# Patient Record
Sex: FEMALE | Race: WHITE
[De-identification: names, ages, dates, MRNs, and addresses within clinical notes are randomized per-mention and may not be internally consistent; named-entity substitution may affect disease eponyms.]

---

## 2022-07-14 PROBLEM — Z00.00 ENCOUNTER FOR PREVENTIVE HEALTH EXAMINATION: Status: ACTIVE | Noted: 2022-07-14

## 2022-07-19 ENCOUNTER — APPOINTMENT (OUTPATIENT)
Dept: ORTHOPEDIC SURGERY | Facility: CLINIC | Age: 87
End: 2022-07-19

## 2022-07-19 DIAGNOSIS — I51.9 HEART DISEASE, UNSPECIFIED: ICD-10-CM

## 2022-07-19 DIAGNOSIS — M79.2 NEURALGIA AND NEURITIS, UNSPECIFIED: ICD-10-CM

## 2022-07-19 DIAGNOSIS — I10 ESSENTIAL (PRIMARY) HYPERTENSION: ICD-10-CM

## 2022-07-19 PROCEDURE — 73610 X-RAY EXAM OF ANKLE: CPT | Mod: RT

## 2022-07-19 PROCEDURE — 99203 OFFICE O/P NEW LOW 30 MIN: CPT

## 2022-07-19 NOTE — PHYSICAL EXAM
[Right] : right foot and ankle [0___] : dorsiflexion 0[unfilled]/5 [5___] : plantar flexion 5[unfilled]/5 [2+] : dorsalis pedis pulse: 2+ [] : uses wheelchair [FreeTextEntry8] : reports diffuse ttp - no localizing to specific area [FreeTextEntry9] : limited by foot drop [de-identified] : decreased diffusely

## 2022-07-19 NOTE — ASSESSMENT
[FreeTextEntry1] : likely nerve pain related to nerve injury at time of surgery\par rec pain management for chronic nerve pain\par rec continued f/up with hip specialist\par continue afo\par f/up prn

## 2022-07-19 NOTE — HISTORY OF PRESENT ILLNESS
[de-identified] : 07/19/2022:  RLE pain s/p revision R hip surgery a few months ago c/b by foot drop. wears afo. reports radiating pain in RLE. going to PT. has been confined to wheelchair siince surgery. no dm/tob.  [FreeTextEntry1] : right lower leg/ankle [FreeTextEntry6] : numbness

## 2022-07-19 NOTE — IMAGING
[Right] : right ankle [There are no fractures, subluxations or dislocations. No significant abnormalities are seen] : There are no fractures, subluxations or dislocations. No significant abnormalities are seen

## 2022-08-10 ENCOUNTER — APPOINTMENT (OUTPATIENT)
Dept: PAIN MANAGEMENT | Facility: CLINIC | Age: 87
End: 2022-08-10

## 2022-08-10 VITALS — HEIGHT: 62 IN | BODY MASS INDEX: 22.82 KG/M2 | WEIGHT: 124 LBS

## 2022-08-10 PROCEDURE — 99204 OFFICE O/P NEW MOD 45 MIN: CPT

## 2022-08-10 RX ORDER — GABAPENTIN 300 MG/1
300 CAPSULE ORAL
Qty: 60 | Refills: 2 | Status: ACTIVE | COMMUNITY
Start: 2022-08-10 | End: 1900-01-01

## 2022-08-10 NOTE — PHYSICAL EXAM
[2___] : right hip flexion  2[unfilled]/5 [1___] : right extensor hallicus longus 1[unfilled]/5 [] : positive equivocal straight leg raise [FreeTextEntry9] : Not tested

## 2022-08-10 NOTE — ASSESSMENT
[FreeTextEntry1] : After discussing various treatment options with the patient including but not limited to oral medications, physical therapy, exercise, modalities as well as interventional spinal injections, we have decided with the following plan:\par \par 1) A MRI is indicated as there has been failure of numerous conservative therapies over the last 6-8 weeks. these include but are not limited to medication therapy and physical therapy. \par \par There has been no further work up after diagnosis of drop foot. \par \par 2) increase gabapentin - I would recommend a trial of neuropathic medication as patient presents with signs of nerve irritation. (ie burning, paresthesias etc) Goals of therapy would be to improve pain and overall QOL. Side effects reviewed with patient. Patient will call or stop medication if given side effects occur.\par

## 2022-08-10 NOTE — HISTORY OF PRESENT ILLNESS
[10] : 10 [Shooting] : shooting [Constant] : constant [Meds] : meds [Lying in bed] : lying in bed [] : no [FreeTextEntry1] : Right leg  [FreeTextEntry6] : Soreness [de-identified] : 03/2022

## 2022-08-11 ENCOUNTER — EMERGENCY (EMERGENCY)
Facility: HOSPITAL | Age: 87
LOS: 1 days | Discharge: HOME | End: 2022-08-11
Attending: EMERGENCY MEDICINE | Admitting: EMERGENCY MEDICINE
Payer: MEDICARE

## 2022-08-11 VITALS
TEMPERATURE: 98 F | OXYGEN SATURATION: 96 % | RESPIRATION RATE: 16 BRPM | DIASTOLIC BLOOD PRESSURE: 80 MMHG | HEART RATE: 68 BPM | SYSTOLIC BLOOD PRESSURE: 135 MMHG

## 2022-08-11 VITALS
RESPIRATION RATE: 17 BRPM | TEMPERATURE: 98 F | DIASTOLIC BLOOD PRESSURE: 65 MMHG | OXYGEN SATURATION: 97 % | SYSTOLIC BLOOD PRESSURE: 135 MMHG | HEART RATE: 69 BPM

## 2022-08-11 PROCEDURE — 99283 EMERGENCY DEPT VISIT LOW MDM: CPT

## 2022-08-11 PROCEDURE — 99282 EMERGENCY DEPT VISIT SF MDM: CPT

## 2022-08-11 NOTE — ED PROVIDER NOTE - PATIENT PORTAL LINK FT
You can access the FollowMyHealth Patient Portal offered by [facility] by registering at the following website: [email or web address]. By joining [employer]’s FollowMyHealth portal, you will also be able to view your health information using other applications (apps) compatible with our system. You can access the FollowMyHealth Patient Portal offered by [facility] by registering at the following website: [email or web address]. By joining [employer]’s FollowMyHealth portal, you will also be able to view your health information using other applications (apps) compatible with our system. You can access the FollowMyHealth Patient Portal offered by [facility] by registering at the following website: [email or web address]. By joining [employer]’s FollowMyHealth portal, you will also be able to view your health information using other applications (apps) compatible with our system.

## 2022-08-11 NOTE — ED ADULT NURSE NOTE - NSICDXPASTMEDICALHX_GEN_ALL_CORE_FT
PAST MEDICAL HISTORY:  Afib     Degeneration macular     HTN (hypertension)     Osteoporosis     PFO (patent foramen ovale)     Spinal stenosis

## 2022-08-11 NOTE — ED PROVIDER NOTE - OBJECTIVE STATEMENT
Patient brought in by EMS from assisted living for laceration to left thumb from last night.  Patient relates she was adjusting something on her wheelchair at approximately 6 PM when she accidentally cut her finger.  Tetanus up-to-date.  No weakness numbness or any other injuries.    PMAROLDO Baker

## 2022-08-11 NOTE — ED ADULT NURSE NOTE - OBJECTIVE STATEMENT
Presents to ER with left thumb avulsion. Pt states she cut it on her wheelchair last night. Dr West stating wound cannot be sutured, will place steri strips to wound.

## 2022-08-11 NOTE — ED PROVIDER NOTE - CLINICAL SUMMARY MEDICAL DECISION MAKING FREE TEXT BOX
Patient brought in by EMS for laceration to left thumb more than 12 hours ago.  No weakness or numbness or any other injuries.  Tetanus up-to-date.  Due to laceration occurring more than 12 hours ago, wound cleansed Steri-Strips placed, not repaired with sutures due to increased risk of infection

## 2022-08-11 NOTE — ED PROVIDER NOTE - SKIN, MLM
Skin normal color for race, warm, dry. left thumb approx 2cm laceration over prox phalanx, full rom, distal sensation intact cap refill < 2 secs

## 2022-08-11 NOTE — ED ADULT NURSE NOTE - NSIMPLEMENTINTERV_GEN_ALL_ED
Implemented All Universal Safety Interventions:  [place] to call system. Call bell, personal items and telephone within reach. Instruct patient to call for assistance. Room bathroom lighting operational. Non-slip footwear when patient is off stretcher. Physically safe environment: no spills, clutter or unnecessary equipment. Stretcher in lowest position, wheels locked, appropriate side rails in place. Implemented All Universal Safety Interventions:  [place] to call system. Call bell, personal items and telephone within reach. Instruct patient to call for assistance. Room bathroom lighting operational. Non-slip footwear when patient is off stretcher. Physically safe environment: no spills, clutter or unnecessary equipment. Stretcher in lowest position, wheels locked, appropriate side rails in place. Implemented All Universal Safety Interventions:  [place] to call system. Call bell, personal items and telephone within reach. Instruct patient to call for assistance. Room bathroom lighting operational. Non-slip footwear when patient is off stretcher. Physically safe environment: no spills, clutter or unnecessary equipment. Stretcher in lowest position, wheels locked, appropriate side rails in place.

## 2022-08-16 ENCOUNTER — FORM ENCOUNTER (OUTPATIENT)
Age: 87
End: 2022-08-16

## 2022-08-17 ENCOUNTER — APPOINTMENT (OUTPATIENT)
Dept: MRI IMAGING | Facility: CLINIC | Age: 87
End: 2022-08-17

## 2022-08-17 PROCEDURE — 72148 MRI LUMBAR SPINE W/O DYE: CPT | Mod: MH

## 2022-08-30 ENCOUNTER — NON-APPOINTMENT (OUTPATIENT)
Age: 87
End: 2022-08-30

## 2022-09-26 ENCOUNTER — APPOINTMENT (OUTPATIENT)
Dept: PAIN MANAGEMENT | Facility: CLINIC | Age: 87
End: 2022-09-26

## 2022-09-26 VITALS — WEIGHT: 129 LBS | BODY MASS INDEX: 23.74 KG/M2 | HEIGHT: 62 IN

## 2022-10-20 ENCOUNTER — APPOINTMENT (OUTPATIENT)
Age: 87
End: 2022-10-20

## 2022-11-03 ENCOUNTER — APPOINTMENT (OUTPATIENT)
Age: 87
End: 2022-11-03

## 2022-11-03 PROCEDURE — 64483 NJX AA&/STRD TFRM EPI L/S 1: CPT | Mod: RT

## 2022-11-21 ENCOUNTER — APPOINTMENT (OUTPATIENT)
Dept: PAIN MANAGEMENT | Facility: CLINIC | Age: 87
End: 2022-11-21

## 2023-01-24 ENCOUNTER — APPOINTMENT (OUTPATIENT)
Dept: ORTHOPEDIC SURGERY | Facility: CLINIC | Age: 88
End: 2023-01-24
Payer: MEDICARE

## 2023-01-24 VITALS — HEIGHT: 62 IN | WEIGHT: 129 LBS | BODY MASS INDEX: 23.74 KG/M2

## 2023-01-24 DIAGNOSIS — M21.371 FOOT DROP, RIGHT FOOT: ICD-10-CM

## 2023-01-24 PROCEDURE — 99213 OFFICE O/P EST LOW 20 MIN: CPT

## 2023-01-24 NOTE — PHYSICAL EXAM
[Right] : right foot and ankle [0___] : dorsiflexion 0[unfilled]/5 [5___] : plantar flexion 5[unfilled]/5 [2+] : dorsalis pedis pulse: 2+ [] : no gross deformity [FreeTextEntry8] : no sig poin ttp [FreeTextEntry9] : limited by foot drop [de-identified] : decreased diffusely

## 2023-01-24 NOTE — HISTORY OF PRESENT ILLNESS
[de-identified] : 07/19/2022:  RLE pain s/p revision R hip surgery a few months ago c/b by foot drop. wears afo. reports radiating pain in RLE. going to PT. has been confined to wheelchair siince surgery. no dm/tob. \par \par 01/24/2023: here requesting rx for new afo. no sig change.  [] : Post Surgical Visit: no [FreeTextEntry1] : right lower leg/ankle [FreeTextEntry6] : numbness

## 2023-01-24 NOTE — ASSESSMENT
[FreeTextEntry1] : seeing dr oconnell for pain management\par rec continued f/up with hip specialist\par new rx for afo provided\par f/up prn\par \par The patient is ambulatory and requires stabilization at the ankle to continue safe ambulation. The patient requires a custom brace because they cannot fit into a prefabricated brace due to the limb size and shape. Also, the condition necessitating the orthosis is expected to be of longstanding duration. There is also a need to control the ankle in multiple planes.\par \par

## 2023-03-04 ENCOUNTER — EMERGENCY (EMERGENCY)
Facility: HOSPITAL | Age: 88
LOS: 1 days | Discharge: HOME | End: 2023-03-04
Attending: STUDENT IN AN ORGANIZED HEALTH CARE EDUCATION/TRAINING PROGRAM | Admitting: STUDENT IN AN ORGANIZED HEALTH CARE EDUCATION/TRAINING PROGRAM
Payer: MEDICARE

## 2023-03-04 VITALS
OXYGEN SATURATION: 92 % | HEART RATE: 60 BPM | TEMPERATURE: 98 F | RESPIRATION RATE: 20 BRPM | DIASTOLIC BLOOD PRESSURE: 80 MMHG | SYSTOLIC BLOOD PRESSURE: 182 MMHG | WEIGHT: 130.07 LBS

## 2023-03-04 VITALS
RESPIRATION RATE: 18 BRPM | OXYGEN SATURATION: 97 % | SYSTOLIC BLOOD PRESSURE: 177 MMHG | TEMPERATURE: 98 F | HEART RATE: 59 BPM | DIASTOLIC BLOOD PRESSURE: 98 MMHG

## 2023-03-04 LAB
ALBUMIN SERPL ELPH-MCNC: 3.1 G/DL — LOW (ref 3.3–5)
ALP SERPL-CCNC: 121 U/L — HIGH (ref 40–120)
ALT FLD-CCNC: 22 U/L — SIGNIFICANT CHANGE UP (ref 12–78)
ANION GAP SERPL CALC-SCNC: 4 MMOL/L — LOW (ref 5–17)
APPEARANCE UR: ABNORMAL
APTT BLD: 29.7 SEC — SIGNIFICANT CHANGE UP (ref 27.5–35.5)
AST SERPL-CCNC: 22 U/L — SIGNIFICANT CHANGE UP (ref 15–37)
BASOPHILS # BLD AUTO: 0.07 K/UL — SIGNIFICANT CHANGE UP (ref 0–0.2)
BASOPHILS NFR BLD AUTO: 0.7 % — SIGNIFICANT CHANGE UP (ref 0–2)
BILIRUB SERPL-MCNC: 0.6 MG/DL — SIGNIFICANT CHANGE UP (ref 0.2–1.2)
BILIRUB UR-MCNC: NEGATIVE — SIGNIFICANT CHANGE UP
BUN SERPL-MCNC: 30 MG/DL — HIGH (ref 7–23)
CALCIUM SERPL-MCNC: 8.7 MG/DL — SIGNIFICANT CHANGE UP (ref 8.5–10.1)
CHLORIDE SERPL-SCNC: 111 MMOL/L — HIGH (ref 96–108)
CO2 SERPL-SCNC: 27 MMOL/L — SIGNIFICANT CHANGE UP (ref 22–31)
COLOR SPEC: YELLOW — SIGNIFICANT CHANGE UP
CREAT SERPL-MCNC: 0.89 MG/DL — SIGNIFICANT CHANGE UP (ref 0.5–1.3)
DIFF PNL FLD: ABNORMAL
EGFR: 60 ML/MIN/1.73M2 — SIGNIFICANT CHANGE UP
EOSINOPHIL # BLD AUTO: 0.06 K/UL — SIGNIFICANT CHANGE UP (ref 0–0.5)
EOSINOPHIL NFR BLD AUTO: 0.6 % — SIGNIFICANT CHANGE UP (ref 0–6)
GLUCOSE SERPL-MCNC: 90 MG/DL — SIGNIFICANT CHANGE UP (ref 70–99)
GLUCOSE UR QL: NEGATIVE — SIGNIFICANT CHANGE UP
HCT VFR BLD CALC: 36.6 % — SIGNIFICANT CHANGE UP (ref 34.5–45)
HGB BLD-MCNC: 11.4 G/DL — LOW (ref 11.5–15.5)
IMM GRANULOCYTES NFR BLD AUTO: 0.5 % — SIGNIFICANT CHANGE UP (ref 0–0.9)
INR BLD: 1 RATIO — SIGNIFICANT CHANGE UP (ref 0.88–1.16)
KETONES UR-MCNC: NEGATIVE — SIGNIFICANT CHANGE UP
LEUKOCYTE ESTERASE UR-ACNC: ABNORMAL
LIDOCAIN IGE QN: 168 U/L — SIGNIFICANT CHANGE UP (ref 73–393)
LYMPHOCYTES # BLD AUTO: 1.21 K/UL — SIGNIFICANT CHANGE UP (ref 1–3.3)
LYMPHOCYTES # BLD AUTO: 12.7 % — LOW (ref 13–44)
MCHC RBC-ENTMCNC: 30.4 PG — SIGNIFICANT CHANGE UP (ref 27–34)
MCHC RBC-ENTMCNC: 31.1 GM/DL — LOW (ref 32–36)
MCV RBC AUTO: 97.6 FL — SIGNIFICANT CHANGE UP (ref 80–100)
MONOCYTES # BLD AUTO: 0.69 K/UL — SIGNIFICANT CHANGE UP (ref 0–0.9)
MONOCYTES NFR BLD AUTO: 7.2 % — SIGNIFICANT CHANGE UP (ref 2–14)
NEUTROPHILS # BLD AUTO: 7.45 K/UL — HIGH (ref 1.8–7.4)
NEUTROPHILS NFR BLD AUTO: 78.3 % — HIGH (ref 43–77)
NITRITE UR-MCNC: NEGATIVE — SIGNIFICANT CHANGE UP
NRBC # BLD: 0 /100 WBCS — SIGNIFICANT CHANGE UP (ref 0–0)
PH UR: 5 — SIGNIFICANT CHANGE UP (ref 5–8)
PLATELET # BLD AUTO: 271 K/UL — SIGNIFICANT CHANGE UP (ref 150–400)
POTASSIUM SERPL-MCNC: 4.4 MMOL/L — SIGNIFICANT CHANGE UP (ref 3.5–5.3)
POTASSIUM SERPL-SCNC: 4.4 MMOL/L — SIGNIFICANT CHANGE UP (ref 3.5–5.3)
PROT SERPL-MCNC: 7 G/DL — SIGNIFICANT CHANGE UP (ref 6–8.3)
PROT UR-MCNC: 30 MG/DL
PROTHROM AB SERPL-ACNC: 11.7 SEC — SIGNIFICANT CHANGE UP (ref 10.5–13.4)
RBC # BLD: 3.75 M/UL — LOW (ref 3.8–5.2)
RBC # FLD: 14 % — SIGNIFICANT CHANGE UP (ref 10.3–14.5)
SODIUM SERPL-SCNC: 142 MMOL/L — SIGNIFICANT CHANGE UP (ref 135–145)
SP GR SPEC: 1.01 — SIGNIFICANT CHANGE UP (ref 1.01–1.02)
UROBILINOGEN FLD QL: NEGATIVE — SIGNIFICANT CHANGE UP
WBC # BLD: 9.53 K/UL — SIGNIFICANT CHANGE UP (ref 3.8–10.5)
WBC # FLD AUTO: 9.53 K/UL — SIGNIFICANT CHANGE UP (ref 3.8–10.5)

## 2023-03-04 PROCEDURE — 74176 CT ABD & PELVIS W/O CONTRAST: CPT | Mod: MA

## 2023-03-04 PROCEDURE — 85025 COMPLETE CBC W/AUTO DIFF WBC: CPT

## 2023-03-04 PROCEDURE — 36415 COLL VENOUS BLD VENIPUNCTURE: CPT

## 2023-03-04 PROCEDURE — 81001 URINALYSIS AUTO W/SCOPE: CPT

## 2023-03-04 PROCEDURE — 83690 ASSAY OF LIPASE: CPT

## 2023-03-04 PROCEDURE — 85610 PROTHROMBIN TIME: CPT

## 2023-03-04 PROCEDURE — 80053 COMPREHEN METABOLIC PANEL: CPT

## 2023-03-04 PROCEDURE — 85730 THROMBOPLASTIN TIME PARTIAL: CPT

## 2023-03-04 PROCEDURE — 99284 EMERGENCY DEPT VISIT MOD MDM: CPT | Mod: FS

## 2023-03-04 PROCEDURE — 99284 EMERGENCY DEPT VISIT MOD MDM: CPT | Mod: 25

## 2023-03-04 PROCEDURE — 76856 US EXAM PELVIC COMPLETE: CPT

## 2023-03-04 PROCEDURE — 74176 CT ABD & PELVIS W/O CONTRAST: CPT | Mod: 26,MA

## 2023-03-04 PROCEDURE — 86850 RBC ANTIBODY SCREEN: CPT

## 2023-03-04 PROCEDURE — 76856 US EXAM PELVIC COMPLETE: CPT | Mod: 26

## 2023-03-04 PROCEDURE — 87186 SC STD MICRODIL/AGAR DIL: CPT

## 2023-03-04 PROCEDURE — 86901 BLOOD TYPING SEROLOGIC RH(D): CPT

## 2023-03-04 PROCEDURE — 86900 BLOOD TYPING SEROLOGIC ABO: CPT

## 2023-03-04 PROCEDURE — 87086 URINE CULTURE/COLONY COUNT: CPT

## 2023-03-04 NOTE — ED PROVIDER NOTE - PROGRESS NOTE DETAILS
daughter notified of results and plan - states has f/u with gyn wednesday - advised to keep appt, return precautions discussed

## 2023-03-04 NOTE — ED PROVIDER NOTE - OBJECTIVE STATEMENT
89 F c/o vaginal bleeding. Has been ongoing issue x months, but facility felt it was increased recently so sent to ED. Pt on eliquis. Denies pain, f/c, cp, sob, palpitations.

## 2023-03-04 NOTE — ED PROVIDER NOTE - PATIENT PORTAL LINK FT
You can access the FollowMyHealth Patient Portal offered by [facility] by registering at the following website: [email or web address]. By joining [employer]’s FollowMyHealth portal, you will also be able to view your health information using other applications (apps) compatible with our system. You can access the FollowMyHealth Patient Portal offered by [facility] by registering at the following website: [email or web address]. By joining [employer]’s FollowMyHealth portal, you will also be able to view your health information using other applications (apps) compatible with our system. You can access the FollowMyHealth Patient Portal offered by [facility] by registering at the following website: [email or web address]. By joining [employer]’s FollowMyHealth portal, you will also be able to view your health information using other applications (apps) compatible with our system.

## 2023-03-04 NOTE — ED PROVIDER NOTE - NSFOLLOWUPINSTRUCTIONS_ED_ALL_ED_FT
Follow up with gynecologist as planned.  Return for worsening or concerning symptoms.          Abnormal uterine bleeding means bleeding more than normal from your womb (uterus). It can include:  •Bleeding after sex.      •Bleeding between monthly (menstrual) periods.      •Bleeding that is heavier than normal.      •Monthly periods that last longer than normal.      •Bleeding after you have stopped having your monthly period (menopause).      You should see a doctor for any kind of bleeding that is not normal. Treatment depends on the cause of your bleeding and how much you bleed.      Follow these instructions at home:    Medicines     •Take over-the-counter and prescription medicines only as told by your doctor.    •Ask your doctor about:  •Taking medicines such as aspirin and ibuprofen. Do not take these medicines unless your doctor tells you to take them.      •Taking over-the-counter medicines, vitamins, herbs, and supplements.        •You may be given iron pills. Take them as told by your doctor.      Managing constipation     If you take iron pills, you may need to take these actions to prevent or treat trouble pooping (constipation):  •Drink enough fluid to keep your pee (urine) pale yellow.      •Take over-the-counter or prescription medicines.      •Eat foods that are high in fiber. These include beans, whole grains, and fresh fruits and vegetables.      •Limit foods that are high in fat and sugar. These include fried or sweet foods.      Activity    Change your activity to decrease bleeding if you need to change your sanitary pad more than one time every 2 hours:  •Lie in bed with your feet raised (elevated).      •Place a cold pack on your lower belly.      •Rest as much as you are able until the bleeding stops or slows down.      General instructions    •Do not use tampons, douche, or have sex until your doctor says these things are okay.      •Change your pads often.    •Get regular exams. These include:   •Pelvic exams.      •Screenings for cancer of the cervix.        •It is up to you to get the results of any tests that are done. Ask how to get your results when they are ready.    •Watch for any changes in your bleeding. For 2 months, write down:  •When your monthly period starts.      •When your monthly period ends.      •When you get any abnormal bleeding from your vagina.      •What problems you notice.        •Keep all follow-up visits.        Contact a doctor if:    •The bleeding lasts more than one week.      •You feel dizzy at times.      •You feel like you may vomit (nausea).      •You vomit.      •You feel light-headed or weak.      •Your symptoms get worse.        Get help right away if:    •You faint.      •You have to change pads every hour.      •You have pain in your belly.      •You have a fever or chills.      •You get sweaty or weak.      •You pass large blood clots from your vagina.      These symptoms may be an emergency. Get help right away. Call your local emergency services (911 in the [place]    • Do not wait to see if the symptoms will go away.       • Do not drive yourself to the hospital.         Summary    •Abnormal uterine bleeding means bleeding more than normal from your womb (uterus).      •Any kind of bleeding that is not normal should be checked by a doctor.      •Treatment depends on the cause of your bleeding and how much you bleed.      •Get help right away if you faint, you have to change pads every hour, or you pass large blood clots from your vagina.      This information is not intended to replace advice given to you by your health care provider. Make sure you discuss any questions you have with your health care provider.

## 2023-03-04 NOTE — ED ADULT TRIAGE NOTE - NS ED NURSE AMBULANCES
[facility]

## 2023-03-04 NOTE — ED ADULT NURSE NOTE - CAS EDP DISCH TYPE
[facility]/Assisted living facility [facility]Assisted living facility [facility]/Assisted living facility

## 2023-03-04 NOTE — ED ADULT NURSE NOTE - NSIMPLEMENTINTERV_GEN_ALL_ED
Implemented All Universal Safety Interventions:  [place] to call system. Call bell, personal items and telephone within reach. Instruct patient to call for assistance. Room bathroom lighting operational. Non-slip footwear when patient is off stretcher. Physically safe environment: no spills, clutter or unnecessary equipment. Stretcher in lowest position, wheels locked, appropriate side rails in place. Implemented All Universal Safety Interventions:  [place] to call system. Call bell, personal items and telephone within reach. Instruct patient to call for assistance. Room bathroom lighting operational. Non-slip footwear when patient is off stretcher. Physically safe environment: no spills, clutter or unnecessary equipment. Stretcher in lowest position, wheels locked, appropriate side rails in place. Implemented All Universal Safety Interventions:  [place] to call system. Call bell, personal items and telephone within reach. Instruct patient to call for assistance. Room bathroom lighting operational. Non-slip footwear when patient is off stretcher. Physically safe environment: no spills, clutter or unnecessary equipment. Stretcher in lowest position, wheels locked, appropriate side rails in place.

## 2023-03-04 NOTE — ED ADULT NURSE NOTE - OBJECTIVE STATEMENT
Patient is 89yo F presents with c/o vaginal or rectal bleeding x2 days. Patient reports she is on Eliquis, noticed blood in her diaper two days ago. Patient reports she is unsure if it is from "the front or the behind". Patient denies pain anywhere, denies palpitations, dizziness, visual changes, or recent injury. Per papers from [facility] patient did not receive her Eliquis today. Patient is 89yo F presents with c/o vaginal or rectal bleeding x2 days. Patient reports she is on Eliquis, noticed blood in her diaper two days ago. Patient reports she is unsure if it is from "the front or the behind". Patient denies pain anywhere, denies palpitations, dizziness, visual changes, or recent injury. Per papers from [facility] patient did not receive her Eliquis today. Patient is 89yo F presents with c/o vaginal or rectal bleeding x2 days. Patient reports she is on Eliquis, noticed blood in her diaper two days ago. Patient reports she is unsure if it is from "the front or the behind". Patient denies pain anywhere, denies palpitations, dizziness, visual changes, or recent injury. Per papers from [facility] patient did not receive her Eliquis today.

## 2023-03-04 NOTE — ED ADULT NURSE REASSESSMENT NOTE - NSIMPLEMENTINTERV_GEN_ALL_ED
Implemented All Fall with Harm Risk Interventions:  [place] to call system. Call bell, personal items and telephone within reach. Instruct patient to call for assistance. Room bathroom lighting operational. Non-slip footwear when patient is off stretcher. Physically safe environment: no spills, clutter or unnecessary equipment. Stretcher in lowest position, wheels locked, appropriate side rails in place. Provide visual cue, wrist band, yellow gown, etc. Monitor gait and stability. Monitor for mental status changes and reorient to person, place, and time. Review medications for side effects contributing to fall risk. Reinforce activity limits and safety measures with patient and family. Provide visual clues: red socks. Implemented All Fall with Harm Risk Interventions:  [place] to call system. Call bell, personal items and telephone within reach. Instruct patient to call for assistance. Room bathroom lighting operational. Non-slip footwear when patient is off stretcher. Physically safe environment: no spills, clutter or unnecessary equipment. Stretcher in lowest position, wheels locked, appropriate side rails in place. Provide visual cue, wrist band, yellow gown, etc. Monitor gait and stability. Monitor for mental status changes and reorient to person, place, and time. Review medications for side effects contributing to fall risk. Reinforce activity limits and safety measures with patient and family. Provide visual clues: red socks. Implemented All Fall with Harm Risk Interventions:  [place] to call system. Call bell, personal items and telephone within reach. Instruct patient to call for assistance. Room bathroom lighting operational. Non-slip footwear when patient is off stretcher. Physically safe environment: no spills, clutter or unnecessary equipment. Stretcher in lowest position, wheels locked, appropriate side rails in place. Provide visual cue, wrist band, yellow gown, etc. Monitor gait and stability. Monitor for mental status changes and reorient to person, place, and time. Review medications for side effects contributing to fall risk. Reinforce activity limits and safety measures with patient and family. Provide visual clues: red socks.

## 2023-03-04 NOTE — ED ADULT NURSE REASSESSMENT NOTE - NS ED NURSE REASSESS COMMENT FT1
Pt resting comfortably in bed at this time, offers no complaints.  Denies any chest pain or SOB.  No n/v/d.  Pt still appears to be having some vaginal bleeding.  Pending ultrasound.  Denies any pain or discomfort.  Maintain comfort and safety.

## 2023-03-04 NOTE — ED PROVIDER NOTE - NS ED ATTENDING STATEMENT MOD
This was a shared visit with the ASAD. I reviewed and verified the documentation and independently performed the documented:

## 2023-03-04 NOTE — ED PROVIDER NOTE - CLINICAL SUMMARY MEDICAL DECISION MAKING FREE TEXT BOX
I, Gideon Salgado MD, have seen and examined the patient on the date of service.  I agree with the ASAD's assessment as written, with exceptions or additions as noted below or in a separate note. pt with pmh of afib on eliquis, htn is here for vaginal bleeding. states present for months, but feels like increased recently so came to ed. otherwise denies any complaints. no cp/sob/abdominal pain/nausea/vomiting. on exam, pt without vasquez bleeding or abd pain. there is some blood noted on her diaper. a/p: unclear etiology to bleeding, possible hematuria vs vaginal bleeding, maybe from fibroids vs malignancy? lower concern for gi bleed given no reported melena. given chronicity, will check for anemia as well as imaging for pathology. will also check for uti. plan for labs/imaging.

## 2023-03-07 LAB
-  AMPICILLIN: SIGNIFICANT CHANGE UP
-  CIPROFLOXACIN: SIGNIFICANT CHANGE UP
-  LEVOFLOXACIN: SIGNIFICANT CHANGE UP
-  NITROFURANTOIN: SIGNIFICANT CHANGE UP
-  TETRACYCLINE: SIGNIFICANT CHANGE UP
-  VANCOMYCIN: SIGNIFICANT CHANGE UP
CULTURE RESULTS: SIGNIFICANT CHANGE UP
METHOD TYPE: SIGNIFICANT CHANGE UP
ORGANISM # SPEC MICROSCOPIC CNT: SIGNIFICANT CHANGE UP
SPECIMEN SOURCE: SIGNIFICANT CHANGE UP

## 2023-03-07 NOTE — ED POST DISCHARGE NOTE - RESULT SUMMARY
Enterococcus found in the urine.  I spoke with patient's daughter as well as with patient's assisted living, Bactrim sent to pharmacy.  Pending sensitivity.  Gideon Salgado MD.

## 2023-03-07 NOTE — ED POST DISCHARGE NOTE - DETAILS
spoke with daughter and assisted living facility Urine cultures return, sensitive to fluoroquinolones as well as Macrobid.  Given patient amiodarone, fluoroquinolone not a great option so Macrobid prescribed.  I called patient assisted living and let them know that we are switching her antibiotics.  Gideon Salgado MD.

## 2023-03-08 RX ORDER — NITROFURANTOIN MACROCRYSTAL 50 MG
1 CAPSULE ORAL
Qty: 14 | Refills: 0
Start: 2023-03-08 | End: 2023-03-14

## 2023-07-10 ENCOUNTER — INPATIENT (INPATIENT)
Facility: HOSPITAL | Age: 88
LOS: 1 days | Discharge: NURSING, REHAB OR LONG-TERM CARE | DRG: 309 | End: 2023-07-12
Attending: INTERNAL MEDICINE | Admitting: INTERNAL MEDICINE
Payer: MEDICARE

## 2023-07-10 VITALS
HEART RATE: 40 BPM | TEMPERATURE: 98 F | OXYGEN SATURATION: 99 % | DIASTOLIC BLOOD PRESSURE: 54 MMHG | SYSTOLIC BLOOD PRESSURE: 102 MMHG | RESPIRATION RATE: 18 BRPM

## 2023-07-10 PROCEDURE — 99291 CRITICAL CARE FIRST HOUR: CPT

## 2023-07-10 NOTE — ED ADULT TRIAGE NOTE - MEANS OF ARRIVAL
stretcher Consent 1/Introductory Paragraph: The rationale for Mohs was explained to the patient and consent was obtained. The risks, benefits and alternatives to therapy were discussed in detail. Specifically, the risks of infection, scarring, bleeding, prolonged wound healing, incomplete removal, allergy to anesthesia, nerve injury and recurrence were addressed. Prior to the procedure, the treatment site was clearly identified and confirmed by the patient. All components of Universal Protocol/PAUSE Rule completed.

## 2023-07-10 NOTE — ED ADULT TRIAGE NOTE - CHIEF COMPLAINT QUOTE
per ems from [facility] for shortness of breath, oxygen saturation on room air 95%, arrives on nasal cannula. pt breathing unlabored. per ems HR 30-60s. pt received 1mg of atropine IV per ems per ems from [facility] for shortness of breath, oxygen saturation on room air 95%, arrives on nasal cannula. pt breathing unlabored. per ems HR 30-60s. pt received 1mg of atropine IV per ems per ems from [facility] for shortness of breath, oxygen saturation on room air 95%, arrives on nasal cannula. pt breathing unlabored. per ems HR 30-60s. pt received 1mg of atropine IV per ems

## 2023-07-10 NOTE — ED ADULT TRIAGE NOTE - NS ED NURSE AMBULANCES
[facility]

## 2023-07-11 DIAGNOSIS — I44.2 ATRIOVENTRICULAR BLOCK, COMPLETE: ICD-10-CM

## 2023-07-11 LAB
ALBUMIN SERPL ELPH-MCNC: 2.9 G/DL — LOW (ref 3.3–5)
ALBUMIN SERPL ELPH-MCNC: 3.1 G/DL — LOW (ref 3.3–5)
ALP SERPL-CCNC: 108 U/L — SIGNIFICANT CHANGE UP (ref 40–120)
ALP SERPL-CCNC: 113 U/L — SIGNIFICANT CHANGE UP (ref 40–120)
ALP SERPL-CCNC: 120 U/L — SIGNIFICANT CHANGE UP (ref 40–120)
ALT FLD-CCNC: 80 U/L — HIGH (ref 12–78)
ALT FLD-CCNC: 92 U/L — HIGH (ref 12–78)
ALT FLD-CCNC: 95 U/L — HIGH (ref 12–78)
ANION GAP SERPL CALC-SCNC: 6 MMOL/L — SIGNIFICANT CHANGE UP (ref 5–17)
ANION GAP SERPL CALC-SCNC: 7 MMOL/L — SIGNIFICANT CHANGE UP (ref 5–17)
ANION GAP SERPL CALC-SCNC: 8 MMOL/L — SIGNIFICANT CHANGE UP (ref 5–17)
APTT BLD: 42 SEC — HIGH (ref 27.5–35.5)
AST SERPL-CCNC: 53 U/L — HIGH (ref 15–37)
AST SERPL-CCNC: 66 U/L — HIGH (ref 15–37)
BASOPHILS # BLD AUTO: 0.09 K/UL — SIGNIFICANT CHANGE UP (ref 0–0.2)
BASOPHILS NFR BLD AUTO: 0.6 % — SIGNIFICANT CHANGE UP (ref 0–2)
BILIRUB SERPL-MCNC: 0.3 MG/DL — SIGNIFICANT CHANGE UP (ref 0.2–1.2)
BILIRUB SERPL-MCNC: 0.4 MG/DL — SIGNIFICANT CHANGE UP (ref 0.2–1.2)
BUN SERPL-MCNC: 49 MG/DL — HIGH (ref 7–23)
BUN SERPL-MCNC: 50 MG/DL — HIGH (ref 7–23)
BUN SERPL-MCNC: 52 MG/DL — HIGH (ref 7–23)
CALCIUM SERPL-MCNC: 8.8 MG/DL — SIGNIFICANT CHANGE UP (ref 8.5–10.1)
CALCIUM SERPL-MCNC: 9 MG/DL — SIGNIFICANT CHANGE UP (ref 8.5–10.1)
CALCIUM SERPL-MCNC: 9.3 MG/DL — SIGNIFICANT CHANGE UP (ref 8.5–10.1)
CHLORIDE SERPL-SCNC: 109 MMOL/L — HIGH (ref 96–108)
CHLORIDE SERPL-SCNC: 110 MMOL/L — HIGH (ref 96–108)
CHOLEST SERPL-MCNC: 142 MG/DL — SIGNIFICANT CHANGE UP
CK MB BLD-MCNC: 2.7 % — SIGNIFICANT CHANGE UP (ref 0–3.5)
CK MB CFR SERPL CALC: 1.4 NG/ML — SIGNIFICANT CHANGE UP (ref 0–3.6)
CK SERPL-CCNC: 51 U/L — SIGNIFICANT CHANGE UP (ref 26–192)
CO2 SERPL-SCNC: 19 MMOL/L — LOW (ref 22–31)
CO2 SERPL-SCNC: 23 MMOL/L — SIGNIFICANT CHANGE UP (ref 22–31)
CO2 SERPL-SCNC: 25 MMOL/L — SIGNIFICANT CHANGE UP (ref 22–31)
CREAT SERPL-MCNC: 2.4 MG/DL — HIGH (ref 0.5–1.3)
CREAT SERPL-MCNC: 2.7 MG/DL — HIGH (ref 0.5–1.3)
CREAT SERPL-MCNC: 2.8 MG/DL — HIGH (ref 0.5–1.3)
EGFR: 15 ML/MIN/1.73M2 — LOW
EGFR: 16 ML/MIN/1.73M2 — LOW
EGFR: 18 ML/MIN/1.73M2 — LOW
EOSINOPHIL # BLD AUTO: 0.03 K/UL — SIGNIFICANT CHANGE UP (ref 0–0.5)
EOSINOPHIL NFR BLD AUTO: 0.2 % — SIGNIFICANT CHANGE UP (ref 0–6)
GLUCOSE SERPL-MCNC: 104 MG/DL — HIGH (ref 70–99)
GLUCOSE SERPL-MCNC: 107 MG/DL — HIGH (ref 70–99)
GLUCOSE SERPL-MCNC: 128 MG/DL — HIGH (ref 70–99)
HCT VFR BLD CALC: 32.5 % — LOW (ref 34.5–45)
HCT VFR BLD CALC: 33.8 % — LOW (ref 34.5–45)
HCT VFR BLD CALC: 35.9 % — SIGNIFICANT CHANGE UP (ref 34.5–45)
HDLC SERPL-MCNC: 55 MG/DL — SIGNIFICANT CHANGE UP
HGB BLD-MCNC: 10.2 G/DL — LOW (ref 11.5–15.5)
HGB BLD-MCNC: 10.7 G/DL — LOW (ref 11.5–15.5)
HGB BLD-MCNC: 9.9 G/DL — LOW (ref 11.5–15.5)
IMM GRANULOCYTES NFR BLD AUTO: 1.5 % — HIGH (ref 0–0.9)
INR BLD: 1.32 RATIO — HIGH (ref 0.88–1.16)
LACTATE SERPL-SCNC: 1.6 MMOL/L — SIGNIFICANT CHANGE UP (ref 0.7–2)
LACTATE SERPL-SCNC: 2.5 MMOL/L — HIGH (ref 0.7–2)
LACTATE SERPL-SCNC: 2.9 MMOL/L — HIGH (ref 0.7–2)
LIDOCAIN IGE QN: 277 U/L — SIGNIFICANT CHANGE UP (ref 73–393)
LIPID PNL WITH DIRECT LDL SERPL: 70 MG/DL — SIGNIFICANT CHANGE UP
LYMPHOCYTES # BLD AUTO: 1.25 K/UL — SIGNIFICANT CHANGE UP (ref 1–3.3)
LYMPHOCYTES # BLD AUTO: 7.7 % — LOW (ref 13–44)
MAGNESIUM SERPL-MCNC: 2.3 MG/DL — SIGNIFICANT CHANGE UP (ref 1.6–2.6)
MAGNESIUM SERPL-MCNC: 2.4 MG/DL — SIGNIFICANT CHANGE UP (ref 1.6–2.6)
MCHC RBC-ENTMCNC: 28.2 PG — SIGNIFICANT CHANGE UP (ref 27–34)
MCHC RBC-ENTMCNC: 28.3 PG — SIGNIFICANT CHANGE UP (ref 27–34)
MCHC RBC-ENTMCNC: 28.6 PG — SIGNIFICANT CHANGE UP (ref 27–34)
MCHC RBC-ENTMCNC: 29.8 GM/DL — LOW (ref 32–36)
MCHC RBC-ENTMCNC: 30.2 GM/DL — LOW (ref 32–36)
MCHC RBC-ENTMCNC: 30.5 GM/DL — LOW (ref 32–36)
MCV RBC AUTO: 92.6 FL — SIGNIFICANT CHANGE UP (ref 80–100)
MCV RBC AUTO: 93.6 FL — SIGNIFICANT CHANGE UP (ref 80–100)
MCV RBC AUTO: 96 FL — SIGNIFICANT CHANGE UP (ref 80–100)
MONOCYTES # BLD AUTO: 1.08 K/UL — HIGH (ref 0–0.9)
MONOCYTES NFR BLD AUTO: 6.7 % — SIGNIFICANT CHANGE UP (ref 2–14)
NEUTROPHILS # BLD AUTO: 13.47 K/UL — HIGH (ref 1.8–7.4)
NEUTROPHILS NFR BLD AUTO: 83.3 % — HIGH (ref 43–77)
NON HDL CHOLESTEROL: 87 MG/DL — SIGNIFICANT CHANGE UP
NRBC # BLD: 0 /100 WBCS — SIGNIFICANT CHANGE UP (ref 0–0)
NT-PROBNP SERPL-SCNC: 4030 PG/ML — HIGH (ref 0–450)
PLATELET # BLD AUTO: 310 K/UL — SIGNIFICANT CHANGE UP (ref 150–400)
PLATELET # BLD AUTO: 338 K/UL — SIGNIFICANT CHANGE UP (ref 150–400)
PLATELET # BLD AUTO: 346 K/UL — SIGNIFICANT CHANGE UP (ref 150–400)
POTASSIUM SERPL-MCNC: 5 MMOL/L — SIGNIFICANT CHANGE UP (ref 3.5–5.3)
POTASSIUM SERPL-MCNC: 5.2 MMOL/L — SIGNIFICANT CHANGE UP (ref 3.5–5.3)
POTASSIUM SERPL-MCNC: 6.5 MMOL/L — CRITICAL HIGH (ref 3.5–5.3)
POTASSIUM SERPL-SCNC: 5 MMOL/L — SIGNIFICANT CHANGE UP (ref 3.5–5.3)
POTASSIUM SERPL-SCNC: 5.2 MMOL/L — SIGNIFICANT CHANGE UP (ref 3.5–5.3)
POTASSIUM SERPL-SCNC: 6.5 MMOL/L — CRITICAL HIGH (ref 3.5–5.3)
PROT SERPL-MCNC: 6.7 G/DL — SIGNIFICANT CHANGE UP (ref 6–8.3)
PROTHROM AB SERPL-ACNC: 15.5 SEC — HIGH (ref 10.5–13.4)
RBC # BLD: 3.51 M/UL — LOW (ref 3.8–5.2)
RBC # BLD: 3.61 M/UL — LOW (ref 3.8–5.2)
RBC # BLD: 3.74 M/UL — LOW (ref 3.8–5.2)
RBC # FLD: 16.6 % — HIGH (ref 10.3–14.5)
RBC # FLD: 16.7 % — HIGH (ref 10.3–14.5)
RBC # FLD: 16.9 % — HIGH (ref 10.3–14.5)
SODIUM SERPL-SCNC: 136 MMOL/L — SIGNIFICANT CHANGE UP (ref 135–145)
SODIUM SERPL-SCNC: 140 MMOL/L — SIGNIFICANT CHANGE UP (ref 135–145)
SODIUM SERPL-SCNC: 141 MMOL/L — SIGNIFICANT CHANGE UP (ref 135–145)
TRIGL SERPL-MCNC: 94 MG/DL — SIGNIFICANT CHANGE UP
TROPONIN I, HIGH SENSITIVITY RESULT: 23.5 NG/L — SIGNIFICANT CHANGE UP
TROPONIN I, HIGH SENSITIVITY RESULT: 35 NG/L — SIGNIFICANT CHANGE UP
TSH SERPL-MCNC: 1.98 UIU/ML — SIGNIFICANT CHANGE UP (ref 0.36–3.74)
TSH SERPL-MCNC: 2.68 UIU/ML — SIGNIFICANT CHANGE UP (ref 0.36–3.74)
WBC # BLD: 12.19 K/UL — HIGH (ref 3.8–10.5)
WBC # BLD: 13.75 K/UL — HIGH (ref 3.8–10.5)
WBC # BLD: 16.16 K/UL — HIGH (ref 3.8–10.5)
WBC # FLD AUTO: 12.19 K/UL — HIGH (ref 3.8–10.5)
WBC # FLD AUTO: 13.75 K/UL — HIGH (ref 3.8–10.5)
WBC # FLD AUTO: 16.16 K/UL — HIGH (ref 3.8–10.5)

## 2023-07-11 PROCEDURE — 71045 X-RAY EXAM CHEST 1 VIEW: CPT | Mod: 26

## 2023-07-11 PROCEDURE — 70450 CT HEAD/BRAIN W/O DYE: CPT | Mod: 26

## 2023-07-11 PROCEDURE — 93010 ELECTROCARDIOGRAM REPORT: CPT | Mod: 76

## 2023-07-11 RX ORDER — SODIUM CHLORIDE 9 MG/ML
1000 INJECTION INTRAMUSCULAR; INTRAVENOUS; SUBCUTANEOUS
Refills: 0 | Status: DISCONTINUED | OUTPATIENT
Start: 2023-07-11 | End: 2023-07-12

## 2023-07-11 RX ORDER — CALCIUM GLUCONATE 100 MG/ML
1 VIAL (ML) INTRAVENOUS ONCE
Refills: 0 | Status: COMPLETED | OUTPATIENT
Start: 2023-07-11 | End: 2023-07-11

## 2023-07-11 RX ORDER — ATROPINE SULFATE 0.1 MG/ML
0.5 SYRINGE (ML) INJECTION
Refills: 0 | Status: DISCONTINUED | OUTPATIENT
Start: 2023-07-11 | End: 2023-07-12

## 2023-07-11 RX ORDER — LEVOTHYROXINE SODIUM 125 MCG
88 TABLET ORAL DAILY
Refills: 0 | Status: DISCONTINUED | OUTPATIENT
Start: 2023-07-11 | End: 2023-07-12

## 2023-07-11 RX ORDER — SODIUM CHLORIDE 9 MG/ML
1000 INJECTION, SOLUTION INTRAVENOUS
Refills: 0 | Status: DISCONTINUED | OUTPATIENT
Start: 2023-07-11 | End: 2023-07-11

## 2023-07-11 RX ORDER — CEFTRIAXONE 500 MG/1
1000 INJECTION, POWDER, FOR SOLUTION INTRAMUSCULAR; INTRAVENOUS ONCE
Refills: 0 | Status: COMPLETED | OUTPATIENT
Start: 2023-07-11 | End: 2023-07-11

## 2023-07-11 RX ORDER — SODIUM BICARBONATE 1 MEQ/ML
44 SYRINGE (ML) INTRAVENOUS ONCE
Refills: 0 | Status: COMPLETED | OUTPATIENT
Start: 2023-07-11 | End: 2023-07-11

## 2023-07-11 RX ORDER — IPRATROPIUM/ALBUTEROL SULFATE 18-103MCG
3 AEROSOL WITH ADAPTER (GRAM) INHALATION EVERY 6 HOURS
Refills: 0 | Status: DISCONTINUED | OUTPATIENT
Start: 2023-07-11 | End: 2023-07-12

## 2023-07-11 RX ORDER — CEFTRIAXONE 500 MG/1
1000 INJECTION, POWDER, FOR SOLUTION INTRAMUSCULAR; INTRAVENOUS EVERY 24 HOURS
Refills: 0 | Status: DISCONTINUED | OUTPATIENT
Start: 2023-07-12 | End: 2023-07-12

## 2023-07-11 RX ORDER — ACETAMINOPHEN 500 MG
650 TABLET ORAL EVERY 6 HOURS
Refills: 0 | Status: DISCONTINUED | OUTPATIENT
Start: 2023-07-11 | End: 2023-07-12

## 2023-07-11 RX ORDER — APIXABAN 2.5 MG/1
2.5 TABLET, FILM COATED ORAL
Refills: 0 | Status: DISCONTINUED | OUTPATIENT
Start: 2023-07-11 | End: 2023-07-12

## 2023-07-11 RX ORDER — ALBUTEROL 90 UG/1
2.5 AEROSOL, METERED ORAL ONCE
Refills: 0 | Status: COMPLETED | OUTPATIENT
Start: 2023-07-11 | End: 2023-07-11

## 2023-07-11 RX ORDER — SODIUM ZIRCONIUM CYCLOSILICATE 10 G/10G
10 POWDER, FOR SUSPENSION ORAL ONCE
Refills: 0 | Status: COMPLETED | OUTPATIENT
Start: 2023-07-11 | End: 2023-07-11

## 2023-07-11 RX ORDER — PANTOPRAZOLE SODIUM 20 MG/1
40 TABLET, DELAYED RELEASE ORAL DAILY
Refills: 0 | Status: DISCONTINUED | OUTPATIENT
Start: 2023-07-11 | End: 2023-07-12

## 2023-07-11 RX ORDER — INSULIN HUMAN 100 [IU]/ML
6 INJECTION, SOLUTION SUBCUTANEOUS ONCE
Refills: 0 | Status: COMPLETED | OUTPATIENT
Start: 2023-07-11 | End: 2023-07-11

## 2023-07-11 RX ORDER — DEXTROSE 50 % IN WATER 50 %
50 SYRINGE (ML) INTRAVENOUS ONCE
Refills: 0 | Status: COMPLETED | OUTPATIENT
Start: 2023-07-11 | End: 2023-07-11

## 2023-07-11 RX ADMIN — Medication 44 MILLIEQUIVALENT(S): at 02:19

## 2023-07-11 RX ADMIN — Medication 50 MILLILITER(S): at 03:27

## 2023-07-11 RX ADMIN — ALBUTEROL 2.5 MILLIGRAM(S): 90 AEROSOL, METERED ORAL at 02:19

## 2023-07-11 RX ADMIN — SODIUM ZIRCONIUM CYCLOSILICATE 10 GRAM(S): 10 POWDER, FOR SUSPENSION ORAL at 02:20

## 2023-07-11 RX ADMIN — PANTOPRAZOLE SODIUM 40 MILLIGRAM(S): 20 TABLET, DELAYED RELEASE ORAL at 13:06

## 2023-07-11 RX ADMIN — APIXABAN 2.5 MILLIGRAM(S): 2.5 TABLET, FILM COATED ORAL at 17:56

## 2023-07-11 RX ADMIN — SODIUM CHLORIDE 75 MILLILITER(S): 9 INJECTION INTRAMUSCULAR; INTRAVENOUS; SUBCUTANEOUS at 15:09

## 2023-07-11 RX ADMIN — INSULIN HUMAN 6 UNIT(S): 100 INJECTION, SOLUTION SUBCUTANEOUS at 03:27

## 2023-07-11 RX ADMIN — Medication 100 GRAM(S): at 02:20

## 2023-07-11 RX ADMIN — Medication 3 MILLILITER(S): at 20:34

## 2023-07-11 RX ADMIN — Medication 200 MILLIGRAM(S): at 11:11

## 2023-07-11 RX ADMIN — CEFTRIAXONE 100 MILLIGRAM(S): 500 INJECTION, POWDER, FOR SOLUTION INTRAMUSCULAR; INTRAVENOUS at 01:11

## 2023-07-11 RX ADMIN — APIXABAN 2.5 MILLIGRAM(S): 2.5 TABLET, FILM COATED ORAL at 06:38

## 2023-07-11 RX ADMIN — Medication 3 MILLILITER(S): at 08:14

## 2023-07-11 RX ADMIN — Medication 3 MILLILITER(S): at 14:33

## 2023-07-11 RX ADMIN — Medication 88 MICROGRAM(S): at 06:38

## 2023-07-11 NOTE — PATIENT PROFILE ADULT - FUNCTIONAL ASSESSMENT - BASIC MOBILITY 6.
1-calculated by average/Not able to assess (calculate score using [facility] averaging method)  1-calculated by average/Not able to assess (calculate score using [facility] averaging method)  1-calculated by average/Not able to assess (calculate score using [facility] averaging method)

## 2023-07-11 NOTE — ED ADULT NURSE REASSESSMENT NOTE - NSFALLRISKINTERV_ED_ALL_ED
Assistance OOB with selected safe patient handling equipment if applicable/Assistance with ambulation/Communicate fall risk and risk factors to all staff, patient, and family/Monitor gait and stability/Monitor for mental status changes and reorient to person, place, and time, as needed/Move patient closer to nursing station/within visual sight of ED staff/Provide patient with walking aids/Provide visual cue: yellow wristband, yellow gown, etc/Reinforce activity limits and safety measures with patient and family/Toileting schedule using arm’s reach rule for commode and bathroom/Use of alarms - bed, stretcher, chair and/or video monitoring/Call bell, personal items and telephone in reach/Instruct patient to call for assistance before getting out of bed/chair/stretcher/Non-slip footwear applied when patient is off stretcher/[place] to call system/Physically safe environment - no spills, clutter or unnecessary equipment/Purposeful Proactive Rounding/Room/bathroom lighting operational, light cord in reach Assistance OOB with selected safe patient handling equipment if applicable/Assistance with ambulation/Communicate fall risk and risk factors to all staff, patient, and family/Monitor gait and stability/Monitor for mental status changes and reorient to person, place, and time, as needed/Move patient closer to nursing station/within visual sight of ED staff/Provide patient with walking aids/Provide visual cue: yellow wristband, yellow gown, etc/Reinforce activity limits and safety measures with patient and family/Toileting schedule using arm’s reach rule for commode and bathroom/Use of alarms - bed, stretcher, chair and/or video monitoring/Call bell, personal items and telephone in reach/Instruct patient to call for assistance before getting out of bed/chair/stretcher/Non-slip footwear applied when patient is off stretcher/[place] to call system/Physically safe environment - no spills, clutter or unnecessary equipment/Purposeful Proactive Rounding/Room/bathroom lighting operational, light cord in reach Assistance OOB with selected safe patient handling equipment if applicable/Assistance with ambulation/Communicate fall risk and risk factors to all staff, patient, and family/Monitor gait and stability/Monitor for mental status changes and reorient to person, place, and time, as needed/Move patient closer to nursing station/within visual sight of ED staff/Provide patient with walking aids/Provide visual cue: yellow wristband, yellow gown, etc/Reinforce activity limits and safety measures with patient and family/Toileting schedule using arm’s reach rule for commode and bathroom/Use of alarms - bed, stretcher, chair and/or video monitoring/Call bell, personal items and telephone in reach/Instruct patient to call for assistance before getting out of bed/chair/stretcher/Non-slip footwear applied when patient is off stretcher/[place] to call system/Physically safe environment - no spills, clutter or unnecessary equipment/Purposeful Proactive Rounding/Room/bathroom lighting operational, light cord in reach

## 2023-07-11 NOTE — H&P ADULT - HISTORY OF PRESENT ILLNESS
89-year-old female who has a history of hypothyroidism A-fib on [facility]quis resident of a rehab facility for progressive weakness and unsteady gait.  Found to be complaining of shortness of breath and feeling unwell her heart rate was in the 30s.  EMS was called the patient was brought by private paramedic service to [facility] for evaluation of complete heart block.  EMS administered an amp of atropine without change in status or rhythm.  Patient states she feels better now.  (Even though her heart rate is 39).  Her transfer paperwork includes recommended transport to [facility], her cardiologist is at [facility] per the patient, and her primary care physician is Dr. Collins covered by dr Soliz at this facility. 89-year-old female who has a history of hypothyroidism A-fib on Eliquis resident of a rehab facility for progressive weakness and unsteady gait.  Found to be complaining of shortness of breath and feeling unwell her heart rate was in the 30s.  EMS was called the patient was brought by private paramedic service to [facility] for evaluation of complete heart block.  EMS administered an amp of atropine without change in status or rhythm.  Patient states she feels better now.  (Even though her heart rate is 39).  Her transfer paperwork includes recommended transport to [facility], her cardiologist is at [facility] per the patient, and her primary care physician is Dr. Collins covered by dr Soliz at this facility. 89-year-old female who has a history of hypothyroidism A-fib on Eliquis resident of a rehab facility for progressive weakness and unsteady gait.  Found to be complaining of shortness of breath and feeling unwell her heart rate was in the 30s.  EMS was called the patient was brought by private paramedic service to [facility] for evaluation of complete heart block.  EMS administered an amp of atropine without change in status or rhythm.  Patient states she feels better now.  (Even though her heart rate is 39).  Her transfer paperwork includes recommended transport to [facility], her cardiologist is at [facility] per the patient, and her primary care physician is Dr. Collins covered by dr Soliz at this facility.

## 2023-07-11 NOTE — CONSULT NOTE ADULT - SUBJECTIVE AND OBJECTIVE BOX
CHIEF COMPLAINT/ REASON FOR VISIT  .. Patient was seen to address the  issue listed under PROBLEM LIST which is located toward bottom of this note     DEACON ROBERT    PLV APER 11    Allergies    penicillin (Unknown)    Intolerances        PAST MEDICAL & SURGICAL HISTORY:  HTN (hypertension)      Afib      Spinal stenosis      PFO (patent foramen ovale)      Degeneration macular      Osteoporosis          FAMILY HISTORY:      Home Medications:  amiodarone 200 mg oral tablet: 1 tab(s) orally once a day (04 Mar 2023 20:14)  cholecalciferol 25 mcg (1000 intl units) oral tablet: 1 tab(s) orally once a day (04 Mar 2023 20:14)  clindamycin 1% topical lotion: Apply topically to affected area 2 times a day (04 Mar 2023 20:14)  Eliquis 2.5 mg oral tablet: 1 tab(s) orally 2 times a day (04 Mar 2023 20:14)  enalapril 10 mg oral tablet: orally once a day (at bedtime) (04 Mar 2023 20:14)  enalapril 5 mg oral tablet: 1 tab(s) orally once a day (04 Mar 2023 20:14)  famotidine 20 mg oral tablet: orally once a day (04 Mar 2023 20:14)  Lasix 20 mg oral tablet: 1 tab(s) orally once a day (04 Mar 2023 20:14)  levothyroxine 88 mcg (0.088 mg) oral tablet: 1 tab(s) orally once a day (04 Mar 2023 20:14)  metoprolol tartrate 50 mg oral tablet: orally 3 times a day (04 Mar 2023 20:14)  potassium chloride 10 mEq oral capsule, extended release: orally once a day (04 Mar 2023 20:14)      MEDICATIONS  (STANDING):  albuterol/ipratropium for Nebulization 3 milliLiter(s) Nebulizer every 6 hours  apixaban 2.5 milliGRAM(s) Oral two times a day  dextrose 5% + sodium chloride 0.45%. 1000 milliLiter(s) (50 mL/Hr) IV Continuous <Continuous>  levothyroxine 88 MICROGram(s) Oral daily  pantoprazole    Tablet 40 milliGRAM(s) Oral daily    MEDICATIONS  (PRN):  atropine Syringe 0.5 milliGRAM(s) IV Push <User Schedule> PRN Heart rate<35              Vital Signs Last 24 Hrs  T(C): 36.7 (11 Jul 2023 02:45), Max: 36.7 (10 Jul 2023 23:52)  T(F): 98 (11 Jul 2023 02:45), Max: 98.1 (10 Jul 2023 23:52)  HR: 42 (11 Jul 2023 02:45) (40 - 42)  BP: 123/46 (11 Jul 2023 02:45) (102/54 - 123/46)  BP(mean): --  RR: 18 (11 Jul 2023 02:45) (18 - 18)  SpO2: 98% (11 Jul 2023 02:45) (98% - 99%)    Parameters below as of 11 Jul 2023 02:45  Patient On (Oxygen Delivery Method): nasal cannula  O2 Flow (L/min): 2                LABS:                        10.7   16.16 )-----------( 310      ( 11 Jul 2023 00:43 )             35.9     07-11    136  |  110<H>  |  49<H>  ----------------------------<  128<H>  6.5<HH>   |  19<L>  |  2.40<H>    Ca    9.0      11 Jul 2023 00:43  Mg     2.3     07-11    TPro  6.7  /  Alb  2.9<L>  /  TBili  0.3  /  DBili  x   /  AST  53<H>  /  ALT  80<H>  /  AlkPhos  120  07-11    PT/INR - ( 11 Jul 2023 00:43 )   PT: 15.5 sec;   INR: 1.32 ratio         PTT - ( 11 Jul 2023 00:43 )  PTT:42.0 sec  Urinalysis Basic - ( 11 Jul 2023 00:43 )    Color: x / Appearance: x / SG: x / pH: x  Gluc: 128 mg/dL / Ketone: x  / Bili: x / Urobili: x   Blood: x / Protein: x / Nitrite: x   Leuk Esterase: x / RBC: x / WBC x   Sq Epi: x / Non Sq Epi: x / Bacteria: x            WBC:  WBC Count: 16.16 K/uL (07-11 @ 00:43)      MICROBIOLOGY:  RECENT CULTURES:        CARDIAC MARKERS ( 11 Jul 2023 00:43 )  x     / x     / 51 U/L / x     / 1.4 ng/mL        PT/INR - ( 11 Jul 2023 00:43 )   PT: 15.5 sec;   INR: 1.32 ratio         PTT - ( 11 Jul 2023 00:43 )  PTT:42.0 sec    Sodium:  Sodium: 136 mmol/L (07-11 @ 00:43)      2.40 mg/dL 07-11 @ 00:43      Hemoglobin:  Hemoglobin: 10.7 g/dL (07-11 @ 00:43)      Platelets: Platelet Count - Automated: 310 K/uL (07-11 @ 00:43)      LIVER FUNCTIONS - ( 11 Jul 2023 00:43 )  Alb: 2.9 g/dL / Pro: 6.7 g/dL / ALK PHOS: 120 U/L / ALT: 80 U/L / AST: 53 U/L / GGT: x             Urinalysis Basic - ( 11 Jul 2023 00:43 )    Color: x / Appearance: x / SG: x / pH: x  Gluc: 128 mg/dL / Ketone: x  / Bili: x / Urobili: x   Blood: x / Protein: x / Nitrite: x   Leuk Esterase: x / RBC: x / WBC x   Sq Epi: x / Non Sq Epi: x / Bacteria: x        RADIOLOGY & ADDITIONAL STUDIES:      MICROBIOLOGY:  RECENT CULTURES:

## 2023-07-11 NOTE — ED ADULT NURSE NOTE - NSFALLHARMRISKINTERV_ED_ALL_ED
June 26, 2017        Jarocho Tinajero MD  [address]  [facility] [address]             [facility]  [address]  [facility] [address]  Phone: [phone]  Fax: [phone]   Patient: Sandro Alberto   MR Number: [id number]   YOB: 2015   Date of Visit: 6/26/2017       Dear Dr. Tinajero:    Thank you for referring Sandro Alberto to me for evaluation. Below are the relevant portions of my assessment and plan of care.     Thank you for referring your patient Sandro Alberto to the cardiology clinic for consultation. The patient is accompanied by his mother. Please review my findings below.    CHIEF COMPLAINT: Chronic lung disease    HISTORY OF PRESENT ILLNESS:  I had the pleasure of seeing Sandro in follow-up in the pediatric cardiology clinic at the [facility].  As you know, Sandro is a 2 yr old male with a history of prematurity, PDA s/p ligation, and  PFO vs ASD. He was last seen by Dr. Patel in 2016. He was doing well at the time with no complaints.    INTERIM HISTORY: Since his last clinic visit, he has continued to do well. Mom reports that he is very active.  She has no complaints referable to the cardiovascular system.    REVIEW OF SYSTEMS:     GENERAL: No fever, chills, fatigability or weight loss.  SKIN: No rashes.  EYES: Denies discharge.  EARS: Denies discharge.  MOUTH & THROAT: No hoarseness or change in voice. No excessive gum bleeding.  CHEST: Denies CUELLAR, cyanosis, wheezing, cough and sputum production.  CARDIOVASCULAR: Denies reduced exercise tolerance.  ABDOMEN: Appetite fine. No weight loss. Denies diarrhea, hematemesis or blood in stool.  MUSCULOSKELETAL: No joint stiffness or swelling.   NEUROLOGIC: No history of seizures or paralysis.    PAST MEDICAL HISTORY:   Past Medical History:   Diagnosis Date    Adrenal insufficiency     resolved after hydrocortisone taper    Apnea of prematurity     ASD (atrial septal  Assistance OOB with selected safe patient handling equipment if applicable/Assistance with ambulation/Communicate risk of Fall with Harm to all staff, patient, and family/Monitor gait and stability/Provide visual cue: red socks, yellow wristband, yellow gown, etc/Reinforce activity limits and safety measures with patient and family/Bed in lowest position, wheels locked, appropriate side rails in place/Call bell, personal items and telephone in reach/Instruct patient to call for assistance before getting out of bed/chair/stretcher/Non-slip footwear applied when patient is off stretcher/[place] to call system/Physically safe environment - no spills, clutter or unnecessary equipment/Purposeful Proactive Rounding/Room/bathroom lighting operational, light cord in reach defect)     Chronic lung disease of prematurity 2015    History of prolonged intubation and mechanical ventilation, including jet. On ventilatory support until 2015 and then again briefly on 2015-2015 for gastrostomy placement. NIPPV support completed on 7/13/15. Low flow nasal cannula since 2015. Will be discharged home on 1L nasal cannula at 100%. Completed two prolonged courses of dexamethasone 2015-2015.  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     Hypoxemia     Klebsiella pneumonia     PDA (patent ductus arteriosus)     S/P repair of PDA (patent ductus arteriosus) 2015    Snoring     Wheezing-associated respiratory infection        FAMILY HISTORY:   Family History   Problem Relation Age of Onset    No Known Problems Mother     No Known Problems Father     Congenital heart disease Neg Hx     Early death Neg Hx     Pacemaker/defibrilator Neg Hx        SOCIAL HISTORY:   Social History     Social History    Marital status: Single     Spouse name: N/A    Number of children: N/A    Years of education: N/A     Occupational History    Not on file.     Social History Main Topics    Smoking status: Never Smoker    Smokeless tobacco: Not on file    Alcohol use No    Drug use: No    Sexual activity: Not on file     Other Topics Concern    Not on file     Social History Narrative    Living with mother, father.  Parents both Mos[place]o speaking.  Family is from [facility].  Mom's dad recently passed (6/18/15).        Services through [facility] ([phone])           ALLERGIES:  Review of patient's allergies indicates:  No Known Allergies    MEDICATIONS:    Current Outpatient Prescriptions:     acetaminophen (TYLENOL) 160 mg/5 mL Liqd, Take 4 mLs (128 mg total) by mouth every 6 (six) hours as needed., Disp: 1 mL, Rfl: 0    pediatric nutr, iron, LF-fiber (PEDIASURE WITH FIBER)  "0.03-1 gram-kcal/mL Liqd, Give 2 bottles a day, Disp: 60 Bottle, Rfl: 6    albuterol (PROAIR HFA) 90 mcg/actuation inhaler, Inhale 2 puffs into the lungs every 4 (four) hours as needed for Wheezing., Disp: 1 Inhaler, Rfl: 1    albuterol (PROVENTIL) 2.5 mg /3 mL (0.083 %) nebulizer solution, Take 3 mLs (2.5 mg total) by nebulization every 4 (four) hours as needed for Wheezing., Disp: 1 Box, Rfl: 2    gastrostomy tube 18 Fr Misc, Please provide : 1 Replacement Bard Button every 3 months 18fr X 1.2 cm Ref# [id number] 4 per month Bard button decompression tube 18fr X 1.2cm  Ref# [id number] 4 per month Bard button continuous feeding tube 18fr/ 90 degree adaptor Ref # [id number]  1 box 60ml catheter tip syringes, Disp: 2 each, Rfl: 6      PHYSICAL EXAM:   Vitals:    06/26/17 1008   BP: 93/60   BP Location: Left arm   Patient Position: Sitting   Pulse: (!) 116   SpO2: 96%   Weight: 12.7 kg (28 lb)   Height: 2' 9.07" (0.84 m)         GENERAL: Awake, well-developed well-nourished, no apparent distress. Non-cyanotic.  HEENT: Mucous membranes moist and pink, normocephalic atraumatic, no cranial or carotid bruits, sclera anicteric, EOMI  NECK: No jugular venous distention, no thyromegaly, no lymphadenopathy  CHEST: Good air movement, clear to auscultation bilaterally  CARDIOVASCULAR: Quiet precordium, regular rate and rhythm, S1S2, no murmurs rubs or gallops  ABDOMEN: Soft, nontender nondistended, no hepatosplenomegaly, no aortic bruits  EXTREMITIES: Warm well perfused, 2+ radial/femoral/pedal pulses, capillary refill 2 seconds, no clubbing, cyanosis, or edema  NEURO: Alert and oriented, cooperative with exam, face symmetric, moves all extremities well    STUDIES:  EKG: Normal sinus rhythm. Normal EKG  ECHOCARDIOGRAM (prelim):  No atrial level shunt.  No PDA.   Normal biventricular size and systolic function.  No pericardial effusion.    ASSESSMENT:  Encounter Diagnoses   Name Primary?    Chronic lung disease of prematurity Yes "     PLAN:     1) I reviewed my physical exam findings and the echocardiographic findings with Sandro's mom via a . He has no shunting and normal cardiac function. His cardiac exam and echocardiogram are normal for age. I see no secondary evidence of elevated PA pressure. I explained this to Sandro's mom and she verbalized understanding.    2) No activity restrictions or cardiac special precautions.    3) I informed mom to call with further questions or concerns.    4) Follow-up as needed should new questions or concerns arise.    Time Spent: 30 (min) with over 50% in direct patient and family consultation.      The patient's doctor will be notified via Fax    I hope this brings you up-to-date on Sandro Alberto  Please contact me with any questions or concerns.    Christie Downing MD  Pediatric Cardiology  Interventional Cardiology  [facility] [place] [address]  [phone]         If you have questions, please do not hesitate to call me. I look forward to following Sandro along with you.    Sincerely,      Christie Downing MD           CC  No Recipients        Assistance OOB with selected safe patient handling equipment if applicable/Assistance with ambulation/Communicate risk of Fall with Harm to all staff, patient, and family/Monitor gait and stability/Provide visual cue: red socks, yellow wristband, yellow gown, etc/Reinforce activity limits and safety measures with patient and family/Bed in lowest position, wheels locked, appropriate side rails in place/Call bell, personal items and telephone in reach/Instruct patient to call for assistance before getting out of bed/chair/stretcher/Non-slip footwear applied when patient is off stretcher/[place] to call system/Physically safe environment - no spills, clutter or unnecessary equipment/Purposeful Proactive Rounding/Room/bathroom lighting operational, light cord in reach Assistance OOB with selected safe patient handling equipment if applicable/Assistance with ambulation/Communicate risk of Fall with Harm to all staff, patient, and family/Monitor gait and stability/Provide visual cue: red socks, yellow wristband, yellow gown, etc/Reinforce activity limits and safety measures with patient and family/Bed in lowest position, wheels locked, appropriate side rails in place/Call bell, personal items and telephone in reach/Instruct patient to call for assistance before getting out of bed/chair/stretcher/Non-slip footwear applied when patient is off stretcher/[place] to call system/Physically safe environment - no spills, clutter or unnecessary equipment/Purposeful Proactive Rounding/Room/bathroom lighting operational, light cord in reach

## 2023-07-11 NOTE — ED PROVIDER NOTE - OBJECTIVE STATEMENT
Some mild tach patient is a 89-year-old female who [facility] a [facility] and unsteady gait.  Found to be complaining of shortness of breath and feeling unwell her heart rate was in the 30s.  EMS was called the patient was brought by private paramedic service to [facility] for evaluation of complete heart block.  EMS administered an amp of atropine without change in status or rhythm.  Patient states she feels better now.  (Even though her heart rate is 39).  Her transfer paperwork includes recommended transport to [facility], her cardiologist is at [facility] per the patient, and her primary care physician is Dr. Collins covered by dr Soliz at this facility.   She has a MOLST in place, which indicates DNR and DNI. Some mild tach patient is a 89-year-old female who [facility] a [facility] and unsteady gait.  Found to be complaining of shortness of breath and feeling unwell her heart rate was in the 30s.  EMS was called the patient was brought by private paramedic service to [facility] for evaluation of complete heart block.  EMS administered an amp of atropine without change in status or rhythm.  Patient states she feels better now.  (Even though her heart rate is 39).  Her transfer paperwork includes recommended transport to [facility], her cardiologist is at [facility] per the patient, and her primary care physician is Dr. Collins covered by dr Soliz at this facility.   She has a MOLST in place, which indicates DNR and DNI. Some mild tach patient is a 89-year-old female who has a [facility] and unsteady gait.  Found to be complaining of shortness of breath and feeling unwell her heart rate was in the 30s.  EMS was called the patient was brought by private paramedic service to [facility] for evaluation of complete heart block.  EMS administered an amp of atropine without change in status or rhythm.  Patient states she feels better now.  (Even though her heart rate is 39).  Her transfer paperwork includes recommended transport to [facility], her cardiologist is at [facility] per the patient, and her primary care physician is Dr. Collins covered by dr Soliz at this facility.   She has a MOLST in place, which indicates DNR and DNI.

## 2023-07-11 NOTE — ED PROVIDER NOTE - CLINICAL SUMMARY MEDICAL DECISION MAKING FREE TEXT BOX
Patient is critically ill with a life-threatening arrhythmia.  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  She also on metoprolol for her heart rate.  She has no pacemaker.  She is DNR DNI.  Presents with newly reported bradycardia with hypotension shortness of breath found to be in heart block.  With a junctional escape rhythm of 40.  Plan of care includes rule out metabolic derangement causing arrhythmia, rule out infectious derangement causing arrhythmia rule out MI causing arrhythmia.  Laboratory studies reveal a slightly elevated white blood cell count, elevated potassium and kidney function, will treat the elevated potassium given the arrhythmia.  Cardiac consultation with [facility] cardiology, admit to telemetry service.  Patient is at risk for decompensation and need for immediate pacing.  Will have transcutaneous pacing pads near the patient.  As her most does not indicate cannot pace, rather just no CPR.  This chart was made with dictation software and may contain typographical errors. Patient is critically ill with a life-threatening arrhythmia.  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  She also on metoprolol for her heart rate.  She has no pacemaker.  She is DNR DNI.  Presents with newly reported bradycardia with hypotension shortness of breath found to be in heart block.  With a junctional escape rhythm of 40.  Plan of care includes rule out metabolic derangement causing arrhythmia, rule out infectious derangement causing arrhythmia rule out MI causing arrhythmia.  Laboratory studies reveal a slightly elevated white blood cell count, elevated potassium and kidney function, will treat the elevated potassium given the arrhythmia.  Cardiac consultation with [facility] cardiology, admit to telemetry service.  Patient is at risk for decompensation and need for immediate pacing.  Will have transcutaneous pacing pads near the patient.  As her most does not indicate cannot pace, rather just no CPR.  This chart was made with dictation software and may contain typographical errors. Patient is critically ill with a life-threatening arrhythmia.  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  She also on metoprolol for her heart rate.  She has no pacemaker.  She is DNR DNI.  Presents with newly reported bradycardia with hypotension shortness of breath found to be in heart block.  With a junctional escape rhythm of 40.  Plan of care includes rule out metabolic derangement causing arrhythmia, rule out infectious derangement causing arrhythmia rule out MI causing arrhythmia.  Laboratory studies reveal a slightly elevated white blood cell count, elevated potassium and kidney function, will treat the elevated potassium given the arrhythmia.  Cardiac consultation with [facility] cardiology, admit to telemetry service.  Patient is at risk for decompensation and need for immediate pacing.  Will have transcutaneous pacing pads near the patient.  As her most does not indicate cannot pace, rather just no CPR.  This chart was made with dictation software and may contain typographical errors.

## 2023-07-11 NOTE — ED PROVIDER NOTE - WR ORDER ID 1
[id number]

## 2023-07-11 NOTE — CONSULT NOTE ADULT - ASSESSMENT
Pt is a 89 y/o F pmhx of hypothyroidism, Afib on eliquis, resident of rehab facility for progressive weakness, [facility] w/ complaints of SOB and a general unwell feeling. Pt HR found to be in the 30's, EMS called and transported to ED, in ED ECG showed junctional escape rhythm, treated w/ atropine by EMS w/o response. Baseline labs revealed STEFANY w/ hyperkalemia w/ K of 6.5, elevated WBC w/ positive UA.    1. Junctional Rhythm likely secondary to hyperkalemia   2. STEFANY   3. UTI   4. Transaminitis     Plan:   - Does not need ICU admission at this time.   - Rhythm likely related to hyperkalemia, treated w/ lokalema and calcium in ED would give insulin as well for temporization.   - +UA treated w/ Rocephin in ED would continue on abx.   - Spoke w/ Daughter Beth Aquino who believes her mother would not want transvenous or transcutaneous pacing if needed. Would want only medical management.   - Case discussed w/ EICU attending Dr. Killian   - Reconsult as needed.  Pt is a 89 y/o F pmhx of hypothyroidism, Afib on eliquis, resident of rehab facility for progressive weakness, presented to [facility] ED w/ complaints of SOB and a general unwell feeling. Pt HR found to be in the 30's, EMS called and transported to ED, in ED ECG showed junctional escape rhythm, treated w/ atropine by EMS w/o response. Baseline labs revealed STEFANY w/ hyperkalemia w/ K of 6.5, elevated WBC w/ positive UA.    1. Junctional Rhythm likely secondary to hyperkalemia   2. STEFANY   3. UTI   4. Transaminitis     Plan:   - Does not need ICU admission at this time.   - Rhythm likely related to hyperkalemia, treated w/ lokalema and calcium in ED would give insulin as well for temporization.   - +UA treated w/ Rocephin in ED would continue on abx.   - Spoke w/ Daughter Beth Aquino who believes her mother would not want transvenous or transcutaneous pacing if needed. Would want only medical management.   - Case discussed w/ EICU attending Dr. Killian   - Reconsult as needed.  Pt is a 89 y/o F pmhx of hypothyroidism, Afib on eliquis, resident of rehab facility for progressive weakness, presented to [facility] ED w/ complaints of SOB and a general unwell feeling. Pt HR found to be in the 30's, EMS called and transported to ED, in ED ECG showed junctional escape rhythm, treated w/ atropine by EMS w/o response. Baseline labs revealed STEFANY w/ hyperkalemia w/ K of 6.5, elevated WBC w/ positive UA.    1. Junctional Rhythm likely secondary to hyperkalemia   2. STEFANY   3. UTI   4. Transaminitis     Plan:   - Does not need ICU admission at this time.   - Rhythm likely related to hyperkalemia, treated w/ lokalema and calcium in ED would give insulin as well for temporization.   - +UA treated w/ Rocephin in ED would continue on abx.   - Spoke w/ Daughter Beth Aquino who believes her mother would not want transvenous or transcutaneous pacing if needed. Would want only medical management.   - Case discussed w/ EICU attending Dr. Killian   - Reconsult as needed.

## 2023-07-11 NOTE — ED ADULT NURSE REASSESSMENT NOTE - NSFALLOOBATTEMPT_ED_ALL_ED
No
Patient baseline mental status/Alert and oriented to person, place and time/Dressing clean and dry/No adverse reaction to first time med in ED/Awake/Symptoms improved

## 2023-07-11 NOTE — CONSULT NOTE ADULT - SUBJECTIVE AND OBJECTIVE BOX
Patient is a 89y old  Female who presents with a chief complaint of     BRIEF HOSPITAL COURSE: Pt is a 89 y/[facility] F [facility] facility for progressive weakness, presented to \[facility]\"" ED w/ complaints of SOB and a general unwell feeling. Pt HR found to be in the 30's, EMS called and transported to ED, in ED ECG showed junctional escape rhythm, treated w/ atropine by EMS w/o response. Baseline labs revealed STEFANY w/ hyperkalemia w/ K of 6.5, elevated WBC w/ positive UA. Pt confirmed to be DNR/DNI, ICU consulted for further management.     PAST MEDICAL & SURGICAL HISTORY:  HTN (hypertension)      Afib      Spinal stenosis      PFO (patent foramen ovale)      Degeneration macular      Osteoporosis          Review of Systems:  Unable to assess secondary to mentation.     Medications:      albuterol    0.083%. 2.5 milliGRAM(s) Nebulizer once                calcium gluconate IVPB 1 Gram(s) IV Intermittent Once  sodium bicarbonate  Injectable 44 milliEquivalent(s) IV Push Once        sodium zirconium cyclosilicate 10 Gram(s) Oral Once          ICU Vital Signs Last 24 Hrs  T(C): 36.7 (10 Jul 2023 23:52), Max: 36.7 (10 Jul 2023 23:52)  T(F): 98.1 (10 Jul 2023 23:52), Max: 98.1 (10 Jul 2023 23:52)  HR: 40 (10 Jul 2023 23:52) (40 - 40)  BP: 102/54 (10 Jul 2023 23:52) (102/54 - 102/54)  BP(mean): --  ABP: --  ABP(mean): --  RR: 18 (10 Jul 2023 23:52) (18 - 18)  SpO2: 99% (10 Jul 2023 23:52) (99% - 99%)    O2 Parameters below as of 10 Jul 2023 23:52  Patient On (Oxygen Delivery Method): nasal cannula  O2 Flow (L/min): 2              I&O's Detail        LABS:                        10.7   16.16 )-----------( 310      ( 11 Jul 2023 00:43 )             35.9     07-11    136  |  110<H>  |  49<H>  ----------------------------<  128<H>  6.5<HH>   |  19<L>  |  2.40<H>    Ca    9.0      11 Jul 2023 00:43  Mg     2.3     07-11    TPro  6.7  /  Alb  2.9<L>  /  TBili  0.3  /  DBili  x   /  AST  53<H>  /  ALT  80<H>  /  AlkPhos  120  07-11      CARDIAC MARKERS ( 11 Jul 2023 00:43 )  x     / x     / 51 U/L / x     / 1.4 ng/mL      CAPILLARY BLOOD GLUCOSE        PT/INR - ( 11 Jul 2023 00:43 )   PT: 15.5 sec;   INR: 1.32 ratio         PTT - ( 11 Jul 2023 00:43 )  PTT:42.0 sec  Urinalysis Basic - ( 11 Jul 2023 00:43 )    Color: x / Appearance: x / SG: x / pH: x  Gluc: 128 mg/dL / Ketone: x  / Bili: x / Urobili: x   Blood: x / Protein: x / Nitrite: x   Leuk Esterase: x / RBC: x / WBC x   Sq Epi: x / Non Sq Epi: x / Bacteria: x      CULTURES:      Physical Examination:    General: Pt laying in hospital bed in no acute distress.  Alert, responsive, confused.     HEENT: Pupils equal, reactive to light.  Symmetric.    PULM: Clear to auscultation bilaterally, no significant sputum production    CVS: Regular rate and rhythm, no murmurs, rubs, or gallops    ABD: Soft, nondistended, nontender, normoactive bowel sounds, no masses    EXT: No edema, nontender    SKIN: Warm and well perfused.       RADIOLOGY:   < from: CT Abdomen and Pelvis No Cont (03.04.23 @ 18:08) >  ACC: [id number] EXAM:  CT ABDOMEN AND PELVIS   ORDERED BY: BRIDGET CELESTIN     PROCEDURE DATE:  03/04/2023          INTERPRETATION:  CLINICAL INFORMATION: Hematuria versus vaginal bleeding    COMPARISON: None.    CONTRAST/COMPLICATIONS:  IV Contrast: NONE  0 cc administered   0 cc discarded  Oral Contrast: NONE  Complications: None reported at time of study completion    PROCEDURE:  CT of the Abdomen and Pelvis was performed.  Sagittal and coronal reformats were performed.    FINDINGS:  LOWER CHEST: Cardiomegaly, notably with biatrial dilatation, and coronary   artery calcifications. Bibasilar subsegmental atelectasis.    LIVER: Hyperdense liver; correlation for amiodarone therapy.  BILE DUCTS: Normal caliber.  GALLBLADDER: Cholecystectomy.  SPLEEN: Within normal limits.  PANCREAS: Within normal limits.  ADRENALS: Within normal limits.  KIDNEYS/URETERS: Left renal cyst. A few punctate (approximately 2 mm)   bilateral nonobstructing kidney stones.    BLADDER/REPRODUCTIVE ORGANS: Limited evaluation of the lower pelvis due   to beam hardening artifact from the patient's right hip arthroplasty.   Calcified fibroid uterus. No definite adnexal mass. Suboptimal evaluation   of the urinary bladder due to underdistention and artifact.    BOWEL: No bowel obstruction. Appendix is normal.  PERITONEUM: No ascites.  VESSELS: Atherosclerotic changes.  RETROPERITONEUM/LYMPH NODES: No lymphadenopathy.  ABDOMINAL WALL: Within normal limits.  BONES: Right hip arthroplasty. Osteopenia. Left hip and spinal   degenerative changes.    IMPRESSION:  A few tiny bilateral nonobstructing kidney stones.    No acute abdominal pathology.      --- End of Report ---            JORGE THOMAS MD; Attending Radiologist  This document has been electronically signed. Mar  4 2023  6:22PM          CRITICAL CARE TIME SPENT:    Patient is a 89y old  Female who presents with a chief complaint of     BRIEF HOSPITAL COURSE: Pt is a 89 y/o F [facility]othy[facility]hab facility for progressive weakness, presented to [facility] ED w/ complaints of SOB and a general unwell feeling. Pt HR found to be in the 30's, EMS called and transported to ED, in ED ECG showed junctional escape rhythm, treated w/ atropine by EMS w/o response. Baseline labs revealed STEFANY w/ hyperkalemia w/ K of 6.5, elevated WBC w/ positive UA. Pt confirmed to be DNR/DNI, ICU consulted for further management.     PAST MEDICAL & SURGICAL HISTORY:  HTN (hypertension)      Afib      Spinal stenosis      PFO (patent foramen ovale)      Degeneration macular      Osteoporosis          Review of Systems:  Unable to assess secondary to mentation.     Medications:      albuterol    0.083%. 2.5 milliGRAM(s) Nebulizer once                calcium gluconate IVPB 1 Gram(s) IV Intermittent Once  sodium bicarbonate  Injectable 44 milliEquivalent(s) IV Push Once        sodium zirconium cyclosilicate 10 Gram(s) Oral Once          ICU Vital Signs Last 24 Hrs  T(C): 36.7 (10 Jul 2023 23:52), Max: 36.7 (10 Jul 2023 23:52)  T(F): 98.1 (10 Jul 2023 23:52), Max: 98.1 (10 Jul 2023 23:52)  HR: 40 (10 Jul 2023 23:52) (40 - 40)  BP: 102/54 (10 Jul 2023 23:52) (102/54 - 102/54)  BP(mean): --  ABP: --  ABP(mean): --  RR: 18 (10 Jul 2023 23:52) (18 - 18)  SpO2: 99% (10 Jul 2023 23:52) (99% - 99%)    O2 Parameters below as of 10 Jul 2023 23:52  Patient On (Oxygen Delivery Method): nasal cannula  O2 Flow (L/min): 2              I&O's Detail        LABS:                        10.7   16.16 )-----------( 310      ( 11 Jul 2023 00:43 )             35.9     07-11    136  |  110<H>  |  49<H>  ----------------------------<  128<H>  6.5<HH>   |  19<L>  |  2.40<H>    Ca    9.0      11 Jul 2023 00:43  Mg     2.3     07-11    TPro  6.7  /  Alb  2.9<L>  /  TBili  0.3  /  DBili  x   /  AST  53<H>  /  ALT  80<H>  /  AlkPhos  120  07-11      CARDIAC MARKERS ( 11 Jul 2023 00:43 )  x     / x     / 51 U/L / x     / 1.4 ng/mL      CAPILLARY BLOOD GLUCOSE        PT/INR - ( 11 Jul 2023 00:43 )   PT: 15.5 sec;   INR: 1.32 ratio         PTT - ( 11 Jul 2023 00:43 )  PTT:42.0 sec  Urinalysis Basic - ( 11 Jul 2023 00:43 )    Color: x / Appearance: x / SG: x / pH: x  Gluc: 128 mg/dL / Ketone: x  / Bili: x / Urobili: x   Blood: x / Protein: x / Nitrite: x   Leuk Esterase: x / RBC: x / WBC x   Sq Epi: x / Non Sq Epi: x / Bacteria: x      CULTURES:      Physical Examination:    General: Pt laying in hospital bed in no acute distress.  Alert, responsive, confused.     HEENT: Pupils equal, reactive to light.  Symmetric.    PULM: Clear to auscultation bilaterally, no significant sputum production    CVS: Regular rate and rhythm, no murmurs, rubs, or gallops    ABD: Soft, nondistended, nontender, normoactive bowel sounds, no masses    EXT: No edema, nontender    SKIN: Warm and well perfused.       RADIOLOGY:   < from: CT Abdomen and Pelvis No Cont (03.04.23 @ 18:08) >  ACC: [id number] EXAM:  CT ABDOMEN AND PELVIS   ORDERED BY: BRIDGET CELESTIN     PROCEDURE DATE:  03/04/2023          INTERPRETATION:  CLINICAL INFORMATION: Hematuria versus vaginal bleeding    COMPARISON: None.    CONTRAST/COMPLICATIONS:  IV Contrast: NONE  0 cc administered   0 cc discarded  Oral Contrast: NONE  Complications: None reported at time of study completion    PROCEDURE:  CT of the Abdomen and Pelvis was performed.  Sagittal and coronal reformats were performed.    FINDINGS:  LOWER CHEST: Cardiomegaly, notably with biatrial dilatation, and coronary   artery calcifications. Bibasilar subsegmental atelectasis.    LIVER: Hyperdense liver; correlation for amiodarone therapy.  BILE DUCTS: Normal caliber.  GALLBLADDER: Cholecystectomy.  SPLEEN: Within normal limits.  PANCREAS: Within normal limits.  ADRENALS: Within normal limits.  KIDNEYS/URETERS: Left renal cyst. A few punctate (approximately 2 mm)   bilateral nonobstructing kidney stones.    BLADDER/REPRODUCTIVE ORGANS: Limited evaluation of the lower pelvis due   to beam hardening artifact from the patient's right hip arthroplasty.   Calcified fibroid uterus. No definite adnexal mass. Suboptimal evaluation   of the urinary bladder due to underdistention and artifact.    BOWEL: No bowel obstruction. Appendix is normal.  PERITONEUM: No ascites.  VESSELS: Atherosclerotic changes.  RETROPERITONEUM/LYMPH NODES: No lymphadenopathy.  ABDOMINAL WALL: Within normal limits.  BONES: Right hip arthroplasty. Osteopenia. Left hip and spinal   degenerative changes.    IMPRESSION:  A few tiny bilateral nonobstructing kidney stones.    No acute abdominal pathology.      --- End of Report ---            JORGE THOMAS MD; Attending Radiologist  This document has been electronically signed. Mar  4 2023  6:22PM          CRITICAL CARE TIME SPENT:    Patient is a 89y old  Female who presents with a chief complaint of     BRIEF HOSPITAL COURSE: Pt is a 89 y/o F [facility] facility for progressive weakness, presented to [facility] ED w/ complaints of SOB and a general unwell feeling. Pt HR found to be in the 30's, EMS called and transported to ED, in ED ECG showed junctional escape rhythm, treated w/ atropine by EMS w/o response. Baseline labs revealed STEFANY w/ hyperkalemia w/ K of 6.5, elevated WBC w/ positive UA. Pt confirmed to be DNR/DNI, ICU consulted for further management.     PAST MEDICAL & SURGICAL HISTORY:  HTN (hypertension)      Afib      Spinal stenosis      PFO (patent foramen ovale)      Degeneration macular      Osteoporosis          Review of Systems:  Unable to assess secondary to mentation.     Medications:      albuterol    0.083%. 2.5 milliGRAM(s) Nebulizer once                calcium gluconate IVPB 1 Gram(s) IV Intermittent Once  sodium bicarbonate  Injectable 44 milliEquivalent(s) IV Push Once        sodium zirconium cyclosilicate 10 Gram(s) Oral Once          ICU Vital Signs Last 24 Hrs  T(C): 36.7 (10 Jul 2023 23:52), Max: 36.7 (10 Jul 2023 23:52)  T(F): 98.1 (10 Jul 2023 23:52), Max: 98.1 (10 Jul 2023 23:52)  HR: 40 (10 Jul 2023 23:52) (40 - 40)  BP: 102/54 (10 Jul 2023 23:52) (102/54 - 102/54)  BP(mean): --  ABP: --  ABP(mean): --  RR: 18 (10 Jul 2023 23:52) (18 - 18)  SpO2: 99% (10 Jul 2023 23:52) (99% - 99%)    O2 Parameters below as of 10 Jul 2023 23:52  Patient On (Oxygen Delivery Method): nasal cannula  O2 Flow (L/min): 2              I&O's Detail        LABS:                        10.7   16.16 )-----------( 310      ( 11 Jul 2023 00:43 )             35.9     07-11    136  |  110<H>  |  49<H>  ----------------------------<  128<H>  6.5<HH>   |  19<L>  |  2.40<H>    Ca    9.0      11 Jul 2023 00:43  Mg     2.3     07-11    TPro  6.7  /  Alb  2.9<L>  /  TBili  0.3  /  DBili  x   /  AST  53<H>  /  ALT  80<H>  /  AlkPhos  120  07-11      CARDIAC MARKERS ( 11 Jul 2023 00:43 )  x     / x     / 51 U/L / x     / 1.4 ng/mL      CAPILLARY BLOOD GLUCOSE        PT/INR - ( 11 Jul 2023 00:43 )   PT: 15.5 sec;   INR: 1.32 ratio         PTT - ( 11 Jul 2023 00:43 )  PTT:42.0 sec  Urinalysis Basic - ( 11 Jul 2023 00:43 )    Color: x / Appearance: x / SG: x / pH: x  Gluc: 128 mg/dL / Ketone: x  / Bili: x / Urobili: x   Blood: x / Protein: x / Nitrite: x   Leuk Esterase: x / RBC: x / WBC x   Sq Epi: x / Non Sq Epi: x / Bacteria: x      CULTURES:      Physical Examination:    General: Pt laying in hospital bed in no acute distress.  Alert, responsive, confused.     HEENT: Pupils equal, reactive to light.  Symmetric.    PULM: Clear to auscultation bilaterally, no significant sputum production    CVS: Regular rate and rhythm, no murmurs, rubs, or gallops    ABD: Soft, nondistended, nontender, normoactive bowel sounds, no masses    EXT: No edema, nontender    SKIN: Warm and well perfused.       RADIOLOGY:   < from: CT Abdomen and Pelvis No Cont (03.04.23 @ 18:08) >  ACC: [id number] EXAM:  CT ABDOMEN AND PELVIS   ORDERED BY: BRIDGET CELESTIN     PROCEDURE DATE:  03/04/2023          INTERPRETATION:  CLINICAL INFORMATION: Hematuria versus vaginal bleeding    COMPARISON: None.    CONTRAST/COMPLICATIONS:  IV Contrast: NONE  0 cc administered   0 cc discarded  Oral Contrast: NONE  Complications: None reported at time of study completion    PROCEDURE:  CT of the Abdomen and Pelvis was performed.  Sagittal and coronal reformats were performed.    FINDINGS:  LOWER CHEST: Cardiomegaly, notably with biatrial dilatation, and coronary   artery calcifications. Bibasilar subsegmental atelectasis.    LIVER: Hyperdense liver; correlation for amiodarone therapy.  BILE DUCTS: Normal caliber.  GALLBLADDER: Cholecystectomy.  SPLEEN: Within normal limits.  PANCREAS: Within normal limits.  ADRENALS: Within normal limits.  KIDNEYS/URETERS: Left renal cyst. A few punctate (approximately 2 mm)   bilateral nonobstructing kidney stones.    BLADDER/REPRODUCTIVE ORGANS: Limited evaluation of the lower pelvis due   to beam hardening artifact from the patient's right hip arthroplasty.   Calcified fibroid uterus. No definite adnexal mass. Suboptimal evaluation   of the urinary bladder due to underdistention and artifact.    BOWEL: No bowel obstruction. Appendix is normal.  PERITONEUM: No ascites.  VESSELS: Atherosclerotic changes.  RETROPERITONEUM/LYMPH NODES: No lymphadenopathy.  ABDOMINAL WALL: Within normal limits.  BONES: Right hip arthroplasty. Osteopenia. Left hip and spinal   degenerative changes.    IMPRESSION:  A few tiny bilateral nonobstructing kidney stones.    No acute abdominal pathology.      --- End of Report ---            JORGE THOMAS MD; Attending Radiologist  This document has been electronically signed. Mar  4 2023  6:22PM          CRITICAL CARE TIME SPENT:

## 2023-07-11 NOTE — ED ADULT NURSE NOTE - CHIEF COMPLAINT QUOTE
per ems from [facility] for shortness of breath, oxygen saturation on room air 95%, arrives on nasal cannula. pt breathing unlabored. per ems HR 30-60s. pt received 1mg of atropine IV per ems per ems from [facility] for shortness of breath, oxygen saturation on room air 95%, arrives on nasal cannula. pt breathing unlabored. per ems HR 30-60s. pt received 1mg of atropine IV per ems per ems from [facility] for shortness of breath, oxygen saturation on room air 95%, arrives on nasal cannula. pt breathing unlabored. per ems HR 30-60s. pt received 1mg of atropine IV per ems

## 2023-07-11 NOTE — ED PROVIDER NOTE - CARE PLAN
1 Principal Discharge DX:	Complete heart block  Secondary Diagnosis:	Chronic atrial fibrillation   Principal Discharge DX:	Complete heart block  Secondary Diagnosis:	Chronic atrial fibrillation  Secondary Diagnosis:	Acute hyperkalemia  Secondary Diagnosis:	STEFANY (acute kidney injury)

## 2023-07-11 NOTE — PATIENT PROFILE ADULT - FALL HARM RISK - HARM RISK INTERVENTIONS
Assistance with ambulation/Assistance OOB with selected safe patient handling equipment/Communicate Risk of Fall with Harm to all staff/Discuss with provider need for PT consult/Monitor gait and stability/Reinforce activity limits and safety measures with patient and family/Tailored Fall Risk Interventions/Visual Cue: Yellow wristband and red socks/Bed in lowest position, wheels locked, appropriate side rails in place/Call bell, personal items and telephone in reach/Instruct patient to call for assistance before getting out of bed or chair/Non-slip footwear when patient is out of bed/[place] to call system/Physically safe environment - no spills, clutter or unnecessary equipment/Purposeful Proactive Rounding/Room/bathroom lighting operational, light cord in reach Assistance with ambulation/Assistance OOB with selected safe patient handling equipment/Communicate Risk of Fall with Harm to all staff/Discuss with provider need for PT consult/Monitor gait and stability/Reinforce activity limits and safety measures with patient and family/Tailored Fall Risk Interventions/Visual Cue: Yellow wristband and red socks/Bed in lowest position, wheels locked, appropriate side rails in place/Call bell, personal items and telephone in reach/Instruct patient to call for assistance before getting out of bed or chair/Non-slip footwear when patient is out of bed/Only to call system/Physically safe environment - no spills, clutter or unnecessary equipment/Purposeful Proactive Rounding/Room/bathroom lighting operational, light cord in reach Assistance with ambulation/Assistance OOB with selected safe patient handling equipment/Communicate Risk of Fall with Harm to all staff/Discuss with provider need for PT consult/Monitor gait and stability/Reinforce activity limits and safety measures with patient and family/Tailored Fall Risk Interventions/Visual Cue: Yellow wristband and red socks/Bed in lowest position, wheels locked, appropriate side rails in place/Call bell, personal items and telephone in reach/Instruct patient to call for assistance before getting out of bed or chair/Non-slip footwear when patient is out of bed/[place] to call system/Physically safe environment - no spills, clutter or unnecessary equipment/Purposeful Proactive Rounding/Room/bathroom lighting operational, light cord in reach

## 2023-07-11 NOTE — ED PROVIDER NOTE - CRITICAL CARE ATTENDING CONTRIBUTION TO CARE
Patient is critically ill with a life-threatening arrhythmia.  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  She also on metoprolol for her heart rate.  She has no pacemaker.  She is DNR DNI.  Presents with newly reported bradycardia with hypotension shortness of breath found to be in heart block.  With a junctional escape rhythm of 40.  Plan of care includes rule out metabolic derangement causing arrhythmia, rule out infectious derangement causing arrhythmia rule out MI causing arrhythmia.  Laboratory studies reveal a slightly elevated white blood cell count, elevated potassium and kidney function, will treat the elevated potassium given the arrhythmia.  Cardiac consultation with [facility] cardiology, admit to telemetry service.  Patient is at risk for decompensation and need for immediate pacing.  Will have transcutaneous pacing pads near the patient.  As her most does not indicate cannot pace, rather just no CPR.  This chart was made with dictation software and may contain typographical errors. Patient is critically ill with a life-threatening arrhythmia.  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  She also on metoprolol for her heart rate.  She has no pacemaker.  She is DNR DNI.  Presents with newly reported bradycardia with hypotension shortness of breath found to be in heart block.  With a junctional escape rhythm of 40.  Plan of care includes rule out metabolic derangement causing arrhythmia, rule out infectious derangement causing arrhythmia rule out MI causing arrhythmia.  Laboratory studies reveal a slightly elevated white blood cell count, elevated potassium and kidney function, will treat the elevated potassium given the arrhythmia.  Cardiac consultation with [facility] cardiology, admit to telemetry service.  Patient is at risk for decompensation and need for immediate pacing.  Will have transcutaneous pacing pads near the patient.  As her most does not indicate cannot pace, rather just no CPR.  This chart was made with dictation software and may contain typographical errors. Patient is critically ill with a life-threatening arrhythmia.  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  She also on metoprolol for her heart rate.  She has no pacemaker.  She is DNR DNI.  Presents with newly reported bradycardia with hypotension shortness of breath found to be in heart block.  With a junctional escape rhythm of 40.  Plan of care includes rule out metabolic derangement causing arrhythmia, rule out infectious derangement causing arrhythmia rule out MI causing arrhythmia.  Laboratory studies reveal a slightly elevated white blood cell count, elevated potassium and kidney function, will treat the elevated potassium given the arrhythmia.  Cardiac consultation with [facility] cardiology, admit to telemetry service.  Patient is at risk for decompensation and need for immediate pacing.  Will have transcutaneous pacing pads near the patient.  As her most does not indicate cannot pace, rather just no CPR.  This chart was made with dictation software and may contain typographical errors.

## 2023-07-11 NOTE — H&P ADULT - NSHPPHYSICALEXAM_GEN_ALL_CORE
General: frail  PERRLA  Neurology: A&Ox1  Respiratory: CTA B/L  CV: RRR, S1S2, no murmurs, rubs or gallops  Abdominal: Soft, NT, ND +BS, Last BM  Extremities:edema+  Skin Normal

## 2023-07-11 NOTE — CONSULT NOTE ADULT - CONVERSATION DETAILS
Discussed pt condition w/ daughter Beth. Informed Beth that pt HR is very slow and may need to be paced or controlled via transcutaneous or transvenous pacing. Daughter states her mother would not want anything that invasive and would only want to be managed/treated medically w/ IV medications.

## 2023-07-11 NOTE — CONSULT NOTE ADULT - SUBJECTIVE AND OBJECTIVE BOX
[facility] [place]     Patient is a 89y old  Female who presents with a chief complaint of Weakness, dizziness (11 Jul 2023 01:55)      HPI:      HPI:    89y Female for Cardiology Consult    PAST MEDICAL & SURGICAL HISTORY:  HTN (hypertension)      Afib      Spinal stenosis      PFO (patent foramen ovale)      Degeneration macular      Osteoporosis          FAMILY HISTORY:      SOCIAL HISTORY:   Alcohol:  Smoking:    Allergies    penicillin (Unknown)    Intolerances        MEDICATIONS  (STANDING):  apixaban 2.5 milliGRAM(s) Oral two times a day    MEDICATIONS  (PRN):      REVIEW OF SYSTEMS:  CONSTITUTIONAL: No fever, weight loss, chills, shakes, or fat  RESPIRATORY: No cough, wheezing, hemoptysis, or shortness of breath  CARDIOVASCULAR: No chest pain, dyspnea, palpitations, dizziness, syncope, paroxysmal nocturnal dyspnea, orthopnea, or arm or leg swelling  GASTROINTESTINAL: No abdominal  or epigastric pain, nausea, vomiting, hematemesis, diarrhea, constipation, melena or bright red bloo  NEUROLOGICAL: No headaches, memory loss, slurred speech, limb weakness, loss of strength, numbness, or tremors  SKIN: No itching, burning, rashes, or lesions  ENDOCRINE: No heat or cold intolerance, or hair loss  MUSCULOSKELETAL: No joint pain or swelling, muscle, back, or extremity pain  HEME/LYMPH: No easy bruising or bleeding gums  ALLERY AND IMMUNOLOGIC: No hives or rash.    Vital Signs Last 24 Hrs  T(C): 36.7 (10 Jul 2023 23:52), Max: 36.7 (10 Jul 2023 23:52)  T(F): 98.1 (10 Jul 2023 23:52), Max: 98.1 (10 Jul 2023 23:52)  HR: 40 (10 Jul 2023 23:52) (40 - 40)  BP: 102/54 (10 Jul 2023 23:52) (102/54 - 102/54)  BP(mean): --  RR: 18 (10 Jul 2023 23:52) (18 - 18)  SpO2: 99% (10 Jul 2023 23:52) (99% - 99%)    Parameters below as of 10 Jul 2023 23:52  Patient On (Oxygen Delivery Method): nasal cannula  O2 Flow (L/min): 2      PHYSICAL EXAM:  HEAD:  Atraumatic, Normocephalic  EYES: EOMI, PERRLA, conjunctiva and sclera clear  NECK: Supple and normal thyroid.  No JVD or carotid bruit.   HEART: S1, S2 regular , 1/6 soft ejection systolic murmur in mitral area , no thrill and no gallops .  PULMONARY: Bilateral vesicular breathing , few scattered ronchi and few scattered rales are present .  ABDOMEN: Soft nontender and positive bowl sounds   EXTREMITIES:  No clubbing, cyanosis, or pedal  edema  NEUROLOGICAL: AAOX3 , no focal deficit .    LABS:                        10.7   16.16 )-----------( 310      ( 11 Jul 2023 00:43 )             35.9     07-11    136  |  110<H>  |  49<H>  ----------------------------<  128<H>  6.5<HH>   |  19<L>  |  2.40<H>    Ca    9.0      11 Jul 2023 00:43  Mg     2.3     07-11    TPro  6.7  /  Alb  2.9<L>  /  TBili  0.3  /  DBili  x   /  AST  53<H>  /  ALT  80<H>  /  AlkPhos  120  07-11    CARDIAC MARKERS ( 11 Jul 2023 00:43 )  x     / x     / 51 U/L / x     / 1.4 ng/mL      PT/INR - ( 11 Jul 2023 00:43 )   PT: 15.5 sec;   INR: 1.32 ratio         PTT - ( 11 Jul 2023 00:43 )  PTT:42.0 sec  Urinalysis Basic - ( 11 Jul 2023 00:43 )    Color: x / Appearance: x / SG: x / pH: x  Gluc: 128 mg/dL / Ketone: x  / Bili: x / Urobili: x   Blood: x / Protein: x / Nitrite: x   Leuk Esterase: x / RBC: x / WBC x   Sq Epi: x / Non Sq Epi: x / Bacteria: x      BNP      EKG:  ECHO:  IMAGING:    Assessment and Plan :     Will continue to follow during hospital course and give further recommendations as needed . Thanks for your referral . if any questions please contact me at office ([phone])cell [phone])  [facility] [place]     Patient is a 89y old  Female who presents with a chief complaint of Weakness, dizziness (11 Jul 2023 01:55)      HPI:      HPI:    89y Female for Cardiology Consult    PAST MEDICAL & SURGICAL HISTORY:  HTN (hypertension)      Afib      Spinal stenosis      PFO (patent foramen ovale)      Degeneration macular      Osteoporosis          FAMILY HISTORY:      SOCIAL HISTORY:   Alcohol:  Smoking:    Allergies    penicillin (Unknown)    Intolerances        MEDICATIONS  (STANDING):  apixaban 2.5 milliGRAM(s) Oral two times a day    MEDICATIONS  (PRN):      REVIEW OF SYSTEMS:  CONSTITUTIONAL: No fever, weight loss, chills, shakes, or fat  RESPIRATORY: No cough, wheezing, hemoptysis, or shortness of breath  CARDIOVASCULAR: No chest pain, dyspnea, palpitations, dizziness, syncope, paroxysmal nocturnal dyspnea, orthopnea, or arm or leg swelling  GASTROINTESTINAL: No abdominal  or epigastric pain, nausea, vomiting, hematemesis, diarrhea, constipation, melena or bright red bloo  NEUROLOGICAL: No headaches, memory loss, slurred speech, limb weakness, loss of strength, numbness, or tremors  SKIN: No itching, burning, rashes, or lesions  ENDOCRINE: No heat or cold intolerance, or hair loss  MUSCULOSKELETAL: No joint pain or swelling, muscle, back, or extremity pain  HEME/LYMPH: No easy bruising or bleeding gums  ALLERY AND IMMUNOLOGIC: No hives or rash.    Vital Signs Last 24 Hrs  T(C): 36.7 (10 Jul 2023 23:52), Max: 36.7 (10 Jul 2023 23:52)  T(F): 98.1 (10 Jul 2023 23:52), Max: 98.1 (10 Jul 2023 23:52)  HR: 40 (10 Jul 2023 23:52) (40 - 40)  BP: 102/54 (10 Jul 2023 23:52) (102/54 - 102/54)  BP(mean): --  RR: 18 (10 Jul 2023 23:52) (18 - 18)  SpO2: 99% (10 Jul 2023 23:52) (99% - 99%)    Parameters below as of 10 Jul 2023 23:52  Patient On (Oxygen Delivery Method): nasal cannula  O2 Flow (L/min): 2      PHYSICAL EXAM:  HEAD:  Atraumatic, Normocephalic  EYES: EOMI, PERRLA, conjunctiva and sclera clear  NECK: Supple and normal thyroid.  No JVD or carotid bruit.   HEART: S1, S2 regular , 1/6 soft ejection systolic murmur in mitral area , no thrill and no gallops .  PULMONARY: Bilateral vesicular breathing , few scattered ronchi and few scattered rales are present .  ABDOMEN: Soft nontender and positive bowl sounds   EXTREMITIES:  No clubbing, cyanosis, or pedal  edema  NEUROLOGICAL: AAOX3 , no focal deficit .    LABS:                        10.7   16.16 )-----------( 310      ( 11 Jul 2023 00:43 )             35.9     07-11    136  |  110<H>  |  49<H>  ----------------------------<  128<H>  6.5<HH>   |  19<L>  |  2.40<H>    Ca    9.0      11 Jul 2023 00:43  Mg     2.3     07-11    TPro  6.7  /  Alb  2.9<L>  /  TBili  0.3  /  DBili  x   /  AST  53<H>  /  ALT  80<H>  /  AlkPhos  120  07-11    CARDIAC MARKERS ( 11 Jul 2023 00:43 )  x     / x     / 51 U/L / x     / 1.4 ng/mL      PT/INR - ( 11 Jul 2023 00:43 )   PT: 15.5 sec;   INR: 1.32 ratio         PTT - ( 11 Jul 2023 00:43 )  PTT:42.0 sec  Urinalysis Basic - ( 11 Jul 2023 00:43 )    Color: x / Appearance: x / SG: x / pH: x  Gluc: 128 mg/dL / Ketone: x  / Bili: x / Urobili: x   Blood: x / Protein: x / Nitrite: x   Leuk Esterase: x / RBC: x / WBC x   Sq Epi: x / Non Sq Epi: x / Bacteria: x      BNP      EKG:  ECHO:  IMAGING:    Assessment and Plan :     Will continue to follow during hospital course and give further recommendations as needed . Thanks for your referral . if any questions please contact me at office ([phone])cell [phone])  [facility] [place]     Patient is a 89y old  Female who presents with a chief complaint of Weakness, dizziness (11 Jul 2023 01:55)      HPI:      HPI:    89y Female for Cardiology Consult    PAST MEDICAL & SURGICAL HISTORY:  HTN (hypertension)      Afib      Spinal stenosis      PFO (patent foramen ovale)      Degeneration macular      Osteoporosis          FAMILY HISTORY:      SOCIAL HISTORY:   Alcohol:  Smoking:    Allergies    penicillin (Unknown)    Intolerances        MEDICATIONS  (STANDING):  apixaban 2.5 milliGRAM(s) Oral two times a day    MEDICATIONS  (PRN):      REVIEW OF SYSTEMS:  CONSTITUTIONAL: No fever, weight loss, chills, shakes, or fat  RESPIRATORY: No cough, wheezing, hemoptysis, or shortness of breath  CARDIOVASCULAR: No chest pain, dyspnea, palpitations, dizziness, syncope, paroxysmal nocturnal dyspnea, orthopnea, or arm or leg swelling  GASTROINTESTINAL: No abdominal  or epigastric pain, nausea, vomiting, hematemesis, diarrhea, constipation, melena or bright red bloo  NEUROLOGICAL: No headaches, memory loss, slurred speech, limb weakness, loss of strength, numbness, or tremors  SKIN: No itching, burning, rashes, or lesions  ENDOCRINE: No heat or cold intolerance, or hair loss  MUSCULOSKELETAL: No joint pain or swelling, muscle, back, or extremity pain  HEME/LYMPH: No easy bruising or bleeding gums  ALLERY AND IMMUNOLOGIC: No hives or rash.    Vital Signs Last 24 Hrs  T(C): 36.7 (10 Jul 2023 23:52), Max: 36.7 (10 Jul 2023 23:52)  T(F): 98.1 (10 Jul 2023 23:52), Max: 98.1 (10 Jul 2023 23:52)  HR: 40 (10 Jul 2023 23:52) (40 - 40)  BP: 102/54 (10 Jul 2023 23:52) (102/54 - 102/54)  BP(mean): --  RR: 18 (10 Jul 2023 23:52) (18 - 18)  SpO2: 99% (10 Jul 2023 23:52) (99% - 99%)    Parameters below as of 10 Jul 2023 23:52  Patient On (Oxygen Delivery Method): nasal cannula  O2 Flow (L/min): 2      PHYSICAL EXAM:  HEAD:  Atraumatic, Normocephalic  EYES: EOMI, PERRLA, conjunctiva and sclera clear  NECK: Supple and normal thyroid.  No JVD or carotid bruit.   HEART: S1, S2 regular , 1/6 soft ejection systolic murmur in mitral area , no thrill and no gallops .  PULMONARY: Bilateral vesicular breathing , few scattered ronchi and few scattered rales are present .  ABDOMEN: Soft nontender and positive bowl sounds   EXTREMITIES:  No clubbing, cyanosis, or pedal  edema  NEUROLOGICAL: AAOX3 , no focal deficit .    LABS:                        10.7   16.16 )-----------( 310      ( 11 Jul 2023 00:43 )             35.9     07-11    136  |  110<H>  |  49<H>  ----------------------------<  128<H>  6.5<HH>   |  19<L>  |  2.40<H>    Ca    9.0      11 Jul 2023 00:43  Mg     2.3     07-11    TPro  6.7  /  Alb  2.9<L>  /  TBili  0.3  /  DBili  x   /  AST  53<H>  /  ALT  80<H>  /  AlkPhos  120  07-11    CARDIAC MARKERS ( 11 Jul 2023 00:43 )  x     / x     / 51 U/L / x     / 1.4 ng/mL      PT/INR - ( 11 Jul 2023 00:43 )   PT: 15.5 sec;   INR: 1.32 ratio         PTT - ( 11 Jul 2023 00:43 )  PTT:42.0 sec  Urinalysis Basic - ( 11 Jul 2023 00:43 )    Color: x / Appearance: x / SG: x / pH: x  Gluc: 128 mg/dL / Ketone: x  / Bili: x / Urobili: x   Blood: x / Protein: x / Nitrite: x   Leuk Esterase: x / RBC: x / WBC x   Sq Epi: x / Non Sq Epi: x / Bacteria: x      BNP      EKG:  ECHO:  IMAGING:    Assessment and Plan :     Will continue to follow during hospital course and give further recommendations as needed . Thanks for your referral . if any questions please contact me at office ([phone])cell [phone])  NINOSKA DAVENPORT MD [facility]  [address] [address]  [address]  OFFICE : [phone]  CELL : [phone]  CARDIOLOGY CONSULT :     Patient is a 89y old  Female who presents with a chief complaint of Weakness, dizziness (11 Jul 2023 01:55)    Chief Complaint: shortness of breath.    · Chief Complaint: The patient is a 89y Female complaining of shortness of breath.  · HPI Objective Statement: Some mild tach patient is a 89-year-old female who has a history of hypothyroidism A-fib on Eliquis resident of a rehab facility for progressive weakness and unsteady gait.  Found to be complaining of shortness of breath and feeling unwell her heart rate was in the 30s.  EMS was called the patient was brought by private paramedic service to [facility] for evaluation of complete heart block.  EMS administered an amp of atropine without change in status or rhythm.  Patient states she feels better now.  (Even though her heart rate is 39).  Her transfer paperwork includes recommended transport to [facility], her cardiologist is at [facility] per the patient, and her primary care physician is Dr. Collins covered by dr Soliz at this facility.   She has a MOLST in place, which indicates DNR and DNI.          HPI:    89y Female for Cardiology Consult    PAST MEDICAL & SURGICAL HISTORY:  HTN (hypertension)      Afib      Spinal stenosis      PFO (patent foramen ovale)      Degeneration macular      Osteoporosis          FAMILY HISTORY:      SOCIAL HISTORY:   Alcohol:  Smoking:    Allergies    penicillin (Unknown)    Intolerances        MEDICATIONS  (STANDING):  apixaban 2.5 milliGRAM(s) Oral two times a day    MEDICATIONS  (PRN):    Vital Signs Last 24 Hrs  T(C): 36.7 (10 Jul 2023 23:52), Max: 36.7 (10 Jul 2023 23:52)  T(F): 98.1 (10 Jul 2023 23:52), Max: 98.1 (10 Jul 2023 23:52)  HR: 40 (10 Jul 2023 23:52) (40 - 40)  BP: 102/54 (10 Jul 2023 23:52) (102/54 - 102/54)  BP(mean): --  RR: 18 (10 Jul 2023 23:52) (18 - 18)  SpO2: 99% (10 Jul 2023 23:52) (99% - 99%)    Parameters below as of 10 Jul 2023 23:52  Patient On (Oxygen Delivery Method): nasal cannula  O2 Flow (L/min): 2    LABS:                        10.7   16.16 )-----------( 310      ( 11 Jul 2023 00:43 )             35.9     07-11    136  |  110<H>  |  49<H>  ----------------------------<  128<H>  6.5<HH>   |  19<L>  |  2.40<H>    Ca    9.0      11 Jul 2023 00:43  Mg     2.3     07-11    TPro  6.7  /  Alb  2.9<L>  /  TBili  0.3  /  DBili  x   /  AST  53<H>  /  ALT  80<H>  /  AlkPhos  120  07-11    CARDIAC MARKERS ( 11 Jul 2023 00:43 )  x     / x     / 51 U/L / x     / 1.4 ng/mL      PT/INR - ( 11 Jul 2023 00:43 )   PT: 15.5 sec;   INR: 1.32 ratio         PTT - ( 11 Jul 2023 00:43 )  PTT:42.0 sec  Urinalysis Basic - ( 11 Jul 2023 00:43 )    Color: x / Appearance: x / SG: x / pH: x  Gluc: 128 mg/dL / Ketone: x  / Bili: x / Urobili: x   Blood: x / Protein: x / Nitrite: x   Leuk Esterase: x / RBC: x / WBC x   Sq Epi: x / Non Sq Epi: x / Bacteria: x      Assessment and Plan :   FULL CONSULT DICTATED .  89 years old female with H/O paroxysmal  atrial fibrillation hypothyroidism has lethargy ans confusion and has SOB and also slow atrial fibrillation and has severe bradycardia and AV dissociation and complete heart block and possible effect of hyperkalemia and metoprolol . Will treat hyperkalemia and see any change in heart rate and rythum once hyperkalemia is corrected . Family does  not want any PPM and not even transcutaneous PPM . Prognosis guarded .   Will endorse to Dr Denis to follow cardiology form tomorrow  . Thanks for your referral . if any questions please contact me at office ([phone] cell [phone])  NINOSKA DAVENPORT MD [facility]  [address] [address]  [address]  OFFICE : [phone]  CELL : [phone]  CARDIOLOGY CONSULT :     Patient is a 89y old  Female who presents with a chief complaint of Weakness, dizziness (11 Jul 2023 01:55)    Chief Complaint: shortness of breath.    · Chief Complaint: The patient is a 89y Female complaining of shortness of breath.  · HPI Objective Statement: Some mild tach patient is a 89-year-old female who has a history of hypothyroidism A-fib on Eliquis resident of a rehab facility for progressive weakness and unsteady gait.  Found to be complaining of shortness of breath and feeling unwell her heart rate was in the 30s.  EMS was called the patient was brought by private paramedic service to [facility] for evaluation of complete heart block.  EMS administered an amp of atropine without change in status or rhythm.  Patient states she feels better now.  (Even though her heart rate is 39).  Her transfer paperwork includes recommended transport to [facility], her cardiologist is at [facility] per the patient, and her primary care physician is Dr. Collins covered by dr Soliz at this facility.   She has a MOLST in place, which indicates DNR and DNI.          HPI:    89y Female for Cardiology Consult    PAST MEDICAL & SURGICAL HISTORY:  HTN (hypertension)      Afib      Spinal stenosis      PFO (patent foramen ovale)      Degeneration macular      Osteoporosis          FAMILY HISTORY:      SOCIAL HISTORY:   Alcohol:  Smoking:    Allergies    penicillin (Unknown)    Intolerances        MEDICATIONS  (STANDING):  apixaban 2.5 milliGRAM(s) Oral two times a day    MEDICATIONS  (PRN):    Vital Signs Last 24 Hrs  T(C): 36.7 (10 Jul 2023 23:52), Max: 36.7 (10 Jul 2023 23:52)  T(F): 98.1 (10 Jul 2023 23:52), Max: 98.1 (10 Jul 2023 23:52)  HR: 40 (10 Jul 2023 23:52) (40 - 40)  BP: 102/54 (10 Jul 2023 23:52) (102/54 - 102/54)  BP(mean): --  RR: 18 (10 Jul 2023 23:52) (18 - 18)  SpO2: 99% (10 Jul 2023 23:52) (99% - 99%)    Parameters below as of 10 Jul 2023 23:52  Patient On (Oxygen Delivery Method): nasal cannula  O2 Flow (L/min): 2    LABS:                        10.7   16.16 )-----------( 310      ( 11 Jul 2023 00:43 )             35.9     07-11    136  |  110<H>  |  49<H>  ----------------------------<  128<H>  6.5<HH>   |  19<L>  |  2.40<H>    Ca    9.0      11 Jul 2023 00:43  Mg     2.3     07-11    TPro  6.7  /  Alb  2.9<L>  /  TBili  0.3  /  DBili  x   /  AST  53<H>  /  ALT  80<H>  /  AlkPhos  120  07-11    CARDIAC MARKERS ( 11 Jul 2023 00:43 )  x     / x     / 51 U/L / x     / 1.4 ng/mL      PT/INR - ( 11 Jul 2023 00:43 )   PT: 15.5 sec;   INR: 1.32 ratio         PTT - ( 11 Jul 2023 00:43 )  PTT:42.0 sec  Urinalysis Basic - ( 11 Jul 2023 00:43 )    Color: x / Appearance: x / SG: x / pH: x  Gluc: 128 mg/dL / Ketone: x  / Bili: x / Urobili: x   Blood: x / Protein: x / Nitrite: x   Leuk Esterase: x / RBC: x / WBC x   Sq Epi: x / Non Sq Epi: x / Bacteria: x      Assessment and Plan :   FULL CONSULT DICTATED .  89 years old female with H/O paroxysmal  atrial fibrillation hypothyroidism has lethargy ans confusion and has SOB and also slow atrial fibrillation and has severe bradycardia and AV dissociation and complete heart block and possible effect of hyperkalemia and metoprolol . Will treat hyperkalemia and see any change in heart rate and rythum once hyperkalemia is corrected . Family does  not want any PPM and not even transcutaneous PPM . Prognosis guarded .   Will endorse to Dr Denis to follow cardiology form tomorrow  . Thanks for your referral . if any questions please contact me at office ([phone] cell [phone])  NINOSKA DAVENPORT MD [facility]  [address] [address]  [address]  OFFICE : [phone]  CELL : [phone]  CARDIOLOGY CONSULT :     Patient is a 89y old  Female who presents with a chief complaint of Weakness, dizziness (11 Jul 2023 01:55)    Chief Complaint: shortness of breath.    · Chief Complaint: The patient is a 89y Female complaining of shortness of breath.  · HPI Objective Statement: Some mild tach patient is a 89-year-old female who has a history of hypothyroidism A-fib on Eliquis resident of a rehab facility for progressive weakness and unsteady gait.  Found to be complaining of shortness of breath and feeling unwell her heart rate was in the 30s.  EMS was called the patient was brought by private paramedic service to [facility] for evaluation of complete heart block.  EMS administered an amp of atropine without change in status or rhythm.  Patient states she feels better now.  (Even though her heart rate is 39).  Her transfer paperwork includes recommended transport to [facility], her cardiologist is at [facility] per the patient, and her primary care physician is Dr. Collins covered by dr Soliz at this facility.   She has a MOLST in place, which indicates DNR and DNI.          HPI:    89y Female for Cardiology Consult    PAST MEDICAL & SURGICAL HISTORY:  HTN (hypertension)      Afib      Spinal stenosis      PFO (patent foramen ovale)      Degeneration macular      Osteoporosis          FAMILY HISTORY:      SOCIAL HISTORY:   Alcohol:  Smoking:    Allergies    penicillin (Unknown)    Intolerances        MEDICATIONS  (STANDING):  apixaban 2.5 milliGRAM(s) Oral two times a day    MEDICATIONS  (PRN):    Vital Signs Last 24 Hrs  T(C): 36.7 (10 Jul 2023 23:52), Max: 36.7 (10 Jul 2023 23:52)  T(F): 98.1 (10 Jul 2023 23:52), Max: 98.1 (10 Jul 2023 23:52)  HR: 40 (10 Jul 2023 23:52) (40 - 40)  BP: 102/54 (10 Jul 2023 23:52) (102/54 - 102/54)  BP(mean): --  RR: 18 (10 Jul 2023 23:52) (18 - 18)  SpO2: 99% (10 Jul 2023 23:52) (99% - 99%)    Parameters below as of 10 Jul 2023 23:52  Patient On (Oxygen Delivery Method): nasal cannula  O2 Flow (L/min): 2    LABS:                        10.7   16.16 )-----------( 310      ( 11 Jul 2023 00:43 )             35.9     07-11    136  |  110<H>  |  49<H>  ----------------------------<  128<H>  6.5<HH>   |  19<L>  |  2.40<H>    Ca    9.0      11 Jul 2023 00:43  Mg     2.3     07-11    TPro  6.7  /  Alb  2.9<L>  /  TBili  0.3  /  DBili  x   /  AST  53<H>  /  ALT  80<H>  /  AlkPhos  120  07-11    CARDIAC MARKERS ( 11 Jul 2023 00:43 )  x     / x     / 51 U/L / x     / 1.4 ng/mL      PT/INR - ( 11 Jul 2023 00:43 )   PT: 15.5 sec;   INR: 1.32 ratio         PTT - ( 11 Jul 2023 00:43 )  PTT:42.0 sec  Urinalysis Basic - ( 11 Jul 2023 00:43 )    Color: x / Appearance: x / SG: x / pH: x  Gluc: 128 mg/dL / Ketone: x  / Bili: x / Urobili: x   Blood: x / Protein: x / Nitrite: x   Leuk Esterase: x / RBC: x / WBC x   Sq Epi: x / Non Sq Epi: x / Bacteria: x      Assessment and Plan :   FULL CONSULT DICTATED .  89 years old female with H/O paroxysmal  atrial fibrillation hypothyroidism has lethargy ans confusion and has SOB and also slow atrial fibrillation and has severe bradycardia and AV dissociation and complete heart block and possible effect of hyperkalemia and metoprolol . Will treat hyperkalemia and see any change in heart rate and rythum once hyperkalemia is corrected . Family does  not want any PPM and not even transcutaneous PPM . Prognosis guarded .   Will endorse to Dr Denis to follow cardiology form tomorrow  . Thanks for your referral . if any questions please contact me at office ([phone] cell [phone])

## 2023-07-11 NOTE — ED PROVIDER NOTE - SKIN, MLM
Skin thin frail appearing, no wounds noted, normal color for race, warm, dry and intact. No evidence of rash.

## 2023-07-11 NOTE — CONSULT NOTE ADULT - TIME BILLING
in direct care of patient , reviewing labs and other results and adjusting medications and in discussion with other consultants and RN and PMD

## 2023-07-11 NOTE — CONSULT NOTE ADULT - ASSESSMENT
Initial evaluation/Pulmonary Critical Care consultation requested on 7/11/2023  by Dr DR SOLIZ     from Dr Grant   Patient examined chart reviewed    HOSPITAL ADMISSION   PATIENT CAME  FROM (if information available)      REASON FOR VISIT  .. Management of problems listed below      PHYSICAL EXAM    HEENT Unremarkable  atraumatic   RESP Fair air entry  Harsh breath sound   CARDIAC S1 S2 No S3     NO JVD    ABDOMEN No hepatosplenomegaly   PEDAL EDEMA present No calf tenderness  NO rash       GENERAL DATA .     GOC.    . 7/11/2023 dnr  ICU STAY.   .. none  COVID.   ..      BEST PRACTICE ISSUES.    HOB ELEVATN.   .. Yes  DVT PPLX.   .. 7/11/2023 apixaba 2.5 x 2 ( a fib)   STRESS ULCER PPLX.   ..    7/11/2023 protonix 40   INFECTION PPLX.   ..    SP SW FIFI.    ..        DIET.    .. 7/11/2023 puree    IV fl.  .. 7/11/2023 ns 75      PROCEDURES.  ..   PAST PROCEDURES.    ..     GENERAL DATA .     ALLGY.  ..         pncl                WT.  ..      ABGS.   .       VS/ PO/IO/ VENT/ DRIPS.   7/11/2023 afeb 120/40 18   7/11/2023 ra 98%         DOA/INITIAL PRESENTATION.    89-year-old female who has a history of hypothyroidism A-fib on Eliquis (hx AFL ablation), PFO,  resident of a rehab facility for progressive weakness and unsteady gait.  Found to be complaining of shortness of breath and feeling unwell her heart rate was in the 30s.  EMS was called the patient was brought by private paramedic service to [facility] for evaluation of complete heart block.  EMS administered an amp of atropine without change in status or rhythm.  Patient states she feels better now once in [facility] ER .  (Even though her heart rate is 39).  Her transfer paperwork includes recommended transport to [facility], her cardiologist is at [facility] per the patient, and her primary care physician is Dr. Collins covered by dr Soliz at this facilit                           .     . 7/11/2023 Pt REFUSES EP w/u, declines pacemaker and after dw Cardio  daughter Beth who also declines pacemaker or EP procedure  pt with hx on ILR, dead battery, was seen followed by Dr Cotto, Dr Arellano in the past. Pt non-conpliant with outpt cardiac f/u.  . 7/11/2023 Cardio recommends Observe on tele D/C metoprolol check echo here, prior LVEF 50%, PFO Eliquis 2.5 bid for hx AFIB  . 7/11/2023 Pulm Crit care consulted  COURSE.     PROBLEMS/ASSESSMENT/RECOMMENDATIONS (A/R).  PULMONARY.  . GAS EXCHANGE.  .. A/R.   .. Monitor pulse oximetry and target po 90-95%    . COPD.   .. 7/11/2023 duoneb.4   .. 7/11/2023 guaifenesin 200.4p   .. cont rx    INFECTION.  . UTI 7/11/2023   ..  w 7/11/2023 w 12.1   .. cxr 7/11/2023 cxr atelectatic infiltr l gretar   .. 7/11/2023 Rocephin     HEMODYNAMICS.  .. 7/11/2023 bp ok    CARDIO.  . CHF.  .. bnp 7/11/2023 bnp 4030     . CAD.  .. Tr 7/11/2023 Tr 112   . A fib.  .. 7/11/2023 apixaba 2.5 x 2 ( a fib)    . 3 DEGREE AV BLOCK POA 7/11/2023   .. A/R.  .. 7/11/2023 Pt REFUSES EP w/u, declines pacemaker and after dw Cardio  daughter Beth who also declines pacemaker or EP procedure  pt with hx on ILR, dead battery, was seen followed by Dr Cotto, Dr Arellano in the past. Pt non-conpliant with outpt cardiac f/u.  .. 7/11/2023 Cardio recommends Observe on tele D/C metoprolol check echo here, prior LVEF 50%, PFO Eliquis 2.5 bid for hx A FIB    HEMAT.   . ANEMIA.  .. Hb 7/11/2023 Hb 9.9  .. monitor      RENAL.  . STEFANY/CKD.  .. Cr 7/11/2023 Cr 2.8   .. monitor     ENDOCRINE.  . HYPOTHYROID.  .. 7/11/2023 levoxyl 88     NEURO.   . l WEKANESS.  .. CT H 7/11/2023 (-)     MAIN ISSUES.  .3 DEGREE AV BLOCK POA 7/11/2023   .. 7/11/2023 Pt REFUSES EP w/u, declines pacemaker or EP procedure  .. pt with hx on ILR, dead battery, was seen followed by Dr Cotto, Dr Arellano in the past.   .. 7/11/2023 Cardio recommends Observe on tele D/C metoprolol check echo here, prior LVEF 50%, PFO Eliquis 2.5 bid for hx A . A FIB  . UTI   ..  7/11/2023 rocephin  . AF  .. 7/11/2023 apixa 2.5 x 2     TIME SPENT.   . About 55 minutes aggregate care time spent on encounter; activities included   direct patient care, counseling and/or coordinating care reviewing notes, lab data/ imaging , discussion with multidisciplinary team/ patient  /family and explaining in detail risks, benefits, alternatives  of the recommendations     JAKOB WORTHY 89 f 7/11/2023 9/22/1929 DR BAM SOLIZ              Initial evaluation/Pulmonary Critical Care consultation requested on 7/11/2023  by Dr DR SOLIZ     from Dr Grant   Patient examined chart reviewed    HOSPITAL ADMISSION   PATIENT CAME  FROM (if information available)      REASON FOR VISIT  .. Management of problems listed below      PHYSICAL EXAM    HEENT Unremarkable  atraumatic   RESP Fair air entry  Harsh breath sound   CARDIAC S1 S2 No S3     NO JVD    ABDOMEN No hepatosplenomegaly   PEDAL EDEMA present No calf tenderness  NO rash       GENERAL DATA .     GOC.    . 7/11/2023 dnr  ICU STAY.   .. none  COVID.   ..      BEST PRACTICE ISSUES.    HOB ELEVATN.   .. Yes  DVT PPLX.   .. 7/11/2023 apixaba 2.5 x 2 ( a fib)   STRESS ULCER PPLX.   ..    7/11/2023 protonix 40   INFECTION PPLX.   ..    SP SW FIFI.    ..        DIET.    .. 7/11/2023 puree    IV fl.  .. 7/11/2023 ns 75      PROCEDURES.  ..   PAST PROCEDURES.    ..     GENERAL DATA .     ALLGY.  ..         pncl                WT.  ..      ABGS.   .       VS/ PO/IO/ VENT/ DRIPS.   7/11/2023 afeb 120/40 18   7/11/2023 ra 98%         DOA/INITIAL PRESENTATION.    89-year-old female who has a history of hypothyroidism A-fib on Eliquis (hx AFL ablation), PFO,  resident of a rehab facility for progressive weakness and unsteady gait.  Found to be complaining of shortness of breath and feeling unwell her heart rate was in the 30s.  EMS was called the patient was brought by private paramedic service to [facility] for evaluation of complete heart block.  EMS administered an amp of atropine without change in status or rhythm.  Patient states she feels better now once in [facility] ER .  (Even though her heart rate is 39).  Her transfer paperwork includes recommended transport to [facility], her cardiologist is at [facility] per the patient, and her primary care physician is Dr. Collins covered by dr Soliz at this facilit                           .     . 7/11/2023 Pt REFUSES EP w/u, declines pacemaker and after dw Cardio  daughter Beth who also declines pacemaker or EP procedure  pt with hx on ILR, dead battery, was seen followed by Dr Cotto, Dr Arellano in the past. Pt non-conpliant with outpt cardiac f/u.  . 7/11/2023 Cardio recommends Observe on tele D/C metoprolol check echo here, prior LVEF 50%, PFO Eliquis 2.5 bid for hx AFIB  . 7/11/2023 Pulm Crit care consulted  COURSE.     PROBLEMS/ASSESSMENT/RECOMMENDATIONS (A/R).  PULMONARY.  . GAS EXCHANGE.  .. A/R.   .. Monitor pulse oximetry and target po 90-95%    . COPD.   .. 7/11/2023 duoneb.4   .. 7/11/2023 guaifenesin 200.4p   .. cont rx    INFECTION.  . UTI 7/11/2023   ..  w 7/11/2023 w 12.1   .. cxr 7/11/2023 cxr atelectatic infiltr l gretar   .. 7/11/2023 Rocephin     HEMODYNAMICS.  .. 7/11/2023 bp ok    CARDIO.  . CHF.  .. bnp 7/11/2023 bnp 4030     . CAD.  .. Tr 7/11/2023 Tr 112   . A fib.  .. 7/11/2023 apixaba 2.5 x 2 ( a fib)    . 3 DEGREE AV BLOCK POA 7/11/2023   .. A/R.  .. 7/11/2023 Pt REFUSES EP w/u, declines pacemaker and after dw Cardio  daughter Beth who also declines pacemaker or EP procedure  pt with hx on ILR, dead battery, was seen followed by Dr Cotto, Dr Arellano in the past. Pt non-conpliant with outpt cardiac f/u.  .. 7/11/2023 Cardio recommends Observe on tele D/C metoprolol check echo here, prior LVEF 50%, PFO Eliquis 2.5 bid for hx A FIB    HEMAT.   . ANEMIA.  .. Hb 7/11/2023 Hb 9.9  .. monitor      RENAL.  . STEFANY/CKD.  .. Cr 7/11/2023 Cr 2.8   .. monitor     ENDOCRINE.  . HYPOTHYROID.  .. 7/11/2023 levoxyl 88     NEURO.   . l WEKANESS.  .. CT H 7/11/2023 (-)     MAIN ISSUES.  .3 DEGREE AV BLOCK POA 7/11/2023   .. 7/11/2023 Pt REFUSES EP w/u, declines pacemaker or EP procedure  .. pt with hx on ILR, dead battery, was seen followed by Dr Cotto, Dr Arellano in the past.   .. 7/11/2023 Cardio recommends Observe on tele D/C metoprolol check echo here, prior LVEF 50%, PFO Eliquis 2.5 bid for hx A . A FIB  . UTI   ..  7/11/2023 rocephin  . AF  .. 7/11/2023 apixa 2.5 x 2     TIME SPENT.   . About 55 minutes aggregate care time spent on encounter; activities included   direct patient care, counseling and/or coordinating care reviewing notes, lab data/ imaging , discussion with multidisciplinary team/ patient  /family and explaining in detail risks, benefits, alternatives  of the recommendations     JAKOB WORTHY 89 f 7/11/2023 9/22/1929 DR BAM SOLIZ              Initial evaluation/Pulmonary Critical Care consultation requested on 7/11/2023  by Dr DR SOLIZ     from Dr Grant   Patient examined chart reviewed    HOSPITAL ADMISSION   PATIENT CAME  FROM (if information available)      REASON FOR VISIT  .. Management of problems listed below      PHYSICAL EXAM    HEENT Unremarkable  atraumatic   RESP Fair air entry  Harsh breath sound   CARDIAC S1 S2 No S3     NO JVD    ABDOMEN No hepatosplenomegaly   PEDAL EDEMA present No calf tenderness  NO rash       GENERAL DATA .     GOC.    . 7/11/2023 dnr  ICU STAY.   .. none  COVID.   ..      BEST PRACTICE ISSUES.    HOB ELEVATN.   .. Yes  DVT PPLX.   .. 7/11/2023 apixaba 2.5 x 2 ( a fib)   STRESS ULCER PPLX.   ..    7/11/2023 protonix 40   INFECTION PPLX.   ..    SP SW FIFI.    ..        DIET.    .. 7/11/2023 puree    IV fl.  .. 7/11/2023 ns 75      PROCEDURES.  ..   PAST PROCEDURES.    ..     GENERAL DATA .     ALLGY.  ..         pncl                WT.  ..      ABGS.   .       VS/ PO/IO/ VENT/ DRIPS.   7/11/2023 afeb 120/40 18   7/11/2023 ra 98%         DOA/INITIAL PRESENTATION.    89-year-old female who has a history of hypothyroidism A-fib on Eliquis (hx AFL ablation), PFO,  resident of a rehab facility for progressive weakness and unsteady gait.  Found to be complaining of shortness of breath and feeling unwell her heart rate was in the 30s.  EMS was called the patient was brought by private paramedic service to [facility] for evaluation of complete heart block.  EMS administered an amp of atropine without change in status or rhythm.  Patient states she feels better now once in [facility] ER .  (Even though her heart rate is 39).  Her transfer paperwork includes recommended transport to [facility], her cardiologist is at [facility] per the patient, and her primary care physician is Dr. Collins covered by dr Soliz at this facilit                           .     . 7/11/2023 Pt REFUSES EP w/u, declines pacemaker and after dw Cardio  daughter Beth who also declines pacemaker or EP procedure  pt with hx on ILR, dead battery, was seen followed by Dr Cotto, Dr Arellano in the past. Pt non-conpliant with outpt cardiac f/u.  . 7/11/2023 Cardio recommends Observe on tele D/C metoprolol check echo here, prior LVEF 50%, PFO Eliquis 2.5 bid for hx AFIB  . 7/11/2023 Pulm Crit care consulted  COURSE.     PROBLEMS/ASSESSMENT/RECOMMENDATIONS (A/R).  PULMONARY.  . GAS EXCHANGE.  .. A/R.   .. Monitor pulse oximetry and target po 90-95%    . COPD.   .. 7/11/2023 duoneb.4   .. 7/11/2023 guaifenesin 200.4p   .. cont rx    INFECTION.  . UTI 7/11/2023   ..  w 7/11/2023 w 12.1   .. cxr 7/11/2023 cxr atelectatic infiltr l gretar   .. 7/11/2023 Rocephin     HEMODYNAMICS.  .. 7/11/2023 bp ok    CARDIO.  . CHF.  .. bnp 7/11/2023 bnp 4030     . CAD.  .. Tr 7/11/2023 Tr 112   . A fib.  .. 7/11/2023 apixaba 2.5 x 2 ( a fib)    . 3 DEGREE AV BLOCK POA 7/11/2023   .. A/R.  .. 7/11/2023 Pt REFUSES EP w/u, declines pacemaker and after dw Cardio  daughter Beth who also declines pacemaker or EP procedure  pt with hx on ILR, dead battery, was seen followed by Dr Cotto, Dr Arellano in the past. Pt non-conpliant with outpt cardiac f/u.  .. 7/11/2023 Cardio recommends Observe on tele D/C metoprolol check echo here, prior LVEF 50%, PFO Eliquis 2.5 bid for hx A FIB    HEMAT.   . ANEMIA.  .. Hb 7/11/2023 Hb 9.9  .. monitor      RENAL.  . STEFANY/CKD.  .. Cr 7/11/2023 Cr 2.8   .. monitor     ENDOCRINE.  . HYPOTHYROID.  .. 7/11/2023 levoxyl 88     NEURO.   . l WEKANESS.  .. CT H 7/11/2023 (-)     MAIN ISSUES.  .3 DEGREE AV BLOCK POA 7/11/2023   .. 7/11/2023 Pt REFUSES EP w/u, declines pacemaker or EP procedure  .. pt with hx on ILR, dead battery, was seen followed by Dr Cotto, Dr Arellano in the past.   .. 7/11/2023 Cardio recommends Observe on tele D/C metoprolol check echo here, prior LVEF 50%, PFO Eliquis 2.5 bid for hx A . A FIB  . UTI   ..  7/11/2023 rocephin  . AF  .. 7/11/2023 apixa 2.5 x 2     TIME SPENT.   . About 55 minutes aggregate care time spent on encounter; activities included   direct patient care, counseling and/or coordinating care reviewing notes, lab data/ imaging , discussion with multidisciplinary team/ patient  /family and explaining in detail risks, benefits, alternatives  of the recommendations     JAKOB WORTHY 89 f 7/11/2023 9/22/1929 DR BAM SOLIZ

## 2023-07-11 NOTE — CONSULT NOTE ADULT - SUBJECTIVE AND OBJECTIVE BOX
[facility] Cardiology    CHIEF COMPLAINT: Patient is a 89y old  Female who presents with a chief complaint of weakness (11 Jul 2023 07:29)      HPI:  89-year-old female who has a history of hypothyroidism A-fib on Eliquis resident of a rehab facility for progressive weakness and unsteady gait.  Found to be complaining of shortness of breath and feeling unwell her heart rate was in the 30s.  EMS was called the patient was brought by private paramedic service to [facility] for evaluation of complete heart block.  EMS administered an amp of atropine without change in status or rhythm.  Patient states she feels better now.  (Even though her heart rate is 39).  Her transfer paperwork includes recommended transport to [facility], her cardiologist is at [facility] per the patient, and her primary care physician is Dr. Collins covered by dr Soliz at this facility. (11 Jul 2023 07:29)    PAST MEDICAL & SURGICAL HISTORY:  HTN (hypertension)      Afib      Spinal stenosis      PFO (patent foramen ovale)      Degeneration macular      Osteoporosis        SOCIAL HISTORY: no tobacco  FAMILY HISTORY:   HTN  MEDICATIONS  (STANDING):  albuterol/ipratropium for Nebulization 3 milliLiter(s) Nebulizer every 6 hours  apixaban 2.5 milliGRAM(s) Oral two times a day  cefTRIAXone   IVPB 1000 milliGRAM(s) IV Intermittent every 24 hours  levothyroxine 88 MICROGram(s) Oral daily  pantoprazole    Tablet 40 milliGRAM(s) Oral daily  sodium chloride 0.9%. 1000 milliLiter(s) (75 mL/Hr) IV Continuous <Continuous>    MEDICATIONS  (PRN):  acetaminophen     Tablet .. 650 milliGRAM(s) Oral every 6 hours PRN Temp greater or equal to 38C (100.4F), Mild Pain (1 - 3)  atropine Syringe 0.5 milliGRAM(s) IV Push <User Schedule> PRN Heart rate<35  guaiFENesin Oral Liquid (Sugar-Free) 200 milliGRAM(s) Oral every 6 hours PRN Cough    Allergies    penicillin (Unknown)    Intolerances        REVIEW OF SYSTEMS:  CONSTITUTIONAL: weakness, no fevers   EYES/ENT: No visual changes  NECK: No pain or stiffness  RESPIRATORY:  shortness of breath  CARDIOVASCULAR: No chest pain or palpitations  GASTROINTESTINAL: No abdominal pain  GENITOURINARY: No hematuria  NEUROLOGICAL: No weakness  SKIN: No rash  All other review of systems is negative unless indicated above    VITAL SIGNS:   Vital Signs Last 24 Hrs  T(C): 36.8 (11 Jul 2023 07:48), Max: 36.8 (11 Jul 2023 07:48)  T(F): 98.3 (11 Jul 2023 07:48), Max: 98.3 (11 Jul 2023 07:48)  HR: 42 (11 Jul 2023 05:44) (40 - 42)  BP: 127/49 (11 Jul 2023 07:48) (102/54 - 127/49)  BP(mean): --  RR: 41 (11 Jul 2023 07:48) (18 - 41)  SpO2: 98% (11 Jul 2023 07:48) (97% - 99%)    Parameters below as of 11 Jul 2023 07:48  Patient On (Oxygen Delivery Method): room air      I&O's Summary    PHYSICAL EXAM:  Constitutional: NAD  Neurological: Alert and oriented  HEENT: EOMI, no JVD  Cardiovascular: S1 and S2, no murmur  Pulmonary: breath sounds bilaterally  Gastrointestinal: Bowel Sounds present, soft, nontender  Ext: no peripheral edema  Skin: No rashes, No cyanosis.  Psych:  Mood calm    LABS: All Labs Reviewed:                        10.2   13.75 )-----------( 338      ( 11 Jul 2023 07:00 )             33.8     07-11    140  |  109<H>  |  52<H>  ----------------------------<  107<H>  5.2   |  25  |  2.70<H>    Ca    9.3      11 Jul 2023 07:00  Mg     2.4     07-11    TPro  6.7  /  Alb  3.1<L>  /  TBili  0.4  /  DBili  x   /  AST  66<H>  /  ALT  92<H>  /  AlkPhos  113  07-11    PT/INR - ( 11 Jul 2023 00:43 )   PT: 15.5 sec;   INR: 1.32 ratio         PTT - ( 11 Jul 2023 00:43 )  PTT:42.0 sec  CARDIAC MARKERS ( 11 Jul 2023 00:43 )  x     / x     / 51 U/L / x     / 1.4 ng/mL      HPI:  89-year-old female who has a history of hypothyroidism A-fib on Eliquis (hx AFL ablation), PFO,  resident of a rehab facility for progressive weakness and unsteady gait.  Found to be complaining of shortness of breath and feeling unwell her heart rate was in the 30s.  EMS was called the patient was brought by private paramedic service to [facility] for evaluation of complete heart block.  EMS administered an amp of atropine without change in status or rhythm.  Patient states she feels better now.  (Even though her heart rate is 39).  Her transfer paperwork includes recommended transport to [facility], her cardiologist is at [facility] per the patient, and her primary care physician is Dr. Collins covered by dr Soliz at this facility. (11 Jul 2023 07:29)    CHB on admission EKG  Pt was on metoprolol at home per daughter and outpt EMR  Vitals now; HR: 42 (11 Jul 2023 05:44) (40 - 42), BP: 127/49  Pt mentating well, SOB improved. Denies CP or SOB now. Alert & wants to go home  Pt REFUSES EP w/u, declines pacemaker  spoke to daughter Beth who also declines pacemaker or EP procedure  pt with hx on ILR, dead battery, was seen followed by Dr Cotto, Dr Arellano in the past. Pt non-conpliant with outpt cardiac f/u.    PLAN  Observe on tele  D/C metoprolol  check echo here, prior LVEF 50%, PFO  repeat ekg ordered  Eliquis 2.5 bid for hx AFIB  will follow here         [facility] Cardiology    CHIEF COMPLAINT: Patient is a 89y old  Female who presents with a chief complaint of weakness (11 Jul 2023 07:29)      HPI:  89-year-old female who has a history of hypothyroidism A-fib on Eliquis resident of a rehab facility for progressive weakness and unsteady gait.  Found to be complaining of shortness of breath and feeling unwell her heart rate was in the 30s.  EMS was called the patient was brought by private paramedic service to [facility] for evaluation of complete heart block.  EMS administered an amp of atropine without change in status or rhythm.  Patient states she feels better now.  (Even though her heart rate is 39).  Her transfer paperwork includes recommended transport to [facility], her cardiologist is at [facility] per the patient, and her primary care physician is Dr. Collins covered by dr Soliz at this facility. (11 Jul 2023 07:29)    PAST MEDICAL & SURGICAL HISTORY:  HTN (hypertension)      Afib      Spinal stenosis      PFO (patent foramen ovale)      Degeneration macular      Osteoporosis        SOCIAL HISTORY: no tobacco  FAMILY HISTORY:   HTN  MEDICATIONS  (STANDING):  albuterol/ipratropium for Nebulization 3 milliLiter(s) Nebulizer every 6 hours  apixaban 2.5 milliGRAM(s) Oral two times a day  cefTRIAXone   IVPB 1000 milliGRAM(s) IV Intermittent every 24 hours  levothyroxine 88 MICROGram(s) Oral daily  pantoprazole    Tablet 40 milliGRAM(s) Oral daily  sodium chloride 0.9%. 1000 milliLiter(s) (75 mL/Hr) IV Continuous <Continuous>    MEDICATIONS  (PRN):  acetaminophen     Tablet .. 650 milliGRAM(s) Oral every 6 hours PRN Temp greater or equal to 38C (100.4F), Mild Pain (1 - 3)  atropine Syringe 0.5 milliGRAM(s) IV Push <User Schedule> PRN Heart rate<35  guaiFENesin Oral Liquid (Sugar-Free) 200 milliGRAM(s) Oral every 6 hours PRN Cough    Allergies    penicillin (Unknown)    Intolerances        REVIEW OF SYSTEMS:  CONSTITUTIONAL: weakness, no fevers   EYES/ENT: No visual changes  NECK: No pain or stiffness  RESPIRATORY:  shortness of breath  CARDIOVASCULAR: No chest pain or palpitations  GASTROINTESTINAL: No abdominal pain  GENITOURINARY: No hematuria  NEUROLOGICAL: No weakness  SKIN: No rash  All other review of systems is negative unless indicated above    VITAL SIGNS:   Vital Signs Last 24 Hrs  T(C): 36.8 (11 Jul 2023 07:48), Max: 36.8 (11 Jul 2023 07:48)  T(F): 98.3 (11 Jul 2023 07:48), Max: 98.3 (11 Jul 2023 07:48)  HR: 42 (11 Jul 2023 05:44) (40 - 42)  BP: 127/49 (11 Jul 2023 07:48) (102/54 - 127/49)  BP(mean): --  RR: 41 (11 Jul 2023 07:48) (18 - 41)  SpO2: 98% (11 Jul 2023 07:48) (97% - 99%)    Parameters below as of 11 Jul 2023 07:48  Patient On (Oxygen Delivery Method): room air      I&O's Summary    PHYSICAL EXAM:  Constitutional: NAD  Neurological: Alert and oriented  HEENT: EOMI, no JVD  Cardiovascular: S1 and S2, no murmur  Pulmonary: breath sounds bilaterally  Gastrointestinal: Bowel Sounds present, soft, nontender  Ext: no peripheral edema  Skin: No rashes, No cyanosis.  Psych:  Mood calm    LABS: All Labs Reviewed:                        10.2   13.75 )-----------( 338      ( 11 Jul 2023 07:00 )             33.8     07-11    140  |  109<H>  |  52<H>  ----------------------------<  107<H>  5.2   |  25  |  2.70<H>    Ca    9.3      11 Jul 2023 07:00  Mg     2.4     07-11    TPro  6.7  /  Alb  3.1<L>  /  TBili  0.4  /  DBili  x   /  AST  66<H>  /  ALT  92<H>  /  AlkPhos  113  07-11    PT/INR - ( 11 Jul 2023 00:43 )   PT: 15.5 sec;   INR: 1.32 ratio         PTT - ( 11 Jul 2023 00:43 )  PTT:42.0 sec  CARDIAC MARKERS ( 11 Jul 2023 00:43 )  x     / x     / 51 U/L / x     / 1.4 ng/mL      HPI:  89-year-old female who has a history of hypothyroidism A-fib on Eliquis (hx AFL ablation), PFO,  resident of a rehab facility for progressive weakness and unsteady gait.  Found to be complaining of shortness of breath and feeling unwell her heart rate was in the 30s.  EMS was called the patient was brought by private paramedic service to [facility] for evaluation of complete heart block.  EMS administered an amp of atropine without change in status or rhythm.  Patient states she feels better now.  (Even though her heart rate is 39).  Her transfer paperwork includes recommended transport to [facility], her cardiologist is at [facility] per the patient, and her primary care physician is Dr. Collins covered by dr Soliz at this facility. (11 Jul 2023 07:29)    CHB on admission EKG  Pt was on metoprolol at home per daughter and outpt EMR  Vitals now; HR: 42 (11 Jul 2023 05:44) (40 - 42), BP: 127/49  Pt mentating well, SOB improved. Denies CP or SOB now. Alert & wants to go home  Pt REFUSES EP w/u, declines pacemaker  spoke to daughter Beth who also declines pacemaker or EP procedure  pt with hx on ILR, dead battery, was seen followed by Dr Cotto, Dr Arellano in the past. Pt non-conpliant with outpt cardiac f/u.    PLAN  Observe on tele  D/C metoprolol  check echo here, prior LVEF 50%, PFO  repeat ekg ordered  Eliquis 2.5 bid for hx AFIB  will follow here         [facility] Cardiology    CHIEF COMPLAINT: Patient is a 89y old  Female who presents with a chief complaint of weakness (11 Jul 2023 07:29)      HPI:  89-year-old female who has a history of hypothyroidism A-fib on Eliquis resident of a rehab facility for progressive weakness and unsteady gait.  Found to be complaining of shortness of breath and feeling unwell her heart rate was in the 30s.  EMS was called the patient was brought by private paramedic service to [facility] for evaluation of complete heart block.  EMS administered an amp of atropine without change in status or rhythm.  Patient states she feels better now.  (Even though her heart rate is 39).  Her transfer paperwork includes recommended transport to [facility], her cardiologist is at [facility] per the patient, and her primary care physician is Dr. Collins covered by dr Soliz at this facility. (11 Jul 2023 07:29)    PAST MEDICAL & SURGICAL HISTORY:  HTN (hypertension)      Afib      Spinal stenosis      PFO (patent foramen ovale)      Degeneration macular      Osteoporosis        SOCIAL HISTORY: no tobacco  FAMILY HISTORY:   HTN  MEDICATIONS  (STANDING):  albuterol/ipratropium for Nebulization 3 milliLiter(s) Nebulizer every 6 hours  apixaban 2.5 milliGRAM(s) Oral two times a day  cefTRIAXone   IVPB 1000 milliGRAM(s) IV Intermittent every 24 hours  levothyroxine 88 MICROGram(s) Oral daily  pantoprazole    Tablet 40 milliGRAM(s) Oral daily  sodium chloride 0.9%. 1000 milliLiter(s) (75 mL/Hr) IV Continuous <Continuous>    MEDICATIONS  (PRN):  acetaminophen     Tablet .. 650 milliGRAM(s) Oral every 6 hours PRN Temp greater or equal to 38C (100.4F), Mild Pain (1 - 3)  atropine Syringe 0.5 milliGRAM(s) IV Push <User Schedule> PRN Heart rate<35  guaiFENesin Oral Liquid (Sugar-Free) 200 milliGRAM(s) Oral every 6 hours PRN Cough    Allergies    penicillin (Unknown)    Intolerances        REVIEW OF SYSTEMS:  CONSTITUTIONAL: weakness, no fevers   EYES/ENT: No visual changes  NECK: No pain or stiffness  RESPIRATORY:  shortness of breath  CARDIOVASCULAR: No chest pain or palpitations  GASTROINTESTINAL: No abdominal pain  GENITOURINARY: No hematuria  NEUROLOGICAL: No weakness  SKIN: No rash  All other review of systems is negative unless indicated above    VITAL SIGNS:   Vital Signs Last 24 Hrs  T(C): 36.8 (11 Jul 2023 07:48), Max: 36.8 (11 Jul 2023 07:48)  T(F): 98.3 (11 Jul 2023 07:48), Max: 98.3 (11 Jul 2023 07:48)  HR: 42 (11 Jul 2023 05:44) (40 - 42)  BP: 127/49 (11 Jul 2023 07:48) (102/54 - 127/49)  BP(mean): --  RR: 41 (11 Jul 2023 07:48) (18 - 41)  SpO2: 98% (11 Jul 2023 07:48) (97% - 99%)    Parameters below as of 11 Jul 2023 07:48  Patient On (Oxygen Delivery Method): room air      I&O's Summary    PHYSICAL EXAM:  Constitutional: NAD  Neurological: Alert and oriented  HEENT: EOMI, no JVD  Cardiovascular: S1 and S2, no murmur  Pulmonary: breath sounds bilaterally  Gastrointestinal: Bowel Sounds present, soft, nontender  Ext: no peripheral edema  Skin: No rashes, No cyanosis.  Psych:  Mood calm    LABS: All Labs Reviewed:                        10.2   13.75 )-----------( 338      ( 11 Jul 2023 07:00 )             33.8     07-11    140  |  109<H>  |  52<H>  ----------------------------<  107<H>  5.2   |  25  |  2.70<H>    Ca    9.3      11 Jul 2023 07:00  Mg     2.4     07-11    TPro  6.7  /  Alb  3.1<L>  /  TBili  0.4  /  DBili  x   /  AST  66<H>  /  ALT  92<H>  /  AlkPhos  113  07-11    PT/INR - ( 11 Jul 2023 00:43 )   PT: 15.5 sec;   INR: 1.32 ratio         PTT - ( 11 Jul 2023 00:43 )  PTT:42.0 sec  CARDIAC MARKERS ( 11 Jul 2023 00:43 )  x     / x     / 51 U/L / x     / 1.4 ng/mL      HPI:  89-year-old female who has a history of hypothyroidism A-fib on Eliquis (hx AFL ablation), PFO,  resident of a rehab facility for progressive weakness and unsteady gait.  Found to be complaining of shortness of breath and feeling unwell her heart rate was in the 30s.  EMS was called the patient was brought by private paramedic service to [facility] for evaluation of complete heart block.  EMS administered an amp of atropine without change in status or rhythm.  Patient states she feels better now.  (Even though her heart rate is 39).  Her transfer paperwork includes recommended transport to [facility], her cardiologist is at [facility] per the patient, and her primary care physician is Dr. Collins covered by dr Soliz at this facility. (11 Jul 2023 07:29)    CHB on admission EKG  Pt was on metoprolol at home per daughter and outpt EMR  Vitals now; HR: 42 (11 Jul 2023 05:44) (40 - 42), BP: 127/49  Pt mentating well, SOB improved. Denies CP or SOB now. Alert & wants to go home  Pt REFUSES EP w/u, declines pacemaker  spoke to daughter Beth who also declines pacemaker or EP procedure  pt with hx on ILR, dead battery, was seen followed by Dr Cotto, Dr Arellano in the past. Pt non-conpliant with outpt cardiac f/u.    PLAN  Observe on tele  D/C metoprolol  check echo here, prior LVEF 50%, PFO  repeat ekg ordered  Eliquis 2.5 bid for hx AFIB  will follow here

## 2023-07-11 NOTE — ED ADULT NURSE NOTE - OBJECTIVE STATEMENT
89 y/o female BIBA. Alert and oriented x4. C/o SOB, as per EMS. Pt bradycardic with HR of 40. 1 amp atropine given prior to arrival. Respirations even and unlabored. Pt oxygen saturation 95% on room air. 89 y/o female BIBA. Alert and oriented x4. C/o SOB, as per EMS. Pt bradycardic with HR of 40. 1 amp atropine given prior to arrival. Respirations even and unlabored. Pt oxygen saturation 95% on room air.

## 2023-07-11 NOTE — ED PROVIDER NOTE - PROGRESS NOTE DETAILS
dr kaplan/catarino [facility] cardiology paged  dr petit for dr Franklin hooper  Pt has molst with dnr dni form attached to her documents from [facility]. dr kaplan/catarino [facility] cardiology paged  dr petit for dr Franklin hooper  Pt has molst with dnr dni form attached to her documents from sn[facility]. dr kaplan/catarino [facility] cardiology paged  dr petit for dr Franklin hooper  Pt has molst with dnr dni form attached to her documents from [facility]. icu consulted dr kaplan/catarino [facility] cardiology paged, repaged 01:30am  dr petit for dr Franklin hooper  Pt has molst with dnr dni form attached to her documents from [facility]. dr kaplan/catarino [facility] cardiology paged, repaged 01:30am  dr petit for dr Franklin hooper  Pt has molst with dnr dni form attached to her documents from sn[facility]. dr kaplan/catarino [facility] cardiology paged, repaged 01:30am  dr petit for dr Franklin hooper  Pt has molst with dnr dni form attached to her documents from sn[facility]. icu at bedside, pt stable, dw dr KINDRA Reich for dr Soliz, await call back from [facility] cardiology  Dr. Reich will cover admit aware of current plan of care and agrees. icu at bedside, pt stable, dw dr KINDRA Reich for dr Soliz, await call back from [facility] cardiology  Dr. Reich will cover admit aware of current plan of care and agrees. icu at bedside, pt stable, dw dr KINDRA Reich for dr Soliz, await call back from [facility] cardiology  Dr. Reich will cover admit aware of current plan of care and agrees. icu at bedside, pt stable, isatu Reich for dr Soliz, await call back from [facility] cardiology  Dr. Reich will cover admit aware of current plan of care and agrees.  dw dr Valente kaplan at 2 am will see pt icu at bedside, pt stable, isatu Reich for dr Soliz, await call back from [facility] cardiology  Dr. Reich will cover admit aware of current plan of care and agrees.  dw dr Valente kaplan at 2 am will see pt icu at bedside, pt stable, isatu Reich for dr Soliz, await call back from [facility] cardiology  Dr. Reich will cover admit aware of current plan of care and agrees.  dw dr Valente kaplan at 2 am will see pt

## 2023-07-11 NOTE — H&P ADULT - NSHPLABSRESULTS_GEN_ALL_CORE
Lab Results:  CBC  CBC Full  -  ( 11 Jul 2023 07:00 )  WBC Count : 13.75 K/uL  RBC Count : 3.61 M/uL  Hemoglobin : 10.2 g/dL  Hematocrit : 33.8 %  Platelet Count - Automated : 338 K/uL  Mean Cell Volume : 93.6 fl  Mean Cell Hemoglobin : 28.3 pg  Mean Cell Hemoglobin Concentration : 30.2 gm/dL  Auto Neutrophil # : x  Auto Lymphocyte # : x  Auto Monocyte # : x  Auto Eosinophil # : x  Auto Basophil # : x  Auto Neutrophil % : x  Auto Lymphocyte % : x  Auto Monocyte % : x  Auto Eosinophil % : x  Auto Basophil % : x    .		Differential:	[] Automated		[] Manual  Chemistry                        10.2   13.75 )-----------( 338      ( 11 Jul 2023 07:00 )             33.8     07-11    136  |  110<H>  |  49<H>  ----------------------------<  128<H>  6.5<HH>   |  19<L>  |  2.40<H>    Ca    9.0      11 Jul 2023 00:43  Mg     2.3     07-11    TPro  6.7  /  Alb  2.9<L>  /  TBili  0.3  /  DBili  x   /  AST  53<H>  /  ALT  80<H>  /  AlkPhos  120  07-11    LIVER FUNCTIONS - ( 11 Jul 2023 00:43 )  Alb: 2.9 g/dL / Pro: 6.7 g/dL / ALK PHOS: 120 U/L / ALT: 80 U/L / AST: 53 U/L / GGT: x           PT/INR - ( 11 Jul 2023 00:43 )   PT: 15.5 sec;   INR: 1.32 ratio         PTT - ( 11 Jul 2023 00:43 )  PTT:42.0 sec  Urinalysis Basic - ( 11 Jul 2023 00:43 )    Color: x / Appearance: x / SG: x / pH: x  Gluc: 128 mg/dL / Ketone: x  / Bili: x / Urobili: x   Blood: x / Protein: x / Nitrite: x   Leuk Esterase: x / RBC: x / WBC x   Sq Epi: x / Non Sq Epi: x / Bacteria: x            MICROBIOLOGY/CULTURES:      RADIOLOGY RESULTS: reviewed

## 2023-07-11 NOTE — H&P ADULT - ASSESSMENT
89-year-old female who has a history of hypothyroidism A-fib on Eliquis resident of a rehab facility for progressive weakness and unsteady gait.  Found to be complaining of shortness of breath and feeling unwell her heart rate was in the 30s.  EMS was called the patient was brought by private paramedic service to [facility] for evaluation of complete heart block.  EMS administered an amp of atropine without change in status or rhythm.  Patient states she feels better now.  (Even though her heart rate is 39).  Her transfer paperwork includes recommended transport to [facility], her cardiologist is at [facility] per the patient, and her primary care physician is Dr. Collins covered by dr Soliz at this facility. 89-year-old female who has a history of hypothyroidism A-fib on Eliquis resident of a rehab facility for progressive weakness and unsteady gait.  Found to be complaining of shortness of breath and feeling unwell her heart rate was in the 30s.  EMS was called the patient was brought by private paramedic service to [facility] for evaluation of complete heart block.  EMS administered an amp of atropine without change in status or rhythm.  Patient states she feels better now.  (Even though her heart rate is 39).  Her transfer paperwork includes recommended transport to [facility], her cardiologist is at [facility] per the patient, and her primary care physician is Dr. Collins covered by dr Soliz at this facility. 89-year-old female who has a history of hypothyroidism A-fib on Eliquis resident of a rehab facility for progressive weakness and unsteady gait.  Found to be complaining of shortness of breath and feeling unwell her heart rate was in the 30s.  EMS was called the patient was brought by private paramedic service to [facility] for evaluation of complete heart block.  EMS administered an amp of atropine without change in status or rhythm.  Patient states she feels better now.  (Even though her heart rate is 39).  Her transfer paperwork includes recommended transport to [facility], her cardiologist is at [facility] per the patient, and her primary care physician is Dr. Collins covered by dr Soliz at this facility. 89-year-old female who has a history of hypothyroidism A-fib on Eliquis resident of a rehab facility for progressive weakness and unsteady gait.  Found to be complaining of shortness of breath and feeling unwell her heart rate was in the 30s.  EMS was called the patient was brought by private paramedic service to [facility] for evaluation of complete heart block.  EMS administered an amp of atropine without change in status or rhythm.  Patient states she feels better now.  (Even though her heart rate is 39).  Her transfer paperwork includes recommended transport to [facility], her cardiologist is at [facility] per the patient, and her primary care physician is Dr. Collins covered by dr Soliz at this facility.    Bradycardia, Afib   - telemonitor  - hold BB  - cards fu   - family does not want any aggressive measures   - cw eliquis    UTI  - cw ceftriaxone  - fu cultures  - ID fu     Hypothyroid  - cw synthroid  - check TSH    STEFANY  - monitor cr  - hold ACE  - IVF     89-year-old female who has a history of hypothyroidism A-fib on Eliquis resident of a rehab facility for progressive weakness and unsteady gait.  Found to be complaining of shortness of breath and feeling unwell her heart rate was in the 30s.  EMS was called the patient was brought by private paramedic service to [facility] for evaluation of complete heart block.  EMS administered an amp of atropine without change in status or rhythm.  Patient states she feels better now.  (Even though her heart rate is 39).  Her transfer paperwork includes recommended transport to [facility], her cardiologist is at [facility] per the patient, and her primary care physician is Dr. Collins covered by dr Soliz at this facility.    Bradycardia, Afib   - telemonitor  - hold BB  - cards fu   - family does not want any aggressive measures   - cw eliquis    UTI  - cw ceftriaxone  - fu cultures  - ID fu     Hypothyroid  - cw synthroid  - check TSH    STEFANY  - monitor cr  - hold ACE  - IVF     89-year-old female who has a history of hypothyroidism A-fib on Eliquis resident of a rehab facility for progressive weakness and unsteady gait.  Found to be complaining of shortness of breath and feeling unwell her heart rate was in the 30s.  EMS was called the patient was brought by private paramedic service to [facility] for evaluation of complete heart block.  EMS administered an amp of atropine without change in status or rhythm.  Patient states she feels better now.  (Even though her heart rate is 39).  Her transfer paperwork includes recommended transport to [facility], her cardiologist is at [facility] per the patient, and her primary care physician is Dr. Collins covered by dr Soliz at this facility.    Bradycardia, Afib   - telemonitor  - hold BB  - cards fu   - family does not want any aggressive measures   - cw eliquis    UTI  - cw ceftriaxone  - fu cultures  - ID fu     Hypothyroid  - cw synthroid  - check TSH    STEFANY  - monitor cr  - hold ACE  - IVF

## 2023-07-11 NOTE — ED PROVIDER NOTE - CONSTITUTIONAL, MLM
normal... Well appearing, elderly female awake, alert, oriented to person, place, time/situation and in no apparent distress.

## 2023-07-11 NOTE — SWALLOW BEDSIDE ASSESSMENT ADULT - COMMENTS
Consult received, chart reviewed. SLP attempted to see pt at bedside. Upon arrival, pt AxA, 24 RR, HR 36. Pt not appropriate for swallow assessment. Pt left as received. SLP notified RN and called MD Reich. Per conversation with MD Reich, this service to follow up tomorrow at bedside. Consult received, chart reviewed. SLP attempted to see pt at bedside. Upon arrival, pt AxA, 24 RR, HR 36. Pt not appropriate for swallow assessment. Pt left as received. SLP notified RN and called MD Reich. Per conversation with MD Reich, this service to follow up tomorrow at bedside. Defer diet to MD at this time. Consult received, chart reviewed. SLP attempted to see pt at bedside. Upon arrival, pt AxA, 24 RR, HR 36. Pt not appropriate for swallow assessment. Pt left as received. SLP notified RN and called MD Reich. Per conversation with MD Reich, this service to follow up at bedside as schedule permits. Defer diet to MD at this time.

## 2023-07-12 ENCOUNTER — TRANSCRIPTION ENCOUNTER (OUTPATIENT)
Age: 88
End: 2023-07-12

## 2023-07-12 VITALS
RESPIRATION RATE: 16 BRPM | HEART RATE: 83 BPM | TEMPERATURE: 98 F | SYSTOLIC BLOOD PRESSURE: 128 MMHG | DIASTOLIC BLOOD PRESSURE: 62 MMHG | OXYGEN SATURATION: 94 %

## 2023-07-12 LAB
ALBUMIN SERPL ELPH-MCNC: 2.8 G/DL — LOW (ref 3.3–5)
ALP SERPL-CCNC: 98 U/L — SIGNIFICANT CHANGE UP (ref 40–120)
ALT FLD-CCNC: 91 U/L — HIGH (ref 12–78)
ANION GAP SERPL CALC-SCNC: 8 MMOL/L — SIGNIFICANT CHANGE UP (ref 5–17)
AST SERPL-CCNC: 55 U/L — HIGH (ref 15–37)
BILIRUB SERPL-MCNC: 0.6 MG/DL — SIGNIFICANT CHANGE UP (ref 0.2–1.2)
BUN SERPL-MCNC: 48 MG/DL — HIGH (ref 7–23)
CALCIUM SERPL-MCNC: 8.4 MG/DL — LOW (ref 8.5–10.1)
CHLORIDE SERPL-SCNC: 114 MMOL/L — HIGH (ref 96–108)
CO2 SERPL-SCNC: 22 MMOL/L — SIGNIFICANT CHANGE UP (ref 22–31)
CREAT SERPL-MCNC: 2.2 MG/DL — HIGH (ref 0.5–1.3)
EGFR: 20 ML/MIN/1.73M2 — LOW
FOLATE SERPL-MCNC: 10.8 NG/ML — SIGNIFICANT CHANGE UP
GLUCOSE SERPL-MCNC: 103 MG/DL — HIGH (ref 70–99)
HCT VFR BLD CALC: 33.9 % — LOW (ref 34.5–45)
HGB BLD-MCNC: 10.1 G/DL — LOW (ref 11.5–15.5)
MCHC RBC-ENTMCNC: 28.5 PG — SIGNIFICANT CHANGE UP (ref 27–34)
MCHC RBC-ENTMCNC: 29.8 GM/DL — LOW (ref 32–36)
MCV RBC AUTO: 95.5 FL — SIGNIFICANT CHANGE UP (ref 80–100)
NRBC # BLD: 0 /100 WBCS — SIGNIFICANT CHANGE UP (ref 0–0)
OB PNL STL: NEGATIVE — SIGNIFICANT CHANGE UP
PLATELET # BLD AUTO: 313 K/UL — SIGNIFICANT CHANGE UP (ref 150–400)
POTASSIUM SERPL-MCNC: 4.2 MMOL/L — SIGNIFICANT CHANGE UP (ref 3.5–5.3)
POTASSIUM SERPL-SCNC: 4.2 MMOL/L — SIGNIFICANT CHANGE UP (ref 3.5–5.3)
PROT SERPL-MCNC: 6.5 G/DL — SIGNIFICANT CHANGE UP (ref 6–8.3)
RBC # BLD: 3.55 M/UL — LOW (ref 3.8–5.2)
RBC # FLD: 16.9 % — HIGH (ref 10.3–14.5)
SARS-COV-2 RNA SPEC QL NAA+PROBE: SIGNIFICANT CHANGE UP
SODIUM SERPL-SCNC: 144 MMOL/L — SIGNIFICANT CHANGE UP (ref 135–145)
TSH SERPL-MCNC: 2.21 UIU/ML — SIGNIFICANT CHANGE UP (ref 0.36–3.74)
VIT B12 SERPL-MCNC: 488 PG/ML — SIGNIFICANT CHANGE UP (ref 232–1245)
WBC # BLD: 9.49 K/UL — SIGNIFICANT CHANGE UP (ref 3.8–10.5)
WBC # FLD AUTO: 9.49 K/UL — SIGNIFICANT CHANGE UP (ref 3.8–10.5)

## 2023-07-12 PROCEDURE — 96374 THER/PROPH/DIAG INJ IV PUSH: CPT

## 2023-07-12 PROCEDURE — 83735 ASSAY OF MAGNESIUM: CPT

## 2023-07-12 PROCEDURE — 85610 PROTHROMBIN TIME: CPT

## 2023-07-12 PROCEDURE — 87040 BLOOD CULTURE FOR BACTERIA: CPT

## 2023-07-12 PROCEDURE — 85730 THROMBOPLASTIN TIME PARTIAL: CPT

## 2023-07-12 PROCEDURE — 96375 TX/PRO/DX INJ NEW DRUG ADDON: CPT

## 2023-07-12 PROCEDURE — 84443 ASSAY THYROID STIM HORMONE: CPT

## 2023-07-12 PROCEDURE — 70450 CT HEAD/BRAIN W/O DYE: CPT

## 2023-07-12 PROCEDURE — 83690 ASSAY OF LIPASE: CPT

## 2023-07-12 PROCEDURE — 99291 CRITICAL CARE FIRST HOUR: CPT | Mod: 25

## 2023-07-12 PROCEDURE — 80053 COMPREHEN METABOLIC PANEL: CPT

## 2023-07-12 PROCEDURE — 92610 EVALUATE SWALLOWING FUNCTION: CPT

## 2023-07-12 PROCEDURE — 80061 LIPID PANEL: CPT

## 2023-07-12 PROCEDURE — 71045 X-RAY EXAM CHEST 1 VIEW: CPT

## 2023-07-12 PROCEDURE — 82272 OCCULT BLD FECES 1-3 TESTS: CPT

## 2023-07-12 PROCEDURE — 82550 ASSAY OF CK (CPK): CPT

## 2023-07-12 PROCEDURE — 85027 COMPLETE CBC AUTOMATED: CPT

## 2023-07-12 PROCEDURE — 83880 ASSAY OF NATRIURETIC PEPTIDE: CPT

## 2023-07-12 PROCEDURE — 36415 COLL VENOUS BLD VENIPUNCTURE: CPT

## 2023-07-12 PROCEDURE — 94760 N-INVAS EAR/PLS OXIMETRY 1: CPT

## 2023-07-12 PROCEDURE — 94640 AIRWAY INHALATION TREATMENT: CPT

## 2023-07-12 PROCEDURE — 85025 COMPLETE CBC W/AUTO DIFF WBC: CPT

## 2023-07-12 PROCEDURE — 93005 ELECTROCARDIOGRAM TRACING: CPT

## 2023-07-12 PROCEDURE — 84484 ASSAY OF TROPONIN QUANT: CPT

## 2023-07-12 PROCEDURE — 82746 ASSAY OF FOLIC ACID SERUM: CPT

## 2023-07-12 PROCEDURE — 87635 SARS-COV-2 COVID-19 AMP PRB: CPT

## 2023-07-12 PROCEDURE — 82553 CREATINE MB FRACTION: CPT

## 2023-07-12 PROCEDURE — 82962 GLUCOSE BLOOD TEST: CPT

## 2023-07-12 PROCEDURE — 83605 ASSAY OF LACTIC ACID: CPT

## 2023-07-12 PROCEDURE — 82607 VITAMIN B-12: CPT

## 2023-07-12 RX ORDER — CEFTRIAXONE 500 MG/1
1 INJECTION, POWDER, FOR SOLUTION INTRAMUSCULAR; INTRAVENOUS
Qty: 0 | Refills: 0 | DISCHARGE
Start: 2023-07-12

## 2023-07-12 RX ADMIN — Medication 88 MICROGRAM(S): at 05:32

## 2023-07-12 RX ADMIN — APIXABAN 2.5 MILLIGRAM(S): 2.5 TABLET, FILM COATED ORAL at 05:32

## 2023-07-12 RX ADMIN — PANTOPRAZOLE SODIUM 40 MILLIGRAM(S): 20 TABLET, DELAYED RELEASE ORAL at 12:02

## 2023-07-12 RX ADMIN — CEFTRIAXONE 100 MILLIGRAM(S): 500 INJECTION, POWDER, FOR SOLUTION INTRAMUSCULAR; INTRAVENOUS at 00:01

## 2023-07-12 RX ADMIN — Medication 3 MILLILITER(S): at 20:36

## 2023-07-12 RX ADMIN — Medication 3 MILLILITER(S): at 07:48

## 2023-07-12 RX ADMIN — Medication 3 MILLILITER(S): at 13:44

## 2023-07-12 NOTE — CARE COORDINATION ASSESSMENT. - NSPASTMEDSURGHISTORY_GEN_ALL_CORE_FT
PAST MEDICAL & SURGICAL HISTORY:  Osteoporosis      Degeneration macular      PFO (patent foramen ovale)      Spinal stenosis      Afib      HTN (hypertension)

## 2023-07-12 NOTE — CONSULT NOTE ADULT - ASSESSMENT
89-year-old female who has a history of hypothyroidism A-fib on Eliquis resident of a rehab facility for progressive weakness and unsteady gait.  Found to be complaining of shortness of breath and feeling unwell her heart rate was in the 30s.  EMS was called the patient was brought by private paramedic service to [facility] for evaluation of complete heart block.  EMS administered an amp of atropine without change in status or rhythm.  Patient states she feels better now.  (Even though her heart rate is 39).  Her transfer paperwork includes recommended transport to [facility], her cardiologist is at [facility] per the patient, and her primary care physician is Dr. Collins covered by dr Soliz at this facility. (11 Jul 2023 07:29)     89-year-old female who has a history of hypothyroidism A-fib on Eliquis resident of a rehab facility for progressive weakness and unsteady gait.  Found to be complaining of shortness of breath and feeling unwell her heart rate was in the 30s.  EMS was called the patient was brought by private paramedic service to [facility] for evaluation of complete heart block.  EMS administered an amp of atropine without change in status or rhythm.  Patient states she feels better now.  (Even though her heart rate is 39).  Her transfer paperwork includes recommended transport to [facility], her cardiologist is at [facility] per the patient, and her primary care physician is Dr. Collins covered by dr Soliz at this facility. (11 Jul 2023 07:29)     89-year-old female who has a history of hypothyroidism A-fib on Eliquis resident of a rehab facility for progressive weakness and unsteady gait.  Found to be complaining of shortness of breath and feeling unwell her heart rate was in the 30s.  EMS was called the patient was brought by private paramedic service to [facility] for evaluation of complete heart block.  EMS administered an amp of atropine without change in status or rhythm.  Patient states she feels better now.  (Even though her heart rate is 39).  Her transfer paperwork includes recommended transport to [facility], her cardiologist is at [facility] per the patient, and her primary care physician is Dr. Collins covered by dr Soliz at this facility. (11 Jul 2023 07:29)   89-year-old female who has a history of hypothyroidism A-fib on Eliquis resident of a rehab facility for progressive weakness and unsteady gait.  Found to be complaining of shortness of breath and feeling unwell her heart rate was in the 30s.  EMS was called the patient was brought by private paramedic service to [facility] for evaluation of complete heart block.  EMS administered an amp of atropine without change in status or rhythm.  Patient states she feels better now.  (Even though her heart rate is 39).  Her transfer paperwork includes recommended transport to [facility], her cardiologist is at [facility] per the patient, and her primary care physician is Dr. Collins covered by dr Soliz at this facility. (11 Jul 2023 07:29)      ACUTE RENAL FAILURE: sodium chloride 0.9%. 1000 milliLiter(s) (75 mL/Hr) IV Continuous  Serum creatinine is  at   2.2   , approximating GFR at   ml/min.   There is no progression . No uremic symptoms  No evidence of anemia .  Fluid status stable.  Will continue to avoid nephrotoxic drugs.  Patient remains asymptomatic.   Continue current therapy.  hold  diuretic.  hold   ACE inhibitor.  hold   ARB.  Additional evaluation:   ECG,    echocardiogram,     CXR,  will obtained recent   renal ultrasound to evalaute kidney size and possible stones ,    BP monitoring,continue current antihypertensive meds, low salt diet,    f/u  blood and urine cx,serial lactate levels,monitor vitals closley,ivfs hydration,monitor urine output and renal profile,  cefTRIAXone   IVPB 1000 milliGRAM(s) IV Intermittent every 24 hours     89-year-old female who has a history of hypothyroidism A-fib on Eliquis resident of a rehab facility for progressive weakness and unsteady gait.  Found to be complaining of shortness of breath and feeling unwell her heart rate was in the 30s.  EMS was called the patient was brought by private paramedic service to [facility] for evaluation of complete heart block.  EMS administered an amp of atropine without change in status or rhythm.  Patient states she feels better now.  (Even though her heart rate is 39).  Her transfer paperwork includes recommended transport to [facility], her cardiologist is at [facility] per the patient, and her primary care physician is Dr. Collins covered by dr Soliz at this facility. (11 Jul 2023 07:29)      ACUTE RENAL FAILURE: sodium chloride 0.9%. 1000 milliLiter(s) (75 mL/Hr) IV Continuous  Serum creatinine is  at   2.2   , approximating GFR at   ml/min.   There is no progression . No uremic symptoms  No evidence of anemia .  Fluid status stable.  Will continue to avoid nephrotoxic drugs.  Patient remains asymptomatic.   Continue current therapy.  hold  diuretic.  hold   ACE inhibitor.  hold   ARB.  Additional evaluation:   ECG,    echocardiogram,     CXR,  will obtained recent   renal ultrasound to evalaute kidney size and possible stones ,    BP monitoring,continue current antihypertensive meds, low salt diet,    f/u  blood and urine cx,serial lactate levels,monitor vitals closley,ivfs hydration,monitor urine output and renal profile,  cefTRIAXone   IVPB 1000 milliGRAM(s) IV Intermittent every 24 hours     89-year-old female who has a history of hypothyroidism A-fib on Eliquis resident of a rehab facility for progressive weakness and unsteady gait.  Found to be complaining of shortness of breath and feeling unwell her heart rate was in the 30s.  EMS was called the patient was brought by private paramedic service to [facility] for evaluation of complete heart block.  EMS administered an amp of atropine without change in status or rhythm.  Patient states she feels better now.  (Even though her heart rate is 39).  Her transfer paperwork includes recommended transport to [facility], her cardiologist is at [facility] per the patient, and her primary care physician is Dr. Collins covered by dr Soliz at this facility. (11 Jul 2023 07:29)      ACUTE RENAL FAILURE: sodium chloride 0.9%. 1000 milliLiter(s) (75 mL/Hr) IV Continuous  Serum creatinine is  at   2.2   , approximating GFR at   ml/min.   There is no progression . No uremic symptoms  No evidence of anemia .  Fluid status stable.  Will continue to avoid nephrotoxic drugs.  Patient remains asymptomatic.   Continue current therapy.  hold  diuretic.  hold   ACE inhibitor.  hold   ARB.  Additional evaluation:   ECG,    echocardiogram,     CXR,  will obtained recent   renal ultrasound to evalaute kidney size and possible stones ,    BP monitoring,continue current antihypertensive meds, low salt diet,    f/u  blood and urine cx,serial lactate levels,monitor vitals closley,ivfs hydration,monitor urine output and renal profile,  cefTRIAXone   IVPB 1000 milliGRAM(s) IV Intermittent every 24 hours

## 2023-07-12 NOTE — SWALLOW BEDSIDE ASSESSMENT ADULT - COMMENTS
Chart reviewed order received for swallow eval.  Pt received sleeping in bed, arousable with cues and positioned upright, Ox2, reduced cognition, [place], +O2NC, HR in 70s, pain scale 0/10 pre & post eval.  Swallow eval completed see below for details.  Pt left as received NAD RN   &    notified.  Will follow. Chart reviewed order received for swallow eval.  Pt received sleeping in bed, arousable with cues and positioned upright, Ox2, reduced cognition, [place], +O2NC, HR in 70s, pain scale 0/10 pre & post eval.  Swallow eval completed see below for details.  Pt left as received NAD RN   &    notified.  Will follow. Chart reviewed order received for swallow eval.  Pt received sleeping in bed, arousable with cues and positioned upright, Ox2, reduced cognition, [place], +O2NC, HR in 70s, pain scale 0/10 pre & post eval.  Swallow eval completed see below for details.  Pt left as received NAD RN   &    notified.  Will follow. Chart reviewed order received for swallow eval.  Pt received sleeping in bed, arousable with cues and positioned upright, Ox2, reduced cognition, [place], +O2NC, HR in 70s, pain scale 0/10 pre & post eval.  Swallow eval completed see below for details.  Pt left as received NAD JOHN Mcfarlane & Dr. Soliz notified.  Will follow.    Per charting, pt is a "89-year-old female who has a history of hypothyroidism A-fib on Eliquis resident of a rehab facility for progressive weakness and unsteady gait.  Found to be complaining of shortness of breath and feeling unwell her heart rate was in the 30s.  EMS was called the patient was brought by private paramedic service to [facility] for evaluation of complete heart block.  EMS administered an amp of atropine without change in status or rhythm.  Patient states she feels better now.  (Even though her heart rate is 39).  Her transfer paperwork includes recommended transport to [facility], her cardiologist is at [facility] per the patient, and her primary care physician is Dr. Collins covered by dr Soliz at this facility."    Chest xray 7/11/23: "IMPRESSION: Atelectatic infiltrates left greater than right noted. Heart enlargement."    CT Head 7/11/23: "No evidence of acute intracranial hemorrhage or hydrocephalus. Atrophy with white matter ischemic changes." Chart reviewed order received for swallow eval.  Pt received sleeping in bed, arousable with cues and positioned upright, Ox2, reduced cognition, [place], +O2NC, HR in 70s, pain scale 0/10 pre & post eval.  Swallow eval completed see below for details.  Pt left as received NAD JOHN Mcfarlane & Dr. Soliz notified.  Will follow.    Per charting, pt is a "89-year-old female who has a history of hypothyroidism A-fib on Eliquis resident of a rehab facility for progressive weakness and unsteady gait.  Found to be complaining of shortness of breath and feeling unwell her heart rate was in the 30s.  EMS was called the patient was brought by private paramedic service to [facility] for evaluation of complete heart block.  EMS administered an amp of atropine without change in status or rhythm.  Patient states she feels better now.  (Even though her heart rate is 39).  Her transfer paperwork includes recommended transport to [facility], her cardiologist is at [facility] per the patient, and her primary care physician is Dr. Collins covered by dr Soliz at this facility."    Chest xray 7/11/23: "IMPRESSION: Atelectatic infiltrates left greater than right noted. Heart enlargement."    CT Head 7/11/23: "No evidence of acute intracranial hemorrhage or hydrocephalus. Atrophy with white matter ischemic changes." Chart reviewed order received for swallow eval.  Pt received sleeping in bed, arousable with cues and positioned upright, Ox2, reduced cognition, [place], +O2NC, HR in 70s, pain scale 0/10 pre & post eval.  Swallow eval completed see below for details.  Pt left as received NAD JOHN Mcfarlane & Dr. Soliz notified.  Will follow.    Per charting, pt is a "89-year-old female who has a history of hypothyroidism A-fib on Eliquis resident of a rehab facility for progressive weakness and unsteady gait.  Found to be complaining of shortness of breath and feeling unwell her heart rate was in the 30s.  EMS was called the patient was brought by private paramedic service to [facility] for evaluation of complete heart block.  EMS administered an amp of atropine without change in status or rhythm.  Patient states she feels better now.  (Even though her heart rate is 39).  Her transfer paperwork includes recommended transport to [facility], her cardiologist is at [facility] per the patient, and her primary care physician is Dr. Collins covered by dr Soliz at this facility."    Chest xray 7/11/23: "IMPRESSION: Atelectatic infiltrates left greater than right noted. Heart enlargement."    CT Head 7/11/23: "No evidence of acute intracranial hemorrhage or hydrocephalus. Atrophy with white matter ischemic changes." Chart reviewed order received for swallow eval.  Pt received sleeping in bed, arousable with cues and positioned upright, Ox2, reduced cognition, [place], +O2NC, HR in 70s, pain scale 0/10 pre & post eval.  Swallow eval completed see below for details.  Pt left as received NAD JOHN Mcfarlane & Dr. Reich notified.  Will follow.    Per charting, pt is a "89-year-old female who has a history of hypothyroidism A-fib on Eliquis resident of a rehab facility for progressive weakness and unsteady gait.  Found to be complaining of shortness of breath and feeling unwell her heart rate was in the 30s.  EMS was called the patient was brought by private paramedic service to [facility] for evaluation of complete heart block.  EMS administered an amp of atropine without change in status or rhythm.  Patient states she feels better now.  (Even though her heart rate is 39).  Her transfer paperwork includes recommended transport to [facility], her cardiologist is at [facility] per the patient, and her primary care physician is Dr. Collins covered by dr Soliz at this facility."    Chest xray 7/11/23: "IMPRESSION: Atelectatic infiltrates left greater than right noted. Heart enlargement."    CT Head 7/11/23: "No evidence of acute intracranial hemorrhage or hydrocephalus. Atrophy with white matter ischemic changes." Chart reviewed order received for swallow eval.  Pt received sleeping in bed, arousable with cues and positioned upright, Ox2, reduced cognition, [place], +O2NC, HR in 70s, pain scale 0/10 pre & post eval.  Swallow eval completed see below for details.  Pt left as received NAD JOHN Mcfarlane & Dr. Reich notified.  Will follow.    Per charting, pt is a "89-year-old female who has a history of hypothyroidism A-fib on Eliquis resident of a rehab facility for progressive weakness and unsteady gait.  Found to be complaining of shortness of breath and feeling unwell her heart rate was in the 30s.  EMS was called the patient was brought by private paramedic service to [facility] for evaluation of complete heart block.  EMS administered an amp of atropine without change in status or rhythm.  Patient states she feels better now.  (Even though her heart rate is 39).  Her transfer paperwork includes recommended transport to [facility], her cardiologist is at [facility] per the patient, and her primary care physician is Dr. Collins covered by dr Soliz at this facility."    Chest xray 7/11/23: "IMPRESSION: Atelectatic infiltrates left greater than right noted. Heart enlargement."    CT Head 7/11/23: "No evidence of acute intracranial hemorrhage or hydrocephalus. Atrophy with white matter ischemic changes." Chart reviewed order received for swallow eval.  Pt received sleeping in bed, arousable with cues and positioned upright, Ox2, reduced cognition, [place], +O2NC, HR in 70s, pain scale 0/10 pre & post eval.  Swallow eval completed see below for details.  Pt left as received NAD JOHN Mcfarlane & Dr. Reich notified.  Will follow.    Per charting, pt is a "89-year-old female who has a history of hypothyroidism A-fib on Eliquis resident of a rehab facility for progressive weakness and unsteady gait.  Found to be complaining of shortness of breath and feeling unwell her heart rate was in the 30s.  EMS was called the patient was brought by private paramedic service to [facility] for evaluation of complete heart block.  EMS administered an amp of atropine without change in status or rhythm.  Patient states she feels better now.  (Even though her heart rate is 39).  Her transfer paperwork includes recommended transport to [facility], her cardiologist is at [facility] per the patient, and her primary care physician is Dr. Collins covered by dr Soliz at this facility."    Chest xray 7/11/23: "IMPRESSION: Atelectatic infiltrates left greater than right noted. Heart enlargement."    CT Head 7/11/23: "No evidence of acute intracranial hemorrhage or hydrocephalus. Atrophy with white matter ischemic changes." Ivermectin Counseling:  Patient instructed to take medication on an empty stomach with a full glass of water.  Patient informed of potential adverse effects including but not limited to nausea, diarrhea, dizziness, itching, and swelling of the extremities or lymph nodes.  The patient verbalized understanding of the proper use and possible adverse effects of ivermectin.  All of the patient's questions and concerns were addressed.

## 2023-07-12 NOTE — PROGRESS NOTE ADULT - ASSESSMENT
89-year-old female who has a history of hypothyroidism A-fib on Eliquis resident of a rehab facility for progressive weakness and unsteady gait.  Found to be complaining of shortness of breath and feeling unwell her heart rate was in the 30s.  EMS was called the patient was brought by private paramedic service to [facility] for evaluation of complete heart block.  EMS administered an amp of atropine without change in status or rhythm.  Patient states she feels better now.  (Even though her heart rate is 39).  Her transfer paperwork includes recommended transport to [facility], her cardiologist is at [facility] per the patient, and her primary care physician is Dr. Collins covered by dr Soliz at this facility.    Bradycardia, Afib   - telemonitor  - hold BB  - cards fu   - family does not want any aggressive measures   - cw eliquis    UTI  - cw ceftriaxone  - fu cultures  - ID fu     Hypothyroid  - cw synthroid  - check TSH    STEFANY  - monitor cr  - hold ACE  - IVF  - renal fu        89-year-old female who has a history of hypothyroidism A-fib on Eliquis resident of a rehab facility for progressive weakness and unsteady gait.  Found to be complaining of shortness of breath and feeling unwell her heart rate was in the 30s.  EMS was called the patient was brought by private paramedic service to [facility] for evaluation of complete heart block.  EMS administered an amp of atropine without change in status or rhythm.  Patient states she feels better now.  (Even though her heart rate is 39).  Her transfer paperwork includes recommended transport to [facility], her cardiologist is at [facility] per the patient, and her primary care physician is Dr. Collins covered by dr Soliz at this facility.    Bradycardia, Afib   - telemonitor  - hold BB  - cards fu   - family does not want any aggressive measures   - cw eliquis    UTI  - cw ceftriaxone  - fu cultures  - ID fu     Hypothyroid  - cw synthroid  - check TSH    STEFANY  - monitor cr  - hold ACE  - IVF  - renal fu        89-year-old female who has a history of hypothyroidism A-fib on Eliquis resident of a rehab facility for progressive weakness and unsteady gait.  Found to be complaining of shortness of breath and feeling unwell her heart rate was in the 30s.  EMS was called the patient was brought by private paramedic service to [facility] for evaluation of complete heart block.  EMS administered an amp of atropine without change in status or rhythm.  Patient states she feels better now.  (Even though her heart rate is 39).  Her transfer paperwork includes recommended transport to [facility], her cardiologist is at [facility] per the patient, and her primary care physician is Dr. Collins covered by dr Soliz at this facility.    Bradycardia, Afib   - telemonitor  - hold BB  - cards fu   - family does not want any aggressive measures   - cw eliquis    UTI  - cw ceftriaxone  - fu cultures  - ID fu     Hypothyroid  - cw synthroid  - check TSH    STEFANY  - monitor cr  - hold ACE  - IVF  - renal fu

## 2023-07-12 NOTE — CARE COORDINATION ASSESSMENT. - OTHER PERTINENT DISCHARGE PLANNING INFORMATION:
CM met with the patient and daughter at the bedside and explained role of CM and transition planning. Pt with periods of confusion, Daughter verbalized understanding. CM provided direct contact/resource folder and remains available. Pt resides at [facility]. Per daughter pt is wheelchair bound and unable to ambulate. CM contacted Amauri from [facility], she confirmed that patient is wheelchair bound. Pt had prior home care PT services through [facility] [phone] Home care. They will need a form 5794 to be completed and all new meds eprescribed to [facility], when patient is cleared to return. CM met with the patient and daughter at the bedside and explained role of CM and transition planning. Pt with periods of confusion, Daughter verbalized understanding. CM provided direct contact/resource folder and remains available. Pt resides at [facility]. Per daughter pt is wheelchair bound and unable to ambulate. CM contacted Amauri from [facility], she confirmed that patient is wheelchair bound. Pt had prior home care PT services through [facility] [phone] Home care. They will need a form 1329 to be completed and all new meds eprescribed to [facility], when patient is cleared to return. CM met with the patient and daughter at the bedside and explained role of CM and transition planning. Pt with periods of confusion, Daughter verbalized understanding. CM provided direct contact/resource folder and remains available. Pt resides at [facility]. Per daughter pt is wheelchair bound and unable to ambulate. CM contacted Amauri from [facility], she confirmed that patient is wheelchair bound. Pt had prior home care PT services through [facility] [phone] Home care. They will need a form 6463 to be completed and all new meds eprescribed to [facility], when patient is cleared to return.

## 2023-07-12 NOTE — DISCHARGE NOTE PROVIDER - NSDCMRMEDTOKEN_GEN_ALL_CORE_FT
cefTRIAXone: 1 gram(s) intravenous once a day  Eliquis 2.5 mg oral tablet: 1 tab(s) orally 2 times a day  levothyroxine 88 mcg (0.088 mg) oral tablet: 1 tab(s) orally once a day

## 2023-07-12 NOTE — PROGRESS NOTE ADULT - SUBJECTIVE AND OBJECTIVE BOX
CHIEF COMPLAINT/ REASON FOR VISIT  .. Patient was seen to address the  issue listed under PROBLEM LIST which is located toward bottom of this note     DEACONLUIS ROBERT    PLV TELN 321 D1    Allergies    penicillin (Unknown)    Intolerances        PAST MEDICAL & SURGICAL HISTORY:  HTN (hypertension)      Afib      Spinal stenosis      PFO (patent foramen ovale)      Degeneration macular      Osteoporosis          FAMILY HISTORY:      Home Medications:  amiodarone 200 mg oral tablet: 1 tab(s) orally once a day (11 Jul 2023 07:49)  cholecalciferol 25 mcg (1000 intl units) oral tablet: 1 tab(s) orally once a day (11 Jul 2023 07:49)  clindamycin 1% topical lotion: Apply topically to affected area 2 times a day (11 Jul 2023 07:49)  Eliquis 2.5 mg oral tablet: 1 tab(s) orally 2 times a day (11 Jul 2023 07:49)  enalapril 10 mg oral tablet: orally once a day (at bedtime) (11 Jul 2023 07:49)  enalapril 5 mg oral tablet: 1 tab(s) orally once a day (11 Jul 2023 07:49)  famotidine 20 mg oral tablet: orally once a day (11 Jul 2023 07:49)  Lasix 20 mg oral tablet: 1 tab(s) orally once a day (11 Jul 2023 07:49)  levothyroxine 88 mcg (0.088 mg) oral tablet: 1 tab(s) orally once a day (11 Jul 2023 07:49)  metoprolol tartrate 50 mg oral tablet: orally 3 times a day (11 Jul 2023 07:49)  potassium chloride 10 mEq oral capsule, extended release: orally once a day (11 Jul 2023 07:49)      MEDICATIONS  (STANDING):  albuterol/ipratropium for Nebulization 3 milliLiter(s) Nebulizer every 6 hours  apixaban 2.5 milliGRAM(s) Oral two times a day  cefTRIAXone   IVPB 1000 milliGRAM(s) IV Intermittent every 24 hours  levothyroxine 88 MICROGram(s) Oral daily  pantoprazole    Tablet 40 milliGRAM(s) Oral daily  sodium chloride 0.9%. 1000 milliLiter(s) (75 mL/Hr) IV Continuous <Continuous>    MEDICATIONS  (PRN):  acetaminophen     Tablet .. 650 milliGRAM(s) Oral every 6 hours PRN Temp greater or equal to 38C (100.4F), Mild Pain (1 - 3)  atropine Syringe 0.5 milliGRAM(s) IV Push <User Schedule> PRN Heart rate<35  guaiFENesin Oral Liquid (Sugar-Free) 200 milliGRAM(s) Oral every 6 hours PRN Cough      Diet, Pureed (07-11-23 @ 07:52) [Active]          Vital Signs Last 24 Hrs  T(C): 36.3 (12 Jul 2023 05:04), Max: 36.9 (11 Jul 2023 14:20)  T(F): 97.4 (12 Jul 2023 05:04), Max: 98.5 (11 Jul 2023 22:16)  HR: 79 (12 Jul 2023 05:04) (42 - 79)  BP: 119/86 (12 Jul 2023 05:04) (119/45 - 151/63)  BP(mean): --  RR: 17 (12 Jul 2023 05:04) (16 - 41)  SpO2: 99% (12 Jul 2023 05:04) (96% - 99%)    Parameters below as of 12 Jul 2023 05:04  Patient On (Oxygen Delivery Method): nasal cannula                  LABS:                        9.9    12.19 )-----------( 346      ( 11 Jul 2023 08:20 )             32.5     07-11    141  |  110<H>  |  50<H>  ----------------------------<  104<H>  5.0   |  23  |  2.80<H>    Ca    8.8      11 Jul 2023 08:20  Mg     2.4     07-11    TPro  6.7  /  Alb  2.9<L>  /  TBili  0.4  /  DBili  x   /  AST  66<H>  /  ALT  95<H>  /  AlkPhos  108  07-11    PT/INR - ( 11 Jul 2023 00:43 )   PT: 15.5 sec;   INR: 1.32 ratio         PTT - ( 11 Jul 2023 00:43 )  PTT:42.0 sec  Urinalysis Basic - ( 11 Jul 2023 08:20 )    Color: x / Appearance: x / SG: x / pH: x  Gluc: 104 mg/dL / Ketone: x  / Bili: x / Urobili: x   Blood: x / Protein: x / Nitrite: x   Leuk Esterase: x / RBC: x / WBC x   Sq Epi: x / Non Sq Epi: x / Bacteria: x            WBC:  WBC Count: 12.19 K/uL (07-11 @ 08:20)  WBC Count: 13.75 K/uL (07-11 @ 07:00)  WBC Count: 16.16 K/uL (07-11 @ 00:43)      MICROBIOLOGY:  RECENT CULTURES:        CARDIAC MARKERS ( 11 Jul 2023 00:43 )  x     / x     / 51 U/L / x     / 1.4 ng/mL        PT/INR - ( 11 Jul 2023 00:43 )   PT: 15.5 sec;   INR: 1.32 ratio         PTT - ( 11 Jul 2023 00:43 )  PTT:42.0 sec    Sodium:  Sodium: 141 mmol/L (07-11 @ 08:20)  Sodium: 140 mmol/L (07-11 @ 07:00)  Sodium: 136 mmol/L (07-11 @ 00:43)      2.80 mg/dL 07-11 @ 08:20  2.70 mg/dL 07-11 @ 07:00  2.40 mg/dL 07-11 @ 00:43      Hemoglobin:  Hemoglobin: 9.9 g/dL (07-11 @ 08:20)  Hemoglobin: 10.2 g/dL (07-11 @ 07:00)  Hemoglobin: 10.7 g/dL (07-11 @ 00:43)      Platelets: Platelet Count - Automated: 346 K/uL (07-11 @ 08:20)  Platelet Count - Automated: 338 K/uL (07-11 @ 07:00)  Platelet Count - Automated: 310 K/uL (07-11 @ 00:43)      LIVER FUNCTIONS - ( 11 Jul 2023 08:20 )  Alb: 2.9 g/dL / Pro: 6.7 g/dL / ALK PHOS: 108 U/L / ALT: 95 U/L / AST: 66 U/L / GGT: x             Urinalysis Basic - ( 11 Jul 2023 08:20 )    Color: x / Appearance: x / SG: x / pH: x  Gluc: 104 mg/dL / Ketone: x  / Bili: x / Urobili: x   Blood: x / Protein: x / Nitrite: x   Leuk Esterase: x / RBC: x / WBC x   Sq Epi: x / Non Sq Epi: x / Bacteria: x        RADIOLOGY & ADDITIONAL STUDIES:      MICROBIOLOGY:  RECENT CULTURES:

## 2023-07-12 NOTE — CONSULT NOTE ADULT - NSCONSULTADDITIONALINFOA_GEN_ALL_CORE
MEDICATIONS  (STANDING):  albuterol/ipratropium for Nebulization 3 milliLiter(s) Nebulizer every 6 hours  apixaban 2.5 milliGRAM(s) Oral two times a day  cefTRIAXone   IVPB 1000 milliGRAM(s) IV Intermittent every 24 hours  levothyroxine 88 MICROGram(s) Oral daily  pantoprazole    Tablet 40 milliGRAM(s) Oral daily  sodium chloride 0.9%. 1000 milliLiter(s) (75 mL/Hr) IV Continuous <Continuous>

## 2023-07-12 NOTE — CONSULT NOTE ADULT - SUBJECTIVE AND OBJECTIVE BOX
Patient is a 89y Female whom presented to the hospital with     PAST MEDICAL & SURGICAL HISTORY:  HTN (hypertension)      Afib      Spinal stenosis      PFO (patent foramen ovale)      Degeneration macular      Osteoporosis          MEDICATIONS  (STANDING):  albuterol/ipratropium for Nebulization 3 milliLiter(s) Nebulizer every 6 hours  apixaban 2.5 milliGRAM(s) Oral two times a day  cefTRIAXone   IVPB 1000 milliGRAM(s) IV Intermittent every 24 hours  levothyroxine 88 MICROGram(s) Oral daily  pantoprazole    Tablet 40 milliGRAM(s) Oral daily  sodium chloride 0.9%. 1000 milliLiter(s) (75 mL/Hr) IV Continuous <Continuous>      Allergies    penicillin (Unknown)    Intolerances        SOCIAL HISTORY:  Denies ETOh,Smoking,     FAMILY HISTORY:      REVIEW OF SYSTEMS:    CONSTITUTIONAL: No weakness, fevers or chills  EYES/ENT: No visual changes;  no throat pain   NECK: No pain or stiffness  RESPIRATORY: No cough, wheezing, hemoptysis; No shortness of breath  CARDIOVASCULAR: No chest pain or palpitations  GASTROINTESTINAL: No abdominal or epigastric pain. No nausea, vomiting,     No diarrhea or constipation. No melena   GENITOURINARY: No dysuria, frequency or hematuria  NEUROLOGICAL: No numbness or weakness  SKIN: dry      VITAL:  T(C): , Max: 37.1 (07-12-23 @ 11:29)  T(F): , Max: 98.8 (07-12-23 @ 11:29)  HR: 80 (07-12-23 @ 11:29)  BP: 108/56 (07-12-23 @ 11:29)  BP(mean): --  RR: 16 (07-12-23 @ 11:29)  SpO2: 96% (07-12-23 @ 11:29)  Wt(kg): --    I and O's:        PHYSICAL EXAM:    Constitutional: NAD  HEENT: conjunctive   clear   Neck:  No JVD  Respiratory: CTAB  Cardiovascular: S1 and S2  Gastrointestinal: BS+, soft, NT/ND  Extremities: No peripheral edema  Neurological: A/O x 3, no focal deficits  Psychiatric: Normal mood, normal affect  : No Plaza  Skin: No rashes  Access: Not applicable    LABS:                        10.1   9.49  )-----------( 313      ( 12 Jul 2023 08:56 )             33.9     07-12    144  |  114<H>  |  48<H>  ----------------------------<  103<H>  4.2   |  22  |  2.20<H>    Ca    8.4<L>      12 Jul 2023 08:56  Mg     2.4     07-11    TPro  6.5  /  Alb  2.8<L>  /  TBili  0.6  /  DBili  x   /  AST  55<H>  /  ALT  91<H>  /  AlkPhos  98  07-12      Urine Studies:  Urinalysis Basic - ( 12 Jul 2023 08:56 )    Color: x / Appearance: x / SG: x / pH: x  Gluc: 103 mg/dL / Ketone: x  / Bili: x / Urobili: x   Blood: x / Protein: x / Nitrite: x   Leuk Esterase: x / RBC: x / WBC x   Sq Epi: x / Non Sq Epi: x / Bacteria: x            RADIOLOGY & ADDITIONAL STUDIES:                   Patient is a 89y Female whom presented to the hospital with nick    PAST MEDICAL & SURGICAL HISTORY:  HTN (hypertension)      Afib      Spinal stenosis      PFO (patent foramen ovale)      Degeneration macular      Osteoporosis          MEDICATIONS  (STANDING):  albuterol/ipratropium for Nebulization 3 milliLiter(s) Nebulizer every 6 hours  apixaban 2.5 milliGRAM(s) Oral two times a day  cefTRIAXone   IVPB 1000 milliGRAM(s) IV Intermittent every 24 hours  levothyroxine 88 MICROGram(s) Oral daily  pantoprazole    Tablet 40 milliGRAM(s) Oral daily  sodium chloride 0.9%. 1000 milliLiter(s) (75 mL/Hr) IV Continuous <Continuous>      Allergies    penicillin (Unknown)    Intolerances        SOCIAL HISTORY:  Denies ETOh,Smoking,     FAMILY HISTORY:      REVIEW OF SYSTEMS:  unable to obtained a good review system          VITAL:  T(C): , Max: 37.1 (07-12-23 @ 11:29)  T(F): , Max: 98.8 (07-12-23 @ 11:29)  HR: 80 (07-12-23 @ 11:29)  BP: 108/56 (07-12-23 @ 11:29)  BP(mean): --  RR: 16 (07-12-23 @ 11:29)  SpO2: 96% (07-12-23 @ 11:29)  Wt(kg): --    I and O's:        PHYSICAL EXAM:    Constitutional: NAD  HEENT: conjunctive   clear   Neck:  No JVD  Respiratory: decrease bs b/l   Cardiovascular: S1 and S2  Gastrointestinal: BS+, soft, NT/ND  Extremities: No peripheral edema  : No Plaza  Skin: dry   Access: Not applicable    LABS:                        10.1   9.49  )-----------( 313      ( 12 Jul 2023 08:56 )             33.9     07-12    144  |  114<H>  |  48<H>  ----------------------------<  103<H>  4.2   |  22  |  2.20<H>    Ca    8.4<L>      12 Jul 2023 08:56  Mg     2.4     07-11    TPro  6.5  /  Alb  2.8<L>  /  TBili  0.6  /  DBili  x   /  AST  55<H>  /  ALT  91<H>  /  AlkPhos  98  07-12      Urine Studies:  Urinalysis Basic - ( 12 Jul 2023 08:56 )    Color: x / Appearance: x / SG: x / pH: x  Gluc: 103 mg/dL / Ketone: x  / Bili: x / Urobili: x   Blood: x / Protein: x / Nitrite: x   Leuk Esterase: x / RBC: x / WBC x   Sq Epi: x / Non Sq Epi: x / Bacteria: x            RADIOLOGY & ADDITIONAL STUDIES:

## 2023-07-12 NOTE — DISCHARGE NOTE NURSING/CASE MANAGEMENT/SOCIAL WORK - NSDCPEFALRISK_GEN_ALL_CORE
For information on Fall & Injury Prevention, visit: [email or web address]://[email or web address]/[email or web address]-and-[email or web address]-and-mobility OR  https://www.[email or web address]/news/fall-prevention-tips-to-avoid-injury OR  https://www.cdc.gov/steadi/patient.html For information on Fall & Injury Prevention, visit: https://www.[facility]/news/fall-prevention-protects-and-maintains-health-and-mobility OR  https://www.[facility]/news/fall-prevention-tips-to-avoid-injury OR  https://www.cdc.gov/steadi/patient.html For information on Fall & Injury Prevention, visit: https://www.[facility]/news/fall-prevention-protects-and-maintains-health-and-mobility OR  https://www.[facility]/news/fall-prevention-tips-to-avoid-injury OR  https://www.cdc.gov/steadi/patient.html

## 2023-07-12 NOTE — DISCHARGE NOTE PROVIDER - CARE PROVIDER_API CALL
Valente Swenson  Cardiovascular Disease  [address], [address]  [place] [address]  Phone: [phone]  Fax: [phone]  Follow Up Time:    Valente Swenson  Cardiovascular Disease  [address], [address]  [place] [address]  Phone: [phone]  Fax: [phone]  Follow Up Time:    Valente Swenson  Cardiovascular Disease  [address], [address]  [place] [address]  Phone: [phone]  Fax: [phone]  Follow Up Time:

## 2023-07-12 NOTE — DISCHARGE NOTE PROVIDER - NSDCCAREPROVSEEN_GEN_ALL_CORE_FT
A new diagnosis patient had the 3 episode 1 last year in [facility] and 2nd 1 was she has was fasting for Ramadan and the 3rd 1 happen 3 weeks ago out of the shower associated with blurred vision and passed out the no lose control of the urine or stool no numbness no muscle weakness the but she describes her vision as occur 10 drop the and become completely black associated with a bounding headache and never told before she had a migraine  The plan the check CBC to rule out anemia check thyroid check vitamin B12 and recommend to do MRI of the brain InCase get worse go to the emergency room Damir, Kaylene Reich, Rei Dutta, Tex Dow, Maranda Soliz, Bisi Grant, Rosas Cuevas, Niurka Manning, Demetrius HERNANDEZ Damir, Kaylene Reich, Rie Dutta, Tex Dow, Maranda Soliz, Bisi Grant, Rosas Cuevas, Niurka Manning, Demetrius HERNANDEZ

## 2023-07-12 NOTE — PROGRESS NOTE ADULT - SUBJECTIVE AND OBJECTIVE BOX
Patient is a 89y old  Female who presents with a chief complaint of weakness (12 Jul 2023 07:28)    Date of servie : 07-12-23 @ 13:44  INTERVAL HPI/OVERNIGHT EVENTS:  T(C): 37.1 (07-12-23 @ 11:29), Max: 37.1 (07-12-23 @ 11:29)  HR: 80 (07-12-23 @ 11:29) (42 - 80)  BP: 108/56 (07-12-23 @ 11:29) (108/56 - 151/63)  RR: 16 (07-12-23 @ 11:29) (16 - 17)  SpO2: 96% (07-12-23 @ 11:29) (96% - 99%)  Wt(kg): --  I&O's Summary      LABS:                        10.1   9.49  )-----------( 313      ( 12 Jul 2023 08:56 )             33.9     07-12    144  |  114<H>  |  48<H>  ----------------------------<  103<H>  4.2   |  22  |  2.20<H>    Ca    8.4<L>      12 Jul 2023 08:56  Mg     2.4     07-11    TPro  6.5  /  Alb  2.8<L>  /  TBili  0.6  /  DBili  x   /  AST  55<H>  /  ALT  91<H>  /  AlkPhos  98  07-12    PT/INR - ( 11 Jul 2023 00:43 )   PT: 15.5 sec;   INR: 1.32 ratio         PTT - ( 11 Jul 2023 00:43 )  PTT:42.0 sec  Urinalysis Basic - ( 12 Jul 2023 08:56 )    Color: x / Appearance: x / SG: x / pH: x  Gluc: 103 mg/dL / Ketone: x  / Bili: x / Urobili: x   Blood: x / Protein: x / Nitrite: x   Leuk Esterase: x / RBC: x / WBC x   Sq Epi: x / Non Sq Epi: x / Bacteria: x      CAPILLARY BLOOD GLUCOSE            Urinalysis Basic - ( 12 Jul 2023 08:56 )    Color: x / Appearance: x / SG: x / pH: x  Gluc: 103 mg/dL / Ketone: x  / Bili: x / Urobili: x   Blood: x / Protein: x / Nitrite: x   Leuk Esterase: x / RBC: x / WBC x   Sq Epi: x / Non Sq Epi: x / Bacteria: x        MEDICATIONS  (STANDING):  albuterol/ipratropium for Nebulization 3 milliLiter(s) Nebulizer every 6 hours  apixaban 2.5 milliGRAM(s) Oral two times a day  cefTRIAXone   IVPB 1000 milliGRAM(s) IV Intermittent every 24 hours  levothyroxine 88 MICROGram(s) Oral daily  pantoprazole    Tablet 40 milliGRAM(s) Oral daily  sodium chloride 0.9%. 1000 milliLiter(s) (75 mL/Hr) IV Continuous <Continuous>    MEDICATIONS  (PRN):  acetaminophen     Tablet .. 650 milliGRAM(s) Oral every 6 hours PRN Temp greater or equal to 38C (100.4F), Mild Pain (1 - 3)  atropine Syringe 0.5 milliGRAM(s) IV Push <User Schedule> PRN Heart rate<35  guaiFENesin Oral Liquid (Sugar-Free) 200 milliGRAM(s) Oral every 6 hours PRN Cough          PHYSICAL EXAM:  GENERAL: frail  CHEST/LUNG: Clear to percussion bilaterally; No rales, rhonchi, wheezing, or rubs  HEART: Regular rate and rhythm; No murmurs, rubs, or gallops  ABDOMEN: Soft, Nontender, Nondistended; Bowel sounds present  EXTREMITIES:  edema +    Care Discussed with Consultants/Other Providers [x ] YES  [ ] NO

## 2023-07-12 NOTE — CARE COORDINATION ASSESSMENT. - NSCAREPROVIDERS_GEN_ALL_CORE_FT
CARE PROVIDERS:  Accepting Physician: Bisi Soliz  Administration: Ana Mccauley  Admitting: Bisi Soliz  Attending: Bisi Soliz  Consultant: Rosas Grant  Consultant: Niurka Cuevas  Consultant: Valente Swenson  Consultant: Demetrius Manning  Consultant: Parish Serra  Consultant: Rei Reich  Consultant: Tex Dutta  ED Attending: Valente Sen  ED Nurse: Savanna Grant  Nurse: Hermann Vick  Nurse: Davida Daugherty  Ordered: Doctor, Unknown  Ordered: ADM, User  Ordered: ServiceAccount, [facility]  Outpatient Provider: Pahlavan, Mohsen  Outpatient Provider: Parish Serra  Override: Orly Henao  Override: Rosa M Chowdhury  PCA/Nursing Assistant: Pricilla Garrison  PCA/Nursing Assistant: Anat Ritchie  Primary Team: Pieter Siu  Primary Team: Kaylene Reich  Registered Dietitian: Joie Duran  Respiratory Therapy: Jorge Tee  : Ritesh Castillo  Speech Pathology: Alyssa Donald  Speech Pathology: Jamee Celeste  Speech Pathology: Kayley Salcido// Supp. Assoc.: Daina Mckeon   CARE PROVIDERS:  Accepting Physician: Bisi Soliz  Administration: Ana Mccauley  Admitting: Bisi Soliz  Attending: Bisi Soliz  Consultant: Rosas Grant  Consultant: Niurka Cuevas  Consultant: Valente Swenson  Consultant: Demetrius Manning  Consultant: Parish Serra  Consultant: Rei Reich  Consultant: Tex Dutta  ED Attending: Valente Sen  ED Nurse: Savanna Grant  Nurse: Hermann Vick  Nurse: Davida Daugherty  Ordered: Doctor, Unknown  Ordered: ADM, User  Ordered: [facility]Account, [facility]  Outpatient Provider: Pahlavan, Mohsen  Outpatient Provider: Parish Serra  Override: Orly Henao  Override: Rosa M Chowdhury  PCA/Nursing Assistant: Pricilla Garrison  PCA/Nursing Assistant: Anat Ritchie  Primary Team: Pieter Siu  Primary Team: Kaylene Reich  Registered Dietitian: Joie Duran  Respiratory Therapy: Jorge Tee  : Ritesh Castillo  Speech Pathology: Alyssa Donald  Speech Pathology: Jamee Celeste  Speech Pathology: Kayley Salcido// Supp. Assoc.: Daina Mckeon   CARE PROVIDERS:  Accepting Physician: Bisi Soliz  Administration: Ana Mccauley  Admitting: Bisi Soliz  Attending: Bisi Soliz  Consultant: Rosas Grant  Consultant: Niurka Cuevas  Consultant: Valente Swenson  Consultant: Demetrius Manning  Consultant: Parish Serra  Consultant: Rei Reich  Consultant: Tex Dutta  ED Attending: Valente Sen  ED Nurse: Savanna Grant  Nurse: Hermann Vick  Nurse: Davida Daugherty  Ordered: Doctor, Unknown  Ordered: ADM, User  Ordered: ServiceAccount, [facility]ML[facility]  Outpatient Provider: Pahlavan, Mohsen  Outpatient Provider: Parish Serra  Override: Orly Henao  Override: Rosa M Chowdhury  PCA/Nursing Assistant: Pricilla Garrison  PCA/Nursing Assistant: Anat Ritchie  Primary Team: Pieter Siu  Primary Team: Kaylene Reich  Registered Dietitian: Joie Duran  Respiratory Therapy: Jorge Tee  : Ritesh Castillo  Speech Pathology: Alyssa Donald  Speech Pathology: Jamee Celeste  Speech Pathology: Kayley Salcido// Supp. Assoc.: Daina Mckeon

## 2023-07-12 NOTE — PROGRESS NOTE ADULT - SUBJECTIVE AND OBJECTIVE BOX
Neurology follow up note    DEACON [id number]      Interval History:    Patient resting in bed     Allergies    penicillin (Unknown)    Intolerances        MEDICATIONS    acetaminophen     Tablet .. 650 milliGRAM(s) Oral every 6 hours PRN  albuterol/ipratropium for Nebulization 3 milliLiter(s) Nebulizer every 6 hours  apixaban 2.5 milliGRAM(s) Oral two times a day  atropine Syringe 0.5 milliGRAM(s) IV Push <User Schedule> PRN  cefTRIAXone   IVPB 1000 milliGRAM(s) IV Intermittent every 24 hours  guaiFENesin Oral Liquid (Sugar-Free) 200 milliGRAM(s) Oral every 6 hours PRN  levothyroxine 88 MICROGram(s) Oral daily  pantoprazole    Tablet 40 milliGRAM(s) Oral daily  sodium chloride 0.9%. 1000 milliLiter(s) IV Continuous <Continuous>    REVIEW OF SYSTEMS:  Constitutional:  The patient denies fever, chills, or night sweats.  Head:  No headache.  Eyes:  No double vision or blurry vision.  Ears:  No ringing in the ears.  Neck:  No neck pain.  Respiratory:  Positive history of shortness of breath, which brought her to the emergency room.  Cardiovascular:  No chest pain.  Musculoskeletal:  Occasional joint pain.  Genitourinary:  No burning upon urination.    PHYSICAL EXAMINATION:  HEENT:  Head:  Normocephalic, atraumatic.  Eyes:  No scleral icterus.  Ears:  Hearing appeared to be intact.  NECK:  Supple.  RESPIRATORY:  Decreased air entry bilaterally.  CARDIOVASCULAR:  S1 and S2 heard.  ABDOMEN:  Soft and nontender.  EXTREMITIES:  No clubbing or cyanosis was noted.      NEUROLOGIC:  The patient is awake and alert.  Location was hospital.   Was able to name simple objects.  Recall was 0/3 within 3 minutes.  Speech was fluent.  Smile symmetric.  Motor:  Bilateral upper 4/5, left lower 4-/5, right lower 3/5, but does have a history of right leg weakness as per my conversation with daughter, history of falls, bed bound, and hip revision.            Vital Signs Last 24 Hrs  T(C): 36.3 (12 Jul 2023 05:04), Max: 36.9 (11 Jul 2023 14:20)  T(F): 97.4 (12 Jul 2023 05:04), Max: 98.5 (11 Jul 2023 22:16)  HR: 79 (12 Jul 2023 05:04) (42 - 79)  BP: 119/86 (12 Jul 2023 05:04) (119/86 - 151/63)  BP(mean): --  RR: 17 (12 Jul 2023 05:04) (16 - 17)  SpO2: 99% (12 Jul 2023 05:04) (96% - 99%)    Parameters below as of 12 Jul 2023 05:04  Patient On (Oxygen Delivery Method): nasal cannula                      LABS:  CBC Full  -  ( 11 Jul 2023 08:20 )  WBC Count : 12.19 K/uL  RBC Count : 3.51 M/uL  Hemoglobin : 9.9 g/dL  Hematocrit : 32.5 %  Platelet Count - Automated : 346 K/uL  Mean Cell Volume : 92.6 fl  Mean Cell Hemoglobin : 28.2 pg  Mean Cell Hemoglobin Concentration : 30.5 gm/dL  Auto Neutrophil # : x  Auto Lymphocyte # : x  Auto Monocyte # : x  Auto Eosinophil # : x  Auto Basophil # : x  Auto Neutrophil % : x  Auto Lymphocyte % : x  Auto Monocyte % : x  Auto Eosinophil % : x  Auto Basophil % : x    Urinalysis Basic - ( 11 Jul 2023 08:20 )    Color: x / Appearance: x / SG: x / pH: x  Gluc: 104 mg/dL / Ketone: x  / Bili: x / Urobili: x   Blood: x / Protein: x / Nitrite: x   Leuk Esterase: x / RBC: x / WBC x   Sq Epi: x / Non Sq Epi: x / Bacteria: x      07-11    141  |  110<H>  |  50<H>  ----------------------------<  104<H>  5.0   |  23  |  2.80<H>    Ca    8.8      11 Jul 2023 08:20  Mg     2.4     07-11    TPro  6.7  /  Alb  2.9<L>  /  TBili  0.4  /  DBili  x   /  AST  66<H>  /  ALT  95<H>  /  AlkPhos  108  07-11    Hemoglobin A1C:     LIVER FUNCTIONS - ( 11 Jul 2023 08:20 )  Alb: 2.9 g/dL / Pro: 6.7 g/dL / ALK PHOS: 108 U/L / ALT: 95 U/L / AST: 66 U/L / GGT: x           Vitamin B12   PT/INR - ( 11 Jul 2023 00:43 )   PT: 15.5 sec;   INR: 1.32 ratio         PTT - ( 11 Jul 2023 00:43 )  PTT:42.0 sec      RADIOLOGY    ANALYSIS AND PLAN:  This is a 89-year-old with episode of altered mental status and right leg weakness.  For episode of altered mental status, suspect most likely metabolic encephalopathy with the patient having shortness of breath, elevated white blood cell count, possibly underlying infectious type process, would recommend to follow up chest x-ray and check UA as needed.    Monitor renal function as needed.  The patient does appear to have right leg weakness.  This may be her baseline as per my conversation with daughter,  CT imaging of the brain  was negative for any type of central lesions that could have occurred.  The patient does have underlying atrial fibrillation.  For atrial fibrillation, continue anticoagulation.  For history of hypertension, monitor systolic blood pressure.  For mild cognitive impairment versus subtle dementia supportive treatment   For hypothyroidism, continue the patient on Synthroid.      Spoke with daughter, Beth, at [phone].  She does understand while in the hospital setting, she may become more disoriented, which has happened to her in the past.    Greater than 45 minutes of time was spent with the patient, plan of care, reviewing data, with greater than 50% of the visit was spent counseling and/or coordinating care with multidisciplinary healthcare team   Neurology follow up note    DEACON [id number]      Interval History:    Patient resting in bed     Allergies    penicillin (Unknown)    Intolerances        MEDICATIONS    acetaminophen     Tablet .. 650 milliGRAM(s) Oral every 6 hours PRN  albuterol/ipratropium for Nebulization 3 milliLiter(s) Nebulizer every 6 hours  apixaban 2.5 milliGRAM(s) Oral two times a day  atropine Syringe 0.5 milliGRAM(s) IV Push <User Schedule> PRN  cefTRIAXone   IVPB 1000 milliGRAM(s) IV Intermittent every 24 hours  guaiFENesin Oral Liquid (Sugar-Free) 200 milliGRAM(s) Oral every 6 hours PRN  levothyroxine 88 MICROGram(s) Oral daily  pantoprazole    Tablet 40 milliGRAM(s) Oral daily  sodium chloride 0.9%. 1000 milliLiter(s) IV Continuous <Continuous>    REVIEW OF SYSTEMS:  Constitutional:  The patient denies fever, chills, or night sweats.  Head:  No headache.  Eyes:  No double vision or blurry vision.  Ears:  No ringing in the ears.  Neck:  No neck pain.  Respiratory:  Positive history of shortness of breath, which brought her to the emergency room.  Cardiovascular:  No chest pain.  Musculoskeletal:  Occasional joint pain.  Genitourinary:  No burning upon urination.    PHYSICAL EXAMINATION:  HEENT:  Head:  Normocephalic, atraumatic.  Eyes:  No scleral icterus.  Ears:  Hearing appeared to be intact.  NECK:  Supple.  RESPIRATORY:  Decreased air entry bilaterally.  CARDIOVASCULAR:  S1 and S2 heard.  ABDOMEN:  Soft and nontender.  EXTREMITIES:  No clubbing or cyanosis was noted.      NEUROLOGIC:  The patient is awake and alert.  Location was hospital.   Was able to name simple objects.  Recall was 0/3 within 3 minutes.  Speech was fluent.  Smile symmetric.  Motor:  Bilateral upper 4/5, left lower 4-/5, right lower 3/5, but does have a history of right leg weakness as per my conversation with daughter, history of falls, bed bound, and hip revision.            Vital Signs Last 24 Hrs  T(C): 36.3 (12 Jul 2023 05:04), Max: 36.9 (11 Jul 2023 14:20)  T(F): 97.4 (12 Jul 2023 05:04), Max: 98.5 (11 Jul 2023 22:16)  HR: 79 (12 Jul 2023 05:04) (42 - 79)  BP: 119/86 (12 Jul 2023 05:04) (119/86 - 151/63)  BP(mean): --  RR: 17 (12 Jul 2023 05:04) (16 - 17)  SpO2: 99% (12 Jul 2023 05:04) (96% - 99%)    Parameters below as of 12 Jul 2023 05:04  Patient On (Oxygen Delivery Method): nasal cannula                      LABS:  CBC Full  -  ( 11 Jul 2023 08:20 )  WBC Count : 12.19 K/uL  RBC Count : 3.51 M/uL  Hemoglobin : 9.9 g/dL  Hematocrit : 32.5 %  Platelet Count - Automated : 346 K/uL  Mean Cell Volume : 92.6 fl  Mean Cell Hemoglobin : 28.2 pg  Mean Cell Hemoglobin Concentration : 30.5 gm/dL  Auto Neutrophil # : x  Auto Lymphocyte # : x  Auto Monocyte # : x  Auto Eosinophil # : x  Auto Basophil # : x  Auto Neutrophil % : x  Auto Lymphocyte % : x  Auto Monocyte % : x  Auto Eosinophil % : x  Auto Basophil % : x    Urinalysis Basic - ( 11 Jul 2023 08:20 )    Color: x / Appearance: x / SG: x / pH: x  Gluc: 104 mg/dL / Ketone: x  / Bili: x / Urobili: x   Blood: x / Protein: x / Nitrite: x   Leuk Esterase: x / RBC: x / WBC x   Sq Epi: x / Non Sq Epi: x / Bacteria: x      07-11    141  |  110<H>  |  50<H>  ----------------------------<  104<H>  5.0   |  23  |  2.80<H>    Ca    8.8      11 Jul 2023 08:20  Mg     2.4     07-11    TPro  6.7  /  Alb  2.9<L>  /  TBili  0.4  /  DBili  x   /  AST  66<H>  /  ALT  95<H>  /  AlkPhos  108  07-11    Hemoglobin A1C:     LIVER FUNCTIONS - ( 11 Jul 2023 08:20 )  Alb: 2.9 g/dL / Pro: 6.7 g/dL / ALK PHOS: 108 U/L / ALT: 95 U/L / AST: 66 U/L / GGT: x           Vitamin B12   PT/INR - ( 11 Jul 2023 00:43 )   PT: 15.5 sec;   INR: 1.32 ratio         PTT - ( 11 Jul 2023 00:43 )  PTT:42.0 sec      RADIOLOGY    ANALYSIS AND PLAN:  This is a 89-year-old with episode of altered mental status and right leg weakness.  For episode of altered mental status, suspect most likely metabolic encephalopathy with the patient having shortness of breath, elevated white blood cell count, possibly underlying infectious type process, would recommend to follow up chest x-ray and check UA as needed.    Monitor renal function as needed.  The patient does appear to have right leg weakness.  This may be her baseline as per my conversation with daughter,  CT imaging of the brain  was negative for any type of central lesions that could have occurred.  The patient does have underlying atrial fibrillation.  For atrial fibrillation, continue anticoagulation.  For history of hypertension, monitor systolic blood pressure.  For mild cognitive impairment versus subtle dementia supportive treatment   For hypothyroidism, continue the patient on Synthroid.      Spoke with daughter, Beth, at [phone].  She does understand while in the hospital setting, she may become more disoriented, which has happened to her in the past.    Greater than 45 minutes of time was spent with the patient, plan of care, reviewing data, with greater than 50% of the visit was spent counseling and/or coordinating care with multidisciplinary healthcare team   Neurology follow up note    DEACON [id number]      Interval History:    Patient resting in bed     Allergies    penicillin (Unknown)    Intolerances        MEDICATIONS    acetaminophen     Tablet .. 650 milliGRAM(s) Oral every 6 hours PRN  albuterol/ipratropium for Nebulization 3 milliLiter(s) Nebulizer every 6 hours  apixaban 2.5 milliGRAM(s) Oral two times a day  atropine Syringe 0.5 milliGRAM(s) IV Push <User Schedule> PRN  cefTRIAXone   IVPB 1000 milliGRAM(s) IV Intermittent every 24 hours  guaiFENesin Oral Liquid (Sugar-Free) 200 milliGRAM(s) Oral every 6 hours PRN  levothyroxine 88 MICROGram(s) Oral daily  pantoprazole    Tablet 40 milliGRAM(s) Oral daily  sodium chloride 0.9%. 1000 milliLiter(s) IV Continuous <Continuous>    REVIEW OF SYSTEMS:  Constitutional:  The patient denies fever, chills, or night sweats.  Head:  No headache.  Eyes:  No double vision or blurry vision.  Ears:  No ringing in the ears.  Neck:  No neck pain.  Respiratory:  Positive history of shortness of breath, which brought her to the emergency room.  Cardiovascular:  No chest pain.  Musculoskeletal:  Occasional joint pain.  Genitourinary:  No burning upon urination.    PHYSICAL EXAMINATION:  HEENT:  Head:  Normocephalic, atraumatic.  Eyes:  No scleral icterus.  Ears:  Hearing appeared to be intact.  NECK:  Supple.  RESPIRATORY:  Decreased air entry bilaterally.  CARDIOVASCULAR:  S1 and S2 heard.  ABDOMEN:  Soft and nontender.  EXTREMITIES:  No clubbing or cyanosis was noted.      NEUROLOGIC:  The patient is awake and alert.  Location was hospital.   Was able to name simple objects.  Recall was 0/3 within 3 minutes.  Speech was fluent.  Smile symmetric.  Motor:  Bilateral upper 4/5, left lower 4-/5, right lower 3/5, but does have a history of right leg weakness as per my conversation with daughter, history of falls, bed bound, and hip revision.            Vital Signs Last 24 Hrs  T(C): 36.3 (12 Jul 2023 05:04), Max: 36.9 (11 Jul 2023 14:20)  T(F): 97.4 (12 Jul 2023 05:04), Max: 98.5 (11 Jul 2023 22:16)  HR: 79 (12 Jul 2023 05:04) (42 - 79)  BP: 119/86 (12 Jul 2023 05:04) (119/86 - 151/63)  BP(mean): --  RR: 17 (12 Jul 2023 05:04) (16 - 17)  SpO2: 99% (12 Jul 2023 05:04) (96% - 99%)    Parameters below as of 12 Jul 2023 05:04  Patient On (Oxygen Delivery Method): nasal cannula                      LABS:  CBC Full  -  ( 11 Jul 2023 08:20 )  WBC Count : 12.19 K/uL  RBC Count : 3.51 M/uL  Hemoglobin : 9.9 g/dL  Hematocrit : 32.5 %  Platelet Count - Automated : 346 K/uL  Mean Cell Volume : 92.6 fl  Mean Cell Hemoglobin : 28.2 pg  Mean Cell Hemoglobin Concentration : 30.5 gm/dL  Auto Neutrophil # : x  Auto Lymphocyte # : x  Auto Monocyte # : x  Auto Eosinophil # : x  Auto Basophil # : x  Auto Neutrophil % : x  Auto Lymphocyte % : x  Auto Monocyte % : x  Auto Eosinophil % : x  Auto Basophil % : x    Urinalysis Basic - ( 11 Jul 2023 08:20 )    Color: x / Appearance: x / SG: x / pH: x  Gluc: 104 mg/dL / Ketone: x  / Bili: x / Urobili: x   Blood: x / Protein: x / Nitrite: x   Leuk Esterase: x / RBC: x / WBC x   Sq Epi: x / Non Sq Epi: x / Bacteria: x      07-11    141  |  110<H>  |  50<H>  ----------------------------<  104<H>  5.0   |  23  |  2.80<H>    Ca    8.8      11 Jul 2023 08:20  Mg     2.4     07-11    TPro  6.7  /  Alb  2.9<L>  /  TBili  0.4  /  DBili  x   /  AST  66<H>  /  ALT  95<H>  /  AlkPhos  108  07-11    Hemoglobin A1C:     LIVER FUNCTIONS - ( 11 Jul 2023 08:20 )  Alb: 2.9 g/dL / Pro: 6.7 g/dL / ALK PHOS: 108 U/L / ALT: 95 U/L / AST: 66 U/L / GGT: x           Vitamin B12   PT/INR - ( 11 Jul 2023 00:43 )   PT: 15.5 sec;   INR: 1.32 ratio         PTT - ( 11 Jul 2023 00:43 )  PTT:42.0 sec      RADIOLOGY    ANALYSIS AND PLAN:  This is a 89-year-old with episode of altered mental status and right leg weakness.  For episode of altered mental status, suspect most likely metabolic encephalopathy with the patient having shortness of breath, elevated white blood cell count, possibly underlying infectious type process, would recommend to follow up chest x-ray and check UA as needed.    Monitor renal function as needed.  The patient does appear to have right leg weakness.  This may be her baseline as per my conversation with daughter,  CT imaging of the brain  was negative for any type of central lesions that could have occurred.  The patient does have underlying atrial fibrillation.  For atrial fibrillation, continue anticoagulation.  For history of hypertension, monitor systolic blood pressure.  For mild cognitive impairment versus subtle dementia supportive treatment   For hypothyroidism, continue the patient on Synthroid.      Spoke with daughter, Beth, at [phone].  She does understand while in the hospital setting, she may become more disoriented, which has happened to her in the past.    Greater than 45 minutes of time was spent with the patient, plan of care, reviewing data, with greater than 50% of the visit was spent counseling and/or coordinating care with multidisciplinary healthcare team   Neurology follow up note    DEACON [id number]      Interval History:    Patient resting in bed     Allergies    penicillin (Unknown)    Intolerances        MEDICATIONS    acetaminophen     Tablet .. 650 milliGRAM(s) Oral every 6 hours PRN  albuterol/ipratropium for Nebulization 3 milliLiter(s) Nebulizer every 6 hours  apixaban 2.5 milliGRAM(s) Oral two times a day  atropine Syringe 0.5 milliGRAM(s) IV Push <User Schedule> PRN  cefTRIAXone   IVPB 1000 milliGRAM(s) IV Intermittent every 24 hours  guaiFENesin Oral Liquid (Sugar-Free) 200 milliGRAM(s) Oral every 6 hours PRN  levothyroxine 88 MICROGram(s) Oral daily  pantoprazole    Tablet 40 milliGRAM(s) Oral daily  sodium chloride 0.9%. 1000 milliLiter(s) IV Continuous <Continuous>          Vital Signs Last 24 Hrs  T(C): 36.3 (12 Jul 2023 05:04), Max: 36.9 (11 Jul 2023 14:20)  T(F): 97.4 (12 Jul 2023 05:04), Max: 98.5 (11 Jul 2023 22:16)  HR: 79 (12 Jul 2023 05:04) (42 - 79)  BP: 119/86 (12 Jul 2023 05:04) (119/86 - 151/63)  BP(mean): --  RR: 17 (12 Jul 2023 05:04) (16 - 17)  SpO2: 99% (12 Jul 2023 05:04) (96% - 99%)    Parameters below as of 12 Jul 2023 05:04  Patient On (Oxygen Delivery Method): nasal cannula    REVIEW OF SYSTEMS:  Constitutional:  The patient denies fever, chills, or night sweats.  Head:  No headache.  Eyes:  No double vision or blurry vision.  Ears:  No ringing in the ears.  Neck:  No neck pain.  Respiratory:  Positive history of shortness of breath, which brought her to the emergency room.  Cardiovascular:  No chest pain.  Musculoskeletal:  Occasional joint pain.  Genitourinary:  No burning upon urination.    PHYSICAL EXAMINATION:  HEENT:  Head:  Normocephalic, atraumatic.  Eyes:  No scleral icterus.  Ears:  Hearing appeared to be intact.  NECK:  Supple.  RESPIRATORY:  Decreased air entry bilaterally.  CARDIOVASCULAR:  S1 and S2 heard.  ABDOMEN:  Soft and nontender.  EXTREMITIES:  No clubbing or cyanosis was noted.      NEUROLOGIC:  The patient is awake and alert.  Location was hospital.   Was able to name simple objects.  Recall was 0/3 within 3 minutes.  Speech was fluent.  Smile symmetric.  Motor:  Bilateral upper 4/5, left lower 4-/5, right lower 3/5, but does have a history of right leg weakness as per my conversation with daughter, history of falls, bed bound, and hip revision.                        LABS:  CBC Full  -  ( 11 Jul 2023 08:20 )  WBC Count : 12.19 K/uL  RBC Count : 3.51 M/uL  Hemoglobin : 9.9 g/dL  Hematocrit : 32.5 %  Platelet Count - Automated : 346 K/uL  Mean Cell Volume : 92.6 fl  Mean Cell Hemoglobin : 28.2 pg  Mean Cell Hemoglobin Concentration : 30.5 gm/dL  Auto Neutrophil # : x  Auto Lymphocyte # : x  Auto Monocyte # : x  Auto Eosinophil # : x  Auto Basophil # : x  Auto Neutrophil % : x  Auto Lymphocyte % : x  Auto Monocyte % : x  Auto Eosinophil % : x  Auto Basophil % : x    Urinalysis Basic - ( 11 Jul 2023 08:20 )    Color: x / Appearance: x / SG: x / pH: x  Gluc: 104 mg/dL / Ketone: x  / Bili: x / Urobili: x   Blood: x / Protein: x / Nitrite: x   Leuk Esterase: x / RBC: x / WBC x   Sq Epi: x / Non Sq Epi: x / Bacteria: x      07-11    141  |  110<H>  |  50<H>  ----------------------------<  104<H>  5.0   |  23  |  2.80<H>    Ca    8.8      11 Jul 2023 08:20  Mg     2.4     07-11    TPro  6.7  /  Alb  2.9<L>  /  TBili  0.4  /  DBili  x   /  AST  66<H>  /  ALT  95<H>  /  AlkPhos  108  07-11    Hemoglobin A1C:     LIVER FUNCTIONS - ( 11 Jul 2023 08:20 )  Alb: 2.9 g/dL / Pro: 6.7 g/dL / ALK PHOS: 108 U/L / ALT: 95 U/L / AST: 66 U/L / GGT: x           Vitamin B12   PT/INR - ( 11 Jul 2023 00:43 )   PT: 15.5 sec;   INR: 1.32 ratio         PTT - ( 11 Jul 2023 00:43 )  PTT:42.0 sec      RADIOLOGY    ANALYSIS AND PLAN:  This is a 89-year-old with episode of altered mental status and right leg weakness.  For episode of altered mental status, suspect most likely metabolic encephalopathy with the patient having shortness of breath, elevated white blood cell count, possibly underlying infectious type process, would recommend to follow up chest x-ray and check UA as needed.    Monitor renal function as needed.  The patient does appear to have right leg weakness.  This may be her baseline as per my conversation with daughter,  CT imaging of the brain  was negative for any type of central lesions that could have occurred.  The patient does have underlying atrial fibrillation.  For atrial fibrillation, continue anticoagulation.  For history of hypertension, monitor systolic blood pressure.  For mild cognitive impairment versus subtle dementia supportive treatment   For hypothyroidism, continue the patient on Synthroid.  fall precautions       Spoke with daughter, Beth, at [phone] 7/12.  She does understand while in the hospital setting, she may become more disoriented, which has happened to her in the past.    Greater than 45 minutes of time was spent with the patient, plan of care, reviewing data, with greater than 50% of the visit was spent counseling and/or coordinating care with multidisciplinary healthcare team   Neurology follow up note    DEACON [id number]      Interval History:    Patient resting in bed     Allergies    penicillin (Unknown)    Intolerances        MEDICATIONS    acetaminophen     Tablet .. 650 milliGRAM(s) Oral every 6 hours PRN  albuterol/ipratropium for Nebulization 3 milliLiter(s) Nebulizer every 6 hours  apixaban 2.5 milliGRAM(s) Oral two times a day  atropine Syringe 0.5 milliGRAM(s) IV Push <User Schedule> PRN  cefTRIAXone   IVPB 1000 milliGRAM(s) IV Intermittent every 24 hours  guaiFENesin Oral Liquid (Sugar-Free) 200 milliGRAM(s) Oral every 6 hours PRN  levothyroxine 88 MICROGram(s) Oral daily  pantoprazole    Tablet 40 milliGRAM(s) Oral daily  sodium chloride 0.9%. 1000 milliLiter(s) IV Continuous <Continuous>          Vital Signs Last 24 Hrs  T(C): 36.3 (12 Jul 2023 05:04), Max: 36.9 (11 Jul 2023 14:20)  T(F): 97.4 (12 Jul 2023 05:04), Max: 98.5 (11 Jul 2023 22:16)  HR: 79 (12 Jul 2023 05:04) (42 - 79)  BP: 119/86 (12 Jul 2023 05:04) (119/86 - 151/63)  BP(mean): --  RR: 17 (12 Jul 2023 05:04) (16 - 17)  SpO2: 99% (12 Jul 2023 05:04) (96% - 99%)    Parameters below as of 12 Jul 2023 05:04  Patient On (Oxygen Delivery Method): nasal cannula    REVIEW OF SYSTEMS:  Constitutional:  The patient denies fever, chills, or night sweats.  Head:  No headache.  Eyes:  No double vision or blurry vision.  Ears:  No ringing in the ears.  Neck:  No neck pain.  Respiratory:  Positive history of shortness of breath, which brought her to the emergency room.  Cardiovascular:  No chest pain.  Musculoskeletal:  Occasional joint pain.  Genitourinary:  No burning upon urination.    PHYSICAL EXAMINATION:  HEENT:  Head:  Normocephalic, atraumatic.  Eyes:  No scleral icterus.  Ears:  Hearing appeared to be intact.  NECK:  Supple.  RESPIRATORY:  Decreased air entry bilaterally.  CARDIOVASCULAR:  S1 and S2 heard.  ABDOMEN:  Soft and nontender.  EXTREMITIES:  No clubbing or cyanosis was noted.      NEUROLOGIC:  The patient is awake and alert.  Location was hospital.   Was able to name simple objects.  Recall was 0/3 within 3 minutes.  Speech was fluent.  Smile symmetric.  Motor:  Bilateral upper 4/5, left lower 4-/5, right lower 3/5, but does have a history of right leg weakness as per my conversation with daughter, history of falls, bed bound, and hip revision.                        LABS:  CBC Full  -  ( 11 Jul 2023 08:20 )  WBC Count : 12.19 K/uL  RBC Count : 3.51 M/uL  Hemoglobin : 9.9 g/dL  Hematocrit : 32.5 %  Platelet Count - Automated : 346 K/uL  Mean Cell Volume : 92.6 fl  Mean Cell Hemoglobin : 28.2 pg  Mean Cell Hemoglobin Concentration : 30.5 gm/dL  Auto Neutrophil # : x  Auto Lymphocyte # : x  Auto Monocyte # : x  Auto Eosinophil # : x  Auto Basophil # : x  Auto Neutrophil % : x  Auto Lymphocyte % : x  Auto Monocyte % : x  Auto Eosinophil % : x  Auto Basophil % : x    Urinalysis Basic - ( 11 Jul 2023 08:20 )    Color: x / Appearance: x / SG: x / pH: x  Gluc: 104 mg/dL / Ketone: x  / Bili: x / Urobili: x   Blood: x / Protein: x / Nitrite: x   Leuk Esterase: x / RBC: x / WBC x   Sq Epi: x / Non Sq Epi: x / Bacteria: x      07-11    141  |  110<H>  |  50<H>  ----------------------------<  104<H>  5.0   |  23  |  2.80<H>    Ca    8.8      11 Jul 2023 08:20  Mg     2.4     07-11    TPro  6.7  /  Alb  2.9<L>  /  TBili  0.4  /  DBili  x   /  AST  66<H>  /  ALT  95<H>  /  AlkPhos  108  07-11    Hemoglobin A1C:     LIVER FUNCTIONS - ( 11 Jul 2023 08:20 )  Alb: 2.9 g/dL / Pro: 6.7 g/dL / ALK PHOS: 108 U/L / ALT: 95 U/L / AST: 66 U/L / GGT: x           Vitamin B12   PT/INR - ( 11 Jul 2023 00:43 )   PT: 15.5 sec;   INR: 1.32 ratio         PTT - ( 11 Jul 2023 00:43 )  PTT:42.0 sec      RADIOLOGY    ANALYSIS AND PLAN:  This is a 89-year-old with episode of altered mental status and right leg weakness.  For episode of altered mental status, suspect most likely metabolic encephalopathy with the patient having shortness of breath, elevated white blood cell count, possibly underlying infectious type process, would recommend to follow up chest x-ray and check UA as needed.    Monitor renal function as needed.  The patient does appear to have right leg weakness.  This may be her baseline as per my conversation with daughter,  CT imaging of the brain  was negative for any type of central lesions that could have occurred.  The patient does have underlying atrial fibrillation.  For atrial fibrillation, continue anticoagulation.  For history of hypertension, monitor systolic blood pressure.  For mild cognitive impairment versus subtle dementia supportive treatment   For hypothyroidism, continue the patient on Synthroid.  fall precautions       Spoke with daughter, Beth, at [phone] 7/12.  She does understand while in the hospital setting, she may become more disoriented, which has happened to her in the past.    Greater than 45 minutes of time was spent with the patient, plan of care, reviewing data, with greater than 50% of the visit was spent counseling and/or coordinating care with multidisciplinary healthcare team   Neurology follow up note    DEACON [id number]      Interval History:    Patient resting in bed     Allergies    penicillin (Unknown)    Intolerances        MEDICATIONS    acetaminophen     Tablet .. 650 milliGRAM(s) Oral every 6 hours PRN  albuterol/ipratropium for Nebulization 3 milliLiter(s) Nebulizer every 6 hours  apixaban 2.5 milliGRAM(s) Oral two times a day  atropine Syringe 0.5 milliGRAM(s) IV Push <User Schedule> PRN  cefTRIAXone   IVPB 1000 milliGRAM(s) IV Intermittent every 24 hours  guaiFENesin Oral Liquid (Sugar-Free) 200 milliGRAM(s) Oral every 6 hours PRN  levothyroxine 88 MICROGram(s) Oral daily  pantoprazole    Tablet 40 milliGRAM(s) Oral daily  sodium chloride 0.9%. 1000 milliLiter(s) IV Continuous <Continuous>          Vital Signs Last 24 Hrs  T(C): 36.3 (12 Jul 2023 05:04), Max: 36.9 (11 Jul 2023 14:20)  T(F): 97.4 (12 Jul 2023 05:04), Max: 98.5 (11 Jul 2023 22:16)  HR: 79 (12 Jul 2023 05:04) (42 - 79)  BP: 119/86 (12 Jul 2023 05:04) (119/86 - 151/63)  BP(mean): --  RR: 17 (12 Jul 2023 05:04) (16 - 17)  SpO2: 99% (12 Jul 2023 05:04) (96% - 99%)    Parameters below as of 12 Jul 2023 05:04  Patient On (Oxygen Delivery Method): nasal cannula    REVIEW OF SYSTEMS:  Constitutional:  The patient denies fever, chills, or night sweats.  Head:  No headache.  Eyes:  No double vision or blurry vision.  Ears:  No ringing in the ears.  Neck:  No neck pain.  Respiratory:  Positive history of shortness of breath, which brought her to the emergency room.  Cardiovascular:  No chest pain.  Musculoskeletal:  Occasional joint pain.  Genitourinary:  No burning upon urination.    PHYSICAL EXAMINATION:  HEENT:  Head:  Normocephalic, atraumatic.  Eyes:  No scleral icterus.  Ears:  Hearing appeared to be intact.  NECK:  Supple.  RESPIRATORY:  Decreased air entry bilaterally.  CARDIOVASCULAR:  S1 and S2 heard.  ABDOMEN:  Soft and nontender.  EXTREMITIES:  No clubbing or cyanosis was noted.      NEUROLOGIC:  The patient is awake and alert.  Location was hospital.   Was able to name simple objects.  Recall was 0/3 within 3 minutes.  Speech was fluent.  Smile symmetric.  Motor:  Bilateral upper 4/5, left lower 4-/5, right lower 3/5, but does have a history of right leg weakness as per my conversation with daughter, history of falls, bed bound, and hip revision.                        LABS:  CBC Full  -  ( 11 Jul 2023 08:20 )  WBC Count : 12.19 K/uL  RBC Count : 3.51 M/uL  Hemoglobin : 9.9 g/dL  Hematocrit : 32.5 %  Platelet Count - Automated : 346 K/uL  Mean Cell Volume : 92.6 fl  Mean Cell Hemoglobin : 28.2 pg  Mean Cell Hemoglobin Concentration : 30.5 gm/dL  Auto Neutrophil # : x  Auto Lymphocyte # : x  Auto Monocyte # : x  Auto Eosinophil # : x  Auto Basophil # : x  Auto Neutrophil % : x  Auto Lymphocyte % : x  Auto Monocyte % : x  Auto Eosinophil % : x  Auto Basophil % : x    Urinalysis Basic - ( 11 Jul 2023 08:20 )    Color: x / Appearance: x / SG: x / pH: x  Gluc: 104 mg/dL / Ketone: x  / Bili: x / Urobili: x   Blood: x / Protein: x / Nitrite: x   Leuk Esterase: x / RBC: x / WBC x   Sq Epi: x / Non Sq Epi: x / Bacteria: x      07-11    141  |  110<H>  |  50<H>  ----------------------------<  104<H>  5.0   |  23  |  2.80<H>    Ca    8.8      11 Jul 2023 08:20  Mg     2.4     07-11    TPro  6.7  /  Alb  2.9<L>  /  TBili  0.4  /  DBili  x   /  AST  66<H>  /  ALT  95<H>  /  AlkPhos  108  07-11    Hemoglobin A1C:     LIVER FUNCTIONS - ( 11 Jul 2023 08:20 )  Alb: 2.9 g/dL / Pro: 6.7 g/dL / ALK PHOS: 108 U/L / ALT: 95 U/L / AST: 66 U/L / GGT: x           Vitamin B12   PT/INR - ( 11 Jul 2023 00:43 )   PT: 15.5 sec;   INR: 1.32 ratio         PTT - ( 11 Jul 2023 00:43 )  PTT:42.0 sec      RADIOLOGY    ANALYSIS AND PLAN:  This is a 89-year-old with episode of altered mental status and right leg weakness.  For episode of altered mental status, suspect most likely metabolic encephalopathy with the patient having shortness of breath, elevated white blood cell count, possibly underlying infectious type process, would recommend to follow up chest x-ray and check UA as needed.    Monitor renal function as needed.  The patient does appear to have right leg weakness.  This may be her baseline as per my conversation with daughter,  CT imaging of the brain  was negative for any type of central lesions that could have occurred.  The patient does have underlying atrial fibrillation.  For atrial fibrillation, continue anticoagulation.  For history of hypertension, monitor systolic blood pressure.  For mild cognitive impairment versus subtle dementia supportive treatment   For hypothyroidism, continue the patient on Synthroid.  fall precautions       Spoke with daughter, Beth, at [phone] 7/12.  She does understand while in the hospital setting, she may become more disoriented, which has happened to her in the past.    Greater than 45 minutes of time was spent with the patient, plan of care, reviewing data, with greater than 50% of the visit was spent counseling and/or coordinating care with multidisciplinary healthcare team

## 2023-07-12 NOTE — DISCHARGE NOTE PROVIDER - HOSPITAL COURSE
89-year-old female who has a history of hypothyroidism A-fib on Eliquis resident of a rehab facility for progressive weakness and unsteady gait.  Found to be complaining of shortness of breath and feeling unwell her heart rate was in the 30s.  EMS was called the patient was brought by private paramedic service to [facility] for evaluation of complete heart block.  EMS administered an amp of atropine without change in status or rhythm.  Patient states she feels better now.  (Even though her heart rate is 39).  Her transfer paperwork includes recommended transport to [facility], her cardiologist is at [facility] per the patient, and her primary care physician is Dr. Collins covered by dr Soliz at this facility.    Bradycardia, Afib   - telemonitor  - hold BB  - cards fu   - transfer to [facility] for PPM on family request   - cw eliquis    UTI  - cw ceftriaxone  - fu cultures  - ID fu     Hypothyroid  - cw synthroid  - check TSH    STEFANY  - monitor cr  - hold ACE  - IVF  - renal fu    89-year-old female who has a history of hypothyroidism A-fib on [facility] resident of a rehab facility for progressive weakness and unsteady gait.  Found to be complaining of shortness of breath and feeling unwell her heart rate was in the 30s.  EMS was called the patient was brought by private paramedic service to [facility] for evaluation of complete heart block.  EMS administered an amp of atropine without change in status or rhythm.  Patient states she feels better now.  (Even though her heart rate is 39).  Her transfer paperwork includes recommended transport to [facility], her cardiologist is at [facility] per the patient, and her primary care physician is Dr. Collins covered by dr Soliz at this facility.    Bradycardia, Afib   - telemonitor  - hold BB  - cards fu   - transfer to [facility] for PPM on family request   - cw eliquis    UTI  - cw ceftriaxone  - fu cultures  - ID fu     Hypothyroid  - cw synthroid  - check TSH    STEFANY  - monitor cr  - hold ACE  - IVF  - renal fu    89-year-old female who has a history of hypothyroidism A-fib on Eliquis resident of a rehab facility for progressive weakness and unsteady gait.  Found to be complaining of shortness of breath and feeling unwell her heart rate was in the 30s.  EMS was called the patient was brought by private paramedic service to [facility] for evaluation of complete heart block.  EMS administered an amp of atropine without change in status or rhythm.  Patient states she feels better now.  (Even though her heart rate is 39).  Her transfer paperwork includes recommended transport to [facility], her cardiologist is at [facility] per the patient, and her primary care physician is Dr. Collins covered by dr Soliz at this facility.    Bradycardia, Afib   - telemonitor  - hold BB  - cards fu   - transfer to [facility] for PPM on family request   - cw eliquis    UTI  - cw ceftriaxone  - fu cultures  - ID fu     Hypothyroid  - cw synthroid  - check TSH    STEFANY  - monitor cr  - hold ACE  - IVF  - renal fu

## 2023-07-12 NOTE — SWALLOW BEDSIDE ASSESSMENT ADULT - SLP GENERAL OBSERVATIONS
Pt received sleeping in bed, arousable with cues and positioned upright, Ox2, reduced cognition, [place], +O2NC, HR in 70s, pain scale 0/10 pre & post eval Pt received sleeping in bed, arousable with cues and positioned upright, Ox2, reduced cognition, [place], +O2NC, HR in 70s, pain scale 0/10 pre & post eval Pt received sleeping in bed, arousable with cues and positioned upright, Ox2, reduced cognition, [place], +O2NC, HR in 70s, pain scale 0/10 pre & post eval

## 2023-07-12 NOTE — PROGRESS NOTE ADULT - SUBJECTIVE AND OBJECTIVE BOX
Patient is a 89y Female with a known history of :    HPI:  89-year-old female who has a history of hypothyroidism A-fib on [facility] resident of a rehab facility for progressive weakness and unsteady gait.  Found to be complaining of shortness of breath and feeling unwell her heart rate was in the 30s.  EMS was called the patient was brought by private paramedic service to [facility] for evaluation of complete heart block.  EMS administered an amp of atropine without change in status or rhythm.  Patient states she feels better now.  (Even though her heart rate is 39).  Her transfer paperwork includes recommended transport to [facility], her cardiologist is at [facility] per the patient, and her primary care physician is Dr. Collins covered by dr Soliz at this facility. (11 Jul 2023 07:29)      REVIEW OF SYSTEMS:    CONSTITUTIONAL: weakness, no fevers   EYES/ENT: No visual changes  NECK: No pain or stiffness  RESPIRATORY:  shortness of breath  CARDIOVASCULAR: No chest pain or palpitations  GASTROINTESTINAL: No abdominal pain  GENITOURINARY: No hematuria  NEUROLOGICAL: No weakness  SKIN: No rash  All other review of systems is negative unless indicated above        MEDICATIONS  (STANDING):  albuterol/ipratropium for Nebulization 3 milliLiter(s) Nebulizer every 6 hours  apixaban 2.5 milliGRAM(s) Oral two times a day  cefTRIAXone   IVPB 1000 milliGRAM(s) IV Intermittent every 24 hours  levothyroxine 88 MICROGram(s) Oral daily  pantoprazole    Tablet 40 milliGRAM(s) Oral daily  sodium chloride 0.9%. 1000 milliLiter(s) (75 mL/Hr) IV Continuous <Continuous>    MEDICATIONS  (PRN):  acetaminophen     Tablet .. 650 milliGRAM(s) Oral every 6 hours PRN Temp greater or equal to 38C (100.4F), Mild Pain (1 - 3)  atropine Syringe 0.5 milliGRAM(s) IV Push <User Schedule> PRN Heart rate<35  guaiFENesin Oral Liquid (Sugar-Free) 200 milliGRAM(s) Oral every 6 hours PRN Cough      ALLERGIES: penicillin (Unknown)      FAMILY HISTORY:      PHYSICAL EXAMINATION:  -----------------------------  T(C): 36.3 (07-12-23 @ 05:04), Max: 36.9 (07-11-23 @ 14:20)  HR: 79 (07-12-23 @ 05:04) (42 - 79)  BP: 119/86 (07-12-23 @ 05:04) (119/86 - 151/63)  RR: 17 (07-12-23 @ 05:04) (16 - 41)  SpO2: 99% (07-12-23 @ 05:04) (96% - 99%)  Wt(kg): --    PHYSICAL EXAM:  Constitutional: NAD  Neurological: Alert and oriented  HEENT: EOMI, no JVD  Cardiovascular: S1 and S2, no murmur  Pulmonary: breath sounds bilaterally  Gastrointestinal: Bowel Sounds present, soft, nontender  Ext: no peripheral edema  Skin: No rashes, No cyanosis.  Psych:  Mood calm    LABS: All Labs Reviewed:      LABS:   --------  07-11    141  |  110<H>  |  50<H>  ----------------------------<  104<H>  5.0   |  23  |  2.80<H>    Ca    8.8      11 Jul 2023 08:20  Mg     2.4     07-11    TPro  6.7  /  Alb  2.9<L>  /  TBili  0.4  /  DBili  x   /  AST  66<H>  /  ALT  95<H>  /  AlkPhos  108  07-11                         9.9    12.19 )-----------( 346      ( 11 Jul 2023 08:20 )             32.5     PT/INR - ( 11 Jul 2023 00:43 )   PT: 15.5 sec;   INR: 1.32 ratio         PTT - ( 11 Jul 2023 00:43 )  PTT:42.0 sec      142 mg/dL, --, 55 mg/dL, 94 mg/dL         Patient is a 89y Female with a known history of :    HPI:  89-year-old female who has a history of hypothyroidism A-fib on [facility] resident of a rehab facility for progressive weakness and unsteady gait.  Found to be complaining of shortness of breath and feeling unwell her heart rate was in the 30s.  EMS was called the patient was brought by private paramedic service to [facility] for evaluation of complete heart block.  EMS administered an amp of atropine without change in status or rhythm.  Patient states she feels better now.  (Even though her heart rate is 39).  Her transfer paperwork includes recommended transport to [facility], her cardiologist is at [facility] per the patient, and her primary care physician is Dr. Collins covered by dr Soliz at this facility. (11 Jul 2023 07:29)      REVIEW OF SYSTEMS:    CONSTITUTIONAL: weakness, no fevers   EYES/ENT: No visual changes  NECK: No pain or stiffness  RESPIRATORY:  shortness of breath  CARDIOVASCULAR: No chest pain or palpitations  GASTROINTESTINAL: No abdominal pain  GENITOURINARY: No hematuria  NEUROLOGICAL: No weakness  SKIN: No rash  All other review of systems is negative unless indicated above        MEDICATIONS  (STANDING):  albuterol/ipratropium for Nebulization 3 milliLiter(s) Nebulizer every 6 hours  apixaban 2.5 milliGRAM(s) Oral two times a day  cefTRIAXone   IVPB 1000 milliGRAM(s) IV Intermittent every 24 hours  levothyroxine 88 MICROGram(s) Oral daily  pantoprazole    Tablet 40 milliGRAM(s) Oral daily  sodium chloride 0.9%. 1000 milliLiter(s) (75 mL/Hr) IV Continuous <Continuous>    MEDICATIONS  (PRN):  acetaminophen     Tablet .. 650 milliGRAM(s) Oral every 6 hours PRN Temp greater or equal to 38C (100.4F), Mild Pain (1 - 3)  atropine Syringe 0.5 milliGRAM(s) IV Push <User Schedule> PRN Heart rate<35  guaiFENesin Oral Liquid (Sugar-Free) 200 milliGRAM(s) Oral every 6 hours PRN Cough      ALLERGIES: penicillin (Unknown)      FAMILY HISTORY:      PHYSICAL EXAMINATION:  -----------------------------  T(C): 36.3 (07-12-23 @ 05:04), Max: 36.9 (07-11-23 @ 14:20)  HR: 79 (07-12-23 @ 05:04) (42 - 79)  BP: 119/86 (07-12-23 @ 05:04) (119/86 - 151/63)  RR: 17 (07-12-23 @ 05:04) (16 - 41)  SpO2: 99% (07-12-23 @ 05:04) (96% - 99%)  Wt(kg): --    PHYSICAL EXAM:  Constitutional: NAD  Neurological: Alert and oriented  HEENT: EOMI, no JVD  Cardiovascular: S1 and S2, no murmur  Pulmonary: breath sounds bilaterally  Gastrointestinal: Bowel Sounds present, soft, nontender  Ext: no peripheral edema  Skin: No rashes, No cyanosis.  Psych:  Mood calm    LABS: All Labs Reviewed:      LABS:   --------  07-11    141  |  110<H>  |  50<H>  ----------------------------<  104<H>  5.0   |  23  |  2.80<H>    Ca    8.8      11 Jul 2023 08:20  Mg     2.4     07-11    TPro  6.7  /  Alb  2.9<L>  /  TBili  0.4  /  DBili  x   /  AST  66<H>  /  ALT  95<H>  /  AlkPhos  108  07-11                         9.9    12.19 )-----------( 346      ( 11 Jul 2023 08:20 )             32.5     PT/INR - ( 11 Jul 2023 00:43 )   PT: 15.5 sec;   INR: 1.32 ratio         PTT - ( 11 Jul 2023 00:43 )  PTT:42.0 sec      142 mg/dL, --, 55 mg/dL, 94 mg/dL         Patient is a 89y Female with a known history of :    HPI:  89-year-old female who has a history of hypothyroidism A-fib on [facility] resident of a rehab facility for progressive weakness and unsteady gait.  Found to be complaining of shortness of breath and feeling unwell her heart rate was in the 30s.  EMS was called the patient was brought by private paramedic service to [facility] for evaluation of complete heart block.  EMS administered an amp of atropine without change in status or rhythm.  Patient states she feels better now.  (Even though her heart rate is 39).  Her transfer paperwork includes recommended transport to [facility], her cardiologist is at [facility] per the patient, and her primary care physician is Dr. Collins covered by dr Soliz at this facility. (11 Jul 2023 07:29)      REVIEW OF SYSTEMS:    CONSTITUTIONAL: weakness, no fevers   EYES/ENT: No visual changes  NECK: No pain or stiffness  RESPIRATORY:  shortness of breath  CARDIOVASCULAR: No chest pain or palpitations  GASTROINTESTINAL: No abdominal pain  GENITOURINARY: No hematuria  NEUROLOGICAL: No weakness  SKIN: No rash  All other review of systems is negative unless indicated above        MEDICATIONS  (STANDING):  albuterol/ipratropium for Nebulization 3 milliLiter(s) Nebulizer every 6 hours  apixaban 2.5 milliGRAM(s) Oral two times a day  cefTRIAXone   IVPB 1000 milliGRAM(s) IV Intermittent every 24 hours  levothyroxine 88 MICROGram(s) Oral daily  pantoprazole    Tablet 40 milliGRAM(s) Oral daily  sodium chloride 0.9%. 1000 milliLiter(s) (75 mL/Hr) IV Continuous <Continuous>    MEDICATIONS  (PRN):  acetaminophen     Tablet .. 650 milliGRAM(s) Oral every 6 hours PRN Temp greater or equal to 38C (100.4F), Mild Pain (1 - 3)  atropine Syringe 0.5 milliGRAM(s) IV Push <User Schedule> PRN Heart rate<35  guaiFENesin Oral Liquid (Sugar-Free) 200 milliGRAM(s) Oral every 6 hours PRN Cough      ALLERGIES: penicillin (Unknown)      FAMILY HISTORY:      PHYSICAL EXAMINATION:  -----------------------------  T(C): 36.3 (07-12-23 @ 05:04), Max: 36.9 (07-11-23 @ 14:20)  HR: 79 (07-12-23 @ 05:04) (42 - 79)  BP: 119/86 (07-12-23 @ 05:04) (119/86 - 151/63)  RR: 17 (07-12-23 @ 05:04) (16 - 41)  SpO2: 99% (07-12-23 @ 05:04) (96% - 99%)  Wt(kg): --    PHYSICAL EXAM:  Constitutional: NAD  Neurological: Alert and oriented  HEENT: EOMI, no JVD  Cardiovascular: S1 and S2, no murmur  Pulmonary: breath sounds bilaterally  Gastrointestinal: Bowel Sounds present, soft, nontender  Ext: no peripheral edema  Skin: No rashes, No cyanosis.  Psych:  Mood calm    LABS: All Labs Reviewed:      LABS:   --------  07-11    141  |  110<H>  |  50<H>  ----------------------------<  104<H>  5.0   |  23  |  2.80<H>    Ca    8.8      11 Jul 2023 08:20  Mg     2.4     07-11    TPro  6.7  /  Alb  2.9<L>  /  TBili  0.4  /  DBili  x   /  AST  66<H>  /  ALT  95<H>  /  AlkPhos  108  07-11                         9.9    12.19 )-----------( 346      ( 11 Jul 2023 08:20 )             32.5     PT/INR - ( 11 Jul 2023 00:43 )   PT: 15.5 sec;   INR: 1.32 ratio         PTT - ( 11 Jul 2023 00:43 )  PTT:42.0 sec      142 mg/dL, --, 55 mg/dL, 94 mg/dL

## 2023-07-12 NOTE — SWALLOW BEDSIDE ASSESSMENT ADULT - ASR SWALLOW RECOMMEND DIAG
If silent aspiration is of concern, given 7/11/23 chest xray results, rx consider MBS for objective view of pharyngeal stage. If silent aspiration is of concern, given 7/11/23 chest xray results, rx consider MBS for objective view of pharyngeal stage.  Discussed with Dr. Damir MD does not wish to pursue MBS at this time.

## 2023-07-12 NOTE — PROGRESS NOTE ADULT - ASSESSMENT
89-year-old female who has a history of hypothyroidism A-fib on Eliquis (hx AFL ablation), PFO,  resident of a rehab facility for progressive weakness and unsteady gait.  Found to be complaining of shortness of breath and feeling unwell her heart rate was in the 30s.  EMS was called the patient was brought by private paramedic service to [facility] for evaluation of complete heart block.  EMS administered an amp of atropine without change in status or rhythm.  Patient states she feels better now.  (Even though her heart rate is 39).  Her transfer paperwork includes recommended transport to [facility], her cardiologist is at [facility] per the patient, and her primary care physician is Dr. Collins covered by dr Soliz at this facility. (11 Jul 2023 07:29)    CHB on admission EKG  Pt was on metoprolol at home per daughter and outpt EMR  Vitals now; HR: 42 (11 Jul 2023 05:44) (40 - 42), BP: 127/49  Pt mentating well, SOB improved. Denies CP or SOB now. Alert & wants to go home  Pt REFUSES EP w/u, declines pacemaker  Dr. Manning spoke to daughter Beth who also declines pacemaker or EP procedure  pt with hx on ILR, dead battery, was seen followed by Dr Cotto, Dr Arellano in the past. Pt non-conpliant with outpt cardiac f/u.    PLAN  Observe on tele  D/C metoprolol  check echo here, prior LVEF 50%, PFO  repeat ekg ordered  Eliquis 2.5 bid for hx AFIB  will follow here   89-year-old female who has a history of hypothyroidism A-fib on Eliquis (hx AFL ablation), PFO,  resident of a rehab facility for progressive weakness and unsteady gait.  Found to be complaining of shortness of breath and feeling unwell her heart rate was in the 30s.  EMS was called the patient was brought by private paramedic service to [facility] for evaluation of complete heart block.  EMS administered an amp of atropine without change in status or rhythm.  Patient states she feels better now.  (Even though her heart rate is 39).  Her transfer paperwork includes recommended transport to [facility], her cardiologist is at [facility] per the patient, and her primary care physician is Dr. Collins covered by dr Soliz at this facility. (11 Jul 2023 07:29)    CHB on admission EKG  Pt was on metoprolol at home per daughter and outpt EMR  Vitals now; HR: 42 (11 Jul 2023 05:44) (40 - 42), BP: 127/49  Pt mentating well, SOB improved. Denies CP or SOB now. Alert & wants to go home  Pt REFUSES EP w/u, declines pacemaker  Dr. Manning spoke to daughter Beth who also declines pacemaker or EP procedure  pt with hx on ILR, dead battery, was seen followed by Dr Cotto, Dr Arellano in the past. Pt non-conpliant with outpt cardiac f/u.    PLAN  Observe on tele  D/C metoprolol  check echo here, prior LVEF 50%, PFO  repeat ekg ordered  Eliquis 2.5 bid for hx AFIB  will follow here   89-year-old female who has a history of hypothyroidism A-fib on Eliquis (hx AFL ablation), PFO,  resident of a rehab facility for progressive weakness and unsteady gait.  Found to be complaining of shortness of breath and feeling unwell her heart rate was in the 30s.  EMS was called the patient was brought by private paramedic service to [facility] for evaluation of complete heart block.  EMS administered an amp of atropine without change in status or rhythm.  Patient states she feels better now.  (Even though her heart rate is 39).  Her transfer paperwork includes recommended transport to [facility], her cardiologist is at [facility] per the patient, and her primary care physician is Dr. Collins covered by dr Soliz at this facility. (11 Jul 2023 07:29)    CHB on admission EKG  Pt was on metoprolol at home per daughter and outpt EMR  Vitals now; HR: 42 (11 Jul 2023 05:44) (40 - 42), BP: 127/49  Pt mentating well, SOB improved. Denies CP or SOB now. Alert & wants to go home  Pt REFUSES EP w/u, declines pacemaker  Dr. Manning spoke to daughter Beth who also declines pacemaker or EP procedure  pt with hx on ILR, dead battery, was seen followed by Dr Cotto, Dr Arellano in the past. Pt non-conpliant with outpt cardiac f/u.    PLAN  Observe on tele  D/C metoprolol  check echo here, prior LVEF 50%, PFO  repeat ekg ordered  Eliquis 2.5 bid for hx AFIB  will follow here   89-year-old female who has a history of hypothyroidism A-fib on Eliquis (hx AFL ablation), PFO,  resident of a rehab facility for progressive weakness and unsteady gait.  Found to be complaining of shortness of breath and feeling unwell her heart rate was in the 30s.  EMS was called the patient was brought by private paramedic service to [facility] for evaluation of complete heart block.  EMS administered an amp of atropine without change in status or rhythm.  Patient states she feels better now.  (Even though her heart rate is 39).  Her transfer paperwork includes recommended transport to [facility], her cardiologist is at [facility] per the patient, and her primary care physician is Dr. Collins covered by dr Soliz at this facility. (11 Jul 2023 07:29)    CHB on admission EKG  Pt was on metoprolol at home per daughter and outpt EMR  Vitals now; HR: 42 (11 Jul 2023 05:44) (40 - 42), BP: 127/49  Pt mentating well, SOB improved. Denies CP or SOB now. Alert & wants to go home  Pt REFUSES EP w/u, declines pacemaker  Dr. Manning spoke to daughter Beth who also declines pacemaker or EP procedure  pt with hx on ILR, dead battery, was seen followed by Dr Cotto, Dr Arellano in the past. Pt non-conpliant with outpt cardiac f/u.    PLAN  Observe on tele  D/C metoprolol  check echo here, prior LVEF 50%, PFO  repeat ekg ordered  Eliquis 2.5 bid for hx AFIB  will follow here    ADDENDUM   Pt's dtr contacted me and informs me that she has reconsidered PPM placement.  After discussing with pt, they understand that the CHB is indicative of advanced conduction disease and wants to proceed with PPM.  Surgical indication, method risk benefit reviewed.  Will arrange transfer to [facility] for PPM.        89-year-old female who has a history of hypothyroidism A-fib on Eliquis (hx AFL ablation), PFO,  resident of a rehab facility for progressive weakness and unsteady gait.  Found to be complaining of shortness of breath and feeling unwell her heart rate was in the 30s.  EMS was called the patient was brought by private paramedic service to [facility] for evaluation of complete heart block.  EMS administered an amp of atropine without change in status or rhythm.  Patient states she feels better now.  (Even though her heart rate is 39).  Her transfer paperwork includes recommended transport to [facility], her cardiologist is at [facility] per the patient, and her primary care physician is Dr. Collins covered by dr Soliz at this facility. (11 Jul 2023 07:29)    CHB on admission EKG  Pt was on metoprolol at home per daughter and outpt EMR  Vitals now; HR: 42 (11 Jul 2023 05:44) (40 - 42), BP: 127/49  Pt mentating well, SOB improved. Denies CP or SOB now. Alert & wants to go home  Pt REFUSES EP w/u, declines pacemaker  Dr. Manning spoke to daughter Beth who also declines pacemaker or EP procedure  pt with hx on ILR, dead battery, was seen followed by Dr Cotto, Dr Arellano in the past. Pt non-conpliant with outpt cardiac f/u.    PLAN  Observe on tele  D/C metoprolol  check echo here, prior LVEF 50%, PFO  repeat ekg ordered  Eliquis 2.5 bid for hx AFIB  will follow here    ADDENDUM   Pt's dtr contacted me and informs me that she has reconsidered PPM placement.  After discussing with pt, they understand that the CHB is indicative of advanced conduction disease and wants to proceed with PPM.  Surgical indication, method risk benefit reviewed.  Will arrange transfer to [facility] for PPM.        89-year-old female who has a history of hypothyroidism A-fib on Eliquis (hx AFL ablation), PFO,  resident of a rehab facility for progressive weakness and unsteady gait.  Found to be complaining of shortness of breath and feeling unwell her heart rate was in the 30s.  EMS was called the patient was brought by private paramedic service to [facility] for evaluation of complete heart block.  EMS administered an amp of atropine without change in status or rhythm.  Patient states she feels better now.  (Even though her heart rate is 39).  Her transfer paperwork includes recommended transport to [facility], her cardiologist is at [facility] per the patient, and her primary care physician is Dr. Collins covered by dr Soliz at this facility. (11 Jul 2023 07:29)    CHB on admission EKG  Pt was on metoprolol at home per daughter and outpt EMR  Vitals now; HR: 42 (11 Jul 2023 05:44) (40 - 42), BP: 127/49  Pt mentating well, SOB improved. Denies CP or SOB now. Alert & wants to go home  Pt REFUSES EP w/u, declines pacemaker  Dr. Manning spoke to daughter Beth who also declines pacemaker or EP procedure  pt with hx on ILR, dead battery, was seen followed by Dr Cotto, Dr Arellano in the past. Pt non-conpliant with outpt cardiac f/u.    PLAN  Observe on tele  D/C metoprolol  check echo here, prior LVEF 50%, PFO  repeat ekg ordered  Eliquis 2.5 bid for hx AFIB  will follow here    ADDENDUM   Pt's dtr contacted me and informs me that she has reconsidered PPM placement.  After discussing with pt, they understand that the CHB is indicative of advanced conduction disease and wants to proceed with PPM.  Surgical indication, method risk benefit reviewed.  Will arrange transfer to [facility] for PPM.

## 2023-07-12 NOTE — CARE COORDINATION ASSESSMENT. - [facility]

## 2023-07-12 NOTE — SWALLOW BEDSIDE ASSESSMENT ADULT - SWALLOW EVAL: DIAGNOSIS
Mild-moderate oral dysphagia marked by prolonged mastication time with soft & bite sized and easy to chew, increased oral transit time, suspected posterior loss.  Pharyngeal stage deemed functional, no overt s/s penetration/aspiration noted.  If silent aspiration is of concern, given 7/11/23 chest xray results, rx consider MBS for objective view of pharyngeal stage. Mild-moderate oral dysphagia marked by prolonged mastication time with soft & bite sized and easy to chew, increased oral transit time, suspected posterior loss with all administered consistencies. Pharyngeal stage deemed functional, no overt s/s penetration/aspiration noted.  If silent aspiration is of concern, given 7/11/23 chest xray results, rx consider MBS for objective view of pharyngeal stage. Mild-moderate oral dysphagia marked by prolonged mastication time with soft & bite sized and easy to chew, increased oral transit time, suspected posterior loss with all administered consistencies. Pharyngeal stage deemed functional, no overt s/s penetration/aspiration noted.  If silent aspiration is of concern, given 7/11/23 chest xray results, rx consider MBS for objective view of pharyngeal stage.  Discussed with Dr. Damir MD does not wish to pursue MBS at this time.

## 2023-07-12 NOTE — SWALLOW BEDSIDE ASSESSMENT ADULT - ASPIRATION PRECAUTIONS
I have personally provided the amount of critical care time documented below excluding time spent on separate procedures.
yes

## 2023-07-12 NOTE — PROGRESS NOTE ADULT - ASSESSMENT
GENERAL DATA .     GOC.    . 7/11/2023 dnr  ICU STAY.   .. none  COVID.   ..    BEST PRACTICE ISSUES.    HOB ELEVATN.   .. Yes  DVT PPLX.   .. 7/11/2023 apixaba 2.5 x 2 ( a fib)   STRESS ULCER PPLX.   ..    7/11/2023 protonix 40   INFECTION PPLX.   ..    SP SW FIFI.    ..        DIET.    .. 7/11/2023 puree    IV fl.  .. 7/11/2023 ns 75      PROCEDURES.  ..   PAST PROCEDURES.    ..   GENERAL DATA .   ALLGY.  ..         pncl                WT.    REASON FOR VISIT  .. Management of problems listed below      PHYSICAL EXAM    HEENT Unremarkable  atraumatic   RESP Fair air entry  Harsh breath sound   CARDIAC S1 S2 No S3     NO JVD    ABDOMEN No hepatosplenomegaly   PEDAL EDEMA present No calf tenderness  NO rash     ABGS.   .       VS/ PO/IO/ VENT/ DRIPS.   7/12/2023 afeb 48 150/60   7/12/2023 2l 99%     DOA/INITIAL PRESENTATION.    89-year-old female who has a history of hypothyroidism A-fib on Eliquis (hx AFL ablation), PFO,  resident of a rehab facility for progressive weakness and unsteady gait.  Found to be complaining of shortness of breath and feeling unwell her heart rate was in the 30s.  EMS was called the patient was brought by private paramedic service to [facility] for evaluation of complete heart block.  EMS administered an amp of atropine without change in status or rhythm.  Patient states she feels better now once in [facility] ER .  (Even though her heart rate is 39).  Her transfer paperwork includes recommended transport to [facility], her cardiologist is at [facility] per the patient, and her primary care physician is Dr. Collins covered by dr Herrera at this facilit                           .     . 7/11/2023 Pt REFUSES EP w/u, declines pacemaker and after dw Cardio  daughter Beth who also declines pacemaker or EP procedure  pt with hx on ILR, dead battery, was seen followed by Dr Cotto, Dr Arellano in the past. Pt non-conpliant with outpt cardiac f/u.  . 7/11/2023 Cardio recommends Observe on tele D/C metoprolol check echo here, prior LVEF 50%, PFO Eliquis 2.5 bid for hx AFIB  . 7/11/2023 Pulm Crit care consulted  COURSE.     PROBLEMS/ASSESSMENT/RECOMMENDATIONS (A/R).  PULMONARY.  . GAS EXCHANGE.  .. A/R.   .. Monitor pulse oximetry and target po 90-95%    . COPD.   .. 7/11/2023 duoneb.4   .. 7/11/2023 guaifenesin 200.4p   .. cont rx     INFECTION.  . UTI 7/11/2023   ..  w 7/11/2023 w 12.1   .. cxr 7/11/2023 cxr atelectatic infiltr l gretar   .. 7/11/2023 Rocephin     HEMODYNAMICS.  .. 7/11/2023 bp ok    CARDIO.  . CHF.  .. bnp 7/11/2023 bnp 4030     . CAD.  .. Tr 7/11/2023 Tr 112     . A fib.  .. 7/11/2023 apixaba 2.5 x 2 ( a fib)    . 3 DEGREE AV BLOCK POA 7/11/2023   .. A/R.  .. 7/11/2023 Pt REFUSES EP w/u, declines pacemaker and after dw Cardio  daughter Beth who also declines pacemaker or EP procedure  pt with hx on ILR, dead battery, was seen followed by Dr Adan De La Cruz in the past. Pt non-conpliant with outpt cardiac f/u.  .. 7/11/2023 Cardio recommends Observe on tele D/C metoprolol check echo here, prior LVEF 50%, PFO Eliquis 2.5 bid for hx A FIB    HEMAT.   . ANEMIA.  .. Hb 7/11/2023 Hb 9.9   .. sob 7/12 (-)     RENAL.  . STEFANY/CKD.  .. Cr 7/11/2023 Cr 2.8   .. on iv fl   .. monitor     ENDOCRINE.  . HYPOTHYROID.  .. 7/11/2023 levoxyl 88     NEURO.   . l WEKANESS.  .. CT H 7/11/2023 (-)     MAIN ISSUES.  .3 DEGREE AV BLOCK POA 7/11/2023   .. 7/11/2023 Pt REFUSES EP w/u, declines pacemaker or EP procedure  .. pt with hx on ILR, dead battery, was seen followed by Dr Cotto, Dr Arellano in the past.   .. 7/11/2023 Cardio recommends Observe on tele D/C metoprolol check echo here, prior LVEF 50%, PFO Eliquis 2.5 bid for hx AFIB  . UTI   ..  7/11/2023 rocephin  . AF  .. 7/11/2023 apixa 2.5 x 2   . STEFANY/CKD    .. 7/12/2023 cr 2.8     TIME SPENT.   . About 36 minutes aggregate care time spent on encounter; activities included   direct patient care, counseling and/or coordinating care reviewing notes, lab data/ imaging , discussion with multidisciplinary team/ patient  /family and explaining in detail risks, benefits, alternatives  of the recommendations     JAKOB WORTHY 89 f 7/11/2023 9/22/1929 DR BAM HERRERA          GENERAL DATA .     GOC.    . 7/11/2023 dnr  ICU STAY.   .. none  COVID.   ..    BEST PRACTICE ISSUES.    HOB ELEVATN.   .. Yes  DVT PPLX.   .. 7/11/2023 apixaba 2.5 x 2 ( a fib)   STRESS ULCER PPLX.   ..    7/11/2023 protonix 40   INFECTION PPLX.   ..    SP SW FIFI.    ..        DIET.    .. 7/11/2023 puree    IV fl.  .. 7/11/2023 ns 75      PROCEDURES.  ..   PAST PROCEDURES.    ..   GENERAL DATA .   ALLGY.  ..         pncl                WT.    REASON FOR VISIT  .. Management of problems listed below      PHYSICAL EXAM    HEENT Unremarkable  atraumatic   RESP Fair air entry  Harsh breath sound   CARDIAC S1 S2 No S3     NO JVD    ABDOMEN No hepatosplenomegaly   PEDAL EDEMA present No calf tenderness  NO rash     ABGS.   .       VS/ PO/IO/ VENT/ DRIPS.   7/12/2023 afeb 48 150/60   7/12/2023 2l 99%     DOA/INITIAL PRESENTATION.    89-year-old female who has a history of hypothyroidism A-fib on Eliquis (hx AFL ablation), PFO,  resident of a rehab facility for progressive weakness and unsteady gait.  Found to be complaining of shortness of breath and feeling unwell her heart rate was in the 30s.  EMS was called the patient was brought by private paramedic service to [facility] for evaluation of complete heart block.  EMS administered an amp of atropine without change in status or rhythm.  Patient states she feels better now once in [facility] ER .  (Even though her heart rate is 39).  Her transfer paperwork includes recommended transport to [facility], her cardiologist is at [facility] per the patient, and her primary care physician is Dr. Collins covered by dr Herrera at this facilit                           .     . 7/11/2023 Pt REFUSES EP w/u, declines pacemaker and after dw Cardio  daughter Beth who also declines pacemaker or EP procedure  pt with hx on ILR, dead battery, was seen followed by Dr Cotto, Dr Arellano in the past. Pt non-conpliant with outpt cardiac f/u.  . 7/11/2023 Cardio recommends Observe on tele D/C metoprolol check echo here, prior LVEF 50%, PFO Eliquis 2.5 bid for hx AFIB  . 7/11/2023 Pulm Crit care consulted  COURSE.     PROBLEMS/ASSESSMENT/RECOMMENDATIONS (A/R).  PULMONARY.  . GAS EXCHANGE.  .. A/R.   .. Monitor pulse oximetry and target po 90-95%    . COPD.   .. 7/11/2023 duoneb.4   .. 7/11/2023 guaifenesin 200.4p   .. cont rx     INFECTION.  . UTI 7/11/2023   ..  w 7/11/2023 w 12.1   .. cxr 7/11/2023 cxr atelectatic infiltr l gretar   .. 7/11/2023 Rocephin     HEMODYNAMICS.  .. 7/11/2023 bp ok    CARDIO.  . CHF.  .. bnp 7/11/2023 bnp 4030     . CAD.  .. Tr 7/11/2023 Tr 112     . A fib.  .. 7/11/2023 apixaba 2.5 x 2 ( a fib)    . 3 DEGREE AV BLOCK POA 7/11/2023   .. A/R.  .. 7/11/2023 Pt REFUSES EP w/u, declines pacemaker and after dw Cardio  daughter Beth who also declines pacemaker or EP procedure  pt with hx on ILR, dead battery, was seen followed by Dr Adan De La Cruz in the past. Pt non-conpliant with outpt cardiac f/u.  .. 7/11/2023 Cardio recommends Observe on tele D/C metoprolol check echo here, prior LVEF 50%, PFO Eliquis 2.5 bid for hx A FIB    HEMAT.   . ANEMIA.  .. Hb 7/11/2023 Hb 9.9   .. sob 7/12 (-)     RENAL.  . STEFANY/CKD.  .. Cr 7/11/2023 Cr 2.8   .. on iv fl   .. monitor     ENDOCRINE.  . HYPOTHYROID.  .. 7/11/2023 levoxyl 88     NEURO.   . l WEKANESS.  .. CT H 7/11/2023 (-)     MAIN ISSUES.  .3 DEGREE AV BLOCK POA 7/11/2023   .. 7/11/2023 Pt REFUSES EP w/u, declines pacemaker or EP procedure  .. pt with hx on ILR, dead battery, was seen followed by Dr Cotto, Dr Arellano in the past.   .. 7/11/2023 Cardio recommends Observe on tele D/C metoprolol check echo here, prior LVEF 50%, PFO Eliquis 2.5 bid for hx AFIB  . UTI   ..  7/11/2023 rocephin  . AF  .. 7/11/2023 apixa 2.5 x 2   . STEFANY/CKD    .. 7/12/2023 cr 2.8     TIME SPENT.   . About 36 minutes aggregate care time spent on encounter; activities included   direct patient care, counseling and/or coordinating care reviewing notes, lab data/ imaging , discussion with multidisciplinary team/ patient  /family and explaining in detail risks, benefits, alternatives  of the recommendations     JAKOB WORTHY 89 f 7/11/2023 9/22/1929 DR BAM HERRERA          GENERAL DATA .     GOC.    . 7/11/2023 dnr  ICU STAY.   .. none  COVID.   ..    BEST PRACTICE ISSUES.    HOB ELEVATN.   .. Yes  DVT PPLX.   .. 7/11/2023 apixaba 2.5 x 2 ( a fib)   STRESS ULCER PPLX.   ..    7/11/2023 protonix 40   INFECTION PPLX.   ..    SP SW FIFI.    ..        DIET.    .. 7/11/2023 puree    IV fl.  .. 7/11/2023 ns 75      PROCEDURES.  ..   PAST PROCEDURES.    ..   GENERAL DATA .   ALLGY.  ..         pncl                WT.    REASON FOR VISIT  .. Management of problems listed below      PHYSICAL EXAM    HEENT Unremarkable  atraumatic   RESP Fair air entry  Harsh breath sound   CARDIAC S1 S2 No S3     NO JVD    ABDOMEN No hepatosplenomegaly   PEDAL EDEMA present No calf tenderness  NO rash     ABGS.   .       VS/ PO/IO/ VENT/ DRIPS.   7/12/2023 afeb 48 150/60   7/12/2023 2l 99%     DOA/INITIAL PRESENTATION.    89-year-old female who has a history of hypothyroidism A-fib on Eliquis (hx AFL ablation), PFO,  resident of a rehab facility for progressive weakness and unsteady gait.  Found to be complaining of shortness of breath and feeling unwell her heart rate was in the 30s.  EMS was called the patient was brought by private paramedic service to [facility] for evaluation of complete heart block.  EMS administered an amp of atropine without change in status or rhythm.  Patient states she feels better now once in [facility] ER .  (Even though her heart rate is 39).  Her transfer paperwork includes recommended transport to [facility], her cardiologist is at [facility] per the patient, and her primary care physician is Dr. Collins covered by dr Herrera at this facilit                           .     . 7/11/2023 Pt REFUSES EP w/u, declines pacemaker and after dw Cardio  daughter Beth who also declines pacemaker or EP procedure  pt with hx on ILR, dead battery, was seen followed by Dr Cotto, Dr Arellano in the past. Pt non-conpliant with outpt cardiac f/u.  . 7/11/2023 Cardio recommends Observe on tele D/C metoprolol check echo here, prior LVEF 50%, PFO Eliquis 2.5 bid for hx AFIB  . 7/11/2023 Pulm Crit care consulted  COURSE.     PROBLEMS/ASSESSMENT/RECOMMENDATIONS (A/R).  PULMONARY.  . GAS EXCHANGE.  .. A/R.   .. Monitor pulse oximetry and target po 90-95%    . COPD.   .. 7/11/2023 duoneb.4   .. 7/11/2023 guaifenesin 200.4p   .. cont rx     INFECTION.  . UTI 7/11/2023   ..  w 7/11/2023 w 12.1   .. cxr 7/11/2023 cxr atelectatic infiltr l gretar   .. 7/11/2023 Rocephin     HEMODYNAMICS.  .. 7/11/2023 bp ok    CARDIO.  . CHF.  .. bnp 7/11/2023 bnp 4030     . CAD.  .. Tr 7/11/2023 Tr 112     . A fib.  .. 7/11/2023 apixaba 2.5 x 2 ( a fib)    . 3 DEGREE AV BLOCK POA 7/11/2023   .. A/R.  .. 7/11/2023 Pt REFUSES EP w/u, declines pacemaker and after dw Cardio  daughter Beth who also declines pacemaker or EP procedure  pt with hx on ILR, dead battery, was seen followed by Dr Adan De La Cruz in the past. Pt non-conpliant with outpt cardiac f/u.  .. 7/11/2023 Cardio recommends Observe on tele D/C metoprolol check echo here, prior LVEF 50%, PFO Eliquis 2.5 bid for hx A FIB    HEMAT.   . ANEMIA.  .. Hb 7/11/2023 Hb 9.9   .. sob 7/12 (-)     RENAL.  . STEFANY/CKD.  .. Cr 7/11/2023 Cr 2.8   .. on iv fl   .. monitor     ENDOCRINE.  . HYPOTHYROID.  .. 7/11/2023 levoxyl 88     NEURO.   . l WEKANESS.  .. CT H 7/11/2023 (-)     MAIN ISSUES.  .3 DEGREE AV BLOCK POA 7/11/2023   .. 7/11/2023 Pt REFUSES EP w/u, declines pacemaker or EP procedure  .. pt with hx on ILR, dead battery, was seen followed by Dr Cotto, Dr Arellano in the past.   .. 7/11/2023 Cardio recommends Observe on tele D/C metoprolol check echo here, prior LVEF 50%, PFO Eliquis 2.5 bid for hx AFIB  . UTI   ..  7/11/2023 rocephin  . AF  .. 7/11/2023 apixa 2.5 x 2   . STEFANY/CKD    .. 7/12/2023 cr 2.8     TIME SPENT.   . About 36 minutes aggregate care time spent on encounter; activities included   direct patient care, counseling and/or coordinating care reviewing notes, lab data/ imaging , discussion with multidisciplinary team/ patient  /family and explaining in detail risks, benefits, alternatives  of the recommendations     JAKOB WORTHY 89 f 7/11/2023 9/22/1929 DR BAM HERRERA

## 2023-07-12 NOTE — DISCHARGE NOTE PROVIDER - NSDCCPCAREPLAN_GEN_ALL_CORE_FT
PRINCIPAL DISCHARGE DIAGNOSIS  Diagnosis: Complete heart block  Assessment and Plan of Treatment:       SECONDARY DISCHARGE DIAGNOSES  Diagnosis: Chronic atrial fibrillation  Assessment and Plan of Treatment:     Diagnosis: Acute hyperkalemia  Assessment and Plan of Treatment:     Diagnosis: STEFANY (acute kidney injury)  Assessment and Plan of Treatment:

## 2023-07-12 NOTE — DISCHARGE NOTE NURSING/CASE MANAGEMENT/SOCIAL WORK - PATIENT PORTAL LINK FT
You can access the FollowMyHealth Patient Portal offered by [facility] by registering at the following website: [email or web address]. By joining [employer]’s FollowMyHealth portal, you will also be able to view your health information using other applications (apps) compatible with our system. You can access the FollowMyHealth Patient Portal offered by [facility] by registering at the following website: [email or web address]. By joining [employer]’s FollowMyHealth portal, you will also be able to view your health information using other applications (apps) compatible with our system. You can access the FollowMyHealth Patient Portal offered by [facility] by registering at the following website: [email or web address]. By joining [employer]’s FollowMyHealth portal, you will also be able to view your health information using other applications (apps) compatible with our system.

## 2023-07-16 LAB
CULTURE RESULTS: SIGNIFICANT CHANGE UP
SPECIMEN SOURCE: SIGNIFICANT CHANGE UP

## 2023-08-20 ENCOUNTER — INPATIENT (INPATIENT)
Facility: HOSPITAL | Age: 88
LOS: 11 days | Discharge: HOME | DRG: 871 | End: 2023-09-01
Attending: INTERNAL MEDICINE | Admitting: INTERNAL MEDICINE
Payer: MEDICARE

## 2023-08-20 VITALS
SYSTOLIC BLOOD PRESSURE: 177 MMHG | DIASTOLIC BLOOD PRESSURE: 86 MMHG | OXYGEN SATURATION: 93 % | HEIGHT: 64 IN | TEMPERATURE: 99 F | HEART RATE: 71 BPM | RESPIRATION RATE: 18 BRPM | WEIGHT: 123.9 LBS

## 2023-08-20 DIAGNOSIS — N39.0 URINARY TRACT INFECTION, SITE NOT SPECIFIED: ICD-10-CM

## 2023-08-20 DIAGNOSIS — I48.91 UNSPECIFIED ATRIAL FIBRILLATION: ICD-10-CM

## 2023-08-20 DIAGNOSIS — J20.9 ACUTE BRONCHITIS, UNSPECIFIED: ICD-10-CM

## 2023-08-20 DIAGNOSIS — I10 ESSENTIAL (PRIMARY) HYPERTENSION: ICD-10-CM

## 2023-08-20 DIAGNOSIS — E03.9 HYPOTHYROIDISM, UNSPECIFIED: ICD-10-CM

## 2023-08-20 DIAGNOSIS — Z29.9 ENCOUNTER FOR PROPHYLACTIC MEASURES, UNSPECIFIED: ICD-10-CM

## 2023-08-20 DIAGNOSIS — K22.89 OTHER SPECIFIED DISEASE OF ESOPHAGUS: ICD-10-CM

## 2023-08-20 DIAGNOSIS — J18.9 PNEUMONIA, UNSPECIFIED ORGANISM: ICD-10-CM

## 2023-08-20 DIAGNOSIS — Z95.0 PRESENCE OF CARDIAC PACEMAKER: ICD-10-CM

## 2023-08-20 DIAGNOSIS — Z95.0 PRESENCE OF CARDIAC PACEMAKER: Chronic | ICD-10-CM

## 2023-08-20 DIAGNOSIS — M48.00 SPINAL STENOSIS, SITE UNSPECIFIED: ICD-10-CM

## 2023-08-20 DIAGNOSIS — E87.6 HYPOKALEMIA: ICD-10-CM

## 2023-08-20 LAB
ALBUMIN SERPL ELPH-MCNC: 3.4 G/DL — SIGNIFICANT CHANGE UP (ref 3.3–5)
ALP SERPL-CCNC: 124 U/L — HIGH (ref 40–120)
ALT FLD-CCNC: 25 U/L — SIGNIFICANT CHANGE UP (ref 12–78)
ANION GAP SERPL CALC-SCNC: 8 MMOL/L — SIGNIFICANT CHANGE UP (ref 5–17)
APPEARANCE UR: ABNORMAL
APTT BLD: 30 SEC — SIGNIFICANT CHANGE UP (ref 24.5–35.6)
AST SERPL-CCNC: 23 U/L — SIGNIFICANT CHANGE UP (ref 15–37)
BASOPHILS # BLD AUTO: 0 K/UL — SIGNIFICANT CHANGE UP (ref 0–0.2)
BASOPHILS NFR BLD AUTO: 0 % — SIGNIFICANT CHANGE UP (ref 0–2)
BILIRUB SERPL-MCNC: 1.7 MG/DL — HIGH (ref 0.2–1.2)
BILIRUB UR-MCNC: NEGATIVE — SIGNIFICANT CHANGE UP
BUN SERPL-MCNC: 16 MG/DL — SIGNIFICANT CHANGE UP (ref 7–23)
CALCIUM SERPL-MCNC: 9 MG/DL — SIGNIFICANT CHANGE UP (ref 8.5–10.1)
CHLORIDE SERPL-SCNC: 107 MMOL/L — SIGNIFICANT CHANGE UP (ref 96–108)
CO2 SERPL-SCNC: 29 MMOL/L — SIGNIFICANT CHANGE UP (ref 22–31)
COLOR SPEC: YELLOW — SIGNIFICANT CHANGE UP
CREAT SERPL-MCNC: 0.85 MG/DL — SIGNIFICANT CHANGE UP (ref 0.5–1.3)
DIFF PNL FLD: ABNORMAL
EGFR: 64 ML/MIN/1.73M2 — SIGNIFICANT CHANGE UP
EOSINOPHIL # BLD AUTO: 0 K/UL — SIGNIFICANT CHANGE UP (ref 0–0.5)
EOSINOPHIL NFR BLD AUTO: 0 % — SIGNIFICANT CHANGE UP (ref 0–6)
GLUCOSE SERPL-MCNC: 130 MG/DL — HIGH (ref 70–99)
GLUCOSE UR QL: NEGATIVE MG/DL — SIGNIFICANT CHANGE UP
HCT VFR BLD CALC: 39.5 % — SIGNIFICANT CHANGE UP (ref 34.5–45)
HGB BLD-MCNC: 12.1 G/DL — SIGNIFICANT CHANGE UP (ref 11.5–15.5)
INR BLD: 1.29 RATIO — HIGH (ref 0.85–1.18)
KETONES UR-MCNC: NEGATIVE MG/DL — SIGNIFICANT CHANGE UP
LACTATE SERPL-SCNC: 1.4 MMOL/L — SIGNIFICANT CHANGE UP (ref 0.7–2)
LEUKOCYTE ESTERASE UR-ACNC: ABNORMAL
LYMPHOCYTES # BLD AUTO: 0.33 K/UL — LOW (ref 1–3.3)
LYMPHOCYTES # BLD AUTO: 2 % — LOW (ref 13–44)
MCHC RBC-ENTMCNC: 29.8 PG — SIGNIFICANT CHANGE UP (ref 27–34)
MCHC RBC-ENTMCNC: 30.6 GM/DL — LOW (ref 32–36)
MCV RBC AUTO: 97.3 FL — SIGNIFICANT CHANGE UP (ref 80–100)
MONOCYTES # BLD AUTO: 1.33 K/UL — HIGH (ref 0–0.9)
MONOCYTES NFR BLD AUTO: 8 % — SIGNIFICANT CHANGE UP (ref 2–14)
NEUTROPHILS # BLD AUTO: 15 K/UL — HIGH (ref 1.8–7.4)
NEUTROPHILS NFR BLD AUTO: 90 % — HIGH (ref 43–77)
NITRITE UR-MCNC: NEGATIVE — SIGNIFICANT CHANGE UP
NRBC # BLD: SIGNIFICANT CHANGE UP /100 WBCS (ref 0–0)
NT-PROBNP SERPL-SCNC: 4645 PG/ML — HIGH (ref 0–450)
PH UR: 7.5 — SIGNIFICANT CHANGE UP (ref 5–8)
PLATELET # BLD AUTO: 225 K/UL — SIGNIFICANT CHANGE UP (ref 150–400)
POTASSIUM SERPL-MCNC: 3.3 MMOL/L — LOW (ref 3.5–5.3)
POTASSIUM SERPL-SCNC: 3.3 MMOL/L — LOW (ref 3.5–5.3)
PROT SERPL-MCNC: 7.4 G/DL — SIGNIFICANT CHANGE UP (ref 6–8.3)
PROT UR-MCNC: 100 MG/DL
PROTHROM AB SERPL-ACNC: 15 SEC — HIGH (ref 9.5–13)
RAPID RVP RESULT: SIGNIFICANT CHANGE UP
RBC # BLD: 4.06 M/UL — SIGNIFICANT CHANGE UP (ref 3.8–5.2)
RBC # FLD: 17.7 % — HIGH (ref 10.3–14.5)
SARS-COV-2 RNA SPEC QL NAA+PROBE: SIGNIFICANT CHANGE UP
SODIUM SERPL-SCNC: 144 MMOL/L — SIGNIFICANT CHANGE UP (ref 135–145)
SP GR SPEC: 1.01 — SIGNIFICANT CHANGE UP (ref 1–1.03)
TROPONIN I, HIGH SENSITIVITY RESULT: 33.2 NG/L — SIGNIFICANT CHANGE UP
TROPONIN I, HIGH SENSITIVITY RESULT: 38.7 NG/L — SIGNIFICANT CHANGE UP
UROBILINOGEN FLD QL: 1 MG/DL — SIGNIFICANT CHANGE UP (ref 0.2–1)
WBC # BLD: 16.67 K/UL — HIGH (ref 3.8–10.5)
WBC # FLD AUTO: 16.67 K/UL — HIGH (ref 3.8–10.5)

## 2023-08-20 PROCEDURE — 99285 EMERGENCY DEPT VISIT HI MDM: CPT | Mod: FS

## 2023-08-20 PROCEDURE — 71250 CT THORAX DX C-: CPT | Mod: 26,ME

## 2023-08-20 PROCEDURE — G1004: CPT

## 2023-08-20 PROCEDURE — 71045 X-RAY EXAM CHEST 1 VIEW: CPT | Mod: 26

## 2023-08-20 RX ORDER — AZITHROMYCIN 500 MG/1
500 TABLET, FILM COATED ORAL EVERY 24 HOURS
Refills: 0 | Status: DISCONTINUED | OUTPATIENT
Start: 2023-08-20 | End: 2023-08-20

## 2023-08-20 RX ORDER — APIXABAN 2.5 MG/1
2.5 TABLET, FILM COATED ORAL
Refills: 0 | Status: DISCONTINUED | OUTPATIENT
Start: 2023-08-20 | End: 2023-09-01

## 2023-08-20 RX ORDER — ACETAMINOPHEN 500 MG
1000 TABLET ORAL ONCE
Refills: 0 | Status: COMPLETED | OUTPATIENT
Start: 2023-08-20 | End: 2023-08-20

## 2023-08-20 RX ORDER — PANTOPRAZOLE SODIUM 20 MG/1
40 TABLET, DELAYED RELEASE ORAL DAILY
Refills: 0 | Status: DISCONTINUED | OUTPATIENT
Start: 2023-08-20 | End: 2023-09-01

## 2023-08-20 RX ORDER — POTASSIUM CHLORIDE 20 MEQ
40 PACKET (EA) ORAL ONCE
Refills: 0 | Status: COMPLETED | OUTPATIENT
Start: 2023-08-20 | End: 2023-08-20

## 2023-08-20 RX ORDER — ACETAMINOPHEN 500 MG
650 TABLET ORAL EVERY 6 HOURS
Refills: 0 | Status: DISCONTINUED | OUTPATIENT
Start: 2023-08-20 | End: 2023-09-01

## 2023-08-20 RX ORDER — LACTOBACILLUS ACIDOPHILUS 100MM CELL
1 CAPSULE ORAL EVERY 12 HOURS
Refills: 0 | Status: DISCONTINUED | OUTPATIENT
Start: 2023-08-20 | End: 2023-09-01

## 2023-08-20 RX ORDER — ONDANSETRON 8 MG/1
4 TABLET, FILM COATED ORAL EVERY 8 HOURS
Refills: 0 | Status: DISCONTINUED | OUTPATIENT
Start: 2023-08-20 | End: 2023-09-01

## 2023-08-20 RX ORDER — IPRATROPIUM/ALBUTEROL SULFATE 18-103MCG
3 AEROSOL WITH ADAPTER (GRAM) INHALATION EVERY 6 HOURS
Refills: 0 | Status: DISCONTINUED | OUTPATIENT
Start: 2023-08-20 | End: 2023-09-01

## 2023-08-20 RX ORDER — CEFEPIME 1 G/1
2000 INJECTION, POWDER, FOR SOLUTION INTRAMUSCULAR; INTRAVENOUS ONCE
Refills: 0 | Status: COMPLETED | OUTPATIENT
Start: 2023-08-20 | End: 2023-08-20

## 2023-08-20 RX ORDER — LANOLIN ALCOHOL/MO/W.PET/CERES
3 CREAM (GRAM) TOPICAL AT BEDTIME
Refills: 0 | Status: DISCONTINUED | OUTPATIENT
Start: 2023-08-20 | End: 2023-09-01

## 2023-08-20 RX ORDER — CEFEPIME 1 G/1
2000 INJECTION, POWDER, FOR SOLUTION INTRAMUSCULAR; INTRAVENOUS EVERY 12 HOURS
Refills: 0 | Status: DISCONTINUED | OUTPATIENT
Start: 2023-08-21 | End: 2023-08-21

## 2023-08-20 RX ORDER — DEXTROSE MONOHYDRATE, SODIUM CHLORIDE, AND POTASSIUM CHLORIDE 50; .745; 4.5 G/1000ML; G/1000ML; G/1000ML
1000 INJECTION, SOLUTION INTRAVENOUS
Refills: 0 | Status: DISCONTINUED | OUTPATIENT
Start: 2023-08-20 | End: 2023-08-22

## 2023-08-20 RX ORDER — LEVOTHYROXINE SODIUM 125 MCG
88 TABLET ORAL DAILY
Refills: 0 | Status: DISCONTINUED | OUTPATIENT
Start: 2023-08-20 | End: 2023-08-22

## 2023-08-20 RX ADMIN — CEFEPIME 2000 MILLIGRAM(S): 1 INJECTION, POWDER, FOR SOLUTION INTRAMUSCULAR; INTRAVENOUS at 14:40

## 2023-08-20 RX ADMIN — Medication 40 MILLIEQUIVALENT(S): at 14:15

## 2023-08-20 RX ADMIN — Medication 100 MILLIGRAM(S): at 21:17

## 2023-08-20 RX ADMIN — Medication 1000 MILLIGRAM(S): at 12:58

## 2023-08-20 RX ADMIN — DEXTROSE MONOHYDRATE, SODIUM CHLORIDE, AND POTASSIUM CHLORIDE 50 MILLILITER(S): 50; .745; 4.5 INJECTION, SOLUTION INTRAVENOUS at 15:31

## 2023-08-20 RX ADMIN — Medication 400 MILLIGRAM(S): at 12:43

## 2023-08-20 RX ADMIN — APIXABAN 2.5 MILLIGRAM(S): 2.5 TABLET, FILM COATED ORAL at 18:13

## 2023-08-20 RX ADMIN — CEFEPIME 100 MILLIGRAM(S): 1 INJECTION, POWDER, FOR SOLUTION INTRAMUSCULAR; INTRAVENOUS at 14:11

## 2023-08-20 RX ADMIN — Medication 1000 MILLIGRAM(S): at 13:40

## 2023-08-20 RX ADMIN — Medication 1 TABLET(S): at 18:13

## 2023-08-20 NOTE — ED PROVIDER NOTE - SKILLED NURSING FACILITY NOTE SUMMARY FREE TEXT FOR MDM OBTAINED AND REVIEWED OLD RECORDS QUESTION
medical history, medication, code status obtained by [facility] notes medical history, medication, code status obtained by [facility] notes medical history, medication, code status obtained by [facility] notes

## 2023-08-20 NOTE — CONSULT NOTE ADULT - SUBJECTIVE AND OBJECTIVE BOX
CHIEF COMPLAINT/ REASON FOR VISIT  .. Patient was seen to address the  issue listed under PROBLEM LIST which is located toward bottom of this note     DEACON ROBERT    PLV APER 04    Allergies    penicillin (Unknown)    Intolerances        PAST MEDICAL & SURGICAL HISTORY:  HTN (hypertension)      Afib      Spinal stenosis      PFO (patent foramen ovale)      Degeneration macular      Osteoporosis      History of complete heart block      Hypothyroidism      Cardiac pacemaker          FAMILY HISTORY:      Home Medications:  cefTRIAXone: 1 gram(s) intravenous once a day (2023 17:06)  Eliquis 2.5 mg oral tablet: 1 tab(s) orally 2 times a day (2023 07:49)  levothyroxine 88 mcg (0.088 mg) oral tablet: 1 tab(s) orally once a day (2023 07:49)      MEDICATIONS  (STANDING):  apixaban 2.5 milliGRAM(s) Oral two times a day  benzonatate 100 milliGRAM(s) Oral every 8 hours  dextrose 5% + sodium chloride 0.45% with potassium chloride 20 mEq/L 1000 milliLiter(s) (50 mL/Hr) IV Continuous <Continuous>  lactobacillus acidophilus 1 Tablet(s) Oral every 12 hours  levoFLOXacin IVPB      levothyroxine 88 MICROGram(s) Oral daily  pantoprazole    Tablet 40 milliGRAM(s) Oral daily    MEDICATIONS  (PRN):  acetaminophen     Tablet .. 650 milliGRAM(s) Oral every 6 hours PRN Temp greater or equal to 38C (100.4F), Mild Pain (1 - 3)  albuterol/ipratropium for Nebulization 3 milliLiter(s) Nebulizer every 6 hours PRN Shortness of Breath and/or Wheezing  aluminum hydroxide/magnesium hydroxide/simethicone Suspension 30 milliLiter(s) Oral every 4 hours PRN Dyspepsia  guaiFENesin Oral Liquid (Sugar-Free) 200 milliGRAM(s) Oral every 6 hours PRN Cough  melatonin 3 milliGRAM(s) Oral at bedtime PRN Insomnia  ondansetron Injectable 4 milliGRAM(s) IV Push every 8 hours PRN Nausea and/or Vomiting              Vital Signs Last 24 Hrs  T(C): 36.4 (20 Aug 2023 16:02), Max: 37.2 (20 Aug 2023 11:24)  T(F): 97.5 (20 Aug 2023 16:02), Max: 99 (20 Aug 2023 11:24)  HR: 71 (20 Aug 2023 16:02) (71 - 71)  BP: 108/70 (20 Aug 2023 16:02) (108/70 - 177/86)  BP(mean): --  RR: 18 (20 Aug 2023 16:02) (18 - 18)  SpO2: 96% (20 Aug 2023 16:02) (93% - 96%)    Parameters below as of 20 Aug 2023 16:02  Patient On (Oxygen Delivery Method): room air                  LABS:                        12.1   16.67 )-----------( 225      ( 20 Aug 2023 11:55 )             39.5         144  |  107  |  16  ----------------------------<  130<H>  3.3<L>   |  29  |  0.85    Ca    9.0      20 Aug 2023 11:55    TPro  7.4  /  Alb  3.4  /  TBili  1.7<H>  /  DBili  x   /  AST  23  /  ALT  25  /  AlkPhos  124<H>      PT/INR - ( 20 Aug 2023 11:55 )   PT: 15.0 sec;   INR: 1.29 ratio         PTT - ( 20 Aug 2023 11:55 )  PTT:30.0 sec  Urinalysis Basic - ( 20 Aug 2023 11:55 )    Color: Yellow / Appearance: Turbid / S.012 / pH: x  Gluc: 130 mg/dL / Ketone: Negative mg/dL  / Bili: Negative / Urobili: 1.0 mg/dL   Blood: x / Protein: 100 mg/dL / Nitrite: Negative   Leuk Esterase: Large / RBC: 25 /HPF / WBC Too Numerous to count /HPF   Sq Epi: x / Non Sq Epi: x / Bacteria: Few /HPF            WBC:  WBC Count: 16.67 K/uL ( @ 11:55)      MICROBIOLOGY:  RECENT CULTURES:              PT/INR - ( 20 Aug 2023 11:55 )   PT: 15.0 sec;   INR: 1.29 ratio         PTT - ( 20 Aug 2023 11:55 )  PTT:30.0 sec    Sodium:  Sodium: 144 mmol/L ( @ 11:55)      0.85 mg/dL  @ 11:55      Hemoglobin:  Hemoglobin: 12.1 g/dL ( @ 11:55)      Platelets: Platelet Count - Automated: 225 K/uL ( @ 11:55)      LIVER FUNCTIONS - ( 20 Aug 2023 11:55 )  Alb: 3.4 g/dL / Pro: 7.4 g/dL / ALK PHOS: 124 U/L / ALT: 25 U/L / AST: 23 U/L / GGT: x             Urinalysis Basic - ( 20 Aug 2023 11:55 )    Color: Yellow / Appearance: Turbid / S.012 / pH: x  Gluc: 130 mg/dL / Ketone: Negative mg/dL  / Bili: Negative / Urobili: 1.0 mg/dL   Blood: x / Protein: 100 mg/dL / Nitrite: Negative   Leuk Esterase: Large / RBC: 25 /HPF / WBC Too Numerous to count /HPF   Sq Epi: x / Non Sq Epi: x / Bacteria: Few /HPF        RADIOLOGY & ADDITIONAL STUDIES:      MICROBIOLOGY:  RECENT CULTURES:

## 2023-08-20 NOTE — CONSULT NOTE ADULT - SUBJECTIVE AND OBJECTIVE BOX
Patient is a 89y old  Female who presents with a chief complaint of cough (20 Aug 2023 17:55)      HPI:  89-year-old female with history of hypothyroidism, A-fib on Eliquis, s/p recent pacemaker for complete heart block brought in by ambulance from [facility] living home for cough for the past 2 weeks.  Associated with generalized weakness and decreased p.o. intake.  No fall or trauma.  Patient states she has had cold symptoms for a while.  Denies fever, chills, chest pain, shortness of breath, abdominal pain, nausea, vomiting, upper or lower extremity weakness or paresthesias. pmd: Dr. Crawley, cards: [facility] Seen by card Dr Reich Admitted  to telemetry unit for monitoring , send 3 sets of cardiac enzymes to rule out acute coronary event, obtain ECHO to evaluate LVEF, cardiology consult  ,continue current management, O2 supply, anticoagulation plan as per cardiology consult Pulm cons requested , antitussives and nebulized bd ordered .Also UTI suspected and started on iv abx , id cons called Palliative care consult requested ,to discuss advance directives and complete MOLST  (20 Aug 2023 15:35)      Asked to see patient for ID Consult    PAST MEDICAL & SURGICAL HISTORY:  HTN (hypertension)      Afib      Spinal stenosis      PFO (patent foramen ovale)      Degeneration macular      Osteoporosis      History of complete heart block      Hypothyroidism      Cardiac pacemaker          Allergies    penicillin (Unknown)    Intolerances        REVIEW OF SYSTEMS:  All systems below were reviewed and are negative [  ]  HEENT:  ID:  Pulmonary:  Cardiac:  GI:  Renal:  Musculoskeletal:  All other systems above were reviewed and are negative   [  ]    HOME MEDICATIONS:    MEDICATIONS  (STANDING):  apixaban 2.5 milliGRAM(s) Oral two times a day  azithromycin  IVPB 500 milliGRAM(s) IV Intermittent every 24 hours  benzonatate 100 milliGRAM(s) Oral every 8 hours  dextrose 5% + sodium chloride 0.45% with potassium chloride 20 mEq/L 1000 milliLiter(s) (50 mL/Hr) IV Continuous <Continuous>  lactobacillus acidophilus 1 Tablet(s) Oral every 12 hours  levothyroxine 88 MICROGram(s) Oral daily  pantoprazole    Tablet 40 milliGRAM(s) Oral daily    MEDICATIONS  (PRN):  acetaminophen     Tablet .. 650 milliGRAM(s) Oral every 6 hours PRN Temp greater or equal to 38C (100.4F), Mild Pain (1 - 3)  albuterol/ipratropium for Nebulization 3 milliLiter(s) Nebulizer every 6 hours PRN Shortness of Breath and/or Wheezing  aluminum hydroxide/magnesium hydroxide/simethicone Suspension 30 milliLiter(s) Oral every 4 hours PRN Dyspepsia  guaiFENesin Oral Liquid (Sugar-Free) 200 milliGRAM(s) Oral every 6 hours PRN Cough  melatonin 3 milliGRAM(s) Oral at bedtime PRN Insomnia  ondansetron Injectable 4 milliGRAM(s) IV Push every 8 hours PRN Nausea and/or Vomiting      Vital Signs Last 24 Hrs  T(C): 36.4 (20 Aug 2023 16:02), Max: 37.2 (20 Aug 2023 11:24)  T(F): 97.5 (20 Aug 2023 16:02), Max: 99 (20 Aug 2023 11:24)  HR: 71 (20 Aug 2023 16:02) (71 - 71)  BP: 108/70 (20 Aug 2023 16:02) (108/70 - 177/86)  BP(mean): --  RR: 18 (20 Aug 2023 16:02) (18 - 18)  SpO2: 96% (20 Aug 2023 16:02) (93% - 96%)    Parameters below as of 20 Aug 2023 16:02  Patient On (Oxygen Delivery Method): room air        PHYSICAL EXAM:  HEENT: NC/At, PERRL:A  Neck: Soft, no tenderness  Lungs: Decreased BS bilaterally,. no wheezing  Heart: RRR, no murmurs.   Abdomen: Soft, no tenderness.   Genital/ Rectal: No khan catheter  Extremities: No ulcers or edema  Neurologic: Confused, No focal deficits.     I&O's Summary      LABORATORY:                          12.1   16.67 )-----------( 225      ( 20 Aug 2023 11:55 )             39.5           08-20    144  |  107  |  16  ----------------------------<  130<H>  3.3<L>   |  29  |  0.85    Ca    9.0      20 Aug 2023 11:55    TPro  7.4  /  Alb  3.4  /  TBili  1.7<H>  /  DBili  x   /  AST  23  /  ALT  25  /  AlkPhos  124<H>  08-20      Rapid Respiratory Viral Panel Result        08-20 @ 11:55  Rapid RVP [facility]  Coronovirus --  Adenovirus --  Bordetella Pertussis --  Chlamydia Pneumonia --  Entero/Rhinovirus--  HKU1 Coronovirus --  HMPV Coronovirus --  Influenza A --  Influenza AH1 --  Influenza AH1 2009 --  Influenza AH3 --  Influenza B --  Mycoplasma Pneumoniae --  NL63 Coronovirus --  OC43 Coronovirus --  Parainfluenza 1 --  Parainfluenza 2 --  Parainfluenza 3 --  Parainfluenza 4 --  Resp Syncytial Virus --      LABORATORY:    CBC Full  -  ( 20 Aug 2023 11:55 )  WBC Count : 16.67 K/uL  RBC Count : 4.06 M/uL  Hemoglobin : 12.1 g/dL  Hematocrit : 39.5 %  Platelet Count - Automated : 225 K/uL  Mean Cell Volume : 97.3 fl  Mean Cell Hemoglobin : 29.8 pg  Mean Cell Hemoglobin Concentration : 30.6 gm/dL  Auto Neutrophil # : 15.00 K/uL  Auto Lymphocyte # : 0.33 K/uL  Auto Monocyte # : 1.33 K/uL  Auto Eosinophil # : 0.00 K/uL  Auto Basophil # : 0.00 K/uL  Auto Neutrophil % : 90.0 %  Auto Lymphocyte % : 2.0 %  Auto Monocyte % : 8.0 %  Auto Eosinophil % : 0.0 %  Auto Basophil % : 0.0 %          08-20    144  |  107  |  16  ----------------------------<  130<H>  3.3<L>   |  29  |  0.85    Ca    9.0      20 Aug 2023 11:55    TPro  7.4  /  Alb  3.4  /  TBili  1.7<H>  /  DBili  x   /  AST  23  /  ALT  25  /  AlkPhos  124<H>  08-20      Assessment and plan:    1. UTI,  2. Atelectasis vs pneumonia of lower lobes.  3. Generalized weakness.  4. Dysphagia.      . Chest CT showed atelectasis vs infiltrates of lower lobes, No clear evidence of pneumonia.  . The patient has had a nonproductive cough for more than 2 weeks. Would look for other possible sources: GERD, dysphagia.  . Levaquin was changed to IV Cefepime due to prolonged QTc.    . Will discontinue IV Zithromax due to prolonged QTc  . Continue IV Cefepime for now,  . Follow cultures.   . SLP re-evaluation for dysphagia.  . Continue Protonix.    Thank you        Patient is a 89y old  Female who presents with a chief complaint of cough (20 Aug 2023 17:55)      HPI:  89-year-old female with history of hypothyroidism, A-fib on Eliquis, s/p recent pacemaker for complete heart block brought in by ambulance from [facility] living home for cough for the past 2 weeks.  Associated with generalized weakness and decreased p.o. intake.  No fall or trauma.  Patient states she has had cold symptoms for a while.  Denies fever, chills, chest pain, shortness of breath, abdominal pain, nausea, vomiting, upper or lower extremity weakness or paresthesias. pmd: Dr. Crawley, cards: [facility] Seen by card Dr Reich Admitted  to telemetry unit for monitoring , send 3 sets of cardiac enzymes to rule out acute coronary event, obtain ECHO to evaluate LVEF, cardiology consult  ,continue current management, O2 supply, anticoagulation plan as per cardiology consult Pulm cons requested , antitussives and nebulized bd ordered .Also UTI suspected and started on iv abx , id cons called Palliative care consult requested ,to discuss advance directives and complete MOLST  (20 Aug 2023 15:35)      Asked to see patient for ID Consult    PAST MEDICAL & SURGICAL HISTORY:  HTN (hypertension)      Afib      Spinal stenosis      PFO (patent foramen ovale)      Degeneration macular      Osteoporosis      History of complete heart block      Hypothyroidism      Cardiac pacemaker          Allergies    penicillin (Unknown)    Intolerances        REVIEW OF SYSTEMS:  All systems below were reviewed and are negative [  ]  HEENT:  ID:  Pulmonary:  Cardiac:  GI:  Renal:  Musculoskeletal:  All other systems above were reviewed and are negative   [  ]    HOME MEDICATIONS:    MEDICATIONS  (STANDING):  apixaban 2.5 milliGRAM(s) Oral two times a day  azithromycin  IVPB 500 milliGRAM(s) IV Intermittent every 24 hours  benzonatate 100 milliGRAM(s) Oral every 8 hours  dextrose 5% + sodium chloride 0.45% with potassium chloride 20 mEq/L 1000 milliLiter(s) (50 mL/Hr) IV Continuous <Continuous>  lactobacillus acidophilus 1 Tablet(s) Oral every 12 hours  levothyroxine 88 MICROGram(s) Oral daily  pantoprazole    Tablet 40 milliGRAM(s) Oral daily    MEDICATIONS  (PRN):  acetaminophen     Tablet .. 650 milliGRAM(s) Oral every 6 hours PRN Temp greater or equal to 38C (100.4F), Mild Pain (1 - 3)  albuterol/ipratropium for Nebulization 3 milliLiter(s) Nebulizer every 6 hours PRN Shortness of Breath and/or Wheezing  aluminum hydroxide/magnesium hydroxide/simethicone Suspension 30 milliLiter(s) Oral every 4 hours PRN Dyspepsia  guaiFENesin Oral Liquid (Sugar-Free) 200 milliGRAM(s) Oral every 6 hours PRN Cough  melatonin 3 milliGRAM(s) Oral at bedtime PRN Insomnia  ondansetron Injectable 4 milliGRAM(s) IV Push every 8 hours PRN Nausea and/or Vomiting      Vital Signs Last 24 Hrs  T(C): 36.4 (20 Aug 2023 16:02), Max: 37.2 (20 Aug 2023 11:24)  T(F): 97.5 (20 Aug 2023 16:02), Max: 99 (20 Aug 2023 11:24)  HR: 71 (20 Aug 2023 16:02) (71 - 71)  BP: 108/70 (20 Aug 2023 16:02) (108/70 - 177/86)  BP(mean): --  RR: 18 (20 Aug 2023 16:02) (18 - 18)  SpO2: 96% (20 Aug 2023 16:02) (93% - 96%)    Parameters below as of 20 Aug 2023 16:02  Patient On (Oxygen Delivery Method): room air        PHYSICAL EXAM:  HEENT: NC/At, PERRL:A  Neck: Soft, no tenderness  Lungs: Decreased BS bilaterally,. no wheezing  Heart: RRR, no murmurs.   Abdomen: Soft, no tenderness.   Genital/ Rectal: No khan catheter  Extremities: No ulcers or edema  Neurologic: Confused, No focal deficits.     I&O's Summary      LABORATORY:                          12.1   16.67 )-----------( 225      ( 20 Aug 2023 11:55 )             39.5           08-20    144  |  107  |  16  ----------------------------<  130<H>  3.3<L>   |  29  |  0.85    Ca    9.0      20 Aug 2023 11:55    TPro  7.4  /  Alb  3.4  /  TBili  1.7<H>  /  DBili  x   /  AST  23  /  ALT  25  /  AlkPhos  124<H>  08-20      Rapid Respiratory Viral Panel Result        08-20 @ 11:55  Rapid RVP [facility]  Coronovirus --  Adenovirus --  Bordetella Pertussis --  Chlamydia Pneumonia --  Entero/Rhinovirus--  HKU1 Coronovirus --  HMPV Coronovirus --  Influenza A --  Influenza AH1 --  Influenza AH1 2009 --  Influenza AH3 --  Influenza B --  Mycoplasma Pneumoniae --  NL63 Coronovirus --  OC43 Coronovirus --  Parainfluenza 1 --  Parainfluenza 2 --  Parainfluenza 3 --  Parainfluenza 4 --  Resp Syncytial Virus --      LABORATORY:    CBC Full  -  ( 20 Aug 2023 11:55 )  WBC Count : 16.67 K/uL  RBC Count : 4.06 M/uL  Hemoglobin : 12.1 g/dL  Hematocrit : 39.5 %  Platelet Count - Automated : 225 K/uL  Mean Cell Volume : 97.3 fl  Mean Cell Hemoglobin : 29.8 pg  Mean Cell Hemoglobin Concentration : 30.6 gm/dL  Auto Neutrophil # : 15.00 K/uL  Auto Lymphocyte # : 0.33 K/uL  Auto Monocyte # : 1.33 K/uL  Auto Eosinophil # : 0.00 K/uL  Auto Basophil # : 0.00 K/uL  Auto Neutrophil % : 90.0 %  Auto Lymphocyte % : 2.0 %  Auto Monocyte % : 8.0 %  Auto Eosinophil % : 0.0 %  Auto Basophil % : 0.0 %          08-20    144  |  107  |  16  ----------------------------<  130<H>  3.3<L>   |  29  |  0.85    Ca    9.0      20 Aug 2023 11:55    TPro  7.4  /  Alb  3.4  /  TBili  1.7<H>  /  DBili  x   /  AST  23  /  ALT  25  /  AlkPhos  124<H>  08-20      Assessment and plan:    1. UTI,  2. Atelectasis vs pneumonia of lower lobes.  3. Generalized weakness.  4. Dysphagia.      . Chest CT showed atelectasis vs infiltrates of lower lobes, No clear evidence of pneumonia.  . The patient has had a nonproductive cough for more than 2 weeks. Would look for other possible sources: GERD, dysphagia.  . Levaquin was changed to IV Cefepime due to prolonged QTc.    . Will discontinue IV Zithromax due to prolonged QTc  . Continue IV Cefepime for now,  . Follow cultures.   . SLP re-evaluation for dysphagia.  . Continue Protonix.    Thank you        Patient is a 89y old  Female who presents with a chief complaint of cough (20 Aug 2023 17:55)      HPI:  89-year-old female with history of hypothyroidism, A-fib on Eliquis, s/p recent pacemaker for complete heart block brought in by ambulance from [facility] living home for cough for the past 2 weeks.  Associated with generalized weakness and decreased p.o. intake.  No fall or trauma.  Patient states she has had cold symptoms for a while.  Denies fever, chills, chest pain, shortness of breath, abdominal pain, nausea, vomiting, upper or lower extremity weakness or paresthesias. pmd: Dr. Crawley, cards: [facility] Seen by card Dr Reich Admitted  to telemetry unit for monitoring , send 3 sets of cardiac enzymes to rule out acute coronary event, obtain ECHO to evaluate LVEF, cardiology consult  ,continue current management, O2 supply, anticoagulation plan as per cardiology consult Pulm cons requested , antitussives and nebulized bd ordered .Also UTI suspected and started on iv abx , id cons called Palliative care consult requested ,to discuss advance directives and complete MOLST  (20 Aug 2023 15:35)      Asked to see patient for ID Consult    PAST MEDICAL & SURGICAL HISTORY:  HTN (hypertension)      Afib      Spinal stenosis      PFO (patent foramen ovale)      Degeneration macular      Osteoporosis      History of complete heart block      Hypothyroidism      Cardiac pacemaker          Allergies    penicillin (Unknown)    Intolerances        REVIEW OF SYSTEMS:  All systems below were reviewed and are negative [  ]  HEENT:  ID:  Pulmonary:  Cardiac:  GI:  Renal:  Musculoskeletal:  All other systems above were reviewed and are negative   [  ]    HOME MEDICATIONS:    MEDICATIONS  (STANDING):  apixaban 2.5 milliGRAM(s) Oral two times a day  azithromycin  IVPB 500 milliGRAM(s) IV Intermittent every 24 hours  benzonatate 100 milliGRAM(s) Oral every 8 hours  dextrose 5% + sodium chloride 0.45% with potassium chloride 20 mEq/L 1000 milliLiter(s) (50 mL/Hr) IV Continuous <Continuous>  lactobacillus acidophilus 1 Tablet(s) Oral every 12 hours  levothyroxine 88 MICROGram(s) Oral daily  pantoprazole    Tablet 40 milliGRAM(s) Oral daily    MEDICATIONS  (PRN):  acetaminophen     Tablet .. 650 milliGRAM(s) Oral every 6 hours PRN Temp greater or equal to 38C (100.4F), Mild Pain (1 - 3)  albuterol/ipratropium for Nebulization 3 milliLiter(s) Nebulizer every 6 hours PRN Shortness of Breath and/or Wheezing  aluminum hydroxide/magnesium hydroxide/simethicone Suspension 30 milliLiter(s) Oral every 4 hours PRN Dyspepsia  guaiFENesin Oral Liquid (Sugar-Free) 200 milliGRAM(s) Oral every 6 hours PRN Cough  melatonin 3 milliGRAM(s) Oral at bedtime PRN Insomnia  ondansetron Injectable 4 milliGRAM(s) IV Push every 8 hours PRN Nausea and/or Vomiting      Vital Signs Last 24 Hrs  T(C): 36.4 (20 Aug 2023 16:02), Max: 37.2 (20 Aug 2023 11:24)  T(F): 97.5 (20 Aug 2023 16:02), Max: 99 (20 Aug 2023 11:24)  HR: 71 (20 Aug 2023 16:02) (71 - 71)  BP: 108/70 (20 Aug 2023 16:02) (108/70 - 177/86)  BP(mean): --  RR: 18 (20 Aug 2023 16:02) (18 - 18)  SpO2: 96% (20 Aug 2023 16:02) (93% - 96%)    Parameters below as of 20 Aug 2023 16:02  Patient On (Oxygen Delivery Method): room air        PHYSICAL EXAM:  HEENT: NC/At, PERRL:A  Neck: Soft, no tenderness  Lungs: Decreased BS bilaterally,. no wheezing  Heart: RRR, no murmurs.   Abdomen: Soft, no tenderness.   Genital/ Rectal: No khan catheter  Extremities: No ulcers or edema  Neurologic: Confused, No focal deficits.     I&O's Summary      LABORATORY:                          12.1   16.67 )-----------( 225      ( 20 Aug 2023 11:55 )             39.5           08-20    144  |  107  |  16  ----------------------------<  130<H>  3.3<L>   |  29  |  0.85    Ca    9.0      20 Aug 2023 11:55    TPro  7.4  /  Alb  3.4  /  TBili  1.7<H>  /  DBili  x   /  AST  23  /  ALT  25  /  AlkPhos  124<H>  08-20      Rapid Respiratory Viral Panel Result        08-20 @ 11:55  Rapid RVP [facility]  Coronovirus --  Adenovirus --  Bordetella Pertussis --  Chlamydia Pneumonia --  Entero/Rhinovirus--  HKU1 Coronovirus --  HMPV Coronovirus --  Influenza A --  Influenza AH1 --  Influenza AH1 2009 --  Influenza AH3 --  Influenza B --  Mycoplasma Pneumoniae --  NL63 Coronovirus --  OC43 Coronovirus --  Parainfluenza 1 --  Parainfluenza 2 --  Parainfluenza 3 --  Parainfluenza 4 --  Resp Syncytial Virus --      LABORATORY:    CBC Full  -  ( 20 Aug 2023 11:55 )  WBC Count : 16.67 K/uL  RBC Count : 4.06 M/uL  Hemoglobin : 12.1 g/dL  Hematocrit : 39.5 %  Platelet Count - Automated : 225 K/uL  Mean Cell Volume : 97.3 fl  Mean Cell Hemoglobin : 29.8 pg  Mean Cell Hemoglobin Concentration : 30.6 gm/dL  Auto Neutrophil # : 15.00 K/uL  Auto Lymphocyte # : 0.33 K/uL  Auto Monocyte # : 1.33 K/uL  Auto Eosinophil # : 0.00 K/uL  Auto Basophil # : 0.00 K/uL  Auto Neutrophil % : 90.0 %  Auto Lymphocyte % : 2.0 %  Auto Monocyte % : 8.0 %  Auto Eosinophil % : 0.0 %  Auto Basophil % : 0.0 %          08-20    144  |  107  |  16  ----------------------------<  130<H>  3.3<L>   |  29  |  0.85    Ca    9.0      20 Aug 2023 11:55    TPro  7.4  /  Alb  3.4  /  TBili  1.7<H>  /  DBili  x   /  AST  23  /  ALT  25  /  AlkPhos  124<H>  08-20      Assessment and plan:    1. UTI,  2. Atelectasis vs pneumonia of lower lobes.  3. Generalized weakness.  4. Dysphagia.      . Chest CT showed atelectasis vs infiltrates of lower lobes, No clear evidence of pneumonia.  . The patient has had a nonproductive cough for more than 2 weeks. Would look for other possible sources: GERD, dysphagia.  . Levaquin was changed to IV Cefepime due to prolonged QTc.    . Will discontinue IV Zithromax due to prolonged QTc  . Continue IV Cefepime for now,  . Follow cultures.   . SLP re-evaluation for dysphagia.  . Continue Protonix.    Thank you        Patient is a 89y old  Female who presents with a chief complaint of cough (20 Aug 2023 17:55)        The patient is a 89-year-old female with history of dysphagia, hypothyroidism, A-fib on Eliquis, PFO, s/p recent pacemaker for complete heart block. spinal stenosis who was  brought to the ED by ambulance from [facility] living [place] for a persistent cough for the past 2 weeks associated with generalized weakness and decreased p.o. intake.  There was no report of fall or trauma. In the ED she was afebrile. CT of chest showed atelectasis vs infiltrates of lower lobes. UA was suggestive of UTI. She was admitted and placed on IV Levaquin which was changed to IV Cefepime and Zithromax today. The patient was very confused and could not prove more details.           PAST MEDICAL & SURGICAL HISTORY:  HTN (hypertension)      Afib      Spinal stenosis      PFO (patent foramen ovale)      Degeneration macular      Osteoporosis      History of complete heart block      Hypothyroidism      Cardiac pacemaker          Allergies    penicillin (Unknown)    Intolerances        REVIEW OF SYSTEMS:  No vomiting  No dysuria.      HOME MEDICATIONS:    MEDICATIONS  (STANDING):  apixaban 2.5 milliGRAM(s) Oral two times a day  azithromycin  IVPB 500 milliGRAM(s) IV Intermittent every 24 hours  benzonatate 100 milliGRAM(s) Oral every 8 hours  dextrose 5% + sodium chloride 0.45% with potassium chloride 20 mEq/L 1000 milliLiter(s) (50 mL/Hr) IV Continuous <Continuous>  lactobacillus acidophilus 1 Tablet(s) Oral every 12 hours  levothyroxine 88 MICROGram(s) Oral daily  pantoprazole    Tablet 40 milliGRAM(s) Oral daily    MEDICATIONS  (PRN):  acetaminophen     Tablet .. 650 milliGRAM(s) Oral every 6 hours PRN Temp greater or equal to 38C (100.4F), Mild Pain (1 - 3)  albuterol/ipratropium for Nebulization 3 milliLiter(s) Nebulizer every 6 hours PRN Shortness of Breath and/or Wheezing  aluminum hydroxide/magnesium hydroxide/simethicone Suspension 30 milliLiter(s) Oral every 4 hours PRN Dyspepsia  guaiFENesin Oral Liquid (Sugar-Free) 200 milliGRAM(s) Oral every 6 hours PRN Cough  melatonin 3 milliGRAM(s) Oral at bedtime PRN Insomnia  ondansetron Injectable 4 milliGRAM(s) IV Push every 8 hours PRN Nausea and/or Vomiting      Vital Signs Last 24 Hrs  T(C): 36.4 (20 Aug 2023 16:02), Max: 37.2 (20 Aug 2023 11:24)  T(F): 97.5 (20 Aug 2023 16:02), Max: 99 (20 Aug 2023 11:24)  HR: 71 (20 Aug 2023 16:02) (71 - 71)  BP: 108/70 (20 Aug 2023 16:02) (108/70 - 177/86)  BP(mean): --  RR: 18 (20 Aug 2023 16:02) (18 - 18)  SpO2: 96% (20 Aug 2023 16:02) (93% - 96%)    Parameters below as of 20 Aug 2023 16:02  Patient On (Oxygen Delivery Method): room air        PHYSICAL EXAM:  HEENT: NC/At, PERRL:A  Neck: Soft, no tenderness  Lungs: Decreased BS bilaterally,. no wheezing  Heart: RRR, no murmurs.   Abdomen: Soft, no tenderness.   Genital/ Rectal: No khan catheter  Extremities: No ulcers or edema  Neurologic: Confused, No focal deficits.     I&O's Summary      LABORATORY:                          12.1   16.67 )-----------( 225      ( 20 Aug 2023 11:55 )             39.5           08-20    144  |  107  |  16  ----------------------------<  130<H>  3.3<L>   |  29  |  0.85    Ca    9.0      20 Aug 2023 11:55    TPro  7.4  /  Alb  3.4  /  TBili  1.7<H>  /  DBili  x   /  AST  23  /  ALT  25  /  AlkPhos  124<H>  08-20      Rapid Respiratory Viral Panel Result        08-20 @ 11:55  Rapid RVP [facility]  Coronovirus --  Adenovirus --  Bordetella Pertussis --  Chlamydia Pneumonia --  Entero/Rhinovirus--  HKU1 Coronovirus --  HMPV Coronovirus --  Influenza A --  Influenza AH1 --  Influenza AH1 2009 --  Influenza AH3 --  Influenza B --  Mycoplasma Pneumoniae --  NL63 Coronovirus --  OC43 Coronovirus --  Parainfluenza 1 --  Parainfluenza 2 --  Parainfluenza 3 --  Parainfluenza 4 --  Resp Syncytial Virus --      LABORATORY:    CBC Full  -  ( 20 Aug 2023 11:55 )  WBC Count : 16.67 K/uL  RBC Count : 4.06 M/uL  Hemoglobin : 12.1 g/dL  Hematocrit : 39.5 %  Platelet Count - Automated : 225 K/uL  Mean Cell Volume : 97.3 fl  Mean Cell Hemoglobin : 29.8 pg  Mean Cell Hemoglobin Concentration : 30.6 gm/dL  Auto Neutrophil # : 15.00 K/uL  Auto Lymphocyte # : 0.33 K/uL  Auto Monocyte # : 1.33 K/uL  Auto Eosinophil # : 0.00 K/uL  Auto Basophil # : 0.00 K/uL  Auto Neutrophil % : 90.0 %  Auto Lymphocyte % : 2.0 %  Auto Monocyte % : 8.0 %  Auto Eosinophil % : 0.0 %  Auto Basophil % : 0.0 %          08-20    144  |  107  |  16  ----------------------------<  130<H>  3.3<L>   |  29  |  0.85    Ca    9.0      20 Aug 2023 11:55    TPro  7.4  /  Alb  3.4  /  TBili  1.7<H>  /  DBili  x   /  AST  23  /  ALT  25  /  AlkPhos  124<H>  08-20      Assessment and plan:    1. UTI,  2. Atelectasis vs pneumonia of lower lobes.  3. Generalized weakness.  4. Dysphagia.      . Chest CT showed atelectasis vs infiltrates of lower lobes, No clear evidence of pneumonia.  . The patient has had a nonproductive cough for more than 2 weeks. Would look for other possible sources of this persistent cough: GERD, dysphagia.  . Levaquin was changed to IV Cefepime due to prolonged QTc.    . Will discontinue IV Zithromax due to prolonged QTc  . Continue IV Cefepime for now,  . Follow cultures.   . SLP re-evaluation for dysphagia.  . Continue Protonix.    Thank you        Patient is a 89y old  Female who presents with a chief complaint of cough (20 Aug 2023 17:55)        The patient is a 89-year-old female with history of dysphagia, hypothyroidism, A-fib on Eliquis, PFO, s/p recent pacemaker for complete heart block. spinal stenosis who was  brought to the ED by ambulance from [facility] living [place] for a persistent cough for the past 2 weeks associated with generalized weakness and decreased p.o. intake.  There was no report of fall or trauma. In the ED she was afebrile. CT of chest showed atelectasis vs infiltrates of lower lobes. UA was suggestive of UTI. She was admitted and placed on IV Levaquin which was changed to IV Cefepime and Zithromax today. The patient was very confused and could not prove more details.           PAST MEDICAL & SURGICAL HISTORY:  HTN (hypertension)      Afib      Spinal stenosis      PFO (patent foramen ovale)      Degeneration macular      Osteoporosis      History of complete heart block      Hypothyroidism      Cardiac pacemaker          Allergies    penicillin (Unknown)    Intolerances        REVIEW OF SYSTEMS:  No vomiting  No dysuria.      HOME MEDICATIONS:    MEDICATIONS  (STANDING):  apixaban 2.5 milliGRAM(s) Oral two times a day  azithromycin  IVPB 500 milliGRAM(s) IV Intermittent every 24 hours  benzonatate 100 milliGRAM(s) Oral every 8 hours  dextrose 5% + sodium chloride 0.45% with potassium chloride 20 mEq/L 1000 milliLiter(s) (50 mL/Hr) IV Continuous <Continuous>  lactobacillus acidophilus 1 Tablet(s) Oral every 12 hours  levothyroxine 88 MICROGram(s) Oral daily  pantoprazole    Tablet 40 milliGRAM(s) Oral daily    MEDICATIONS  (PRN):  acetaminophen     Tablet .. 650 milliGRAM(s) Oral every 6 hours PRN Temp greater or equal to 38C (100.4F), Mild Pain (1 - 3)  albuterol/ipratropium for Nebulization 3 milliLiter(s) Nebulizer every 6 hours PRN Shortness of Breath and/or Wheezing  aluminum hydroxide/magnesium hydroxide/simethicone Suspension 30 milliLiter(s) Oral every 4 hours PRN Dyspepsia  guaiFENesin Oral Liquid (Sugar-Free) 200 milliGRAM(s) Oral every 6 hours PRN Cough  melatonin 3 milliGRAM(s) Oral at bedtime PRN Insomnia  ondansetron Injectable 4 milliGRAM(s) IV Push every 8 hours PRN Nausea and/or Vomiting      Vital Signs Last 24 Hrs  T(C): 36.4 (20 Aug 2023 16:02), Max: 37.2 (20 Aug 2023 11:24)  T(F): 97.5 (20 Aug 2023 16:02), Max: 99 (20 Aug 2023 11:24)  HR: 71 (20 Aug 2023 16:02) (71 - 71)  BP: 108/70 (20 Aug 2023 16:02) (108/70 - 177/86)  BP(mean): --  RR: 18 (20 Aug 2023 16:02) (18 - 18)  SpO2: 96% (20 Aug 2023 16:02) (93% - 96%)    Parameters below as of 20 Aug 2023 16:02  Patient On (Oxygen Delivery Method): room air        PHYSICAL EXAM:  HEENT: NC/At, PERRL:A  Neck: Soft, no tenderness  Lungs: Decreased BS bilaterally,. no wheezing  Heart: RRR, no murmurs.   Abdomen: Soft, no tenderness.   Genital/ Rectal: No khan catheter  Extremities: No ulcers or edema  Neurologic: Confused, No focal deficits.     I&O's Summary      LABORATORY:                          12.1   16.67 )-----------( 225      ( 20 Aug 2023 11:55 )             39.5           08-20    144  |  107  |  16  ----------------------------<  130<H>  3.3<L>   |  29  |  0.85    Ca    9.0      20 Aug 2023 11:55    TPro  7.4  /  Alb  3.4  /  TBili  1.7<H>  /  DBili  x   /  AST  23  /  ALT  25  /  AlkPhos  124<H>  08-20      Rapid Respiratory Viral Panel Result        08-20 @ 11:55  Rapid RVP NotDete  Coronovirus --  Adenovirus --  Bordetella Pertussis --  Chlamydia Pneumonia --  Entero/Rhinovirus--  HKU1 Coronovirus --  HMPV Coronovirus --  Influenza A --  Influenza AH1 --  Influenza AH1 2009 --  Influenza AH3 --  Influenza B --  Mycoplasma Pneumoniae --  NL63 Coronovirus --  OC43 Coronovirus --  Parainfluenza 1 --  Parainfluenza 2 --  Parainfluenza 3 --  Parainfluenza 4 --  Resp Syncytial Virus --      LABORATORY:    CBC Full  -  ( 20 Aug 2023 11:55 )  WBC Count : 16.67 K/uL  RBC Count : 4.06 M/uL  Hemoglobin : 12.1 g/dL  Hematocrit : 39.5 %  Platelet Count - Automated : 225 K/uL  Mean Cell Volume : 97.3 fl  Mean Cell Hemoglobin : 29.8 pg  Mean Cell Hemoglobin Concentration : 30.6 gm/dL  Auto Neutrophil # : 15.00 K/uL  Auto Lymphocyte # : 0.33 K/uL  Auto Monocyte # : 1.33 K/uL  Auto Eosinophil # : 0.00 K/uL  Auto Basophil # : 0.00 K/uL  Auto Neutrophil % : 90.0 %  Auto Lymphocyte % : 2.0 %  Auto Monocyte % : 8.0 %  Auto Eosinophil % : 0.0 %  Auto Basophil % : 0.0 %          08-20    144  |  107  |  16  ----------------------------<  130<H>  3.3<L>   |  29  |  0.85    Ca    9.0      20 Aug 2023 11:55    TPro  7.4  /  Alb  3.4  /  TBili  1.7<H>  /  DBili  x   /  AST  23  /  ALT  25  /  AlkPhos  124<H>  08-20      Assessment and plan:    1. UTI,  2. Atelectasis vs pneumonia of lower lobes.  3. Generalized weakness.  4. Dysphagia.      . Chest CT showed atelectasis vs infiltrates of lower lobes, No clear evidence of pneumonia.  . The patient has had a nonproductive cough for more than 2 weeks. Would look for other possible sources of this persistent cough: GERD, dysphagia.  . Levaquin was changed to IV Cefepime due to prolonged QTc.    . Will discontinue IV Zithromax due to prolonged QTc  . Continue IV Cefepime for now,  . Follow cultures.   . SLP re-evaluation for dysphagia.  . Continue Protonix.    Thank you        Patient is a 89y old  Female who presents with a chief complaint of cough (20 Aug 2023 17:55)        The patient is a 89-year-old female with history of dysphagia, hypothyroidism, A-fib on Eliquis, PFO, s/p recent pacemaker for complete heart block. spinal stenosis who was  brought to the ED by ambulance from [facility] living [place] for a persistent cough for the past 2 weeks associated with generalized weakness and decreased p.o. intake.  There was no report of fall or trauma. In the ED she was afebrile. CT of chest showed atelectasis vs infiltrates of lower lobes. UA was suggestive of UTI. She was admitted and placed on IV Levaquin which was changed to IV Cefepime and Zithromax today. The patient was very confused and could not prove more details.           PAST MEDICAL & SURGICAL HISTORY:  HTN (hypertension)      Afib      Spinal stenosis      PFO (patent foramen ovale)      Degeneration macular      Osteoporosis      History of complete heart block      Hypothyroidism      Cardiac pacemaker          Allergies    penicillin (Unknown)    Intolerances        REVIEW OF SYSTEMS:  No vomiting  No dysuria.      HOME MEDICATIONS:    MEDICATIONS  (STANDING):  apixaban 2.5 milliGRAM(s) Oral two times a day  azithromycin  IVPB 500 milliGRAM(s) IV Intermittent every 24 hours  benzonatate 100 milliGRAM(s) Oral every 8 hours  dextrose 5% + sodium chloride 0.45% with potassium chloride 20 mEq/L 1000 milliLiter(s) (50 mL/Hr) IV Continuous <Continuous>  lactobacillus acidophilus 1 Tablet(s) Oral every 12 hours  levothyroxine 88 MICROGram(s) Oral daily  pantoprazole    Tablet 40 milliGRAM(s) Oral daily    MEDICATIONS  (PRN):  acetaminophen     Tablet .. 650 milliGRAM(s) Oral every 6 hours PRN Temp greater or equal to 38C (100.4F), Mild Pain (1 - 3)  albuterol/ipratropium for Nebulization 3 milliLiter(s) Nebulizer every 6 hours PRN Shortness of Breath and/or Wheezing  aluminum hydroxide/magnesium hydroxide/simethicone Suspension 30 milliLiter(s) Oral every 4 hours PRN Dyspepsia  guaiFENesin Oral Liquid (Sugar-Free) 200 milliGRAM(s) Oral every 6 hours PRN Cough  melatonin 3 milliGRAM(s) Oral at bedtime PRN Insomnia  ondansetron Injectable 4 milliGRAM(s) IV Push every 8 hours PRN Nausea and/or Vomiting      Vital Signs Last 24 Hrs  T(C): 36.4 (20 Aug 2023 16:02), Max: 37.2 (20 Aug 2023 11:24)  T(F): 97.5 (20 Aug 2023 16:02), Max: 99 (20 Aug 2023 11:24)  HR: 71 (20 Aug 2023 16:02) (71 - 71)  BP: 108/70 (20 Aug 2023 16:02) (108/70 - 177/86)  BP(mean): --  RR: 18 (20 Aug 2023 16:02) (18 - 18)  SpO2: 96% (20 Aug 2023 16:02) (93% - 96%)    Parameters below as of 20 Aug 2023 16:02  Patient On (Oxygen Delivery Method): room air        PHYSICAL EXAM:  HEENT: NC/At, PERRL:A  Neck: Soft, no tenderness  Lungs: Decreased BS bilaterally,. no wheezing  Heart: RRR, no murmurs.   Abdomen: Soft, no tenderness.   Genital/ Rectal: No khan catheter  Extremities: No ulcers or edema  Neurologic: Confused, No focal deficits.     I&O's Summary      LABORATORY:                          12.1   16.67 )-----------( 225      ( 20 Aug 2023 11:55 )             39.5           08-20    144  |  107  |  16  ----------------------------<  130<H>  3.3<L>   |  29  |  0.85    Ca    9.0      20 Aug 2023 11:55    TPro  7.4  /  Alb  3.4  /  TBili  1.7<H>  /  DBili  x   /  AST  23  /  ALT  25  /  AlkPhos  124<H>  08-20      Rapid Respiratory Viral Panel Result        08-20 @ 11:55  Rapid RVP NotDete  Coronovirus --  Adenovirus --  Bordetella Pertussis --  Chlamydia Pneumonia --  Entero/Rhinovirus--  HKU1 Coronovirus --  HMPV Coronovirus --  Influenza A --  Influenza AH1 --  Influenza AH1 2009 --  Influenza AH3 --  Influenza B --  Mycoplasma Pneumoniae --  NL63 Coronovirus --  OC43 Coronovirus --  Parainfluenza 1 --  Parainfluenza 2 --  Parainfluenza 3 --  Parainfluenza 4 --  Resp Syncytial Virus --      LABORATORY:    CBC Full  -  ( 20 Aug 2023 11:55 )  WBC Count : 16.67 K/uL  RBC Count : 4.06 M/uL  Hemoglobin : 12.1 g/dL  Hematocrit : 39.5 %  Platelet Count - Automated : 225 K/uL  Mean Cell Volume : 97.3 fl  Mean Cell Hemoglobin : 29.8 pg  Mean Cell Hemoglobin Concentration : 30.6 gm/dL  Auto Neutrophil # : 15.00 K/uL  Auto Lymphocyte # : 0.33 K/uL  Auto Monocyte # : 1.33 K/uL  Auto Eosinophil # : 0.00 K/uL  Auto Basophil # : 0.00 K/uL  Auto Neutrophil % : 90.0 %  Auto Lymphocyte % : 2.0 %  Auto Monocyte % : 8.0 %  Auto Eosinophil % : 0.0 %  Auto Basophil % : 0.0 %          08-20    144  |  107  |  16  ----------------------------<  130<H>  3.3<L>   |  29  |  0.85    Ca    9.0      20 Aug 2023 11:55    TPro  7.4  /  Alb  3.4  /  TBili  1.7<H>  /  DBili  x   /  AST  23  /  ALT  25  /  AlkPhos  124<H>  08-20      Assessment and plan:    1. UTI,  2. Atelectasis vs pneumonia of lower lobes.  3. Generalized weakness.  4. Dysphagia.      . Chest CT showed atelectasis vs infiltrates of lower lobes, No clear evidence of pneumonia.  . The patient has had a nonproductive cough for more than 2 weeks. Would look for other possible sources of this persistent cough: GERD, dysphagia.  . Levaquin was changed to IV Cefepime due to prolonged QTc.    . Will discontinue IV Zithromax due to prolonged QTc  . Continue IV Cefepime for now,  . Follow cultures.   . SLP re-evaluation for dysphagia.  . Continue Protonix.    Thank you

## 2023-08-20 NOTE — PATIENT PROFILE ADULT - STATED REASON FOR ADMISSION
patient from [facility] c/o cough x 2 weeks and poor po intake. patient from [facility] c/o cough x 2 weeks and poor po intake. patient from [facility] c/o cough x 2 weeks and poor po intake.

## 2023-08-20 NOTE — PATIENT PROFILE ADULT - FALL HARM RISK - HARM RISK INTERVENTIONS
Assistance with ambulation/Assistance OOB with selected safe patient handling equipment/Communicate Risk of Fall with Harm to all staff/Discuss with provider need for PT consult/Monitor gait and stability/Provide patient with walking aids - walker, cane, crutches/Reinforce activity limits and safety measures with patient and family/Review medications for side effects contributing to fall risk/Sit up slowly, dangle for a short time, stand at bedside before walking/Tailored Fall Risk Interventions/Toileting schedule using arm’s reach rule for commode and bathroom/Visual Cue: Yellow wristband and red socks/Bed in lowest position, wheels locked, appropriate side rails in place/Call bell, personal items and telephone in reach/Instruct patient to call for assistance before getting out of bed or chair/Non-slip footwear when patient is out of bed/[place] to call system/Physically safe environment - no spills, clutter or unnecessary equipment/Purposeful Proactive Rounding/Room/bathroom lighting operational, light cord in reach Assistance with ambulation/Assistance OOB with selected safe patient handling equipment/Communicate Risk of Fall with Harm to all staff/Discuss with provider need for PT consult/Monitor gait and stability/Provide patient with walking aids - walker, cane, crutches/Reinforce activity limits and safety measures with patient and family/Review medications for side effects contributing to fall risk/Sit up slowly, dangle for a short time, stand at bedside before walking/Tailored Fall Risk Interventions/Toileting schedule using arm’s reach rule for commode and bathroom/Visual Cue: Yellow wristband and red socks/Bed in lowest position, wheels locked, appropriate side rails in place/Call bell, personal items and telephone in reach/Instruct patient to call for assistance before getting out of bed or chair/Non-slip footwear when patient is out of bed/[place] to call system/Physically safe environment - no spills, clutter or unnecessary equipment/Purposeful Proactive Rounding/Room/bathroom lighting operational, light cord in reach Assistance with ambulation/Assistance OOB with selected safe patient handling equipment/Communicate Risk of Fall with Harm to all staff/Discuss with provider need for PT consult/Monitor gait and stability/Provide patient with walking aids - walker, cane, crutches/Reinforce activity limits and safety measures with patient and family/Review medications for side effects contributing to fall risk/Sit up slowly, dangle for a short time, stand at bedside before walking/Tailored Fall Risk Interventions/Toileting schedule using arm’s reach rule for commode and bathroom/Visual Cue: Yellow wristband and red socks/Bed in lowest position, wheels locked, appropriate side rails in place/Call bell, personal items and telephone in reach/Instruct patient to call for assistance before getting out of bed or chair/Non-slip footwear when patient is out of bed/[place] to call system/Physically safe environment - no spills, clutter or unnecessary equipment/Purposeful Proactive Rounding/Room/bathroom lighting operational, light cord in reach

## 2023-08-20 NOTE — ED PROVIDER NOTE - OBJECTIVE STATEMENT
89-year-old female with history of hypothyroidism, A-fib on Eliquis, recent pacemaker for complete heart block brought in by ambulance from [facility] living home for cough for the past 2 weeks.  Associated with generalized weakness and decreased p.o. intake.  No fall or trauma.  Patient states she has had cold symptoms for a while.  Denies fever, chills, chest pain, shortness of breath, abdominal pain, nausea, vomiting, upper or lower extremity weakness or paresthesias.     pmd: Dr. Crawley, cards: [facility] 89-year-old female with history of hypothyroidism, A-fib on Eliquis, recent pacemaker for complete heart block brought in by ambulance from [facility] living home for cough for the past 2 weeks.  Associated with generalized weakness and decreased p.o. intake.  No fall or trauma.  Patient states she has had cold symptoms for a while.  Denies fever, chills, chest pain, shortness of breath, abdominal pain, nausea, vomiting, upper or lower extremity weakness or paresthesias.     pmd: Dr. Crawley, cards: [facility] 89-year-old female with history of hypothyroidism, A-fib on Eliquis, recent pacemaker for complete heart block brought in by ambulance from [facility] living home for cough for the past 2 weeks.  Associated with generalized weakness and decreased p.o. intake.  No fall or trauma.  Patient states she has had cold symptoms for a while.  Denies fever, chills, chest pain, shortness of breath, abdominal pain, nausea, vomiting, upper or lower extremity weakness or paresthesias.     pmd: Dr. Crawley, cards: [facility]

## 2023-08-20 NOTE — PATIENT PROFILE ADULT - HAVE YOU EXPERIENCED VIOLENCE OR A TRAUMATIC EVENT?
CONSTITUTIONAL: No fever,  POSITIVE weight loss, or fatigue  EYES: No eye pain, visual disturbances, or discharge  ENMT:  No difficulty hearing, tinnitus, vertigo; No sinus or throat pain  NECK: No pain or stiffness  RESPIRATORY: No cough, wheezing, chills or hemoptysis; No shortness of breath  CARDIOVASCULAR: No chest pain, palpitations, dizziness, or leg swelling  GASTROINTESTINAL :intermittent  abdominal . No nausea, vomiting, or hematemesis; No diarrhea or constipation. No melena or hematochezia.  GENITOURINARY: No dysuria, frequency, hematuria, or incontinence  NEUROLOGICAL: No headaches, memory loss, loss of strength, numbness, or tremors  SKIN: No itching, burning, rashes, or lesions   LYMPH NODES: No enlarged glands  ENDOCRINE: No heat or cold intolerance; No hair loss  MUSCULOSKELETAL: left leg pain swelling and erythema   PSYCHIATRIC: No depression, anxiety, mood swings, or difficulty sleeping  HEME/LYMPH: No easy bruising, or bleeding gums  ALLERGY AND IMMUNOLOGIC: No hives or eczema no

## 2023-08-20 NOTE — ED ADULT NURSE NOTE - CAS EDP DISCH DISPOSITION ADMI
[facility]

## 2023-08-20 NOTE — ED PROVIDER NOTE - CLINICAL SUMMARY MEDICAL DECISION MAKING FREE TEXT BOX
89 y/o F hx of Afib on Eliquis, HTN, Hypothyroidism, recent PPM placement presenting with fatigue and cough.   Notable leukocytosis and pyuria as well as new R pleural effusion.   Concerning for UTI and PNA. Given recent hospitalization, will cover with cefepime.  PPM site c/d/i. One previous culture on record growing Enterococcus.  PCN allergy- would avoid FQ given age and would not commit patient to carbapenem at this time  Will need admission for further antibiotics.   Covid-19 pending. 89 y/o F hx of Afib on Eliquis, HTN, Hypothyroidism, recent PPM placement presenting with fatigue and cough.   Notable leukocytosis and pyuria as well as new R pleural effusion.   Concerning for UTI and PNA. Given recent hospitalization, will cover with cefepime.  PPM site c/d/i. One previous culture on record growing Enterococcus.  PCN allergy- would avoid FQ given age and would not commit patient to carbapenem at this time  Will need admission for further antibiotics.   Covid-19 pending. 89 y/o F hx of Afib on Eliquis, HTN, Hypothyroidism, recent PPM placement presenting with fatigue and cough.   Notable leukocytosis and pyuria as well as new R pleural effusion.   Concerning for UTI and PNA. Given recent hospitalization, will cover with cefepime.  PPM site c/d/i. One previous culture on record growing Enterococcus.  PCN allergy- would avoid FQ given age/QTc and would not commit patient to carbapenem at this time  Will need admission for further antibiotics.   Covid-19 pending.  Obtain CT chest to further characterize right lung findings. 89 y/o F hx of Afib on Eliquis, HTN, Hypothyroidism, recent PPM placement presenting with fatigue and cough.   Notable leukocytosis and pyuria as well as new R pleural effusion.   Concerning for UTI and PNA. Given recent hospitalization, will cover with cefepime.  PPM site c/d/i. One previous culture on record growing Enterococcus.  PCN allergy- would avoid FQ given age/QTc and would not commit patient to carbapenem at this time  Will need admission for further antibiotics.   Covid-19 pending.  Obtain CT chest to further characterize right lung findings.

## 2023-08-20 NOTE — H&P ADULT - NSICDXPASTMEDICALHX_GEN_ALL_CORE_FT
PAST MEDICAL HISTORY:  Afib     Degeneration macular     History of complete heart block     HTN (hypertension)     Hypothyroidism     Osteoporosis     PFO (patent foramen ovale)     Spinal stenosis

## 2023-08-20 NOTE — CHART NOTE - NSCHARTNOTEFT_GEN_A_CORE
Called by pharmacy for antibiotic intervention. Patient was ordered for Levaquin, however upon further review of creatinine clearance and QT (527), it was determined that Levaquin would not be a suitable option. Patient has documented penicillin allergy, however tolerated Ceftriaxone in the past.     Plan  - Dc Levaquin  - Start Cefepime and Azithromycin for broad spectrum and atypical coverage  - f/u legionella pcr  - Primary team can determine further antibiotic regimen

## 2023-08-20 NOTE — ED PROVIDER NOTE - NS ED ATTENDING STATEMENT MOD
Attending Only This was a shared visit with the ASAD. I reviewed and verified the documentation and independently performed the documented:

## 2023-08-20 NOTE — ED ADULT TRIAGE NOTE - ARRIVAL FROM
[facility]/Assisted living facility [facility]/Assisted living facility [facility]/Assisted living facility

## 2023-08-20 NOTE — H&P ADULT - HISTORY OF PRESENT ILLNESS
89-year-old female with history of hypothyroidism, A-fib on Eliquis, s/p recent pacemaker for complete heart block brought in by ambulance from [facility] assisted living home for cough for the past 2 weeks.  Associated with generalized weakness and decreased p.o. intake.  No fall or trauma.  Patient states she has had cold symptoms for a while.  Denies fever, chills, chest pain, shortness of breath, abdominal pain, nausea, vomiting, upper or lower extremity weakness or paresthesias. pmd: Dr. Crawley, cards: [facility] Seen by card Dr Reich Admitted  to telemetry unit for monitoring , send 3 sets of cardiac enzymes to rule out acute coronary event, obtain ECHO to evaluate LVEF, cardiology consult  ,continue current management, O2 supply, anticoagulation plan as per cardiology consult Pulm cons requested , antitussives and nebulized bd ordered .Also UTI suspected and started on iv abx , id cons called Palliative care consult requested ,to discuss advance directives and complete MOLST  89-year-old female with history of hypothyroidism, A-fib on Eliquis, s/p recent pacemaker for complete heart block brought in by ambulance from [facility] assisted living home for cough for the past 2 weeks.  Associated with generalized weakness and decreased p.o. intake.  No fall or trauma.  Patient states she has had cold symptoms for a while.  Denies fever, chills, chest pain, shortness of breath, abdominal pain, nausea, vomiting, upper or lower extremity weakness or paresthesias. pmd: Dr. Crawley, cards: [facility] Seen by card Dr Reich Admitted  to telemetry unit for monitoring , send 3 sets of cardiac enzymes to rule out acute coronary event, obtain ECHO to evaluate LVEF, cardiology consult  ,continue current management, O2 supply, anticoagulation plan as per cardiology consult Pulm cons requested , antitussives and nebulized bd ordered .Also UTI suspected and started on iv abx , id cons called Palliative care consult requested ,to discuss advance directives and complete MOLST  89-year-old female with history of hypothyroidism, A-fib on Eliquis, s/p recent pacemaker for complete heart block brought in by ambulance from [facility] assisted living home for cough for the past 2 weeks.  Associated with generalized weakness and decreased p.o. intake.  No fall or trauma.  Patient states she has had cold symptoms for a while.  Denies fever, chills, chest pain, shortness of breath, abdominal pain, nausea, vomiting, upper or lower extremity weakness or paresthesias. pmd: Dr. Crawley, cards: [facility] Seen by card Dr Reich Admitted  to telemetry unit for monitoring , send 3 sets of cardiac enzymes to rule out acute coronary event, obtain ECHO to evaluate LVEF, cardiology consult  ,continue current management, O2 supply, anticoagulation plan as per cardiology consult Pulm cons requested , antitussives and nebulized bd ordered .Also UTI suspected and started on iv abx , id cons called Palliative care consult requested ,to discuss advance directives and complete MOLST

## 2023-08-20 NOTE — ED ADULT NURSE NOTE - CHIEF COMPLAINT QUOTE
sent by [facility] for cough x 2 weeks. negative cxr and covid swab. poor PO intake today. sent by [facility] for cough x 2 weeks. negative cxr and covid swab. poor PO intake today. sent by [facility] for cough x 2 weeks. negative cxr and covid swab. poor PO intake today.

## 2023-08-20 NOTE — PATIENT PROFILE ADULT - CENTRAL VENOUS CATHETER/PICC LINE
Third attempt:  Received referral from Dr. Maurice Torres for Dr. Bin eDsai regarding peripheral edema , could not leave message due to voice mail full. Will sent back to Dr. Sandy Joaquin.
no

## 2023-08-20 NOTE — ED PROVIDER NOTE - ATTENDING APP SHARED VISIT CONTRIBUTION OF CARE
Kaylee Escoto     see MDM

## 2023-08-20 NOTE — ED ADULT TRIAGE NOTE - RESPIRATORY RATE (BREATHS/MIN)
Incentive Spirometer        Using the incentive spirometer helps expand the small air sacs of your lungs, helps you breathe deeply, and helps improve your lung function. Use your incentive spirometer twice a day (10 breaths each time) prior to surgery. How to Use Your Incentive Spirometer:  1. Hold the incentive spirometer in an upright position. 2. Breathe out as usual.   3. Place the mouthpiece in your mouth and seal your lips tightly around it. 4. Take a deep breath. Breathe in slowly and as deeply as possible. Keep the blue flow rate guide between the arrows. 5. Hold your breath as long as possible. Then exhale slowly and allow the piston to fall to the bottom of the column. 6. Rest for a few seconds and repeat steps one through five at least 10 times. PAT Tidal Volume__________1250________  x_____2___________  Date_______8/25/20________________    Elridge Chris THE INCENTIVE SPIROMETER WITH YOU TO THE HOSPITAL ON THE DAY OF YOUR SURGERY. Opportunity given to ask and answer questions as well as to observe return demonstration.     Patient signature_____________________________    Witness____________________________ 18

## 2023-08-20 NOTE — ED PROVIDER NOTE - DIFFERENTIAL DIAGNOSIS
Differential Diagnosis ddx includes but not limited to: pneumonia, viral syndrome, chf, pleural effusion, dehydration, electrolyte abnormality

## 2023-08-20 NOTE — H&P ADULT - PROBLEM SELECTOR PLAN 2
Chest CT showed atelectasis vs infiltrates of lower lobes, No clear evidence of pneumonia.  . The patient has had a nonproductive cough for more than 2 weeks. Would look for other possible sources of this persistent cough: GERD, dysphagia.  . Levaquin was changed to IV Cefepime due to prolonged QTc and Zithromax was discontinued .    . Continue IV Cefepime for now,  . Follow cultures.   . SLP re-evaluation for dysphagia.

## 2023-08-20 NOTE — CONSULT NOTE ADULT - SUBJECTIVE AND OBJECTIVE BOX
[facility]     Patient is a 89y old  Female who presents with a chief complaint of     HPI:      HPI:    89y Female for Cardiology Consult    PAST MEDICAL & SURGICAL HISTORY:  HTN (hypertension)      Afib      Spinal stenosis      PFO (patent foramen ovale)      Degeneration macular      Osteoporosis          FAMILY HISTORY:      SOCIAL HISTORY:   Alcohol:  Smoking:    Allergies    penicillin (Unknown)    Intolerances        MEDICATIONS  (STANDING):    MEDICATIONS  (PRN):      REVIEW OF SYSTEMS:  CONSTITUTIONAL: No fever, weight loss, chills, shakes, or fat  RESPIRATORY: No cough, wheezing, hemoptysis, or shortness of breath  CARDIOVASCULAR: No chest pain, dyspnea, palpitations, dizziness, syncope, paroxysmal nocturnal dyspnea, orthopnea, or arm or leg swelling  GASTROINTESTINAL: No abdominal  or epigastric pain, nausea, vomiting, hematemesis, diarrhea, constipation, melena or bright red bloo  NEUROLOGICAL: No headaches, memory loss, slurred speech, limb weakness, loss of strength, numbness, or tremors  SKIN: No itching, burning, rashes, or lesions  ENDOCRINE: No heat or cold intolerance, or hair loss  MUSCULOSKELETAL: No joint pain or swelling, muscle, back, or extremity pain  HEME/LYMPH: No easy bruising or bleeding gums  ALLERY AND IMMUNOLOGIC: No hives or rash.    Vital Signs Last 24 Hrs  T(C): 37.2 (20 Aug 2023 11:24), Max: 37.2 (20 Aug 2023 11:24)  T(F): 99 (20 Aug 2023 11:24), Max: 99 (20 Aug 2023 11:24)  HR: 71 (20 Aug 2023 11:24) (71 - 71)  BP: 177/86 (20 Aug 2023 11:24) (177/86 - 177/86)  BP(mean): --  RR: 18 (20 Aug 2023 11:24) (18 - 18)  SpO2: 93% (20 Aug 2023 11:24) (93% - 93%)    Parameters below as of 20 Aug 2023 11:24  Patient On (Oxygen Delivery Method): room air        PHYSICAL EXAM:  HEAD:  Atraumatic, Normocephalic  EYES: EOMI, PERRLA, conjunctiva and sclera clear  NECK: Supple and normal thyroid.  No JVD or carotid bruit.   HEART: S1, S2 regular , 1/6 soft ejection systolic murmur in mitral area , no thrill and no gallops .  PULMONARY: Bilateral vesicular breathing , few scattered ronchi and few scattered rales are present .  ABDOMEN: Soft nontender and positive bowl sounds   EXTREMITIES:  No clubbing, cyanosis, or pedal  edema  NEUROLOGICAL: AAOX3 , no focal deficit .    LABS:                        12.1   16.67 )-----------( 225      ( 20 Aug 2023 11:55 )             39.5     08    144  |  107  |  16  ----------------------------<  130<H>  3.3<L>   |  29  |  0.85    Ca    9.0      20 Aug 2023 11:55    TPro  7.4  /  Alb  3.4  /  TBili  1.7<H>  /  DBili  x   /  AST  23  /  ALT  25  /  AlkPhos  124<H>  0820        PT/INR - ( 20 Aug 2023 11:55 )   PT: 15.0 sec;   INR: 1.29 ratio         PTT - ( 20 Aug 2023 11:55 )  PTT:30.0 sec  Urinalysis Basic - ( 20 Aug 2023 11:55 )    Color: Yellow / Appearance: Turbid / S.012 / pH: x  Gluc: 130 mg/dL / Ketone: Negative mg/dL  / Bili: Negative / Urobili: 1.0 mg/dL   Blood: x / Protein: 100 mg/dL / Nitrite: Negative   Leuk Esterase: Large / RBC: 25 /HPF / WBC Too Numerous to count /HPF   Sq Epi: x / Non Sq Epi: x / Bacteria: Few /HPF      BNP      EKG:  ECHO:  IMAGING:    Assessment and Plan :     Will continue to follow during hospital course and give further recommendations as needed . Thanks for your referral . if any questions please contact me at office ([phone])cell [phone])  [facility]     Patient is a 89y old  Female who presents with a chief complaint of     HPI:      HPI:    89y Female for Cardiology Consult    PAST MEDICAL & SURGICAL HISTORY:  HTN (hypertension)      Afib      Spinal stenosis      PFO (patent foramen ovale)      Degeneration macular      Osteoporosis          FAMILY HISTORY:      SOCIAL HISTORY:   Alcohol:  Smoking:    Allergies    penicillin (Unknown)    Intolerances        MEDICATIONS  (STANDING):    MEDICATIONS  (PRN):      REVIEW OF SYSTEMS:  CONSTITUTIONAL: No fever, weight loss, chills, shakes, or fat  RESPIRATORY: No cough, wheezing, hemoptysis, or shortness of breath  CARDIOVASCULAR: No chest pain, dyspnea, palpitations, dizziness, syncope, paroxysmal nocturnal dyspnea, orthopnea, or arm or leg swelling  GASTROINTESTINAL: No abdominal  or epigastric pain, nausea, vomiting, hematemesis, diarrhea, constipation, melena or bright red bloo  NEUROLOGICAL: No headaches, memory loss, slurred speech, limb weakness, loss of strength, numbness, or tremors  SKIN: No itching, burning, rashes, or lesions  ENDOCRINE: No heat or cold intolerance, or hair loss  MUSCULOSKELETAL: No joint pain or swelling, muscle, back, or extremity pain  HEME/LYMPH: No easy bruising or bleeding gums  ALLERY AND IMMUNOLOGIC: No hives or rash.    Vital Signs Last 24 Hrs  T(C): 37.2 (20 Aug 2023 11:24), Max: 37.2 (20 Aug 2023 11:24)  T(F): 99 (20 Aug 2023 11:24), Max: 99 (20 Aug 2023 11:24)  HR: 71 (20 Aug 2023 11:24) (71 - 71)  BP: 177/86 (20 Aug 2023 11:24) (177/86 - 177/86)  BP(mean): --  RR: 18 (20 Aug 2023 11:24) (18 - 18)  SpO2: 93% (20 Aug 2023 11:24) (93% - 93%)    Parameters below as of 20 Aug 2023 11:24  Patient On (Oxygen Delivery Method): room air        PHYSICAL EXAM:  HEAD:  Atraumatic, Normocephalic  EYES: EOMI, PERRLA, conjunctiva and sclera clear  NECK: Supple and normal thyroid.  No JVD or carotid bruit.   HEART: S1, S2 regular , 1/6 soft ejection systolic murmur in mitral area , no thrill and no gallops .  PULMONARY: Bilateral vesicular breathing , few scattered ronchi and few scattered rales are present .  ABDOMEN: Soft nontender and positive bowl sounds   EXTREMITIES:  No clubbing, cyanosis, or pedal  edema  NEUROLOGICAL: AAOX3 , no focal deficit .    LABS:                        12.1   16.67 )-----------( 225      ( 20 Aug 2023 11:55 )             39.5     08    144  |  107  |  16  ----------------------------<  130<H>  3.3<L>   |  29  |  0.85    Ca    9.0      20 Aug 2023 11:55    TPro  7.4  /  Alb  3.4  /  TBili  1.7<H>  /  DBili  x   /  AST  23  /  ALT  25  /  AlkPhos  124<H>  0820        PT/INR - ( 20 Aug 2023 11:55 )   PT: 15.0 sec;   INR: 1.29 ratio         PTT - ( 20 Aug 2023 11:55 )  PTT:30.0 sec  Urinalysis Basic - ( 20 Aug 2023 11:55 )    Color: Yellow / Appearance: Turbid / S.012 / pH: x  Gluc: 130 mg/dL / Ketone: Negative mg/dL  / Bili: Negative / Urobili: 1.0 mg/dL   Blood: x / Protein: 100 mg/dL / Nitrite: Negative   Leuk Esterase: Large / RBC: 25 /HPF / WBC Too Numerous to count /HPF   Sq Epi: x / Non Sq Epi: x / Bacteria: Few /HPF      BNP      EKG:  ECHO:  IMAGING:    Assessment and Plan :     Will continue to follow during hospital course and give further recommendations as needed . Thanks for your referral . if any questions please contact me at office ([phone])cell [phone])  [facility]     Patient is a 89y old  Female who presents with a chief complaint of     HPI:      HPI:    89y Female for Cardiology Consult    PAST MEDICAL & SURGICAL HISTORY:  HTN (hypertension)      Afib      Spinal stenosis      PFO (patent foramen ovale)      Degeneration macular      Osteoporosis          FAMILY HISTORY:      SOCIAL HISTORY:   Alcohol:  Smoking:    Allergies    penicillin (Unknown)    Intolerances        MEDICATIONS  (STANDING):    MEDICATIONS  (PRN):      REVIEW OF SYSTEMS:  CONSTITUTIONAL: No fever, weight loss, chills, shakes, or fat  RESPIRATORY: No cough, wheezing, hemoptysis, or shortness of breath  CARDIOVASCULAR: No chest pain, dyspnea, palpitations, dizziness, syncope, paroxysmal nocturnal dyspnea, orthopnea, or arm or leg swelling  GASTROINTESTINAL: No abdominal  or epigastric pain, nausea, vomiting, hematemesis, diarrhea, constipation, melena or bright red bloo  NEUROLOGICAL: No headaches, memory loss, slurred speech, limb weakness, loss of strength, numbness, or tremors  SKIN: No itching, burning, rashes, or lesions  ENDOCRINE: No heat or cold intolerance, or hair loss  MUSCULOSKELETAL: No joint pain or swelling, muscle, back, or extremity pain  HEME/LYMPH: No easy bruising or bleeding gums  ALLERY AND IMMUNOLOGIC: No hives or rash.    Vital Signs Last 24 Hrs  T(C): 37.2 (20 Aug 2023 11:24), Max: 37.2 (20 Aug 2023 11:24)  T(F): 99 (20 Aug 2023 11:24), Max: 99 (20 Aug 2023 11:24)  HR: 71 (20 Aug 2023 11:24) (71 - 71)  BP: 177/86 (20 Aug 2023 11:24) (177/86 - 177/86)  BP(mean): --  RR: 18 (20 Aug 2023 11:24) (18 - 18)  SpO2: 93% (20 Aug 2023 11:24) (93% - 93%)    Parameters below as of 20 Aug 2023 11:24  Patient On (Oxygen Delivery Method): room air        PHYSICAL EXAM:  HEAD:  Atraumatic, Normocephalic  EYES: EOMI, PERRLA, conjunctiva and sclera clear  NECK: Supple and normal thyroid.  No JVD or carotid bruit.   HEART: S1, S2 regular , 1/6 soft ejection systolic murmur in mitral area , no thrill and no gallops .  PULMONARY: Bilateral vesicular breathing , few scattered ronchi and few scattered rales are present .  ABDOMEN: Soft nontender and positive bowl sounds   EXTREMITIES:  No clubbing, cyanosis, or pedal  edema  NEUROLOGICAL: AAOX3 , no focal deficit .    LABS:                        12.1   16.67 )-----------( 225      ( 20 Aug 2023 11:55 )             39.5     08    144  |  107  |  16  ----------------------------<  130<H>  3.3<L>   |  29  |  0.85    Ca    9.0      20 Aug 2023 11:55    TPro  7.4  /  Alb  3.4  /  TBili  1.7<H>  /  DBili  x   /  AST  23  /  ALT  25  /  AlkPhos  124<H>  0820        PT/INR - ( 20 Aug 2023 11:55 )   PT: 15.0 sec;   INR: 1.29 ratio         PTT - ( 20 Aug 2023 11:55 )  PTT:30.0 sec  Urinalysis Basic - ( 20 Aug 2023 11:55 )    Color: Yellow / Appearance: Turbid / S.012 / pH: x  Gluc: 130 mg/dL / Ketone: Negative mg/dL  / Bili: Negative / Urobili: 1.0 mg/dL   Blood: x / Protein: 100 mg/dL / Nitrite: Negative   Leuk Esterase: Large / RBC: 25 /HPF / WBC Too Numerous to count /HPF   Sq Epi: x / Non Sq Epi: x / Bacteria: Few /HPF      BNP      EKG:  ECHO:  IMAGING:    Assessment and Plan :     Will continue to follow during hospital course and give further recommendations as needed . Thanks for your referral . if any questions please contact me at office ([phone])cell [phone])  NINOSKA DAVENPORT MD [place] [address]  [address]  OFFICE : [phone]  CELL : [phone]  CARDIOLOGY CONSULT :     Patient is a 89y old  Female who presents with a chief complaint of SOB , cough and wheezing .    HPI:· Chief Complaint: The patient is a 89y Female complaining of cough.  · HPI Objective Statement: 89-year-old female with history of hypothyroidism, A-fib on Eliquis, recent pacemaker for complete heart block brought in by ambulance from [facility] living home for cough for the past 2 weeks.  Associated with generalized weakness and decreased p.o. intake.  No fall or trauma.  Patient states she has had cold symptoms for a while.  Denies fever, chills, chest pain, shortness of breath, abdominal pain, nausea, vomiting, upper or lower extremity weakness or paresthesias.     	pmd: Dr. Crawley, cards: [facility]        HPI:    89y Female for Cardiology Consult    PAST MEDICAL & SURGICAL HISTORY:  HTN (hypertension)      Afib      Spinal stenosis      PFO (patent foramen ovale)      Degeneration macular      Osteoporosis          FAMILY HISTORY:      SOCIAL HISTORY:   Alcohol:  Smoking:    Allergies    penicillin (Unknown)    Intolerances        MEDICATIONS  (STANDING):    MEDICATIONS  (PRN):      Vital Signs Last 24 Hrs  T(C): 37.2 (20 Aug 2023 11:24), Max: 37.2 (20 Aug 2023 11:24)  T(F): 99 (20 Aug 2023 11:24), Max: 99 (20 Aug 2023 11:24)  HR: 71 (20 Aug 2023 11:24) (71 - 71)  BP: 177/86 (20 Aug 2023 11:24) (177/86 - 177/86)  BP(mean): --  RR: 18 (20 Aug 2023 11:24) (18 - 18)  SpO2: 93% (20 Aug 2023 11:24) (93% - 93%)    Parameters below as of 20 Aug 2023 11:24  Patient On (Oxygen Delivery Method): room air        LABS:                        12.1   16.67 )-----------( 225      ( 20 Aug 2023 11:55 )             39.5     08-20    144  |  107  |  16  ----------------------------<  130<H>  3.3<L>   |  29  |  0.85    Ca    9.0      20 Aug 2023 11:55    TPro  7.4  /  Alb  3.4  /  TBili  1.7<H>  /  DBili  x   /  AST  23  /  ALT  25  /  AlkPhos  124<H>  08-20        PT/INR - ( 20 Aug 2023 11:55 )   PT: 15.0 sec;   INR: 1.29 ratio         PTT - ( 20 Aug 2023 11:55 )  PTT:30.0 sec  Urinalysis Basic - ( 20 Aug 2023 11:55 )    Color: Yellow / Appearance: Turbid / S.012 / pH: x  Gluc: 130 mg/dL / Ketone: Negative mg/dL  / Bili: Negative / Urobili: 1.0 mg/dL   Blood: x / Protein: 100 mg/dL / Nitrite: Negative   Leuk Esterase: Large / RBC: 25 /HPF / WBC Too Numerous to count /HPF   Sq Epi: x / Non Sq Epi: x / Bacteria: Few /HPF      Assessment and Plan :   FULL CONSULT DICTATED .  89 years old female with H/O paroxysmal atrial fibrillation , SSS and PPM , hypothyroidism , mild chronic diastolic heart failure has SOB , cough and wheezing and has pneumonia . R/O COVID and viral pneumonia . Patient mild CHF well compensated . Will also send Troponin I at frequent intervals . Monitor hemoglobin and electrolytes .  Will continue to follow during hospital course and give further recommendations as needed . Thanks for your referral . if any questions please contact me at office ([phone] cell [phone])  NINOSKA DAVENPORT MD [place] [address]  [address]  OFFICE : [phone]  CELL : [phone]  CARDIOLOGY CONSULT :     Patient is a 89y old  Female who presents with a chief complaint of SOB , cough and wheezing .    HPI:· Chief Complaint: The patient is a 89y Female complaining of cough.  · HPI Objective Statement: 89-year-old female with history of hypothyroidism, A-fib on Eliquis, recent pacemaker for complete heart block brought in by ambulance from [facility] living home for cough for the past 2 weeks.  Associated with generalized weakness and decreased p.o. intake.  No fall or trauma.  Patient states she has had cold symptoms for a while.  Denies fever, chills, chest pain, shortness of breath, abdominal pain, nausea, vomiting, upper or lower extremity weakness or paresthesias.     	pmd: Dr. Crawley, cards: [facility]        HPI:    89y Female for Cardiology Consult    PAST MEDICAL & SURGICAL HISTORY:  HTN (hypertension)      Afib      Spinal stenosis      PFO (patent foramen ovale)      Degeneration macular      Osteoporosis          FAMILY HISTORY:      SOCIAL HISTORY:   Alcohol:  Smoking:    Allergies    penicillin (Unknown)    Intolerances        MEDICATIONS  (STANDING):    MEDICATIONS  (PRN):      Vital Signs Last 24 Hrs  T(C): 37.2 (20 Aug 2023 11:24), Max: 37.2 (20 Aug 2023 11:24)  T(F): 99 (20 Aug 2023 11:24), Max: 99 (20 Aug 2023 11:24)  HR: 71 (20 Aug 2023 11:24) (71 - 71)  BP: 177/86 (20 Aug 2023 11:24) (177/86 - 177/86)  BP(mean): --  RR: 18 (20 Aug 2023 11:24) (18 - 18)  SpO2: 93% (20 Aug 2023 11:24) (93% - 93%)    Parameters below as of 20 Aug 2023 11:24  Patient On (Oxygen Delivery Method): room air        LABS:                        12.1   16.67 )-----------( 225      ( 20 Aug 2023 11:55 )             39.5     08-20    144  |  107  |  16  ----------------------------<  130<H>  3.3<L>   |  29  |  0.85    Ca    9.0      20 Aug 2023 11:55    TPro  7.4  /  Alb  3.4  /  TBili  1.7<H>  /  DBili  x   /  AST  23  /  ALT  25  /  AlkPhos  124<H>  08-20        PT/INR - ( 20 Aug 2023 11:55 )   PT: 15.0 sec;   INR: 1.29 ratio         PTT - ( 20 Aug 2023 11:55 )  PTT:30.0 sec  Urinalysis Basic - ( 20 Aug 2023 11:55 )    Color: Yellow / Appearance: Turbid / S.012 / pH: x  Gluc: 130 mg/dL / Ketone: Negative mg/dL  / Bili: Negative / Urobili: 1.0 mg/dL   Blood: x / Protein: 100 mg/dL / Nitrite: Negative   Leuk Esterase: Large / RBC: 25 /HPF / WBC Too Numerous to count /HPF   Sq Epi: x / Non Sq Epi: x / Bacteria: Few /HPF      Assessment and Plan :   FULL CONSULT DICTATED .  89 years old female with H/O paroxysmal atrial fibrillation , SSS and PPM , hypothyroidism , mild chronic diastolic heart failure has SOB , cough and wheezing and has pneumonia . R/O COVID and viral pneumonia . Patient mild CHF well compensated . Will also send Troponin I at frequent intervals . Monitor hemoglobin and electrolytes .  Will continue to follow during hospital course and give further recommendations as needed . Thanks for your referral . if any questions please contact me at office ([phone] cell [phone])  NINOSKA DAVENPORT MD [place] [address]  [address]  OFFICE : [phone]  CELL : [phone]  CARDIOLOGY CONSULT :     Patient is a 89y old  Female who presents with a chief complaint of SOB , cough and wheezing .    HPI:· Chief Complaint: The patient is a 89y Female complaining of cough.  · HPI Objective Statement: 89-year-old female with history of hypothyroidism, A-fib on Eliquis, recent pacemaker for complete heart block brought in by ambulance from [facility] living home for cough for the past 2 weeks.  Associated with generalized weakness and decreased p.o. intake.  No fall or trauma.  Patient states she has had cold symptoms for a while.  Denies fever, chills, chest pain, shortness of breath, abdominal pain, nausea, vomiting, upper or lower extremity weakness or paresthesias.     	pmd: Dr. Crawley, cards: [facility]        HPI:    89y Female for Cardiology Consult    PAST MEDICAL & SURGICAL HISTORY:  HTN (hypertension)      Afib      Spinal stenosis      PFO (patent foramen ovale)      Degeneration macular      Osteoporosis          FAMILY HISTORY:      SOCIAL HISTORY:   Alcohol:  Smoking:    Allergies    penicillin (Unknown)    Intolerances        MEDICATIONS  (STANDING):    MEDICATIONS  (PRN):      Vital Signs Last 24 Hrs  T(C): 37.2 (20 Aug 2023 11:24), Max: 37.2 (20 Aug 2023 11:24)  T(F): 99 (20 Aug 2023 11:24), Max: 99 (20 Aug 2023 11:24)  HR: 71 (20 Aug 2023 11:24) (71 - 71)  BP: 177/86 (20 Aug 2023 11:24) (177/86 - 177/86)  BP(mean): --  RR: 18 (20 Aug 2023 11:24) (18 - 18)  SpO2: 93% (20 Aug 2023 11:24) (93% - 93%)    Parameters below as of 20 Aug 2023 11:24  Patient On (Oxygen Delivery Method): room air        LABS:                        12.1   16.67 )-----------( 225      ( 20 Aug 2023 11:55 )             39.5     08-20    144  |  107  |  16  ----------------------------<  130<H>  3.3<L>   |  29  |  0.85    Ca    9.0      20 Aug 2023 11:55    TPro  7.4  /  Alb  3.4  /  TBili  1.7<H>  /  DBili  x   /  AST  23  /  ALT  25  /  AlkPhos  124<H>  08-20        PT/INR - ( 20 Aug 2023 11:55 )   PT: 15.0 sec;   INR: 1.29 ratio         PTT - ( 20 Aug 2023 11:55 )  PTT:30.0 sec  Urinalysis Basic - ( 20 Aug 2023 11:55 )    Color: Yellow / Appearance: Turbid / S.012 / pH: x  Gluc: 130 mg/dL / Ketone: Negative mg/dL  / Bili: Negative / Urobili: 1.0 mg/dL   Blood: x / Protein: 100 mg/dL / Nitrite: Negative   Leuk Esterase: Large / RBC: 25 /HPF / WBC Too Numerous to count /HPF   Sq Epi: x / Non Sq Epi: x / Bacteria: Few /HPF      Assessment and Plan :   FULL CONSULT DICTATED .  89 years old female with H/O paroxysmal atrial fibrillation , SSS and PPM , hypothyroidism , mild chronic diastolic heart failure has SOB , cough and wheezing and has pneumonia . R/O COVID and viral pneumonia . Patient mild CHF well compensated . Will also send Troponin I at frequent intervals . Monitor hemoglobin and electrolytes .  Will continue to follow during hospital course and give further recommendations as needed . Thanks for your referral . if any questions please contact me at office ([phone] cell [phone])

## 2023-08-20 NOTE — ED ADULT NURSE NOTE - OBJECTIVE STATEMENT
Pt. received alert and oriented x 2 with chief complaint of cough for 2 weeks. Pt. presents w/ 100.3 rectal temp. Pt. also presents w/ stage 1 pressure ulcer to sacral area. Pt. placed on continuous cardiac monitor with continuous pulse ox.

## 2023-08-20 NOTE — PHARMACOTHERAPY INTERVENTION NOTE - COMMENTS
Patient is a 89yr old female ordered for levofloxacin 750mg q48hrs for PNA. Discussed w/ Dr. Hester patient's QTC is 529ms and since levofloxacin can prolong QTC recommended switching to cefepime 2g q12h (renally adjusted). Pt has an unknown PCN allergy but tolerated X1 dose in ED and Ceftriaxone in July 2023. MD agreed and order entered per dissuasion.  Patient is a 89yr old female ordered for levofloxacin 750mg q48hrs for PNA. Pt does have an unknown PCN allergy but tolerated cefepime X 1 in ED and Ceftriaxone in July 2023. Discussed w/ Dr. Hester and recommended switching to cefepime 2g q12h (renally adjusted) due to prolonged QTC (529ms). MD agreed and order entered per dissuasion.

## 2023-08-20 NOTE — H&P ADULT - ASSESSMENT
89-year-old female with history of hypothyroidism, A-fib on Eliquis, s/p recent pacemaker for complete heart block brought in by ambulance from [facility] assisted living home for cough for the past 2 weeks.  Associated with generalized weakness and decreased p.o. intake.  No fall or trauma.  Patient states she has had cold symptoms for a while.  Denies fever, chills, chest pain, shortness of breath, abdominal pain, nausea, vomiting, upper or lower extremity weakness or paresthesias. pmd: Dr. Crawley, cards: [facility] Seen by card Dr Reich Admitted  to telemetry unit for monitoring , send 3 sets of cardiac enzymes to rule out acute coronary event, obtain ECHO to evaluate LVEF, cardiology consult  ,continue current management, O2 supply, anticoagulation plan as per cardiology consult Pulm cons requested , antitussives and nebulized bd ordered .Also UTI suspected and started on iv abx , id cons called Palliative care consult requested ,to discuss advance directives and complete MOLST  89-year-old female with history of hypothyroidism, A-fib on Eliquis, s/p recent pacemaker for complete heart block brought in by ambulance from [facility] assisted living home for cough for the past 2 weeks.  Associated with generalized weakness and decreased p.o. intake.  No fall or trauma.  Patient states she has had cold symptoms for a while.  Denies fever, chills, chest pain, shortness of breath, abdominal pain, nausea, vomiting, upper or lower extremity weakness or paresthesias. pmd: Dr. Crawley, cards: [facility] Seen by card Dr Reich Admitted  to telemetry unit for monitoring , send 3 sets of cardiac enzymes to rule out acute coronary event, obtain ECHO to evaluate LVEF, cardiology consult  ,continue current management, O2 supply, anticoagulation plan as per cardiology consult Pulm cons requested , antitussives and nebulized bd ordered .Also UTI suspected and started on iv abx , id cons called Palliative care consult requested ,to discuss advance directives and complete MOLST  89-year-old female with history of hypothyroidism, A-fib on Eliquis, s/p recent pacemaker for complete heart block brought in by ambulance from [facility] assisted living home for cough for the past 2 weeks.  Associated with generalized weakness and decreased p.o. intake.  No fall or trauma.  Patient states she has had cold symptoms for a while.  Denies fever, chills, chest pain, shortness of breath, abdominal pain, nausea, vomiting, upper or lower extremity weakness or paresthesias. pmd: Dr. Crawley, cards: [facility] Seen by card Dr Reich Admitted  to telemetry unit for monitoring , send 3 sets of cardiac enzymes to rule out acute coronary event, obtain ECHO to evaluate LVEF, cardiology consult  ,continue current management, O2 supply, anticoagulation plan as per cardiology consult Pulm cons requested , antitussives and nebulized bd ordered .Also UTI suspected and started on iv abx , id cons called Palliative care consult requested ,to discuss advance directives and complete MOLST

## 2023-08-20 NOTE — H&P ADULT - TIME BILLING
75minutes spent on this visit, 50% visit time spent in care co-ordination with other attendings and counselling patient ,writing admission orders ( see complete and current orders and order section) ,requesting necessary consults ,informing family about status & plan of care .I have discussed care plan with [facility] wellness/admitting /nursing   department ,outpatient PCP , hospital consultants , ER physician & med staff . 75minutes spent on this visit, 50% visit time spent in care co-ordination with other attendings and counselling patient ,writing admission orders ( see complete and current orders and order section) ,requesting necessary consults ,informing family about status & plan of care .I have discussed care plan with [facility] wellness/admitting /nursing   department ,outpatient PCP , hospital consultants , ER physician & med staff . 75minutes spent on this visit, 50% visit time spent in care co-ordination with other attendings and counselling patient ,writing admission orders ( see complete and current orders and order section) ,requesting necessary consults ,informing family about status & plan of care .I have discussed care plan with [facility] wellness/admitting /nursing   department ,outpatient PCP , hospital consultants , ER physician & med staff .

## 2023-08-20 NOTE — PATIENT PROFILE ADULT - NSPROGENOTHERPROVIDER_GEN_A_NUR
[facility] agency Pending sale to [facility] agency [facility] agency

## 2023-08-20 NOTE — CONSULT NOTE ADULT - ASSESSMENT
Initial evaluation/Pulmonary Critical Care consultation requested on 8/20/2023  by Dr HERRERA    from Dr Grant   Patient examined chart reviewed    HOSPITAL ADMISSION   PATIENT CAME  FROM (if information available)      REASON FOR VISIT  .. Management of problems listed below      PHYSICAL EXAM    HEENT Unremarkable  atraumatic   RESP Fair air entry  Harsh breath sound   CARDIAC S1 S2 No S3     NO JVD    ABDOMEN No hepatosplenomegaly   PEDAL EDEMA present No calf tenderness  NO rash       GENERAL DATA .     GOC.    ..   ICU STAY.   .. none  COVID.   ..    BEST PRACTICE ISSUES.    HOB ELEVATN.   .. Yes  DVT PPLX.   ..  8/20/2023 apixaba 2.5 x 2 (af)     SPEECH SWALLOW RECOMMENDATIONS.    ..        DIET.    ..  8/20/2023 soft bite size   IV fl.  .. D5 1/2 ns 20 kcl 50   STRESS ULCER PPLX.   ..    8/20/2023 protonix 40   INFECTION PPLX.   ..     ALLGY.  ..    pncl                     WT.  ..  8/20/2023 56  BMI.  ..   8/20/2023 21    PROCEDURES.  ..     ABGS.   .     VS/ PO/IO/ VENT/ DRIPS.   8/20/2023 99f 71 170/80   8/20/2023 ra 93%         DOA C/C.     . 8/20/2023 · Chief Complaint Quote sent by [facility] for cough x 2 weeks. negative cxr and covid swab. poor PO intake today.           INITIAL PRESENTATION.   . 8/20/2023 89-year-old female with history of hypothyroidism, A-fib on Eliquis, recent pacemaker for complete heart block brought in by ambulance from [facility] assisted living home for cough for the past 2 weeks.  Associated with generalized weakness and decreased p.o. intake.  No fall or trauma.  Patient states she has had cold symptoms for a while.  Denies fever, chills, chest pain, shortness of breath, abdominal pain, nausea, vomiting, upper or lower extremity weakness or paresthesias.   pmd: Dr. Crawley, cards: [facility].   . hypothyroidism   . A-fib on Eliquis (hx AFL ablation),  .  PFO  HOME MEDS.   . levoxyl eliquis 2.5 x 2   COURSE.  . 8/20/2023 pulm consultd    . 8/20/2023 5:39 PM ER meds given already include cefepime 2g     PROBLEMS/ASSESSMENT/RECOMMENDATIONS (A/R).  PULMONARY.  . GAS EXCHANGE.  .. A/R.   .. Monitor pulse oximetry and target po 90-95%    . COPD.  .. 8/20/2023 duoneb.4p   .. 8/20/2023 benzonatate 100.3 x3d   .. 8/20/2023 gauiafenesin     INFECTION.  .. w 8/20/2023 w 16   .. ua 8/20/2023 ua 1012 w tntc l estrase large r 25   .. ct chest 8/20/2023   .... l upper ant cardiac device leads terminating in r atrium and rv   .... no enlarged hilar or mediastinal ln   .... mild doe mod diffuse distention of mid to upper esophagus   .... cm   .... increased density of liver ? amiodarone effect   .... bl lower lobe partial areas of compressive atelectasis sl progressd since ctap 3/4/2023   .... mild bl lower lung areas of linear and compressive atelectasis lower lobes and lingula   .... mild bl upper lobe subsegmental linear and dependent atelectasis   .... nonsp mild interlobular septal thickening   .. 8/20/2023 will start empiric levaquin     . Hemodynamics.  .. la 8/20/2023 la 1.4  .. BP high     . A fib   .. 8/20/2023 eliquis     . CHF   .. bnp 8/20/2023 bnp 4545       HEMAT   .. Hb 8/20/2023 Hb 12   .. plt 8/20/2023 plt 225   .. inr 8/20/2023 inr 129     RENAL   .. Na 8/20/2023 Na 144   .. K 8/20/2023 K 3.3   .. co2 8/20/2023 co2 29   .. Cr 8/20/2023 Cr .8     LFTS   .. AP 8/20/2023 124  .. ast 23  .. alt 25     MAIN ISSUES.  . Cough x 2 wk poa 8/20/2023  . Pneumonia   .. 8/20/2023 levaquin   . COPD   . A fib   .. 8/20/2023 apixaba   . CHF     TIME SPENT.   . Over 55 minutes aggregate care time spent on encounter; activities included   direct patient care, counseling and/or coordinating care reviewing notes, lab data/ imaging , discussion with multidisciplinary team/ patient  /family and explaining in detail risks, benefits, alternatives  of the recommendations     JAKOB WORHTY 89 f 8/20/2023 9/22/1929 DR BAM HERRERA      Initial evaluation/Pulmonary Critical Care consultation requested on 8/20/2023  by Dr HERRERA    from Dr Grant   Patient examined chart reviewed    HOSPITAL ADMISSION   PATIENT CAME  FROM (if information available)      REASON FOR VISIT  .. Management of problems listed below      PHYSICAL EXAM    HEENT Unremarkable  atraumatic   RESP Fair air entry  Harsh breath sound   CARDIAC S1 S2 No S3     NO JVD    ABDOMEN No hepatosplenomegaly   PEDAL EDEMA present No calf tenderness  NO rash       GENERAL DATA .     GOC.    ..   ICU STAY.   .. none  COVID.   ..    BEST PRACTICE ISSUES.    HOB ELEVATN.   .. Yes  DVT PPLX.   ..  8/20/2023 apixaba 2.5 x 2 (af)     SPEECH SWALLOW RECOMMENDATIONS.    ..        DIET.    ..  8/20/2023 soft bite size   IV fl.  .. D5 1/2 ns 20 kcl 50   STRESS ULCER PPLX.   ..    8/20/2023 protonix 40   INFECTION PPLX.   ..     ALLGY.  ..    pncl                     WT.  ..  8/20/2023 56  BMI.  ..   8/20/2023 21    PROCEDURES.  ..     ABGS.   .     VS/ PO/IO/ VENT/ DRIPS.   8/20/2023 99f 71 170/80   8/20/2023 ra 93%         DOA C/C.     . 8/20/2023 · Chief Complaint Quote sent by [employer] [facility] for cough x 2 weeks. negative cxr and covid swab. poor PO intake today.           INITIAL PRESENTATION.   . 8/20/2023 89-year-old female with history of hypothyroidism, A-fib on Eliquis, recent pacemaker for complete heart block brought in by ambulance from [facility] assisted living home for cough for the past 2 weeks.  Associated with generalized weakness and decreased p.o. intake.  No fall or trauma.  Patient states she has had cold symptoms for a while.  Denies fever, chills, chest pain, shortness of breath, abdominal pain, nausea, vomiting, upper or lower extremity weakness or paresthesias.   pmd: Dr. Crawley, cards: [facility].   . hypothyroidism   . A-fib on Eliquis (hx AFL ablation),  .  PFO  HOME MEDS.   . levoxyl eliquis 2.5 x 2   COURSE.  . 8/20/2023 pulm consultd    . 8/20/2023 5:39 PM ER meds given already include cefepime 2g     PROBLEMS/ASSESSMENT/RECOMMENDATIONS (A/R).  PULMONARY.  . GAS EXCHANGE.  .. A/R.   .. Monitor pulse oximetry and target po 90-95%    . COPD.  .. 8/20/2023 duoneb.4p   .. 8/20/2023 benzonatate 100.3 x3d   .. 8/20/2023 gauiafenesin     INFECTION.  .. w 8/20/2023 w 16   .. ua 8/20/2023 ua 1012 w tntc l estrase large r 25   .. ct chest 8/20/2023   .... l upper ant cardiac device leads terminating in r atrium and rv   .... no enlarged hilar or mediastinal ln   .... mild doe mod diffuse distention of mid to upper esophagus   .... cm   .... increased density of liver ? amiodarone effect   .... bl lower lobe partial areas of compressive atelectasis sl progressd since ctap 3/4/2023   .... mild bl lower lung areas of linear and compressive atelectasis lower lobes and lingula   .... mild bl upper lobe subsegmental linear and dependent atelectasis   .... nonsp mild interlobular septal thickening   .. 8/20/2023 will start empiric levaquin     . Hemodynamics.  .. la 8/20/2023 la 1.4  .. BP high     . A fib   .. 8/20/2023 eliquis     . CHF   .. bnp 8/20/2023 bnp 4545       HEMAT   .. Hb 8/20/2023 Hb 12   .. plt 8/20/2023 plt 225   .. inr 8/20/2023 inr 129     RENAL   .. Na 8/20/2023 Na 144   .. K 8/20/2023 K 3.3   .. co2 8/20/2023 co2 29   .. Cr 8/20/2023 Cr .8     LFTS   .. AP 8/20/2023 124  .. ast 23  .. alt 25     MAIN ISSUES.  . Cough x 2 wk poa 8/20/2023  . Pneumonia   .. 8/20/2023 levaquin   . COPD   . A fib   .. 8/20/2023 apixaba   . CHF     TIME SPENT.   . Over 55 minutes aggregate care time spent on encounter; activities included   direct patient care, counseling and/or coordinating care reviewing notes, lab data/ imaging , discussion with multidisciplinary team/ patient  /family and explaining in detail risks, benefits, alternatives  of the recommendations     JAKOB WORTHY 89 f 8/20/2023 9/22/1929 DR BAM HERRERA      Initial evaluation/Pulmonary Critical Care consultation requested on 8/20/2023  by Dr HERRERA    from Dr Grant   Patient examined chart reviewed    HOSPITAL ADMISSION   PATIENT CAME  FROM (if information available)      REASON FOR VISIT  .. Management of problems listed below      PHYSICAL EXAM    HEENT Unremarkable  atraumatic   RESP Fair air entry  Harsh breath sound   CARDIAC S1 S2 No S3     NO JVD    ABDOMEN No hepatosplenomegaly   PEDAL EDEMA present No calf tenderness  NO rash       GENERAL DATA .     GOC.    ..   ICU STAY.   .. none  COVID.   ..    BEST PRACTICE ISSUES.    HOB ELEVATN.   .. Yes  DVT PPLX.   ..  8/20/2023 apixaba 2.5 x 2 (af)     SPEECH SWALLOW RECOMMENDATIONS.    ..        DIET.    ..  8/20/2023 soft bite size   IV fl.  .. D5 1/2 ns 20 kcl 50   STRESS ULCER PPLX.   ..    8/20/2023 protonix 40   INFECTION PPLX.   ..     ALLGY.  ..    pncl                     WT.  ..  8/20/2023 56  BMI.  ..   8/20/2023 21    PROCEDURES.  ..     ABGS.   .     VS/ PO/IO/ VENT/ DRIPS.   8/20/2023 99f 71 170/80   8/20/2023 ra 93%         DOA C/C.     . 8/20/2023 · Chief Complaint Quote sent by [employer] [facility] for cough x 2 weeks. negative cxr and covid swab. poor PO intake today.           INITIAL PRESENTATION.   . 8/20/2023 89-year-old female with history of hypothyroidism, A-fib on Eliquis, recent pacemaker for complete heart block brought in by ambulance from [facility] assisted living home for cough for the past 2 weeks.  Associated with generalized weakness and decreased p.o. intake.  No fall or trauma.  Patient states she has had cold symptoms for a while.  Denies fever, chills, chest pain, shortness of breath, abdominal pain, nausea, vomiting, upper or lower extremity weakness or paresthesias.   pmd: Dr. Crawley, cards: [facility].   . hypothyroidism   . A-fib on Eliquis (hx AFL ablation),  .  PFO  HOME MEDS.   . levoxyl eliquis 2.5 x 2   COURSE.  . 8/20/2023 pulm consultd    . 8/20/2023 5:39 PM ER meds given already include cefepime 2g     PROBLEMS/ASSESSMENT/RECOMMENDATIONS (A/R).  PULMONARY.  . GAS EXCHANGE.  .. A/R.   .. Monitor pulse oximetry and target po 90-95%    . COPD.  .. 8/20/2023 duoneb.4p   .. 8/20/2023 benzonatate 100.3 x3d   .. 8/20/2023 gauiafenesin     INFECTION.  .. w 8/20/2023 w 16   .. ua 8/20/2023 ua 1012 w tntc l estrase large r 25   .. ct chest 8/20/2023   .... l upper ant cardiac device leads terminating in r atrium and rv   .... no enlarged hilar or mediastinal ln   .... mild doe mod diffuse distention of mid to upper esophagus   .... cm   .... increased density of liver ? amiodarone effect   .... bl lower lobe partial areas of compressive atelectasis sl progressd since ctap 3/4/2023   .... mild bl lower lung areas of linear and compressive atelectasis lower lobes and lingula   .... mild bl upper lobe subsegmental linear and dependent atelectasis   .... nonsp mild interlobular septal thickening   .. 8/20/2023 will start empiric levaquin     . Hemodynamics.  .. la 8/20/2023 la 1.4  .. BP high     . A fib   .. 8/20/2023 eliquis     . CHF   .. bnp 8/20/2023 bnp 4545       HEMAT   .. Hb 8/20/2023 Hb 12   .. plt 8/20/2023 plt 225   .. inr 8/20/2023 inr 129     RENAL   .. Na 8/20/2023 Na 144   .. K 8/20/2023 K 3.3   .. co2 8/20/2023 co2 29   .. Cr 8/20/2023 Cr .8     LFTS   .. AP 8/20/2023 124  .. ast 23  .. alt 25     MAIN ISSUES.  . Cough x 2 wk poa 8/20/2023  . Pneumonia   .. 8/20/2023 levaquin   . COPD   . A fib   .. 8/20/2023 apixaba   . CHF     TIME SPENT.   . Over 55 minutes aggregate care time spent on encounter; activities included   direct patient care, counseling and/or coordinating care reviewing notes, lab data/ imaging , discussion with multidisciplinary team/ patient  /family and explaining in detail risks, benefits, alternatives  of the recommendations     JAKOB WORTHY 89 f 8/20/2023 9/22/1929 DR BAM HERRERA

## 2023-08-20 NOTE — ED ADULT NURSE NOTE - NSFALLHARMRISKINTERV_ED_ALL_ED
Assistance OOB with selected safe patient handling equipment if applicable/Assistance with ambulation/Communicate risk of Fall with Harm to all staff, patient, and family/Monitor gait and stability/Provide patient with walking aids/Provide visual cue: red socks, yellow wristband, yellow gown, etc/Reinforce activity limits and safety measures with patient and family/Bed in lowest position, wheels locked, appropriate side rails in place/Call bell, personal items and telephone in reach/Instruct patient to call for assistance before getting out of bed/chair/stretcher/Non-slip footwear applied when patient is off stretcher/[place] to call system/Physically safe environment - no spills, clutter or unnecessary equipment/Purposeful Proactive Rounding/Room/bathroom lighting operational, light cord in reach Assistance OOB with selected safe patient handling equipment if applicable/Assistance with ambulation/Communicate risk of Fall with Harm to all staff, patient, and family/Monitor gait and stability/Provide patient with walking aids/Provide visual cue: red socks, yellow wristband, yellow gown, etc/Reinforce activity limits and safety measures with patient and family/Bed in lowest position, wheels locked, appropriate side rails in place/Call bell, personal items and telephone in reach/Instruct patient to call for assistance before getting out of bed/chair/stretcher/Non-slip footwear applied when patient is off stretcher/[place] to call system/Physically safe environment - no spills, clutter or unnecessary equipment/Purposeful Proactive Rounding/Room/bathroom lighting operational, light cord in reach Assistance OOB with selected safe patient handling equipment if applicable/Assistance with ambulation/Communicate risk of Fall with Harm to all staff, patient, and family/Monitor gait and stability/Provide patient with walking aids/Provide visual cue: red socks, yellow wristband, yellow gown, etc/Reinforce activity limits and safety measures with patient and family/Bed in lowest position, wheels locked, appropriate side rails in place/Call bell, personal items and telephone in reach/Instruct patient to call for assistance before getting out of bed/chair/stretcher/Non-slip footwear applied when patient is off stretcher/[place] to call system/Physically safe environment - no spills, clutter or unnecessary equipment/Purposeful Proactive Rounding/Room/bathroom lighting operational, light cord in reach

## 2023-08-20 NOTE — ED ADULT TRIAGE NOTE - NS ED NURSE AMBULANCES
[facility]

## 2023-08-20 NOTE — H&P ADULT - NSHPLABSRESULTS_GEN_ALL_CORE
< from: CT Chest No Cont (08.20.23 @ 14:06) >    Left upper anterior chest wall cardiac device with the leads terminating   within the right atrium and right ventricle.    Evaluation of the left axilla is limited due to superimposed streak   artifact from the left shoulder replacement. No enlarged mediastinal or   hilar lymph nodes.    Mild to moderate diffuse distention of the mid to upper esophagus.    Heart size is enlarged. Vascular calcifications with involvement of the   aorta and the coronary arteries. Mitral annular calcifications. Trace   bilateral pleural effusions.    Evaluation of the upper abdomen demonstrate increased density of the   partially imaged liver which can be seen in the setting of amiodarone   administration.    Evaluation of the lungs demonstrate bilateral lower lobe partial areas of   compressive atelectasis towards the lung bases slightly progressed since   the abdominal CT of March 4, 2023. Mild bilateral lower lung superimposed   areas of linear and compressive atelectasis within the lower lobes and   the lingula. Mild bilateral upper lobe subsegmental linear and dependent   areas of atelectasis. Nonspecific mild interlobular septal thickening. No   central endobronchial lesions.    Degenerative changes of the spine. Status post left shoulder replacement.   Old healed bilateral rib fractures. Minimal superior endplate loss of   height of the T9 vertebral body is of indeterminate age. Mild superior   endplate loss of height of a few other lower spinal vertebral bodies   appear unchanged since the abdominal CT of March 4, 2023.    IMPRESSION: Trace bilateral pleural effusions with bilateral lower lung   areas of atelectasis.    < end of copied text >    < from: CT Head No Cont (07.11.23 @ 09:46) >    HEMORRHAGE:  No evidence of intracranial hemorrhage.  BRAIN PARENCHYMA:  Moderate atrophy. Moderate confluent periventricular   and subcortical white matter ischemic changes  No mass or mass effect.  VENTRICLES / SHIFT:  No hydrocephalus. No midline shift.  EXTRA-AXIAL / BASAL CISTERNS:  No extra-axial mass. Basal cisterns   preserved.  CALVARIUM AND EXTRACRANIAL SOFT TISSUES:  No depressed calvarial fracture.  SINUSES, ORBITS, MASTOIDS:  The visualized paranasal sinuses and mastoid   air cells are well aerated.    IMPRESSION:  No evidence of acute intracranial hemorrhage or hydrocephalus. Atrophy   with white matter ischemic changes.    < end of copied text >

## 2023-08-21 DIAGNOSIS — R78.81 BACTEREMIA: ICD-10-CM

## 2023-08-21 LAB
-  CTX-M RESISTANCE MARKER: SIGNIFICANT CHANGE UP
-  ESBL: SIGNIFICANT CHANGE UP
ALBUMIN SERPL ELPH-MCNC: 2.6 G/DL — LOW (ref 3.3–5)
ALP SERPL-CCNC: 95 U/L — SIGNIFICANT CHANGE UP (ref 40–120)
ALT FLD-CCNC: 37 U/L — SIGNIFICANT CHANGE UP (ref 12–78)
ANION GAP SERPL CALC-SCNC: 3 MMOL/L — LOW (ref 5–17)
AST SERPL-CCNC: 40 U/L — HIGH (ref 15–37)
BASOPHILS # BLD AUTO: 0.08 K/UL — SIGNIFICANT CHANGE UP (ref 0–0.2)
BASOPHILS NFR BLD AUTO: 0.4 % — SIGNIFICANT CHANGE UP (ref 0–2)
BILIRUB SERPL-MCNC: 1.1 MG/DL — SIGNIFICANT CHANGE UP (ref 0.2–1.2)
BUN SERPL-MCNC: 20 MG/DL — SIGNIFICANT CHANGE UP (ref 7–23)
CALCIUM SERPL-MCNC: 8.3 MG/DL — LOW (ref 8.5–10.1)
CHLORIDE SERPL-SCNC: 109 MMOL/L — HIGH (ref 96–108)
CHOLEST SERPL-MCNC: 98 MG/DL — SIGNIFICANT CHANGE UP
CO2 SERPL-SCNC: 29 MMOL/L — SIGNIFICANT CHANGE UP (ref 22–31)
CREAT SERPL-MCNC: 0.86 MG/DL — SIGNIFICANT CHANGE UP (ref 0.5–1.3)
E COLI DNA BLD POS QL NAA+NON-PROBE: SIGNIFICANT CHANGE UP
EGFR: 63 ML/MIN/1.73M2 — SIGNIFICANT CHANGE UP
EOSINOPHIL # BLD AUTO: 0.02 K/UL — SIGNIFICANT CHANGE UP (ref 0–0.5)
EOSINOPHIL NFR BLD AUTO: 0.1 % — SIGNIFICANT CHANGE UP (ref 0–6)
GLUCOSE SERPL-MCNC: 114 MG/DL — HIGH (ref 70–99)
GRAM STN FLD: SIGNIFICANT CHANGE UP
HCT VFR BLD CALC: 34.7 % — SIGNIFICANT CHANGE UP (ref 34.5–45)
HDLC SERPL-MCNC: 49 MG/DL — LOW
HGB BLD-MCNC: 10.5 G/DL — LOW (ref 11.5–15.5)
IMM GRANULOCYTES NFR BLD AUTO: 1.2 % — HIGH (ref 0–0.9)
INR BLD: 1.53 RATIO — HIGH (ref 0.85–1.18)
LACTATE SERPL-SCNC: 1.7 MMOL/L — SIGNIFICANT CHANGE UP (ref 0.7–2)
LEGIONELLA AG UR QL: NEGATIVE — SIGNIFICANT CHANGE UP
LIPID PNL WITH DIRECT LDL SERPL: 36 MG/DL — SIGNIFICANT CHANGE UP
LYMPHOCYTES # BLD AUTO: 1.33 K/UL — SIGNIFICANT CHANGE UP (ref 1–3.3)
LYMPHOCYTES # BLD AUTO: 6.2 % — LOW (ref 13–44)
MAGNESIUM SERPL-MCNC: 1.9 MG/DL — SIGNIFICANT CHANGE UP (ref 1.6–2.6)
MCHC RBC-ENTMCNC: 29.7 PG — SIGNIFICANT CHANGE UP (ref 27–34)
MCHC RBC-ENTMCNC: 30.3 GM/DL — LOW (ref 32–36)
MCV RBC AUTO: 98 FL — SIGNIFICANT CHANGE UP (ref 80–100)
METHOD TYPE: SIGNIFICANT CHANGE UP
MONOCYTES # BLD AUTO: 1.6 K/UL — HIGH (ref 0–0.9)
MONOCYTES NFR BLD AUTO: 7.5 % — SIGNIFICANT CHANGE UP (ref 2–14)
NEUTROPHILS # BLD AUTO: 18.11 K/UL — HIGH (ref 1.8–7.4)
NEUTROPHILS NFR BLD AUTO: 84.6 % — HIGH (ref 43–77)
NON HDL CHOLESTEROL: 49 MG/DL — SIGNIFICANT CHANGE UP
NRBC # BLD: 0 /100 WBCS — SIGNIFICANT CHANGE UP (ref 0–0)
PHOSPHATE SERPL-MCNC: 3.7 MG/DL — SIGNIFICANT CHANGE UP (ref 2.5–4.5)
PLATELET # BLD AUTO: 175 K/UL — SIGNIFICANT CHANGE UP (ref 150–400)
POTASSIUM SERPL-MCNC: 3.8 MMOL/L — SIGNIFICANT CHANGE UP (ref 3.5–5.3)
POTASSIUM SERPL-SCNC: 3.8 MMOL/L — SIGNIFICANT CHANGE UP (ref 3.5–5.3)
PROCALCITONIN SERPL-MCNC: 7.59 NG/ML — HIGH
PROT SERPL-MCNC: 6.1 G/DL — SIGNIFICANT CHANGE UP (ref 6–8.3)
PROTHROM AB SERPL-ACNC: 17.7 SEC — HIGH (ref 9.5–13)
RBC # BLD: 3.54 M/UL — LOW (ref 3.8–5.2)
RBC # FLD: 17.7 % — HIGH (ref 10.3–14.5)
SODIUM SERPL-SCNC: 141 MMOL/L — SIGNIFICANT CHANGE UP (ref 135–145)
SPECIMEN SOURCE: SIGNIFICANT CHANGE UP
TRIGL SERPL-MCNC: 53 MG/DL — SIGNIFICANT CHANGE UP
TROPONIN I, HIGH SENSITIVITY RESULT: 31 NG/L — SIGNIFICANT CHANGE UP
TSH SERPL-MCNC: 0.84 UIU/ML — SIGNIFICANT CHANGE UP (ref 0.36–3.74)
WBC # BLD: 21.39 K/UL — HIGH (ref 3.8–10.5)
WBC # FLD AUTO: 21.39 K/UL — HIGH (ref 3.8–10.5)

## 2023-08-21 RX ORDER — ERTAPENEM SODIUM 1 G/1
1000 INJECTION, POWDER, LYOPHILIZED, FOR SOLUTION INTRAMUSCULAR; INTRAVENOUS EVERY 24 HOURS
Refills: 0 | Status: COMPLETED | OUTPATIENT
Start: 2023-08-21 | End: 2023-08-28

## 2023-08-21 RX ADMIN — APIXABAN 2.5 MILLIGRAM(S): 2.5 TABLET, FILM COATED ORAL at 18:28

## 2023-08-21 RX ADMIN — ONDANSETRON 4 MILLIGRAM(S): 8 TABLET, FILM COATED ORAL at 20:23

## 2023-08-21 RX ADMIN — DEXTROSE MONOHYDRATE, SODIUM CHLORIDE, AND POTASSIUM CHLORIDE 50 MILLILITER(S): 50; .745; 4.5 INJECTION, SOLUTION INTRAVENOUS at 13:22

## 2023-08-21 RX ADMIN — Medication 100 MILLIGRAM(S): at 05:07

## 2023-08-21 RX ADMIN — CEFEPIME 100 MILLIGRAM(S): 1 INJECTION, POWDER, FOR SOLUTION INTRAMUSCULAR; INTRAVENOUS at 04:10

## 2023-08-21 RX ADMIN — Medication 88 MICROGRAM(S): at 05:08

## 2023-08-21 RX ADMIN — PANTOPRAZOLE SODIUM 40 MILLIGRAM(S): 20 TABLET, DELAYED RELEASE ORAL at 13:22

## 2023-08-21 RX ADMIN — Medication 100 MILLIGRAM(S): at 21:46

## 2023-08-21 RX ADMIN — ERTAPENEM SODIUM 120 MILLIGRAM(S): 1 INJECTION, POWDER, LYOPHILIZED, FOR SOLUTION INTRAMUSCULAR; INTRAVENOUS at 18:28

## 2023-08-21 RX ADMIN — Medication 1 TABLET(S): at 05:07

## 2023-08-21 RX ADMIN — Medication 100 MILLIGRAM(S): at 13:22

## 2023-08-21 RX ADMIN — CEFEPIME 100 MILLIGRAM(S): 1 INJECTION, POWDER, FOR SOLUTION INTRAMUSCULAR; INTRAVENOUS at 13:22

## 2023-08-21 RX ADMIN — Medication 1 TABLET(S): at 18:28

## 2023-08-21 RX ADMIN — APIXABAN 2.5 MILLIGRAM(S): 2.5 TABLET, FILM COATED ORAL at 05:08

## 2023-08-21 NOTE — DIETITIAN INITIAL EVALUATION ADULT - PERTINENT LABORATORY DATA
08-21    141  |  109<H>  |  20  ----------------------------<  114<H>  3.8   |  29  |  0.86    Ca    8.3<L>      21 Aug 2023 06:24  Phos  3.7     08-21  Mg     1.9     08-21    TPro  6.1  /  Alb  2.6<L>  /  TBili  1.1  /  DBili  x   /  AST  40<H>  /  ALT  37  /  AlkPhos  95  08-21

## 2023-08-21 NOTE — CARE COORDINATION ASSESSMENT. - NSDCPLANSERVICES_GEN_ALL_CORE
Met w/ pt and her daughter at bedside to discuss role of case management, medical plan of care, and discharge plan.  Pt verbalized understanding.  name and contact # provided. Patient is from the [facility] and the daughter's wishes is to return there upon discharge.  Per the daughter, the patient is wheelchair bound and does not walk.  She has a 6hr private aide 7 days a week and receive help from the facility staff.  Pt was receiving home care services through [facility] prior to the hospitalization.    CM spoke with nurse Craft at the facility, they will nee a covid result within 72hours and a 3122 completed with all new meds to be sent to [facility] -[phone]. Patient will need ambulance transportation upon discharge.     CM will continue to follow pt along with the treatment team to assist in safe discharge plans./Home Care/Transportation Met w/ pt and her daughter at bedside to discuss role of case management, medical plan of care, and discharge plan.  Pt verbalized understanding.  name and contact # provided. Patient is from the [facility] and the daughter's wishes is to return there upon discharge.  Per the daughter, the patient is wheelchair bound and does not walk.  She has a 6hr private aide 7 days a week and receive help from the facility staff.  Pt was receiving home care services through [facility] prior to the hospitalization.    CM spoke with nurse Craft at the facility, they will nee a covid result within 72hours and a 3122 completed with all new meds to be sent to [facility] [phone]. Patient will need ambulance transportation upon discharge.     CM will continue to follow pt along with the treatment team to assist in safe discharge plans./Home Care/Transportation Met w/ pt and her daughter at bedside to discuss role of case management, medical plan of care, and discharge plan.  Pt verbalized understanding.  name and contact # provided. Patient is from the [facility] and the daughter's wishes is to return there upon discharge.  Per the daughter, the patient is wheelchair bound and does not walk.  She has a 6hr private aide 7 days a week and receive help from the facility staff.  Pt was receiving home care services through [facility] prior to the hospitalization.    CM spoke with nurse Craft at the facility, they will nee a covid result within 72hours and a 3122 completed with all new meds to be sent to [facility] -[phone]. Patient will need ambulance transportation upon discharge.     CM will continue to follow pt along with the treatment team to assist in safe discharge plans./Home Care/Transportation

## 2023-08-21 NOTE — PROGRESS NOTE ADULT - SUBJECTIVE AND OBJECTIVE BOX
[facility] [place]       HPI:  89-year-old female with history of hypothyroidism, A-fib on Eliquis, s/p recent pacemaker for complete heart block brought in by ambulance from [facility] assisted living home for cough for the past 2 weeks.  Associated with generalized weakness and decreased p.o. intake.  No fall or trauma.  Patient states she has had cold symptoms for a while.  Denies fever, chills, chest pain, shortness of breath, abdominal pain, nausea, vomiting, upper or lower extremity weakness or paresthesias. pmd: Dr. Crawley, cards: [facility] Seen by card Dr Reich Admitted  to telemetry unit for monitoring , send 3 sets of cardiac enzymes to rule out acute coronary event, obtain ECHO to evaluate LVEF, cardiology consult  ,continue current management, O2 supply, anticoagulation plan as per cardiology consult Pulm cons requested , antitussives and nebulized bd ordered .Also UTI suspected and started on iv abx , id cons called Palliative care consult requested ,to discuss advance directives and complete MOLST  (20 Aug 2023 15:35)        SUBJECTIVE:      ALLERGIES:  Allergies    penicillin (Unknown)    Intolerances          MEDICATIONS  (STANDING):  apixaban 2.5 milliGRAM(s) Oral two times a day  benzonatate 100 milliGRAM(s) Oral every 8 hours  dextrose 5% + sodium chloride 0.45% with potassium chloride 20 mEq/L 1000 milliLiter(s) (50 mL/Hr) IV Continuous <Continuous>  ertapenem  IVPB 1000 milliGRAM(s) IV Intermittent every 24 hours  lactobacillus acidophilus 1 Tablet(s) Oral every 12 hours  levothyroxine 88 MICROGram(s) Oral daily  pantoprazole    Tablet 40 milliGRAM(s) Oral daily    MEDICATIONS  (PRN):  acetaminophen     Tablet .. 650 milliGRAM(s) Oral every 6 hours PRN Temp greater or equal to 38C (100.4F), Mild Pain (1 - 3)  albuterol/ipratropium for Nebulization 3 milliLiter(s) Nebulizer every 6 hours PRN Shortness of Breath and/or Wheezing  aluminum hydroxide/magnesium hydroxide/simethicone Suspension 30 milliLiter(s) Oral every 4 hours PRN Dyspepsia  guaiFENesin Oral Liquid (Sugar-Free) 200 milliGRAM(s) Oral every 6 hours PRN Cough  melatonin 3 milliGRAM(s) Oral at bedtime PRN Insomnia  ondansetron Injectable 4 milliGRAM(s) IV Push every 8 hours PRN Nausea and/or Vomiting      REVIEW OF SYSTEMS:  CONSTITUTIONAL: No fever,  RESPIRATORY: No cough, wheezing, shortness of breath  CARDIOVASCULAR: No chest pain, dyspnea, palpitations, dizziness, syncope, paroxysmal nocturnal dyspnea, orthopnea, or arm or leg swelling  GASTROINTESTINAL: No abdominal  or epigastric pain, nausea, vomiting,  diarrhea  NEUROLOGICAL: No headaches,  loss of strength, numbness, or tremors    Vital Signs Last 24 Hrs  T(C): 36.9 (21 Aug 2023 20:50), Max: 36.9 (21 Aug 2023 20:50)  T(F): 98.4 (21 Aug 2023 20:50), Max: 98.4 (21 Aug 2023 20:50)  HR: 81 (21 Aug 2023 20:50) (60 - 81)  BP: 131/79 (21 Aug 2023 20:50) (129/73 - 148/76)  BP(mean): --  RR: 18 (21 Aug 2023 20:50) (18 - 18)  SpO2: 97% (21 Aug 2023 20:50) (97% - 100%)    Parameters below as of 21 Aug 2023 20:50  Patient On (Oxygen Delivery Method): nasal cannula        PHYSICAL EXAM:  HEAD:  Atraumatic, Normocephalic  NECK: Supple and normal thyroid.  No JVD or carotid bruit.   HEART: S1, S2 regular , 1/6 soft ejection systolic murmur in mitral area , no thrill and no gallops .  PULMONARY: Bilateral vesicular breathing , few scattered ronchi and few scattered rales are present .  ABDOMEN: Soft nontender and positive bowl sounds   EXTREMITIES:  No clubbing, cyanosis, or pedal  edema  NEUROLOGICAL: AAOX3 , no focal deficit .    LABS:                        10.5   21.39 )-----------( 175      ( 21 Aug 2023 06:24 )             34.7     08-21    141  |  109<H>  |  20  ----------------------------<  114<H>  3.8   |  29  |  0.86    Ca    8.3<L>      21 Aug 2023 06:24  Phos  3.7     08-21  Mg     1.9     08-21    TPro  6.1  /  Alb  2.6<L>  /  TBili  1.1  /  DBili  x   /  AST  40<H>  /  ALT  37  /  AlkPhos  95  08-21        PT/INR - ( 21 Aug 2023 06:24 )   PT: 17.7 sec;   INR: 1.53 ratio         PTT - ( 20 Aug 2023 11:55 )  PTT:30.0 sec  Urinalysis Basic - ( 21 Aug 2023 06:24 )    Color: x / Appearance: x / SG: x / pH: x  Gluc: 114 mg/dL / Ketone: x  / Bili: x / Urobili: x   Blood: x / Protein: x / Nitrite: x   Leuk Esterase: x / RBC: x / WBC x   Sq Epi: x / Non Sq Epi: x / Bacteria: x      BNP      EKG:  ECHO:  IMAGING:    Assessment/Plan    Will continue to follow during hospital course and give further recommendations as needed . Thanks for your referral . if any questions please contact me at office ([phone])cell [phone])  [facility] [place]       HPI:  89-year-old female with history of hypothyroidism, A-fib on Eliquis, s/p recent pacemaker for complete heart block brought in by ambulance from [facility] assisted living home for cough for the past 2 weeks.  Associated with generalized weakness and decreased p.o. intake.  No fall or trauma.  Patient states she has had cold symptoms for a while.  Denies fever, chills, chest pain, shortness of breath, abdominal pain, nausea, vomiting, upper or lower extremity weakness or paresthesias. pmd: Dr. Crawley, cards: [facility] Seen by card Dr Reich Admitted  to telemetry unit for monitoring , send 3 sets of cardiac enzymes to rule out acute coronary event, obtain ECHO to evaluate LVEF, cardiology consult  ,continue current management, O2 supply, anticoagulation plan as per cardiology consult Pulm cons requested , antitussives and nebulized bd ordered .Also UTI suspected and started on iv abx , id cons called Palliative care consult requested ,to discuss advance directives and complete MOLST  (20 Aug 2023 15:35)        SUBJECTIVE:      ALLERGIES:  Allergies    penicillin (Unknown)    Intolerances          MEDICATIONS  (STANDING):  apixaban 2.5 milliGRAM(s) Oral two times a day  benzonatate 100 milliGRAM(s) Oral every 8 hours  dextrose 5% + sodium chloride 0.45% with potassium chloride 20 mEq/L 1000 milliLiter(s) (50 mL/Hr) IV Continuous <Continuous>  ertapenem  IVPB 1000 milliGRAM(s) IV Intermittent every 24 hours  lactobacillus acidophilus 1 Tablet(s) Oral every 12 hours  levothyroxine 88 MICROGram(s) Oral daily  pantoprazole    Tablet 40 milliGRAM(s) Oral daily    MEDICATIONS  (PRN):  acetaminophen     Tablet .. 650 milliGRAM(s) Oral every 6 hours PRN Temp greater or equal to 38C (100.4F), Mild Pain (1 - 3)  albuterol/ipratropium for Nebulization 3 milliLiter(s) Nebulizer every 6 hours PRN Shortness of Breath and/or Wheezing  aluminum hydroxide/magnesium hydroxide/simethicone Suspension 30 milliLiter(s) Oral every 4 hours PRN Dyspepsia  guaiFENesin Oral Liquid (Sugar-Free) 200 milliGRAM(s) Oral every 6 hours PRN Cough  melatonin 3 milliGRAM(s) Oral at bedtime PRN Insomnia  ondansetron Injectable 4 milliGRAM(s) IV Push every 8 hours PRN Nausea and/or Vomiting      REVIEW OF SYSTEMS:  CONSTITUTIONAL: No fever,  RESPIRATORY: No cough, wheezing, shortness of breath  CARDIOVASCULAR: No chest pain, dyspnea, palpitations, dizziness, syncope, paroxysmal nocturnal dyspnea, orthopnea, or arm or leg swelling  GASTROINTESTINAL: No abdominal  or epigastric pain, nausea, vomiting,  diarrhea  NEUROLOGICAL: No headaches,  loss of strength, numbness, or tremors    Vital Signs Last 24 Hrs  T(C): 36.9 (21 Aug 2023 20:50), Max: 36.9 (21 Aug 2023 20:50)  T(F): 98.4 (21 Aug 2023 20:50), Max: 98.4 (21 Aug 2023 20:50)  HR: 81 (21 Aug 2023 20:50) (60 - 81)  BP: 131/79 (21 Aug 2023 20:50) (129/73 - 148/76)  BP(mean): --  RR: 18 (21 Aug 2023 20:50) (18 - 18)  SpO2: 97% (21 Aug 2023 20:50) (97% - 100%)    Parameters below as of 21 Aug 2023 20:50  Patient On (Oxygen Delivery Method): nasal cannula        PHYSICAL EXAM:  HEAD:  Atraumatic, Normocephalic  NECK: Supple and normal thyroid.  No JVD or carotid bruit.   HEART: S1, S2 regular , 1/6 soft ejection systolic murmur in mitral area , no thrill and no gallops .  PULMONARY: Bilateral vesicular breathing , few scattered ronchi and few scattered rales are present .  ABDOMEN: Soft nontender and positive bowl sounds   EXTREMITIES:  No clubbing, cyanosis, or pedal  edema  NEUROLOGICAL: AAOX3 , no focal deficit .    LABS:                        10.5   21.39 )-----------( 175      ( 21 Aug 2023 06:24 )             34.7     08-21    141  |  109<H>  |  20  ----------------------------<  114<H>  3.8   |  29  |  0.86    Ca    8.3<L>      21 Aug 2023 06:24  Phos  3.7     08-21  Mg     1.9     08-21    TPro  6.1  /  Alb  2.6<L>  /  TBili  1.1  /  DBili  x   /  AST  40<H>  /  ALT  37  /  AlkPhos  95  08-21        PT/INR - ( 21 Aug 2023 06:24 )   PT: 17.7 sec;   INR: 1.53 ratio         PTT - ( 20 Aug 2023 11:55 )  PTT:30.0 sec  Urinalysis Basic - ( 21 Aug 2023 06:24 )    Color: x / Appearance: x / SG: x / pH: x  Gluc: 114 mg/dL / Ketone: x  / Bili: x / Urobili: x   Blood: x / Protein: x / Nitrite: x   Leuk Esterase: x / RBC: x / WBC x   Sq Epi: x / Non Sq Epi: x / Bacteria: x      BNP      EKG:  ECHO:  IMAGING:    Assessment/Plan    Will continue to follow during hospital course and give further recommendations as needed . Thanks for your referral . if any questions please contact me at office ([phone])cell [phone])  [facility] [place]       HPI:  89-year-old female with history of hypothyroidism, A-fib on Eliquis, s/p recent pacemaker for complete heart block brought in by ambulance from [facility] assisted living home for cough for the past 2 weeks.  Associated with generalized weakness and decreased p.o. intake.  No fall or trauma.  Patient states she has had cold symptoms for a while.  Denies fever, chills, chest pain, shortness of breath, abdominal pain, nausea, vomiting, upper or lower extremity weakness or paresthesias. pmd: Dr. Crawley, cards: [facility] Seen by card Dr Reich Admitted  to telemetry unit for monitoring , send 3 sets of cardiac enzymes to rule out acute coronary event, obtain ECHO to evaluate LVEF, cardiology consult  ,continue current management, O2 supply, anticoagulation plan as per cardiology consult Pulm cons requested , antitussives and nebulized bd ordered .Also UTI suspected and started on iv abx , id cons called Palliative care consult requested ,to discuss advance directives and complete MOLST  (20 Aug 2023 15:35)        SUBJECTIVE:      ALLERGIES:  Allergies    penicillin (Unknown)    Intolerances          MEDICATIONS  (STANDING):  apixaban 2.5 milliGRAM(s) Oral two times a day  benzonatate 100 milliGRAM(s) Oral every 8 hours  dextrose 5% + sodium chloride 0.45% with potassium chloride 20 mEq/L 1000 milliLiter(s) (50 mL/Hr) IV Continuous <Continuous>  ertapenem  IVPB 1000 milliGRAM(s) IV Intermittent every 24 hours  lactobacillus acidophilus 1 Tablet(s) Oral every 12 hours  levothyroxine 88 MICROGram(s) Oral daily  pantoprazole    Tablet 40 milliGRAM(s) Oral daily    MEDICATIONS  (PRN):  acetaminophen     Tablet .. 650 milliGRAM(s) Oral every 6 hours PRN Temp greater or equal to 38C (100.4F), Mild Pain (1 - 3)  albuterol/ipratropium for Nebulization 3 milliLiter(s) Nebulizer every 6 hours PRN Shortness of Breath and/or Wheezing  aluminum hydroxide/magnesium hydroxide/simethicone Suspension 30 milliLiter(s) Oral every 4 hours PRN Dyspepsia  guaiFENesin Oral Liquid (Sugar-Free) 200 milliGRAM(s) Oral every 6 hours PRN Cough  melatonin 3 milliGRAM(s) Oral at bedtime PRN Insomnia  ondansetron Injectable 4 milliGRAM(s) IV Push every 8 hours PRN Nausea and/or Vomiting      REVIEW OF SYSTEMS:  CONSTITUTIONAL: No fever,  RESPIRATORY: No cough, wheezing, shortness of breath  CARDIOVASCULAR: No chest pain, dyspnea, palpitations, dizziness, syncope, paroxysmal nocturnal dyspnea, orthopnea, or arm or leg swelling  GASTROINTESTINAL: No abdominal  or epigastric pain, nausea, vomiting,  diarrhea  NEUROLOGICAL: No headaches,  loss of strength, numbness, or tremors    Vital Signs Last 24 Hrs  T(C): 36.9 (21 Aug 2023 20:50), Max: 36.9 (21 Aug 2023 20:50)  T(F): 98.4 (21 Aug 2023 20:50), Max: 98.4 (21 Aug 2023 20:50)  HR: 81 (21 Aug 2023 20:50) (60 - 81)  BP: 131/79 (21 Aug 2023 20:50) (129/73 - 148/76)  BP(mean): --  RR: 18 (21 Aug 2023 20:50) (18 - 18)  SpO2: 97% (21 Aug 2023 20:50) (97% - 100%)    Parameters below as of 21 Aug 2023 20:50  Patient On (Oxygen Delivery Method): nasal cannula        PHYSICAL EXAM:  HEAD:  Atraumatic, Normocephalic  NECK: Supple and normal thyroid.  No JVD or carotid bruit.   HEART: S1, S2 regular , 1/6 soft ejection systolic murmur in mitral area , no thrill and no gallops .  PULMONARY: Bilateral vesicular breathing , few scattered ronchi and few scattered rales are present .  ABDOMEN: Soft nontender and positive bowl sounds   EXTREMITIES:  No clubbing, cyanosis, or pedal  edema  NEUROLOGICAL: AAOX3 , no focal deficit .    LABS:                        10.5   21.39 )-----------( 175      ( 21 Aug 2023 06:24 )             34.7     08-21    141  |  109<H>  |  20  ----------------------------<  114<H>  3.8   |  29  |  0.86    Ca    8.3<L>      21 Aug 2023 06:24  Phos  3.7     08-21  Mg     1.9     08-21    TPro  6.1  /  Alb  2.6<L>  /  TBili  1.1  /  DBili  x   /  AST  40<H>  /  ALT  37  /  AlkPhos  95  08-21        PT/INR - ( 21 Aug 2023 06:24 )   PT: 17.7 sec;   INR: 1.53 ratio         PTT - ( 20 Aug 2023 11:55 )  PTT:30.0 sec  Urinalysis Basic - ( 21 Aug 2023 06:24 )    Color: x / Appearance: x / SG: x / pH: x  Gluc: 114 mg/dL / Ketone: x  / Bili: x / Urobili: x   Blood: x / Protein: x / Nitrite: x   Leuk Esterase: x / RBC: x / WBC x   Sq Epi: x / Non Sq Epi: x / Bacteria: x      BNP      EKG:  ECHO:  IMAGING:    Assessment/Plan    Will continue to follow during hospital course and give further recommendations as needed . Thanks for your referral . if any questions please contact me at office ([phone])cell [phone])  [facility] [place]       HPI:  89-year-old female with history of hypothyroidism, A-fib on Eliquis, s/p recent pacemaker for complete heart block brought in by ambulance from [facility] assisted living home for cough for the past 2 weeks.  Associated with generalized weakness and decreased p.o. intake.  No fall or trauma.  Patient states she has had cold symptoms for a while.  Denies fever, chills, chest pain, shortness of breath, abdominal pain, nausea, vomiting, upper or lower extremity weakness or paresthesias. pmd: Dr. Crawley, cards: [facility] Seen by card Dr Reich Admitted  to telemetry unit for monitoring , send 3 sets of cardiac enzymes to rule out acute coronary event, obtain ECHO to evaluate LVEF, cardiology consult  ,continue current management, O2 supply, anticoagulation plan as per cardiology consult Pulm cons requested , antitussives and nebulized bd ordered .Also UTI suspected and started on iv abx , id cons called Palliative care consult requested ,to discuss advance directives and complete MOLST  (20 Aug 2023 15:35)        SUBJECTIVE:      ALLERGIES:  Allergies    penicillin (Unknown)    Intolerances          MEDICATIONS  (STANDING):  apixaban 2.5 milliGRAM(s) Oral two times a day  benzonatate 100 milliGRAM(s) Oral every 8 hours  dextrose 5% + sodium chloride 0.45% with potassium chloride 20 mEq/L 1000 milliLiter(s) (50 mL/Hr) IV Continuous <Continuous>  ertapenem  IVPB 1000 milliGRAM(s) IV Intermittent every 24 hours  lactobacillus acidophilus 1 Tablet(s) Oral every 12 hours  levothyroxine 88 MICROGram(s) Oral daily  pantoprazole    Tablet 40 milliGRAM(s) Oral daily    MEDICATIONS  (PRN):  acetaminophen     Tablet .. 650 milliGRAM(s) Oral every 6 hours PRN Temp greater or equal to 38C (100.4F), Mild Pain (1 - 3)  albuterol/ipratropium for Nebulization 3 milliLiter(s) Nebulizer every 6 hours PRN Shortness of Breath and/or Wheezing  aluminum hydroxide/magnesium hydroxide/simethicone Suspension 30 milliLiter(s) Oral every 4 hours PRN Dyspepsia  guaiFENesin Oral Liquid (Sugar-Free) 200 milliGRAM(s) Oral every 6 hours PRN Cough  melatonin 3 milliGRAM(s) Oral at bedtime PRN Insomnia  ondansetron Injectable 4 milliGRAM(s) IV Push every 8 hours PRN Nausea and/or Vomiting      REVIEW OF SYSTEMS:  CONSTITUTIONAL: No fever,  RESPIRATORY: No cough, wheezing, shortness of breath  CARDIOVASCULAR: No chest pain, dyspnea, palpitations, dizziness, syncope, paroxysmal nocturnal dyspnea, orthopnea, or arm or leg swelling  GASTROINTESTINAL: No abdominal  or epigastric pain, nausea, vomiting,  diarrhea  NEUROLOGICAL: No headaches,  loss of strength, numbness, or tremors    Vital Signs Last 24 Hrs  T(C): 36.9 (21 Aug 2023 20:50), Max: 36.9 (21 Aug 2023 20:50)  T(F): 98.4 (21 Aug 2023 20:50), Max: 98.4 (21 Aug 2023 20:50)  HR: 81 (21 Aug 2023 20:50) (60 - 81)  BP: 131/79 (21 Aug 2023 20:50) (129/73 - 148/76)  BP(mean): --  RR: 18 (21 Aug 2023 20:50) (18 - 18)  SpO2: 97% (21 Aug 2023 20:50) (97% - 100%)    Parameters below as of 21 Aug 2023 20:50  Patient On (Oxygen Delivery Method): nasal cannula        PHYSICAL EXAM:  HEAD:  Atraumatic, Normocephalic  NECK: Supple and normal thyroid.  No JVD or carotid bruit.   HEART: S1, S2 regular , 1/6 soft ejection systolic murmur in mitral area , no thrill and no gallops .  PULMONARY: Bilateral vesicular breathing , few scattered rhonchi and few scattered rales are present .  ABDOMEN: Soft nontender and positive bowl sounds   EXTREMITIES:  No clubbing, cyanosis, or pedal  edema  NEUROLOGICAL: AA and mild confused  , no focal deficit .  Skin : No rashes .  Musculoskeletal : No joint swellings     LABS:                        10.5   21.39 )-----------( 175      ( 21 Aug 2023 06:24 )             34.7     08-21    141  |  109<H>  |  20  ----------------------------<  114<H>  3.8   |  29  |  0.86    Ca    8.3<L>      21 Aug 2023 06:24  Phos  3.7     08-21  Mg     1.9     08-21    TPro  6.1  /  Alb  2.6<L>  /  TBili  1.1  /  DBili  x   /  AST  40<H>  /  ALT  37  /  AlkPhos  95  08-21        PT/INR - ( 21 Aug 2023 06:24 )   PT: 17.7 sec;   INR: 1.53 ratio         PTT - ( 20 Aug 2023 11:55 )  PTT:30.0 sec  Urinalysis Basic - ( 21 Aug 2023 06:24 )    Color: x / Appearance: x / SG: x / pH: x  Gluc: 114 mg/dL / Ketone: x  / Bili: x / Urobili: x   Blood: x / Protein: x / Nitrite: x   Leuk Esterase: x / RBC: x / WBC x   Sq Epi: x / Non Sq Epi: x / Bacteria: x      Assessment/Plan  89 years old female with H/O paroxysmal atrial fibrillation , SSS and PPM , hypothyroidism , mild chronic diastolic heart failure has SOB , cough and wheezing and has pneumonia . R/O COVID and viral pneumonia . Patient mild CHF well compensated . Will also send Troponin I at frequent intervals . Monitor hemoglobin and electrolytes .  Will continue to follow during hospital course and give further recommendations as needed . Thanks for your referral . if any questions please contact me at office ([phone] cell [phone])      [facility] [place]       HPI:  89-year-old female with history of hypothyroidism, A-fib on Eliquis, s/p recent pacemaker for complete heart block brought in by ambulance from [facility] assisted living home for cough for the past 2 weeks.  Associated with generalized weakness and decreased p.o. intake.  No fall or trauma.  Patient states she has had cold symptoms for a while.  Denies fever, chills, chest pain, shortness of breath, abdominal pain, nausea, vomiting, upper or lower extremity weakness or paresthesias. pmd: Dr. Crawley, cards: [facility] Seen by card Dr Reich Admitted  to telemetry unit for monitoring , send 3 sets of cardiac enzymes to rule out acute coronary event, obtain ECHO to evaluate LVEF, cardiology consult  ,continue current management, O2 supply, anticoagulation plan as per cardiology consult Pulm cons requested , antitussives and nebulized bd ordered .Also UTI suspected and started on iv abx , id cons called Palliative care consult requested ,to discuss advance directives and complete MOLST  (20 Aug 2023 15:35)        SUBJECTIVE:      ALLERGIES:  Allergies    penicillin (Unknown)    Intolerances          MEDICATIONS  (STANDING):  apixaban 2.5 milliGRAM(s) Oral two times a day  benzonatate 100 milliGRAM(s) Oral every 8 hours  dextrose 5% + sodium chloride 0.45% with potassium chloride 20 mEq/L 1000 milliLiter(s) (50 mL/Hr) IV Continuous <Continuous>  ertapenem  IVPB 1000 milliGRAM(s) IV Intermittent every 24 hours  lactobacillus acidophilus 1 Tablet(s) Oral every 12 hours  levothyroxine 88 MICROGram(s) Oral daily  pantoprazole    Tablet 40 milliGRAM(s) Oral daily    MEDICATIONS  (PRN):  acetaminophen     Tablet .. 650 milliGRAM(s) Oral every 6 hours PRN Temp greater or equal to 38C (100.4F), Mild Pain (1 - 3)  albuterol/ipratropium for Nebulization 3 milliLiter(s) Nebulizer every 6 hours PRN Shortness of Breath and/or Wheezing  aluminum hydroxide/magnesium hydroxide/simethicone Suspension 30 milliLiter(s) Oral every 4 hours PRN Dyspepsia  guaiFENesin Oral Liquid (Sugar-Free) 200 milliGRAM(s) Oral every 6 hours PRN Cough  melatonin 3 milliGRAM(s) Oral at bedtime PRN Insomnia  ondansetron Injectable 4 milliGRAM(s) IV Push every 8 hours PRN Nausea and/or Vomiting      REVIEW OF SYSTEMS:  CONSTITUTIONAL: No fever,  RESPIRATORY: No cough, wheezing, shortness of breath  CARDIOVASCULAR: No chest pain, dyspnea, palpitations, dizziness, syncope, paroxysmal nocturnal dyspnea, orthopnea, or arm or leg swelling  GASTROINTESTINAL: No abdominal  or epigastric pain, nausea, vomiting,  diarrhea  NEUROLOGICAL: No headaches,  loss of strength, numbness, or tremors    Vital Signs Last 24 Hrs  T(C): 36.9 (21 Aug 2023 20:50), Max: 36.9 (21 Aug 2023 20:50)  T(F): 98.4 (21 Aug 2023 20:50), Max: 98.4 (21 Aug 2023 20:50)  HR: 81 (21 Aug 2023 20:50) (60 - 81)  BP: 131/79 (21 Aug 2023 20:50) (129/73 - 148/76)  BP(mean): --  RR: 18 (21 Aug 2023 20:50) (18 - 18)  SpO2: 97% (21 Aug 2023 20:50) (97% - 100%)    Parameters below as of 21 Aug 2023 20:50  Patient On (Oxygen Delivery Method): nasal cannula        PHYSICAL EXAM:  HEAD:  Atraumatic, Normocephalic  NECK: Supple and normal thyroid.  No JVD or carotid bruit.   HEART: S1, S2 regular , 1/6 soft ejection systolic murmur in mitral area , no thrill and no gallops .  PULMONARY: Bilateral vesicular breathing , few scattered rhonchi and few scattered rales are present .  ABDOMEN: Soft nontender and positive bowl sounds   EXTREMITIES:  No clubbing, cyanosis, or pedal  edema  NEUROLOGICAL: AA and mild confused  , no focal deficit .  Skin : No rashes .  Musculoskeletal : No joint swellings     LABS:                        10.5   21.39 )-----------( 175      ( 21 Aug 2023 06:24 )             34.7     08-21    141  |  109<H>  |  20  ----------------------------<  114<H>  3.8   |  29  |  0.86    Ca    8.3<L>      21 Aug 2023 06:24  Phos  3.7     08-21  Mg     1.9     08-21    TPro  6.1  /  Alb  2.6<L>  /  TBili  1.1  /  DBili  x   /  AST  40<H>  /  ALT  37  /  AlkPhos  95  08-21        PT/INR - ( 21 Aug 2023 06:24 )   PT: 17.7 sec;   INR: 1.53 ratio         PTT - ( 20 Aug 2023 11:55 )  PTT:30.0 sec  Urinalysis Basic - ( 21 Aug 2023 06:24 )    Color: x / Appearance: x / SG: x / pH: x  Gluc: 114 mg/dL / Ketone: x  / Bili: x / Urobili: x   Blood: x / Protein: x / Nitrite: x   Leuk Esterase: x / RBC: x / WBC x   Sq Epi: x / Non Sq Epi: x / Bacteria: x      Assessment/Plan  89 years old female with H/O paroxysmal atrial fibrillation , SSS and PPM , hypothyroidism , mild chronic diastolic heart failure has SOB , cough and wheezing and has pneumonia . R/O COVID and viral pneumonia . Patient mild CHF well compensated . Will also send Troponin I at frequent intervals . Monitor hemoglobin and electrolytes .  Will continue to follow during hospital course and give further recommendations as needed . Thanks for your referral . if any questions please contact me at office ([phone] cell [phone])      [facility] [place]       HPI:  89-year-old female with history of hypothyroidism, A-fib on Eliquis, s/p recent pacemaker for complete heart block brought in by ambulance from [facility] assisted living home for cough for the past 2 weeks.  Associated with generalized weakness and decreased p.o. intake.  No fall or trauma.  Patient states she has had cold symptoms for a while.  Denies fever, chills, chest pain, shortness of breath, abdominal pain, nausea, vomiting, upper or lower extremity weakness or paresthesias. pmd: Dr. Crawley, cards: [facility] Seen by card Dr Reich Admitted  to telemetry unit for monitoring , send 3 sets of cardiac enzymes to rule out acute coronary event, obtain ECHO to evaluate LVEF, cardiology consult  ,continue current management, O2 supply, anticoagulation plan as per cardiology consult Pulm cons requested , antitussives and nebulized bd ordered .Also UTI suspected and started on iv abx , id cons called Palliative care consult requested ,to discuss advance directives and complete MOLST  (20 Aug 2023 15:35)        SUBJECTIVE:      ALLERGIES:  Allergies    penicillin (Unknown)    Intolerances          MEDICATIONS  (STANDING):  apixaban 2.5 milliGRAM(s) Oral two times a day  benzonatate 100 milliGRAM(s) Oral every 8 hours  dextrose 5% + sodium chloride 0.45% with potassium chloride 20 mEq/L 1000 milliLiter(s) (50 mL/Hr) IV Continuous <Continuous>  ertapenem  IVPB 1000 milliGRAM(s) IV Intermittent every 24 hours  lactobacillus acidophilus 1 Tablet(s) Oral every 12 hours  levothyroxine 88 MICROGram(s) Oral daily  pantoprazole    Tablet 40 milliGRAM(s) Oral daily    MEDICATIONS  (PRN):  acetaminophen     Tablet .. 650 milliGRAM(s) Oral every 6 hours PRN Temp greater or equal to 38C (100.4F), Mild Pain (1 - 3)  albuterol/ipratropium for Nebulization 3 milliLiter(s) Nebulizer every 6 hours PRN Shortness of Breath and/or Wheezing  aluminum hydroxide/magnesium hydroxide/simethicone Suspension 30 milliLiter(s) Oral every 4 hours PRN Dyspepsia  guaiFENesin Oral Liquid (Sugar-Free) 200 milliGRAM(s) Oral every 6 hours PRN Cough  melatonin 3 milliGRAM(s) Oral at bedtime PRN Insomnia  ondansetron Injectable 4 milliGRAM(s) IV Push every 8 hours PRN Nausea and/or Vomiting      REVIEW OF SYSTEMS:  CONSTITUTIONAL: No fever,  RESPIRATORY: No cough, wheezing, shortness of breath  CARDIOVASCULAR: No chest pain, dyspnea, palpitations, dizziness, syncope, paroxysmal nocturnal dyspnea, orthopnea, or arm or leg swelling  GASTROINTESTINAL: No abdominal  or epigastric pain, nausea, vomiting,  diarrhea  NEUROLOGICAL: No headaches,  loss of strength, numbness, or tremors    Vital Signs Last 24 Hrs  T(C): 36.9 (21 Aug 2023 20:50), Max: 36.9 (21 Aug 2023 20:50)  T(F): 98.4 (21 Aug 2023 20:50), Max: 98.4 (21 Aug 2023 20:50)  HR: 81 (21 Aug 2023 20:50) (60 - 81)  BP: 131/79 (21 Aug 2023 20:50) (129/73 - 148/76)  BP(mean): --  RR: 18 (21 Aug 2023 20:50) (18 - 18)  SpO2: 97% (21 Aug 2023 20:50) (97% - 100%)    Parameters below as of 21 Aug 2023 20:50  Patient On (Oxygen Delivery Method): nasal cannula        PHYSICAL EXAM:  HEAD:  Atraumatic, Normocephalic  NECK: Supple and normal thyroid.  No JVD or carotid bruit.   HEART: S1, S2 regular , 1/6 soft ejection systolic murmur in mitral area , no thrill and no gallops .  PULMONARY: Bilateral vesicular breathing , few scattered rhonchi and few scattered rales are present .  ABDOMEN: Soft nontender and positive bowl sounds   EXTREMITIES:  No clubbing, cyanosis, or pedal  edema  NEUROLOGICAL: AA and mild confused  , no focal deficit .  Skin : No rashes .  Musculoskeletal : No joint swellings     LABS:                        10.5   21.39 )-----------( 175      ( 21 Aug 2023 06:24 )             34.7     08-21    141  |  109<H>  |  20  ----------------------------<  114<H>  3.8   |  29  |  0.86    Ca    8.3<L>      21 Aug 2023 06:24  Phos  3.7     08-21  Mg     1.9     08-21    TPro  6.1  /  Alb  2.6<L>  /  TBili  1.1  /  DBili  x   /  AST  40<H>  /  ALT  37  /  AlkPhos  95  08-21        PT/INR - ( 21 Aug 2023 06:24 )   PT: 17.7 sec;   INR: 1.53 ratio         PTT - ( 20 Aug 2023 11:55 )  PTT:30.0 sec  Urinalysis Basic - ( 21 Aug 2023 06:24 )    Color: x / Appearance: x / SG: x / pH: x  Gluc: 114 mg/dL / Ketone: x  / Bili: x / Urobili: x   Blood: x / Protein: x / Nitrite: x   Leuk Esterase: x / RBC: x / WBC x   Sq Epi: x / Non Sq Epi: x / Bacteria: x      Assessment/Plan  89 years old female with H/O paroxysmal atrial fibrillation , SSS and PPM , hypothyroidism , mild chronic diastolic heart failure has SOB , cough and wheezing and has pneumonia . R/O COVID and viral pneumonia . Patient mild CHF well compensated . Will also send Troponin I at frequent intervals . Monitor hemoglobin and electrolytes .  Will continue to follow during hospital course and give further recommendations as needed . Thanks for your referral . if any questions please contact me at office ([phone] cell [phone])      NINOSKA DAVENPORT MD [place] [id number]  [address]  OFFICE : [id number]  CELL : [phone]  CARDIOLOGY F/U  :       HPI:  89-year-old female with history of hypothyroidism, A-fib on Eliquis, s/p recent pacemaker for complete heart block brought in by ambulance from [facility] assisted living home for cough for the past 2 weeks.  Associated with generalized weakness and decreased p.o. intake.  No fall or trauma.  Patient states she has had cold symptoms for a while.  Denies fever, chills, chest pain, shortness of breath, abdominal pain, nausea, vomiting, upper or lower extremity weakness or paresthesias. pmd: Dr. Crawley, cards: [facility] Seen by card Dr Davenport Admitted  to telemetry unit for monitoring , send 3 sets of cardiac enzymes to rule out acute coronary event, obtain ECHO to evaluate LVEF, cardiology consult  ,continue current management, O2 supply, anticoagulation plan as per cardiology consult Pulm cons requested , antitussives and nebulized bd ordered .Also UTI suspected and started on iv abx , id cons called Palliative care consult requested ,to discuss advance directives and complete MOLST  (20 Aug 2023 15:35)        SUBJECTIVE: Patient feeling better .      ALLERGIES:  Allergies    penicillin (Unknown)    Intolerances          MEDICATIONS  (STANDING):  apixaban 2.5 milliGRAM(s) Oral two times a day  benzonatate 100 milliGRAM(s) Oral every 8 hours  dextrose 5% + sodium chloride 0.45% with potassium chloride 20 mEq/L 1000 milliLiter(s) (50 mL/Hr) IV Continuous <Continuous>  ertapenem  IVPB 1000 milliGRAM(s) IV Intermittent every 24 hours  lactobacillus acidophilus 1 Tablet(s) Oral every 12 hours  levothyroxine 88 MICROGram(s) Oral daily  pantoprazole    Tablet 40 milliGRAM(s) Oral daily    MEDICATIONS  (PRN):  acetaminophen     Tablet .. 650 milliGRAM(s) Oral every 6 hours PRN Temp greater or equal to 38C (100.4F), Mild Pain (1 - 3)  albuterol/ipratropium for Nebulization 3 milliLiter(s) Nebulizer every 6 hours PRN Shortness of Breath and/or Wheezing  aluminum hydroxide/magnesium hydroxide/simethicone Suspension 30 milliLiter(s) Oral every 4 hours PRN Dyspepsia  guaiFENesin Oral Liquid (Sugar-Free) 200 milliGRAM(s) Oral every 6 hours PRN Cough  melatonin 3 milliGRAM(s) Oral at bedtime PRN Insomnia  ondansetron Injectable 4 milliGRAM(s) IV Push every 8 hours PRN Nausea and/or Vomiting      REVIEW OF SYSTEMS:  CONSTITUTIONAL: No fever,  RESPIRATORY: Improvement in mild  cough and  wheezing  shortness of breath  CARDIOVASCULAR: No chest pain,  palpitations, dizziness, syncope, paroxysmal nocturnal dyspnea,  or arm or leg swelling and improvement in SOB   GASTROINTESTINAL: No abdominal  or epigastric pain, nausea, vomiting,  diarrhea  NEUROLOGICAL: No headaches,  numbness, or tremors  Skin : No itching .  Hematology : No active bleeding .  Endocrinology : No heat and cold intolerance .  Psychiatry : Patient is calm .  Genitourinary : No dysuria .  Musculoskeletal : Patient has mild arthritis       Vital Signs Last 24 Hrs  T(C): 36.9 (21 Aug 2023 20:50), Max: 36.9 (21 Aug 2023 20:50)  T(F): 98.4 (21 Aug 2023 20:50), Max: 98.4 (21 Aug 2023 20:50)  HR: 81 (21 Aug 2023 20:50) (60 - 81)  BP: 131/79 (21 Aug 2023 20:50) (129/73 - 148/76)  BP(mean): --  RR: 18 (21 Aug 2023 20:50) (18 - 18)  SpO2: 97% (21 Aug 2023 20:50) (97% - 100%)    Parameters below as of 21 Aug 2023 20:50  Patient On (Oxygen Delivery Method): nasal cannula        PHYSICAL EXAM:  HEAD:  Atraumatic, Normocephalic  NECK: Supple and normal thyroid.  No JVD or carotid bruit.   HEART: S1, S2 regular , 1/6 soft ejection systolic murmur in mitral area , no thrill and no gallops .  PULMONARY: Bilateral vesicular breathing , few scattered rhonchi and few scattered rales are present .  ABDOMEN: Soft nontender and positive bowl sounds   EXTREMITIES:  No clubbing, cyanosis, or pedal  edema  NEUROLOGICAL: AA and mild confused  , no focal deficit .  Skin : No rashes .  Musculoskeletal : No joint swellings     LABS:                        10.5   21.39 )-----------( 175      ( 21 Aug 2023 06:24 )             34.7     08-21    141  |  109<H>  |  20  ----------------------------<  114<H>  3.8   |  29  |  0.86    Ca    8.3<L>      21 Aug 2023 06:24  Phos  3.7     08-21  Mg     1.9     08-21    TPro  6.1  /  Alb  2.6<L>  /  TBili  1.1  /  DBili  x   /  AST  40<H>  /  ALT  37  /  AlkPhos  95  08-21        PT/INR - ( 21 Aug 2023 06:24 )   PT: 17.7 sec;   INR: 1.53 ratio         PTT - ( 20 Aug 2023 11:55 )  PTT:30.0 sec  Urinalysis Basic - ( 21 Aug 2023 06:24 )    Color: x / Appearance: x / SG: x / pH: x  Gluc: 114 mg/dL / Ketone: x  / Bili: x / Urobili: x   Blood: x / Protein: x / Nitrite: x   Leuk Esterase: x / RBC: x / WBC x   Sq Epi: x / Non Sq Epi: x / Bacteria: x      Assessment/Plan  Patient has :  1) R/O pneumonia and sepsis .  2) UTI   3) paroxysmal atrial fibrillation and stable .  4) SSS and S/P PPM .  5) Mild chronic diastolic heart failure and under control   6) H/O hypothyroidism   7) Mild  Anemia   Plan : 1) I/V antibiotics as per ID 2) Rest of medications as such   3) Monitor hemoglobin and electrolytes . 4) Increase ambulation .  Will continue to follow during hospital course and give further recommendations as needed . Thanks for your referral . if any questions please contact me at office ([phone] cell [phone])      NINOSKA DAVENPORT MD [place] [id number]  [address]  OFFICE : [phone]  CELL : [phone]  CARDIOLOGY F/U  :       HPI:  89-year-old female with history of hypothyroidism, A-fib on Eliquis, s/p recent pacemaker for complete heart block brought in by ambulance from [facility] assisted living home for cough for the past 2 weeks.  Associated with generalized weakness and decreased p.o. intake.  No fall or trauma.  Patient states she has had cold symptoms for a while.  Denies fever, chills, chest pain, shortness of breath, abdominal pain, nausea, vomiting, upper or lower extremity weakness or paresthesias. pmd: Dr. Crawley, cards: [facility] Seen by card Dr Davenport Admitted  to telemetry unit for monitoring , send 3 sets of cardiac enzymes to rule out acute coronary event, obtain ECHO to evaluate LVEF, cardiology consult  ,continue current management, O2 supply, anticoagulation plan as per cardiology consult Pulm cons requested , antitussives and nebulized bd ordered .Also UTI suspected and started on iv abx , id cons called Palliative care consult requested ,to discuss advance directives and complete MOLST  (20 Aug 2023 15:35)        SUBJECTIVE: Patient feeling better .      ALLERGIES:  Allergies    penicillin (Unknown)    Intolerances          MEDICATIONS  (STANDING):  apixaban 2.5 milliGRAM(s) Oral two times a day  benzonatate 100 milliGRAM(s) Oral every 8 hours  dextrose 5% + sodium chloride 0.45% with potassium chloride 20 mEq/L 1000 milliLiter(s) (50 mL/Hr) IV Continuous <Continuous>  ertapenem  IVPB 1000 milliGRAM(s) IV Intermittent every 24 hours  lactobacillus acidophilus 1 Tablet(s) Oral every 12 hours  levothyroxine 88 MICROGram(s) Oral daily  pantoprazole    Tablet 40 milliGRAM(s) Oral daily    MEDICATIONS  (PRN):  acetaminophen     Tablet .. 650 milliGRAM(s) Oral every 6 hours PRN Temp greater or equal to 38C (100.4F), Mild Pain (1 - 3)  albuterol/ipratropium for Nebulization 3 milliLiter(s) Nebulizer every 6 hours PRN Shortness of Breath and/or Wheezing  aluminum hydroxide/magnesium hydroxide/simethicone Suspension 30 milliLiter(s) Oral every 4 hours PRN Dyspepsia  guaiFENesin Oral Liquid (Sugar-Free) 200 milliGRAM(s) Oral every 6 hours PRN Cough  melatonin 3 milliGRAM(s) Oral at bedtime PRN Insomnia  ondansetron Injectable 4 milliGRAM(s) IV Push every 8 hours PRN Nausea and/or Vomiting      REVIEW OF SYSTEMS:  CONSTITUTIONAL: No fever,  RESPIRATORY: Improvement in mild  cough and  wheezing  shortness of breath  CARDIOVASCULAR: No chest pain,  palpitations, dizziness, syncope, paroxysmal nocturnal dyspnea,  or arm or leg swelling and improvement in SOB   GASTROINTESTINAL: No abdominal  or epigastric pain, nausea, vomiting,  diarrhea  NEUROLOGICAL: No headaches,  numbness, or tremors  Skin : No itching .  Hematology : No active bleeding .  Endocrinology : No heat and cold intolerance .  Psychiatry : Patient is calm .  Genitourinary : No dysuria .  Musculoskeletal : Patient has mild arthritis       Vital Signs Last 24 Hrs  T(C): 36.9 (21 Aug 2023 20:50), Max: 36.9 (21 Aug 2023 20:50)  T(F): 98.4 (21 Aug 2023 20:50), Max: 98.4 (21 Aug 2023 20:50)  HR: 81 (21 Aug 2023 20:50) (60 - 81)  BP: 131/79 (21 Aug 2023 20:50) (129/73 - 148/76)  BP(mean): --  RR: 18 (21 Aug 2023 20:50) (18 - 18)  SpO2: 97% (21 Aug 2023 20:50) (97% - 100%)    Parameters below as of 21 Aug 2023 20:50  Patient On (Oxygen Delivery Method): nasal cannula        PHYSICAL EXAM:  HEAD:  Atraumatic, Normocephalic  NECK: Supple and normal thyroid.  No JVD or carotid bruit.   HEART: S1, S2 regular , 1/6 soft ejection systolic murmur in mitral area , no thrill and no gallops .  PULMONARY: Bilateral vesicular breathing , few scattered rhonchi and few scattered rales are present .  ABDOMEN: Soft nontender and positive bowl sounds   EXTREMITIES:  No clubbing, cyanosis, or pedal  edema  NEUROLOGICAL: AA and mild confused  , no focal deficit .  Skin : No rashes .  Musculoskeletal : No joint swellings     LABS:                        10.5   21.39 )-----------( 175      ( 21 Aug 2023 06:24 )             34.7     08-21    141  |  109<H>  |  20  ----------------------------<  114<H>  3.8   |  29  |  0.86    Ca    8.3<L>      21 Aug 2023 06:24  Phos  3.7     08-21  Mg     1.9     08-21    TPro  6.1  /  Alb  2.6<L>  /  TBili  1.1  /  DBili  x   /  AST  40<H>  /  ALT  37  /  AlkPhos  95  08-21        PT/INR - ( 21 Aug 2023 06:24 )   PT: 17.7 sec;   INR: 1.53 ratio         PTT - ( 20 Aug 2023 11:55 )  PTT:30.0 sec  Urinalysis Basic - ( 21 Aug 2023 06:24 )    Color: x / Appearance: x / SG: x / pH: x  Gluc: 114 mg/dL / Ketone: x  / Bili: x / Urobili: x   Blood: x / Protein: x / Nitrite: x   Leuk Esterase: x / RBC: x / WBC x   Sq Epi: x / Non Sq Epi: x / Bacteria: x      Assessment/Plan  Patient has :  1) R/O pneumonia and sepsis .  2) UTI   3) paroxysmal atrial fibrillation and stable .  4) SSS and S/P PPM .  5) Mild chronic diastolic heart failure and under control   6) H/O hypothyroidism   7) Mild  Anemia   Plan : 1) I/V antibiotics as per ID 2) Rest of medications as such   3) Monitor hemoglobin and electrolytes . 4) Increase ambulation .  Will continue to follow during hospital course and give further recommendations as needed . Thanks for your referral . if any questions please contact me at office ([phone] cell [phone])      NINOSKA DAVENPORT MD [place] [id number]  [address]  OFFICE : [phone]  CELL : [phone]  CARDIOLOGY F/U  :       HPI:  89-year-old female with history of hypothyroidism, A-fib on Eliquis, s/p recent pacemaker for complete heart block brought in by ambulance from [facility] assisted living home for cough for the past 2 weeks.  Associated with generalized weakness and decreased p.o. intake.  No fall or trauma.  Patient states she has had cold symptoms for a while.  Denies fever, chills, chest pain, shortness of breath, abdominal pain, nausea, vomiting, upper or lower extremity weakness or paresthesias. pmd: Dr. Crawley, cards: [facility] Seen by card Dr Davenport Admitted  to telemetry unit for monitoring , send 3 sets of cardiac enzymes to rule out acute coronary event, obtain ECHO to evaluate LVEF, cardiology consult  ,continue current management, O2 supply, anticoagulation plan as per cardiology consult Pulm cons requested , antitussives and nebulized bd ordered .Also UTI suspected and started on iv abx , id cons called Palliative care consult requested ,to discuss advance directives and complete MOLST  (20 Aug 2023 15:35)        SUBJECTIVE: Patient feeling better .      ALLERGIES:  Allergies    penicillin (Unknown)    Intolerances          MEDICATIONS  (STANDING):  apixaban 2.5 milliGRAM(s) Oral two times a day  benzonatate 100 milliGRAM(s) Oral every 8 hours  dextrose 5% + sodium chloride 0.45% with potassium chloride 20 mEq/L 1000 milliLiter(s) (50 mL/Hr) IV Continuous <Continuous>  ertapenem  IVPB 1000 milliGRAM(s) IV Intermittent every 24 hours  lactobacillus acidophilus 1 Tablet(s) Oral every 12 hours  levothyroxine 88 MICROGram(s) Oral daily  pantoprazole    Tablet 40 milliGRAM(s) Oral daily    MEDICATIONS  (PRN):  acetaminophen     Tablet .. 650 milliGRAM(s) Oral every 6 hours PRN Temp greater or equal to 38C (100.4F), Mild Pain (1 - 3)  albuterol/ipratropium for Nebulization 3 milliLiter(s) Nebulizer every 6 hours PRN Shortness of Breath and/or Wheezing  aluminum hydroxide/magnesium hydroxide/simethicone Suspension 30 milliLiter(s) Oral every 4 hours PRN Dyspepsia  guaiFENesin Oral Liquid (Sugar-Free) 200 milliGRAM(s) Oral every 6 hours PRN Cough  melatonin 3 milliGRAM(s) Oral at bedtime PRN Insomnia  ondansetron Injectable 4 milliGRAM(s) IV Push every 8 hours PRN Nausea and/or Vomiting      REVIEW OF SYSTEMS:  CONSTITUTIONAL: No fever,  RESPIRATORY: Improvement in mild  cough and  wheezing  shortness of breath  CARDIOVASCULAR: No chest pain,  palpitations, dizziness, syncope, paroxysmal nocturnal dyspnea,  or arm or leg swelling and improvement in SOB   GASTROINTESTINAL: No abdominal  or epigastric pain, nausea, vomiting,  diarrhea  NEUROLOGICAL: No headaches,  numbness, or tremors  Skin : No itching .  Hematology : No active bleeding .  Endocrinology : No heat and cold intolerance .  Psychiatry : Patient is calm .  Genitourinary : No dysuria .  Musculoskeletal : Patient has mild arthritis       Vital Signs Last 24 Hrs  T(C): 36.9 (21 Aug 2023 20:50), Max: 36.9 (21 Aug 2023 20:50)  T(F): 98.4 (21 Aug 2023 20:50), Max: 98.4 (21 Aug 2023 20:50)  HR: 81 (21 Aug 2023 20:50) (60 - 81)  BP: 131/79 (21 Aug 2023 20:50) (129/73 - 148/76)  BP(mean): --  RR: 18 (21 Aug 2023 20:50) (18 - 18)  SpO2: 97% (21 Aug 2023 20:50) (97% - 100%)    Parameters below as of 21 Aug 2023 20:50  Patient On (Oxygen Delivery Method): nasal cannula        PHYSICAL EXAM:  HEAD:  Atraumatic, Normocephalic  NECK: Supple and normal thyroid.  No JVD or carotid bruit.   HEART: S1, S2 regular , 1/6 soft ejection systolic murmur in mitral area , no thrill and no gallops .  PULMONARY: Bilateral vesicular breathing , few scattered rhonchi and few scattered rales are present .  ABDOMEN: Soft nontender and positive bowl sounds   EXTREMITIES:  No clubbing, cyanosis, or pedal  edema  NEUROLOGICAL: AA and mild confused  , no focal deficit .  Skin : No rashes .  Musculoskeletal : No joint swellings     LABS:                        10.5   21.39 )-----------( 175      ( 21 Aug 2023 06:24 )             34.7     08-21    141  |  109<H>  |  20  ----------------------------<  114<H>  3.8   |  29  |  0.86    Ca    8.3<L>      21 Aug 2023 06:24  Phos  3.7     08-21  Mg     1.9     08-21    TPro  6.1  /  Alb  2.6<L>  /  TBili  1.1  /  DBili  x   /  AST  40<H>  /  ALT  37  /  AlkPhos  95  08-21        PT/INR - ( 21 Aug 2023 06:24 )   PT: 17.7 sec;   INR: 1.53 ratio         PTT - ( 20 Aug 2023 11:55 )  PTT:30.0 sec  Urinalysis Basic - ( 21 Aug 2023 06:24 )    Color: x / Appearance: x / SG: x / pH: x  Gluc: 114 mg/dL / Ketone: x  / Bili: x / Urobili: x   Blood: x / Protein: x / Nitrite: x   Leuk Esterase: x / RBC: x / WBC x   Sq Epi: x / Non Sq Epi: x / Bacteria: x      Assessment/Plan  Patient has :  1) R/O pneumonia and sepsis .  2) UTI   3) paroxysmal atrial fibrillation and stable .  4) SSS and S/P PPM .  5) Mild chronic diastolic heart failure and under control   6) H/O hypothyroidism   7) Mild  Anemia   Plan : 1) I/V antibiotics as per ID 2) Rest of medications as such   3) Monitor hemoglobin and electrolytes . 4) Increase ambulation .  Will continue to follow during hospital course and give further recommendations as needed . Thanks for your referral . if any questions please contact me at office ([phone] cell [phone])

## 2023-08-21 NOTE — PROGRESS NOTE ADULT - ASSESSMENT
89-year-old female with history of hypothyroidism, A-fib on Eliquis, s/p recent pacemaker for complete heart block brought in by ambulance from [facility] assisted living home for cough for the past 2 weeks.  Associated with generalized weakness and decreased p.o. intake.  No fall or trauma.  Patient states she has had cold symptoms for a while.  Denies fever, chills, chest pain, shortness of breath, abdominal pain, nausea, vomiting, upper or lower extremity weakness or paresthesias. pmd: Dr. Crawley, cards: [facility] Seen by card Dr Reich Admitted  to telemetry unit for monitoring , send 3 sets of cardiac enzymes to rule out acute coronary event, obtain ECHO to evaluate LVEF, cardiology consult  ,continue current management, O2 supply, anticoagulation plan as per cardiology consult Pulm cons requested , antitussives and nebulized bd ordered .Also UTI suspected and started on iv abx , id cons called Palliative care consult requested ,to discuss advance directives and complete MOLST  89-year-old female with history of hypothyroidism, A-fib on Eliquis, s/p recent pacemaker for complete heart block brought in by ambulance from [facility] assisted living home for cough for the past 2 weeks.  Associated with generalized weakness and decreased p.o. intake.  No fall or trauma.  Patient states she has had cold symptoms for a while.  Denies fever, chills, chest pain, shortness of breath, abdominal pain, nausea, vomiting, upper or lower extremity weakness or paresthesias. pmd: Dr. Crawley, cards: [facility] Seen by card Dr Reich Admitted  to telemetry unit for monitoring , send 3 sets of cardiac enzymes to rule out acute coronary event, obtain ECHO to evaluate LVEF, cardiology consult  ,continue current management, O2 supply, anticoagulation plan as per cardiology consult Pulm cons requested , antitussives and nebulized bd ordered .Also UTI suspected and started on iv abx , id cons called Palliative care consult requested ,to discuss advance directives and complete MOLST  89-year-old female with history of hypothyroidism, A-fib on Eliquis, s/p recent pacemaker for complete heart block brought in by ambulance from [facility] assisted living home for cough for the past 2 weeks.  Associated with generalized weakness and decreased p.o. intake.  No fall or trauma.  Patient states she has had cold symptoms for a while.  Denies fever, chills, chest pain, shortness of breath, abdominal pain, nausea, vomiting, upper or lower extremity weakness or paresthesias. pmd: Dr. Crawley, cards: [facility] Seen by card Dr Reich Admitted  to telemetry unit for monitoring , send 3 sets of cardiac enzymes to rule out acute coronary event, obtain ECHO to evaluate LVEF, cardiology consult  ,continue current management, O2 supply, anticoagulation plan as per cardiology consult Pulm cons requested , antitussives and nebulized bd ordered .Also UTI suspected and started on iv abx , id cons called Palliative care consult requested ,to discuss advance directives and complete MOLST

## 2023-08-21 NOTE — DIETITIAN INITIAL EVALUATION ADULT - ADD RECOMMEND
Will provide mighty shakes 4oz BID. Food preferences/meal alternatives obtained/honored. Recommend MVI with minerals daily.

## 2023-08-21 NOTE — PROGRESS NOTE ADULT - SUBJECTIVE AND OBJECTIVE BOX
CHIEF COMPLAINT/ REASON FOR VISIT  .. Patient was seen to address the  issue listed under PROBLEM LIST which is located toward bottom of this note     DEACON ROBERT    PLV 1EAS 106 W1    Allergies    penicillin (Unknown)    Intolerances        PAST MEDICAL & SURGICAL HISTORY:  HTN (hypertension)      Afib      Spinal stenosis      PFO (patent foramen ovale)      Degeneration macular      Osteoporosis      History of complete heart block      Hypothyroidism      Cardiac pacemaker          FAMILY HISTORY:      Home Medications:  cefTRIAXone: 1 gram(s) intravenous once a day (2023 17:06)  Eliquis 2.5 mg oral tablet: 1 tab(s) orally 2 times a day (2023 07:49)  levothyroxine 88 mcg (0.088 mg) oral tablet: 1 tab(s) orally once a day (2023 07:49)      MEDICATIONS  (STANDING):  apixaban 2.5 milliGRAM(s) Oral two times a day  benzonatate 100 milliGRAM(s) Oral every 8 hours  cefepime   IVPB 2000 milliGRAM(s) IV Intermittent every 12 hours  dextrose 5% + sodium chloride 0.45% with potassium chloride 20 mEq/L 1000 milliLiter(s) (50 mL/Hr) IV Continuous <Continuous>  lactobacillus acidophilus 1 Tablet(s) Oral every 12 hours  levothyroxine 88 MICROGram(s) Oral daily  pantoprazole    Tablet 40 milliGRAM(s) Oral daily    MEDICATIONS  (PRN):  acetaminophen     Tablet .. 650 milliGRAM(s) Oral every 6 hours PRN Temp greater or equal to 38C (100.4F), Mild Pain (1 - 3)  albuterol/ipratropium for Nebulization 3 milliLiter(s) Nebulizer every 6 hours PRN Shortness of Breath and/or Wheezing  aluminum hydroxide/magnesium hydroxide/simethicone Suspension 30 milliLiter(s) Oral every 4 hours PRN Dyspepsia  guaiFENesin Oral Liquid (Sugar-Free) 200 milliGRAM(s) Oral every 6 hours PRN Cough  melatonin 3 milliGRAM(s) Oral at bedtime PRN Insomnia  ondansetron Injectable 4 milliGRAM(s) IV Push every 8 hours PRN Nausea and/or Vomiting              Vital Signs Last 24 Hrs  T(C): 36.3 (21 Aug 2023 04:55), Max: 37.2 (20 Aug 2023 11:24)  T(F): 97.4 (21 Aug 2023 04:55), Max: 99 (20 Aug 2023 11:24)  HR: 60 (21 Aug 2023 04:55) (60 - 71)  BP: 129/73 (21 Aug 2023 04:55) (108/70 - 177/86)  BP(mean): --  RR: 18 (21 Aug 2023 04:55) (18 - 18)  SpO2: 98% (21 Aug 2023 04:55) (84% - 98%)    Parameters below as of 21 Aug 2023 04:55  Patient On (Oxygen Delivery Method): room air  O2 Flow (L/min): 2        23 @ 07:01  -  23 @ 06:09  --------------------------------------------------------  IN: 690 mL / OUT: 0 mL / NET: 690 mL              LABS:                        12.1   16.67 )-----------( 225      ( 20 Aug 2023 11:55 )             39.5     08-20    144  |  107  |  16  ----------------------------<  130<H>  3.3<L>   |  29  |  0.85    Ca    9.0      20 Aug 2023 11:55    TPro  7.4  /  Alb  3.4  /  TBili  1.7<H>  /  DBili  x   /  AST  23  /  ALT  25  /  AlkPhos  124<H>      PT/INR - ( 20 Aug 2023 11:55 )   PT: 15.0 sec;   INR: 1.29 ratio         PTT - ( 20 Aug 2023 11:55 )  PTT:30.0 sec  Urinalysis Basic - ( 20 Aug 2023 11:55 )    Color: Yellow / Appearance: Turbid / S.012 / pH: x  Gluc: 130 mg/dL / Ketone: Negative mg/dL  / Bili: Negative / Urobili: 1.0 mg/dL   Blood: x / Protein: 100 mg/dL / Nitrite: Negative   Leuk Esterase: Large / RBC: 25 /HPF / WBC Too Numerous to count /HPF   Sq Epi: x / Non Sq Epi: x / Bacteria: Few /HPF            WBC:  WBC Count: 16.67 K/uL ( @ 11:55)      MICROBIOLOGY:  RECENT CULTURES:   .Blood Blood-Peripheral XXXX   Growth in aerobic bottle: Gram Negative Rods   Growth in aerobic bottle: Gram Negative Rods     .Blood Blood-Peripheral Blood Culture PCR   Growth in aerobic and anaerobic bottles: Gram Negative Rods   Growth in aerobic and anaerobic bottles: Gram Negative Rods  Direct identification is available within approximately 3-5  hours either by Blood Panel Multiplexed PCR or Direct  MALDI-TOF. Details: [email or web address]                PT/INR - ( 20 Aug 2023 11:55 )   PT: 15.0 sec;   INR: 1.29 ratio         PTT - ( 20 Aug 2023 11:55 )  PTT:30.0 sec    Sodium:  Sodium: 144 mmol/L ( @ 11:55)      0.85 mg/dL  @ 11:55      Hemoglobin:  Hemoglobin: 12.1 g/dL ( @ 11:55)      Platelets: Platelet Count - Automated: 225 K/uL ( @ 11:55)      LIVER FUNCTIONS - ( 20 Aug 2023 11:55 )  Alb: 3.4 g/dL / Pro: 7.4 g/dL / ALK PHOS: 124 U/L / ALT: 25 U/L / AST: 23 U/L / GGT: x             Urinalysis Basic - ( 20 Aug 2023 11:55 )    Color: Yellow / Appearance: Turbid / S.012 / pH: x  Gluc: 130 mg/dL / Ketone: Negative mg/dL  / Bili: Negative / Urobili: 1.0 mg/dL   Blood: x / Protein: 100 mg/dL / Nitrite: Negative   Leuk Esterase: Large / RBC: 25 /HPF / WBC Too Numerous to count /HPF   Sq Epi: x / Non Sq Epi: x / Bacteria: Few /HPF        RADIOLOGY & ADDITIONAL STUDIES:      MICROBIOLOGY:  RECENT CULTURES:   .Blood Blood-Peripheral XXXX   Growth in aerobic bottle: Gram Negative Rods   Growth in aerobic bottle: Gram Negative Rods     .Blood Blood-Peripheral Blood Culture PCR   Growth in aerobic and anaerobic bottles: Gram Negative Rods   Growth in aerobic and anaerobic bottles: Gram Negative Rods  Direct identification is available within approximately 3-5  hours either by Blood Panel Multiplexed PCR or Direct  MALDI-TOF. Details: [email or web address]                 CHIEF COMPLAINT/ REASON FOR VISIT  .. Patient was seen to address the  issue listed under PROBLEM LIST which is located toward bottom of this note     DEACON ROBERT    PLV 1EAS 106 W1    Allergies    penicillin (Unknown)    Intolerances        PAST MEDICAL & SURGICAL HISTORY:  HTN (hypertension)      Afib      Spinal stenosis      PFO (patent foramen ovale)      Degeneration macular      Osteoporosis      History of complete heart block      Hypothyroidism      Cardiac pacemaker          FAMILY HISTORY:      Home Medications:  cefTRIAXone: 1 gram(s) intravenous once a day (2023 17:06)  Eliquis 2.5 mg oral tablet: 1 tab(s) orally 2 times a day (2023 07:49)  levothyroxine 88 mcg (0.088 mg) oral tablet: 1 tab(s) orally once a day (2023 07:49)      MEDICATIONS  (STANDING):  apixaban 2.5 milliGRAM(s) Oral two times a day  benzonatate 100 milliGRAM(s) Oral every 8 hours  cefepime   IVPB 2000 milliGRAM(s) IV Intermittent every 12 hours  dextrose 5% + sodium chloride 0.45% with potassium chloride 20 mEq/L 1000 milliLiter(s) (50 mL/Hr) IV Continuous <Continuous>  lactobacillus acidophilus 1 Tablet(s) Oral every 12 hours  levothyroxine 88 MICROGram(s) Oral daily  pantoprazole    Tablet 40 milliGRAM(s) Oral daily    MEDICATIONS  (PRN):  acetaminophen     Tablet .. 650 milliGRAM(s) Oral every 6 hours PRN Temp greater or equal to 38C (100.4F), Mild Pain (1 - 3)  albuterol/ipratropium for Nebulization 3 milliLiter(s) Nebulizer every 6 hours PRN Shortness of Breath and/or Wheezing  aluminum hydroxide/magnesium hydroxide/simethicone Suspension 30 milliLiter(s) Oral every 4 hours PRN Dyspepsia  guaiFENesin Oral Liquid (Sugar-Free) 200 milliGRAM(s) Oral every 6 hours PRN Cough  melatonin 3 milliGRAM(s) Oral at bedtime PRN Insomnia  ondansetron Injectable 4 milliGRAM(s) IV Push every 8 hours PRN Nausea and/or Vomiting              Vital Signs Last 24 Hrs  T(C): 36.3 (21 Aug 2023 04:55), Max: 37.2 (20 Aug 2023 11:24)  T(F): 97.4 (21 Aug 2023 04:55), Max: 99 (20 Aug 2023 11:24)  HR: 60 (21 Aug 2023 04:55) (60 - 71)  BP: 129/73 (21 Aug 2023 04:55) (108/70 - 177/86)  BP(mean): --  RR: 18 (21 Aug 2023 04:55) (18 - 18)  SpO2: 98% (21 Aug 2023 04:55) (84% - 98%)    Parameters below as of 21 Aug 2023 04:55  Patient On (Oxygen Delivery Method): room air  O2 Flow (L/min): 2        23 @ 07:01  -  23 @ 06:09  --------------------------------------------------------  IN: 690 mL / OUT: 0 mL / NET: 690 mL              LABS:                        12.1   16.67 )-----------( 225      ( 20 Aug 2023 11:55 )             39.5     08-20    144  |  107  |  16  ----------------------------<  130<H>  3.3<L>   |  29  |  0.85    Ca    9.0      20 Aug 2023 11:55    TPro  7.4  /  Alb  3.4  /  TBili  1.7<H>  /  DBili  x   /  AST  23  /  ALT  25  /  AlkPhos  124<H>      PT/INR - ( 20 Aug 2023 11:55 )   PT: 15.0 sec;   INR: 1.29 ratio         PTT - ( 20 Aug 2023 11:55 )  PTT:30.0 sec  Urinalysis Basic - ( 20 Aug 2023 11:55 )    Color: Yellow / Appearance: Turbid / S.012 / pH: x  Gluc: 130 mg/dL / Ketone: Negative mg/dL  / Bili: Negative / Urobili: 1.0 mg/dL   Blood: x / Protein: 100 mg/dL / Nitrite: Negative   Leuk Esterase: Large / RBC: 25 /HPF / WBC Too Numerous to count /HPF   Sq Epi: x / Non Sq Epi: x / Bacteria: Few /HPF            WBC:  WBC Count: 16.67 K/uL ( @ 11:55)      MICROBIOLOGY:  RECENT CULTURES:   .Blood Blood-Peripheral XXXX   Growth in aerobic bottle: Gram Negative Rods   Growth in aerobic bottle: Gram Negative Rods     .Blood Blood-Peripheral Blood Culture PCR   Growth in aerobic and anaerobic bottles: Gram Negative Rods   Growth in aerobic and anaerobic bottles: Gram Negative Rods  Direct identification is available within approximately 3-5  hours either by Blood Panel Multiplexed PCR or Direct  MALDI-TOF. Details: [email or web address]                PT/INR - ( 20 Aug 2023 11:55 )   PT: 15.0 sec;   INR: 1.29 ratio         PTT - ( 20 Aug 2023 11:55 )  PTT:30.0 sec    Sodium:  Sodium: 144 mmol/L ( @ 11:55)      0.85 mg/dL  @ 11:55      Hemoglobin:  Hemoglobin: 12.1 g/dL ( @ 11:55)      Platelets: Platelet Count - Automated: 225 K/uL ( @ 11:55)      LIVER FUNCTIONS - ( 20 Aug 2023 11:55 )  Alb: 3.4 g/dL / Pro: 7.4 g/dL / ALK PHOS: 124 U/L / ALT: 25 U/L / AST: 23 U/L / GGT: x             Urinalysis Basic - ( 20 Aug 2023 11:55 )    Color: Yellow / Appearance: Turbid / S.012 / pH: x  Gluc: 130 mg/dL / Ketone: Negative mg/dL  / Bili: Negative / Urobili: 1.0 mg/dL   Blood: x / Protein: 100 mg/dL / Nitrite: Negative   Leuk Esterase: Large / RBC: 25 /HPF / WBC Too Numerous to count /HPF   Sq Epi: x / Non Sq Epi: x / Bacteria: Few /HPF        RADIOLOGY & ADDITIONAL STUDIES:      MICROBIOLOGY:  RECENT CULTURES:   .Blood Blood-Peripheral XXXX   Growth in aerobic bottle: Gram Negative Rods   Growth in aerobic bottle: Gram Negative Rods     .Blood Blood-Peripheral Blood Culture PCR   Growth in aerobic and anaerobic bottles: Gram Negative Rods   Growth in aerobic and anaerobic bottles: Gram Negative Rods  Direct identification is available within approximately 3-5  hours either by Blood Panel Multiplexed PCR or Direct  MALDI-TOF. Details: [email or web address]                 CHIEF COMPLAINT/ REASON FOR VISIT  .. Patient was seen to address the  issue listed under PROBLEM LIST which is located toward bottom of this note     DEACON ROBERT    PLV 1EAS 106 W1    Allergies    penicillin (Unknown)    Intolerances        PAST MEDICAL & SURGICAL HISTORY:  HTN (hypertension)      Afib      Spinal stenosis      PFO (patent foramen ovale)      Degeneration macular      Osteoporosis      History of complete heart block      Hypothyroidism      Cardiac pacemaker          FAMILY HISTORY:      Home Medications:  cefTRIAXone: 1 gram(s) intravenous once a day (2023 17:06)  Eliquis 2.5 mg oral tablet: 1 tab(s) orally 2 times a day (2023 07:49)  levothyroxine 88 mcg (0.088 mg) oral tablet: 1 tab(s) orally once a day (2023 07:49)      MEDICATIONS  (STANDING):  apixaban 2.5 milliGRAM(s) Oral two times a day  benzonatate 100 milliGRAM(s) Oral every 8 hours  cefepime   IVPB 2000 milliGRAM(s) IV Intermittent every 12 hours  dextrose 5% + sodium chloride 0.45% with potassium chloride 20 mEq/L 1000 milliLiter(s) (50 mL/Hr) IV Continuous <Continuous>  lactobacillus acidophilus 1 Tablet(s) Oral every 12 hours  levothyroxine 88 MICROGram(s) Oral daily  pantoprazole    Tablet 40 milliGRAM(s) Oral daily    MEDICATIONS  (PRN):  acetaminophen     Tablet .. 650 milliGRAM(s) Oral every 6 hours PRN Temp greater or equal to 38C (100.4F), Mild Pain (1 - 3)  albuterol/ipratropium for Nebulization 3 milliLiter(s) Nebulizer every 6 hours PRN Shortness of Breath and/or Wheezing  aluminum hydroxide/magnesium hydroxide/simethicone Suspension 30 milliLiter(s) Oral every 4 hours PRN Dyspepsia  guaiFENesin Oral Liquid (Sugar-Free) 200 milliGRAM(s) Oral every 6 hours PRN Cough  melatonin 3 milliGRAM(s) Oral at bedtime PRN Insomnia  ondansetron Injectable 4 milliGRAM(s) IV Push every 8 hours PRN Nausea and/or Vomiting              Vital Signs Last 24 Hrs  T(C): 36.3 (21 Aug 2023 04:55), Max: 37.2 (20 Aug 2023 11:24)  T(F): 97.4 (21 Aug 2023 04:55), Max: 99 (20 Aug 2023 11:24)  HR: 60 (21 Aug 2023 04:55) (60 - 71)  BP: 129/73 (21 Aug 2023 04:55) (108/70 - 177/86)  BP(mean): --  RR: 18 (21 Aug 2023 04:55) (18 - 18)  SpO2: 98% (21 Aug 2023 04:55) (84% - 98%)    Parameters below as of 21 Aug 2023 04:55  Patient On (Oxygen Delivery Method): room air  O2 Flow (L/min): 2        23 @ 07:01  -  23 @ 06:09  --------------------------------------------------------  IN: 690 mL / OUT: 0 mL / NET: 690 mL              LABS:                        12.1   16.67 )-----------( 225      ( 20 Aug 2023 11:55 )             39.5     08-20    144  |  107  |  16  ----------------------------<  130<H>  3.3<L>   |  29  |  0.85    Ca    9.0      20 Aug 2023 11:55    TPro  7.4  /  Alb  3.4  /  TBili  1.7<H>  /  DBili  x   /  AST  23  /  ALT  25  /  AlkPhos  124<H>      PT/INR - ( 20 Aug 2023 11:55 )   PT: 15.0 sec;   INR: 1.29 ratio         PTT - ( 20 Aug 2023 11:55 )  PTT:30.0 sec  Urinalysis Basic - ( 20 Aug 2023 11:55 )    Color: Yellow / Appearance: Turbid / S.012 / pH: x  Gluc: 130 mg/dL / Ketone: Negative mg/dL  / Bili: Negative / Urobili: 1.0 mg/dL   Blood: x / Protein: 100 mg/dL / Nitrite: Negative   Leuk Esterase: Large / RBC: 25 /HPF / WBC Too Numerous to count /HPF   Sq Epi: x / Non Sq Epi: x / Bacteria: Few /HPF            WBC:  WBC Count: 16.67 K/uL ( @ 11:55)      MICROBIOLOGY:  RECENT CULTURES:   .Blood Blood-Peripheral XXXX   Growth in aerobic bottle: Gram Negative Rods   Growth in aerobic bottle: Gram Negative Rods     .Blood Blood-Peripheral Blood Culture PCR   Growth in aerobic and anaerobic bottles: Gram Negative Rods   Growth in aerobic and anaerobic bottles: Gram Negative Rods  Direct identification is available within approximately 3-5  hours either by Blood Panel Multiplexed PCR or Direct  MALDI-TOF. Details: [email or web address]                PT/INR - ( 20 Aug 2023 11:55 )   PT: 15.0 sec;   INR: 1.29 ratio         PTT - ( 20 Aug 2023 11:55 )  PTT:30.0 sec    Sodium:  Sodium: 144 mmol/L ( @ 11:55)      0.85 mg/dL  @ 11:55      Hemoglobin:  Hemoglobin: 12.1 g/dL ( @ 11:55)      Platelets: Platelet Count - Automated: 225 K/uL ( @ 11:55)      LIVER FUNCTIONS - ( 20 Aug 2023 11:55 )  Alb: 3.4 g/dL / Pro: 7.4 g/dL / ALK PHOS: 124 U/L / ALT: 25 U/L / AST: 23 U/L / GGT: x             Urinalysis Basic - ( 20 Aug 2023 11:55 )    Color: Yellow / Appearance: Turbid / S.012 / pH: x  Gluc: 130 mg/dL / Ketone: Negative mg/dL  / Bili: Negative / Urobili: 1.0 mg/dL   Blood: x / Protein: 100 mg/dL / Nitrite: Negative   Leuk Esterase: Large / RBC: 25 /HPF / WBC Too Numerous to count /HPF   Sq Epi: x / Non Sq Epi: x / Bacteria: Few /HPF        RADIOLOGY & ADDITIONAL STUDIES:      MICROBIOLOGY:  RECENT CULTURES:   .Blood Blood-Peripheral XXXX   Growth in aerobic bottle: Gram Negative Rods   Growth in aerobic bottle: Gram Negative Rods     .Blood Blood-Peripheral Blood Culture PCR   Growth in aerobic and anaerobic bottles: Gram Negative Rods   Growth in aerobic and anaerobic bottles: Gram Negative Rods  Direct identification is available within approximately 3-5  hours either by Blood Panel Multiplexed PCR or Direct  MALDI-TOF. Details: [email or web address]

## 2023-08-21 NOTE — DIETITIAN INITIAL EVALUATION ADULT - OTHER INFO
89-year-old female with history of hypothyroidism, A-fib on Eliquis, s/p recent pacemaker for complete heart block brought in by ambulance from [facility] assisted living home for cough for the past 2 weeks.     Pt awake/alert/forgetful with daugther at bedside. S/p SLP evaluation (8/21) with recommendation for soft and bite sized diet with thin liquids. Pt tolerating well with fair appetite/po intake. Generally good appetite reported by daughtre however decreased past couple days pta. UBW 120lbs. No significant changes reported.  103.8lbs(8/21). Pts daughter does not believe current weight accurate. Request reweight. Declined oral nutritional supplements. Enjoys protein sources in diet. Will send greek yogurt and malted shakes BID to maximize po intake.  89-year-old female with history of hypothyroidism, A-fib on Eliquis, s/p recent pacemaker for complete heart block brought in by ambulance from [facility] assisted living home for cough for the past 2 weeks.     Pt awake/alert/forgetful with daugther at bedside. S/p SLP evaluation (8/21) with recommendation for soft and bite sized diet with thin liquids. Pt tolerating well with fair appetite/po intake. Generally good appetite reported by daughtre however decreased past couple days pta. UBW 120lbs. No significant changes reported.  103.8lbs(8/21). Pts daughter does not believe current weight accurate. Request reweight. Declined oral nutritional supplements. Enjoys protein sources in diet. Will send greek yogurt and malted shakes BID to maximize po intake.  89-year-old female with history of hypothyroidism, A-fib on Eliquis, s/p recent pacemaker for complete heart block brought in by ambulance from [facility] assisted living home for cough for the past 2 weeks.     Pt awake/alert/forgetful with daugther at bedside. S/p SLP evaluation (8/21) with recommendation for soft and bite sized diet with thin liquids. Pt tolerating well with fair appetite/po intake. Generally good appetite reported by daughtre however decreased past couple days pta. UBW 120lbs. No significant changes reported.  103.8lbs(8/21). Pts daughter does not believe current weight accurate. Request reweight. Declined oral nutritional supplements. Enjoys protein sources in diet. Will send greek yogurt and malted shakes BID to maximize po intake.  89-year-old female with history of hypothyroidism, A-fib on Eliquis, s/p recent pacemaker for complete heart block brought in by ambulance from [facility] assisted living home for cough for the past 2 weeks.     Pt awake/alert/forgetful with daugther at bedside. S/p SLP evaluation (8/21) with recommendation for soft and bite sized diet with thin liquids. Pt tolerating well with fair appetite/po intake. Generally good appetite reported by daughtre however decreased past couple days pta. UBW 120lbs. No significant changes reported.  103.8lbs(8/21). Pts daughter does not believe current weight accurate. Request reweight. Appears closer to current weight. Declined oral nutritional supplements. Enjoys protein sources in diet. Will send greek yogurt and malted shakes BID to maximize po intake.  89-year-old female with history of hypothyroidism, A-fib on Eliquis, s/p recent pacemaker for complete heart block brought in by ambulance from [facility] assisted living home for cough for the past 2 weeks.     Pt awake/alert/forgetful with daugther at bedside. S/p SLP evaluation (8/21) with recommendation for soft and bite sized diet with thin liquids. Pt tolerating well with fair appetite/po intake. Generally good appetite reported by daughtre however decreased past couple days pta. UBW 120lbs. No significant changes reported.  103.8lbs(8/21). Pts daughter does not believe current weight accurate. Request reweight. Appears closer to current weight. Declined oral nutritional supplements. Enjoys protein sources in diet. Will send greek yogurt and malted shakes BID to maximize po intake.  89-year-old female with history of hypothyroidism, A-fib on Eliquis, s/p recent pacemaker for complete heart block brought in by ambulance from [facility] assisted living home for cough for the past 2 weeks.     Pt awake/alert/forgetful with daugther at bedside. S/p SLP evaluation (8/21) with recommendation for soft and bite sized diet with thin liquids. Pt tolerating well with fair appetite/po intake. Generally good appetite reported by daughtre however decreased past couple days pta. UBW 120lbs. No significant changes reported.  103.8lbs(8/21). Pts daughter does not believe current weight accurate. Request reweight. Appears closer to current weight. Declined oral nutritional supplements. Enjoys protein sources in diet. Will send greek yogurt and malted shakes BID to maximize po intake.

## 2023-08-21 NOTE — SWALLOW BEDSIDE ASSESSMENT ADULT - COMMENTS
H&P: 89-year-old female with history of hypothyroidism, A-fib on Eliquis, s/p recent pacemaker for complete heart block brought in by ambulance from [facility] assisted living home for cough for the past 2 weeks.  Associated with generalized weakness and decreased p.o. intake.  No fall or trauma.  Patient states she has had cold symptoms for a while.    CT Chest: Atelectasis vs. infiltrates of lower lobes no clear evidence of pneumonia    Pt is known to this dept. from prior bedside swallow evaluation completed 7/12/23 at which time she was recommended soft and bite sized solids with thin fluids. Please see full report for details    Pt was received seated upright in bed awake, alert, in NAD, eating breakfast, able to follow simple commands and communicated verbally with SLP. Pt denied dysphagia upon probing. H&P: 89-year-old female with history of hypothyroidism, A-fib on Eliquis, s/p recent pacemaker for complete heart block brought in by ambulance from [facility] assisted living home for cough for the past 2 weeks.  Associated with generalized weakness and decreased p.o. intake.  No fall or trauma.  Patient states she has had cold symptoms for a while.    CT Chest: Atelectasis vs. infiltrates of lower lobes no clear evidence of pneumonia    Pt is known to this dept. from prior bedside swallow evaluation completed 7/12/23 at which time she was recommended soft and bite sized solids with thin fluids. Please see full report for details    Pt was received seated upright in bed awake, alert, in NAD, eating breakfast, able to follow simple commands and communicated verbally with SLP. Pt denied dysphagia upon probing. H&P: 89-year-old female with history of hypothyroidism, A-fib on Eliquis, s/p recent pacemaker for complete heart block brought in by ambulance from [facility] assisted living home for cough for the past 2 weeks.  Associated with generalized weakness and decreased p.o. intake.  No fall or trauma.  Patient states she has had cold symptoms for a while.    CT Chest: Atelectasis vs. infiltrates of lower lobes no clear evidence of pneumonia    Pt is known to this dept. from prior bedside swallow evaluation completed 7/12/23 at which time she was recommended soft and bite sized solids with thin fluids. Please see full report for details    Pt was received seated upright in bed awake, alert, in NAD, eating breakfast, able to follow simple commands and communicated verbally with SLP. Pt denied dysphagia upon probing.

## 2023-08-21 NOTE — PATIENT CHOICE NOTE. - NSPTCHOICENOTES_GEN_ALL_CORE
Assisted Living Facility with home care services via [facility] Assisted Living Facility with home care services via [facility]isted Living Facility with home care services via [facility]

## 2023-08-21 NOTE — PROGRESS NOTE ADULT - ASSESSMENT
REASON FOR VISIT  .. Management of problems listed below      PHYSICAL EXAM    HEENT Unremarkable  atraumatic   RESP Fair air entry  Harsh breath sound   CARDIAC S1 S2 No S3     NO JVD    ABDOMEN No hepatosplenomegaly   PEDAL EDEMA present No calf tenderness  NO rash       GENERAL DATA .     GOC.    ..   ICU STAY.   .. none  COVID.   .. scv2 8/21/2023 (-)    BEST PRACTICE ISSUES.    HOB ELEVATN.   .. Yes  DVT PPLX.   ..  8/20/2023 apixaba 2.5 x 2 (af)     SPEECH SWALLOW RECOMMENDATIONS.    ..    8/21/2023 soft bite     DIET.    ..  8/20/2023 soft bite size   IV fl.  .. D5 1/2 ns 20 kcl 50   STRESS ULCER PPLX.   ..    8/20/2023 protonix 40   INFECTION PPLX.   ..     ALLGY.  ..    pncl                     WT.  ..  8/20/2023 56  BMI.  ..   8/20/2023 21    PROCEDURES.  ..     ABGS.   .     VS/ PO/IO/ VENT/ DRIPS.   8/21/2023 afeb 64 120/70   8/21/2023 3l 98%     DOA C/C.     . 8/20/2023 · Chief Complaint Quote sent by [employer] [facility] for cough x 2 weeks. negative cxr and covid swab. poor PO intake today.           INITIAL PRESENTATION.   . 8/20/2023 89-year-old female with history of hypothyroidism, A-fib on Eliquis, recent pacemaker for complete heart block brought in by ambulance from [facility] assisted living home for cough for the past 2 weeks.  Associated with generalized weakness and decreased p.o. intake.  No fall or trauma.  Patient states she has had cold symptoms for a while.  Denies fever, chills, chest pain, shortness of breath, abdominal pain, nausea, vomiting, upper or lower extremity weakness or paresthesias.   pmd: Dr. Crawley, cards: [facility].   . hypothyroidism   . A-fib on Eliquis (hx AFL ablation),  .  PFO  HOME MEDS.   . levoxyl eliquis 2.5 x 2   COURSE.  . 8/20/2023 pulm consultd    . 8/20/2023 5:39 PM ER meds given already include cefepime 2g     PROBLEMS/ASSESSMENT/RECOMMENDATIONS (A/R).  PULMONARY.  . GAS EXCHANGE.  .. A/R.   .. Monitor pulse oximetry and target po 90-95%    . COPD.  .. 8/20/2023 duoneb.4p   .. 8/20/2023 benzonatate 100.3 x3d   .. 8/20/2023 gauiafenesin     INFECTION.  . E coli ESBL CTX M bacteremia 8/20  .. w 8/20-8/21/2023 w 16 - 21  .. pr 8/21/2023 pr 7.5   .. ua 8/20/2023 ua 1012 w tntc l estrase large r 25   .. ct chest 8/20/2023   .... l upper ant cardiac device leads terminating in r atrium and rv   .... no enlarged hilar or mediastinal ln   .... mild doe mod diffuse distention of mid to upper esophagus   .... cm   .... increased density of liver ? amiodarone effect   .... bl lower lobe partial areas of compressive atelectasis sl progressd since ctap 3/4/2023   .... mild bl lower lung areas of linear and compressive atelectasis lower lobes and lingula   .... mild bl upper lobe subsegmental linear and dependent atelectasis   .... nonsp mild interlobular septal thickening   .. bc 8/20 E coli ESBL CTX M bacteremia 8/20   .. rvp 8/20 (-)   .. 8/20/2023 will start empiric levaquin   .. 8/21/2023 message sent to ID to change to carbapenem     . Hemodynamics.  .. la 8/20/2023 la 1.4  .. BP ok      . A fib   .. 8/20/2023 eliquis     . CAD.  .. Tr 8/21/2023 Tr 31     . CHF   .. bnp 8/20/2023 bnp 4545       HEMAT   .. Hb 8/20-8/21/2023 Hb 12 - 10.5   .. plt 8/20/2023 plt 225   .. inr 8/20/2023 inr 129     RENAL   .. Na 8/20/2023 Na 144   .. K 8/20/2023 K 3.3   .. co2 8/20/2023 co2 29   .. Cr 8/20/2023 Cr .8     LFTS   .. AP 8/20/2023 124  .. ast 23  .. alt 25     MAIN ISSUES.  . Cough x 2 wk poa 8/20/2023  . Pneumonia   . E coli ESBL CTX M bacteremia 8/20  .. 8/20/2023 levaquin   .. 8/21/2023 message sent to ID to change to carbapenem   . COPD   . A fib   .. 8/20/2023 apixaba   . CHF     TIME SPENT.   . Over 36 minutes aggregate care time spent on encounter; activities included   direct patient care, counseling and/or coordinating care reviewing notes, lab data/ imaging , discussion with multidisciplinary team/ patient  /family and explaining in detail risks, benefits, alternatives  of the recommendations     JAKOB WORTHY 89 f 8/20/2023 9/22/1929 DR BAM HERRERA      REASON FOR VISIT  .. Management of problems listed below      PHYSICAL EXAM    HEENT Unremarkable  atraumatic   RESP Fair air entry  Harsh breath sound   CARDIAC S1 S2 No S3     NO JVD    ABDOMEN No hepatosplenomegaly   PEDAL EDEMA present No calf tenderness  NO rash       GENERAL DATA .     GOC.    ..   ICU STAY.   .. none  COVID.   .. scv2 8/21/2023 (-)    BEST PRACTICE ISSUES.    HOB ELEVATN.   .. Yes  DVT PPLX.   ..  8/20/2023 apixaba 2.5 x 2 (af)     SPEECH SWALLOW RECOMMENDATIONS.    ..    8/21/2023 soft bite     DIET.    ..  8/20/2023 soft bite size   IV fl.  .. D5 1/2 ns 20 kcl 50   STRESS ULCER PPLX.   ..    8/20/2023 protonix 40   INFECTION PPLX.   ..     ALLGY.  ..    pncl                     WT.  ..  8/20/2023 56  BMI.  ..   8/20/2023 21    PROCEDURES.  ..     ABGS.   .     VS/ PO/IO/ VENT/ DRIPS.   8/21/2023 afeb 64 120/70   8/21/2023 3l 98%     DOA C/C.     . 8/20/2023 · Chief Complaint Quote sent by [employer] [facility] for cough x 2 weeks. negative cxr and covid swab. poor PO intake today.           INITIAL PRESENTATION.   . 8/20/2023 89-year-old female with history of hypothyroidism, A-fib on Eliquis, recent pacemaker for complete heart block brought in by ambulance from [facility] assisted living home for cough for the past 2 weeks.  Associated with generalized weakness and decreased p.o. intake.  No fall or trauma.  Patient states she has had cold symptoms for a while.  Denies fever, chills, chest pain, shortness of breath, abdominal pain, nausea, vomiting, upper or lower extremity weakness or paresthesias.   pmd: Dr. Crawley, cards: [facility].   . hypothyroidism   . A-fib on Eliquis (hx AFL ablation),  .  PFO  HOME MEDS.   . levoxyl eliquis 2.5 x 2   COURSE.  . 8/20/2023 pulm consultd    . 8/20/2023 5:39 PM ER meds given already include cefepime 2g     PROBLEMS/ASSESSMENT/RECOMMENDATIONS (A/R).  PULMONARY.  . GAS EXCHANGE.  .. A/R.   .. Monitor pulse oximetry and target po 90-95%    . COPD.  .. 8/20/2023 duoneb.4p   .. 8/20/2023 benzonatate 100.3 x3d   .. 8/20/2023 gauiafenesin     INFECTION.  . E coli ESBL CTX M bacteremia 8/20  .. w 8/20-8/21/2023 w 16 - 21  .. pr 8/21/2023 pr 7.5   .. ua 8/20/2023 ua 1012 w tntc l estrase large r 25   .. ct chest 8/20/2023   .... l upper ant cardiac device leads terminating in r atrium and rv   .... no enlarged hilar or mediastinal ln   .... mild doe mod diffuse distention of mid to upper esophagus   .... cm   .... increased density of liver ? amiodarone effect   .... bl lower lobe partial areas of compressive atelectasis sl progressd since ctap 3/4/2023   .... mild bl lower lung areas of linear and compressive atelectasis lower lobes and lingula   .... mild bl upper lobe subsegmental linear and dependent atelectasis   .... nonsp mild interlobular septal thickening   .. bc 8/20 E coli ESBL CTX M bacteremia 8/20   .. rvp 8/20 (-)   .. 8/20/2023 will start empiric levaquin   .. 8/21/2023 message sent to ID to change to carbapenem     . Hemodynamics.  .. la 8/20/2023 la 1.4  .. BP ok      . A fib   .. 8/20/2023 eliquis     . CAD.  .. Tr 8/21/2023 Tr 31     . CHF   .. bnp 8/20/2023 bnp 4545       HEMAT   .. Hb 8/20-8/21/2023 Hb 12 - 10.5   .. plt 8/20/2023 plt 225   .. inr 8/20/2023 inr 129     RENAL   .. Na 8/20/2023 Na 144   .. K 8/20/2023 K 3.3   .. co2 8/20/2023 co2 29   .. Cr 8/20/2023 Cr .8     LFTS   .. AP 8/20/2023 124  .. ast 23  .. alt 25     MAIN ISSUES.  . Cough x 2 wk poa 8/20/2023  . Pneumonia   . E coli ESBL CTX M bacteremia 8/20  .. 8/20/2023 levaquin   .. 8/21/2023 message sent to ID to change to carbapenem   . COPD   . A fib   .. 8/20/2023 apixaba   . CHF     TIME SPENT.   . Over 36 minutes aggregate care time spent on encounter; activities included   direct patient care, counseling and/or coordinating care reviewing notes, lab data/ imaging , discussion with multidisciplinary team/ patient  /family and explaining in detail risks, benefits, alternatives  of the recommendations     JAKOB WORTHY 89 f 8/20/2023 9/22/1929 DR BAM HERRERA      REASON FOR VISIT  .. Management of problems listed below      PHYSICAL EXAM    HEENT Unremarkable  atraumatic   RESP Fair air entry  Harsh breath sound   CARDIAC S1 S2 No S3     NO JVD    ABDOMEN No hepatosplenomegaly   PEDAL EDEMA present No calf tenderness  NO rash       GENERAL DATA .     GOC.    ..   ICU STAY.   .. none  COVID.   .. scv2 8/21/2023 (-)    BEST PRACTICE ISSUES.    HOB ELEVATN.   .. Yes  DVT PPLX.   ..  8/20/2023 apixaba 2.5 x 2 (af)     SPEECH SWALLOW RECOMMENDATIONS.    ..    8/21/2023 soft bite     DIET.    ..  8/20/2023 soft bite size   IV fl.  .. D5 1/2 ns 20 kcl 50   STRESS ULCER PPLX.   ..    8/20/2023 protonix 40   INFECTION PPLX.   ..     ALLGY.  ..    pncl                     WT.  ..  8/20/2023 56  BMI.  ..   8/20/2023 21    PROCEDURES.  ..     ABGS.   .     VS/ PO/IO/ VENT/ DRIPS.   8/21/2023 afeb 64 120/70   8/21/2023 3l 98%     DOA C/C.     . 8/20/2023 · Chief Complaint Quote sent by [employer] [facility] for cough x 2 weeks. negative cxr and covid swab. poor PO intake today.           INITIAL PRESENTATION.   . 8/20/2023 89-year-old female with history of hypothyroidism, A-fib on Eliquis, recent pacemaker for complete heart block brought in by ambulance from [facility] assisted living home for cough for the past 2 weeks.  Associated with generalized weakness and decreased p.o. intake.  No fall or trauma.  Patient states she has had cold symptoms for a while.  Denies fever, chills, chest pain, shortness of breath, abdominal pain, nausea, vomiting, upper or lower extremity weakness or paresthesias.   pmd: Dr. Crawley, cards: [facility].   . hypothyroidism   . A-fib on Eliquis (hx AFL ablation),  .  PFO  HOME MEDS.   . levoxyl eliquis 2.5 x 2   COURSE.  . 8/20/2023 pulm consultd    . 8/20/2023 5:39 PM ER meds given already include cefepime 2g     PROBLEMS/ASSESSMENT/RECOMMENDATIONS (A/R).  PULMONARY.  . GAS EXCHANGE.  .. A/R.   .. Monitor pulse oximetry and target po 90-95%    . COPD.  .. 8/20/2023 duoneb.4p   .. 8/20/2023 benzonatate 100.3 x3d   .. 8/20/2023 gauiafenesin     INFECTION.  . E coli ESBL CTX M bacteremia 8/20  .. w 8/20-8/21/2023 w 16 - 21  .. pr 8/21/2023 pr 7.5   .. ua 8/20/2023 ua 1012 w tntc l estrase large r 25   .. ct chest 8/20/2023   .... l upper ant cardiac device leads terminating in r atrium and rv   .... no enlarged hilar or mediastinal ln   .... mild doe mod diffuse distention of mid to upper esophagus   .... cm   .... increased density of liver ? amiodarone effect   .... bl lower lobe partial areas of compressive atelectasis sl progressd since ctap 3/4/2023   .... mild bl lower lung areas of linear and compressive atelectasis lower lobes and lingula   .... mild bl upper lobe subsegmental linear and dependent atelectasis   .... nonsp mild interlobular septal thickening   .. bc 8/20 E coli ESBL CTX M bacteremia 8/20   .. rvp 8/20 (-)   .. 8/20/2023 will start empiric levaquin   .. 8/21/2023 message sent to ID to change to carbapenem     . Hemodynamics.  .. la 8/20/2023 la 1.4  .. BP ok      . A fib   .. 8/20/2023 eliquis     . CAD.  .. Tr 8/21/2023 Tr 31     . CHF   .. bnp 8/20/2023 bnp 4545       HEMAT   .. Hb 8/20-8/21/2023 Hb 12 - 10.5   .. plt 8/20/2023 plt 225   .. inr 8/20/2023 inr 129     RENAL   .. Na 8/20/2023 Na 144   .. K 8/20/2023 K 3.3   .. co2 8/20/2023 co2 29   .. Cr 8/20/2023 Cr .8     LFTS   .. AP 8/20/2023 124  .. ast 23  .. alt 25     MAIN ISSUES.  . Cough x 2 wk poa 8/20/2023  . Pneumonia   . E coli ESBL CTX M bacteremia 8/20  .. 8/20/2023 levaquin   .. 8/21/2023 message sent to ID to change to carbapenem   . COPD   . A fib   .. 8/20/2023 apixaba   . CHF     TIME SPENT.   . Over 36 minutes aggregate care time spent on encounter; activities included   direct patient care, counseling and/or coordinating care reviewing notes, lab data/ imaging , discussion with multidisciplinary team/ patient  /family and explaining in detail risks, benefits, alternatives  of the recommendations     JAKOB WORTHY 89 f 8/20/2023 9/22/1929 DR BAM HERRERA

## 2023-08-21 NOTE — CARE COORDINATION ASSESSMENT. - NSCAREPROVIDERS_GEN_ALL_CORE_FT
CARE PROVIDERS:  Accepting Physician: Bisi Soliz  Administration: Annemarie Keith  Admitting: Bisi Soliz  Attending: Bisi Soliz  Cardiology Technician: Coco Hardwick  Case Management: Paty Mayo  Case Management: Brittany Kelly  Clinical Doc. Improvement: Adina Long  Consultant: Rei Reich  Consultant: Edwardo Gonzalez  Consultant: Rosas Grant  Covering Team: Ita Alford  Covering Team: Monica Moeller  ED ACP: Shira Kenny  ED Attending: Pop Felipe  ED Nurse: Asim Rendon  Emergency Medicine: Pop Felipe  Nurse: Geraldine Ovalles  Nurse: Ayad Hernández  Ordered: ADM, User  Ordered: ServiceAccount, [facility]  Outpatient Provider: Pahlavan, Mohsen  Outpatient Provider: Parish Serra  Override: Lina Crespo  PCA/Nursing Assistant: Talita Mercer  Physical Therapy: Faith Gordon  Primary Team: Bird Tilley  Primary Team: Josh Hester  Registered Dietitian: Isabel Ren  Registered Dietitian: Hannah Galaviz  : Alyssa Harrison  Speech Pathology: Maryann Rodriguez  Speech Pathology: Sunni Barnes  Team: [employer] [facility], Team   CARE PROVIDERS:  Accepting Physician: Bisi Soliz  Administration: Annemarie Keith  Admitting: Bisi Soliz  Attending: Bisi Soliz  Cardiology Technician: Coco Hardwick  Case Management: Paty Mayo  Case Management: Brittany Kelly  Clinical Doc. Improvement: Adina Long  Consultant: Rei Reich  Consultant: Edwardo Gonzalez  Consultant: Rosas Grant  Covering Team: Ita Alford  Covering Team: Monica Moeller  ED ACP: Shira Kenny  ED Attending: Pop Felipe  ED Nurse: Asim Rendon  Emergency Medicine: Pop Felipe  Nurse: Geraldine Ovalles  Nurse: Ayad Hernández  Ordered: ADM, User  Ordered: ServiceAccount, [facility]  Outpatient Provider: Pahlavan, Mohsen  Outpatient Provider: Parish Serra  Override: Lina Crespo  PCA/Nursing Assistant: Talita Mercer  Physical Therapy: Faith Gordon  Primary Team: Bird Tilley  Primary Team: Josh Hester  Registered Dietitian: Isabel Ren  Registered Dietitian: Hannah Galaviz  : Alyssa Harrison  Speech Pathology: Maryann Rodriguez  Speech Pathology: Sunni Barnes  Team: [place] [employer] [facility], Team   CARE PROVIDERS:  Accepting Physician: Bisi Soliz  Administration: Annemarie Keith  Admitting: Bisi Soilz  Attending: Bisi Soliz  Cardiology Technician: Coco Hardwick  Case Management: Paty Mayo  Case Management: Brittany Kelly  Clinical Doc. Improvement: Adina Long  Consultant: Rei Reich  Consultant: Edwardo Gonzalez  Consultant: Rosas Grant  Covering Team: Ita Alford  Covering Team: Monica Moeller  ED ACP: Shira Kenny  ED Attending: Pop Felipe  ED Nurse: Asim Rendon  Emergency Medicine: Pop Felipe  Nurse: Geraldine Ovalles  Nurse: Ayad Hernández  Ordered: ADM, User  Ordered: ServiceAccount, [facility]  Outpatient Provider: Pahlavan, Mohsen  Outpatient Provider: Parish Serra  Override: Lina Crespo  PCA/Nursing Assistant: Talita Mercer  Physical Therapy: Faith Gordon  Primary Team: Bird Tilley  Primary Team: Josh Hester  Registered Dietitian: Isabel Ren  Registered Dietitian: Hannah Galaviz  : Alyssa Harrison  Speech Pathology: Maryann Rodriguez  Speech Pathology: Sunni Barnes  Team: [place] [employer] [facility], Team

## 2023-08-21 NOTE — CARE COORDINATION ASSESSMENT. - PRO ARRIVE FROM
The [facility] [phone]/assisted living facility [facility] [phone]/assisted living facility [facility] [phone]/assisted living facility

## 2023-08-21 NOTE — DIETITIAN INITIAL EVALUATION ADULT - PERTINENT MEDS FT
MEDICATIONS  (STANDING):  apixaban 2.5 milliGRAM(s) Oral two times a day  benzonatate 100 milliGRAM(s) Oral every 8 hours  cefepime   IVPB 2000 milliGRAM(s) IV Intermittent every 12 hours  dextrose 5% + sodium chloride 0.45% with potassium chloride 20 mEq/L 1000 milliLiter(s) (50 mL/Hr) IV Continuous <Continuous>  lactobacillus acidophilus 1 Tablet(s) Oral every 12 hours  levothyroxine 88 MICROGram(s) Oral daily  pantoprazole    Tablet 40 milliGRAM(s) Oral daily    MEDICATIONS  (PRN):  acetaminophen     Tablet .. 650 milliGRAM(s) Oral every 6 hours PRN Temp greater or equal to 38C (100.4F), Mild Pain (1 - 3)  albuterol/ipratropium for Nebulization 3 milliLiter(s) Nebulizer every 6 hours PRN Shortness of Breath and/or Wheezing  aluminum hydroxide/magnesium hydroxide/simethicone Suspension 30 milliLiter(s) Oral every 4 hours PRN Dyspepsia  guaiFENesin Oral Liquid (Sugar-Free) 200 milliGRAM(s) Oral every 6 hours PRN Cough  melatonin 3 milliGRAM(s) Oral at bedtime PRN Insomnia  ondansetron Injectable 4 milliGRAM(s) IV Push every 8 hours PRN Nausea and/or Vomiting

## 2023-08-21 NOTE — PROGRESS NOTE ADULT - SUBJECTIVE AND OBJECTIVE BOX
PROGRESS NOTE  Patient is a 89y old  Female who presents with a chief complaint of Pneumonia due to infectious organism     (21 Aug 2023 14:05)    Chart and available morning labs /imaging are reviewed electronically , urgent issues addressed . More information  is being added upon completion of rounds , when more information is collected and management discussed with consultants , medical staff and social service/case management on the floor   OVERNIGHT  No new issues reported by medical staff . All above noted Patient is resting in a bed comfortably  .No distress noted     HPI:  89-year-old female with history of hypothyroidism, A-fib on Eliquis, s/p recent pacemaker for complete heart block brought in by ambulance from [facility] living [place] for cough for the past 2 weeks.  Associated with generalized weakness and decreased p.o. intake.  No fall or trauma.  Patient states she has had cold symptoms for a while.  Denies fever, chills, chest pain, shortness of breath, abdominal pain, nausea, vomiting, upper or lower extremity weakness or paresthesias. pmd: Dr. Crawley, cards: [facility] Seen by card Dr Reich Admitted  to telemetry unit for monitoring , send 3 sets of cardiac enzymes to rule out acute coronary event, obtain ECHO to evaluate LVEF, cardiology consult  ,continue current management, O2 supply, anticoagulation plan as per cardiology consult Pulm cons requested , antitussives and nebulized bd ordered .Also UTI suspected and started on iv abx , id cons called Palliative care consult requested ,to discuss advance directives and complete MOLST  (20 Aug 2023 15:35)    PAST MEDICAL & SURGICAL HISTORY:  HTN (hypertension)      Afib      Spinal stenosis      PFO (patent foramen ovale)      Degeneration macular      Osteoporosis      History of complete heart block      Hypothyroidism      Cardiac pacemaker          MEDICATIONS  (STANDING):  apixaban 2.5 milliGRAM(s) Oral two times a day  benzonatate 100 milliGRAM(s) Oral every 8 hours  dextrose 5% + sodium chloride 0.45% with potassium chloride 20 mEq/L 1000 milliLiter(s) (50 mL/Hr) IV Continuous <Continuous>  ertapenem  IVPB 1000 milliGRAM(s) IV Intermittent every 24 hours  lactobacillus acidophilus 1 Tablet(s) Oral every 12 hours  levothyroxine 88 MICROGram(s) Oral daily  pantoprazole    Tablet 40 milliGRAM(s) Oral daily    MEDICATIONS  (PRN):  acetaminophen     Tablet .. 650 milliGRAM(s) Oral every 6 hours PRN Temp greater or equal to 38C (100.4F), Mild Pain (1 - 3)  albuterol/ipratropium for Nebulization 3 milliLiter(s) Nebulizer every 6 hours PRN Shortness of Breath and/or Wheezing  aluminum hydroxide/magnesium hydroxide/simethicone Suspension 30 milliLiter(s) Oral every 4 hours PRN Dyspepsia  guaiFENesin Oral Liquid (Sugar-Free) 200 milliGRAM(s) Oral every 6 hours PRN Cough  melatonin 3 milliGRAM(s) Oral at bedtime PRN Insomnia  ondansetron Injectable 4 milliGRAM(s) IV Push every 8 hours PRN Nausea and/or Vomiting      OBJECTIVE    T(C): 36.7 (08-21-23 @ 13:48), Max: 36.8 (08-20-23 @ 20:15)  HR: 63 (08-21-23 @ 13:48) (60 - 71)  BP: 148/76 (08-21-23 @ 13:48) (108/70 - 148/76)  RR: 18 (08-21-23 @ 13:48) (18 - 18)  SpO2: 100% (08-21-23 @ 13:48) (84% - 100%)  Wt(kg): --  I&O's Summary    20 Aug 2023 07:01  -  21 Aug 2023 07:00  --------------------------------------------------------  IN: 790 mL / OUT: 0 mL / NET: 790 mL          REVIEW OF SYSTEMS:  CONSTITUTIONAL: No fever, weight loss, or fatigue  EYES: No eye pain, visual disturbances, or discharge  ENMT:   No sinus or throat pain  NECK: No pain or stiffness  RESPIRATORY: No cough, wheezing, chills or hemoptysis; No shortness of breath  CARDIOVASCULAR: No chest pain, palpitations, dizziness, or leg swelling  GASTROINTESTINAL: No abdominal pain. No nausea, vomiting; No diarrhea or constipation. No melena or hematochezia.  GENITOURINARY: No dysuria, frequency, hematuria, or incontinence  NEUROLOGICAL: No headaches, memory loss, loss of strength, numbness, or tremors  SKIN: No itching, burning, rashes, or lesions   MUSCULOSKELETAL: No joint pain or swelling; No muscle, back, or extremity pain    PHYSICAL EXAM:  Appearance: NAD. VS past 24 hrs -as above   HEENT:   Moist oral mucosa. Conjunctiva clear b/l.   Neck : supple  Respiratory: Lungs CTAB.  Gastrointestinal:  Soft, nontender. No rebound. No rigidity. BS present	  Cardiovascular: RRR ,S1S2 present  Neurologic: Non-focal. Moving all extremities.  Extremities: No edema. No erythema. No calf tenderness.  Skin: No rashes, No ecchymoses, No cyanosis.	  wounds ,skin lesions-See skin assesment flow sheet   LABS:                        10.5   21.39 )-----------( 175      ( 21 Aug 2023 06:24 )             34.7     08-21    141  |  109<H>  |  20  ----------------------------<  114<H>  3.8   |  29  |  0.86    Ca    8.3<L>      21 Aug 2023 06:24  Phos  3.7     08-21  Mg     1.9     08-21    TPro  6.1  /  Alb  2.6<L>  /  TBili  1.1  /  DBili  x   /  AST  40<H>  /  ALT  37  /  AlkPhos  95  08-21    CAPILLARY BLOOD GLUCOSE        PT/INR - ( 21 Aug 2023 06:24 )   PT: 17.7 sec;   INR: 1.53 ratio         PTT - ( 20 Aug 2023 11:55 )  PTT:30.0 sec  Urinalysis Basic - ( 21 Aug 2023 06:24 )    Color: x / Appearance: x / SG: x / pH: x  Gluc: 114 mg/dL / Ketone: x  / Bili: x / Urobili: x   Blood: x / Protein: x / Nitrite: x   Leuk Esterase: x / RBC: x / WBC x   Sq Epi: x / Non Sq Epi: x / Bacteria: x        Culture - Blood (collected 20 Aug 2023 11:50)  Source: .Blood Blood-Peripheral  Gram Stain (21 Aug 2023 02:35):    Growth in aerobic bottle: Gram Negative Rods  Preliminary Report (21 Aug 2023 02:35):    Growth in aerobic bottle: Gram Negative Rods    Culture - Blood (collected 20 Aug 2023 11:45)  Source: .Blood Blood-Peripheral  Gram Stain (21 Aug 2023 02:35):    Growth in aerobic and anaerobic bottles: Gram Negative Rods  Preliminary Report (21 Aug 2023 02:35):    Growth in aerobic and anaerobic bottles: Gram Negative Rods    Direct identification is available within approximately 3-5    hours either by Blood Panel Multiplexed PCR or Direct    MALDI-TOF. Details: [email or web address]  Organism: Blood Culture PCR (21 Aug 2023 04:16)  Organism: Blood Culture PCR (21 Aug 2023 04:16)  RADIOLOGY & ADDITIONAL TESTS:   reviewed elctronically  25 minutes aggregate time was spent on this visit, 50% visit time spent in care co-ordination with other attendings and counselling patient .I have discussed care plan with patient / HCP/family member ,who expressed understanding of problems treatment and their effect and side effects, alternatives in details. I have asked if they have any questions and concerns and appropriately addressed them to best of my ability. ACP-Advance care planning was discussed , pallitaive care issues ,CMO ,GOC ,MOLST  form ,advance directives were reviewed .All questions were answered to the best of my knowledge - 25 m PROGRESS NOTE  Patient is a 89y old  Female who presents with a chief complaint of Pneumonia due to infectious organism     (21 Aug 2023 14:05)    Chart and available morning labs /imaging are reviewed electronically , urgent issues addressed . More information  is being added upon completion of rounds , when more information is collected and management discussed with consultants , medical staff and social service/case management on the floor   OVERNIGHT  No new issues reported by medical staff . All above noted Patient is resting in a bed comfortably  .No distress noted     HPI:  89-year-old female with history of hypothyroidism, A-fib on Eliquis, s/p recent pacemaker for complete heart block brought in by ambulance from [facility] living [place] for cough for the past 2 weeks.  Associated with generalized weakness and decreased p.o. intake.  No fall or trauma.  Patient states she has had cold symptoms for a while.  Denies fever, chills, chest pain, shortness of breath, abdominal pain, nausea, vomiting, upper or lower extremity weakness or paresthesias. pmd: Dr. Crawley, cards: [facility] Seen by card Dr Reich Admitted  to telemetry unit for monitoring , send 3 sets of cardiac enzymes to rule out acute coronary event, obtain ECHO to evaluate LVEF, cardiology consult  ,continue current management, O2 supply, anticoagulation plan as per cardiology consult Pulm cons requested , antitussives and nebulized bd ordered .Also UTI suspected and started on iv abx , id cons called Palliative care consult requested ,to discuss advance directives and complete MOLST  (20 Aug 2023 15:35)    PAST MEDICAL & SURGICAL HISTORY:  HTN (hypertension)      Afib      Spinal stenosis      PFO (patent foramen ovale)      Degeneration macular      Osteoporosis      History of complete heart block      Hypothyroidism      Cardiac pacemaker          MEDICATIONS  (STANDING):  apixaban 2.5 milliGRAM(s) Oral two times a day  benzonatate 100 milliGRAM(s) Oral every 8 hours  dextrose 5% + sodium chloride 0.45% with potassium chloride 20 mEq/L 1000 milliLiter(s) (50 mL/Hr) IV Continuous <Continuous>  ertapenem  IVPB 1000 milliGRAM(s) IV Intermittent every 24 hours  lactobacillus acidophilus 1 Tablet(s) Oral every 12 hours  levothyroxine 88 MICROGram(s) Oral daily  pantoprazole    Tablet 40 milliGRAM(s) Oral daily    MEDICATIONS  (PRN):  acetaminophen     Tablet .. 650 milliGRAM(s) Oral every 6 hours PRN Temp greater or equal to 38C (100.4F), Mild Pain (1 - 3)  albuterol/ipratropium for Nebulization 3 milliLiter(s) Nebulizer every 6 hours PRN Shortness of Breath and/or Wheezing  aluminum hydroxide/magnesium hydroxide/simethicone Suspension 30 milliLiter(s) Oral every 4 hours PRN Dyspepsia  guaiFENesin Oral Liquid (Sugar-Free) 200 milliGRAM(s) Oral every 6 hours PRN Cough  melatonin 3 milliGRAM(s) Oral at bedtime PRN Insomnia  ondansetron Injectable 4 milliGRAM(s) IV Push every 8 hours PRN Nausea and/or Vomiting      OBJECTIVE    T(C): 36.7 (08-21-23 @ 13:48), Max: 36.8 (08-20-23 @ 20:15)  HR: 63 (08-21-23 @ 13:48) (60 - 71)  BP: 148/76 (08-21-23 @ 13:48) (108/70 - 148/76)  RR: 18 (08-21-23 @ 13:48) (18 - 18)  SpO2: 100% (08-21-23 @ 13:48) (84% - 100%)  Wt(kg): --  I&O's Summary    20 Aug 2023 07:01  -  21 Aug 2023 07:00  --------------------------------------------------------  IN: 790 mL / OUT: 0 mL / NET: 790 mL          REVIEW OF SYSTEMS:  CONSTITUTIONAL: No fever, weight loss, or fatigue  EYES: No eye pain, visual disturbances, or discharge  ENMT:   No sinus or throat pain  NECK: No pain or stiffness  RESPIRATORY: No cough, wheezing, chills or hemoptysis; No shortness of breath  CARDIOVASCULAR: No chest pain, palpitations, dizziness, or leg swelling  GASTROINTESTINAL: No abdominal pain. No nausea, vomiting; No diarrhea or constipation. No melena or hematochezia.  GENITOURINARY: No dysuria, frequency, hematuria, or incontinence  NEUROLOGICAL: No headaches, memory loss, loss of strength, numbness, or tremors  SKIN: No itching, burning, rashes, or lesions   MUSCULOSKELETAL: No joint pain or swelling; No muscle, back, or extremity pain    PHYSICAL EXAM:  Appearance: NAD. VS past 24 hrs -as above   HEENT:   Moist oral mucosa. Conjunctiva clear b/l.   Neck : supple  Respiratory: Lungs CTAB.  Gastrointestinal:  Soft, nontender. No rebound. No rigidity. BS present	  Cardiovascular: RRR ,S1S2 present  Neurologic: Non-focal. Moving all extremities.  Extremities: No edema. No erythema. No calf tenderness.  Skin: No rashes, No ecchymoses, No cyanosis.	  wounds ,skin lesions-See skin assesment flow sheet   LABS:                        10.5   21.39 )-----------( 175      ( 21 Aug 2023 06:24 )             34.7     08-21    141  |  109<H>  |  20  ----------------------------<  114<H>  3.8   |  29  |  0.86    Ca    8.3<L>      21 Aug 2023 06:24  Phos  3.7     08-21  Mg     1.9     08-21    TPro  6.1  /  Alb  2.6<L>  /  TBili  1.1  /  DBili  x   /  AST  40<H>  /  ALT  37  /  AlkPhos  95  08-21    CAPILLARY BLOOD GLUCOSE        PT/INR - ( 21 Aug 2023 06:24 )   PT: 17.7 sec;   INR: 1.53 ratio         PTT - ( 20 Aug 2023 11:55 )  PTT:30.0 sec  Urinalysis Basic - ( 21 Aug 2023 06:24 )    Color: x / Appearance: x / SG: x / pH: x  Gluc: 114 mg/dL / Ketone: x  / Bili: x / Urobili: x   Blood: x / Protein: x / Nitrite: x   Leuk Esterase: x / RBC: x / WBC x   Sq Epi: x / Non Sq Epi: x / Bacteria: x        Culture - Blood (collected 20 Aug 2023 11:50)  Source: .Blood Blood-Peripheral  Gram Stain (21 Aug 2023 02:35):    Growth in aerobic bottle: Gram Negative Rods  Preliminary Report (21 Aug 2023 02:35):    Growth in aerobic bottle: Gram Negative Rods    Culture - Blood (collected 20 Aug 2023 11:45)  Source: .Blood Blood-Peripheral  Gram Stain (21 Aug 2023 02:35):    Growth in aerobic and anaerobic bottles: Gram Negative Rods  Preliminary Report (21 Aug 2023 02:35):    Growth in aerobic and anaerobic bottles: Gram Negative Rods    Direct identification is available within approximately 3-5    hours either by Blood Panel Multiplexed PCR or Direct    MALDI-TOF. Details: [email or web address]  Organism: Blood Culture PCR (21 Aug 2023 04:16)  Organism: Blood Culture PCR (21 Aug 2023 04:16)  RADIOLOGY & ADDITIONAL TESTS:   reviewed elctronically  25 minutes aggregate time was spent on this visit, 50% visit time spent in care co-ordination with other attendings and counselling patient .I have discussed care plan with patient / HCP/family member ,who expressed understanding of problems treatment and their effect and side effects, alternatives in details. I have asked if they have any questions and concerns and appropriately addressed them to best of my ability. ACP-Advance care planning was discussed , pallitaive care issues ,CMO ,GOC ,MOLST  form ,advance directives were reviewed .All questions were answered to the best of my knowledge - 25 m PROGRESS NOTE  Patient is a 89y old  Female who presents with a chief complaint of Pneumonia due to infectious organism     (21 Aug 2023 14:05)    Chart and available morning labs /imaging are reviewed electronically , urgent issues addressed . More information  is being added upon completion of rounds , when more information is collected and management discussed with consultants , medical staff and social service/case management on the floor   OVERNIGHT  No new issues reported by medical staff . All above noted Patient is resting in a bed comfortably  .No distress noted     HPI:  89-year-old female with history of hypothyroidism, A-fib on Eliquis, s/p recent pacemaker for complete heart block brought in by ambulance from [facility] living [place] for cough for the past 2 weeks.  Associated with generalized weakness and decreased p.o. intake.  No fall or trauma.  Patient states she has had cold symptoms for a while.  Denies fever, chills, chest pain, shortness of breath, abdominal pain, nausea, vomiting, upper or lower extremity weakness or paresthesias. pmd: Dr. Crawley, cards: [facility] Seen by card Dr Reich Admitted  to telemetry unit for monitoring , send 3 sets of cardiac enzymes to rule out acute coronary event, obtain ECHO to evaluate LVEF, cardiology consult  ,continue current management, O2 supply, anticoagulation plan as per cardiology consult Pulm cons requested , antitussives and nebulized bd ordered .Also UTI suspected and started on iv abx , id cons called Palliative care consult requested ,to discuss advance directives and complete MOLST  (20 Aug 2023 15:35)    PAST MEDICAL & SURGICAL HISTORY:  HTN (hypertension)      Afib      Spinal stenosis      PFO (patent foramen ovale)      Degeneration macular      Osteoporosis      History of complete heart block      Hypothyroidism      Cardiac pacemaker          MEDICATIONS  (STANDING):  apixaban 2.5 milliGRAM(s) Oral two times a day  benzonatate 100 milliGRAM(s) Oral every 8 hours  dextrose 5% + sodium chloride 0.45% with potassium chloride 20 mEq/L 1000 milliLiter(s) (50 mL/Hr) IV Continuous <Continuous>  ertapenem  IVPB 1000 milliGRAM(s) IV Intermittent every 24 hours  lactobacillus acidophilus 1 Tablet(s) Oral every 12 hours  levothyroxine 88 MICROGram(s) Oral daily  pantoprazole    Tablet 40 milliGRAM(s) Oral daily    MEDICATIONS  (PRN):  acetaminophen     Tablet .. 650 milliGRAM(s) Oral every 6 hours PRN Temp greater or equal to 38C (100.4F), Mild Pain (1 - 3)  albuterol/ipratropium for Nebulization 3 milliLiter(s) Nebulizer every 6 hours PRN Shortness of Breath and/or Wheezing  aluminum hydroxide/magnesium hydroxide/simethicone Suspension 30 milliLiter(s) Oral every 4 hours PRN Dyspepsia  guaiFENesin Oral Liquid (Sugar-Free) 200 milliGRAM(s) Oral every 6 hours PRN Cough  melatonin 3 milliGRAM(s) Oral at bedtime PRN Insomnia  ondansetron Injectable 4 milliGRAM(s) IV Push every 8 hours PRN Nausea and/or Vomiting      OBJECTIVE    T(C): 36.7 (08-21-23 @ 13:48), Max: 36.8 (08-20-23 @ 20:15)  HR: 63 (08-21-23 @ 13:48) (60 - 71)  BP: 148/76 (08-21-23 @ 13:48) (108/70 - 148/76)  RR: 18 (08-21-23 @ 13:48) (18 - 18)  SpO2: 100% (08-21-23 @ 13:48) (84% - 100%)  Wt(kg): --  I&O's Summary    20 Aug 2023 07:01  -  21 Aug 2023 07:00  --------------------------------------------------------  IN: 790 mL / OUT: 0 mL / NET: 790 mL          REVIEW OF SYSTEMS:  CONSTITUTIONAL: No fever, weight loss, or fatigue  EYES: No eye pain, visual disturbances, or discharge  ENMT:   No sinus or throat pain  NECK: No pain or stiffness  RESPIRATORY: No cough, wheezing, chills or hemoptysis; No shortness of breath  CARDIOVASCULAR: No chest pain, palpitations, dizziness, or leg swelling  GASTROINTESTINAL: No abdominal pain. No nausea, vomiting; No diarrhea or constipation. No melena or hematochezia.  GENITOURINARY: No dysuria, frequency, hematuria, or incontinence  NEUROLOGICAL: No headaches, memory loss, loss of strength, numbness, or tremors  SKIN: No itching, burning, rashes, or lesions   MUSCULOSKELETAL: No joint pain or swelling; No muscle, back, or extremity pain    PHYSICAL EXAM:  Appearance: NAD. VS past 24 hrs -as above   HEENT:   Moist oral mucosa. Conjunctiva clear b/l.   Neck : supple  Respiratory: Lungs CTAB.  Gastrointestinal:  Soft, nontender. No rebound. No rigidity. BS present	  Cardiovascular: RRR ,S1S2 present  Neurologic: Non-focal. Moving all extremities.  Extremities: No edema. No erythema. No calf tenderness.  Skin: No rashes, No ecchymoses, No cyanosis.	  wounds ,skin lesions-See skin assesment flow sheet   LABS:                        10.5   21.39 )-----------( 175      ( 21 Aug 2023 06:24 )             34.7     08-21    141  |  109<H>  |  20  ----------------------------<  114<H>  3.8   |  29  |  0.86    Ca    8.3<L>      21 Aug 2023 06:24  Phos  3.7     08-21  Mg     1.9     08-21    TPro  6.1  /  Alb  2.6<L>  /  TBili  1.1  /  DBili  x   /  AST  40<H>  /  ALT  37  /  AlkPhos  95  08-21    CAPILLARY BLOOD GLUCOSE        PT/INR - ( 21 Aug 2023 06:24 )   PT: 17.7 sec;   INR: 1.53 ratio         PTT - ( 20 Aug 2023 11:55 )  PTT:30.0 sec  Urinalysis Basic - ( 21 Aug 2023 06:24 )    Color: x / Appearance: x / SG: x / pH: x  Gluc: 114 mg/dL / Ketone: x  / Bili: x / Urobili: x   Blood: x / Protein: x / Nitrite: x   Leuk Esterase: x / RBC: x / WBC x   Sq Epi: x / Non Sq Epi: x / Bacteria: x        Culture - Blood (collected 20 Aug 2023 11:50)  Source: .Blood Blood-Peripheral  Gram Stain (21 Aug 2023 02:35):    Growth in aerobic bottle: Gram Negative Rods  Preliminary Report (21 Aug 2023 02:35):    Growth in aerobic bottle: Gram Negative Rods    Culture - Blood (collected 20 Aug 2023 11:45)  Source: .Blood Blood-Peripheral  Gram Stain (21 Aug 2023 02:35):    Growth in aerobic and anaerobic bottles: Gram Negative Rods  Preliminary Report (21 Aug 2023 02:35):    Growth in aerobic and anaerobic bottles: Gram Negative Rods    Direct identification is available within approximately 3-5    hours either by Blood Panel Multiplexed PCR or Direct    MALDI-TOF. Details: [email or web address]  Organism: Blood Culture PCR (21 Aug 2023 04:16)  Organism: Blood Culture PCR (21 Aug 2023 04:16)  RADIOLOGY & ADDITIONAL TESTS:   reviewed elctronically  25 minutes aggregate time was spent on this visit, 50% visit time spent in care co-ordination with other attendings and counselling patient .I have discussed care plan with patient / HCP/family member ,who expressed understanding of problems treatment and their effect and side effects, alternatives in details. I have asked if they have any questions and concerns and appropriately addressed them to best of my ability. ACP-Advance care planning was discussed , pallitaive care issues ,CMO ,GOC ,MOLST  form ,advance directives were reviewed .All questions were answered to the best of my knowledge - 25 m

## 2023-08-21 NOTE — CONSULT NOTE ADULT - SUBJECTIVE AND OBJECTIVE BOX
Chief Complaint:  Patient is a 89y old  Female who presents with a chief complaint of Pneumonia due to infectious organism  89-year-old female with history of hypothyroidism, A-fib on Eliquis, s/p recent pacemaker for complete heart block brought in by ambulance from [facility] living home for cough for the past 2 weeks.  Associated with generalized weakness and decreased p.o. intake.  No fall or trauma.  Patient states she has had cold symptoms for a while.  Denies fever, chills, chest pain, shortness of breath, abdominal pain, nausea, vomiting, upper or lower extremity weakness or paresthesias. pmd: Dr. Crawley, cards: [facility] Seen by card Dr Reich Admitted  to telemetry unit for monitoring , send 3 sets of cardiac enzymes to rule out acute coronary event, obtain ECHO to evaluate LVEF, cardiology consult  ,continue current management, O2 supply, anticoagulation plan as per cardiology consult Pulm cons requested , antitussives and nebulized bd ordered .Also UTI suspected and started on iv abx , id cons called Palliative care consult requested ,to discuss advance directives and complete MOLST        Review of Systems:  · Negative General Symptoms	no fever; no chills  · General Symptoms	malaise; fatigue; weakness  · Skin/Breast	negative  · Ophthalmologic	negative  · ENMT	negative  · Respiratory and Thorax Symptoms	dyspnea  cough   · Cardiovascular	negative  · Gastrointestinal	negative  · Genitourinary	negative  · Musculoskeletal	negative  · Neurological	negative  · Psychiatric	negative  · Hematology/Lymphatics	negative  · Endocrine	negative  · Allergic/Immunologic	negative      Allergies:  penicillin (Unknown)      Medications:  acetaminophen     Tablet .. 650 milliGRAM(s) Oral every 6 hours PRN  albuterol/ipratropium for Nebulization 3 milliLiter(s) Nebulizer every 6 hours PRN  aluminum hydroxide/magnesium hydroxide/simethicone Suspension 30 milliLiter(s) Oral every 4 hours PRN  apixaban 2.5 milliGRAM(s) Oral two times a day  benzonatate 100 milliGRAM(s) Oral every 8 hours  dextrose 5% + sodium chloride 0.45% with potassium chloride 20 mEq/L 1000 milliLiter(s) IV Continuous <Continuous>  ertapenem  IVPB 1000 milliGRAM(s) IV Intermittent every 24 hours  guaiFENesin Oral Liquid (Sugar-Free) 200 milliGRAM(s) Oral every 6 hours PRN  lactobacillus acidophilus 1 Tablet(s) Oral every 12 hours  levothyroxine 88 MICROGram(s) Oral daily  melatonin 3 milliGRAM(s) Oral at bedtime PRN  ondansetron Injectable 4 milliGRAM(s) IV Push every 8 hours PRN  pantoprazole    Tablet 40 milliGRAM(s) Oral daily      PMHX/PSHX:  HTN (hypertension)    Afib    Spinal stenosis    PFO (patent foramen ovale)    Degeneration macular    Osteoporosis    History of complete heart block    Hypothyroidism    Cardiac pacemaker        Family history:    no uc no cd no celiac disease    Social History:   no etoh no cigs no ivda    ROS:     General:  No wt loss, fevers, chills, night sweats, fatigue,   Eyes:  Good vision, no reported pain  ENT:  No sore throat, pain, runny nose, dysphagia  CV:  No pain, palpitations, hypo/hypertension  Resp:  No dyspnea, cough, tachypnea, wheezing  GI:  No pain, No nausea, No vomiting, No diarrhea, No constipation, No weight loss, No fever, No pruritis, No rectal bleeding, No tarry stools, No dysphagia,  :  No pain, bleeding, incontinence, nocturia  Muscle:  No pain, weakness  Neuro:  No weakness, tingling, memory problems  Psych:  No fatigue, insomnia, mood problems, depression  Endocrine:  No polyuria, polydipsia, cold/heat intolerance  Heme:  No petechiae, ecchymosis, easy bruisability  Skin:  No rash, tattoos, scars, edema      PHYSICAL EXAM:   Vital Signs:  Vital Signs Last 24 Hrs  T(C): 36.7 (21 Aug 2023 13:48), Max: 36.8 (20 Aug 2023 20:15)  T(F): 98 (21 Aug 2023 13:48), Max: 98.3 (20 Aug 2023 20:15)  HR: 63 (21 Aug 2023 13:48) (60 - 64)  BP: 148/76 (21 Aug 2023 13:48) (129/73 - 148/76)  BP(mean): --  RR: 18 (21 Aug 2023 13:48) (18 - 18)  SpO2: 100% (21 Aug 2023 13:48) (84% - 100%)    Parameters below as of 21 Aug 2023 13:48  Patient On (Oxygen Delivery Method): room air      Daily     Daily Weight in k.1 (21 Aug 2023 04:55)    GENERAL:  Appears stated age, well-groomed, well-nourished, no distress  HEENT:  NC/AT,  conjunctivae clear and pink, no thyromegaly, nodules, adenopathy, no JVD, sclera -anicteric  CHEST:  Full & symmetric excursion, no increased effort, breath sounds clear  HEART:  Regular rhythm, S1, S2, no murmur/rub/S3/S4, no abdominal bruit, no edema  ABDOMEN:  Soft, non-tender, non-distended, normoactive bowel sounds,  no masses ,no hepato-splenomegaly, no signs of chronic liver disease  EXTEREMITIES:  no cyanosis,clubbing or edema  SKIN:  No rash/erythema/ecchymoses/petechiae/wounds/abscess/warm/dry  NEURO:  Alert, oriented, no asterixis, no tremor, no encephalopathy    LABS:                        10.5   21.39 )-----------( 175      ( 21 Aug 2023 06:24 )             34.7     08-21    141  |  109<H>  |  20  ----------------------------<  114<H>  3.8   |  29  |  0.86    Ca    8.3<L>      21 Aug 2023 06:24  Phos  3.7     08-21  Mg     1.9     08-21    TPro  6.1  /  Alb  2.6<L>  /  TBili  1.1  /  DBili  x   /  AST  40<H>  /  ALT  37  /  AlkPhos  95  08-21    LIVER FUNCTIONS - ( 21 Aug 2023 06:24 )  Alb: 2.6 g/dL / Pro: 6.1 g/dL / ALK PHOS: 95 U/L / ALT: 37 U/L / AST: 40 U/L / GGT: x           PT/INR - ( 21 Aug 2023 06:24 )   PT: 17.7 sec;   INR: 1.53 ratio         PTT - ( 20 Aug 2023 11:55 )  PTT:30.0 sec  Urinalysis Basic - ( 21 Aug 2023 06:24 )    Color: x / Appearance: x / SG: x / pH: x  Gluc: 114 mg/dL / Ketone: x  / Bili: x / Urobili: x   Blood: x / Protein: x / Nitrite: x   Leuk Esterase: x / RBC: x / WBC x   Sq Epi: x / Non Sq Epi: x / Bacteria: x          Imaging:             Chief Complaint:  Patient is a 89y old  Female who presents with a chief complaint of Pneumonia due to infectious organism  89-year-old female with history of hypothyroidism, A-fib on Eliquis, s/p recent pacemaker for complete heart block brought in by ambulance from [facility] living home for cough for the past 2 weeks.  Associated with generalized weakness and decreased p.o. intake.  No fall or trauma.  Patient states she has had cold symptoms for a while.  Denies fever, chills, chest pain, shortness of breath, abdominal pain, nausea, vomiting, upper or lower extremity weakness or paresthesias. pmd: Dr. Crawley, cards: [facility] Seen by card Dr Reich Admitted  to telemetry unit for monitoring , send 3 sets of cardiac enzymes to rule out acute coronary event, obtain ECHO to evaluate LVEF, cardiology consult  ,continue current management, O2 supply, anticoagulation plan as per cardiology consult Pulm cons requested , antitussives and nebulized bd ordered .Also UTI suspected and started on iv abx , id cons called Palliative care consult requested ,to discuss advance directives and complete MOLST        Review of Systems:  · Negative General Symptoms	no fever; no chills  · General Symptoms	malaise; fatigue; weakness  · Skin/Breast	negative  · Ophthalmologic	negative  · ENMT	negative  · Respiratory and Thorax Symptoms	dyspnea  cough   · Cardiovascular	negative  · Gastrointestinal	negative  · Genitourinary	negative  · Musculoskeletal	negative  · Neurological	negative  · Psychiatric	negative  · Hematology/Lymphatics	negative  · Endocrine	negative  · Allergic/Immunologic	negative      Allergies:  penicillin (Unknown)      Medications:  acetaminophen     Tablet .. 650 milliGRAM(s) Oral every 6 hours PRN  albuterol/ipratropium for Nebulization 3 milliLiter(s) Nebulizer every 6 hours PRN  aluminum hydroxide/magnesium hydroxide/simethicone Suspension 30 milliLiter(s) Oral every 4 hours PRN  apixaban 2.5 milliGRAM(s) Oral two times a day  benzonatate 100 milliGRAM(s) Oral every 8 hours  dextrose 5% + sodium chloride 0.45% with potassium chloride 20 mEq/L 1000 milliLiter(s) IV Continuous <Continuous>  ertapenem  IVPB 1000 milliGRAM(s) IV Intermittent every 24 hours  guaiFENesin Oral Liquid (Sugar-Free) 200 milliGRAM(s) Oral every 6 hours PRN  lactobacillus acidophilus 1 Tablet(s) Oral every 12 hours  levothyroxine 88 MICROGram(s) Oral daily  melatonin 3 milliGRAM(s) Oral at bedtime PRN  ondansetron Injectable 4 milliGRAM(s) IV Push every 8 hours PRN  pantoprazole    Tablet 40 milliGRAM(s) Oral daily      PMHX/PSHX:  HTN (hypertension)    Afib    Spinal stenosis    PFO (patent foramen ovale)    Degeneration macular    Osteoporosis    History of complete heart block    Hypothyroidism    Cardiac pacemaker        Family history:    no uc no cd no celiac disease    Social History:   no etoh no cigs no ivda    ROS:     General:  No wt loss, fevers, chills, night sweats, fatigue,   Eyes:  Good vision, no reported pain  ENT:  No sore throat, pain, runny nose, dysphagia  CV:  No pain, palpitations, hypo/hypertension  Resp:  No dyspnea, cough, tachypnea, wheezing  GI:  No pain, No nausea, No vomiting, No diarrhea, No constipation, No weight loss, No fever, No pruritis, No rectal bleeding, No tarry stools, No dysphagia,  :  No pain, bleeding, incontinence, nocturia  Muscle:  No pain, weakness  Neuro:  No weakness, tingling, memory problems  Psych:  No fatigue, insomnia, mood problems, depression  Endocrine:  No polyuria, polydipsia, cold/heat intolerance  Heme:  No petechiae, ecchymosis, easy bruisability  Skin:  No rash, tattoos, scars, edema      PHYSICAL EXAM:   Vital Signs:  Vital Signs Last 24 Hrs  T(C): 36.7 (21 Aug 2023 13:48), Max: 36.8 (20 Aug 2023 20:15)  T(F): 98 (21 Aug 2023 13:48), Max: 98.3 (20 Aug 2023 20:15)  HR: 63 (21 Aug 2023 13:48) (60 - 64)  BP: 148/76 (21 Aug 2023 13:48) (129/73 - 148/76)  BP(mean): --  RR: 18 (21 Aug 2023 13:48) (18 - 18)  SpO2: 100% (21 Aug 2023 13:48) (84% - 100%)    Parameters below as of 21 Aug 2023 13:48  Patient On (Oxygen Delivery Method): room air      Daily     Daily Weight in k.1 (21 Aug 2023 04:55)    GENERAL:  Appears stated age, well-groomed, well-nourished, no distress  HEENT:  NC/AT,  conjunctivae clear and pink, no thyromegaly, nodules, adenopathy, no JVD, sclera -anicteric  CHEST:  Full & symmetric excursion, no increased effort, breath sounds clear  HEART:  Regular rhythm, S1, S2, no murmur/rub/S3/S4, no abdominal bruit, no edema  ABDOMEN:  Soft, non-tender, non-distended, normoactive bowel sounds,  no masses ,no hepato-splenomegaly, no signs of chronic liver disease  EXTEREMITIES:  no cyanosis,clubbing or edema  SKIN:  No rash/erythema/ecchymoses/petechiae/wounds/abscess/warm/dry  NEURO:  Alert, oriented, no asterixis, no tremor, no encephalopathy    LABS:                        10.5   21.39 )-----------( 175      ( 21 Aug 2023 06:24 )             34.7     08-21    141  |  109<H>  |  20  ----------------------------<  114<H>  3.8   |  29  |  0.86    Ca    8.3<L>      21 Aug 2023 06:24  Phos  3.7     08-21  Mg     1.9     08-21    TPro  6.1  /  Alb  2.6<L>  /  TBili  1.1  /  DBili  x   /  AST  40<H>  /  ALT  37  /  AlkPhos  95  08-21    LIVER FUNCTIONS - ( 21 Aug 2023 06:24 )  Alb: 2.6 g/dL / Pro: 6.1 g/dL / ALK PHOS: 95 U/L / ALT: 37 U/L / AST: 40 U/L / GGT: x           PT/INR - ( 21 Aug 2023 06:24 )   PT: 17.7 sec;   INR: 1.53 ratio         PTT - ( 20 Aug 2023 11:55 )  PTT:30.0 sec  Urinalysis Basic - ( 21 Aug 2023 06:24 )    Color: x / Appearance: x / SG: x / pH: x  Gluc: 114 mg/dL / Ketone: x  / Bili: x / Urobili: x   Blood: x / Protein: x / Nitrite: x   Leuk Esterase: x / RBC: x / WBC x   Sq Epi: x / Non Sq Epi: x / Bacteria: x          Imaging:             Chief Complaint:  Patient is a 89y old  Female who presents with a chief complaint of Pneumonia due to infectious organism  89-year-old female with history of hypothyroidism, A-fib on Eliquis, s/p recent pacemaker for complete heart block brought in by ambulance from [facility] living home for cough for the past 2 weeks.  Associated with generalized weakness and decreased p.o. intake.  No fall or trauma.  Patient states she has had cold symptoms for a while.  Denies fever, chills, chest pain, shortness of breath, abdominal pain, nausea, vomiting, upper or lower extremity weakness or paresthesias. pmd: Dr. Crawley, cards: [facility] Seen by card Dr Reich Admitted  to telemetry unit for monitoring , send 3 sets of cardiac enzymes to rule out acute coronary event, obtain ECHO to evaluate LVEF, cardiology consult  ,continue current management, O2 supply, anticoagulation plan as per cardiology consult Pulm cons requested , antitussives and nebulized bd ordered .Also UTI suspected and started on iv abx , id cons called Palliative care consult requested ,to discuss advance directives and complete MOLST        Review of Systems:  · Negative General Symptoms	no fever; no chills  · General Symptoms	malaise; fatigue; weakness  · Skin/Breast	negative  · Ophthalmologic	negative  · ENMT	negative  · Respiratory and Thorax Symptoms	dyspnea  cough   · Cardiovascular	negative  · Gastrointestinal	negative  · Genitourinary	negative  · Musculoskeletal	negative  · Neurological	negative  · Psychiatric	negative  · Hematology/Lymphatics	negative  · Endocrine	negative  · Allergic/Immunologic	negative      Allergies:  penicillin (Unknown)      Medications:  acetaminophen     Tablet .. 650 milliGRAM(s) Oral every 6 hours PRN  albuterol/ipratropium for Nebulization 3 milliLiter(s) Nebulizer every 6 hours PRN  aluminum hydroxide/magnesium hydroxide/simethicone Suspension 30 milliLiter(s) Oral every 4 hours PRN  apixaban 2.5 milliGRAM(s) Oral two times a day  benzonatate 100 milliGRAM(s) Oral every 8 hours  dextrose 5% + sodium chloride 0.45% with potassium chloride 20 mEq/L 1000 milliLiter(s) IV Continuous <Continuous>  ertapenem  IVPB 1000 milliGRAM(s) IV Intermittent every 24 hours  guaiFENesin Oral Liquid (Sugar-Free) 200 milliGRAM(s) Oral every 6 hours PRN  lactobacillus acidophilus 1 Tablet(s) Oral every 12 hours  levothyroxine 88 MICROGram(s) Oral daily  melatonin 3 milliGRAM(s) Oral at bedtime PRN  ondansetron Injectable 4 milliGRAM(s) IV Push every 8 hours PRN  pantoprazole    Tablet 40 milliGRAM(s) Oral daily      PMHX/PSHX:  HTN (hypertension)    Afib    Spinal stenosis    PFO (patent foramen ovale)    Degeneration macular    Osteoporosis    History of complete heart block    Hypothyroidism    Cardiac pacemaker        Family history:    no uc no cd no celiac disease    Social History:   no etoh no cigs no ivda    ROS:     General:  No wt loss, fevers, chills, night sweats, fatigue,   Eyes:  Good vision, no reported pain  ENT:  No sore throat, pain, runny nose, dysphagia  CV:  No pain, palpitations, hypo/hypertension  Resp:  No dyspnea, cough, tachypnea, wheezing  GI:  No pain, No nausea, No vomiting, No diarrhea, No constipation, No weight loss, No fever, No pruritis, No rectal bleeding, No tarry stools, No dysphagia,  :  No pain, bleeding, incontinence, nocturia  Muscle:  No pain, weakness  Neuro:  No weakness, tingling, memory problems  Psych:  No fatigue, insomnia, mood problems, depression  Endocrine:  No polyuria, polydipsia, cold/heat intolerance  Heme:  No petechiae, ecchymosis, easy bruisability  Skin:  No rash, tattoos, scars, edema      PHYSICAL EXAM:   Vital Signs:  Vital Signs Last 24 Hrs  T(C): 36.7 (21 Aug 2023 13:48), Max: 36.8 (20 Aug 2023 20:15)  T(F): 98 (21 Aug 2023 13:48), Max: 98.3 (20 Aug 2023 20:15)  HR: 63 (21 Aug 2023 13:48) (60 - 64)  BP: 148/76 (21 Aug 2023 13:48) (129/73 - 148/76)  BP(mean): --  RR: 18 (21 Aug 2023 13:48) (18 - 18)  SpO2: 100% (21 Aug 2023 13:48) (84% - 100%)    Parameters below as of 21 Aug 2023 13:48  Patient On (Oxygen Delivery Method): room air      Daily     Daily Weight in k.1 (21 Aug 2023 04:55)    GENERAL:  Appears stated age, well-groomed, well-nourished, no distress  HEENT:  NC/AT,  conjunctivae clear and pink, no thyromegaly, nodules, adenopathy, no JVD, sclera -anicteric  CHEST:  Full & symmetric excursion, no increased effort, breath sounds clear  HEART:  Regular rhythm, S1, S2, no murmur/rub/S3/S4, no abdominal bruit, no edema  ABDOMEN:  Soft, non-tender, non-distended, normoactive bowel sounds,  no masses ,no hepato-splenomegaly, no signs of chronic liver disease  EXTEREMITIES:  no cyanosis,clubbing or edema  SKIN:  No rash/erythema/ecchymoses/petechiae/wounds/abscess/warm/dry  NEURO:  Alert, oriented, no asterixis, no tremor, no encephalopathy    LABS:                        10.5   21.39 )-----------( 175      ( 21 Aug 2023 06:24 )             34.7     08-21    141  |  109<H>  |  20  ----------------------------<  114<H>  3.8   |  29  |  0.86    Ca    8.3<L>      21 Aug 2023 06:24  Phos  3.7     08-21  Mg     1.9     08-21    TPro  6.1  /  Alb  2.6<L>  /  TBili  1.1  /  DBili  x   /  AST  40<H>  /  ALT  37  /  AlkPhos  95  08-21    LIVER FUNCTIONS - ( 21 Aug 2023 06:24 )  Alb: 2.6 g/dL / Pro: 6.1 g/dL / ALK PHOS: 95 U/L / ALT: 37 U/L / AST: 40 U/L / GGT: x           PT/INR - ( 21 Aug 2023 06:24 )   PT: 17.7 sec;   INR: 1.53 ratio         PTT - ( 20 Aug 2023 11:55 )  PTT:30.0 sec  Urinalysis Basic - ( 21 Aug 2023 06:24 )    Color: x / Appearance: x / SG: x / pH: x  Gluc: 114 mg/dL / Ketone: x  / Bili: x / Urobili: x   Blood: x / Protein: x / Nitrite: x   Leuk Esterase: x / RBC: x / WBC x   Sq Epi: x / Non Sq Epi: x / Bacteria: x          Imaging:

## 2023-08-21 NOTE — PROGRESS NOTE ADULT - SUBJECTIVE AND OBJECTIVE BOX
Blood cultures on admission are growing ESBL E coli, likely due to UTI.  Discontinue IV Cefepime. Add Invanz 1 gm daily.  Repeat blood cultures in AM.  Monitor leukocytosis.  Will follow.

## 2023-08-21 NOTE — PHARMACOTHERAPY INTERVENTION NOTE - COMMENTS
Modified penicillin allergy to state patient tolerated cefepime during this admission.    Rodrigo Rolon, PharmD  Clinical Pharmacy Specialist, Infectious Diseases  Tele-Antimicrobial Stewardship Program (Tele-ASP)  Tele-ASP Phone: [phone]  Modified penicillin allergy to state patient tolerated cefepime during this admission.    Rodrigo Rolon, PharmD  Clinical Pharmacy Specialist, Infectious Diseases  Tele-Antimicrobial Stewardship Program (Tele-ASP)  Tele-ASP Phone: [phone]  Modified penicillin allergy to state patient tolerated cefepime during this admission.    Rodrigo Rolon, PharmD  Clinical Pharmacy Specialist, Infectious Diseases  Tele-Antimicrobial Stewardship Program (Tele-ASP)  Tele-ASP Phone: [phone]

## 2023-08-21 NOTE — PATIENT CHOICE NOTE. - NSPTCHOICESTATE_GEN_ALL_CORE
I have met with the patient and/or caregiver to discuss discharge goals and treatment plan. Patient and/or caregiver also provided with instructions on accessing the CMS Compare websites for additional information related to Post Acute Provider quality and resource use measures to assist them in evaluation of the providers and in selecting their post-acute provider of choice. Patient and caregiver were informed of the facilities that are owned and/or operated by [facility]. I have discussed with the patient the availability of in-network facilities and providers. Patient and caregiver provided with a list of post-acute providers whose services are appropriate to the discharge plans and patient needs.     For patient requiring durable medical equipment, patient and/or caregiver were informed that they have the right to request who provides the required equipment. I have met with the patient and/or caregiver to discuss discharge goals and treatment plan. Patient and/or caregiver also provided with instructions on accessing the CMS Compare websites for additional information related to Post Acute Provider quality and resource use measures to assist them in evaluation of the providers and in selecting their post-acute provider of choice. Patient and caregiver were informed of the facilities that are owned and/or operated by [facility]. I have discussed with the patient the availability of in-network facilities and providers. Patient and caregiver provided with a list of post-acute providers whose services are appropriate to the discharge plans and patient needs.     For patient requiring durable medical equipment, patient and/or caregiver were informed that they have the right to request who provides the required equipment. I have met with the patient and/or caregiver to discuss discharge goals and treatment plan. Patient and/or caregiver also provided with instructions on accessing the CMS Compare websites for additional information related to Post Acute Provider quality and resource use measures to assist them in evaluation of the providers and in selecting their post-acute provider of choice. Patient and caregiver were informed of the facilities that are owned and/or operated by [facility]. I have discussed with the patient the availability of in-network facilities and providers. Patient and caregiver provided with a list of post-acute providers whose services are appropriate to the discharge plans and patient needs.     For patient requiring durable medical equipment, patient and/or caregiver were informed that they have the right to request who provides the required equipment.

## 2023-08-21 NOTE — CHART NOTE - NSCHARTNOTEFT_GEN_A_CORE
RN called for critical value: blood cultures grew GNR in both aerobic and anaerobic bottles. Patient currently on IV Cefepime, as advised by infectious disease. ID already consulted and following.

## 2023-08-21 NOTE — CARE COORDINATION ASSESSMENT. - NSPASTMEDSURGHISTORY_GEN_ALL_CORE_FT
PAST MEDICAL & SURGICAL HISTORY:  Osteoporosis      Degeneration macular      PFO (patent foramen ovale)      Spinal stenosis      Afib      HTN (hypertension)      Hypothyroidism      History of complete heart block      Cardiac pacemaker

## 2023-08-22 LAB
-  AMIKACIN: SIGNIFICANT CHANGE UP
-  AMPICILLIN/SULBACTAM: SIGNIFICANT CHANGE UP
-  AMPICILLIN: SIGNIFICANT CHANGE UP
-  AZTREONAM: SIGNIFICANT CHANGE UP
-  CEFAZOLIN: SIGNIFICANT CHANGE UP
-  CEFEPIME: SIGNIFICANT CHANGE UP
-  CEFTRIAXONE: SIGNIFICANT CHANGE UP
-  CIPROFLOXACIN: SIGNIFICANT CHANGE UP
-  ERTAPENEM: SIGNIFICANT CHANGE UP
-  GENTAMICIN: SIGNIFICANT CHANGE UP
-  IMIPENEM: SIGNIFICANT CHANGE UP
-  LEVOFLOXACIN: SIGNIFICANT CHANGE UP
-  MEROPENEM: SIGNIFICANT CHANGE UP
-  PIPERACILLIN/TAZOBACTAM: SIGNIFICANT CHANGE UP
-  TOBRAMYCIN: SIGNIFICANT CHANGE UP
-  TRIMETHOPRIM/SULFAMETHOXAZOLE: SIGNIFICANT CHANGE UP
ALBUMIN SERPL ELPH-MCNC: 2.6 G/DL — LOW (ref 3.3–5)
ALP SERPL-CCNC: 99 U/L — SIGNIFICANT CHANGE UP (ref 40–120)
ALT FLD-CCNC: 36 U/L — SIGNIFICANT CHANGE UP (ref 12–78)
ANION GAP SERPL CALC-SCNC: 4 MMOL/L — LOW (ref 5–17)
AST SERPL-CCNC: 21 U/L — SIGNIFICANT CHANGE UP (ref 15–37)
BASOPHILS # BLD AUTO: 0.03 K/UL — SIGNIFICANT CHANGE UP (ref 0–0.2)
BASOPHILS NFR BLD AUTO: 0.4 % — SIGNIFICANT CHANGE UP (ref 0–2)
BILIRUB SERPL-MCNC: 0.8 MG/DL — SIGNIFICANT CHANGE UP (ref 0.2–1.2)
BUN SERPL-MCNC: 22 MG/DL — SIGNIFICANT CHANGE UP (ref 7–23)
CALCIUM SERPL-MCNC: 8.4 MG/DL — LOW (ref 8.5–10.1)
CHLORIDE SERPL-SCNC: 110 MMOL/L — HIGH (ref 96–108)
CO2 SERPL-SCNC: 28 MMOL/L — SIGNIFICANT CHANGE UP (ref 22–31)
CREAT SERPL-MCNC: 0.81 MG/DL — SIGNIFICANT CHANGE UP (ref 0.5–1.3)
CULTURE RESULTS: SIGNIFICANT CHANGE UP
EGFR: 68 ML/MIN/1.73M2 — SIGNIFICANT CHANGE UP
EOSINOPHIL # BLD AUTO: 0.05 K/UL — SIGNIFICANT CHANGE UP (ref 0–0.5)
EOSINOPHIL NFR BLD AUTO: 0.7 % — SIGNIFICANT CHANGE UP (ref 0–6)
GLUCOSE SERPL-MCNC: 99 MG/DL — SIGNIFICANT CHANGE UP (ref 70–99)
GRAM STN FLD: SIGNIFICANT CHANGE UP
HCT VFR BLD CALC: 35.2 % — SIGNIFICANT CHANGE UP (ref 34.5–45)
HGB BLD-MCNC: 10.7 G/DL — LOW (ref 11.5–15.5)
IMM GRANULOCYTES NFR BLD AUTO: 0.6 % — SIGNIFICANT CHANGE UP (ref 0–0.9)
LYMPHOCYTES # BLD AUTO: 0.41 K/UL — LOW (ref 1–3.3)
LYMPHOCYTES # BLD AUTO: 5.9 % — LOW (ref 13–44)
MCHC RBC-ENTMCNC: 29.5 PG — SIGNIFICANT CHANGE UP (ref 27–34)
MCHC RBC-ENTMCNC: 30.4 GM/DL — LOW (ref 32–36)
MCV RBC AUTO: 97 FL — SIGNIFICANT CHANGE UP (ref 80–100)
METHOD TYPE: SIGNIFICANT CHANGE UP
MONOCYTES # BLD AUTO: 0.48 K/UL — SIGNIFICANT CHANGE UP (ref 0–0.9)
MONOCYTES NFR BLD AUTO: 6.9 % — SIGNIFICANT CHANGE UP (ref 2–14)
NEUTROPHILS # BLD AUTO: 5.96 K/UL — SIGNIFICANT CHANGE UP (ref 1.8–7.4)
NEUTROPHILS NFR BLD AUTO: 85.5 % — HIGH (ref 43–77)
NRBC # BLD: 0 /100 WBCS — SIGNIFICANT CHANGE UP (ref 0–0)
ORGANISM # SPEC MICROSCOPIC CNT: SIGNIFICANT CHANGE UP
PLATELET # BLD AUTO: 195 K/UL — SIGNIFICANT CHANGE UP (ref 150–400)
POTASSIUM SERPL-MCNC: 3.4 MMOL/L — LOW (ref 3.5–5.3)
POTASSIUM SERPL-SCNC: 3.4 MMOL/L — LOW (ref 3.5–5.3)
PROT SERPL-MCNC: 6.2 G/DL — SIGNIFICANT CHANGE UP (ref 6–8.3)
RBC # BLD: 3.63 M/UL — LOW (ref 3.8–5.2)
RBC # FLD: 17 % — HIGH (ref 10.3–14.5)
SODIUM SERPL-SCNC: 142 MMOL/L — SIGNIFICANT CHANGE UP (ref 135–145)
SPECIMEN SOURCE: SIGNIFICANT CHANGE UP
WBC # BLD: 6.97 K/UL — SIGNIFICANT CHANGE UP (ref 3.8–10.5)
WBC # FLD AUTO: 6.97 K/UL — SIGNIFICANT CHANGE UP (ref 3.8–10.5)

## 2023-08-22 PROCEDURE — 74220 X-RAY XM ESOPHAGUS 1CNTRST: CPT | Mod: 26

## 2023-08-22 RX ORDER — LEVOTHYROXINE SODIUM 125 MCG
75 TABLET ORAL DAILY
Refills: 0 | Status: DISCONTINUED | OUTPATIENT
Start: 2023-08-22 | End: 2023-09-01

## 2023-08-22 RX ORDER — METOPROLOL TARTRATE 50 MG
50 TABLET ORAL
Refills: 0 | Status: DISCONTINUED | OUTPATIENT
Start: 2023-08-22 | End: 2023-09-01

## 2023-08-22 RX ORDER — POLYETHYLENE GLYCOL 3350 17 G/17G
17 POWDER, FOR SOLUTION ORAL DAILY
Refills: 0 | Status: DISCONTINUED | OUTPATIENT
Start: 2023-08-22 | End: 2023-09-01

## 2023-08-22 RX ORDER — FERROUS SULFATE 325(65) MG
325 TABLET ORAL DAILY
Refills: 0 | Status: DISCONTINUED | OUTPATIENT
Start: 2023-08-22 | End: 2023-09-01

## 2023-08-22 RX ORDER — MULTIVIT-MIN/FERROUS GLUCONATE 9 MG/15 ML
1 LIQUID (ML) ORAL DAILY
Refills: 0 | Status: DISCONTINUED | OUTPATIENT
Start: 2023-08-22 | End: 2023-09-01

## 2023-08-22 RX ORDER — SODIUM CHLORIDE 9 MG/ML
1000 INJECTION, SOLUTION INTRAVENOUS
Refills: 0 | Status: DISCONTINUED | OUTPATIENT
Start: 2023-08-22 | End: 2023-08-24

## 2023-08-22 RX ORDER — AMIODARONE HYDROCHLORIDE 400 MG/1
200 TABLET ORAL DAILY
Refills: 0 | Status: DISCONTINUED | OUTPATIENT
Start: 2023-08-22 | End: 2023-09-01

## 2023-08-22 RX ADMIN — ERTAPENEM SODIUM 120 MILLIGRAM(S): 1 INJECTION, POWDER, LYOPHILIZED, FOR SOLUTION INTRAMUSCULAR; INTRAVENOUS at 14:20

## 2023-08-22 RX ADMIN — APIXABAN 2.5 MILLIGRAM(S): 2.5 TABLET, FILM COATED ORAL at 05:15

## 2023-08-22 RX ADMIN — APIXABAN 2.5 MILLIGRAM(S): 2.5 TABLET, FILM COATED ORAL at 17:51

## 2023-08-22 RX ADMIN — SODIUM CHLORIDE 50 MILLILITER(S): 9 INJECTION, SOLUTION INTRAVENOUS at 17:52

## 2023-08-22 RX ADMIN — Medication 325 MILLIGRAM(S): at 13:18

## 2023-08-22 RX ADMIN — Medication 100 MILLIGRAM(S): at 06:52

## 2023-08-22 RX ADMIN — Medication 88 MICROGRAM(S): at 05:15

## 2023-08-22 RX ADMIN — Medication 1 TABLET(S): at 05:16

## 2023-08-22 RX ADMIN — Medication 1 TABLET(S): at 17:51

## 2023-08-22 RX ADMIN — Medication 50 MILLIGRAM(S): at 17:51

## 2023-08-22 RX ADMIN — Medication 1 TABLET(S): at 13:18

## 2023-08-22 RX ADMIN — POLYETHYLENE GLYCOL 3350 17 GRAM(S): 17 POWDER, FOR SOLUTION ORAL at 13:18

## 2023-08-22 RX ADMIN — Medication 1 DROP(S): at 17:51

## 2023-08-22 RX ADMIN — PANTOPRAZOLE SODIUM 40 MILLIGRAM(S): 20 TABLET, DELAYED RELEASE ORAL at 13:18

## 2023-08-22 RX ADMIN — Medication 100 MILLIGRAM(S): at 13:18

## 2023-08-22 RX ADMIN — Medication 100 MILLIGRAM(S): at 21:35

## 2023-08-22 NOTE — PHYSICAL THERAPY INITIAL EVALUATION ADULT - PERTINENT HX OF CURRENT PROBLEM, REHAB EVAL
89-year-old female with history of hypothyroidism, A-fib on Eliquis, s/p recent pacemaker for complete heart block brought in by ambulance from [facility] assisted living home for cough for the past 2 weeks.  Associated with generalized weakness and decreased p.o. intake.  No fall or trauma.  Patient states she has had cold symptoms for a while.  Denies fever, chills, chest pain, shortness of breath, abdominal pain, nausea, vomiting, upper or lower extremity weakness or paresthesias. pmd: Dr. Crawley, cards: [facility] Seen by card Dr Reich Admitted  to telemetry unit for monitoring , send 3 sets of cardiac enzymes to rule out acute coronary event, obtain ECHO to evaluate LVEF, cardiology consult  ,continue current management, O2 supply, anticoagulation plan as per cardiology consult Pulm cons requested , antitussives and nebulized bd ordered .Also UTI suspected and started on iv abx , id cons called Palliative care consult requested ,to discuss advance directives and complete MOLST 89-year-old female with history of hypothyroidism, A-fib on Eliquis, s/p recent pacemaker for complete heart block brought in by ambulance from [facility] assisted living home for cough for the past 2 weeks.  Associated with generalized weakness and decreased p.o. intake.  No fall or trauma.  Patient states she has had cold symptoms for a while.  Denies fever, chills, chest pain, shortness of breath, abdominal pain, nausea, vomiting, upper or lower extremity weakness or paresthesias. pmd: Dr. Crawley, cards: [facility] Seen by card Dr Reich Admitted  to telemetry unit for monitoring , send 3 sets of cardiac enzymes to rule out acute coronary event, obtain ECHO to evaluate LVEF, cardiology consult  ,continue current management, O2 supply, anticoagulation plan as per cardiology consult Pulm cons requested , antitussives and nebulized bd ordered .Also UTI suspected and started on iv abx , id cons called Palliative care consult requested ,to discuss advance directives and complete MOLST 89-year-old female with history of hypothyroidism, A-fib on Eliquis, s/p recent pacemaker for complete heart block brought in by ambulance from [facility] assisted living home for cough for the past 2 weeks.  Associated with generalized weakness and decreased p.o. intake.  No fall or trauma.  Patient states she has had cold symptoms for a while.  Denies fever, chills, chest pain, shortness of breath, abdominal pain, nausea, vomiting, upper or lower extremity weakness or paresthesias. pmd: Dr. Crawley, cards: [facility] Seen by card Dr Reich Admitted  to telemetry unit for monitoring , send 3 sets of cardiac enzymes to rule out acute coronary event, obtain ECHO to evaluate LVEF, cardiology consult  ,continue current management, O2 supply, anticoagulation plan as per cardiology consult Pulm cons requested , antitussives and nebulized bd ordered .Also UTI suspected and started on iv abx , id cons called Palliative care consult requested ,to discuss advance directives and complete MOLST

## 2023-08-22 NOTE — PROGRESS NOTE ADULT - SUBJECTIVE AND OBJECTIVE BOX
[facility]  Víctor Robertson PA-C  [address] [place] [address]  [phone]      INTERVAL HPI/OVERNIGHT EVENTS:  Pt s/e  Pt denies dysphagia  Discussed CT results with pt and explained why esophagram is being performed    MEDICATIONS  (STANDING):  aMIOdarone    Tablet 200 milliGRAM(s) Oral daily  apixaban 2.5 milliGRAM(s) Oral two times a day  artificial  tears Solution 1 Drop(s) Both EYES two times a day  benzonatate 100 milliGRAM(s) Oral every 8 hours  dextrose 5% + sodium chloride 0.45%. 1000 milliLiter(s) (50 mL/Hr) IV Continuous <Continuous>  enalapril 10 milliGRAM(s) Oral daily  ertapenem  IVPB 1000 milliGRAM(s) IV Intermittent every 24 hours  ferrous    sulfate 325 milliGRAM(s) Oral daily  lactobacillus acidophilus 1 Tablet(s) Oral every 12 hours  levothyroxine 75 MICROGram(s) Oral daily  metoprolol tartrate 50 milliGRAM(s) Oral two times a day  multivitamin/minerals 1 Tablet(s) Oral daily  pantoprazole    Tablet 40 milliGRAM(s) Oral daily  polyethylene glycol 3350 17 Gram(s) Oral daily    MEDICATIONS  (PRN):  acetaminophen     Tablet .. 650 milliGRAM(s) Oral every 6 hours PRN Temp greater or equal to 38C (100.4F), Mild Pain (1 - 3)  albuterol/ipratropium for Nebulization 3 milliLiter(s) Nebulizer every 6 hours PRN Shortness of Breath and/or Wheezing  aluminum hydroxide/magnesium hydroxide/simethicone Suspension 30 milliLiter(s) Oral every 4 hours PRN Dyspepsia  guaiFENesin Oral Liquid (Sugar-Free) 200 milliGRAM(s) Oral every 6 hours PRN Cough  melatonin 3 milliGRAM(s) Oral at bedtime PRN Insomnia  ondansetron Injectable 4 milliGRAM(s) IV Push every 8 hours PRN Nausea and/or Vomiting      Allergies    penicillin (Unknown)      PHYSICAL EXAM:   Vital Signs:  Vital Signs Last 24 Hrs  T(C): 36.4 (22 Aug 2023 05:02), Max: 36.9 (21 Aug 2023 20:50)  T(F): 97.6 (22 Aug 2023 05:02), Max: 98.4 (21 Aug 2023 20:50)  HR: 70 (22 Aug 2023 05:02) (63 - 81)  BP: 161/78 (22 Aug 2023 05:02) (131/79 - 161/78)  BP(mean): --  RR: 18 (22 Aug 2023 05:02) (18 - 18)  SpO2: 98% (22 Aug 2023 05:02) (97% - 100%)    Parameters below as of 22 Aug 2023 05:02  Patient On (Oxygen Delivery Method): nasal cannula  O2 Flow (L/min): 2    Daily     Daily Weight in k.3 (22 Aug 2023 05:02)    GENERAL:  Appears stated age  HEENT:  NC/AT  CHEST:  Full & symmetric excursion  HEART:  Regular rhythm  ABDOMEN:  Soft, non-tender, non-distended  EXTEREMITIES:  no cyanosis  SKIN:  No rash  NEURO:  Alert      LABS:                        10.7   6.97  )-----------( 195      ( 22 Aug 2023 06:20 )             35.2     08-22    142  |  110<H>  |  22  ----------------------------<  99  3.4<L>   |  28  |  0.81    Ca    8.4<L>      22 Aug 2023 06:20  Phos  3.7     08-21  Mg     1.9     08-21    TPro  6.2  /  Alb  2.6<L>  /  TBili  0.8  /  DBili  x   /  AST  21  /  ALT  36  /  AlkPhos  99  08-22    PT/INR - ( 21 Aug 2023 06:24 )   PT: 17.7 sec;   INR: 1.53 ratio           Urinalysis Basic - ( 22 Aug 2023 06:20 )    Color: x / Appearance: x / SG: x / pH: x  Gluc: 99 mg/dL / Ketone: x  / Bili: x / Urobili: x   Blood: x / Protein: x / Nitrite: x   Leuk Esterase: x / RBC: x / WBC x   Sq Epi: x / Non Sq Epi: x / Bacteria: x    IMAGING:  < from: Xray Esophagram Single Contrast (23 @ 10:17) >    ACC: [id number] EXAM:  XR ESOPH SNGL CON STUDY   ORDERED BY: GEMA FLANAGAN     PROCEDURE DATE:  2023          INTERPRETATION:  Clinical information: Dysphasia, abdominal pain.    Limited esophagram is performed following the oral administration of   barium under fluoroscopic control.  Semiupright oblique imaging is obtained.    Contrast transits through the esophagus to the stomach without delay.  There appears to be smooth tapering of the distal esophagus.  No hiatal hernia is noted.    IMPRESSION:    Limited study.    Smooth tapering of the distal esophagus.  Recommend further correlation for distal esophageal stricture.    --- End of Report ---            THEODORA LLANOS MD; Attending Radiologist  This document has been electronically signed. Aug 22 2023 11:15AM    < end of copied text > [facility]  Víctor Robertson PA-C  [address] [place] [address]  [phone]      INTERVAL HPI/OVERNIGHT EVENTS:  Pt s/e  Pt denies dysphagia  Discussed CT results with pt and explained why esophagram is being performed    MEDICATIONS  (STANDING):  aMIOdarone    Tablet 200 milliGRAM(s) Oral daily  apixaban 2.5 milliGRAM(s) Oral two times a day  artificial  tears Solution 1 Drop(s) Both EYES two times a day  benzonatate 100 milliGRAM(s) Oral every 8 hours  dextrose 5% + sodium chloride 0.45%. 1000 milliLiter(s) (50 mL/Hr) IV Continuous <Continuous>  enalapril 10 milliGRAM(s) Oral daily  ertapenem  IVPB 1000 milliGRAM(s) IV Intermittent every 24 hours  ferrous    sulfate 325 milliGRAM(s) Oral daily  lactobacillus acidophilus 1 Tablet(s) Oral every 12 hours  levothyroxine 75 MICROGram(s) Oral daily  metoprolol tartrate 50 milliGRAM(s) Oral two times a day  multivitamin/minerals 1 Tablet(s) Oral daily  pantoprazole    Tablet 40 milliGRAM(s) Oral daily  polyethylene glycol 3350 17 Gram(s) Oral daily    MEDICATIONS  (PRN):  acetaminophen     Tablet .. 650 milliGRAM(s) Oral every 6 hours PRN Temp greater or equal to 38C (100.4F), Mild Pain (1 - 3)  albuterol/ipratropium for Nebulization 3 milliLiter(s) Nebulizer every 6 hours PRN Shortness of Breath and/or Wheezing  aluminum hydroxide/magnesium hydroxide/simethicone Suspension 30 milliLiter(s) Oral every 4 hours PRN Dyspepsia  guaiFENesin Oral Liquid (Sugar-Free) 200 milliGRAM(s) Oral every 6 hours PRN Cough  melatonin 3 milliGRAM(s) Oral at bedtime PRN Insomnia  ondansetron Injectable 4 milliGRAM(s) IV Push every 8 hours PRN Nausea and/or Vomiting      Allergies    penicillin (Unknown)      PHYSICAL EXAM:   Vital Signs:  Vital Signs Last 24 Hrs  T(C): 36.4 (22 Aug 2023 05:02), Max: 36.9 (21 Aug 2023 20:50)  T(F): 97.6 (22 Aug 2023 05:02), Max: 98.4 (21 Aug 2023 20:50)  HR: 70 (22 Aug 2023 05:02) (63 - 81)  BP: 161/78 (22 Aug 2023 05:02) (131/79 - 161/78)  BP(mean): --  RR: 18 (22 Aug 2023 05:02) (18 - 18)  SpO2: 98% (22 Aug 2023 05:02) (97% - 100%)    Parameters below as of 22 Aug 2023 05:02  Patient On (Oxygen Delivery Method): nasal cannula  O2 Flow (L/min): 2    Daily     Daily Weight in k.3 (22 Aug 2023 05:02)    GENERAL:  Appears stated age  HEENT:  NC/AT  CHEST:  Full & symmetric excursion  HEART:  Regular rhythm  ABDOMEN:  Soft, non-tender, non-distended  EXTEREMITIES:  no cyanosis  SKIN:  No rash  NEURO:  Alert      LABS:                        10.7   6.97  )-----------( 195      ( 22 Aug 2023 06:20 )             35.2     08-22    142  |  110<H>  |  22  ----------------------------<  99  3.4<L>   |  28  |  0.81    Ca    8.4<L>      22 Aug 2023 06:20  Phos  3.7     08-21  Mg     1.9     08-21    TPro  6.2  /  Alb  2.6<L>  /  TBili  0.8  /  DBili  x   /  AST  21  /  ALT  36  /  AlkPhos  99  08-22    PT/INR - ( 21 Aug 2023 06:24 )   PT: 17.7 sec;   INR: 1.53 ratio           Urinalysis Basic - ( 22 Aug 2023 06:20 )    Color: x / Appearance: x / SG: x / pH: x  Gluc: 99 mg/dL / Ketone: x  / Bili: x / Urobili: x   Blood: x / Protein: x / Nitrite: x   Leuk Esterase: x / RBC: x / WBC x   Sq Epi: x / Non Sq Epi: x / Bacteria: x    IMAGING:  < from: Xray Esophagram Single Contrast (23 @ 10:17) >    ACC: [id number] EXAM:  XR ESOPH SNGL CON STUDY   ORDERED BY: GEMA FLANAGAN     PROCEDURE DATE:  2023          INTERPRETATION:  Clinical information: Dysphasia, abdominal pain.    Limited esophagram is performed following the oral administration of   barium under fluoroscopic control.  Semiupright oblique imaging is obtained.    Contrast transits through the esophagus to the stomach without delay.  There appears to be smooth tapering of the distal esophagus.  No hiatal hernia is noted.    IMPRESSION:    Limited study.    Smooth tapering of the distal esophagus.  Recommend further correlation for distal esophageal stricture.    --- End of Report ---            THEODORA LLANOS MD; Attending Radiologist  This document has been electronically signed. Aug 22 2023 11:15AM    < end of copied text > [facility]  Víctor Robertson PA-C  [address] [place] [address]  [phone]      INTERVAL HPI/OVERNIGHT EVENTS:  Pt s/e  Pt denies dysphagia  Discussed CT results with pt and explained why esophagram is being performed    MEDICATIONS  (STANDING):  aMIOdarone    Tablet 200 milliGRAM(s) Oral daily  apixaban 2.5 milliGRAM(s) Oral two times a day  artificial  tears Solution 1 Drop(s) Both EYES two times a day  benzonatate 100 milliGRAM(s) Oral every 8 hours  dextrose 5% + sodium chloride 0.45%. 1000 milliLiter(s) (50 mL/Hr) IV Continuous <Continuous>  enalapril 10 milliGRAM(s) Oral daily  ertapenem  IVPB 1000 milliGRAM(s) IV Intermittent every 24 hours  ferrous    sulfate 325 milliGRAM(s) Oral daily  lactobacillus acidophilus 1 Tablet(s) Oral every 12 hours  levothyroxine 75 MICROGram(s) Oral daily  metoprolol tartrate 50 milliGRAM(s) Oral two times a day  multivitamin/minerals 1 Tablet(s) Oral daily  pantoprazole    Tablet 40 milliGRAM(s) Oral daily  polyethylene glycol 3350 17 Gram(s) Oral daily    MEDICATIONS  (PRN):  acetaminophen     Tablet .. 650 milliGRAM(s) Oral every 6 hours PRN Temp greater or equal to 38C (100.4F), Mild Pain (1 - 3)  albuterol/ipratropium for Nebulization 3 milliLiter(s) Nebulizer every 6 hours PRN Shortness of Breath and/or Wheezing  aluminum hydroxide/magnesium hydroxide/simethicone Suspension 30 milliLiter(s) Oral every 4 hours PRN Dyspepsia  guaiFENesin Oral Liquid (Sugar-Free) 200 milliGRAM(s) Oral every 6 hours PRN Cough  melatonin 3 milliGRAM(s) Oral at bedtime PRN Insomnia  ondansetron Injectable 4 milliGRAM(s) IV Push every 8 hours PRN Nausea and/or Vomiting      Allergies    penicillin (Unknown)      PHYSICAL EXAM:   Vital Signs:  Vital Signs Last 24 Hrs  T(C): 36.4 (22 Aug 2023 05:02), Max: 36.9 (21 Aug 2023 20:50)  T(F): 97.6 (22 Aug 2023 05:02), Max: 98.4 (21 Aug 2023 20:50)  HR: 70 (22 Aug 2023 05:02) (63 - 81)  BP: 161/78 (22 Aug 2023 05:02) (131/79 - 161/78)  BP(mean): --  RR: 18 (22 Aug 2023 05:02) (18 - 18)  SpO2: 98% (22 Aug 2023 05:02) (97% - 100%)    Parameters below as of 22 Aug 2023 05:02  Patient On (Oxygen Delivery Method): nasal cannula  O2 Flow (L/min): 2    Daily     Daily Weight in k.3 (22 Aug 2023 05:02)    GENERAL:  Appears stated age  HEENT:  NC/AT  CHEST:  Full & symmetric excursion  HEART:  Regular rhythm  ABDOMEN:  Soft, non-tender, non-distended  EXTEREMITIES:  no cyanosis  SKIN:  No rash  NEURO:  Alert      LABS:                        10.7   6.97  )-----------( 195      ( 22 Aug 2023 06:20 )             35.2     08-22    142  |  110<H>  |  22  ----------------------------<  99  3.4<L>   |  28  |  0.81    Ca    8.4<L>      22 Aug 2023 06:20  Phos  3.7     08-21  Mg     1.9     08-21    TPro  6.2  /  Alb  2.6<L>  /  TBili  0.8  /  DBili  x   /  AST  21  /  ALT  36  /  AlkPhos  99  08-22    PT/INR - ( 21 Aug 2023 06:24 )   PT: 17.7 sec;   INR: 1.53 ratio           Urinalysis Basic - ( 22 Aug 2023 06:20 )    Color: x / Appearance: x / SG: x / pH: x  Gluc: 99 mg/dL / Ketone: x  / Bili: x / Urobili: x   Blood: x / Protein: x / Nitrite: x   Leuk Esterase: x / RBC: x / WBC x   Sq Epi: x / Non Sq Epi: x / Bacteria: x    IMAGING:  < from: Xray Esophagram Single Contrast (23 @ 10:17) >    ACC: [id number] EXAM:  XR ESOPH SNGL CON STUDY   ORDERED BY: GEMA FLANAGAN     PROCEDURE DATE:  2023          INTERPRETATION:  Clinical information: Dysphasia, abdominal pain.    Limited esophagram is performed following the oral administration of   barium under fluoroscopic control.  Semiupright oblique imaging is obtained.    Contrast transits through the esophagus to the stomach without delay.  There appears to be smooth tapering of the distal esophagus.  No hiatal hernia is noted.    IMPRESSION:    Limited study.    Smooth tapering of the distal esophagus.  Recommend further correlation for distal esophageal stricture.    --- End of Report ---            THEODORA LLANOS MD; Attending Radiologist  This document has been electronically signed. Aug 22 2023 11:15AM    < end of copied text >

## 2023-08-22 NOTE — PROGRESS NOTE ADULT - SUBJECTIVE AND OBJECTIVE BOX
PROGRESS NOTE  Patient is a 89y old  Female who presents with a chief complaint of cough (22 Aug 2023 13:04)  Chart and available morning labs /imaging are reviewed electronically , urgent issues addressed . More information  is being added upon completion of rounds , when more information is collected and management discussed with consultants , medical staff and social service/case management on the floor     OVERNIGHT  No new issues reported by medical staff . All above noted Patient is resting in a bed comfortably ..No distress noted     HPI:  89-year-old female with history of hypothyroidism, A-fib on Eliquis, s/p recent pacemaker for complete heart block brought in by ambulance from [facility] living [place] for cough for the past 2 weeks.  Associated with generalized weakness and decreased p.o. intake.  No fall or trauma.  Patient states she has had cold symptoms for a while.  Denies fever, chills, chest pain, shortness of breath, abdominal pain, nausea, vomiting, upper or lower extremity weakness or paresthesias. pmd: Dr. Crawley, cards: [facility] Seen by card Dr Reich Admitted  to telemetry unit for monitoring , send 3 sets of cardiac enzymes to rule out acute coronary event, obtain ECHO to evaluate LVEF, cardiology consult  ,continue current management, O2 supply, anticoagulation plan as per cardiology consult Pulm cons requested , antitussives and nebulized bd ordered .Also UTI suspected and started on iv abx , id cons called Palliative care consult requested ,to discuss advance directives and complete MOLST  (20 Aug 2023 15:35)    PAST MEDICAL & SURGICAL HISTORY:  HTN (hypertension)      Afib      Spinal stenosis      PFO (patent foramen ovale)      Degeneration macular      Osteoporosis      History of complete heart block      Hypothyroidism      Cardiac pacemaker          MEDICATIONS  (STANDING):  aMIOdarone    Tablet 200 milliGRAM(s) Oral daily  apixaban 2.5 milliGRAM(s) Oral two times a day  artificial  tears Solution 1 Drop(s) Both EYES two times a day  benzonatate 100 milliGRAM(s) Oral every 8 hours  dextrose 5% + sodium chloride 0.45%. 1000 milliLiter(s) (50 mL/Hr) IV Continuous <Continuous>  enalapril 10 milliGRAM(s) Oral daily  ertapenem  IVPB 1000 milliGRAM(s) IV Intermittent every 24 hours  ferrous    sulfate 325 milliGRAM(s) Oral daily  lactobacillus acidophilus 1 Tablet(s) Oral every 12 hours  levothyroxine 75 MICROGram(s) Oral daily  metoprolol tartrate 50 milliGRAM(s) Oral two times a day  multivitamin/minerals 1 Tablet(s) Oral daily  pantoprazole    Tablet 40 milliGRAM(s) Oral daily  polyethylene glycol 3350 17 Gram(s) Oral daily    MEDICATIONS  (PRN):  acetaminophen     Tablet .. 650 milliGRAM(s) Oral every 6 hours PRN Temp greater or equal to 38C (100.4F), Mild Pain (1 - 3)  albuterol/ipratropium for Nebulization 3 milliLiter(s) Nebulizer every 6 hours PRN Shortness of Breath and/or Wheezing  aluminum hydroxide/magnesium hydroxide/simethicone Suspension 30 milliLiter(s) Oral every 4 hours PRN Dyspepsia  guaiFENesin Oral Liquid (Sugar-Free) 200 milliGRAM(s) Oral every 6 hours PRN Cough  melatonin 3 milliGRAM(s) Oral at bedtime PRN Insomnia  ondansetron Injectable 4 milliGRAM(s) IV Push every 8 hours PRN Nausea and/or Vomiting      OBJECTIVE    T(C): 36.6 (08-22-23 @ 14:55), Max: 36.9 (08-21-23 @ 20:50)  HR: 78 (08-22-23 @ 14:55) (70 - 81)  BP: 161/81 (08-22-23 @ 14:55) (131/79 - 161/81)  RR: 18 (08-22-23 @ 14:55) (18 - 18)  SpO2: 91% (08-22-23 @ 14:55) (91% - 98%)  Wt(kg): --  I&O's Summary        REVIEW OF SYSTEMS:  CONSTITUTIONAL: No fever, weight loss, or fatigue  EYES: No eye pain, visual disturbances, or discharge  ENMT:   No sinus or throat pain  NECK: No pain or stiffness  RESPIRATORY: No cough, wheezing, chills or hemoptysis; No shortness of breath  CARDIOVASCULAR: No chest pain, palpitations, dizziness, or leg swelling  GASTROINTESTINAL: No abdominal pain. No nausea, vomiting; No diarrhea or constipation. No melena or hematochezia.  GENITOURINARY: No dysuria, frequency, hematuria, or incontinence  NEUROLOGICAL: No headaches, memory loss, loss of strength, numbness, or tremors  SKIN: No itching, burning, rashes, or lesions   MUSCULOSKELETAL: No joint pain or swelling; No muscle, back, or extremity pain    PHYSICAL EXAM:  Appearance: NAD. VS past 24 hrs -as above   HEENT:   Moist oral mucosa. Conjunctiva clear b/l.   Neck : supple  Respiratory: Lungs CTAB.  Gastrointestinal:  Soft, nontender. No rebound. No rigidity. BS present	  Cardiovascular: RRR ,S1S2 present  Neurologic: Non-focal. Moving all extremities.  Extremities: No edema. No erythema. No calf tenderness.  Skin: No rashes, No ecchymoses, No cyanosis.	  wounds ,skin lesions-See skin assesment flow sheet   LABS:                        10.7   6.97  )-----------( 195      ( 22 Aug 2023 06:20 )             35.2     08-22    142  |  110<H>  |  22  ----------------------------<  99  3.4<L>   |  28  |  0.81    Ca    8.4<L>      22 Aug 2023 06:20  Phos  3.7     08-21  Mg     1.9     08-21    TPro  6.2  /  Alb  2.6<L>  /  TBili  0.8  /  DBili  x   /  AST  21  /  ALT  36  /  AlkPhos  99  08-22    CAPILLARY BLOOD GLUCOSE        PT/INR - ( 21 Aug 2023 06:24 )   PT: 17.7 sec;   INR: 1.53 ratio           Urinalysis Basic - ( 22 Aug 2023 06:20 )    Color: x / Appearance: x / SG: x / pH: x  Gluc: 99 mg/dL / Ketone: x  / Bili: x / Urobili: x   Blood: x / Protein: x / Nitrite: x   Leuk Esterase: x / RBC: x / WBC x   Sq Epi: x / Non Sq Epi: x / Bacteria: x        Culture - Blood (collected 21 Aug 2023 09:30)  Source: .Blood Blood  Gram Stain (22 Aug 2023 03:37):    Growth in anaerobic bottle: Gram Negative Rods  Preliminary Report (22 Aug 2023 03:37):    Growth in anaerobic bottle: Gram Negative Rods    Culture - Urine (collected 20 Aug 2023 11:55)  Source: Clean Catch Clean Catch (Midstream)  Preliminary Report (22 Aug 2023 00:01):    >100,000 CFU/ml Escherichia coli    Culture - Blood (collected 20 Aug 2023 11:50)  Source: .Blood Blood-Peripheral  Gram Stain (21 Aug 2023 02:35):    Growth in aerobic bottle: Gram Negative Rods  Preliminary Report (21 Aug 2023 16:38):    Growth in aerobic bottle: Escherichia coli    See previous culture [id number]    Culture - Blood (collected 20 Aug 2023 11:45)  Source: .Blood Blood-Peripheral  Gram Stain (21 Aug 2023 02:35):    Growth in aerobic and anaerobic bottles: Gram Negative Rods  Preliminary Report (21 Aug 2023 16:34):    Growth in aerobic and anaerobic bottles: Escherichia coli    Direct identification is available within approximately 3-5    hours either by Blood Panel Multiplexed PCR or Direct    MALDI-TOF. Details: [email or web address]  Organism: Blood Culture PCR (21 Aug 2023 04:16)  Organism: Blood Culture PCR (21 Aug 2023 04:16)      RADIOLOGY & ADDITIONAL TESTS:   reviewed elctronically  ASSESSMENT/PLAN: 	    25 minutes aggregate time was spent on this visit, 50% visit time spent in care co-ordination with other attendings and counselling patient .I have discussed care plan with patient / HCP/family member ,who expressed understanding of problems treatment and their effect and side effects, alternatives in details. I have asked if they have any questions and concerns and appropriately addressed them to best of my ability.  PROGRESS NOTE  Patient is a 89y old  Female who presents with a chief complaint of cough (22 Aug 2023 13:04)  Chart and available morning labs /imaging are reviewed electronically , urgent issues addressed . More information  is being added upon completion of rounds , when more information is collected and management discussed with consultants , medical staff and social service/case management on the floor     OVERNIGHT  No new issues reported by medical staff . All above noted Patient is resting in a bed comfortably ..No distress noted     HPI:  89-year-old female with history of hypothyroidism, A-fib on Eliquis, s/p recent pacemaker for complete heart block brought in by ambulance from [facility] living [place] for cough for the past 2 weeks.  Associated with generalized weakness and decreased p.o. intake.  No fall or trauma.  Patient states she has had cold symptoms for a while.  Denies fever, chills, chest pain, shortness of breath, abdominal pain, nausea, vomiting, upper or lower extremity weakness or paresthesias. pmd: Dr. Crawley, cards: [facility] Seen by card Dr Reich Admitted  to telemetry unit for monitoring , send 3 sets of cardiac enzymes to rule out acute coronary event, obtain ECHO to evaluate LVEF, cardiology consult  ,continue current management, O2 supply, anticoagulation plan as per cardiology consult Pulm cons requested , antitussives and nebulized bd ordered .Also UTI suspected and started on iv abx , id cons called Palliative care consult requested ,to discuss advance directives and complete MOLST  (20 Aug 2023 15:35)    PAST MEDICAL & SURGICAL HISTORY:  HTN (hypertension)      Afib      Spinal stenosis      PFO (patent foramen ovale)      Degeneration macular      Osteoporosis      History of complete heart block      Hypothyroidism      Cardiac pacemaker          MEDICATIONS  (STANDING):  aMIOdarone    Tablet 200 milliGRAM(s) Oral daily  apixaban 2.5 milliGRAM(s) Oral two times a day  artificial  tears Solution 1 Drop(s) Both EYES two times a day  benzonatate 100 milliGRAM(s) Oral every 8 hours  dextrose 5% + sodium chloride 0.45%. 1000 milliLiter(s) (50 mL/Hr) IV Continuous <Continuous>  enalapril 10 milliGRAM(s) Oral daily  ertapenem  IVPB 1000 milliGRAM(s) IV Intermittent every 24 hours  ferrous    sulfate 325 milliGRAM(s) Oral daily  lactobacillus acidophilus 1 Tablet(s) Oral every 12 hours  levothyroxine 75 MICROGram(s) Oral daily  metoprolol tartrate 50 milliGRAM(s) Oral two times a day  multivitamin/minerals 1 Tablet(s) Oral daily  pantoprazole    Tablet 40 milliGRAM(s) Oral daily  polyethylene glycol 3350 17 Gram(s) Oral daily    MEDICATIONS  (PRN):  acetaminophen     Tablet .. 650 milliGRAM(s) Oral every 6 hours PRN Temp greater or equal to 38C (100.4F), Mild Pain (1 - 3)  albuterol/ipratropium for Nebulization 3 milliLiter(s) Nebulizer every 6 hours PRN Shortness of Breath and/or Wheezing  aluminum hydroxide/magnesium hydroxide/simethicone Suspension 30 milliLiter(s) Oral every 4 hours PRN Dyspepsia  guaiFENesin Oral Liquid (Sugar-Free) 200 milliGRAM(s) Oral every 6 hours PRN Cough  melatonin 3 milliGRAM(s) Oral at bedtime PRN Insomnia  ondansetron Injectable 4 milliGRAM(s) IV Push every 8 hours PRN Nausea and/or Vomiting      OBJECTIVE    T(C): 36.6 (08-22-23 @ 14:55), Max: 36.9 (08-21-23 @ 20:50)  HR: 78 (08-22-23 @ 14:55) (70 - 81)  BP: 161/81 (08-22-23 @ 14:55) (131/79 - 161/81)  RR: 18 (08-22-23 @ 14:55) (18 - 18)  SpO2: 91% (08-22-23 @ 14:55) (91% - 98%)  Wt(kg): --  I&O's Summary        REVIEW OF SYSTEMS:  CONSTITUTIONAL: No fever, weight loss, or fatigue  EYES: No eye pain, visual disturbances, or discharge  ENMT:   No sinus or throat pain  NECK: No pain or stiffness  RESPIRATORY: No cough, wheezing, chills or hemoptysis; No shortness of breath  CARDIOVASCULAR: No chest pain, palpitations, dizziness, or leg swelling  GASTROINTESTINAL: No abdominal pain. No nausea, vomiting; No diarrhea or constipation. No melena or hematochezia.  GENITOURINARY: No dysuria, frequency, hematuria, or incontinence  NEUROLOGICAL: No headaches, memory loss, loss of strength, numbness, or tremors  SKIN: No itching, burning, rashes, or lesions   MUSCULOSKELETAL: No joint pain or swelling; No muscle, back, or extremity pain    PHYSICAL EXAM:  Appearance: NAD. VS past 24 hrs -as above   HEENT:   Moist oral mucosa. Conjunctiva clear b/l.   Neck : supple  Respiratory: Lungs CTAB.  Gastrointestinal:  Soft, nontender. No rebound. No rigidity. BS present	  Cardiovascular: RRR ,S1S2 present  Neurologic: Non-focal. Moving all extremities.  Extremities: No edema. No erythema. No calf tenderness.  Skin: No rashes, No ecchymoses, No cyanosis.	  wounds ,skin lesions-See skin assesment flow sheet   LABS:                        10.7   6.97  )-----------( 195      ( 22 Aug 2023 06:20 )             35.2     08-22    142  |  110<H>  |  22  ----------------------------<  99  3.4<L>   |  28  |  0.81    Ca    8.4<L>      22 Aug 2023 06:20  Phos  3.7     08-21  Mg     1.9     08-21    TPro  6.2  /  Alb  2.6<L>  /  TBili  0.8  /  DBili  x   /  AST  21  /  ALT  36  /  AlkPhos  99  08-22    CAPILLARY BLOOD GLUCOSE        PT/INR - ( 21 Aug 2023 06:24 )   PT: 17.7 sec;   INR: 1.53 ratio           Urinalysis Basic - ( 22 Aug 2023 06:20 )    Color: x / Appearance: x / SG: x / pH: x  Gluc: 99 mg/dL / Ketone: x  / Bili: x / Urobili: x   Blood: x / Protein: x / Nitrite: x   Leuk Esterase: x / RBC: x / WBC x   Sq Epi: x / Non Sq Epi: x / Bacteria: x        Culture - Blood (collected 21 Aug 2023 09:30)  Source: .Blood Blood  Gram Stain (22 Aug 2023 03:37):    Growth in anaerobic bottle: Gram Negative Rods  Preliminary Report (22 Aug 2023 03:37):    Growth in anaerobic bottle: Gram Negative Rods    Culture - Urine (collected 20 Aug 2023 11:55)  Source: Clean Catch Clean Catch (Midstream)  Preliminary Report (22 Aug 2023 00:01):    >100,000 CFU/ml Escherichia coli    Culture - Blood (collected 20 Aug 2023 11:50)  Source: .Blood Blood-Peripheral  Gram Stain (21 Aug 2023 02:35):    Growth in aerobic bottle: Gram Negative Rods  Preliminary Report (21 Aug 2023 16:38):    Growth in aerobic bottle: Escherichia coli    See previous culture [id number]    Culture - Blood (collected 20 Aug 2023 11:45)  Source: .Blood Blood-Peripheral  Gram Stain (21 Aug 2023 02:35):    Growth in aerobic and anaerobic bottles: Gram Negative Rods  Preliminary Report (21 Aug 2023 16:34):    Growth in aerobic and anaerobic bottles: Escherichia coli    Direct identification is available within approximately 3-5    hours either by Blood Panel Multiplexed PCR or Direct    MALDI-TOF. Details: [email or web address]  Organism: Blood Culture PCR (21 Aug 2023 04:16)  Organism: Blood Culture PCR (21 Aug 2023 04:16)      RADIOLOGY & ADDITIONAL TESTS:   reviewed elctronically  ASSESSMENT/PLAN: 	    25 minutes aggregate time was spent on this visit, 50% visit time spent in care co-ordination with other attendings and counselling patient .I have discussed care plan with patient / HCP/family member ,who expressed understanding of problems treatment and their effect and side effects, alternatives in details. I have asked if they have any questions and concerns and appropriately addressed them to best of my ability.  PROGRESS NOTE  Patient is a 89y old  Female who presents with a chief complaint of cough (22 Aug 2023 13:04)  Chart and available morning labs /imaging are reviewed electronically , urgent issues addressed . More information  is being added upon completion of rounds , when more information is collected and management discussed with consultants , medical staff and social service/case management on the floor     OVERNIGHT  No new issues reported by medical staff . All above noted Patient is resting in a bed comfortably ..No distress noted     HPI:  89-year-old female with history of hypothyroidism, A-fib on Eliquis, s/p recent pacemaker for complete heart block brought in by ambulance from [facility] living [place] for cough for the past 2 weeks.  Associated with generalized weakness and decreased p.o. intake.  No fall or trauma.  Patient states she has had cold symptoms for a while.  Denies fever, chills, chest pain, shortness of breath, abdominal pain, nausea, vomiting, upper or lower extremity weakness or paresthesias. pmd: Dr. Crawley, cards: [facility] Seen by card Dr Reich Admitted  to telemetry unit for monitoring , send 3 sets of cardiac enzymes to rule out acute coronary event, obtain ECHO to evaluate LVEF, cardiology consult  ,continue current management, O2 supply, anticoagulation plan as per cardiology consult Pulm cons requested , antitussives and nebulized bd ordered .Also UTI suspected and started on iv abx , id cons called Palliative care consult requested ,to discuss advance directives and complete MOLST  (20 Aug 2023 15:35)    PAST MEDICAL & SURGICAL HISTORY:  HTN (hypertension)      Afib      Spinal stenosis      PFO (patent foramen ovale)      Degeneration macular      Osteoporosis      History of complete heart block      Hypothyroidism      Cardiac pacemaker          MEDICATIONS  (STANDING):  aMIOdarone    Tablet 200 milliGRAM(s) Oral daily  apixaban 2.5 milliGRAM(s) Oral two times a day  artificial  tears Solution 1 Drop(s) Both EYES two times a day  benzonatate 100 milliGRAM(s) Oral every 8 hours  dextrose 5% + sodium chloride 0.45%. 1000 milliLiter(s) (50 mL/Hr) IV Continuous <Continuous>  enalapril 10 milliGRAM(s) Oral daily  ertapenem  IVPB 1000 milliGRAM(s) IV Intermittent every 24 hours  ferrous    sulfate 325 milliGRAM(s) Oral daily  lactobacillus acidophilus 1 Tablet(s) Oral every 12 hours  levothyroxine 75 MICROGram(s) Oral daily  metoprolol tartrate 50 milliGRAM(s) Oral two times a day  multivitamin/minerals 1 Tablet(s) Oral daily  pantoprazole    Tablet 40 milliGRAM(s) Oral daily  polyethylene glycol 3350 17 Gram(s) Oral daily    MEDICATIONS  (PRN):  acetaminophen     Tablet .. 650 milliGRAM(s) Oral every 6 hours PRN Temp greater or equal to 38C (100.4F), Mild Pain (1 - 3)  albuterol/ipratropium for Nebulization 3 milliLiter(s) Nebulizer every 6 hours PRN Shortness of Breath and/or Wheezing  aluminum hydroxide/magnesium hydroxide/simethicone Suspension 30 milliLiter(s) Oral every 4 hours PRN Dyspepsia  guaiFENesin Oral Liquid (Sugar-Free) 200 milliGRAM(s) Oral every 6 hours PRN Cough  melatonin 3 milliGRAM(s) Oral at bedtime PRN Insomnia  ondansetron Injectable 4 milliGRAM(s) IV Push every 8 hours PRN Nausea and/or Vomiting      OBJECTIVE    T(C): 36.6 (08-22-23 @ 14:55), Max: 36.9 (08-21-23 @ 20:50)  HR: 78 (08-22-23 @ 14:55) (70 - 81)  BP: 161/81 (08-22-23 @ 14:55) (131/79 - 161/81)  RR: 18 (08-22-23 @ 14:55) (18 - 18)  SpO2: 91% (08-22-23 @ 14:55) (91% - 98%)  Wt(kg): --  I&O's Summary        REVIEW OF SYSTEMS:  CONSTITUTIONAL: No fever, weight loss, or fatigue  EYES: No eye pain, visual disturbances, or discharge  ENMT:   No sinus or throat pain  NECK: No pain or stiffness  RESPIRATORY: No cough, wheezing, chills or hemoptysis; No shortness of breath  CARDIOVASCULAR: No chest pain, palpitations, dizziness, or leg swelling  GASTROINTESTINAL: No abdominal pain. No nausea, vomiting; No diarrhea or constipation. No melena or hematochezia.  GENITOURINARY: No dysuria, frequency, hematuria, or incontinence  NEUROLOGICAL: No headaches, memory loss, loss of strength, numbness, or tremors  SKIN: No itching, burning, rashes, or lesions   MUSCULOSKELETAL: No joint pain or swelling; No muscle, back, or extremity pain    PHYSICAL EXAM:  Appearance: NAD. VS past 24 hrs -as above   HEENT:   Moist oral mucosa. Conjunctiva clear b/l.   Neck : supple  Respiratory: Lungs CTAB.  Gastrointestinal:  Soft, nontender. No rebound. No rigidity. BS present	  Cardiovascular: RRR ,S1S2 present  Neurologic: Non-focal. Moving all extremities.  Extremities: No edema. No erythema. No calf tenderness.  Skin: No rashes, No ecchymoses, No cyanosis.	  wounds ,skin lesions-See skin assesment flow sheet   LABS:                        10.7   6.97  )-----------( 195      ( 22 Aug 2023 06:20 )             35.2     08-22    142  |  110<H>  |  22  ----------------------------<  99  3.4<L>   |  28  |  0.81    Ca    8.4<L>      22 Aug 2023 06:20  Phos  3.7     08-21  Mg     1.9     08-21    TPro  6.2  /  Alb  2.6<L>  /  TBili  0.8  /  DBili  x   /  AST  21  /  ALT  36  /  AlkPhos  99  08-22    CAPILLARY BLOOD GLUCOSE        PT/INR - ( 21 Aug 2023 06:24 )   PT: 17.7 sec;   INR: 1.53 ratio           Urinalysis Basic - ( 22 Aug 2023 06:20 )    Color: x / Appearance: x / SG: x / pH: x  Gluc: 99 mg/dL / Ketone: x  / Bili: x / Urobili: x   Blood: x / Protein: x / Nitrite: x   Leuk Esterase: x / RBC: x / WBC x   Sq Epi: x / Non Sq Epi: x / Bacteria: x        Culture - Blood (collected 21 Aug 2023 09:30)  Source: .Blood Blood  Gram Stain (22 Aug 2023 03:37):    Growth in anaerobic bottle: Gram Negative Rods  Preliminary Report (22 Aug 2023 03:37):    Growth in anaerobic bottle: Gram Negative Rods    Culture - Urine (collected 20 Aug 2023 11:55)  Source: Clean Catch Clean Catch (Midstream)  Preliminary Report (22 Aug 2023 00:01):    >100,000 CFU/ml Escherichia coli    Culture - Blood (collected 20 Aug 2023 11:50)  Source: .Blood Blood-Peripheral  Gram Stain (21 Aug 2023 02:35):    Growth in aerobic bottle: Gram Negative Rods  Preliminary Report (21 Aug 2023 16:38):    Growth in aerobic bottle: Escherichia coli    See previous culture [id number]    Culture - Blood (collected 20 Aug 2023 11:45)  Source: .Blood Blood-Peripheral  Gram Stain (21 Aug 2023 02:35):    Growth in aerobic and anaerobic bottles: Gram Negative Rods  Preliminary Report (21 Aug 2023 16:34):    Growth in aerobic and anaerobic bottles: Escherichia coli    Direct identification is available within approximately 3-5    hours either by Blood Panel Multiplexed PCR or Direct    MALDI-TOF. Details: [email or web address]  Organism: Blood Culture PCR (21 Aug 2023 04:16)  Organism: Blood Culture PCR (21 Aug 2023 04:16)      RADIOLOGY & ADDITIONAL TESTS:   reviewed elctronically  ASSESSMENT/PLAN: 	    25 minutes aggregate time was spent on this visit, 50% visit time spent in care co-ordination with other attendings and counselling patient .I have discussed care plan with patient / HCP/family member ,who expressed understanding of problems treatment and their effect and side effects, alternatives in details. I have asked if they have any questions and concerns and appropriately addressed them to best of my ability.

## 2023-08-22 NOTE — PROGRESS NOTE ADULT - SUBJECTIVE AND OBJECTIVE BOX
CHIEF COMPLAINT/ REASON FOR VISIT  .. Patient was seen to address the  issue listed under PROBLEM LIST which is located toward bottom of this note     DEACON ROBERT    PLV 1EAS 106 W1    Allergies    penicillin (Unknown)    Intolerances        PAST MEDICAL & SURGICAL HISTORY:  HTN (hypertension)      Afib      Spinal stenosis      PFO (patent foramen ovale)      Degeneration macular      Osteoporosis      History of complete heart block      Hypothyroidism      Cardiac pacemaker          FAMILY HISTORY:      Home Medications:  Albuterol (Eqv-ProAir HFA) 90 mcg/inh inhalation aerosol: 1 puff(s) inhaled 4 times a day as needed for  cough or wheezing (21 Aug 2023 09:56)  amiodarone 200 mg oral tablet: 1 tab(s) orally once a day (21 Aug 2023 09:55)  clindamycin 1% topical lotion: Apply topically to affected area once a day to face (21 Aug 2023 09:55)  Eliquis 2.5 mg oral tablet: 1 tab(s) orally 2 times a day (21 Aug 2023 09:55)  enalapril 10 mg oral tablet: 1 tab(s) orally once a day (21 Aug 2023 09:55)  ferrous sulfate 325 mg (65 mg elemental iron) oral tablet: 1 tab(s) orally once a day (21 Aug 2023 09:55)  levothyroxine 75 mcg (0.075 mg) oral tablet: 1 tab(s) orally once a day (21 Aug 2023 09:56)  Metoprolol Tartrate 50 mg oral tablet: 1 tab(s) orally 2 times a day (21 Aug 2023 09:56)  Myrbetriq 50 mg oral tablet, extended release: 1 tab(s) orally once a day (21 Aug 2023 09:56)  polyethylene glycol 3350 oral powder for reconstitution: 17 gram(s) orally once a day (21 Aug 2023 09:56)  PreserVision AREDS oral capsule: 1 cap(s) orally once a day (21 Aug 2023 09:56)  Refresh Dry Eye Therapy ophthalmic solution: 1 drop(s) in each eye 4 times a day (21 Aug 2023 09:56)  Tylenol 325 mg oral tablet: 2 tab(s) orally every 4 hours as needed for pain or fever (21 Aug 2023 09:56)      MEDICATIONS  (STANDING):  apixaban 2.5 milliGRAM(s) Oral two times a day  benzonatate 100 milliGRAM(s) Oral every 8 hours  dextrose 5% + sodium chloride 0.45% with potassium chloride 20 mEq/L 1000 milliLiter(s) (50 mL/Hr) IV Continuous <Continuous>  ertapenem  IVPB 1000 milliGRAM(s) IV Intermittent every 24 hours  lactobacillus acidophilus 1 Tablet(s) Oral every 12 hours  levothyroxine 88 MICROGram(s) Oral daily  pantoprazole    Tablet 40 milliGRAM(s) Oral daily    MEDICATIONS  (PRN):  acetaminophen     Tablet .. 650 milliGRAM(s) Oral every 6 hours PRN Temp greater or equal to 38C (100.4F), Mild Pain (1 - 3)  albuterol/ipratropium for Nebulization 3 milliLiter(s) Nebulizer every 6 hours PRN Shortness of Breath and/or Wheezing  aluminum hydroxide/magnesium hydroxide/simethicone Suspension 30 milliLiter(s) Oral every 4 hours PRN Dyspepsia  guaiFENesin Oral Liquid (Sugar-Free) 200 milliGRAM(s) Oral every 6 hours PRN Cough  melatonin 3 milliGRAM(s) Oral at bedtime PRN Insomnia  ondansetron Injectable 4 milliGRAM(s) IV Push every 8 hours PRN Nausea and/or Vomiting              Vital Signs Last 24 Hrs  T(C): 36.9 (21 Aug 2023 20:50), Max: 36.9 (21 Aug 2023 20:50)  T(F): 98.4 (21 Aug 2023 20:50), Max: 98.4 (21 Aug 2023 20:50)  HR: 81 (21 Aug 2023 20:50) (63 - 81)  BP: 131/79 (21 Aug 2023 20:50) (131/79 - 148/76)  BP(mean): --  RR: 18 (21 Aug 2023 20:50) (18 - 18)  SpO2: 97% (21 Aug 2023 20:50) (97% - 100%)    Parameters below as of 21 Aug 2023 20:50  Patient On (Oxygen Delivery Method): nasal cannula          08-20-23 @ 07:01  -  08-21-23 @ 07:00  --------------------------------------------------------  IN: 790 mL / OUT: 0 mL / NET: 790 mL              LABS:                        10.5   21.39 )-----------( 175      ( 21 Aug 2023 06:24 )             34.7     08-21    141  |  109<H>  |  20  ----------------------------<  114<H>  3.8   |  29  |  0.86    Ca    8.3<L>      21 Aug 2023 06:24  Phos  3.7     08-21  Mg     1.9     08-21    TPro  6.1  /  Alb  2.6<L>  /  TBili  1.1  /  DBili  x   /  AST  40<H>  /  ALT  37  /  AlkPhos  95  08-21    PT/INR - ( 21 Aug 2023 06:24 )   PT: 17.7 sec;   INR: 1.53 ratio         PTT - ( 20 Aug 2023 11:55 )  PTT:30.0 sec  Urinalysis Basic - ( 21 Aug 2023 06:24 )    Color: x / Appearance: x / SG: x / pH: x  Gluc: 114 mg/dL / Ketone: x  / Bili: x / Urobili: x   Blood: x / Protein: x / Nitrite: x   Leuk Esterase: x / RBC: x / WBC x   Sq Epi: x / Non Sq Epi: x / Bacteria: x            WBC:  WBC Count: 21.39 K/uL (08-21 @ 06:24)  WBC Count: 16.67 K/uL (08-20 @ 11:55)      MICROBIOLOGY:  RECENT CULTURES:  08-21 .Blood Blood XXXX   Growth in anaerobic bottle: Gram Negative Rods   Growth in anaerobic bottle: Gram Negative Rods    08-20 Clean Catch Clean Catch (Midstream) XXXX XXXX   >100,000 CFU/ml Escherichia coli    08-20 .Blood Blood-Peripheral XXXX   Growth in aerobic bottle: Gram Negative Rods   Growth in aerobic bottle: Escherichia coli  See previous culture [id number]    08-20 .Blood Blood-Peripheral Blood Culture PCR   Growth in aerobic and anaerobic bottles: Gram Negative Rods   Growth in aerobic and anaerobic bottles: Escherichia coli  Direct identification is available within approximately 3-5  hours either by Blood Panel Multiplexed PCR or Direct  MALDI-TOF. Details: [email or web address]                PT/INR - ( 21 Aug 2023 06:24 )   PT: 17.7 sec;   INR: 1.53 ratio         PTT - ( 20 Aug 2023 11:55 )  PTT:30.0 sec    Sodium:  Sodium: 141 mmol/L (08-21 @ 06:24)  Sodium: 144 mmol/L (08-20 @ 11:55)      0.86 mg/dL 08-21 @ 06:24  0.85 mg/dL 08-20 @ 11:55      Hemoglobin:  Hemoglobin: 10.5 g/dL (08-21 @ 06:24)  Hemoglobin: 12.1 g/dL (08-20 @ 11:55)      Platelets: Platelet Count - Automated: 175 K/uL (08-21 @ 06:24)  Platelet Count - Automated: 225 K/uL (08-20 @ 11:55)      LIVER FUNCTIONS - ( 21 Aug 2023 06:24 )  Alb: 2.6 g/dL / Pro: 6.1 g/dL / ALK PHOS: 95 U/L / ALT: 37 U/L / AST: 40 U/L / GGT: x             Urinalysis Basic - ( 21 Aug 2023 06:24 )    Color: x / Appearance: x / SG: x / pH: x  Gluc: 114 mg/dL / Ketone: x  / Bili: x / Urobili: x   Blood: x / Protein: x / Nitrite: x   Leuk Esterase: x / RBC: x / WBC x   Sq Epi: x / Non Sq Epi: x / Bacteria: x        RADIOLOGY & ADDITIONAL STUDIES:      MICROBIOLOGY:  RECENT CULTURES:  08-21 .Blood Blood XXXX   Growth in anaerobic bottle: Gram Negative Rods   Growth in anaerobic bottle: Gram Negative Rods    08-20 Clean Catch Clean Catch (Midstream) XXXX XXXX   >100,000 CFU/ml Escherichia coli    08-20 .Blood Blood-Peripheral XXXX   Growth in aerobic bottle: Gram Negative Rods   Growth in aerobic bottle: Escherichia coli  See previous culture [id number]    08-20 .Blood Blood-Peripheral Blood Culture PCR   Growth in aerobic and anaerobic bottles: Gram Negative Rods   Growth in aerobic and anaerobic bottles: Escherichia coli  Direct identification is available within approximately 3-5  hours either by Blood Panel Multiplexed PCR or Direct  MALDI-TOF. Details: [email or web address]                 CHIEF COMPLAINT/ REASON FOR VISIT  .. Patient was seen to address the  issue listed under PROBLEM LIST which is located toward bottom of this note     DEACON ROBERT    PLV 1EAS 106 W1    Allergies    penicillin (Unknown)    Intolerances        PAST MEDICAL & SURGICAL HISTORY:  HTN (hypertension)      Afib      Spinal stenosis      PFO (patent foramen ovale)      Degeneration macular      Osteoporosis      History of complete heart block      Hypothyroidism      Cardiac pacemaker          FAMILY HISTORY:      Home Medications:  Albuterol (Eqv-ProAir HFA) 90 mcg/inh inhalation aerosol: 1 puff(s) inhaled 4 times a day as needed for  cough or wheezing (21 Aug 2023 09:56)  amiodarone 200 mg oral tablet: 1 tab(s) orally once a day (21 Aug 2023 09:55)  clindamycin 1% topical lotion: Apply topically to affected area once a day to face (21 Aug 2023 09:55)  Eliquis 2.5 mg oral tablet: 1 tab(s) orally 2 times a day (21 Aug 2023 09:55)  enalapril 10 mg oral tablet: 1 tab(s) orally once a day (21 Aug 2023 09:55)  ferrous sulfate 325 mg (65 mg elemental iron) oral tablet: 1 tab(s) orally once a day (21 Aug 2023 09:55)  levothyroxine 75 mcg (0.075 mg) oral tablet: 1 tab(s) orally once a day (21 Aug 2023 09:56)  Metoprolol Tartrate 50 mg oral tablet: 1 tab(s) orally 2 times a day (21 Aug 2023 09:56)  Myrbetriq 50 mg oral tablet, extended release: 1 tab(s) orally once a day (21 Aug 2023 09:56)  polyethylene glycol 3350 oral powder for reconstitution: 17 gram(s) orally once a day (21 Aug 2023 09:56)  PreserVision AREDS oral capsule: 1 cap(s) orally once a day (21 Aug 2023 09:56)  Refresh Dry Eye Therapy ophthalmic solution: 1 drop(s) in each eye 4 times a day (21 Aug 2023 09:56)  Tylenol 325 mg oral tablet: 2 tab(s) orally every 4 hours as needed for pain or fever (21 Aug 2023 09:56)      MEDICATIONS  (STANDING):  apixaban 2.5 milliGRAM(s) Oral two times a day  benzonatate 100 milliGRAM(s) Oral every 8 hours  dextrose 5% + sodium chloride 0.45% with potassium chloride 20 mEq/L 1000 milliLiter(s) (50 mL/Hr) IV Continuous <Continuous>  ertapenem  IVPB 1000 milliGRAM(s) IV Intermittent every 24 hours  lactobacillus acidophilus 1 Tablet(s) Oral every 12 hours  levothyroxine 88 MICROGram(s) Oral daily  pantoprazole    Tablet 40 milliGRAM(s) Oral daily    MEDICATIONS  (PRN):  acetaminophen     Tablet .. 650 milliGRAM(s) Oral every 6 hours PRN Temp greater or equal to 38C (100.4F), Mild Pain (1 - 3)  albuterol/ipratropium for Nebulization 3 milliLiter(s) Nebulizer every 6 hours PRN Shortness of Breath and/or Wheezing  aluminum hydroxide/magnesium hydroxide/simethicone Suspension 30 milliLiter(s) Oral every 4 hours PRN Dyspepsia  guaiFENesin Oral Liquid (Sugar-Free) 200 milliGRAM(s) Oral every 6 hours PRN Cough  melatonin 3 milliGRAM(s) Oral at bedtime PRN Insomnia  ondansetron Injectable 4 milliGRAM(s) IV Push every 8 hours PRN Nausea and/or Vomiting              Vital Signs Last 24 Hrs  T(C): 36.9 (21 Aug 2023 20:50), Max: 36.9 (21 Aug 2023 20:50)  T(F): 98.4 (21 Aug 2023 20:50), Max: 98.4 (21 Aug 2023 20:50)  HR: 81 (21 Aug 2023 20:50) (63 - 81)  BP: 131/79 (21 Aug 2023 20:50) (131/79 - 148/76)  BP(mean): --  RR: 18 (21 Aug 2023 20:50) (18 - 18)  SpO2: 97% (21 Aug 2023 20:50) (97% - 100%)    Parameters below as of 21 Aug 2023 20:50  Patient On (Oxygen Delivery Method): nasal cannula          08-20-23 @ 07:01  -  08-21-23 @ 07:00  --------------------------------------------------------  IN: 790 mL / OUT: 0 mL / NET: 790 mL              LABS:                        10.5   21.39 )-----------( 175      ( 21 Aug 2023 06:24 )             34.7     08-21    141  |  109<H>  |  20  ----------------------------<  114<H>  3.8   |  29  |  0.86    Ca    8.3<L>      21 Aug 2023 06:24  Phos  3.7     08-21  Mg     1.9     08-21    TPro  6.1  /  Alb  2.6<L>  /  TBili  1.1  /  DBili  x   /  AST  40<H>  /  ALT  37  /  AlkPhos  95  08-21    PT/INR - ( 21 Aug 2023 06:24 )   PT: 17.7 sec;   INR: 1.53 ratio         PTT - ( 20 Aug 2023 11:55 )  PTT:30.0 sec  Urinalysis Basic - ( 21 Aug 2023 06:24 )    Color: x / Appearance: x / SG: x / pH: x  Gluc: 114 mg/dL / Ketone: x  / Bili: x / Urobili: x   Blood: x / Protein: x / Nitrite: x   Leuk Esterase: x / RBC: x / WBC x   Sq Epi: x / Non Sq Epi: x / Bacteria: x            WBC:  WBC Count: 21.39 K/uL (08-21 @ 06:24)  WBC Count: 16.67 K/uL (08-20 @ 11:55)      MICROBIOLOGY:  RECENT CULTURES:  08-21 .Blood Blood XXXX   Growth in anaerobic bottle: Gram Negative Rods   Growth in anaerobic bottle: Gram Negative Rods    08-20 Clean Catch Clean Catch (Midstream) XXXX XXXX   >100,000 CFU/ml Escherichia coli    08-20 .Blood Blood-Peripheral XXXX   Growth in aerobic bottle: Gram Negative Rods   Growth in aerobic bottle: Escherichia coli  See previous culture [id number]    08-20 .Blood Blood-Peripheral Blood Culture PCR   Growth in aerobic and anaerobic bottles: Gram Negative Rods   Growth in aerobic and anaerobic bottles: Escherichia coli  Direct identification is available within approximately 3-5  hours either by Blood Panel Multiplexed PCR or Direct  MALDI-TOF. Details: [email or web address]                PT/INR - ( 21 Aug 2023 06:24 )   PT: 17.7 sec;   INR: 1.53 ratio         PTT - ( 20 Aug 2023 11:55 )  PTT:30.0 sec    Sodium:  Sodium: 141 mmol/L (08-21 @ 06:24)  Sodium: 144 mmol/L (08-20 @ 11:55)      0.86 mg/dL 08-21 @ 06:24  0.85 mg/dL 08-20 @ 11:55      Hemoglobin:  Hemoglobin: 10.5 g/dL (08-21 @ 06:24)  Hemoglobin: 12.1 g/dL (08-20 @ 11:55)      Platelets: Platelet Count - Automated: 175 K/uL (08-21 @ 06:24)  Platelet Count - Automated: 225 K/uL (08-20 @ 11:55)      LIVER FUNCTIONS - ( 21 Aug 2023 06:24 )  Alb: 2.6 g/dL / Pro: 6.1 g/dL / ALK PHOS: 95 U/L / ALT: 37 U/L / AST: 40 U/L / GGT: x             Urinalysis Basic - ( 21 Aug 2023 06:24 )    Color: x / Appearance: x / SG: x / pH: x  Gluc: 114 mg/dL / Ketone: x  / Bili: x / Urobili: x   Blood: x / Protein: x / Nitrite: x   Leuk Esterase: x / RBC: x / WBC x   Sq Epi: x / Non Sq Epi: x / Bacteria: x        RADIOLOGY & ADDITIONAL STUDIES:      MICROBIOLOGY:  RECENT CULTURES:  08-21 .Blood Blood XXXX   Growth in anaerobic bottle: Gram Negative Rods   Growth in anaerobic bottle: Gram Negative Rods    08-20 Clean Catch Clean Catch (Midstream) XXXX XXXX   >100,000 CFU/ml Escherichia coli    08-20 .Blood Blood-Peripheral XXXX   Growth in aerobic bottle: Gram Negative Rods   Growth in aerobic bottle: Escherichia coli  See previous culture [id number]    08-20 .Blood Blood-Peripheral Blood Culture PCR   Growth in aerobic and anaerobic bottles: Gram Negative Rods   Growth in aerobic and anaerobic bottles: Escherichia coli  Direct identification is available within approximately 3-5  hours either by Blood Panel Multiplexed PCR or Direct  MALDI-TOF. Details: [email or web address]                 CHIEF COMPLAINT/ REASON FOR VISIT  .. Patient was seen to address the  issue listed under PROBLEM LIST which is located toward bottom of this note     DEACON ROBERT    PLV 1EAS 106 W1    Allergies    penicillin (Unknown)    Intolerances        PAST MEDICAL & SURGICAL HISTORY:  HTN (hypertension)      Afib      Spinal stenosis      PFO (patent foramen ovale)      Degeneration macular      Osteoporosis      History of complete heart block      Hypothyroidism      Cardiac pacemaker          FAMILY HISTORY:      Home Medications:  Albuterol (Eqv-ProAir HFA) 90 mcg/inh inhalation aerosol: 1 puff(s) inhaled 4 times a day as needed for  cough or wheezing (21 Aug 2023 09:56)  amiodarone 200 mg oral tablet: 1 tab(s) orally once a day (21 Aug 2023 09:55)  clindamycin 1% topical lotion: Apply topically to affected area once a day to face (21 Aug 2023 09:55)  Eliquis 2.5 mg oral tablet: 1 tab(s) orally 2 times a day (21 Aug 2023 09:55)  enalapril 10 mg oral tablet: 1 tab(s) orally once a day (21 Aug 2023 09:55)  ferrous sulfate 325 mg (65 mg elemental iron) oral tablet: 1 tab(s) orally once a day (21 Aug 2023 09:55)  levothyroxine 75 mcg (0.075 mg) oral tablet: 1 tab(s) orally once a day (21 Aug 2023 09:56)  Metoprolol Tartrate 50 mg oral tablet: 1 tab(s) orally 2 times a day (21 Aug 2023 09:56)  Myrbetriq 50 mg oral tablet, extended release: 1 tab(s) orally once a day (21 Aug 2023 09:56)  polyethylene glycol 3350 oral powder for reconstitution: 17 gram(s) orally once a day (21 Aug 2023 09:56)  PreserVision AREDS oral capsule: 1 cap(s) orally once a day (21 Aug 2023 09:56)  Refresh Dry Eye Therapy ophthalmic solution: 1 drop(s) in each eye 4 times a day (21 Aug 2023 09:56)  Tylenol 325 mg oral tablet: 2 tab(s) orally every 4 hours as needed for pain or fever (21 Aug 2023 09:56)      MEDICATIONS  (STANDING):  apixaban 2.5 milliGRAM(s) Oral two times a day  benzonatate 100 milliGRAM(s) Oral every 8 hours  dextrose 5% + sodium chloride 0.45% with potassium chloride 20 mEq/L 1000 milliLiter(s) (50 mL/Hr) IV Continuous <Continuous>  ertapenem  IVPB 1000 milliGRAM(s) IV Intermittent every 24 hours  lactobacillus acidophilus 1 Tablet(s) Oral every 12 hours  levothyroxine 88 MICROGram(s) Oral daily  pantoprazole    Tablet 40 milliGRAM(s) Oral daily    MEDICATIONS  (PRN):  acetaminophen     Tablet .. 650 milliGRAM(s) Oral every 6 hours PRN Temp greater or equal to 38C (100.4F), Mild Pain (1 - 3)  albuterol/ipratropium for Nebulization 3 milliLiter(s) Nebulizer every 6 hours PRN Shortness of Breath and/or Wheezing  aluminum hydroxide/magnesium hydroxide/simethicone Suspension 30 milliLiter(s) Oral every 4 hours PRN Dyspepsia  guaiFENesin Oral Liquid (Sugar-Free) 200 milliGRAM(s) Oral every 6 hours PRN Cough  melatonin 3 milliGRAM(s) Oral at bedtime PRN Insomnia  ondansetron Injectable 4 milliGRAM(s) IV Push every 8 hours PRN Nausea and/or Vomiting              Vital Signs Last 24 Hrs  T(C): 36.9 (21 Aug 2023 20:50), Max: 36.9 (21 Aug 2023 20:50)  T(F): 98.4 (21 Aug 2023 20:50), Max: 98.4 (21 Aug 2023 20:50)  HR: 81 (21 Aug 2023 20:50) (63 - 81)  BP: 131/79 (21 Aug 2023 20:50) (131/79 - 148/76)  BP(mean): --  RR: 18 (21 Aug 2023 20:50) (18 - 18)  SpO2: 97% (21 Aug 2023 20:50) (97% - 100%)    Parameters below as of 21 Aug 2023 20:50  Patient On (Oxygen Delivery Method): nasal cannula          08-20-23 @ 07:01  -  08-21-23 @ 07:00  --------------------------------------------------------  IN: 790 mL / OUT: 0 mL / NET: 790 mL              LABS:                        10.5   21.39 )-----------( 175      ( 21 Aug 2023 06:24 )             34.7     08-21    141  |  109<H>  |  20  ----------------------------<  114<H>  3.8   |  29  |  0.86    Ca    8.3<L>      21 Aug 2023 06:24  Phos  3.7     08-21  Mg     1.9     08-21    TPro  6.1  /  Alb  2.6<L>  /  TBili  1.1  /  DBili  x   /  AST  40<H>  /  ALT  37  /  AlkPhos  95  08-21    PT/INR - ( 21 Aug 2023 06:24 )   PT: 17.7 sec;   INR: 1.53 ratio         PTT - ( 20 Aug 2023 11:55 )  PTT:30.0 sec  Urinalysis Basic - ( 21 Aug 2023 06:24 )    Color: x / Appearance: x / SG: x / pH: x  Gluc: 114 mg/dL / Ketone: x  / Bili: x / Urobili: x   Blood: x / Protein: x / Nitrite: x   Leuk Esterase: x / RBC: x / WBC x   Sq Epi: x / Non Sq Epi: x / Bacteria: x            WBC:  WBC Count: 21.39 K/uL (08-21 @ 06:24)  WBC Count: 16.67 K/uL (08-20 @ 11:55)      MICROBIOLOGY:  RECENT CULTURES:  08-21 .Blood Blood XXXX   Growth in anaerobic bottle: Gram Negative Rods   Growth in anaerobic bottle: Gram Negative Rods    08-20 Clean Catch Clean Catch (Midstream) XXXX XXXX   >100,000 CFU/ml Escherichia coli    08-20 .Blood Blood-Peripheral XXXX   Growth in aerobic bottle: Gram Negative Rods   Growth in aerobic bottle: Escherichia coli  See previous culture [id number]    08-20 .Blood Blood-Peripheral Blood Culture PCR   Growth in aerobic and anaerobic bottles: Gram Negative Rods   Growth in aerobic and anaerobic bottles: Escherichia coli  Direct identification is available within approximately 3-5  hours either by Blood Panel Multiplexed PCR or Direct  MALDI-TOF. Details: [email or web address]                PT/INR - ( 21 Aug 2023 06:24 )   PT: 17.7 sec;   INR: 1.53 ratio         PTT - ( 20 Aug 2023 11:55 )  PTT:30.0 sec    Sodium:  Sodium: 141 mmol/L (08-21 @ 06:24)  Sodium: 144 mmol/L (08-20 @ 11:55)      0.86 mg/dL 08-21 @ 06:24  0.85 mg/dL 08-20 @ 11:55      Hemoglobin:  Hemoglobin: 10.5 g/dL (08-21 @ 06:24)  Hemoglobin: 12.1 g/dL (08-20 @ 11:55)      Platelets: Platelet Count - Automated: 175 K/uL (08-21 @ 06:24)  Platelet Count - Automated: 225 K/uL (08-20 @ 11:55)      LIVER FUNCTIONS - ( 21 Aug 2023 06:24 )  Alb: 2.6 g/dL / Pro: 6.1 g/dL / ALK PHOS: 95 U/L / ALT: 37 U/L / AST: 40 U/L / GGT: x             Urinalysis Basic - ( 21 Aug 2023 06:24 )    Color: x / Appearance: x / SG: x / pH: x  Gluc: 114 mg/dL / Ketone: x  / Bili: x / Urobili: x   Blood: x / Protein: x / Nitrite: x   Leuk Esterase: x / RBC: x / WBC x   Sq Epi: x / Non Sq Epi: x / Bacteria: x        RADIOLOGY & ADDITIONAL STUDIES:      MICROBIOLOGY:  RECENT CULTURES:  08-21 .Blood Blood XXXX   Growth in anaerobic bottle: Gram Negative Rods   Growth in anaerobic bottle: Gram Negative Rods    08-20 Clean Catch Clean Catch (Midstream) XXXX XXXX   >100,000 CFU/ml Escherichia coli    08-20 .Blood Blood-Peripheral XXXX   Growth in aerobic bottle: Gram Negative Rods   Growth in aerobic bottle: Escherichia coli  See previous culture [id number]    08-20 .Blood Blood-Peripheral Blood Culture PCR   Growth in aerobic and anaerobic bottles: Gram Negative Rods   Growth in aerobic and anaerobic bottles: Escherichia coli  Direct identification is available within approximately 3-5  hours either by Blood Panel Multiplexed PCR or Direct  MALDI-TOF. Details: [email or web address]

## 2023-08-22 NOTE — PHYSICAL THERAPY INITIAL EVALUATION ADULT - NSPTDISCHREC_GEN_A_CORE
Return to [facility] with resumption of previous services Return to [facility] with resumption of previous services Return to [facility] with resumption of previous services

## 2023-08-22 NOTE — PROGRESS NOTE ADULT - SUBJECTIVE AND OBJECTIVE BOX
PROGRESS NOTE  Patient is a 89y old  Female who presents with a chief complaint of cough (22 Aug 2023 12:50)      OVERNIGHT      HPI:  89-year-old female with history of hypothyroidism, A-fib on Eliquis, s/p recent pacemaker for complete heart block brought in by ambulance from [facility] living home for cough for the past 2 weeks.  Associated with generalized weakness and decreased p.o. intake.  No fall or trauma.  Patient states she has had cold symptoms for a while.  Denies fever, chills, chest pain, shortness of breath, abdominal pain, nausea, vomiting, upper or lower extremity weakness or paresthesias. pmd: Dr. Crawley, cards: [facility] Seen by card Dr Reich Admitted  to telemetry unit for monitoring , send 3 sets of cardiac enzymes to rule out acute coronary event, obtain ECHO to evaluate LVEF, cardiology consult  ,continue current management, O2 supply, anticoagulation plan as per cardiology consult Pulm cons requested , antitussives and nebulized bd ordered .Also UTI suspected and started on iv abx , id cons called Palliative care consult requested ,to discuss advance directives and complete MOLST  (20 Aug 2023 15:35)    PAST MEDICAL & SURGICAL HISTORY:  HTN (hypertension)      Afib      Spinal stenosis      PFO (patent foramen ovale)      Degeneration macular      Osteoporosis      History of complete heart block      Hypothyroidism      Cardiac pacemaker          MEDICATIONS  (STANDING):  aMIOdarone    Tablet 200 milliGRAM(s) Oral daily  apixaban 2.5 milliGRAM(s) Oral two times a day  artificial  tears Solution 1 Drop(s) Both EYES two times a day  benzonatate 100 milliGRAM(s) Oral every 8 hours  dextrose 5% + sodium chloride 0.45%. 1000 milliLiter(s) (50 mL/Hr) IV Continuous <Continuous>  enalapril 10 milliGRAM(s) Oral daily  ertapenem  IVPB 1000 milliGRAM(s) IV Intermittent every 24 hours  ferrous    sulfate 325 milliGRAM(s) Oral daily  lactobacillus acidophilus 1 Tablet(s) Oral every 12 hours  levothyroxine 75 MICROGram(s) Oral daily  metoprolol tartrate 50 milliGRAM(s) Oral two times a day  multivitamin/minerals 1 Tablet(s) Oral daily  pantoprazole    Tablet 40 milliGRAM(s) Oral daily  polyethylene glycol 3350 17 Gram(s) Oral daily    MEDICATIONS  (PRN):  acetaminophen     Tablet .. 650 milliGRAM(s) Oral every 6 hours PRN Temp greater or equal to 38C (100.4F), Mild Pain (1 - 3)  albuterol/ipratropium for Nebulization 3 milliLiter(s) Nebulizer every 6 hours PRN Shortness of Breath and/or Wheezing  aluminum hydroxide/magnesium hydroxide/simethicone Suspension 30 milliLiter(s) Oral every 4 hours PRN Dyspepsia  guaiFENesin Oral Liquid (Sugar-Free) 200 milliGRAM(s) Oral every 6 hours PRN Cough  melatonin 3 milliGRAM(s) Oral at bedtime PRN Insomnia  ondansetron Injectable 4 milliGRAM(s) IV Push every 8 hours PRN Nausea and/or Vomiting      OBJECTIVE    T(C): 36.4 (08-22-23 @ 05:02), Max: 36.9 (08-21-23 @ 20:50)  HR: 70 (08-22-23 @ 05:02) (63 - 81)  BP: 161/78 (08-22-23 @ 05:02) (131/79 - 161/78)  RR: 18 (08-22-23 @ 05:02) (18 - 18)  SpO2: 98% (08-22-23 @ 05:02) (97% - 100%)  Wt(kg): --  I&O's Summary        REVIEW OF SYSTEMS:  CONSTITUTIONAL: No fever, weight loss, or fatigue  EYES: No eye pain, visual disturbances, or discharge  ENMT:   No sinus or throat pain  NECK: No pain or stiffness  RESPIRATORY: No cough, wheezing, chills or hemoptysis; No shortness of breath  CARDIOVASCULAR: No chest pain, palpitations, dizziness, or leg swelling  GASTROINTESTINAL: No abdominal pain. No nausea, vomiting; No diarrhea or constipation. No melena or hematochezia.  GENITOURINARY: No dysuria, frequency, hematuria, or incontinence  NEUROLOGICAL: No headaches, memory loss, loss of strength, numbness, or tremors  SKIN: No itching, burning, rashes, or lesions   MUSCULOSKELETAL: No joint pain or swelling; No muscle, back, or extremity pain    PHYSICAL EXAM:  Appearance: NAD. VS past 24 hrs -as above   HEENT:   Moist oral mucosa. Conjunctiva clear b/l.   Neck : supple  Respiratory: Lungs CTAB.  Gastrointestinal:  Soft, nontender. No rebound. No rigidity. BS present	  Cardiovascular: RRR ,S1S2 present  Neurologic: Non-focal. Moving all extremities.  Extremities: No edema. No erythema. No calf tenderness.  Skin: No rashes, No ecchymoses, No cyanosis.	  wounds ,skin lesions-See skin assesment flow sheet   LABS:                        10.7   6.97  )-----------( 195      ( 22 Aug 2023 06:20 )             35.2     08-22    142  |  110<H>  |  22  ----------------------------<  99  3.4<L>   |  28  |  0.81    Ca    8.4<L>      22 Aug 2023 06:20  Phos  3.7     08-21  Mg     1.9     08-21    TPro  6.2  /  Alb  2.6<L>  /  TBili  0.8  /  DBili  x   /  AST  21  /  ALT  36  /  AlkPhos  99  08-22    CAPILLARY BLOOD GLUCOSE        PT/INR - ( 21 Aug 2023 06:24 )   PT: 17.7 sec;   INR: 1.53 ratio           Urinalysis Basic - ( 22 Aug 2023 06:20 )    Color: x / Appearance: x / SG: x / pH: x  Gluc: 99 mg/dL / Ketone: x  / Bili: x / Urobili: x   Blood: x / Protein: x / Nitrite: x   Leuk Esterase: x / RBC: x / WBC x   Sq Epi: x / Non Sq Epi: x / Bacteria: x        Culture - Blood (collected 21 Aug 2023 09:30)  Source: .Blood Blood  Gram Stain (22 Aug 2023 03:37):    Growth in anaerobic bottle: Gram Negative Rods  Preliminary Report (22 Aug 2023 03:37):    Growth in anaerobic bottle: Gram Negative Rods    Culture - Urine (collected 20 Aug 2023 11:55)  Source: Clean Catch Clean Catch (Midstream)  Preliminary Report (22 Aug 2023 00:01):    >100,000 CFU/ml Escherichia coli    Culture - Blood (collected 20 Aug 2023 11:50)  Source: .Blood Blood-Peripheral  Gram Stain (21 Aug 2023 02:35):    Growth in aerobic bottle: Gram Negative Rods  Preliminary Report (21 Aug 2023 16:38):    Growth in aerobic bottle: Escherichia coli    See previous culture [id number]    Culture - Blood (collected 20 Aug 2023 11:45)  Source: .Blood Blood-Peripheral  Gram Stain (21 Aug 2023 02:35):    Growth in aerobic and anaerobic bottles: Gram Negative Rods  Preliminary Report (21 Aug 2023 16:34):    Growth in aerobic and anaerobic bottles: Escherichia coli    Direct identification is available within approximately 3-5    hours either by Blood Panel Multiplexed PCR or Direct    MALDI-TOF. Details: [email or web address]  Organism: Blood Culture PCR (21 Aug 2023 04:16)  Organism: Blood Culture PCR (21 Aug 2023 04:16)      RADIOLOGY & ADDITIONAL TESTS:   reviewed elctronically  ASSESSMENT/PLAN: 	     PROGRESS NOTE  Patient is a 89y old  Female who presents with a chief complaint of cough (22 Aug 2023 12:50)      OVERNIGHT      HPI:  89-year-old female with history of hypothyroidism, A-fib on Eliquis, s/p recent pacemaker for complete heart block brought in by ambulance from [facility] living home for cough for the past 2 weeks.  Associated with generalized weakness and decreased p.o. intake.  No fall or trauma.  Patient states she has had cold symptoms for a while.  Denies fever, chills, chest pain, shortness of breath, abdominal pain, nausea, vomiting, upper or lower extremity weakness or paresthesias. pmd: Dr. Crawley, cards: [facility] Seen by card Dr Reich Admitted  to telemetry unit for monitoring , send 3 sets of cardiac enzymes to rule out acute coronary event, obtain ECHO to evaluate LVEF, cardiology consult  ,continue current management, O2 supply, anticoagulation plan as per cardiology consult Pulm cons requested , antitussives and nebulized bd ordered .Also UTI suspected and started on iv abx , id cons called Palliative care consult requested ,to discuss advance directives and complete MOLST  (20 Aug 2023 15:35)    PAST MEDICAL & SURGICAL HISTORY:  HTN (hypertension)      Afib      Spinal stenosis      PFO (patent foramen ovale)      Degeneration macular      Osteoporosis      History of complete heart block      Hypothyroidism      Cardiac pacemaker          MEDICATIONS  (STANDING):  aMIOdarone    Tablet 200 milliGRAM(s) Oral daily  apixaban 2.5 milliGRAM(s) Oral two times a day  artificial  tears Solution 1 Drop(s) Both EYES two times a day  benzonatate 100 milliGRAM(s) Oral every 8 hours  dextrose 5% + sodium chloride 0.45%. 1000 milliLiter(s) (50 mL/Hr) IV Continuous <Continuous>  enalapril 10 milliGRAM(s) Oral daily  ertapenem  IVPB 1000 milliGRAM(s) IV Intermittent every 24 hours  ferrous    sulfate 325 milliGRAM(s) Oral daily  lactobacillus acidophilus 1 Tablet(s) Oral every 12 hours  levothyroxine 75 MICROGram(s) Oral daily  metoprolol tartrate 50 milliGRAM(s) Oral two times a day  multivitamin/minerals 1 Tablet(s) Oral daily  pantoprazole    Tablet 40 milliGRAM(s) Oral daily  polyethylene glycol 3350 17 Gram(s) Oral daily    MEDICATIONS  (PRN):  acetaminophen     Tablet .. 650 milliGRAM(s) Oral every 6 hours PRN Temp greater or equal to 38C (100.4F), Mild Pain (1 - 3)  albuterol/ipratropium for Nebulization 3 milliLiter(s) Nebulizer every 6 hours PRN Shortness of Breath and/or Wheezing  aluminum hydroxide/magnesium hydroxide/simethicone Suspension 30 milliLiter(s) Oral every 4 hours PRN Dyspepsia  guaiFENesin Oral Liquid (Sugar-Free) 200 milliGRAM(s) Oral every 6 hours PRN Cough  melatonin 3 milliGRAM(s) Oral at bedtime PRN Insomnia  ondansetron Injectable 4 milliGRAM(s) IV Push every 8 hours PRN Nausea and/or Vomiting      OBJECTIVE    T(C): 36.4 (08-22-23 @ 05:02), Max: 36.9 (08-21-23 @ 20:50)  HR: 70 (08-22-23 @ 05:02) (63 - 81)  BP: 161/78 (08-22-23 @ 05:02) (131/79 - 161/78)  RR: 18 (08-22-23 @ 05:02) (18 - 18)  SpO2: 98% (08-22-23 @ 05:02) (97% - 100%)  Wt(kg): --  I&O's Summary        REVIEW OF SYSTEMS:  CONSTITUTIONAL: No fever, weight loss, or fatigue  EYES: No eye pain, visual disturbances, or discharge  ENMT:   No sinus or throat pain  NECK: No pain or stiffness  RESPIRATORY: No cough, wheezing, chills or hemoptysis; No shortness of breath  CARDIOVASCULAR: No chest pain, palpitations, dizziness, or leg swelling  GASTROINTESTINAL: No abdominal pain. No nausea, vomiting; No diarrhea or constipation. No melena or hematochezia.  GENITOURINARY: No dysuria, frequency, hematuria, or incontinence  NEUROLOGICAL: No headaches, memory loss, loss of strength, numbness, or tremors  SKIN: No itching, burning, rashes, or lesions   MUSCULOSKELETAL: No joint pain or swelling; No muscle, back, or extremity pain    PHYSICAL EXAM:  Appearance: NAD. VS past 24 hrs -as above   HEENT:   Moist oral mucosa. Conjunctiva clear b/l.   Neck : supple  Respiratory: Lungs CTAB.  Gastrointestinal:  Soft, nontender. No rebound. No rigidity. BS present	  Cardiovascular: RRR ,S1S2 present  Neurologic: Non-focal. Moving all extremities.  Extremities: No edema. No erythema. No calf tenderness.  Skin: No rashes, No ecchymoses, No cyanosis.	  wounds ,skin lesions-See skin assesment flow sheet   LABS:                        10.7   6.97  )-----------( 195      ( 22 Aug 2023 06:20 )             35.2     08-22    142  |  110<H>  |  22  ----------------------------<  99  3.4<L>   |  28  |  0.81    Ca    8.4<L>      22 Aug 2023 06:20  Phos  3.7     08-21  Mg     1.9     08-21    TPro  6.2  /  Alb  2.6<L>  /  TBili  0.8  /  DBili  x   /  AST  21  /  ALT  36  /  AlkPhos  99  08-22    CAPILLARY BLOOD GLUCOSE        PT/INR - ( 21 Aug 2023 06:24 )   PT: 17.7 sec;   INR: 1.53 ratio           Urinalysis Basic - ( 22 Aug 2023 06:20 )    Color: x / Appearance: x / SG: x / pH: x  Gluc: 99 mg/dL / Ketone: x  / Bili: x / Urobili: x   Blood: x / Protein: x / Nitrite: x   Leuk Esterase: x / RBC: x / WBC x   Sq Epi: x / Non Sq Epi: x / Bacteria: x        Culture - Blood (collected 21 Aug 2023 09:30)  Source: .Blood Blood  Gram Stain (22 Aug 2023 03:37):    Growth in anaerobic bottle: Gram Negative Rods  Preliminary Report (22 Aug 2023 03:37):    Growth in anaerobic bottle: Gram Negative Rods    Culture - Urine (collected 20 Aug 2023 11:55)  Source: Clean Catch Clean Catch (Midstream)  Preliminary Report (22 Aug 2023 00:01):    >100,000 CFU/ml Escherichia coli    Culture - Blood (collected 20 Aug 2023 11:50)  Source: .Blood Blood-Peripheral  Gram Stain (21 Aug 2023 02:35):    Growth in aerobic bottle: Gram Negative Rods  Preliminary Report (21 Aug 2023 16:38):    Growth in aerobic bottle: Escherichia coli    See previous culture [id number]    Culture - Blood (collected 20 Aug 2023 11:45)  Source: .Blood Blood-Peripheral  Gram Stain (21 Aug 2023 02:35):    Growth in aerobic and anaerobic bottles: Gram Negative Rods  Preliminary Report (21 Aug 2023 16:34):    Growth in aerobic and anaerobic bottles: Escherichia coli    Direct identification is available within approximately 3-5    hours either by Blood Panel Multiplexed PCR or Direct    MALDI-TOF. Details: [email or web address]  Organism: Blood Culture PCR (21 Aug 2023 04:16)  Organism: Blood Culture PCR (21 Aug 2023 04:16)      RADIOLOGY & ADDITIONAL TESTS:   reviewed elctronically  ASSESSMENT/PLAN: 	     PROGRESS NOTE  Patient is a 89y old  Female who presents with a chief complaint of cough (22 Aug 2023 12:50)      OVERNIGHT      HPI:  89-year-old female with history of hypothyroidism, A-fib on Eliquis, s/p recent pacemaker for complete heart block brought in by ambulance from [facility] living home for cough for the past 2 weeks.  Associated with generalized weakness and decreased p.o. intake.  No fall or trauma.  Patient states she has had cold symptoms for a while.  Denies fever, chills, chest pain, shortness of breath, abdominal pain, nausea, vomiting, upper or lower extremity weakness or paresthesias. pmd: Dr. Crawley, cards: [facility] Seen by card Dr Reich Admitted  to telemetry unit for monitoring , send 3 sets of cardiac enzymes to rule out acute coronary event, obtain ECHO to evaluate LVEF, cardiology consult  ,continue current management, O2 supply, anticoagulation plan as per cardiology consult Pulm cons requested , antitussives and nebulized bd ordered .Also UTI suspected and started on iv abx , id cons called Palliative care consult requested ,to discuss advance directives and complete MOLST  (20 Aug 2023 15:35)    PAST MEDICAL & SURGICAL HISTORY:  HTN (hypertension)      Afib      Spinal stenosis      PFO (patent foramen ovale)      Degeneration macular      Osteoporosis      History of complete heart block      Hypothyroidism      Cardiac pacemaker          MEDICATIONS  (STANDING):  aMIOdarone    Tablet 200 milliGRAM(s) Oral daily  apixaban 2.5 milliGRAM(s) Oral two times a day  artificial  tears Solution 1 Drop(s) Both EYES two times a day  benzonatate 100 milliGRAM(s) Oral every 8 hours  dextrose 5% + sodium chloride 0.45%. 1000 milliLiter(s) (50 mL/Hr) IV Continuous <Continuous>  enalapril 10 milliGRAM(s) Oral daily  ertapenem  IVPB 1000 milliGRAM(s) IV Intermittent every 24 hours  ferrous    sulfate 325 milliGRAM(s) Oral daily  lactobacillus acidophilus 1 Tablet(s) Oral every 12 hours  levothyroxine 75 MICROGram(s) Oral daily  metoprolol tartrate 50 milliGRAM(s) Oral two times a day  multivitamin/minerals 1 Tablet(s) Oral daily  pantoprazole    Tablet 40 milliGRAM(s) Oral daily  polyethylene glycol 3350 17 Gram(s) Oral daily    MEDICATIONS  (PRN):  acetaminophen     Tablet .. 650 milliGRAM(s) Oral every 6 hours PRN Temp greater or equal to 38C (100.4F), Mild Pain (1 - 3)  albuterol/ipratropium for Nebulization 3 milliLiter(s) Nebulizer every 6 hours PRN Shortness of Breath and/or Wheezing  aluminum hydroxide/magnesium hydroxide/simethicone Suspension 30 milliLiter(s) Oral every 4 hours PRN Dyspepsia  guaiFENesin Oral Liquid (Sugar-Free) 200 milliGRAM(s) Oral every 6 hours PRN Cough  melatonin 3 milliGRAM(s) Oral at bedtime PRN Insomnia  ondansetron Injectable 4 milliGRAM(s) IV Push every 8 hours PRN Nausea and/or Vomiting      OBJECTIVE    T(C): 36.4 (08-22-23 @ 05:02), Max: 36.9 (08-21-23 @ 20:50)  HR: 70 (08-22-23 @ 05:02) (63 - 81)  BP: 161/78 (08-22-23 @ 05:02) (131/79 - 161/78)  RR: 18 (08-22-23 @ 05:02) (18 - 18)  SpO2: 98% (08-22-23 @ 05:02) (97% - 100%)  Wt(kg): --  I&O's Summary        REVIEW OF SYSTEMS:  CONSTITUTIONAL: No fever, weight loss, or fatigue  EYES: No eye pain, visual disturbances, or discharge  ENMT:   No sinus or throat pain  NECK: No pain or stiffness  RESPIRATORY: No cough, wheezing, chills or hemoptysis; No shortness of breath  CARDIOVASCULAR: No chest pain, palpitations, dizziness, or leg swelling  GASTROINTESTINAL: No abdominal pain. No nausea, vomiting; No diarrhea or constipation. No melena or hematochezia.  GENITOURINARY: No dysuria, frequency, hematuria, or incontinence  NEUROLOGICAL: No headaches, memory loss, loss of strength, numbness, or tremors  SKIN: No itching, burning, rashes, or lesions   MUSCULOSKELETAL: No joint pain or swelling; No muscle, back, or extremity pain    PHYSICAL EXAM:  Appearance: NAD. VS past 24 hrs -as above   HEENT:   Moist oral mucosa. Conjunctiva clear b/l.   Neck : supple  Respiratory: Lungs CTAB.  Gastrointestinal:  Soft, nontender. No rebound. No rigidity. BS present	  Cardiovascular: RRR ,S1S2 present  Neurologic: Non-focal. Moving all extremities.  Extremities: No edema. No erythema. No calf tenderness.  Skin: No rashes, No ecchymoses, No cyanosis.	  wounds ,skin lesions-See skin assesment flow sheet   LABS:                        10.7   6.97  )-----------( 195      ( 22 Aug 2023 06:20 )             35.2     08-22    142  |  110<H>  |  22  ----------------------------<  99  3.4<L>   |  28  |  0.81    Ca    8.4<L>      22 Aug 2023 06:20  Phos  3.7     08-21  Mg     1.9     08-21    TPro  6.2  /  Alb  2.6<L>  /  TBili  0.8  /  DBili  x   /  AST  21  /  ALT  36  /  AlkPhos  99  08-22    CAPILLARY BLOOD GLUCOSE        PT/INR - ( 21 Aug 2023 06:24 )   PT: 17.7 sec;   INR: 1.53 ratio           Urinalysis Basic - ( 22 Aug 2023 06:20 )    Color: x / Appearance: x / SG: x / pH: x  Gluc: 99 mg/dL / Ketone: x  / Bili: x / Urobili: x   Blood: x / Protein: x / Nitrite: x   Leuk Esterase: x / RBC: x / WBC x   Sq Epi: x / Non Sq Epi: x / Bacteria: x        Culture - Blood (collected 21 Aug 2023 09:30)  Source: .Blood Blood  Gram Stain (22 Aug 2023 03:37):    Growth in anaerobic bottle: Gram Negative Rods  Preliminary Report (22 Aug 2023 03:37):    Growth in anaerobic bottle: Gram Negative Rods    Culture - Urine (collected 20 Aug 2023 11:55)  Source: Clean Catch Clean Catch (Midstream)  Preliminary Report (22 Aug 2023 00:01):    >100,000 CFU/ml Escherichia coli    Culture - Blood (collected 20 Aug 2023 11:50)  Source: .Blood Blood-Peripheral  Gram Stain (21 Aug 2023 02:35):    Growth in aerobic bottle: Gram Negative Rods  Preliminary Report (21 Aug 2023 16:38):    Growth in aerobic bottle: Escherichia coli    See previous culture [id number]    Culture - Blood (collected 20 Aug 2023 11:45)  Source: .Blood Blood-Peripheral  Gram Stain (21 Aug 2023 02:35):    Growth in aerobic and anaerobic bottles: Gram Negative Rods  Preliminary Report (21 Aug 2023 16:34):    Growth in aerobic and anaerobic bottles: Escherichia coli    Direct identification is available within approximately 3-5    hours either by Blood Panel Multiplexed PCR or Direct    MALDI-TOF. Details: [email or web address]  Organism: Blood Culture PCR (21 Aug 2023 04:16)  Organism: Blood Culture PCR (21 Aug 2023 04:16)      RADIOLOGY & ADDITIONAL TESTS:   reviewed elctronically  ASSESSMENT/PLAN:

## 2023-08-22 NOTE — PHARMACOTHERAPY INTERVENTION NOTE - COMMENTS
Modified penicillin allergy history to state that patient tolerated ertapenem during this admission.    César Garcia, PharmD, [facility]  Clinical Pharmacy Specialist, Infectious Diseases  Tele-Antimicrobial Stewardship Program (Tele-ASP)  Tele-ASP Phone: [phone]   Modified penicillin allergy history to state that patient tolerated ertapenem during this admission.    César Garcia, PharmD, [facility]  Clinical Pharmacy Specialist, Infectious Diseases  Tele-Antimicrobial Stewardship Program (Tele-ASP)  Tele-ASP Phone: [phone]   Modified penicillin allergy history to state that patient tolerated ertapenem during this admission.    César Garcia, [facility]D, [facility]  Clinical Pharmacy Specialist, Infectious Diseases  Tele-Antimicrobial Stewardship Program (Tele-ASP)  Tele-ASP Phone: [phone]

## 2023-08-22 NOTE — CHART NOTE - NSCHARTNOTEFT_GEN_A_CORE
RN called with critical lab value: blood culture x1 from 8/21 growing gram negative rods in anaerobic bottle.   Previous blood cultures from 8/20 grew gram negative rods ESBL E.coli. ID aware.   - Patient currently on Invanz, cefepime previously discontinued by infectious disease.   - ID following

## 2023-08-22 NOTE — PHYSICAL THERAPY INITIAL EVALUATION ADULT - ADDITIONAL COMMENTS
Per EMR. Patient is from the [facility] and the daughter's wishes is to return there upon discharge.  Per the daughter, the patient is wheelchair bound and does not walk.  She has a 6hr private aide 7 days a week and receive help from the facility staff. Per EMR. Patient is from the [facility] and the daughter's wishes is to return there upon discharge.  Per the daughter, the patient is wheelchair bound and does not walk.  She has a 6hr private aide 7 days a week and receive help from the facility staff. Per EMR. Patient is from the [facility] and the daughter's wishes is to return there upon discharge.  Per the daughter, the patient is wheelchair bound and does not walk.  She has a 6hr private aide 7 days a week and receive help from the facility staff.

## 2023-08-22 NOTE — PROGRESS NOTE ADULT - SUBJECTIVE AND OBJECTIVE BOX
Interval History:    CENTRAL LINE:   [  ] YES       [  ] NO  SKELTON:                 [  ] YES       [  ] NO         REVIEW OF SYSTEMS:  All Systems below were reviewed and are negative [  ]  HEENT:  ID:  Pulmonary:  Cardiac:  GI:  Renal:  Musculoskeletal:  All other systems above were reviewed and are negative   [  ]      MEDICATIONS  (STANDING):  aMIOdarone    Tablet 200 milliGRAM(s) Oral daily  apixaban 2.5 milliGRAM(s) Oral two times a day  artificial  tears Solution 1 Drop(s) Both EYES two times a day  benzonatate 100 milliGRAM(s) Oral every 8 hours  dextrose 5% + sodium chloride 0.45%. 1000 milliLiter(s) (50 mL/Hr) IV Continuous <Continuous>  enalapril 10 milliGRAM(s) Oral daily  ertapenem  IVPB 1000 milliGRAM(s) IV Intermittent every 24 hours  ferrous    sulfate 325 milliGRAM(s) Oral daily  lactobacillus acidophilus 1 Tablet(s) Oral every 12 hours  levothyroxine 75 MICROGram(s) Oral daily  metoprolol tartrate 50 milliGRAM(s) Oral two times a day  multivitamin/minerals 1 Tablet(s) Oral daily  pantoprazole    Tablet 40 milliGRAM(s) Oral daily  polyethylene glycol 3350 17 Gram(s) Oral daily    MEDICATIONS  (PRN):  acetaminophen     Tablet .. 650 milliGRAM(s) Oral every 6 hours PRN Temp greater or equal to 38C (100.4F), Mild Pain (1 - 3)  albuterol/ipratropium for Nebulization 3 milliLiter(s) Nebulizer every 6 hours PRN Shortness of Breath and/or Wheezing  aluminum hydroxide/magnesium hydroxide/simethicone Suspension 30 milliLiter(s) Oral every 4 hours PRN Dyspepsia  guaiFENesin Oral Liquid (Sugar-Free) 200 milliGRAM(s) Oral every 6 hours PRN Cough  melatonin 3 milliGRAM(s) Oral at bedtime PRN Insomnia  ondansetron Injectable 4 milliGRAM(s) IV Push every 8 hours PRN Nausea and/or Vomiting      Vital Signs Last 24 Hrs  T(C): 36.6 (22 Aug 2023 14:55), Max: 36.9 (21 Aug 2023 20:50)  T(F): 97.8 (22 Aug 2023 14:55), Max: 98.4 (21 Aug 2023 20:50)  HR: 78 (22 Aug 2023 14:55) (70 - 81)  BP: 161/81 (22 Aug 2023 14:55) (131/79 - 161/81)  BP(mean): --  RR: 18 (22 Aug 2023 14:55) (18 - 18)  SpO2: 91% (22 Aug 2023 14:55) (91% - 98%)    Parameters below as of 22 Aug 2023 14:55  Patient On (Oxygen Delivery Method): nasal cannula  O2 Flow (L/min): 4      I&O's Summary      PHYSICAL EXAM:  HEENT: NC/AT; PERRLA  Neck: Soft; no tenderness  Lungs: CTA bilaterally; no wheezing.   Heart:  Abdomen:  Genital/ Rectal:  Extremities:  Neurologic:  Vascular:      LABORATORY:    CBC Full  -  ( 22 Aug 2023 06:20 )  WBC Count : 6.97 K/uL  RBC Count : 3.63 M/uL  Hemoglobin : 10.7 g/dL  Hematocrit : 35.2 %  Platelet Count - Automated : 195 K/uL  Mean Cell Volume : 97.0 fl  Mean Cell Hemoglobin : 29.5 pg  Mean Cell Hemoglobin Concentration : 30.4 gm/dL  Auto Neutrophil # : 5.96 K/uL  Auto Lymphocyte # : 0.41 K/uL  Auto Monocyte # : 0.48 K/uL  Auto Eosinophil # : 0.05 K/uL  Auto Basophil # : 0.03 K/uL  Auto Neutrophil % : 85.5 %  Auto Lymphocyte % : 5.9 %  Auto Monocyte % : 6.9 %  Auto Eosinophil % : 0.7 %  Auto Basophil % : 0.4 %      ESR:                   08-21 @ 06:24  --    C-Reactive Protein:     08-21 @ 06:24  --    Procalcitonin:           08-21 @ 06:24   7.59      08-22    142  |  110<H>  |  22  ----------------------------<  99  3.4<L>   |  28  |  0.81    Ca    8.4<L>      22 Aug 2023 06:20  Phos  3.7     08-21  Mg     1.9     08-21    TPro  6.2  /  Alb  2.6<L>  /  TBili  0.8  /  DBili  x   /  AST  21  /  ALT  36  /  AlkPhos  99  08-22      Rapid Respiratory Viral Panel Result        08-20 @ 11:55  Rapid RVP [facility]  Coronovirus --  Adenovirus --  Bordetella Pertussis --  Chlamydia Pneumonia --  Entero/Rhinovirus--  HKU1 Coronovirus --  HMPV Coronovirus --  Influenza A --  Influenza AH1 --  Influenza AH1 2009 --  Influenza AH3 --  Influenza B --  Mycoplasma Pneumoniae --  NL63 Coronovirus --  OC43 Coronovirus --  Parainfluenza 1 --  Parainfluenza 2 --  Parainfluenza 3 --  Parainfluenza 4 --  Resp Syncytial Virus --      Assessment and Plan:          Edwardo Gonzalez MD   [phone].      Interval History:    CENTRAL LINE:   [  ] YES       [  ] NO  SKELTON:                 [  ] YES       [  ] NO         REVIEW OF SYSTEMS:  All Systems below were reviewed and are negative [  ]  HEENT:  ID:  Pulmonary:  Cardiac:  GI:  Renal:  Musculoskeletal:  All other systems above were reviewed and are negative   [  ]      MEDICATIONS  (STANDING):  aMIOdarone    Tablet 200 milliGRAM(s) Oral daily  apixaban 2.5 milliGRAM(s) Oral two times a day  artificial  tears Solution 1 Drop(s) Both EYES two times a day  benzonatate 100 milliGRAM(s) Oral every 8 hours  dextrose 5% + sodium chloride 0.45%. 1000 milliLiter(s) (50 mL/Hr) IV Continuous <Continuous>  enalapril 10 milliGRAM(s) Oral daily  ertapenem  IVPB 1000 milliGRAM(s) IV Intermittent every 24 hours  ferrous    sulfate 325 milliGRAM(s) Oral daily  lactobacillus acidophilus 1 Tablet(s) Oral every 12 hours  levothyroxine 75 MICROGram(s) Oral daily  metoprolol tartrate 50 milliGRAM(s) Oral two times a day  multivitamin/minerals 1 Tablet(s) Oral daily  pantoprazole    Tablet 40 milliGRAM(s) Oral daily  polyethylene glycol 3350 17 Gram(s) Oral daily    MEDICATIONS  (PRN):  acetaminophen     Tablet .. 650 milliGRAM(s) Oral every 6 hours PRN Temp greater or equal to 38C (100.4F), Mild Pain (1 - 3)  albuterol/ipratropium for Nebulization 3 milliLiter(s) Nebulizer every 6 hours PRN Shortness of Breath and/or Wheezing  aluminum hydroxide/magnesium hydroxide/simethicone Suspension 30 milliLiter(s) Oral every 4 hours PRN Dyspepsia  guaiFENesin Oral Liquid (Sugar-Free) 200 milliGRAM(s) Oral every 6 hours PRN Cough  melatonin 3 milliGRAM(s) Oral at bedtime PRN Insomnia  ondansetron Injectable 4 milliGRAM(s) IV Push every 8 hours PRN Nausea and/or Vomiting      Vital Signs Last 24 Hrs  T(C): 36.6 (22 Aug 2023 14:55), Max: 36.9 (21 Aug 2023 20:50)  T(F): 97.8 (22 Aug 2023 14:55), Max: 98.4 (21 Aug 2023 20:50)  HR: 78 (22 Aug 2023 14:55) (70 - 81)  BP: 161/81 (22 Aug 2023 14:55) (131/79 - 161/81)  BP(mean): --  RR: 18 (22 Aug 2023 14:55) (18 - 18)  SpO2: 91% (22 Aug 2023 14:55) (91% - 98%)    Parameters below as of 22 Aug 2023 14:55  Patient On (Oxygen Delivery Method): nasal cannula  O2 Flow (L/min): 4      I&O's Summary      PHYSICAL EXAM:  HEENT: NC/AT; PERRLA  Neck: Soft; no tenderness  Lungs: CTA bilaterally; no wheezing.   Heart:  Abdomen:  Genital/ Rectal:  Extremities:  Neurologic:  Vascular:      LABORATORY:    CBC Full  -  ( 22 Aug 2023 06:20 )  WBC Count : 6.97 K/uL  RBC Count : 3.63 M/uL  Hemoglobin : 10.7 g/dL  Hematocrit : 35.2 %  Platelet Count - Automated : 195 K/uL  Mean Cell Volume : 97.0 fl  Mean Cell Hemoglobin : 29.5 pg  Mean Cell Hemoglobin Concentration : 30.4 gm/dL  Auto Neutrophil # : 5.96 K/uL  Auto Lymphocyte # : 0.41 K/uL  Auto Monocyte # : 0.48 K/uL  Auto Eosinophil # : 0.05 K/uL  Auto Basophil # : 0.03 K/uL  Auto Neutrophil % : 85.5 %  Auto Lymphocyte % : 5.9 %  Auto Monocyte % : 6.9 %  Auto Eosinophil % : 0.7 %  Auto Basophil % : 0.4 %      ESR:                   08-21 @ 06:24  --    C-Reactive Protein:     08-21 @ 06:24  --    Procalcitonin:           08-21 @ 06:24   7.59      08-22    142  |  110<H>  |  22  ----------------------------<  99  3.4<L>   |  28  |  0.81    Ca    8.4<L>      22 Aug 2023 06:20  Phos  3.7     08-21  Mg     1.9     08-21    TPro  6.2  /  Alb  2.6<L>  /  TBili  0.8  /  DBili  x   /  AST  21  /  ALT  36  /  AlkPhos  99  08-22      Rapid Respiratory Viral Panel Result        08-20 @ 11:55  Rapid RVP [facility]  Coronovirus --  Adenovirus --  Bordetella Pertussis --  Chlamydia Pneumonia --  Entero/Rhinovirus--  HKU1 Coronovirus --  HMPV Coronovirus --  Influenza A --  Influenza AH1 --  Influenza AH1 2009 --  Influenza AH3 --  Influenza B --  Mycoplasma Pneumoniae --  NL63 Coronovirus --  OC43 Coronovirus --  Parainfluenza 1 --  Parainfluenza 2 --  Parainfluenza 3 --  Parainfluenza 4 --  Resp Syncytial Virus --      Assessment and Plan:          Edwardo Gonzalez MD   [phone].      Interval History:    CENTRAL LINE:   [  ] YES       [  ] NO  SKELTON:                 [  ] YES       [  ] NO         REVIEW OF SYSTEMS:  All Systems below were reviewed and are negative [  ]  HEENT:  ID:  Pulmonary:  Cardiac:  GI:  Renal:  Musculoskeletal:  All other systems above were reviewed and are negative   [  ]      MEDICATIONS  (STANDING):  aMIOdarone    Tablet 200 milliGRAM(s) Oral daily  apixaban 2.5 milliGRAM(s) Oral two times a day  artificial  tears Solution 1 Drop(s) Both EYES two times a day  benzonatate 100 milliGRAM(s) Oral every 8 hours  dextrose 5% + sodium chloride 0.45%. 1000 milliLiter(s) (50 mL/Hr) IV Continuous <Continuous>  enalapril 10 milliGRAM(s) Oral daily  ertapenem  IVPB 1000 milliGRAM(s) IV Intermittent every 24 hours  ferrous    sulfate 325 milliGRAM(s) Oral daily  lactobacillus acidophilus 1 Tablet(s) Oral every 12 hours  levothyroxine 75 MICROGram(s) Oral daily  metoprolol tartrate 50 milliGRAM(s) Oral two times a day  multivitamin/minerals 1 Tablet(s) Oral daily  pantoprazole    Tablet 40 milliGRAM(s) Oral daily  polyethylene glycol 3350 17 Gram(s) Oral daily    MEDICATIONS  (PRN):  acetaminophen     Tablet .. 650 milliGRAM(s) Oral every 6 hours PRN Temp greater or equal to 38C (100.4F), Mild Pain (1 - 3)  albuterol/ipratropium for Nebulization 3 milliLiter(s) Nebulizer every 6 hours PRN Shortness of Breath and/or Wheezing  aluminum hydroxide/magnesium hydroxide/simethicone Suspension 30 milliLiter(s) Oral every 4 hours PRN Dyspepsia  guaiFENesin Oral Liquid (Sugar-Free) 200 milliGRAM(s) Oral every 6 hours PRN Cough  melatonin 3 milliGRAM(s) Oral at bedtime PRN Insomnia  ondansetron Injectable 4 milliGRAM(s) IV Push every 8 hours PRN Nausea and/or Vomiting      Vital Signs Last 24 Hrs  T(C): 36.6 (22 Aug 2023 14:55), Max: 36.9 (21 Aug 2023 20:50)  T(F): 97.8 (22 Aug 2023 14:55), Max: 98.4 (21 Aug 2023 20:50)  HR: 78 (22 Aug 2023 14:55) (70 - 81)  BP: 161/81 (22 Aug 2023 14:55) (131/79 - 161/81)  BP(mean): --  RR: 18 (22 Aug 2023 14:55) (18 - 18)  SpO2: 91% (22 Aug 2023 14:55) (91% - 98%)    Parameters below as of 22 Aug 2023 14:55  Patient On (Oxygen Delivery Method): nasal cannula  O2 Flow (L/min): 4      I&O's Summary      PHYSICAL EXAM:  HEENT: NC/AT; PERRLA  Neck: Soft; no tenderness  Lungs: CTA bilaterally; no wheezing.   Heart:  Abdomen:  Genital/ Rectal:  Extremities:  Neurologic:  Vascular:      LABORATORY:    CBC Full  -  ( 22 Aug 2023 06:20 )  WBC Count : 6.97 K/uL  RBC Count : 3.63 M/uL  Hemoglobin : 10.7 g/dL  Hematocrit : 35.2 %  Platelet Count - Automated : 195 K/uL  Mean Cell Volume : 97.0 fl  Mean Cell Hemoglobin : 29.5 pg  Mean Cell Hemoglobin Concentration : 30.4 gm/dL  Auto Neutrophil # : 5.96 K/uL  Auto Lymphocyte # : 0.41 K/uL  Auto Monocyte # : 0.48 K/uL  Auto Eosinophil # : 0.05 K/uL  Auto Basophil # : 0.03 K/uL  Auto Neutrophil % : 85.5 %  Auto Lymphocyte % : 5.9 %  Auto Monocyte % : 6.9 %  Auto Eosinophil % : 0.7 %  Auto Basophil % : 0.4 %      ESR:                   08-21 @ 06:24  --    C-Reactive Protein:     08-21 @ 06:24  --    Procalcitonin:           08-21 @ 06:24   7.59      08-22    142  |  110<H>  |  22  ----------------------------<  99  3.4<L>   |  28  |  0.81    Ca    8.4<L>      22 Aug 2023 06:20  Phos  3.7     08-21  Mg     1.9     08-21    TPro  6.2  /  Alb  2.6<L>  /  TBili  0.8  /  DBili  x   /  AST  21  /  ALT  36  /  AlkPhos  99  08-22      Rapid Respiratory Viral Panel Result        08-20 @ 11:55  Rapid RVP [facility]  Coronovirus --  Adenovirus --  Bordetella Pertussis --  Chlamydia Pneumonia --  Entero/Rhinovirus--  HKU1 Coronovirus --  HMPV Coronovirus --  Influenza A --  Influenza AH1 --  Influenza AH1 2009 --  Influenza AH3 --  Influenza B --  Mycoplasma Pneumoniae --  NL63 Coronovirus --  OC43 Coronovirus --  Parainfluenza 1 --  Parainfluenza 2 --  Parainfluenza 3 --  Parainfluenza 4 --  Resp Syncytial Virus --      Assessment and Plan:          Edwardo Gonzalez MD   [phone].  No fevers noted      MEDICATIONS  (STANDING):  aMIOdarone    Tablet 200 milliGRAM(s) Oral daily  apixaban 2.5 milliGRAM(s) Oral two times a day  artificial  tears Solution 1 Drop(s) Both EYES two times a day  benzonatate 100 milliGRAM(s) Oral every 8 hours  dextrose 5% + sodium chloride 0.45%. 1000 milliLiter(s) (50 mL/Hr) IV Continuous <Continuous>  enalapril 10 milliGRAM(s) Oral daily  ertapenem  IVPB 1000 milliGRAM(s) IV Intermittent every 24 hours  ferrous    sulfate 325 milliGRAM(s) Oral daily  lactobacillus acidophilus 1 Tablet(s) Oral every 12 hours  levothyroxine 75 MICROGram(s) Oral daily  metoprolol tartrate 50 milliGRAM(s) Oral two times a day  multivitamin/minerals 1 Tablet(s) Oral daily  pantoprazole    Tablet 40 milliGRAM(s) Oral daily  polyethylene glycol 3350 17 Gram(s) Oral daily    MEDICATIONS  (PRN):  acetaminophen     Tablet .. 650 milliGRAM(s) Oral every 6 hours PRN Temp greater or equal to 38C (100.4F), Mild Pain (1 - 3)  albuterol/ipratropium for Nebulization 3 milliLiter(s) Nebulizer every 6 hours PRN Shortness of Breath and/or Wheezing  aluminum hydroxide/magnesium hydroxide/simethicone Suspension 30 milliLiter(s) Oral every 4 hours PRN Dyspepsia  guaiFENesin Oral Liquid (Sugar-Free) 200 milliGRAM(s) Oral every 6 hours PRN Cough  melatonin 3 milliGRAM(s) Oral at bedtime PRN Insomnia  ondansetron Injectable 4 milliGRAM(s) IV Push every 8 hours PRN Nausea and/or Vomiting      Vital Signs Last 24 Hrs  T(C): 36.6 (22 Aug 2023 14:55), Max: 36.9 (21 Aug 2023 20:50)  T(F): 97.8 (22 Aug 2023 14:55), Max: 98.4 (21 Aug 2023 20:50)  HR: 78 (22 Aug 2023 14:55) (70 - 81)  BP: 161/81 (22 Aug 2023 14:55) (131/79 - 161/81)  BP(mean): --  RR: 18 (22 Aug 2023 14:55) (18 - 18)  SpO2: 91% (22 Aug 2023 14:55) (91% - 98%)    Parameters below as of 22 Aug 2023 14:55  Patient On (Oxygen Delivery Method): nasal cannula  O2 Flow (L/min): 4      I&O's Summary      PHYSICAL EXAM:  HEENT: NC/AT; PERRLA  Neck: Soft; no tenderness  Lungs: CTA bilaterally; no wheezing.   Heart:  Abdomen:  Genital/ Rectal:  Extremities:  Neurologic:  Vascular:      LABORATORY:    CBC Full  -  ( 22 Aug 2023 06:20 )  WBC Count : 6.97 K/uL  RBC Count : 3.63 M/uL  Hemoglobin : 10.7 g/dL  Hematocrit : 35.2 %  Platelet Count - Automated : 195 K/uL  Mean Cell Volume : 97.0 fl  Mean Cell Hemoglobin : 29.5 pg  Mean Cell Hemoglobin Concentration : 30.4 gm/dL  Auto Neutrophil # : 5.96 K/uL  Auto Lymphocyte # : 0.41 K/uL  Auto Monocyte # : 0.48 K/uL  Auto Eosinophil # : 0.05 K/uL  Auto Basophil # : 0.03 K/uL  Auto Neutrophil % : 85.5 %  Auto Lymphocyte % : 5.9 %  Auto Monocyte % : 6.9 %  Auto Eosinophil % : 0.7 %  Auto Basophil % : 0.4 %      ESR:                   08-21 @ 06:24  --    C-Reactive Protein:     08-21 @ 06:24  --    Procalcitonin:           08-21 @ 06:24   7.59      08-22    142  |  110<H>  |  22  ----------------------------<  99  3.4<L>   |  28  |  0.81    Ca    8.4<L>      22 Aug 2023 06:20  Phos  3.7     08-21  Mg     1.9     08-21    TPro  6.2  /  Alb  2.6<L>  /  TBili  0.8  /  DBili  x   /  AST  21  /  ALT  36  /  AlkPhos  99  08-22      Rapid Respiratory Viral Panel Result        08-20 @ 11:55  Rapid RVP [facility]  Coronovirus --  Adenovirus --  Bordetella Pertussis --  Chlamydia Pneumonia --  Entero/Rhinovirus--  HKU1 Coronovirus --  HMPV Coronovirus --  Influenza A --  Influenza AH1 --  Influenza AH1 2009 --  Influenza AH3 --  Influenza B --  Mycoplasma Pneumoniae --  NL63 Coronovirus --  OC43 Coronovirus --  Parainfluenza 1 --  Parainfluenza 2 --  Parainfluenza 3 --  Parainfluenza 4 --  Resp Syncytial Virus --      Assessment and Plan:    1. UTI,  2. Atelectasis vs pneumonia of lower lobes.  3. Generalized weakness.  4. Dysphagia.  5. Bacteremia with ESBL E COli      . Chest CT showed atelectasis vs infiltrates of lower lobes, No clear evidence of pneumonia.  . The patient has had a nonproductive cough for more than 2 weeks. Would look for other possible sources of this persistent cough: GERD, dysphagia.  . Continue IV Invanz.   . Follow cultures.   . SLP re-evaluation for dysphagia.  . Continue Protonix.        Edwardo Gonzalez MD   [phone].  No fevers noted      MEDICATIONS  (STANDING):  aMIOdarone    Tablet 200 milliGRAM(s) Oral daily  apixaban 2.5 milliGRAM(s) Oral two times a day  artificial  tears Solution 1 Drop(s) Both EYES two times a day  benzonatate 100 milliGRAM(s) Oral every 8 hours  dextrose 5% + sodium chloride 0.45%. 1000 milliLiter(s) (50 mL/Hr) IV Continuous <Continuous>  enalapril 10 milliGRAM(s) Oral daily  ertapenem  IVPB 1000 milliGRAM(s) IV Intermittent every 24 hours  ferrous    sulfate 325 milliGRAM(s) Oral daily  lactobacillus acidophilus 1 Tablet(s) Oral every 12 hours  levothyroxine 75 MICROGram(s) Oral daily  metoprolol tartrate 50 milliGRAM(s) Oral two times a day  multivitamin/minerals 1 Tablet(s) Oral daily  pantoprazole    Tablet 40 milliGRAM(s) Oral daily  polyethylene glycol 3350 17 Gram(s) Oral daily    MEDICATIONS  (PRN):  acetaminophen     Tablet .. 650 milliGRAM(s) Oral every 6 hours PRN Temp greater or equal to 38C (100.4F), Mild Pain (1 - 3)  albuterol/ipratropium for Nebulization 3 milliLiter(s) Nebulizer every 6 hours PRN Shortness of Breath and/or Wheezing  aluminum hydroxide/magnesium hydroxide/simethicone Suspension 30 milliLiter(s) Oral every 4 hours PRN Dyspepsia  guaiFENesin Oral Liquid (Sugar-Free) 200 milliGRAM(s) Oral every 6 hours PRN Cough  melatonin 3 milliGRAM(s) Oral at bedtime PRN Insomnia  ondansetron Injectable 4 milliGRAM(s) IV Push every 8 hours PRN Nausea and/or Vomiting      Vital Signs Last 24 Hrs  T(C): 36.6 (22 Aug 2023 14:55), Max: 36.9 (21 Aug 2023 20:50)  T(F): 97.8 (22 Aug 2023 14:55), Max: 98.4 (21 Aug 2023 20:50)  HR: 78 (22 Aug 2023 14:55) (70 - 81)  BP: 161/81 (22 Aug 2023 14:55) (131/79 - 161/81)  BP(mean): --  RR: 18 (22 Aug 2023 14:55) (18 - 18)  SpO2: 91% (22 Aug 2023 14:55) (91% - 98%)    Parameters below as of 22 Aug 2023 14:55  Patient On (Oxygen Delivery Method): nasal cannula  O2 Flow (L/min): 4      I&O's Summary      PHYSICAL EXAM:  HEENT: NC/AT; PERRLA  Neck: Soft; no tenderness  Lungs: CTA bilaterally; no wheezing.   Heart:  Abdomen:  Genital/ Rectal:  Extremities:  Neurologic:  Vascular:      LABORATORY:    CBC Full  -  ( 22 Aug 2023 06:20 )  WBC Count : 6.97 K/uL  RBC Count : 3.63 M/uL  Hemoglobin : 10.7 g/dL  Hematocrit : 35.2 %  Platelet Count - Automated : 195 K/uL  Mean Cell Volume : 97.0 fl  Mean Cell Hemoglobin : 29.5 pg  Mean Cell Hemoglobin Concentration : 30.4 gm/dL  Auto Neutrophil # : 5.96 K/uL  Auto Lymphocyte # : 0.41 K/uL  Auto Monocyte # : 0.48 K/uL  Auto Eosinophil # : 0.05 K/uL  Auto Basophil # : 0.03 K/uL  Auto Neutrophil % : 85.5 %  Auto Lymphocyte % : 5.9 %  Auto Monocyte % : 6.9 %  Auto Eosinophil % : 0.7 %  Auto Basophil % : 0.4 %      ESR:                   08-21 @ 06:24  --    C-Reactive Protein:     08-21 @ 06:24  --    Procalcitonin:           08-21 @ 06:24   7.59      08-22    142  |  110<H>  |  22  ----------------------------<  99  3.4<L>   |  28  |  0.81    Ca    8.4<L>      22 Aug 2023 06:20  Phos  3.7     08-21  Mg     1.9     08-21    TPro  6.2  /  Alb  2.6<L>  /  TBili  0.8  /  DBili  x   /  AST  21  /  ALT  36  /  AlkPhos  99  08-22      Rapid Respiratory Viral Panel Result        08-20 @ 11:55  Rapid RVP [facility]  Coronovirus --  Adenovirus --  Bordetella Pertussis --  Chlamydia Pneumonia --  Entero/Rhinovirus--  HKU1 Coronovirus --  HMPV Coronovirus --  Influenza A --  Influenza AH1 --  Influenza AH1 2009 --  Influenza AH3 --  Influenza B --  Mycoplasma Pneumoniae --  NL63 Coronovirus --  OC43 Coronovirus --  Parainfluenza 1 --  Parainfluenza 2 --  Parainfluenza 3 --  Parainfluenza 4 --  Resp Syncytial Virus --      Assessment and Plan:    1. UTI,  2. Atelectasis vs pneumonia of lower lobes.  3. Generalized weakness.  4. Dysphagia.  5. Bacteremia with ESBL E COli      . Chest CT showed atelectasis vs infiltrates of lower lobes, No clear evidence of pneumonia.  . The patient has had a nonproductive cough for more than 2 weeks. Would look for other possible sources of this persistent cough: GERD, dysphagia.  . Continue IV Invanz.   . Follow cultures.   . SLP re-evaluation for dysphagia.  . Continue Protonix.        Edwardo Gonzalez MD   [phone].  No fevers noted      MEDICATIONS  (STANDING):  aMIOdarone    Tablet 200 milliGRAM(s) Oral daily  apixaban 2.5 milliGRAM(s) Oral two times a day  artificial  tears Solution 1 Drop(s) Both EYES two times a day  benzonatate 100 milliGRAM(s) Oral every 8 hours  dextrose 5% + sodium chloride 0.45%. 1000 milliLiter(s) (50 mL/Hr) IV Continuous <Continuous>  enalapril 10 milliGRAM(s) Oral daily  ertapenem  IVPB 1000 milliGRAM(s) IV Intermittent every 24 hours  ferrous    sulfate 325 milliGRAM(s) Oral daily  lactobacillus acidophilus 1 Tablet(s) Oral every 12 hours  levothyroxine 75 MICROGram(s) Oral daily  metoprolol tartrate 50 milliGRAM(s) Oral two times a day  multivitamin/minerals 1 Tablet(s) Oral daily  pantoprazole    Tablet 40 milliGRAM(s) Oral daily  polyethylene glycol 3350 17 Gram(s) Oral daily    MEDICATIONS  (PRN):  acetaminophen     Tablet .. 650 milliGRAM(s) Oral every 6 hours PRN Temp greater or equal to 38C (100.4F), Mild Pain (1 - 3)  albuterol/ipratropium for Nebulization 3 milliLiter(s) Nebulizer every 6 hours PRN Shortness of Breath and/or Wheezing  aluminum hydroxide/magnesium hydroxide/simethicone Suspension 30 milliLiter(s) Oral every 4 hours PRN Dyspepsia  guaiFENesin Oral Liquid (Sugar-Free) 200 milliGRAM(s) Oral every 6 hours PRN Cough  melatonin 3 milliGRAM(s) Oral at bedtime PRN Insomnia  ondansetron Injectable 4 milliGRAM(s) IV Push every 8 hours PRN Nausea and/or Vomiting      Vital Signs Last 24 Hrs  T(C): 36.6 (22 Aug 2023 14:55), Max: 36.9 (21 Aug 2023 20:50)  T(F): 97.8 (22 Aug 2023 14:55), Max: 98.4 (21 Aug 2023 20:50)  HR: 78 (22 Aug 2023 14:55) (70 - 81)  BP: 161/81 (22 Aug 2023 14:55) (131/79 - 161/81)  BP(mean): --  RR: 18 (22 Aug 2023 14:55) (18 - 18)  SpO2: 91% (22 Aug 2023 14:55) (91% - 98%)    Parameters below as of 22 Aug 2023 14:55  Patient On (Oxygen Delivery Method): nasal cannula  O2 Flow (L/min): 4      I&O's Summary      PHYSICAL EXAM:  HEENT: NC/AT; PERRLA  Neck: Soft; no tenderness  Lungs: CTA bilaterally; no wheezing.   Heart:  Abdomen:  Genital/ Rectal:  Extremities:  Neurologic:  Vascular:      LABORATORY:    CBC Full  -  ( 22 Aug 2023 06:20 )  WBC Count : 6.97 K/uL  RBC Count : 3.63 M/uL  Hemoglobin : 10.7 g/dL  Hematocrit : 35.2 %  Platelet Count - Automated : 195 K/uL  Mean Cell Volume : 97.0 fl  Mean Cell Hemoglobin : 29.5 pg  Mean Cell Hemoglobin Concentration : 30.4 gm/dL  Auto Neutrophil # : 5.96 K/uL  Auto Lymphocyte # : 0.41 K/uL  Auto Monocyte # : 0.48 K/uL  Auto Eosinophil # : 0.05 K/uL  Auto Basophil # : 0.03 K/uL  Auto Neutrophil % : 85.5 %  Auto Lymphocyte % : 5.9 %  Auto Monocyte % : 6.9 %  Auto Eosinophil % : 0.7 %  Auto Basophil % : 0.4 %      ESR:                   08-21 @ 06:24  --    C-Reactive Protein:     08-21 @ 06:24  --    Procalcitonin:           08-21 @ 06:24   7.59      08-22    142  |  110<H>  |  22  ----------------------------<  99  3.4<L>   |  28  |  0.81    Ca    8.4<L>      22 Aug 2023 06:20  Phos  3.7     08-21  Mg     1.9     08-21    TPro  6.2  /  Alb  2.6<L>  /  TBili  0.8  /  DBili  x   /  AST  21  /  ALT  36  /  AlkPhos  99  08-22      Rapid Respiratory Viral Panel Result        08-20 @ 11:55  Rapid RVP NotDetec  Coronovirus --  Adenovirus --  Bordetella Pertussis --  Chlamydia Pneumonia --  Entero/Rhinovirus--  HKU1 Coronovirus --  HMPV Coronovirus --  Influenza A --  Influenza AH1 --  Influenza AH1 2009 --  Influenza AH3 --  Influenza B --  Mycoplasma Pneumoniae --  NL63 Coronovirus --  OC43 Coronovirus --  Parainfluenza 1 --  Parainfluenza 2 --  Parainfluenza 3 --  Parainfluenza 4 --  Resp Syncytial Virus --      Assessment and Plan:    1. UTI,  2. Atelectasis vs pneumonia of lower lobes.  3. Generalized weakness.  4. Dysphagia.  5. Bacteremia with ESBL E COli      . Chest CT showed atelectasis vs infiltrates of lower lobes, No clear evidence of pneumonia.  . The patient has had a nonproductive cough for more than 2 weeks. Would look for other possible sources of this persistent cough: GERD, dysphagia.  . Continue IV Invanz.   . Follow cultures.   . SLP re-evaluation for dysphagia.  . Continue Protonix.        Edwardo Gonzalez MD   [phone].  No fevers noted  Comfortable     MEDICATIONS  (STANDING):  aMIOdarone    Tablet 200 milliGRAM(s) Oral daily  apixaban 2.5 milliGRAM(s) Oral two times a day  artificial  tears Solution 1 Drop(s) Both EYES two times a day  benzonatate 100 milliGRAM(s) Oral every 8 hours  dextrose 5% + sodium chloride 0.45%. 1000 milliLiter(s) (50 mL/Hr) IV Continuous <Continuous>  enalapril 10 milliGRAM(s) Oral daily  ertapenem  IVPB 1000 milliGRAM(s) IV Intermittent every 24 hours  ferrous    sulfate 325 milliGRAM(s) Oral daily  lactobacillus acidophilus 1 Tablet(s) Oral every 12 hours  levothyroxine 75 MICROGram(s) Oral daily  metoprolol tartrate 50 milliGRAM(s) Oral two times a day  multivitamin/minerals 1 Tablet(s) Oral daily  pantoprazole    Tablet 40 milliGRAM(s) Oral daily  polyethylene glycol 3350 17 Gram(s) Oral daily    MEDICATIONS  (PRN):  acetaminophen     Tablet .. 650 milliGRAM(s) Oral every 6 hours PRN Temp greater or equal to 38C (100.4F), Mild Pain (1 - 3)  albuterol/ipratropium for Nebulization 3 milliLiter(s) Nebulizer every 6 hours PRN Shortness of Breath and/or Wheezing  aluminum hydroxide/magnesium hydroxide/simethicone Suspension 30 milliLiter(s) Oral every 4 hours PRN Dyspepsia  guaiFENesin Oral Liquid (Sugar-Free) 200 milliGRAM(s) Oral every 6 hours PRN Cough  melatonin 3 milliGRAM(s) Oral at bedtime PRN Insomnia  ondansetron Injectable 4 milliGRAM(s) IV Push every 8 hours PRN Nausea and/or Vomiting      Vital Signs Last 24 Hrs  T(C): 36.6 (22 Aug 2023 14:55), Max: 36.9 (21 Aug 2023 20:50)  T(F): 97.8 (22 Aug 2023 14:55), Max: 98.4 (21 Aug 2023 20:50)  HR: 78 (22 Aug 2023 14:55) (70 - 81)  BP: 161/81 (22 Aug 2023 14:55) (131/79 - 161/81)  BP(mean): --  RR: 18 (22 Aug 2023 14:55) (18 - 18)  SpO2: 91% (22 Aug 2023 14:55) (91% - 98%)    Parameters below as of 22 Aug 2023 14:55  Patient On (Oxygen Delivery Method): nasal cannula  O2 Flow (L/min): 4      I&O's Summary      PHYSICAL EXAM:  HEENT: NC/AT; PERRLA  Neck: Soft; no tenderness  Lungs: CTA bilaterally; no wheezing.   Heart: RRR, no murmurs.  Abdomen: Soft, no tenderness.   Genital/ Rectal: No khan catheter.   Extremities: No ulcers.   Neurologic: Awake.       LABORATORY:    CBC Full  -  ( 22 Aug 2023 06:20 )  WBC Count : 6.97 K/uL  RBC Count : 3.63 M/uL  Hemoglobin : 10.7 g/dL  Hematocrit : 35.2 %  Platelet Count - Automated : 195 K/uL  Mean Cell Volume : 97.0 fl  Mean Cell Hemoglobin : 29.5 pg  Mean Cell Hemoglobin Concentration : 30.4 gm/dL  Auto Neutrophil # : 5.96 K/uL  Auto Lymphocyte # : 0.41 K/uL  Auto Monocyte # : 0.48 K/uL  Auto Eosinophil # : 0.05 K/uL  Auto Basophil # : 0.03 K/uL  Auto Neutrophil % : 85.5 %  Auto Lymphocyte % : 5.9 %  Auto Monocyte % : 6.9 %  Auto Eosinophil % : 0.7 %  Auto Basophil % : 0.4 %      ESR:                   08-21 @ 06:24  --    C-Reactive Protein:     08-21 @ 06:24  --    Procalcitonin:           08-21 @ 06:24   7.59      08-22    142  |  110<H>  |  22  ----------------------------<  99  3.4<L>   |  28  |  0.81    Ca    8.4<L>      22 Aug 2023 06:20  Phos  3.7     08-21  Mg     1.9     08-21    TPro  6.2  /  Alb  2.6<L>  /  TBili  0.8  /  DBili  x   /  AST  21  /  ALT  36  /  AlkPhos  99  08-22      Rapid Respiratory Viral Panel Result        08-20 @ 11:55  Rapid RVP NotDetec  Coronovirus --  Adenovirus --  Bordetella Pertussis --  Chlamydia Pneumonia --  Entero/Rhinovirus--  HKU1 Coronovirus --  HMPV Coronovirus --  Influenza A --  Influenza AH1 --  Influenza AH1 2009 --  Influenza AH3 --  Influenza B --  Mycoplasma Pneumoniae --  NL63 Coronovirus --  OC43 Coronovirus --  Parainfluenza 1 --  Parainfluenza 2 --  Parainfluenza 3 --  Parainfluenza 4 --  Resp Syncytial Virus --      Assessment and Plan:    1. UTI with E coli  2. Atelectasis vs pneumonia of lower lobes.  3. Generalized weakness.  4. Dysphagia.  5. Bacteremia with ESBL E Coli      . Chest CT showed atelectasis vs infiltrates of lower lobes, No clear evidence of pneumonia.  . The patient has had a nonproductive cough for more than 2 weeks. Would look for other possible sources of this persistent cough: GERD, dysphagia.  . Blood cultures on admission grew ESBL E coli, likely due to UTI. Waiting for urine culture.   . Continue IV Invanz for 8 days. Follow repeated blood cultures.   . Would get SLP re-evaluation for dysphagia.  . Continue Protonix.        Edwardo Gonzalez MD   [phone].  No fevers noted  Comfortable     MEDICATIONS  (STANDING):  aMIOdarone    Tablet 200 milliGRAM(s) Oral daily  apixaban 2.5 milliGRAM(s) Oral two times a day  artificial  tears Solution 1 Drop(s) Both EYES two times a day  benzonatate 100 milliGRAM(s) Oral every 8 hours  dextrose 5% + sodium chloride 0.45%. 1000 milliLiter(s) (50 mL/Hr) IV Continuous <Continuous>  enalapril 10 milliGRAM(s) Oral daily  ertapenem  IVPB 1000 milliGRAM(s) IV Intermittent every 24 hours  ferrous    sulfate 325 milliGRAM(s) Oral daily  lactobacillus acidophilus 1 Tablet(s) Oral every 12 hours  levothyroxine 75 MICROGram(s) Oral daily  metoprolol tartrate 50 milliGRAM(s) Oral two times a day  multivitamin/minerals 1 Tablet(s) Oral daily  pantoprazole    Tablet 40 milliGRAM(s) Oral daily  polyethylene glycol 3350 17 Gram(s) Oral daily    MEDICATIONS  (PRN):  acetaminophen     Tablet .. 650 milliGRAM(s) Oral every 6 hours PRN Temp greater or equal to 38C (100.4F), Mild Pain (1 - 3)  albuterol/ipratropium for Nebulization 3 milliLiter(s) Nebulizer every 6 hours PRN Shortness of Breath and/or Wheezing  aluminum hydroxide/magnesium hydroxide/simethicone Suspension 30 milliLiter(s) Oral every 4 hours PRN Dyspepsia  guaiFENesin Oral Liquid (Sugar-Free) 200 milliGRAM(s) Oral every 6 hours PRN Cough  melatonin 3 milliGRAM(s) Oral at bedtime PRN Insomnia  ondansetron Injectable 4 milliGRAM(s) IV Push every 8 hours PRN Nausea and/or Vomiting      Vital Signs Last 24 Hrs  T(C): 36.6 (22 Aug 2023 14:55), Max: 36.9 (21 Aug 2023 20:50)  T(F): 97.8 (22 Aug 2023 14:55), Max: 98.4 (21 Aug 2023 20:50)  HR: 78 (22 Aug 2023 14:55) (70 - 81)  BP: 161/81 (22 Aug 2023 14:55) (131/79 - 161/81)  BP(mean): --  RR: 18 (22 Aug 2023 14:55) (18 - 18)  SpO2: 91% (22 Aug 2023 14:55) (91% - 98%)    Parameters below as of 22 Aug 2023 14:55  Patient On (Oxygen Delivery Method): nasal cannula  O2 Flow (L/min): 4      I&O's Summary      PHYSICAL EXAM:  HEENT: NC/AT; PERRLA  Neck: Soft; no tenderness  Lungs: CTA bilaterally; no wheezing.   Heart: RRR, no murmurs.  Abdomen: Soft, no tenderness.   Genital/ Rectal: No khan catheter.   Extremities: No ulcers.   Neurologic: Awake.       LABORATORY:    CBC Full  -  ( 22 Aug 2023 06:20 )  WBC Count : 6.97 K/uL  RBC Count : 3.63 M/uL  Hemoglobin : 10.7 g/dL  Hematocrit : 35.2 %  Platelet Count - Automated : 195 K/uL  Mean Cell Volume : 97.0 fl  Mean Cell Hemoglobin : 29.5 pg  Mean Cell Hemoglobin Concentration : 30.4 gm/dL  Auto Neutrophil # : 5.96 K/uL  Auto Lymphocyte # : 0.41 K/uL  Auto Monocyte # : 0.48 K/uL  Auto Eosinophil # : 0.05 K/uL  Auto Basophil # : 0.03 K/uL  Auto Neutrophil % : 85.5 %  Auto Lymphocyte % : 5.9 %  Auto Monocyte % : 6.9 %  Auto Eosinophil % : 0.7 %  Auto Basophil % : 0.4 %      ESR:                   08-21 @ 06:24  --    C-Reactive Protein:     08-21 @ 06:24  --    Procalcitonin:           08-21 @ 06:24   7.59      08-22    142  |  110<H>  |  22  ----------------------------<  99  3.4<L>   |  28  |  0.81    Ca    8.4<L>      22 Aug 2023 06:20  Phos  3.7     08-21  Mg     1.9     08-21    TPro  6.2  /  Alb  2.6<L>  /  TBili  0.8  /  DBili  x   /  AST  21  /  ALT  36  /  AlkPhos  99  08-22      Rapid Respiratory Viral Panel Result        08-20 @ 11:55  Rapid RVP NotDetec  Coronovirus --  Adenovirus --  Bordetella Pertussis --  Chlamydia Pneumonia --  Entero/Rhinovirus--  HKU1 Coronovirus --  HMPV Coronovirus --  Influenza A --  Influenza AH1 --  Influenza AH1 2009 --  Influenza AH3 --  Influenza B --  Mycoplasma Pneumoniae --  NL63 Coronovirus --  OC43 Coronovirus --  Parainfluenza 1 --  Parainfluenza 2 --  Parainfluenza 3 --  Parainfluenza 4 --  Resp Syncytial Virus --      Assessment and Plan:    1. UTI with E coli  2. Atelectasis vs pneumonia of lower lobes.  3. Generalized weakness.  4. Dysphagia.  5. Bacteremia with ESBL E Coli      . Chest CT showed atelectasis vs infiltrates of lower lobes, No clear evidence of pneumonia.  . The patient has had a nonproductive cough for more than 2 weeks. Would look for other possible sources of this persistent cough: GERD, dysphagia.  . Blood cultures on admission grew ESBL E coli, likely due to UTI. Waiting for urine culture.   . Continue IV Invanz for 8 days. Follow repeated blood cultures.   . Would get SLP re-evaluation for dysphagia.  . Continue Protonix.        Edwardo Gonzalez MD   [phone].  No fevers noted  Comfortable     MEDICATIONS  (STANDING):  aMIOdarone    Tablet 200 milliGRAM(s) Oral daily  apixaban 2.5 milliGRAM(s) Oral two times a day  artificial  tears Solution 1 Drop(s) Both EYES two times a day  benzonatate 100 milliGRAM(s) Oral every 8 hours  dextrose 5% + sodium chloride 0.45%. 1000 milliLiter(s) (50 mL/Hr) IV Continuous <Continuous>  enalapril 10 milliGRAM(s) Oral daily  ertapenem  IVPB 1000 milliGRAM(s) IV Intermittent every 24 hours  ferrous    sulfate 325 milliGRAM(s) Oral daily  lactobacillus acidophilus 1 Tablet(s) Oral every 12 hours  levothyroxine 75 MICROGram(s) Oral daily  metoprolol tartrate 50 milliGRAM(s) Oral two times a day  multivitamin/minerals 1 Tablet(s) Oral daily  pantoprazole    Tablet 40 milliGRAM(s) Oral daily  polyethylene glycol 3350 17 Gram(s) Oral daily    MEDICATIONS  (PRN):  acetaminophen     Tablet .. 650 milliGRAM(s) Oral every 6 hours PRN Temp greater or equal to 38C (100.4F), Mild Pain (1 - 3)  albuterol/ipratropium for Nebulization 3 milliLiter(s) Nebulizer every 6 hours PRN Shortness of Breath and/or Wheezing  aluminum hydroxide/magnesium hydroxide/simethicone Suspension 30 milliLiter(s) Oral every 4 hours PRN Dyspepsia  guaiFENesin Oral Liquid (Sugar-Free) 200 milliGRAM(s) Oral every 6 hours PRN Cough  melatonin 3 milliGRAM(s) Oral at bedtime PRN Insomnia  ondansetron Injectable 4 milliGRAM(s) IV Push every 8 hours PRN Nausea and/or Vomiting      Vital Signs Last 24 Hrs  T(C): 36.6 (22 Aug 2023 14:55), Max: 36.9 (21 Aug 2023 20:50)  T(F): 97.8 (22 Aug 2023 14:55), Max: 98.4 (21 Aug 2023 20:50)  HR: 78 (22 Aug 2023 14:55) (70 - 81)  BP: 161/81 (22 Aug 2023 14:55) (131/79 - 161/81)  BP(mean): --  RR: 18 (22 Aug 2023 14:55) (18 - 18)  SpO2: 91% (22 Aug 2023 14:55) (91% - 98%)    Parameters below as of 22 Aug 2023 14:55  Patient On (Oxygen Delivery Method): nasal cannula  O2 Flow (L/min): 4      I&O's Summary      PHYSICAL EXAM:  HEENT: NC/AT; PERRLA  Neck: Soft; no tenderness  Lungs: CTA bilaterally; no wheezing.   Heart: RRR, no murmurs.  Abdomen: Soft, no tenderness.   Genital/ Rectal: No khan catheter.   Extremities: No ulcers.   Neurologic: Awake.       LABORATORY:    CBC Full  -  ( 22 Aug 2023 06:20 )  WBC Count : 6.97 K/uL  RBC Count : 3.63 M/uL  Hemoglobin : 10.7 g/dL  Hematocrit : 35.2 %  Platelet Count - Automated : 195 K/uL  Mean Cell Volume : 97.0 fl  Mean Cell Hemoglobin : 29.5 pg  Mean Cell Hemoglobin Concentration : 30.4 gm/dL  Auto Neutrophil # : 5.96 K/uL  Auto Lymphocyte # : 0.41 K/uL  Auto Monocyte # : 0.48 K/uL  Auto Eosinophil # : 0.05 K/uL  Auto Basophil # : 0.03 K/uL  Auto Neutrophil % : 85.5 %  Auto Lymphocyte % : 5.9 %  Auto Monocyte % : 6.9 %  Auto Eosinophil % : 0.7 %  Auto Basophil % : 0.4 %      ESR:                   08-21 @ 06:24  --    C-Reactive Protein:     08-21 @ 06:24  --    Procalcitonin:           08-21 @ 06:24   7.59      08-22    142  |  110<H>  |  22  ----------------------------<  99  3.4<L>   |  28  |  0.81    Ca    8.4<L>      22 Aug 2023 06:20  Phos  3.7     08-21  Mg     1.9     08-21    TPro  6.2  /  Alb  2.6<L>  /  TBili  0.8  /  DBili  x   /  AST  21  /  ALT  36  /  AlkPhos  99  08-22      Rapid Respiratory Viral Panel Result        08-20 @ 11:55  Rapid RVP NotDetec  Coronovirus --  Adenovirus --  Bordetella Pertussis --  Chlamydia Pneumonia --  Entero/Rhinovirus--  HKU1 Coronovirus --  HMPV Coronovirus --  Influenza A --  Influenza AH1 --  Influenza AH1 2009 --  Influenza AH3 --  Influenza B --  Mycoplasma Pneumoniae --  NL63 Coronovirus --  OC43 Coronovirus --  Parainfluenza 1 --  Parainfluenza 2 --  Parainfluenza 3 --  Parainfluenza 4 --  Resp Syncytial Virus --      Assessment and Plan:    1. UTI with E coli  2. Atelectasis vs pneumonia of lower lobes.  3. Generalized weakness.  4. Dysphagia.  5. Bacteremia with ESBL E Coli      . Chest CT showed atelectasis vs infiltrates of lower lobes, No clear evidence of pneumonia.  . The patient has had a nonproductive cough for more than 2 weeks. Would look for other possible sources of this persistent cough: GERD, dysphagia.  . Blood cultures on admission grew ESBL E coli, likely due to UTI. Waiting for urine culture.   . Continue IV Invanz for 8 days. Follow repeated blood cultures.   . Would get SLP re-evaluation for dysphagia.  . Continue Protonix.        Edwardo Gonzalez MD   [phone].

## 2023-08-22 NOTE — PROGRESS NOTE ADULT - ASSESSMENT
REASON FOR VISIT  .. Management of problems listed below      PHYSICAL EXAM    HEENT Unremarkable  atraumatic   RESP Fair air entry  Harsh breath sound   CARDIAC S1 S2 No S3     NO JVD    ABDOMEN No hepatosplenomegaly   PEDAL EDEMA present No calf tenderness  NO rash       GENERAL DATA .     GOC.    ..   ICU STAY.   .. none  COVID.   .. scv2 8/21/2023 (-)    BEST PRACTICE ISSUES.    HOB ELEVATN.   .. Yes  DVT PPLX.   ..  8/20/2023 apixaba 2.5 x 2 (af)     SPEECH SWALLOW RECOMMENDATIONS.    ..    8/21/2023 soft bite     DIET.    ..  8/20/2023 soft bite size   IV fl.  .. 8/22/2023 D5 1/2 ns  50   STRESS ULCER PPLX.   ..    8/20/2023 protonix 40   INFECTION PPLX.   ..     ALLGY.  ..    pncl                     WT.  ..  8/20/2023 56  BMI.  ..   8/20/2023 21    PROCEDURES.    ABGS.   .     VS/ PO/IO/ VENT/ DRIPS.   8/22/2023 afeb 63 140/76   8/22/2023 4l 91%     DOA C/C.     . 8/20/2023 · Chief Complaint Quote sent by [employer] [facility] for cough x 2 weeks. negative cxr and covid swab. poor PO intake today.           INITIAL PRESENTATION.   . 8/20/2023 89-year-old female with history of hypothyroidism, A-fib on Eliquis, recent pacemaker for complete heart block brought in by ambulance from [facility] assisted living home for cough for the past 2 weeks.  Associated with generalized weakness and decreased p.o. intake.  No fall or trauma.  Patient states she has had cold symptoms for a while.  Denies fever, chills, chest pain, shortness of breath, abdominal pain, nausea, vomiting, upper or lower extremity weakness or paresthesias.   pmd: Dr. Crawley, cards: [facility].   . hypothyroidism   . A-fib on Eliquis (hx AFL ablation),  .  PFO  HOME MEDS.   . levoxyl eliquis 2.5 x 2   COURSE.  . 8/20/2023 pulm consultd    . 8/20/2023 5:39 PM ER meds given already include cefepime 2g     PROBLEMS/ASSESSMENT/RECOMMENDATIONS (A/R).  PULMONARY.  . GAS EXCHANGE.  .. A/R.   .. Monitor pulse oximetry and target po 90-95%    . COPD.  .. 8/20/2023 duoneb.4p   .. 8/20/2023 benzonatate 100.3 x3d   .. 8/20/2023 gauiafenesin     INFECTION.  . E coli ESBL CTX M bacteremia 8/20  .. w 8/20-8/21-8/22/2023 w 16 - 21- 6.9   .. pr 8/21/2023 pr 7.5   .. ua 8/20/2023 ua 1012 w tntc l estrase large r 25   .. ct chest 8/20/2023   .... l upper ant cardiac device leads terminating in r atrium and rv   .... no enlarged hilar or mediastinal ln   .... mild doe mod diffuse distention of mid to upper esophagus   .... cm   .... increased density of liver ? amiodarone effect   .... bl lower lobe partial areas of compressive atelectasis sl progressd since ctap 3/4/2023   .... mild bl lower lung areas of linear and compressive atelectasis lower lobes and lingula   .... mild bl upper lobe subsegmental linear and dependent atelectasis   .... nonsp mild interlobular septal thickening   .. MICRO  .. bc 8/20 E coli ESBL CTX M bacteremia 8/20   .. bc 8/21 gnr   .. legn 8/21 (-)   .. rvp 8/20 (-)   .. EVENTS   .. 8/20/2023 will start empiric levaquin   .. 8/21/2023 message sent to ID to change to carbapenem   .. ABIO  .. 8/21 ertapenem 8d dr green   .. AR   .. ESBL e coli bacteremia on 8/20 likely from urine on ertapenem stratd 8/21     . Hemodynamics.  .. la 8/20/2023 la 1.4  .. BP ok      . A fib   .. 8/20/2023 eliquis   .. 8/22/2023 amiodaron 200   .. 8/22/2023 metporolol 50.2     . CAD.  .. Tr 8/21/2023 Tr 31     . CHF   .. bnp 8/20/2023 bnp 4545   .. 8/22/2023 enalapril 10       HEMAT   .. Hb 8/20-8/21/2023 Hb 12 - 10.5   .. plt 8/20/2023 plt 225   .. inr 8/20/2023 inr 129     RENAL   .. Na 8/20/2023 Na 144   .. K 8/20/2023 K 3.3   .. co2 8/20/2023 co2 29   .. Cr 8/20/2023 Cr .8     . Dysphagia  .. esophagogram 8/22/2023 smooth tapering o distal esophagus     LFTS   .. AP 8/20/2023 124  .. ast 23  .. alt 25     MAIN ISSUES.  . Cough x 2 wk poa 8/20/2023  . Pneumonia   . E coli ESBL CTX M bacteremia 8/20  .. 8/20/2023 levaquin -> 8/21 ertapenem 1 x 8d Dr Green  . COPD   . A fib   .. 8/20/2023 apixaba   . CHF     TIME SPENT.   . Over 36 minutes aggregate care time spent on encounter; activities included   direct patient care, counseling and/or coordinating care reviewing notes, lab data/ imaging , discussion with multidisciplinary team/ patient  /family and explaining in detail risks, benefits, alternatives  of the recommendations     JAKOB WORTHY 89 f 8/20/2023 9/22/1929 DR BAM HERRERA      REASON FOR VISIT  .. Management of problems listed below      PHYSICAL EXAM    HEENT Unremarkable  atraumatic   RESP Fair air entry  Harsh breath sound   CARDIAC S1 S2 No S3     NO JVD    ABDOMEN No hepatosplenomegaly   PEDAL EDEMA present No calf tenderness  NO rash       GENERAL DATA .     GOC.    ..   ICU STAY.   .. none  COVID.   .. scv2 8/21/2023 (-)    BEST PRACTICE ISSUES.    HOB ELEVATN.   .. Yes  DVT PPLX.   ..  8/20/2023 apixaba 2.5 x 2 (af)     SPEECH SWALLOW RECOMMENDATIONS.    ..    8/21/2023 soft bite     DIET.    ..  8/20/2023 soft bite size   IV fl.  .. 8/22/2023 D5 1/2 ns  50   STRESS ULCER PPLX.   ..    8/20/2023 protonix 40   INFECTION PPLX.   ..     ALLGY.  ..    pncl                     WT.  ..  8/20/2023 56  BMI.  ..   8/20/2023 21    PROCEDURES.    ABGS.   .     VS/ PO/IO/ VENT/ DRIPS.   8/22/2023 afeb 63 140/76   8/22/2023 4l 91%     DOA C/C.     . 8/20/2023 · Chief Complaint Quote sent by [employer] [facility] for cough x 2 weeks. negative cxr and covid swab. poor PO intake today.           INITIAL PRESENTATION.   . 8/20/2023 89-year-old female with history of hypothyroidism, A-fib on Eliquis, recent pacemaker for complete heart block brought in by ambulance from [facility] assisted living home for cough for the past 2 weeks.  Associated with generalized weakness and decreased p.o. intake.  No fall or trauma.  Patient states she has had cold symptoms for a while.  Denies fever, chills, chest pain, shortness of breath, abdominal pain, nausea, vomiting, upper or lower extremity weakness or paresthesias.   pmd: Dr. Crawley, cards: [facility].   . hypothyroidism   . A-fib on Eliquis (hx AFL ablation),  .  PFO  HOME MEDS.   . levoxyl eliquis 2.5 x 2   COURSE.  . 8/20/2023 pulm consultd    . 8/20/2023 5:39 PM ER meds given already include cefepime 2g     PROBLEMS/ASSESSMENT/RECOMMENDATIONS (A/R).  PULMONARY.  . GAS EXCHANGE.  .. A/R.   .. Monitor pulse oximetry and target po 90-95%    . COPD.  .. 8/20/2023 duoneb.4p   .. 8/20/2023 benzonatate 100.3 x3d   .. 8/20/2023 gauiafenesin     INFECTION.  . E coli ESBL CTX M bacteremia 8/20  .. w 8/20-8/21-8/22/2023 w 16 - 21- 6.9   .. pr 8/21/2023 pr 7.5   .. ua 8/20/2023 ua 1012 w tntc l estrase large r 25   .. ct chest 8/20/2023   .... l upper ant cardiac device leads terminating in r atrium and rv   .... no enlarged hilar or mediastinal ln   .... mild doe mod diffuse distention of mid to upper esophagus   .... cm   .... increased density of liver ? amiodarone effect   .... bl lower lobe partial areas of compressive atelectasis sl progressd since ctap 3/4/2023   .... mild bl lower lung areas of linear and compressive atelectasis lower lobes and lingula   .... mild bl upper lobe subsegmental linear and dependent atelectasis   .... nonsp mild interlobular septal thickening   .. MICRO  .. bc 8/20 E coli ESBL CTX M bacteremia 8/20   .. bc 8/21 gnr   .. legn 8/21 (-)   .. rvp 8/20 (-)   .. EVENTS   .. 8/20/2023 will start empiric levaquin   .. 8/21/2023 message sent to ID to change to carbapenem   .. ABIO  .. 8/21 ertapenem 8d dr green   .. AR   .. ESBL e coli bacteremia on 8/20 likely from urine on ertapenem stratd 8/21     . Hemodynamics.  .. la 8/20/2023 la 1.4  .. BP ok      . A fib   .. 8/20/2023 eliquis   .. 8/22/2023 amiodaron 200   .. 8/22/2023 metporolol 50.2     . CAD.  .. Tr 8/21/2023 Tr 31     . CHF   .. bnp 8/20/2023 bnp 4545   .. 8/22/2023 enalapril 10       HEMAT   .. Hb 8/20-8/21/2023 Hb 12 - 10.5   .. plt 8/20/2023 plt 225   .. inr 8/20/2023 inr 129     RENAL   .. Na 8/20/2023 Na 144   .. K 8/20/2023 K 3.3   .. co2 8/20/2023 co2 29   .. Cr 8/20/2023 Cr .8     . Dysphagia  .. esophagogram 8/22/2023 smooth tapering o distal esophagus     LFTS   .. AP 8/20/2023 124  .. ast 23  .. alt 25     MAIN ISSUES.  . Cough x 2 wk poa 8/20/2023  . Pneumonia   . E coli ESBL CTX M bacteremia 8/20  .. 8/20/2023 levaquin -> 8/21 ertapenem 1 x 8d Dr Green  . COPD   . A fib   .. 8/20/2023 apixaba   . CHF     TIME SPENT.   . Over 36 minutes aggregate care time spent on encounter; activities included   direct patient care, counseling and/or coordinating care reviewing notes, lab data/ imaging , discussion with multidisciplinary team/ patient  /family and explaining in detail risks, benefits, alternatives  of the recommendations     JAKOB WORTHY 89 f 8/20/2023 9/22/1929 DR BAM HERRERA      REASON FOR VISIT  .. Management of problems listed below      PHYSICAL EXAM    HEENT Unremarkable  atraumatic   RESP Fair air entry  Harsh breath sound   CARDIAC S1 S2 No S3     NO JVD    ABDOMEN No hepatosplenomegaly   PEDAL EDEMA present No calf tenderness  NO rash       GENERAL DATA .     GOC.    ..   ICU STAY.   .. none  COVID.   .. scv2 8/21/2023 (-)    BEST PRACTICE ISSUES.    HOB ELEVATN.   .. Yes  DVT PPLX.   ..  8/20/2023 apixaba 2.5 x 2 (af)     SPEECH SWALLOW RECOMMENDATIONS.    ..    8/21/2023 soft bite     DIET.    ..  8/20/2023 soft bite size   IV fl.  .. 8/22/2023 D5 1/2 ns  50   STRESS ULCER PPLX.   ..    8/20/2023 protonix 40   INFECTION PPLX.   ..     ALLGY.  ..    pncl                     WT.  ..  8/20/2023 56  BMI.  ..   8/20/2023 21    PROCEDURES.    ABGS.   .     VS/ PO/IO/ VENT/ DRIPS.   8/22/2023 afeb 63 140/76   8/22/2023 4l 91%     DOA C/C.     . 8/20/2023 · Chief Complaint Quote sent by [employer] [facility] for cough x 2 weeks. negative cxr and covid swab. poor PO intake today.           INITIAL PRESENTATION.   . 8/20/2023 89-year-old female with history of hypothyroidism, A-fib on Eliquis, recent pacemaker for complete heart block brought in by ambulance from [facility] assisted living home for cough for the past 2 weeks.  Associated with generalized weakness and decreased p.o. intake.  No fall or trauma.  Patient states she has had cold symptoms for a while.  Denies fever, chills, chest pain, shortness of breath, abdominal pain, nausea, vomiting, upper or lower extremity weakness or paresthesias.   pmd: Dr. Crawley, cards: [facility].   . hypothyroidism   . A-fib on Eliquis (hx AFL ablation),  .  PFO  HOME MEDS.   . levoxyl eliquis 2.5 x 2   COURSE.  . 8/20/2023 pulm consultd    . 8/20/2023 5:39 PM ER meds given already include cefepime 2g     PROBLEMS/ASSESSMENT/RECOMMENDATIONS (A/R).  PULMONARY.  . GAS EXCHANGE.  .. A/R.   .. Monitor pulse oximetry and target po 90-95%    . COPD.  .. 8/20/2023 duoneb.4p   .. 8/20/2023 benzonatate 100.3 x3d   .. 8/20/2023 gauiafenesin     INFECTION.  . E coli ESBL CTX M bacteremia 8/20  .. w 8/20-8/21-8/22/2023 w 16 - 21- 6.9   .. pr 8/21/2023 pr 7.5   .. ua 8/20/2023 ua 1012 w tntc l estrase large r 25   .. ct chest 8/20/2023   .... l upper ant cardiac device leads terminating in r atrium and rv   .... no enlarged hilar or mediastinal ln   .... mild doe mod diffuse distention of mid to upper esophagus   .... cm   .... increased density of liver ? amiodarone effect   .... bl lower lobe partial areas of compressive atelectasis sl progressd since ctap 3/4/2023   .... mild bl lower lung areas of linear and compressive atelectasis lower lobes and lingula   .... mild bl upper lobe subsegmental linear and dependent atelectasis   .... nonsp mild interlobular septal thickening   .. MICRO  .. bc 8/20 E coli ESBL CTX M bacteremia 8/20   .. bc 8/21 gnr   .. legn 8/21 (-)   .. rvp 8/20 (-)   .. EVENTS   .. 8/20/2023 will start empiric levaquin   .. 8/21/2023 message sent to ID to change to carbapenem   .. ABIO  .. 8/21 ertapenem 8d dr green   .. AR   .. ESBL e coli bacteremia on 8/20 likely from urine on ertapenem stratd 8/21     . Hemodynamics.  .. la 8/20/2023 la 1.4  .. BP ok      . A fib   .. 8/20/2023 eliquis   .. 8/22/2023 amiodaron 200   .. 8/22/2023 metporolol 50.2     . CAD.  .. Tr 8/21/2023 Tr 31     . CHF   .. bnp 8/20/2023 bnp 4545   .. 8/22/2023 enalapril 10       HEMAT   .. Hb 8/20-8/21/2023 Hb 12 - 10.5   .. plt 8/20/2023 plt 225   .. inr 8/20/2023 inr 129     RENAL   .. Na 8/20/2023 Na 144   .. K 8/20/2023 K 3.3   .. co2 8/20/2023 co2 29   .. Cr 8/20/2023 Cr .8     . Dysphagia  .. esophagogram 8/22/2023 smooth tapering o distal esophagus     LFTS   .. AP 8/20/2023 124  .. ast 23  .. alt 25     MAIN ISSUES.  . Cough x 2 wk poa 8/20/2023  . Pneumonia   . E coli ESBL CTX M bacteremia 8/20  .. 8/20/2023 levaquin -> 8/21 ertapenem 1 x 8d Dr Green  . COPD   . A fib   .. 8/20/2023 apixaba   . CHF     TIME SPENT.   . Over 36 minutes aggregate care time spent on encounter; activities included   direct patient care, counseling and/or coordinating care reviewing notes, lab data/ imaging , discussion with multidisciplinary team/ patient  /family and explaining in detail risks, benefits, alternatives  of the recommendations     JAKOB WORTHY 89 f 8/20/2023 9/22/1929 DR BAM HERRERA

## 2023-08-22 NOTE — PROGRESS NOTE ADULT - ASSESSMENT
dilated esophagus  ?aspiration pneumonia  pneumonia    esophagram noted  ppi once a day  gerd precautions  aspiration precautions  TTE 8/21/2023 reviewed; estimated pulmonary artery systolic pressure is 63 mmHg, consistent with moderate-to-severe pulmonary HTN  pt is not candidate for egd at this facility  currently asymptomatic and tolerating diet  outpatient follow up; may need egd at tertiary facility if symptomatic  cont soft diet  will d/w family    I reviewed the overnight course of events on the unit, re-confirming the patient history. I discussed the care with the patient and their family  Differential diagnosis and plan of care discussed with patient after the evaluation  40 minutes spent on total encounter of which more than fifty percent of the encounter was spent counseling and/or coordinating care by the attending physician.  Advanced care planning was discussed with patient and family.  Advanced care planning forms were reviewed and discussed.  Risks, benefits and alternatives of gastroenterologic procedures were discussed in detail and all questions were answered.

## 2023-08-22 NOTE — PROGRESS NOTE ADULT - ASSESSMENT
89-year-old female with history of hypothyroidism, A-fib on Eliquis, s/p recent pacemaker for complete heart block brought in by ambulance from [facility] assisted living home for cough for the past 2 weeks.  Associated with generalized weakness and decreased p.o. intake.  No fall or trauma.  Patient states she has had cold symptoms for a while.  Denies fever, chills, chest pain, shortness of breath, abdominal pain, nausea, vomiting, upper or lower extremity weakness or paresthesias. pmd: Dr. Crawley, cards: [facility] Seen by card Dr Reich Admitted  to telemetry unit for monitoring , send 3 sets of cardiac enzymes to rule out acute coronary event, obtain ECHO to evaluate LVEF, cardiology consult  ,continue current management, O2 supply, anticoagulation plan as per cardiology consult Pulm cons requested , antitussives and nebulized bd ordered .Also UTI suspected and started on iv abx , id cons called Palliative care consult requested ,to discuss advance directives and complete MOLST  89-year-old female with history of hypothyroidism, A-fib on Eliquis, s/p recent pacemaker for complete heart block brought in by ambulance from [facility] assisted living home for cough for the past 2 weeks.  Associated with generalized weakness and decreased p.o. intake.  No fall or trauma.  Patient states she has had cold symptoms for a while.  Denies fever, chills, chest pain, shortness of breath, abdominal pain, nausea, vomiting, upper or lower extremity weakness or paresthesias. pmd: Dr. Crawley, cards: [facility] Seen by card Dr Reich Admitted  to telemetry unit for monitoring , send 3 sets of cardiac enzymes to rule out acute coronary event, obtain ECHO to evaluate LVEF, cardiology consult  ,continue current management, O2 supply, anticoagulation plan as per cardiology consult Pulm cons requested , antitussives and nebulized bd ordered .Also UTI suspected and started on iv abx , id cons called Palliative care consult requested ,to discuss advance directives and complete MOLST  89-year-old female with history of hypothyroidism, A-fib on Eliquis, s/p recent pacemaker for complete heart block brought in by ambulance from [facility] assisted living home for cough for the past 2 weeks.  Associated with generalized weakness and decreased p.o. intake.  No fall or trauma.  Patient states she has had cold symptoms for a while.  Denies fever, chills, chest pain, shortness of breath, abdominal pain, nausea, vomiting, upper or lower extremity weakness or paresthesias. pmd: Dr. Crawley, cards: [facility] Seen by card Dr Reich Admitted  to telemetry unit for monitoring , send 3 sets of cardiac enzymes to rule out acute coronary event, obtain ECHO to evaluate LVEF, cardiology consult  ,continue current management, O2 supply, anticoagulation plan as per cardiology consult Pulm cons requested , antitussives and nebulized bd ordered .Also UTI suspected and started on iv abx , id cons called Palliative care consult requested ,to discuss advance directives and complete MOLST

## 2023-08-23 DIAGNOSIS — A41.9 SEPSIS, UNSPECIFIED ORGANISM: ICD-10-CM

## 2023-08-23 LAB
-  AMIKACIN: SIGNIFICANT CHANGE UP
-  AMOXICILLIN/CLAVULANIC ACID: SIGNIFICANT CHANGE UP
-  AMPICILLIN/SULBACTAM: SIGNIFICANT CHANGE UP
-  AMPICILLIN: SIGNIFICANT CHANGE UP
-  AZTREONAM: SIGNIFICANT CHANGE UP
-  CEFAZOLIN: SIGNIFICANT CHANGE UP
-  CEFEPIME: SIGNIFICANT CHANGE UP
-  CEFTRIAXONE: SIGNIFICANT CHANGE UP
-  CEFUROXIME: SIGNIFICANT CHANGE UP
-  CIPROFLOXACIN: SIGNIFICANT CHANGE UP
-  ERTAPENEM: SIGNIFICANT CHANGE UP
-  GENTAMICIN: SIGNIFICANT CHANGE UP
-  IMIPENEM: SIGNIFICANT CHANGE UP
-  LEVOFLOXACIN: SIGNIFICANT CHANGE UP
-  MEROPENEM: SIGNIFICANT CHANGE UP
-  NITROFURANTOIN: SIGNIFICANT CHANGE UP
-  PIPERACILLIN/TAZOBACTAM: SIGNIFICANT CHANGE UP
-  TOBRAMYCIN: SIGNIFICANT CHANGE UP
-  TRIMETHOPRIM/SULFAMETHOXAZOLE: SIGNIFICANT CHANGE UP
ALBUMIN SERPL ELPH-MCNC: 2.5 G/DL — LOW (ref 3.3–5)
ALP SERPL-CCNC: 91 U/L — SIGNIFICANT CHANGE UP (ref 40–120)
ALT FLD-CCNC: 25 U/L — SIGNIFICANT CHANGE UP (ref 12–78)
ANION GAP SERPL CALC-SCNC: 3 MMOL/L — LOW (ref 5–17)
AST SERPL-CCNC: 11 U/L — LOW (ref 15–37)
BILIRUB SERPL-MCNC: 0.5 MG/DL — SIGNIFICANT CHANGE UP (ref 0.2–1.2)
BUN SERPL-MCNC: 16 MG/DL — SIGNIFICANT CHANGE UP (ref 7–23)
CALCIUM SERPL-MCNC: 8.4 MG/DL — LOW (ref 8.5–10.1)
CHLORIDE SERPL-SCNC: 111 MMOL/L — HIGH (ref 96–108)
CO2 SERPL-SCNC: 28 MMOL/L — SIGNIFICANT CHANGE UP (ref 22–31)
CREAT SERPL-MCNC: 0.68 MG/DL — SIGNIFICANT CHANGE UP (ref 0.5–1.3)
CULTURE RESULTS: SIGNIFICANT CHANGE UP
EGFR: 81 ML/MIN/1.73M2 — SIGNIFICANT CHANGE UP
GLUCOSE SERPL-MCNC: 100 MG/DL — HIGH (ref 70–99)
GRAM STN FLD: SIGNIFICANT CHANGE UP
HCT VFR BLD CALC: 33.8 % — LOW (ref 34.5–45)
HGB BLD-MCNC: 10.1 G/DL — LOW (ref 11.5–15.5)
MCHC RBC-ENTMCNC: 29.4 PG — SIGNIFICANT CHANGE UP (ref 27–34)
MCHC RBC-ENTMCNC: 29.9 GM/DL — LOW (ref 32–36)
MCV RBC AUTO: 98.3 FL — SIGNIFICANT CHANGE UP (ref 80–100)
METHOD TYPE: SIGNIFICANT CHANGE UP
NRBC # BLD: 0 /100 WBCS — SIGNIFICANT CHANGE UP (ref 0–0)
NT-PROBNP SERPL-SCNC: 3279 PG/ML — HIGH (ref 0–450)
ORGANISM # SPEC MICROSCOPIC CNT: SIGNIFICANT CHANGE UP
PLATELET # BLD AUTO: 198 K/UL — SIGNIFICANT CHANGE UP (ref 150–400)
POTASSIUM SERPL-MCNC: 3.5 MMOL/L — SIGNIFICANT CHANGE UP (ref 3.5–5.3)
POTASSIUM SERPL-SCNC: 3.5 MMOL/L — SIGNIFICANT CHANGE UP (ref 3.5–5.3)
PROT SERPL-MCNC: 6 G/DL — SIGNIFICANT CHANGE UP (ref 6–8.3)
RBC # BLD: 3.44 M/UL — LOW (ref 3.8–5.2)
RBC # FLD: 16.9 % — HIGH (ref 10.3–14.5)
SODIUM SERPL-SCNC: 142 MMOL/L — SIGNIFICANT CHANGE UP (ref 135–145)
SPECIMEN SOURCE: SIGNIFICANT CHANGE UP
WBC # BLD: 6 K/UL — SIGNIFICANT CHANGE UP (ref 3.8–10.5)
WBC # FLD AUTO: 6 K/UL — SIGNIFICANT CHANGE UP (ref 3.8–10.5)

## 2023-08-23 RX ORDER — HYDRALAZINE HCL 50 MG
10 TABLET ORAL ONCE
Refills: 0 | Status: COMPLETED | OUTPATIENT
Start: 2023-08-23 | End: 2023-08-23

## 2023-08-23 RX ADMIN — AMIODARONE HYDROCHLORIDE 200 MILLIGRAM(S): 400 TABLET ORAL at 05:33

## 2023-08-23 RX ADMIN — SODIUM CHLORIDE 50 MILLILITER(S): 9 INJECTION, SOLUTION INTRAVENOUS at 17:05

## 2023-08-23 RX ADMIN — APIXABAN 2.5 MILLIGRAM(S): 2.5 TABLET, FILM COATED ORAL at 17:05

## 2023-08-23 RX ADMIN — Medication 1 TABLET(S): at 11:13

## 2023-08-23 RX ADMIN — Medication 10 MILLIGRAM(S): at 05:33

## 2023-08-23 RX ADMIN — POLYETHYLENE GLYCOL 3350 17 GRAM(S): 17 POWDER, FOR SOLUTION ORAL at 11:13

## 2023-08-23 RX ADMIN — ERTAPENEM SODIUM 120 MILLIGRAM(S): 1 INJECTION, POWDER, LYOPHILIZED, FOR SOLUTION INTRAMUSCULAR; INTRAVENOUS at 15:28

## 2023-08-23 RX ADMIN — Medication 50 MILLIGRAM(S): at 17:05

## 2023-08-23 RX ADMIN — Medication 10 MILLIGRAM(S): at 06:56

## 2023-08-23 RX ADMIN — Medication 100 MILLIGRAM(S): at 11:13

## 2023-08-23 RX ADMIN — Medication 1 DROP(S): at 05:34

## 2023-08-23 RX ADMIN — Medication 1 TABLET(S): at 17:04

## 2023-08-23 RX ADMIN — Medication 1 DROP(S): at 17:05

## 2023-08-23 RX ADMIN — Medication 1 TABLET(S): at 05:33

## 2023-08-23 RX ADMIN — PANTOPRAZOLE SODIUM 40 MILLIGRAM(S): 20 TABLET, DELAYED RELEASE ORAL at 11:13

## 2023-08-23 RX ADMIN — Medication 75 MICROGRAM(S): at 05:36

## 2023-08-23 RX ADMIN — Medication 325 MILLIGRAM(S): at 11:13

## 2023-08-23 RX ADMIN — Medication 50 MILLIGRAM(S): at 05:33

## 2023-08-23 RX ADMIN — Medication 100 MILLIGRAM(S): at 06:16

## 2023-08-23 RX ADMIN — APIXABAN 2.5 MILLIGRAM(S): 2.5 TABLET, FILM COATED ORAL at 05:33

## 2023-08-23 NOTE — PROGRESS NOTE ADULT - ASSESSMENT
89-year-old female with history of hypothyroidism, A-fib on Eliquis, s/p recent pacemaker for complete heart block brought in by ambulance from [facility] assisted living home for cough for the past 2 weeks.  Associated with generalized weakness and decreased p.o. intake.  No fall or trauma.  Patient states she has had cold symptoms for a while.  Denies fever, chills, chest pain, shortness of breath, abdominal pain, nausea, vomiting, upper or lower extremity weakness or paresthesias. pmd: Dr. Crawley, cards: [facility] Seen by card Dr Reich Admitted  to telemetry unit for monitoring , send 3 sets of cardiac enzymes to rule out acute coronary event, obtain ECHO to evaluate LVEF, cardiology consult  ,continue current management, O2 supply, anticoagulation plan as per cardiology consult Pulm cons requested , antitussives and nebulized bd ordered .Also UTI suspected and started on iv abx , id cons called Palliative care consult requested ,to discuss advance directives and complete MOLST  89-year-old female with history of hypothyroidism, A-fib on Eliquis, s/p recent pacemaker for complete heart block brought in by ambulance from [facility] assisted living home for cough for the past 2 weeks.  Associated with generalized weakness and decreased p.o. intake.  No fall or trauma.  Patient states she has had cold symptoms for a while.  Denies fever, chills, chest pain, shortness of breath, abdominal pain, nausea, vomiting, upper or lower extremity weakness or paresthesias. pmd: Dr. Crawley, cards: [facility] Seen by card Dr Reich Admitted  to telemetry unit for monitoring , send 3 sets of cardiac enzymes to rule out acute coronary event, obtain ECHO to evaluate LVEF, cardiology consult  ,continue current management, O2 supply, anticoagulation plan as per cardiology consult Pulm cons requested , antitussives and nebulized bd ordered .Also UTI suspected and started on iv abx , id cons called Palliative care consult requested ,to discuss advance directives and complete MOLST  89-year-old female with history of hypothyroidism, A-fib on Eliquis, s/p recent pacemaker for complete heart block brought in by ambulance from [facility] assisted living home for cough for the past 2 weeks.  Associated with generalized weakness and decreased p.o. intake.  No fall or trauma.  Patient states she has had cold symptoms for a while.  Denies fever, chills, chest pain, shortness of breath, abdominal pain, nausea, vomiting, upper or lower extremity weakness or paresthesias. pmd: Dr. Crawley, cards: [facility] Seen by card Dr Reich Admitted  to telemetry unit for monitoring , send 3 sets of cardiac enzymes to rule out acute coronary event, obtain ECHO to evaluate LVEF, cardiology consult  ,continue current management, O2 supply, anticoagulation plan as per cardiology consult Pulm cons requested , antitussives and nebulized bd ordered .Also UTI suspected and started on iv abx , id cons called Palliative care consult requested ,to discuss advance directives and complete MOLST

## 2023-08-23 NOTE — PROGRESS NOTE ADULT - SUBJECTIVE AND OBJECTIVE BOX
[facility] [place]       HPI:  89-year-old female with history of hypothyroidism, A-fib on Eliquis, s/p recent pacemaker for complete heart block brought in by ambulance from [facility] assisted living home for cough for the past 2 weeks.  Associated with generalized weakness and decreased p.o. intake.  No fall or trauma.  Patient states she has had cold symptoms for a while.  Denies fever, chills, chest pain, shortness of breath, abdominal pain, nausea, vomiting, upper or lower extremity weakness or paresthesias. pmd: Dr. Crawley, cards: [facility] Seen by card Dr Reich Admitted  to telemetry unit for monitoring , send 3 sets of cardiac enzymes to rule out acute coronary event, obtain ECHO to evaluate LVEF, cardiology consult  ,continue current management, O2 supply, anticoagulation plan as per cardiology consult Pulm cons requested , antitussives and nebulized bd ordered .Also UTI suspected and started on iv abx , id cons called Palliative care consult requested ,to discuss advance directives and complete MOLST  (20 Aug 2023 15:35)        SUBJECTIVE:      ALLERGIES:  Allergies    penicillin (Unknown)    Intolerances          MEDICATIONS  (STANDING):  aMIOdarone    Tablet 200 milliGRAM(s) Oral daily  apixaban 2.5 milliGRAM(s) Oral two times a day  artificial  tears Solution 1 Drop(s) Both EYES two times a day  dextrose 5% + sodium chloride 0.45%. 1000 milliLiter(s) (50 mL/Hr) IV Continuous <Continuous>  enalapril 10 milliGRAM(s) Oral daily  ertapenem  IVPB 1000 milliGRAM(s) IV Intermittent every 24 hours  ferrous    sulfate 325 milliGRAM(s) Oral daily  lactobacillus acidophilus 1 Tablet(s) Oral every 12 hours  levothyroxine 75 MICROGram(s) Oral daily  metoprolol tartrate 50 milliGRAM(s) Oral two times a day  multivitamin/minerals 1 Tablet(s) Oral daily  pantoprazole    Tablet 40 milliGRAM(s) Oral daily  polyethylene glycol 3350 17 Gram(s) Oral daily    MEDICATIONS  (PRN):  acetaminophen     Tablet .. 650 milliGRAM(s) Oral every 6 hours PRN Temp greater or equal to 38C (100.4F), Mild Pain (1 - 3)  albuterol/ipratropium for Nebulization 3 milliLiter(s) Nebulizer every 6 hours PRN Shortness of Breath and/or Wheezing  aluminum hydroxide/magnesium hydroxide/simethicone Suspension 30 milliLiter(s) Oral every 4 hours PRN Dyspepsia  guaiFENesin Oral Liquid (Sugar-Free) 200 milliGRAM(s) Oral every 6 hours PRN Cough  melatonin 3 milliGRAM(s) Oral at bedtime PRN Insomnia  ondansetron Injectable 4 milliGRAM(s) IV Push every 8 hours PRN Nausea and/or Vomiting      REVIEW OF SYSTEMS:  CONSTITUTIONAL: No fever,  RESPIRATORY: No cough, wheezing, shortness of breath  CARDIOVASCULAR: No chest pain, dyspnea, palpitations, dizziness, syncope, paroxysmal nocturnal dyspnea, orthopnea, or arm or leg swelling  GASTROINTESTINAL: No abdominal  or epigastric pain, nausea, vomiting,  diarrhea  NEUROLOGICAL: No headaches,  loss of strength, numbness, or tremors    Vital Signs Last 24 Hrs  T(C): 36.4 (24 Aug 2023 04:58), Max: 36.8 (23 Aug 2023 20:11)  T(F): 97.5 (24 Aug 2023 04:58), Max: 98.3 (23 Aug 2023 20:11)  HR: 67 (24 Aug 2023 04:58) (65 - 76)  BP: 151/68 (24 Aug 2023 04:58) (120/76 - 178/83)  BP(mean): --  RR: 18 (24 Aug 2023 04:58) (17 - 18)  SpO2: 97% (24 Aug 2023 04:58) (94% - 97%)    Parameters below as of 24 Aug 2023 04:58  Patient On (Oxygen Delivery Method): nasal cannula  O2 Flow (L/min): 2      PHYSICAL EXAM:  HEAD:  Atraumatic, Normocephalic  NECK: Supple and normal thyroid.  No JVD or carotid bruit.   HEART: S1, S2 regular , 1/6 soft ejection systolic murmur in mitral area , no thrill and no gallops .  PULMONARY: Bilateral vesicular breathing , few scattered ronchi and few scattered rales are present .  ABDOMEN: Soft nontender and positive bowl sounds   EXTREMITIES:  No clubbing, cyanosis, or pedal  edema  NEUROLOGICAL: AAOX3 , no focal deficit .    LABS:                        10.1   6.00  )-----------( 198      ( 23 Aug 2023 06:08 )             33.8     08-23    142  |  111<H>  |  16  ----------------------------<  100<H>  3.5   |  28  |  0.68    Ca    8.4<L>      23 Aug 2023 06:08    TPro  6.0  /  Alb  2.5<L>  /  TBili  0.5  /  DBili  x   /  AST  11<L>  /  ALT  25  /  AlkPhos  91  08-23          Urinalysis Basic - ( 23 Aug 2023 06:08 )    Color: x / Appearance: x / SG: x / pH: x  Gluc: 100 mg/dL / Ketone: x  / Bili: x / Urobili: x   Blood: x / Protein: x / Nitrite: x   Leuk Esterase: x / RBC: x / WBC x   Sq Epi: x / Non Sq Epi: x / Bacteria: x      BNP      EKG:  ECHO:  IMAGING:    Assessment/Plan    Will continue to follow during hospital course and give further recommendations as needed . Thanks for your referral . if any questions please contact me at office ([phone])cell [phone])  [facility] [place]       HPI:  89-year-old female with history of hypothyroidism, A-fib on Eliquis, s/p recent pacemaker for complete heart block brought in by ambulance from [facility] assisted living home for cough for the past 2 weeks.  Associated with generalized weakness and decreased p.o. intake.  No fall or trauma.  Patient states she has had cold symptoms for a while.  Denies fever, chills, chest pain, shortness of breath, abdominal pain, nausea, vomiting, upper or lower extremity weakness or paresthesias. pmd: Dr. Crawley, cards: [facility] Seen by card Dr Reich Admitted  to telemetry unit for monitoring , send 3 sets of cardiac enzymes to rule out acute coronary event, obtain ECHO to evaluate LVEF, cardiology consult  ,continue current management, O2 supply, anticoagulation plan as per cardiology consult Pulm cons requested , antitussives and nebulized bd ordered .Also UTI suspected and started on iv abx , id cons called Palliative care consult requested ,to discuss advance directives and complete MOLST  (20 Aug 2023 15:35)        SUBJECTIVE:      ALLERGIES:  Allergies    penicillin (Unknown)    Intolerances          MEDICATIONS  (STANDING):  aMIOdarone    Tablet 200 milliGRAM(s) Oral daily  apixaban 2.5 milliGRAM(s) Oral two times a day  artificial  tears Solution 1 Drop(s) Both EYES two times a day  dextrose 5% + sodium chloride 0.45%. 1000 milliLiter(s) (50 mL/Hr) IV Continuous <Continuous>  enalapril 10 milliGRAM(s) Oral daily  ertapenem  IVPB 1000 milliGRAM(s) IV Intermittent every 24 hours  ferrous    sulfate 325 milliGRAM(s) Oral daily  lactobacillus acidophilus 1 Tablet(s) Oral every 12 hours  levothyroxine 75 MICROGram(s) Oral daily  metoprolol tartrate 50 milliGRAM(s) Oral two times a day  multivitamin/minerals 1 Tablet(s) Oral daily  pantoprazole    Tablet 40 milliGRAM(s) Oral daily  polyethylene glycol 3350 17 Gram(s) Oral daily    MEDICATIONS  (PRN):  acetaminophen     Tablet .. 650 milliGRAM(s) Oral every 6 hours PRN Temp greater or equal to 38C (100.4F), Mild Pain (1 - 3)  albuterol/ipratropium for Nebulization 3 milliLiter(s) Nebulizer every 6 hours PRN Shortness of Breath and/or Wheezing  aluminum hydroxide/magnesium hydroxide/simethicone Suspension 30 milliLiter(s) Oral every 4 hours PRN Dyspepsia  guaiFENesin Oral Liquid (Sugar-Free) 200 milliGRAM(s) Oral every 6 hours PRN Cough  melatonin 3 milliGRAM(s) Oral at bedtime PRN Insomnia  ondansetron Injectable 4 milliGRAM(s) IV Push every 8 hours PRN Nausea and/or Vomiting      REVIEW OF SYSTEMS:  CONSTITUTIONAL: No fever,  RESPIRATORY: No cough, wheezing, shortness of breath  CARDIOVASCULAR: No chest pain, dyspnea, palpitations, dizziness, syncope, paroxysmal nocturnal dyspnea, orthopnea, or arm or leg swelling  GASTROINTESTINAL: No abdominal  or epigastric pain, nausea, vomiting,  diarrhea  NEUROLOGICAL: No headaches,  loss of strength, numbness, or tremors    Vital Signs Last 24 Hrs  T(C): 36.4 (24 Aug 2023 04:58), Max: 36.8 (23 Aug 2023 20:11)  T(F): 97.5 (24 Aug 2023 04:58), Max: 98.3 (23 Aug 2023 20:11)  HR: 67 (24 Aug 2023 04:58) (65 - 76)  BP: 151/68 (24 Aug 2023 04:58) (120/76 - 178/83)  BP(mean): --  RR: 18 (24 Aug 2023 04:58) (17 - 18)  SpO2: 97% (24 Aug 2023 04:58) (94% - 97%)    Parameters below as of 24 Aug 2023 04:58  Patient On (Oxygen Delivery Method): nasal cannula  O2 Flow (L/min): 2      PHYSICAL EXAM:  HEAD:  Atraumatic, Normocephalic  NECK: Supple and normal thyroid.  No JVD or carotid bruit.   HEART: S1, S2 regular , 1/6 soft ejection systolic murmur in mitral area , no thrill and no gallops .  PULMONARY: Bilateral vesicular breathing , few scattered ronchi and few scattered rales are present .  ABDOMEN: Soft nontender and positive bowl sounds   EXTREMITIES:  No clubbing, cyanosis, or pedal  edema  NEUROLOGICAL: AAOX3 , no focal deficit .    LABS:                        10.1   6.00  )-----------( 198      ( 23 Aug 2023 06:08 )             33.8     08-23    142  |  111<H>  |  16  ----------------------------<  100<H>  3.5   |  28  |  0.68    Ca    8.4<L>      23 Aug 2023 06:08    TPro  6.0  /  Alb  2.5<L>  /  TBili  0.5  /  DBili  x   /  AST  11<L>  /  ALT  25  /  AlkPhos  91  08-23          Urinalysis Basic - ( 23 Aug 2023 06:08 )    Color: x / Appearance: x / SG: x / pH: x  Gluc: 100 mg/dL / Ketone: x  / Bili: x / Urobili: x   Blood: x / Protein: x / Nitrite: x   Leuk Esterase: x / RBC: x / WBC x   Sq Epi: x / Non Sq Epi: x / Bacteria: x      BNP      EKG:  ECHO:  IMAGING:    Assessment/Plan    Will continue to follow during hospital course and give further recommendations as needed . Thanks for your referral . if any questions please contact me at office ([phone])cell [phone])  [facility] [place]       HPI:  89-year-old female with history of hypothyroidism, A-fib on Eliquis, s/p recent pacemaker for complete heart block brought in by ambulance from [facility] assisted living home for cough for the past 2 weeks.  Associated with generalized weakness and decreased p.o. intake.  No fall or trauma.  Patient states she has had cold symptoms for a while.  Denies fever, chills, chest pain, shortness of breath, abdominal pain, nausea, vomiting, upper or lower extremity weakness or paresthesias. pmd: Dr. Crawley, cards: [facility] Seen by card Dr Reich Admitted  to telemetry unit for monitoring , send 3 sets of cardiac enzymes to rule out acute coronary event, obtain ECHO to evaluate LVEF, cardiology consult  ,continue current management, O2 supply, anticoagulation plan as per cardiology consult Pulm cons requested , antitussives and nebulized bd ordered .Also UTI suspected and started on iv abx , id cons called Palliative care consult requested ,to discuss advance directives and complete MOLST  (20 Aug 2023 15:35)        SUBJECTIVE:      ALLERGIES:  Allergies    penicillin (Unknown)    Intolerances          MEDICATIONS  (STANDING):  aMIOdarone    Tablet 200 milliGRAM(s) Oral daily  apixaban 2.5 milliGRAM(s) Oral two times a day  artificial  tears Solution 1 Drop(s) Both EYES two times a day  dextrose 5% + sodium chloride 0.45%. 1000 milliLiter(s) (50 mL/Hr) IV Continuous <Continuous>  enalapril 10 milliGRAM(s) Oral daily  ertapenem  IVPB 1000 milliGRAM(s) IV Intermittent every 24 hours  ferrous    sulfate 325 milliGRAM(s) Oral daily  lactobacillus acidophilus 1 Tablet(s) Oral every 12 hours  levothyroxine 75 MICROGram(s) Oral daily  metoprolol tartrate 50 milliGRAM(s) Oral two times a day  multivitamin/minerals 1 Tablet(s) Oral daily  pantoprazole    Tablet 40 milliGRAM(s) Oral daily  polyethylene glycol 3350 17 Gram(s) Oral daily    MEDICATIONS  (PRN):  acetaminophen     Tablet .. 650 milliGRAM(s) Oral every 6 hours PRN Temp greater or equal to 38C (100.4F), Mild Pain (1 - 3)  albuterol/ipratropium for Nebulization 3 milliLiter(s) Nebulizer every 6 hours PRN Shortness of Breath and/or Wheezing  aluminum hydroxide/magnesium hydroxide/simethicone Suspension 30 milliLiter(s) Oral every 4 hours PRN Dyspepsia  guaiFENesin Oral Liquid (Sugar-Free) 200 milliGRAM(s) Oral every 6 hours PRN Cough  melatonin 3 milliGRAM(s) Oral at bedtime PRN Insomnia  ondansetron Injectable 4 milliGRAM(s) IV Push every 8 hours PRN Nausea and/or Vomiting      REVIEW OF SYSTEMS:  CONSTITUTIONAL: No fever,  RESPIRATORY: No cough, wheezing, shortness of breath  CARDIOVASCULAR: No chest pain, dyspnea, palpitations, dizziness, syncope, paroxysmal nocturnal dyspnea, orthopnea, or arm or leg swelling  GASTROINTESTINAL: No abdominal  or epigastric pain, nausea, vomiting,  diarrhea  NEUROLOGICAL: No headaches,  loss of strength, numbness, or tremors    Vital Signs Last 24 Hrs  T(C): 36.4 (24 Aug 2023 04:58), Max: 36.8 (23 Aug 2023 20:11)  T(F): 97.5 (24 Aug 2023 04:58), Max: 98.3 (23 Aug 2023 20:11)  HR: 67 (24 Aug 2023 04:58) (65 - 76)  BP: 151/68 (24 Aug 2023 04:58) (120/76 - 178/83)  BP(mean): --  RR: 18 (24 Aug 2023 04:58) (17 - 18)  SpO2: 97% (24 Aug 2023 04:58) (94% - 97%)    Parameters below as of 24 Aug 2023 04:58  Patient On (Oxygen Delivery Method): nasal cannula  O2 Flow (L/min): 2      PHYSICAL EXAM:  HEAD:  Atraumatic, Normocephalic  NECK: Supple and normal thyroid.  No JVD or carotid bruit.   HEART: S1, S2 regular , 1/6 soft ejection systolic murmur in mitral area , no thrill and no gallops .  PULMONARY: Bilateral vesicular breathing , few scattered ronchi and few scattered rales are present .  ABDOMEN: Soft nontender and positive bowl sounds   EXTREMITIES:  No clubbing, cyanosis, or pedal  edema  NEUROLOGICAL: AAOX3 , no focal deficit .    LABS:                        10.1   6.00  )-----------( 198      ( 23 Aug 2023 06:08 )             33.8     08-23    142  |  111<H>  |  16  ----------------------------<  100<H>  3.5   |  28  |  0.68    Ca    8.4<L>      23 Aug 2023 06:08    TPro  6.0  /  Alb  2.5<L>  /  TBili  0.5  /  DBili  x   /  AST  11<L>  /  ALT  25  /  AlkPhos  91  08-23          Urinalysis Basic - ( 23 Aug 2023 06:08 )    Color: x / Appearance: x / SG: x / pH: x  Gluc: 100 mg/dL / Ketone: x  / Bili: x / Urobili: x   Blood: x / Protein: x / Nitrite: x   Leuk Esterase: x / RBC: x / WBC x   Sq Epi: x / Non Sq Epi: x / Bacteria: x      BNP      EKG:  ECHO:  IMAGING:    Assessment/Plan    Will continue to follow during hospital course and give further recommendations as needed . Thanks for your referral . if any questions please contact me at office ([phone])cell [phone])  [facility] [place]       HPI:  89-year-old female with history of hypothyroidism, A-fib on Eliquis, s/p recent pacemaker for complete heart block brought in by ambulance from [facility] assisted living home for cough for the past 2 weeks.  Associated with generalized weakness and decreased p.o. intake.  No fall or trauma.  Patient states she has had cold symptoms for a while.  Denies fever, chills, chest pain, shortness of breath, abdominal pain, nausea, vomiting, upper or lower extremity weakness or paresthesias. pmd: Dr. Crawley, cards: [facility] Seen by card Dr Reich Admitted  to telemetry unit for monitoring , send 3 sets of cardiac enzymes to rule out acute coronary event, obtain ECHO to evaluate LVEF, cardiology consult  ,continue current management, O2 supply, anticoagulation plan as per cardiology consult Pulm cons requested , antitussives and nebulized bd ordered .Also UTI suspected and started on iv abx , id cons called Palliative care consult requested ,to discuss advance directives and complete MOLST  (20 Aug 2023 15:35)        SUBJECTIVE:      ALLERGIES:  Allergies    penicillin (Unknown)    Intolerances          MEDICATIONS  (STANDING):  aMIOdarone    Tablet 200 milliGRAM(s) Oral daily  apixaban 2.5 milliGRAM(s) Oral two times a day  artificial  tears Solution 1 Drop(s) Both EYES two times a day  dextrose 5% + sodium chloride 0.45%. 1000 milliLiter(s) (50 mL/Hr) IV Continuous <Continuous>  enalapril 10 milliGRAM(s) Oral daily  ertapenem  IVPB 1000 milliGRAM(s) IV Intermittent every 24 hours  ferrous    sulfate 325 milliGRAM(s) Oral daily  lactobacillus acidophilus 1 Tablet(s) Oral every 12 hours  levothyroxine 75 MICROGram(s) Oral daily  metoprolol tartrate 50 milliGRAM(s) Oral two times a day  multivitamin/minerals 1 Tablet(s) Oral daily  pantoprazole    Tablet 40 milliGRAM(s) Oral daily  polyethylene glycol 3350 17 Gram(s) Oral daily    MEDICATIONS  (PRN):  acetaminophen     Tablet .. 650 milliGRAM(s) Oral every 6 hours PRN Temp greater or equal to 38C (100.4F), Mild Pain (1 - 3)  albuterol/ipratropium for Nebulization 3 milliLiter(s) Nebulizer every 6 hours PRN Shortness of Breath and/or Wheezing  aluminum hydroxide/magnesium hydroxide/simethicone Suspension 30 milliLiter(s) Oral every 4 hours PRN Dyspepsia  guaiFENesin Oral Liquid (Sugar-Free) 200 milliGRAM(s) Oral every 6 hours PRN Cough  melatonin 3 milliGRAM(s) Oral at bedtime PRN Insomnia  ondansetron Injectable 4 milliGRAM(s) IV Push every 8 hours PRN Nausea and/or Vomiting      REVIEW OF SYSTEMS:  CONSTITUTIONAL: No fever,  RESPIRATORY: No cough, wheezing, shortness of breath  CARDIOVASCULAR: No chest pain, dyspnea, palpitations, dizziness, syncope, paroxysmal nocturnal dyspnea, orthopnea, or arm or leg swelling  GASTROINTESTINAL: No abdominal  or epigastric pain, nausea, vomiting,  diarrhea  NEUROLOGICAL: No headaches,  loss of strength, numbness, or tremors    Vital Signs Last 24 Hrs  T(C): 36.4 (24 Aug 2023 04:58), Max: 36.8 (23 Aug 2023 20:11)  T(F): 97.5 (24 Aug 2023 04:58), Max: 98.3 (23 Aug 2023 20:11)  HR: 67 (24 Aug 2023 04:58) (65 - 76)  BP: 151/68 (24 Aug 2023 04:58) (120/76 - 178/83)  BP(mean): --  RR: 18 (24 Aug 2023 04:58) (17 - 18)  SpO2: 97% (24 Aug 2023 04:58) (94% - 97%)    Parameters below as of 24 Aug 2023 04:58  Patient On (Oxygen Delivery Method): nasal cannula  O2 Flow (L/min): 2      PHYSICAL EXAM:  HEAD:  Atraumatic, Normocephalic  NECK: Supple and normal thyroid.  No JVD or carotid bruit.   HEART: S1, S2 regular , 1/6 soft ejection systolic murmur in mitral area , no thrill and no gallops .  PULMONARY: Bilateral vesicular breathing , few scattered ronchi and few scattered rales are present .  ABDOMEN: Soft nontender and positive bowl sounds   EXTREMITIES:  No clubbing, cyanosis, or pedal  edema  NEUROLOGICAL: AAOX3 , no focal deficit .    LABS:                        10.1   6.00  )-----------( 198      ( 23 Aug 2023 06:08 )             33.8     08-23    142  |  111<H>  |  16  ----------------------------<  100<H>  3.5   |  28  |  0.68    Ca    8.4<L>      23 Aug 2023 06:08    TPro  6.0  /  Alb  2.5<L>  /  TBili  0.5  /  DBili  x   /  AST  11<L>  /  ALT  25  /  AlkPhos  91  08-23          Urinalysis Basic - ( 23 Aug 2023 06:08 )    Color: x / Appearance: x / SG: x / pH: x  Gluc: 100 mg/dL / Ketone: x  / Bili: x / Urobili: x   Blood: x / Protein: x / Nitrite: x   Leuk Esterase: x / RBC: x / WBC x   Sq Epi: x / Non Sq Epi: x / Bacteria: x      Assessment/Plan  Patient has :  1) R/O pneumonia and sepsis .  2) UTI   3) paroxysmal atrial fibrillation and stable .  4) SSS and S/P PPM .  5) Mild chronic diastolic heart failure and under control   6) H/O hypothyroidism   7) Mild  Anemia   Plan : 1) I/V antibiotics as per ID 2) Rest of medications as such   3) Monitor hemoglobin and electrolytes . 4) Increase ambulation .  Will continue to follow during hospital course and give further recommendations as needed . Thanks for your referral . if any questions please contact me at office ([phone] cell [phone])      [facility] [place]       HPI:  89-year-old female with history of hypothyroidism, A-fib on Eliquis, s/p recent pacemaker for complete heart block brought in by ambulance from [facility] assisted living home for cough for the past 2 weeks.  Associated with generalized weakness and decreased p.o. intake.  No fall or trauma.  Patient states she has had cold symptoms for a while.  Denies fever, chills, chest pain, shortness of breath, abdominal pain, nausea, vomiting, upper or lower extremity weakness or paresthesias. pmd: Dr. Crawley, cards: [facility] Seen by card Dr Reich Admitted  to telemetry unit for monitoring , send 3 sets of cardiac enzymes to rule out acute coronary event, obtain ECHO to evaluate LVEF, cardiology consult  ,continue current management, O2 supply, anticoagulation plan as per cardiology consult Pulm cons requested , antitussives and nebulized bd ordered .Also UTI suspected and started on iv abx , id cons called Palliative care consult requested ,to discuss advance directives and complete MOLST  (20 Aug 2023 15:35)        SUBJECTIVE:      ALLERGIES:  Allergies    penicillin (Unknown)    Intolerances          MEDICATIONS  (STANDING):  aMIOdarone    Tablet 200 milliGRAM(s) Oral daily  apixaban 2.5 milliGRAM(s) Oral two times a day  artificial  tears Solution 1 Drop(s) Both EYES two times a day  dextrose 5% + sodium chloride 0.45%. 1000 milliLiter(s) (50 mL/Hr) IV Continuous <Continuous>  enalapril 10 milliGRAM(s) Oral daily  ertapenem  IVPB 1000 milliGRAM(s) IV Intermittent every 24 hours  ferrous    sulfate 325 milliGRAM(s) Oral daily  lactobacillus acidophilus 1 Tablet(s) Oral every 12 hours  levothyroxine 75 MICROGram(s) Oral daily  metoprolol tartrate 50 milliGRAM(s) Oral two times a day  multivitamin/minerals 1 Tablet(s) Oral daily  pantoprazole    Tablet 40 milliGRAM(s) Oral daily  polyethylene glycol 3350 17 Gram(s) Oral daily    MEDICATIONS  (PRN):  acetaminophen     Tablet .. 650 milliGRAM(s) Oral every 6 hours PRN Temp greater or equal to 38C (100.4F), Mild Pain (1 - 3)  albuterol/ipratropium for Nebulization 3 milliLiter(s) Nebulizer every 6 hours PRN Shortness of Breath and/or Wheezing  aluminum hydroxide/magnesium hydroxide/simethicone Suspension 30 milliLiter(s) Oral every 4 hours PRN Dyspepsia  guaiFENesin Oral Liquid (Sugar-Free) 200 milliGRAM(s) Oral every 6 hours PRN Cough  melatonin 3 milliGRAM(s) Oral at bedtime PRN Insomnia  ondansetron Injectable 4 milliGRAM(s) IV Push every 8 hours PRN Nausea and/or Vomiting      REVIEW OF SYSTEMS:  CONSTITUTIONAL: No fever,  RESPIRATORY: No cough, wheezing, shortness of breath  CARDIOVASCULAR: No chest pain, dyspnea, palpitations, dizziness, syncope, paroxysmal nocturnal dyspnea, orthopnea, or arm or leg swelling  GASTROINTESTINAL: No abdominal  or epigastric pain, nausea, vomiting,  diarrhea  NEUROLOGICAL: No headaches,  loss of strength, numbness, or tremors    Vital Signs Last 24 Hrs  T(C): 36.4 (24 Aug 2023 04:58), Max: 36.8 (23 Aug 2023 20:11)  T(F): 97.5 (24 Aug 2023 04:58), Max: 98.3 (23 Aug 2023 20:11)  HR: 67 (24 Aug 2023 04:58) (65 - 76)  BP: 151/68 (24 Aug 2023 04:58) (120/76 - 178/83)  BP(mean): --  RR: 18 (24 Aug 2023 04:58) (17 - 18)  SpO2: 97% (24 Aug 2023 04:58) (94% - 97%)    Parameters below as of 24 Aug 2023 04:58  Patient On (Oxygen Delivery Method): nasal cannula  O2 Flow (L/min): 2      PHYSICAL EXAM:  HEAD:  Atraumatic, Normocephalic  NECK: Supple and normal thyroid.  No JVD or carotid bruit.   HEART: S1, S2 regular , 1/6 soft ejection systolic murmur in mitral area , no thrill and no gallops .  PULMONARY: Bilateral vesicular breathing , few scattered ronchi and few scattered rales are present .  ABDOMEN: Soft nontender and positive bowl sounds   EXTREMITIES:  No clubbing, cyanosis, or pedal  edema  NEUROLOGICAL: AAOX3 , no focal deficit .    LABS:                        10.1   6.00  )-----------( 198      ( 23 Aug 2023 06:08 )             33.8     08-23    142  |  111<H>  |  16  ----------------------------<  100<H>  3.5   |  28  |  0.68    Ca    8.4<L>      23 Aug 2023 06:08    TPro  6.0  /  Alb  2.5<L>  /  TBili  0.5  /  DBili  x   /  AST  11<L>  /  ALT  25  /  AlkPhos  91  08-23          Urinalysis Basic - ( 23 Aug 2023 06:08 )    Color: x / Appearance: x / SG: x / pH: x  Gluc: 100 mg/dL / Ketone: x  / Bili: x / Urobili: x   Blood: x / Protein: x / Nitrite: x   Leuk Esterase: x / RBC: x / WBC x   Sq Epi: x / Non Sq Epi: x / Bacteria: x      Assessment/Plan  Patient has :  1) R/O pneumonia and sepsis .  2) UTI   3) paroxysmal atrial fibrillation and stable .  4) SSS and S/P PPM .  5) Mild chronic diastolic heart failure and under control   6) H/O hypothyroidism   7) Mild  Anemia   Plan : 1) I/V antibiotics as per ID 2) Rest of medications as such   3) Monitor hemoglobin and electrolytes . 4) Increase ambulation .  Will continue to follow during hospital course and give further recommendations as needed . Thanks for your referral . if any questions please contact me at office ([phone] cell [phone])      [facility] [place]       HPI:  89-year-old female with history of hypothyroidism, A-fib on Eliquis, s/p recent pacemaker for complete heart block brought in by ambulance from [facility] assisted living home for cough for the past 2 weeks.  Associated with generalized weakness and decreased p.o. intake.  No fall or trauma.  Patient states she has had cold symptoms for a while.  Denies fever, chills, chest pain, shortness of breath, abdominal pain, nausea, vomiting, upper or lower extremity weakness or paresthesias. pmd: Dr. Crawley, cards: [facility] Seen by card Dr Reich Admitted  to telemetry unit for monitoring , send 3 sets of cardiac enzymes to rule out acute coronary event, obtain ECHO to evaluate LVEF, cardiology consult  ,continue current management, O2 supply, anticoagulation plan as per cardiology consult Pulm cons requested , antitussives and nebulized bd ordered .Also UTI suspected and started on iv abx , id cons called Palliative care consult requested ,to discuss advance directives and complete MOLST  (20 Aug 2023 15:35)        SUBJECTIVE:      ALLERGIES:  Allergies    penicillin (Unknown)    Intolerances          MEDICATIONS  (STANDING):  aMIOdarone    Tablet 200 milliGRAM(s) Oral daily  apixaban 2.5 milliGRAM(s) Oral two times a day  artificial  tears Solution 1 Drop(s) Both EYES two times a day  dextrose 5% + sodium chloride 0.45%. 1000 milliLiter(s) (50 mL/Hr) IV Continuous <Continuous>  enalapril 10 milliGRAM(s) Oral daily  ertapenem  IVPB 1000 milliGRAM(s) IV Intermittent every 24 hours  ferrous    sulfate 325 milliGRAM(s) Oral daily  lactobacillus acidophilus 1 Tablet(s) Oral every 12 hours  levothyroxine 75 MICROGram(s) Oral daily  metoprolol tartrate 50 milliGRAM(s) Oral two times a day  multivitamin/minerals 1 Tablet(s) Oral daily  pantoprazole    Tablet 40 milliGRAM(s) Oral daily  polyethylene glycol 3350 17 Gram(s) Oral daily    MEDICATIONS  (PRN):  acetaminophen     Tablet .. 650 milliGRAM(s) Oral every 6 hours PRN Temp greater or equal to 38C (100.4F), Mild Pain (1 - 3)  albuterol/ipratropium for Nebulization 3 milliLiter(s) Nebulizer every 6 hours PRN Shortness of Breath and/or Wheezing  aluminum hydroxide/magnesium hydroxide/simethicone Suspension 30 milliLiter(s) Oral every 4 hours PRN Dyspepsia  guaiFENesin Oral Liquid (Sugar-Free) 200 milliGRAM(s) Oral every 6 hours PRN Cough  melatonin 3 milliGRAM(s) Oral at bedtime PRN Insomnia  ondansetron Injectable 4 milliGRAM(s) IV Push every 8 hours PRN Nausea and/or Vomiting      REVIEW OF SYSTEMS:  CONSTITUTIONAL: No fever,  RESPIRATORY: No cough, wheezing, shortness of breath  CARDIOVASCULAR: No chest pain, dyspnea, palpitations, dizziness, syncope, paroxysmal nocturnal dyspnea, orthopnea, or arm or leg swelling  GASTROINTESTINAL: No abdominal  or epigastric pain, nausea, vomiting,  diarrhea  NEUROLOGICAL: No headaches,  loss of strength, numbness, or tremors    Vital Signs Last 24 Hrs  T(C): 36.4 (24 Aug 2023 04:58), Max: 36.8 (23 Aug 2023 20:11)  T(F): 97.5 (24 Aug 2023 04:58), Max: 98.3 (23 Aug 2023 20:11)  HR: 67 (24 Aug 2023 04:58) (65 - 76)  BP: 151/68 (24 Aug 2023 04:58) (120/76 - 178/83)  BP(mean): --  RR: 18 (24 Aug 2023 04:58) (17 - 18)  SpO2: 97% (24 Aug 2023 04:58) (94% - 97%)    Parameters below as of 24 Aug 2023 04:58  Patient On (Oxygen Delivery Method): nasal cannula  O2 Flow (L/min): 2      PHYSICAL EXAM:  HEAD:  Atraumatic, Normocephalic  NECK: Supple and normal thyroid.  No JVD or carotid bruit.   HEART: S1, S2 regular , 1/6 soft ejection systolic murmur in mitral area , no thrill and no gallops .  PULMONARY: Bilateral vesicular breathing , few scattered ronchi and few scattered rales are present .  ABDOMEN: Soft nontender and positive bowl sounds   EXTREMITIES:  No clubbing, cyanosis, or pedal  edema  NEUROLOGICAL: AAOX3 , no focal deficit .    LABS:                        10.1   6.00  )-----------( 198      ( 23 Aug 2023 06:08 )             33.8     08-23    142  |  111<H>  |  16  ----------------------------<  100<H>  3.5   |  28  |  0.68    Ca    8.4<L>      23 Aug 2023 06:08    TPro  6.0  /  Alb  2.5<L>  /  TBili  0.5  /  DBili  x   /  AST  11<L>  /  ALT  25  /  AlkPhos  91  08-23          Urinalysis Basic - ( 23 Aug 2023 06:08 )    Color: x / Appearance: x / SG: x / pH: x  Gluc: 100 mg/dL / Ketone: x  / Bili: x / Urobili: x   Blood: x / Protein: x / Nitrite: x   Leuk Esterase: x / RBC: x / WBC x   Sq Epi: x / Non Sq Epi: x / Bacteria: x      Assessment/Plan  Patient has :  1) R/O pneumonia and sepsis .  2) UTI   3) paroxysmal atrial fibrillation and stable .  4) SSS and S/P PPM .  5) Mild chronic diastolic heart failure and under control   6) H/O hypothyroidism   7) Mild  Anemia   Plan : 1) I/V antibiotics as per ID 2) Rest of medications as such   3) Monitor hemoglobin and electrolytes . 4) Increase ambulation .  Will continue to follow during hospital course and give further recommendations as needed . Thanks for your referral . if any questions please contact me at office ([phone] cell [phone])      NINOSKA DAVENPORT MD [place] [id number]  [address]  OFFICE : [phone]  CELL : [phone]  CARDIOLOGY F/U  :       HPI:  89-year-old female with history of hypothyroidism, A-fib on Eliquis, s/p recent pacemaker for complete heart block brought in by ambulance from [facility] living home for cough for the past 2 weeks.  Associated with generalized weakness and decreased p.o. intake.  No fall or trauma.  Patient states she has had cold symptoms for a while.  Denies fever, chills, chest pain, shortness of breath, abdominal pain, nausea, vomiting, upper or lower extremity weakness or paresthesias. pmd: Dr. Crawley, cards: [facility] Seen by card Dr Davenport Admitted  to telemetry unit for monitoring , send 3 sets of cardiac enzymes to rule out acute coronary event, obtain ECHO to evaluate LVEF, cardiology consult  ,continue current management, O2 supply, anticoagulation plan as per cardiology consult Pulm cons requested , antitussives and nebulized bd ordered .Also UTI suspected and started on iv abx , id cons called Palliative care consult requested ,to discuss advance directives and complete MOLST  (20 Aug 2023 15:35)        SUBJECTIVE: Patient feeling better       ALLERGIES:  Allergies    penicillin (Unknown)    Intolerances          MEDICATIONS  (STANDING):  aMIOdarone    Tablet 200 milliGRAM(s) Oral daily  apixaban 2.5 milliGRAM(s) Oral two times a day  artificial  tears Solution 1 Drop(s) Both EYES two times a day  dextrose 5% + sodium chloride 0.45%. 1000 milliLiter(s) (50 mL/Hr) IV Continuous <Continuous>  enalapril 10 milliGRAM(s) Oral daily  ertapenem  IVPB 1000 milliGRAM(s) IV Intermittent every 24 hours  ferrous    sulfate 325 milliGRAM(s) Oral daily  lactobacillus acidophilus 1 Tablet(s) Oral every 12 hours  levothyroxine 75 MICROGram(s) Oral daily  metoprolol tartrate 50 milliGRAM(s) Oral two times a day  multivitamin/minerals 1 Tablet(s) Oral daily  pantoprazole    Tablet 40 milliGRAM(s) Oral daily  polyethylene glycol 3350 17 Gram(s) Oral daily    MEDICATIONS  (PRN):  acetaminophen     Tablet .. 650 milliGRAM(s) Oral every 6 hours PRN Temp greater or equal to 38C (100.4F), Mild Pain (1 - 3)  albuterol/ipratropium for Nebulization 3 milliLiter(s) Nebulizer every 6 hours PRN Shortness of Breath and/or Wheezing  aluminum hydroxide/magnesium hydroxide/simethicone Suspension 30 milliLiter(s) Oral every 4 hours PRN Dyspepsia  guaiFENesin Oral Liquid (Sugar-Free) 200 milliGRAM(s) Oral every 6 hours PRN Cough  melatonin 3 milliGRAM(s) Oral at bedtime PRN Insomnia  ondansetron Injectable 4 milliGRAM(s) IV Push every 8 hours PRN Nausea and/or Vomiting      REVIEW OF SYSTEMS:  CONSTITUTIONAL: No fever,  RESPIRATORY: No cough, wheezing, shortness of breath  CARDIOVASCULAR: No chest pain, dyspnea, palpitations, dizziness, syncope, paroxysmal nocturnal dyspnea, orthopnea, or arm or leg swelling  GASTROINTESTINAL: No abdominal  or epigastric pain, nausea, vomiting,  diarrhea  NEUROLOGICAL: No headaches,  loss of strength, numbness, or tremors    Vital Signs Last 24 Hrs  T(C): 36.4 (24 Aug 2023 04:58), Max: 36.8 (23 Aug 2023 20:11)  T(F): 97.5 (24 Aug 2023 04:58), Max: 98.3 (23 Aug 2023 20:11)  HR: 67 (24 Aug 2023 04:58) (65 - 76)  BP: 151/68 (24 Aug 2023 04:58) (120/76 - 178/83)  BP(mean): --  RR: 18 (24 Aug 2023 04:58) (17 - 18)  SpO2: 97% (24 Aug 2023 04:58) (94% - 97%)    Parameters below as of 24 Aug 2023 04:58  Patient On (Oxygen Delivery Method): nasal cannula  O2 Flow (L/min): 2      PHYSICAL EXAM:  HEAD:  Atraumatic, Normocephalic  NECK: Supple and normal thyroid.  No JVD or carotid bruit.   HEART: S1, S2 regular , 1/6 soft ejection systolic murmur in mitral area , no thrill and no gallops .  PULMONARY: Bilateral vesicular breathing , few scattered ronchi and few scattered rales are present .  ABDOMEN: Soft nontender and positive bowl sounds   EXTREMITIES:  No clubbing, cyanosis, or pedal  edema  NEUROLOGICAL: AAOX3 , no focal deficit .    LABS:                        10.1   6.00  )-----------( 198      ( 23 Aug 2023 06:08 )             33.8     08-23    142  |  111<H>  |  16  ----------------------------<  100<H>  3.5   |  28  |  0.68    Ca    8.4<L>      23 Aug 2023 06:08    TPro  6.0  /  Alb  2.5<L>  /  TBili  0.5  /  DBili  x   /  AST  11<L>  /  ALT  25  /  AlkPhos  91  08-23          Urinalysis Basic - ( 23 Aug 2023 06:08 )    Color: x / Appearance: x / SG: x / pH: x  Gluc: 100 mg/dL / Ketone: x  / Bili: x / Urobili: x   Blood: x / Protein: x / Nitrite: x   Leuk Esterase: x / RBC: x / WBC x   Sq Epi: x / Non Sq Epi: x / Bacteria: x      Assessment/Plan  Patient has :  1) R/O pneumonia and sepsis .  2) UTI   3) paroxysmal atrial fibrillation and stable .  4) SSS and S/P PPM .  5) Mild chronic diastolic heart failure and under control   6) H/O hypothyroidism   7) Mild  Anemia   Plan : 1) I/V antibiotics as per ID 2) Rest of medications as such   3) Monitor hemoglobin and electrolytes . 4) Increase ambulation .  Will continue to follow during hospital course and give further recommendations as needed . Thanks for your referral . if any questions please contact me at office ([phone] cell [phone])      NINOSKA DAVENPORT MD [place] [id number]  [address]  OFFICE : [phone]  CELL : [phone]  CARDIOLOGY F/U  :       HPI:  89-year-old female with history of hypothyroidism, A-fib on Eliquis, s/p recent pacemaker for complete heart block brought in by ambulance from [facility] living home for cough for the past 2 weeks.  Associated with generalized weakness and decreased p.o. intake.  No fall or trauma.  Patient states she has had cold symptoms for a while.  Denies fever, chills, chest pain, shortness of breath, abdominal pain, nausea, vomiting, upper or lower extremity weakness or paresthesias. pmd: Dr. Crawley, cards: [facility] Seen by card Dr Davenport Admitted  to telemetry unit for monitoring , send 3 sets of cardiac enzymes to rule out acute coronary event, obtain ECHO to evaluate LVEF, cardiology consult  ,continue current management, O2 supply, anticoagulation plan as per cardiology consult Pulm cons requested , antitussives and nebulized bd ordered .Also UTI suspected and started on iv abx , id cons called Palliative care consult requested ,to discuss advance directives and complete MOLST  (20 Aug 2023 15:35)        SUBJECTIVE: Patient feeling better       ALLERGIES:  Allergies    penicillin (Unknown)    Intolerances          MEDICATIONS  (STANDING):  aMIOdarone    Tablet 200 milliGRAM(s) Oral daily  apixaban 2.5 milliGRAM(s) Oral two times a day  artificial  tears Solution 1 Drop(s) Both EYES two times a day  dextrose 5% + sodium chloride 0.45%. 1000 milliLiter(s) (50 mL/Hr) IV Continuous <Continuous>  enalapril 10 milliGRAM(s) Oral daily  ertapenem  IVPB 1000 milliGRAM(s) IV Intermittent every 24 hours  ferrous    sulfate 325 milliGRAM(s) Oral daily  lactobacillus acidophilus 1 Tablet(s) Oral every 12 hours  levothyroxine 75 MICROGram(s) Oral daily  metoprolol tartrate 50 milliGRAM(s) Oral two times a day  multivitamin/minerals 1 Tablet(s) Oral daily  pantoprazole    Tablet 40 milliGRAM(s) Oral daily  polyethylene glycol 3350 17 Gram(s) Oral daily    MEDICATIONS  (PRN):  acetaminophen     Tablet .. 650 milliGRAM(s) Oral every 6 hours PRN Temp greater or equal to 38C (100.4F), Mild Pain (1 - 3)  albuterol/ipratropium for Nebulization 3 milliLiter(s) Nebulizer every 6 hours PRN Shortness of Breath and/or Wheezing  aluminum hydroxide/magnesium hydroxide/simethicone Suspension 30 milliLiter(s) Oral every 4 hours PRN Dyspepsia  guaiFENesin Oral Liquid (Sugar-Free) 200 milliGRAM(s) Oral every 6 hours PRN Cough  melatonin 3 milliGRAM(s) Oral at bedtime PRN Insomnia  ondansetron Injectable 4 milliGRAM(s) IV Push every 8 hours PRN Nausea and/or Vomiting      REVIEW OF SYSTEMS:  CONSTITUTIONAL: No fever,  RESPIRATORY: No cough, wheezing, shortness of breath  CARDIOVASCULAR: No chest pain, dyspnea, palpitations, dizziness, syncope, paroxysmal nocturnal dyspnea, orthopnea, or arm or leg swelling  GASTROINTESTINAL: No abdominal  or epigastric pain, nausea, vomiting,  diarrhea  NEUROLOGICAL: No headaches,  loss of strength, numbness, or tremors    Vital Signs Last 24 Hrs  T(C): 36.4 (24 Aug 2023 04:58), Max: 36.8 (23 Aug 2023 20:11)  T(F): 97.5 (24 Aug 2023 04:58), Max: 98.3 (23 Aug 2023 20:11)  HR: 67 (24 Aug 2023 04:58) (65 - 76)  BP: 151/68 (24 Aug 2023 04:58) (120/76 - 178/83)  BP(mean): --  RR: 18 (24 Aug 2023 04:58) (17 - 18)  SpO2: 97% (24 Aug 2023 04:58) (94% - 97%)    Parameters below as of 24 Aug 2023 04:58  Patient On (Oxygen Delivery Method): nasal cannula  O2 Flow (L/min): 2      PHYSICAL EXAM:  HEAD:  Atraumatic, Normocephalic  NECK: Supple and normal thyroid.  No JVD or carotid bruit.   HEART: S1, S2 regular , 1/6 soft ejection systolic murmur in mitral area , no thrill and no gallops .  PULMONARY: Bilateral vesicular breathing , few scattered ronchi and few scattered rales are present .  ABDOMEN: Soft nontender and positive bowl sounds   EXTREMITIES:  No clubbing, cyanosis, or pedal  edema  NEUROLOGICAL: AAOX3 , no focal deficit .    LABS:                        10.1   6.00  )-----------( 198      ( 23 Aug 2023 06:08 )             33.8     08-23    142  |  111<H>  |  16  ----------------------------<  100<H>  3.5   |  28  |  0.68    Ca    8.4<L>      23 Aug 2023 06:08    TPro  6.0  /  Alb  2.5<L>  /  TBili  0.5  /  DBili  x   /  AST  11<L>  /  ALT  25  /  AlkPhos  91  08-23          Urinalysis Basic - ( 23 Aug 2023 06:08 )    Color: x / Appearance: x / SG: x / pH: x  Gluc: 100 mg/dL / Ketone: x  / Bili: x / Urobili: x   Blood: x / Protein: x / Nitrite: x   Leuk Esterase: x / RBC: x / WBC x   Sq Epi: x / Non Sq Epi: x / Bacteria: x      Assessment/Plan  Patient has :  1) R/O pneumonia and sepsis .  2) UTI   3) paroxysmal atrial fibrillation and stable .  4) SSS and S/P PPM .  5) Mild chronic diastolic heart failure and under control   6) H/O hypothyroidism   7) Mild  Anemia   Plan : 1) I/V antibiotics as per ID 2) Rest of medications as such   3) Monitor hemoglobin and electrolytes . 4) Increase ambulation .  Will continue to follow during hospital course and give further recommendations as needed . Thanks for your referral . if any questions please contact me at office ([phone] cell [phone])      NINOSKA DAVENPORT MD [place] [id number]  [address]  OFFICE : [phone]  CELL : [phone]  CARDIOLOGY F/U  :       HPI:  89-year-old female with history of hypothyroidism, A-fib on Eliquis, s/p recent pacemaker for complete heart block brought in by ambulance from [facility] living home for cough for the past 2 weeks.  Associated with generalized weakness and decreased p.o. intake.  No fall or trauma.  Patient states she has had cold symptoms for a while.  Denies fever, chills, chest pain, shortness of breath, abdominal pain, nausea, vomiting, upper or lower extremity weakness or paresthesias. pmd: Dr. Crawley, cards: [facility] Seen by card Dr Davenport Admitted  to telemetry unit for monitoring , send 3 sets of cardiac enzymes to rule out acute coronary event, obtain ECHO to evaluate LVEF, cardiology consult  ,continue current management, O2 supply, anticoagulation plan as per cardiology consult Pulm cons requested , antitussives and nebulized bd ordered .Also UTI suspected and started on iv abx , id cons called Palliative care consult requested ,to discuss advance directives and complete MOLST  (20 Aug 2023 15:35)        SUBJECTIVE: Patient feeling better       ALLERGIES:  Allergies    penicillin (Unknown)    Intolerances          MEDICATIONS  (STANDING):  aMIOdarone    Tablet 200 milliGRAM(s) Oral daily  apixaban 2.5 milliGRAM(s) Oral two times a day  artificial  tears Solution 1 Drop(s) Both EYES two times a day  dextrose 5% + sodium chloride 0.45%. 1000 milliLiter(s) (50 mL/Hr) IV Continuous <Continuous>  enalapril 10 milliGRAM(s) Oral daily  ertapenem  IVPB 1000 milliGRAM(s) IV Intermittent every 24 hours  ferrous    sulfate 325 milliGRAM(s) Oral daily  lactobacillus acidophilus 1 Tablet(s) Oral every 12 hours  levothyroxine 75 MICROGram(s) Oral daily  metoprolol tartrate 50 milliGRAM(s) Oral two times a day  multivitamin/minerals 1 Tablet(s) Oral daily  pantoprazole    Tablet 40 milliGRAM(s) Oral daily  polyethylene glycol 3350 17 Gram(s) Oral daily    MEDICATIONS  (PRN):  acetaminophen     Tablet .. 650 milliGRAM(s) Oral every 6 hours PRN Temp greater or equal to 38C (100.4F), Mild Pain (1 - 3)  albuterol/ipratropium for Nebulization 3 milliLiter(s) Nebulizer every 6 hours PRN Shortness of Breath and/or Wheezing  aluminum hydroxide/magnesium hydroxide/simethicone Suspension 30 milliLiter(s) Oral every 4 hours PRN Dyspepsia  guaiFENesin Oral Liquid (Sugar-Free) 200 milliGRAM(s) Oral every 6 hours PRN Cough  melatonin 3 milliGRAM(s) Oral at bedtime PRN Insomnia  ondansetron Injectable 4 milliGRAM(s) IV Push every 8 hours PRN Nausea and/or Vomiting      REVIEW OF SYSTEMS:  CONSTITUTIONAL: No fever,  RESPIRATORY: No cough, wheezing, shortness of breath  CARDIOVASCULAR: No chest pain, dyspnea, palpitations, dizziness, syncope, paroxysmal nocturnal dyspnea, orthopnea, or arm or leg swelling  GASTROINTESTINAL: No abdominal  or epigastric pain, nausea, vomiting,  diarrhea  NEUROLOGICAL: No headaches,  loss of strength, numbness, or tremors    Vital Signs Last 24 Hrs  T(C): 36.4 (24 Aug 2023 04:58), Max: 36.8 (23 Aug 2023 20:11)  T(F): 97.5 (24 Aug 2023 04:58), Max: 98.3 (23 Aug 2023 20:11)  HR: 67 (24 Aug 2023 04:58) (65 - 76)  BP: 151/68 (24 Aug 2023 04:58) (120/76 - 178/83)  BP(mean): --  RR: 18 (24 Aug 2023 04:58) (17 - 18)  SpO2: 97% (24 Aug 2023 04:58) (94% - 97%)    Parameters below as of 24 Aug 2023 04:58  Patient On (Oxygen Delivery Method): nasal cannula  O2 Flow (L/min): 2      PHYSICAL EXAM:  HEAD:  Atraumatic, Normocephalic  NECK: Supple and normal thyroid.  No JVD or carotid bruit.   HEART: S1, S2 regular , 1/6 soft ejection systolic murmur in mitral area , no thrill and no gallops .  PULMONARY: Bilateral vesicular breathing , few scattered ronchi and few scattered rales are present .  ABDOMEN: Soft nontender and positive bowl sounds   EXTREMITIES:  No clubbing, cyanosis, or pedal  edema  NEUROLOGICAL: AAOX3 , no focal deficit .    LABS:                        10.1   6.00  )-----------( 198      ( 23 Aug 2023 06:08 )             33.8     08-23    142  |  111<H>  |  16  ----------------------------<  100<H>  3.5   |  28  |  0.68    Ca    8.4<L>      23 Aug 2023 06:08    TPro  6.0  /  Alb  2.5<L>  /  TBili  0.5  /  DBili  x   /  AST  11<L>  /  ALT  25  /  AlkPhos  91  08-23          Urinalysis Basic - ( 23 Aug 2023 06:08 )    Color: x / Appearance: x / SG: x / pH: x  Gluc: 100 mg/dL / Ketone: x  / Bili: x / Urobili: x   Blood: x / Protein: x / Nitrite: x   Leuk Esterase: x / RBC: x / WBC x   Sq Epi: x / Non Sq Epi: x / Bacteria: x      Assessment/Plan  Patient has :  1) R/O pneumonia and sepsis .  2) UTI   3) paroxysmal atrial fibrillation and stable .  4) SSS and S/P PPM .  5) Mild chronic diastolic heart failure and under control   6) H/O hypothyroidism   7) Mild  Anemia   Plan : 1) I/V antibiotics as per ID 2) Rest of medications as such   3) Monitor hemoglobin and electrolytes . 4) Increase ambulation .  Will continue to follow during hospital course and give further recommendations as needed . Thanks for your referral . if any questions please contact me at office ([phone] cell [phone])      NINOSKA DAVENPORT MD [place] [id number]  [address]  OFFICE : [phone]  CELL : [phone]  CARDIOLOGY F/U  :       HPI:  89-year-old female with history of hypothyroidism, A-fib on Eliquis, s/p recent pacemaker for complete heart block brought in by ambulance from [facility] living home for cough for the past 2 weeks.  Associated with generalized weakness and decreased p.o. intake.  No fall or trauma.  Patient states she has had cold symptoms for a while.  Denies fever, chills, chest pain, shortness of breath, abdominal pain, nausea, vomiting, upper or lower extremity weakness or paresthesias. pmd: Dr. Crawley, cards: [facility] Seen by card Dr Davenport Admitted  to telemetry unit for monitoring , send 3 sets of cardiac enzymes to rule out acute coronary event, obtain ECHO to evaluate LVEF, cardiology consult  ,continue current management, O2 supply, anticoagulation plan as per cardiology consult Pulm cons requested , antitussives and nebulized bd ordered .Also UTI suspected and started on iv abx , id cons called Palliative care consult requested ,to discuss advance directives and complete MOLST  (20 Aug 2023 15:35)        SUBJECTIVE: Patient feeling better       ALLERGIES:  Allergies    penicillin (Unknown)    Intolerances          MEDICATIONS  (STANDING):  aMIOdarone    Tablet 200 milliGRAM(s) Oral daily  apixaban 2.5 milliGRAM(s) Oral two times a day  artificial  tears Solution 1 Drop(s) Both EYES two times a day  dextrose 5% + sodium chloride 0.45%. 1000 milliLiter(s) (50 mL/Hr) IV Continuous <Continuous>  enalapril 10 milliGRAM(s) Oral daily  ertapenem  IVPB 1000 milliGRAM(s) IV Intermittent every 24 hours  ferrous    sulfate 325 milliGRAM(s) Oral daily  lactobacillus acidophilus 1 Tablet(s) Oral every 12 hours  levothyroxine 75 MICROGram(s) Oral daily  metoprolol tartrate 50 milliGRAM(s) Oral two times a day  multivitamin/minerals 1 Tablet(s) Oral daily  pantoprazole    Tablet 40 milliGRAM(s) Oral daily  polyethylene glycol 3350 17 Gram(s) Oral daily    MEDICATIONS  (PRN):  acetaminophen     Tablet .. 650 milliGRAM(s) Oral every 6 hours PRN Temp greater or equal to 38C (100.4F), Mild Pain (1 - 3)  albuterol/ipratropium for Nebulization 3 milliLiter(s) Nebulizer every 6 hours PRN Shortness of Breath and/or Wheezing  aluminum hydroxide/magnesium hydroxide/simethicone Suspension 30 milliLiter(s) Oral every 4 hours PRN Dyspepsia  guaiFENesin Oral Liquid (Sugar-Free) 200 milliGRAM(s) Oral every 6 hours PRN Cough  melatonin 3 milliGRAM(s) Oral at bedtime PRN Insomnia  ondansetron Injectable 4 milliGRAM(s) IV Push every 8 hours PRN Nausea and/or Vomiting      REVIEW OF SYSTEMS:  CONSTITUTIONAL: No fever,  RESPIRATORY: Improvement in mild  cough and  wheezing  shortness of breath  CARDIOVASCULAR: No chest pain,  palpitations, dizziness, syncope, paroxysmal nocturnal dyspnea,  or arm or leg swelling and improvement in SOB   GASTROINTESTINAL: No abdominal  or epigastric pain, nausea, vomiting,  diarrhea  NEUROLOGICAL: No headaches,  numbness, or tremors  Skin : No itching .  Hematology : No active bleeding .  Endocrinology : No heat and cold intolerance .  Psychiatry : Patient is calm .  Genitourinary : No dysuria .  Musculoskeletal : Patient has mild arthritis     Vital Signs Last 24 Hrs  T(C): 36.4 (24 Aug 2023 04:58), Max: 36.8 (23 Aug 2023 20:11)  T(F): 97.5 (24 Aug 2023 04:58), Max: 98.3 (23 Aug 2023 20:11)  HR: 67 (24 Aug 2023 04:58) (65 - 76)  BP: 151/68 (24 Aug 2023 04:58) (120/76 - 178/83)  BP(mean): --  RR: 18 (24 Aug 2023 04:58) (17 - 18)  SpO2: 97% (24 Aug 2023 04:58) (94% - 97%)    Parameters below as of 24 Aug 2023 04:58  Patient On (Oxygen Delivery Method): nasal cannula  O2 Flow (L/min): 2      PHYSICAL EXAM:  HEAD:  Atraumatic, Normocephalic  NECK: Supple and normal thyroid.  No JVD or carotid bruit.   HEART: S1, S2 regular , 1/6 soft ejection systolic murmur in mitral area , no thrill and no gallops .  PULMONARY: Bilateral vesicular breathing , few scattered rhonchi and few scattered rales are present .  ABDOMEN: Soft nontender and positive bowl sounds   EXTREMITIES:  No clubbing, cyanosis, or pedal  edema  NEUROLOGICAL: AA and mild confused  , no focal deficit .  Skin : No rashes .  Musculoskeletal : No joint swellings .    LABS:                        10.1   6.00  )-----------( 198      ( 23 Aug 2023 06:08 )             33.8     08-23    142  |  111<H>  |  16  ----------------------------<  100<H>  3.5   |  28  |  0.68    Ca    8.4<L>      23 Aug 2023 06:08    TPro  6.0  /  Alb  2.5<L>  /  TBili  0.5  /  DBili  x   /  AST  11<L>  /  ALT  25  /  AlkPhos  91  08-23          Urinalysis Basic - ( 23 Aug 2023 06:08 )    Color: x / Appearance: x / SG: x / pH: x  Gluc: 100 mg/dL / Ketone: x  / Bili: x / Urobili: x   Blood: x / Protein: x / Nitrite: x   Leuk Esterase: x / RBC: x / WBC x   Sq Epi: x / Non Sq Epi: x / Bacteria: x      Assessment/Plan  Patient has :  1) R/O pneumonia and sepsis . Possibility of pneumnia less likely as per ID . Chronic cough can be due to enalapril so will change it to different antihypertensive medication amlodipine 5 mg daily   2) UTI   3) paroxysmal atrial fibrillation and stable .  4) SSS and S/P PPM .  5) Mild chronic diastolic heart failure and under control   6) H/O hypothyroidism   7) Mild  Anemia   8) Hypertension   Plan : 1) I/V antibiotics as per ID 2) Rest of medications as such   3) Monitor hemoglobin and electrolytes . 4) Increase ambulation . 5) Discontinue enalapril due to cough and start amlodipine 5 mg daily 6) Discontinue monitor   Will continue to follow during hospital course and give further recommendations as needed . Thanks for your referral . if any questions please contact me at office ([phone] cell [phone])      NINOSKA DAVENPORT MD [place] [id number]  [address]  OFFICE : [id number]  CELL : [phone]  CARDIOLOGY F/U  :       HPI:  89-year-old female with history of hypothyroidism, A-fib on Eliquis, s/p recent pacemaker for complete heart block brought in by ambulance from [facility] living home for cough for the past 2 weeks.  Associated with generalized weakness and decreased p.o. intake.  No fall or trauma.  Patient states she has had cold symptoms for a while.  Denies fever, chills, chest pain, shortness of breath, abdominal pain, nausea, vomiting, upper or lower extremity weakness or paresthesias. pmd: Dr. Crawley, cards: [facility] Seen by card Dr Davenport Admitted  to telemetry unit for monitoring , send 3 sets of cardiac enzymes to rule out acute coronary event, obtain ECHO to evaluate LVEF, cardiology consult  ,continue current management, O2 supply, anticoagulation plan as per cardiology consult Pulm cons requested , antitussives and nebulized bd ordered .Also UTI suspected and started on iv abx , id cons called Palliative care consult requested ,to discuss advance directives and complete MOLST  (20 Aug 2023 15:35)        SUBJECTIVE: Patient feeling better       ALLERGIES:  Allergies    penicillin (Unknown)    Intolerances          MEDICATIONS  (STANDING):  aMIOdarone    Tablet 200 milliGRAM(s) Oral daily  apixaban 2.5 milliGRAM(s) Oral two times a day  artificial  tears Solution 1 Drop(s) Both EYES two times a day  dextrose 5% + sodium chloride 0.45%. 1000 milliLiter(s) (50 mL/Hr) IV Continuous <Continuous>  enalapril 10 milliGRAM(s) Oral daily  ertapenem  IVPB 1000 milliGRAM(s) IV Intermittent every 24 hours  ferrous    sulfate 325 milliGRAM(s) Oral daily  lactobacillus acidophilus 1 Tablet(s) Oral every 12 hours  levothyroxine 75 MICROGram(s) Oral daily  metoprolol tartrate 50 milliGRAM(s) Oral two times a day  multivitamin/minerals 1 Tablet(s) Oral daily  pantoprazole    Tablet 40 milliGRAM(s) Oral daily  polyethylene glycol 3350 17 Gram(s) Oral daily    MEDICATIONS  (PRN):  acetaminophen     Tablet .. 650 milliGRAM(s) Oral every 6 hours PRN Temp greater or equal to 38C (100.4F), Mild Pain (1 - 3)  albuterol/ipratropium for Nebulization 3 milliLiter(s) Nebulizer every 6 hours PRN Shortness of Breath and/or Wheezing  aluminum hydroxide/magnesium hydroxide/simethicone Suspension 30 milliLiter(s) Oral every 4 hours PRN Dyspepsia  guaiFENesin Oral Liquid (Sugar-Free) 200 milliGRAM(s) Oral every 6 hours PRN Cough  melatonin 3 milliGRAM(s) Oral at bedtime PRN Insomnia  ondansetron Injectable 4 milliGRAM(s) IV Push every 8 hours PRN Nausea and/or Vomiting      REVIEW OF SYSTEMS:  CONSTITUTIONAL: No fever,  RESPIRATORY: Improvement in mild  cough and  wheezing  shortness of breath  CARDIOVASCULAR: No chest pain,  palpitations, dizziness, syncope, paroxysmal nocturnal dyspnea,  or arm or leg swelling and improvement in SOB   GASTROINTESTINAL: No abdominal  or epigastric pain, nausea, vomiting,  diarrhea  NEUROLOGICAL: No headaches,  numbness, or tremors  Skin : No itching .  Hematology : No active bleeding .  Endocrinology : No heat and cold intolerance .  Psychiatry : Patient is calm .  Genitourinary : No dysuria .  Musculoskeletal : Patient has mild arthritis     Vital Signs Last 24 Hrs  T(C): 36.4 (24 Aug 2023 04:58), Max: 36.8 (23 Aug 2023 20:11)  T(F): 97.5 (24 Aug 2023 04:58), Max: 98.3 (23 Aug 2023 20:11)  HR: 67 (24 Aug 2023 04:58) (65 - 76)  BP: 151/68 (24 Aug 2023 04:58) (120/76 - 178/83)  BP(mean): --  RR: 18 (24 Aug 2023 04:58) (17 - 18)  SpO2: 97% (24 Aug 2023 04:58) (94% - 97%)    Parameters below as of 24 Aug 2023 04:58  Patient On (Oxygen Delivery Method): nasal cannula  O2 Flow (L/min): 2      PHYSICAL EXAM:  HEAD:  Atraumatic, Normocephalic  NECK: Supple and normal thyroid.  No JVD or carotid bruit.   HEART: S1, S2 regular , 1/6 soft ejection systolic murmur in mitral area , no thrill and no gallops .  PULMONARY: Bilateral vesicular breathing , few scattered rhonchi and few scattered rales are present .  ABDOMEN: Soft nontender and positive bowl sounds   EXTREMITIES:  No clubbing, cyanosis, or pedal  edema  NEUROLOGICAL: AA and mild confused  , no focal deficit .  Skin : No rashes .  Musculoskeletal : No joint swellings .    LABS:                        10.1   6.00  )-----------( 198      ( 23 Aug 2023 06:08 )             33.8     08-23    142  |  111<H>  |  16  ----------------------------<  100<H>  3.5   |  28  |  0.68    Ca    8.4<L>      23 Aug 2023 06:08    TPro  6.0  /  Alb  2.5<L>  /  TBili  0.5  /  DBili  x   /  AST  11<L>  /  ALT  25  /  AlkPhos  91  08-23          Urinalysis Basic - ( 23 Aug 2023 06:08 )    Color: x / Appearance: x / SG: x / pH: x  Gluc: 100 mg/dL / Ketone: x  / Bili: x / Urobili: x   Blood: x / Protein: x / Nitrite: x   Leuk Esterase: x / RBC: x / WBC x   Sq Epi: x / Non Sq Epi: x / Bacteria: x      Assessment/Plan  Patient has :  1) R/O pneumonia and sepsis . Possibility of pneumnia less likely as per ID . Chronic cough can be due to enalapril so will change it to different antihypertensive medication amlodipine 5 mg daily   2) UTI   3) paroxysmal atrial fibrillation and stable .  4) SSS and S/P PPM .  5) Mild chronic diastolic heart failure and under control   6) H/O hypothyroidism   7) Mild  Anemia   8) Hypertension   Plan : 1) I/V antibiotics as per ID 2) Rest of medications as such   3) Monitor hemoglobin and electrolytes . 4) Increase ambulation . 5) Discontinue enalapril due to cough and start amlodipine 5 mg daily 6) Discontinue monitor   Will continue to follow during hospital course and give further recommendations as needed . Thanks for your referral . if any questions please contact me at office ([phone] cell [phone])      NINOSKA DAVENPORT MD [place] [id number]  [address]  OFFICE : [phone]  CELL : [phone]  CARDIOLOGY F/U  :       HPI:  89-year-old female with history of hypothyroidism, A-fib on Eliquis, s/p recent pacemaker for complete heart block brought in by ambulance from [facility] living home for cough for the past 2 weeks.  Associated with generalized weakness and decreased p.o. intake.  No fall or trauma.  Patient states she has had cold symptoms for a while.  Denies fever, chills, chest pain, shortness of breath, abdominal pain, nausea, vomiting, upper or lower extremity weakness or paresthesias. pmd: Dr. Crawley, cards: [facility] Seen by card Dr Davenport Admitted  to telemetry unit for monitoring , send 3 sets of cardiac enzymes to rule out acute coronary event, obtain ECHO to evaluate LVEF, cardiology consult  ,continue current management, O2 supply, anticoagulation plan as per cardiology consult Pulm cons requested , antitussives and nebulized bd ordered .Also UTI suspected and started on iv abx , id cons called Palliative care consult requested ,to discuss advance directives and complete MOLST  (20 Aug 2023 15:35)        SUBJECTIVE: Patient feeling better       ALLERGIES:  Allergies    penicillin (Unknown)    Intolerances          MEDICATIONS  (STANDING):  aMIOdarone    Tablet 200 milliGRAM(s) Oral daily  apixaban 2.5 milliGRAM(s) Oral two times a day  artificial  tears Solution 1 Drop(s) Both EYES two times a day  dextrose 5% + sodium chloride 0.45%. 1000 milliLiter(s) (50 mL/Hr) IV Continuous <Continuous>  enalapril 10 milliGRAM(s) Oral daily  ertapenem  IVPB 1000 milliGRAM(s) IV Intermittent every 24 hours  ferrous    sulfate 325 milliGRAM(s) Oral daily  lactobacillus acidophilus 1 Tablet(s) Oral every 12 hours  levothyroxine 75 MICROGram(s) Oral daily  metoprolol tartrate 50 milliGRAM(s) Oral two times a day  multivitamin/minerals 1 Tablet(s) Oral daily  pantoprazole    Tablet 40 milliGRAM(s) Oral daily  polyethylene glycol 3350 17 Gram(s) Oral daily    MEDICATIONS  (PRN):  acetaminophen     Tablet .. 650 milliGRAM(s) Oral every 6 hours PRN Temp greater or equal to 38C (100.4F), Mild Pain (1 - 3)  albuterol/ipratropium for Nebulization 3 milliLiter(s) Nebulizer every 6 hours PRN Shortness of Breath and/or Wheezing  aluminum hydroxide/magnesium hydroxide/simethicone Suspension 30 milliLiter(s) Oral every 4 hours PRN Dyspepsia  guaiFENesin Oral Liquid (Sugar-Free) 200 milliGRAM(s) Oral every 6 hours PRN Cough  melatonin 3 milliGRAM(s) Oral at bedtime PRN Insomnia  ondansetron Injectable 4 milliGRAM(s) IV Push every 8 hours PRN Nausea and/or Vomiting      REVIEW OF SYSTEMS:  CONSTITUTIONAL: No fever,  RESPIRATORY: Improvement in mild  cough and  wheezing  shortness of breath  CARDIOVASCULAR: No chest pain,  palpitations, dizziness, syncope, paroxysmal nocturnal dyspnea,  or arm or leg swelling and improvement in SOB   GASTROINTESTINAL: No abdominal  or epigastric pain, nausea, vomiting,  diarrhea  NEUROLOGICAL: No headaches,  numbness, or tremors  Skin : No itching .  Hematology : No active bleeding .  Endocrinology : No heat and cold intolerance .  Psychiatry : Patient is calm .  Genitourinary : No dysuria .  Musculoskeletal : Patient has mild arthritis     Vital Signs Last 24 Hrs  T(C): 36.4 (24 Aug 2023 04:58), Max: 36.8 (23 Aug 2023 20:11)  T(F): 97.5 (24 Aug 2023 04:58), Max: 98.3 (23 Aug 2023 20:11)  HR: 67 (24 Aug 2023 04:58) (65 - 76)  BP: 151/68 (24 Aug 2023 04:58) (120/76 - 178/83)  BP(mean): --  RR: 18 (24 Aug 2023 04:58) (17 - 18)  SpO2: 97% (24 Aug 2023 04:58) (94% - 97%)    Parameters below as of 24 Aug 2023 04:58  Patient On (Oxygen Delivery Method): nasal cannula  O2 Flow (L/min): 2      PHYSICAL EXAM:  HEAD:  Atraumatic, Normocephalic  NECK: Supple and normal thyroid.  No JVD or carotid bruit.   HEART: S1, S2 regular , 1/6 soft ejection systolic murmur in mitral area , no thrill and no gallops .  PULMONARY: Bilateral vesicular breathing , few scattered rhonchi and few scattered rales are present .  ABDOMEN: Soft nontender and positive bowl sounds   EXTREMITIES:  No clubbing, cyanosis, or pedal  edema  NEUROLOGICAL: AA and mild confused  , no focal deficit .  Skin : No rashes .  Musculoskeletal : No joint swellings .    LABS:                        10.1   6.00  )-----------( 198      ( 23 Aug 2023 06:08 )             33.8     08-23    142  |  111<H>  |  16  ----------------------------<  100<H>  3.5   |  28  |  0.68    Ca    8.4<L>      23 Aug 2023 06:08    TPro  6.0  /  Alb  2.5<L>  /  TBili  0.5  /  DBili  x   /  AST  11<L>  /  ALT  25  /  AlkPhos  91  08-23          Urinalysis Basic - ( 23 Aug 2023 06:08 )    Color: x / Appearance: x / SG: x / pH: x  Gluc: 100 mg/dL / Ketone: x  / Bili: x / Urobili: x   Blood: x / Protein: x / Nitrite: x   Leuk Esterase: x / RBC: x / WBC x   Sq Epi: x / Non Sq Epi: x / Bacteria: x      Assessment/Plan  Patient has :  1) R/O pneumonia and sepsis . Possibility of pneumnia less likely as per ID . Chronic cough can be due to enalapril so will change it to different antihypertensive medication amlodipine 5 mg daily   2) UTI   3) paroxysmal atrial fibrillation and stable .  4) SSS and S/P PPM .  5) Mild chronic diastolic heart failure and under control   6) H/O hypothyroidism   7) Mild  Anemia   8) Hypertension   Plan : 1) I/V antibiotics as per ID 2) Rest of medications as such   3) Monitor hemoglobin and electrolytes . 4) Increase ambulation . 5) Discontinue enalapril due to cough and start amlodipine 5 mg daily 6) Discontinue monitor   Will continue to follow during hospital course and give further recommendations as needed . Thanks for your referral . if any questions please contact me at office ([phone] cell [phone])

## 2023-08-23 NOTE — PROVIDER CONTACT NOTE (CRITICAL VALUE NOTIFICATION) - PERSON GIVING RESULT:
Cady March [facility] Cady March [facility] Cady March [facility]

## 2023-08-23 NOTE — PROGRESS NOTE ADULT - SUBJECTIVE AND OBJECTIVE BOX
[facility]  Víctor Robertson PA-C  [address] [place] [address]  [phone]      INTERVAL HPI/OVERNIGHT EVENTS:  Pt s/e  Denies dysphagia, odynophagia, globus sensation, or any further GI symptoms    MEDICATIONS  (STANDING):  aMIOdarone    Tablet 200 milliGRAM(s) Oral daily  apixaban 2.5 milliGRAM(s) Oral two times a day  artificial  tears Solution 1 Drop(s) Both EYES two times a day  dextrose 5% + sodium chloride 0.45%. 1000 milliLiter(s) (50 mL/Hr) IV Continuous <Continuous>  enalapril 10 milliGRAM(s) Oral daily  ertapenem  IVPB 1000 milliGRAM(s) IV Intermittent every 24 hours  ferrous    sulfate 325 milliGRAM(s) Oral daily  lactobacillus acidophilus 1 Tablet(s) Oral every 12 hours  levothyroxine 75 MICROGram(s) Oral daily  metoprolol tartrate 50 milliGRAM(s) Oral two times a day  multivitamin/minerals 1 Tablet(s) Oral daily  pantoprazole    Tablet 40 milliGRAM(s) Oral daily  polyethylene glycol 3350 17 Gram(s) Oral daily    MEDICATIONS  (PRN):  acetaminophen     Tablet .. 650 milliGRAM(s) Oral every 6 hours PRN Temp greater or equal to 38C (100.4F), Mild Pain (1 - 3)  albuterol/ipratropium for Nebulization 3 milliLiter(s) Nebulizer every 6 hours PRN Shortness of Breath and/or Wheezing  aluminum hydroxide/magnesium hydroxide/simethicone Suspension 30 milliLiter(s) Oral every 4 hours PRN Dyspepsia  guaiFENesin Oral Liquid (Sugar-Free) 200 milliGRAM(s) Oral every 6 hours PRN Cough  melatonin 3 milliGRAM(s) Oral at bedtime PRN Insomnia  ondansetron Injectable 4 milliGRAM(s) IV Push every 8 hours PRN Nausea and/or Vomiting      Allergies    penicillin (Unknown)      PHYSICAL EXAM:   Vital Signs:  Vital Signs Last 24 Hrs  T(C): 36.6 (23 Aug 2023 12:42), Max: 36.7 (23 Aug 2023 05:05)  T(F): 97.9 (23 Aug 2023 12:42), Max: 98 (23 Aug 2023 05:05)  HR: 76 (23 Aug 2023 12:42) (64 - 78)  BP: 147/84 (23 Aug 2023 12:42) (120/76 - 198/90)  BP(mean): --  RR: 17 (23 Aug 2023 12:42) (17 - 19)  SpO2: 96% (23 Aug 2023 12:42) (91% - 100%)    Parameters below as of 23 Aug 2023 12:42  Patient On (Oxygen Delivery Method): nasal cannula      Daily     Daily Weight in k.7 (23 Aug 2023 05:05)    GENERAL:  Appears stated age  HEENT:  NC/AT  CHEST:  Full & symmetric excursion  HEART:  Regular rhythm  ABDOMEN:  Soft, non-tender, non-distended  EXTEREMITIES:  no cyanosis  SKIN:  No rash  NEURO:  Alert      LABS:                        10.1   6.00  )-----------( 198      ( 23 Aug 2023 06:08 )             33.8     08-23    142  |  111<H>  |  16  ----------------------------<  100<H>  3.5   |  28  |  0.68    Ca    8.4<L>      23 Aug 2023 06:08    TPro  6.0  /  Alb  2.5<L>  /  TBili  0.5  /  DBili  x   /  AST  11<L>  /  ALT  25  /  AlkPhos  91  08-23      Urinalysis Basic - ( 23 Aug 2023 06:08 )    Color: x / Appearance: x / SG: x / pH: x  Gluc: 100 mg/dL / Ketone: x  / Bili: x / Urobili: x   Blood: x / Protein: x / Nitrite: x   Leuk Esterase: x / RBC: x / WBC x   Sq Epi: x / Non Sq Epi: x / Bacteria: x   [facility]  Víctor Robertson PA-C  [address] [place] [address]  [phone]      INTERVAL HPI/OVERNIGHT EVENTS:  Pt s/e  Denies dysphagia, odynophagia, globus sensation, or any further GI symptoms    MEDICATIONS  (STANDING):  aMIOdarone    Tablet 200 milliGRAM(s) Oral daily  apixaban 2.5 milliGRAM(s) Oral two times a day  artificial  tears Solution 1 Drop(s) Both EYES two times a day  dextrose 5% + sodium chloride 0.45%. 1000 milliLiter(s) (50 mL/Hr) IV Continuous <Continuous>  enalapril 10 milliGRAM(s) Oral daily  ertapenem  IVPB 1000 milliGRAM(s) IV Intermittent every 24 hours  ferrous    sulfate 325 milliGRAM(s) Oral daily  lactobacillus acidophilus 1 Tablet(s) Oral every 12 hours  levothyroxine 75 MICROGram(s) Oral daily  metoprolol tartrate 50 milliGRAM(s) Oral two times a day  multivitamin/minerals 1 Tablet(s) Oral daily  pantoprazole    Tablet 40 milliGRAM(s) Oral daily  polyethylene glycol 3350 17 Gram(s) Oral daily    MEDICATIONS  (PRN):  acetaminophen     Tablet .. 650 milliGRAM(s) Oral every 6 hours PRN Temp greater or equal to 38C (100.4F), Mild Pain (1 - 3)  albuterol/ipratropium for Nebulization 3 milliLiter(s) Nebulizer every 6 hours PRN Shortness of Breath and/or Wheezing  aluminum hydroxide/magnesium hydroxide/simethicone Suspension 30 milliLiter(s) Oral every 4 hours PRN Dyspepsia  guaiFENesin Oral Liquid (Sugar-Free) 200 milliGRAM(s) Oral every 6 hours PRN Cough  melatonin 3 milliGRAM(s) Oral at bedtime PRN Insomnia  ondansetron Injectable 4 milliGRAM(s) IV Push every 8 hours PRN Nausea and/or Vomiting      Allergies    penicillin (Unknown)      PHYSICAL EXAM:   Vital Signs:  Vital Signs Last 24 Hrs  T(C): 36.6 (23 Aug 2023 12:42), Max: 36.7 (23 Aug 2023 05:05)  T(F): 97.9 (23 Aug 2023 12:42), Max: 98 (23 Aug 2023 05:05)  HR: 76 (23 Aug 2023 12:42) (64 - 78)  BP: 147/84 (23 Aug 2023 12:42) (120/76 - 198/90)  BP(mean): --  RR: 17 (23 Aug 2023 12:42) (17 - 19)  SpO2: 96% (23 Aug 2023 12:42) (91% - 100%)    Parameters below as of 23 Aug 2023 12:42  Patient On (Oxygen Delivery Method): nasal cannula      Daily     Daily Weight in k.7 (23 Aug 2023 05:05)    GENERAL:  Appears stated age  HEENT:  NC/AT  CHEST:  Full & symmetric excursion  HEART:  Regular rhythm  ABDOMEN:  Soft, non-tender, non-distended  EXTEREMITIES:  no cyanosis  SKIN:  No rash  NEURO:  Alert      LABS:                        10.1   6.00  )-----------( 198      ( 23 Aug 2023 06:08 )             33.8     08-23    142  |  111<H>  |  16  ----------------------------<  100<H>  3.5   |  28  |  0.68    Ca    8.4<L>      23 Aug 2023 06:08    TPro  6.0  /  Alb  2.5<L>  /  TBili  0.5  /  DBili  x   /  AST  11<L>  /  ALT  25  /  AlkPhos  91  08-23      Urinalysis Basic - ( 23 Aug 2023 06:08 )    Color: x / Appearance: x / SG: x / pH: x  Gluc: 100 mg/dL / Ketone: x  / Bili: x / Urobili: x   Blood: x / Protein: x / Nitrite: x   Leuk Esterase: x / RBC: x / WBC x   Sq Epi: x / Non Sq Epi: x / Bacteria: x   [facility]  Víctor Robertson PA-C  [address] [place] [address]  [phone]      INTERVAL HPI/OVERNIGHT EVENTS:  Pt s/e  Denies dysphagia, odynophagia, globus sensation, or any further GI symptoms    MEDICATIONS  (STANDING):  aMIOdarone    Tablet 200 milliGRAM(s) Oral daily  apixaban 2.5 milliGRAM(s) Oral two times a day  artificial  tears Solution 1 Drop(s) Both EYES two times a day  dextrose 5% + sodium chloride 0.45%. 1000 milliLiter(s) (50 mL/Hr) IV Continuous <Continuous>  enalapril 10 milliGRAM(s) Oral daily  ertapenem  IVPB 1000 milliGRAM(s) IV Intermittent every 24 hours  ferrous    sulfate 325 milliGRAM(s) Oral daily  lactobacillus acidophilus 1 Tablet(s) Oral every 12 hours  levothyroxine 75 MICROGram(s) Oral daily  metoprolol tartrate 50 milliGRAM(s) Oral two times a day  multivitamin/minerals 1 Tablet(s) Oral daily  pantoprazole    Tablet 40 milliGRAM(s) Oral daily  polyethylene glycol 3350 17 Gram(s) Oral daily    MEDICATIONS  (PRN):  acetaminophen     Tablet .. 650 milliGRAM(s) Oral every 6 hours PRN Temp greater or equal to 38C (100.4F), Mild Pain (1 - 3)  albuterol/ipratropium for Nebulization 3 milliLiter(s) Nebulizer every 6 hours PRN Shortness of Breath and/or Wheezing  aluminum hydroxide/magnesium hydroxide/simethicone Suspension 30 milliLiter(s) Oral every 4 hours PRN Dyspepsia  guaiFENesin Oral Liquid (Sugar-Free) 200 milliGRAM(s) Oral every 6 hours PRN Cough  melatonin 3 milliGRAM(s) Oral at bedtime PRN Insomnia  ondansetron Injectable 4 milliGRAM(s) IV Push every 8 hours PRN Nausea and/or Vomiting      Allergies    penicillin (Unknown)      PHYSICAL EXAM:   Vital Signs:  Vital Signs Last 24 Hrs  T(C): 36.6 (23 Aug 2023 12:42), Max: 36.7 (23 Aug 2023 05:05)  T(F): 97.9 (23 Aug 2023 12:42), Max: 98 (23 Aug 2023 05:05)  HR: 76 (23 Aug 2023 12:42) (64 - 78)  BP: 147/84 (23 Aug 2023 12:42) (120/76 - 198/90)  BP(mean): --  RR: 17 (23 Aug 2023 12:42) (17 - 19)  SpO2: 96% (23 Aug 2023 12:42) (91% - 100%)    Parameters below as of 23 Aug 2023 12:42  Patient On (Oxygen Delivery Method): nasal cannula      Daily     Daily Weight in k.7 (23 Aug 2023 05:05)    GENERAL:  Appears stated age  HEENT:  NC/AT  CHEST:  Full & symmetric excursion  HEART:  Regular rhythm  ABDOMEN:  Soft, non-tender, non-distended  EXTEREMITIES:  no cyanosis  SKIN:  No rash  NEURO:  Alert      LABS:                        10.1   6.00  )-----------( 198      ( 23 Aug 2023 06:08 )             33.8     08-23    142  |  111<H>  |  16  ----------------------------<  100<H>  3.5   |  28  |  0.68    Ca    8.4<L>      23 Aug 2023 06:08    TPro  6.0  /  Alb  2.5<L>  /  TBili  0.5  /  DBili  x   /  AST  11<L>  /  ALT  25  /  AlkPhos  91  08-23      Urinalysis Basic - ( 23 Aug 2023 06:08 )    Color: x / Appearance: x / SG: x / pH: x  Gluc: 100 mg/dL / Ketone: x  / Bili: x / Urobili: x   Blood: x / Protein: x / Nitrite: x   Leuk Esterase: x / RBC: x / WBC x   Sq Epi: x / Non Sq Epi: x / Bacteria: x

## 2023-08-23 NOTE — PROGRESS NOTE ADULT - SUBJECTIVE AND OBJECTIVE BOX
PROGRESS NOTE  Patient is a 89y old  Female who presents with a chief complaint of cough (23 Aug 2023 05:37)    Chart and available morning labs /imaging are reviewed electronically , urgent issues addressed . More information  is being added upon completion of rounds , when more information is collected and management discussed with consultants , medical staff and social service/case management on the floor   OVERNIGHT  No new issues reported by medical staff . All above noted Patient is resting in a bed comfortably .Confused ,poor mentation .No distress noted     HPI:  89-year-old female with history of hypothyroidism, A-fib on Eliquis, s/p recent pacemaker for complete heart block brought in by ambulance from [facility] living home for cough for the past 2 weeks.  Associated with generalized weakness and decreased p.o. intake.  No fall or trauma.  Patient states she has had cold symptoms for a while.  Denies fever, chills, chest pain, shortness of breath, abdominal pain, nausea, vomiting, upper or lower extremity weakness or paresthesias. pmd: Dr. Crawley, cards: [facility] Seen by card Dr Reich Admitted  to telemetry unit for monitoring , send 3 sets of cardiac enzymes to rule out acute coronary event, obtain ECHO to evaluate LVEF, cardiology consult  ,continue current management, O2 supply, anticoagulation plan as per cardiology consult Pulm cons requested , antitussives and nebulized bd ordered .Also UTI suspected and started on iv abx , id cons called Palliative care consult requested ,to discuss advance directives and complete MOLST  (20 Aug 2023 15:35)    PAST MEDICAL & SURGICAL HISTORY:  HTN (hypertension)      Afib      Spinal stenosis      PFO (patent foramen ovale)      Degeneration macular      Osteoporosis      History of complete heart block      Hypothyroidism      Cardiac pacemaker          MEDICATIONS  (STANDING):  aMIOdarone    Tablet 200 milliGRAM(s) Oral daily  apixaban 2.5 milliGRAM(s) Oral two times a day  artificial  tears Solution 1 Drop(s) Both EYES two times a day  dextrose 5% + sodium chloride 0.45%. 1000 milliLiter(s) (50 mL/Hr) IV Continuous <Continuous>  enalapril 10 milliGRAM(s) Oral daily  ertapenem  IVPB 1000 milliGRAM(s) IV Intermittent every 24 hours  ferrous    sulfate 325 milliGRAM(s) Oral daily  lactobacillus acidophilus 1 Tablet(s) Oral every 12 hours  levothyroxine 75 MICROGram(s) Oral daily  metoprolol tartrate 50 milliGRAM(s) Oral two times a day  multivitamin/minerals 1 Tablet(s) Oral daily  pantoprazole    Tablet 40 milliGRAM(s) Oral daily  polyethylene glycol 3350 17 Gram(s) Oral daily    MEDICATIONS  (PRN):  acetaminophen     Tablet .. 650 milliGRAM(s) Oral every 6 hours PRN Temp greater or equal to 38C (100.4F), Mild Pain (1 - 3)  albuterol/ipratropium for Nebulization 3 milliLiter(s) Nebulizer every 6 hours PRN Shortness of Breath and/or Wheezing  aluminum hydroxide/magnesium hydroxide/simethicone Suspension 30 milliLiter(s) Oral every 4 hours PRN Dyspepsia  guaiFENesin Oral Liquid (Sugar-Free) 200 milliGRAM(s) Oral every 6 hours PRN Cough  melatonin 3 milliGRAM(s) Oral at bedtime PRN Insomnia  ondansetron Injectable 4 milliGRAM(s) IV Push every 8 hours PRN Nausea and/or Vomiting      OBJECTIVE    T(C): 36.7 (08-23-23 @ 05:05), Max: 36.7 (08-23-23 @ 05:05)  HR: 75 (08-23-23 @ 06:38) (64 - 78)  BP: 120/76 (08-23-23 @ 07:15) (120/76 - 198/90)  RR: 19 (08-23-23 @ 05:05) (18 - 19)  SpO2: 100% (08-23-23 @ 05:05) (91% - 100%)  Wt(kg): --  I&O's Summary    22 Aug 2023 07:01  -  23 Aug 2023 07:00  --------------------------------------------------------  IN: 400 mL / OUT: 0 mL / NET: 400 mL          REVIEW OF SYSTEMS:  CONSTITUTIONAL: No fever, weight loss, or fatigue  EYES: No eye pain, visual disturbances, or discharge  ENMT:   No sinus or throat pain  NECK: No pain or stiffness  RESPIRATORY: No cough, wheezing, chills or hemoptysis; No shortness of breath  CARDIOVASCULAR: No chest pain, palpitations, dizziness, or leg swelling  GASTROINTESTINAL: No abdominal pain. No nausea, vomiting; No diarrhea or constipation. No melena or hematochezia.  GENITOURINARY: No dysuria, frequency, hematuria, or incontinence  NEUROLOGICAL: No headaches, memory loss, loss of strength, numbness, or tremors  SKIN: No itching, burning, rashes, or lesions   MUSCULOSKELETAL: No joint pain or swelling; No muscle, back, or extremity pain    PHYSICAL EXAM:  Appearance: NAD. VS past 24 hrs -as above   HEENT:   Moist oral mucosa. Conjunctiva clear b/l.   Neck : supple  Respiratory: Lungs CTAB.  Gastrointestinal:  Soft, nontender. No rebound. No rigidity. BS present	  Cardiovascular: RRR ,S1S2 present  Neurologic: Non-focal. Moving all extremities.  Extremities: No edema. No erythema. No calf tenderness.  Skin: No rashes, No ecchymoses, No cyanosis.	  wounds ,skin lesions-See skin assesment flow sheet   LABS:                        10.1   6.00  )-----------( 198      ( 23 Aug 2023 06:08 )             33.8     08-23    142  |  111<H>  |  16  ----------------------------<  100<H>  3.5   |  28  |  0.68    Ca    8.4<L>      23 Aug 2023 06:08    TPro  6.0  /  Alb  2.5<L>  /  TBili  0.5  /  DBili  x   /  AST  11<L>  /  ALT  25  /  AlkPhos  91  08-23    CAPILLARY BLOOD GLUCOSE          Urinalysis Basic - ( 23 Aug 2023 06:08 )    Color: x / Appearance: x / SG: x / pH: x  Gluc: 100 mg/dL / Ketone: x  / Bili: x / Urobili: x   Blood: x / Protein: x / Nitrite: x   Leuk Esterase: x / RBC: x / WBC x   Sq Epi: x / Non Sq Epi: x / Bacteria: x        Culture - Blood (collected 21 Aug 2023 09:35)  Source: .Blood Blood  Gram Stain (23 Aug 2023 01:21):    Growth in aerobic bottle: Gram Negative Rods  Preliminary Report (23 Aug 2023 01:21):    Growth in aerobic bottle: Gram Negative Rods    Culture - Blood (collected 21 Aug 2023 09:30)  Source: .Blood Blood  Gram Stain (22 Aug 2023 17:29):    Growth in anaerobic bottle: Gram Negative Rods    Growth in aerobic bottle: Gram Negative Rods  Final Report (23 Aug 2023 10:15):    Growth in aerobic and anaerobic bottles: Escherichia coli ESBL    See previous culture [id number]    Culture - Urine (collected 20 Aug 2023 11:55)  Source: Clean Catch Clean Catch (Midstream)  Final Report (23 Aug 2023 09:17):    >100,000 CFU/ml Escherichia coli ESBL  Organism: Escherichia coli ESBL (23 Aug 2023 09:17)  Organism: Escherichia coli ESBL (23 Aug 2023 09:17)    Culture - Blood (collected 20 Aug 2023 11:50)  Source: .Blood Blood-Peripheral  Gram Stain (21 Aug 2023 02:35):    Growth in aerobic bottle: Gram Negative Rods  Final Report (22 Aug 2023 17:18):    Growth in aerobic bottle: Escherichia coli ESBL    See previous culture [id number]    Culture - Blood (collected 20 Aug 2023 11:45)  Source: .Blood Blood-Peripheral  Gram Stain (21 Aug 2023 02:35):    Growth in aerobic and anaerobic bottles: Gram Negative Rods  Final Report (22 Aug 2023 17:15):    Growth in aerobic and anaerobic bottles: Escherichia coli ESBL    Direct identification is available within approximately 3-5    hours either by Blood Panel Multiplexed PCR or Direct    MALDI-TOF. Details: [email or web address]  Organism: Blood Culture PCR  Escherichia coli ESBL (22 Aug 2023 17:15)  Organism: Escherichia coli ESBL (22 Aug 2023 17:15)  Organism: Blood Culture PCR (22 Aug 2023 17:15)      RADIOLOGY & ADDITIONAL TESTS:   reviewed elctronically  ASSESSMENT/PLAN: 	    25 minutes aggregate time was spent on this visit, 50% visit time spent in care co-ordination with other attendings and counselling patient .I have discussed care plan with patient / HCP/family member ,who expressed understanding of problems treatment and their effect and side effects, alternatives in details. I have asked if they have any questions and concerns and appropriately addressed them to best of my ability.  PROGRESS NOTE  Patient is a 89y old  Female who presents with a chief complaint of cough (23 Aug 2023 05:37)    Chart and available morning labs /imaging are reviewed electronically , urgent issues addressed . More information  is being added upon completion of rounds , when more information is collected and management discussed with consultants , medical staff and social service/case management on the floor   OVERNIGHT  No new issues reported by medical staff . All above noted Patient is resting in a bed comfortably .Confused ,poor mentation .No distress noted     HPI:  89-year-old female with history of hypothyroidism, A-fib on Eliquis, s/p recent pacemaker for complete heart block brought in by ambulance from [facility] living home for cough for the past 2 weeks.  Associated with generalized weakness and decreased p.o. intake.  No fall or trauma.  Patient states she has had cold symptoms for a while.  Denies fever, chills, chest pain, shortness of breath, abdominal pain, nausea, vomiting, upper or lower extremity weakness or paresthesias. pmd: Dr. Crawley, cards: [facility] Seen by card Dr Reich Admitted  to telemetry unit for monitoring , send 3 sets of cardiac enzymes to rule out acute coronary event, obtain ECHO to evaluate LVEF, cardiology consult  ,continue current management, O2 supply, anticoagulation plan as per cardiology consult Pulm cons requested , antitussives and nebulized bd ordered .Also UTI suspected and started on iv abx , id cons called Palliative care consult requested ,to discuss advance directives and complete MOLST  (20 Aug 2023 15:35)    PAST MEDICAL & SURGICAL HISTORY:  HTN (hypertension)      Afib      Spinal stenosis      PFO (patent foramen ovale)      Degeneration macular      Osteoporosis      History of complete heart block      Hypothyroidism      Cardiac pacemaker          MEDICATIONS  (STANDING):  aMIOdarone    Tablet 200 milliGRAM(s) Oral daily  apixaban 2.5 milliGRAM(s) Oral two times a day  artificial  tears Solution 1 Drop(s) Both EYES two times a day  dextrose 5% + sodium chloride 0.45%. 1000 milliLiter(s) (50 mL/Hr) IV Continuous <Continuous>  enalapril 10 milliGRAM(s) Oral daily  ertapenem  IVPB 1000 milliGRAM(s) IV Intermittent every 24 hours  ferrous    sulfate 325 milliGRAM(s) Oral daily  lactobacillus acidophilus 1 Tablet(s) Oral every 12 hours  levothyroxine 75 MICROGram(s) Oral daily  metoprolol tartrate 50 milliGRAM(s) Oral two times a day  multivitamin/minerals 1 Tablet(s) Oral daily  pantoprazole    Tablet 40 milliGRAM(s) Oral daily  polyethylene glycol 3350 17 Gram(s) Oral daily    MEDICATIONS  (PRN):  acetaminophen     Tablet .. 650 milliGRAM(s) Oral every 6 hours PRN Temp greater or equal to 38C (100.4F), Mild Pain (1 - 3)  albuterol/ipratropium for Nebulization 3 milliLiter(s) Nebulizer every 6 hours PRN Shortness of Breath and/or Wheezing  aluminum hydroxide/magnesium hydroxide/simethicone Suspension 30 milliLiter(s) Oral every 4 hours PRN Dyspepsia  guaiFENesin Oral Liquid (Sugar-Free) 200 milliGRAM(s) Oral every 6 hours PRN Cough  melatonin 3 milliGRAM(s) Oral at bedtime PRN Insomnia  ondansetron Injectable 4 milliGRAM(s) IV Push every 8 hours PRN Nausea and/or Vomiting      OBJECTIVE    T(C): 36.7 (08-23-23 @ 05:05), Max: 36.7 (08-23-23 @ 05:05)  HR: 75 (08-23-23 @ 06:38) (64 - 78)  BP: 120/76 (08-23-23 @ 07:15) (120/76 - 198/90)  RR: 19 (08-23-23 @ 05:05) (18 - 19)  SpO2: 100% (08-23-23 @ 05:05) (91% - 100%)  Wt(kg): --  I&O's Summary    22 Aug 2023 07:01  -  23 Aug 2023 07:00  --------------------------------------------------------  IN: 400 mL / OUT: 0 mL / NET: 400 mL          REVIEW OF SYSTEMS:  CONSTITUTIONAL: No fever, weight loss, or fatigue  EYES: No eye pain, visual disturbances, or discharge  ENMT:   No sinus or throat pain  NECK: No pain or stiffness  RESPIRATORY: No cough, wheezing, chills or hemoptysis; No shortness of breath  CARDIOVASCULAR: No chest pain, palpitations, dizziness, or leg swelling  GASTROINTESTINAL: No abdominal pain. No nausea, vomiting; No diarrhea or constipation. No melena or hematochezia.  GENITOURINARY: No dysuria, frequency, hematuria, or incontinence  NEUROLOGICAL: No headaches, memory loss, loss of strength, numbness, or tremors  SKIN: No itching, burning, rashes, or lesions   MUSCULOSKELETAL: No joint pain or swelling; No muscle, back, or extremity pain    PHYSICAL EXAM:  Appearance: NAD. VS past 24 hrs -as above   HEENT:   Moist oral mucosa. Conjunctiva clear b/l.   Neck : supple  Respiratory: Lungs CTAB.  Gastrointestinal:  Soft, nontender. No rebound. No rigidity. BS present	  Cardiovascular: RRR ,S1S2 present  Neurologic: Non-focal. Moving all extremities.  Extremities: No edema. No erythema. No calf tenderness.  Skin: No rashes, No ecchymoses, No cyanosis.	  wounds ,skin lesions-See skin assesment flow sheet   LABS:                        10.1   6.00  )-----------( 198      ( 23 Aug 2023 06:08 )             33.8     08-23    142  |  111<H>  |  16  ----------------------------<  100<H>  3.5   |  28  |  0.68    Ca    8.4<L>      23 Aug 2023 06:08    TPro  6.0  /  Alb  2.5<L>  /  TBili  0.5  /  DBili  x   /  AST  11<L>  /  ALT  25  /  AlkPhos  91  08-23    CAPILLARY BLOOD GLUCOSE          Urinalysis Basic - ( 23 Aug 2023 06:08 )    Color: x / Appearance: x / SG: x / pH: x  Gluc: 100 mg/dL / Ketone: x  / Bili: x / Urobili: x   Blood: x / Protein: x / Nitrite: x   Leuk Esterase: x / RBC: x / WBC x   Sq Epi: x / Non Sq Epi: x / Bacteria: x        Culture - Blood (collected 21 Aug 2023 09:35)  Source: .Blood Blood  Gram Stain (23 Aug 2023 01:21):    Growth in aerobic bottle: Gram Negative Rods  Preliminary Report (23 Aug 2023 01:21):    Growth in aerobic bottle: Gram Negative Rods    Culture - Blood (collected 21 Aug 2023 09:30)  Source: .Blood Blood  Gram Stain (22 Aug 2023 17:29):    Growth in anaerobic bottle: Gram Negative Rods    Growth in aerobic bottle: Gram Negative Rods  Final Report (23 Aug 2023 10:15):    Growth in aerobic and anaerobic bottles: Escherichia coli ESBL    See previous culture [id number]    Culture - Urine (collected 20 Aug 2023 11:55)  Source: Clean Catch Clean Catch (Midstream)  Final Report (23 Aug 2023 09:17):    >100,000 CFU/ml Escherichia coli ESBL  Organism: Escherichia coli ESBL (23 Aug 2023 09:17)  Organism: Escherichia coli ESBL (23 Aug 2023 09:17)    Culture - Blood (collected 20 Aug 2023 11:50)  Source: .Blood Blood-Peripheral  Gram Stain (21 Aug 2023 02:35):    Growth in aerobic bottle: Gram Negative Rods  Final Report (22 Aug 2023 17:18):    Growth in aerobic bottle: Escherichia coli ESBL    See previous culture [id number]    Culture - Blood (collected 20 Aug 2023 11:45)  Source: .Blood Blood-Peripheral  Gram Stain (21 Aug 2023 02:35):    Growth in aerobic and anaerobic bottles: Gram Negative Rods  Final Report (22 Aug 2023 17:15):    Growth in aerobic and anaerobic bottles: Escherichia coli ESBL    Direct identification is available within approximately 3-5    hours either by Blood Panel Multiplexed PCR or Direct    MALDI-TOF. Details: [email or web address]  Organism: Blood Culture PCR  Escherichia coli ESBL (22 Aug 2023 17:15)  Organism: Escherichia coli ESBL (22 Aug 2023 17:15)  Organism: Blood Culture PCR (22 Aug 2023 17:15)      RADIOLOGY & ADDITIONAL TESTS:   reviewed elctronically  ASSESSMENT/PLAN: 	    25 minutes aggregate time was spent on this visit, 50% visit time spent in care co-ordination with other attendings and counselling patient .I have discussed care plan with patient / HCP/family member ,who expressed understanding of problems treatment and their effect and side effects, alternatives in details. I have asked if they have any questions and concerns and appropriately addressed them to best of my ability.  PROGRESS NOTE  Patient is a 89y old  Female who presents with a chief complaint of cough (23 Aug 2023 05:37)    Chart and available morning labs /imaging are reviewed electronically , urgent issues addressed . More information  is being added upon completion of rounds , when more information is collected and management discussed with consultants , medical staff and social service/case management on the floor   OVERNIGHT  No new issues reported by medical staff . All above noted Patient is resting in a bed comfortably .Confused ,poor mentation .No distress noted     HPI:  89-year-old female with history of hypothyroidism, A-fib on Eliquis, s/p recent pacemaker for complete heart block brought in by ambulance from [facility] living home for cough for the past 2 weeks.  Associated with generalized weakness and decreased p.o. intake.  No fall or trauma.  Patient states she has had cold symptoms for a while.  Denies fever, chills, chest pain, shortness of breath, abdominal pain, nausea, vomiting, upper or lower extremity weakness or paresthesias. pmd: Dr. Crawley, [facility] by card Dr Reich Admitted  to telemetry unit for monitoring , send 3 sets of cardiac enzymes to rule out acute coronary event, obtain ECHO to evaluate LVEF, cardiology consult  ,continue current management, O2 supply, anticoagulation plan as per cardiology consult Pulm cons requested , antitussives and nebulized bd ordered .Also UTI suspected and started on iv abx , id cons called Palliative care consult requested ,to discuss advance directives and complete MOLST  (20 Aug 2023 15:35)    PAST MEDICAL & SURGICAL HISTORY:  HTN (hypertension)      Afib      Spinal stenosis      PFO (patent foramen ovale)      Degeneration macular      Osteoporosis      History of complete heart block      Hypothyroidism      Cardiac pacemaker          MEDICATIONS  (STANDING):  aMIOdarone    Tablet 200 milliGRAM(s) Oral daily  apixaban 2.5 milliGRAM(s) Oral two times a day  artificial  tears Solution 1 Drop(s) Both EYES two times a day  dextrose 5% + sodium chloride 0.45%. 1000 milliLiter(s) (50 mL/Hr) IV Continuous <Continuous>  enalapril 10 milliGRAM(s) Oral daily  ertapenem  IVPB 1000 milliGRAM(s) IV Intermittent every 24 hours  ferrous    sulfate 325 milliGRAM(s) Oral daily  lactobacillus acidophilus 1 Tablet(s) Oral every 12 hours  levothyroxine 75 MICROGram(s) Oral daily  metoprolol tartrate 50 milliGRAM(s) Oral two times a day  multivitamin/minerals 1 Tablet(s) Oral daily  pantoprazole    Tablet 40 milliGRAM(s) Oral daily  polyethylene glycol 3350 17 Gram(s) Oral daily    MEDICATIONS  (PRN):  acetaminophen     Tablet .. 650 milliGRAM(s) Oral every 6 hours PRN Temp greater or equal to 38C (100.4F), Mild Pain (1 - 3)  albuterol/ipratropium for Nebulization 3 milliLiter(s) Nebulizer every 6 hours PRN Shortness of Breath and/or Wheezing  aluminum hydroxide/magnesium hydroxide/simethicone Suspension 30 milliLiter(s) Oral every 4 hours PRN Dyspepsia  guaiFENesin Oral Liquid (Sugar-Free) 200 milliGRAM(s) Oral every 6 hours PRN Cough  melatonin 3 milliGRAM(s) Oral at bedtime PRN Insomnia  ondansetron Injectable 4 milliGRAM(s) IV Push every 8 hours PRN Nausea and/or Vomiting      OBJECTIVE    T(C): 36.7 (08-23-23 @ 05:05), Max: 36.7 (08-23-23 @ 05:05)  HR: 75 (08-23-23 @ 06:38) (64 - 78)  BP: 120/76 (08-23-23 @ 07:15) (120/76 - 198/90)  RR: 19 (08-23-23 @ 05:05) (18 - 19)  SpO2: 100% (08-23-23 @ 05:05) (91% - 100%)  Wt(kg): --  I&O's Summary    22 Aug 2023 07:01  -  23 Aug 2023 07:00  --------------------------------------------------------  IN: 400 mL / OUT: 0 mL / NET: 400 mL          REVIEW OF SYSTEMS:  CONSTITUTIONAL: No fever, weight loss, or fatigue  EYES: No eye pain, visual disturbances, or discharge  ENMT:   No sinus or throat pain  NECK: No pain or stiffness  RESPIRATORY: No cough, wheezing, chills or hemoptysis; No shortness of breath  CARDIOVASCULAR: No chest pain, palpitations, dizziness, or leg swelling  GASTROINTESTINAL: No abdominal pain. No nausea, vomiting; No diarrhea or constipation. No melena or hematochezia.  GENITOURINARY: No dysuria, frequency, hematuria, or incontinence  NEUROLOGICAL: No headaches, memory loss, loss of strength, numbness, or tremors  SKIN: No itching, burning, rashes, or lesions   MUSCULOSKELETAL: No joint pain or swelling; No muscle, back, or extremity pain    PHYSICAL EXAM:  Appearance: NAD. VS past 24 hrs -as above   HEENT:   Moist oral mucosa. Conjunctiva clear b/l.   Neck : supple  Respiratory: Lungs CTAB.  Gastrointestinal:  Soft, nontender. No rebound. No rigidity. BS present	  Cardiovascular: RRR ,S1S2 present  Neurologic: Non-focal. Moving all extremities.  Extremities: No edema. No erythema. No calf tenderness.  Skin: No rashes, No ecchymoses, No cyanosis.	  wounds ,skin lesions-See skin assesment flow sheet   LABS:                        10.1   6.00  )-----------( 198      ( 23 Aug 2023 06:08 )             33.8     08-23    142  |  111<H>  |  16  ----------------------------<  100<H>  3.5   |  28  |  0.68    Ca    8.4<L>      23 Aug 2023 06:08    TPro  6.0  /  Alb  2.5<L>  /  TBili  0.5  /  DBili  x   /  AST  11<L>  /  ALT  25  /  AlkPhos  91  08-23    CAPILLARY BLOOD GLUCOSE          Urinalysis Basic - ( 23 Aug 2023 06:08 )    Color: x / Appearance: x / SG: x / pH: x  Gluc: 100 mg/dL / Ketone: x  / Bili: x / Urobili: x   Blood: x / Protein: x / Nitrite: x   Leuk Esterase: x / RBC: x / WBC x   Sq Epi: x / Non Sq Epi: x / Bacteria: x        Culture - Blood (collected 21 Aug 2023 09:35)  Source: .Blood Blood  Gram Stain (23 Aug 2023 01:21):    Growth in aerobic bottle: Gram Negative Rods  Preliminary Report (23 Aug 2023 01:21):    Growth in aerobic bottle: Gram Negative Rods    Culture - Blood (collected 21 Aug 2023 09:30)  Source: .Blood Blood  Gram Stain (22 Aug 2023 17:29):    Growth in anaerobic bottle: Gram Negative Rods    Growth in aerobic bottle: Gram Negative Rods  Final Report (23 Aug 2023 10:15):    Growth in aerobic and anaerobic bottles: Escherichia coli ESBL    See previous culture [id number]    Culture - Urine (collected 20 Aug 2023 11:55)  Source: Clean Catch Clean Catch (Midstream)  Final Report (23 Aug 2023 09:17):    >100,000 CFU/ml Escherichia coli ESBL  Organism: Escherichia coli ESBL (23 Aug 2023 09:17)  Organism: Escherichia coli ESBL (23 Aug 2023 09:17)    Culture - Blood (collected 20 Aug 2023 11:50)  Source: .Blood Blood-Peripheral  Gram Stain (21 Aug 2023 02:35):    Growth in aerobic bottle: Gram Negative Rods  Final Report (22 Aug 2023 17:18):    Growth in aerobic bottle: Escherichia coli ESBL    See previous culture [id number]    Culture - Blood (collected 20 Aug 2023 11:45)  Source: .Blood Blood-Peripheral  Gram Stain (21 Aug 2023 02:35):    Growth in aerobic and anaerobic bottles: Gram Negative Rods  Final Report (22 Aug 2023 17:15):    Growth in aerobic and anaerobic bottles: Escherichia coli ESBL    Direct identification is available within approximately 3-5    hours either by Blood Panel Multiplexed PCR or Direct    MALDI-TOF. Details: [email or web address]  Organism: Blood Culture PCR  Escherichia coli ESBL (22 Aug 2023 17:15)  Organism: Escherichia coli ESBL (22 Aug 2023 17:15)  Organism: Blood Culture PCR (22 Aug 2023 17:15)      RADIOLOGY & ADDITIONAL TESTS:   reviewed elctronically  ASSESSMENT/PLAN: 	    25 minutes aggregate time was spent on this visit, 50% visit time spent in care co-ordination with other attendings and counselling patient .I have discussed care plan with patient / HCP/family member ,who expressed understanding of problems treatment and their effect and side effects, alternatives in details. I have asked if they have any questions and concerns and appropriately addressed them to best of my ability.

## 2023-08-23 NOTE — PROGRESS NOTE ADULT - SUBJECTIVE AND OBJECTIVE BOX
CHIEF COMPLAINT/ REASON FOR VISIT  .. Patient was seen to address the  issue listed under PROBLEM LIST which is located toward bottom of this note     DEACON ROBERT    PLV 1EAS 106 W1    Allergies    penicillin (Unknown)    Intolerances        PAST MEDICAL & SURGICAL HISTORY:  HTN (hypertension)      Afib      Spinal stenosis      PFO (patent foramen ovale)      Degeneration macular      Osteoporosis      History of complete heart block      Hypothyroidism      Cardiac pacemaker          FAMILY HISTORY:      Home Medications:  Albuterol (Eqv-ProAir HFA) 90 mcg/inh inhalation aerosol: 1 puff(s) inhaled 4 times a day as needed for  cough or wheezing (21 Aug 2023 09:56)  amiodarone 200 mg oral tablet: 1 tab(s) orally once a day (21 Aug 2023 09:55)  clindamycin 1% topical lotion: Apply topically to affected area once a day to face (21 Aug 2023 09:55)  Eliquis 2.5 mg oral tablet: 1 tab(s) orally 2 times a day (21 Aug 2023 09:55)  enalapril 10 mg oral tablet: 1 tab(s) orally once a day (21 Aug 2023 09:55)  ferrous sulfate 325 mg (65 mg elemental iron) oral tablet: 1 tab(s) orally once a day (21 Aug 2023 09:55)  levothyroxine 75 mcg (0.075 mg) oral tablet: 1 tab(s) orally once a day (21 Aug 2023 09:56)  Metoprolol Tartrate 50 mg oral tablet: 1 tab(s) orally 2 times a day (21 Aug 2023 09:56)  Myrbetriq 50 mg oral tablet, extended release: 1 tab(s) orally once a day (21 Aug 2023 09:56)  polyethylene glycol 3350 oral powder for reconstitution: 17 gram(s) orally once a day (21 Aug 2023 09:56)  PreserVision AREDS oral capsule: 1 cap(s) orally once a day (21 Aug 2023 09:56)  Refresh Dry Eye Therapy ophthalmic solution: 1 drop(s) in each eye 4 times a day (21 Aug 2023 09:56)  Tylenol 325 mg oral tablet: 2 tab(s) orally every 4 hours as needed for pain or fever (21 Aug 2023 09:56)      MEDICATIONS  (STANDING):  aMIOdarone    Tablet 200 milliGRAM(s) Oral daily  apixaban 2.5 milliGRAM(s) Oral two times a day  artificial  tears Solution 1 Drop(s) Both EYES two times a day  benzonatate 100 milliGRAM(s) Oral every 8 hours  dextrose 5% + sodium chloride 0.45%. 1000 milliLiter(s) (50 mL/Hr) IV Continuous <Continuous>  enalapril 10 milliGRAM(s) Oral daily  ertapenem  IVPB 1000 milliGRAM(s) IV Intermittent every 24 hours  ferrous    sulfate 325 milliGRAM(s) Oral daily  lactobacillus acidophilus 1 Tablet(s) Oral every 12 hours  levothyroxine 75 MICROGram(s) Oral daily  metoprolol tartrate 50 milliGRAM(s) Oral two times a day  multivitamin/minerals 1 Tablet(s) Oral daily  pantoprazole    Tablet 40 milliGRAM(s) Oral daily  polyethylene glycol 3350 17 Gram(s) Oral daily    MEDICATIONS  (PRN):  acetaminophen     Tablet .. 650 milliGRAM(s) Oral every 6 hours PRN Temp greater or equal to 38C (100.4F), Mild Pain (1 - 3)  albuterol/ipratropium for Nebulization 3 milliLiter(s) Nebulizer every 6 hours PRN Shortness of Breath and/or Wheezing  aluminum hydroxide/magnesium hydroxide/simethicone Suspension 30 milliLiter(s) Oral every 4 hours PRN Dyspepsia  guaiFENesin Oral Liquid (Sugar-Free) 200 milliGRAM(s) Oral every 6 hours PRN Cough  melatonin 3 milliGRAM(s) Oral at bedtime PRN Insomnia  ondansetron Injectable 4 milliGRAM(s) IV Push every 8 hours PRN Nausea and/or Vomiting              Vital Signs Last 24 Hrs  T(C): 36.7 (23 Aug 2023 05:05), Max: 36.7 (23 Aug 2023 05:05)  T(F): 98 (23 Aug 2023 05:05), Max: 98 (23 Aug 2023 05:05)  HR: 64 (23 Aug 2023 05:05) (64 - 78)  BP: 194/84 (23 Aug 2023 05:05) (160/84 - 194/84)  BP(mean): --  RR: 19 (23 Aug 2023 05:05) (18 - 19)  SpO2: 100% (23 Aug 2023 05:05) (91% - 100%)    Parameters below as of 23 Aug 2023 05:05  Patient On (Oxygen Delivery Method): nasal cannula          08-22-23 @ 07:01  -  08-23-23 @ 05:37  --------------------------------------------------------  IN: 400 mL / OUT: 0 mL / NET: 400 mL              LABS:                        10.7   6.97  )-----------( 195      ( 22 Aug 2023 06:20 )             35.2     08-22    142  |  110<H>  |  22  ----------------------------<  99  3.4<L>   |  28  |  0.81    Ca    8.4<L>      22 Aug 2023 06:20  Phos  3.7     08-21  Mg     1.9     08-21    TPro  6.2  /  Alb  2.6<L>  /  TBili  0.8  /  DBili  x   /  AST  21  /  ALT  36  /  AlkPhos  99  08-22    PT/INR - ( 21 Aug 2023 06:24 )   PT: 17.7 sec;   INR: 1.53 ratio           Urinalysis Basic - ( 22 Aug 2023 06:20 )    Color: x / Appearance: x / SG: x / pH: x  Gluc: 99 mg/dL / Ketone: x  / Bili: x / Urobili: x   Blood: x / Protein: x / Nitrite: x   Leuk Esterase: x / RBC: x / WBC x   Sq Epi: x / Non Sq Epi: x / Bacteria: x            WBC:  WBC Count: 6.97 K/uL (08-22 @ 06:20)  WBC Count: 21.39 K/uL (08-21 @ 06:24)  WBC Count: 16.67 K/uL (08-20 @ 11:55)      MICROBIOLOGY:  RECENT CULTURES:  08-21 .Blood Blood XXXX   Growth in aerobic bottle: Gram Negative Rods   Growth in aerobic bottle: Gram Negative Rods    08-21 .Blood Blood XXXX   Growth in anaerobic bottle: Gram Negative Rods  Growth in aerobic bottle: Gram Negative Rods   Growth in anaerobic bottle: Escherichia coli ESBL  Growth in aerobic bottle: Gram Negative Rods  See previous culture [id number]    08-20 Clean Catch Clean Catch (Midstream) XXXX XXXX   >100,000 CFU/ml Escherichia coli    08-20 .Blood Blood-Peripheral XXXX   Growth in aerobic bottle: Gram Negative Rods   Growth in aerobic bottle: Escherichia coli ESBL  See previous culture [id number]    08-20 .Blood Blood-Peripheral Blood Culture PCR  Escherichia coli ESBL   Growth in aerobic and anaerobic bottles: Gram Negative Rods   Growth in aerobic and anaerobic bottles: Escherichia coli ESBL  Direct identification is available within approximately 3-5  hours either by Blood Panel Multiplexed PCR or Direct  MALDI-TOF. Details: [email or web address]                PT/INR - ( 21 Aug 2023 06:24 )   PT: 17.7 sec;   INR: 1.53 ratio             Sodium:  Sodium: 142 mmol/L (08-22 @ 06:20)  Sodium: 141 mmol/L (08-21 @ 06:24)  Sodium: 144 mmol/L (08-20 @ 11:55)      0.81 mg/dL 08-22 @ 06:20  0.86 mg/dL 08-21 @ 06:24  0.85 mg/dL 08-20 @ 11:55      Hemoglobin:  Hemoglobin: 10.7 g/dL (08-22 @ 06:20)  Hemoglobin: 10.5 g/dL (08-21 @ 06:24)  Hemoglobin: 12.1 g/dL (08-20 @ 11:55)      Platelets: Platelet Count - Automated: 195 K/uL (08-22 @ 06:20)  Platelet Count - Automated: 175 K/uL (08-21 @ 06:24)  Platelet Count - Automated: 225 K/uL (08-20 @ 11:55)      LIVER FUNCTIONS - ( 22 Aug 2023 06:20 )  Alb: 2.6 g/dL / Pro: 6.2 g/dL / ALK PHOS: 99 U/L / ALT: 36 U/L / AST: 21 U/L / GGT: x             Urinalysis Basic - ( 22 Aug 2023 06:20 )    Color: x / Appearance: x / SG: x / pH: x  Gluc: 99 mg/dL / Ketone: x  / Bili: x / Urobili: x   Blood: x / Protein: x / Nitrite: x   Leuk Esterase: x / RBC: x / WBC x   Sq Epi: x / Non Sq Epi: x / Bacteria: x        RADIOLOGY & ADDITIONAL STUDIES:      MICROBIOLOGY:  RECENT CULTURES:  08-21 .Blood Blood XXXX   Growth in aerobic bottle: Gram Negative Rods   Growth in aerobic bottle: Gram Negative Rods    08-21 .Blood Blood XXXX   Growth in anaerobic bottle: Gram Negative Rods  Growth in aerobic bottle: Gram Negative Rods   Growth in anaerobic bottle: Escherichia coli ESBL  Growth in aerobic bottle: Gram Negative Rods  See previous culture [id number]    08-20 Clean Catch Clean Catch (Midstream) XXXX XXXX   >100,000 CFU/ml Escherichia coli    08-20 .Blood Blood-Peripheral XXXX   Growth in aerobic bottle: Gram Negative Rods   Growth in aerobic bottle: Escherichia coli ESBL  See previous culture [id number]    08-20 .Blood Blood-Peripheral Blood Culture PCR  Escherichia coli ESBL   Growth in aerobic and anaerobic bottles: Gram Negative Rods   Growth in aerobic and anaerobic bottles: Escherichia coli ESBL  Direct identification is available within approximately 3-5  hours either by Blood Panel Multiplexed PCR or Direct  MALDI-TOF. Details: [email or web address]                 CHIEF COMPLAINT/ REASON FOR VISIT  .. Patient was seen to address the  issue listed under PROBLEM LIST which is located toward bottom of this note     DEACON ROBERT    PLV 1EAS 106 W1    Allergies    penicillin (Unknown)    Intolerances        PAST MEDICAL & SURGICAL HISTORY:  HTN (hypertension)      Afib      Spinal stenosis      PFO (patent foramen ovale)      Degeneration macular      Osteoporosis      History of complete heart block      Hypothyroidism      Cardiac pacemaker          FAMILY HISTORY:      Home Medications:  Albuterol (Eqv-ProAir HFA) 90 mcg/inh inhalation aerosol: 1 puff(s) inhaled 4 times a day as needed for  cough or wheezing (21 Aug 2023 09:56)  amiodarone 200 mg oral tablet: 1 tab(s) orally once a day (21 Aug 2023 09:55)  clindamycin 1% topical lotion: Apply topically to affected area once a day to face (21 Aug 2023 09:55)  Eliquis 2.5 mg oral tablet: 1 tab(s) orally 2 times a day (21 Aug 2023 09:55)  enalapril 10 mg oral tablet: 1 tab(s) orally once a day (21 Aug 2023 09:55)  ferrous sulfate 325 mg (65 mg elemental iron) oral tablet: 1 tab(s) orally once a day (21 Aug 2023 09:55)  levothyroxine 75 mcg (0.075 mg) oral tablet: 1 tab(s) orally once a day (21 Aug 2023 09:56)  Metoprolol Tartrate 50 mg oral tablet: 1 tab(s) orally 2 times a day (21 Aug 2023 09:56)  Myrbetriq 50 mg oral tablet, extended release: 1 tab(s) orally once a day (21 Aug 2023 09:56)  polyethylene glycol 3350 oral powder for reconstitution: 17 gram(s) orally once a day (21 Aug 2023 09:56)  PreserVision AREDS oral capsule: 1 cap(s) orally once a day (21 Aug 2023 09:56)  Refresh Dry Eye Therapy ophthalmic solution: 1 drop(s) in each eye 4 times a day (21 Aug 2023 09:56)  Tylenol 325 mg oral tablet: 2 tab(s) orally every 4 hours as needed for pain or fever (21 Aug 2023 09:56)      MEDICATIONS  (STANDING):  aMIOdarone    Tablet 200 milliGRAM(s) Oral daily  apixaban 2.5 milliGRAM(s) Oral two times a day  artificial  tears Solution 1 Drop(s) Both EYES two times a day  benzonatate 100 milliGRAM(s) Oral every 8 hours  dextrose 5% + sodium chloride 0.45%. 1000 milliLiter(s) (50 mL/Hr) IV Continuous <Continuous>  enalapril 10 milliGRAM(s) Oral daily  ertapenem  IVPB 1000 milliGRAM(s) IV Intermittent every 24 hours  ferrous    sulfate 325 milliGRAM(s) Oral daily  lactobacillus acidophilus 1 Tablet(s) Oral every 12 hours  levothyroxine 75 MICROGram(s) Oral daily  metoprolol tartrate 50 milliGRAM(s) Oral two times a day  multivitamin/minerals 1 Tablet(s) Oral daily  pantoprazole    Tablet 40 milliGRAM(s) Oral daily  polyethylene glycol 3350 17 Gram(s) Oral daily    MEDICATIONS  (PRN):  acetaminophen     Tablet .. 650 milliGRAM(s) Oral every 6 hours PRN Temp greater or equal to 38C (100.4F), Mild Pain (1 - 3)  albuterol/ipratropium for Nebulization 3 milliLiter(s) Nebulizer every 6 hours PRN Shortness of Breath and/or Wheezing  aluminum hydroxide/magnesium hydroxide/simethicone Suspension 30 milliLiter(s) Oral every 4 hours PRN Dyspepsia  guaiFENesin Oral Liquid (Sugar-Free) 200 milliGRAM(s) Oral every 6 hours PRN Cough  melatonin 3 milliGRAM(s) Oral at bedtime PRN Insomnia  ondansetron Injectable 4 milliGRAM(s) IV Push every 8 hours PRN Nausea and/or Vomiting              Vital Signs Last 24 Hrs  T(C): 36.7 (23 Aug 2023 05:05), Max: 36.7 (23 Aug 2023 05:05)  T(F): 98 (23 Aug 2023 05:05), Max: 98 (23 Aug 2023 05:05)  HR: 64 (23 Aug 2023 05:05) (64 - 78)  BP: 194/84 (23 Aug 2023 05:05) (160/84 - 194/84)  BP(mean): --  RR: 19 (23 Aug 2023 05:05) (18 - 19)  SpO2: 100% (23 Aug 2023 05:05) (91% - 100%)    Parameters below as of 23 Aug 2023 05:05  Patient On (Oxygen Delivery Method): nasal cannula          08-22-23 @ 07:01  -  08-23-23 @ 05:37  --------------------------------------------------------  IN: 400 mL / OUT: 0 mL / NET: 400 mL              LABS:                        10.7   6.97  )-----------( 195      ( 22 Aug 2023 06:20 )             35.2     08-22    142  |  110<H>  |  22  ----------------------------<  99  3.4<L>   |  28  |  0.81    Ca    8.4<L>      22 Aug 2023 06:20  Phos  3.7     08-21  Mg     1.9     08-21    TPro  6.2  /  Alb  2.6<L>  /  TBili  0.8  /  DBili  x   /  AST  21  /  ALT  36  /  AlkPhos  99  08-22    PT/INR - ( 21 Aug 2023 06:24 )   PT: 17.7 sec;   INR: 1.53 ratio           Urinalysis Basic - ( 22 Aug 2023 06:20 )    Color: x / Appearance: x / SG: x / pH: x  Gluc: 99 mg/dL / Ketone: x  / Bili: x / Urobili: x   Blood: x / Protein: x / Nitrite: x   Leuk Esterase: x / RBC: x / WBC x   Sq Epi: x / Non Sq Epi: x / Bacteria: x            WBC:  WBC Count: 6.97 K/uL (08-22 @ 06:20)  WBC Count: 21.39 K/uL (08-21 @ 06:24)  WBC Count: 16.67 K/uL (08-20 @ 11:55)      MICROBIOLOGY:  RECENT CULTURES:  08-21 .Blood Blood XXXX   Growth in aerobic bottle: Gram Negative Rods   Growth in aerobic bottle: Gram Negative Rods    08-21 .Blood Blood XXXX   Growth in anaerobic bottle: Gram Negative Rods  Growth in aerobic bottle: Gram Negative Rods   Growth in anaerobic bottle: Escherichia coli ESBL  Growth in aerobic bottle: Gram Negative Rods  See previous culture [id number]    08-20 Clean Catch Clean Catch (Midstream) XXXX XXXX   >100,000 CFU/ml Escherichia coli    08-20 .Blood Blood-Peripheral XXXX   Growth in aerobic bottle: Gram Negative Rods   Growth in aerobic bottle: Escherichia coli ESBL  See previous culture [id number]    08-20 .Blood Blood-Peripheral Blood Culture PCR  Escherichia coli ESBL   Growth in aerobic and anaerobic bottles: Gram Negative Rods   Growth in aerobic and anaerobic bottles: Escherichia coli ESBL  Direct identification is available within approximately 3-5  hours either by Blood Panel Multiplexed PCR or Direct  MALDI-TOF. Details: [email or web address]                PT/INR - ( 21 Aug 2023 06:24 )   PT: 17.7 sec;   INR: 1.53 ratio             Sodium:  Sodium: 142 mmol/L (08-22 @ 06:20)  Sodium: 141 mmol/L (08-21 @ 06:24)  Sodium: 144 mmol/L (08-20 @ 11:55)      0.81 mg/dL 08-22 @ 06:20  0.86 mg/dL 08-21 @ 06:24  0.85 mg/dL 08-20 @ 11:55      Hemoglobin:  Hemoglobin: 10.7 g/dL (08-22 @ 06:20)  Hemoglobin: 10.5 g/dL (08-21 @ 06:24)  Hemoglobin: 12.1 g/dL (08-20 @ 11:55)      Platelets: Platelet Count - Automated: 195 K/uL (08-22 @ 06:20)  Platelet Count - Automated: 175 K/uL (08-21 @ 06:24)  Platelet Count - Automated: 225 K/uL (08-20 @ 11:55)      LIVER FUNCTIONS - ( 22 Aug 2023 06:20 )  Alb: 2.6 g/dL / Pro: 6.2 g/dL / ALK PHOS: 99 U/L / ALT: 36 U/L / AST: 21 U/L / GGT: x             Urinalysis Basic - ( 22 Aug 2023 06:20 )    Color: x / Appearance: x / SG: x / pH: x  Gluc: 99 mg/dL / Ketone: x  / Bili: x / Urobili: x   Blood: x / Protein: x / Nitrite: x   Leuk Esterase: x / RBC: x / WBC x   Sq Epi: x / Non Sq Epi: x / Bacteria: x        RADIOLOGY & ADDITIONAL STUDIES:      MICROBIOLOGY:  RECENT CULTURES:  08-21 .Blood Blood XXXX   Growth in aerobic bottle: Gram Negative Rods   Growth in aerobic bottle: Gram Negative Rods    08-21 .Blood Blood XXXX   Growth in anaerobic bottle: Gram Negative Rods  Growth in aerobic bottle: Gram Negative Rods   Growth in anaerobic bottle: Escherichia coli ESBL  Growth in aerobic bottle: Gram Negative Rods  See previous culture [id number]    08-20 Clean Catch Clean Catch (Midstream) XXXX XXXX   >100,000 CFU/ml Escherichia coli    08-20 .Blood Blood-Peripheral XXXX   Growth in aerobic bottle: Gram Negative Rods   Growth in aerobic bottle: Escherichia coli ESBL  See previous culture [id number]    08-20 .Blood Blood-Peripheral Blood Culture PCR  Escherichia coli ESBL   Growth in aerobic and anaerobic bottles: Gram Negative Rods   Growth in aerobic and anaerobic bottles: Escherichia coli ESBL  Direct identification is available within approximately 3-5  hours either by Blood Panel Multiplexed PCR or Direct  MALDI-TOF. Details: [email or web address]                 CHIEF COMPLAINT/ REASON FOR VISIT  .. Patient was seen to address the  issue listed under PROBLEM LIST which is located toward bottom of this note     DEACON ROBERT    PLV 1EAS 106 W1    Allergies    penicillin (Unknown)    Intolerances        PAST MEDICAL & SURGICAL HISTORY:  HTN (hypertension)      Afib      Spinal stenosis      PFO (patent foramen ovale)      Degeneration macular      Osteoporosis      History of complete heart block      Hypothyroidism      Cardiac pacemaker          FAMILY HISTORY:      Home Medications:  Albuterol (Eqv-ProAir HFA) 90 mcg/inh inhalation aerosol: 1 puff(s) inhaled 4 times a day as needed for  cough or wheezing (21 Aug 2023 09:56)  amiodarone 200 mg oral tablet: 1 tab(s) orally once a day (21 Aug 2023 09:55)  clindamycin 1% topical lotion: Apply topically to affected area once a day to face (21 Aug 2023 09:55)  Eliquis 2.5 mg oral tablet: 1 tab(s) orally 2 times a day (21 Aug 2023 09:55)  enalapril 10 mg oral tablet: 1 tab(s) orally once a day (21 Aug 2023 09:55)  ferrous sulfate 325 mg (65 mg elemental iron) oral tablet: 1 tab(s) orally once a day (21 Aug 2023 09:55)  levothyroxine 75 mcg (0.075 mg) oral tablet: 1 tab(s) orally once a day (21 Aug 2023 09:56)  Metoprolol Tartrate 50 mg oral tablet: 1 tab(s) orally 2 times a day (21 Aug 2023 09:56)  Myrbetriq 50 mg oral tablet, extended release: 1 tab(s) orally once a day (21 Aug 2023 09:56)  polyethylene glycol 3350 oral powder for reconstitution: 17 gram(s) orally once a day (21 Aug 2023 09:56)  PreserVision AREDS oral capsule: 1 cap(s) orally once a day (21 Aug 2023 09:56)  Refresh Dry Eye Therapy ophthalmic solution: 1 drop(s) in each eye 4 times a day (21 Aug 2023 09:56)  Tylenol 325 mg oral tablet: 2 tab(s) orally every 4 hours as needed for pain or fever (21 Aug 2023 09:56)      MEDICATIONS  (STANDING):  aMIOdarone    Tablet 200 milliGRAM(s) Oral daily  apixaban 2.5 milliGRAM(s) Oral two times a day  artificial  tears Solution 1 Drop(s) Both EYES two times a day  benzonatate 100 milliGRAM(s) Oral every 8 hours  dextrose 5% + sodium chloride 0.45%. 1000 milliLiter(s) (50 mL/Hr) IV Continuous <Continuous>  enalapril 10 milliGRAM(s) Oral daily  ertapenem  IVPB 1000 milliGRAM(s) IV Intermittent every 24 hours  ferrous    sulfate 325 milliGRAM(s) Oral daily  lactobacillus acidophilus 1 Tablet(s) Oral every 12 hours  levothyroxine 75 MICROGram(s) Oral daily  metoprolol tartrate 50 milliGRAM(s) Oral two times a day  multivitamin/minerals 1 Tablet(s) Oral daily  pantoprazole    Tablet 40 milliGRAM(s) Oral daily  polyethylene glycol 3350 17 Gram(s) Oral daily    MEDICATIONS  (PRN):  acetaminophen     Tablet .. 650 milliGRAM(s) Oral every 6 hours PRN Temp greater or equal to 38C (100.4F), Mild Pain (1 - 3)  albuterol/ipratropium for Nebulization 3 milliLiter(s) Nebulizer every 6 hours PRN Shortness of Breath and/or Wheezing  aluminum hydroxide/magnesium hydroxide/simethicone Suspension 30 milliLiter(s) Oral every 4 hours PRN Dyspepsia  guaiFENesin Oral Liquid (Sugar-Free) 200 milliGRAM(s) Oral every 6 hours PRN Cough  melatonin 3 milliGRAM(s) Oral at bedtime PRN Insomnia  ondansetron Injectable 4 milliGRAM(s) IV Push every 8 hours PRN Nausea and/or Vomiting              Vital Signs Last 24 Hrs  T(C): 36.7 (23 Aug 2023 05:05), Max: 36.7 (23 Aug 2023 05:05)  T(F): 98 (23 Aug 2023 05:05), Max: 98 (23 Aug 2023 05:05)  HR: 64 (23 Aug 2023 05:05) (64 - 78)  BP: 194/84 (23 Aug 2023 05:05) (160/84 - 194/84)  BP(mean): --  RR: 19 (23 Aug 2023 05:05) (18 - 19)  SpO2: 100% (23 Aug 2023 05:05) (91% - 100%)    Parameters below as of 23 Aug 2023 05:05  Patient On (Oxygen Delivery Method): nasal cannula          08-22-23 @ 07:01  -  08-23-23 @ 05:37  --------------------------------------------------------  IN: 400 mL / OUT: 0 mL / NET: 400 mL              LABS:                        10.7   6.97  )-----------( 195      ( 22 Aug 2023 06:20 )             35.2     08-22    142  |  110<H>  |  22  ----------------------------<  99  3.4<L>   |  28  |  0.81    Ca    8.4<L>      22 Aug 2023 06:20  Phos  3.7     08-21  Mg     1.9     08-21    TPro  6.2  /  Alb  2.6<L>  /  TBili  0.8  /  DBili  x   /  AST  21  /  ALT  36  /  AlkPhos  99  08-22    PT/INR - ( 21 Aug 2023 06:24 )   PT: 17.7 sec;   INR: 1.53 ratio           Urinalysis Basic - ( 22 Aug 2023 06:20 )    Color: x / Appearance: x / SG: x / pH: x  Gluc: 99 mg/dL / Ketone: x  / Bili: x / Urobili: x   Blood: x / Protein: x / Nitrite: x   Leuk Esterase: x / RBC: x / WBC x   Sq Epi: x / Non Sq Epi: x / Bacteria: x            WBC:  WBC Count: 6.97 K/uL (08-22 @ 06:20)  WBC Count: 21.39 K/uL (08-21 @ 06:24)  WBC Count: 16.67 K/uL (08-20 @ 11:55)      MICROBIOLOGY:  RECENT CULTURES:  08-21 .Blood Blood XXXX   Growth in aerobic bottle: Gram Negative Rods   Growth in aerobic bottle: Gram Negative Rods    08-21 .Blood Blood XXXX   Growth in anaerobic bottle: Gram Negative Rods  Growth in aerobic bottle: Gram Negative Rods   Growth in anaerobic bottle: Escherichia coli ESBL  Growth in aerobic bottle: Gram Negative Rods  See previous culture [id number]    08-20 Clean Catch Clean Catch (Midstream) XXXX XXXX   >100,000 CFU/ml Escherichia coli    08-20 .Blood Blood-Peripheral XXXX   Growth in aerobic bottle: Gram Negative Rods   Growth in aerobic bottle: Escherichia coli ESBL  See previous culture [id number]    08-20 .Blood Blood-Peripheral Blood Culture PCR  Escherichia coli ESBL   Growth in aerobic and anaerobic bottles: Gram Negative Rods   Growth in aerobic and anaerobic bottles: Escherichia coli ESBL  Direct identification is available within approximately 3-5  hours either by Blood Panel Multiplexed PCR or Direct  MALDI-TOF. Details: [email or web address]                PT/INR - ( 21 Aug 2023 06:24 )   PT: 17.7 sec;   INR: 1.53 ratio             Sodium:  Sodium: 142 mmol/L (08-22 @ 06:20)  Sodium: 141 mmol/L (08-21 @ 06:24)  Sodium: 144 mmol/L (08-20 @ 11:55)      0.81 mg/dL 08-22 @ 06:20  0.86 mg/dL 08-21 @ 06:24  0.85 mg/dL 08-20 @ 11:55      Hemoglobin:  Hemoglobin: 10.7 g/dL (08-22 @ 06:20)  Hemoglobin: 10.5 g/dL (08-21 @ 06:24)  Hemoglobin: 12.1 g/dL (08-20 @ 11:55)      Platelets: Platelet Count - Automated: 195 K/uL (08-22 @ 06:20)  Platelet Count - Automated: 175 K/uL (08-21 @ 06:24)  Platelet Count - Automated: 225 K/uL (08-20 @ 11:55)      LIVER FUNCTIONS - ( 22 Aug 2023 06:20 )  Alb: 2.6 g/dL / Pro: 6.2 g/dL / ALK PHOS: 99 U/L / ALT: 36 U/L / AST: 21 U/L / GGT: x             Urinalysis Basic - ( 22 Aug 2023 06:20 )    Color: x / Appearance: x / SG: x / pH: x  Gluc: 99 mg/dL / Ketone: x  / Bili: x / Urobili: x   Blood: x / Protein: x / Nitrite: x   Leuk Esterase: x / RBC: x / WBC x   Sq Epi: x / Non Sq Epi: x / Bacteria: x        RADIOLOGY & ADDITIONAL STUDIES:      MICROBIOLOGY:  RECENT CULTURES:  08-21 .Blood Blood XXXX   Growth in aerobic bottle: Gram Negative Rods   Growth in aerobic bottle: Gram Negative Rods    08-21 .Blood Blood XXXX   Growth in anaerobic bottle: Gram Negative Rods  Growth in aerobic bottle: Gram Negative Rods   Growth in anaerobic bottle: Escherichia coli ESBL  Growth in aerobic bottle: Gram Negative Rods  See previous culture [id number]    08-20 Clean Catch Clean Catch (Midstream) XXXX XXXX   >100,000 CFU/ml Escherichia coli    08-20 .Blood Blood-Peripheral XXXX   Growth in aerobic bottle: Gram Negative Rods   Growth in aerobic bottle: Escherichia coli ESBL  See previous culture [id number]    08-20 .Blood Blood-Peripheral Blood Culture PCR  Escherichia coli ESBL   Growth in aerobic and anaerobic bottles: Gram Negative Rods   Growth in aerobic and anaerobic bottles: Escherichia coli ESBL  Direct identification is available within approximately 3-5  hours either by Blood Panel Multiplexed PCR or Direct  MALDI-TOF. Details: [email or web address]

## 2023-08-23 NOTE — PROGRESS NOTE ADULT - ASSESSMENT
REASON FOR VISIT  .. Management of problems listed below      PHYSICAL EXAM    HEENT Unremarkable  atraumatic   RESP Fair air entry  Harsh breath sound   CARDIAC S1 S2 No S3     NO JVD    ABDOMEN No hepatosplenomegaly   PEDAL EDEMA present No calf tenderness  NO rash       GENERAL DATA .     GOC.    ..   ICU STAY.   .. none  COVID.   .. scv2 8/21/2023 (-)    BEST PRACTICE ISSUES.    HOB ELEVATN.   .. Yes  DVT PPLX.   ..  8/20/2023 apixaba 2.5 x 2 (af)     SPEECH SWALLOW RECOMMENDATIONS.    ..    8/21/2023 soft bite     DIET.    ..  8/20/2023 soft bite size   IV fl.  .. 8/22/2023 D5 1/2 ns  50   STRESS ULCER PPLX.   ..    8/20/2023 protonix 40   INFECTION PPLX.   ..     ALLGY.  ..    pncl                     WT.  ..  8/20/2023 56  BMI.  ..   8/20/2023 21    PROCEDURES.    ABGS.   .     VS/ PO/IO/ VENT/ DRIPS.   8/23/2023 afeb 76 140/80   8/23/2023 nc 96%     DOA C/C.     . 8/20/2023 · Chief Complaint Quote sent by "[employer] [facility] for cough x 2 weeks. negative cxr and covid swab. poor PO intake today.           INITIAL PRESENTATION.   . 8/20/2023 89-year-old female with history of hypothyroidism, A-fib on Eliquis, recent pacemaker for complete heart block brought in by ambulance from [facility] assisted living home for cough for the past 2 weeks.  Associated with generalized weakness and decreased p.o. intake.  No fall or trauma.  Patient states she has had cold symptoms for a while.  Denies fever, chills, chest pain, shortness of breath, abdominal pain, nausea, vomiting, upper or lower extremity weakness or paresthesias.   pmd: Dr. Crawley, cards: [employer].   . hypothyroidism   . A-fib on Eliquis (hx AFL ablation),  .  PFO  HOME MEDS.   . levoxyl eliquis 2.5 x 2   COURSE.  . 8/20/2023 pulm consultd    . 8/20/2023 5:39 PM ER meds given already include cefepime 2g     PROBLEMS/ASSESSMENT/RECOMMENDATIONS (A/R).  PULMONARY.  . GAS EXCHANGE.  .. A/R.   .. Monitor pulse oximetry and target po 90-95%    . COPD.  .. 8/20/2023 duoneb.4p   .. 8/20/2023 benzonatate 100.3 x3d   .. 8/20/2023 gauiafenesin     INFECTION.  . E coli ESBL CTX M bacteremia 8/20  .. w 8/20-8/21-8/22-8/23/2023 w 16 - 21- 6.9 - 6   .. pr 8/21/2023 pr 7.5   .. ua 8/20/2023 ua 1012 w tntc l estrase large r 25   .. ct chest 8/20/2023   .... l upper ant cardiac device leads terminating in r atrium and rv   .... no enlarged hilar or mediastinal ln   .... mild doe mod diffuse distention of mid to upper esophagus   .... cm   .... increased density of liver ? amiodarone effect   .... bl lower lobe partial areas of compressive atelectasis sl progressd since ctap 3/4/2023   .... mild bl lower lung areas of linear and compressive atelectasis lower lobes and lingula   .... mild bl upper lobe subsegmental linear and dependent atelectasis   .... nonsp mild interlobular septal thickening   .. MICRO  .. bc 8/20 E coli ESBL CTX M bacteremia 8/20   .. bc 8/21 e coli esbl    .. bc 8/22 (-)   .. legn 8/21 (-)   .. rvp 8/20 (-)   .. EVENTS   .. 8/20/2023 will start empiric levaquin   .. 8/21/2023 message sent to ID to change to carbapenem   .. ABIO  .. 8/21 ertapenem 8d dr green   .. AR   .. ESBL e coli bacteremia on 8/20 likely from urine on ertapenem stratd 8/21     . Hemodynamics.  .. la 8/20/2023 la 1.4  .. BP ok      . A fib   .. 8/20/2023 eliquis   .. 8/22/2023 amiodaron 200   .. 8/22/2023 metporolol 50.2     . CAD.  .. Tr 8/21/2023 Tr 31     . CHF   .. bnp 8/20-8/23/2023 bnp 4545 - 3219   .. 8/22/2023 enalapril 10       HEMAT   .. Hb 8/20-8/21-8/23/2023 Hb 12 - 10.5 - 10   .. plt 8/20/2023 plt 225   .. inr 8/20/2023 inr 129     RENAL   .. Na 8/20/2023 Na 144   .. K 8/20/2023 K 3.3   .. co2 8/20/2023 co2 29   .. Cr 8/20/2023 Cr .8     . Dysphagia  .. esophagogram 8/22/2023 smooth tapering o distal esophagus     LFTS   .. AP 8/20/2023 124  .. ast 23  .. alt 25     MAIN ISSUES.  . Cough x 2 wk poa 8/20/2023  . Pneumonia   . E coli ESBL CTX M bacteremia 8/20 8/21  .. bc 8/22 (-)   .. 8/20/2023 levaquin -> 8/21 ertapenem 1 x 8d Dr Green  . COPD   . A fib   .. 8/20/2023 apixaba   . CHF     TIME SPENT.   . Over 36 minutes aggregate care time spent on encounter; activities included   direct patient care, counseling and/or coordinating care reviewing notes, lab data/ imaging , discussion with multidisciplinary team/ patient  /family and explaining in detail risks, benefits, alternatives  of the recommendations     JAKOB WORTHY 89 f 8/20/2023 9/22/1929 DR BAM HERRERA      REASON FOR VISIT  .. Management of problems listed below      PHYSICAL EXAM    HEENT Unremarkable  atraumatic   RESP Fair air entry  Harsh breath sound   CARDIAC S1 S2 No S3     NO JVD    ABDOMEN No hepatosplenomegaly   PEDAL EDEMA present No calf tenderness  NO rash       GENERAL DATA .     GOC.    ..   ICU STAY.   .. none  COVID.   .. scv2 8/21/2023 (-)    BEST PRACTICE ISSUES.    HOB ELEVATN.   .. Yes  DVT PPLX.   ..  8/20/2023 apixaba 2.5 x 2 (af)     SPEECH SWALLOW RECOMMENDATIONS.    ..    8/21/2023 soft bite     DIET.    ..  8/20/2023 soft bite size   IV fl.  .. 8/22/2023 D5 1/2 ns  50   STRESS ULCER PPLX.   ..    8/20/2023 protonix 40   INFECTION PPLX.   ..     ALLGY.  ..    pncl                     WT.  ..  8/20/2023 56  BMI.  ..   8/20/2023 21    PROCEDURES.    ABGS.   .     VS/ PO/IO/ VENT/ DRIPS.   8/23/2023 afeb 76 140/80   8/23/2023 nc 96%     DOA C/C.     . 8/20/2023 · Chief Complaint Quote sent by [facility] for cough x 2 weeks. negative cxr and covid swab. poor PO intake today.           INITIAL PRESENTATION.   . 8/20/2023 89-year-old female with history of hypothyroidism, A-fib on Eliquis, recent pacemaker for complete heart block brought in by ambulance from [facility] assisted living home for cough for the past 2 weeks.  Associated with generalized weakness and decreased p.o. intake.  No fall or trauma.  Patient states she has had cold symptoms for a while.  Denies fever, chills, chest pain, shortness of breath, abdominal pain, nausea, vomiting, upper or lower extremity weakness or paresthesias.   pmd: Dr. Crawley, cards: [facility].   . hypothyroidism   . A-fib on Eliquis (hx AFL ablation),  .  PFO  HOME MEDS.   . levoxyl eliquis 2.5 x 2   COURSE.  . 8/20/2023 pulm consultd    . 8/20/2023 5:39 PM ER meds given already include cefepime 2g     PROBLEMS/ASSESSMENT/RECOMMENDATIONS (A/R).  PULMONARY.  . GAS EXCHANGE.  .. A/R.   .. Monitor pulse oximetry and target po 90-95%    . COPD.  .. 8/20/2023 duoneb.4p   .. 8/20/2023 benzonatate 100.3 x3d   .. 8/20/2023 gauiafenesin     INFECTION.  . E coli ESBL CTX M bacteremia 8/20  .. w 8/20-8/21-8/22-8/23/2023 w 16 - 21- 6.9 - 6   .. pr 8/21/2023 pr 7.5   .. ua 8/20/2023 ua 1012 w tntc l estrase large r 25   .. ct chest 8/20/2023   .... l upper ant cardiac device leads terminating in r atrium and rv   .... no enlarged hilar or mediastinal ln   .... mild doe mod diffuse distention of mid to upper esophagus   .... cm   .... increased density of liver ? amiodarone effect   .... bl lower lobe partial areas of compressive atelectasis sl progressd since ctap 3/4/2023   .... mild bl lower lung areas of linear and compressive atelectasis lower lobes and lingula   .... mild bl upper lobe subsegmental linear and dependent atelectasis   .... nonsp mild interlobular septal thickening   .. MICRO  .. bc 8/20 E coli ESBL CTX M bacteremia 8/20   .. bc 8/21 e coli esbl    .. bc 8/22 (-)   .. legn 8/21 (-)   .. rvp 8/20 (-)   .. EVENTS   .. 8/20/2023 will start empiric levaquin   .. 8/21/2023 message sent to ID to change to carbapenem   .. ABIO  .. 8/21 ertapenem 8d dr green   .. AR   .. ESBL e coli bacteremia on 8/20 likely from urine on ertapenem stratd 8/21     . Hemodynamics.  .. la 8/20/2023 la 1.4  .. BP ok      . A fib   .. 8/20/2023 eliquis   .. 8/22/2023 amiodaron 200   .. 8/22/2023 metporolol 50.2     . CAD.  .. Tr 8/21/2023 Tr 31     . CHF   .. bnp 8/20-8/23/2023 bnp 4545 - 3219   .. 8/22/2023 enalapril 10       HEMAT   .. Hb 8/20-8/21-8/23/2023 Hb 12 - 10.5 - 10   .. plt 8/20/2023 plt 225   .. inr 8/20/2023 inr 129     RENAL   .. Na 8/20/2023 Na 144   .. K 8/20/2023 K 3.3   .. co2 8/20/2023 co2 29   .. Cr 8/20/2023 Cr .8     . Dysphagia  .. esophagogram 8/22/2023 smooth tapering o distal esophagus     LFTS   .. AP 8/20/2023 124  .. ast 23  .. alt 25     MAIN ISSUES.  . Cough x 2 wk poa 8/20/2023  . Pneumonia   . E coli ESBL CTX M bacteremia 8/20 8/21  .. bc 8/22 (-)   .. 8/20/2023 levaquin -> 8/21 ertapenem 1 x 8d Dr Green  . COPD   . A fib   .. 8/20/2023 apixaba   . CHF     TIME SPENT.   . Over 36 minutes aggregate care time spent on encounter; activities included   direct patient care, counseling and/or coordinating care reviewing notes, lab data/ imaging , discussion with multidisciplinary team/ patient  /family and explaining in detail risks, benefits, alternatives  of the recommendations     JAKOB WORTHY 89 f 8/20/2023 9/22/1929 DR BAM HERRERA      REASON FOR VISIT  .. Management of problems listed below      PHYSICAL EXAM    HEENT Unremarkable  atraumatic   RESP Fair air entry  Harsh breath sound   CARDIAC S1 S2 No S3     NO JVD    ABDOMEN No hepatosplenomegaly   PEDAL EDEMA present No calf tenderness  NO rash       GENERAL DATA .     GOC.    ..   ICU STAY.   .. none  COVID.   .. scv2 8/21/2023 (-)    BEST PRACTICE ISSUES.    HOB ELEVATN.   .. Yes  DVT PPLX.   ..  8/20/2023 apixaba 2.5 x 2 (af)     SPEECH SWALLOW RECOMMENDATIONS.    ..    8/21/2023 soft bite     DIET.    ..  8/20/2023 soft bite size   IV fl.  .. 8/22/2023 D5 1/2 ns  50   STRESS ULCER PPLX.   ..    8/20/2023 protonix 40   INFECTION PPLX.   ..     ALLGY.  ..    pncl                     WT.  ..  8/20/2023 56  BMI.  ..   8/20/2023 21    PROCEDURES.    ABGS.   .     VS/ PO/IO/ VENT/ DRIPS.   8/23/2023 afeb 76 140/80   8/23/2023 nc 96%     DOA C/C.     . 8/20/2023 · Chief Complaint Quote sent by [facility] for cough x 2 weeks. negative cxr and covid swab. poor PO intake today.           INITIAL PRESENTATION.   . 8/20/2023 89-year-old female with history of hypothyroidism, A-fib on Eliquis, recent pacemaker for complete heart block brought in by ambulance from [facility] assisted living home for cough for the past 2 weeks.  Associated with generalized weakness and decreased p.o. intake.  No fall or trauma.  Patient states she has had cold symptoms for a while.  Denies fever, chills, chest pain, shortness of breath, abdominal pain, nausea, vomiting, upper or lower extremity weakness or paresthesias.   pmd: Dr. Crawley, cards: [facility].   . hypothyroidism   . A-fib on Eliquis (hx AFL ablation),  .  PFO  HOME MEDS.   . levoxyl eliquis 2.5 x 2   COURSE.  . 8/20/2023 pulm consultd    . 8/20/2023 5:39 PM ER meds given already include cefepime 2g     PROBLEMS/ASSESSMENT/RECOMMENDATIONS (A/R).  PULMONARY.  . GAS EXCHANGE.  .. A/R.   .. Monitor pulse oximetry and target po 90-95%    . COPD.  .. 8/20/2023 duoneb.4p   .. 8/20/2023 benzonatate 100.3 x3d   .. 8/20/2023 gauiafenesin     INFECTION.  . E coli ESBL CTX M bacteremia 8/20  .. w 8/20-8/21-8/22-8/23/2023 w 16 - 21- 6.9 - 6   .. pr 8/21/2023 pr 7.5   .. ua 8/20/2023 ua 1012 w tntc l estrase large r 25   .. ct chest 8/20/2023   .... l upper ant cardiac device leads terminating in r atrium and rv   .... no enlarged hilar or mediastinal ln   .... mild doe mod diffuse distention of mid to upper esophagus   .... cm   .... increased density of liver ? amiodarone effect   .... bl lower lobe partial areas of compressive atelectasis sl progressd since ctap 3/4/2023   .... mild bl lower lung areas of linear and compressive atelectasis lower lobes and lingula   .... mild bl upper lobe subsegmental linear and dependent atelectasis   .... nonsp mild interlobular septal thickening   .. MICRO  .. bc 8/20 E coli ESBL CTX M bacteremia 8/20   .. bc 8/21 e coli esbl    .. bc 8/22 (-)   .. legn 8/21 (-)   .. rvp 8/20 (-)   .. EVENTS   .. 8/20/2023 will start empiric levaquin   .. 8/21/2023 message sent to ID to change to carbapenem   .. ABIO  .. 8/21 ertapenem 8d dr green   .. AR   .. ESBL e coli bacteremia on 8/20 likely from urine on ertapenem stratd 8/21     . Hemodynamics.  .. la 8/20/2023 la 1.4  .. BP ok      . A fib   .. 8/20/2023 eliquis   .. 8/22/2023 amiodaron 200   .. 8/22/2023 metporolol 50.2     . CAD.  .. Tr 8/21/2023 Tr 31     . CHF   .. bnp 8/20-8/23/2023 bnp 4545 - 3219   .. 8/22/2023 enalapril 10       HEMAT   .. Hb 8/20-8/21-8/23/2023 Hb 12 - 10.5 - 10   .. plt 8/20/2023 plt 225   .. inr 8/20/2023 inr 129     RENAL   .. Na 8/20/2023 Na 144   .. K 8/20/2023 K 3.3   .. co2 8/20/2023 co2 29   .. Cr 8/20/2023 Cr .8     . Dysphagia  .. esophagogram 8/22/2023 smooth tapering o distal esophagus     LFTS   .. AP 8/20/2023 124  .. ast 23  .. alt 25     MAIN ISSUES.  . Cough x 2 wk poa 8/20/2023  . Pneumonia   . E coli ESBL CTX M bacteremia 8/20 8/21  .. bc 8/22 (-)   .. 8/20/2023 levaquin -> 8/21 ertapenem 1 x 8d Dr Green  . COPD   . A fib   .. 8/20/2023 apixaba   . CHF     TIME SPENT.   . Over 36 minutes aggregate care time spent on encounter; activities included   direct patient care, counseling and/or coordinating care reviewing notes, lab data/ imaging , discussion with multidisciplinary team/ patient  /family and explaining in detail risks, benefits, alternatives  of the recommendations     JAKOB WORTHY 89 f 8/20/2023 9/22/1929 DR BAM HERRERA

## 2023-08-23 NOTE — CHART NOTE - NSCHARTNOTEFT_GEN_A_CORE
RN called for manual /80. Patient asymptomatic. Due for AM hypertension medications: lopressor, amiodarone, and enalapril. RN to give now. BP measurements during daytime have been notably elevated systolic 160s.   - RN to give AM BP medication  - Will reassess BP in 1 hour  - Cardiology Dr. Reich following RN called for manual /80. Patient asymptomatic. Due for AM hypertension medications: lopressor, amiodarone, and enalapril. RN to give now. BP measurements during daytime have been notably elevated systolic 160s.   - RN to give AM BP medication  - Per tele tech, no events. EKG tracing shows sinus rhythm, rate consistently in 60s.   - Will reassess BP in 1 hour  - Cardiology Dr. Reich following RN called for manual /80. Patient asymptomatic. Due for AM hypertension medications: lopressor, amiodarone, and enalapril. RN to give now. BP measurements during daytime have been notably elevated systolic 160s.   - RN to give AM BP medication  - Per tele tech, no events. EKG tracing shows sinus rhythm, rate consistently in 60s.   - Will reassess BP in 1 hour  - Cardiology Dr. Reich following    _____________________________________________________________________  ADDENDUM  RN called to report critical lab value: second culture drawn 8/21 growing gram negative rods in aerobic bottle. First blood culture drawn on 8/21, of note, grew GNR in both anaerobic and aerobic bottles. Patient currently on ertapenem as advised by infectious disease.   - ID already following   - Primary team to follow up in AM RN called for manual /80. Patient asymptomatic. Due for AM hypertension medications: lopressor, amiodarone, and enalapril. RN to give now. BP measurements during daytime have been notably elevated systolic 160s.   - RN to give AM BP medication  - Per tele tech, no events. EKG tracing shows sinus rhythm, rate consistently in 60s.   - Will reassess BP in 1 hour  - Cardiology Dr. Reich following    _____________________________________________________________________  ADDENDUM  RN called to report critical lab value: second culture drawn 8/21 growing gram negative rods in aerobic bottle. First blood culture drawn on 8/21, of note, grew GNR in both anaerobic and aerobic bottles. Patient currently on ertapenem as advised by infectious disease.   - ID already following   - Primary team to follow up in AM    _____________________________________________________________________  ADDENDUM  Repeat BP after 1 hour 188/94.   - Confirmed with tele tech, HR 60s  - Administered hydralazine 10mg IVP x1  - After 15 minutes, follow up BP measurement 120/76  - Primary team to follow up

## 2023-08-23 NOTE — PROGRESS NOTE ADULT - SUBJECTIVE AND OBJECTIVE BOX
Interval History:    CENTRAL LINE:   [  ] YES       [  ] NO  SKELTON:                 [  ] YES       [  ] NO         REVIEW OF SYSTEMS:  All Systems below were reviewed and are negative [  ]  HEENT:  ID:  Pulmonary:  Cardiac:  GI:  Renal:  Musculoskeletal:  All other systems above were reviewed and are negative   [  ]      MEDICATIONS  (STANDING):  aMIOdarone    Tablet 200 milliGRAM(s) Oral daily  apixaban 2.5 milliGRAM(s) Oral two times a day  artificial  tears Solution 1 Drop(s) Both EYES two times a day  dextrose 5% + sodium chloride 0.45%. 1000 milliLiter(s) (50 mL/Hr) IV Continuous <Continuous>  enalapril 10 milliGRAM(s) Oral daily  ertapenem  IVPB 1000 milliGRAM(s) IV Intermittent every 24 hours  ferrous    sulfate 325 milliGRAM(s) Oral daily  lactobacillus acidophilus 1 Tablet(s) Oral every 12 hours  levothyroxine 75 MICROGram(s) Oral daily  metoprolol tartrate 50 milliGRAM(s) Oral two times a day  multivitamin/minerals 1 Tablet(s) Oral daily  pantoprazole    Tablet 40 milliGRAM(s) Oral daily  polyethylene glycol 3350 17 Gram(s) Oral daily    MEDICATIONS  (PRN):  acetaminophen     Tablet .. 650 milliGRAM(s) Oral every 6 hours PRN Temp greater or equal to 38C (100.4F), Mild Pain (1 - 3)  albuterol/ipratropium for Nebulization 3 milliLiter(s) Nebulizer every 6 hours PRN Shortness of Breath and/or Wheezing  aluminum hydroxide/magnesium hydroxide/simethicone Suspension 30 milliLiter(s) Oral every 4 hours PRN Dyspepsia  guaiFENesin Oral Liquid (Sugar-Free) 200 milliGRAM(s) Oral every 6 hours PRN Cough  melatonin 3 milliGRAM(s) Oral at bedtime PRN Insomnia  ondansetron Injectable 4 milliGRAM(s) IV Push every 8 hours PRN Nausea and/or Vomiting      Vital Signs Last 24 Hrs  T(C): 36.6 (23 Aug 2023 12:42), Max: 36.7 (23 Aug 2023 05:05)  T(F): 97.9 (23 Aug 2023 12:42), Max: 98 (23 Aug 2023 05:05)  HR: 70 (23 Aug 2023 16:59) (64 - 76)  BP: 151/79 (23 Aug 2023 16:59) (120/76 - 198/90)  BP(mean): --  RR: 17 (23 Aug 2023 12:42) (17 - 19)  SpO2: 96% (23 Aug 2023 12:42) (93% - 100%)    Parameters below as of 23 Aug 2023 12:42  Patient On (Oxygen Delivery Method): nasal cannula        I&O's Summary    22 Aug 2023 07:01  -  23 Aug 2023 07:00  --------------------------------------------------------  IN: 400 mL / OUT: 0 mL / NET: 400 mL    23 Aug 2023 07:01  -  23 Aug 2023 19:09  --------------------------------------------------------  IN: 450 mL / OUT: 0 mL / NET: 450 mL        PHYSICAL EXAM:  HEENT: NC/AT; PERRLA  Neck: Soft; no tenderness  Lungs: CTA bilaterally; no wheezing.   Heart:  Abdomen:  Genital/ Rectal:  Extremities:  Neurologic:  Vascular:      LABORATORY:    CBC Full  -  ( 23 Aug 2023 06:08 )  WBC Count : 6.00 K/uL  RBC Count : 3.44 M/uL  Hemoglobin : 10.1 g/dL  Hematocrit : 33.8 %  Platelet Count - Automated : 198 K/uL  Mean Cell Volume : 98.3 fl  Mean Cell Hemoglobin : 29.4 pg  Mean Cell Hemoglobin Concentration : 29.9 gm/dL  Auto Neutrophil # : x  Auto Lymphocyte # : x  Auto Monocyte # : x  Auto Eosinophil # : x  Auto Basophil # : x  Auto Neutrophil % : x  Auto Lymphocyte % : x  Auto Monocyte % : x  Auto Eosinophil % : x  Auto Basophil % : x      ESR:                   08-21 @ 06:24  --    C-Reactive Protein:     08-21 @ 06:24  --    Procalcitonin:           08-21 @ 06:24   7.59      08-23    142  |  111<H>  |  16  ----------------------------<  100<H>  3.5   |  28  |  0.68    Ca    8.4<L>      23 Aug 2023 06:08    TPro  6.0  /  Alb  2.5<L>  /  TBili  0.5  /  DBili  x   /  AST  11<L>  /  ALT  25  /  AlkPhos  91  08-23      Rapid Respiratory Viral Panel Result        08-20 @ 11:55  Rapid RVP [facility]  Coronovirus --  Adenovirus --  Bordetella Pertussis --  Chlamydia Pneumonia --  Entero/Rhinovirus--  HKU1 Coronovirus --  HMPV Coronovirus --  Influenza A --  Influenza AH1 --  Influenza AH1 2009 --  Influenza AH3 --  Influenza B --  Mycoplasma Pneumoniae --  NL63 Coronovirus --  OC43 Coronovirus --  Parainfluenza 1 --  Parainfluenza 2 --  Parainfluenza 3 --  Parainfluenza 4 --  Resp Syncytial Virus --      Assessment and Plan:          Edwardo Gonzalez MD   [phone].      Interval History:    CENTRAL LINE:   [  ] YES       [  ] NO  SKELTON:                 [  ] YES       [  ] NO         REVIEW OF SYSTEMS:  All Systems below were reviewed and are negative [  ]  HEENT:  ID:  Pulmonary:  Cardiac:  GI:  Renal:  Musculoskeletal:  All other systems above were reviewed and are negative   [  ]      MEDICATIONS  (STANDING):  aMIOdarone    Tablet 200 milliGRAM(s) Oral daily  apixaban 2.5 milliGRAM(s) Oral two times a day  artificial  tears Solution 1 Drop(s) Both EYES two times a day  dextrose 5% + sodium chloride 0.45%. 1000 milliLiter(s) (50 mL/Hr) IV Continuous <Continuous>  enalapril 10 milliGRAM(s) Oral daily  ertapenem  IVPB 1000 milliGRAM(s) IV Intermittent every 24 hours  ferrous    sulfate 325 milliGRAM(s) Oral daily  lactobacillus acidophilus 1 Tablet(s) Oral every 12 hours  levothyroxine 75 MICROGram(s) Oral daily  metoprolol tartrate 50 milliGRAM(s) Oral two times a day  multivitamin/minerals 1 Tablet(s) Oral daily  pantoprazole    Tablet 40 milliGRAM(s) Oral daily  polyethylene glycol 3350 17 Gram(s) Oral daily    MEDICATIONS  (PRN):  acetaminophen     Tablet .. 650 milliGRAM(s) Oral every 6 hours PRN Temp greater or equal to 38C (100.4F), Mild Pain (1 - 3)  albuterol/ipratropium for Nebulization 3 milliLiter(s) Nebulizer every 6 hours PRN Shortness of Breath and/or Wheezing  aluminum hydroxide/magnesium hydroxide/simethicone Suspension 30 milliLiter(s) Oral every 4 hours PRN Dyspepsia  guaiFENesin Oral Liquid (Sugar-Free) 200 milliGRAM(s) Oral every 6 hours PRN Cough  melatonin 3 milliGRAM(s) Oral at bedtime PRN Insomnia  ondansetron Injectable 4 milliGRAM(s) IV Push every 8 hours PRN Nausea and/or Vomiting      Vital Signs Last 24 Hrs  T(C): 36.6 (23 Aug 2023 12:42), Max: 36.7 (23 Aug 2023 05:05)  T(F): 97.9 (23 Aug 2023 12:42), Max: 98 (23 Aug 2023 05:05)  HR: 70 (23 Aug 2023 16:59) (64 - 76)  BP: 151/79 (23 Aug 2023 16:59) (120/76 - 198/90)  BP(mean): --  RR: 17 (23 Aug 2023 12:42) (17 - 19)  SpO2: 96% (23 Aug 2023 12:42) (93% - 100%)    Parameters below as of 23 Aug 2023 12:42  Patient On (Oxygen Delivery Method): nasal cannula        I&O's Summary    22 Aug 2023 07:01  -  23 Aug 2023 07:00  --------------------------------------------------------  IN: 400 mL / OUT: 0 mL / NET: 400 mL    23 Aug 2023 07:01  -  23 Aug 2023 19:09  --------------------------------------------------------  IN: 450 mL / OUT: 0 mL / NET: 450 mL        PHYSICAL EXAM:  HEENT: NC/AT; PERRLA  Neck: Soft; no tenderness  Lungs: CTA bilaterally; no wheezing.   Heart:  Abdomen:  Genital/ Rectal:  Extremities:  Neurologic:  Vascular:      LABORATORY:    CBC Full  -  ( 23 Aug 2023 06:08 )  WBC Count : 6.00 K/uL  RBC Count : 3.44 M/uL  Hemoglobin : 10.1 g/dL  Hematocrit : 33.8 %  Platelet Count - Automated : 198 K/uL  Mean Cell Volume : 98.3 fl  Mean Cell Hemoglobin : 29.4 pg  Mean Cell Hemoglobin Concentration : 29.9 gm/dL  Auto Neutrophil # : x  Auto Lymphocyte # : x  Auto Monocyte # : x  Auto Eosinophil # : x  Auto Basophil # : x  Auto Neutrophil % : x  Auto Lymphocyte % : x  Auto Monocyte % : x  Auto Eosinophil % : x  Auto Basophil % : x      ESR:                   08-21 @ 06:24  --    C-Reactive Protein:     08-21 @ 06:24  --    Procalcitonin:           08-21 @ 06:24   7.59      08-23    142  |  111<H>  |  16  ----------------------------<  100<H>  3.5   |  28  |  0.68    Ca    8.4<L>      23 Aug 2023 06:08    TPro  6.0  /  Alb  2.5<L>  /  TBili  0.5  /  DBili  x   /  AST  11<L>  /  ALT  25  /  AlkPhos  91  08-23      Rapid Respiratory Viral Panel Result        08-20 @ 11:55  Rapid RVP [facility]  Coronovirus --  Adenovirus --  Bordetella Pertussis --  Chlamydia Pneumonia --  Entero/Rhinovirus--  HKU1 Coronovirus --  HMPV Coronovirus --  Influenza A --  Influenza AH1 --  Influenza AH1 2009 --  Influenza AH3 --  Influenza B --  Mycoplasma Pneumoniae --  NL63 Coronovirus --  OC43 Coronovirus --  Parainfluenza 1 --  Parainfluenza 2 --  Parainfluenza 3 --  Parainfluenza 4 --  Resp Syncytial Virus --      Assessment and Plan:          Edwardo Gonzalez MD   [phone].      Interval History:    CENTRAL LINE:   [  ] YES       [  ] NO  SKELTON:                 [  ] YES       [  ] NO         REVIEW OF SYSTEMS:  All Systems below were reviewed and are negative [  ]  HEENT:  ID:  Pulmonary:  Cardiac:  GI:  Renal:  Musculoskeletal:  All other systems above were reviewed and are negative   [  ]      MEDICATIONS  (STANDING):  aMIOdarone    Tablet 200 milliGRAM(s) Oral daily  apixaban 2.5 milliGRAM(s) Oral two times a day  artificial  tears Solution 1 Drop(s) Both EYES two times a day  dextrose 5% + sodium chloride 0.45%. 1000 milliLiter(s) (50 mL/Hr) IV Continuous <Continuous>  enalapril 10 milliGRAM(s) Oral daily  ertapenem  IVPB 1000 milliGRAM(s) IV Intermittent every 24 hours  ferrous    sulfate 325 milliGRAM(s) Oral daily  lactobacillus acidophilus 1 Tablet(s) Oral every 12 hours  levothyroxine 75 MICROGram(s) Oral daily  metoprolol tartrate 50 milliGRAM(s) Oral two times a day  multivitamin/minerals 1 Tablet(s) Oral daily  pantoprazole    Tablet 40 milliGRAM(s) Oral daily  polyethylene glycol 3350 17 Gram(s) Oral daily    MEDICATIONS  (PRN):  acetaminophen     Tablet .. 650 milliGRAM(s) Oral every 6 hours PRN Temp greater or equal to 38C (100.4F), Mild Pain (1 - 3)  albuterol/ipratropium for Nebulization 3 milliLiter(s) Nebulizer every 6 hours PRN Shortness of Breath and/or Wheezing  aluminum hydroxide/magnesium hydroxide/simethicone Suspension 30 milliLiter(s) Oral every 4 hours PRN Dyspepsia  guaiFENesin Oral Liquid (Sugar-Free) 200 milliGRAM(s) Oral every 6 hours PRN Cough  melatonin 3 milliGRAM(s) Oral at bedtime PRN Insomnia  ondansetron Injectable 4 milliGRAM(s) IV Push every 8 hours PRN Nausea and/or Vomiting      Vital Signs Last 24 Hrs  T(C): 36.6 (23 Aug 2023 12:42), Max: 36.7 (23 Aug 2023 05:05)  T(F): 97.9 (23 Aug 2023 12:42), Max: 98 (23 Aug 2023 05:05)  HR: 70 (23 Aug 2023 16:59) (64 - 76)  BP: 151/79 (23 Aug 2023 16:59) (120/76 - 198/90)  BP(mean): --  RR: 17 (23 Aug 2023 12:42) (17 - 19)  SpO2: 96% (23 Aug 2023 12:42) (93% - 100%)    Parameters below as of 23 Aug 2023 12:42  Patient On (Oxygen Delivery Method): nasal cannula        I&O's Summary    22 Aug 2023 07:01  -  23 Aug 2023 07:00  --------------------------------------------------------  IN: 400 mL / OUT: 0 mL / NET: 400 mL    23 Aug 2023 07:01  -  23 Aug 2023 19:09  --------------------------------------------------------  IN: 450 mL / OUT: 0 mL / NET: 450 mL        PHYSICAL EXAM:  HEENT: NC/AT; PERRLA  Neck: Soft; no tenderness  Lungs: CTA bilaterally; no wheezing.   Heart:  Abdomen:  Genital/ Rectal:  Extremities:  Neurologic:  Vascular:      LABORATORY:    CBC Full  -  ( 23 Aug 2023 06:08 )  WBC Count : 6.00 K/uL  RBC Count : 3.44 M/uL  Hemoglobin : 10.1 g/dL  Hematocrit : 33.8 %  Platelet Count - Automated : 198 K/uL  Mean Cell Volume : 98.3 fl  Mean Cell Hemoglobin : 29.4 pg  Mean Cell Hemoglobin Concentration : 29.9 gm/dL  Auto Neutrophil # : x  Auto Lymphocyte # : x  Auto Monocyte # : x  Auto Eosinophil # : x  Auto Basophil # : x  Auto Neutrophil % : x  Auto Lymphocyte % : x  Auto Monocyte % : x  Auto Eosinophil % : x  Auto Basophil % : x      ESR:                   08-21 @ 06:24  --    C-Reactive Protein:     08-21 @ 06:24  --    Procalcitonin:           08-21 @ 06:24   7.59      08-23    142  |  111<H>  |  16  ----------------------------<  100<H>  3.5   |  28  |  0.68    Ca    8.4<L>      23 Aug 2023 06:08    TPro  6.0  /  Alb  2.5<L>  /  TBili  0.5  /  DBili  x   /  AST  11<L>  /  ALT  25  /  AlkPhos  91  08-23      Rapid Respiratory Viral Panel Result        08-20 @ 11:55  Rapid RVP [facility]  Coronovirus --  Adenovirus --  Bordetella Pertussis --  Chlamydia Pneumonia --  Entero/Rhinovirus--  HKU1 Coronovirus --  HMPV Coronovirus --  Influenza A --  Influenza AH1 --  Influenza AH1 2009 --  Influenza AH3 --  Influenza B --  Mycoplasma Pneumoniae --  NL63 Coronovirus --  OC43 Coronovirus --  Parainfluenza 1 --  Parainfluenza 2 --  Parainfluenza 3 --  Parainfluenza 4 --  Resp Syncytial Virus --      Assessment and Plan:          Edwardo Gonzalez MD   [phone].    She is afebrile  On O2 NC  Awake    MEDICATIONS  (STANDING):  aMIOdarone    Tablet 200 milliGRAM(s) Oral daily  apixaban 2.5 milliGRAM(s) Oral two times a day  artificial  tears Solution 1 Drop(s) Both EYES two times a day  dextrose 5% + sodium chloride 0.45%. 1000 milliLiter(s) (50 mL/Hr) IV Continuous <Continuous>  enalapril 10 milliGRAM(s) Oral daily  ertapenem  IVPB 1000 milliGRAM(s) IV Intermittent every 24 hours  ferrous    sulfate 325 milliGRAM(s) Oral daily  lactobacillus acidophilus 1 Tablet(s) Oral every 12 hours  levothyroxine 75 MICROGram(s) Oral daily  metoprolol tartrate 50 milliGRAM(s) Oral two times a day  multivitamin/minerals 1 Tablet(s) Oral daily  pantoprazole    Tablet 40 milliGRAM(s) Oral daily  polyethylene glycol 3350 17 Gram(s) Oral daily    MEDICATIONS  (PRN):  acetaminophen     Tablet .. 650 milliGRAM(s) Oral every 6 hours PRN Temp greater or equal to 38C (100.4F), Mild Pain (1 - 3)  albuterol/ipratropium for Nebulization 3 milliLiter(s) Nebulizer every 6 hours PRN Shortness of Breath and/or Wheezing  aluminum hydroxide/magnesium hydroxide/simethicone Suspension 30 milliLiter(s) Oral every 4 hours PRN Dyspepsia  guaiFENesin Oral Liquid (Sugar-Free) 200 milliGRAM(s) Oral every 6 hours PRN Cough  melatonin 3 milliGRAM(s) Oral at bedtime PRN Insomnia  ondansetron Injectable 4 milliGRAM(s) IV Push every 8 hours PRN Nausea and/or Vomiting      Vital Signs Last 24 Hrs  T(C): 36.6 (23 Aug 2023 12:42), Max: 36.7 (23 Aug 2023 05:05)  T(F): 97.9 (23 Aug 2023 12:42), Max: 98 (23 Aug 2023 05:05)  HR: 70 (23 Aug 2023 16:59) (64 - 76)  BP: 151/79 (23 Aug 2023 16:59) (120/76 - 198/90)  BP(mean): --  RR: 17 (23 Aug 2023 12:42) (17 - 19)  SpO2: 96% (23 Aug 2023 12:42) (93% - 100%)    Parameters below as of 23 Aug 2023 12:42  Patient On (Oxygen Delivery Method): nasal cannula        I&O's Summary    22 Aug 2023 07:01  -  23 Aug 2023 07:00  --------------------------------------------------------  IN: 400 mL / OUT: 0 mL / NET: 400 mL    23 Aug 2023 07:01  -  23 Aug 2023 19:09  --------------------------------------------------------  IN: 450 mL / OUT: 0 mL / NET: 450 mL        PHYSICAL EXAM:  HEENT: NC/AT; PERRLA  Neck: Soft; no tenderness  Lungs: CTA bilaterally; no wheezing.   Heart:  Abdomen:  Genital/ Rectal:  Extremities:  Neurologic:  Vascular:      LABORATORY:    CBC Full  -  ( 23 Aug 2023 06:08 )  WBC Count : 6.00 K/uL  RBC Count : 3.44 M/uL  Hemoglobin : 10.1 g/dL  Hematocrit : 33.8 %  Platelet Count - Automated : 198 K/uL  Mean Cell Volume : 98.3 fl  Mean Cell Hemoglobin : 29.4 pg  Mean Cell Hemoglobin Concentration : 29.9 gm/dL  Auto Neutrophil # : x  Auto Lymphocyte # : x  Auto Monocyte # : x  Auto Eosinophil # : x  Auto Basophil # : x  Auto Neutrophil % : x  Auto Lymphocyte % : x  Auto Monocyte % : x  Auto Eosinophil % : x  Auto Basophil % : x      ESR:                   08-21 @ 06:24  --    C-Reactive Protein:     08-21 @ 06:24  --    Procalcitonin:           08-21 @ 06:24   7.59      08-23    142  |  111<H>  |  16  ----------------------------<  100<H>  3.5   |  28  |  0.68    Ca    8.4<L>      23 Aug 2023 06:08    TPro  6.0  /  Alb  2.5<L>  /  TBili  0.5  /  DBili  x   /  AST  11<L>  /  ALT  25  /  AlkPhos  91  08-23      Rapid Respiratory Viral Panel Result        08-20 @ 11:55  Rapid RVP [facility]  Coronovirus --  Adenovirus --  Bordetella Pertussis --  Chlamydia Pneumonia --  Entero/Rhinovirus--  HKU1 Coronovirus --  HMPV Coronovirus --  Influenza A --  Influenza AH1 --  Influenza AH1 2009 --  Influenza AH3 --  Influenza B --  Mycoplasma Pneumoniae --  NL63 Coronovirus --  OC43 Coronovirus --  Parainfluenza 1 --  Parainfluenza 2 --  Parainfluenza 3 --  Parainfluenza 4 --  Resp Syncytial Virus --      Assessment and Plan:    1. UTI with E coli  2. Atelectasis vs pneumonia of lower lobes.  3. Generalized weakness.  4. Dysphagia.  5. Bacteremia with ESBL E Coli      . Chest CT showed atelectasis vs infiltrates of lower lobes, No clear evidence of pneumonia.  . The patient has had a nonproductive cough for more than 2 weeks. Would look for other possible sources of this persistent cough: GERD, dysphagia.  . Blood cultures on admission grew ESBL E coli, likely due to UTI. Waiting for urine culture.   . Continue IV Invanz for 8 days. Follow repeated blood cultures.   . Would get SLP re-evaluation for dysphagia.  . Continue Protonix.        Edwardo Gonzalez MD   [phone].    She is afebrile  On O2 NC  Awake    MEDICATIONS  (STANDING):  aMIOdarone    Tablet 200 milliGRAM(s) Oral daily  apixaban 2.5 milliGRAM(s) Oral two times a day  artificial  tears Solution 1 Drop(s) Both EYES two times a day  dextrose 5% + sodium chloride 0.45%. 1000 milliLiter(s) (50 mL/Hr) IV Continuous <Continuous>  enalapril 10 milliGRAM(s) Oral daily  ertapenem  IVPB 1000 milliGRAM(s) IV Intermittent every 24 hours  ferrous    sulfate 325 milliGRAM(s) Oral daily  lactobacillus acidophilus 1 Tablet(s) Oral every 12 hours  levothyroxine 75 MICROGram(s) Oral daily  metoprolol tartrate 50 milliGRAM(s) Oral two times a day  multivitamin/minerals 1 Tablet(s) Oral daily  pantoprazole    Tablet 40 milliGRAM(s) Oral daily  polyethylene glycol 3350 17 Gram(s) Oral daily    MEDICATIONS  (PRN):  acetaminophen     Tablet .. 650 milliGRAM(s) Oral every 6 hours PRN Temp greater or equal to 38C (100.4F), Mild Pain (1 - 3)  albuterol/ipratropium for Nebulization 3 milliLiter(s) Nebulizer every 6 hours PRN Shortness of Breath and/or Wheezing  aluminum hydroxide/magnesium hydroxide/simethicone Suspension 30 milliLiter(s) Oral every 4 hours PRN Dyspepsia  guaiFENesin Oral Liquid (Sugar-Free) 200 milliGRAM(s) Oral every 6 hours PRN Cough  melatonin 3 milliGRAM(s) Oral at bedtime PRN Insomnia  ondansetron Injectable 4 milliGRAM(s) IV Push every 8 hours PRN Nausea and/or Vomiting      Vital Signs Last 24 Hrs  T(C): 36.6 (23 Aug 2023 12:42), Max: 36.7 (23 Aug 2023 05:05)  T(F): 97.9 (23 Aug 2023 12:42), Max: 98 (23 Aug 2023 05:05)  HR: 70 (23 Aug 2023 16:59) (64 - 76)  BP: 151/79 (23 Aug 2023 16:59) (120/76 - 198/90)  BP(mean): --  RR: 17 (23 Aug 2023 12:42) (17 - 19)  SpO2: 96% (23 Aug 2023 12:42) (93% - 100%)    Parameters below as of 23 Aug 2023 12:42  Patient On (Oxygen Delivery Method): nasal cannula        I&O's Summary    22 Aug 2023 07:01  -  23 Aug 2023 07:00  --------------------------------------------------------  IN: 400 mL / OUT: 0 mL / NET: 400 mL    23 Aug 2023 07:01  -  23 Aug 2023 19:09  --------------------------------------------------------  IN: 450 mL / OUT: 0 mL / NET: 450 mL        PHYSICAL EXAM:  HEENT: NC/AT; PERRLA  Neck: Soft; no tenderness  Lungs: CTA bilaterally; no wheezing.   Heart:  Abdomen:  Genital/ Rectal:  Extremities:  Neurologic:  Vascular:      LABORATORY:    CBC Full  -  ( 23 Aug 2023 06:08 )  WBC Count : 6.00 K/uL  RBC Count : 3.44 M/uL  Hemoglobin : 10.1 g/dL  Hematocrit : 33.8 %  Platelet Count - Automated : 198 K/uL  Mean Cell Volume : 98.3 fl  Mean Cell Hemoglobin : 29.4 pg  Mean Cell Hemoglobin Concentration : 29.9 gm/dL  Auto Neutrophil # : x  Auto Lymphocyte # : x  Auto Monocyte # : x  Auto Eosinophil # : x  Auto Basophil # : x  Auto Neutrophil % : x  Auto Lymphocyte % : x  Auto Monocyte % : x  Auto Eosinophil % : x  Auto Basophil % : x      ESR:                   08-21 @ 06:24  --    C-Reactive Protein:     08-21 @ 06:24  --    Procalcitonin:           08-21 @ 06:24   7.59      08-23    142  |  111<H>  |  16  ----------------------------<  100<H>  3.5   |  28  |  0.68    Ca    8.4<L>      23 Aug 2023 06:08    TPro  6.0  /  Alb  2.5<L>  /  TBili  0.5  /  DBili  x   /  AST  11<L>  /  ALT  25  /  AlkPhos  91  08-23      Rapid Respiratory Viral Panel Result        08-20 @ 11:55  Rapid RVP [facility]  Coronovirus --  Adenovirus --  Bordetella Pertussis --  Chlamydia Pneumonia --  Entero/Rhinovirus--  HKU1 Coronovirus --  HMPV Coronovirus --  Influenza A --  Influenza AH1 --  Influenza AH1 2009 --  Influenza AH3 --  Influenza B --  Mycoplasma Pneumoniae --  NL63 Coronovirus --  OC43 Coronovirus --  Parainfluenza 1 --  Parainfluenza 2 --  Parainfluenza 3 --  Parainfluenza 4 --  Resp Syncytial Virus --      Assessment and Plan:    1. UTI with E coli  2. Atelectasis vs pneumonia of lower lobes.  3. Generalized weakness.  4. Dysphagia.  5. Bacteremia with ESBL E Coli      . Chest CT showed atelectasis vs infiltrates of lower lobes, No clear evidence of pneumonia.  . The patient has had a nonproductive cough for more than 2 weeks. Would look for other possible sources of this persistent cough: GERD, dysphagia.  . Blood cultures on admission grew ESBL E coli, likely due to UTI. Waiting for urine culture.   . Continue IV Invanz for 8 days. Follow repeated blood cultures.   . Would get SLP re-evaluation for dysphagia.  . Continue Protonix.        Edwardo Gonzalez MD   [phone].    She is afebrile  On O2 NC  Awake    MEDICATIONS  (STANDING):  aMIOdarone    Tablet 200 milliGRAM(s) Oral daily  apixaban 2.5 milliGRAM(s) Oral two times a day  artificial  tears Solution 1 Drop(s) Both EYES two times a day  dextrose 5% + sodium chloride 0.45%. 1000 milliLiter(s) (50 mL/Hr) IV Continuous <Continuous>  enalapril 10 milliGRAM(s) Oral daily  ertapenem  IVPB 1000 milliGRAM(s) IV Intermittent every 24 hours  ferrous    sulfate 325 milliGRAM(s) Oral daily  lactobacillus acidophilus 1 Tablet(s) Oral every 12 hours  levothyroxine 75 MICROGram(s) Oral daily  metoprolol tartrate 50 milliGRAM(s) Oral two times a day  multivitamin/minerals 1 Tablet(s) Oral daily  pantoprazole    Tablet 40 milliGRAM(s) Oral daily  polyethylene glycol 3350 17 Gram(s) Oral daily    MEDICATIONS  (PRN):  acetaminophen     Tablet .. 650 milliGRAM(s) Oral every 6 hours PRN Temp greater or equal to 38C (100.4F), Mild Pain (1 - 3)  albuterol/ipratropium for Nebulization 3 milliLiter(s) Nebulizer every 6 hours PRN Shortness of Breath and/or Wheezing  aluminum hydroxide/magnesium hydroxide/simethicone Suspension 30 milliLiter(s) Oral every 4 hours PRN Dyspepsia  guaiFENesin Oral Liquid (Sugar-Free) 200 milliGRAM(s) Oral every 6 hours PRN Cough  melatonin 3 milliGRAM(s) Oral at bedtime PRN Insomnia  ondansetron Injectable 4 milliGRAM(s) IV Push every 8 hours PRN Nausea and/or Vomiting      Vital Signs Last 24 Hrs  T(C): 36.6 (23 Aug 2023 12:42), Max: 36.7 (23 Aug 2023 05:05)  T(F): 97.9 (23 Aug 2023 12:42), Max: 98 (23 Aug 2023 05:05)  HR: 70 (23 Aug 2023 16:59) (64 - 76)  BP: 151/79 (23 Aug 2023 16:59) (120/76 - 198/90)  BP(mean): --  RR: 17 (23 Aug 2023 12:42) (17 - 19)  SpO2: 96% (23 Aug 2023 12:42) (93% - 100%)    Parameters below as of 23 Aug 2023 12:42  Patient On (Oxygen Delivery Method): nasal cannula        I&O's Summary    22 Aug 2023 07:01  -  23 Aug 2023 07:00  --------------------------------------------------------  IN: 400 mL / OUT: 0 mL / NET: 400 mL    23 Aug 2023 07:01  -  23 Aug 2023 19:09  --------------------------------------------------------  IN: 450 mL / OUT: 0 mL / NET: 450 mL        PHYSICAL EXAM:  HEENT: NC/AT; PERRLA  Neck: Soft; no tenderness  Lungs: CTA bilaterally; no wheezing.   Heart:  Abdomen:  Genital/ Rectal:  Extremities:  Neurologic:  Vascular:      LABORATORY:    CBC Full  -  ( 23 Aug 2023 06:08 )  WBC Count : 6.00 K/uL  RBC Count : 3.44 M/uL  Hemoglobin : 10.1 g/dL  Hematocrit : 33.8 %  Platelet Count - Automated : 198 K/uL  Mean Cell Volume : 98.3 fl  Mean Cell Hemoglobin : 29.4 pg  Mean Cell Hemoglobin Concentration : 29.9 gm/dL  Auto Neutrophil # : x  Auto Lymphocyte # : x  Auto Monocyte # : x  Auto Eosinophil # : x  Auto Basophil # : x  Auto Neutrophil % : x  Auto Lymphocyte % : x  Auto Monocyte % : x  Auto Eosinophil % : x  Auto Basophil % : x      ESR:                   08-21 @ 06:24  --    C-Reactive Protein:     08-21 @ 06:24  --    Procalcitonin:           08-21 @ 06:24   7.59      08-23    142  |  111<H>  |  16  ----------------------------<  100<H>  3.5   |  28  |  0.68    Ca    8.4<L>      23 Aug 2023 06:08    TPro  6.0  /  Alb  2.5<L>  /  TBili  0.5  /  DBili  x   /  AST  11<L>  /  ALT  25  /  AlkPhos  91  08-23      Rapid Respiratory Viral Panel Result        08-20 @ 11:55  Rapid RVP [facility]  Coronovirus --  Adenovirus --  Bordetella Pertussis --  Chlamydia Pneumonia --  Entero/Rhinovirus--  HKU1 Coronovirus --  HMPV Coronovirus --  Influenza A --  Influenza AH1 --  Influenza AH1 2009 --  Influenza AH3 --  Influenza B --  Mycoplasma Pneumoniae --  NL63 Coronovirus --  OC43 Coronovirus --  Parainfluenza 1 --  Parainfluenza 2 --  Parainfluenza 3 --  Parainfluenza 4 --  Resp Syncytial Virus --      Assessment and Plan:    1. UTI with E coli  2. Atelectasis vs pneumonia of lower lobes.  3. Generalized weakness.  4. Dysphagia.  5. Bacteremia with ESBL E Coli      . Chest CT showed atelectasis vs infiltrates of lower lobes, No clear evidence of pneumonia.  . The patient has had a nonproductive cough for more than 2 weeks. Would look for other possible sources of this persistent cough: GERD, dysphagia.  . Blood cultures on admission grew ESBL E coli, likely due to UTI. Waiting for urine culture.   . Continue IV Invanz for 8 days. Follow repeated blood cultures.   . Would get SLP re-evaluation for dysphagia.  . Continue Protonix.        Edwardo Gonzalez MD   [phone].

## 2023-08-24 LAB
ANION GAP SERPL CALC-SCNC: 3 MMOL/L — LOW (ref 5–17)
BUN SERPL-MCNC: 13 MG/DL — SIGNIFICANT CHANGE UP (ref 7–23)
CALCIUM SERPL-MCNC: 8.6 MG/DL — SIGNIFICANT CHANGE UP (ref 8.5–10.1)
CHLORIDE SERPL-SCNC: 111 MMOL/L — HIGH (ref 96–108)
CO2 SERPL-SCNC: 28 MMOL/L — SIGNIFICANT CHANGE UP (ref 22–31)
CREAT SERPL-MCNC: 0.62 MG/DL — SIGNIFICANT CHANGE UP (ref 0.5–1.3)
EGFR: 83 ML/MIN/1.73M2 — SIGNIFICANT CHANGE UP
GLUCOSE SERPL-MCNC: 105 MG/DL — HIGH (ref 70–99)
HCT VFR BLD CALC: 33.3 % — LOW (ref 34.5–45)
HGB BLD-MCNC: 10 G/DL — LOW (ref 11.5–15.5)
MCHC RBC-ENTMCNC: 29.1 PG — SIGNIFICANT CHANGE UP (ref 27–34)
MCHC RBC-ENTMCNC: 30 GM/DL — LOW (ref 32–36)
MCV RBC AUTO: 96.8 FL — SIGNIFICANT CHANGE UP (ref 80–100)
NRBC # BLD: 0 /100 WBCS — SIGNIFICANT CHANGE UP (ref 0–0)
PLATELET # BLD AUTO: 203 K/UL — SIGNIFICANT CHANGE UP (ref 150–400)
POTASSIUM SERPL-MCNC: 3.4 MMOL/L — LOW (ref 3.5–5.3)
POTASSIUM SERPL-SCNC: 3.4 MMOL/L — LOW (ref 3.5–5.3)
RBC # BLD: 3.44 M/UL — LOW (ref 3.8–5.2)
RBC # FLD: 16.5 % — HIGH (ref 10.3–14.5)
SODIUM SERPL-SCNC: 142 MMOL/L — SIGNIFICANT CHANGE UP (ref 135–145)
WBC # BLD: 5.8 K/UL — SIGNIFICANT CHANGE UP (ref 3.8–10.5)
WBC # FLD AUTO: 5.8 K/UL — SIGNIFICANT CHANGE UP (ref 3.8–10.5)

## 2023-08-24 RX ORDER — AMLODIPINE BESYLATE 2.5 MG/1
5 TABLET ORAL DAILY
Refills: 0 | Status: DISCONTINUED | OUTPATIENT
Start: 2023-08-24 | End: 2023-08-25

## 2023-08-24 RX ADMIN — APIXABAN 2.5 MILLIGRAM(S): 2.5 TABLET, FILM COATED ORAL at 05:35

## 2023-08-24 RX ADMIN — Medication 325 MILLIGRAM(S): at 13:40

## 2023-08-24 RX ADMIN — Medication 1 DROP(S): at 05:36

## 2023-08-24 RX ADMIN — Medication 1 TABLET(S): at 13:40

## 2023-08-24 RX ADMIN — POLYETHYLENE GLYCOL 3350 17 GRAM(S): 17 POWDER, FOR SOLUTION ORAL at 13:41

## 2023-08-24 RX ADMIN — AMIODARONE HYDROCHLORIDE 200 MILLIGRAM(S): 400 TABLET ORAL at 05:35

## 2023-08-24 RX ADMIN — Medication 50 MILLIGRAM(S): at 05:35

## 2023-08-24 RX ADMIN — Medication 3 MILLIGRAM(S): at 22:37

## 2023-08-24 RX ADMIN — Medication 50 MILLIGRAM(S): at 20:52

## 2023-08-24 RX ADMIN — Medication 10 MILLIGRAM(S): at 05:35

## 2023-08-24 RX ADMIN — Medication 75 MICROGRAM(S): at 05:35

## 2023-08-24 RX ADMIN — ERTAPENEM SODIUM 120 MILLIGRAM(S): 1 INJECTION, POWDER, LYOPHILIZED, FOR SOLUTION INTRAMUSCULAR; INTRAVENOUS at 14:00

## 2023-08-24 RX ADMIN — PANTOPRAZOLE SODIUM 40 MILLIGRAM(S): 20 TABLET, DELAYED RELEASE ORAL at 13:40

## 2023-08-24 RX ADMIN — Medication 1 TABLET(S): at 05:35

## 2023-08-24 NOTE — PROGRESS NOTE ADULT - ASSESSMENT
89-year-old female with history of hypothyroidism, A-fib on Eliquis, s/p recent pacemaker for complete heart block brought in by ambulance from [facility] assisted living home for cough for the past 2 weeks.  Associated with generalized weakness and decreased p.o. intake.  No fall or trauma.  Patient states she has had cold symptoms for a while.  Denies fever, chills, chest pain, shortness of breath, abdominal pain, nausea, vomiting, upper or lower extremity weakness or paresthesias. pmd: Dr. Crawley, cards: [facility] Seen by card Dr Reich Admitted  to telemetry unit for monitoring , send 3 sets of cardiac enzymes to rule out acute coronary event, obtain ECHO to evaluate LVEF, cardiology consult  ,continue current management, O2 supply, anticoagulation plan as per cardiology consult Pulm cons requested , antitussives and nebulized bd ordered .Also UTI suspected and started on iv abx , id cons called Palliative care consult requested ,to discuss advance directives and complete MOLST  89-year-old female with history of hypothyroidism, A-fib on Eliquis, s/p recent pacemaker for complete heart block brought in by ambulance from [facility] assisted living home for cough for the past 2 weeks.  Associated with generalized weakness and decreased p.o. intake.  No fall or trauma.  Patient states she has had cold symptoms for a while.  Denies fever, chills, chest pain, shortness of breath, abdominal pain, nausea, vomiting, upper or lower extremity weakness or paresthesias. pmd: Dr. Crawley, cards: [facility] Seen by card Dr Reich Admitted  to telemetry unit for monitoring , send 3 sets of cardiac enzymes to rule out acute coronary event, obtain ECHO to evaluate LVEF, cardiology consult  ,continue current management, O2 supply, anticoagulation plan as per cardiology consult Pulm cons requested , antitussives and nebulized bd ordered .Also UTI suspected and started on iv abx , id cons called Palliative care consult requested ,to discuss advance directives and complete MOLST  89-year-old female with history of hypothyroidism, A-fib on Eliquis, s/p recent pacemaker for complete heart block brought in by ambulance from [facility] assisted living home for cough for the past 2 weeks.  Associated with generalized weakness and decreased p.o. intake.  No fall or trauma.  Patient states she has had cold symptoms for a while.  Denies fever, chills, chest pain, shortness of breath, abdominal pain, nausea, vomiting, upper or lower extremity weakness or paresthesias. pmd: Dr. Crawley, cards: [facility] Seen by card Dr Reich Admitted  to telemetry unit for monitoring , send 3 sets of cardiac enzymes to rule out acute coronary event, obtain ECHO to evaluate LVEF, cardiology consult  ,continue current management, O2 supply, anticoagulation plan as per cardiology consult Pulm cons requested , antitussives and nebulized bd ordered .Also UTI suspected and started on iv abx , id cons called Palliative care consult requested ,to discuss advance directives and complete MOLST

## 2023-08-24 NOTE — PROGRESS NOTE ADULT - SUBJECTIVE AND OBJECTIVE BOX
[facility]  Víctor Robertson PA-C  [address] [place] [address]  [phone]      INTERVAL HPI/OVERNIGHT EVENTS:  Pt s/e  No new GI events    MEDICATIONS  (STANDING):  aMIOdarone    Tablet 200 milliGRAM(s) Oral daily  amLODIPine   Tablet 5 milliGRAM(s) Oral daily  apixaban 2.5 milliGRAM(s) Oral two times a day  artificial  tears Solution 1 Drop(s) Both EYES two times a day  dextrose 5% + sodium chloride 0.45%. 1000 milliLiter(s) (50 mL/Hr) IV Continuous <Continuous>  ertapenem  IVPB 1000 milliGRAM(s) IV Intermittent every 24 hours  ferrous    sulfate 325 milliGRAM(s) Oral daily  lactobacillus acidophilus 1 Tablet(s) Oral every 12 hours  levothyroxine 75 MICROGram(s) Oral daily  metoprolol tartrate 50 milliGRAM(s) Oral two times a day  multivitamin/minerals 1 Tablet(s) Oral daily  pantoprazole    Tablet 40 milliGRAM(s) Oral daily  polyethylene glycol 3350 17 Gram(s) Oral daily    MEDICATIONS  (PRN):  acetaminophen     Tablet .. 650 milliGRAM(s) Oral every 6 hours PRN Temp greater or equal to 38C (100.4F), Mild Pain (1 - 3)  albuterol/ipratropium for Nebulization 3 milliLiter(s) Nebulizer every 6 hours PRN Shortness of Breath and/or Wheezing  aluminum hydroxide/magnesium hydroxide/simethicone Suspension 30 milliLiter(s) Oral every 4 hours PRN Dyspepsia  guaiFENesin Oral Liquid (Sugar-Free) 200 milliGRAM(s) Oral every 6 hours PRN Cough  melatonin 3 milliGRAM(s) Oral at bedtime PRN Insomnia  ondansetron Injectable 4 milliGRAM(s) IV Push every 8 hours PRN Nausea and/or Vomiting      Allergies    penicillin (Unknown)    PHYSICAL EXAM:   Vital Signs:  Vital Signs Last 24 Hrs  T(C): 36.4 (24 Aug 2023 04:58), Max: 36.8 (23 Aug 2023 20:11)  T(F): 97.5 (24 Aug 2023 04:58), Max: 98.3 (23 Aug 2023 20:11)  HR: 67 (24 Aug 2023 04:58) (65 - 76)  BP: 151/68 (24 Aug 2023 04:58) (147/84 - 178/83)  BP(mean): --  RR: 18 (24 Aug 2023 04:58) (17 - 18)  SpO2: 97% (24 Aug 2023 04:58) (94% - 97%)    Parameters below as of 24 Aug 2023 04:58  Patient On (Oxygen Delivery Method): nasal cannula  O2 Flow (L/min): 2    Daily     Daily Weight in k.8 (24 Aug 2023 04:58)    GENERAL:  Appears stated age  HEENT:  NC/AT  CHEST:  Full & symmetric excursion  HEART:  Regular rhythm  ABDOMEN:  Soft, non-tender, non-distended  EXTEREMITIES:  no cyanosis  SKIN:  No rash  NEURO:  Alert      LABS:                        10.0   5.80  )-----------(       ( 24 Aug 2023 06:48 )             33.3     08-24    142  |  111<H>  |  13  ----------------------------<  105<H>  3.4<L>   |  28  |  0.62    Ca    8.6      24 Aug 2023 06:48    TPro  6.0  /  Alb  2.5<L>  /  TBili  0.5  /  DBili  x   /  AST  11<L>  /  ALT  25  /  AlkPhos  91  08-23      Urinalysis Basic - ( 24 Aug 2023 06:48 )    Color: x / Appearance: x / SG: x / pH: x  Gluc: 105 mg/dL / Ketone: x  / Bili: x / Urobili: x   Blood: x / Protein: x / Nitrite: x   Leuk Esterase: x / RBC: x / WBC x   Sq Epi: x / Non Sq Epi: x / Bacteria: x [facility]  Víctor Robertson PA-C  [address] [place] [address]  [phone]      INTERVAL HPI/OVERNIGHT EVENTS:  Pt s/e  No new GI events    MEDICATIONS  (STANDING):  aMIOdarone    Tablet 200 milliGRAM(s) Oral daily  amLODIPine   Tablet 5 milliGRAM(s) Oral daily  apixaban 2.5 milliGRAM(s) Oral two times a day  artificial  tears Solution 1 Drop(s) Both EYES two times a day  dextrose 5% + sodium chloride 0.45%. 1000 milliLiter(s) (50 mL/Hr) IV Continuous <Continuous>  ertapenem  IVPB 1000 milliGRAM(s) IV Intermittent every 24 hours  ferrous    sulfate 325 milliGRAM(s) Oral daily  lactobacillus acidophilus 1 Tablet(s) Oral every 12 hours  levothyroxine 75 MICROGram(s) Oral daily  metoprolol tartrate 50 milliGRAM(s) Oral two times a day  multivitamin/minerals 1 Tablet(s) Oral daily  pantoprazole    Tablet 40 milliGRAM(s) Oral daily  polyethylene glycol 3350 17 Gram(s) Oral daily    MEDICATIONS  (PRN):  acetaminophen     Tablet .. 650 milliGRAM(s) Oral every 6 hours PRN Temp greater or equal to 38C (100.4F), Mild Pain (1 - 3)  albuterol/ipratropium for Nebulization 3 milliLiter(s) Nebulizer every 6 hours PRN Shortness of Breath and/or Wheezing  aluminum hydroxide/magnesium hydroxide/simethicone Suspension 30 milliLiter(s) Oral every 4 hours PRN Dyspepsia  guaiFENesin Oral Liquid (Sugar-Free) 200 milliGRAM(s) Oral every 6 hours PRN Cough  melatonin 3 milliGRAM(s) Oral at bedtime PRN Insomnia  ondansetron Injectable 4 milliGRAM(s) IV Push every 8 hours PRN Nausea and/or Vomiting      Allergies    penicillin (Unknown)    PHYSICAL EXAM:   Vital Signs:  Vital Signs Last 24 Hrs  T(C): 36.4 (24 Aug 2023 04:58), Max: 36.8 (23 Aug 2023 20:11)  T(F): 97.5 (24 Aug 2023 04:58), Max: 98.3 (23 Aug 2023 20:11)  HR: 67 (24 Aug 2023 04:58) (65 - 76)  BP: 151/68 (24 Aug 2023 04:58) (147/84 - 178/83)  BP(mean): --  RR: 18 (24 Aug 2023 04:58) (17 - 18)  SpO2: 97% (24 Aug 2023 04:58) (94% - 97%)    Parameters below as of 24 Aug 2023 04:58  Patient On (Oxygen Delivery Method): nasal cannula  O2 Flow (L/min): 2    Daily     Daily Weight in k.8 (24 Aug 2023 04:58)    GENERAL:  Appears stated age  HEENT:  NC/AT  CHEST:  Full & symmetric excursion  HEART:  Regular rhythm  ABDOMEN:  Soft, non-tender, non-distended  EXTEREMITIES:  no cyanosis  SKIN:  No rash  NEURO:  Alert      LABS:                        10.0   5.80  )-----------(       ( 24 Aug 2023 06:48 )             33.3     08-24    142  |  111<H>  |  13  ----------------------------<  105<H>  3.4<L>   |  28  |  0.62    Ca    8.6      24 Aug 2023 06:48    TPro  6.0  /  Alb  2.5<L>  /  TBili  0.5  /  DBili  x   /  AST  11<L>  /  ALT  25  /  AlkPhos  91  08-23      Urinalysis Basic - ( 24 Aug 2023 06:48 )    Color: x / Appearance: x / SG: x / pH: x  Gluc: 105 mg/dL / Ketone: x  / Bili: x / Urobili: x   Blood: x / Protein: x / Nitrite: x   Leuk Esterase: x / RBC: x / WBC x   Sq Epi: x / Non Sq Epi: x / Bacteria: x [facility]  Víctor Robertson PA-C  [address] [place] [address]  [phone]      INTERVAL HPI/OVERNIGHT EVENTS:  Pt s/e  No new GI events    MEDICATIONS  (STANDING):  aMIOdarone    Tablet 200 milliGRAM(s) Oral daily  amLODIPine   Tablet 5 milliGRAM(s) Oral daily  apixaban 2.5 milliGRAM(s) Oral two times a day  artificial  tears Solution 1 Drop(s) Both EYES two times a day  dextrose 5% + sodium chloride 0.45%. 1000 milliLiter(s) (50 mL/Hr) IV Continuous <Continuous>  ertapenem  IVPB 1000 milliGRAM(s) IV Intermittent every 24 hours  ferrous    sulfate 325 milliGRAM(s) Oral daily  lactobacillus acidophilus 1 Tablet(s) Oral every 12 hours  levothyroxine 75 MICROGram(s) Oral daily  metoprolol tartrate 50 milliGRAM(s) Oral two times a day  multivitamin/minerals 1 Tablet(s) Oral daily  pantoprazole    Tablet 40 milliGRAM(s) Oral daily  polyethylene glycol 3350 17 Gram(s) Oral daily    MEDICATIONS  (PRN):  acetaminophen     Tablet .. 650 milliGRAM(s) Oral every 6 hours PRN Temp greater or equal to 38C (100.4F), Mild Pain (1 - 3)  albuterol/ipratropium for Nebulization 3 milliLiter(s) Nebulizer every 6 hours PRN Shortness of Breath and/or Wheezing  aluminum hydroxide/magnesium hydroxide/simethicone Suspension 30 milliLiter(s) Oral every 4 hours PRN Dyspepsia  guaiFENesin Oral Liquid (Sugar-Free) 200 milliGRAM(s) Oral every 6 hours PRN Cough  melatonin 3 milliGRAM(s) Oral at bedtime PRN Insomnia  ondansetron Injectable 4 milliGRAM(s) IV Push every 8 hours PRN Nausea and/or Vomiting      Allergies    penicillin (Unknown)    PHYSICAL EXAM:   Vital Signs:  Vital Signs Last 24 Hrs  T(C): 36.4 (24 Aug 2023 04:58), Max: 36.8 (23 Aug 2023 20:11)  T(F): 97.5 (24 Aug 2023 04:58), Max: 98.3 (23 Aug 2023 20:11)  HR: 67 (24 Aug 2023 04:58) (65 - 76)  BP: 151/68 (24 Aug 2023 04:58) (147/84 - 178/83)  BP(mean): --  RR: 18 (24 Aug 2023 04:58) (17 - 18)  SpO2: 97% (24 Aug 2023 04:58) (94% - 97%)    Parameters below as of 24 Aug 2023 04:58  Patient On (Oxygen Delivery Method): nasal cannula  O2 Flow (L/min): 2    Daily     Daily Weight in k.8 (24 Aug 2023 04:58)    GENERAL:  Appears stated age  HEENT:  NC/AT  CHEST:  Full & symmetric excursion  HEART:  Regular rhythm  ABDOMEN:  Soft, non-tender, non-distended  EXTEREMITIES:  no cyanosis  SKIN:  No rash  NEURO:  Alert      LABS:                        10.0   5.80  )-----------(       ( 24 Aug 2023 06:48 )             33.3     08-24    142  |  111<H>  |  13  ----------------------------<  105<H>  3.4<L>   |  28  |  0.62    Ca    8.6      24 Aug 2023 06:48    TPro  6.0  /  Alb  2.5<L>  /  TBili  0.5  /  DBili  x   /  AST  11<L>  /  ALT  25  /  AlkPhos  91  08-23      Urinalysis Basic - ( 24 Aug 2023 06:48 )    Color: x / Appearance: x / SG: x / pH: x  Gluc: 105 mg/dL / Ketone: x  / Bili: x / Urobili: x   Blood: x / Protein: x / Nitrite: x   Leuk Esterase: x / RBC: x / WBC x   Sq Epi: x / Non Sq Epi: x / Bacteria: x

## 2023-08-24 NOTE — PROGRESS NOTE ADULT - SUBJECTIVE AND OBJECTIVE BOX
CHIEF COMPLAINT/ REASON FOR VISIT  .. Patient was seen to address the  issue listed under PROBLEM LIST which is located toward bottom of this note     DEACON ROBERT    PLV 1EAS 106 W1    Allergies    penicillin (Unknown)    Intolerances        PAST MEDICAL & SURGICAL HISTORY:  HTN (hypertension)      Afib      Spinal stenosis      PFO (patent foramen ovale)      Degeneration macular      Osteoporosis      History of complete heart block      Hypothyroidism      Cardiac pacemaker          FAMILY HISTORY:      Home Medications:  Albuterol (Eqv-ProAir HFA) 90 mcg/inh inhalation aerosol: 1 puff(s) inhaled 4 times a day as needed for  cough or wheezing (21 Aug 2023 09:56)  amiodarone 200 mg oral tablet: 1 tab(s) orally once a day (21 Aug 2023 09:55)  clindamycin 1% topical lotion: Apply topically to affected area once a day to face (21 Aug 2023 09:55)  Eliquis 2.5 mg oral tablet: 1 tab(s) orally 2 times a day (21 Aug 2023 09:55)  enalapril 10 mg oral tablet: 1 tab(s) orally once a day (21 Aug 2023 09:55)  ferrous sulfate 325 mg (65 mg elemental iron) oral tablet: 1 tab(s) orally once a day (21 Aug 2023 09:55)  levothyroxine 75 mcg (0.075 mg) oral tablet: 1 tab(s) orally once a day (21 Aug 2023 09:56)  Metoprolol Tartrate 50 mg oral tablet: 1 tab(s) orally 2 times a day (21 Aug 2023 09:56)  Myrbetriq 50 mg oral tablet, extended release: 1 tab(s) orally once a day (21 Aug 2023 09:56)  polyethylene glycol 3350 oral powder for reconstitution: 17 gram(s) orally once a day (21 Aug 2023 09:56)  PreserVision AREDS oral capsule: 1 cap(s) orally once a day (21 Aug 2023 09:56)  Refresh Dry Eye Therapy ophthalmic solution: 1 drop(s) in each eye 4 times a day (21 Aug 2023 09:56)  Tylenol 325 mg oral tablet: 2 tab(s) orally every 4 hours as needed for pain or fever (21 Aug 2023 09:56)      MEDICATIONS  (STANDING):  aMIOdarone    Tablet 200 milliGRAM(s) Oral daily  amLODIPine   Tablet 5 milliGRAM(s) Oral daily  apixaban 2.5 milliGRAM(s) Oral two times a day  artificial  tears Solution 1 Drop(s) Both EYES two times a day  dextrose 5% + sodium chloride 0.45%. 1000 milliLiter(s) (50 mL/Hr) IV Continuous <Continuous>  ertapenem  IVPB 1000 milliGRAM(s) IV Intermittent every 24 hours  ferrous    sulfate 325 milliGRAM(s) Oral daily  lactobacillus acidophilus 1 Tablet(s) Oral every 12 hours  levothyroxine 75 MICROGram(s) Oral daily  metoprolol tartrate 50 milliGRAM(s) Oral two times a day  multivitamin/minerals 1 Tablet(s) Oral daily  pantoprazole    Tablet 40 milliGRAM(s) Oral daily  polyethylene glycol 3350 17 Gram(s) Oral daily    MEDICATIONS  (PRN):  acetaminophen     Tablet .. 650 milliGRAM(s) Oral every 6 hours PRN Temp greater or equal to 38C (100.4F), Mild Pain (1 - 3)  albuterol/ipratropium for Nebulization 3 milliLiter(s) Nebulizer every 6 hours PRN Shortness of Breath and/or Wheezing  aluminum hydroxide/magnesium hydroxide/simethicone Suspension 30 milliLiter(s) Oral every 4 hours PRN Dyspepsia  guaiFENesin Oral Liquid (Sugar-Free) 200 milliGRAM(s) Oral every 6 hours PRN Cough  melatonin 3 milliGRAM(s) Oral at bedtime PRN Insomnia  ondansetron Injectable 4 milliGRAM(s) IV Push every 8 hours PRN Nausea and/or Vomiting              Vital Signs Last 24 Hrs  T(C): 36.4 (24 Aug 2023 04:58), Max: 36.8 (23 Aug 2023 20:11)  T(F): 97.5 (24 Aug 2023 04:58), Max: 98.3 (23 Aug 2023 20:11)  HR: 67 (24 Aug 2023 04:58) (65 - 76)  BP: 151/68 (24 Aug 2023 04:58) (147/84 - 178/83)  BP(mean): --  RR: 18 (24 Aug 2023 04:58) (17 - 18)  SpO2: 97% (24 Aug 2023 04:58) (94% - 97%)    Parameters below as of 24 Aug 2023 04:58  Patient On (Oxygen Delivery Method): nasal cannula  O2 Flow (L/min): 2        08-23-23 @ 07:01  -  08-24-23 @ 07:00  --------------------------------------------------------  IN: 1000 mL / OUT: 300 mL / NET: 700 mL              LABS:                        10.0   5.80  )-----------( 203      ( 24 Aug 2023 06:48 )             33.3     08-24    142  |  111<H>  |  13  ----------------------------<  105<H>  3.4<L>   |  28  |  0.62    Ca    8.6      24 Aug 2023 06:48    TPro  6.0  /  Alb  2.5<L>  /  TBili  0.5  /  DBili  x   /  AST  11<L>  /  ALT  25  /  AlkPhos  91  08-23      Urinalysis Basic - ( 24 Aug 2023 06:48 )    Color: x / Appearance: x / SG: x / pH: x  Gluc: 105 mg/dL / Ketone: x  / Bili: x / Urobili: x   Blood: x / Protein: x / Nitrite: x   Leuk Esterase: x / RBC: x / WBC x   Sq Epi: x / Non Sq Epi: x / Bacteria: x            WBC:  WBC Count: 5.80 K/uL (08-24 @ 06:48)  WBC Count: 6.00 K/uL (08-23 @ 06:08)  WBC Count: 6.97 K/uL (08-22 @ 06:20)  WBC Count: 21.39 K/uL (08-21 @ 06:24)  WBC Count: 16.67 K/uL (08-20 @ 11:55)      MICROBIOLOGY:  RECENT CULTURES:  08-22 .Blood Blood XXXX XXXX   No growth at 24 hours    08-22 .Blood Blood XXXX XXXX   No growth at 24 hours    08-21 .Blood Blood XXXX   Growth in aerobic bottle: Gram Negative Rods   Growth in aerobic bottle: Escherichia coli ESBL  See previous culture [id number]    08-21 .Blood Blood XXXX   Growth in anaerobic bottle: Gram Negative Rods  Growth in aerobic bottle: Gram Negative Rods   Growth in aerobic and anaerobic bottles: Escherichia coli ESBL  See previous culture [id number]    08-20 Clean Catch Clean Catch (Midstream) Escherichia coli ESBL XXXX   >100,000 CFU/ml Escherichia coli ESBL    08-20 .Blood Blood-Peripheral XXXX   Growth in aerobic bottle: Gram Negative Rods   Growth in aerobic bottle: Escherichia coli ESBL  See previous culture [id number]    08-20 .Blood Blood-Peripheral Blood Culture PCR  Escherichia coli ESBL   Growth in aerobic and anaerobic bottles: Gram Negative Rods   Growth in aerobic and anaerobic bottles: Escherichia coli ESBL  Direct identification is available within approximately 3-5  hours either by Blood Panel Multiplexed PCR or Direct  MALDI-TOF. Details: [email or web address]                    Sodium:  Sodium: 142 mmol/L (08-24 @ 06:48)  Sodium: 142 mmol/L (08-23 @ 06:08)  Sodium: 142 mmol/L (08-22 @ 06:20)  Sodium: 141 mmol/L (08-21 @ 06:24)  Sodium: 144 mmol/L (08-20 @ 11:55)      0.62 mg/dL 08-24 @ 06:48  0.68 mg/dL 08-23 @ 06:08  0.81 mg/dL 08-22 @ 06:20  0.86 mg/dL 08-21 @ 06:24  0.85 mg/dL 08-20 @ 11:55      Hemoglobin:  Hemoglobin: 10.0 g/dL (08-24 @ 06:48)  Hemoglobin: 10.1 g/dL (08-23 @ 06:08)  Hemoglobin: 10.7 g/dL (08-22 @ 06:20)  Hemoglobin: 10.5 g/dL (08-21 @ 06:24)  Hemoglobin: 12.1 g/dL (08-20 @ 11:55)      Platelets: Platelet Count - Automated: 203 K/uL (08-24 @ 06:48)  Platelet Count - Automated: 198 K/uL (08-23 @ 06:08)  Platelet Count - Automated: 195 K/uL (08-22 @ 06:20)  Platelet Count - Automated: 175 K/uL (08-21 @ 06:24)  Platelet Count - Automated: 225 K/uL (08-20 @ 11:55)      LIVER FUNCTIONS - ( 23 Aug 2023 06:08 )  Alb: 2.5 g/dL / Pro: 6.0 g/dL / ALK PHOS: 91 U/L / ALT: 25 U/L / AST: 11 U/L / GGT: x             Urinalysis Basic - ( 24 Aug 2023 06:48 )    Color: x / Appearance: x / SG: x / pH: x  Gluc: 105 mg/dL / Ketone: x  / Bili: x / Urobili: x   Blood: x / Protein: x / Nitrite: x   Leuk Esterase: x / RBC: x / WBC x   Sq Epi: x / Non Sq Epi: x / Bacteria: x        RADIOLOGY & ADDITIONAL STUDIES:      MICROBIOLOGY:  RECENT CULTURES:  08-22 .Blood Blood XXXX XXXX   No growth at 24 hours    08-22 .Blood Blood XXXX XXXX   No growth at 24 hours    08-21 .Blood Blood XXXX   Growth in aerobic bottle: Gram Negative Rods   Growth in aerobic bottle: Escherichia coli ESBL  See previous culture [id number]    08-21 .Blood Blood XXXX   Growth in anaerobic bottle: Gram Negative Rods  Growth in aerobic bottle: Gram Negative Rods   Growth in aerobic and anaerobic bottles: Escherichia coli ESBL  See previous culture [id number]    08-20 Clean Catch Clean Catch (Midstream) Escherichia coli ESBL XXXX   >100,000 CFU/ml Escherichia coli ESBL    08-20 .Blood Blood-Peripheral XXXX   Growth in aerobic bottle: Gram Negative Rods   Growth in aerobic bottle: Escherichia coli ESBL  See previous culture [id number]    08-20 .Blood Blood-Peripheral Blood Culture PCR  Escherichia coli ESBL   Growth in aerobic and anaerobic bottles: Gram Negative Rods   Growth in aerobic and anaerobic bottles: Escherichia coli ESBL  Direct identification is available within approximately 3-5  hours either by Blood Panel Multiplexed PCR or Direct  MALDI-TOF. Details: [email or web address]                 CHIEF COMPLAINT/ REASON FOR VISIT  .. Patient was seen to address the  issue listed under PROBLEM LIST which is located toward bottom of this note     DEACON ROBERT    PLV 1EAS 106 W1    Allergies    penicillin (Unknown)    Intolerances        PAST MEDICAL & SURGICAL HISTORY:  HTN (hypertension)      Afib      Spinal stenosis      PFO (patent foramen ovale)      Degeneration macular      Osteoporosis      History of complete heart block      Hypothyroidism      Cardiac pacemaker          FAMILY HISTORY:      Home Medications:  Albuterol (Eqv-ProAir HFA) 90 mcg/inh inhalation aerosol: 1 puff(s) inhaled 4 times a day as needed for  cough or wheezing (21 Aug 2023 09:56)  amiodarone 200 mg oral tablet: 1 tab(s) orally once a day (21 Aug 2023 09:55)  clindamycin 1% topical lotion: Apply topically to affected area once a day to face (21 Aug 2023 09:55)  Eliquis 2.5 mg oral tablet: 1 tab(s) orally 2 times a day (21 Aug 2023 09:55)  enalapril 10 mg oral tablet: 1 tab(s) orally once a day (21 Aug 2023 09:55)  ferrous sulfate 325 mg (65 mg elemental iron) oral tablet: 1 tab(s) orally once a day (21 Aug 2023 09:55)  levothyroxine 75 mcg (0.075 mg) oral tablet: 1 tab(s) orally once a day (21 Aug 2023 09:56)  Metoprolol Tartrate 50 mg oral tablet: 1 tab(s) orally 2 times a day (21 Aug 2023 09:56)  Myrbetriq 50 mg oral tablet, extended release: 1 tab(s) orally once a day (21 Aug 2023 09:56)  polyethylene glycol 3350 oral powder for reconstitution: 17 gram(s) orally once a day (21 Aug 2023 09:56)  PreserVision AREDS oral capsule: 1 cap(s) orally once a day (21 Aug 2023 09:56)  Refresh Dry Eye Therapy ophthalmic solution: 1 drop(s) in each eye 4 times a day (21 Aug 2023 09:56)  Tylenol 325 mg oral tablet: 2 tab(s) orally every 4 hours as needed for pain or fever (21 Aug 2023 09:56)      MEDICATIONS  (STANDING):  aMIOdarone    Tablet 200 milliGRAM(s) Oral daily  amLODIPine   Tablet 5 milliGRAM(s) Oral daily  apixaban 2.5 milliGRAM(s) Oral two times a day  artificial  tears Solution 1 Drop(s) Both EYES two times a day  dextrose 5% + sodium chloride 0.45%. 1000 milliLiter(s) (50 mL/Hr) IV Continuous <Continuous>  ertapenem  IVPB 1000 milliGRAM(s) IV Intermittent every 24 hours  ferrous    sulfate 325 milliGRAM(s) Oral daily  lactobacillus acidophilus 1 Tablet(s) Oral every 12 hours  levothyroxine 75 MICROGram(s) Oral daily  metoprolol tartrate 50 milliGRAM(s) Oral two times a day  multivitamin/minerals 1 Tablet(s) Oral daily  pantoprazole    Tablet 40 milliGRAM(s) Oral daily  polyethylene glycol 3350 17 Gram(s) Oral daily    MEDICATIONS  (PRN):  acetaminophen     Tablet .. 650 milliGRAM(s) Oral every 6 hours PRN Temp greater or equal to 38C (100.4F), Mild Pain (1 - 3)  albuterol/ipratropium for Nebulization 3 milliLiter(s) Nebulizer every 6 hours PRN Shortness of Breath and/or Wheezing  aluminum hydroxide/magnesium hydroxide/simethicone Suspension 30 milliLiter(s) Oral every 4 hours PRN Dyspepsia  guaiFENesin Oral Liquid (Sugar-Free) 200 milliGRAM(s) Oral every 6 hours PRN Cough  melatonin 3 milliGRAM(s) Oral at bedtime PRN Insomnia  ondansetron Injectable 4 milliGRAM(s) IV Push every 8 hours PRN Nausea and/or Vomiting              Vital Signs Last 24 Hrs  T(C): 36.4 (24 Aug 2023 04:58), Max: 36.8 (23 Aug 2023 20:11)  T(F): 97.5 (24 Aug 2023 04:58), Max: 98.3 (23 Aug 2023 20:11)  HR: 67 (24 Aug 2023 04:58) (65 - 76)  BP: 151/68 (24 Aug 2023 04:58) (147/84 - 178/83)  BP(mean): --  RR: 18 (24 Aug 2023 04:58) (17 - 18)  SpO2: 97% (24 Aug 2023 04:58) (94% - 97%)    Parameters below as of 24 Aug 2023 04:58  Patient On (Oxygen Delivery Method): nasal cannula  O2 Flow (L/min): 2        08-23-23 @ 07:01  -  08-24-23 @ 07:00  --------------------------------------------------------  IN: 1000 mL / OUT: 300 mL / NET: 700 mL              LABS:                        10.0   5.80  )-----------( 203      ( 24 Aug 2023 06:48 )             33.3     08-24    142  |  111<H>  |  13  ----------------------------<  105<H>  3.4<L>   |  28  |  0.62    Ca    8.6      24 Aug 2023 06:48    TPro  6.0  /  Alb  2.5<L>  /  TBili  0.5  /  DBili  x   /  AST  11<L>  /  ALT  25  /  AlkPhos  91  08-23      Urinalysis Basic - ( 24 Aug 2023 06:48 )    Color: x / Appearance: x / SG: x / pH: x  Gluc: 105 mg/dL / Ketone: x  / Bili: x / Urobili: x   Blood: x / Protein: x / Nitrite: x   Leuk Esterase: x / RBC: x / WBC x   Sq Epi: x / Non Sq Epi: x / Bacteria: x            WBC:  WBC Count: 5.80 K/uL (08-24 @ 06:48)  WBC Count: 6.00 K/uL (08-23 @ 06:08)  WBC Count: 6.97 K/uL (08-22 @ 06:20)  WBC Count: 21.39 K/uL (08-21 @ 06:24)  WBC Count: 16.67 K/uL (08-20 @ 11:55)      MICROBIOLOGY:  RECENT CULTURES:  08-22 .Blood Blood XXXX XXXX   No growth at 24 hours    08-22 .Blood Blood XXXX XXXX   No growth at 24 hours    08-21 .Blood Blood XXXX   Growth in aerobic bottle: Gram Negative Rods   Growth in aerobic bottle: Escherichia coli ESBL  See previous culture [id number]    08-21 .Blood Blood XXXX   Growth in anaerobic bottle: Gram Negative Rods  Growth in aerobic bottle: Gram Negative Rods   Growth in aerobic and anaerobic bottles: Escherichia coli ESBL  See previous culture [id number]    08-20 Clean Catch Clean Catch (Midstream) Escherichia coli ESBL XXXX   >100,000 CFU/ml Escherichia coli ESBL    08-20 .Blood Blood-Peripheral XXXX   Growth in aerobic bottle: Gram Negative Rods   Growth in aerobic bottle: Escherichia coli ESBL  See previous culture [id number]    08-20 .Blood Blood-Peripheral Blood Culture PCR  Escherichia coli ESBL   Growth in aerobic and anaerobic bottles: Gram Negative Rods   Growth in aerobic and anaerobic bottles: Escherichia coli ESBL  Direct identification is available within approximately 3-5  hours either by Blood Panel Multiplexed PCR or Direct  MALDI-TOF. Details: [email or web address]                    Sodium:  Sodium: 142 mmol/L (08-24 @ 06:48)  Sodium: 142 mmol/L (08-23 @ 06:08)  Sodium: 142 mmol/L (08-22 @ 06:20)  Sodium: 141 mmol/L (08-21 @ 06:24)  Sodium: 144 mmol/L (08-20 @ 11:55)      0.62 mg/dL 08-24 @ 06:48  0.68 mg/dL 08-23 @ 06:08  0.81 mg/dL 08-22 @ 06:20  0.86 mg/dL 08-21 @ 06:24  0.85 mg/dL 08-20 @ 11:55      Hemoglobin:  Hemoglobin: 10.0 g/dL (08-24 @ 06:48)  Hemoglobin: 10.1 g/dL (08-23 @ 06:08)  Hemoglobin: 10.7 g/dL (08-22 @ 06:20)  Hemoglobin: 10.5 g/dL (08-21 @ 06:24)  Hemoglobin: 12.1 g/dL (08-20 @ 11:55)      Platelets: Platelet Count - Automated: 203 K/uL (08-24 @ 06:48)  Platelet Count - Automated: 198 K/uL (08-23 @ 06:08)  Platelet Count - Automated: 195 K/uL (08-22 @ 06:20)  Platelet Count - Automated: 175 K/uL (08-21 @ 06:24)  Platelet Count - Automated: 225 K/uL (08-20 @ 11:55)      LIVER FUNCTIONS - ( 23 Aug 2023 06:08 )  Alb: 2.5 g/dL / Pro: 6.0 g/dL / ALK PHOS: 91 U/L / ALT: 25 U/L / AST: 11 U/L / GGT: x             Urinalysis Basic - ( 24 Aug 2023 06:48 )    Color: x / Appearance: x / SG: x / pH: x  Gluc: 105 mg/dL / Ketone: x  / Bili: x / Urobili: x   Blood: x / Protein: x / Nitrite: x   Leuk Esterase: x / RBC: x / WBC x   Sq Epi: x / Non Sq Epi: x / Bacteria: x        RADIOLOGY & ADDITIONAL STUDIES:      MICROBIOLOGY:  RECENT CULTURES:  08-22 .Blood Blood XXXX XXXX   No growth at 24 hours    08-22 .Blood Blood XXXX XXXX   No growth at 24 hours    08-21 .Blood Blood XXXX   Growth in aerobic bottle: Gram Negative Rods   Growth in aerobic bottle: Escherichia coli ESBL  See previous culture [id number]    08-21 .Blood Blood XXXX   Growth in anaerobic bottle: Gram Negative Rods  Growth in aerobic bottle: Gram Negative Rods   Growth in aerobic and anaerobic bottles: Escherichia coli ESBL  See previous culture [id number]    08-20 Clean Catch Clean Catch (Midstream) Escherichia coli ESBL XXXX   >100,000 CFU/ml Escherichia coli ESBL    08-20 .Blood Blood-Peripheral XXXX   Growth in aerobic bottle: Gram Negative Rods   Growth in aerobic bottle: Escherichia coli ESBL  See previous culture [id number]    08-20 .Blood Blood-Peripheral Blood Culture PCR  Escherichia coli ESBL   Growth in aerobic and anaerobic bottles: Gram Negative Rods   Growth in aerobic and anaerobic bottles: Escherichia coli ESBL  Direct identification is available within approximately 3-5  hours either by Blood Panel Multiplexed PCR or Direct  MALDI-TOF. Details: [email or web address]                 CHIEF COMPLAINT/ REASON FOR VISIT  .. Patient was seen to address the  issue listed under PROBLEM LIST which is located toward bottom of this note     DEACON ROBERT    PLV 1EAS 106 W1    Allergies    penicillin (Unknown)    Intolerances        PAST MEDICAL & SURGICAL HISTORY:  HTN (hypertension)      Afib      Spinal stenosis      PFO (patent foramen ovale)      Degeneration macular      Osteoporosis      History of complete heart block      Hypothyroidism      Cardiac pacemaker          FAMILY HISTORY:      Home Medications:  Albuterol (Eqv-ProAir HFA) 90 mcg/inh inhalation aerosol: 1 puff(s) inhaled 4 times a day as needed for  cough or wheezing (21 Aug 2023 09:56)  amiodarone 200 mg oral tablet: 1 tab(s) orally once a day (21 Aug 2023 09:55)  clindamycin 1% topical lotion: Apply topically to affected area once a day to face (21 Aug 2023 09:55)  Eliquis 2.5 mg oral tablet: 1 tab(s) orally 2 times a day (21 Aug 2023 09:55)  enalapril 10 mg oral tablet: 1 tab(s) orally once a day (21 Aug 2023 09:55)  ferrous sulfate 325 mg (65 mg elemental iron) oral tablet: 1 tab(s) orally once a day (21 Aug 2023 09:55)  levothyroxine 75 mcg (0.075 mg) oral tablet: 1 tab(s) orally once a day (21 Aug 2023 09:56)  Metoprolol Tartrate 50 mg oral tablet: 1 tab(s) orally 2 times a day (21 Aug 2023 09:56)  Myrbetriq 50 mg oral tablet, extended release: 1 tab(s) orally once a day (21 Aug 2023 09:56)  polyethylene glycol 3350 oral powder for reconstitution: 17 gram(s) orally once a day (21 Aug 2023 09:56)  PreserVision AREDS oral capsule: 1 cap(s) orally once a day (21 Aug 2023 09:56)  Refresh Dry Eye Therapy ophthalmic solution: 1 drop(s) in each eye 4 times a day (21 Aug 2023 09:56)  Tylenol 325 mg oral tablet: 2 tab(s) orally every 4 hours as needed for pain or fever (21 Aug 2023 09:56)      MEDICATIONS  (STANDING):  aMIOdarone    Tablet 200 milliGRAM(s) Oral daily  amLODIPine   Tablet 5 milliGRAM(s) Oral daily  apixaban 2.5 milliGRAM(s) Oral two times a day  artificial  tears Solution 1 Drop(s) Both EYES two times a day  dextrose 5% + sodium chloride 0.45%. 1000 milliLiter(s) (50 mL/Hr) IV Continuous <Continuous>  ertapenem  IVPB 1000 milliGRAM(s) IV Intermittent every 24 hours  ferrous    sulfate 325 milliGRAM(s) Oral daily  lactobacillus acidophilus 1 Tablet(s) Oral every 12 hours  levothyroxine 75 MICROGram(s) Oral daily  metoprolol tartrate 50 milliGRAM(s) Oral two times a day  multivitamin/minerals 1 Tablet(s) Oral daily  pantoprazole    Tablet 40 milliGRAM(s) Oral daily  polyethylene glycol 3350 17 Gram(s) Oral daily    MEDICATIONS  (PRN):  acetaminophen     Tablet .. 650 milliGRAM(s) Oral every 6 hours PRN Temp greater or equal to 38C (100.4F), Mild Pain (1 - 3)  albuterol/ipratropium for Nebulization 3 milliLiter(s) Nebulizer every 6 hours PRN Shortness of Breath and/or Wheezing  aluminum hydroxide/magnesium hydroxide/simethicone Suspension 30 milliLiter(s) Oral every 4 hours PRN Dyspepsia  guaiFENesin Oral Liquid (Sugar-Free) 200 milliGRAM(s) Oral every 6 hours PRN Cough  melatonin 3 milliGRAM(s) Oral at bedtime PRN Insomnia  ondansetron Injectable 4 milliGRAM(s) IV Push every 8 hours PRN Nausea and/or Vomiting              Vital Signs Last 24 Hrs  T(C): 36.4 (24 Aug 2023 04:58), Max: 36.8 (23 Aug 2023 20:11)  T(F): 97.5 (24 Aug 2023 04:58), Max: 98.3 (23 Aug 2023 20:11)  HR: 67 (24 Aug 2023 04:58) (65 - 76)  BP: 151/68 (24 Aug 2023 04:58) (147/84 - 178/83)  BP(mean): --  RR: 18 (24 Aug 2023 04:58) (17 - 18)  SpO2: 97% (24 Aug 2023 04:58) (94% - 97%)    Parameters below as of 24 Aug 2023 04:58  Patient On (Oxygen Delivery Method): nasal cannula  O2 Flow (L/min): 2        08-23-23 @ 07:01  -  08-24-23 @ 07:00  --------------------------------------------------------  IN: 1000 mL / OUT: 300 mL / NET: 700 mL              LABS:                        10.0   5.80  )-----------( 203      ( 24 Aug 2023 06:48 )             33.3     08-24    142  |  111<H>  |  13  ----------------------------<  105<H>  3.4<L>   |  28  |  0.62    Ca    8.6      24 Aug 2023 06:48    TPro  6.0  /  Alb  2.5<L>  /  TBili  0.5  /  DBili  x   /  AST  11<L>  /  ALT  25  /  AlkPhos  91  08-23      Urinalysis Basic - ( 24 Aug 2023 06:48 )    Color: x / Appearance: x / SG: x / pH: x  Gluc: 105 mg/dL / Ketone: x  / Bili: x / Urobili: x   Blood: x / Protein: x / Nitrite: x   Leuk Esterase: x / RBC: x / WBC x   Sq Epi: x / Non Sq Epi: x / Bacteria: x            WBC:  WBC Count: 5.80 K/uL (08-24 @ 06:48)  WBC Count: 6.00 K/uL (08-23 @ 06:08)  WBC Count: 6.97 K/uL (08-22 @ 06:20)  WBC Count: 21.39 K/uL (08-21 @ 06:24)  WBC Count: 16.67 K/uL (08-20 @ 11:55)      MICROBIOLOGY:  RECENT CULTURES:  08-22 .Blood Blood XXXX XXXX   No growth at 24 hours    08-22 .Blood Blood XXXX XXXX   No growth at 24 hours    08-21 .Blood Blood XXXX   Growth in aerobic bottle: Gram Negative Rods   Growth in aerobic bottle: Escherichia coli ESBL  See previous culture [id number]    08-21 .Blood Blood XXXX   Growth in anaerobic bottle: Gram Negative Rods  Growth in aerobic bottle: Gram Negative Rods   Growth in aerobic and anaerobic bottles: Escherichia coli ESBL  See previous culture [id number]    08-20 Clean Catch Clean Catch (Midstream) Escherichia coli ESBL XXXX   >100,000 CFU/ml Escherichia coli ESBL    08-20 .Blood Blood-Peripheral XXXX   Growth in aerobic bottle: Gram Negative Rods   Growth in aerobic bottle: Escherichia coli ESBL  See previous culture [id number]    08-20 .Blood Blood-Peripheral Blood Culture PCR  Escherichia coli ESBL   Growth in aerobic and anaerobic bottles: Gram Negative Rods   Growth in aerobic and anaerobic bottles: Escherichia coli ESBL  Direct identification is available within approximately 3-5  hours either by Blood Panel Multiplexed PCR or Direct  MALDI-TOF. Details: [email or web address]                    Sodium:  Sodium: 142 mmol/L (08-24 @ 06:48)  Sodium: 142 mmol/L (08-23 @ 06:08)  Sodium: 142 mmol/L (08-22 @ 06:20)  Sodium: 141 mmol/L (08-21 @ 06:24)  Sodium: 144 mmol/L (08-20 @ 11:55)      0.62 mg/dL 08-24 @ 06:48  0.68 mg/dL 08-23 @ 06:08  0.81 mg/dL 08-22 @ 06:20  0.86 mg/dL 08-21 @ 06:24  0.85 mg/dL 08-20 @ 11:55      Hemoglobin:  Hemoglobin: 10.0 g/dL (08-24 @ 06:48)  Hemoglobin: 10.1 g/dL (08-23 @ 06:08)  Hemoglobin: 10.7 g/dL (08-22 @ 06:20)  Hemoglobin: 10.5 g/dL (08-21 @ 06:24)  Hemoglobin: 12.1 g/dL (08-20 @ 11:55)      Platelets: Platelet Count - Automated: 203 K/uL (08-24 @ 06:48)  Platelet Count - Automated: 198 K/uL (08-23 @ 06:08)  Platelet Count - Automated: 195 K/uL (08-22 @ 06:20)  Platelet Count - Automated: 175 K/uL (08-21 @ 06:24)  Platelet Count - Automated: 225 K/uL (08-20 @ 11:55)      LIVER FUNCTIONS - ( 23 Aug 2023 06:08 )  Alb: 2.5 g/dL / Pro: 6.0 g/dL / ALK PHOS: 91 U/L / ALT: 25 U/L / AST: 11 U/L / GGT: x             Urinalysis Basic - ( 24 Aug 2023 06:48 )    Color: x / Appearance: x / SG: x / pH: x  Gluc: 105 mg/dL / Ketone: x  / Bili: x / Urobili: x   Blood: x / Protein: x / Nitrite: x   Leuk Esterase: x / RBC: x / WBC x   Sq Epi: x / Non Sq Epi: x / Bacteria: x        RADIOLOGY & ADDITIONAL STUDIES:      MICROBIOLOGY:  RECENT CULTURES:  08-22 .Blood Blood XXXX XXXX   No growth at 24 hours    08-22 .Blood Blood XXXX XXXX   No growth at 24 hours    08-21 .Blood Blood XXXX   Growth in aerobic bottle: Gram Negative Rods   Growth in aerobic bottle: Escherichia coli ESBL  See previous culture [id number]    08-21 .Blood Blood XXXX   Growth in anaerobic bottle: Gram Negative Rods  Growth in aerobic bottle: Gram Negative Rods   Growth in aerobic and anaerobic bottles: Escherichia coli ESBL  See previous culture [id number]    08-20 Clean Catch Clean Catch (Midstream) Escherichia coli ESBL XXXX   >100,000 CFU/ml Escherichia coli ESBL    08-20 .Blood Blood-Peripheral XXXX   Growth in aerobic bottle: Gram Negative Rods   Growth in aerobic bottle: Escherichia coli ESBL  See previous culture [id number]    08-20 .Blood Blood-Peripheral Blood Culture PCR  Escherichia coli ESBL   Growth in aerobic and anaerobic bottles: Gram Negative Rods   Growth in aerobic and anaerobic bottles: Escherichia coli ESBL  Direct identification is available within approximately 3-5  hours either by Blood Panel Multiplexed PCR or Direct  MALDI-TOF. Details: [email or web address]

## 2023-08-24 NOTE — PROGRESS NOTE ADULT - ASSESSMENT
REASON FOR VISIT  .. Management of problems listed below      PHYSICAL EXAM    HEENT Unremarkable  atraumatic   RESP Fair air entry  Harsh breath sound   CARDIAC S1 S2 No S3     NO JVD    ABDOMEN No hepatosplenomegaly   PEDAL EDEMA present No calf tenderness  NO rash       GENERAL DATA .     GOC.    ..   ICU STAY.   .. none  COVID.   .. scv2 8/21/2023 (-)    BEST PRACTICE ISSUES.    HOB ELEVATN.   .. Yes  DVT PPLX.   ..  8/20/2023 apixaba 2.5 x 2 (af)     SPEECH SWALLOW RECOMMENDATIONS.    ..    8/21/2023 soft bite     DIET.    ..  8/20/2023 soft bite size   IV fl.  .. 8/22/2023 D5 1/2 ns  50   STRESS ULCER PPLX.   ..    8/20/2023 protonix 40   INFECTION PPLX.   ..     ALLGY.  ..    pncl                     WT.  ..  8/20/2023 56  BMI.  ..   8/20/2023 21    PROCEDURES.  ..     ABGS.   .     VS/ PO/IO/ VENT/ DRIPS.   8/24/2023 afeb 70 160/80   8/24/2023 2l 92%     DOA C/C.     . 8/20/2023 · Chief Complaint Quote sent by [employer] for cough x 2 weeks. negative cxr and covid swab. poor PO intake today.           INITIAL PRESENTATION.   . 8/20/2023 89-year-old female with history of hypothyroidism, A-fib on Eliquis, recent pacemaker for complete heart block brought in by ambulance from [facility] assisted living home for cough for the past 2 weeks.  Associated with generalized weakness and decreased p.o. intake.  No fall or trauma.  Patient states she has had cold symptoms for a while.  Denies fever, chills, chest pain, shortness of breath, abdominal pain, nausea, vomiting, upper or lower extremity weakness or paresthesias.   pmd: Dr. Crawley, cards: [facility].   . hypothyroidism   . A-fib on Eliquis (hx AFL ablation),  .  PFO  HOME MEDS.   . levoxyl eliquis 2.5 x 2   COURSE.  . 8/20/2023 pulm consultd    . 8/20/2023 5:39 PM ER meds given already include cefepime 2g     PROBLEMS/ASSESSMENT/RECOMMENDATIONS (A/R).  PULMONARY.  . GAS EXCHANGE.  .. A/R.   .. Monitor pulse oximetry and target po 90-95%    . COPD.  .. 8/20/2023 duoneb.4p   .. 8/20/2023 benzonatate 100.3 x3d   .. 8/20/2023 gauiafenesin     INFECTION.  . E coli ESBL CTX M bacteremia 8/20  .. w 8/20-8/21-8/22-8/23/2023 w 16 - 21- 6.9 - 6   .. pr 8/21/2023 pr 7.5   .. ua 8/20/2023 ua 1012 w tntc l estrase large r 25   .. ct chest 8/20/2023   .... l upper ant cardiac device leads terminating in r atrium and rv   .... no enlarged hilar or mediastinal ln   .... mild doe mod diffuse distention of mid to upper esophagus   .... cm   .... increased density of liver ? amiodarone effect   .... bl lower lobe partial areas of compressive atelectasis sl progressd since ctap 3/4/2023   .... mild bl lower lung areas of linear and compressive atelectasis lower lobes and lingula   .... mild bl upper lobe subsegmental linear and dependent atelectasis   .... nonsp mild interlobular septal thickening   .. MICRO  .. bc 8/20 E coli ESBL CTX M bacteremia 8/20   .. bc 8/21 e coli esbl    .. bc 8/22 (-)   .. legn 8/21 (-)   .. rvp 8/20 (-)   .. EVENTS   .. 8/20/2023 will start empiric levaquin   .. 8/21/2023 message sent to ID to change to carbapenem   .. ABIO  .. 8/21 ertapenem 8d dr green   .. AR   .. ESBL e coli bacteremia on 8/20 likely from urine on ertapenem stratd 8/21     . Hemodynamics.  .. la 8/20/2023 la 1.4  .. BP ok      . A fib   .. 8/20/2023 eliquis   .. 8/22/2023 amiodaron 200   .. 8/22/2023 metporolol 50.2     . CAD.  .. Tr 8/21/2023 Tr 31     . CHF   .. bnp 8/20-8/23/2023 bnp 4545 - 3219   .. 8/22/2023 enalapril 10       HEMAT   .. Hb 8/20-8/21-8/23/2023 Hb 12 - 10.5 - 10   .. plt 8/20/2023 plt 225   .. inr 8/20/2023 inr 129     RENAL   .. Na 8/20/2023 Na 144   .. K 8/20/2023 K 3.3   .. co2 8/20/2023 co2 29   .. Cr 8/20/2023 Cr .8     . Dysphagia  .. esophagogram 8/22/2023 smooth tapering o distal esophagus     LFTS   .. AP 8/20/2023 124  .. ast 23  .. alt 25     MAIN ISSUES.  . Cough x 2 wk poa 8/20/2023  . Pneumonia   . E coli ESBL CTX M bacteremia 8/20 8/21  .. bc 8/22 (-)   .. 8/20/2023 levaquin -> 8/21 ertapenem 1 x 8d Dr Green  . COPD   . A fib   .. 8/20/2023 apixaba   . CHF     TIME SPENT.   . Over 36 minutes aggregate care time spent on encounter; activities included   direct patient care, counseling and/or coordinating care reviewing notes, lab data/ imaging , discussion with multidisciplinary team/ patient  /family and explaining in detail risks, benefits, alternatives  of the recommendations     JAKOB WORTHY 89 f 8/20/2023 9/22/1929 DR BAM HERRERA      REASON FOR VISIT  .. Management of problems listed below      PHYSICAL EXAM    HEENT Unremarkable  atraumatic   RESP Fair air entry  Harsh breath sound   CARDIAC S1 S2 No S3     NO JVD    ABDOMEN No hepatosplenomegaly   PEDAL EDEMA present No calf tenderness  NO rash       GENERAL DATA .     GOC.    ..   ICU STAY.   .. none  COVID.   .. scv2 8/21/2023 (-)    BEST PRACTICE ISSUES.    HOB ELEVATN.   .. Yes  DVT PPLX.   ..  8/20/2023 apixaba 2.5 x 2 (af)     SPEECH SWALLOW RECOMMENDATIONS.    ..    8/21/2023 soft bite     DIET.    ..  8/20/2023 soft bite size   IV fl.  .. 8/22/2023 D5 1/2 ns  50   STRESS ULCER PPLX.   ..    8/20/2023 protonix 40   INFECTION PPLX.   ..     ALLGY.  ..    pncl                     WT.  ..  8/20/2023 56  BMI.  ..   8/20/2023 21    PROCEDURES.  ..     ABGS.   .     VS/ PO/IO/ VENT/ DRIPS.   8/24/2023 afeb 70 160/80   8/24/2023 2l 92%     DOA C/C.     . 8/20/2023 · Chief Complaint Quote sent by [employer] for cough x 2 weeks. negative cxr and covid swab. poor PO intake today.           INITIAL PRESENTATION.   . 8/20/2023 89-year-old female with history of hypothyroidism, A-fib on Eliquis, recent pacemaker for complete heart block brought in by ambulance from [facility] assisted living home for cough for the past 2 weeks.  Associated with generalized weakness and decreased p.o. intake.  No fall or trauma.  Patient states she has had cold symptoms for a while.  Denies fever, chills, chest pain, shortness of breath, abdominal pain, nausea, vomiting, upper or lower extremity weakness or paresthesias.   pmd: Dr. Crawley, cards: [facility].   . hypothyroidism   . A-fib on Eliquis (hx AFL ablation),  .  PFO  HOME MEDS.   . levoxyl eliquis 2.5 x 2   COURSE.  . 8/20/2023 pulm consultd    . 8/20/2023 5:39 PM ER meds given already include cefepime 2g     PROBLEMS/ASSESSMENT/RECOMMENDATIONS (A/R).  PULMONARY.  . GAS EXCHANGE.  .. A/R.   .. Monitor pulse oximetry and target po 90-95%    . COPD.  .. 8/20/2023 duoneb.4p   .. 8/20/2023 benzonatate 100.3 x3d   .. 8/20/2023 gauiafenesin     INFECTION.  . E coli ESBL CTX M bacteremia 8/20  .. w 8/20-8/21-8/22-8/23/2023 w 16 - 21- 6.9 - 6   .. pr 8/21/2023 pr 7.5   .. ua 8/20/2023 ua 1012 w tntc l estrase large r 25   .. ct chest 8/20/2023   .... l upper ant cardiac device leads terminating in r atrium and rv   .... no enlarged hilar or mediastinal ln   .... mild doe mod diffuse distention of mid to upper esophagus   .... cm   .... increased density of liver ? amiodarone effect   .... bl lower lobe partial areas of compressive atelectasis sl progressd since ctap 3/4/2023   .... mild bl lower lung areas of linear and compressive atelectasis lower lobes and lingula   .... mild bl upper lobe subsegmental linear and dependent atelectasis   .... nonsp mild interlobular septal thickening   .. MICRO  .. bc 8/20 E coli ESBL CTX M bacteremia 8/20   .. bc 8/21 e coli esbl    .. bc 8/22 (-)   .. legn 8/21 (-)   .. rvp 8/20 (-)   .. EVENTS   .. 8/20/2023 will start empiric levaquin   .. 8/21/2023 message sent to ID to change to carbapenem   .. ABIO  .. 8/21 ertapenem 8d dr green   .. AR   .. ESBL e coli bacteremia on 8/20 likely from urine on ertapenem stratd 8/21     . Hemodynamics.  .. la 8/20/2023 la 1.4  .. BP ok      . A fib   .. 8/20/2023 eliquis   .. 8/22/2023 amiodaron 200   .. 8/22/2023 metporolol 50.2     . CAD.  .. Tr 8/21/2023 Tr 31     . CHF   .. bnp 8/20-8/23/2023 bnp 4545 - 3219   .. 8/22/2023 enalapril 10       HEMAT   .. Hb 8/20-8/21-8/23/2023 Hb 12 - 10.5 - 10   .. plt 8/20/2023 plt 225   .. inr 8/20/2023 inr 129     RENAL   .. Na 8/20/2023 Na 144   .. K 8/20/2023 K 3.3   .. co2 8/20/2023 co2 29   .. Cr 8/20/2023 Cr .8     . Dysphagia  .. esophagogram 8/22/2023 smooth tapering o distal esophagus     LFTS   .. AP 8/20/2023 124  .. ast 23  .. alt 25     MAIN ISSUES.  . Cough x 2 wk poa 8/20/2023  . Pneumonia   . E coli ESBL CTX M bacteremia 8/20 8/21  .. bc 8/22 (-)   .. 8/20/2023 levaquin -> 8/21 ertapenem 1 x 8d Dr Green  . COPD   . A fib   .. 8/20/2023 apixaba   . CHF     TIME SPENT.   . Over 36 minutes aggregate care time spent on encounter; activities included   direct patient care, counseling and/or coordinating care reviewing notes, lab data/ imaging , discussion with multidisciplinary team/ patient  /family and explaining in detail risks, benefits, alternatives  of the recommendations     JAKOB WORTHY 89 f 8/20/2023 9/22/1929 DR BAM HERRERA      REASON FOR VISIT  .. Management of problems listed below      PHYSICAL EXAM    HEENT Unremarkable  atraumatic   RESP Fair air entry  Harsh breath sound   CARDIAC S1 S2 No S3     NO JVD    ABDOMEN No hepatosplenomegaly   PEDAL EDEMA present No calf tenderness  NO rash       GENERAL DATA .     GOC.    ..   ICU STAY.   .. none  COVID.   .. scv2 8/21/2023 (-)    BEST PRACTICE ISSUES.    HOB ELEVATN.   .. Yes  DVT PPLX.   ..  8/20/2023 apixaba 2.5 x 2 (af)     SPEECH SWALLOW RECOMMENDATIONS.    ..    8/21/2023 soft bite     DIET.    ..  8/20/2023 soft bite size   IV fl.  .. 8/22/2023 D5 1/2 ns  50   STRESS ULCER PPLX.   ..    8/20/2023 protonix 40   INFECTION PPLX.   ..     ALLGY.  ..    pncl                     WT.  ..  8/20/2023 56  BMI.  ..   8/20/2023 21    PROCEDURES.  ..     ABGS.   .     VS/ PO/IO/ VENT/ DRIPS.   8/24/2023 afeb 70 160/80   8/24/2023 2l 92%     DOA C/C.     . 8/20/2023 · Chief Complaint Quote sent by [employer] for cough x 2 weeks. negative cxr and covid swab. poor PO intake today.           INITIAL PRESENTATION.   . 8/20/2023 89-year-old female with history of hypothyroidism, A-fib on Eliquis, recent pacemaker for complete heart block brought in by ambulance from [facility] assisted living home for cough for the past 2 weeks.  Associated with generalized weakness and decreased p.o. intake.  No fall or trauma.  Patient states she has had cold symptoms for a while.  Denies fever, chills, chest pain, shortness of breath, abdominal pain, nausea, vomiting, upper or lower extremity weakness or paresthesias.   pmd: Dr. Crawley, cards: [facility].   . hypothyroidism   . A-fib on Eliquis (hx AFL ablation),  .  PFO  HOME MEDS.   . levoxyl eliquis 2.5 x 2   COURSE.  . 8/20/2023 pulm consultd    . 8/20/2023 5:39 PM ER meds given already include cefepime 2g     PROBLEMS/ASSESSMENT/RECOMMENDATIONS (A/R).  PULMONARY.  . GAS EXCHANGE.  .. A/R.   .. Monitor pulse oximetry and target po 90-95%    . COPD.  .. 8/20/2023 duoneb.4p   .. 8/20/2023 benzonatate 100.3 x3d   .. 8/20/2023 gauiafenesin     INFECTION.  . E coli ESBL CTX M bacteremia 8/20  .. w 8/20-8/21-8/22-8/23/2023 w 16 - 21- 6.9 - 6   .. pr 8/21/2023 pr 7.5   .. ua 8/20/2023 ua 1012 w tntc l estrase large r 25   .. ct chest 8/20/2023   .... l upper ant cardiac device leads terminating in r atrium and rv   .... no enlarged hilar or mediastinal ln   .... mild doe mod diffuse distention of mid to upper esophagus   .... cm   .... increased density of liver ? amiodarone effect   .... bl lower lobe partial areas of compressive atelectasis sl progressd since ctap 3/4/2023   .... mild bl lower lung areas of linear and compressive atelectasis lower lobes and lingula   .... mild bl upper lobe subsegmental linear and dependent atelectasis   .... nonsp mild interlobular septal thickening   .. MICRO  .. bc 8/20 E coli ESBL CTX M bacteremia 8/20   .. bc 8/21 e coli esbl    .. bc 8/22 (-)   .. legn 8/21 (-)   .. rvp 8/20 (-)   .. EVENTS   .. 8/20/2023 will start empiric levaquin   .. 8/21/2023 message sent to ID to change to carbapenem   .. ABIO  .. 8/21 ertapenem 8d dr green   .. AR   .. ESBL e coli bacteremia on 8/20 likely from urine on ertapenem stratd 8/21     . Hemodynamics.  .. la 8/20/2023 la 1.4  .. BP ok      . A fib   .. 8/20/2023 eliquis   .. 8/22/2023 amiodaron 200   .. 8/22/2023 metporolol 50.2     . CAD.  .. Tr 8/21/2023 Tr 31     . CHF   .. bnp 8/20-8/23/2023 bnp 4545 - 3219   .. 8/22/2023 enalapril 10       HEMAT   .. Hb 8/20-8/21-8/23/2023 Hb 12 - 10.5 - 10   .. plt 8/20/2023 plt 225   .. inr 8/20/2023 inr 129     RENAL   .. Na 8/20/2023 Na 144   .. K 8/20/2023 K 3.3   .. co2 8/20/2023 co2 29   .. Cr 8/20/2023 Cr .8     . Dysphagia  .. esophagogram 8/22/2023 smooth tapering o distal esophagus     LFTS   .. AP 8/20/2023 124  .. ast 23  .. alt 25     MAIN ISSUES.  . Cough x 2 wk poa 8/20/2023  . Pneumonia   . E coli ESBL CTX M bacteremia 8/20 8/21  .. bc 8/22 (-)   .. 8/20/2023 levaquin -> 8/21 ertapenem 1 x 8d Dr Green  . COPD   . A fib   .. 8/20/2023 apixaba   . CHF     TIME SPENT.   . Over 36 minutes aggregate care time spent on encounter; activities included   direct patient care, counseling and/or coordinating care reviewing notes, lab data/ imaging , discussion with multidisciplinary team/ patient  /family and explaining in detail risks, benefits, alternatives  of the recommendations     JAKOB WORTHY 89 f 8/20/2023 9/22/1929 DR BAM HERRERA

## 2023-08-24 NOTE — PROGRESS NOTE ADULT - SUBJECTIVE AND OBJECTIVE BOX
Date of Service 08-24-23 @ 16:16    Patient is a 89y old  Female who presents with a chief complaint of cough (24 Aug 2023 12:10)      INTERVAL /OVERNIGHT EVENTS: c/o vaginal discomfort    MEDICATIONS  (STANDING):  aMIOdarone    Tablet 200 milliGRAM(s) Oral daily  amLODIPine   Tablet 5 milliGRAM(s) Oral daily  apixaban 2.5 milliGRAM(s) Oral two times a day  artificial  tears Solution 1 Drop(s) Both EYES two times a day  ertapenem  IVPB 1000 milliGRAM(s) IV Intermittent every 24 hours  ferrous    sulfate 325 milliGRAM(s) Oral daily  lactobacillus acidophilus 1 Tablet(s) Oral every 12 hours  levothyroxine 75 MICROGram(s) Oral daily  metoprolol tartrate 50 milliGRAM(s) Oral two times a day  multivitamin/minerals 1 Tablet(s) Oral daily  pantoprazole    Tablet 40 milliGRAM(s) Oral daily  polyethylene glycol 3350 17 Gram(s) Oral daily    MEDICATIONS  (PRN):  acetaminophen     Tablet .. 650 milliGRAM(s) Oral every 6 hours PRN Temp greater or equal to 38C (100.4F), Mild Pain (1 - 3)  albuterol/ipratropium for Nebulization 3 milliLiter(s) Nebulizer every 6 hours PRN Shortness of Breath and/or Wheezing  aluminum hydroxide/magnesium hydroxide/simethicone Suspension 30 milliLiter(s) Oral every 4 hours PRN Dyspepsia  guaiFENesin Oral Liquid (Sugar-Free) 200 milliGRAM(s) Oral every 6 hours PRN Cough  melatonin 3 milliGRAM(s) Oral at bedtime PRN Insomnia  ondansetron Injectable 4 milliGRAM(s) IV Push every 8 hours PRN Nausea and/or Vomiting      Allergies    penicillin (Unknown)    Intolerances        REVIEW OF SYSTEMS:  CONSTITUTIONAL: No fever, weight loss, or fatigue  EYES: No eye pain, visual disturbances, or discharge  ENMT:  No difficulty hearing, tinnitus, vertigo; No sinus or throat pain  NECK: No pain or stiffness  RESPIRATORY: No cough, wheezing, chills or hemoptysis; No shortness of breath  CARDIOVASCULAR: No chest pain, palpitations, dizziness, or leg swelling  GASTROINTESTINAL: No abdominal or epigastric pain. No nausea, vomiting, or hematemesis; No diarrhea or constipation. No melena or hematochezia.  GENITOURINARY: No dysuria, frequency, hematuria, or incontinence  NEUROLOGICAL: No headaches, memory loss, loss of strength, numbness, or tremors  SKIN: No itching, burning, rashes, or lesions   LYMPH NODES: No enlarged glands  ENDOCRINE: No heat or cold intolerance; No hair loss; No polydipsia or polyuria  MUSCULOSKELETAL: No joint pain or swelling; No muscle, back, or extremity pain  PSYCHIATRIC: No depression, anxiety, mood swings, or difficulty sleeping  HEME/LYMPH: No easy bruising, or bleeding gums  ALLERGY AND IMMUNOLOGIC: No hives or eczema    Vital Signs Last 24 Hrs  T(C): 36.6 (24 Aug 2023 13:03), Max: 36.8 (23 Aug 2023 20:11)  T(F): 97.8 (24 Aug 2023 13:03), Max: 98.3 (23 Aug 2023 20:11)  HR: 73 (24 Aug 2023 13:03) (65 - 73)  BP: 165/83 (24 Aug 2023 13:03) (151/68 - 178/83)  BP(mean): --  RR: 18 (24 Aug 2023 13:03) (18 - 18)  SpO2: 92% (24 Aug 2023 13:03) (92% - 97%)    Parameters below as of 24 Aug 2023 13:03  Patient On (Oxygen Delivery Method): nasal cannula  O2 Flow (L/min): 2      PHYSICAL EXAM:  GENERAL: NAD, well-groomed, well-developed  HEAD:  Atraumatic, Normocephalic  EYES: EOMI, PERRLA, conjunctiva and sclera clear  ENMT: No tonsillar erythema, exudates, or enlargement; Moist mucous membranes, Good dentition, No lesions  NECK: Supple, No JVD, Normal thyroid  NERVOUS SYSTEM:  Alert & Oriented X3, Good concentration; Motor Strength 5/5 B/L upper and lower extremities; DTRs 2+ intact and symmetric  CHEST/LUNG: Clear to auscultation bilaterally; No rales, rhonchi, wheezing, or rubs  HEART: Regular rate and rhythm; No murmurs, rubs, or gallops  ABDOMEN: Soft, Nontender, Nondistended; Bowel sounds present  EXTREMITIES:  2+ Peripheral Pulses, No clubbing, cyanosis, or edema  LYMPH: No lymphadenopathy noted  SKIN: No rashes or lesions    LABS:                        10.0   5.80  )-----------( 203      ( 24 Aug 2023 06:48 )             33.3     24 Aug 2023 06:48    142    |  111    |  13     ----------------------------<  105    3.4     |  28     |  0.62     Ca    8.6        24 Aug 2023 06:48        Urinalysis Basic - ( 24 Aug 2023 06:48 )    Color: x / Appearance: x / SG: x / pH: x  Gluc: 105 mg/dL / Ketone: x  / Bili: x / Urobili: x   Blood: x / Protein: x / Nitrite: x   Leuk Esterase: x / RBC: x / WBC x   Sq Epi: x / Non Sq Epi: x / Bacteria: x      CAPILLARY BLOOD GLUCOSE          RADIOLOGY & ADDITIONAL TESTS:    Notes Reviewed:  [x ] YES  [ ] NO    Care Discussed with Consultants/Other Providers [x ] YES  [ ] NO

## 2023-08-24 NOTE — CHART NOTE - NSCHARTNOTEFT_GEN_A_CORE
Assessment: Pt seen for nutrition follow-up. Chart reviewed, hospital course noted.    Brief hx: Pt is a "89-year-old female with history of hypothyroidism, A-fib on Eliquis, s/p recent pacemaker for complete heart block brought in by ambulance from [facility] assisted living home for cough for the past 2 weeks.  Associated with generalized weakness and decreased p.o. intake."    Visited pt at bedside this am. Pt sleeping soundly during visit. Called out pt's name 3x, pt remained asleep at this time. Breakfast meal tray left at pt's bedside, observed minimal po intake this am. Consumed a few mouthfuls of hot cereal. PO intakes 50-75% per nursing documentation. Assisted with feeding. Tolerating diet well. No N/V/D/C per chart review. No BMs thus far. Bowel regimen rx. Pt continues on soft/bite size diet rx. Swallow evaluation completed 8/21- SLP recommended soft and bite sized solids with thin liquids. Pt previously declined ensure supplements. Providing greek yogurt and mighty shakes 4oz BID. Recommend continued assistance/encouragement at meal times.     Factors impacting intake: [ ] none [ ] nausea  [ ] vomiting [ ] diarrhea [ ] constipation  [ ]chewing problems [ ] swallowing issues  [X] other: assistance with feeding     Diet Prescription: Diet, Soft and Bite Sized (08-20-23 @ 14:18) [Active]    Intake: poor/fair    Current Weight: Weight (kg): 56.2 (08-20 @ 11:24), 8/24 114.1# (no edema noted)  % Weight Change    Pertinent Medications: MEDICATIONS  (STANDING):  aMIOdarone    Tablet 200 milliGRAM(s) Oral daily  amLODIPine   Tablet 5 milliGRAM(s) Oral daily  apixaban 2.5 milliGRAM(s) Oral two times a day  artificial  tears Solution 1 Drop(s) Both EYES two times a day  dextrose 5% + sodium chloride 0.45%. 1000 milliLiter(s) (50 mL/Hr) IV Continuous <Continuous>  ertapenem  IVPB 1000 milliGRAM(s) IV Intermittent every 24 hours  ferrous    sulfate 325 milliGRAM(s) Oral daily  lactobacillus acidophilus 1 Tablet(s) Oral every 12 hours  levothyroxine 75 MICROGram(s) Oral daily  metoprolol tartrate 50 milliGRAM(s) Oral two times a day  multivitamin/minerals 1 Tablet(s) Oral daily  pantoprazole    Tablet 40 milliGRAM(s) Oral daily  polyethylene glycol 3350 17 Gram(s) Oral daily    MEDICATIONS  (PRN):  acetaminophen     Tablet .. 650 milliGRAM(s) Oral every 6 hours PRN Temp greater or equal to 38C (100.4F), Mild Pain (1 - 3)  albuterol/ipratropium for Nebulization 3 milliLiter(s) Nebulizer every 6 hours PRN Shortness of Breath and/or Wheezing  aluminum hydroxide/magnesium hydroxide/simethicone Suspension 30 milliLiter(s) Oral every 4 hours PRN Dyspepsia  guaiFENesin Oral Liquid (Sugar-Free) 200 milliGRAM(s) Oral every 6 hours PRN Cough  melatonin 3 milliGRAM(s) Oral at bedtime PRN Insomnia  ondansetron Injectable 4 milliGRAM(s) IV Push every 8 hours PRN Nausea and/or Vomiting    Pertinent Labs: 08-24 Na142 mmol/L Glu 105 mg/dL<H> K+ 3.4 mmol/L<L> Cr  0.62 mg/dL BUN 13 mg/dL 08-21 Phos 3.7 mg/dL 08-23 Alb 2.5 g/dL<L> 08-21 Chol 98 mg/dL LDL --    HDL 49 mg/dL<L> Trig 53 mg/dL    Skin: stage I pressure ulcer to coccyx    Estimated Needs:   [X] no change since previous assessment: based on bedscale wt on admit 103.8#/47kg  30-35kcal/kg (1410-1645kcal)  1.2-1.4g pro/kg (56-66gm protein)  [ ] recalculated:     Previous Nutrition Diagnosis:   [ ] Inadequate Energy Intake [ ]Inadequate Oral Intake [ ] Excessive Energy Intake   [ ] Underweight [ ] Increased Nutrient Needs [ ] Overweight/Obesity   [ ] Altered GI Function [ ] Unintended Weight Loss [ ] Food & Nutrition Related Knowledge Deficit [X] Malnutrition (moderate/chronic)    Nutrition Diagnosis is [X] ongoing  [ ] resolved [ ] not applicable     New Nutrition Diagnosis: [X] not applicable     Interventions:   Recommend  [ ] Change Diet To:  [ ] Nutrition Supplement  [ ] Nutrition Support  [X] Other: Continue current diet as ordered; honor food preferences to optimize po intake/tolerance  Continue MVI/minerals daily    Monitoring and Evaluation:   [X] PO intake [ x ] Tolerance to diet prescription [ x ] weights [ x ] labs[ x ] follow up per protocol  [X] other: s/s GI distress, bowel function, skin integrity/ edema Assessment: Pt seen for nutrition follow-up. Chart reviewed, hospital course noted.    Brief hx: Pt is a "89-year-old female with history of hypothyroidism, A-fib on Eliquis, s/p recent pacemaker for complete heart block brought in by ambulance from [facility] assisted living home for cough for the past 2 weeks.  Associated with generalized weakness and decreased p.o. intake."    Visited pt at bedside this am. Pt sleeping soundly during visit. Called out pt's name 3x, pt remained asleep at this time. Breakfast meal tray left at pt's bedside, observed minimal po intake this am. Consumed a few mouthfuls of hot cereal. PO intakes 50-75% per nursing documentation. Assisted with feeding. Tolerating diet well. No N/V/D/C per chart review. No BMs thus far. Bowel regimen rx. Pt continues on soft/bite size diet rx. Swallow evaluation completed 8/21- SLP recommended soft and bite sized solids with thin liquids. Pt previously declined ensure supplements. Providing greek yogurt and mighty shakes 4oz BID. Recommend continued assistance/encouragement at meal times.     Factors impacting intake: [ ] none [ ] nausea  [ ] vomiting [ ] diarrhea [ ] constipation  [ ]chewing problems [ ] swallowing issues  [X] other: assistance with feeding     Diet Prescription: Diet, Soft and Bite Sized (08-20-23 @ 14:18) [Active]    Intake: poor/fair    Current Weight: Weight (kg): 56.2 (08-20 @ 11:24), 8/24 114.1# (no edema noted)  % Weight Change    Pertinent Medications: MEDICATIONS  (STANDING):  aMIOdarone    Tablet 200 milliGRAM(s) Oral daily  amLODIPine   Tablet 5 milliGRAM(s) Oral daily  apixaban 2.5 milliGRAM(s) Oral two times a day  artificial  tears Solution 1 Drop(s) Both EYES two times a day  dextrose 5% + sodium chloride 0.45%. 1000 milliLiter(s) (50 mL/Hr) IV Continuous <Continuous>  ertapenem  IVPB 1000 milliGRAM(s) IV Intermittent every 24 hours  ferrous    sulfate 325 milliGRAM(s) Oral daily  lactobacillus acidophilus 1 Tablet(s) Oral every 12 hours  levothyroxine 75 MICROGram(s) Oral daily  metoprolol tartrate 50 milliGRAM(s) Oral two times a day  multivitamin/minerals 1 Tablet(s) Oral daily  pantoprazole    Tablet 40 milliGRAM(s) Oral daily  polyethylene glycol 3350 17 Gram(s) Oral daily    MEDICATIONS  (PRN):  acetaminophen     Tablet .. 650 milliGRAM(s) Oral every 6 hours PRN Temp greater or equal to 38C (100.4F), Mild Pain (1 - 3)  albuterol/ipratropium for Nebulization 3 milliLiter(s) Nebulizer every 6 hours PRN Shortness of Breath and/or Wheezing  aluminum hydroxide/magnesium hydroxide/simethicone Suspension 30 milliLiter(s) Oral every 4 hours PRN Dyspepsia  guaiFENesin Oral Liquid (Sugar-Free) 200 milliGRAM(s) Oral every 6 hours PRN Cough  melatonin 3 milliGRAM(s) Oral at bedtime PRN Insomnia  ondansetron Injectable 4 milliGRAM(s) IV Push every 8 hours PRN Nausea and/or Vomiting    Pertinent Labs: 08-24 Na142 mmol/L Glu 105 mg/dL<H> K+ 3.4 mmol/L<L> Cr  0.62 mg/dL BUN 13 mg/dL 08-21 Phos 3.7 mg/dL 08-23 Alb 2.5 g/dL<L> 08-21 Chol 98 mg/dL LDL --    HDL 49 mg/dL<L> Trig 53 mg/dL    Skin: stage I pressure ulcer to coccyx    Estimated Needs:   [X] no change since previous assessment: based on bedscale wt on admit 103.8#/47kg  30-35kcal/kg (1410-1645kcal)  1.2-1.4g pro/kg (56-66gm protein)  [ ] recalculated:     Previous Nutrition Diagnosis:   [ ] Inadequate Energy Intake [ ]Inadequate Oral Intake [ ] Excessive Energy Intake   [ ] Underweight [ ] Increased Nutrient Needs [ ] Overweight/Obesity   [ ] Altered GI Function [ ] Unintended Weight Loss [ ] Food & Nutrition Related Knowledge Deficit [X] Malnutrition (moderate/chronic)    Nutrition Diagnosis is [X] ongoing  [ ] resolved [ ] not applicable     New Nutrition Diagnosis: [X] not applicable     Interventions:   Recommend  [ ] Change Diet To:  [ ] Nutrition Supplement  [ ] Nutrition Support  [X] Other: Continue current diet as ordered; honor food preferences to optimize po intake/tolerance  Continue MVI/minerals daily    Monitoring and Evaluation:   [X] PO intake [ x ] Tolerance to diet prescription [ x ] weights [ x ] labs[ x ] follow up per protocol  [X] other: s/s GI distress, bowel function, skin integrity/ edema Assessment: Pt seen for nutrition follow-up. Chart reviewed, hospital course noted.    Brief hx: Pt is a "89-year-old female with history of hypothyroidism, A-fib on Eliquis, s/p recent pacemaker for complete heart block brought in by ambulance from [facility] assisted living home for cough for the past 2 weeks.  Associated with generalized weakness and decreased p.o. intake."    Visited pt at bedside this am. Pt sleeping soundly during visit. Called out pt's name 3x, pt remained asleep at this time. Breakfast meal tray left at pt's bedside, observed minimal po intake this am. Consumed a few mouthfuls of hot cereal. PO intakes 50-75% per nursing documentation. Assisted with feeding. Tolerating diet well. No N/V/D/C per chart review. No BMs thus far. Bowel regimen rx. Pt continues on soft/bite size diet rx. Swallow evaluation completed 8/21- SLP recommended soft and bite sized solids with thin liquids. Pt previously declined ensure supplements. Providing greek yogurt and mighty shakes 4oz BID. Recommend continued assistance/encouragement at meal times.     Factors impacting intake: [ ] none [ ] nausea  [ ] vomiting [ ] diarrhea [ ] constipation  [ ]chewing problems [ ] swallowing issues  [X] other: assistance with feeding     Diet Prescription: Diet, Soft and Bite Sized (08-20-23 @ 14:18) [Active]    Intake: poor/fair    Current Weight: Weight (kg): 56.2 (08-20 @ 11:24), 8/24 114.1# (no edema noted)  % Weight Change    Pertinent Medications: MEDICATIONS  (STANDING):  aMIOdarone    Tablet 200 milliGRAM(s) Oral daily  amLODIPine   Tablet 5 milliGRAM(s) Oral daily  apixaban 2.5 milliGRAM(s) Oral two times a day  artificial  tears Solution 1 Drop(s) Both EYES two times a day  dextrose 5% + sodium chloride 0.45%. 1000 milliLiter(s) (50 mL/Hr) IV Continuous <Continuous>  ertapenem  IVPB 1000 milliGRAM(s) IV Intermittent every 24 hours  ferrous    sulfate 325 milliGRAM(s) Oral daily  lactobacillus acidophilus 1 Tablet(s) Oral every 12 hours  levothyroxine 75 MICROGram(s) Oral daily  metoprolol tartrate 50 milliGRAM(s) Oral two times a day  multivitamin/minerals 1 Tablet(s) Oral daily  pantoprazole    Tablet 40 milliGRAM(s) Oral daily  polyethylene glycol 3350 17 Gram(s) Oral daily    MEDICATIONS  (PRN):  acetaminophen     Tablet .. 650 milliGRAM(s) Oral every 6 hours PRN Temp greater or equal to 38C (100.4F), Mild Pain (1 - 3)  albuterol/ipratropium for Nebulization 3 milliLiter(s) Nebulizer every 6 hours PRN Shortness of Breath and/or Wheezing  aluminum hydroxide/magnesium hydroxide/simethicone Suspension 30 milliLiter(s) Oral every 4 hours PRN Dyspepsia  guaiFENesin Oral Liquid (Sugar-Free) 200 milliGRAM(s) Oral every 6 hours PRN Cough  melatonin 3 milliGRAM(s) Oral at bedtime PRN Insomnia  ondansetron Injectable 4 milliGRAM(s) IV Push every 8 hours PRN Nausea and/or Vomiting    Pertinent Labs: 08-24 Na142 mmol/L Glu 105 mg/dL<H> K+ 3.4 mmol/L<L> Cr  0.62 mg/dL BUN 13 mg/dL 08-21 Phos 3.7 mg/dL 08-23 Alb 2.5 g/dL<L> 08-21 Chol 98 mg/dL LDL --    HDL 49 mg/dL<L> Trig 53 mg/dL    Skin: stage I pressure ulcer to coccyx    Estimated Needs:   [X] no change since previous assessment: based on bedscale wt on admit 103.8#/47kg  30-35kcal/kg (1410-1645kcal)  1.2-1.4g pro/kg (56-66gm protein)  [ ] recalculated:     Previous Nutrition Diagnosis:   [ ] Inadequate Energy Intake [ ]Inadequate Oral Intake [ ] Excessive Energy Intake   [ ] Underweight [ ] Increased Nutrient Needs [ ] Overweight/Obesity   [ ] Altered GI Function [ ] Unintended Weight Loss [ ] Food & Nutrition Related Knowledge Deficit [X] Malnutrition (moderate/chronic)    Nutrition Diagnosis is [X] ongoing  [ ] resolved [ ] not applicable     New Nutrition Diagnosis: [X] not applicable     Interventions:   Recommend  [ ] Change Diet To:  [ ] Nutrition Supplement  [ ] Nutrition Support  [X] Other: Continue current diet as ordered; honor food preferences to optimize po intake/tolerance  Continue MVI/minerals daily    Monitoring and Evaluation:   [X] PO intake [ x ] Tolerance to diet prescription [ x ] weights [ x ] labs[ x ] follow up per protocol  [X] other: s/s GI distress, bowel function, skin integrity/ edema

## 2023-08-24 NOTE — PROVIDER CONTACT NOTE (CRITICAL VALUE NOTIFICATION) - TEST AND RESULT REPORTED:
Blood Culture- Growth in aerobic/anaerobic bottles- Gram Negative Rods
positive blood cultures, growth in aerobic Gram Negative rods
blood cultures, anerobic gram negative rods from 8/21
growth in aerobic bottle: E coli ESBL

## 2023-08-24 NOTE — PROGRESS NOTE ADULT - SUBJECTIVE AND OBJECTIVE BOX
Interval History:    CENTRAL LINE:   [  ] YES       [  ] NO  SKELTON:                 [  ] YES       [  ] NO         REVIEW OF SYSTEMS:  All Systems below were reviewed and are negative [  ]  HEENT:  ID:  Pulmonary:  Cardiac:  GI:  Renal:  Musculoskeletal:  All other systems above were reviewed and are negative   [  ]      MEDICATIONS  (STANDING):  aMIOdarone    Tablet 200 milliGRAM(s) Oral daily  amLODIPine   Tablet 5 milliGRAM(s) Oral daily  apixaban 2.5 milliGRAM(s) Oral two times a day  artificial  tears Solution 1 Drop(s) Both EYES two times a day  ertapenem  IVPB 1000 milliGRAM(s) IV Intermittent every 24 hours  ferrous    sulfate 325 milliGRAM(s) Oral daily  lactobacillus acidophilus 1 Tablet(s) Oral every 12 hours  levothyroxine 75 MICROGram(s) Oral daily  metoprolol tartrate 50 milliGRAM(s) Oral two times a day  multivitamin/minerals 1 Tablet(s) Oral daily  pantoprazole    Tablet 40 milliGRAM(s) Oral daily  polyethylene glycol 3350 17 Gram(s) Oral daily    MEDICATIONS  (PRN):  acetaminophen     Tablet .. 650 milliGRAM(s) Oral every 6 hours PRN Temp greater or equal to 38C (100.4F), Mild Pain (1 - 3)  albuterol/ipratropium for Nebulization 3 milliLiter(s) Nebulizer every 6 hours PRN Shortness of Breath and/or Wheezing  aluminum hydroxide/magnesium hydroxide/simethicone Suspension 30 milliLiter(s) Oral every 4 hours PRN Dyspepsia  guaiFENesin Oral Liquid (Sugar-Free) 200 milliGRAM(s) Oral every 6 hours PRN Cough  melatonin 3 milliGRAM(s) Oral at bedtime PRN Insomnia  ondansetron Injectable 4 milliGRAM(s) IV Push every 8 hours PRN Nausea and/or Vomiting      Vital Signs Last 24 Hrs  T(C): 36.6 (24 Aug 2023 13:03), Max: 36.8 (23 Aug 2023 20:11)  T(F): 97.8 (24 Aug 2023 13:03), Max: 98.3 (23 Aug 2023 20:11)  HR: 73 (24 Aug 2023 13:03) (65 - 73)  BP: 165/83 (24 Aug 2023 13:03) (151/68 - 178/83)  BP(mean): --  RR: 18 (24 Aug 2023 13:03) (18 - 18)  SpO2: 92% (24 Aug 2023 13:03) (92% - 97%)    Parameters below as of 24 Aug 2023 13:03  Patient On (Oxygen Delivery Method): nasal cannula  O2 Flow (L/min): 2      I&O's Summary    23 Aug 2023 07:01  -  24 Aug 2023 07:00  --------------------------------------------------------  IN: 1000 mL / OUT: 300 mL / NET: 700 mL        PHYSICAL EXAM:  HEENT: NC/AT; PERRLA  Neck: Soft; no tenderness  Lungs: CTA bilaterally; no wheezing.   Heart:  Abdomen:  Genital/ Rectal:  Extremities:  Neurologic:  Vascular:      LABORATORY:    CBC Full  -  ( 24 Aug 2023 06:48 )  WBC Count : 5.80 K/uL  RBC Count : 3.44 M/uL  Hemoglobin : 10.0 g/dL  Hematocrit : 33.3 %  Platelet Count - Automated : 203 K/uL  Mean Cell Volume : 96.8 fl  Mean Cell Hemoglobin : 29.1 pg  Mean Cell Hemoglobin Concentration : 30.0 gm/dL  Auto Neutrophil # : x  Auto Lymphocyte # : x  Auto Monocyte # : x  Auto Eosinophil # : x  Auto Basophil # : x  Auto Neutrophil % : x  Auto Lymphocyte % : x  Auto Monocyte % : x  Auto Eosinophil % : x  Auto Basophil % : x      ESR:                   08-21 @ 06:24  --    C-Reactive Protein:     08-21 @ 06:24  --    Procalcitonin:           08-21 @ 06:24   7.59      08-24    142  |  111<H>  |  13  ----------------------------<  105<H>  3.4<L>   |  28  |  0.62    Ca    8.6      24 Aug 2023 06:48    TPro  6.0  /  Alb  2.5<L>  /  TBili  0.5  /  DBili  x   /  AST  11<L>  /  ALT  25  /  AlkPhos  91  08-23      Rapid Respiratory Viral Panel Result        08-20 @ 11:55  Rapid RVP NotDetec  Coronovirus --  Adenovirus --  Bordetella Pertussis --  Chlamydia Pneumonia --  Entero/Rhinovirus--  HKU1 Coronovirus --  HMPV Coronovirus --  Influenza A --  Influenza AH1 --  Influenza AH1 2009 --  Influenza AH3 --  Influenza B --  Mycoplasma Pneumoniae --  NL63 Coronovirus --  OC43 Coronovirus --  Parainfluenza 1 --  Parainfluenza 2 --  Parainfluenza 3 --  Parainfluenza 4 --  Resp Syncytial Virus --      Assessment and Plan:          Edwardo Gonzalez MD   [phone].      Interval History:    CENTRAL LINE:   [  ] YES       [  ] NO  SKELTON:                 [  ] YES       [  ] NO         REVIEW OF SYSTEMS:  All Systems below were reviewed and are negative [  ]  HEENT:  ID:  Pulmonary:  Cardiac:  GI:  Renal:  Musculoskeletal:  All other systems above were reviewed and are negative   [  ]      MEDICATIONS  (STANDING):  aMIOdarone    Tablet 200 milliGRAM(s) Oral daily  amLODIPine   Tablet 5 milliGRAM(s) Oral daily  apixaban 2.5 milliGRAM(s) Oral two times a day  artificial  tears Solution 1 Drop(s) Both EYES two times a day  ertapenem  IVPB 1000 milliGRAM(s) IV Intermittent every 24 hours  ferrous    sulfate 325 milliGRAM(s) Oral daily  lactobacillus acidophilus 1 Tablet(s) Oral every 12 hours  levothyroxine 75 MICROGram(s) Oral daily  metoprolol tartrate 50 milliGRAM(s) Oral two times a day  multivitamin/minerals 1 Tablet(s) Oral daily  pantoprazole    Tablet 40 milliGRAM(s) Oral daily  polyethylene glycol 3350 17 Gram(s) Oral daily    MEDICATIONS  (PRN):  acetaminophen     Tablet .. 650 milliGRAM(s) Oral every 6 hours PRN Temp greater or equal to 38C (100.4F), Mild Pain (1 - 3)  albuterol/ipratropium for Nebulization 3 milliLiter(s) Nebulizer every 6 hours PRN Shortness of Breath and/or Wheezing  aluminum hydroxide/magnesium hydroxide/simethicone Suspension 30 milliLiter(s) Oral every 4 hours PRN Dyspepsia  guaiFENesin Oral Liquid (Sugar-Free) 200 milliGRAM(s) Oral every 6 hours PRN Cough  melatonin 3 milliGRAM(s) Oral at bedtime PRN Insomnia  ondansetron Injectable 4 milliGRAM(s) IV Push every 8 hours PRN Nausea and/or Vomiting      Vital Signs Last 24 Hrs  T(C): 36.6 (24 Aug 2023 13:03), Max: 36.8 (23 Aug 2023 20:11)  T(F): 97.8 (24 Aug 2023 13:03), Max: 98.3 (23 Aug 2023 20:11)  HR: 73 (24 Aug 2023 13:03) (65 - 73)  BP: 165/83 (24 Aug 2023 13:03) (151/68 - 178/83)  BP(mean): --  RR: 18 (24 Aug 2023 13:03) (18 - 18)  SpO2: 92% (24 Aug 2023 13:03) (92% - 97%)    Parameters below as of 24 Aug 2023 13:03  Patient On (Oxygen Delivery Method): nasal cannula  O2 Flow (L/min): 2      I&O's Summary    23 Aug 2023 07:01  -  24 Aug 2023 07:00  --------------------------------------------------------  IN: 1000 mL / OUT: 300 mL / NET: 700 mL        PHYSICAL EXAM:  HEENT: NC/AT; PERRLA  Neck: Soft; no tenderness  Lungs: CTA bilaterally; no wheezing.   Heart:  Abdomen:  Genital/ Rectal:  Extremities:  Neurologic:  Vascular:      LABORATORY:    CBC Full  -  ( 24 Aug 2023 06:48 )  WBC Count : 5.80 K/uL  RBC Count : 3.44 M/uL  Hemoglobin : 10.0 g/dL  Hematocrit : 33.3 %  Platelet Count - Automated : 203 K/uL  Mean Cell Volume : 96.8 fl  Mean Cell Hemoglobin : 29.1 pg  Mean Cell Hemoglobin Concentration : 30.0 gm/dL  Auto Neutrophil # : x  Auto Lymphocyte # : x  Auto Monocyte # : x  Auto Eosinophil # : x  Auto Basophil # : x  Auto Neutrophil % : x  Auto Lymphocyte % : x  Auto Monocyte % : x  Auto Eosinophil % : x  Auto Basophil % : x      ESR:                   08-21 @ 06:24  --    C-Reactive Protein:     08-21 @ 06:24  --    Procalcitonin:           08-21 @ 06:24   7.59      08-24    142  |  111<H>  |  13  ----------------------------<  105<H>  3.4<L>   |  28  |  0.62    Ca    8.6      24 Aug 2023 06:48    TPro  6.0  /  Alb  2.5<L>  /  TBili  0.5  /  DBili  x   /  AST  11<L>  /  ALT  25  /  AlkPhos  91  08-23      Rapid Respiratory Viral Panel Result        08-20 @ 11:55  Rapid RVP NotDetec  Coronovirus --  Adenovirus --  Bordetella Pertussis --  Chlamydia Pneumonia --  Entero/Rhinovirus--  HKU1 Coronovirus --  HMPV Coronovirus --  Influenza A --  Influenza AH1 --  Influenza AH1 2009 --  Influenza AH3 --  Influenza B --  Mycoplasma Pneumoniae --  NL63 Coronovirus --  OC43 Coronovirus --  Parainfluenza 1 --  Parainfluenza 2 --  Parainfluenza 3 --  Parainfluenza 4 --  Resp Syncytial Virus --      Assessment and Plan:          Edwadro Gonzalez MD   [phone].      Interval History:    CENTRAL LINE:   [  ] YES       [  ] NO  SKELTON:                 [  ] YES       [  ] NO         REVIEW OF SYSTEMS:  All Systems below were reviewed and are negative [  ]  HEENT:  ID:  Pulmonary:  Cardiac:  GI:  Renal:  Musculoskeletal:  All other systems above were reviewed and are negative   [  ]      MEDICATIONS  (STANDING):  aMIOdarone    Tablet 200 milliGRAM(s) Oral daily  amLODIPine   Tablet 5 milliGRAM(s) Oral daily  apixaban 2.5 milliGRAM(s) Oral two times a day  artificial  tears Solution 1 Drop(s) Both EYES two times a day  ertapenem  IVPB 1000 milliGRAM(s) IV Intermittent every 24 hours  ferrous    sulfate 325 milliGRAM(s) Oral daily  lactobacillus acidophilus 1 Tablet(s) Oral every 12 hours  levothyroxine 75 MICROGram(s) Oral daily  metoprolol tartrate 50 milliGRAM(s) Oral two times a day  multivitamin/minerals 1 Tablet(s) Oral daily  pantoprazole    Tablet 40 milliGRAM(s) Oral daily  polyethylene glycol 3350 17 Gram(s) Oral daily    MEDICATIONS  (PRN):  acetaminophen     Tablet .. 650 milliGRAM(s) Oral every 6 hours PRN Temp greater or equal to 38C (100.4F), Mild Pain (1 - 3)  albuterol/ipratropium for Nebulization 3 milliLiter(s) Nebulizer every 6 hours PRN Shortness of Breath and/or Wheezing  aluminum hydroxide/magnesium hydroxide/simethicone Suspension 30 milliLiter(s) Oral every 4 hours PRN Dyspepsia  guaiFENesin Oral Liquid (Sugar-Free) 200 milliGRAM(s) Oral every 6 hours PRN Cough  melatonin 3 milliGRAM(s) Oral at bedtime PRN Insomnia  ondansetron Injectable 4 milliGRAM(s) IV Push every 8 hours PRN Nausea and/or Vomiting      Vital Signs Last 24 Hrs  T(C): 36.6 (24 Aug 2023 13:03), Max: 36.8 (23 Aug 2023 20:11)  T(F): 97.8 (24 Aug 2023 13:03), Max: 98.3 (23 Aug 2023 20:11)  HR: 73 (24 Aug 2023 13:03) (65 - 73)  BP: 165/83 (24 Aug 2023 13:03) (151/68 - 178/83)  BP(mean): --  RR: 18 (24 Aug 2023 13:03) (18 - 18)  SpO2: 92% (24 Aug 2023 13:03) (92% - 97%)    Parameters below as of 24 Aug 2023 13:03  Patient On (Oxygen Delivery Method): nasal cannula  O2 Flow (L/min): 2      I&O's Summary    23 Aug 2023 07:01  -  24 Aug 2023 07:00  --------------------------------------------------------  IN: 1000 mL / OUT: 300 mL / NET: 700 mL        PHYSICAL EXAM:  HEENT: NC/AT; PERRLA  Neck: Soft; no tenderness  Lungs: CTA bilaterally; no wheezing.   Heart:  Abdomen:  Genital/ Rectal:  Extremities:  Neurologic:  Vascular:      LABORATORY:    CBC Full  -  ( 24 Aug 2023 06:48 )  WBC Count : 5.80 K/uL  RBC Count : 3.44 M/uL  Hemoglobin : 10.0 g/dL  Hematocrit : 33.3 %  Platelet Count - Automated : 203 K/uL  Mean Cell Volume : 96.8 fl  Mean Cell Hemoglobin : 29.1 pg  Mean Cell Hemoglobin Concentration : 30.0 gm/dL  Auto Neutrophil # : x  Auto Lymphocyte # : x  Auto Monocyte # : x  Auto Eosinophil # : x  Auto Basophil # : x  Auto Neutrophil % : x  Auto Lymphocyte % : x  Auto Monocyte % : x  Auto Eosinophil % : x  Auto Basophil % : x      ESR:                   08-21 @ 06:24  --    C-Reactive Protein:     08-21 @ 06:24  --    Procalcitonin:           08-21 @ 06:24   7.59      08-24    142  |  111<H>  |  13  ----------------------------<  105<H>  3.4<L>   |  28  |  0.62    Ca    8.6      24 Aug 2023 06:48    TPro  6.0  /  Alb  2.5<L>  /  TBili  0.5  /  DBili  x   /  AST  11<L>  /  ALT  25  /  AlkPhos  91  08-23      Rapid Respiratory Viral Panel Result        08-20 @ 11:55  Rapid RVP NotDetec  Coronovirus --  Adenovirus --  Bordetella Pertussis --  Chlamydia Pneumonia --  Entero/Rhinovirus--  HKU1 Coronovirus --  HMPV Coronovirus --  Influenza A --  Influenza AH1 --  Influenza AH1 2009 --  Influenza AH3 --  Influenza B --  Mycoplasma Pneumoniae --  NL63 Coronovirus --  OC43 Coronovirus --  Parainfluenza 1 --  Parainfluenza 2 --  Parainfluenza 3 --  Parainfluenza 4 --  Resp Syncytial Virus --      Assessment and Plan:          Edwardo Gonzalez MD   [phone].    She is afebrile  Comfortable     MEDICATIONS  (STANDING):  aMIOdarone    Tablet 200 milliGRAM(s) Oral daily  amLODIPine   Tablet 5 milliGRAM(s) Oral daily  apixaban 2.5 milliGRAM(s) Oral two times a day  artificial  tears Solution 1 Drop(s) Both EYES two times a day  ertapenem  IVPB 1000 milliGRAM(s) IV Intermittent every 24 hours  ferrous    sulfate 325 milliGRAM(s) Oral daily  lactobacillus acidophilus 1 Tablet(s) Oral every 12 hours  levothyroxine 75 MICROGram(s) Oral daily  metoprolol tartrate 50 milliGRAM(s) Oral two times a day  multivitamin/minerals 1 Tablet(s) Oral daily  pantoprazole    Tablet 40 milliGRAM(s) Oral daily  polyethylene glycol 3350 17 Gram(s) Oral daily    MEDICATIONS  (PRN):  acetaminophen     Tablet .. 650 milliGRAM(s) Oral every 6 hours PRN Temp greater or equal to 38C (100.4F), Mild Pain (1 - 3)  albuterol/ipratropium for Nebulization 3 milliLiter(s) Nebulizer every 6 hours PRN Shortness of Breath and/or Wheezing  aluminum hydroxide/magnesium hydroxide/simethicone Suspension 30 milliLiter(s) Oral every 4 hours PRN Dyspepsia  guaiFENesin Oral Liquid (Sugar-Free) 200 milliGRAM(s) Oral every 6 hours PRN Cough  melatonin 3 milliGRAM(s) Oral at bedtime PRN Insomnia  ondansetron Injectable 4 milliGRAM(s) IV Push every 8 hours PRN Nausea and/or Vomiting      Vital Signs Last 24 Hrs  T(C): 36.6 (24 Aug 2023 13:03), Max: 36.8 (23 Aug 2023 20:11)  T(F): 97.8 (24 Aug 2023 13:03), Max: 98.3 (23 Aug 2023 20:11)  HR: 73 (24 Aug 2023 13:03) (65 - 73)  BP: 165/83 (24 Aug 2023 13:03) (151/68 - 178/83)  BP(mean): --  RR: 18 (24 Aug 2023 13:03) (18 - 18)  SpO2: 92% (24 Aug 2023 13:03) (92% - 97%)    Parameters below as of 24 Aug 2023 13:03  Patient On (Oxygen Delivery Method): nasal cannula  O2 Flow (L/min): 2      I&O's Summary    23 Aug 2023 07:01  -  24 Aug 2023 07:00  --------------------------------------------------------  IN: 1000 mL / OUT: 300 mL / NET: 700 mL        PHYSICAL EXAM:  HEENT: NC/AT; PERRLA  Neck: Soft; no tenderness  Lungs: CTA bilaterally; no wheezing.   Heart:  Abdomen:  Genital/ Rectal:  Extremities:  Neurologic:  Vascular:      LABORATORY:    CBC Full  -  ( 24 Aug 2023 06:48 )  WBC Count : 5.80 K/uL  RBC Count : 3.44 M/uL  Hemoglobin : 10.0 g/dL  Hematocrit : 33.3 %  Platelet Count - Automated : 203 K/uL  Mean Cell Volume : 96.8 fl  Mean Cell Hemoglobin : 29.1 pg  Mean Cell Hemoglobin Concentration : 30.0 gm/dL  Auto Neutrophil # : x  Auto Lymphocyte # : x  Auto Monocyte # : x  Auto Eosinophil # : x  Auto Basophil # : x  Auto Neutrophil % : x  Auto Lymphocyte % : x  Auto Monocyte % : x  Auto Eosinophil % : x  Auto Basophil % : x      ESR:                   08-21 @ 06:24  --    C-Reactive Protein:     08-21 @ 06:24  --    Procalcitonin:           08-21 @ 06:24   7.59      08-24    142  |  111<H>  |  13  ----------------------------<  105<H>  3.4<L>   |  28  |  0.62    Ca    8.6      24 Aug 2023 06:48    TPro  6.0  /  Alb  2.5<L>  /  TBili  0.5  /  DBili  x   /  AST  11<L>  /  ALT  25  /  AlkPhos  91  08-23      Rapid Respiratory Viral Panel Result        08-20 @ 11:55  [facility]  Coronovirus --  Adenovirus --  Bordetella Pertussis --  Chlamydia Pneumonia --  Entero/Rhinovirus--  HKU1 Coronovirus --  HMPV Coronovirus --  Influenza A --  Influenza AH1 --  Influenza AH1 2009 --  Influenza AH3 --  Influenza B --  Mycoplasma Pneumoniae --  NL63 Coronovirus --  OC43 Coronovirus --  Parainfluenza 1 --  Parainfluenza 2 --  Parainfluenza 3 --  Parainfluenza 4 --  Resp Syncytial Virus --      Assessment and Plan:    1. UTI with E coli  2. Atelectasis vs pneumonia of lower lobes.  3. Generalized weakness.  4. Dysphagia.  5. Bacteremia with ESBL E Coli      . Chest CT showed atelectasis vs infiltrates of lower lobes, No clear evidence of pneumonia.  . The patient has had a nonproductive cough for more than 2 weeks. Would look for other possible sources of this persistent cough: GERD, dysphagia.  . Blood cultures on admission grew ESBL E coli, likely due to UTI. Waiting for urine culture.   . Continue IV Invanz for 4 more days for total of  8 days via a peripheral IV line. Repeated blood cultures. are negative.   . Would get SLP re-evaluation for dysphagia.  . Continue Protonix.        Edwardo Gonzalez MD   [phone].    She is afebrile  Comfortable     MEDICATIONS  (STANDING):  aMIOdarone    Tablet 200 milliGRAM(s) Oral daily  amLODIPine   Tablet 5 milliGRAM(s) Oral daily  apixaban 2.5 milliGRAM(s) Oral two times a day  artificial  tears Solution 1 Drop(s) Both EYES two times a day  ertapenem  IVPB 1000 milliGRAM(s) IV Intermittent every 24 hours  ferrous    sulfate 325 milliGRAM(s) Oral daily  lactobacillus acidophilus 1 Tablet(s) Oral every 12 hours  levothyroxine 75 MICROGram(s) Oral daily  metoprolol tartrate 50 milliGRAM(s) Oral two times a day  multivitamin/minerals 1 Tablet(s) Oral daily  pantoprazole    Tablet 40 milliGRAM(s) Oral daily  polyethylene glycol 3350 17 Gram(s) Oral daily    MEDICATIONS  (PRN):  acetaminophen     Tablet .. 650 milliGRAM(s) Oral every 6 hours PRN Temp greater or equal to 38C (100.4F), Mild Pain (1 - 3)  albuterol/ipratropium for Nebulization 3 milliLiter(s) Nebulizer every 6 hours PRN Shortness of Breath and/or Wheezing  aluminum hydroxide/magnesium hydroxide/simethicone Suspension 30 milliLiter(s) Oral every 4 hours PRN Dyspepsia  guaiFENesin Oral Liquid (Sugar-Free) 200 milliGRAM(s) Oral every 6 hours PRN Cough  melatonin 3 milliGRAM(s) Oral at bedtime PRN Insomnia  ondansetron Injectable 4 milliGRAM(s) IV Push every 8 hours PRN Nausea and/or Vomiting      Vital Signs Last 24 Hrs  T(C): 36.6 (24 Aug 2023 13:03), Max: 36.8 (23 Aug 2023 20:11)  T(F): 97.8 (24 Aug 2023 13:03), Max: 98.3 (23 Aug 2023 20:11)  HR: 73 (24 Aug 2023 13:03) (65 - 73)  BP: 165/83 (24 Aug 2023 13:03) (151/68 - 178/83)  BP(mean): --  RR: 18 (24 Aug 2023 13:03) (18 - 18)  SpO2: 92% (24 Aug 2023 13:03) (92% - 97%)    Parameters below as of 24 Aug 2023 13:03  Patient On (Oxygen Delivery Method): nasal cannula  O2 Flow (L/min): 2      I&O's Summary    23 Aug 2023 07:01  -  24 Aug 2023 07:00  --------------------------------------------------------  IN: 1000 mL / OUT: 300 mL / NET: 700 mL        PHYSICAL EXAM:  HEENT: NC/AT; PERRLA  Neck: Soft; no tenderness  Lungs: CTA bilaterally; no wheezing.   Heart:  Abdomen:  Genital/ Rectal:  Extremities:  Neurologic:  Vascular:      LABORATORY:    CBC Full  -  ( 24 Aug 2023 06:48 )  WBC Count : 5.80 K/uL  RBC Count : 3.44 M/uL  Hemoglobin : 10.0 g/dL  Hematocrit : 33.3 %  Platelet Count - Automated : 203 K/uL  Mean Cell Volume : 96.8 fl  Mean Cell Hemoglobin : 29.1 pg  Mean Cell Hemoglobin Concentration : 30.0 gm/dL  Auto Neutrophil # : x  Auto Lymphocyte # : x  Auto Monocyte # : x  Auto Eosinophil # : x  Auto Basophil # : x  Auto Neutrophil % : x  Auto Lymphocyte % : x  Auto Monocyte % : x  Auto Eosinophil % : x  Auto Basophil % : x      ESR:                   08-21 @ 06:24  --    C-Reactive Protein:     08-21 @ 06:24  --    Procalcitonin:           08-21 @ 06:24   7.59      08-24    142  |  111<H>  |  13  ----------------------------<  105<H>  3.4<L>   |  28  |  0.62    Ca    8.6      24 Aug 2023 06:48    TPro  6.0  /  Alb  2.5<L>  /  TBili  0.5  /  DBili  x   /  AST  11<L>  /  ALT  25  /  AlkPhos  91  08-23      Rapid Respiratory Viral Panel Result        08-20 @ 11:55  [facility]  Coronovirus --  Adenovirus --  Bordetella Pertussis --  Chlamydia Pneumonia --  Entero/Rhinovirus--  HKU1 Coronovirus --  HMPV Coronovirus --  Influenza A --  Influenza AH1 --  Influenza AH1 2009 --  Influenza AH3 --  Influenza B --  Mycoplasma Pneumoniae --  NL63 Coronovirus --  OC43 Coronovirus --  Parainfluenza 1 --  Parainfluenza 2 --  Parainfluenza 3 --  Parainfluenza 4 --  Resp Syncytial Virus --      Assessment and Plan:    1. UTI with E coli  2. Atelectasis vs pneumonia of lower lobes.  3. Generalized weakness.  4. Dysphagia.  5. Bacteremia with ESBL E Coli      . Chest CT showed atelectasis vs infiltrates of lower lobes, No clear evidence of pneumonia.  . The patient has had a nonproductive cough for more than 2 weeks. Would look for other possible sources of this persistent cough: GERD, dysphagia.  . Blood cultures on admission grew ESBL E coli, likely due to UTI. Waiting for urine culture.   . Continue IV Invanz for 4 more days for total of  8 days via a peripheral IV line. Repeated blood cultures. are negative.   . Would get SLP re-evaluation for dysphagia.  . Continue Protonix.        Edwardo Gonzalez MD   [phone].    She is afebrile  Comfortable     MEDICATIONS  (STANDING):  aMIOdarone    Tablet 200 milliGRAM(s) Oral daily  amLODIPine   Tablet 5 milliGRAM(s) Oral daily  apixaban 2.5 milliGRAM(s) Oral two times a day  artificial  tears Solution 1 Drop(s) Both EYES two times a day  ertapenem  IVPB 1000 milliGRAM(s) IV Intermittent every 24 hours  ferrous    sulfate 325 milliGRAM(s) Oral daily  lactobacillus acidophilus 1 Tablet(s) Oral every 12 hours  levothyroxine 75 MICROGram(s) Oral daily  metoprolol tartrate 50 milliGRAM(s) Oral two times a day  multivitamin/minerals 1 Tablet(s) Oral daily  pantoprazole    Tablet 40 milliGRAM(s) Oral daily  polyethylene glycol 3350 17 Gram(s) Oral daily    MEDICATIONS  (PRN):  acetaminophen     Tablet .. 650 milliGRAM(s) Oral every 6 hours PRN Temp greater or equal to 38C (100.4F), Mild Pain (1 - 3)  albuterol/ipratropium for Nebulization 3 milliLiter(s) Nebulizer every 6 hours PRN Shortness of Breath and/or Wheezing  aluminum hydroxide/magnesium hydroxide/simethicone Suspension 30 milliLiter(s) Oral every 4 hours PRN Dyspepsia  guaiFENesin Oral Liquid (Sugar-Free) 200 milliGRAM(s) Oral every 6 hours PRN Cough  melatonin 3 milliGRAM(s) Oral at bedtime PRN Insomnia  ondansetron Injectable 4 milliGRAM(s) IV Push every 8 hours PRN Nausea and/or Vomiting      Vital Signs Last 24 Hrs  T(C): 36.6 (24 Aug 2023 13:03), Max: 36.8 (23 Aug 2023 20:11)  T(F): 97.8 (24 Aug 2023 13:03), Max: 98.3 (23 Aug 2023 20:11)  HR: 73 (24 Aug 2023 13:03) (65 - 73)  BP: 165/83 (24 Aug 2023 13:03) (151/68 - 178/83)  BP(mean): --  RR: 18 (24 Aug 2023 13:03) (18 - 18)  SpO2: 92% (24 Aug 2023 13:03) (92% - 97%)    Parameters below as of 24 Aug 2023 13:03  Patient On (Oxygen Delivery Method): nasal cannula  O2 Flow (L/min): 2      I&O's Summary    23 Aug 2023 07:01  -  24 Aug 2023 07:00  --------------------------------------------------------  IN: 1000 mL / OUT: 300 mL / NET: 700 mL        PHYSICAL EXAM:  HEENT: NC/AT; PERRLA  Neck: Soft; no tenderness  Lungs: CTA bilaterally; no wheezing.   Heart:  Abdomen:  Genital/ Rectal:  Extremities:  Neurologic:  Vascular:      LABORATORY:    CBC Full  -  ( 24 Aug 2023 06:48 )  WBC Count : 5.80 K/uL  RBC Count : 3.44 M/uL  Hemoglobin : 10.0 g/dL  Hematocrit : 33.3 %  Platelet Count - Automated : 203 K/uL  Mean Cell Volume : 96.8 fl  Mean Cell Hemoglobin : 29.1 pg  Mean Cell Hemoglobin Concentration : 30.0 gm/dL  Auto Neutrophil # : x  Auto Lymphocyte # : x  Auto Monocyte # : x  Auto Eosinophil # : x  Auto Basophil # : x  Auto Neutrophil % : x  Auto Lymphocyte % : x  Auto Monocyte % : x  Auto Eosinophil % : x  Auto Basophil % : x      ESR:                   08-21 @ 06:24  --    C-Reactive Protein:     08-21 @ 06:24  --    Procalcitonin:           08-21 @ 06:24   7.59      08-24    142  |  111<H>  |  13  ----------------------------<  105<H>  3.4<L>   |  28  |  0.62    Ca    8.6      24 Aug 2023 06:48    TPro  6.0  /  Alb  2.5<L>  /  TBili  0.5  /  DBili  x   /  AST  11<L>  /  ALT  25  /  AlkPhos  91  08-23      Rapid Respiratory Viral Panel Result        08-20 @ 11:55  [facility]  Coronovirus --  Adenovirus --  Bordetella Pertussis --  Chlamydia Pneumonia --  Entero/Rhinovirus--  HKU1 Coronovirus --  HMPV Coronovirus --  Influenza A --  Influenza AH1 --  Influenza AH1 2009 --  Influenza AH3 --  Influenza B --  Mycoplasma Pneumoniae --  NL63 Coronovirus --  OC43 Coronovirus --  Parainfluenza 1 --  Parainfluenza 2 --  Parainfluenza 3 --  Parainfluenza 4 --  Resp Syncytial Virus --      Assessment and Plan:    1. UTI with E coli  2. Atelectasis vs pneumonia of lower lobes.  3. Generalized weakness.  4. Dysphagia.  5. Bacteremia with ESBL E Coli      . Chest CT showed atelectasis vs infiltrates of lower lobes, No clear evidence of pneumonia.  . The patient has had a nonproductive cough for more than 2 weeks. Would look for other possible sources of this persistent cough: GERD, dysphagia.  . Blood cultures on admission grew ESBL E coli, likely due to UTI. Waiting for urine culture.   . Continue IV Invanz for 4 more days for total of  8 days via a peripheral IV line. Repeated blood cultures. are negative.   . Would get SLP re-evaluation for dysphagia.  . Continue Protonix.        Edwardo Gonzalez MD   [phone].    She is afebrile  Comfortable     MEDICATIONS  (STANDING):  aMIOdarone    Tablet 200 milliGRAM(s) Oral daily  amLODIPine   Tablet 5 milliGRAM(s) Oral daily  apixaban 2.5 milliGRAM(s) Oral two times a day  artificial  tears Solution 1 Drop(s) Both EYES two times a day  ertapenem  IVPB 1000 milliGRAM(s) IV Intermittent every 24 hours  ferrous    sulfate 325 milliGRAM(s) Oral daily  lactobacillus acidophilus 1 Tablet(s) Oral every 12 hours  levothyroxine 75 MICROGram(s) Oral daily  metoprolol tartrate 50 milliGRAM(s) Oral two times a day  multivitamin/minerals 1 Tablet(s) Oral daily  pantoprazole    Tablet 40 milliGRAM(s) Oral daily  polyethylene glycol 3350 17 Gram(s) Oral daily    MEDICATIONS  (PRN):  acetaminophen     Tablet .. 650 milliGRAM(s) Oral every 6 hours PRN Temp greater or equal to 38C (100.4F), Mild Pain (1 - 3)  albuterol/ipratropium for Nebulization 3 milliLiter(s) Nebulizer every 6 hours PRN Shortness of Breath and/or Wheezing  aluminum hydroxide/magnesium hydroxide/simethicone Suspension 30 milliLiter(s) Oral every 4 hours PRN Dyspepsia  guaiFENesin Oral Liquid (Sugar-Free) 200 milliGRAM(s) Oral every 6 hours PRN Cough  melatonin 3 milliGRAM(s) Oral at bedtime PRN Insomnia  ondansetron Injectable 4 milliGRAM(s) IV Push every 8 hours PRN Nausea and/or Vomiting      Vital Signs Last 24 Hrs  T(C): 36.6 (24 Aug 2023 13:03), Max: 36.8 (23 Aug 2023 20:11)  T(F): 97.8 (24 Aug 2023 13:03), Max: 98.3 (23 Aug 2023 20:11)  HR: 73 (24 Aug 2023 13:03) (65 - 73)  BP: 165/83 (24 Aug 2023 13:03) (151/68 - 178/83)  BP(mean): --  RR: 18 (24 Aug 2023 13:03) (18 - 18)  SpO2: 92% (24 Aug 2023 13:03) (92% - 97%)    Parameters below as of 24 Aug 2023 13:03  Patient On (Oxygen Delivery Method): nasal cannula  O2 Flow (L/min): 2      I&O's Summary    23 Aug 2023 07:01  -  24 Aug 2023 07:00  --------------------------------------------------------  IN: 1000 mL / OUT: 300 mL / NET: 700 mL        PHYSICAL EXAM:  HEENT: NC/AT; PERRLA  Neck: Soft; no tenderness  Lungs: CTA bilaterally; no wheezing.   Heart: RRR, no murmurs.  Abdomen: Soft, no tenderness.  Extremities: No ulcers.   Neurologic: Confused.       LABORATORY:    CBC Full  -  ( 24 Aug 2023 06:48 )  WBC Count : 5.80 K/uL  RBC Count : 3.44 M/uL  Hemoglobin : 10.0 g/dL  Hematocrit : 33.3 %  Platelet Count - Automated : 203 K/uL  Mean Cell Volume : 96.8 fl  Mean Cell Hemoglobin : 29.1 pg  Mean Cell Hemoglobin Concentration : 30.0 gm/dL  Auto Neutrophil # : x  Auto Lymphocyte # : x  Auto Monocyte # : x  Auto Eosinophil # : x  Auto Basophil # : x  Auto Neutrophil % : x  Auto Lymphocyte % : x  Auto Monocyte % : x  Auto Eosinophil % : x  Auto Basophil % : x      ESR:                   08-21 @ 06:24  --    C-Reactive Protein:     08-21 @ 06:24  --    Procalcitonin:           08-21 @ 06:24   7.59      08-24    142  |  111<H>  |  13  ----------------------------<  105<H>  3.4<L>   |  28  |  0.62    Ca    8.6      24 Aug 2023 06:48    TPro  6.0  /  Alb  2.5<L>  /  TBili  0.5  /  DBili  x   /  AST  11<L>  /  ALT  25  /  AlkPhos  91  08-23      Rapid Respiratory Viral Panel Result        08-20 @ 11:55  Rapid RVP NotDetec  Coronovirus --  Adenovirus --  Bordetella Pertussis --  Chlamydia Pneumonia --  Entero/Rhinovirus--  HKU1 Coronovirus --  HMPV Coronovirus --  Influenza A --  Influenza AH1 --  Influenza AH1 2009 --  Influenza AH3 --  Influenza B --  Mycoplasma Pneumoniae --  NL63 Coronovirus --  OC43 Coronovirus --  Parainfluenza 1 --  Parainfluenza 2 --  Parainfluenza 3 --  Parainfluenza 4 --  Resp Syncytial Virus --      Assessment and Plan:    1. UTI with E coli  2. Atelectasis vs pneumonia of lower lobes.  3. Generalized weakness.  4. Dysphagia.  5. Bacteremia with ESBL E Coli      . Chest CT showed atelectasis vs infiltrates of lower lobes, No clear evidence of pneumonia.  . The patient has had a nonproductive cough for more than 2 weeks. Would look for other possible sources of this persistent cough: GERD, dysphagia.  . Blood cultures on admission grew ESBL E coli, likely due to UTI. Urine culture also grew ESBL E coli.  . Continue IV Invanz for 4 more days for total of  8 days via a peripheral IV line. Repeated blood culture are negative.   . Supportive care.       Edwardo Gonzalez MD   [phone].    She is afebrile  Comfortable     MEDICATIONS  (STANDING):  aMIOdarone    Tablet 200 milliGRAM(s) Oral daily  amLODIPine   Tablet 5 milliGRAM(s) Oral daily  apixaban 2.5 milliGRAM(s) Oral two times a day  artificial  tears Solution 1 Drop(s) Both EYES two times a day  ertapenem  IVPB 1000 milliGRAM(s) IV Intermittent every 24 hours  ferrous    sulfate 325 milliGRAM(s) Oral daily  lactobacillus acidophilus 1 Tablet(s) Oral every 12 hours  levothyroxine 75 MICROGram(s) Oral daily  metoprolol tartrate 50 milliGRAM(s) Oral two times a day  multivitamin/minerals 1 Tablet(s) Oral daily  pantoprazole    Tablet 40 milliGRAM(s) Oral daily  polyethylene glycol 3350 17 Gram(s) Oral daily    MEDICATIONS  (PRN):  acetaminophen     Tablet .. 650 milliGRAM(s) Oral every 6 hours PRN Temp greater or equal to 38C (100.4F), Mild Pain (1 - 3)  albuterol/ipratropium for Nebulization 3 milliLiter(s) Nebulizer every 6 hours PRN Shortness of Breath and/or Wheezing  aluminum hydroxide/magnesium hydroxide/simethicone Suspension 30 milliLiter(s) Oral every 4 hours PRN Dyspepsia  guaiFENesin Oral Liquid (Sugar-Free) 200 milliGRAM(s) Oral every 6 hours PRN Cough  melatonin 3 milliGRAM(s) Oral at bedtime PRN Insomnia  ondansetron Injectable 4 milliGRAM(s) IV Push every 8 hours PRN Nausea and/or Vomiting      Vital Signs Last 24 Hrs  T(C): 36.6 (24 Aug 2023 13:03), Max: 36.8 (23 Aug 2023 20:11)  T(F): 97.8 (24 Aug 2023 13:03), Max: 98.3 (23 Aug 2023 20:11)  HR: 73 (24 Aug 2023 13:03) (65 - 73)  BP: 165/83 (24 Aug 2023 13:03) (151/68 - 178/83)  BP(mean): --  RR: 18 (24 Aug 2023 13:03) (18 - 18)  SpO2: 92% (24 Aug 2023 13:03) (92% - 97%)    Parameters below as of 24 Aug 2023 13:03  Patient On (Oxygen Delivery Method): nasal cannula  O2 Flow (L/min): 2      I&O's Summary    23 Aug 2023 07:01  -  24 Aug 2023 07:00  --------------------------------------------------------  IN: 1000 mL / OUT: 300 mL / NET: 700 mL        PHYSICAL EXAM:  HEENT: NC/AT; PERRLA  Neck: Soft; no tenderness  Lungs: CTA bilaterally; no wheezing.   Heart: RRR, no murmurs.  Abdomen: Soft, no tenderness.  Extremities: No ulcers.   Neurologic: Confused.       LABORATORY:    CBC Full  -  ( 24 Aug 2023 06:48 )  WBC Count : 5.80 K/uL  RBC Count : 3.44 M/uL  Hemoglobin : 10.0 g/dL  Hematocrit : 33.3 %  Platelet Count - Automated : 203 K/uL  Mean Cell Volume : 96.8 fl  Mean Cell Hemoglobin : 29.1 pg  Mean Cell Hemoglobin Concentration : 30.0 gm/dL  Auto Neutrophil # : x  Auto Lymphocyte # : x  Auto Monocyte # : x  Auto Eosinophil # : x  Auto Basophil # : x  Auto Neutrophil % : x  Auto Lymphocyte % : x  Auto Monocyte % : x  Auto Eosinophil % : x  Auto Basophil % : x      ESR:                   08-21 @ 06:24  --    C-Reactive Protein:     08-21 @ 06:24  --    Procalcitonin:           08-21 @ 06:24   7.59      08-24    142  |  111<H>  |  13  ----------------------------<  105<H>  3.4<L>   |  28  |  0.62    Ca    8.6      24 Aug 2023 06:48    TPro  6.0  /  Alb  2.5<L>  /  TBili  0.5  /  DBili  x   /  AST  11<L>  /  ALT  25  /  AlkPhos  91  08-23      Rapid Respiratory Viral Panel Result        08-20 @ 11:55  Rapid RVP NotDetec  Coronovirus --  Adenovirus --  Bordetella Pertussis --  Chlamydia Pneumonia --  Entero/Rhinovirus--  HKU1 Coronovirus --  HMPV Coronovirus --  Influenza A --  Influenza AH1 --  Influenza AH1 2009 --  Influenza AH3 --  Influenza B --  Mycoplasma Pneumoniae --  NL63 Coronovirus --  OC43 Coronovirus --  Parainfluenza 1 --  Parainfluenza 2 --  Parainfluenza 3 --  Parainfluenza 4 --  Resp Syncytial Virus --      Assessment and Plan:    1. UTI with E coli  2. Atelectasis vs pneumonia of lower lobes.  3. Generalized weakness.  4. Dysphagia.  5. Bacteremia with ESBL E Coli      . Chest CT showed atelectasis vs infiltrates of lower lobes, No clear evidence of pneumonia.  . The patient has had a nonproductive cough for more than 2 weeks. Would look for other possible sources of this persistent cough: GERD, dysphagia.  . Blood cultures on admission grew ESBL E coli, likely due to UTI. Urine culture also grew ESBL E coli.  . Continue IV Invanz for 4 more days for total of  8 days via a peripheral IV line. Repeated blood culture are negative.   . Supportive care.       Edwardo Gonzalez MD   [phone].    She is afebrile  Comfortable     MEDICATIONS  (STANDING):  aMIOdarone    Tablet 200 milliGRAM(s) Oral daily  amLODIPine   Tablet 5 milliGRAM(s) Oral daily  apixaban 2.5 milliGRAM(s) Oral two times a day  artificial  tears Solution 1 Drop(s) Both EYES two times a day  ertapenem  IVPB 1000 milliGRAM(s) IV Intermittent every 24 hours  ferrous    sulfate 325 milliGRAM(s) Oral daily  lactobacillus acidophilus 1 Tablet(s) Oral every 12 hours  levothyroxine 75 MICROGram(s) Oral daily  metoprolol tartrate 50 milliGRAM(s) Oral two times a day  multivitamin/minerals 1 Tablet(s) Oral daily  pantoprazole    Tablet 40 milliGRAM(s) Oral daily  polyethylene glycol 3350 17 Gram(s) Oral daily    MEDICATIONS  (PRN):  acetaminophen     Tablet .. 650 milliGRAM(s) Oral every 6 hours PRN Temp greater or equal to 38C (100.4F), Mild Pain (1 - 3)  albuterol/ipratropium for Nebulization 3 milliLiter(s) Nebulizer every 6 hours PRN Shortness of Breath and/or Wheezing  aluminum hydroxide/magnesium hydroxide/simethicone Suspension 30 milliLiter(s) Oral every 4 hours PRN Dyspepsia  guaiFENesin Oral Liquid (Sugar-Free) 200 milliGRAM(s) Oral every 6 hours PRN Cough  melatonin 3 milliGRAM(s) Oral at bedtime PRN Insomnia  ondansetron Injectable 4 milliGRAM(s) IV Push every 8 hours PRN Nausea and/or Vomiting      Vital Signs Last 24 Hrs  T(C): 36.6 (24 Aug 2023 13:03), Max: 36.8 (23 Aug 2023 20:11)  T(F): 97.8 (24 Aug 2023 13:03), Max: 98.3 (23 Aug 2023 20:11)  HR: 73 (24 Aug 2023 13:03) (65 - 73)  BP: 165/83 (24 Aug 2023 13:03) (151/68 - 178/83)  BP(mean): --  RR: 18 (24 Aug 2023 13:03) (18 - 18)  SpO2: 92% (24 Aug 2023 13:03) (92% - 97%)    Parameters below as of 24 Aug 2023 13:03  Patient On (Oxygen Delivery Method): nasal cannula  O2 Flow (L/min): 2      I&O's Summary    23 Aug 2023 07:01  -  24 Aug 2023 07:00  --------------------------------------------------------  IN: 1000 mL / OUT: 300 mL / NET: 700 mL        PHYSICAL EXAM:  HEENT: NC/AT; PERRLA  Neck: Soft; no tenderness  Lungs: CTA bilaterally; no wheezing.   Heart: RRR, no murmurs.  Abdomen: Soft, no tenderness.  Extremities: No ulcers.   Neurologic: Confused.       LABORATORY:    CBC Full  -  ( 24 Aug 2023 06:48 )  WBC Count : 5.80 K/uL  RBC Count : 3.44 M/uL  Hemoglobin : 10.0 g/dL  Hematocrit : 33.3 %  Platelet Count - Automated : 203 K/uL  Mean Cell Volume : 96.8 fl  Mean Cell Hemoglobin : 29.1 pg  Mean Cell Hemoglobin Concentration : 30.0 gm/dL  Auto Neutrophil # : x  Auto Lymphocyte # : x  Auto Monocyte # : x  Auto Eosinophil # : x  Auto Basophil # : x  Auto Neutrophil % : x  Auto Lymphocyte % : x  Auto Monocyte % : x  Auto Eosinophil % : x  Auto Basophil % : x      ESR:                   08-21 @ 06:24  --    C-Reactive Protein:     08-21 @ 06:24  --    Procalcitonin:           08-21 @ 06:24   7.59      08-24    142  |  111<H>  |  13  ----------------------------<  105<H>  3.4<L>   |  28  |  0.62    Ca    8.6      24 Aug 2023 06:48    TPro  6.0  /  Alb  2.5<L>  /  TBili  0.5  /  DBili  x   /  AST  11<L>  /  ALT  25  /  AlkPhos  91  08-23      Rapid Respiratory Viral Panel Result        08-20 @ 11:55  Rapid RVP NotDetec  Coronovirus --  Adenovirus --  Bordetella Pertussis --  Chlamydia Pneumonia --  Entero/Rhinovirus--  HKU1 Coronovirus --  HMPV Coronovirus --  Influenza A --  Influenza AH1 --  Influenza AH1 2009 --  Influenza AH3 --  Influenza B --  Mycoplasma Pneumoniae --  NL63 Coronovirus --  OC43 Coronovirus --  Parainfluenza 1 --  Parainfluenza 2 --  Parainfluenza 3 --  Parainfluenza 4 --  Resp Syncytial Virus --      Assessment and Plan:    1. UTI with E coli  2. Atelectasis vs pneumonia of lower lobes.  3. Generalized weakness.  4. Dysphagia.  5. Bacteremia with ESBL E Coli      . Chest CT showed atelectasis vs infiltrates of lower lobes, No clear evidence of pneumonia.  . The patient has had a nonproductive cough for more than 2 weeks. Would look for other possible sources of this persistent cough: GERD, dysphagia.  . Blood cultures on admission grew ESBL E coli, likely due to UTI. Urine culture also grew ESBL E coli.  . Continue IV Invanz for 4 more days for total of  8 days via a peripheral IV line. Repeated blood culture are negative.   . Supportive care.       Edwardo Gonzalez MD   [phone].

## 2023-08-24 NOTE — PROGRESS NOTE ADULT - ASSESSMENT
dilated esophagus  ?aspiration pneumonia  pneumonia    esophagram noted  ppi once a day  gerd precautions  aspiration precautions  TTE 8/21/2023 reviewed; estimated pulmonary artery systolic pressure is 63 mmHg, consistent with moderate-to-severe pulmonary HTN  pt is not candidate for egd at this facility  currently asymptomatic and tolerating diet  outpatient follow up; may need egd at tertiary facility if symptomatic  cont soft diet    I reviewed the overnight course of events on the unit, re-confirming the patient history. I discussed the care with the patient and their family  Differential diagnosis and plan of care discussed with patient after the evaluation  40 minutes spent on total encounter of which more than fifty percent of the encounter was spent counseling and/or coordinating care by the attending physician.  Advanced care planning was discussed with patient and family.  Advanced care planning forms were reviewed and discussed.  Risks, benefits and alternatives of gastroenterologic procedures were discussed in detail and all questions were answered.

## 2023-08-24 NOTE — PROVIDER CONTACT NOTE (CRITICAL VALUE NOTIFICATION) - SITUATION
Pt admitted with PNA
Blood Culture (8/20) Growth in aerobic/anaerobic bottles- Gram Negative Rods
growth in aerobic bottle: E coli ESBL

## 2023-08-24 NOTE — CHART NOTE - NSCHARTNOTEFT_GEN_A_CORE
Called by RN, for critical lab value: pt with final positive blood cultures for ESBL E. coli. On Invanz as advised by infectious disease.   - ID already following  - Primary team to follow up in AM

## 2023-08-25 ENCOUNTER — TRANSCRIPTION ENCOUNTER (OUTPATIENT)
Age: 88
End: 2023-08-25

## 2023-08-25 LAB
ANION GAP SERPL CALC-SCNC: 4 MMOL/L — LOW (ref 5–17)
BUN SERPL-MCNC: 12 MG/DL — SIGNIFICANT CHANGE UP (ref 7–23)
CALCIUM SERPL-MCNC: 8.7 MG/DL — SIGNIFICANT CHANGE UP (ref 8.5–10.1)
CHLORIDE SERPL-SCNC: 111 MMOL/L — HIGH (ref 96–108)
CO2 SERPL-SCNC: 28 MMOL/L — SIGNIFICANT CHANGE UP (ref 22–31)
CREAT SERPL-MCNC: 0.53 MG/DL — SIGNIFICANT CHANGE UP (ref 0.5–1.3)
EGFR: 86 ML/MIN/1.73M2 — SIGNIFICANT CHANGE UP
GLUCOSE SERPL-MCNC: 96 MG/DL — SIGNIFICANT CHANGE UP (ref 70–99)
HCT VFR BLD CALC: 33.1 % — LOW (ref 34.5–45)
HGB BLD-MCNC: 10.2 G/DL — LOW (ref 11.5–15.5)
MCHC RBC-ENTMCNC: 29.2 PG — SIGNIFICANT CHANGE UP (ref 27–34)
MCHC RBC-ENTMCNC: 30.8 GM/DL — LOW (ref 32–36)
MCV RBC AUTO: 94.8 FL — SIGNIFICANT CHANGE UP (ref 80–100)
NRBC # BLD: 0 /100 WBCS — SIGNIFICANT CHANGE UP (ref 0–0)
PLATELET # BLD AUTO: 215 K/UL — SIGNIFICANT CHANGE UP (ref 150–400)
POTASSIUM SERPL-MCNC: 3.4 MMOL/L — LOW (ref 3.5–5.3)
POTASSIUM SERPL-SCNC: 3.4 MMOL/L — LOW (ref 3.5–5.3)
RBC # BLD: 3.49 M/UL — LOW (ref 3.8–5.2)
RBC # FLD: 16.4 % — HIGH (ref 10.3–14.5)
SODIUM SERPL-SCNC: 143 MMOL/L — SIGNIFICANT CHANGE UP (ref 135–145)
WBC # BLD: 7.13 K/UL — SIGNIFICANT CHANGE UP (ref 3.8–10.5)
WBC # FLD AUTO: 7.13 K/UL — SIGNIFICANT CHANGE UP (ref 3.8–10.5)

## 2023-08-25 RX ORDER — POTASSIUM CHLORIDE 20 MEQ
40 PACKET (EA) ORAL ONCE
Refills: 0 | Status: COMPLETED | OUTPATIENT
Start: 2023-08-25 | End: 2023-08-25

## 2023-08-25 RX ORDER — AMLODIPINE BESYLATE 2.5 MG/1
5 TABLET ORAL ONCE
Refills: 0 | Status: COMPLETED | OUTPATIENT
Start: 2023-08-25 | End: 2023-08-25

## 2023-08-25 RX ORDER — POTASSIUM CHLORIDE 20 MEQ
10 PACKET (EA) ORAL
Refills: 0 | Status: DISCONTINUED | OUTPATIENT
Start: 2023-08-25 | End: 2023-08-28

## 2023-08-25 RX ORDER — AMLODIPINE BESYLATE 2.5 MG/1
10 TABLET ORAL DAILY
Refills: 0 | Status: DISCONTINUED | OUTPATIENT
Start: 2023-08-26 | End: 2023-09-01

## 2023-08-25 RX ADMIN — Medication 1 TABLET(S): at 05:14

## 2023-08-25 RX ADMIN — AMLODIPINE BESYLATE 5 MILLIGRAM(S): 2.5 TABLET ORAL at 14:18

## 2023-08-25 RX ADMIN — Medication 325 MILLIGRAM(S): at 13:23

## 2023-08-25 RX ADMIN — Medication 1 TABLET(S): at 18:12

## 2023-08-25 RX ADMIN — AMIODARONE HYDROCHLORIDE 200 MILLIGRAM(S): 400 TABLET ORAL at 05:14

## 2023-08-25 RX ADMIN — APIXABAN 2.5 MILLIGRAM(S): 2.5 TABLET, FILM COATED ORAL at 05:14

## 2023-08-25 RX ADMIN — ERTAPENEM SODIUM 120 MILLIGRAM(S): 1 INJECTION, POWDER, LYOPHILIZED, FOR SOLUTION INTRAMUSCULAR; INTRAVENOUS at 13:48

## 2023-08-25 RX ADMIN — POLYETHYLENE GLYCOL 3350 17 GRAM(S): 17 POWDER, FOR SOLUTION ORAL at 13:23

## 2023-08-25 RX ADMIN — PANTOPRAZOLE SODIUM 40 MILLIGRAM(S): 20 TABLET, DELAYED RELEASE ORAL at 13:23

## 2023-08-25 RX ADMIN — Medication 40 MILLIEQUIVALENT(S): at 13:22

## 2023-08-25 RX ADMIN — Medication 1 DROP(S): at 18:12

## 2023-08-25 RX ADMIN — Medication 1 DROP(S): at 13:22

## 2023-08-25 RX ADMIN — Medication 50 MILLIGRAM(S): at 05:13

## 2023-08-25 RX ADMIN — Medication 75 MICROGRAM(S): at 05:13

## 2023-08-25 RX ADMIN — Medication 50 MILLIGRAM(S): at 18:12

## 2023-08-25 RX ADMIN — Medication 10 MILLIEQUIVALENT(S): at 18:12

## 2023-08-25 RX ADMIN — Medication 1 TABLET(S): at 13:23

## 2023-08-25 RX ADMIN — AMLODIPINE BESYLATE 5 MILLIGRAM(S): 2.5 TABLET ORAL at 05:13

## 2023-08-25 RX ADMIN — APIXABAN 2.5 MILLIGRAM(S): 2.5 TABLET, FILM COATED ORAL at 18:12

## 2023-08-25 NOTE — DISCHARGE NOTE PROVIDER - NSDCMRMEDTOKEN_GEN_ALL_CORE_FT
Albuterol (Eqv-ProAir HFA) 90 mcg/inh inhalation aerosol: 1 puff(s) inhaled 4 times a day as needed for  cough or wheezing  amiodarone 200 mg oral tablet: 1 tab(s) orally once a day  clindamycin 1% topical lotion: Apply topically to affected area once a day to face  Eliquis 2.5 mg oral tablet: 1 tab(s) orally 2 times a day  enalapril 10 mg oral tablet: 1 tab(s) orally once a day  ferrous sulfate 325 mg (65 mg elemental iron) oral tablet: 1 tab(s) orally once a day  levothyroxine 75 mcg (0.075 mg) oral tablet: 1 tab(s) orally once a day  Metoprolol Tartrate 50 mg oral tablet: 1 tab(s) orally 2 times a day  Myrbetriq 50 mg oral tablet, extended release: 1 tab(s) orally once a day  polyethylene glycol 3350 oral powder for reconstitution: 17 gram(s) orally once a day  PreserVision AREDS oral capsule: 1 cap(s) orally once a day  Refresh Dry Eye Therapy ophthalmic solution: 1 drop(s) in each eye 4 times a day  Tylenol 325 mg oral tablet: 2 tab(s) orally every 4 hours as needed for pain or fever   Albuterol (Eqv-ProAir HFA) 90 mcg/inh inhalation aerosol: 1 puff(s) inhaled 4 times a day as needed for  cough or wheezing  amiodarone 200 mg oral tablet: 1 tab(s) orally once a day  amLODIPine 10 mg oral tablet: 1 tab(s) orally once a day  clindamycin 1% topical lotion: Apply topically to affected area once a day to face  Eliquis 2.5 mg oral tablet: 1 tab(s) orally 2 times a day  enalapril 10 mg oral tablet: 1 tab(s) orally once a day  ferrous sulfate 325 mg (65 mg elemental iron) oral tablet: 1 tab(s) orally once a day  levothyroxine 75 mcg (0.075 mg) oral tablet: 1 tab(s) orally once a day  Metoprolol Tartrate 50 mg oral tablet: 1 tab(s) orally 2 times a day  Myrbetriq 50 mg oral tablet, extended release: 1 tab(s) orally once a day  polyethylene glycol 3350 oral powder for reconstitution: 17 gram(s) orally once a day  PreserVision AREDS oral capsule: 1 cap(s) orally once a day  Protonix 40 mg oral delayed release tablet: 1 tab(s) orally once a day  Refresh Dry Eye Therapy ophthalmic solution: 1 drop(s) in each eye 4 times a day  Tylenol 325 mg oral tablet: 2 tab(s) orally every 4 hours as needed for pain or fever

## 2023-08-25 NOTE — PROGRESS NOTE ADULT - SUBJECTIVE AND OBJECTIVE BOX
[facility]  Víctor Robertson PA-C  [address] [place] [address]  [phone]      INTERVAL HPI/OVERNIGHT EVENTS:  Pt s/e  No dysphagia or further GI complaints    MEDICATIONS  (STANDING):  aMIOdarone    Tablet 200 milliGRAM(s) Oral daily  amLODIPine   Tablet 5 milliGRAM(s) Oral daily  apixaban 2.5 milliGRAM(s) Oral two times a day  artificial  tears Solution 1 Drop(s) Both EYES two times a day  ertapenem  IVPB 1000 milliGRAM(s) IV Intermittent every 24 hours  ferrous    sulfate 325 milliGRAM(s) Oral daily  lactobacillus acidophilus 1 Tablet(s) Oral every 12 hours  levothyroxine 75 MICROGram(s) Oral daily  metoprolol tartrate 50 milliGRAM(s) Oral two times a day  multivitamin/minerals 1 Tablet(s) Oral daily  pantoprazole    Tablet 40 milliGRAM(s) Oral daily  polyethylene glycol 3350 17 Gram(s) Oral daily  potassium chloride   Powder 40 milliEquivalent(s) Oral once    MEDICATIONS  (PRN):  acetaminophen     Tablet .. 650 milliGRAM(s) Oral every 6 hours PRN Temp greater or equal to 38C (100.4F), Mild Pain (1 - 3)  albuterol/ipratropium for Nebulization 3 milliLiter(s) Nebulizer every 6 hours PRN Shortness of Breath and/or Wheezing  aluminum hydroxide/magnesium hydroxide/simethicone Suspension 30 milliLiter(s) Oral every 4 hours PRN Dyspepsia  guaiFENesin Oral Liquid (Sugar-Free) 200 milliGRAM(s) Oral every 6 hours PRN Cough  melatonin 3 milliGRAM(s) Oral at bedtime PRN Insomnia  ondansetron Injectable 4 milliGRAM(s) IV Push every 8 hours PRN Nausea and/or Vomiting      Allergies  penicillin (Unknown)      PHYSICAL EXAM:   Vital Signs:  Vital Signs Last 24 Hrs  T(C): 36.6 (25 Aug 2023 04:35), Max: 36.8 (24 Aug 2023 20:21)  T(F): 97.9 (25 Aug 2023 04:35), Max: 98.2 (24 Aug 2023 20:21)  HR: 62 (25 Aug 2023 04:35) (62 - 89)  BP: 183/79 (25 Aug 2023 04:35) (150/80 - 183/79)  BP(mean): --  RR: 18 (25 Aug 2023 04:35) (18 - 18)  SpO2: 92% (25 Aug 2023 04:35) (92% - 92%)    Parameters below as of 25 Aug 2023 04:35  Patient On (Oxygen Delivery Method): room air      Daily     Daily Weight in k.3 (25 Aug 2023 06:57)    GENERAL:  Appears stated age  HEENT:  NC/AT  CHEST:  Full & symmetric excursion  HEART:  Regular rhythm  ABDOMEN:  Soft, non-tender, non-distended  EXTEREMITIES:  no cyanosis  SKIN:  No rash  NEURO:  Alert      LABS:                        10.2   7.13  )-----------( 215      ( 25 Aug 2023 06:05 )             33.1     08-25    143  |  111<H>  |  12  ----------------------------<  96  3.4<L>   |  28  |  0.53    Ca    8.7      25 Aug 2023 06:05        Urinalysis Basic - ( 25 Aug 2023 06:05 )    Color: x / Appearance: x / SG: x / pH: x  Gluc: 96 mg/dL / Ketone: x  / Bili: x / Urobili: x   Blood: x / Protein: x / Nitrite: x   Leuk Esterase: x / RBC: x / WBC x   Sq Epi: x / Non Sq Epi: x / Bacteria: x     [facility]  Víctor Robertson PA-C  [address] [place] [address]  [phone]      INTERVAL HPI/OVERNIGHT EVENTS:  Pt s/e  No dysphagia or further GI complaints    MEDICATIONS  (STANDING):  aMIOdarone    Tablet 200 milliGRAM(s) Oral daily  amLODIPine   Tablet 5 milliGRAM(s) Oral daily  apixaban 2.5 milliGRAM(s) Oral two times a day  artificial  tears Solution 1 Drop(s) Both EYES two times a day  ertapenem  IVPB 1000 milliGRAM(s) IV Intermittent every 24 hours  ferrous    sulfate 325 milliGRAM(s) Oral daily  lactobacillus acidophilus 1 Tablet(s) Oral every 12 hours  levothyroxine 75 MICROGram(s) Oral daily  metoprolol tartrate 50 milliGRAM(s) Oral two times a day  multivitamin/minerals 1 Tablet(s) Oral daily  pantoprazole    Tablet 40 milliGRAM(s) Oral daily  polyethylene glycol 3350 17 Gram(s) Oral daily  potassium chloride   Powder 40 milliEquivalent(s) Oral once    MEDICATIONS  (PRN):  acetaminophen     Tablet .. 650 milliGRAM(s) Oral every 6 hours PRN Temp greater or equal to 38C (100.4F), Mild Pain (1 - 3)  albuterol/ipratropium for Nebulization 3 milliLiter(s) Nebulizer every 6 hours PRN Shortness of Breath and/or Wheezing  aluminum hydroxide/magnesium hydroxide/simethicone Suspension 30 milliLiter(s) Oral every 4 hours PRN Dyspepsia  guaiFENesin Oral Liquid (Sugar-Free) 200 milliGRAM(s) Oral every 6 hours PRN Cough  melatonin 3 milliGRAM(s) Oral at bedtime PRN Insomnia  ondansetron Injectable 4 milliGRAM(s) IV Push every 8 hours PRN Nausea and/or Vomiting      Allergies  penicillin (Unknown)      PHYSICAL EXAM:   Vital Signs:  Vital Signs Last 24 Hrs  T(C): 36.6 (25 Aug 2023 04:35), Max: 36.8 (24 Aug 2023 20:21)  T(F): 97.9 (25 Aug 2023 04:35), Max: 98.2 (24 Aug 2023 20:21)  HR: 62 (25 Aug 2023 04:35) (62 - 89)  BP: 183/79 (25 Aug 2023 04:35) (150/80 - 183/79)  BP(mean): --  RR: 18 (25 Aug 2023 04:35) (18 - 18)  SpO2: 92% (25 Aug 2023 04:35) (92% - 92%)    Parameters below as of 25 Aug 2023 04:35  Patient On (Oxygen Delivery Method): room air      Daily     Daily Weight in k.3 (25 Aug 2023 06:57)    GENERAL:  Appears stated age  HEENT:  NC/AT  CHEST:  Full & symmetric excursion  HEART:  Regular rhythm  ABDOMEN:  Soft, non-tender, non-distended  EXTEREMITIES:  no cyanosis  SKIN:  No rash  NEURO:  Alert      LABS:                        10.2   7.13  )-----------( 215      ( 25 Aug 2023 06:05 )             33.1     08-25    143  |  111<H>  |  12  ----------------------------<  96  3.4<L>   |  28  |  0.53    Ca    8.7      25 Aug 2023 06:05        Urinalysis Basic - ( 25 Aug 2023 06:05 )    Color: x / Appearance: x / SG: x / pH: x  Gluc: 96 mg/dL / Ketone: x  / Bili: x / Urobili: x   Blood: x / Protein: x / Nitrite: x   Leuk Esterase: x / RBC: x / WBC x   Sq Epi: x / Non Sq Epi: x / Bacteria: x     [facility]  Víctor Robertson PA-C  [address] [place] [address]  [phone]      INTERVAL HPI/OVERNIGHT EVENTS:  Pt s/e  No dysphagia or further GI complaints    MEDICATIONS  (STANDING):  aMIOdarone    Tablet 200 milliGRAM(s) Oral daily  amLODIPine   Tablet 5 milliGRAM(s) Oral daily  apixaban 2.5 milliGRAM(s) Oral two times a day  artificial  tears Solution 1 Drop(s) Both EYES two times a day  ertapenem  IVPB 1000 milliGRAM(s) IV Intermittent every 24 hours  ferrous    sulfate 325 milliGRAM(s) Oral daily  lactobacillus acidophilus 1 Tablet(s) Oral every 12 hours  levothyroxine 75 MICROGram(s) Oral daily  metoprolol tartrate 50 milliGRAM(s) Oral two times a day  multivitamin/minerals 1 Tablet(s) Oral daily  pantoprazole    Tablet 40 milliGRAM(s) Oral daily  polyethylene glycol 3350 17 Gram(s) Oral daily  potassium chloride   Powder 40 milliEquivalent(s) Oral once    MEDICATIONS  (PRN):  acetaminophen     Tablet .. 650 milliGRAM(s) Oral every 6 hours PRN Temp greater or equal to 38C (100.4F), Mild Pain (1 - 3)  albuterol/ipratropium for Nebulization 3 milliLiter(s) Nebulizer every 6 hours PRN Shortness of Breath and/or Wheezing  aluminum hydroxide/magnesium hydroxide/simethicone Suspension 30 milliLiter(s) Oral every 4 hours PRN Dyspepsia  guaiFENesin Oral Liquid (Sugar-Free) 200 milliGRAM(s) Oral every 6 hours PRN Cough  melatonin 3 milliGRAM(s) Oral at bedtime PRN Insomnia  ondansetron Injectable 4 milliGRAM(s) IV Push every 8 hours PRN Nausea and/or Vomiting      Allergies  penicillin (Unknown)      PHYSICAL EXAM:   Vital Signs:  Vital Signs Last 24 Hrs  T(C): 36.6 (25 Aug 2023 04:35), Max: 36.8 (24 Aug 2023 20:21)  T(F): 97.9 (25 Aug 2023 04:35), Max: 98.2 (24 Aug 2023 20:21)  HR: 62 (25 Aug 2023 04:35) (62 - 89)  BP: 183/79 (25 Aug 2023 04:35) (150/80 - 183/79)  BP(mean): --  RR: 18 (25 Aug 2023 04:35) (18 - 18)  SpO2: 92% (25 Aug 2023 04:35) (92% - 92%)    Parameters below as of 25 Aug 2023 04:35  Patient On (Oxygen Delivery Method): room air      Daily     Daily Weight in k.3 (25 Aug 2023 06:57)    GENERAL:  Appears stated age  HEENT:  NC/AT  CHEST:  Full & symmetric excursion  HEART:  Regular rhythm  ABDOMEN:  Soft, non-tender, non-distended  EXTEREMITIES:  no cyanosis  SKIN:  No rash  NEURO:  Alert      LABS:                        10.2   7.13  )-----------( 215      ( 25 Aug 2023 06:05 )             33.1     08-25    143  |  111<H>  |  12  ----------------------------<  96  3.4<L>   |  28  |  0.53    Ca    8.7      25 Aug 2023 06:05        Urinalysis Basic - ( 25 Aug 2023 06:05 )    Color: x / Appearance: x / SG: x / pH: x  Gluc: 96 mg/dL / Ketone: x  / Bili: x / Urobili: x   Blood: x / Protein: x / Nitrite: x   Leuk Esterase: x / RBC: x / WBC x   Sq Epi: x / Non Sq Epi: x / Bacteria: x

## 2023-08-25 NOTE — CASE MANAGEMENT PROGRESS NOTE - NSCMPROGRESSNOTE_GEN_ALL_CORE
Patient remains on IV Invanz for 4 more days for total of  8 days, as per ID. Patient will need Covid-19 PCR  within 72 hours of transition back the [facility]. Patient remains on IV Invanz for 4 more days for total of  8 days, as per ID. Patient will need Covid-19 PCR  within 72 hours of transition back the [facility]. Patient remains on IV Invanz for 4 more days for total of  8 days, as per ID. Patient will need Covid-19 PCR  within 72 hours of transition back the [facility].

## 2023-08-25 NOTE — DISCHARGE NOTE PROVIDER - PROVIDER RX CONTACT NUMBER
[phone]

## 2023-08-25 NOTE — PROGRESS NOTE ADULT - SUBJECTIVE AND OBJECTIVE BOX
Date of Service 08-25-23 @ 17:17    Patient is a 89y old  Female who presents with a chief complaint of cough (25 Aug 2023 13:03)      INTERVAL /OVERNIGHT EVENTS: resting comfortably    MEDICATIONS  (STANDING):  aMIOdarone    Tablet 200 milliGRAM(s) Oral daily  apixaban 2.5 milliGRAM(s) Oral two times a day  artificial  tears Solution 1 Drop(s) Both EYES two times a day  ertapenem  IVPB 1000 milliGRAM(s) IV Intermittent every 24 hours  ferrous    sulfate 325 milliGRAM(s) Oral daily  lactobacillus acidophilus 1 Tablet(s) Oral every 12 hours  levothyroxine 75 MICROGram(s) Oral daily  metoprolol tartrate 50 milliGRAM(s) Oral two times a day  multivitamin/minerals 1 Tablet(s) Oral daily  pantoprazole    Tablet 40 milliGRAM(s) Oral daily  polyethylene glycol 3350 17 Gram(s) Oral daily  potassium chloride    Tablet ER 10 milliEquivalent(s) Oral two times a day    MEDICATIONS  (PRN):  acetaminophen     Tablet .. 650 milliGRAM(s) Oral every 6 hours PRN Temp greater or equal to 38C (100.4F), Mild Pain (1 - 3)  albuterol/ipratropium for Nebulization 3 milliLiter(s) Nebulizer every 6 hours PRN Shortness of Breath and/or Wheezing  aluminum hydroxide/magnesium hydroxide/simethicone Suspension 30 milliLiter(s) Oral every 4 hours PRN Dyspepsia  guaiFENesin Oral Liquid (Sugar-Free) 200 milliGRAM(s) Oral every 6 hours PRN Cough  melatonin 3 milliGRAM(s) Oral at bedtime PRN Insomnia  ondansetron Injectable 4 milliGRAM(s) IV Push every 8 hours PRN Nausea and/or Vomiting      Allergies    penicillin (Unknown)    Intolerances        REVIEW OF SYSTEMS:  denies    Vital Signs Last 24 Hrs  T(C): 36.8 (25 Aug 2023 12:59), Max: 36.8 (24 Aug 2023 20:21)  T(F): 98.2 (25 Aug 2023 12:59), Max: 98.2 (24 Aug 2023 20:21)  HR: 68 (25 Aug 2023 12:59) (62 - 89)  BP: 120/70 (25 Aug 2023 16:05) (120/70 - 186/82)  BP(mean): --  RR: 18 (25 Aug 2023 12:59) (18 - 18)  SpO2: 90% (25 Aug 2023 12:59) (90% - 92%)    Parameters below as of 25 Aug 2023 12:59  Patient On (Oxygen Delivery Method): room air        PHYSICAL EXAM:  GENERAL: NAD, well-groomed, well-developed  HEAD:  Atraumatic, Normocephalic  EYES: EOMI, PERRLA, conjunctiva and sclera clear  ENMT: No tonsillar erythema, exudates, or enlargement; Moist mucous membranes, Good dentition, No lesions  NECK: Supple, No JVD, Normal thyroid  NERVOUS SYSTEM:  Alert & Oriented X3, Good concentration; Motor Strength 5/5 B/L upper and lower extremities; DTRs 2+ intact and symmetric  CHEST/LUNG: Clear to auscultation bilaterally; No rales, rhonchi, wheezing, or rubs  HEART: Regular rate and rhythm; No murmurs, rubs, or gallops  ABDOMEN: Soft, Nontender, Nondistended; Bowel sounds present  EXTREMITIES:  2+ Peripheral Pulses, No clubbing, cyanosis, or edema  LYMPH: No lymphadenopathy noted  SKIN: No rashes or lesions    LABS:                        10.2   7.13  )-----------( 215      ( 25 Aug 2023 06:05 )             33.1     25 Aug 2023 06:05    143    |  111    |  12     ----------------------------<  96     3.4     |  28     |  0.53     Ca    8.7        25 Aug 2023 06:05        Urinalysis Basic - ( 25 Aug 2023 06:05 )    Color: x / Appearance: x / SG: x / pH: x  Gluc: 96 mg/dL / Ketone: x  / Bili: x / Urobili: x   Blood: x / Protein: x / Nitrite: x   Leuk Esterase: x / RBC: x / WBC x   Sq Epi: x / Non Sq Epi: x / Bacteria: x      CAPILLARY BLOOD GLUCOSE          RADIOLOGY & ADDITIONAL TESTS:    Notes Reviewed:  [x ] YES  [ ] NO    Care Discussed with Consultants/Other Providers [ x] YES  [ ] NO

## 2023-08-25 NOTE — DISCHARGE NOTE PROVIDER - HOSPITAL COURSE
89-year-old female with history of hypothyroidism, A-fib on Eliquis, s/p recent pacemaker for complete heart block brought in by ambulance from [facility] assisted living home for cough for the past 2 weeks.  Associated with generalized weakness and decreased p.o. intake.  Found with UTI, completed course of IV antibiotics.     Problem/Plan - 1:  ·  Problem: Sepsis.   ·  Plan: likely 2/2 to UTI ,POA -Final Report (22 Aug 2023 17:15):BLOOD CX    Growth in aerobic and anaerobic bottles: Escherichia coli ESBL   . completed course of intravenous antibiotics, resolved     Problem/Plan - 2:  ·  Problem: Gram-negative bacteremia.   ·  Plan: ruled in for SEPSIS ,POA likely 2/2 to UTI ,poa -ID cons ,follow septic workup, followed by ID, completed course of IV antibiotics.     Problem/Plan - 3:  ·  Problem: Acute UTI.   ·  Plan: ID cons , follow urine cx , continue cefepime Source: Clean Catch Clean Catch (Midstream)  Preliminary Report (22 Aug 2023 00:01):    >100,000 CFU/ml Escherichia coli.  . ID cons ,follow septic workup, followed by ID, completed course of IV antibiotics.     Problem/Plan - 4:  ·  Problem: Acute bronchitis.   ·  Plan: Chest CT showed atelectasis vs infiltrates of lower lobes, No clear evidence of pneumonia.  . The patient has had a nonproductive cough for more than 2 weeks. Would look for other possible sources of this persistent cough: GERD, dysphagia.  . Levaquin was changed to IV Cefepime due to prolonged QTc and Zithromax was discontinued. Per ID changed to Ertapenem for 7 day course   . Completed IV course of antibiotics prior to d/c   . Repeat BC 8/22 and 8/24 with NGTD     Problem/Plan - 5:  ·  Problem: Afib.   ·  Plan: continue home medications.    Problem/Plan - 6:  ·  Problem: Hypokalemia.   ·  Plan: serial bmp ,electrolytes monitored and replaced     Problem/Plan - 7:  ·  Problem: Esophageal dilatation.   ·  Plan: gi cons ,ppi Seen by GI -ESOPHAGOGRAM REVIEWED ppi once a day .POC D/W HCP on a phone 08/22/23  gerd precautions  aspiration precautions  TTE 8/21/2023 reviewed; estimated pulmonary artery systolic pressure is 63 mmHg, consistent with moderate-to-severe pulmonary HTN  pt is not candidate for egd at this facility  currently asymptomatic and tolerating diet  outpatient follow up; may need egd at tertiary facility if symptomatic  cont soft diet.    Problem/Plan - 8:  ·  Problem: S/P placement of cardiac pacemaker.   ·  Plan: continue home medications ,card f/up.    Problem/Plan - 9:  ·  Problem: HTN (hypertension).   ·  Plan: continue home medications.    Problem/Plan - 10:  ·  Problem: Hypothyroidism.   ·  Plan; continue home medications.    Problem/Plan - 11:  ·  Problem: Spinal stenosis.   ·  Plan: pain management , ot/pt.    Problem/Plan - 12:  ·  Problem: Prophylactic measure.   ·  Plan: Gastrointestinal stress ulcer prophylaxis and DVT prophylaxis administered.   89-year-old female with history of hypothyroidism, A-fib on Eliquis, s/p recent pacemaker for complete heart block brought in by ambulance from [facility] assisted living home for cough for the past 2 weeks.  Associated with generalized weakness and decreased p.o. intake.  Found with UTI, completed course of IV antibiotics.     Problem/Plan - 1:  ·  Problem: Sepsis.   ·  Plan: likely 2/2 to UTI ,POA -Final Report (22 Aug 2023 17:15):BLOOD CX    Growth in aerobic and anaerobic bottles: Escherichia coli ESBL   . completed course of intravenous antibiotics, resolved     Problem/Plan - 2:  ·  Problem: Gram-negative bacteremia.   ·  Plan: ruled in for SEPSIS ,POA likely 2/2 to UTI ,poa -ID cons ,follow septic workup, followed by ID, completed course of IV antibiotics.     Problem/Plan - 3:  ·  Problem: Acute UTI.   ·  Plan: ID cons , follow urine cx , continue cefepime Source: Clean Catch Clean Catch (Midstream)  Preliminary Report (22 Aug 2023 00:01):    >100,000 CFU/ml Escherichia coli.  . ID cons ,follow septic workup, followed by ID, completed course of IV antibiotics.     Problem/Plan - 4:  ·  Problem: Acute bronchitis.   ·  Plan: Chest CT showed atelectasis vs infiltrates of lower lobes, No clear evidence of pneumonia.  . The patient has had a nonproductive cough for more than 2 weeks. Would look for other possible sources of this persistent cough: GERD, dysphagia.  . Levaquin was changed to IV Cefepime due to prolonged QTc and Zithromax was discontinued. Per ID changed to Ertapenem for 7 day course   . Completed IV course of antibiotics prior to d/c   . Repeat BC 8/22 and 8/24 with NGTD     Problem/Plan - 5:  ·  Problem: Afib.   ·  Plan: continue home medications.    Problem/Plan - 6:  ·  Problem: Hypokalemia.   ·  Plan: serial bmp ,electrolytes monitored and replaced     Problem/Plan - 7:  ·  Problem: Esophageal dilatation.   ·  Plan: gi cons ,ppi Seen by GI -ESOPHAGOGRAM REVIEWED ppi once a day .POC D/W HCP on a phone 08/22/23  gerd precautions  aspiration precautions  TTE 8/21/2023 reviewed; estimated pulmonary artery systolic pressure is 63 mmHg, consistent with moderate-to-severe pulmonary HTN  pt is not candidate for egd at this facility  currently asymptomatic and tolerating diet  outpatient follow up; may need egd at tertiary facility if symptomatic  cont soft diet.    Problem/Plan - 8:  ·  Problem: S/P placement of cardiac pacemaker.   ·  Plan: continue home medications ,card f/up.    Problem/Plan - 9:  ·  Problem: HTN (hypertension).   ·  Plan: continue home medications.    Problem/Plan - 10:  ·  Problem: Hypothyroidism.   ·  Plan; continue home medications.    Problem/Plan - 11:  ·  Problem: Spinal stenosis.   ·  Plan: pain management , ot/pt.    Problem/Plan - 12:  ·  Problem: Prophylactic measure.   ·  Plan: Gastrointestinal stress ulcer prophylaxis and DVT prophylaxis administered.   89-year-old female with history of hypothyroidism, A-fib on Eliquis, s/p recent pacemaker for complete heart block brought in by ambulance from [facility] assisted living home for cough for the past 2 weeks.  Associated with generalized weakness and decreased p.o. intake.  Found with UTI, completed course of IV antibiotics.     Problem/Plan - 1:  ·  Problem: Sepsis.   ·  Plan: likely 2/2 to UTI ,POA -Final Report (22 Aug 2023 17:15):BLOOD CX    Growth in aerobic and anaerobic bottles: Escherichia coli ESBL   . completed course of intravenous antibiotics, resolved     Problem/Plan - 2:  ·  Problem: Gram-negative bacteremia.   ·  Plan: ruled in for SEPSIS ,POA likely 2/2 to UTI ,poa -ID cons ,follow septic workup, followed by ID, completed course of IV antibiotics.     Problem/Plan - 3:  ·  Problem: Acute UTI.   ·  Plan: ID cons , follow urine cx , continue cefepime Source: Clean Catch Clean Catch (Midstream)  Preliminary Report (22 Aug 2023 00:01):    >100,000 CFU/ml Escherichia coli.  . ID cons ,follow septic workup, followed by ID, completed course of IV antibiotics.     Problem/Plan - 4:  ·  Problem: Acute bronchitis.   ·  Plan: Chest CT showed atelectasis vs infiltrates of lower lobes, No clear evidence of pneumonia.  . The patient has had a nonproductive cough for more than 2 weeks. Would look for other possible sources of this persistent cough: GERD, dysphagia.  . Levaquin was changed to IV Cefepime due to prolonged QTc and Zithromax was discontinued. Per ID changed to Ertapenem for 7 day course   . Completed IV course of antibiotics prior to d/c   . Repeat BC 8/22 and 8/24 with NGTD     Problem/Plan - 5:  ·  Problem: Afib.   ·  Plan: continue home medications.    Problem/Plan - 6:  ·  Problem: Hypokalemia.   ·  Plan: serial bmp ,electrolytes monitored and replaced     Problem/Plan - 7:  ·  Problem: Esophageal dilatation.   ·  Plan: gi cons ,ppi Seen by GI -ESOPHAGOGRAM REVIEWED ppi once a day .POC D/W HCP on a phone 08/22/23  gerd precautions  aspiration precautions  TTE 8/21/2023 reviewed; estimated pulmonary artery systolic pressure is 63 mmHg, consistent with moderate-to-severe pulmonary HTN  pt is not candidate for egd at this facility  currently asymptomatic and tolerating diet  outpatient follow up; may need egd at tertiary facility if symptomatic  cont soft diet.    Problem/Plan - 8:  ·  Problem: S/P placement of cardiac pacemaker.   ·  Plan: continue home medications ,card f/up.    Problem/Plan - 9:  ·  Problem: HTN (hypertension).   ·  Plan: continue home medications.    Problem/Plan - 10:  ·  Problem: Hypothyroidism.   ·  Plan; continue home medications.    Problem/Plan - 11:  ·  Problem: Spinal stenosis.   ·  Plan: pain management , ot/pt.    Problem/Plan - 12:  ·  Problem: Prophylactic measure.   ·  Plan: Gastrointestinal stress ulcer prophylaxis and DVT prophylaxis administered.

## 2023-08-25 NOTE — PROGRESS NOTE ADULT - ASSESSMENT
REASON FOR VISIT  .. Management of problems listed below      PHYSICAL EXAM    HEENT Unremarkable  atraumatic   RESP Fair air entry  Harsh breath sound   CARDIAC S1 S2 No S3     NO JVD    ABDOMEN No hepatosplenomegaly   PEDAL EDEMA present No calf tenderness  NO rash       GENERAL DATA .     GOC.    ..   ICU STAY.   .. none  COVID.   .. scv2 8/21/2023 (-)    BEST PRACTICE ISSUES.    HOB ELEVATN.   .. Yes  DVT PPLX.   ..  8/20/2023 apixaba 2.5 x 2 (af)     SPEECH SWALLOW RECOMMENDATIONS.    ..    8/21/2023 soft bite     DIET.    ..  8/20/2023 soft bite size   IV fl.  .. 8/22/2023 D5 1/2 ns  50   STRESS ULCER PPLX.   ..    8/20/2023 protonix 40   INFECTION PPLX.   ..     ALLGY.  ..    pncl                     WT.  ..  8/20/2023 56  BMI.  ..   8/20/2023 21    PROCEDURES.    ABGS.   .     VS/ PO/IO/ VENT/ DRIPS.   8/25/2023 afeb 80 170/90   8/25/2023 ra 92%     DOA C/C.     . 8/20/2023 · Chief Complaint Quote sent by [employer] [facility] for cough x 2 weeks. negative cxr and covid swab. poor PO intake today.           INITIAL PRESENTATION.   . 8/20/2023 89-year-old female with history of hypothyroidism, A-fib on Eliquis, recent pacemaker for complete heart block brought in by ambulance from [facility] assisted living home for cough for the past 2 weeks.  Associated with generalized weakness and decreased p.o. intake.  No fall or trauma.  Patient states she has had cold symptoms for a while.  Denies fever, chills, chest pain, shortness of breath, abdominal pain, nausea, vomiting, upper or lower extremity weakness or paresthesias.   pmd: Dr. Crawley, cards: [facility].   . hypothyroidism   . A-fib on Eliquis (hx AFL ablation),  .  PFO  HOME MEDS.   . levoxyl eliquis 2.5 x 2   COURSE.  . 8/20/2023 pulm consultd    . 8/20/2023 5:39 PM ER meds given already include cefepime 2g     PROBLEMS/ASSESSMENT/RECOMMENDATIONS (A/R).  PULMONARY.  . GAS EXCHANGE.  .. A/R.   .. Monitor pulse oximetry and target po 90-95%    . COPD.  .. 8/20/2023 duoneb.4p   .. 8/20/2023 benzonatate 100.3 x3d   .. 8/20/2023 gauiafenesin     INFECTION.  . E coli ESBL CTX M bacteremia 8/20  .. w 8/20-8/21-8/22-8/23/2023 w 16 - 21- 6.9 - 6   .. pr 8/21/2023 pr 7.5   .. ua 8/20/2023 ua 1012 w tntc l estrase large r 25   .. ct chest 8/20/2023   .... l upper ant cardiac device leads terminating in r atrium and rv   .... no enlarged hilar or mediastinal ln   .... mild doe mod diffuse distention of mid to upper esophagus   .... cm   .... increased density of liver ? amiodarone effect   .... bl lower lobe partial areas of compressive atelectasis sl progressd since ctap 3/4/2023   .... mild bl lower lung areas of linear and compressive atelectasis lower lobes and lingula   .... mild bl upper lobe subsegmental linear and dependent atelectasis   .... nonsp mild interlobular septal thickening   .. MICRO  .. bc 8/20 E coli ESBL CTX M bacteremia 8/20   .. bc 8/21 e coli esbl    .. bc 8/22 (-)   .. legn 8/21 (-)   .. rvp 8/20 (-)   .. EVENTS   .. 8/20/2023 will start empiric levaquin   .. 8/21/2023 message sent to ID to change to carbapenem   .. ABIO  .. 8/21 ertapenem 8d dr green   .. AR   .. ESBL e coli bacteremia on 8/20 likely from urine on ertapenem stratd 8/21     . Hemodynamics.  .. la 8/20/2023 la 1.4  .. BP ok      . A fib   .. 8/20/2023 eliquis   .. 8/22/2023 amiodaron 200   .. 8/22/2023 metporolol 50.2     . CAD.  .. Tr 8/21/2023 Tr 31     . CHF   .. bnp 8/20-8/23/2023 bnp 4545 - 3219   .. 8/22/2023 enalapril 10       HEMAT   .. Hb 8/20-8/21-8/23/2023 Hb 12 - 10.5 - 10   .. plt 8/20/2023 plt 225   .. inr 8/20/2023 inr 129     RENAL   .. Na 8/20/2023 Na 144   .. K 8/20/2023 K 3.3   .. co2 8/20/2023 co2 29   .. Cr 8/20/2023 Cr .8     . Dysphagia  .. esophagogram 8/22/2023 smooth tapering o distal esophagus     LFTS   .. AP 8/20/2023 124  .. ast 23  .. alt 25     MAIN ISSUES.  . Cough x 2 wk poa 8/20/2023  . Pneumonia   . E coli ESBL CTX M bacteremia 8/20 8/21  .. bc 8/22 (-)   .. 8/20/2023 levaquin -> 8/21 ertapenem 1 x 8d Dr Green  . COPD   . A fib   .. 8/20/2023 apixaba   . CHF     TIME SPENT.   . Over 36 minutes aggregate care time spent on encounter; activities included   direct patient care, counseling and/or coordinating care reviewing notes, lab data/ imaging , discussion with multidisciplinary team/ patient  /family and explaining in detail risks, benefits, alternatives  of the recommendations        REASON FOR VISIT  .. Management of problems listed below      PHYSICAL EXAM    HEENT Unremarkable  atraumatic   RESP Fair air entry  Harsh breath sound   CARDIAC S1 S2 No S3     NO JVD    ABDOMEN No hepatosplenomegaly   PEDAL EDEMA present No calf tenderness  NO rash       GENERAL DATA .     GOC.    ..   ICU STAY.   .. none  COVID.   .. scv2 8/21/2023 (-)    BEST PRACTICE ISSUES.    HOB ELEVATN.   .. Yes  DVT PPLX.   ..  8/20/2023 apixaba 2.5 x 2 (af)     SPEECH SWALLOW RECOMMENDATIONS.    ..    8/21/2023 soft bite     DIET.    ..  8/20/2023 soft bite size   IV fl.  .. 8/22/2023 D5 1/2 ns  50   STRESS ULCER PPLX.   ..    8/20/2023 protonix 40   INFECTION PPLX.   ..     ALLGY.  ..    pncl                     WT.  ..  8/20/2023 56  BMI.  ..   8/20/2023 21    PROCEDURES.    ABGS.   .     VS/ PO/IO/ VENT/ DRIPS.   8/25/2023 afeb 80 170/90   8/25/2023 ra 92%     DOA C/C.     . 8/20/2023 · Chief Complaint Quote sent by [employer] [facility] for cough x 2 weeks. negative cxr and covid swab. poor PO intake today.           INITIAL PRESENTATION.   . 8/20/2023 89-year-old female with history of hypothyroidism, A-fib on Eliquis, recent pacemaker for complete heart block brought in by ambulance from [facility] assisted living home for cough for the past 2 weeks.  Associated with generalized weakness and decreased p.o. intake.  No fall or trauma.  Patient states she has had cold symptoms for a while.  Denies fever, chills, chest pain, shortness of breath, abdominal pain, nausea, vomiting, upper or lower extremity weakness or paresthesias.   pmd: Dr. Crawley, cards: [facility].   . hypothyroidism   . A-fib on Eliquis (hx AFL ablation),  .  PFO  HOME MEDS.   . levoxyl eliquis 2.5 x 2   COURSE.  . 8/20/2023 pulm consultd    . 8/20/2023 5:39 PM ER meds given already include cefepime 2g     PROBLEMS/ASSESSMENT/RECOMMENDATIONS (A/R).  PULMONARY.  . GAS EXCHANGE.  .. A/R.   .. Monitor pulse oximetry and target po 90-95%    . COPD.  .. 8/20/2023 duoneb.4p   .. 8/20/2023 benzonatate 100.3 x3d   .. 8/20/2023 gauiafenesin     INFECTION.  . E coli ESBL CTX M bacteremia 8/20  .. w 8/20-8/21-8/22-8/23/2023 w 16 - 21- 6.9 - 6   .. pr 8/21/2023 pr 7.5   .. ua 8/20/2023 ua 1012 w tntc l estrase large r 25   .. ct chest 8/20/2023   .... l upper ant cardiac device leads terminating in r atrium and rv   .... no enlarged hilar or mediastinal ln   .... mild doe mod diffuse distention of mid to upper esophagus   .... cm   .... increased density of liver ? amiodarone effect   .... bl lower lobe partial areas of compressive atelectasis sl progressd since ctap 3/4/2023   .... mild bl lower lung areas of linear and compressive atelectasis lower lobes and lingula   .... mild bl upper lobe subsegmental linear and dependent atelectasis   .... nonsp mild interlobular septal thickening   .. MICRO  .. bc 8/20 E coli ESBL CTX M bacteremia 8/20   .. bc 8/21 e coli esbl    .. bc 8/22 (-)   .. legn 8/21 (-)   .. rvp 8/20 (-)   .. EVENTS   .. 8/20/2023 will start empiric levaquin   .. 8/21/2023 message sent to ID to change to carbapenem   .. ABIO  .. 8/21 ertapenem 8d dr green   .. AR   .. ESBL e coli bacteremia on 8/20 likely from urine on ertapenem stratd 8/21     . Hemodynamics.  .. la 8/20/2023 la 1.4  .. BP ok      . A fib   .. 8/20/2023 eliquis   .. 8/22/2023 amiodaron 200   .. 8/22/2023 metporolol 50.2     . CAD.  .. Tr 8/21/2023 Tr 31     . CHF   .. bnp 8/20-8/23/2023 bnp 4545 - 3219   .. 8/22/2023 enalapril 10       HEMAT   .. Hb 8/20-8/21-8/23/2023 Hb 12 - 10.5 - 10   .. plt 8/20/2023 plt 225   .. inr 8/20/2023 inr 129     RENAL   .. Na 8/20/2023 Na 144   .. K 8/20/2023 K 3.3   .. co2 8/20/2023 co2 29   .. Cr 8/20/2023 Cr .8     . Dysphagia  .. esophagogram 8/22/2023 smooth tapering o distal esophagus     LFTS   .. AP 8/20/2023 124  .. ast 23  .. alt 25     MAIN ISSUES.  . Cough x 2 wk poa 8/20/2023  . Pneumonia   . E coli ESBL CTX M bacteremia 8/20 8/21  .. bc 8/22 (-)   .. 8/20/2023 levaquin -> 8/21 ertapenem 1 x 8d Dr Green  . COPD   . A fib   .. 8/20/2023 apixaba   . CHF     TIME SPENT.   . Over 36 minutes aggregate care time spent on encounter; activities included   direct patient care, counseling and/or coordinating care reviewing notes, lab data/ imaging , discussion with multidisciplinary team/ patient  /family and explaining in detail risks, benefits, alternatives  of the recommendations        REASON FOR VISIT  .. Management of problems listed below      PHYSICAL EXAM    HEENT Unremarkable  atraumatic   RESP Fair air entry  Harsh breath sound   CARDIAC S1 S2 No S3     NO JVD    ABDOMEN No hepatosplenomegaly   PEDAL EDEMA present No calf tenderness  NO rash       GENERAL DATA .     GOC.    ..   ICU STAY.   .. none  COVID.   .. scv2 8/21/2023 (-)    BEST PRACTICE ISSUES.    HOB ELEVATN.   .. Yes  DVT PPLX.   ..  8/20/2023 apixaba 2.5 x 2 (af)     SPEECH SWALLOW RECOMMENDATIONS.    ..    8/21/2023 soft bite     DIET.    ..  8/20/2023 soft bite size   IV fl.  .. 8/22/2023 D5 1/2 ns  50   STRESS ULCER PPLX.   ..    8/20/2023 protonix 40   INFECTION PPLX.   ..     ALLGY.  ..    pncl                     WT.  ..  8/20/2023 56  BMI.  ..   8/20/2023 21    PROCEDURES.    ABGS.   .     VS/ PO/IO/ VENT/ DRIPS.   8/25/2023 afeb 80 170/90   8/25/2023 ra 92%     DOA C/C.     . 8/20/2023 · Chief Complaint Quote sent by [employer] [facility] for cough x 2 weeks. negative cxr and covid swab. poor PO intake today.           INITIAL PRESENTATION.   . 8/20/2023 89-year-old female with history of hypothyroidism, A-fib on Eliquis, recent pacemaker for complete heart block brought in by ambulance from [facility] assisted living home for cough for the past 2 weeks.  Associated with generalized weakness and decreased p.o. intake.  No fall or trauma.  Patient states she has had cold symptoms for a while.  Denies fever, chills, chest pain, shortness of breath, abdominal pain, nausea, vomiting, upper or lower extremity weakness or paresthesias.   pmd: Dr. Crawley, cards: [facility].   . hypothyroidism   . A-fib on Eliquis (hx AFL ablation),  .  PFO  HOME MEDS.   . levoxyl eliquis 2.5 x 2   COURSE.  . 8/20/2023 pulm consultd    . 8/20/2023 5:39 PM ER meds given already include cefepime 2g     PROBLEMS/ASSESSMENT/RECOMMENDATIONS (A/R).  PULMONARY.  . GAS EXCHANGE.  .. A/R.   .. Monitor pulse oximetry and target po 90-95%    . COPD.  .. 8/20/2023 duoneb.4p   .. 8/20/2023 benzonatate 100.3 x3d   .. 8/20/2023 gauiafenesin     INFECTION.  . E coli ESBL CTX M bacteremia 8/20  .. w 8/20-8/21-8/22-8/23/2023 w 16 - 21- 6.9 - 6   .. pr 8/21/2023 pr 7.5   .. ua 8/20/2023 ua 1012 w tntc l estrase large r 25   .. ct chest 8/20/2023   .... l upper ant cardiac device leads terminating in r atrium and rv   .... no enlarged hilar or mediastinal ln   .... mild doe mod diffuse distention of mid to upper esophagus   .... cm   .... increased density of liver ? amiodarone effect   .... bl lower lobe partial areas of compressive atelectasis sl progressd since ctap 3/4/2023   .... mild bl lower lung areas of linear and compressive atelectasis lower lobes and lingula   .... mild bl upper lobe subsegmental linear and dependent atelectasis   .... nonsp mild interlobular septal thickening   .. MICRO  .. bc 8/20 E coli ESBL CTX M bacteremia 8/20   .. bc 8/21 e coli esbl    .. bc 8/22 (-)   .. legn 8/21 (-)   .. rvp 8/20 (-)   .. EVENTS   .. 8/20/2023 will start empiric levaquin   .. 8/21/2023 message sent to ID to change to carbapenem   .. ABIO  .. 8/21 ertapenem 8d dr green   .. AR   .. ESBL e coli bacteremia on 8/20 likely from urine on ertapenem stratd 8/21     . Hemodynamics.  .. la 8/20/2023 la 1.4  .. BP ok      . A fib   .. 8/20/2023 eliquis   .. 8/22/2023 amiodaron 200   .. 8/22/2023 metporolol 50.2     . CAD.  .. Tr 8/21/2023 Tr 31     . CHF   .. bnp 8/20-8/23/2023 bnp 4545 - 3219   .. 8/22/2023 enalapril 10       HEMAT   .. Hb 8/20-8/21-8/23/2023 Hb 12 - 10.5 - 10   .. plt 8/20/2023 plt 225   .. inr 8/20/2023 inr 129     RENAL   .. Na 8/20/2023 Na 144   .. K 8/20/2023 K 3.3   .. co2 8/20/2023 co2 29   .. Cr 8/20/2023 Cr .8     . Dysphagia  .. esophagogram 8/22/2023 smooth tapering o distal esophagus     LFTS   .. AP 8/20/2023 124  .. ast 23  .. alt 25     MAIN ISSUES.  . Cough x 2 wk poa 8/20/2023  . Pneumonia   . E coli ESBL CTX M bacteremia 8/20 8/21  .. bc 8/22 (-)   .. 8/20/2023 levaquin -> 8/21 ertapenem 1 x 8d Dr Green  . COPD   . A fib   .. 8/20/2023 apixaba   . CHF     TIME SPENT.   . Over 36 minutes aggregate care time spent on encounter; activities included   direct patient care, counseling and/or coordinating care reviewing notes, lab data/ imaging , discussion with multidisciplinary team/ patient  /family and explaining in detail risks, benefits, alternatives  of the recommendations

## 2023-08-25 NOTE — DISCHARGE NOTE PROVIDER - NSDCCAREPROVSEEN_GEN_ALL_CORE_FT
Allan, Edwin Swenson, Valente Soto, Carlene Reich, Rei Soliz, Bisi Grant, Rosas Lares, Matt Robertson, Lamar Gonzalez, Edwardo Foster, Víctor Barillas

## 2023-08-25 NOTE — DISCHARGE NOTE PROVIDER - NSDCCPCAREPLAN_GEN_ALL_CORE_FT
PRINCIPAL DISCHARGE DIAGNOSIS  Diagnosis: Acute UTI  Assessment and Plan of Treatment: You presented to the hospital with complaint of generalized weakness. Found wth a urinary tract infection. You were seen by infectious disease which recommended you complete a 7 day course of intravenous antibiotics. You completed the antibiotic course and your symptoms have improved.      SECONDARY DISCHARGE DIAGNOSES  Diagnosis: Afib  Assessment and Plan of Treatment: Continue Eliquis. Follow with your cardiologist on discharge.    Diagnosis: HTN (hypertension)  Assessment and Plan of Treatment: Continue current medical management.    Diagnosis: Hypokalemia  Assessment and Plan of Treatment: You were admitted to the hospital with complain  of low potassium levels. Your levels were checked while admitted and they were repleated. Your potassium levels have normalized. Please follow with your primary medical doctor to have your levels checked within 1 week.    Diagnosis: Spinal stenosis  Assessment and Plan of Treatment: Continue current management.     PRINCIPAL DISCHARGE DIAGNOSIS  Diagnosis: Acute UTI  Assessment and Plan of Treatment: You presented to the hospital with complaint of generalized weakness. Found wth a urinary tract infection. You were seen by infectious disease which recommended you complete a 7 day course of intravenous antibiotics. You completed the antibiotic course and your symptoms have improved.      SECONDARY DISCHARGE DIAGNOSES  Diagnosis: Sepsis  Assessment and Plan of Treatment:     Diagnosis: Acute metabolic encephalopathy  Assessment and Plan of Treatment:     Diagnosis: Acute bronchitis  Assessment and Plan of Treatment:     Diagnosis: 2019 novel coronavirus disease (COVID-19)  Assessment and Plan of Treatment:     Diagnosis: Closed displaced fracture of triquetrum  Assessment and Plan of Treatment:     Diagnosis: Afib  Assessment and Plan of Treatment: Continue Eliquis. Follow with your cardiologist on discharge.    Diagnosis: HTN (hypertension)  Assessment and Plan of Treatment: Continue current medical management.    Diagnosis: Spinal stenosis  Assessment and Plan of Treatment: Continue current management.    Diagnosis: Hypokalemia  Assessment and Plan of Treatment: You were admitted to the hospital with complain  of low potassium levels. Your levels were checked while admitted and they were repleated. Your potassium levels have normalized. Please follow with your primary medical doctor to have your levels checked within 1 week.

## 2023-08-25 NOTE — CHART NOTE - NSCHARTNOTEFT_GEN_A_CORE
Do you have Advance Directives (HCP / LV / Organ donation / Documentation of oral advance Directive):   (  x  )  yes    (      )    NO                                                                            Do you have LV - Living will :                                                                                                                                             (  x  )  yes    (      )   No    Do you have HCP - Health Care Proxy:                                                                                                                            (   x  )  yes   (       ) N0    Do you have DNR- Do Not Resuscitate :                                                                                                                           (    x  )  yes  (        )  No    Do you have DNI- Do Not intubate  :                                                                                                                               (   x   )  yes   (       ) No    Do you have MOLST - Medical orders for Life sustaining treatment  :                                                                    (  x    ) yes    (       ) No    Decision Maker :  (     ) Patient     (   x   )  HCA   (     ) Public Health Law Surrogate     (      ) Surrogate  (       ) Guardian    Goals of Care :  (      )   Complete Care     (       ) No Limitations                              (       )   Comfort Care       (       )  Hospice                               (    x  )   Limited medical Intervention / s    Medical Interventions :   (        )   CPR       (     x   )  DNR                                               (        )  Intubation with MV - Mechanical Ventilation  (      ) BIPAP/CPAP    (      x   )   DNI                                               (         )  Artificial Nutrition -  IVF, TPN / PPN, Tube Feeds             (     x    )   No Feeding Tube                                                (    x    ) Use Antibiotics                         (          ) No Antibiotics                                                (    x     ) Blood and Blood Products     (         )   No Blood or Blood products                                                (    x      )  Dialysis                                    (         )  No Dialysis                                                (          )  Medical Management only  (         )  No Invasive Interventions or Surgery  Time spent :                        (     x  ) upto 30 minutes                       (           )   more than 30 minutes  ACP reviewed and discussed

## 2023-08-25 NOTE — DISCHARGE NOTE PROVIDER - DETAILS OF MALNUTRITION DIAGNOSIS/DIAGNOSES
This patient has been assessed with a concern for Malnutrition and was treated during this hospitalization for the following Nutrition diagnosis/diagnoses:     -  08/21/2023: Moderate protein-calorie malnutrition   -  08/21/2023: Underweight (BMI < 19)

## 2023-08-25 NOTE — DISCHARGE NOTE PROVIDER - PROVIDER TOKENS
PROVIDER:[TOKEN:[[id number]]] PROVIDER:[TOKEN:[[id number]]] PROVIDER:[TOKEN:[[id number]]] PROVIDER:[TOKEN:[[id number]]],PROVIDER:[TOKEN:[[id number]],FOLLOWUP:[1-3 days]] PROVIDER:[TOKEN:[[id number]]],PROVIDER:[TOKEN:[[id number]],FOLLOWUP:[1-3 days]] PROVIDER:[TOKEN:[[id number]]],PROVIDER:[TOKEN:[[id number]],FOLLOWUP:[1-3 days]]

## 2023-08-25 NOTE — PROGRESS NOTE ADULT - ASSESSMENT
89-year-old female with history of hypothyroidism, A-fib on Eliquis, s/p recent pacemaker for complete heart block brought in by ambulance from [facility] assisted living home for cough for the past 2 weeks.  Associated with generalized weakness and decreased p.o. intake.  No fall or trauma.  Patient states she has had cold symptoms for a while.  Denies fever, chills, chest pain, shortness of breath, abdominal pain, nausea, vomiting, upper or lower extremity weakness or paresthesias. pmd: Dr. Crawley, cards: [facility] Seen by card Dr Reich Admitted  to telemetry unit for monitoring , send 3 sets of cardiac enzymes to rule out acute coronary event, obtain ECHO to evaluate LVEF, cardiology consult  ,continue current management, O2 supply, anticoagulation plan as per cardiology consult Pulm cons requested , antitussives and nebulized bd ordered .Also UTI suspected and started on iv abx , id cons called Palliative care consult requested ,to discuss advance directives and complete MOLST  89-year-old female with history of hypothyroidism, A-fib on Eliquis, s/p recent pacemaker for complete heart block brought in by ambulance from [facility] assisted living home for cough for the past 2 weeks.  Associated with generalized weakness and decreased p.o. intake.  No fall or trauma.  Patient states she has had cold symptoms for a while.  Denies fever, chills, chest pain, shortness of breath, abdominal pain, nausea, vomiting, upper or lower extremity weakness or paresthesias. pmd: Dr. Crawley, cards: [facility] Seen by card Dr Reich Admitted  to telemetry unit for monitoring , send 3 sets of cardiac enzymes to rule out acute coronary event, obtain ECHO to evaluate LVEF, cardiology consult  ,continue current management, O2 supply, anticoagulation plan as per cardiology consult Pulm cons requested , antitussives and nebulized bd ordered .Also UTI suspected and started on iv abx , id cons called Palliative care consult requested ,to discuss advance directives and complete MOLST  89-year-old female with history of hypothyroidism, A-fib on Eliquis, s/p recent pacemaker for complete heart block brought in by ambulance from [facility] assisted living home for cough for the past 2 weeks.  Associated with generalized weakness and decreased p.o. intake.  No fall or trauma.  Patient states she has had cold symptoms for a while.  Denies fever, chills, chest pain, shortness of breath, abdominal pain, nausea, vomiting, upper or lower extremity weakness or paresthesias. pmd: Dr. Crawley, cards: [facility] Seen by card Dr Reich Admitted  to telemetry unit for monitoring , send 3 sets of cardiac enzymes to rule out acute coronary event, obtain ECHO to evaluate LVEF, cardiology consult  ,continue current management, O2 supply, anticoagulation plan as per cardiology consult Pulm cons requested , antitussives and nebulized bd ordered .Also UTI suspected and started on iv abx , id cons called Palliative care consult requested ,to discuss advance directives and complete MOLST

## 2023-08-25 NOTE — DISCHARGE NOTE PROVIDER - NPI NUMBER (FOR SYSADMIN USE ONLY) :
[id number]] [[id number] [[id number]] [[id number]],[[id number]] [[id number]],[[id number]] [[id number]]

## 2023-08-25 NOTE — DISCHARGE NOTE PROVIDER - CARE PROVIDER_API CALL
Patient is a 68y old  Male who presents with a chief complaint of Fever (07 Feb 2022 07:44)    f/u UTI    Interval History/ROS:  no fever.  dysuria better.  urinating okay.  no n/v/d.  Remainder of ROS otherwise negative.    PAST MEDICAL & SURGICAL HISTORY:  HTN (hypertension)  HLD (hyperlipidemia)  BPH (benign prostatic hyperplasia)    Allergies  No Known Allergies    ANTIMICROBIALS:  Piperacillin/tazobactam IVPB (2/3-2/6)  vancomycin  IVPB (2/3 x1)  cefTRIAXone   IVPB 1000 every 24 hours (2/3, 2/6-2/7)    active:  levoFLOXacin  Tablet 500 every 24 hours (2/8-)    MEDICATIONS  (STANDING):  metoprolol tartrate 12.5 two times a day  phenazopyridine 200 every 8 hours  tamsulosin 0.8 at bedtime    Vital Signs Last 24 Hrs  T(F): 98.1 (02-08-22 @ 08:34), Max: 98.9 (02-07-22 @ 21:32)  HR: 101 (02-08-22 @ 08:34)  BP: 128/85 (02-08-22 @ 08:34)  RR: 18 (02-08-22 @ 08:34)  SpO2: 95% (02-08-22 @ 08:34) (95% - 97%)    PHYSICAL EXAM:  Constitutional: non-toxic  HEAD/EYES: anicteric  ENT:  supple  Cardiovascular:   normal S1, S2,  Respiratory:  clear BS bilaterally  GI:  soft, non-tender, normal bowel sounds  :  no louise, no CVA tenderness  Musculoskeletal:  no synovitis  Neurologic: awake and alert, normal strength, no focal findings  Skin:  no rash  Psychiatric:  awake, alert, appropriate mood                        11.9   11.76 )-----------( 202      ( 08 Feb 2022 07:02 )             38.3 02-08    135  |  100  |  19  ----------------------------<  108  4.3   |  24  |  0.94  Ca    9.4      08 Feb 2022 07:02Phos  3.2     02-08Mg     2.1     02-08    WBC Count: 9.10 (02-07-22 @ 06:22)  WBC Count: 10.10 (02-06-22 @ 07:29)  WBC Count: 11.60 (02-05-22 @ 07:53)  WBC Count: 15.85 (02-04-22 @ 07:06)  WBC Count: 18.88 (02-03-22 @ 10:20)    MICROBIOLOGY:  Culture - Blood (collected 05 Feb 2022 12:04)  Source: .Blood Blood-Peripheral  Preliminary Report (06 Feb 2022 13:01):    No growth to date.    Culture - Blood (collected 05 Feb 2022 12:04)  Source: .Blood Blood-Peripheral  Preliminary Report (06 Feb 2022 13:01):    No growth to date.    Culture - Urine (collected 03 Feb 2022 14:47)  Source: Clean Catch Clean Catch (Midstream)  Final Report (06 Feb 2022 16:22):    >100,000 CFU/ml Escherichia coli  Organism: Escherichia coli (06 Feb 2022 16:22)  Organism: Escherichia coli (06 Feb 2022 16:22)      -  Amikacin: S <=16      -  Amoxicillin/Clavulanic Acid: S <=8/4      -  Ampicillin: S <=8 These ampicillin results predict results for amoxicillin      -  Ampicillin/Sulbactam: S <=4/2 Enterobacter, Klebsiella aerogenes, Citrobacter, and Serratia may develop resistance during prolonged therapy (3-4 days)      -  Aztreonam: S <=4      -  Cefazolin: S <=2 ([id number]) For uncomplicated UTI with K. pneumoniae, E. coli, or P. mirablis: MAHESH <=16 is sensitive and MAHESH >=32 is resistant. This also predicts results for oral agents cefaclor, cefdinir, cefpodoxime, cefprozil, cefuroxime axetil, cephalexin and locarbef for uncomplicated UTI. Note that some isolates may be susceptible to these agents while testing resistant to cefazolin.      -  Cefepime: S <=2      -  Cefoxitin: S <=8      -  Ceftriaxone: S <=1 Enterobacter, Klebsiella aerogenes, Citrobacter, and Serratia may develop resistance during prolonged therapy      -  Ciprofloxacin: S <=0.25      -  Ertapenem: S <=0.5      -  Gentamicin: S <=2      -  Imipenem: S <=1      -  Levofloxacin: S <=0.5      -  Meropenem: S <=1      -  Nitrofurantoin: S <=32 Should not be used to treat pyelonephritis      -  Piperacillin/Tazobactam: S <=8      -  Tigecycline: S <=2      -  Tobramycin: S <=2      -  Trimethoprim/Sulfamethoxazole: S <=0.5/9.5      Method Type: MAHESH    Culture - Blood (collected 03 Feb 2022 14:41)  Source: .Blood Blood-Peripheral  Gram Stain (04 Feb 2022 01:32):    Growth in aerobic bottle: Gram Negative Rods  Final Report (05 Feb 2022 16:12):    Growth in aerobic bottle: Escherichia coli    ***Blood Panel PCR results on this specimen are available    approximately 3 hours after the Gram stain result.***    Gram stain, PCR, and/or culture results may not always    correspond due to difference in methodologies.    ************************************************************    This PCR assay was performed by multiplex PCR. This    Assay tests for 66 bacterial and resistance gene targets.    Please refer to the [facility] test directory    at [email or web address] for details.  Organism: Blood Culture PCR  Escherichia coli (05 Feb 2022 16:12)  Organism: Escherichia coli (05 Feb 2022 16:12)      -  Amikacin: S <=16      -  Ampicillin: S <=8 These ampicillin results predict results for amoxicillin      -  Ampicillin/Sulbactam: S <=4/2 Enterobacter, Klebsiella aerogenes, Citrobacter, and Serratia may develop resistance during prolonged therapy (3-4 days)      -  Aztreonam: S <=4      -  Cefazolin: S <=2 Enterobacter, Klebsiella aerogenes, Citrobacter, and Serratia may develop resistance during prolonged therapy (3-4 days)      -  Cefepime: S <=2      -  Cefoxitin: S <=8      -  Ceftriaxone: S <=1 Enterobacter, Klebsiella aerogenes, Citrobacter, and Serratia may develop resistance during prolonged therapy      -  Ciprofloxacin: S <=0.25      -  Ertapenem: S <=0.5      -  Gentamicin: S <=2      -  Imipenem: S <=1      -  Levofloxacin: S <=0.5      -  Meropenem: S <=1      -  Piperacillin/Tazobactam: S <=8      -  Tobramycin: S <=2      -  Trimethoprim/Sulfamethoxazole: S <=0.5/9.5      Method Type: MAHESH  Organism: Blood Culture PCR (05 Feb 2022 16:12)      -  Escherichia coli: Detec      Method Type: PCR    Culture - Blood (collected 03 Feb 2022 14:41)  Source: .Blood Blood-Peripheral  Preliminary Report (04 Feb 2022 15:02):    No growth to date.    RADIOLOGY:  imaging below personally reviewed and agree with findings    CT Abdomen and Pelvis No Cont (02.03.22 @ 16:48) >  IMPRESSION:  Bladder wall thickening with perivesicular inflammatory changes, suggestive of cystitis.  Enlarged prostate. Mild periprostatic inflammatory changes are suggestive of prostatitis, potentially post procedural or infectious in etiology.   Findings of proctitis, potentially reactive.  Left adrenal adenoma measuring 4.6 cm.   Ayad Collins  Hospice/Palliative Medicine  [address] [place] [address]  Phone: [phone]  Fax: [phone]  Follow Up Time:    Ayad Collins  Hospice/Palliative Medicine  [address] [place] [address]  Phone: [phone]  Fax: [phone]  Follow Up Time:    Ayad Collins  Hospice/Palliative Medicine  [address] [place] [address]  Phone: [phone]  Fax: [phone]  Follow Up Time:    Ayad Collins  Hospice/Palliative Medicine  [address] [place] [address]  Phone: [phone]  Fax: [phone]  Follow Up Time:     Chang Sparks  Plastic Surgery  [address] [place] [address], [address]  [place], [place] [id number]  Phone: [phone]  Fax: [phone]  Follow Up Time: 1-3 days   Ayad Collins  Hospice/Palliative Medicine  [address] [place] [address]  Phone: [phone]  Fax: [phone]  Follow Up Time:     Chang Sparks  Plastic Surgery  [address] [place] [address], [address]  [place], [place] [id number]  Phone: [phone]  Fax: [phone]  Follow Up Time: 1-3 days   Ayad Collins  Hospice/Palliative Medicine  [address] [place] [address]  Phone: [phone]  Fax: [phone]  Follow Up Time:     Chang Sparks  Plastic Surgery  [address] [place] [address], [address]  [place], [place] [id number]  Phone: [phone]  Fax: [phone]  Follow Up Time: 1-3 days

## 2023-08-25 NOTE — PROGRESS NOTE ADULT - SUBJECTIVE AND OBJECTIVE BOX
CHIEF COMPLAINT/ REASON FOR VISIT  .. Patient was seen to address the  issue listed under PROBLEM LIST which is located toward bottom of this note     DEACON ROBERT    PLV 1EAS 106 W1    Allergies    penicillin (Unknown)    Intolerances        PAST MEDICAL & SURGICAL HISTORY:  HTN (hypertension)      Afib      Spinal stenosis      PFO (patent foramen ovale)      Degeneration macular      Osteoporosis      History of complete heart block      Hypothyroidism      Cardiac pacemaker          FAMILY HISTORY:      Home Medications:  Albuterol (Eqv-ProAir HFA) 90 mcg/inh inhalation aerosol: 1 puff(s) inhaled 4 times a day as needed for  cough or wheezing (21 Aug 2023 09:56)  amiodarone 200 mg oral tablet: 1 tab(s) orally once a day (21 Aug 2023 09:55)  clindamycin 1% topical lotion: Apply topically to affected area once a day to face (21 Aug 2023 09:55)  Eliquis 2.5 mg oral tablet: 1 tab(s) orally 2 times a day (21 Aug 2023 09:55)  enalapril 10 mg oral tablet: 1 tab(s) orally once a day (21 Aug 2023 09:55)  ferrous sulfate 325 mg (65 mg elemental iron) oral tablet: 1 tab(s) orally once a day (21 Aug 2023 09:55)  levothyroxine 75 mcg (0.075 mg) oral tablet: 1 tab(s) orally once a day (21 Aug 2023 09:56)  Metoprolol Tartrate 50 mg oral tablet: 1 tab(s) orally 2 times a day (21 Aug 2023 09:56)  Myrbetriq 50 mg oral tablet, extended release: 1 tab(s) orally once a day (21 Aug 2023 09:56)  polyethylene glycol 3350 oral powder for reconstitution: 17 gram(s) orally once a day (21 Aug 2023 09:56)  PreserVision AREDS oral capsule: 1 cap(s) orally once a day (21 Aug 2023 09:56)  Refresh Dry Eye Therapy ophthalmic solution: 1 drop(s) in each eye 4 times a day (21 Aug 2023 09:56)  Tylenol 325 mg oral tablet: 2 tab(s) orally every 4 hours as needed for pain or fever (21 Aug 2023 09:56)      MEDICATIONS  (STANDING):  aMIOdarone    Tablet 200 milliGRAM(s) Oral daily  amLODIPine   Tablet 5 milliGRAM(s) Oral daily  apixaban 2.5 milliGRAM(s) Oral two times a day  artificial  tears Solution 1 Drop(s) Both EYES two times a day  ertapenem  IVPB 1000 milliGRAM(s) IV Intermittent every 24 hours  ferrous    sulfate 325 milliGRAM(s) Oral daily  lactobacillus acidophilus 1 Tablet(s) Oral every 12 hours  levothyroxine 75 MICROGram(s) Oral daily  metoprolol tartrate 50 milliGRAM(s) Oral two times a day  multivitamin/minerals 1 Tablet(s) Oral daily  pantoprazole    Tablet 40 milliGRAM(s) Oral daily  polyethylene glycol 3350 17 Gram(s) Oral daily    MEDICATIONS  (PRN):  acetaminophen     Tablet .. 650 milliGRAM(s) Oral every 6 hours PRN Temp greater or equal to 38C (100.4F), Mild Pain (1 - 3)  albuterol/ipratropium for Nebulization 3 milliLiter(s) Nebulizer every 6 hours PRN Shortness of Breath and/or Wheezing  aluminum hydroxide/magnesium hydroxide/simethicone Suspension 30 milliLiter(s) Oral every 4 hours PRN Dyspepsia  guaiFENesin Oral Liquid (Sugar-Free) 200 milliGRAM(s) Oral every 6 hours PRN Cough  melatonin 3 milliGRAM(s) Oral at bedtime PRN Insomnia  ondansetron Injectable 4 milliGRAM(s) IV Push every 8 hours PRN Nausea and/or Vomiting              Vital Signs Last 24 Hrs  T(C): 36.6 (25 Aug 2023 04:35), Max: 36.8 (24 Aug 2023 20:21)  T(F): 97.9 (25 Aug 2023 04:35), Max: 98.2 (24 Aug 2023 20:21)  HR: 62 (25 Aug 2023 04:35) (62 - 89)  BP: 183/79 (25 Aug 2023 04:35) (150/80 - 183/79)  BP(mean): --  RR: 18 (25 Aug 2023 04:35) (18 - 18)  SpO2: 92% (25 Aug 2023 04:35) (92% - 92%)    Parameters below as of 25 Aug 2023 04:35  Patient On (Oxygen Delivery Method): room air          08-24-23 @ 07:01  -  08-25-23 @ 07:00  --------------------------------------------------------  IN: 0 mL / OUT: 200 mL / NET: -200 mL              LABS:                        10.2   7.13  )-----------( 215      ( 25 Aug 2023 06:05 )             33.1     08-25    143  |  111<H>  |  12  ----------------------------<  96  3.4<L>   |  28  |  0.53    Ca    8.7      25 Aug 2023 06:05        Urinalysis Basic - ( 25 Aug 2023 06:05 )    Color: x / Appearance: x / SG: x / pH: x  Gluc: 96 mg/dL / Ketone: x  / Bili: x / Urobili: x   Blood: x / Protein: x / Nitrite: x   Leuk Esterase: x / RBC: x / WBC x   Sq Epi: x / Non Sq Epi: x / Bacteria: x            WBC:  WBC Count: 7.13 K/uL (08-25 @ 06:05)  WBC Count: 5.80 K/uL (08-24 @ 06:48)  WBC Count: 6.00 K/uL (08-23 @ 06:08)  WBC Count: 6.97 K/uL (08-22 @ 06:20)      MICROBIOLOGY:  RECENT CULTURES:  08-22 .Blood Blood XXXX XXXX   No growth at 48 Hours    08-22 .Blood Blood XXXX XXXX   No growth at 48 Hours    08-21 .Blood Blood XXXX   Growth in aerobic bottle: Gram Negative Rods   Growth in aerobic bottle: Escherichia coli ESBL  See previous culture [id number]    08-21 .Blood Blood XXXX   Growth in anaerobic bottle: Gram Negative Rods  Growth in aerobic bottle: Gram Negative Rods   Growth in aerobic and anaerobic bottles: Escherichia coli ESBL  See previous culture [id number]    08-20 Clean Catch Clean Catch (Midstream) Escherichia coli ESBL XXXX   >100,000 CFU/ml Escherichia coli ESBL    08-20 .Blood Blood-Peripheral XXXX   Growth in aerobic bottle: Gram Negative Rods   Growth in aerobic bottle: Escherichia coli ESBL  See previous culture [id number]    08-20 .Blood Blood-Peripheral Blood Culture PCR  Escherichia coli ESBL   Growth in aerobic and anaerobic bottles: Gram Negative Rods   Growth in aerobic and anaerobic bottles: Escherichia coli ESBL  Direct identification is available within approximately 3-5  hours either by Blood Panel Multiplexed PCR or Direct  MALDI-TOF. Details: [email or web address]                    Sodium:  Sodium: 143 mmol/L (08-25 @ 06:05)  Sodium: 142 mmol/L (08-24 @ 06:48)  Sodium: 142 mmol/L (08-23 @ 06:08)  Sodium: 142 mmol/L (08-22 @ 06:20)      0.53 mg/dL 08-25 @ 06:05  0.62 mg/dL 08-24 @ 06:48  0.68 mg/dL 08-23 @ 06:08  0.81 mg/dL 08-22 @ 06:20      Hemoglobin:  Hemoglobin: 10.2 g/dL (08-25 @ 06:05)  Hemoglobin: 10.0 g/dL (08-24 @ 06:48)  Hemoglobin: 10.1 g/dL (08-23 @ 06:08)  Hemoglobin: 10.7 g/dL (08-22 @ 06:20)      Platelets: Platelet Count - Automated: 215 K/uL (08-25 @ 06:05)  Platelet Count - Automated: 203 K/uL (08-24 @ 06:48)  Platelet Count - Automated: 198 K/uL (08-23 @ 06:08)  Platelet Count - Automated: 195 K/uL (08-22 @ 06:20)          Urinalysis Basic - ( 25 Aug 2023 06:05 )    Color: x / Appearance: x / SG: x / pH: x  Gluc: 96 mg/dL / Ketone: x  / Bili: x / Urobili: x   Blood: x / Protein: x / Nitrite: x   Leuk Esterase: x / RBC: x / WBC x   Sq Epi: x / Non Sq Epi: x / Bacteria: x        RADIOLOGY & ADDITIONAL STUDIES:      MICROBIOLOGY:  RECENT CULTURES:  08-22 .Blood Blood XXXX XXXX   No growth at 48 Hours    08-22 .Blood Blood XXXX XXXX   No growth at 48 Hours    08-21 .Blood Blood XXXX   Growth in aerobic bottle: Gram Negative Rods   Growth in aerobic bottle: Escherichia coli ESBL  See previous culture [id number]    08-21 .Blood Blood XXXX   Growth in anaerobic bottle: Gram Negative Rods  Growth in aerobic bottle: Gram Negative Rods   Growth in aerobic and anaerobic bottles: Escherichia coli ESBL  See previous culture [id number]    08-20 Clean Catch Clean Catch (Midstream) Escherichia coli ESBL XXXX   >100,000 CFU/ml Escherichia coli ESBL    08-20 .Blood Blood-Peripheral XXXX   Growth in aerobic bottle: Gram Negative Rods   Growth in aerobic bottle: Escherichia coli ESBL  See previous culture [id number]    08-20 .Blood Blood-Peripheral Blood Culture PCR  Escherichia coli ESBL   Growth in aerobic and anaerobic bottles: Gram Negative Rods   Growth in aerobic and anaerobic bottles: Escherichia coli ESBL  Direct identification is available within approximately 3-5  hours either by Blood Panel Multiplexed PCR or Direct  MALDI-TOF. Details: [email or web address]                 CHIEF COMPLAINT/ REASON FOR VISIT  .. Patient was seen to address the  issue listed under PROBLEM LIST which is located toward bottom of this note     DEACON ROBERT    PLV 1EAS 106 W1    Allergies    penicillin (Unknown)    Intolerances        PAST MEDICAL & SURGICAL HISTORY:  HTN (hypertension)      Afib      Spinal stenosis      PFO (patent foramen ovale)      Degeneration macular      Osteoporosis      History of complete heart block      Hypothyroidism      Cardiac pacemaker          FAMILY HISTORY:      Home Medications:  Albuterol (Eqv-ProAir HFA) 90 mcg/inh inhalation aerosol: 1 puff(s) inhaled 4 times a day as needed for  cough or wheezing (21 Aug 2023 09:56)  amiodarone 200 mg oral tablet: 1 tab(s) orally once a day (21 Aug 2023 09:55)  clindamycin 1% topical lotion: Apply topically to affected area once a day to face (21 Aug 2023 09:55)  Eliquis 2.5 mg oral tablet: 1 tab(s) orally 2 times a day (21 Aug 2023 09:55)  enalapril 10 mg oral tablet: 1 tab(s) orally once a day (21 Aug 2023 09:55)  ferrous sulfate 325 mg (65 mg elemental iron) oral tablet: 1 tab(s) orally once a day (21 Aug 2023 09:55)  levothyroxine 75 mcg (0.075 mg) oral tablet: 1 tab(s) orally once a day (21 Aug 2023 09:56)  Metoprolol Tartrate 50 mg oral tablet: 1 tab(s) orally 2 times a day (21 Aug 2023 09:56)  Myrbetriq 50 mg oral tablet, extended release: 1 tab(s) orally once a day (21 Aug 2023 09:56)  polyethylene glycol 3350 oral powder for reconstitution: 17 gram(s) orally once a day (21 Aug 2023 09:56)  PreserVision AREDS oral capsule: 1 cap(s) orally once a day (21 Aug 2023 09:56)  Refresh Dry Eye Therapy ophthalmic solution: 1 drop(s) in each eye 4 times a day (21 Aug 2023 09:56)  Tylenol 325 mg oral tablet: 2 tab(s) orally every 4 hours as needed for pain or fever (21 Aug 2023 09:56)      MEDICATIONS  (STANDING):  aMIOdarone    Tablet 200 milliGRAM(s) Oral daily  amLODIPine   Tablet 5 milliGRAM(s) Oral daily  apixaban 2.5 milliGRAM(s) Oral two times a day  artificial  tears Solution 1 Drop(s) Both EYES two times a day  ertapenem  IVPB 1000 milliGRAM(s) IV Intermittent every 24 hours  ferrous    sulfate 325 milliGRAM(s) Oral daily  lactobacillus acidophilus 1 Tablet(s) Oral every 12 hours  levothyroxine 75 MICROGram(s) Oral daily  metoprolol tartrate 50 milliGRAM(s) Oral two times a day  multivitamin/minerals 1 Tablet(s) Oral daily  pantoprazole    Tablet 40 milliGRAM(s) Oral daily  polyethylene glycol 3350 17 Gram(s) Oral daily    MEDICATIONS  (PRN):  acetaminophen     Tablet .. 650 milliGRAM(s) Oral every 6 hours PRN Temp greater or equal to 38C (100.4F), Mild Pain (1 - 3)  albuterol/ipratropium for Nebulization 3 milliLiter(s) Nebulizer every 6 hours PRN Shortness of Breath and/or Wheezing  aluminum hydroxide/magnesium hydroxide/simethicone Suspension 30 milliLiter(s) Oral every 4 hours PRN Dyspepsia  guaiFENesin Oral Liquid (Sugar-Free) 200 milliGRAM(s) Oral every 6 hours PRN Cough  melatonin 3 milliGRAM(s) Oral at bedtime PRN Insomnia  ondansetron Injectable 4 milliGRAM(s) IV Push every 8 hours PRN Nausea and/or Vomiting              Vital Signs Last 24 Hrs  T(C): 36.6 (25 Aug 2023 04:35), Max: 36.8 (24 Aug 2023 20:21)  T(F): 97.9 (25 Aug 2023 04:35), Max: 98.2 (24 Aug 2023 20:21)  HR: 62 (25 Aug 2023 04:35) (62 - 89)  BP: 183/79 (25 Aug 2023 04:35) (150/80 - 183/79)  BP(mean): --  RR: 18 (25 Aug 2023 04:35) (18 - 18)  SpO2: 92% (25 Aug 2023 04:35) (92% - 92%)    Parameters below as of 25 Aug 2023 04:35  Patient On (Oxygen Delivery Method): room air          08-24-23 @ 07:01  -  08-25-23 @ 07:00  --------------------------------------------------------  IN: 0 mL / OUT: 200 mL / NET: -200 mL              LABS:                        10.2   7.13  )-----------( 215      ( 25 Aug 2023 06:05 )             33.1     08-25    143  |  111<H>  |  12  ----------------------------<  96  3.4<L>   |  28  |  0.53    Ca    8.7      25 Aug 2023 06:05        Urinalysis Basic - ( 25 Aug 2023 06:05 )    Color: x / Appearance: x / SG: x / pH: x  Gluc: 96 mg/dL / Ketone: x  / Bili: x / Urobili: x   Blood: x / Protein: x / Nitrite: x   Leuk Esterase: x / RBC: x / WBC x   Sq Epi: x / Non Sq Epi: x / Bacteria: x            WBC:  WBC Count: 7.13 K/uL (08-25 @ 06:05)  WBC Count: 5.80 K/uL (08-24 @ 06:48)  WBC Count: 6.00 K/uL (08-23 @ 06:08)  WBC Count: 6.97 K/uL (08-22 @ 06:20)      MICROBIOLOGY:  RECENT CULTURES:  08-22 .Blood Blood XXXX XXXX   No growth at 48 Hours    08-22 .Blood Blood XXXX XXXX   No growth at 48 Hours    08-21 .Blood Blood XXXX   Growth in aerobic bottle: Gram Negative Rods   Growth in aerobic bottle: Escherichia coli ESBL  See previous culture [id number]    08-21 .Blood Blood XXXX   Growth in anaerobic bottle: Gram Negative Rods  Growth in aerobic bottle: Gram Negative Rods   Growth in aerobic and anaerobic bottles: Escherichia coli ESBL  See previous culture [id number]    08-20 Clean Catch Clean Catch (Midstream) Escherichia coli ESBL XXXX   >100,000 CFU/ml Escherichia coli ESBL    08-20 .Blood Blood-Peripheral XXXX   Growth in aerobic bottle: Gram Negative Rods   Growth in aerobic bottle: Escherichia coli ESBL  See previous culture [id number]    08-20 .Blood Blood-Peripheral Blood Culture PCR  Escherichia coli ESBL   Growth in aerobic and anaerobic bottles: Gram Negative Rods   Growth in aerobic and anaerobic bottles: Escherichia coli ESBL  Direct identification is available within approximately 3-5  hours either by Blood Panel Multiplexed PCR or Direct  MALDI-TOF. Details: [email or web address]                    Sodium:  Sodium: 143 mmol/L (08-25 @ 06:05)  Sodium: 142 mmol/L (08-24 @ 06:48)  Sodium: 142 mmol/L (08-23 @ 06:08)  Sodium: 142 mmol/L (08-22 @ 06:20)      0.53 mg/dL 08-25 @ 06:05  0.62 mg/dL 08-24 @ 06:48  0.68 mg/dL 08-23 @ 06:08  0.81 mg/dL 08-22 @ 06:20      Hemoglobin:  Hemoglobin: 10.2 g/dL (08-25 @ 06:05)  Hemoglobin: 10.0 g/dL (08-24 @ 06:48)  Hemoglobin: 10.1 g/dL (08-23 @ 06:08)  Hemoglobin: 10.7 g/dL (08-22 @ 06:20)      Platelets: Platelet Count - Automated: 215 K/uL (08-25 @ 06:05)  Platelet Count - Automated: 203 K/uL (08-24 @ 06:48)  Platelet Count - Automated: 198 K/uL (08-23 @ 06:08)  Platelet Count - Automated: 195 K/uL (08-22 @ 06:20)          Urinalysis Basic - ( 25 Aug 2023 06:05 )    Color: x / Appearance: x / SG: x / pH: x  Gluc: 96 mg/dL / Ketone: x  / Bili: x / Urobili: x   Blood: x / Protein: x / Nitrite: x   Leuk Esterase: x / RBC: x / WBC x   Sq Epi: x / Non Sq Epi: x / Bacteria: x        RADIOLOGY & ADDITIONAL STUDIES:      MICROBIOLOGY:  RECENT CULTURES:  08-22 .Blood Blood XXXX XXXX   No growth at 48 Hours    08-22 .Blood Blood XXXX XXXX   No growth at 48 Hours    08-21 .Blood Blood XXXX   Growth in aerobic bottle: Gram Negative Rods   Growth in aerobic bottle: Escherichia coli ESBL  See previous culture [id number]    08-21 .Blood Blood XXXX   Growth in anaerobic bottle: Gram Negative Rods  Growth in aerobic bottle: Gram Negative Rods   Growth in aerobic and anaerobic bottles: Escherichia coli ESBL  See previous culture [id number]    08-20 Clean Catch Clean Catch (Midstream) Escherichia coli ESBL XXXX   >100,000 CFU/ml Escherichia coli ESBL    08-20 .Blood Blood-Peripheral XXXX   Growth in aerobic bottle: Gram Negative Rods   Growth in aerobic bottle: Escherichia coli ESBL  See previous culture [id number]    08-20 .Blood Blood-Peripheral Blood Culture PCR  Escherichia coli ESBL   Growth in aerobic and anaerobic bottles: Gram Negative Rods   Growth in aerobic and anaerobic bottles: Escherichia coli ESBL  Direct identification is available within approximately 3-5  hours either by Blood Panel Multiplexed PCR or Direct  MALDI-TOF. Details: [email or web address]                 CHIEF COMPLAINT/ REASON FOR VISIT  .. Patient was seen to address the  issue listed under PROBLEM LIST which is located toward bottom of this note     DEACON ROBERT    PLV 1EAS 106 W1    Allergies    penicillin (Unknown)    Intolerances        PAST MEDICAL & SURGICAL HISTORY:  HTN (hypertension)      Afib      Spinal stenosis      PFO (patent foramen ovale)      Degeneration macular      Osteoporosis      History of complete heart block      Hypothyroidism      Cardiac pacemaker          FAMILY HISTORY:      Home Medications:  Albuterol (Eqv-ProAir HFA) 90 mcg/inh inhalation aerosol: 1 puff(s) inhaled 4 times a day as needed for  cough or wheezing (21 Aug 2023 09:56)  amiodarone 200 mg oral tablet: 1 tab(s) orally once a day (21 Aug 2023 09:55)  clindamycin 1% topical lotion: Apply topically to affected area once a day to face (21 Aug 2023 09:55)  Eliquis 2.5 mg oral tablet: 1 tab(s) orally 2 times a day (21 Aug 2023 09:55)  enalapril 10 mg oral tablet: 1 tab(s) orally once a day (21 Aug 2023 09:55)  ferrous sulfate 325 mg (65 mg elemental iron) oral tablet: 1 tab(s) orally once a day (21 Aug 2023 09:55)  levothyroxine 75 mcg (0.075 mg) oral tablet: 1 tab(s) orally once a day (21 Aug 2023 09:56)  Metoprolol Tartrate 50 mg oral tablet: 1 tab(s) orally 2 times a day (21 Aug 2023 09:56)  Myrbetriq 50 mg oral tablet, extended release: 1 tab(s) orally once a day (21 Aug 2023 09:56)  polyethylene glycol 3350 oral powder for reconstitution: 17 gram(s) orally once a day (21 Aug 2023 09:56)  PreserVision AREDS oral capsule: 1 cap(s) orally once a day (21 Aug 2023 09:56)  Refresh Dry Eye Therapy ophthalmic solution: 1 drop(s) in each eye 4 times a day (21 Aug 2023 09:56)  Tylenol 325 mg oral tablet: 2 tab(s) orally every 4 hours as needed for pain or fever (21 Aug 2023 09:56)      MEDICATIONS  (STANDING):  aMIOdarone    Tablet 200 milliGRAM(s) Oral daily  amLODIPine   Tablet 5 milliGRAM(s) Oral daily  apixaban 2.5 milliGRAM(s) Oral two times a day  artificial  tears Solution 1 Drop(s) Both EYES two times a day  ertapenem  IVPB 1000 milliGRAM(s) IV Intermittent every 24 hours  ferrous    sulfate 325 milliGRAM(s) Oral daily  lactobacillus acidophilus 1 Tablet(s) Oral every 12 hours  levothyroxine 75 MICROGram(s) Oral daily  metoprolol tartrate 50 milliGRAM(s) Oral two times a day  multivitamin/minerals 1 Tablet(s) Oral daily  pantoprazole    Tablet 40 milliGRAM(s) Oral daily  polyethylene glycol 3350 17 Gram(s) Oral daily    MEDICATIONS  (PRN):  acetaminophen     Tablet .. 650 milliGRAM(s) Oral every 6 hours PRN Temp greater or equal to 38C (100.4F), Mild Pain (1 - 3)  albuterol/ipratropium for Nebulization 3 milliLiter(s) Nebulizer every 6 hours PRN Shortness of Breath and/or Wheezing  aluminum hydroxide/magnesium hydroxide/simethicone Suspension 30 milliLiter(s) Oral every 4 hours PRN Dyspepsia  guaiFENesin Oral Liquid (Sugar-Free) 200 milliGRAM(s) Oral every 6 hours PRN Cough  melatonin 3 milliGRAM(s) Oral at bedtime PRN Insomnia  ondansetron Injectable 4 milliGRAM(s) IV Push every 8 hours PRN Nausea and/or Vomiting              Vital Signs Last 24 Hrs  T(C): 36.6 (25 Aug 2023 04:35), Max: 36.8 (24 Aug 2023 20:21)  T(F): 97.9 (25 Aug 2023 04:35), Max: 98.2 (24 Aug 2023 20:21)  HR: 62 (25 Aug 2023 04:35) (62 - 89)  BP: 183/79 (25 Aug 2023 04:35) (150/80 - 183/79)  BP(mean): --  RR: 18 (25 Aug 2023 04:35) (18 - 18)  SpO2: 92% (25 Aug 2023 04:35) (92% - 92%)    Parameters below as of 25 Aug 2023 04:35  Patient On (Oxygen Delivery Method): room air          08-24-23 @ 07:01  -  08-25-23 @ 07:00  --------------------------------------------------------  IN: 0 mL / OUT: 200 mL / NET: -200 mL              LABS:                        10.2   7.13  )-----------( 215      ( 25 Aug 2023 06:05 )             33.1     08-25    143  |  111<H>  |  12  ----------------------------<  96  3.4<L>   |  28  |  0.53    Ca    8.7      25 Aug 2023 06:05        Urinalysis Basic - ( 25 Aug 2023 06:05 )    Color: x / Appearance: x / SG: x / pH: x  Gluc: 96 mg/dL / Ketone: x  / Bili: x / Urobili: x   Blood: x / Protein: x / Nitrite: x   Leuk Esterase: x / RBC: x / WBC x   Sq Epi: x / Non Sq Epi: x / Bacteria: x            WBC:  WBC Count: 7.13 K/uL (08-25 @ 06:05)  WBC Count: 5.80 K/uL (08-24 @ 06:48)  WBC Count: 6.00 K/uL (08-23 @ 06:08)  WBC Count: 6.97 K/uL (08-22 @ 06:20)      MICROBIOLOGY:  RECENT CULTURES:  08-22 .Blood Blood XXXX XXXX   No growth at 48 Hours    08-22 .Blood Blood XXXX XXXX   No growth at 48 Hours    08-21 .Blood Blood XXXX   Growth in aerobic bottle: Gram Negative Rods   Growth in aerobic bottle: Escherichia coli ESBL  See previous culture [id number]    08-21 .Blood Blood XXXX   Growth in anaerobic bottle: Gram Negative Rods  Growth in aerobic bottle: Gram Negative Rods   Growth in aerobic and anaerobic bottles: Escherichia coli ESBL  See previous culture [id number]    08-20 Clean Catch Clean Catch (Midstream) Escherichia coli ESBL XXXX   >100,000 CFU/ml Escherichia coli ESBL    08-20 .Blood Blood-Peripheral XXXX   Growth in aerobic bottle: Gram Negative Rods   Growth in aerobic bottle: Escherichia coli ESBL  See previous culture [id number]    08-20 .Blood Blood-Peripheral Blood Culture PCR  Escherichia coli ESBL   Growth in aerobic and anaerobic bottles: Gram Negative Rods   Growth in aerobic and anaerobic bottles: Escherichia coli ESBL  Direct identification is available within approximately 3-5  hours either by Blood Panel Multiplexed PCR or Direct  MALDI-TOF. Details: [email or web address]                    Sodium:  Sodium: 143 mmol/L (08-25 @ 06:05)  Sodium: 142 mmol/L (08-24 @ 06:48)  Sodium: 142 mmol/L (08-23 @ 06:08)  Sodium: 142 mmol/L (08-22 @ 06:20)      0.53 mg/dL 08-25 @ 06:05  0.62 mg/dL 08-24 @ 06:48  0.68 mg/dL 08-23 @ 06:08  0.81 mg/dL 08-22 @ 06:20      Hemoglobin:  Hemoglobin: 10.2 g/dL (08-25 @ 06:05)  Hemoglobin: 10.0 g/dL (08-24 @ 06:48)  Hemoglobin: 10.1 g/dL (08-23 @ 06:08)  Hemoglobin: 10.7 g/dL (08-22 @ 06:20)      Platelets: Platelet Count - Automated: 215 K/uL (08-25 @ 06:05)  Platelet Count - Automated: 203 K/uL (08-24 @ 06:48)  Platelet Count - Automated: 198 K/uL (08-23 @ 06:08)  Platelet Count - Automated: 195 K/uL (08-22 @ 06:20)          Urinalysis Basic - ( 25 Aug 2023 06:05 )    Color: x / Appearance: x / SG: x / pH: x  Gluc: 96 mg/dL / Ketone: x  / Bili: x / Urobili: x   Blood: x / Protein: x / Nitrite: x   Leuk Esterase: x / RBC: x / WBC x   Sq Epi: x / Non Sq Epi: x / Bacteria: x        RADIOLOGY & ADDITIONAL STUDIES:      MICROBIOLOGY:  RECENT CULTURES:  08-22 .Blood Blood XXXX XXXX   No growth at 48 Hours    08-22 .Blood Blood XXXX XXXX   No growth at 48 Hours    08-21 .Blood Blood XXXX   Growth in aerobic bottle: Gram Negative Rods   Growth in aerobic bottle: Escherichia coli ESBL  See previous culture [id number]    08-21 .Blood Blood XXXX   Growth in anaerobic bottle: Gram Negative Rods  Growth in aerobic bottle: Gram Negative Rods   Growth in aerobic and anaerobic bottles: Escherichia coli ESBL  See previous culture [id number]    08-20 Clean Catch Clean Catch (Midstream) Escherichia coli ESBL XXXX   >100,000 CFU/ml Escherichia coli ESBL    08-20 .Blood Blood-Peripheral XXXX   Growth in aerobic bottle: Gram Negative Rods   Growth in aerobic bottle: Escherichia coli ESBL  See previous culture [id number]    08-20 .Blood Blood-Peripheral Blood Culture PCR  Escherichia coli ESBL   Growth in aerobic and anaerobic bottles: Gram Negative Rods   Growth in aerobic and anaerobic bottles: Escherichia coli ESBL  Direct identification is available within approximately 3-5  hours either by Blood Panel Multiplexed PCR or Direct  MALDI-TOF. Details: [email or web address]

## 2023-08-26 PROCEDURE — 99233 SBSQ HOSP IP/OBS HIGH 50: CPT

## 2023-08-26 RX ADMIN — ERTAPENEM SODIUM 120 MILLIGRAM(S): 1 INJECTION, POWDER, LYOPHILIZED, FOR SOLUTION INTRAMUSCULAR; INTRAVENOUS at 15:18

## 2023-08-26 RX ADMIN — Medication 50 MILLIGRAM(S): at 18:13

## 2023-08-26 RX ADMIN — Medication 1 TABLET(S): at 05:31

## 2023-08-26 RX ADMIN — Medication 10 MILLIEQUIVALENT(S): at 05:32

## 2023-08-26 RX ADMIN — APIXABAN 2.5 MILLIGRAM(S): 2.5 TABLET, FILM COATED ORAL at 18:12

## 2023-08-26 RX ADMIN — AMLODIPINE BESYLATE 10 MILLIGRAM(S): 2.5 TABLET ORAL at 05:34

## 2023-08-26 RX ADMIN — Medication 50 MILLIGRAM(S): at 05:31

## 2023-08-26 RX ADMIN — Medication 75 MICROGRAM(S): at 05:32

## 2023-08-26 RX ADMIN — APIXABAN 2.5 MILLIGRAM(S): 2.5 TABLET, FILM COATED ORAL at 05:33

## 2023-08-26 RX ADMIN — Medication 1 DROP(S): at 05:33

## 2023-08-26 RX ADMIN — Medication 10 MILLIEQUIVALENT(S): at 18:13

## 2023-08-26 RX ADMIN — Medication 325 MILLIGRAM(S): at 11:28

## 2023-08-26 RX ADMIN — Medication 1 TABLET(S): at 11:28

## 2023-08-26 RX ADMIN — POLYETHYLENE GLYCOL 3350 17 GRAM(S): 17 POWDER, FOR SOLUTION ORAL at 11:28

## 2023-08-26 RX ADMIN — Medication 1 DROP(S): at 18:13

## 2023-08-26 RX ADMIN — Medication 1 TABLET(S): at 18:13

## 2023-08-26 RX ADMIN — PANTOPRAZOLE SODIUM 40 MILLIGRAM(S): 20 TABLET, DELAYED RELEASE ORAL at 11:28

## 2023-08-26 RX ADMIN — AMIODARONE HYDROCHLORIDE 200 MILLIGRAM(S): 400 TABLET ORAL at 05:32

## 2023-08-26 NOTE — PROGRESS NOTE ADULT - SUBJECTIVE AND OBJECTIVE BOX
CHIEF COMPLAINT/ REASON FOR VISIT  .. Patient was seen to address the  issue listed under PROBLEM LIST which is located toward bottom of this note     DEACON ROBERT    PLV 1EAS 106 W1    Allergies    penicillin (Unknown)    Intolerances        PAST MEDICAL & SURGICAL HISTORY:  HTN (hypertension)      Afib      Spinal stenosis      PFO (patent foramen ovale)      Degeneration macular      Osteoporosis      History of complete heart block      Hypothyroidism      Cardiac pacemaker          FAMILY HISTORY:      Home Medications:  Albuterol (Eqv-ProAir HFA) 90 mcg/inh inhalation aerosol: 1 puff(s) inhaled 4 times a day as needed for  cough or wheezing (21 Aug 2023 09:56)  amiodarone 200 mg oral tablet: 1 tab(s) orally once a day (21 Aug 2023 09:55)  clindamycin 1% topical lotion: Apply topically to affected area once a day to face (21 Aug 2023 09:55)  Eliquis 2.5 mg oral tablet: 1 tab(s) orally 2 times a day (21 Aug 2023 09:55)  enalapril 10 mg oral tablet: 1 tab(s) orally once a day (21 Aug 2023 09:55)  ferrous sulfate 325 mg (65 mg elemental iron) oral tablet: 1 tab(s) orally once a day (21 Aug 2023 09:55)  levothyroxine 75 mcg (0.075 mg) oral tablet: 1 tab(s) orally once a day (21 Aug 2023 09:56)  Metoprolol Tartrate 50 mg oral tablet: 1 tab(s) orally 2 times a day (21 Aug 2023 09:56)  Myrbetriq 50 mg oral tablet, extended release: 1 tab(s) orally once a day (21 Aug 2023 09:56)  polyethylene glycol 3350 oral powder for reconstitution: 17 gram(s) orally once a day (21 Aug 2023 09:56)  PreserVision AREDS oral capsule: 1 cap(s) orally once a day (21 Aug 2023 09:56)  Refresh Dry Eye Therapy ophthalmic solution: 1 drop(s) in each eye 4 times a day (21 Aug 2023 09:56)  Tylenol 325 mg oral tablet: 2 tab(s) orally every 4 hours as needed for pain or fever (21 Aug 2023 09:56)      MEDICATIONS  (STANDING):  aMIOdarone    Tablet 200 milliGRAM(s) Oral daily  amLODIPine   Tablet 10 milliGRAM(s) Oral daily  apixaban 2.5 milliGRAM(s) Oral two times a day  artificial  tears Solution 1 Drop(s) Both EYES two times a day  ertapenem  IVPB 1000 milliGRAM(s) IV Intermittent every 24 hours  ferrous    sulfate 325 milliGRAM(s) Oral daily  lactobacillus acidophilus 1 Tablet(s) Oral every 12 hours  levothyroxine 75 MICROGram(s) Oral daily  metoprolol tartrate 50 milliGRAM(s) Oral two times a day  multivitamin/minerals 1 Tablet(s) Oral daily  pantoprazole    Tablet 40 milliGRAM(s) Oral daily  polyethylene glycol 3350 17 Gram(s) Oral daily  potassium chloride    Tablet ER 10 milliEquivalent(s) Oral two times a day    MEDICATIONS  (PRN):  acetaminophen     Tablet .. 650 milliGRAM(s) Oral every 6 hours PRN Temp greater or equal to 38C (100.4F), Mild Pain (1 - 3)  albuterol/ipratropium for Nebulization 3 milliLiter(s) Nebulizer every 6 hours PRN Shortness of Breath and/or Wheezing  aluminum hydroxide/magnesium hydroxide/simethicone Suspension 30 milliLiter(s) Oral every 4 hours PRN Dyspepsia  guaiFENesin Oral Liquid (Sugar-Free) 200 milliGRAM(s) Oral every 6 hours PRN Cough  melatonin 3 milliGRAM(s) Oral at bedtime PRN Insomnia  ondansetron Injectable 4 milliGRAM(s) IV Push every 8 hours PRN Nausea and/or Vomiting              Vital Signs Last 24 Hrs  T(C): 36.4 (26 Aug 2023 05:19), Max: 36.8 (25 Aug 2023 12:59)  T(F): 97.5 (26 Aug 2023 05:19), Max: 98.2 (25 Aug 2023 12:59)  HR: 67 (26 Aug 2023 05:19) (67 - 80)  BP: 190/80 (26 Aug 2023 05:19) (120/70 - 190/80)  BP(mean): --  RR: 18 (26 Aug 2023 05:19) (17 - 18)  SpO2: 91% (26 Aug 2023 05:19) (90% - 92%)    Parameters below as of 26 Aug 2023 05:19  Patient On (Oxygen Delivery Method): room air          08-25-23 @ 07:01  -  08-26-23 @ 07:00  --------------------------------------------------------  IN: 0 mL / OUT: 700 mL / NET: -700 mL              LABS:                        10.2   7.13  )-----------( 215      ( 25 Aug 2023 06:05 )             33.1     08-25    143  |  111<H>  |  12  ----------------------------<  96  3.4<L>   |  28  |  0.53    Ca    8.7      25 Aug 2023 06:05        Urinalysis Basic - ( 25 Aug 2023 06:05 )    Color: x / Appearance: x / SG: x / pH: x  Gluc: 96 mg/dL / Ketone: x  / Bili: x / Urobili: x   Blood: x / Protein: x / Nitrite: x   Leuk Esterase: x / RBC: x / WBC x   Sq Epi: x / Non Sq Epi: x / Bacteria: x            WBC:  WBC Count: 7.13 K/uL (08-25 @ 06:05)  WBC Count: 5.80 K/uL (08-24 @ 06:48)  WBC Count: 6.00 K/uL (08-23 @ 06:08)      MICROBIOLOGY:  RECENT CULTURES:  08-24 .Blood Blood XXXX XXXX   No growth at 24 hours    08-22 .Blood Blood XXXX XXXX   No growth at 72 Hours    08-22 .Blood Blood XXXX XXXX   No growth at 72 Hours    08-21 .Blood Blood XXXX   Growth in aerobic bottle: Gram Negative Rods   Growth in aerobic bottle: Escherichia coli ESBL  See previous culture [id number]    08-21 .Blood Blood XXXX   Growth in anaerobic bottle: Gram Negative Rods  Growth in aerobic bottle: Gram Negative Rods   Growth in aerobic and anaerobic bottles: Escherichia coli ESBL  See previous culture [id number]    08-20 Clean Catch Clean Catch (Midstream) Escherichia coli ESBL XXXX   >100,000 CFU/ml Escherichia coli ESBL    08-20 .Blood Blood-Peripheral XXXX   Growth in aerobic bottle: Gram Negative Rods   Growth in aerobic bottle: Escherichia coli ESBL  See previous culture [id number]    08-20 .Blood Blood-Peripheral Blood Culture PCR  Escherichia coli ESBL   Growth in aerobic and anaerobic bottles: Gram Negative Rods   Growth in aerobic and anaerobic bottles: Escherichia coli ESBL  Direct identification is available within approximately 3-5  hours either by Blood Panel Multiplexed PCR or Direct  MALDI-TOF. Details: [email or web address]                    Sodium:  Sodium: 143 mmol/L (08-25 @ 06:05)  Sodium: 142 mmol/L (08-24 @ 06:48)  Sodium: 142 mmol/L (08-23 @ 06:08)      0.53 mg/dL 08-25 @ 06:05  0.62 mg/dL 08-24 @ 06:48  0.68 mg/dL 08-23 @ 06:08      Hemoglobin:  Hemoglobin: 10.2 g/dL (08-25 @ 06:05)  Hemoglobin: 10.0 g/dL (08-24 @ 06:48)  Hemoglobin: 10.1 g/dL (08-23 @ 06:08)      Platelets: Platelet Count - Automated: 215 K/uL (08-25 @ 06:05)  Platelet Count - Automated: 203 K/uL (08-24 @ 06:48)  Platelet Count - Automated: 198 K/uL (08-23 @ 06:08)          Urinalysis Basic - ( 25 Aug 2023 06:05 )    Color: x / Appearance: x / SG: x / pH: x  Gluc: 96 mg/dL / Ketone: x  / Bili: x / Urobili: x   Blood: x / Protein: x / Nitrite: x   Leuk Esterase: x / RBC: x / WBC x   Sq Epi: x / Non Sq Epi: x / Bacteria: x        RADIOLOGY & ADDITIONAL STUDIES:      MICROBIOLOGY:  RECENT CULTURES:  08-24 .Blood Blood XXXX XXXX   No growth at 24 hours    08-22 .Blood Blood XXXX XXXX   No growth at 72 Hours    08-22 .Blood Blood XXXX XXXX   No growth at 72 Hours    08-21 .Blood Blood XXXX   Growth in aerobic bottle: Gram Negative Rods   Growth in aerobic bottle: Escherichia coli ESBL  See previous culture [id number]    08-21 .Blood Blood XXXX   Growth in anaerobic bottle: Gram Negative Rods  Growth in aerobic bottle: Gram Negative Rods   Growth in aerobic and anaerobic bottles: Escherichia coli ESBL  See previous culture [id number]    08-20 Clean Catch Clean Catch (Midstream) Escherichia coli ESBL XXXX   >100,000 CFU/ml Escherichia coli ESBL    08-20 .Blood Blood-Peripheral XXXX   Growth in aerobic bottle: Gram Negative Rods   Growth in aerobic bottle: Escherichia coli ESBL  See previous culture [id number]    08-20 .Blood Blood-Peripheral Blood Culture PCR  Escherichia coli ESBL   Growth in aerobic and anaerobic bottles: Gram Negative Rods   Growth in aerobic and anaerobic bottles: Escherichia coli ESBL  Direct identification is available within approximately 3-5  hours either by Blood Panel Multiplexed PCR or Direct  MALDI-TOF. Details: [email or web address]                 CHIEF COMPLAINT/ REASON FOR VISIT  .. Patient was seen to address the  issue listed under PROBLEM LIST which is located toward bottom of this note     DEACON ROBERT    PLV 1EAS 106 W1    Allergies    penicillin (Unknown)    Intolerances        PAST MEDICAL & SURGICAL HISTORY:  HTN (hypertension)      Afib      Spinal stenosis      PFO (patent foramen ovale)      Degeneration macular      Osteoporosis      History of complete heart block      Hypothyroidism      Cardiac pacemaker          FAMILY HISTORY:      Home Medications:  Albuterol (Eqv-ProAir HFA) 90 mcg/inh inhalation aerosol: 1 puff(s) inhaled 4 times a day as needed for  cough or wheezing (21 Aug 2023 09:56)  amiodarone 200 mg oral tablet: 1 tab(s) orally once a day (21 Aug 2023 09:55)  clindamycin 1% topical lotion: Apply topically to affected area once a day to face (21 Aug 2023 09:55)  Eliquis 2.5 mg oral tablet: 1 tab(s) orally 2 times a day (21 Aug 2023 09:55)  enalapril 10 mg oral tablet: 1 tab(s) orally once a day (21 Aug 2023 09:55)  ferrous sulfate 325 mg (65 mg elemental iron) oral tablet: 1 tab(s) orally once a day (21 Aug 2023 09:55)  levothyroxine 75 mcg (0.075 mg) oral tablet: 1 tab(s) orally once a day (21 Aug 2023 09:56)  Metoprolol Tartrate 50 mg oral tablet: 1 tab(s) orally 2 times a day (21 Aug 2023 09:56)  Myrbetriq 50 mg oral tablet, extended release: 1 tab(s) orally once a day (21 Aug 2023 09:56)  polyethylene glycol 3350 oral powder for reconstitution: 17 gram(s) orally once a day (21 Aug 2023 09:56)  PreserVision AREDS oral capsule: 1 cap(s) orally once a day (21 Aug 2023 09:56)  Refresh Dry Eye Therapy ophthalmic solution: 1 drop(s) in each eye 4 times a day (21 Aug 2023 09:56)  Tylenol 325 mg oral tablet: 2 tab(s) orally every 4 hours as needed for pain or fever (21 Aug 2023 09:56)      MEDICATIONS  (STANDING):  aMIOdarone    Tablet 200 milliGRAM(s) Oral daily  amLODIPine   Tablet 10 milliGRAM(s) Oral daily  apixaban 2.5 milliGRAM(s) Oral two times a day  artificial  tears Solution 1 Drop(s) Both EYES two times a day  ertapenem  IVPB 1000 milliGRAM(s) IV Intermittent every 24 hours  ferrous    sulfate 325 milliGRAM(s) Oral daily  lactobacillus acidophilus 1 Tablet(s) Oral every 12 hours  levothyroxine 75 MICROGram(s) Oral daily  metoprolol tartrate 50 milliGRAM(s) Oral two times a day  multivitamin/minerals 1 Tablet(s) Oral daily  pantoprazole    Tablet 40 milliGRAM(s) Oral daily  polyethylene glycol 3350 17 Gram(s) Oral daily  potassium chloride    Tablet ER 10 milliEquivalent(s) Oral two times a day    MEDICATIONS  (PRN):  acetaminophen     Tablet .. 650 milliGRAM(s) Oral every 6 hours PRN Temp greater or equal to 38C (100.4F), Mild Pain (1 - 3)  albuterol/ipratropium for Nebulization 3 milliLiter(s) Nebulizer every 6 hours PRN Shortness of Breath and/or Wheezing  aluminum hydroxide/magnesium hydroxide/simethicone Suspension 30 milliLiter(s) Oral every 4 hours PRN Dyspepsia  guaiFENesin Oral Liquid (Sugar-Free) 200 milliGRAM(s) Oral every 6 hours PRN Cough  melatonin 3 milliGRAM(s) Oral at bedtime PRN Insomnia  ondansetron Injectable 4 milliGRAM(s) IV Push every 8 hours PRN Nausea and/or Vomiting              Vital Signs Last 24 Hrs  T(C): 36.4 (26 Aug 2023 05:19), Max: 36.8 (25 Aug 2023 12:59)  T(F): 97.5 (26 Aug 2023 05:19), Max: 98.2 (25 Aug 2023 12:59)  HR: 67 (26 Aug 2023 05:19) (67 - 80)  BP: 190/80 (26 Aug 2023 05:19) (120/70 - 190/80)  BP(mean): --  RR: 18 (26 Aug 2023 05:19) (17 - 18)  SpO2: 91% (26 Aug 2023 05:19) (90% - 92%)    Parameters below as of 26 Aug 2023 05:19  Patient On (Oxygen Delivery Method): room air          08-25-23 @ 07:01  -  08-26-23 @ 07:00  --------------------------------------------------------  IN: 0 mL / OUT: 700 mL / NET: -700 mL              LABS:                        10.2   7.13  )-----------( 215      ( 25 Aug 2023 06:05 )             33.1     08-25    143  |  111<H>  |  12  ----------------------------<  96  3.4<L>   |  28  |  0.53    Ca    8.7      25 Aug 2023 06:05        Urinalysis Basic - ( 25 Aug 2023 06:05 )    Color: x / Appearance: x / SG: x / pH: x  Gluc: 96 mg/dL / Ketone: x  / Bili: x / Urobili: x   Blood: x / Protein: x / Nitrite: x   Leuk Esterase: x / RBC: x / WBC x   Sq Epi: x / Non Sq Epi: x / Bacteria: x            WBC:  WBC Count: 7.13 K/uL (08-25 @ 06:05)  WBC Count: 5.80 K/uL (08-24 @ 06:48)  WBC Count: 6.00 K/uL (08-23 @ 06:08)      MICROBIOLOGY:  RECENT CULTURES:  08-24 .Blood Blood XXXX XXXX   No growth at 24 hours    08-22 .Blood Blood XXXX XXXX   No growth at 72 Hours    08-22 .Blood Blood XXXX XXXX   No growth at 72 Hours    08-21 .Blood Blood XXXX   Growth in aerobic bottle: Gram Negative Rods   Growth in aerobic bottle: Escherichia coli ESBL  See previous culture [id number]    08-21 .Blood Blood XXXX   Growth in anaerobic bottle: Gram Negative Rods  Growth in aerobic bottle: Gram Negative Rods   Growth in aerobic and anaerobic bottles: Escherichia coli ESBL  See previous culture [id number]    08-20 Clean Catch Clean Catch (Midstream) Escherichia coli ESBL XXXX   >100,000 CFU/ml Escherichia coli ESBL    08-20 .Blood Blood-Peripheral XXXX   Growth in aerobic bottle: Gram Negative Rods   Growth in aerobic bottle: Escherichia coli ESBL  See previous culture [id number]    08-20 .Blood Blood-Peripheral Blood Culture PCR  Escherichia coli ESBL   Growth in aerobic and anaerobic bottles: Gram Negative Rods   Growth in aerobic and anaerobic bottles: Escherichia coli ESBL  Direct identification is available within approximately 3-5  hours either by Blood Panel Multiplexed PCR or Direct  MALDI-TOF. Details: [email or web address]                    Sodium:  Sodium: 143 mmol/L (08-25 @ 06:05)  Sodium: 142 mmol/L (08-24 @ 06:48)  Sodium: 142 mmol/L (08-23 @ 06:08)      0.53 mg/dL 08-25 @ 06:05  0.62 mg/dL 08-24 @ 06:48  0.68 mg/dL 08-23 @ 06:08      Hemoglobin:  Hemoglobin: 10.2 g/dL (08-25 @ 06:05)  Hemoglobin: 10.0 g/dL (08-24 @ 06:48)  Hemoglobin: 10.1 g/dL (08-23 @ 06:08)      Platelets: Platelet Count - Automated: 215 K/uL (08-25 @ 06:05)  Platelet Count - Automated: 203 K/uL (08-24 @ 06:48)  Platelet Count - Automated: 198 K/uL (08-23 @ 06:08)          Urinalysis Basic - ( 25 Aug 2023 06:05 )    Color: x / Appearance: x / SG: x / pH: x  Gluc: 96 mg/dL / Ketone: x  / Bili: x / Urobili: x   Blood: x / Protein: x / Nitrite: x   Leuk Esterase: x / RBC: x / WBC x   Sq Epi: x / Non Sq Epi: x / Bacteria: x        RADIOLOGY & ADDITIONAL STUDIES:      MICROBIOLOGY:  RECENT CULTURES:  08-24 .Blood Blood XXXX XXXX   No growth at 24 hours    08-22 .Blood Blood XXXX XXXX   No growth at 72 Hours    08-22 .Blood Blood XXXX XXXX   No growth at 72 Hours    08-21 .Blood Blood XXXX   Growth in aerobic bottle: Gram Negative Rods   Growth in aerobic bottle: Escherichia coli ESBL  See previous culture [id number]    08-21 .Blood Blood XXXX   Growth in anaerobic bottle: Gram Negative Rods  Growth in aerobic bottle: Gram Negative Rods   Growth in aerobic and anaerobic bottles: Escherichia coli ESBL  See previous culture [id number]    08-20 Clean Catch Clean Catch (Midstream) Escherichia coli ESBL XXXX   >100,000 CFU/ml Escherichia coli ESBL    08-20 .Blood Blood-Peripheral XXXX   Growth in aerobic bottle: Gram Negative Rods   Growth in aerobic bottle: Escherichia coli ESBL  See previous culture [id number]    08-20 .Blood Blood-Peripheral Blood Culture PCR  Escherichia coli ESBL   Growth in aerobic and anaerobic bottles: Gram Negative Rods   Growth in aerobic and anaerobic bottles: Escherichia coli ESBL  Direct identification is available within approximately 3-5  hours either by Blood Panel Multiplexed PCR or Direct  MALDI-TOF. Details: [email or web address]                 CHIEF COMPLAINT/ REASON FOR VISIT  .. Patient was seen to address the  issue listed under PROBLEM LIST which is located toward bottom of this note     DEACON ROBERT    PLV 1EAS 106 W1    Allergies    penicillin (Unknown)    Intolerances        PAST MEDICAL & SURGICAL HISTORY:  HTN (hypertension)      Afib      Spinal stenosis      PFO (patent foramen ovale)      Degeneration macular      Osteoporosis      History of complete heart block      Hypothyroidism      Cardiac pacemaker          FAMILY HISTORY:      Home Medications:  Albuterol (Eqv-ProAir HFA) 90 mcg/inh inhalation aerosol: 1 puff(s) inhaled 4 times a day as needed for  cough or wheezing (21 Aug 2023 09:56)  amiodarone 200 mg oral tablet: 1 tab(s) orally once a day (21 Aug 2023 09:55)  clindamycin 1% topical lotion: Apply topically to affected area once a day to face (21 Aug 2023 09:55)  Eliquis 2.5 mg oral tablet: 1 tab(s) orally 2 times a day (21 Aug 2023 09:55)  enalapril 10 mg oral tablet: 1 tab(s) orally once a day (21 Aug 2023 09:55)  ferrous sulfate 325 mg (65 mg elemental iron) oral tablet: 1 tab(s) orally once a day (21 Aug 2023 09:55)  levothyroxine 75 mcg (0.075 mg) oral tablet: 1 tab(s) orally once a day (21 Aug 2023 09:56)  Metoprolol Tartrate 50 mg oral tablet: 1 tab(s) orally 2 times a day (21 Aug 2023 09:56)  Myrbetriq 50 mg oral tablet, extended release: 1 tab(s) orally once a day (21 Aug 2023 09:56)  polyethylene glycol 3350 oral powder for reconstitution: 17 gram(s) orally once a day (21 Aug 2023 09:56)  PreserVision AREDS oral capsule: 1 cap(s) orally once a day (21 Aug 2023 09:56)  Refresh Dry Eye Therapy ophthalmic solution: 1 drop(s) in each eye 4 times a day (21 Aug 2023 09:56)  Tylenol 325 mg oral tablet: 2 tab(s) orally every 4 hours as needed for pain or fever (21 Aug 2023 09:56)      MEDICATIONS  (STANDING):  aMIOdarone    Tablet 200 milliGRAM(s) Oral daily  amLODIPine   Tablet 10 milliGRAM(s) Oral daily  apixaban 2.5 milliGRAM(s) Oral two times a day  artificial  tears Solution 1 Drop(s) Both EYES two times a day  ertapenem  IVPB 1000 milliGRAM(s) IV Intermittent every 24 hours  ferrous    sulfate 325 milliGRAM(s) Oral daily  lactobacillus acidophilus 1 Tablet(s) Oral every 12 hours  levothyroxine 75 MICROGram(s) Oral daily  metoprolol tartrate 50 milliGRAM(s) Oral two times a day  multivitamin/minerals 1 Tablet(s) Oral daily  pantoprazole    Tablet 40 milliGRAM(s) Oral daily  polyethylene glycol 3350 17 Gram(s) Oral daily  potassium chloride    Tablet ER 10 milliEquivalent(s) Oral two times a day    MEDICATIONS  (PRN):  acetaminophen     Tablet .. 650 milliGRAM(s) Oral every 6 hours PRN Temp greater or equal to 38C (100.4F), Mild Pain (1 - 3)  albuterol/ipratropium for Nebulization 3 milliLiter(s) Nebulizer every 6 hours PRN Shortness of Breath and/or Wheezing  aluminum hydroxide/magnesium hydroxide/simethicone Suspension 30 milliLiter(s) Oral every 4 hours PRN Dyspepsia  guaiFENesin Oral Liquid (Sugar-Free) 200 milliGRAM(s) Oral every 6 hours PRN Cough  melatonin 3 milliGRAM(s) Oral at bedtime PRN Insomnia  ondansetron Injectable 4 milliGRAM(s) IV Push every 8 hours PRN Nausea and/or Vomiting              Vital Signs Last 24 Hrs  T(C): 36.4 (26 Aug 2023 05:19), Max: 36.8 (25 Aug 2023 12:59)  T(F): 97.5 (26 Aug 2023 05:19), Max: 98.2 (25 Aug 2023 12:59)  HR: 67 (26 Aug 2023 05:19) (67 - 80)  BP: 190/80 (26 Aug 2023 05:19) (120/70 - 190/80)  BP(mean): --  RR: 18 (26 Aug 2023 05:19) (17 - 18)  SpO2: 91% (26 Aug 2023 05:19) (90% - 92%)    Parameters below as of 26 Aug 2023 05:19  Patient On (Oxygen Delivery Method): room air          08-25-23 @ 07:01  -  08-26-23 @ 07:00  --------------------------------------------------------  IN: 0 mL / OUT: 700 mL / NET: -700 mL              LABS:                        10.2   7.13  )-----------( 215      ( 25 Aug 2023 06:05 )             33.1     08-25    143  |  111<H>  |  12  ----------------------------<  96  3.4<L>   |  28  |  0.53    Ca    8.7      25 Aug 2023 06:05        Urinalysis Basic - ( 25 Aug 2023 06:05 )    Color: x / Appearance: x / SG: x / pH: x  Gluc: 96 mg/dL / Ketone: x  / Bili: x / Urobili: x   Blood: x / Protein: x / Nitrite: x   Leuk Esterase: x / RBC: x / WBC x   Sq Epi: x / Non Sq Epi: x / Bacteria: x            WBC:  WBC Count: 7.13 K/uL (08-25 @ 06:05)  WBC Count: 5.80 K/uL (08-24 @ 06:48)  WBC Count: 6.00 K/uL (08-23 @ 06:08)      MICROBIOLOGY:  RECENT CULTURES:  08-24 .Blood Blood XXXX XXXX   No growth at 24 hours    08-22 .Blood Blood XXXX XXXX   No growth at 72 Hours    08-22 .Blood Blood XXXX XXXX   No growth at 72 Hours    08-21 .Blood Blood XXXX   Growth in aerobic bottle: Gram Negative Rods   Growth in aerobic bottle: Escherichia coli ESBL  See previous culture [id number]    08-21 .Blood Blood XXXX   Growth in anaerobic bottle: Gram Negative Rods  Growth in aerobic bottle: Gram Negative Rods   Growth in aerobic and anaerobic bottles: Escherichia coli ESBL  See previous culture [id number]    08-20 Clean Catch Clean Catch (Midstream) Escherichia coli ESBL XXXX   >100,000 CFU/ml Escherichia coli ESBL    08-20 .Blood Blood-Peripheral XXXX   Growth in aerobic bottle: Gram Negative Rods   Growth in aerobic bottle: Escherichia coli ESBL  See previous culture [id number]    08-20 .Blood Blood-Peripheral Blood Culture PCR  Escherichia coli ESBL   Growth in aerobic and anaerobic bottles: Gram Negative Rods   Growth in aerobic and anaerobic bottles: Escherichia coli ESBL  Direct identification is available within approximately 3-5  hours either by Blood Panel Multiplexed PCR or Direct  MALDI-TOF. Details: [email or web address]                    Sodium:  Sodium: 143 mmol/L (08-25 @ 06:05)  Sodium: 142 mmol/L (08-24 @ 06:48)  Sodium: 142 mmol/L (08-23 @ 06:08)      0.53 mg/dL 08-25 @ 06:05  0.62 mg/dL 08-24 @ 06:48  0.68 mg/dL 08-23 @ 06:08      Hemoglobin:  Hemoglobin: 10.2 g/dL (08-25 @ 06:05)  Hemoglobin: 10.0 g/dL (08-24 @ 06:48)  Hemoglobin: 10.1 g/dL (08-23 @ 06:08)      Platelets: Platelet Count - Automated: 215 K/uL (08-25 @ 06:05)  Platelet Count - Automated: 203 K/uL (08-24 @ 06:48)  Platelet Count - Automated: 198 K/uL (08-23 @ 06:08)          Urinalysis Basic - ( 25 Aug 2023 06:05 )    Color: x / Appearance: x / SG: x / pH: x  Gluc: 96 mg/dL / Ketone: x  / Bili: x / Urobili: x   Blood: x / Protein: x / Nitrite: x   Leuk Esterase: x / RBC: x / WBC x   Sq Epi: x / Non Sq Epi: x / Bacteria: x        RADIOLOGY & ADDITIONAL STUDIES:      MICROBIOLOGY:  RECENT CULTURES:  08-24 .Blood Blood XXXX XXXX   No growth at 24 hours    08-22 .Blood Blood XXXX XXXX   No growth at 72 Hours    08-22 .Blood Blood XXXX XXXX   No growth at 72 Hours    08-21 .Blood Blood XXXX   Growth in aerobic bottle: Gram Negative Rods   Growth in aerobic bottle: Escherichia coli ESBL  See previous culture [id number]    08-21 .Blood Blood XXXX   Growth in anaerobic bottle: Gram Negative Rods  Growth in aerobic bottle: Gram Negative Rods   Growth in aerobic and anaerobic bottles: Escherichia coli ESBL  See previous culture [id number]    08-20 Clean Catch Clean Catch (Midstream) Escherichia coli ESBL XXXX   >100,000 CFU/ml Escherichia coli ESBL    08-20 .Blood Blood-Peripheral XXXX   Growth in aerobic bottle: Gram Negative Rods   Growth in aerobic bottle: Escherichia coli ESBL  See previous culture [id number]    08-20 .Blood Blood-Peripheral Blood Culture PCR  Escherichia coli ESBL   Growth in aerobic and anaerobic bottles: Gram Negative Rods   Growth in aerobic and anaerobic bottles: Escherichia coli ESBL  Direct identification is available within approximately 3-5  hours either by Blood Panel Multiplexed PCR or Direct  MALDI-TOF. Details: [email or web address]

## 2023-08-26 NOTE — PROGRESS NOTE ADULT - SUBJECTIVE AND OBJECTIVE BOX
Interval History:    CENTRAL LINE:   [  ] YES       [  ] NO  SKELTON:                 [  ] YES       [  ] NO         REVIEW OF SYSTEMS:  All Systems below were reviewed and are negative [  ]  HEENT:  ID:  Pulmonary:  Cardiac:  GI:  Renal:  Musculoskeletal:  All other systems above were reviewed and are negative   [  ]      MEDICATIONS  (STANDING):  aMIOdarone    Tablet 200 milliGRAM(s) Oral daily  amLODIPine   Tablet 10 milliGRAM(s) Oral daily  apixaban 2.5 milliGRAM(s) Oral two times a day  artificial  tears Solution 1 Drop(s) Both EYES two times a day  ertapenem  IVPB 1000 milliGRAM(s) IV Intermittent every 24 hours  ferrous    sulfate 325 milliGRAM(s) Oral daily  lactobacillus acidophilus 1 Tablet(s) Oral every 12 hours  levothyroxine 75 MICROGram(s) Oral daily  metoprolol tartrate 50 milliGRAM(s) Oral two times a day  multivitamin/minerals 1 Tablet(s) Oral daily  pantoprazole    Tablet 40 milliGRAM(s) Oral daily  polyethylene glycol 3350 17 Gram(s) Oral daily  potassium chloride    Tablet ER 10 milliEquivalent(s) Oral two times a day    MEDICATIONS  (PRN):  acetaminophen     Tablet .. 650 milliGRAM(s) Oral every 6 hours PRN Temp greater or equal to 38C (100.4F), Mild Pain (1 - 3)  albuterol/ipratropium for Nebulization 3 milliLiter(s) Nebulizer every 6 hours PRN Shortness of Breath and/or Wheezing  aluminum hydroxide/magnesium hydroxide/simethicone Suspension 30 milliLiter(s) Oral every 4 hours PRN Dyspepsia  guaiFENesin Oral Liquid (Sugar-Free) 200 milliGRAM(s) Oral every 6 hours PRN Cough  melatonin 3 milliGRAM(s) Oral at bedtime PRN Insomnia  ondansetron Injectable 4 milliGRAM(s) IV Push every 8 hours PRN Nausea and/or Vomiting      Vital Signs Last 24 Hrs  T(C): 36.6 (26 Aug 2023 12:04), Max: 36.6 (25 Aug 2023 20:11)  T(F): 97.9 (26 Aug 2023 12:04), Max: 97.9 (25 Aug 2023 20:11)  HR: 98 (26 Aug 2023 18:09) (67 - 98)  BP: 173/82 (26 Aug 2023 18:09) (170/70 - 190/80)  BP(mean): --  RR: 19 (26 Aug 2023 12:04) (17 - 19)  SpO2: 94% (26 Aug 2023 12:04) (91% - 94%)    Parameters below as of 26 Aug 2023 12:04  Patient On (Oxygen Delivery Method): nasal cannula        I&O's Summary    25 Aug 2023 07:01  -  26 Aug 2023 07:00  --------------------------------------------------------  IN: 0 mL / OUT: 700 mL / NET: -700 mL    26 Aug 2023 07:01  -  26 Aug 2023 19:03  --------------------------------------------------------  IN: 50 mL / OUT: 0 mL / NET: 50 mL        PHYSICAL EXAM:  HEENT: NC/AT; PERRLA  Neck: Soft; no tenderness  Lungs: CTA bilaterally; no wheezing.   Heart:  Abdomen:  Genital/ Rectal:  Extremities:  Neurologic:  Vascular:      LABORATORY:    CBC Full  -  ( 25 Aug 2023 06:05 )  WBC Count : 7.13 K/uL  RBC Count : 3.49 M/uL  Hemoglobin : 10.2 g/dL  Hematocrit : 33.1 %  Platelet Count - Automated : 215 K/uL  Mean Cell Volume : 94.8 fl  Mean Cell Hemoglobin : 29.2 pg  Mean Cell Hemoglobin Concentration : 30.8 gm/dL  Auto Neutrophil # : x  Auto Lymphocyte # : x  Auto Monocyte # : x  Auto Eosinophil # : x  Auto Basophil # : x  Auto Neutrophil % : x  Auto Lymphocyte % : x  Auto Monocyte % : x  Auto Eosinophil % : x  Auto Basophil % : x      ESR:                   08-21 @ 06:24  --    C-Reactive Protein:     08-21 @ 06:24  --    Procalcitonin:           08-21 @ 06:24   7.59      08-25    143  |  111<H>  |  12  ----------------------------<  96  3.4<L>   |  28  |  0.53    Ca    8.7      25 Aug 2023 06:05        Rapid Respiratory Viral Panel Result        08-20 @ 11:55  Rapid RVP [facility]  Coronovirus --  Adenovirus --  Bordetella Pertussis --  Chlamydia Pneumonia --  Entero/Rhinovirus--  HKU1 Coronovirus --  HMPV Coronovirus --  Influenza A --  Influenza AH1 --  Influenza AH1 2009 --  Influenza AH3 --  Influenza B --  Mycoplasma Pneumoniae --  NL63 Coronovirus --  OC43 Coronovirus --  Parainfluenza 1 --  Parainfluenza 2 --  Parainfluenza 3 --  Parainfluenza 4 --  Resp Syncytial Virus --      Assessment and Plan:          Edwardo Gonzalez MD   [phone].      Interval History:    CENTRAL LINE:   [  ] YES       [  ] NO  SKELTON:                 [  ] YES       [  ] NO         REVIEW OF SYSTEMS:  All Systems below were reviewed and are negative [  ]  HEENT:  ID:  Pulmonary:  Cardiac:  GI:  Renal:  Musculoskeletal:  All other systems above were reviewed and are negative   [  ]      MEDICATIONS  (STANDING):  aMIOdarone    Tablet 200 milliGRAM(s) Oral daily  amLODIPine   Tablet 10 milliGRAM(s) Oral daily  apixaban 2.5 milliGRAM(s) Oral two times a day  artificial  tears Solution 1 Drop(s) Both EYES two times a day  ertapenem  IVPB 1000 milliGRAM(s) IV Intermittent every 24 hours  ferrous    sulfate 325 milliGRAM(s) Oral daily  lactobacillus acidophilus 1 Tablet(s) Oral every 12 hours  levothyroxine 75 MICROGram(s) Oral daily  metoprolol tartrate 50 milliGRAM(s) Oral two times a day  multivitamin/minerals 1 Tablet(s) Oral daily  pantoprazole    Tablet 40 milliGRAM(s) Oral daily  polyethylene glycol 3350 17 Gram(s) Oral daily  potassium chloride    Tablet ER 10 milliEquivalent(s) Oral two times a day    MEDICATIONS  (PRN):  acetaminophen     Tablet .. 650 milliGRAM(s) Oral every 6 hours PRN Temp greater or equal to 38C (100.4F), Mild Pain (1 - 3)  albuterol/ipratropium for Nebulization 3 milliLiter(s) Nebulizer every 6 hours PRN Shortness of Breath and/or Wheezing  aluminum hydroxide/magnesium hydroxide/simethicone Suspension 30 milliLiter(s) Oral every 4 hours PRN Dyspepsia  guaiFENesin Oral Liquid (Sugar-Free) 200 milliGRAM(s) Oral every 6 hours PRN Cough  melatonin 3 milliGRAM(s) Oral at bedtime PRN Insomnia  ondansetron Injectable 4 milliGRAM(s) IV Push every 8 hours PRN Nausea and/or Vomiting      Vital Signs Last 24 Hrs  T(C): 36.6 (26 Aug 2023 12:04), Max: 36.6 (25 Aug 2023 20:11)  T(F): 97.9 (26 Aug 2023 12:04), Max: 97.9 (25 Aug 2023 20:11)  HR: 98 (26 Aug 2023 18:09) (67 - 98)  BP: 173/82 (26 Aug 2023 18:09) (170/70 - 190/80)  BP(mean): --  RR: 19 (26 Aug 2023 12:04) (17 - 19)  SpO2: 94% (26 Aug 2023 12:04) (91% - 94%)    Parameters below as of 26 Aug 2023 12:04  Patient On (Oxygen Delivery Method): nasal cannula        I&O's Summary    25 Aug 2023 07:01  -  26 Aug 2023 07:00  --------------------------------------------------------  IN: 0 mL / OUT: 700 mL / NET: -700 mL    26 Aug 2023 07:01  -  26 Aug 2023 19:03  --------------------------------------------------------  IN: 50 mL / OUT: 0 mL / NET: 50 mL        PHYSICAL EXAM:  HEENT: NC/AT; PERRLA  Neck: Soft; no tenderness  Lungs: CTA bilaterally; no wheezing.   Heart:  Abdomen:  Genital/ Rectal:  Extremities:  Neurologic:  Vascular:      LABORATORY:    CBC Full  -  ( 25 Aug 2023 06:05 )  WBC Count : 7.13 K/uL  RBC Count : 3.49 M/uL  Hemoglobin : 10.2 g/dL  Hematocrit : 33.1 %  Platelet Count - Automated : 215 K/uL  Mean Cell Volume : 94.8 fl  Mean Cell Hemoglobin : 29.2 pg  Mean Cell Hemoglobin Concentration : 30.8 gm/dL  Auto Neutrophil # : x  Auto Lymphocyte # : x  Auto Monocyte # : x  Auto Eosinophil # : x  Auto Basophil # : x  Auto Neutrophil % : x  Auto Lymphocyte % : x  Auto Monocyte % : x  Auto Eosinophil % : x  Auto Basophil % : x      ESR:                   08-21 @ 06:24  --    C-Reactive Protein:     08-21 @ 06:24  --    Procalcitonin:           08-21 @ 06:24   7.59      08-25    143  |  111<H>  |  12  ----------------------------<  96  3.4<L>   |  28  |  0.53    Ca    8.7      25 Aug 2023 06:05        Rapid Respiratory Viral Panel Result        08-20 @ 11:55  Rapid RVP [facility]  Coronovirus --  Adenovirus --  Bordetella Pertussis --  Chlamydia Pneumonia --  Entero/Rhinovirus--  HKU1 Coronovirus --  HMPV Coronovirus --  Influenza A --  Influenza AH1 --  Influenza AH1 2009 --  Influenza AH3 --  Influenza B --  Mycoplasma Pneumoniae --  NL63 Coronovirus --  OC43 Coronovirus --  Parainfluenza 1 --  Parainfluenza 2 --  Parainfluenza 3 --  Parainfluenza 4 --  Resp Syncytial Virus --      Assessment and Plan:          Edwardo Gonzalez MD   [phone].      Interval History:    CENTRAL LINE:   [  ] YES       [  ] NO  SKELTON:                 [  ] YES       [  ] NO         REVIEW OF SYSTEMS:  All Systems below were reviewed and are negative [  ]  HEENT:  ID:  Pulmonary:  Cardiac:  GI:  Renal:  Musculoskeletal:  All other systems above were reviewed and are negative   [  ]      MEDICATIONS  (STANDING):  aMIOdarone    Tablet 200 milliGRAM(s) Oral daily  amLODIPine   Tablet 10 milliGRAM(s) Oral daily  apixaban 2.5 milliGRAM(s) Oral two times a day  artificial  tears Solution 1 Drop(s) Both EYES two times a day  ertapenem  IVPB 1000 milliGRAM(s) IV Intermittent every 24 hours  ferrous    sulfate 325 milliGRAM(s) Oral daily  lactobacillus acidophilus 1 Tablet(s) Oral every 12 hours  levothyroxine 75 MICROGram(s) Oral daily  metoprolol tartrate 50 milliGRAM(s) Oral two times a day  multivitamin/minerals 1 Tablet(s) Oral daily  pantoprazole    Tablet 40 milliGRAM(s) Oral daily  polyethylene glycol 3350 17 Gram(s) Oral daily  potassium chloride    Tablet ER 10 milliEquivalent(s) Oral two times a day    MEDICATIONS  (PRN):  acetaminophen     Tablet .. 650 milliGRAM(s) Oral every 6 hours PRN Temp greater or equal to 38C (100.4F), Mild Pain (1 - 3)  albuterol/ipratropium for Nebulization 3 milliLiter(s) Nebulizer every 6 hours PRN Shortness of Breath and/or Wheezing  aluminum hydroxide/magnesium hydroxide/simethicone Suspension 30 milliLiter(s) Oral every 4 hours PRN Dyspepsia  guaiFENesin Oral Liquid (Sugar-Free) 200 milliGRAM(s) Oral every 6 hours PRN Cough  melatonin 3 milliGRAM(s) Oral at bedtime PRN Insomnia  ondansetron Injectable 4 milliGRAM(s) IV Push every 8 hours PRN Nausea and/or Vomiting      Vital Signs Last 24 Hrs  T(C): 36.6 (26 Aug 2023 12:04), Max: 36.6 (25 Aug 2023 20:11)  T(F): 97.9 (26 Aug 2023 12:04), Max: 97.9 (25 Aug 2023 20:11)  HR: 98 (26 Aug 2023 18:09) (67 - 98)  BP: 173/82 (26 Aug 2023 18:09) (170/70 - 190/80)  BP(mean): --  RR: 19 (26 Aug 2023 12:04) (17 - 19)  SpO2: 94% (26 Aug 2023 12:04) (91% - 94%)    Parameters below as of 26 Aug 2023 12:04  Patient On (Oxygen Delivery Method): nasal cannula        I&O's Summary    25 Aug 2023 07:01  -  26 Aug 2023 07:00  --------------------------------------------------------  IN: 0 mL / OUT: 700 mL / NET: -700 mL    26 Aug 2023 07:01  -  26 Aug 2023 19:03  --------------------------------------------------------  IN: 50 mL / OUT: 0 mL / NET: 50 mL        PHYSICAL EXAM:  HEENT: NC/AT; PERRLA  Neck: Soft; no tenderness  Lungs: CTA bilaterally; no wheezing.   Heart:  Abdomen:  Genital/ Rectal:  Extremities:  Neurologic:  Vascular:      LABORATORY:    CBC Full  -  ( 25 Aug 2023 06:05 )  WBC Count : 7.13 K/uL  RBC Count : 3.49 M/uL  Hemoglobin : 10.2 g/dL  Hematocrit : 33.1 %  Platelet Count - Automated : 215 K/uL  Mean Cell Volume : 94.8 fl  Mean Cell Hemoglobin : 29.2 pg  Mean Cell Hemoglobin Concentration : 30.8 gm/dL  Auto Neutrophil # : x  Auto Lymphocyte # : x  Auto Monocyte # : x  Auto Eosinophil # : x  Auto Basophil # : x  Auto Neutrophil % : x  Auto Lymphocyte % : x  Auto Monocyte % : x  Auto Eosinophil % : x  Auto Basophil % : x      ESR:                   08-21 @ 06:24  --    C-Reactive Protein:     08-21 @ 06:24  --    Procalcitonin:           08-21 @ 06:24   7.59      08-25    143  |  111<H>  |  12  ----------------------------<  96  3.4<L>   |  28  |  0.53    Ca    8.7      25 Aug 2023 06:05        Rapid Respiratory Viral Panel Result        08-20 @ 11:55  Rapid [facility]  Coronovirus --  Adenovirus --  Bordetella Pertussis --  Chlamydia Pneumonia --  Entero/Rhinovirus--  HKU1 Coronovirus --  HMPV Coronovirus --  Influenza A --  Influenza AH1 --  Influenza AH1 2009 --  Influenza AH3 --  Influenza B --  Mycoplasma Pneumoniae --  NL63 Coronovirus --  OC43 Coronovirus --  Parainfluenza 1 --  Parainfluenza 2 --  Parainfluenza 3 --  Parainfluenza 4 --  Resp Syncytial Virus --      Assessment and Plan:          Edwardo Gonzalez MD   [phone].  She is awake  Comfortable  No fevers.      MEDICATIONS  (STANDING):  aMIOdarone    Tablet 200 milliGRAM(s) Oral daily  amLODIPine   Tablet 10 milliGRAM(s) Oral daily  apixaban 2.5 milliGRAM(s) Oral two times a day  artificial  tears Solution 1 Drop(s) Both EYES two times a day  ertapenem  IVPB 1000 milliGRAM(s) IV Intermittent every 24 hours  ferrous    sulfate 325 milliGRAM(s) Oral daily  lactobacillus acidophilus 1 Tablet(s) Oral every 12 hours  levothyroxine 75 MICROGram(s) Oral daily  metoprolol tartrate 50 milliGRAM(s) Oral two times a day  multivitamin/minerals 1 Tablet(s) Oral daily  pantoprazole    Tablet 40 milliGRAM(s) Oral daily  polyethylene glycol 3350 17 Gram(s) Oral daily  potassium chloride    Tablet ER 10 milliEquivalent(s) Oral two times a day    MEDICATIONS  (PRN):  acetaminophen     Tablet .. 650 milliGRAM(s) Oral every 6 hours PRN Temp greater or equal to 38C (100.4F), Mild Pain (1 - 3)  albuterol/ipratropium for Nebulization 3 milliLiter(s) Nebulizer every 6 hours PRN Shortness of Breath and/or Wheezing  aluminum hydroxide/magnesium hydroxide/simethicone Suspension 30 milliLiter(s) Oral every 4 hours PRN Dyspepsia  guaiFENesin Oral Liquid (Sugar-Free) 200 milliGRAM(s) Oral every 6 hours PRN Cough  melatonin 3 milliGRAM(s) Oral at bedtime PRN Insomnia  ondansetron Injectable 4 milliGRAM(s) IV Push every 8 hours PRN Nausea and/or Vomiting      Vital Signs Last 24 Hrs  T(C): 36.6 (26 Aug 2023 12:04), Max: 36.6 (25 Aug 2023 20:11)  T(F): 97.9 (26 Aug 2023 12:04), Max: 97.9 (25 Aug 2023 20:11)  HR: 98 (26 Aug 2023 18:09) (67 - 98)  BP: 173/82 (26 Aug 2023 18:09) (170/70 - 190/80)  BP(mean): --  RR: 19 (26 Aug 2023 12:04) (17 - 19)  SpO2: 94% (26 Aug 2023 12:04) (91% - 94%)    Parameters below as of 26 Aug 2023 12:04  Patient On (Oxygen Delivery Method): nasal cannula        I&O's Summary    25 Aug 2023 07:01  -  26 Aug 2023 07:00  --------------------------------------------------------  IN: 0 mL / OUT: 700 mL / NET: -700 mL    26 Aug 2023 07:01  -  26 Aug 2023 19:03  --------------------------------------------------------  IN: 50 mL / OUT: 0 mL / NET: 50 mL        PHYSICAL EXAM:  HEENT: NC/AT; PERRLA  Neck: Soft; no tenderness  Lungs: CTA bilaterally; no wheezing.   Heart:  Abdomen:  Genital/ Rectal:  Extremities:  Neurologic:  Vascular:      LABORATORY:    CBC Full  -  ( 25 Aug 2023 06:05 )  WBC Count : 7.13 K/uL  RBC Count : 3.49 M/uL  Hemoglobin : 10.2 g/dL  Hematocrit : 33.1 %  Platelet Count - Automated : 215 K/uL  Mean Cell Volume : 94.8 fl  Mean Cell Hemoglobin : 29.2 pg  Mean Cell Hemoglobin Concentration : 30.8 gm/dL  Auto Neutrophil # : x  Auto Lymphocyte # : x  Auto Monocyte # : x  Auto Eosinophil # : x  Auto Basophil # : x  Auto Neutrophil % : x  Auto Lymphocyte % : x  Auto Monocyte % : x  Auto Eosinophil % : x  Auto Basophil % : x      ESR:                   08-21 @ 06:24  --    C-Reactive Protein:     08-21 @ 06:24  --    Procalcitonin:           08-21 @ 06:24   7.59      08-25    143  |  111<H>  |  12  ----------------------------<  96  3.4<L>   |  28  |  0.53    Ca    8.7      25 Aug 2023 06:05        Rapid Respiratory Viral Panel Result        08-20 @ 11:55  Rapid R[facility]  Coronovirus --  Adenovirus --  Bordetella Pertussis --  Chlamydia Pneumonia --  Entero/Rhinovirus--  HKU1 Coronovirus --  HMPV Coronovirus --  Influenza A --  Influenza AH1 --  Influenza AH1 2009 --  Influenza AH3 --  Influenza B --  Mycoplasma Pneumoniae --  NL63 Coronovirus --  OC43 Coronovirus --  Parainfluenza 1 --  Parainfluenza 2 --  Parainfluenza 3 --  Parainfluenza 4 --  Resp Syncytial Virus --      Assessment and Plan:      1. UTI with E coli  2. Atelectasis vs pneumonia of lower lobes.  3. Generalized weakness.  4. Dysphagia.  5. Bacteremia with ESBL E Coli      . Chest CT showed atelectasis vs infiltrates of lower lobes, No clear evidence of pneumonia.  . The patient has had a nonproductive cough for more than 2 weeks. Would look for other possible sources of this persistent cough: GERD, dysphagia.  . Blood cultures on admission grew ESBL E coli, likely due to UTI. Urine culture also grew ESBL E coli.  . Continue IV Invanz for total of  8 days ( 08/29/23)  via a peripheral IV line. Repeated blood culture are negative.   . Supportive care.   . Discharge planning.        Edwardo Gonzalez MD   [phone].  She is awake  Comfortable  No fevers.      MEDICATIONS  (STANDING):  aMIOdarone    Tablet 200 milliGRAM(s) Oral daily  amLODIPine   Tablet 10 milliGRAM(s) Oral daily  apixaban 2.5 milliGRAM(s) Oral two times a day  artificial  tears Solution 1 Drop(s) Both EYES two times a day  ertapenem  IVPB 1000 milliGRAM(s) IV Intermittent every 24 hours  ferrous    sulfate 325 milliGRAM(s) Oral daily  lactobacillus acidophilus 1 Tablet(s) Oral every 12 hours  levothyroxine 75 MICROGram(s) Oral daily  metoprolol tartrate 50 milliGRAM(s) Oral two times a day  multivitamin/minerals 1 Tablet(s) Oral daily  pantoprazole    Tablet 40 milliGRAM(s) Oral daily  polyethylene glycol 3350 17 Gram(s) Oral daily  potassium chloride    Tablet ER 10 milliEquivalent(s) Oral two times a day    MEDICATIONS  (PRN):  acetaminophen     Tablet .. 650 milliGRAM(s) Oral every 6 hours PRN Temp greater or equal to 38C (100.4F), Mild Pain (1 - 3)  albuterol/ipratropium for Nebulization 3 milliLiter(s) Nebulizer every 6 hours PRN Shortness of Breath and/or Wheezing  aluminum hydroxide/magnesium hydroxide/simethicone Suspension 30 milliLiter(s) Oral every 4 hours PRN Dyspepsia  guaiFENesin Oral Liquid (Sugar-Free) 200 milliGRAM(s) Oral every 6 hours PRN Cough  melatonin 3 milliGRAM(s) Oral at bedtime PRN Insomnia  ondansetron Injectable 4 milliGRAM(s) IV Push every 8 hours PRN Nausea and/or Vomiting      Vital Signs Last 24 Hrs  T(C): 36.6 (26 Aug 2023 12:04), Max: 36.6 (25 Aug 2023 20:11)  T(F): 97.9 (26 Aug 2023 12:04), Max: 97.9 (25 Aug 2023 20:11)  HR: 98 (26 Aug 2023 18:09) (67 - 98)  BP: 173/82 (26 Aug 2023 18:09) (170/70 - 190/80)  BP(mean): --  RR: 19 (26 Aug 2023 12:04) (17 - 19)  SpO2: 94% (26 Aug 2023 12:04) (91% - 94%)    Parameters below as of 26 Aug 2023 12:04  Patient On (Oxygen Delivery Method): nasal cannula        I&O's Summary    25 Aug 2023 07:01  -  26 Aug 2023 07:00  --------------------------------------------------------  IN: 0 mL / OUT: 700 mL / NET: -700 mL    26 Aug 2023 07:01  -  26 Aug 2023 19:03  --------------------------------------------------------  IN: 50 mL / OUT: 0 mL / NET: 50 mL        PHYSICAL EXAM:  HEENT: NC/AT; PERRLA  Neck: Soft; no tenderness  Lungs: CTA bilaterally; no wheezing.   Heart:  Abdomen:  Genital/ Rectal:  Extremities:  Neurologic:  Vascular:      LABORATORY:    CBC Full  -  ( 25 Aug 2023 06:05 )  WBC Count : 7.13 K/uL  RBC Count : 3.49 M/uL  Hemoglobin : 10.2 g/dL  Hematocrit : 33.1 %  Platelet Count - Automated : 215 K/uL  Mean Cell Volume : 94.8 fl  Mean Cell Hemoglobin : 29.2 pg  Mean Cell Hemoglobin Concentration : 30.8 gm/dL  Auto Neutrophil # : x  Auto Lymphocyte # : x  Auto Monocyte # : x  Auto Eosinophil # : x  Auto Basophil # : x  Auto Neutrophil % : x  Auto Lymphocyte % : x  Auto Monocyte % : x  Auto Eosinophil % : x  Auto Basophil % : x      ESR:                   08-21 @ 06:24  --    C-Reactive Protein:     08-21 @ 06:24  --    Procalcitonin:           08-21 @ 06:24   7.59      08-25    143  |  111<H>  |  12  ----------------------------<  96  3.4<L>   |  28  |  0.53    Ca    8.7      25 Aug 2023 06:05        Rapid Respiratory Viral Panel Result        08-20 @ 11:55  Rapid [facility]  Coronovirus --  Adenovirus --  Bordetella Pertussis --  Chlamydia Pneumonia --  Entero/Rhinovirus--  HKU1 Coronovirus --  HMPV Coronovirus --  Influenza A --  Influenza AH1 --  Influenza AH1 2009 --  Influenza AH3 --  Influenza B --  Mycoplasma Pneumoniae --  NL63 Coronovirus --  OC43 Coronovirus --  Parainfluenza 1 --  Parainfluenza 2 --  Parainfluenza 3 --  Parainfluenza 4 --  Resp Syncytial Virus --      Assessment and Plan:      1. UTI with E coli  2. Atelectasis vs pneumonia of lower lobes.  3. Generalized weakness.  4. Dysphagia.  5. Bacteremia with ESBL E Coli      . Chest CT showed atelectasis vs infiltrates of lower lobes, No clear evidence of pneumonia.  . The patient has had a nonproductive cough for more than 2 weeks. Would look for other possible sources of this persistent cough: GERD, dysphagia.  . Blood cultures on admission grew ESBL E coli, likely due to UTI. Urine culture also grew ESBL E coli.  . Continue IV Invanz for total of  8 days ( 08/29/23)  via a peripheral IV line. Repeated blood culture are negative.   . Supportive care.   . Discharge planning.        Edwardo Gonzalez MD   [phone].  She is awake  Comfortable  No fevers.      MEDICATIONS  (STANDING):  aMIOdarone    Tablet 200 milliGRAM(s) Oral daily  amLODIPine   Tablet 10 milliGRAM(s) Oral daily  apixaban 2.5 milliGRAM(s) Oral two times a day  artificial  tears Solution 1 Drop(s) Both EYES two times a day  ertapenem  IVPB 1000 milliGRAM(s) IV Intermittent every 24 hours  ferrous    sulfate 325 milliGRAM(s) Oral daily  lactobacillus acidophilus 1 Tablet(s) Oral every 12 hours  levothyroxine 75 MICROGram(s) Oral daily  metoprolol tartrate 50 milliGRAM(s) Oral two times a day  multivitamin/minerals 1 Tablet(s) Oral daily  pantoprazole    Tablet 40 milliGRAM(s) Oral daily  polyethylene glycol 3350 17 Gram(s) Oral daily  potassium chloride    Tablet ER 10 milliEquivalent(s) Oral two times a day    MEDICATIONS  (PRN):  acetaminophen     Tablet .. 650 milliGRAM(s) Oral every 6 hours PRN Temp greater or equal to 38C (100.4F), Mild Pain (1 - 3)  albuterol/ipratropium for Nebulization 3 milliLiter(s) Nebulizer every 6 hours PRN Shortness of Breath and/or Wheezing  aluminum hydroxide/magnesium hydroxide/simethicone Suspension 30 milliLiter(s) Oral every 4 hours PRN Dyspepsia  guaiFENesin Oral Liquid (Sugar-Free) 200 milliGRAM(s) Oral every 6 hours PRN Cough  melatonin 3 milliGRAM(s) Oral at bedtime PRN Insomnia  ondansetron Injectable 4 milliGRAM(s) IV Push every 8 hours PRN Nausea and/or Vomiting      Vital Signs Last 24 Hrs  T(C): 36.6 (26 Aug 2023 12:04), Max: 36.6 (25 Aug 2023 20:11)  T(F): 97.9 (26 Aug 2023 12:04), Max: 97.9 (25 Aug 2023 20:11)  HR: 98 (26 Aug 2023 18:09) (67 - 98)  BP: 173/82 (26 Aug 2023 18:09) (170/70 - 190/80)  BP(mean): --  RR: 19 (26 Aug 2023 12:04) (17 - 19)  SpO2: 94% (26 Aug 2023 12:04) (91% - 94%)    Parameters below as of 26 Aug 2023 12:04  Patient On (Oxygen Delivery Method): nasal cannula        I&O's Summary    25 Aug 2023 07:01  -  26 Aug 2023 07:00  --------------------------------------------------------  IN: 0 mL / OUT: 700 mL / NET: -700 mL    26 Aug 2023 07:01  -  26 Aug 2023 19:03  --------------------------------------------------------  IN: 50 mL / OUT: 0 mL / NET: 50 mL        PHYSICAL EXAM:  HEENT: NC/AT; PERRLA  Neck: Soft; no tenderness  Lungs: CTA bilaterally; no wheezing.   Heart:  Abdomen:  Genital/ Rectal:  Extremities:  Neurologic:  Vascular:      LABORATORY:    CBC Full  -  ( 25 Aug 2023 06:05 )  WBC Count : 7.13 K/uL  RBC Count : 3.49 M/uL  Hemoglobin : 10.2 g/dL  Hematocrit : 33.1 %  Platelet Count - Automated : 215 K/uL  Mean Cell Volume : 94.8 fl  Mean Cell Hemoglobin : 29.2 pg  Mean Cell Hemoglobin Concentration : 30.8 gm/dL  Auto Neutrophil # : x  Auto Lymphocyte # : x  Auto Monocyte # : x  Auto Eosinophil # : x  Auto Basophil # : x  Auto Neutrophil % : x  Auto Lymphocyte % : x  Auto Monocyte % : x  Auto Eosinophil % : x  Auto Basophil % : x      ESR:                   08-21 @ 06:24  --    C-Reactive Protein:     08-21 @ 06:24  --    Procalcitonin:           08-21 @ 06:24   7.59      08-25    143  |  111<H>  |  12  ----------------------------<  96  3.4<L>   |  28  |  0.53    Ca    8.7      25 Aug 2023 06:05        Rapid Respiratory Viral Panel Result        08-20 @ 11:55  Rapid RVP [facility]  Coronovirus --  Adenovirus --  Bordetella Pertussis --  Chlamydia Pneumonia --  Entero/Rhinovirus--  HKU1 Coronovirus --  HMPV Coronovirus --  Influenza A --  Influenza AH1 --  Influenza AH1 2009 --  Influenza AH3 --  Influenza B --  Mycoplasma Pneumoniae --  NL63 Coronovirus --  OC43 Coronovirus --  Parainfluenza 1 --  Parainfluenza 2 --  Parainfluenza 3 --  Parainfluenza 4 --  Resp Syncytial Virus --      Assessment and Plan:      1. UTI with E coli  2. Atelectasis vs pneumonia of lower lobes.  3. Generalized weakness.  4. Dysphagia.  5. Bacteremia with ESBL E Coli      . Chest CT showed atelectasis vs infiltrates of lower lobes, No clear evidence of pneumonia.  . The patient has had a nonproductive cough for more than 2 weeks. Would look for other possible sources of this persistent cough: GERD, dysphagia.  . Blood cultures on admission grew ESBL E coli, likely due to UTI. Urine culture also grew ESBL E coli.  . Continue IV Invanz for total of  8 days ( 08/29/23)  via a peripheral IV line. Repeated blood culture are negative.   . Supportive care.   . Discharge planning.        Edwardo Gonzalez MD   [phone].

## 2023-08-26 NOTE — PROGRESS NOTE ADULT - ASSESSMENT
89-year-old female with history of hypothyroidism, A-fib on Eliquis, s/p recent pacemaker for complete heart block brought in by ambulance from [facility] assisted living home for cough for the past 2 weeks.  Associated with generalized weakness and decreased p.o. intake.  No fall or trauma.  Patient states she has had cold symptoms for a while.  Denies fever, chills, chest pain, shortness of breath, abdominal pain, nausea, vomiting, upper or lower extremity weakness or paresthesias. pmd: Dr. Crawley, cards: [facility] Seen by card Dr Reich Admitted  to telemetry unit for monitoring , send 3 sets of cardiac enzymes to rule out acute coronary event, obtain ECHO to evaluate LVEF, cardiology consult  ,continue current management, O2 supply, anticoagulation plan as per cardiology consult Pulm cons requested , antitussives and nebulized bd ordered .Also UTI suspected and started on iv abx , id cons called Palliative care consult requested ,to discuss advance directives and complete MOLST  89-year-old female with history of hypothyroidism, A-fib on Eliquis, s/p recent pacemaker for complete heart block brought in by ambulance from [facility] assisted living home for cough for the past 2 weeks.  Associated with generalized weakness and decreased p.o. intake.  No fall or trauma.  Patient states she has had cold symptoms for a while.  Denies fever, chills, chest pain, shortness of breath, abdominal pain, nausea, vomiting, upper or lower extremity weakness or paresthesias. pmd: Dr. Crawley, cards: [facility] Seen by card Dr Reich Admitted  to telemetry unit for monitoring , send 3 sets of cardiac enzymes to rule out acute coronary event, obtain ECHO to evaluate LVEF, cardiology consult  ,continue current management, O2 supply, anticoagulation plan as per cardiology consult Pulm cons requested , antitussives and nebulized bd ordered .Also UTI suspected and started on iv abx , id cons called Palliative care consult requested ,to discuss advance directives and complete MOLST  89-year-old female with history of hypothyroidism, A-fib on Eliquis, s/p recent pacemaker for complete heart block brought in by ambulance from [facility] assisted living home for cough for the past 2 weeks.  Associated with generalized weakness and decreased p.o. intake.  No fall or trauma.  Patient states she has had cold symptoms for a while.  Denies fever, chills, chest pain, shortness of breath, abdominal pain, nausea, vomiting, upper or lower extremity weakness or paresthesias. pmd: Dr. Crawley, cards: [facility] Seen by card Dr Reich Admitted  to telemetry unit for monitoring , send 3 sets of cardiac enzymes to rule out acute coronary event, obtain ECHO to evaluate LVEF, cardiology consult  ,continue current management, O2 supply, anticoagulation plan as per cardiology consult Pulm cons requested , antitussives and nebulized bd ordered .Also UTI suspected and started on iv abx , id cons called Palliative care consult requested ,to discuss advance directives and complete MOLST

## 2023-08-26 NOTE — PROGRESS NOTE ADULT - SUBJECTIVE AND OBJECTIVE BOX
[facility]  Víctor Robertson PA-C  [address] [place] [address]  [phone]      INTERVAL HPI/OVERNIGHT EVENTS:  Pt s/e   No dysphagia or further GI complaints        MEDICATIONS  (STANDING):  aMIOdarone    Tablet 200 milliGRAM(s) Oral daily  amLODIPine   Tablet 10 milliGRAM(s) Oral daily  apixaban 2.5 milliGRAM(s) Oral two times a day  artificial  tears Solution 1 Drop(s) Both EYES two times a day  ertapenem  IVPB 1000 milliGRAM(s) IV Intermittent every 24 hours  ferrous    sulfate 325 milliGRAM(s) Oral daily  lactobacillus acidophilus 1 Tablet(s) Oral every 12 hours  levothyroxine 75 MICROGram(s) Oral daily  metoprolol tartrate 50 milliGRAM(s) Oral two times a day  multivitamin/minerals 1 Tablet(s) Oral daily  pantoprazole    Tablet 40 milliGRAM(s) Oral daily  polyethylene glycol 3350 17 Gram(s) Oral daily  potassium chloride    Tablet ER 10 milliEquivalent(s) Oral two times a day    MEDICATIONS  (PRN):  acetaminophen     Tablet .. 650 milliGRAM(s) Oral every 6 hours PRN Temp greater or equal to 38C (100.4F), Mild Pain (1 - 3)  albuterol/ipratropium for Nebulization 3 milliLiter(s) Nebulizer every 6 hours PRN Shortness of Breath and/or Wheezing  aluminum hydroxide/magnesium hydroxide/simethicone Suspension 30 milliLiter(s) Oral every 4 hours PRN Dyspepsia  guaiFENesin Oral Liquid (Sugar-Free) 200 milliGRAM(s) Oral every 6 hours PRN Cough  melatonin 3 milliGRAM(s) Oral at bedtime PRN Insomnia  ondansetron Injectable 4 milliGRAM(s) IV Push every 8 hours PRN Nausea and/or Vomiting      Allergies    penicillin (Unknown)    Intolerances          PHYSICAL EXAM  Vital Signs Last 24 Hrs  T(C): 36.4 (26 Aug 2023 05:19), Max: 36.8 (25 Aug 2023 12:59)  T(F): 97.5 (26 Aug 2023 05:19), Max: 98.2 (25 Aug 2023 12:59)  HR: 67 (26 Aug 2023 05:19) (67 - 80)  BP: 190/80 (26 Aug 2023 05:19) (120/70 - 190/80)  BP(mean): --  RR: 18 (26 Aug 2023 05:19) (17 - 18)  SpO2: 91% (26 Aug 2023 05:19) (90% - 92%)    Parameters below as of 26 Aug 2023 05:19  Patient On (Oxygen Delivery Method): room air            GENERAL:  Appears stated age  HEENT:  NC/AT  CHEST:  Full & symmetric excursion  HEART:  Regular rhythm  ABDOMEN:  Soft, non-tender, non-distended  EXTEREMITIES:  no cyanosis  SKIN:  No rash  NEURO:  Alert        LABS:                        10.2   7.13  )-----------( 215      ( 25 Aug 2023 06:05 )             33.1     08-25    143  |  111<H>  |  12  ----------------------------<  96  3.4<L>   |  28  |  0.53    Ca    8.7      25 Aug 2023 06:05            08-25-23 @ 07:01  -  08-26-23 @ 07:00  --------------------------------------------------------  IN: 0 mL / OUT: 700 mL / NET: -700 mL                Culture - Blood (collected 24 Aug 2023 06:48)  Source: .Blood Blood  Preliminary Report (26 Aug 2023 10:01):    No growth at 48 Hours       [facility]  Víctor Robertson PA-C  [address] [place] [address]  [phone]      INTERVAL HPI/OVERNIGHT EVENTS:  Pt s/e   No dysphagia or further GI complaints        MEDICATIONS  (STANDING):  aMIOdarone    Tablet 200 milliGRAM(s) Oral daily  amLODIPine   Tablet 10 milliGRAM(s) Oral daily  apixaban 2.5 milliGRAM(s) Oral two times a day  artificial  tears Solution 1 Drop(s) Both EYES two times a day  ertapenem  IVPB 1000 milliGRAM(s) IV Intermittent every 24 hours  ferrous    sulfate 325 milliGRAM(s) Oral daily  lactobacillus acidophilus 1 Tablet(s) Oral every 12 hours  levothyroxine 75 MICROGram(s) Oral daily  metoprolol tartrate 50 milliGRAM(s) Oral two times a day  multivitamin/minerals 1 Tablet(s) Oral daily  pantoprazole    Tablet 40 milliGRAM(s) Oral daily  polyethylene glycol 3350 17 Gram(s) Oral daily  potassium chloride    Tablet ER 10 milliEquivalent(s) Oral two times a day    MEDICATIONS  (PRN):  acetaminophen     Tablet .. 650 milliGRAM(s) Oral every 6 hours PRN Temp greater or equal to 38C (100.4F), Mild Pain (1 - 3)  albuterol/ipratropium for Nebulization 3 milliLiter(s) Nebulizer every 6 hours PRN Shortness of Breath and/or Wheezing  aluminum hydroxide/magnesium hydroxide/simethicone Suspension 30 milliLiter(s) Oral every 4 hours PRN Dyspepsia  guaiFENesin Oral Liquid (Sugar-Free) 200 milliGRAM(s) Oral every 6 hours PRN Cough  melatonin 3 milliGRAM(s) Oral at bedtime PRN Insomnia  ondansetron Injectable 4 milliGRAM(s) IV Push every 8 hours PRN Nausea and/or Vomiting      Allergies    penicillin (Unknown)    Intolerances          PHYSICAL EXAM  Vital Signs Last 24 Hrs  T(C): 36.4 (26 Aug 2023 05:19), Max: 36.8 (25 Aug 2023 12:59)  T(F): 97.5 (26 Aug 2023 05:19), Max: 98.2 (25 Aug 2023 12:59)  HR: 67 (26 Aug 2023 05:19) (67 - 80)  BP: 190/80 (26 Aug 2023 05:19) (120/70 - 190/80)  BP(mean): --  RR: 18 (26 Aug 2023 05:19) (17 - 18)  SpO2: 91% (26 Aug 2023 05:19) (90% - 92%)    Parameters below as of 26 Aug 2023 05:19  Patient On (Oxygen Delivery Method): room air            GENERAL:  Appears stated age  HEENT:  NC/AT  CHEST:  Full & symmetric excursion  HEART:  Regular rhythm  ABDOMEN:  Soft, non-tender, non-distended  EXTEREMITIES:  no cyanosis  SKIN:  No rash  NEURO:  Alert        LABS:                        10.2   7.13  )-----------( 215      ( 25 Aug 2023 06:05 )             33.1     08-25    143  |  111<H>  |  12  ----------------------------<  96  3.4<L>   |  28  |  0.53    Ca    8.7      25 Aug 2023 06:05            08-25-23 @ 07:01  -  08-26-23 @ 07:00  --------------------------------------------------------  IN: 0 mL / OUT: 700 mL / NET: -700 mL                Culture - Blood (collected 24 Aug 2023 06:48)  Source: .Blood Blood  Preliminary Report (26 Aug 2023 10:01):    No growth at 48 Hours       [facility]  Víctor Robertson PA-C  [address] [place] [address]  [phone]      INTERVAL HPI/OVERNIGHT EVENTS:  Pt s/e   No dysphagia or further GI complaints        MEDICATIONS  (STANDING):  aMIOdarone    Tablet 200 milliGRAM(s) Oral daily  amLODIPine   Tablet 10 milliGRAM(s) Oral daily  apixaban 2.5 milliGRAM(s) Oral two times a day  artificial  tears Solution 1 Drop(s) Both EYES two times a day  ertapenem  IVPB 1000 milliGRAM(s) IV Intermittent every 24 hours  ferrous    sulfate 325 milliGRAM(s) Oral daily  lactobacillus acidophilus 1 Tablet(s) Oral every 12 hours  levothyroxine 75 MICROGram(s) Oral daily  metoprolol tartrate 50 milliGRAM(s) Oral two times a day  multivitamin/minerals 1 Tablet(s) Oral daily  pantoprazole    Tablet 40 milliGRAM(s) Oral daily  polyethylene glycol 3350 17 Gram(s) Oral daily  potassium chloride    Tablet ER 10 milliEquivalent(s) Oral two times a day    MEDICATIONS  (PRN):  acetaminophen     Tablet .. 650 milliGRAM(s) Oral every 6 hours PRN Temp greater or equal to 38C (100.4F), Mild Pain (1 - 3)  albuterol/ipratropium for Nebulization 3 milliLiter(s) Nebulizer every 6 hours PRN Shortness of Breath and/or Wheezing  aluminum hydroxide/magnesium hydroxide/simethicone Suspension 30 milliLiter(s) Oral every 4 hours PRN Dyspepsia  guaiFENesin Oral Liquid (Sugar-Free) 200 milliGRAM(s) Oral every 6 hours PRN Cough  melatonin 3 milliGRAM(s) Oral at bedtime PRN Insomnia  ondansetron Injectable 4 milliGRAM(s) IV Push every 8 hours PRN Nausea and/or Vomiting      Allergies    penicillin (Unknown)    Intolerances          PHYSICAL EXAM  Vital Signs Last 24 Hrs  T(C): 36.4 (26 Aug 2023 05:19), Max: 36.8 (25 Aug 2023 12:59)  T(F): 97.5 (26 Aug 2023 05:19), Max: 98.2 (25 Aug 2023 12:59)  HR: 67 (26 Aug 2023 05:19) (67 - 80)  BP: 190/80 (26 Aug 2023 05:19) (120/70 - 190/80)  BP(mean): --  RR: 18 (26 Aug 2023 05:19) (17 - 18)  SpO2: 91% (26 Aug 2023 05:19) (90% - 92%)    Parameters below as of 26 Aug 2023 05:19  Patient On (Oxygen Delivery Method): room air            GENERAL:  Appears stated age  HEENT:  NC/AT  CHEST:  Full & symmetric excursion  HEART:  Regular rhythm  ABDOMEN:  Soft, non-tender, non-distended  EXTEREMITIES:  no cyanosis  SKIN:  No rash  NEURO:  Alert        LABS:                        10.2   7.13  )-----------( 215      ( 25 Aug 2023 06:05 )             33.1     08-25    143  |  111<H>  |  12  ----------------------------<  96  3.4<L>   |  28  |  0.53    Ca    8.7      25 Aug 2023 06:05            08-25-23 @ 07:01  -  08-26-23 @ 07:00  --------------------------------------------------------  IN: 0 mL / OUT: 700 mL / NET: -700 mL                Culture - Blood (collected 24 Aug 2023 06:48)  Source: .Blood Blood  Preliminary Report (26 Aug 2023 10:01):    No growth at 48 Hours

## 2023-08-26 NOTE — PROGRESS NOTE ADULT - SUBJECTIVE AND OBJECTIVE BOX
Patient is a 89y Female with a known history of :  HTN (hypertension) [I10]    Afib [I48.91]    Spinal stenosis [M48.00]    Hypothyroidism [E03.9]    Acute UTI [N39.0]    Acute bronchitis [J20.9]    Hypokalemia [E87.6]    Prophylactic measure [Z29.9]    Hypothyroidism [E03.9]    S/P placement of cardiac pacemaker [Z95.0]    Esophageal dilatation [K22.89]    Gram-negative bacteremia [R78.81]    Sepsis [A41.9]      HPI:  89-year-old female with history of hypothyroidism, A-fib on Eliquis, s/p recent pacemaker for complete heart block brought in by ambulance from [facility] living home for cough for the past 2 weeks.  Associated with generalized weakness and decreased p.o. intake.  No fall or trauma.  Patient states she has had cold symptoms for a while.  Denies fever, chills, chest pain, shortness of breath, abdominal pain, nausea, vomiting, upper or lower extremity weakness or paresthesias. pmd: Dr. Crawley, cards: [facility] Seen by card Dr Reich Admitted  to telemetry unit for monitoring , send 3 sets of cardiac enzymes to rule out acute coronary event, obtain ECHO to evaluate LVEF, cardiology consult  ,continue current management, O2 supply, anticoagulation plan as per cardiology consult Pulm cons requested , antitussives and nebulized bd ordered .Also UTI suspected and started on iv abx , id cons called Palliative care consult requested ,to discuss advance directives and complete MOLST  (20 Aug 2023 15:35)      REVIEW OF SYSTEMS:    CONSTITUTIONAL: No fever, weight loss, or fatigue  EYES: No eye pain, visual disturbances, or discharge  ENMT:  No difficulty hearing, tinnitus, vertigo; No sinus or throat pain  NECK: No pain or stiffness  BREASTS: No pain, masses, or nipple discharge  RESPIRATORY: No cough, wheezing, chills or hemoptysis; No shortness of breath  CARDIOVASCULAR: No chest pain, palpitations, dizziness, or leg swelling  GASTROINTESTINAL: No abdominal or epigastric pain. No nausea, vomiting, or hematemesis; No diarrhea or constipation. No melena or hematochezia.  GENITOURINARY: No dysuria, frequency, hematuria, or incontinence  NEUROLOGICAL: No headaches, memory loss, loss of strength, numbness, or tremors  SKIN: No itching, burning, rashes, or lesions   LYMPH NODES: No enlarged glands  ENDOCRINE: No heat or cold intolerance; No hair loss  MUSCULOSKELETAL: No joint pain or swelling; No muscle, back, or extremity pain  PSYCHIATRIC: No depression, anxiety, mood swings, or difficulty sleeping  HEME/LYMPH: No easy bruising, or bleeding gums  ALLERGY AND IMMUNOLOGIC: No hives or eczema    MEDICATIONS  (STANDING):  aMIOdarone    Tablet 200 milliGRAM(s) Oral daily  amLODIPine   Tablet 10 milliGRAM(s) Oral daily  apixaban 2.5 milliGRAM(s) Oral two times a day  artificial  tears Solution 1 Drop(s) Both EYES two times a day  ertapenem  IVPB 1000 milliGRAM(s) IV Intermittent every 24 hours  ferrous    sulfate 325 milliGRAM(s) Oral daily  lactobacillus acidophilus 1 Tablet(s) Oral every 12 hours  levothyroxine 75 MICROGram(s) Oral daily  metoprolol tartrate 50 milliGRAM(s) Oral two times a day  multivitamin/minerals 1 Tablet(s) Oral daily  pantoprazole    Tablet 40 milliGRAM(s) Oral daily  polyethylene glycol 3350 17 Gram(s) Oral daily  potassium chloride    Tablet ER 10 milliEquivalent(s) Oral two times a day    MEDICATIONS  (PRN):  acetaminophen     Tablet .. 650 milliGRAM(s) Oral every 6 hours PRN Temp greater or equal to 38C (100.4F), Mild Pain (1 - 3)  albuterol/ipratropium for Nebulization 3 milliLiter(s) Nebulizer every 6 hours PRN Shortness of Breath and/or Wheezing  aluminum hydroxide/magnesium hydroxide/simethicone Suspension 30 milliLiter(s) Oral every 4 hours PRN Dyspepsia  guaiFENesin Oral Liquid (Sugar-Free) 200 milliGRAM(s) Oral every 6 hours PRN Cough  melatonin 3 milliGRAM(s) Oral at bedtime PRN Insomnia  ondansetron Injectable 4 milliGRAM(s) IV Push every 8 hours PRN Nausea and/or Vomiting      ALLERGIES: penicillin (Unknown)      FAMILY HISTORY:      PHYSICAL EXAMINATION:  -----------------------------  T(C): 36.6 (08-26-23 @ 12:04), Max: 36.6 (08-25-23 @ 20:11)  HR: 68 (08-26-23 @ 12:04) (67 - 80)  BP: 175/79 (08-26-23 @ 12:04) (120/70 - 190/80)  RR: 19 (08-26-23 @ 12:04) (17 - 19)  SpO2: 94% (08-26-23 @ 12:04) (91% - 94%)  Wt(kg): --    08-25 @ 07:01  -  08-26 @ 07:00  --------------------------------------------------------  IN:  Total IN: 0 mL    OUT:    Voided (mL): 700 mL  Total OUT: 700 mL    Total NET: -700 mL            VITALS  T(C): 36.6 (08-26-23 @ 12:04), Max: 36.6 (08-25-23 @ 20:11)  HR: 68 (08-26-23 @ 12:04) (67 - 80)  BP: 175/79 (08-26-23 @ 12:04) (120/70 - 190/80)  RR: 19 (08-26-23 @ 12:04) (17 - 19)  SpO2: 94% (08-26-23 @ 12:04) (91% - 94%)    Constitutional: well developed, normal appearance, well groomed, well nourished, no deformities and no acute distress.   Eyes: the conjunctiva exhibited no abnormalities and the eyelids demonstrated no xanthelasmas.   HEENT: normal oral mucosa, no oral pallor and no oral cyanosis.   Neck: normal jugular venous A waves present, normal jugular venous V waves present and no jugular venous gibson A waves.   Pulmonary: no respiratory distress, normal respiratory rhythm and effort, no accessory muscle use and lungs were clear to auscultation bilaterally.   Cardiovascular: heart rate and rhythm were normal, normal S1 and S2 and no murmur, gallop, rub, heave or thrill are present.   Abdomen: soft, non-tender, no hepato-splenomegaly and no abdominal mass palpated.   Musculoskeletal: the gait could not be assessed..   Extremities: no clubbing of the fingernails, no localized cyanosis, no petechial hemorrhages and no ischemic changes.   Skin: normal skin color and pigmentation, no rash, no venous stasis, no skin lesions, no skin ulcer and no xanthoma was observed.   Psychiatric: oriented to person, place, and time, the affect was normal, the mood was normal and not feeling anxious.     LABS:   --------  08-25    143  |  111<H>  |  12  ----------------------------<  96  3.4<L>   |  28  |  0.53    Ca    8.7      25 Aug 2023 06:05                           10.2   7.13  )-----------( 215      ( 25 Aug 2023 06:05 )             33.1                 RADIOLOGY:  -----------------    ECG:     ECHO: Patient is a 89y Female with a known history of :  HTN (hypertension) [I10]    Afib [I48.91]    Spinal stenosis [M48.00]    Hypothyroidism [E03.9]    Acute UTI [N39.0]    Acute bronchitis [J20.9]    Hypokalemia [E87.6]    Prophylactic measure [Z29.9]    Hypothyroidism [E03.9]    S/P placement of cardiac pacemaker [Z95.0]    Esophageal dilatation [K22.89]    Gram-negative bacteremia [R78.81]    Sepsis [A41.9]      HPI:  89-year-old female with history of hypothyroidism, A-fib on Eliquis, s/p recent pacemaker for complete heart block brought in by ambulance from [facility] living home for cough for the past 2 weeks.  Associated with generalized weakness and decreased p.o. intake.  No fall or trauma.  Patient states she has had cold symptoms for a while.  Denies fever, chills, chest pain, shortness of breath, abdominal pain, nausea, vomiting, upper or lower extremity weakness or paresthesias. pmd: Dr. Crawley, cards: [facility] Seen by card Dr Reich Admitted  to telemetry unit for monitoring , send 3 sets of cardiac enzymes to rule out acute coronary event, obtain ECHO to evaluate LVEF, cardiology consult  ,continue current management, O2 supply, anticoagulation plan as per cardiology consult Pulm cons requested , antitussives and nebulized bd ordered .Also UTI suspected and started on iv abx , id cons called Palliative care consult requested ,to discuss advance directives and complete MOLST  (20 Aug 2023 15:35)      REVIEW OF SYSTEMS:    CONSTITUTIONAL: No fever, weight loss, or fatigue  EYES: No eye pain, visual disturbances, or discharge  ENMT:  No difficulty hearing, tinnitus, vertigo; No sinus or throat pain  NECK: No pain or stiffness  BREASTS: No pain, masses, or nipple discharge  RESPIRATORY: No cough, wheezing, chills or hemoptysis; No shortness of breath  CARDIOVASCULAR: No chest pain, palpitations, dizziness, or leg swelling  GASTROINTESTINAL: No abdominal or epigastric pain. No nausea, vomiting, or hematemesis; No diarrhea or constipation. No melena or hematochezia.  GENITOURINARY: No dysuria, frequency, hematuria, or incontinence  NEUROLOGICAL: No headaches, memory loss, loss of strength, numbness, or tremors  SKIN: No itching, burning, rashes, or lesions   LYMPH NODES: No enlarged glands  ENDOCRINE: No heat or cold intolerance; No hair loss  MUSCULOSKELETAL: No joint pain or swelling; No muscle, back, or extremity pain  PSYCHIATRIC: No depression, anxiety, mood swings, or difficulty sleeping  HEME/LYMPH: No easy bruising, or bleeding gums  ALLERGY AND IMMUNOLOGIC: No hives or eczema    MEDICATIONS  (STANDING):  aMIOdarone    Tablet 200 milliGRAM(s) Oral daily  amLODIPine   Tablet 10 milliGRAM(s) Oral daily  apixaban 2.5 milliGRAM(s) Oral two times a day  artificial  tears Solution 1 Drop(s) Both EYES two times a day  ertapenem  IVPB 1000 milliGRAM(s) IV Intermittent every 24 hours  ferrous    sulfate 325 milliGRAM(s) Oral daily  lactobacillus acidophilus 1 Tablet(s) Oral every 12 hours  levothyroxine 75 MICROGram(s) Oral daily  metoprolol tartrate 50 milliGRAM(s) Oral two times a day  multivitamin/minerals 1 Tablet(s) Oral daily  pantoprazole    Tablet 40 milliGRAM(s) Oral daily  polyethylene glycol 3350 17 Gram(s) Oral daily  potassium chloride    Tablet ER 10 milliEquivalent(s) Oral two times a day    MEDICATIONS  (PRN):  acetaminophen     Tablet .. 650 milliGRAM(s) Oral every 6 hours PRN Temp greater or equal to 38C (100.4F), Mild Pain (1 - 3)  albuterol/ipratropium for Nebulization 3 milliLiter(s) Nebulizer every 6 hours PRN Shortness of Breath and/or Wheezing  aluminum hydroxide/magnesium hydroxide/simethicone Suspension 30 milliLiter(s) Oral every 4 hours PRN Dyspepsia  guaiFENesin Oral Liquid (Sugar-Free) 200 milliGRAM(s) Oral every 6 hours PRN Cough  melatonin 3 milliGRAM(s) Oral at bedtime PRN Insomnia  ondansetron Injectable 4 milliGRAM(s) IV Push every 8 hours PRN Nausea and/or Vomiting      ALLERGIES: penicillin (Unknown)      FAMILY HISTORY:      PHYSICAL EXAMINATION:  -----------------------------  T(C): 36.6 (08-26-23 @ 12:04), Max: 36.6 (08-25-23 @ 20:11)  HR: 68 (08-26-23 @ 12:04) (67 - 80)  BP: 175/79 (08-26-23 @ 12:04) (120/70 - 190/80)  RR: 19 (08-26-23 @ 12:04) (17 - 19)  SpO2: 94% (08-26-23 @ 12:04) (91% - 94%)  Wt(kg): --    08-25 @ 07:01  -  08-26 @ 07:00  --------------------------------------------------------  IN:  Total IN: 0 mL    OUT:    Voided (mL): 700 mL  Total OUT: 700 mL    Total NET: -700 mL            VITALS  T(C): 36.6 (08-26-23 @ 12:04), Max: 36.6 (08-25-23 @ 20:11)  HR: 68 (08-26-23 @ 12:04) (67 - 80)  BP: 175/79 (08-26-23 @ 12:04) (120/70 - 190/80)  RR: 19 (08-26-23 @ 12:04) (17 - 19)  SpO2: 94% (08-26-23 @ 12:04) (91% - 94%)    Constitutional: well developed, normal appearance, well groomed, well nourished, no deformities and no acute distress.   Eyes: the conjunctiva exhibited no abnormalities and the eyelids demonstrated no xanthelasmas.   HEENT: normal oral mucosa, no oral pallor and no oral cyanosis.   Neck: normal jugular venous A waves present, normal jugular venous V waves present and no jugular venous gibson A waves.   Pulmonary: no respiratory distress, normal respiratory rhythm and effort, no accessory muscle use and lungs were clear to auscultation bilaterally.   Cardiovascular: heart rate and rhythm were normal, normal S1 and S2 and no murmur, gallop, rub, heave or thrill are present.   Abdomen: soft, non-tender, no hepato-splenomegaly and no abdominal mass palpated.   Musculoskeletal: the gait could not be assessed..   Extremities: no clubbing of the fingernails, no localized cyanosis, no petechial hemorrhages and no ischemic changes.   Skin: normal skin color and pigmentation, no rash, no venous stasis, no skin lesions, no skin ulcer and no xanthoma was observed.   Psychiatric: oriented to person, place, and time, the affect was normal, the mood was normal and not feeling anxious.     LABS:   --------  08-25    143  |  111<H>  |  12  ----------------------------<  96  3.4<L>   |  28  |  0.53    Ca    8.7      25 Aug 2023 06:05                           10.2   7.13  )-----------( 215      ( 25 Aug 2023 06:05 )             33.1                 RADIOLOGY:  -----------------    ECG:     ECHO: Patient is a 89y Female with a known history of :  HTN (hypertension) [I10]    Afib [I48.91]    Spinal stenosis [M48.00]    Hypothyroidism [E03.9]    Acute UTI [N39.0]    Acute bronchitis [J20.9]    Hypokalemia [E87.6]    Prophylactic measure [Z29.9]    Hypothyroidism [E03.9]    S/P placement of cardiac pacemaker [Z95.0]    Esophageal dilatation [K22.89]    Gram-negative bacteremia [R78.81]    Sepsis [A41.9]      HPI:  89-year-old female with history of hypothyroidism, A-fib on Eliquis, s/p recent pacemaker for complete heart block brought in by ambulance from [facility] living home for cough for the past 2 weeks.  Associated with generalized weakness and decreased p.o. intake.  No fall or trauma.  Patient states she has had cold symptoms for a while.  Denies fever, chills, chest pain, shortness of breath, abdominal pain, nausea, vomiting, upper or lower extremity weakness or paresthesias. pmd: Dr. Crawley, cards: [facility] Seen by card Dr Reich Admitted  to telemetry unit for monitoring , send 3 sets of cardiac enzymes to rule out acute coronary event, obtain ECHO to evaluate LVEF, cardiology consult  ,continue current management, O2 supply, anticoagulation plan as per cardiology consult Pulm cons requested , antitussives and nebulized bd ordered .Also UTI suspected and started on iv abx , id cons called Palliative care consult requested ,to discuss advance directives and complete MOLST  (20 Aug 2023 15:35)      REVIEW OF SYSTEMS:    CONSTITUTIONAL: No fever, weight loss, or fatigue  EYES: No eye pain, visual disturbances, or discharge  ENMT:  No difficulty hearing, tinnitus, vertigo; No sinus or throat pain  NECK: No pain or stiffness  BREASTS: No pain, masses, or nipple discharge  RESPIRATORY: No cough, wheezing, chills or hemoptysis; No shortness of breath  CARDIOVASCULAR: No chest pain, palpitations, dizziness, or leg swelling  GASTROINTESTINAL: No abdominal or epigastric pain. No nausea, vomiting, or hematemesis; No diarrhea or constipation. No melena or hematochezia.  GENITOURINARY: No dysuria, frequency, hematuria, or incontinence  NEUROLOGICAL: No headaches, memory loss, loss of strength, numbness, or tremors  SKIN: No itching, burning, rashes, or lesions   LYMPH NODES: No enlarged glands  ENDOCRINE: No heat or cold intolerance; No hair loss  MUSCULOSKELETAL: No joint pain or swelling; No muscle, back, or extremity pain  PSYCHIATRIC: No depression, anxiety, mood swings, or difficulty sleeping  HEME/LYMPH: No easy bruising, or bleeding gums  ALLERGY AND IMMUNOLOGIC: No hives or eczema    MEDICATIONS  (STANDING):  aMIOdarone    Tablet 200 milliGRAM(s) Oral daily  amLODIPine   Tablet 10 milliGRAM(s) Oral daily  apixaban 2.5 milliGRAM(s) Oral two times a day  artificial  tears Solution 1 Drop(s) Both EYES two times a day  ertapenem  IVPB 1000 milliGRAM(s) IV Intermittent every 24 hours  ferrous    sulfate 325 milliGRAM(s) Oral daily  lactobacillus acidophilus 1 Tablet(s) Oral every 12 hours  levothyroxine 75 MICROGram(s) Oral daily  metoprolol tartrate 50 milliGRAM(s) Oral two times a day  multivitamin/minerals 1 Tablet(s) Oral daily  pantoprazole    Tablet 40 milliGRAM(s) Oral daily  polyethylene glycol 3350 17 Gram(s) Oral daily  potassium chloride    Tablet ER 10 milliEquivalent(s) Oral two times a day    MEDICATIONS  (PRN):  acetaminophen     Tablet .. 650 milliGRAM(s) Oral every 6 hours PRN Temp greater or equal to 38C (100.4F), Mild Pain (1 - 3)  albuterol/ipratropium for Nebulization 3 milliLiter(s) Nebulizer every 6 hours PRN Shortness of Breath and/or Wheezing  aluminum hydroxide/magnesium hydroxide/simethicone Suspension 30 milliLiter(s) Oral every 4 hours PRN Dyspepsia  guaiFENesin Oral Liquid (Sugar-Free) 200 milliGRAM(s) Oral every 6 hours PRN Cough  melatonin 3 milliGRAM(s) Oral at bedtime PRN Insomnia  ondansetron Injectable 4 milliGRAM(s) IV Push every 8 hours PRN Nausea and/or Vomiting      ALLERGIES: penicillin (Unknown)      FAMILY HISTORY:      PHYSICAL EXAMINATION:  -----------------------------  T(C): 36.6 (08-26-23 @ 12:04), Max: 36.6 (08-25-23 @ 20:11)  HR: 68 (08-26-23 @ 12:04) (67 - 80)  BP: 175/79 (08-26-23 @ 12:04) (120/70 - 190/80)  RR: 19 (08-26-23 @ 12:04) (17 - 19)  SpO2: 94% (08-26-23 @ 12:04) (91% - 94%)  Wt(kg): --    08-25 @ 07:01  -  08-26 @ 07:00  --------------------------------------------------------  IN:  Total IN: 0 mL    OUT:    Voided (mL): 700 mL  Total OUT: 700 mL    Total NET: -700 mL            VITALS  T(C): 36.6 (08-26-23 @ 12:04), Max: 36.6 (08-25-23 @ 20:11)  HR: 68 (08-26-23 @ 12:04) (67 - 80)  BP: 175/79 (08-26-23 @ 12:04) (120/70 - 190/80)  RR: 19 (08-26-23 @ 12:04) (17 - 19)  SpO2: 94% (08-26-23 @ 12:04) (91% - 94%)    Constitutional: well developed, normal appearance, well groomed, well nourished, no deformities and no acute distress.   Eyes: the conjunctiva exhibited no abnormalities and the eyelids demonstrated no xanthelasmas.   HEENT: normal oral mucosa, no oral pallor and no oral cyanosis.   Neck: normal jugular venous A waves present, normal jugular venous V waves present and no jugular venous gibson A waves.   Pulmonary: no respiratory distress, normal respiratory rhythm and effort, no accessory muscle use and lungs were clear to auscultation bilaterally.   Cardiovascular: heart rate and rhythm were normal, normal S1 and S2 and no murmur, gallop, rub, heave or thrill are present.   Abdomen: soft, non-tender, no hepato-splenomegaly and no abdominal mass palpated.   Musculoskeletal: the gait could not be assessed..   Extremities: no clubbing of the fingernails, no localized cyanosis, no petechial hemorrhages and no ischemic changes.   Skin: normal skin color and pigmentation, no rash, no venous stasis, no skin lesions, no skin ulcer and no xanthoma was observed.   Psychiatric: oriented to person, place, and time, the affect was normal, the mood was normal and not feeling anxious.     LABS:   --------  08-25    143  |  111<H>  |  12  ----------------------------<  96  3.4<L>   |  28  |  0.53    Ca    8.7      25 Aug 2023 06:05                           10.2   7.13  )-----------( 215      ( 25 Aug 2023 06:05 )             33.1                 RADIOLOGY:  -----------------    ECG:     ECHO:

## 2023-08-26 NOTE — CHART NOTE - NSCHARTNOTEFT_GEN_A_CORE
RN noted that patient is DNR/DNI according to MOLST form. However, primary team did not put in order.  - Ordered Do Not Resuscitate order with DNI

## 2023-08-27 LAB
ANION GAP SERPL CALC-SCNC: 7 MMOL/L — SIGNIFICANT CHANGE UP (ref 5–17)
BUN SERPL-MCNC: 11 MG/DL — SIGNIFICANT CHANGE UP (ref 7–23)
CALCIUM SERPL-MCNC: 8.6 MG/DL — SIGNIFICANT CHANGE UP (ref 8.5–10.1)
CHLORIDE SERPL-SCNC: 106 MMOL/L — SIGNIFICANT CHANGE UP (ref 96–108)
CO2 SERPL-SCNC: 28 MMOL/L — SIGNIFICANT CHANGE UP (ref 22–31)
CREAT SERPL-MCNC: 0.52 MG/DL — SIGNIFICANT CHANGE UP (ref 0.5–1.3)
CULTURE RESULTS: SIGNIFICANT CHANGE UP
EGFR: 87 ML/MIN/1.73M2 — SIGNIFICANT CHANGE UP
GLUCOSE SERPL-MCNC: 86 MG/DL — SIGNIFICANT CHANGE UP (ref 70–99)
HCT VFR BLD CALC: 36.5 % — SIGNIFICANT CHANGE UP (ref 34.5–45)
HGB BLD-MCNC: 11.4 G/DL — LOW (ref 11.5–15.5)
MCHC RBC-ENTMCNC: 29.2 PG — SIGNIFICANT CHANGE UP (ref 27–34)
MCHC RBC-ENTMCNC: 31.2 GM/DL — LOW (ref 32–36)
MCV RBC AUTO: 93.6 FL — SIGNIFICANT CHANGE UP (ref 80–100)
NRBC # BLD: 0 /100 WBCS — SIGNIFICANT CHANGE UP (ref 0–0)
PLATELET # BLD AUTO: 302 K/UL — SIGNIFICANT CHANGE UP (ref 150–400)
POTASSIUM SERPL-MCNC: 4.2 MMOL/L — SIGNIFICANT CHANGE UP (ref 3.5–5.3)
POTASSIUM SERPL-SCNC: 4.2 MMOL/L — SIGNIFICANT CHANGE UP (ref 3.5–5.3)
RBC # BLD: 3.9 M/UL — SIGNIFICANT CHANGE UP (ref 3.8–5.2)
RBC # FLD: 16.3 % — HIGH (ref 10.3–14.5)
SODIUM SERPL-SCNC: 141 MMOL/L — SIGNIFICANT CHANGE UP (ref 135–145)
SPECIMEN SOURCE: SIGNIFICANT CHANGE UP
WBC # BLD: 10.12 K/UL — SIGNIFICANT CHANGE UP (ref 3.8–10.5)
WBC # FLD AUTO: 10.12 K/UL — SIGNIFICANT CHANGE UP (ref 3.8–10.5)

## 2023-08-27 PROCEDURE — 99233 SBSQ HOSP IP/OBS HIGH 50: CPT

## 2023-08-27 RX ADMIN — Medication 1 TABLET(S): at 05:08

## 2023-08-27 RX ADMIN — Medication 75 MICROGRAM(S): at 05:09

## 2023-08-27 RX ADMIN — Medication 50 MILLIGRAM(S): at 05:09

## 2023-08-27 RX ADMIN — APIXABAN 2.5 MILLIGRAM(S): 2.5 TABLET, FILM COATED ORAL at 05:09

## 2023-08-27 RX ADMIN — AMLODIPINE BESYLATE 10 MILLIGRAM(S): 2.5 TABLET ORAL at 05:10

## 2023-08-27 RX ADMIN — ERTAPENEM SODIUM 120 MILLIGRAM(S): 1 INJECTION, POWDER, LYOPHILIZED, FOR SOLUTION INTRAMUSCULAR; INTRAVENOUS at 15:42

## 2023-08-27 RX ADMIN — POLYETHYLENE GLYCOL 3350 17 GRAM(S): 17 POWDER, FOR SOLUTION ORAL at 11:32

## 2023-08-27 RX ADMIN — PANTOPRAZOLE SODIUM 40 MILLIGRAM(S): 20 TABLET, DELAYED RELEASE ORAL at 11:32

## 2023-08-27 RX ADMIN — APIXABAN 2.5 MILLIGRAM(S): 2.5 TABLET, FILM COATED ORAL at 18:29

## 2023-08-27 RX ADMIN — Medication 10 MILLIEQUIVALENT(S): at 18:28

## 2023-08-27 RX ADMIN — Medication 1 DROP(S): at 05:11

## 2023-08-27 RX ADMIN — Medication 1 TABLET(S): at 11:32

## 2023-08-27 RX ADMIN — AMIODARONE HYDROCHLORIDE 200 MILLIGRAM(S): 400 TABLET ORAL at 05:09

## 2023-08-27 RX ADMIN — Medication 1 TABLET(S): at 18:33

## 2023-08-27 RX ADMIN — Medication 1 DROP(S): at 18:28

## 2023-08-27 RX ADMIN — Medication 325 MILLIGRAM(S): at 11:32

## 2023-08-27 RX ADMIN — Medication 50 MILLIGRAM(S): at 18:29

## 2023-08-27 RX ADMIN — Medication 10 MILLIEQUIVALENT(S): at 05:12

## 2023-08-27 NOTE — PROVIDER CONTACT NOTE (OTHER) - ASSESSMENT
Family requesting to speak to MD regarding pt's plan of care, RN gave an update on patient's current care plan but family still wants to speak to MD. MD Rodriguez spoke to family over phone and family was not pleased with the conversation with MD. Supervisor Camelia romeo and MD Rodriguez as well. Family requesting to speak to MD regarding pt's plan of care, verbalized pt is not as verbal as she used to be prior to hospitalization and falls asleep easily.  RN gave an update on patient's current care plan but family still wants to speak to MD. MD Rodriguez spoke to family over phone and family was not pleased with the conversation with MD. Supervisor Camelia romeo and MD Rodriguez as well. Pt is sleepy easily aroused and recognizes his daughters, denies any discomfort/ pain at this time. VSS.

## 2023-08-27 NOTE — PROGRESS NOTE ADULT - ASSESSMENT
REASON FOR VISIT  .. Management of problems listed below      PHYSICAL EXAM    HEENT Unremarkable  atraumatic   RESP Fair air entry  Harsh breath sound   CARDIAC S1 S2 No S3     NO JVD    ABDOMEN No hepatosplenomegaly   PEDAL EDEMA present No calf tenderness  NO rash       GENERAL DATA .     GOC.    .. 8/26/2023 dnr  ICU STAY.   .. none  COVID.   .. scv2 8/21/2023 (-)    BEST PRACTICE ISSUES.    HOB ELEVATN.   .. Yes  DVT PPLX.   ..  8/20/2023 apixaba 2.5 x 2 (af)     SPEECH SWALLOW RECOMMENDATIONS.    ..    8/21/2023 soft bite     DIET.    ..  8/20/2023 soft bite size   IV fl.  .. 8/22/2023 D5 1/2 ns  50   STRESS ULCER PPLX.   ..    8/20/2023 protonix 40   INFECTION PPLX.   ..     ALLGY.  ..    pncl                     WT.  ..  8/20/2023 56  BMI.  ..   8/20/2023 21      ABGS.   .     VS/ PO/IO/ VENT/ DRIPS.   8/27/2023 afeb 60 160/80   8/27/2023 ra 95%    DOA C/C.     . 8/20/2023 · [facility] 2 weeks. negative cxr and covid swab. poor PO intake today.           INITIAL PRESENTATION.   . 8/20/2023 89-year-old female with history of hypothyroidism, A-fib on Eliquis, recent pacemaker for complete heart block brought in by ambulance from [facility] assisted living home for cough for the past 2 weeks.  Associated with generalized weakness and decreased p.o. intake.  No fall or trauma.  Patient states she has had cold symptoms for a while.  Denies fever, chills, chest pain, shortness of breath, abdominal pain, nausea, vomiting, upper or lower extremity weakness or paresthesias.   pmd: Dr. Crawley, cards: [facility].   . hypothyroidism   . A-fib on Eliquis (hx AFL ablation),  .  PFO  HOME MEDS.   . levoxyl eliquis 2.5 x 2   COURSE.  . 8/20/2023 pulm consultd    . 8/20/2023 5:39 PM ER meds given already include cefepime 2g                  MAIN ISSUES .   . CC 8/20/2023 89-year-old female with history of hypothyroidism, A-fib on Eliquis, [facility] found to have E coli ESBL cateremia   . COUGH x 2 wk poa 8/20/2023  . Pneumonia   .. ct chest 8/20/2023   .... distention of mid to upper esophagus   . E coli ESBL CTX M bacteremia 8/20 8/21  .. bc 8/22 (-)   .. 8/20/2023 levaquin -> 8/21 ertapenem 1 x 8d Dr Roberts  . COPD   . A fib   .. 8/20/2023 apixaba   . CHF   .. bnp 8/20-8/23/2023 bnp 4545 - 3219   .. echo 8/21/2023 ef 56% pasp 63   .. 8/22/2023 enalapril 10   . Dysphagia  .. esophagogram 8/22/2023 smooth tapering o distal esophagus       TIME SPENT.   . Over 36 minutes aggregate care time spent on encounter; activities included   direct patient care, counseling and/or coordinating care reviewing notes, lab data/ imaging , discussion with multidisciplinary team/ patient  /family and explaining in detail risks, benefits, alternatives  of the recommendations     JAKOB WORTHY 89 f 8/20/2023 9/22/1929 DR BAM HERRERA      REASON FOR VISIT  .. Management of problems listed below      PHYSICAL EXAM    HEENT Unremarkable  atraumatic   RESP Fair air entry  Harsh breath sound   CARDIAC S1 S2 No S3     NO JVD    ABDOMEN No hepatosplenomegaly   PEDAL EDEMA present No calf tenderness  NO rash       GENERAL DATA .     GOC.    .. 8/26/2023 dnr  ICU STAY.   .. none  COVID.   .. scv2 8/21/2023 (-)    BEST PRACTICE ISSUES.    HOB ELEVATN.   .. Yes  DVT PPLX.   ..  8/20/2023 apixaba 2.5 x 2 (af)     SPEECH SWALLOW RECOMMENDATIONS.    ..    8/21/2023 soft bite     DIET.    ..  8/20/2023 soft bite size   IV fl.  .. 8/22/2023 D5 1/2 ns  50   STRESS ULCER PPLX.   ..    8/20/2023 protonix 40   INFECTION PPLX.   ..     ALLGY.  ..    pncl                     WT.  ..  8/20/2023 56  BMI.  ..   8/20/2023 21      ABGS.   .     VS/ PO/IO/ VENT/ DRIPS.   8/27/2023 afeb 60 160/80   8/27/2023 ra 95%    DOA C/C.     . 8/20/2023 · [facility] for [facility] x 2 weeks. negative cxr and covid swab. poor PO intake today.           INITIAL PRESENTATION.   . 8/20/2023 89-year-old female with history of hypothyroidism, A-fib on Eliquis, recent pacemaker for complete heart block brought in by ambulance from [facility] assisted living home for cough for the past 2 weeks.  Associated with generalized weakness and decreased p.o. intake.  No fall or trauma.  Patient states she has had cold symptoms for a while.  Denies fever, chills, chest pain, shortness of breath, abdominal pain, nausea, vomiting, upper or lower extremity weakness or paresthesias.   pmd: Dr. Crawley, cards: [facility].   . hypothyroidism   . A-fib on Eliquis (hx AFL ablation),  .  PFO  HOME MEDS.   . levoxyl eliquis 2.5 x 2   COURSE.  . 8/20/2023 pulm consultd    . 8/20/2023 5:39 PM ER meds given already include cefepime 2g                  MAIN ISSUES .   . CC 8/20/2023 89-year-old female with history of hypothyroidism, A-fib on Eliquis, recent pacemaker for complete heart block from [facility] for cogh 2 w found to have E coli ESBL cateremia   . COUGH x 2 wk poa 8/20/2023  . Pneumonia   .. ct chest 8/20/2023   .... distention of mid to upper esophagus   . E coli ESBL CTX M bacteremia 8/20 8/21  .. bc 8/22 (-)   .. 8/20/2023 levaquin -> 8/21 ertapenem 1 x 8d Dr Roberts  . COPD   . A fib   .. 8/20/2023 apixaba   . CHF   .. bnp 8/20-8/23/2023 bnp 4545 - 3219   .. echo 8/21/2023 ef 56% pasp 63   .. 8/22/2023 enalapril 10   . Dysphagia  .. esophagogram 8/22/2023 smooth tapering o distal esophagus       TIME SPENT.   . Over 36 minutes aggregate care time spent on encounter; activities included   direct patient care, counseling and/or coordinating care reviewing notes, lab data/ imaging , discussion with multidisciplinary team/ patient  /family and explaining in detail risks, benefits, alternatives  of the recommendations     JAKOB WORTHY 89 f 8/20/2023 9/22/1929 DR BAM HERRERA      REASON FOR VISIT  .. Management of problems listed below      PHYSICAL EXAM    HEENT Unremarkable  atraumatic   RESP Fair air entry  Harsh breath sound   CARDIAC S1 S2 No S3     NO JVD    ABDOMEN No hepatosplenomegaly   PEDAL EDEMA present No calf tenderness  NO rash       GENERAL DATA .     GOC.    .. 8/26/2023 dnr  ICU STAY.   .. none  COVID.   .. scv2 8/21/2023 (-)    BEST PRACTICE ISSUES.    HOB ELEVATN.   .. Yes  DVT PPLX.   ..  8/20/2023 apixaba 2.5 x 2 (af)     SPEECH SWALLOW RECOMMENDATIONS.    ..    8/21/2023 soft bite     DIET.    ..  8/20/2023 soft bite size   IV fl.  .. 8/22/2023 D5 1/2 ns  50   STRESS ULCER PPLX.   ..    8/20/2023 protonix 40   INFECTION PPLX.   ..     ALLGY.  ..    pncl                     WT.  ..  8/20/2023 56  BMI.  ..   8/20/2023 21      ABGS.   .     VS/ PO/IO/ VENT/ DRIPS.   8/27/2023 afeb 60 160/80   8/27/2023 ra 95%    DOA C/C.     . 8/20/2023 · [facility]. negative cxr and covid swab. poor PO intake today.           INITIAL PRESENTATION.   . 8/20/2023 89-year-old female with history of hypothyroidism, A-fib on Eliquis, recent pacemaker for complete heart block brought in by ambulance from [facility] assisted living home for cough for the past 2 weeks.  Associated with generalized weakness and decreased p.o. intake.  No fall or trauma.  Patient states she has had cold symptoms for a while.  Denies fever, chills, chest pain, shortness of breath, abdominal pain, nausea, vomiting, upper or lower extremity weakness or paresthesias.   pmd: Dr. Crawley, cards: [facility].   . hypothyroidism   . A-fib on Eliquis (hx AFL ablation),  .  PFO  HOME MEDS.   . levoxyl eliquis 2.5 x 2   COURSE.  . 8/20/2023 pulm consultd    . 8/20/2023 5:39 PM ER meds given already include cefepime 2g                  MAIN ISSUES .   . CC 8/20/2023 89-year-old female with history of hypothyroidism, A-fib on Eliquis, [facility] for [facility] found to have E coli ESBL cateremia   . COUGH x 2 wk poa 8/20/2023  . Pneumonia   .. ct chest 8/20/2023   .... distention of mid to upper esophagus   . E coli ESBL CTX M bacteremia 8/20 8/21  .. bc 8/22 (-)   .. 8/20/2023 levaquin -> 8/21 ertapenem 1 x 8d Dr Roberts  . COPD   . A fib   .. 8/20/2023 apixaba   . CHF   .. bnp 8/20-8/23/2023 bnp 4545 - 3219   .. echo 8/21/2023 ef 56% pasp 63   .. 8/22/2023 enalapril 10   . Dysphagia  .. esophagogram 8/22/2023 smooth tapering o distal esophagus       TIME SPENT.   . Over 36 minutes aggregate care time spent on encounter; activities included   direct patient care, counseling and/or coordinating care reviewing notes, lab data/ imaging , discussion with multidisciplinary team/ patient  /family and explaining in detail risks, benefits, alternatives  of the recommendations     JAKOB WORTHY 89 f 8/20/2023 9/22/1929 DR BAM HERRERA

## 2023-08-27 NOTE — PROGRESS NOTE ADULT - ASSESSMENT
89-year-old female with history of hypothyroidism, A-fib on Eliquis, s/p recent pacemaker for complete heart block brought in by ambulance from [facility] assisted living home for cough for the past 2 weeks.  Associated with generalized weakness and decreased p.o. intake.  No fall or trauma.  Patient states she has had cold symptoms for a while.  Denies fever, chills, chest pain, shortness of breath, abdominal pain, nausea, vomiting, upper or lower extremity weakness or paresthesias. pmd: Dr. Crawley, cards: [facility] Seen by card Dr Reich Admitted  to telemetry unit for monitoring , send 3 sets of cardiac enzymes to rule out acute coronary event, obtain ECHO to evaluate LVEF, cardiology consult  ,continue current management, O2 supply, anticoagulation plan as per cardiology consult Pulm cons requested , antitussives and nebulized bd ordered .Also UTI suspected and started on iv abx , id cons called Palliative care consult requested ,to discuss advance directives and complete MOLST  89-year-old female with history of hypothyroidism, A-fib on Eliquis, s/p recent pacemaker for complete heart block brought in by ambulance from [facility] assisted living home for cough for the past 2 weeks.  Associated with generalized weakness and decreased p.o. intake.  No fall or trauma.  Patient states she has had cold symptoms for a while.  Denies fever, chills, chest pain, shortness of breath, abdominal pain, nausea, vomiting, upper or lower extremity weakness or paresthesias. pmd: Dr. Crawley, cards: [facility] Seen by card Dr Reich Admitted  to telemetry unit for monitoring , send 3 sets of cardiac enzymes to rule out acute coronary event, obtain ECHO to evaluate LVEF, cardiology consult  ,continue current management, O2 supply, anticoagulation plan as per cardiology consult Pulm cons requested , antitussives and nebulized bd ordered .Also UTI suspected and started on iv abx , id cons called Palliative care consult requested ,to discuss advance directives and complete MOLST  89-year-old female with history of hypothyroidism, A-fib on Eliquis, s/p recent pacemaker for complete heart block brought in by ambulance from [facility] assisted living home for cough for the past 2 weeks.  Associated with generalized weakness and decreased p.o. intake.  No fall or trauma.  Patient states she has had cold symptoms for a while.  Denies fever, chills, chest pain, shortness of breath, abdominal pain, nausea, vomiting, upper or lower extremity weakness or paresthesias. pmd: Dr. Crawley, cards: [facility] Seen by card Dr Reich Admitted  to telemetry unit for monitoring , send 3 sets of cardiac enzymes to rule out acute coronary event, obtain ECHO to evaluate LVEF, cardiology consult  ,continue current management, O2 supply, anticoagulation plan as per cardiology consult Pulm cons requested , antitussives and nebulized bd ordered .Also UTI suspected and started on iv abx , id cons called Palliative care consult requested ,to discuss advance directives and complete MOLST

## 2023-08-27 NOTE — PROGRESS NOTE ADULT - SUBJECTIVE AND OBJECTIVE BOX
Patient is a 89y Female with a known history of :  HTN (hypertension) [I10]    Afib [I48.91]    Spinal stenosis [M48.00]    Hypothyroidism [E03.9]    Acute UTI [N39.0]    Acute bronchitis [J20.9]    Hypokalemia [E87.6]    Prophylactic measure [Z29.9]    Hypothyroidism [E03.9]    S/P placement of cardiac pacemaker [Z95.0]    Esophageal dilatation [K22.89]    Gram-negative bacteremia [R78.81]    Sepsis [A41.9]      HPI:  89-year-old female with history of hypothyroidism, A-fib on Eliquis, s/p recent pacemaker for complete heart block brought in by ambulance from [facility] living home for cough for the past 2 weeks.  Associated with generalized weakness and decreased p.o. intake.  No fall or trauma.  Patient states she has had cold symptoms for a while.  Denies fever, chills, chest pain, shortness of breath, abdominal pain, nausea, vomiting, upper or lower extremity weakness or paresthesias. pmd: Dr. Crawley, cards: [facility] Seen by card Dr Reich Admitted  to telemetry unit for monitoring , send 3 sets of cardiac enzymes to rule out acute coronary event, obtain ECHO to evaluate LVEF, cardiology consult  ,continue current management, O2 supply, anticoagulation plan as per cardiology consult Pulm cons requested , antitussives and nebulized bd ordered .Also UTI suspected and started on iv abx , id cons called Palliative care consult requested ,to discuss advance directives and complete MOLST  (20 Aug 2023 15:35)      REVIEW OF SYSTEMS:    CONSTITUTIONAL: No fever, weight loss, or fatigue  EYES: No eye pain, visual disturbances, or discharge  ENMT:  No difficulty hearing, tinnitus, vertigo; No sinus or throat pain  NECK: No pain or stiffness  BREASTS: No pain, masses, or nipple discharge  RESPIRATORY: No cough, wheezing, chills or hemoptysis; No shortness of breath  CARDIOVASCULAR: No chest pain, palpitations, dizziness, or leg swelling  GASTROINTESTINAL: No abdominal or epigastric pain. No nausea, vomiting, or hematemesis; No diarrhea or constipation. No melena or hematochezia.  GENITOURINARY: No dysuria, frequency, hematuria, or incontinence  NEUROLOGICAL: No headaches, memory loss, loss of strength, numbness, or tremors  SKIN: No itching, burning, rashes, or lesions   LYMPH NODES: No enlarged glands  ENDOCRINE: No heat or cold intolerance; No hair loss  MUSCULOSKELETAL: No joint pain or swelling; No muscle, back, or extremity pain  PSYCHIATRIC: No depression, anxiety, mood swings, or difficulty sleeping  HEME/LYMPH: No easy bruising, or bleeding gums  ALLERGY AND IMMUNOLOGIC: No hives or eczema    MEDICATIONS  (STANDING):  aMIOdarone    Tablet 200 milliGRAM(s) Oral daily  amLODIPine   Tablet 10 milliGRAM(s) Oral daily  apixaban 2.5 milliGRAM(s) Oral two times a day  artificial  tears Solution 1 Drop(s) Both EYES two times a day  ertapenem  IVPB 1000 milliGRAM(s) IV Intermittent every 24 hours  ferrous    sulfate 325 milliGRAM(s) Oral daily  lactobacillus acidophilus 1 Tablet(s) Oral every 12 hours  levothyroxine 75 MICROGram(s) Oral daily  metoprolol tartrate 50 milliGRAM(s) Oral two times a day  multivitamin/minerals 1 Tablet(s) Oral daily  pantoprazole    Tablet 40 milliGRAM(s) Oral daily  polyethylene glycol 3350 17 Gram(s) Oral daily  potassium chloride    Tablet ER 10 milliEquivalent(s) Oral two times a day    MEDICATIONS  (PRN):  acetaminophen     Tablet .. 650 milliGRAM(s) Oral every 6 hours PRN Temp greater or equal to 38C (100.4F), Mild Pain (1 - 3)  albuterol/ipratropium for Nebulization 3 milliLiter(s) Nebulizer every 6 hours PRN Shortness of Breath and/or Wheezing  aluminum hydroxide/magnesium hydroxide/simethicone Suspension 30 milliLiter(s) Oral every 4 hours PRN Dyspepsia  guaiFENesin Oral Liquid (Sugar-Free) 200 milliGRAM(s) Oral every 6 hours PRN Cough  melatonin 3 milliGRAM(s) Oral at bedtime PRN Insomnia  ondansetron Injectable 4 milliGRAM(s) IV Push every 8 hours PRN Nausea and/or Vomiting      ALLERGIES: penicillin (Unknown)      FAMILY HISTORY:      PHYSICAL EXAMINATION:  -----------------------------  T(C): 36.7 (08-27-23 @ 04:35), Max: 36.7 (08-27-23 @ 04:35)  HR: 64 (08-27-23 @ 04:35) (64 - 98)  BP: 180/82 (08-27-23 @ 04:35) (173/82 - 180/82)  RR: 18 (08-27-23 @ 04:35) (18 - 19)  SpO2: 97% (08-27-23 @ 04:35) (94% - 98%)  Wt(kg): --    08-26 @ 07:01  -  08-27 @ 07:00  --------------------------------------------------------  IN:    IV PiggyBack: 50 mL  Total IN: 50 mL    OUT:    Voided (mL): 500 mL  Total OUT: 500 mL    Total NET: -450 mL            VITALS  T(C): 36.7 (08-27-23 @ 04:35), Max: 36.7 (08-27-23 @ 04:35)  HR: 64 (08-27-23 @ 04:35) (64 - 98)  BP: 180/82 (08-27-23 @ 04:35) (173/82 - 180/82)  RR: 18 (08-27-23 @ 04:35) (18 - 19)  SpO2: 97% (08-27-23 @ 04:35) (94% - 98%)    Constitutional: well developed, normal appearance, well groomed, well nourished, no deformities and no acute distress.   Eyes: the conjunctiva exhibited no abnormalities and the eyelids demonstrated no xanthelasmas.   HEENT: normal oral mucosa, no oral pallor and no oral cyanosis.   Neck: normal jugular venous A waves present, normal jugular venous V waves present and no jugular venous gibson A waves.   Pulmonary: no respiratory distress, normal respiratory rhythm and effort, no accessory muscle use and lungs were clear to auscultation bilaterally.   Cardiovascular: heart rate and rhythm were normal, normal S1 and S2 and no murmur, gallop, rub, heave or thrill are present.   Abdomen: soft, non-tender, no hepato-splenomegaly and no abdominal mass palpated.   Musculoskeletal: the gait could not be assessed..   Extremities: no clubbing of the fingernails, no localized cyanosis, no petechial hemorrhages and no ischemic changes.   Skin: normal skin color and pigmentation, no rash, no venous stasis, no skin lesions, no skin ulcer and no xanthoma was observed.   Psychiatric: oriented to person, place, and time, the affect was normal, the mood was normal and not feeling anxious.     LABS:   --------                     RADIOLOGY:  -----------------    ECG:     ECHO: Patient is a 89y Female with a known history of :  HTN (hypertension) [I10]    Afib [I48.91]    Spinal stenosis [M48.00]    Hypothyroidism [E03.9]    Acute UTI [N39.0]    Acute bronchitis [J20.9]    Hypokalemia [E87.6]    Prophylactic measure [Z29.9]    Hypothyroidism [E03.9]    S/P placement of cardiac pacemaker [Z95.0]    Esophageal dilatation [K22.89]    Gram-negative bacteremia [R78.81]    Sepsis [A41.9]      HPI:  89-year-old female with history of hypothyroidism, A-fib on Eliquis, s/p recent pacemaker for complete heart block brought in by ambulance from [facility] living home for cough for the past 2 weeks.  Associated with generalized weakness and decreased p.o. intake.  No fall or trauma.  Patient states she has had cold symptoms for a while.  Denies fever, chills, chest pain, shortness of breath, abdominal pain, nausea, vomiting, upper or lower extremity weakness or paresthesias. pmd: Dr. Crawley, cards: [facility] Seen by card Dr Reich Admitted  to telemetry unit for monitoring , send 3 sets of cardiac enzymes to rule out acute coronary event, obtain ECHO to evaluate LVEF, cardiology consult  ,continue current management, O2 supply, anticoagulation plan as per cardiology consult Pulm cons requested , antitussives and nebulized bd ordered .Also UTI suspected and started on iv abx , id cons called Palliative care consult requested ,to discuss advance directives and complete MOLST  (20 Aug 2023 15:35)      REVIEW OF SYSTEMS:    CONSTITUTIONAL: No fever, weight loss, or fatigue  EYES: No eye pain, visual disturbances, or discharge  ENMT:  No difficulty hearing, tinnitus, vertigo; No sinus or throat pain  NECK: No pain or stiffness  BREASTS: No pain, masses, or nipple discharge  RESPIRATORY: No cough, wheezing, chills or hemoptysis; No shortness of breath  CARDIOVASCULAR: No chest pain, palpitations, dizziness, or leg swelling  GASTROINTESTINAL: No abdominal or epigastric pain. No nausea, vomiting, or hematemesis; No diarrhea or constipation. No melena or hematochezia.  GENITOURINARY: No dysuria, frequency, hematuria, or incontinence  NEUROLOGICAL: No headaches, memory loss, loss of strength, numbness, or tremors  SKIN: No itching, burning, rashes, or lesions   LYMPH NODES: No enlarged glands  ENDOCRINE: No heat or cold intolerance; No hair loss  MUSCULOSKELETAL: No joint pain or swelling; No muscle, back, or extremity pain  PSYCHIATRIC: No depression, anxiety, mood swings, or difficulty sleeping  HEME/LYMPH: No easy bruising, or bleeding gums  ALLERGY AND IMMUNOLOGIC: No hives or eczema    MEDICATIONS  (STANDING):  aMIOdarone    Tablet 200 milliGRAM(s) Oral daily  amLODIPine   Tablet 10 milliGRAM(s) Oral daily  apixaban 2.5 milliGRAM(s) Oral two times a day  artificial  tears Solution 1 Drop(s) Both EYES two times a day  ertapenem  IVPB 1000 milliGRAM(s) IV Intermittent every 24 hours  ferrous    sulfate 325 milliGRAM(s) Oral daily  lactobacillus acidophilus 1 Tablet(s) Oral every 12 hours  levothyroxine 75 MICROGram(s) Oral daily  metoprolol tartrate 50 milliGRAM(s) Oral two times a day  multivitamin/minerals 1 Tablet(s) Oral daily  pantoprazole    Tablet 40 milliGRAM(s) Oral daily  polyethylene glycol 3350 17 Gram(s) Oral daily  potassium chloride    Tablet ER 10 milliEquivalent(s) Oral two times a day    MEDICATIONS  (PRN):  acetaminophen     Tablet .. 650 milliGRAM(s) Oral every 6 hours PRN Temp greater or equal to 38C (100.4F), Mild Pain (1 - 3)  albuterol/ipratropium for Nebulization 3 milliLiter(s) Nebulizer every 6 hours PRN Shortness of Breath and/or Wheezing  aluminum hydroxide/magnesium hydroxide/simethicone Suspension 30 milliLiter(s) Oral every 4 hours PRN Dyspepsia  guaiFENesin Oral Liquid (Sugar-Free) 200 milliGRAM(s) Oral every 6 hours PRN Cough  melatonin 3 milliGRAM(s) Oral at bedtime PRN Insomnia  ondansetron Injectable 4 milliGRAM(s) IV Push every 8 hours PRN Nausea and/or Vomiting      ALLERGIES: penicillin (Unknown)      FAMILY HISTORY:      PHYSICAL EXAMINATION:  -----------------------------  T(C): 36.7 (08-27-23 @ 04:35), Max: 36.7 (08-27-23 @ 04:35)  HR: 64 (08-27-23 @ 04:35) (64 - 98)  BP: 180/82 (08-27-23 @ 04:35) (173/82 - 180/82)  RR: 18 (08-27-23 @ 04:35) (18 - 19)  SpO2: 97% (08-27-23 @ 04:35) (94% - 98%)  Wt(kg): --    08-26 @ 07:01  -  08-27 @ 07:00  --------------------------------------------------------  IN:    IV PiggyBack: 50 mL  Total IN: 50 mL    OUT:    Voided (mL): 500 mL  Total OUT: 500 mL    Total NET: -450 mL            VITALS  T(C): 36.7 (08-27-23 @ 04:35), Max: 36.7 (08-27-23 @ 04:35)  HR: 64 (08-27-23 @ 04:35) (64 - 98)  BP: 180/82 (08-27-23 @ 04:35) (173/82 - 180/82)  RR: 18 (08-27-23 @ 04:35) (18 - 19)  SpO2: 97% (08-27-23 @ 04:35) (94% - 98%)    Constitutional: well developed, normal appearance, well groomed, well nourished, no deformities and no acute distress.   Eyes: the conjunctiva exhibited no abnormalities and the eyelids demonstrated no xanthelasmas.   HEENT: normal oral mucosa, no oral pallor and no oral cyanosis.   Neck: normal jugular venous A waves present, normal jugular venous V waves present and no jugular venous gibson A waves.   Pulmonary: no respiratory distress, normal respiratory rhythm and effort, no accessory muscle use and lungs were clear to auscultation bilaterally.   Cardiovascular: heart rate and rhythm were normal, normal S1 and S2 and no murmur, gallop, rub, heave or thrill are present.   Abdomen: soft, non-tender, no hepato-splenomegaly and no abdominal mass palpated.   Musculoskeletal: the gait could not be assessed..   Extremities: no clubbing of the fingernails, no localized cyanosis, no petechial hemorrhages and no ischemic changes.   Skin: normal skin color and pigmentation, no rash, no venous stasis, no skin lesions, no skin ulcer and no xanthoma was observed.   Psychiatric: oriented to person, place, and time, the affect was normal, the mood was normal and not feeling anxious.     LABS:   --------                     RADIOLOGY:  -----------------    ECG:     ECHO: Patient is a 89y Female with a known history of :  HTN (hypertension) [I10]    Afib [I48.91]    Spinal stenosis [M48.00]    Hypothyroidism [E03.9]    Acute UTI [N39.0]    Acute bronchitis [J20.9]    Hypokalemia [E87.6]    Prophylactic measure [Z29.9]    Hypothyroidism [E03.9]    S/P placement of cardiac pacemaker [Z95.0]    Esophageal dilatation [K22.89]    Gram-negative bacteremia [R78.81]    Sepsis [A41.9]      HPI:  89-year-old female with history of hypothyroidism, A-fib on Eliquis, s/p recent pacemaker for complete heart block brought in by ambulance from [facility] living home for cough for the past 2 weeks.  Associated with generalized weakness and decreased p.o. intake.  No fall or trauma.  Patient states she has had cold symptoms for a while.  Denies fever, chills, chest pain, shortness of breath, abdominal pain, nausea, vomiting, upper or lower extremity weakness or paresthesias. pmd: Dr. Crawley, cards: [facility] Seen by card Dr Reich Admitted  to telemetry unit for monitoring , send 3 sets of cardiac enzymes to rule out acute coronary event, obtain ECHO to evaluate LVEF, cardiology consult  ,continue current management, O2 supply, anticoagulation plan as per cardiology consult Pulm cons requested , antitussives and nebulized bd ordered .Also UTI suspected and started on iv abx , id cons called Palliative care consult requested ,to discuss advance directives and complete MOLST  (20 Aug 2023 15:35)      REVIEW OF SYSTEMS:    CONSTITUTIONAL: No fever, weight loss, or fatigue  EYES: No eye pain, visual disturbances, or discharge  ENMT:  No difficulty hearing, tinnitus, vertigo; No sinus or throat pain  NECK: No pain or stiffness  BREASTS: No pain, masses, or nipple discharge  RESPIRATORY: No cough, wheezing, chills or hemoptysis; No shortness of breath  CARDIOVASCULAR: No chest pain, palpitations, dizziness, or leg swelling  GASTROINTESTINAL: No abdominal or epigastric pain. No nausea, vomiting, or hematemesis; No diarrhea or constipation. No melena or hematochezia.  GENITOURINARY: No dysuria, frequency, hematuria, or incontinence  NEUROLOGICAL: No headaches, memory loss, loss of strength, numbness, or tremors  SKIN: No itching, burning, rashes, or lesions   LYMPH NODES: No enlarged glands  ENDOCRINE: No heat or cold intolerance; No hair loss  MUSCULOSKELETAL: No joint pain or swelling; No muscle, back, or extremity pain  PSYCHIATRIC: No depression, anxiety, mood swings, or difficulty sleeping  HEME/LYMPH: No easy bruising, or bleeding gums  ALLERGY AND IMMUNOLOGIC: No hives or eczema    MEDICATIONS  (STANDING):  aMIOdarone    Tablet 200 milliGRAM(s) Oral daily  amLODIPine   Tablet 10 milliGRAM(s) Oral daily  apixaban 2.5 milliGRAM(s) Oral two times a day  artificial  tears Solution 1 Drop(s) Both EYES two times a day  ertapenem  IVPB 1000 milliGRAM(s) IV Intermittent every 24 hours  ferrous    sulfate 325 milliGRAM(s) Oral daily  lactobacillus acidophilus 1 Tablet(s) Oral every 12 hours  levothyroxine 75 MICROGram(s) Oral daily  metoprolol tartrate 50 milliGRAM(s) Oral two times a day  multivitamin/minerals 1 Tablet(s) Oral daily  pantoprazole    Tablet 40 milliGRAM(s) Oral daily  polyethylene glycol 3350 17 Gram(s) Oral daily  potassium chloride    Tablet ER 10 milliEquivalent(s) Oral two times a day    MEDICATIONS  (PRN):  acetaminophen     Tablet .. 650 milliGRAM(s) Oral every 6 hours PRN Temp greater or equal to 38C (100.4F), Mild Pain (1 - 3)  albuterol/ipratropium for Nebulization 3 milliLiter(s) Nebulizer every 6 hours PRN Shortness of Breath and/or Wheezing  aluminum hydroxide/magnesium hydroxide/simethicone Suspension 30 milliLiter(s) Oral every 4 hours PRN Dyspepsia  guaiFENesin Oral Liquid (Sugar-Free) 200 milliGRAM(s) Oral every 6 hours PRN Cough  melatonin 3 milliGRAM(s) Oral at bedtime PRN Insomnia  ondansetron Injectable 4 milliGRAM(s) IV Push every 8 hours PRN Nausea and/or Vomiting      ALLERGIES: penicillin (Unknown)      FAMILY HISTORY:      PHYSICAL EXAMINATION:  -----------------------------  T(C): 36.7 (08-27-23 @ 04:35), Max: 36.7 (08-27-23 @ 04:35)  HR: 64 (08-27-23 @ 04:35) (64 - 98)  BP: 180/82 (08-27-23 @ 04:35) (173/82 - 180/82)  RR: 18 (08-27-23 @ 04:35) (18 - 19)  SpO2: 97% (08-27-23 @ 04:35) (94% - 98%)  Wt(kg): --    08-26 @ 07:01  -  08-27 @ 07:00  --------------------------------------------------------  IN:    IV PiggyBack: 50 mL  Total IN: 50 mL    OUT:    Voided (mL): 500 mL  Total OUT: 500 mL    Total NET: -450 mL            VITALS  T(C): 36.7 (08-27-23 @ 04:35), Max: 36.7 (08-27-23 @ 04:35)  HR: 64 (08-27-23 @ 04:35) (64 - 98)  BP: 180/82 (08-27-23 @ 04:35) (173/82 - 180/82)  RR: 18 (08-27-23 @ 04:35) (18 - 19)  SpO2: 97% (08-27-23 @ 04:35) (94% - 98%)    Constitutional: well developed, normal appearance, well groomed, well nourished, no deformities and no acute distress.   Eyes: the conjunctiva exhibited no abnormalities and the eyelids demonstrated no xanthelasmas.   HEENT: normal oral mucosa, no oral pallor and no oral cyanosis.   Neck: normal jugular venous A waves present, normal jugular venous V waves present and no jugular venous gibson A waves.   Pulmonary: no respiratory distress, normal respiratory rhythm and effort, no accessory muscle use and lungs were clear to auscultation bilaterally.   Cardiovascular: heart rate and rhythm were normal, normal S1 and S2 and no murmur, gallop, rub, heave or thrill are present.   Abdomen: soft, non-tender, no hepato-splenomegaly and no abdominal mass palpated.   Musculoskeletal: the gait could not be assessed..   Extremities: no clubbing of the fingernails, no localized cyanosis, no petechial hemorrhages and no ischemic changes.   Skin: normal skin color and pigmentation, no rash, no venous stasis, no skin lesions, no skin ulcer and no xanthoma was observed.   Psychiatric: oriented to person, place, and time, the affect was normal, the mood was normal and not feeling anxious.     LABS:   --------                     RADIOLOGY:  -----------------    ECG:     ECHO:

## 2023-08-27 NOTE — PROGRESS NOTE ADULT - SUBJECTIVE AND OBJECTIVE BOX
CHIEF COMPLAINT/ REASON FOR VISIT  .. Patient was seen to address the  issue listed under PROBLEM LIST which is located toward bottom of this note     DEACON ROBERT    PLV 1EAS 106 W1    Allergies    penicillin (Unknown)    Intolerances        PAST MEDICAL & SURGICAL HISTORY:  HTN (hypertension)      Afib      Spinal stenosis      PFO (patent foramen ovale)      Degeneration macular      Osteoporosis      History of complete heart block      Hypothyroidism      Cardiac pacemaker          FAMILY HISTORY:      Home Medications:  Albuterol (Eqv-ProAir HFA) 90 mcg/inh inhalation aerosol: 1 puff(s) inhaled 4 times a day as needed for  cough or wheezing (21 Aug 2023 09:56)  amiodarone 200 mg oral tablet: 1 tab(s) orally once a day (21 Aug 2023 09:55)  clindamycin 1% topical lotion: Apply topically to affected area once a day to face (21 Aug 2023 09:55)  Eliquis 2.5 mg oral tablet: 1 tab(s) orally 2 times a day (21 Aug 2023 09:55)  enalapril 10 mg oral tablet: 1 tab(s) orally once a day (21 Aug 2023 09:55)  ferrous sulfate 325 mg (65 mg elemental iron) oral tablet: 1 tab(s) orally once a day (21 Aug 2023 09:55)  levothyroxine 75 mcg (0.075 mg) oral tablet: 1 tab(s) orally once a day (21 Aug 2023 09:56)  Metoprolol Tartrate 50 mg oral tablet: 1 tab(s) orally 2 times a day (21 Aug 2023 09:56)  Myrbetriq 50 mg oral tablet, extended release: 1 tab(s) orally once a day (21 Aug 2023 09:56)  polyethylene glycol 3350 oral powder for reconstitution: 17 gram(s) orally once a day (21 Aug 2023 09:56)  PreserVision AREDS oral capsule: 1 cap(s) orally once a day (21 Aug 2023 09:56)  Refresh Dry Eye Therapy ophthalmic solution: 1 drop(s) in each eye 4 times a day (21 Aug 2023 09:56)  Tylenol 325 mg oral tablet: 2 tab(s) orally every 4 hours as needed for pain or fever (21 Aug 2023 09:56)      MEDICATIONS  (STANDING):  aMIOdarone    Tablet 200 milliGRAM(s) Oral daily  amLODIPine   Tablet 10 milliGRAM(s) Oral daily  apixaban 2.5 milliGRAM(s) Oral two times a day  artificial  tears Solution 1 Drop(s) Both EYES two times a day  ertapenem  IVPB 1000 milliGRAM(s) IV Intermittent every 24 hours  ferrous    sulfate 325 milliGRAM(s) Oral daily  lactobacillus acidophilus 1 Tablet(s) Oral every 12 hours  levothyroxine 75 MICROGram(s) Oral daily  metoprolol tartrate 50 milliGRAM(s) Oral two times a day  multivitamin/minerals 1 Tablet(s) Oral daily  pantoprazole    Tablet 40 milliGRAM(s) Oral daily  polyethylene glycol 3350 17 Gram(s) Oral daily  potassium chloride    Tablet ER 10 milliEquivalent(s) Oral two times a day    MEDICATIONS  (PRN):  acetaminophen     Tablet .. 650 milliGRAM(s) Oral every 6 hours PRN Temp greater or equal to 38C (100.4F), Mild Pain (1 - 3)  albuterol/ipratropium for Nebulization 3 milliLiter(s) Nebulizer every 6 hours PRN Shortness of Breath and/or Wheezing  aluminum hydroxide/magnesium hydroxide/simethicone Suspension 30 milliLiter(s) Oral every 4 hours PRN Dyspepsia  guaiFENesin Oral Liquid (Sugar-Free) 200 milliGRAM(s) Oral every 6 hours PRN Cough  melatonin 3 milliGRAM(s) Oral at bedtime PRN Insomnia  ondansetron Injectable 4 milliGRAM(s) IV Push every 8 hours PRN Nausea and/or Vomiting              Vital Signs Last 24 Hrs  T(C): 36.7 (27 Aug 2023 04:35), Max: 36.7 (27 Aug 2023 04:35)  T(F): 98.1 (27 Aug 2023 04:35), Max: 98.1 (27 Aug 2023 04:35)  HR: 64 (27 Aug 2023 04:35) (64 - 98)  BP: 180/82 (27 Aug 2023 04:35) (173/82 - 180/82)  BP(mean): --  RR: 18 (27 Aug 2023 04:35) (18 - 19)  SpO2: 97% (27 Aug 2023 04:35) (94% - 98%)    Parameters below as of 27 Aug 2023 04:35  Patient On (Oxygen Delivery Method): nasal cannula          08-26-23 @ 07:01  -  08-27-23 @ 07:00  --------------------------------------------------------  IN: 50 mL / OUT: 500 mL / NET: -450 mL              LABS:                        11.4   10.12 )-----------( x        ( 27 Aug 2023 07:14 )             36.5     08-27    141  |  106  |  11  ----------------------------<  86  4.2   |  28  |  0.52    Ca    8.6      27 Aug 2023 07:14        Urinalysis Basic - ( 27 Aug 2023 07:14 )    Color: x / Appearance: x / SG: x / pH: x  Gluc: 86 mg/dL / Ketone: x  / Bili: x / Urobili: x   Blood: x / Protein: x / Nitrite: x   Leuk Esterase: x / RBC: x / WBC x   Sq Epi: x / Non Sq Epi: x / Bacteria: x            WBC:  WBC Count: 10.12 K/uL (08-27 @ 07:14)  WBC Count: 7.13 K/uL (08-25 @ 06:05)  WBC Count: 5.80 K/uL (08-24 @ 06:48)      MICROBIOLOGY:  RECENT CULTURES:  08-24 .Blood Blood XXXX XXXX   No growth at 48 Hours    08-22 .Blood Blood XXXX XXXX   No growth at 4 days    08-22 .Blood Blood XXXX XXXX   No growth at 4 days    08-21 .Blood Blood XXXX   Growth in aerobic bottle: Gram Negative Rods   Growth in aerobic bottle: Escherichia coli ESBL  See previous culture [id number]    08-21 .Blood Blood XXXX   Growth in anaerobic bottle: Gram Negative Rods  Growth in aerobic bottle: Gram Negative Rods   Growth in aerobic and anaerobic bottles: Escherichia coli ESBL  See previous culture [id number]    08-20 Clean Catch Clean Catch (Midstream) Escherichia coli ESBL XXXX   >100,000 CFU/ml Escherichia coli ESBL    08-20 .Blood Blood-Peripheral XXXX   Growth in aerobic bottle: Gram Negative Rods   Growth in aerobic bottle: Escherichia coli ESBL  See previous culture [id number]    08-20 .Blood Blood-Peripheral Blood Culture PCR  Escherichia coli ESBL   Growth in aerobic and anaerobic bottles: Gram Negative Rods   Growth in aerobic and anaerobic bottles: Escherichia coli ESBL  Direct identification is available within approximately 3-5  hours either by Blood Panel Multiplexed PCR or Direct  MALDI-TOF. Details: [email or web address]                    Sodium:  Sodium: 141 mmol/L (08-27 @ 07:14)  Sodium: 143 mmol/L (08-25 @ 06:05)  Sodium: 142 mmol/L (08-24 @ 06:48)      0.52 mg/dL 08-27 @ 07:14  0.53 mg/dL 08-25 @ 06:05  0.62 mg/dL 08-24 @ 06:48      Hemoglobin:  Hemoglobin: 11.4 g/dL (08-27 @ 07:14)  Hemoglobin: 10.2 g/dL (08-25 @ 06:05)  Hemoglobin: 10.0 g/dL (08-24 @ 06:48)      Platelets: Platelet Count - Automated: 215 K/uL (08-25 @ 06:05)  Platelet Count - Automated: 203 K/uL (08-24 @ 06:48)          Urinalysis Basic - ( 27 Aug 2023 07:14 )    Color: x / Appearance: x / SG: x / pH: x  Gluc: 86 mg/dL / Ketone: x  / Bili: x / Urobili: x   Blood: x / Protein: x / Nitrite: x   Leuk Esterase: x / RBC: x / WBC x   Sq Epi: x / Non Sq Epi: x / Bacteria: x        RADIOLOGY & ADDITIONAL STUDIES:      MICROBIOLOGY:  RECENT CULTURES:  08-24 .Blood Blood XXXX XXXX   No growth at 48 Hours    08-22 .Blood Blood XXXX XXXX   No growth at 4 days    08-22 .Blood Blood XXXX XXXX   No growth at 4 days    08-21 .Blood Blood XXXX   Growth in aerobic bottle: Gram Negative Rods   Growth in aerobic bottle: Escherichia coli ESBL  See previous culture [id number]    08-21 .Blood Blood XXXX   Growth in anaerobic bottle: Gram Negative Rods  Growth in aerobic bottle: Gram Negative Rods   Growth in aerobic and anaerobic bottles: Escherichia coli ESBL  See previous culture [id number]    08-20 Clean Catch Clean Catch (Midstream) Escherichia coli ESBL XXXX   >100,000 CFU/ml Escherichia coli ESBL    08-20 .Blood Blood-Peripheral XXXX   Growth in aerobic bottle: Gram Negative Rods   Growth in aerobic bottle: Escherichia coli ESBL  See previous culture [id number]    08-20 .Blood Blood-Peripheral Blood Culture PCR  Escherichia coli ESBL   Growth in aerobic and anaerobic bottles: Gram Negative Rods   Growth in aerobic and anaerobic bottles: Escherichia coli ESBL  Direct identification is available within approximately 3-5  hours either by Blood Panel Multiplexed PCR or Direct  MALDI-TOF. Details: [email or web address]                 CHIEF COMPLAINT/ REASON FOR VISIT  .. Patient was seen to address the  issue listed under PROBLEM LIST which is located toward bottom of this note     DEACON ROBERT    PLV 1EAS 106 W1    Allergies    penicillin (Unknown)    Intolerances        PAST MEDICAL & SURGICAL HISTORY:  HTN (hypertension)      Afib      Spinal stenosis      PFO (patent foramen ovale)      Degeneration macular      Osteoporosis      History of complete heart block      Hypothyroidism      Cardiac pacemaker          FAMILY HISTORY:      Home Medications:  Albuterol (Eqv-ProAir HFA) 90 mcg/inh inhalation aerosol: 1 puff(s) inhaled 4 times a day as needed for  cough or wheezing (21 Aug 2023 09:56)  amiodarone 200 mg oral tablet: 1 tab(s) orally once a day (21 Aug 2023 09:55)  clindamycin 1% topical lotion: Apply topically to affected area once a day to face (21 Aug 2023 09:55)  Eliquis 2.5 mg oral tablet: 1 tab(s) orally 2 times a day (21 Aug 2023 09:55)  enalapril 10 mg oral tablet: 1 tab(s) orally once a day (21 Aug 2023 09:55)  ferrous sulfate 325 mg (65 mg elemental iron) oral tablet: 1 tab(s) orally once a day (21 Aug 2023 09:55)  levothyroxine 75 mcg (0.075 mg) oral tablet: 1 tab(s) orally once a day (21 Aug 2023 09:56)  Metoprolol Tartrate 50 mg oral tablet: 1 tab(s) orally 2 times a day (21 Aug 2023 09:56)  Myrbetriq 50 mg oral tablet, extended release: 1 tab(s) orally once a day (21 Aug 2023 09:56)  polyethylene glycol 3350 oral powder for reconstitution: 17 gram(s) orally once a day (21 Aug 2023 09:56)  PreserVision AREDS oral capsule: 1 cap(s) orally once a day (21 Aug 2023 09:56)  Refresh Dry Eye Therapy ophthalmic solution: 1 drop(s) in each eye 4 times a day (21 Aug 2023 09:56)  Tylenol 325 mg oral tablet: 2 tab(s) orally every 4 hours as needed for pain or fever (21 Aug 2023 09:56)      MEDICATIONS  (STANDING):  aMIOdarone    Tablet 200 milliGRAM(s) Oral daily  amLODIPine   Tablet 10 milliGRAM(s) Oral daily  apixaban 2.5 milliGRAM(s) Oral two times a day  artificial  tears Solution 1 Drop(s) Both EYES two times a day  ertapenem  IVPB 1000 milliGRAM(s) IV Intermittent every 24 hours  ferrous    sulfate 325 milliGRAM(s) Oral daily  lactobacillus acidophilus 1 Tablet(s) Oral every 12 hours  levothyroxine 75 MICROGram(s) Oral daily  metoprolol tartrate 50 milliGRAM(s) Oral two times a day  multivitamin/minerals 1 Tablet(s) Oral daily  pantoprazole    Tablet 40 milliGRAM(s) Oral daily  polyethylene glycol 3350 17 Gram(s) Oral daily  potassium chloride    Tablet ER 10 milliEquivalent(s) Oral two times a day    MEDICATIONS  (PRN):  acetaminophen     Tablet .. 650 milliGRAM(s) Oral every 6 hours PRN Temp greater or equal to 38C (100.4F), Mild Pain (1 - 3)  albuterol/ipratropium for Nebulization 3 milliLiter(s) Nebulizer every 6 hours PRN Shortness of Breath and/or Wheezing  aluminum hydroxide/magnesium hydroxide/simethicone Suspension 30 milliLiter(s) Oral every 4 hours PRN Dyspepsia  guaiFENesin Oral Liquid (Sugar-Free) 200 milliGRAM(s) Oral every 6 hours PRN Cough  melatonin 3 milliGRAM(s) Oral at bedtime PRN Insomnia  ondansetron Injectable 4 milliGRAM(s) IV Push every 8 hours PRN Nausea and/or Vomiting              Vital Signs Last 24 Hrs  T(C): 36.7 (27 Aug 2023 04:35), Max: 36.7 (27 Aug 2023 04:35)  T(F): 98.1 (27 Aug 2023 04:35), Max: 98.1 (27 Aug 2023 04:35)  HR: 64 (27 Aug 2023 04:35) (64 - 98)  BP: 180/82 (27 Aug 2023 04:35) (173/82 - 180/82)  BP(mean): --  RR: 18 (27 Aug 2023 04:35) (18 - 19)  SpO2: 97% (27 Aug 2023 04:35) (94% - 98%)    Parameters below as of 27 Aug 2023 04:35  Patient On (Oxygen Delivery Method): nasal cannula          08-26-23 @ 07:01  -  08-27-23 @ 07:00  --------------------------------------------------------  IN: 50 mL / OUT: 500 mL / NET: -450 mL              LABS:                        11.4   10.12 )-----------( x        ( 27 Aug 2023 07:14 )             36.5     08-27    141  |  106  |  11  ----------------------------<  86  4.2   |  28  |  0.52    Ca    8.6      27 Aug 2023 07:14        Urinalysis Basic - ( 27 Aug 2023 07:14 )    Color: x / Appearance: x / SG: x / pH: x  Gluc: 86 mg/dL / Ketone: x  / Bili: x / Urobili: x   Blood: x / Protein: x / Nitrite: x   Leuk Esterase: x / RBC: x / WBC x   Sq Epi: x / Non Sq Epi: x / Bacteria: x            WBC:  WBC Count: 10.12 K/uL (08-27 @ 07:14)  WBC Count: 7.13 K/uL (08-25 @ 06:05)  WBC Count: 5.80 K/uL (08-24 @ 06:48)      MICROBIOLOGY:  RECENT CULTURES:  08-24 .Blood Blood XXXX XXXX   No growth at 48 Hours    08-22 .Blood Blood XXXX XXXX   No growth at 4 days    08-22 .Blood Blood XXXX XXXX   No growth at 4 days    08-21 .Blood Blood XXXX   Growth in aerobic bottle: Gram Negative Rods   Growth in aerobic bottle: Escherichia coli ESBL  See previous culture [id number]    08-21 .Blood Blood XXXX   Growth in anaerobic bottle: Gram Negative Rods  Growth in aerobic bottle: Gram Negative Rods   Growth in aerobic and anaerobic bottles: Escherichia coli ESBL  See previous culture [id number]    08-20 Clean Catch Clean Catch (Midstream) Escherichia coli ESBL XXXX   >100,000 CFU/ml Escherichia coli ESBL    08-20 .Blood Blood-Peripheral XXXX   Growth in aerobic bottle: Gram Negative Rods   Growth in aerobic bottle: Escherichia coli ESBL  See previous culture [id number]    08-20 .Blood Blood-Peripheral Blood Culture PCR  Escherichia coli ESBL   Growth in aerobic and anaerobic bottles: Gram Negative Rods   Growth in aerobic and anaerobic bottles: Escherichia coli ESBL  Direct identification is available within approximately 3-5  hours either by Blood Panel Multiplexed PCR or Direct  MALDI-TOF. Details: [email or web address]                    Sodium:  Sodium: 141 mmol/L (08-27 @ 07:14)  Sodium: 143 mmol/L (08-25 @ 06:05)  Sodium: 142 mmol/L (08-24 @ 06:48)      0.52 mg/dL 08-27 @ 07:14  0.53 mg/dL 08-25 @ 06:05  0.62 mg/dL 08-24 @ 06:48      Hemoglobin:  Hemoglobin: 11.4 g/dL (08-27 @ 07:14)  Hemoglobin: 10.2 g/dL (08-25 @ 06:05)  Hemoglobin: 10.0 g/dL (08-24 @ 06:48)      Platelets: Platelet Count - Automated: 215 K/uL (08-25 @ 06:05)  Platelet Count - Automated: 203 K/uL (08-24 @ 06:48)          Urinalysis Basic - ( 27 Aug 2023 07:14 )    Color: x / Appearance: x / SG: x / pH: x  Gluc: 86 mg/dL / Ketone: x  / Bili: x / Urobili: x   Blood: x / Protein: x / Nitrite: x   Leuk Esterase: x / RBC: x / WBC x   Sq Epi: x / Non Sq Epi: x / Bacteria: x        RADIOLOGY & ADDITIONAL STUDIES:      MICROBIOLOGY:  RECENT CULTURES:  08-24 .Blood Blood XXXX XXXX   No growth at 48 Hours    08-22 .Blood Blood XXXX XXXX   No growth at 4 days    08-22 .Blood Blood XXXX XXXX   No growth at 4 days    08-21 .Blood Blood XXXX   Growth in aerobic bottle: Gram Negative Rods   Growth in aerobic bottle: Escherichia coli ESBL  See previous culture [id number]    08-21 .Blood Blood XXXX   Growth in anaerobic bottle: Gram Negative Rods  Growth in aerobic bottle: Gram Negative Rods   Growth in aerobic and anaerobic bottles: Escherichia coli ESBL  See previous culture [id number]    08-20 Clean Catch Clean Catch (Midstream) Escherichia coli ESBL XXXX   >100,000 CFU/ml Escherichia coli ESBL    08-20 .Blood Blood-Peripheral XXXX   Growth in aerobic bottle: Gram Negative Rods   Growth in aerobic bottle: Escherichia coli ESBL  See previous culture [id number]    08-20 .Blood Blood-Peripheral Blood Culture PCR  Escherichia coli ESBL   Growth in aerobic and anaerobic bottles: Gram Negative Rods   Growth in aerobic and anaerobic bottles: Escherichia coli ESBL  Direct identification is available within approximately 3-5  hours either by Blood Panel Multiplexed PCR or Direct  MALDI-TOF. Details: [email or web address]                 CHIEF COMPLAINT/ REASON FOR VISIT  .. Patient was seen to address the  issue listed under PROBLEM LIST which is located toward bottom of this note     DEACON ROBERT    PLV 1EAS 106 W1    Allergies    penicillin (Unknown)    Intolerances        PAST MEDICAL & SURGICAL HISTORY:  HTN (hypertension)      Afib      Spinal stenosis      PFO (patent foramen ovale)      Degeneration macular      Osteoporosis      History of complete heart block      Hypothyroidism      Cardiac pacemaker          FAMILY HISTORY:      Home Medications:  Albuterol (Eqv-ProAir HFA) 90 mcg/inh inhalation aerosol: 1 puff(s) inhaled 4 times a day as needed for  cough or wheezing (21 Aug 2023 09:56)  amiodarone 200 mg oral tablet: 1 tab(s) orally once a day (21 Aug 2023 09:55)  clindamycin 1% topical lotion: Apply topically to affected area once a day to face (21 Aug 2023 09:55)  Eliquis 2.5 mg oral tablet: 1 tab(s) orally 2 times a day (21 Aug 2023 09:55)  enalapril 10 mg oral tablet: 1 tab(s) orally once a day (21 Aug 2023 09:55)  ferrous sulfate 325 mg (65 mg elemental iron) oral tablet: 1 tab(s) orally once a day (21 Aug 2023 09:55)  levothyroxine 75 mcg (0.075 mg) oral tablet: 1 tab(s) orally once a day (21 Aug 2023 09:56)  Metoprolol Tartrate 50 mg oral tablet: 1 tab(s) orally 2 times a day (21 Aug 2023 09:56)  Myrbetriq 50 mg oral tablet, extended release: 1 tab(s) orally once a day (21 Aug 2023 09:56)  polyethylene glycol 3350 oral powder for reconstitution: 17 gram(s) orally once a day (21 Aug 2023 09:56)  PreserVision AREDS oral capsule: 1 cap(s) orally once a day (21 Aug 2023 09:56)  Refresh Dry Eye Therapy ophthalmic solution: 1 drop(s) in each eye 4 times a day (21 Aug 2023 09:56)  Tylenol 325 mg oral tablet: 2 tab(s) orally every 4 hours as needed for pain or fever (21 Aug 2023 09:56)      MEDICATIONS  (STANDING):  aMIOdarone    Tablet 200 milliGRAM(s) Oral daily  amLODIPine   Tablet 10 milliGRAM(s) Oral daily  apixaban 2.5 milliGRAM(s) Oral two times a day  artificial  tears Solution 1 Drop(s) Both EYES two times a day  ertapenem  IVPB 1000 milliGRAM(s) IV Intermittent every 24 hours  ferrous    sulfate 325 milliGRAM(s) Oral daily  lactobacillus acidophilus 1 Tablet(s) Oral every 12 hours  levothyroxine 75 MICROGram(s) Oral daily  metoprolol tartrate 50 milliGRAM(s) Oral two times a day  multivitamin/minerals 1 Tablet(s) Oral daily  pantoprazole    Tablet 40 milliGRAM(s) Oral daily  polyethylene glycol 3350 17 Gram(s) Oral daily  potassium chloride    Tablet ER 10 milliEquivalent(s) Oral two times a day    MEDICATIONS  (PRN):  acetaminophen     Tablet .. 650 milliGRAM(s) Oral every 6 hours PRN Temp greater or equal to 38C (100.4F), Mild Pain (1 - 3)  albuterol/ipratropium for Nebulization 3 milliLiter(s) Nebulizer every 6 hours PRN Shortness of Breath and/or Wheezing  aluminum hydroxide/magnesium hydroxide/simethicone Suspension 30 milliLiter(s) Oral every 4 hours PRN Dyspepsia  guaiFENesin Oral Liquid (Sugar-Free) 200 milliGRAM(s) Oral every 6 hours PRN Cough  melatonin 3 milliGRAM(s) Oral at bedtime PRN Insomnia  ondansetron Injectable 4 milliGRAM(s) IV Push every 8 hours PRN Nausea and/or Vomiting              Vital Signs Last 24 Hrs  T(C): 36.7 (27 Aug 2023 04:35), Max: 36.7 (27 Aug 2023 04:35)  T(F): 98.1 (27 Aug 2023 04:35), Max: 98.1 (27 Aug 2023 04:35)  HR: 64 (27 Aug 2023 04:35) (64 - 98)  BP: 180/82 (27 Aug 2023 04:35) (173/82 - 180/82)  BP(mean): --  RR: 18 (27 Aug 2023 04:35) (18 - 19)  SpO2: 97% (27 Aug 2023 04:35) (94% - 98%)    Parameters below as of 27 Aug 2023 04:35  Patient On (Oxygen Delivery Method): nasal cannula          08-26-23 @ 07:01  -  08-27-23 @ 07:00  --------------------------------------------------------  IN: 50 mL / OUT: 500 mL / NET: -450 mL              LABS:                        11.4   10.12 )-----------( x        ( 27 Aug 2023 07:14 )             36.5     08-27    141  |  106  |  11  ----------------------------<  86  4.2   |  28  |  0.52    Ca    8.6      27 Aug 2023 07:14        Urinalysis Basic - ( 27 Aug 2023 07:14 )    Color: x / Appearance: x / SG: x / pH: x  Gluc: 86 mg/dL / Ketone: x  / Bili: x / Urobili: x   Blood: x / Protein: x / Nitrite: x   Leuk Esterase: x / RBC: x / WBC x   Sq Epi: x / Non Sq Epi: x / Bacteria: x            WBC:  WBC Count: 10.12 K/uL (08-27 @ 07:14)  WBC Count: 7.13 K/uL (08-25 @ 06:05)  WBC Count: 5.80 K/uL (08-24 @ 06:48)      MICROBIOLOGY:  RECENT CULTURES:  08-24 .Blood Blood XXXX XXXX   No growth at 48 Hours    08-22 .Blood Blood XXXX XXXX   No growth at 4 days    08-22 .Blood Blood XXXX XXXX   No growth at 4 days    08-21 .Blood Blood XXXX   Growth in aerobic bottle: Gram Negative Rods   Growth in aerobic bottle: Escherichia coli ESBL  See previous culture [id number]    08-21 .Blood Blood XXXX   Growth in anaerobic bottle: Gram Negative Rods  Growth in aerobic bottle: Gram Negative Rods   Growth in aerobic and anaerobic bottles: Escherichia coli ESBL  See previous culture [id number]    08-20 Clean Catch Clean Catch (Midstream) Escherichia coli ESBL XXXX   >100,000 CFU/ml Escherichia coli ESBL    08-20 .Blood Blood-Peripheral XXXX   Growth in aerobic bottle: Gram Negative Rods   Growth in aerobic bottle: Escherichia coli ESBL  See previous culture [id number]    08-20 .Blood Blood-Peripheral Blood Culture PCR  Escherichia coli ESBL   Growth in aerobic and anaerobic bottles: Gram Negative Rods   Growth in aerobic and anaerobic bottles: Escherichia coli ESBL  Direct identification is available within approximately 3-5  hours either by Blood Panel Multiplexed PCR or Direct  MALDI-TOF. Details: [email or web address]                    Sodium:  Sodium: 141 mmol/L (08-27 @ 07:14)  Sodium: 143 mmol/L (08-25 @ 06:05)  Sodium: 142 mmol/L (08-24 @ 06:48)      0.52 mg/dL 08-27 @ 07:14  0.53 mg/dL 08-25 @ 06:05  0.62 mg/dL 08-24 @ 06:48      Hemoglobin:  Hemoglobin: 11.4 g/dL (08-27 @ 07:14)  Hemoglobin: 10.2 g/dL (08-25 @ 06:05)  Hemoglobin: 10.0 g/dL (08-24 @ 06:48)      Platelets: Platelet Count - Automated: 215 K/uL (08-25 @ 06:05)  Platelet Count - Automated: 203 K/uL (08-24 @ 06:48)          Urinalysis Basic - ( 27 Aug 2023 07:14 )    Color: x / Appearance: x / SG: x / pH: x  Gluc: 86 mg/dL / Ketone: x  / Bili: x / Urobili: x   Blood: x / Protein: x / Nitrite: x   Leuk Esterase: x / RBC: x / WBC x   Sq Epi: x / Non Sq Epi: x / Bacteria: x        RADIOLOGY & ADDITIONAL STUDIES:      MICROBIOLOGY:  RECENT CULTURES:  08-24 .Blood Blood XXXX XXXX   No growth at 48 Hours    08-22 .Blood Blood XXXX XXXX   No growth at 4 days    08-22 .Blood Blood XXXX XXXX   No growth at 4 days    08-21 .Blood Blood XXXX   Growth in aerobic bottle: Gram Negative Rods   Growth in aerobic bottle: Escherichia coli ESBL  See previous culture [id number]    08-21 .Blood Blood XXXX   Growth in anaerobic bottle: Gram Negative Rods  Growth in aerobic bottle: Gram Negative Rods   Growth in aerobic and anaerobic bottles: Escherichia coli ESBL  See previous culture [id number]    08-20 Clean Catch Clean Catch (Midstream) Escherichia coli ESBL XXXX   >100,000 CFU/ml Escherichia coli ESBL    08-20 .Blood Blood-Peripheral XXXX   Growth in aerobic bottle: Gram Negative Rods   Growth in aerobic bottle: Escherichia coli ESBL  See previous culture [id number]    08-20 .Blood Blood-Peripheral Blood Culture PCR  Escherichia coli ESBL   Growth in aerobic and anaerobic bottles: Gram Negative Rods   Growth in aerobic and anaerobic bottles: Escherichia coli ESBL  Direct identification is available within approximately 3-5  hours either by Blood Panel Multiplexed PCR or Direct  MALDI-TOF. Details: [email or web address]

## 2023-08-27 NOTE — PROGRESS NOTE ADULT - SUBJECTIVE AND OBJECTIVE BOX
[facility]  Víctor Robertson PA-C  [address] [place] [address]  [phone]      INTERVAL HPI/OVERNIGHT EVENTS:  Pt s/e   No dysphagia or further GI complaints  no acute overnight events        MEDICATIONS  (STANDING):  aMIOdarone    Tablet 200 milliGRAM(s) Oral daily  amLODIPine   Tablet 10 milliGRAM(s) Oral daily  apixaban 2.5 milliGRAM(s) Oral two times a day  artificial  tears Solution 1 Drop(s) Both EYES two times a day  ertapenem  IVPB 1000 milliGRAM(s) IV Intermittent every 24 hours  ferrous    sulfate 325 milliGRAM(s) Oral daily  lactobacillus acidophilus 1 Tablet(s) Oral every 12 hours  levothyroxine 75 MICROGram(s) Oral daily  metoprolol tartrate 50 milliGRAM(s) Oral two times a day  multivitamin/minerals 1 Tablet(s) Oral daily  pantoprazole    Tablet 40 milliGRAM(s) Oral daily  polyethylene glycol 3350 17 Gram(s) Oral daily  potassium chloride    Tablet ER 10 milliEquivalent(s) Oral two times a day    MEDICATIONS  (PRN):  acetaminophen     Tablet .. 650 milliGRAM(s) Oral every 6 hours PRN Temp greater or equal to 38C (100.4F), Mild Pain (1 - 3)  albuterol/ipratropium for Nebulization 3 milliLiter(s) Nebulizer every 6 hours PRN Shortness of Breath and/or Wheezing  aluminum hydroxide/magnesium hydroxide/simethicone Suspension 30 milliLiter(s) Oral every 4 hours PRN Dyspepsia  guaiFENesin Oral Liquid (Sugar-Free) 200 milliGRAM(s) Oral every 6 hours PRN Cough  melatonin 3 milliGRAM(s) Oral at bedtime PRN Insomnia  ondansetron Injectable 4 milliGRAM(s) IV Push every 8 hours PRN Nausea and/or Vomiting      Allergies    penicillin (Unknown)    Intolerances          PHYSICAL EXAM  Vital Signs Last 24 Hrs  T(C): 36.7 (27 Aug 2023 04:35), Max: 36.7 (27 Aug 2023 04:35)  T(F): 98.1 (27 Aug 2023 04:35), Max: 98.1 (27 Aug 2023 04:35)  HR: 64 (27 Aug 2023 04:35) (64 - 98)  BP: 180/82 (27 Aug 2023 04:35) (173/82 - 180/82)  BP(mean): --  RR: 18 (27 Aug 2023 04:35) (18 - 19)  SpO2: 97% (27 Aug 2023 04:35) (94% - 98%)    Parameters below as of 27 Aug 2023 04:35  Patient On (Oxygen Delivery Method): nasal cannula              GENERAL:  Appears stated age  HEENT:  NC/AT  CHEST:  Full & symmetric excursion  HEART:  Regular rhythm  ABDOMEN:  Soft, non-tender, non-distended  EXTEREMITIES:  no cyanosis  SKIN:  No rash  NEURO:  Alert                LABS:                        11.4   10.12 )-----------( x        ( 27 Aug 2023 07:14 )             36.5     08-27    141  |  106  |  11  ----------------------------<  86  4.2   |  28  |  0.52    Ca    8.6      27 Aug 2023 07:14            08-26-23 @ 07:01  -  08-27-23 @ 07:00  --------------------------------------------------------  IN: 50 mL / OUT: 500 mL / NET: -450 mL                  No growth at 48 Hours       [facility]  Víctor Robertson PA-C  [address] [place] [address]  [phone]      INTERVAL HPI/OVERNIGHT EVENTS:  Pt s/e   No dysphagia or further GI complaints  no acute overnight events        MEDICATIONS  (STANDING):  aMIOdarone    Tablet 200 milliGRAM(s) Oral daily  amLODIPine   Tablet 10 milliGRAM(s) Oral daily  apixaban 2.5 milliGRAM(s) Oral two times a day  artificial  tears Solution 1 Drop(s) Both EYES two times a day  ertapenem  IVPB 1000 milliGRAM(s) IV Intermittent every 24 hours  ferrous    sulfate 325 milliGRAM(s) Oral daily  lactobacillus acidophilus 1 Tablet(s) Oral every 12 hours  levothyroxine 75 MICROGram(s) Oral daily  metoprolol tartrate 50 milliGRAM(s) Oral two times a day  multivitamin/minerals 1 Tablet(s) Oral daily  pantoprazole    Tablet 40 milliGRAM(s) Oral daily  polyethylene glycol 3350 17 Gram(s) Oral daily  potassium chloride    Tablet ER 10 milliEquivalent(s) Oral two times a day    MEDICATIONS  (PRN):  acetaminophen     Tablet .. 650 milliGRAM(s) Oral every 6 hours PRN Temp greater or equal to 38C (100.4F), Mild Pain (1 - 3)  albuterol/ipratropium for Nebulization 3 milliLiter(s) Nebulizer every 6 hours PRN Shortness of Breath and/or Wheezing  aluminum hydroxide/magnesium hydroxide/simethicone Suspension 30 milliLiter(s) Oral every 4 hours PRN Dyspepsia  guaiFENesin Oral Liquid (Sugar-Free) 200 milliGRAM(s) Oral every 6 hours PRN Cough  melatonin 3 milliGRAM(s) Oral at bedtime PRN Insomnia  ondansetron Injectable 4 milliGRAM(s) IV Push every 8 hours PRN Nausea and/or Vomiting      Allergies    penicillin (Unknown)    Intolerances          PHYSICAL EXAM  Vital Signs Last 24 Hrs  T(C): 36.7 (27 Aug 2023 04:35), Max: 36.7 (27 Aug 2023 04:35)  T(F): 98.1 (27 Aug 2023 04:35), Max: 98.1 (27 Aug 2023 04:35)  HR: 64 (27 Aug 2023 04:35) (64 - 98)  BP: 180/82 (27 Aug 2023 04:35) (173/82 - 180/82)  BP(mean): --  RR: 18 (27 Aug 2023 04:35) (18 - 19)  SpO2: 97% (27 Aug 2023 04:35) (94% - 98%)    Parameters below as of 27 Aug 2023 04:35  Patient On (Oxygen Delivery Method): nasal cannula              GENERAL:  Appears stated age  HEENT:  NC/AT  CHEST:  Full & symmetric excursion  HEART:  Regular rhythm  ABDOMEN:  Soft, non-tender, non-distended  EXTEREMITIES:  no cyanosis  SKIN:  No rash  NEURO:  Alert                LABS:                        11.4   10.12 )-----------( x        ( 27 Aug 2023 07:14 )             36.5     08-27    141  |  106  |  11  ----------------------------<  86  4.2   |  28  |  0.52    Ca    8.6      27 Aug 2023 07:14            08-26-23 @ 07:01  -  08-27-23 @ 07:00  --------------------------------------------------------  IN: 50 mL / OUT: 500 mL / NET: -450 mL                  No growth at 48 Hours       [facility]  Víctor Robertson PA-C  [address] [place] [address]  [phone]      INTERVAL HPI/OVERNIGHT EVENTS:  Pt s/e   No dysphagia or further GI complaints  no acute overnight events        MEDICATIONS  (STANDING):  aMIOdarone    Tablet 200 milliGRAM(s) Oral daily  amLODIPine   Tablet 10 milliGRAM(s) Oral daily  apixaban 2.5 milliGRAM(s) Oral two times a day  artificial  tears Solution 1 Drop(s) Both EYES two times a day  ertapenem  IVPB 1000 milliGRAM(s) IV Intermittent every 24 hours  ferrous    sulfate 325 milliGRAM(s) Oral daily  lactobacillus acidophilus 1 Tablet(s) Oral every 12 hours  levothyroxine 75 MICROGram(s) Oral daily  metoprolol tartrate 50 milliGRAM(s) Oral two times a day  multivitamin/minerals 1 Tablet(s) Oral daily  pantoprazole    Tablet 40 milliGRAM(s) Oral daily  polyethylene glycol 3350 17 Gram(s) Oral daily  potassium chloride    Tablet ER 10 milliEquivalent(s) Oral two times a day    MEDICATIONS  (PRN):  acetaminophen     Tablet .. 650 milliGRAM(s) Oral every 6 hours PRN Temp greater or equal to 38C (100.4F), Mild Pain (1 - 3)  albuterol/ipratropium for Nebulization 3 milliLiter(s) Nebulizer every 6 hours PRN Shortness of Breath and/or Wheezing  aluminum hydroxide/magnesium hydroxide/simethicone Suspension 30 milliLiter(s) Oral every 4 hours PRN Dyspepsia  guaiFENesin Oral Liquid (Sugar-Free) 200 milliGRAM(s) Oral every 6 hours PRN Cough  melatonin 3 milliGRAM(s) Oral at bedtime PRN Insomnia  ondansetron Injectable 4 milliGRAM(s) IV Push every 8 hours PRN Nausea and/or Vomiting      Allergies    penicillin (Unknown)    Intolerances          PHYSICAL EXAM  Vital Signs Last 24 Hrs  T(C): 36.7 (27 Aug 2023 04:35), Max: 36.7 (27 Aug 2023 04:35)  T(F): 98.1 (27 Aug 2023 04:35), Max: 98.1 (27 Aug 2023 04:35)  HR: 64 (27 Aug 2023 04:35) (64 - 98)  BP: 180/82 (27 Aug 2023 04:35) (173/82 - 180/82)  BP(mean): --  RR: 18 (27 Aug 2023 04:35) (18 - 19)  SpO2: 97% (27 Aug 2023 04:35) (94% - 98%)    Parameters below as of 27 Aug 2023 04:35  Patient On (Oxygen Delivery Method): nasal cannula              GENERAL:  Appears stated age  HEENT:  NC/AT  CHEST:  Full & symmetric excursion  HEART:  Regular rhythm  ABDOMEN:  Soft, non-tender, non-distended  EXTEREMITIES:  no cyanosis  SKIN:  No rash  NEURO:  Alert                LABS:                        11.4   10.12 )-----------( x        ( 27 Aug 2023 07:14 )             36.5     08-27    141  |  106  |  11  ----------------------------<  86  4.2   |  28  |  0.52    Ca    8.6      27 Aug 2023 07:14            08-26-23 @ 07:01  -  08-27-23 @ 07:00  --------------------------------------------------------  IN: 50 mL / OUT: 500 mL / NET: -450 mL                  No growth at 48 Hours

## 2023-08-28 DIAGNOSIS — M79.643 PAIN IN UNSPECIFIED HAND: ICD-10-CM

## 2023-08-28 LAB
ANION GAP SERPL CALC-SCNC: 8 MMOL/L — SIGNIFICANT CHANGE UP (ref 5–17)
BUN SERPL-MCNC: 17 MG/DL — SIGNIFICANT CHANGE UP (ref 7–23)
CALCIUM SERPL-MCNC: 8.9 MG/DL — SIGNIFICANT CHANGE UP (ref 8.5–10.1)
CHLORIDE SERPL-SCNC: 107 MMOL/L — SIGNIFICANT CHANGE UP (ref 96–108)
CO2 SERPL-SCNC: 26 MMOL/L — SIGNIFICANT CHANGE UP (ref 22–31)
CREAT SERPL-MCNC: 0.52 MG/DL — SIGNIFICANT CHANGE UP (ref 0.5–1.3)
EGFR: 87 ML/MIN/1.73M2 — SIGNIFICANT CHANGE UP
GLUCOSE SERPL-MCNC: 88 MG/DL — SIGNIFICANT CHANGE UP (ref 70–99)
HCT VFR BLD CALC: 36.1 % — SIGNIFICANT CHANGE UP (ref 34.5–45)
HGB BLD-MCNC: 11.4 G/DL — LOW (ref 11.5–15.5)
MCHC RBC-ENTMCNC: 29.3 PG — SIGNIFICANT CHANGE UP (ref 27–34)
MCHC RBC-ENTMCNC: 31.6 GM/DL — LOW (ref 32–36)
MCV RBC AUTO: 92.8 FL — SIGNIFICANT CHANGE UP (ref 80–100)
NRBC # BLD: 0 /100 WBCS — SIGNIFICANT CHANGE UP (ref 0–0)
PLATELET # BLD AUTO: 337 K/UL — SIGNIFICANT CHANGE UP (ref 150–400)
POTASSIUM SERPL-MCNC: 4.7 MMOL/L — SIGNIFICANT CHANGE UP (ref 3.5–5.3)
POTASSIUM SERPL-SCNC: 4.7 MMOL/L — SIGNIFICANT CHANGE UP (ref 3.5–5.3)
RBC # BLD: 3.89 M/UL — SIGNIFICANT CHANGE UP (ref 3.8–5.2)
RBC # FLD: 16.5 % — HIGH (ref 10.3–14.5)
SODIUM SERPL-SCNC: 141 MMOL/L — SIGNIFICANT CHANGE UP (ref 135–145)
WBC # BLD: 11.95 K/UL — HIGH (ref 3.8–10.5)
WBC # FLD AUTO: 11.95 K/UL — HIGH (ref 3.8–10.5)

## 2023-08-28 PROCEDURE — 73110 X-RAY EXAM OF WRIST: CPT | Mod: 26,RT

## 2023-08-28 PROCEDURE — 73120 X-RAY EXAM OF HAND: CPT | Mod: 26,RT

## 2023-08-28 RX ORDER — ACETAMINOPHEN 500 MG
325 TABLET ORAL EVERY 8 HOURS
Refills: 0 | Status: COMPLETED | OUTPATIENT
Start: 2023-08-28 | End: 2023-08-31

## 2023-08-28 RX ORDER — SODIUM CHLORIDE 9 MG/ML
1000 INJECTION, SOLUTION INTRAVENOUS
Refills: 0 | Status: DISCONTINUED | OUTPATIENT
Start: 2023-08-28 | End: 2023-09-01

## 2023-08-28 RX ADMIN — Medication 325 MILLIGRAM(S): at 14:39

## 2023-08-28 RX ADMIN — Medication 75 MICROGRAM(S): at 05:32

## 2023-08-28 RX ADMIN — Medication 1 TABLET(S): at 13:34

## 2023-08-28 RX ADMIN — Medication 50 MILLIGRAM(S): at 18:46

## 2023-08-28 RX ADMIN — APIXABAN 2.5 MILLIGRAM(S): 2.5 TABLET, FILM COATED ORAL at 05:33

## 2023-08-28 RX ADMIN — POLYETHYLENE GLYCOL 3350 17 GRAM(S): 17 POWDER, FOR SOLUTION ORAL at 13:33

## 2023-08-28 RX ADMIN — AMLODIPINE BESYLATE 10 MILLIGRAM(S): 2.5 TABLET ORAL at 05:32

## 2023-08-28 RX ADMIN — AMIODARONE HYDROCHLORIDE 200 MILLIGRAM(S): 400 TABLET ORAL at 05:33

## 2023-08-28 RX ADMIN — Medication 1 TABLET(S): at 18:46

## 2023-08-28 RX ADMIN — Medication 325 MILLIGRAM(S): at 21:31

## 2023-08-28 RX ADMIN — Medication 1 TABLET(S): at 05:31

## 2023-08-28 RX ADMIN — APIXABAN 2.5 MILLIGRAM(S): 2.5 TABLET, FILM COATED ORAL at 18:46

## 2023-08-28 RX ADMIN — Medication 50 MILLIGRAM(S): at 05:34

## 2023-08-28 RX ADMIN — Medication 1 DROP(S): at 05:31

## 2023-08-28 RX ADMIN — Medication 325 MILLIGRAM(S): at 13:34

## 2023-08-28 RX ADMIN — PANTOPRAZOLE SODIUM 40 MILLIGRAM(S): 20 TABLET, DELAYED RELEASE ORAL at 13:34

## 2023-08-28 RX ADMIN — ERTAPENEM SODIUM 120 MILLIGRAM(S): 1 INJECTION, POWDER, LYOPHILIZED, FOR SOLUTION INTRAMUSCULAR; INTRAVENOUS at 14:39

## 2023-08-28 RX ADMIN — Medication 1 DROP(S): at 19:08

## 2023-08-28 RX ADMIN — Medication 10 MILLIEQUIVALENT(S): at 05:32

## 2023-08-28 NOTE — PROGRESS NOTE ADULT - SUBJECTIVE AND OBJECTIVE BOX
CHIEF COMPLAINT/ REASON FOR VISIT  .. Patient was seen to address the  issue listed under PROBLEM LIST which is located toward bottom of this note     DEACON ROBERT    PLV 1EAS 106 W1    Allergies    penicillin (Unknown)    Intolerances        PAST MEDICAL & SURGICAL HISTORY:  HTN (hypertension)      Afib      Spinal stenosis      PFO (patent foramen ovale)      Degeneration macular      Osteoporosis      History of complete heart block      Hypothyroidism      Cardiac pacemaker          FAMILY HISTORY:      Home Medications:  Albuterol (Eqv-ProAir HFA) 90 mcg/inh inhalation aerosol: 1 puff(s) inhaled 4 times a day as needed for  cough or wheezing (21 Aug 2023 09:56)  amiodarone 200 mg oral tablet: 1 tab(s) orally once a day (21 Aug 2023 09:55)  clindamycin 1% topical lotion: Apply topically to affected area once a day to face (21 Aug 2023 09:55)  Eliquis 2.5 mg oral tablet: 1 tab(s) orally 2 times a day (21 Aug 2023 09:55)  enalapril 10 mg oral tablet: 1 tab(s) orally once a day (21 Aug 2023 09:55)  ferrous sulfate 325 mg (65 mg elemental iron) oral tablet: 1 tab(s) orally once a day (21 Aug 2023 09:55)  levothyroxine 75 mcg (0.075 mg) oral tablet: 1 tab(s) orally once a day (21 Aug 2023 09:56)  Metoprolol Tartrate 50 mg oral tablet: 1 tab(s) orally 2 times a day (21 Aug 2023 09:56)  Myrbetriq 50 mg oral tablet, extended release: 1 tab(s) orally once a day (21 Aug 2023 09:56)  polyethylene glycol 3350 oral powder for reconstitution: 17 gram(s) orally once a day (21 Aug 2023 09:56)  PreserVision AREDS oral capsule: 1 cap(s) orally once a day (21 Aug 2023 09:56)  Refresh Dry Eye Therapy ophthalmic solution: 1 drop(s) in each eye 4 times a day (21 Aug 2023 09:56)  Tylenol 325 mg oral tablet: 2 tab(s) orally every 4 hours as needed for pain or fever (21 Aug 2023 09:56)      MEDICATIONS  (STANDING):  aMIOdarone    Tablet 200 milliGRAM(s) Oral daily  amLODIPine   Tablet 10 milliGRAM(s) Oral daily  apixaban 2.5 milliGRAM(s) Oral two times a day  artificial  tears Solution 1 Drop(s) Both EYES two times a day  ertapenem  IVPB 1000 milliGRAM(s) IV Intermittent every 24 hours  ferrous    sulfate 325 milliGRAM(s) Oral daily  lactobacillus acidophilus 1 Tablet(s) Oral every 12 hours  levothyroxine 75 MICROGram(s) Oral daily  metoprolol tartrate 50 milliGRAM(s) Oral two times a day  multivitamin/minerals 1 Tablet(s) Oral daily  pantoprazole    Tablet 40 milliGRAM(s) Oral daily  polyethylene glycol 3350 17 Gram(s) Oral daily  potassium chloride    Tablet ER 10 milliEquivalent(s) Oral two times a day    MEDICATIONS  (PRN):  acetaminophen     Tablet .. 650 milliGRAM(s) Oral every 6 hours PRN Temp greater or equal to 38C (100.4F), Mild Pain (1 - 3)  albuterol/ipratropium for Nebulization 3 milliLiter(s) Nebulizer every 6 hours PRN Shortness of Breath and/or Wheezing  aluminum hydroxide/magnesium hydroxide/simethicone Suspension 30 milliLiter(s) Oral every 4 hours PRN Dyspepsia  guaiFENesin Oral Liquid (Sugar-Free) 200 milliGRAM(s) Oral every 6 hours PRN Cough  melatonin 3 milliGRAM(s) Oral at bedtime PRN Insomnia  ondansetron Injectable 4 milliGRAM(s) IV Push every 8 hours PRN Nausea and/or Vomiting              Vital Signs Last 24 Hrs  T(C): 36.7 (28 Aug 2023 04:54), Max: 36.9 (27 Aug 2023 11:45)  T(F): 98 (28 Aug 2023 04:54), Max: 98.5 (27 Aug 2023 11:45)  HR: 70 (28 Aug 2023 04:54) (63 - 78)  BP: 175/82 (28 Aug 2023 04:54) (156/78 - 175/82)  BP(mean): --  RR: 18 (28 Aug 2023 04:54) (18 - 18)  SpO2: 94% (28 Aug 2023 04:54) (94% - 95%)    Parameters below as of 28 Aug 2023 04:54  Patient On (Oxygen Delivery Method): room air          08-27-23 @ 07:01  -  08-28-23 @ 07:00  --------------------------------------------------------  IN: 50 mL / OUT: 750 mL / NET: -700 mL              LABS:                        11.4   10.12 )-----------( 302      ( 27 Aug 2023 07:14 )             36.5     08-27    141  |  106  |  11  ----------------------------<  86  4.2   |  28  |  0.52    Ca    8.6      27 Aug 2023 07:14        Urinalysis Basic - ( 27 Aug 2023 07:14 )    Color: x / Appearance: x / SG: x / pH: x  Gluc: 86 mg/dL / Ketone: x  / Bili: x / Urobili: x   Blood: x / Protein: x / Nitrite: x   Leuk Esterase: x / RBC: x / WBC x   Sq Epi: x / Non Sq Epi: x / Bacteria: x            WBC:  WBC Count: 10.12 K/uL (08-27 @ 07:14)  WBC Count: 7.13 K/uL (08-25 @ 06:05)      MICROBIOLOGY:  RECENT CULTURES:  08-24 .Blood Blood XXXX XXXX   No growth at 72 Hours    08-22 .Blood Blood XXXX XXXX   No growth at 5 days    08-22 .Blood Blood XXXX XXXX   No growth at 5 days    08-21 .Blood Blood XXXX   Growth in aerobic bottle: Gram Negative Rods   Growth in aerobic bottle: Escherichia coli ESBL  See previous culture [id number]    08-21 .Blood Blood XXXX   Growth in anaerobic bottle: Gram Negative Rods  Growth in aerobic bottle: Gram Negative Rods   Growth in aerobic and anaerobic bottles: Escherichia coli ESBL  See previous culture [id number]                    Sodium:  Sodium: 141 mmol/L (08-27 @ 07:14)  Sodium: 143 mmol/L (08-25 @ 06:05)      0.52 mg/dL 08-27 @ 07:14  0.53 mg/dL 08-25 @ 06:05      Hemoglobin:  Hemoglobin: 11.4 g/dL (08-27 @ 07:14)  Hemoglobin: 10.2 g/dL (08-25 @ 06:05)      Platelets: Platelet Count - Automated: 302 K/uL (08-27 @ 07:14)  Platelet Count - Automated: 215 K/uL (08-25 @ 06:05)          Urinalysis Basic - ( 27 Aug 2023 07:14 )    Color: x / Appearance: x / SG: x / pH: x  Gluc: 86 mg/dL / Ketone: x  / Bili: x / Urobili: x   Blood: x / Protein: x / Nitrite: x   Leuk Esterase: x / RBC: x / WBC x   Sq Epi: x / Non Sq Epi: x / Bacteria: x        RADIOLOGY & ADDITIONAL STUDIES:      MICROBIOLOGY:  RECENT CULTURES:  08-24 .Blood Blood XXXX XXXX   No growth at 72 Hours    08-22 .Blood Blood XXXX XXXX   No growth at 5 days    08-22 .Blood Blood XXXX XXXX   No growth at 5 days    08-21 .Blood Blood XXXX   Growth in aerobic bottle: Gram Negative Rods   Growth in aerobic bottle: Escherichia coli ESBL  See previous culture [id number]    08-21 .Blood Blood XXXX   Growth in anaerobic bottle: Gram Negative Rods  Growth in aerobic bottle: Gram Negative Rods   Growth in aerobic and anaerobic bottles: Escherichia coli ESBL  See previous culture [id number]                 CHIEF COMPLAINT/ REASON FOR VISIT  .. Patient was seen to address the  issue listed under PROBLEM LIST which is located toward bottom of this note     DEACON ROBERT    PLV 1EAS 106 W1    Allergies    penicillin (Unknown)    Intolerances        PAST MEDICAL & SURGICAL HISTORY:  HTN (hypertension)      Afib      Spinal stenosis      PFO (patent foramen ovale)      Degeneration macular      Osteoporosis      History of complete heart block      Hypothyroidism      Cardiac pacemaker          FAMILY HISTORY:      Home Medications:  Albuterol (Eqv-ProAir HFA) 90 mcg/inh inhalation aerosol: 1 puff(s) inhaled 4 times a day as needed for  cough or wheezing (21 Aug 2023 09:56)  amiodarone 200 mg oral tablet: 1 tab(s) orally once a day (21 Aug 2023 09:55)  clindamycin 1% topical lotion: Apply topically to affected area once a day to face (21 Aug 2023 09:55)  Eliquis 2.5 mg oral tablet: 1 tab(s) orally 2 times a day (21 Aug 2023 09:55)  enalapril 10 mg oral tablet: 1 tab(s) orally once a day (21 Aug 2023 09:55)  ferrous sulfate 325 mg (65 mg elemental iron) oral tablet: 1 tab(s) orally once a day (21 Aug 2023 09:55)  levothyroxine 75 mcg (0.075 mg) oral tablet: 1 tab(s) orally once a day (21 Aug 2023 09:56)  Metoprolol Tartrate 50 mg oral tablet: 1 tab(s) orally 2 times a day (21 Aug 2023 09:56)  Myrbetriq 50 mg oral tablet, extended release: 1 tab(s) orally once a day (21 Aug 2023 09:56)  polyethylene glycol 3350 oral powder for reconstitution: 17 gram(s) orally once a day (21 Aug 2023 09:56)  PreserVision AREDS oral capsule: 1 cap(s) orally once a day (21 Aug 2023 09:56)  Refresh Dry Eye Therapy ophthalmic solution: 1 drop(s) in each eye 4 times a day (21 Aug 2023 09:56)  Tylenol 325 mg oral tablet: 2 tab(s) orally every 4 hours as needed for pain or fever (21 Aug 2023 09:56)      MEDICATIONS  (STANDING):  aMIOdarone    Tablet 200 milliGRAM(s) Oral daily  amLODIPine   Tablet 10 milliGRAM(s) Oral daily  apixaban 2.5 milliGRAM(s) Oral two times a day  artificial  tears Solution 1 Drop(s) Both EYES two times a day  ertapenem  IVPB 1000 milliGRAM(s) IV Intermittent every 24 hours  ferrous    sulfate 325 milliGRAM(s) Oral daily  lactobacillus acidophilus 1 Tablet(s) Oral every 12 hours  levothyroxine 75 MICROGram(s) Oral daily  metoprolol tartrate 50 milliGRAM(s) Oral two times a day  multivitamin/minerals 1 Tablet(s) Oral daily  pantoprazole    Tablet 40 milliGRAM(s) Oral daily  polyethylene glycol 3350 17 Gram(s) Oral daily  potassium chloride    Tablet ER 10 milliEquivalent(s) Oral two times a day    MEDICATIONS  (PRN):  acetaminophen     Tablet .. 650 milliGRAM(s) Oral every 6 hours PRN Temp greater or equal to 38C (100.4F), Mild Pain (1 - 3)  albuterol/ipratropium for Nebulization 3 milliLiter(s) Nebulizer every 6 hours PRN Shortness of Breath and/or Wheezing  aluminum hydroxide/magnesium hydroxide/simethicone Suspension 30 milliLiter(s) Oral every 4 hours PRN Dyspepsia  guaiFENesin Oral Liquid (Sugar-Free) 200 milliGRAM(s) Oral every 6 hours PRN Cough  melatonin 3 milliGRAM(s) Oral at bedtime PRN Insomnia  ondansetron Injectable 4 milliGRAM(s) IV Push every 8 hours PRN Nausea and/or Vomiting              Vital Signs Last 24 Hrs  T(C): 36.7 (28 Aug 2023 04:54), Max: 36.9 (27 Aug 2023 11:45)  T(F): 98 (28 Aug 2023 04:54), Max: 98.5 (27 Aug 2023 11:45)  HR: 70 (28 Aug 2023 04:54) (63 - 78)  BP: 175/82 (28 Aug 2023 04:54) (156/78 - 175/82)  BP(mean): --  RR: 18 (28 Aug 2023 04:54) (18 - 18)  SpO2: 94% (28 Aug 2023 04:54) (94% - 95%)    Parameters below as of 28 Aug 2023 04:54  Patient On (Oxygen Delivery Method): room air          08-27-23 @ 07:01  -  08-28-23 @ 07:00  --------------------------------------------------------  IN: 50 mL / OUT: 750 mL / NET: -700 mL              LABS:                        11.4   10.12 )-----------( 302      ( 27 Aug 2023 07:14 )             36.5     08-27    141  |  106  |  11  ----------------------------<  86  4.2   |  28  |  0.52    Ca    8.6      27 Aug 2023 07:14        Urinalysis Basic - ( 27 Aug 2023 07:14 )    Color: x / Appearance: x / SG: x / pH: x  Gluc: 86 mg/dL / Ketone: x  / Bili: x / Urobili: x   Blood: x / Protein: x / Nitrite: x   Leuk Esterase: x / RBC: x / WBC x   Sq Epi: x / Non Sq Epi: x / Bacteria: x            WBC:  WBC Count: 10.12 K/uL (08-27 @ 07:14)  WBC Count: 7.13 K/uL (08-25 @ 06:05)      MICROBIOLOGY:  RECENT CULTURES:  08-24 .Blood Blood XXXX XXXX   No growth at 72 Hours    08-22 .Blood Blood XXXX XXXX   No growth at 5 days    08-22 .Blood Blood XXXX XXXX   No growth at 5 days    08-21 .Blood Blood XXXX   Growth in aerobic bottle: Gram Negative Rods   Growth in aerobic bottle: Escherichia coli ESBL  See previous culture [id number]    08-21 .Blood Blood XXXX   Growth in anaerobic bottle: Gram Negative Rods  Growth in aerobic bottle: Gram Negative Rods   Growth in aerobic and anaerobic bottles: Escherichia coli ESBL  See previous culture [id number]                    Sodium:  Sodium: 141 mmol/L (08-27 @ 07:14)  Sodium: 143 mmol/L (08-25 @ 06:05)      0.52 mg/dL 08-27 @ 07:14  0.53 mg/dL 08-25 @ 06:05      Hemoglobin:  Hemoglobin: 11.4 g/dL (08-27 @ 07:14)  Hemoglobin: 10.2 g/dL (08-25 @ 06:05)      Platelets: Platelet Count - Automated: 302 K/uL (08-27 @ 07:14)  Platelet Count - Automated: 215 K/uL (08-25 @ 06:05)          Urinalysis Basic - ( 27 Aug 2023 07:14 )    Color: x / Appearance: x / SG: x / pH: x  Gluc: 86 mg/dL / Ketone: x  / Bili: x / Urobili: x   Blood: x / Protein: x / Nitrite: x   Leuk Esterase: x / RBC: x / WBC x   Sq Epi: x / Non Sq Epi: x / Bacteria: x        RADIOLOGY & ADDITIONAL STUDIES:      MICROBIOLOGY:  RECENT CULTURES:  08-24 .Blood Blood XXXX XXXX   No growth at 72 Hours    08-22 .Blood Blood XXXX XXXX   No growth at 5 days    08-22 .Blood Blood XXXX XXXX   No growth at 5 days    08-21 .Blood Blood XXXX   Growth in aerobic bottle: Gram Negative Rods   Growth in aerobic bottle: Escherichia coli ESBL  See previous culture [id number]    08-21 .Blood Blood XXXX   Growth in anaerobic bottle: Gram Negative Rods  Growth in aerobic bottle: Gram Negative Rods   Growth in aerobic and anaerobic bottles: Escherichia coli ESBL  See previous culture [id number]                 CHIEF COMPLAINT/ REASON FOR VISIT  .. Patient was seen to address the  issue listed under PROBLEM LIST which is located toward bottom of this note     DEACON ROBERT    PLV 1EAS 106 W1    Allergies    penicillin (Unknown)    Intolerances        PAST MEDICAL & SURGICAL HISTORY:  HTN (hypertension)      Afib      Spinal stenosis      PFO (patent foramen ovale)      Degeneration macular      Osteoporosis      History of complete heart block      Hypothyroidism      Cardiac pacemaker          FAMILY HISTORY:      Home Medications:  Albuterol (Eqv-ProAir HFA) 90 mcg/inh inhalation aerosol: 1 puff(s) inhaled 4 times a day as needed for  cough or wheezing (21 Aug 2023 09:56)  amiodarone 200 mg oral tablet: 1 tab(s) orally once a day (21 Aug 2023 09:55)  clindamycin 1% topical lotion: Apply topically to affected area once a day to face (21 Aug 2023 09:55)  Eliquis 2.5 mg oral tablet: 1 tab(s) orally 2 times a day (21 Aug 2023 09:55)  enalapril 10 mg oral tablet: 1 tab(s) orally once a day (21 Aug 2023 09:55)  ferrous sulfate 325 mg (65 mg elemental iron) oral tablet: 1 tab(s) orally once a day (21 Aug 2023 09:55)  levothyroxine 75 mcg (0.075 mg) oral tablet: 1 tab(s) orally once a day (21 Aug 2023 09:56)  Metoprolol Tartrate 50 mg oral tablet: 1 tab(s) orally 2 times a day (21 Aug 2023 09:56)  Myrbetriq 50 mg oral tablet, extended release: 1 tab(s) orally once a day (21 Aug 2023 09:56)  polyethylene glycol 3350 oral powder for reconstitution: 17 gram(s) orally once a day (21 Aug 2023 09:56)  PreserVision AREDS oral capsule: 1 cap(s) orally once a day (21 Aug 2023 09:56)  Refresh Dry Eye Therapy ophthalmic solution: 1 drop(s) in each eye 4 times a day (21 Aug 2023 09:56)  Tylenol 325 mg oral tablet: 2 tab(s) orally every 4 hours as needed for pain or fever (21 Aug 2023 09:56)      MEDICATIONS  (STANDING):  aMIOdarone    Tablet 200 milliGRAM(s) Oral daily  amLODIPine   Tablet 10 milliGRAM(s) Oral daily  apixaban 2.5 milliGRAM(s) Oral two times a day  artificial  tears Solution 1 Drop(s) Both EYES two times a day  ertapenem  IVPB 1000 milliGRAM(s) IV Intermittent every 24 hours  ferrous    sulfate 325 milliGRAM(s) Oral daily  lactobacillus acidophilus 1 Tablet(s) Oral every 12 hours  levothyroxine 75 MICROGram(s) Oral daily  metoprolol tartrate 50 milliGRAM(s) Oral two times a day  multivitamin/minerals 1 Tablet(s) Oral daily  pantoprazole    Tablet 40 milliGRAM(s) Oral daily  polyethylene glycol 3350 17 Gram(s) Oral daily  potassium chloride    Tablet ER 10 milliEquivalent(s) Oral two times a day    MEDICATIONS  (PRN):  acetaminophen     Tablet .. 650 milliGRAM(s) Oral every 6 hours PRN Temp greater or equal to 38C (100.4F), Mild Pain (1 - 3)  albuterol/ipratropium for Nebulization 3 milliLiter(s) Nebulizer every 6 hours PRN Shortness of Breath and/or Wheezing  aluminum hydroxide/magnesium hydroxide/simethicone Suspension 30 milliLiter(s) Oral every 4 hours PRN Dyspepsia  guaiFENesin Oral Liquid (Sugar-Free) 200 milliGRAM(s) Oral every 6 hours PRN Cough  melatonin 3 milliGRAM(s) Oral at bedtime PRN Insomnia  ondansetron Injectable 4 milliGRAM(s) IV Push every 8 hours PRN Nausea and/or Vomiting              Vital Signs Last 24 Hrs  T(C): 36.7 (28 Aug 2023 04:54), Max: 36.9 (27 Aug 2023 11:45)  T(F): 98 (28 Aug 2023 04:54), Max: 98.5 (27 Aug 2023 11:45)  HR: 70 (28 Aug 2023 04:54) (63 - 78)  BP: 175/82 (28 Aug 2023 04:54) (156/78 - 175/82)  BP(mean): --  RR: 18 (28 Aug 2023 04:54) (18 - 18)  SpO2: 94% (28 Aug 2023 04:54) (94% - 95%)    Parameters below as of 28 Aug 2023 04:54  Patient On (Oxygen Delivery Method): room air          08-27-23 @ 07:01  -  08-28-23 @ 07:00  --------------------------------------------------------  IN: 50 mL / OUT: 750 mL / NET: -700 mL              LABS:                        11.4   10.12 )-----------( 302      ( 27 Aug 2023 07:14 )             36.5     08-27    141  |  106  |  11  ----------------------------<  86  4.2   |  28  |  0.52    Ca    8.6      27 Aug 2023 07:14        Urinalysis Basic - ( 27 Aug 2023 07:14 )    Color: x / Appearance: x / SG: x / pH: x  Gluc: 86 mg/dL / Ketone: x  / Bili: x / Urobili: x   Blood: x / Protein: x / Nitrite: x   Leuk Esterase: x / RBC: x / WBC x   Sq Epi: x / Non Sq Epi: x / Bacteria: x            WBC:  WBC Count: 10.12 K/uL (08-27 @ 07:14)  WBC Count: 7.13 K/uL (08-25 @ 06:05)      MICROBIOLOGY:  RECENT CULTURES:  08-24 .Blood Blood XXXX XXXX   No growth at 72 Hours    08-22 .Blood Blood XXXX XXXX   No growth at 5 days    08-22 .Blood Blood XXXX XXXX   No growth at 5 days    08-21 .Blood Blood XXXX   Growth in aerobic bottle: Gram Negative Rods   Growth in aerobic bottle: Escherichia coli ESBL  See previous culture [id number]    08-21 .Blood Blood XXXX   Growth in anaerobic bottle: Gram Negative Rods  Growth in aerobic bottle: Gram Negative Rods   Growth in aerobic and anaerobic bottles: Escherichia coli ESBL  See previous culture [id number]                    Sodium:  Sodium: 141 mmol/L (08-27 @ 07:14)  Sodium: 143 mmol/L (08-25 @ 06:05)      0.52 mg/dL 08-27 @ 07:14  0.53 mg/dL 08-25 @ 06:05      Hemoglobin:  Hemoglobin: 11.4 g/dL (08-27 @ 07:14)  Hemoglobin: 10.2 g/dL (08-25 @ 06:05)      Platelets: Platelet Count - Automated: 302 K/uL (08-27 @ 07:14)  Platelet Count - Automated: 215 K/uL (08-25 @ 06:05)          Urinalysis Basic - ( 27 Aug 2023 07:14 )    Color: x / Appearance: x / SG: x / pH: x  Gluc: 86 mg/dL / Ketone: x  / Bili: x / Urobili: x   Blood: x / Protein: x / Nitrite: x   Leuk Esterase: x / RBC: x / WBC x   Sq Epi: x / Non Sq Epi: x / Bacteria: x        RADIOLOGY & ADDITIONAL STUDIES:      MICROBIOLOGY:  RECENT CULTURES:  08-24 .Blood Blood XXXX XXXX   No growth at 72 Hours    08-22 .Blood Blood XXXX XXXX   No growth at 5 days    08-22 .Blood Blood XXXX XXXX   No growth at 5 days    08-21 .Blood Blood XXXX   Growth in aerobic bottle: Gram Negative Rods   Growth in aerobic bottle: Escherichia coli ESBL  See previous culture [id number]    08-21 .Blood Blood XXXX   Growth in anaerobic bottle: Gram Negative Rods  Growth in aerobic bottle: Gram Negative Rods   Growth in aerobic and anaerobic bottles: Escherichia coli ESBL  See previous culture [id number]

## 2023-08-28 NOTE — PROGRESS NOTE ADULT - ASSESSMENT
REASON FOR VISIT  .. Management of problems listed below      PHYSICAL EXAM    HEENT Unremarkable  atraumatic   RESP Fair air entry  Harsh breath sound   CARDIAC S1 S2 No S3     NO JVD    ABDOMEN No hepatosplenomegaly   PEDAL EDEMA present No calf tenderness  NO rash       GENERAL DATA .     GOC.    .. 8/26/2023 dnr  ICU STAY.   .. none  COVID.   .. scv2 8/21/2023 (-)    BEST PRACTICE ISSUES.    HOB ELEVATN.   .. Yes  DVT PPLX.   ..  8/20/2023 apixaba 2.5 x 2 (af)     SPEECH SWALLOW RECOMMENDATIONS.    ..    8/21/2023 soft bite     DIET.    ..  8/20/2023 soft bite size   IV fl.  .. 8/22/2023 D5 1/2 ns  50   STRESS ULCER PPLX.   ..    8/20/2023 protonix 40   INFECTION PPLX.   ..     ALLGY.  ..    pncl                     WT.  ..  8/20/2023 56  BMI.  ..   8/20/2023 21    PROCEDURES.  ..     ABGS.   .     VS/ PO/IO/ VENT/ DRIPS.   8/28/2023 afeb 70 140/60   8/28/2023 ra 90%    DOA C/C.     . 8/20/2023 · Chief Complaint Quote sent by [facility] for cough x 2 weeks. negative cxr and covid swab. poor PO intake today.           INITIAL PRESENTATION.   . 8/20/2023 89-year-old female with history of hypothyroidism, A-fib on Eliquis, recent pacemaker for complete heart block brought in by ambulance from [facility] assisted living home for cough for the past 2 weeks.  Associated with generalized weakness and decreased p.o. intake.  No fall or trauma.  Patient states she has had cold symptoms for a while.  Denies fever, chills, chest pain, shortness of breath, abdominal pain, nausea, vomiting, upper or lower extremity weakness or paresthesias.   pmd: Dr. Crawley, cards: [facility].   . hypothyroidism   . A-fib on Eliquis (hx AFL ablation),  .  PFO  HOME MEDS.   . levoxyl eliquis 2.5 x 2   COURSE.  . 8/20/2023 pulm consultd    . 8/20/2023 5:39 PM ER meds given already include cefepime 2g                MAIN ISSUES .   . CC 8/20/2023 89-year-old female with history of hypothyroidism, A-fib on Eliquis, recent pacemaker for complete heart block from nursing home for cogh 2 w found to have E coli ESBL cateremia   . COUGH x 2 wk poa 8/20/2023  . Pneumonia   .. ct chest 8/20/2023   .... distention of mid to upper esophagus   . E coli ESBL CTX M bacteremia 8/20 8/21  .. bc 8/22 (-)   .. 8/20/2023 levaquin -> 8/21 ertapenem 1 x 8d  Dr Roberts completed   . COPD   . A fib   .. 8/20/2023 apixaba   . CHF   .. bnp 8/20-8/23/2023 bnp 4545 - 3219   .. echo 8/21/2023 ef 56% pasp 63   .. 8/22/2023 enalapril 10   . Dysphagia  .. esophagogram 8/22/2023 smooth tapering o distal esophagus       TIME SPENT.   . Over 36 minutes aggregate care time spent on encounter; activities included   direct patient care, counseling and/or coordinating care reviewing notes, lab data/ imaging , discussion with multidisciplinary team/ patient  /family and explaining in detail risks, benefits, alternatives  of the recommendations     JAKOB WORTHY 89 f 8/20/2023 9/22/1929 DR BAM HERRERA     REASON FOR VISIT  .. Management of problems listed below      PHYSICAL EXAM    HEENT Unremarkable  atraumatic   RESP Fair air entry  Harsh breath sound   CARDIAC S1 S2 No S3     NO JVD    ABDOMEN No hepatosplenomegaly   PEDAL EDEMA present No calf tenderness  NO rash       GENERAL DATA .     GOC.    .. 8/26/2023 dnr  ICU STAY.   .. none  COVID.   .. scv2 8/21/2023 (-)    BEST PRACTICE ISSUES.    HOB ELEVATN.   .. Yes  DVT PPLX.   ..  8/20/2023 apixaba 2.5 x 2 (af)     SPEECH SWALLOW RECOMMENDATIONS.    ..    8/21/2023 soft bite     DIET.    ..  8/20/2023 soft bite size   IV fl.  .. 8/22/2023 D5 1/2 ns  50   STRESS ULCER PPLX.   ..    8/20/2023 protonix 40   INFECTION PPLX.   ..     ALLGY.  ..    pncl                     WT.  ..  8/20/2023 56  BMI.  ..   8/20/2023 21    PROCEDURES.  ..     ABGS.   .     VS/ PO/IO/ VENT/ DRIPS.   8/28/2023 afeb 70 140/60   8/28/2023 ra 90%    DOA C/C.     . 8/20/2023 · Chief Complaint Quote sent by [facility] for cough x 2 weeks. negative cxr and covid swab. poor PO intake today.           INITIAL PRESENTATION.   . 8/20/2023 89-year-old female with history of hypothyroidism, A-fib on Eliquis, recent pacemaker for complete heart block brought in by ambulance from [facility] assisted living home for cough for the past 2 weeks.  Associated with generalized weakness and decreased p.o. intake.  No fall or trauma.  Patient states she has had cold symptoms for a while.  Denies fever, chills, chest pain, shortness of breath, abdominal pain, nausea, vomiting, upper or lower extremity weakness or paresthesias.   pmd: Dr. Crawley, cards: [facility].   . hypothyroidism   . A-fib on Eliquis (hx AFL ablation),  .  PFO  HOME MEDS.   . levoxyl eliquis 2.5 x 2   COURSE.  . 8/20/2023 pulm consultd    . 8/20/2023 5:39 PM ER meds given already include cefepime 2g                MAIN ISSUES .   . CC 8/20/2023 89-year-old female with history of hypothyroidism, A-fib on Eliquis, recent pacemaker for complete heart block from custodial for cogh 2 w found to have E coli ESBL cateremia   . COUGH x 2 wk poa 8/20/2023  . Pneumonia   .. ct chest 8/20/2023   .... distention of mid to upper esophagus   . E coli ESBL CTX M bacteremia 8/20 8/21  .. bc 8/22 (-)   .. 8/20/2023 levaquin -> 8/21 ertapenem 1 x 8d  Dr Roberts completed   . COPD   . A fib   .. 8/20/2023 apixaba   . CHF   .. bnp 8/20-8/23/2023 bnp 4545 - 3219   .. echo 8/21/2023 ef 56% pasp 63   .. 8/22/2023 enalapril 10   . Dysphagia  .. esophagogram 8/22/2023 smooth tapering o distal esophagus       TIME SPENT.   . Over 36 minutes aggregate care time spent on encounter; activities included   direct patient care, counseling and/or coordinating care reviewing notes, lab data/ imaging , discussion with multidisciplinary team/ patient  /family and explaining in detail risks, benefits, alternatives  of the recommendations     JAKOB WORTHY 89 f 8/20/2023 9/22/1929 DR BAM HERRERA     REASON FOR VISIT  .. Management of problems listed below      PHYSICAL EXAM    HEENT Unremarkable  atraumatic   RESP Fair air entry  Harsh breath sound   CARDIAC S1 S2 No S3     NO JVD    ABDOMEN No hepatosplenomegaly   PEDAL EDEMA present No calf tenderness  NO rash       GENERAL DATA .     GOC.    .. 8/26/2023 dnr  ICU STAY.   .. none  COVID.   .. scv2 8/21/2023 (-)    BEST PRACTICE ISSUES.    HOB ELEVATN.   .. Yes  DVT PPLX.   ..  8/20/2023 apixaba 2.5 x 2 (af)     SPEECH SWALLOW RECOMMENDATIONS.    ..    8/21/2023 soft bite     DIET.    ..  8/20/2023 soft bite size   IV fl.  .. 8/22/2023 D5 1/2 ns  50   STRESS ULCER PPLX.   ..    8/20/2023 protonix 40   INFECTION PPLX.   ..     ALLGY.  ..    pncl                     WT.  ..  8/20/2023 56  BMI.  ..   8/20/2023 21    PROCEDURES.  ..     ABGS.   .     VS/ PO/IO/ VENT/ DRIPS.   8/28/2023 afeb 70 140/60   8/28/2023 ra 90%    DOA C/C.     . 8/20/2023 · Chief Complaint Quote sent by [facility] for cough x 2 weeks. negative cxr and covid swab. poor PO intake today.           INITIAL PRESENTATION.   . 8/20/2023 89-year-old female with history of hypothyroidism, A-fib on Eliquis, recent pacemaker for complete heart block brought in by ambulance from [facility] assisted living home for cough for the past 2 weeks.  Associated with generalized weakness and decreased p.o. intake.  No fall or trauma.  Patient states she has had cold symptoms for a while.  Denies fever, chills, chest pain, shortness of breath, abdominal pain, nausea, vomiting, upper or lower extremity weakness or paresthesias.   pmd: Dr. Crawley, cards: [facility].   . hypothyroidism   . A-fib on Eliquis (hx AFL ablation),  .  PFO  HOME MEDS.   . levoxyl eliquis 2.5 x 2   COURSE.  . 8/20/2023 pulm consultd    . 8/20/2023 5:39 PM ER meds given already include cefepime 2g                MAIN ISSUES .   . CC 8/20/2023 89-year-old female with history of hypothyroidism, A-fib on Eliquis, recent pacemaker for complete heart block from skilled nursing for cogh 2 w found to have E coli ESBL cateremia   . COUGH x 2 wk poa 8/20/2023  . Pneumonia   .. ct chest 8/20/2023   .... distention of mid to upper esophagus   . E coli ESBL CTX M bacteremia 8/20 8/21  .. bc 8/22 (-)   .. 8/20/2023 levaquin -> 8/21 ertapenem 1 x 8d  Dr Roberts completed   . COPD   . A fib   .. 8/20/2023 apixaba   . CHF   .. bnp 8/20-8/23/2023 bnp 4545 - 3219   .. echo 8/21/2023 ef 56% pasp 63   .. 8/22/2023 enalapril 10   . Dysphagia  .. esophagogram 8/22/2023 smooth tapering o distal esophagus       TIME SPENT.   . Over 36 minutes aggregate care time spent on encounter; activities included   direct patient care, counseling and/or coordinating care reviewing notes, lab data/ imaging , discussion with multidisciplinary team/ patient  /family and explaining in detail risks, benefits, alternatives  of the recommendations     JAKOB WORTHY 89 f 8/20/2023 9/22/1929 DR BAM HERRERA

## 2023-08-28 NOTE — PROGRESS NOTE ADULT - ASSESSMENT
89-year-old female with history of hypothyroidism, A-fib on Eliquis, s/p recent pacemaker for complete heart block brought in by ambulance from [facility] assisted living home for cough for the past 2 weeks.  Associated with generalized weakness and decreased p.o. intake.  No fall or trauma.  Patient states she has had cold symptoms for a while.  Denies fever, chills, chest pain, shortness of breath, abdominal pain, nausea, vomiting, upper or lower extremity weakness or paresthesias. pmd: Dr. Crawley, cards: [facility] Seen by card Dr Reich Admitted  to telemetry unit for monitoring , send 3 sets of cardiac enzymes to rule out acute coronary event, obtain ECHO to evaluate LVEF, cardiology consult  ,continue current management, O2 supply, anticoagulation plan as per cardiology consult Pulm cons requested , antitussives and nebulized bd ordered .Also UTI suspected and started on iv abx , id cons called Palliative care consult requested ,to discuss advance directives and complete MOLST  89-year-old female with history of hypothyroidism, A-fib on Eliquis, s/p recent pacemaker for complete heart block brought in by ambulance from [facility] assisted living home for cough for the past 2 weeks.  Associated with generalized weakness and decreased p.o. intake.  No fall or trauma.  Patient states she has had cold symptoms for a while.  Denies fever, chills, chest pain, shortness of breath, abdominal pain, nausea, vomiting, upper or lower extremity weakness or paresthesias. pmd: Dr. Crawley, cards: [facility] Seen by card Dr Reich Admitted  to telemetry unit for monitoring , send 3 sets of cardiac enzymes to rule out acute coronary event, obtain ECHO to evaluate LVEF, cardiology consult  ,continue current management, O2 supply, anticoagulation plan as per cardiology consult Pulm cons requested , antitussives and nebulized bd ordered .Also UTI suspected and started on iv abx , id cons called Palliative care consult requested ,to discuss advance directives and complete MOLST  89-year-old female with history of hypothyroidism, A-fib on Eliquis, s/p recent pacemaker for complete heart block brought in by ambulance from [facility] assisted living home for cough for the past 2 weeks.  Associated with generalized weakness and decreased p.o. intake.  No fall or trauma.  Patient states she has had cold symptoms for a while.  Denies fever, chills, chest pain, shortness of breath, abdominal pain, nausea, vomiting, upper or lower extremity weakness or paresthesias. pmd: Dr. Crawley, cards: [facility] Seen by card Dr Reich Admitted  to telemetry unit for monitoring , send 3 sets of cardiac enzymes to rule out acute coronary event, obtain ECHO to evaluate LVEF, cardiology consult  ,continue current management, O2 supply, anticoagulation plan as per cardiology consult Pulm cons requested , antitussives and nebulized bd ordered .Also UTI suspected and started on iv abx , id cons called Palliative care consult requested ,to discuss advance directives and complete MOLST

## 2023-08-28 NOTE — PROGRESS NOTE ADULT - SUBJECTIVE AND OBJECTIVE BOX
PROGRESS NOTE  Patient is a 89y old  Female who presents with a chief complaint of cough (28 Aug 2023 12:43)    Chart and available morning labs /imaging are reviewed electronically , urgent issues addressed . More information  is being added upon completion of rounds , when more information is collected and management discussed with consultants , medical staff and social service/case management on the floor   OVERNIGHT    No new issues reported by medical staff . All above noted Patient is resting in a bed comfortably .Confused ,poor mentation .No distress noted Daughter is at bedside , spoke to her on a phone earlier today update is given .Patient is c/of R hand and wrist pain /swelling -imaging ordered , likely related to phelbitis in iv infiltration site Daughter is hoping patient can return to [facility] since she is wheel chair bound and no benefits in [facility] placement   HPI:  89-year-old female with history of hypothyroidism, A-fib on Eliquis, s/p recent pacemaker for complete heart block brought in by ambulance from [facility] living home for cough for the past 2 weeks.  Associated with generalized weakness and decreased p.o. intake.  No fall or trauma.  Patient states she has had cold symptoms for a while.  Denies fever, chills, chest pain, shortness of breath, abdominal pain, nausea, vomiting, upper or lower extremity weakness or paresthesias. pmd: Dr. Crawley, cards: [facility] Seen by levon Reich Admitted  to telemetry unit for monitoring , send 3 sets of cardiac enzymes to rule out acute coronary event, obtain ECHO to evaluate LVEF, cardiology consult  ,continue current management, O2 supply, anticoagulation plan as per cardiology consult Pulm cons requested , antitussives and nebulized bd ordered .Also UTI suspected and started on iv abx , id cons called Palliative care consult requested ,to discuss advance directives and complete MOLST  (20 Aug 2023 15:35)    PAST MEDICAL & SURGICAL HISTORY:  HTN (hypertension)      Afib      Spinal stenosis      PFO (patent foramen ovale)      Degeneration macular      Osteoporosis      History of complete heart block      Hypothyroidism      Cardiac pacemaker          MEDICATIONS  (STANDING):  acetaminophen     Tablet .. 325 milliGRAM(s) Oral every 8 hours  aMIOdarone    Tablet 200 milliGRAM(s) Oral daily  amLODIPine   Tablet 10 milliGRAM(s) Oral daily  apixaban 2.5 milliGRAM(s) Oral two times a day  artificial  tears Solution 1 Drop(s) Both EYES two times a day  ertapenem  IVPB 1000 milliGRAM(s) IV Intermittent every 24 hours  ferrous    sulfate 325 milliGRAM(s) Oral daily  lactobacillus acidophilus 1 Tablet(s) Oral every 12 hours  levothyroxine 75 MICROGram(s) Oral daily  metoprolol tartrate 50 milliGRAM(s) Oral two times a day  multivitamin/minerals 1 Tablet(s) Oral daily  pantoprazole    Tablet 40 milliGRAM(s) Oral daily  polyethylene glycol 3350 17 Gram(s) Oral daily  potassium chloride    Tablet ER 10 milliEquivalent(s) Oral two times a day    MEDICATIONS  (PRN):  acetaminophen     Tablet .. 650 milliGRAM(s) Oral every 6 hours PRN Temp greater or equal to 38C (100.4F), Mild Pain (1 - 3)  albuterol/ipratropium for Nebulization 3 milliLiter(s) Nebulizer every 6 hours PRN Shortness of Breath and/or Wheezing  aluminum hydroxide/magnesium hydroxide/simethicone Suspension 30 milliLiter(s) Oral every 4 hours PRN Dyspepsia  guaiFENesin Oral Liquid (Sugar-Free) 200 milliGRAM(s) Oral every 6 hours PRN Cough  melatonin 3 milliGRAM(s) Oral at bedtime PRN Insomnia  ondansetron Injectable 4 milliGRAM(s) IV Push every 8 hours PRN Nausea and/or Vomiting      OBJECTIVE    T(C): 36.7 (08-28-23 @ 12:03), Max: 36.8 (08-27-23 @ 20:20)  HR: 76 (08-28-23 @ 12:03) (63 - 76)  BP: 143/63 (08-28-23 @ 12:03) (143/63 - 175/82)  RR: 18 (08-28-23 @ 12:03) (18 - 18)  SpO2: 90% (08-28-23 @ 12:03) (90% - 94%)  Wt(kg): --  I&O's Summary    27 Aug 2023 07:01  -  28 Aug 2023 07:00  --------------------------------------------------------  IN: 50 mL / OUT: 750 mL / NET: -700 mL          REVIEW OF SYSTEMS:  Patient is  unable to provide any information/ROS  due to baseline mental status.     PHYSICAL EXAM:  Appearance: NAD. VS past 24 hrs -as above   HEENT:   Moist oral mucosa. Conjunctiva clear b/l.   Neck : supple  Respiratory: Lungs CTAB.  Gastrointestinal:  Soft, nontender. No rebound. No rigidity. BS present	  Cardiovascular: RRR ,S1S2 present  Neurologic: Non-focal. Moving all extremities.  Extremities: RUE wrist and hand edema. LE -WNL No calf tenderness.  Skin: No rashes, No ecchymoses, No cyanosis.	  wounds ,skin lesions-See skin assesment flow sheet   LABS:                        11.4   11.95 )-----------( 337      ( 28 Aug 2023 08:22 )             36.1     08-28    141  |  107  |  17  ----------------------------<  88  4.7   |  26  |  0.52    Ca    8.9      28 Aug 2023 08:22      CAPILLARY BLOOD GLUCOSE          Urinalysis Basic - ( 28 Aug 2023 08:22 )    Color: x / Appearance: x / SG: x / pH: x  Gluc: 88 mg/dL / Ketone: x  / Bili: x / Urobili: x   Blood: x / Protein: x / Nitrite: x   Leuk Esterase: x / RBC: x / WBC x   Sq Epi: x / Non Sq Epi: x / Bacteria: x        Culture - Blood (collected 24 Aug 2023 06:48)  Source: .Blood Blood  Preliminary Report (28 Aug 2023 10:00):    No growth at 4 days    Culture - Blood (collected 22 Aug 2023 11:01)  Source: .Blood Blood  Final Report (27 Aug 2023 15:01):    No growth at 5 days    Culture - Blood (collected 22 Aug 2023 06:20)  Source: .Blood Blood  Final Report (27 Aug 2023 12:00):    No growth at 5 days    Culture - Blood (collected 21 Aug 2023 09:35)  Source: .Blood Blood  Gram Stain (23 Aug 2023 01:21):    Growth in aerobic bottle: Gram Negative Rods  Final Report (23 Aug 2023 20:36):    Growth in aerobic bottle: Escherichia coli ESBL    See previous culture [id number]    Culture - Blood (collected 21 Aug 2023 09:30)  Source: .Blood Blood  Gram Stain (22 Aug 2023 17:29):    Growth in anaerobic bottle: Gram Negative Rods    Growth in aerobic bottle: Gram Negative Rods  Final Report (23 Aug 2023 10:15):    Growth in aerobic and anaerobic bottles: Escherichia coli ESBL    See previous culture [id number]    Culture - Urine (collected 20 Aug 2023 11:55)  Source: Clean Catch Clean Catch (Midstream)  Final Report (23 Aug 2023 09:17):    >100,000 CFU/ml Escherichia coli ESBL  Organism: Escherichia coli ESBL (23 Aug 2023 09:17)  Organism: Escherichia coli ESBL (23 Aug 2023 09:17)    Culture - Blood (collected 20 Aug 2023 11:50)  Source: .Blood Blood-Peripheral  Gram Stain (21 Aug 2023 02:35):    Growth in aerobic bottle: Gram Negative Rods  Final Report (22 Aug 2023 17:18):    Growth in aerobic bottle: Escherichia coli ESBL    See previous culture [id number]    Culture - Blood (collected 20 Aug 2023 11:45)  Source: .Blood Blood-Peripheral  Gram Stain (21 Aug 2023 02:35):    Growth in aerobic and anaerobic bottles: Gram Negative Rods  Final Report (22 Aug 2023 17:15):    Growth in aerobic and anaerobic bottles: Escherichia coli ESBL    Direct identification is available within approximately 3-5    hours either by Blood Panel Multiplexed PCR or Direct    MALDI-TOF. Details: [email or web address]  Organism: Blood Culture PCR  Escherichia coli ESBL (22 Aug 2023 17:15)  Organism: Escherichia coli ESBL (22 Aug 2023 17:15)  Organism: Blood Culture PCR (22 Aug 2023 17:15)      RADIOLOGY & ADDITIONAL TESTS:   reviewed elctronically  ASSESSMENT/PLAN: 	  25 minutes aggregate time was spent on this visit, 50% visit time spent in care co-ordination with other attendings and counselling patient .I have discussed care plan with patient / HCP/family member laquita Campos on a phone and in person  ,who expressed understanding of problems treatment and their effect and side effects, alternatives in details. I have asked if they have any questions and concerns and appropriately addressed them to best of my ability. ACP-Advance care planning was discussed , pallitaive care issues ,CMO ,GOC ,MOLST  form ,advance directives were reviewed .All questions were answered to the best of my knowledge - 25 min spent.    PROGRESS NOTE  Patient is a 89y old  Female who presents with a chief complaint of cough (28 Aug 2023 12:43)    Chart and available morning labs /imaging are reviewed electronically , urgent issues addressed . More information  is being added upon completion of rounds , when more information is collected and management discussed with consultants , medical staff and social service/case management on the floor   OVERNIGHT    No new issues reported by medical staff . All above noted Patient is resting in a bed comfortably .Confused ,poor mentation .No distress noted Daughter is at bedside , spoke to her on a phone earlier today update is given .Patient is c/of R hand and wrist pain /swelling -imaging ordered , likely related to phelbitis in iv infiltration site Daughter is hoping patient can return to [facility] since she is wheel chair bound and no benefits in [facility] placement   HPI:  89-year-old female with history of hypothyroidism, A-fib on Eliquis, s/p recent pacemaker for complete heart block brought in by ambulance from [facility] living home for cough for the past 2 weeks.  Associated with generalized weakness and decreased p.o. intake.  No fall or trauma.  Patient states she has had cold symptoms for a while.  Denies fever, chills, chest pain, shortness of breath, abdominal pain, nausea, vomiting, upper or lower extremity weakness or paresthesias. pmd: Dr. Crawley, cards: [facility] Seen by levon Reich Admitted  to telemetry unit for monitoring , send 3 sets of cardiac enzymes to rule out acute coronary event, obtain ECHO to evaluate LVEF, cardiology consult  ,continue current management, O2 supply, anticoagulation plan as per cardiology consult Pulm cons requested , antitussives and nebulized bd ordered .Also UTI suspected and started on iv abx , id cons called Palliative care consult requested ,to discuss advance directives and complete MOLST  (20 Aug 2023 15:35)    PAST MEDICAL & SURGICAL HISTORY:  HTN (hypertension)      Afib      Spinal stenosis      PFO (patent foramen ovale)      Degeneration macular      Osteoporosis      History of complete heart block      Hypothyroidism      Cardiac pacemaker          MEDICATIONS  (STANDING):  acetaminophen     Tablet .. 325 milliGRAM(s) Oral every 8 hours  aMIOdarone    Tablet 200 milliGRAM(s) Oral daily  amLODIPine   Tablet 10 milliGRAM(s) Oral daily  apixaban 2.5 milliGRAM(s) Oral two times a day  artificial  tears Solution 1 Drop(s) Both EYES two times a day  ertapenem  IVPB 1000 milliGRAM(s) IV Intermittent every 24 hours  ferrous    sulfate 325 milliGRAM(s) Oral daily  lactobacillus acidophilus 1 Tablet(s) Oral every 12 hours  levothyroxine 75 MICROGram(s) Oral daily  metoprolol tartrate 50 milliGRAM(s) Oral two times a day  multivitamin/minerals 1 Tablet(s) Oral daily  pantoprazole    Tablet 40 milliGRAM(s) Oral daily  polyethylene glycol 3350 17 Gram(s) Oral daily  potassium chloride    Tablet ER 10 milliEquivalent(s) Oral two times a day    MEDICATIONS  (PRN):  acetaminophen     Tablet .. 650 milliGRAM(s) Oral every 6 hours PRN Temp greater or equal to 38C (100.4F), Mild Pain (1 - 3)  albuterol/ipratropium for Nebulization 3 milliLiter(s) Nebulizer every 6 hours PRN Shortness of Breath and/or Wheezing  aluminum hydroxide/magnesium hydroxide/simethicone Suspension 30 milliLiter(s) Oral every 4 hours PRN Dyspepsia  guaiFENesin Oral Liquid (Sugar-Free) 200 milliGRAM(s) Oral every 6 hours PRN Cough  melatonin 3 milliGRAM(s) Oral at bedtime PRN Insomnia  ondansetron Injectable 4 milliGRAM(s) IV Push every 8 hours PRN Nausea and/or Vomiting      OBJECTIVE    T(C): 36.7 (08-28-23 @ 12:03), Max: 36.8 (08-27-23 @ 20:20)  HR: 76 (08-28-23 @ 12:03) (63 - 76)  BP: 143/63 (08-28-23 @ 12:03) (143/63 - 175/82)  RR: 18 (08-28-23 @ 12:03) (18 - 18)  SpO2: 90% (08-28-23 @ 12:03) (90% - 94%)  Wt(kg): --  I&O's Summary    27 Aug 2023 07:01  -  28 Aug 2023 07:00  --------------------------------------------------------  IN: 50 mL / OUT: 750 mL / NET: -700 mL          REVIEW OF SYSTEMS:  Patient is  unable to provide any information/ROS  due to baseline mental status.     PHYSICAL EXAM:  Appearance: NAD. VS past 24 hrs -as above   HEENT:   Moist oral mucosa. Conjunctiva clear b/l.   Neck : supple  Respiratory: Lungs CTAB.  Gastrointestinal:  Soft, nontender. No rebound. No rigidity. BS present	  Cardiovascular: RRR ,S1S2 present  Neurologic: Non-focal. Moving all extremities.  Extremities: RUE wrist and hand edema. LE -WNL No calf tenderness.  Skin: No rashes, No ecchymoses, No cyanosis.	  wounds ,skin lesions-See skin assesment flow sheet   LABS:                        11.4   11.95 )-----------( 337      ( 28 Aug 2023 08:22 )             36.1     08-28    141  |  107  |  17  ----------------------------<  88  4.7   |  26  |  0.52    Ca    8.9      28 Aug 2023 08:22      CAPILLARY BLOOD GLUCOSE          Urinalysis Basic - ( 28 Aug 2023 08:22 )    Color: x / Appearance: x / SG: x / pH: x  Gluc: 88 mg/dL / Ketone: x  / Bili: x / Urobili: x   Blood: x / Protein: x / Nitrite: x   Leuk Esterase: x / RBC: x / WBC x   Sq Epi: x / Non Sq Epi: x / Bacteria: x        Culture - Blood (collected 24 Aug 2023 06:48)  Source: .Blood Blood  Preliminary Report (28 Aug 2023 10:00):    No growth at 4 days    Culture - Blood (collected 22 Aug 2023 11:01)  Source: .Blood Blood  Final Report (27 Aug 2023 15:01):    No growth at 5 days    Culture - Blood (collected 22 Aug 2023 06:20)  Source: .Blood Blood  Final Report (27 Aug 2023 12:00):    No growth at 5 days    Culture - Blood (collected 21 Aug 2023 09:35)  Source: .Blood Blood  Gram Stain (23 Aug 2023 01:21):    Growth in aerobic bottle: Gram Negative Rods  Final Report (23 Aug 2023 20:36):    Growth in aerobic bottle: Escherichia coli ESBL    See previous culture [id number]    Culture - Blood (collected 21 Aug 2023 09:30)  Source: .Blood Blood  Gram Stain (22 Aug 2023 17:29):    Growth in anaerobic bottle: Gram Negative Rods    Growth in aerobic bottle: Gram Negative Rods  Final Report (23 Aug 2023 10:15):    Growth in aerobic and anaerobic bottles: Escherichia coli ESBL    See previous culture [id number]    Culture - Urine (collected 20 Aug 2023 11:55)  Source: Clean Catch Clean Catch (Midstream)  Final Report (23 Aug 2023 09:17):    >100,000 CFU/ml Escherichia coli ESBL  Organism: Escherichia coli ESBL (23 Aug 2023 09:17)  Organism: Escherichia coli ESBL (23 Aug 2023 09:17)    Culture - Blood (collected 20 Aug 2023 11:50)  Source: .Blood Blood-Peripheral  Gram Stain (21 Aug 2023 02:35):    Growth in aerobic bottle: Gram Negative Rods  Final Report (22 Aug 2023 17:18):    Growth in aerobic bottle: Escherichia coli ESBL    See previous culture [id number]    Culture - Blood (collected 20 Aug 2023 11:45)  Source: .Blood Blood-Peripheral  Gram Stain (21 Aug 2023 02:35):    Growth in aerobic and anaerobic bottles: Gram Negative Rods  Final Report (22 Aug 2023 17:15):    Growth in aerobic and anaerobic bottles: Escherichia coli ESBL    Direct identification is available within approximately 3-5    hours either by Blood Panel Multiplexed PCR or Direct    MALDI-TOF. Details: [email or web address]  Organism: Blood Culture PCR  Escherichia coli ESBL (22 Aug 2023 17:15)  Organism: Escherichia coli ESBL (22 Aug 2023 17:15)  Organism: Blood Culture PCR (22 Aug 2023 17:15)      RADIOLOGY & ADDITIONAL TESTS:   reviewed elctronically  ASSESSMENT/PLAN: 	  25 minutes aggregate time was spent on this visit, 50% visit time spent in care co-ordination with other attendings and counselling patient .I have discussed care plan with patient / HCP/family member laquita Campos on a phone and in person  ,who expressed understanding of problems treatment and their effect and side effects, alternatives in details. I have asked if they have any questions and concerns and appropriately addressed them to best of my ability. ACP-Advance care planning was discussed , pallitaive care issues ,CMO ,GOC ,MOLST  form ,advance directives were reviewed .All questions were answered to the best of my knowledge - 25 min spent.    PROGRESS NOTE  Patient is a 89y old  Female who presents with a chief complaint of cough (28 Aug 2023 12:43)    Chart and available morning labs /imaging are reviewed electronically , urgent issues addressed . More information  is being added upon completion of rounds , when more information is collected and management discussed with consultants , medical staff and social service/case management on the floor   OVERNIGHT    No new issues reported by medical staff . All above noted Patient is resting in a bed comfortably .Confused ,poor mentation .No distress noted Daughter is at bedside , spoke to her on a phone earlier today update is given .Patient is c/of R hand and wrist pain /swelling -imaging ordered , likely related to phelbitis in iv infiltration site Daughter is hoping patient can return to [facility] since she is wheel chair bound and no benefits in [facility] placement   HPI:  89-year-old female with history of hypothyroidism, A-fib on Eliquis, s/p recent pacemaker for complete heart block brought in by ambulance from [facility] living home for cough for the past 2 weeks.  Associated with generalized weakness and decreased p.o. intake.  No fall or trauma.  Patient states she has had cold symptoms for a while.  Denies fever, chills, chest pain, shortness of breath, abdominal pain, nausea, vomiting, upper or lower extremity weakness or paresthesias. pmd: Dr. Crawley, cards: [facility] Seen by levon Reich Admitted  to telemetry unit for monitoring , send 3 sets of cardiac enzymes to rule out acute coronary event, obtain ECHO to evaluate LVEF, cardiology consult  ,continue current management, O2 supply, anticoagulation plan as per cardiology consult Pulm cons requested , antitussives and nebulized bd ordered .Also UTI suspected and started on iv abx , id cons called Palliative care consult requested ,to discuss advance directives and complete MOLST  (20 Aug 2023 15:35)    PAST MEDICAL & SURGICAL HISTORY:  HTN (hypertension)      Afib      Spinal stenosis      PFO (patent foramen ovale)      Degeneration macular      Osteoporosis      History of complete heart block      Hypothyroidism      Cardiac pacemaker          MEDICATIONS  (STANDING):  acetaminophen     Tablet .. 325 milliGRAM(s) Oral every 8 hours  aMIOdarone    Tablet 200 milliGRAM(s) Oral daily  amLODIPine   Tablet 10 milliGRAM(s) Oral daily  apixaban 2.5 milliGRAM(s) Oral two times a day  artificial  tears Solution 1 Drop(s) Both EYES two times a day  ertapenem  IVPB 1000 milliGRAM(s) IV Intermittent every 24 hours  ferrous    sulfate 325 milliGRAM(s) Oral daily  lactobacillus acidophilus 1 Tablet(s) Oral every 12 hours  levothyroxine 75 MICROGram(s) Oral daily  metoprolol tartrate 50 milliGRAM(s) Oral two times a day  multivitamin/minerals 1 Tablet(s) Oral daily  pantoprazole    Tablet 40 milliGRAM(s) Oral daily  polyethylene glycol 3350 17 Gram(s) Oral daily  potassium chloride    Tablet ER 10 milliEquivalent(s) Oral two times a day    MEDICATIONS  (PRN):  acetaminophen     Tablet .. 650 milliGRAM(s) Oral every 6 hours PRN Temp greater or equal to 38C (100.4F), Mild Pain (1 - 3)  albuterol/ipratropium for Nebulization 3 milliLiter(s) Nebulizer every 6 hours PRN Shortness of Breath and/or Wheezing  aluminum hydroxide/magnesium hydroxide/simethicone Suspension 30 milliLiter(s) Oral every 4 hours PRN Dyspepsia  guaiFENesin Oral Liquid (Sugar-Free) 200 milliGRAM(s) Oral every 6 hours PRN Cough  melatonin 3 milliGRAM(s) Oral at bedtime PRN Insomnia  ondansetron Injectable 4 milliGRAM(s) IV Push every 8 hours PRN Nausea and/or Vomiting      OBJECTIVE    T(C): 36.7 (08-28-23 @ 12:03), Max: 36.8 (08-27-23 @ 20:20)  HR: 76 (08-28-23 @ 12:03) (63 - 76)  BP: 143/63 (08-28-23 @ 12:03) (143/63 - 175/82)  RR: 18 (08-28-23 @ 12:03) (18 - 18)  SpO2: 90% (08-28-23 @ 12:03) (90% - 94%)  Wt(kg): --  I&O's Summary    27 Aug 2023 07:01  -  28 Aug 2023 07:00  --------------------------------------------------------  IN: 50 mL / OUT: 750 mL / NET: -700 mL          REVIEW OF SYSTEMS:  Patient is  unable to provide any information/ROS  due to baseline mental status.     PHYSICAL EXAM:  Appearance: NAD. VS past 24 hrs -as above   HEENT:   Moist oral mucosa. Conjunctiva clear b/l.   Neck : supple  Respiratory: Lungs CTAB.  Gastrointestinal:  Soft, nontender. No rebound. No rigidity. BS present	  Cardiovascular: RRR ,S1S2 present  Neurologic: Non-focal. Moving all extremities.  Extremities: RUE wrist and hand edema. LE -WNL No calf tenderness.  Skin: No rashes, No ecchymoses, No cyanosis.	  wounds ,skin lesions-See skin assesment flow sheet   LABS:                        11.4   11.95 )-----------( 337      ( 28 Aug 2023 08:22 )             36.1     08-28    141  |  107  |  17  ----------------------------<  88  4.7   |  26  |  0.52    Ca    8.9      28 Aug 2023 08:22      CAPILLARY BLOOD GLUCOSE          Urinalysis Basic - ( 28 Aug 2023 08:22 )    Color: x / Appearance: x / SG: x / pH: x  Gluc: 88 mg/dL / Ketone: x  / Bili: x / Urobili: x   Blood: x / Protein: x / Nitrite: x   Leuk Esterase: x / RBC: x / WBC x   Sq Epi: x / Non Sq Epi: x / Bacteria: x        Culture - Blood (collected 24 Aug 2023 06:48)  Source: .Blood Blood  Preliminary Report (28 Aug 2023 10:00):    No growth at 4 days    Culture - Blood (collected 22 Aug 2023 11:01)  Source: .Blood Blood  Final Report (27 Aug 2023 15:01):    No growth at 5 days    Culture - Blood (collected 22 Aug 2023 06:20)  Source: .Blood Blood  Final Report (27 Aug 2023 12:00):    No growth at 5 days    Culture - Blood (collected 21 Aug 2023 09:35)  Source: .Blood Blood  Gram Stain (23 Aug 2023 01:21):    Growth in aerobic bottle: Gram Negative Rods  Final Report (23 Aug 2023 20:36):    Growth in aerobic bottle: Escherichia coli ESBL    See previous culture [id number]    Culture - Blood (collected 21 Aug 2023 09:30)  Source: .Blood Blood  Gram Stain (22 Aug 2023 17:29):    Growth in anaerobic bottle: Gram Negative Rods    Growth in aerobic bottle: Gram Negative Rods  Final Report (23 Aug 2023 10:15):    Growth in aerobic and anaerobic bottles: Escherichia coli ESBL    See previous culture [id number]    Culture - Urine (collected 20 Aug 2023 11:55)  Source: Clean Catch Clean Catch (Midstream)  Final Report (23 Aug 2023 09:17):    >100,000 CFU/ml Escherichia coli ESBL  Organism: Escherichia coli ESBL (23 Aug 2023 09:17)  Organism: Escherichia coli ESBL (23 Aug 2023 09:17)    Culture - Blood (collected 20 Aug 2023 11:50)  Source: .Blood Blood-Peripheral  Gram Stain (21 Aug 2023 02:35):    Growth in aerobic bottle: Gram Negative Rods  Final Report (22 Aug 2023 17:18):    Growth in aerobic bottle: Escherichia coli ESBL    See previous culture [id number]    Culture - Blood (collected 20 Aug 2023 11:45)  Source: .Blood Blood-Peripheral  Gram Stain (21 Aug 2023 02:35):    Growth in aerobic and anaerobic bottles: Gram Negative Rods  Final Report (22 Aug 2023 17:15):    Growth in aerobic and anaerobic bottles: Escherichia coli ESBL    Direct identification is available within approximately 3-5    hours either by Blood Panel Multiplexed PCR or Direct    MALDI-TOF. Details: [email or web address]  Organism: Blood Culture PCR  Escherichia coli ESBL (22 Aug 2023 17:15)  Organism: Escherichia coli ESBL (22 Aug 2023 17:15)  Organism: Blood Culture PCR (22 Aug 2023 17:15)      RADIOLOGY & ADDITIONAL TESTS:   reviewed elctronically  ASSESSMENT/PLAN: 	  25 minutes aggregate time was spent on this visit, 50% visit time spent in care co-ordination with other attendings and counselling patient .I have discussed care plan with patient / HCP/family member laquita Campos on a phone and in person  ,who expressed understanding of problems treatment and their effect and side effects, alternatives in details. I have asked if they have any questions and concerns and appropriately addressed them to best of my ability. ACP-Advance care planning was discussed , pallitaive care issues ,CMO ,GOC ,MOLST  form ,advance directives were reviewed .All questions were answered to the best of my knowledge - 25 min spent.

## 2023-08-28 NOTE — PROGRESS NOTE ADULT - SUBJECTIVE AND OBJECTIVE BOX
[facility] [place]       HPI:  89-year-old female with history of hypothyroidism, A-fib on Eliquis, s/p recent pacemaker for complete heart block brought in by ambulance from [facility] assisted living home for cough for the past 2 weeks.  Associated with generalized weakness and decreased p.o. intake.  No fall or trauma.  Patient states she has had cold symptoms for a while.  Denies fever, chills, chest pain, shortness of breath, abdominal pain, nausea, vomiting, upper or lower extremity weakness or paresthesias. pmd: Dr. Crawley, cards: [facility] Seen by card Dr Reich Admitted  to telemetry unit for monitoring , send 3 sets of cardiac enzymes to rule out acute coronary event, obtain ECHO to evaluate LVEF, cardiology consult  ,continue current management, O2 supply, anticoagulation plan as per cardiology consult Pulm cons requested , antitussives and nebulized bd ordered .Also UTI suspected and started on iv abx , id cons called Palliative care consult requested ,to discuss advance directives and complete MOLST  (20 Aug 2023 15:35)        SUBJECTIVE:      ALLERGIES:  Allergies    penicillin (Unknown)    Intolerances          MEDICATIONS  (STANDING):  acetaminophen     Tablet .. 325 milliGRAM(s) Oral every 8 hours  aMIOdarone    Tablet 200 milliGRAM(s) Oral daily  amLODIPine   Tablet 10 milliGRAM(s) Oral daily  apixaban 2.5 milliGRAM(s) Oral two times a day  artificial  tears Solution 1 Drop(s) Both EYES two times a day  dextrose 5% + sodium chloride 0.45%. 1000 milliLiter(s) (50 mL/Hr) IV Continuous <Continuous>  ferrous    sulfate 325 milliGRAM(s) Oral daily  lactobacillus acidophilus 1 Tablet(s) Oral every 12 hours  levothyroxine 75 MICROGram(s) Oral daily  metoprolol tartrate 50 milliGRAM(s) Oral two times a day  multivitamin/minerals 1 Tablet(s) Oral daily  pantoprazole    Tablet 40 milliGRAM(s) Oral daily  polyethylene glycol 3350 17 Gram(s) Oral daily    MEDICATIONS  (PRN):  acetaminophen     Tablet .. 650 milliGRAM(s) Oral every 6 hours PRN Temp greater or equal to 38C (100.4F), Mild Pain (1 - 3)  albuterol/ipratropium for Nebulization 3 milliLiter(s) Nebulizer every 6 hours PRN Shortness of Breath and/or Wheezing  aluminum hydroxide/magnesium hydroxide/simethicone Suspension 30 milliLiter(s) Oral every 4 hours PRN Dyspepsia  guaiFENesin Oral Liquid (Sugar-Free) 200 milliGRAM(s) Oral every 6 hours PRN Cough  melatonin 3 milliGRAM(s) Oral at bedtime PRN Insomnia  ondansetron Injectable 4 milliGRAM(s) IV Push every 8 hours PRN Nausea and/or Vomiting      REVIEW OF SYSTEMS:  CONSTITUTIONAL: No fever,  RESPIRATORY: No cough, wheezing, shortness of breath  CARDIOVASCULAR: No chest pain, dyspnea, palpitations, dizziness, syncope, paroxysmal nocturnal dyspnea, orthopnea, or arm or leg swelling  GASTROINTESTINAL: No abdominal  or epigastric pain, nausea, vomiting,  diarrhea  NEUROLOGICAL: No headaches,  loss of strength, numbness, or tremors    Vital Signs Last 24 Hrs  T(C): 36.7 (29 Aug 2023 04:52), Max: 36.7 (28 Aug 2023 12:03)  T(F): 98.1 (29 Aug 2023 04:52), Max: 98.1 (29 Aug 2023 04:52)  HR: 88 (29 Aug 2023 04:52) (76 - 88)  BP: 124/67 (29 Aug 2023 04:52) (124/67 - 147/80)  BP(mean): --  RR: 18 (29 Aug 2023 04:52) (18 - 18)  SpO2: 96% (29 Aug 2023 04:52) (90% - 96%)    Parameters below as of 29 Aug 2023 04:52  Patient On (Oxygen Delivery Method): room air        PHYSICAL EXAM:  HEAD:  Atraumatic, Normocephalic  NECK: Supple and normal thyroid.  No JVD or carotid bruit.   HEART: S1, S2 regular , 1/6 soft ejection systolic murmur in mitral area , no thrill and no gallops .  PULMONARY: Bilateral vesicular breathing , few scattered ronchi and few scattered rales are present .  ABDOMEN: Soft nontender and positive bowl sounds   EXTREMITIES:  No clubbing, cyanosis, or pedal  edema  NEUROLOGICAL: AAOX3 , no focal deficit .    LABS:                        11.4   11.95 )-----------( 337      ( 28 Aug 2023 08:22 )             36.1     08-28    141  |  107  |  17  ----------------------------<  88  4.7   |  26  |  0.52    Ca    8.9      28 Aug 2023 08:22            Urinalysis Basic - ( 28 Aug 2023 08:22 )    Color: x / Appearance: x / SG: x / pH: x  Gluc: 88 mg/dL / Ketone: x  / Bili: x / Urobili: x   Blood: x / Protein: x / Nitrite: x   Leuk Esterase: x / RBC: x / WBC x   Sq Epi: x / Non Sq Epi: x / Bacteria: x      BNP      EKG:  ECHO:  IMAGING:    Assessment/Plan    Will continue to follow during hospital course and give further recommendations as needed . Thanks for your referral . if any questions please contact me at office ([phone])cell [phone])  [facility] [place]       HPI:  89-year-old female with history of hypothyroidism, A-fib on Eliquis, s/p recent pacemaker for complete heart block brought in by ambulance from [facility] assisted living home for cough for the past 2 weeks.  Associated with generalized weakness and decreased p.o. intake.  No fall or trauma.  Patient states she has had cold symptoms for a while.  Denies fever, chills, chest pain, shortness of breath, abdominal pain, nausea, vomiting, upper or lower extremity weakness or paresthesias. pmd: Dr. Crawley, cards: [facility] Seen by card Dr Reich Admitted  to telemetry unit for monitoring , send 3 sets of cardiac enzymes to rule out acute coronary event, obtain ECHO to evaluate LVEF, cardiology consult  ,continue current management, O2 supply, anticoagulation plan as per cardiology consult Pulm cons requested , antitussives and nebulized bd ordered .Also UTI suspected and started on iv abx , id cons called Palliative care consult requested ,to discuss advance directives and complete MOLST  (20 Aug 2023 15:35)        SUBJECTIVE:      ALLERGIES:  Allergies    penicillin (Unknown)    Intolerances          MEDICATIONS  (STANDING):  acetaminophen     Tablet .. 325 milliGRAM(s) Oral every 8 hours  aMIOdarone    Tablet 200 milliGRAM(s) Oral daily  amLODIPine   Tablet 10 milliGRAM(s) Oral daily  apixaban 2.5 milliGRAM(s) Oral two times a day  artificial  tears Solution 1 Drop(s) Both EYES two times a day  dextrose 5% + sodium chloride 0.45%. 1000 milliLiter(s) (50 mL/Hr) IV Continuous <Continuous>  ferrous    sulfate 325 milliGRAM(s) Oral daily  lactobacillus acidophilus 1 Tablet(s) Oral every 12 hours  levothyroxine 75 MICROGram(s) Oral daily  metoprolol tartrate 50 milliGRAM(s) Oral two times a day  multivitamin/minerals 1 Tablet(s) Oral daily  pantoprazole    Tablet 40 milliGRAM(s) Oral daily  polyethylene glycol 3350 17 Gram(s) Oral daily    MEDICATIONS  (PRN):  acetaminophen     Tablet .. 650 milliGRAM(s) Oral every 6 hours PRN Temp greater or equal to 38C (100.4F), Mild Pain (1 - 3)  albuterol/ipratropium for Nebulization 3 milliLiter(s) Nebulizer every 6 hours PRN Shortness of Breath and/or Wheezing  aluminum hydroxide/magnesium hydroxide/simethicone Suspension 30 milliLiter(s) Oral every 4 hours PRN Dyspepsia  guaiFENesin Oral Liquid (Sugar-Free) 200 milliGRAM(s) Oral every 6 hours PRN Cough  melatonin 3 milliGRAM(s) Oral at bedtime PRN Insomnia  ondansetron Injectable 4 milliGRAM(s) IV Push every 8 hours PRN Nausea and/or Vomiting      REVIEW OF SYSTEMS:  CONSTITUTIONAL: No fever,  RESPIRATORY: No cough, wheezing, shortness of breath  CARDIOVASCULAR: No chest pain, dyspnea, palpitations, dizziness, syncope, paroxysmal nocturnal dyspnea, orthopnea, or arm or leg swelling  GASTROINTESTINAL: No abdominal  or epigastric pain, nausea, vomiting,  diarrhea  NEUROLOGICAL: No headaches,  loss of strength, numbness, or tremors    Vital Signs Last 24 Hrs  T(C): 36.7 (29 Aug 2023 04:52), Max: 36.7 (28 Aug 2023 12:03)  T(F): 98.1 (29 Aug 2023 04:52), Max: 98.1 (29 Aug 2023 04:52)  HR: 88 (29 Aug 2023 04:52) (76 - 88)  BP: 124/67 (29 Aug 2023 04:52) (124/67 - 147/80)  BP(mean): --  RR: 18 (29 Aug 2023 04:52) (18 - 18)  SpO2: 96% (29 Aug 2023 04:52) (90% - 96%)    Parameters below as of 29 Aug 2023 04:52  Patient On (Oxygen Delivery Method): room air        PHYSICAL EXAM:  HEAD:  Atraumatic, Normocephalic  NECK: Supple and normal thyroid.  No JVD or carotid bruit.   HEART: S1, S2 regular , 1/6 soft ejection systolic murmur in mitral area , no thrill and no gallops .  PULMONARY: Bilateral vesicular breathing , few scattered ronchi and few scattered rales are present .  ABDOMEN: Soft nontender and positive bowl sounds   EXTREMITIES:  No clubbing, cyanosis, or pedal  edema  NEUROLOGICAL: AAOX3 , no focal deficit .    LABS:                        11.4   11.95 )-----------( 337      ( 28 Aug 2023 08:22 )             36.1     08-28    141  |  107  |  17  ----------------------------<  88  4.7   |  26  |  0.52    Ca    8.9      28 Aug 2023 08:22            Urinalysis Basic - ( 28 Aug 2023 08:22 )    Color: x / Appearance: x / SG: x / pH: x  Gluc: 88 mg/dL / Ketone: x  / Bili: x / Urobili: x   Blood: x / Protein: x / Nitrite: x   Leuk Esterase: x / RBC: x / WBC x   Sq Epi: x / Non Sq Epi: x / Bacteria: x      BNP      EKG:  ECHO:  IMAGING:    Assessment/Plan    Will continue to follow during hospital course and give further recommendations as needed . Thanks for your referral . if any questions please contact me at office ([phone])cell [phone])  [facility] [place]       HPI:  89-year-old female with history of hypothyroidism, A-fib on Eliquis, s/p recent pacemaker for complete heart block brought in by ambulance from [facility] assisted living home for cough for the past 2 weeks.  Associated with generalized weakness and decreased p.o. intake.  No fall or trauma.  Patient states she has had cold symptoms for a while.  Denies fever, chills, chest pain, shortness of breath, abdominal pain, nausea, vomiting, upper or lower extremity weakness or paresthesias. pmd: Dr. Crawely, cards: [facility] Seen by card Dr Reich Admitted  to telemetry unit for monitoring , send 3 sets of cardiac enzymes to rule out acute coronary event, obtain ECHO to evaluate LVEF, cardiology consult  ,continue current management, O2 supply, anticoagulation plan as per cardiology consult Pulm cons requested , antitussives and nebulized bd ordered .Also UTI suspected and started on iv abx , id cons called Palliative care consult requested ,to discuss advance directives and complete MOLST  (20 Aug 2023 15:35)        SUBJECTIVE:      ALLERGIES:  Allergies    penicillin (Unknown)    Intolerances          MEDICATIONS  (STANDING):  acetaminophen     Tablet .. 325 milliGRAM(s) Oral every 8 hours  aMIOdarone    Tablet 200 milliGRAM(s) Oral daily  amLODIPine   Tablet 10 milliGRAM(s) Oral daily  apixaban 2.5 milliGRAM(s) Oral two times a day  artificial  tears Solution 1 Drop(s) Both EYES two times a day  dextrose 5% + sodium chloride 0.45%. 1000 milliLiter(s) (50 mL/Hr) IV Continuous <Continuous>  ferrous    sulfate 325 milliGRAM(s) Oral daily  lactobacillus acidophilus 1 Tablet(s) Oral every 12 hours  levothyroxine 75 MICROGram(s) Oral daily  metoprolol tartrate 50 milliGRAM(s) Oral two times a day  multivitamin/minerals 1 Tablet(s) Oral daily  pantoprazole    Tablet 40 milliGRAM(s) Oral daily  polyethylene glycol 3350 17 Gram(s) Oral daily    MEDICATIONS  (PRN):  acetaminophen     Tablet .. 650 milliGRAM(s) Oral every 6 hours PRN Temp greater or equal to 38C (100.4F), Mild Pain (1 - 3)  albuterol/ipratropium for Nebulization 3 milliLiter(s) Nebulizer every 6 hours PRN Shortness of Breath and/or Wheezing  aluminum hydroxide/magnesium hydroxide/simethicone Suspension 30 milliLiter(s) Oral every 4 hours PRN Dyspepsia  guaiFENesin Oral Liquid (Sugar-Free) 200 milliGRAM(s) Oral every 6 hours PRN Cough  melatonin 3 milliGRAM(s) Oral at bedtime PRN Insomnia  ondansetron Injectable 4 milliGRAM(s) IV Push every 8 hours PRN Nausea and/or Vomiting      REVIEW OF SYSTEMS:  CONSTITUTIONAL: No fever,  RESPIRATORY: No cough, wheezing, shortness of breath  CARDIOVASCULAR: No chest pain, dyspnea, palpitations, dizziness, syncope, paroxysmal nocturnal dyspnea, orthopnea, or arm or leg swelling  GASTROINTESTINAL: No abdominal  or epigastric pain, nausea, vomiting,  diarrhea  NEUROLOGICAL: No headaches,  loss of strength, numbness, or tremors    Vital Signs Last 24 Hrs  T(C): 36.7 (29 Aug 2023 04:52), Max: 36.7 (28 Aug 2023 12:03)  T(F): 98.1 (29 Aug 2023 04:52), Max: 98.1 (29 Aug 2023 04:52)  HR: 88 (29 Aug 2023 04:52) (76 - 88)  BP: 124/67 (29 Aug 2023 04:52) (124/67 - 147/80)  BP(mean): --  RR: 18 (29 Aug 2023 04:52) (18 - 18)  SpO2: 96% (29 Aug 2023 04:52) (90% - 96%)    Parameters below as of 29 Aug 2023 04:52  Patient On (Oxygen Delivery Method): room air        PHYSICAL EXAM:  HEAD:  Atraumatic, Normocephalic  NECK: Supple and normal thyroid.  No JVD or carotid bruit.   HEART: S1, S2 regular , 1/6 soft ejection systolic murmur in mitral area , no thrill and no gallops .  PULMONARY: Bilateral vesicular breathing , few scattered ronchi and few scattered rales are present .  ABDOMEN: Soft nontender and positive bowl sounds   EXTREMITIES:  No clubbing, cyanosis, or pedal  edema  NEUROLOGICAL: AAOX3 , no focal deficit .    LABS:                        11.4   11.95 )-----------( 337      ( 28 Aug 2023 08:22 )             36.1     08-28    141  |  107  |  17  ----------------------------<  88  4.7   |  26  |  0.52    Ca    8.9      28 Aug 2023 08:22            Urinalysis Basic - ( 28 Aug 2023 08:22 )    Color: x / Appearance: x / SG: x / pH: x  Gluc: 88 mg/dL / Ketone: x  / Bili: x / Urobili: x   Blood: x / Protein: x / Nitrite: x   Leuk Esterase: x / RBC: x / WBC x   Sq Epi: x / Non Sq Epi: x / Bacteria: x      BNP      EKG:  ECHO:  IMAGING:    Assessment/Plan    Will continue to follow during hospital course and give further recommendations as needed . Thanks for your referral . if any questions please contact me at office ([phone])cell [phone])  [facility] [place]       HPI:  89-year-old female with history of hypothyroidism, A-fib on Eliquis, s/p recent pacemaker for complete heart block brought in by ambulance from [facility] assisted living home for cough for the past 2 weeks.  Associated with generalized weakness and decreased p.o. intake.  No fall or trauma.  Patient states she has had cold symptoms for a while.  Denies fever, chills, chest pain, shortness of breath, abdominal pain, nausea, vomiting, upper or lower extremity weakness or paresthesias. pmd: Dr. Crawley, cards: [facility] Seen by card Dr Reich Admitted  to telemetry unit for monitoring , send 3 sets of cardiac enzymes to rule out acute coronary event, obtain ECHO to evaluate LVEF, cardiology consult  ,continue current management, O2 supply, anticoagulation plan as per cardiology consult Pulm cons requested , antitussives and nebulized bd ordered .Also UTI suspected and started on iv abx , id cons called Palliative care consult requested ,to discuss advance directives and complete MOLST  (20 Aug 2023 15:35)        SUBJECTIVE:      ALLERGIES:  Allergies    penicillin (Unknown)    Intolerances          MEDICATIONS  (STANDING):  acetaminophen     Tablet .. 325 milliGRAM(s) Oral every 8 hours  aMIOdarone    Tablet 200 milliGRAM(s) Oral daily  amLODIPine   Tablet 10 milliGRAM(s) Oral daily  apixaban 2.5 milliGRAM(s) Oral two times a day  artificial  tears Solution 1 Drop(s) Both EYES two times a day  dextrose 5% + sodium chloride 0.45%. 1000 milliLiter(s) (50 mL/Hr) IV Continuous <Continuous>  ferrous    sulfate 325 milliGRAM(s) Oral daily  lactobacillus acidophilus 1 Tablet(s) Oral every 12 hours  levothyroxine 75 MICROGram(s) Oral daily  metoprolol tartrate 50 milliGRAM(s) Oral two times a day  multivitamin/minerals 1 Tablet(s) Oral daily  pantoprazole    Tablet 40 milliGRAM(s) Oral daily  polyethylene glycol 3350 17 Gram(s) Oral daily    MEDICATIONS  (PRN):  acetaminophen     Tablet .. 650 milliGRAM(s) Oral every 6 hours PRN Temp greater or equal to 38C (100.4F), Mild Pain (1 - 3)  albuterol/ipratropium for Nebulization 3 milliLiter(s) Nebulizer every 6 hours PRN Shortness of Breath and/or Wheezing  aluminum hydroxide/magnesium hydroxide/simethicone Suspension 30 milliLiter(s) Oral every 4 hours PRN Dyspepsia  guaiFENesin Oral Liquid (Sugar-Free) 200 milliGRAM(s) Oral every 6 hours PRN Cough  melatonin 3 milliGRAM(s) Oral at bedtime PRN Insomnia  ondansetron Injectable 4 milliGRAM(s) IV Push every 8 hours PRN Nausea and/or Vomiting      REVIEW OF SYSTEMS:  CONSTITUTIONAL: No fever,  RESPIRATORY: No cough, wheezing, shortness of breath  CARDIOVASCULAR: No chest pain, dyspnea, palpitations, dizziness, syncope, paroxysmal nocturnal dyspnea, orthopnea, or arm or leg swelling  GASTROINTESTINAL: No abdominal  or epigastric pain, nausea, vomiting,  diarrhea  NEUROLOGICAL: No headaches,  loss of strength, numbness, or tremors    Vital Signs Last 24 Hrs  T(C): 36.7 (29 Aug 2023 04:52), Max: 36.7 (28 Aug 2023 12:03)  T(F): 98.1 (29 Aug 2023 04:52), Max: 98.1 (29 Aug 2023 04:52)  HR: 88 (29 Aug 2023 04:52) (76 - 88)  BP: 124/67 (29 Aug 2023 04:52) (124/67 - 147/80)  BP(mean): --  RR: 18 (29 Aug 2023 04:52) (18 - 18)  SpO2: 96% (29 Aug 2023 04:52) (90% - 96%)    Parameters below as of 29 Aug 2023 04:52  Patient On (Oxygen Delivery Method): room air        PHYSICAL EXAM:  HEAD:  Atraumatic, Normocephalic  NECK: Supple and normal thyroid.  No JVD or carotid bruit.   HEART: S1, S2 regular , 1/6 soft ejection systolic murmur in mitral area , no thrill and no gallops .  PULMONARY: Bilateral vesicular breathing , few scattered rhonchi and few scattered rales are present .  ABDOMEN: Soft nontender and positive bowl sounds   EXTREMITIES:  No clubbing, cyanosis, or pedal  edema  NEUROLOGICAL: AA and mild confused  , no focal deficit .  Skin : No rashes .  Musculoskeletal : No joint swellings .    LABS:                        11.4   11.95 )-----------( 337      ( 28 Aug 2023 08:22 )             36.1     08-28    141  |  107  |  17  ----------------------------<  88  4.7   |  26  |  0.52    Ca    8.9      28 Aug 2023 08:22            Urinalysis Basic - ( 28 Aug 2023 08:22 )    Color: x / Appearance: x / SG: x / pH: x  Gluc: 88 mg/dL / Ketone: x  / Bili: x / Urobili: x   Blood: x / Protein: x / Nitrite: x   Leuk Esterase: x / RBC: x / WBC x   Sq Epi: x / Non Sq Epi: x / Bacteria: x      Assessment/Plan  Patient has :  1) R/O pneumonia and sepsis . Possibility of pneumnia less likely as per ID . Chronic cough can be due to enalapril so will change it to different antihypertensive medication amlodipine 5 mg daily   2) UTI   3) paroxysmal atrial fibrillation and stable .  4) SSS and S/P PPM .  5) Mild chronic diastolic heart failure and under control   6) H/O hypothyroidism   7) Mild  Anemia   8) Hypertension   Plan : 1) I/V antibiotics as per ID 2) Rest of medications as such   3) Monitor hemoglobin and electrolytes . 4) Increase ambulation . 5) Discontinue enalapril due to cough and start amlodipine 5 mg daily 6) Discontinue monitor   Will continue to follow during hospital course and give further recommendations as needed . Thanks for your referral . if any questions please contact me at office ([phone] cell [phone])      [facility] [place]       HPI:  89-year-old female with history of hypothyroidism, A-fib on Eliquis, s/p recent pacemaker for complete heart block brought in by ambulance from [facility] assisted living home for cough for the past 2 weeks.  Associated with generalized weakness and decreased p.o. intake.  No fall or trauma.  Patient states she has had cold symptoms for a while.  Denies fever, chills, chest pain, shortness of breath, abdominal pain, nausea, vomiting, upper or lower extremity weakness or paresthesias. pmd: Dr. Crawley, cards: [facility] Seen by card Dr Reich Admitted  to telemetry unit for monitoring , send 3 sets of cardiac enzymes to rule out acute coronary event, obtain ECHO to evaluate LVEF, cardiology consult  ,continue current management, O2 supply, anticoagulation plan as per cardiology consult Pulm cons requested , antitussives and nebulized bd ordered .Also UTI suspected and started on iv abx , id cons called Palliative care consult requested ,to discuss advance directives and complete MOLST  (20 Aug 2023 15:35)        SUBJECTIVE:      ALLERGIES:  Allergies    penicillin (Unknown)    Intolerances          MEDICATIONS  (STANDING):  acetaminophen     Tablet .. 325 milliGRAM(s) Oral every 8 hours  aMIOdarone    Tablet 200 milliGRAM(s) Oral daily  amLODIPine   Tablet 10 milliGRAM(s) Oral daily  apixaban 2.5 milliGRAM(s) Oral two times a day  artificial  tears Solution 1 Drop(s) Both EYES two times a day  dextrose 5% + sodium chloride 0.45%. 1000 milliLiter(s) (50 mL/Hr) IV Continuous <Continuous>  ferrous    sulfate 325 milliGRAM(s) Oral daily  lactobacillus acidophilus 1 Tablet(s) Oral every 12 hours  levothyroxine 75 MICROGram(s) Oral daily  metoprolol tartrate 50 milliGRAM(s) Oral two times a day  multivitamin/minerals 1 Tablet(s) Oral daily  pantoprazole    Tablet 40 milliGRAM(s) Oral daily  polyethylene glycol 3350 17 Gram(s) Oral daily    MEDICATIONS  (PRN):  acetaminophen     Tablet .. 650 milliGRAM(s) Oral every 6 hours PRN Temp greater or equal to 38C (100.4F), Mild Pain (1 - 3)  albuterol/ipratropium for Nebulization 3 milliLiter(s) Nebulizer every 6 hours PRN Shortness of Breath and/or Wheezing  aluminum hydroxide/magnesium hydroxide/simethicone Suspension 30 milliLiter(s) Oral every 4 hours PRN Dyspepsia  guaiFENesin Oral Liquid (Sugar-Free) 200 milliGRAM(s) Oral every 6 hours PRN Cough  melatonin 3 milliGRAM(s) Oral at bedtime PRN Insomnia  ondansetron Injectable 4 milliGRAM(s) IV Push every 8 hours PRN Nausea and/or Vomiting      REVIEW OF SYSTEMS:  CONSTITUTIONAL: No fever,  RESPIRATORY: No cough, wheezing, shortness of breath  CARDIOVASCULAR: No chest pain, dyspnea, palpitations, dizziness, syncope, paroxysmal nocturnal dyspnea, orthopnea, or arm or leg swelling  GASTROINTESTINAL: No abdominal  or epigastric pain, nausea, vomiting,  diarrhea  NEUROLOGICAL: No headaches,  loss of strength, numbness, or tremors    Vital Signs Last 24 Hrs  T(C): 36.7 (29 Aug 2023 04:52), Max: 36.7 (28 Aug 2023 12:03)  T(F): 98.1 (29 Aug 2023 04:52), Max: 98.1 (29 Aug 2023 04:52)  HR: 88 (29 Aug 2023 04:52) (76 - 88)  BP: 124/67 (29 Aug 2023 04:52) (124/67 - 147/80)  BP(mean): --  RR: 18 (29 Aug 2023 04:52) (18 - 18)  SpO2: 96% (29 Aug 2023 04:52) (90% - 96%)    Parameters below as of 29 Aug 2023 04:52  Patient On (Oxygen Delivery Method): room air        PHYSICAL EXAM:  HEAD:  Atraumatic, Normocephalic  NECK: Supple and normal thyroid.  No JVD or carotid bruit.   HEART: S1, S2 regular , 1/6 soft ejection systolic murmur in mitral area , no thrill and no gallops .  PULMONARY: Bilateral vesicular breathing , few scattered rhonchi and few scattered rales are present .  ABDOMEN: Soft nontender and positive bowl sounds   EXTREMITIES:  No clubbing, cyanosis, or pedal  edema  NEUROLOGICAL: AA and mild confused  , no focal deficit .  Skin : No rashes .  Musculoskeletal : No joint swellings .    LABS:                        11.4   11.95 )-----------( 337      ( 28 Aug 2023 08:22 )             36.1     08-28    141  |  107  |  17  ----------------------------<  88  4.7   |  26  |  0.52    Ca    8.9      28 Aug 2023 08:22            Urinalysis Basic - ( 28 Aug 2023 08:22 )    Color: x / Appearance: x / SG: x / pH: x  Gluc: 88 mg/dL / Ketone: x  / Bili: x / Urobili: x   Blood: x / Protein: x / Nitrite: x   Leuk Esterase: x / RBC: x / WBC x   Sq Epi: x / Non Sq Epi: x / Bacteria: x      Assessment/Plan  Patient has :  1) R/O pneumonia and sepsis . Possibility of pneumnia less likely as per ID . Chronic cough can be due to enalapril so will change it to different antihypertensive medication amlodipine 5 mg daily   2) UTI   3) paroxysmal atrial fibrillation and stable .  4) SSS and S/P PPM .  5) Mild chronic diastolic heart failure and under control   6) H/O hypothyroidism   7) Mild  Anemia   8) Hypertension   Plan : 1) I/V antibiotics as per ID 2) Rest of medications as such   3) Monitor hemoglobin and electrolytes . 4) Increase ambulation . 5) Discontinue enalapril due to cough and start amlodipine 5 mg daily 6) Discontinue monitor   Will continue to follow during hospital course and give further recommendations as needed . Thanks for your referral . if any questions please contact me at office ([phone] cell [phone])      [facility] [place]       HPI:  89-year-old female with history of hypothyroidism, A-fib on Eliquis, s/p recent pacemaker for complete heart block brought in by ambulance from [facility] assisted living home for cough for the past 2 weeks.  Associated with generalized weakness and decreased p.o. intake.  No fall or trauma.  Patient states she has had cold symptoms for a while.  Denies fever, chills, chest pain, shortness of breath, abdominal pain, nausea, vomiting, upper or lower extremity weakness or paresthesias. pmd: Dr. Crawley, cards: [facility] Seen by card Dr Reich Admitted  to telemetry unit for monitoring , send 3 sets of cardiac enzymes to rule out acute coronary event, obtain ECHO to evaluate LVEF, cardiology consult  ,continue current management, O2 supply, anticoagulation plan as per cardiology consult Pulm cons requested , antitussives and nebulized bd ordered .Also UTI suspected and started on iv abx , id cons called Palliative care consult requested ,to discuss advance directives and complete MOLST  (20 Aug 2023 15:35)        SUBJECTIVE:      ALLERGIES:  Allergies    penicillin (Unknown)    Intolerances          MEDICATIONS  (STANDING):  acetaminophen     Tablet .. 325 milliGRAM(s) Oral every 8 hours  aMIOdarone    Tablet 200 milliGRAM(s) Oral daily  amLODIPine   Tablet 10 milliGRAM(s) Oral daily  apixaban 2.5 milliGRAM(s) Oral two times a day  artificial  tears Solution 1 Drop(s) Both EYES two times a day  dextrose 5% + sodium chloride 0.45%. 1000 milliLiter(s) (50 mL/Hr) IV Continuous <Continuous>  ferrous    sulfate 325 milliGRAM(s) Oral daily  lactobacillus acidophilus 1 Tablet(s) Oral every 12 hours  levothyroxine 75 MICROGram(s) Oral daily  metoprolol tartrate 50 milliGRAM(s) Oral two times a day  multivitamin/minerals 1 Tablet(s) Oral daily  pantoprazole    Tablet 40 milliGRAM(s) Oral daily  polyethylene glycol 3350 17 Gram(s) Oral daily    MEDICATIONS  (PRN):  acetaminophen     Tablet .. 650 milliGRAM(s) Oral every 6 hours PRN Temp greater or equal to 38C (100.4F), Mild Pain (1 - 3)  albuterol/ipratropium for Nebulization 3 milliLiter(s) Nebulizer every 6 hours PRN Shortness of Breath and/or Wheezing  aluminum hydroxide/magnesium hydroxide/simethicone Suspension 30 milliLiter(s) Oral every 4 hours PRN Dyspepsia  guaiFENesin Oral Liquid (Sugar-Free) 200 milliGRAM(s) Oral every 6 hours PRN Cough  melatonin 3 milliGRAM(s) Oral at bedtime PRN Insomnia  ondansetron Injectable 4 milliGRAM(s) IV Push every 8 hours PRN Nausea and/or Vomiting      REVIEW OF SYSTEMS:  CONSTITUTIONAL: No fever,  RESPIRATORY: No cough, wheezing, shortness of breath  CARDIOVASCULAR: No chest pain, dyspnea, palpitations, dizziness, syncope, paroxysmal nocturnal dyspnea, orthopnea, or arm or leg swelling  GASTROINTESTINAL: No abdominal  or epigastric pain, nausea, vomiting,  diarrhea  NEUROLOGICAL: No headaches,  loss of strength, numbness, or tremors    Vital Signs Last 24 Hrs  T(C): 36.7 (29 Aug 2023 04:52), Max: 36.7 (28 Aug 2023 12:03)  T(F): 98.1 (29 Aug 2023 04:52), Max: 98.1 (29 Aug 2023 04:52)  HR: 88 (29 Aug 2023 04:52) (76 - 88)  BP: 124/67 (29 Aug 2023 04:52) (124/67 - 147/80)  BP(mean): --  RR: 18 (29 Aug 2023 04:52) (18 - 18)  SpO2: 96% (29 Aug 2023 04:52) (90% - 96%)    Parameters below as of 29 Aug 2023 04:52  Patient On (Oxygen Delivery Method): room air        PHYSICAL EXAM:  HEAD:  Atraumatic, Normocephalic  NECK: Supple and normal thyroid.  No JVD or carotid bruit.   HEART: S1, S2 regular , 1/6 soft ejection systolic murmur in mitral area , no thrill and no gallops .  PULMONARY: Bilateral vesicular breathing , few scattered rhonchi and few scattered rales are present .  ABDOMEN: Soft nontender and positive bowl sounds   EXTREMITIES:  No clubbing, cyanosis, or pedal  edema  NEUROLOGICAL: AA and mild confused  , no focal deficit .  Skin : No rashes .  Musculoskeletal : No joint swellings .    LABS:                        11.4   11.95 )-----------( 337      ( 28 Aug 2023 08:22 )             36.1     08-28    141  |  107  |  17  ----------------------------<  88  4.7   |  26  |  0.52    Ca    8.9      28 Aug 2023 08:22            Urinalysis Basic - ( 28 Aug 2023 08:22 )    Color: x / Appearance: x / SG: x / pH: x  Gluc: 88 mg/dL / Ketone: x  / Bili: x / Urobili: x   Blood: x / Protein: x / Nitrite: x   Leuk Esterase: x / RBC: x / WBC x   Sq Epi: x / Non Sq Epi: x / Bacteria: x      Assessment/Plan  Patient has :  1) R/O pneumonia and sepsis . Possibility of pneumnia less likely as per ID . Chronic cough can be due to enalapril so will change it to different antihypertensive medication amlodipine 5 mg daily   2) UTI   3) paroxysmal atrial fibrillation and stable .  4) SSS and S/P PPM .  5) Mild chronic diastolic heart failure and under control   6) H/O hypothyroidism   7) Mild  Anemia   8) Hypertension   Plan : 1) I/V antibiotics as per ID 2) Rest of medications as such   3) Monitor hemoglobin and electrolytes . 4) Increase ambulation . 5) Discontinue enalapril due to cough and start amlodipine 5 mg daily 6) Discontinue monitor   Will continue to follow during hospital course and give further recommendations as needed . Thanks for your referral . if any questions please contact me at office ([phone] cell [phone])      NINOSKA DAVENPORT MD [place] [id number]  [address]  OFFICE : [phone]  CELL : [phone]  CARDIOLOGY F/U  :       HPI:  89-year-old female with history of hypothyroidism, A-fib on Eliquis, s/p recent pacemaker for complete heart block brought in by ambulance from [facility] living home for cough for the past 2 weeks.  Associated with generalized weakness and decreased p.o. intake.  No fall or trauma.  Patient states she has had cold symptoms for a while.  Denies fever, chills, chest pain, shortness of breath, abdominal pain, nausea, vomiting, upper or lower extremity weakness or paresthesias. pmd: Dr. Crawley, cards: [facility] Seen by card Dr Davenport Admitted  to telemetry unit for monitoring , send 3 sets of cardiac enzymes to rule out acute coronary event, obtain ECHO to evaluate LVEF, cardiology consult  ,continue current management, O2 supply, anticoagulation plan as per cardiology consult Pulm cons requested , antitussives and nebulized bd ordered .Also UTI suspected and started on iv abx , id cons called Palliative care consult requested ,to discuss advance directives and complete MOLST  (20 Aug 2023 15:35)        SUBJECTIVE: Patient feeling better .      ALLERGIES:  Allergies    penicillin (Unknown)    Intolerances          MEDICATIONS  (STANDING):  acetaminophen     Tablet .. 325 milliGRAM(s) Oral every 8 hours  aMIOdarone    Tablet 200 milliGRAM(s) Oral daily  amLODIPine   Tablet 10 milliGRAM(s) Oral daily  apixaban 2.5 milliGRAM(s) Oral two times a day  artificial  tears Solution 1 Drop(s) Both EYES two times a day  dextrose 5% + sodium chloride 0.45%. 1000 milliLiter(s) (50 mL/Hr) IV Continuous <Continuous>  ferrous    sulfate 325 milliGRAM(s) Oral daily  lactobacillus acidophilus 1 Tablet(s) Oral every 12 hours  levothyroxine 75 MICROGram(s) Oral daily  metoprolol tartrate 50 milliGRAM(s) Oral two times a day  multivitamin/minerals 1 Tablet(s) Oral daily  pantoprazole    Tablet 40 milliGRAM(s) Oral daily  polyethylene glycol 3350 17 Gram(s) Oral daily    MEDICATIONS  (PRN):  acetaminophen     Tablet .. 650 milliGRAM(s) Oral every 6 hours PRN Temp greater or equal to 38C (100.4F), Mild Pain (1 - 3)  albuterol/ipratropium for Nebulization 3 milliLiter(s) Nebulizer every 6 hours PRN Shortness of Breath and/or Wheezing  aluminum hydroxide/magnesium hydroxide/simethicone Suspension 30 milliLiter(s) Oral every 4 hours PRN Dyspepsia  guaiFENesin Oral Liquid (Sugar-Free) 200 milliGRAM(s) Oral every 6 hours PRN Cough  melatonin 3 milliGRAM(s) Oral at bedtime PRN Insomnia  ondansetron Injectable 4 milliGRAM(s) IV Push every 8 hours PRN Nausea and/or Vomiting      REVIEW OF SYSTEMS:  CONSTITUTIONAL: No fever,  RESPIRATORY: No cough, wheezing, shortness of breath  CARDIOVASCULAR: No chest pain, dyspnea, palpitations, dizziness, syncope, paroxysmal nocturnal dyspnea, orthopnea, or arm or leg swelling  GASTROINTESTINAL: No abdominal  or epigastric pain, nausea, vomiting,  diarrhea  NEUROLOGICAL: No headaches,  loss of strength, numbness, or tremors    Vital Signs Last 24 Hrs  T(C): 36.7 (29 Aug 2023 04:52), Max: 36.7 (28 Aug 2023 12:03)  T(F): 98.1 (29 Aug 2023 04:52), Max: 98.1 (29 Aug 2023 04:52)  HR: 88 (29 Aug 2023 04:52) (76 - 88)  BP: 124/67 (29 Aug 2023 04:52) (124/67 - 147/80)  BP(mean): --  RR: 18 (29 Aug 2023 04:52) (18 - 18)  SpO2: 96% (29 Aug 2023 04:52) (90% - 96%)    Parameters below as of 29 Aug 2023 04:52  Patient On (Oxygen Delivery Method): room air        PHYSICAL EXAM:  HEAD:  Atraumatic, Normocephalic  NECK: Supple and normal thyroid.  No JVD or carotid bruit.   HEART: S1, S2 regular , 1/6 soft ejection systolic murmur in mitral area , no thrill and no gallops .  PULMONARY: Bilateral vesicular breathing , few scattered rhonchi and few scattered rales are present .  ABDOMEN: Soft nontender and positive bowl sounds   EXTREMITIES:  No clubbing, cyanosis, or pedal  edema  NEUROLOGICAL: AA and mild confused  , no focal deficit .  Skin : No rashes .  Musculoskeletal : No joint swellings .    LABS:                        11.4   11.95 )-----------( 337      ( 28 Aug 2023 08:22 )             36.1     08-28    141  |  107  |  17  ----------------------------<  88  4.7   |  26  |  0.52    Ca    8.9      28 Aug 2023 08:22            Urinalysis Basic - ( 28 Aug 2023 08:22 )    Color: x / Appearance: x / SG: x / pH: x  Gluc: 88 mg/dL / Ketone: x  / Bili: x / Urobili: x   Blood: x / Protein: x / Nitrite: x   Leuk Esterase: x / RBC: x / WBC x   Sq Epi: x / Non Sq Epi: x / Bacteria: x      Assessment/Plan  Patient has :  1) R/O pneumonia and sepsis . Possibility of pneumnia less likely as per ID . Chronic cough can be due to enalapril so will change it to different antihypertensive medication amlodipine 5 mg daily   2) UTI   3) paroxysmal atrial fibrillation and stable .  4) SSS and S/P PPM .  5) Mild chronic diastolic heart failure and under control   6) H/O hypothyroidism   7) Mild  Anemia   8) Hypertension   Plan : 1) I/V antibiotics as per ID 2) Rest of medications as such   3) Monitor hemoglobin and electrolytes . 4) Increase ambulation . 5) Discontinue enalapril due to cough and start amlodipine 5 mg daily 6) Discontinue monitor   Will continue to follow during hospital course and give further recommendations as needed . Thanks for your referral . if any questions please contact me at office ([phone] cell [phone])      NINOSKA DAVENPORT MD [place] [id number]  [address]  OFFICE : [phone]  CELL : [phone]  CARDIOLOGY F/U  :       HPI:  89-year-old female with history of hypothyroidism, A-fib on Eliquis, s/p recent pacemaker for complete heart block brought in by ambulance from [facility] living home for cough for the past 2 weeks.  Associated with generalized weakness and decreased p.o. intake.  No fall or trauma.  Patient states she has had cold symptoms for a while.  Denies fever, chills, chest pain, shortness of breath, abdominal pain, nausea, vomiting, upper or lower extremity weakness or paresthesias. pmd: Dr. Crawley, cards: [facility] Seen by card Dr Davenport Admitted  to telemetry unit for monitoring , send 3 sets of cardiac enzymes to rule out acute coronary event, obtain ECHO to evaluate LVEF, cardiology consult  ,continue current management, O2 supply, anticoagulation plan as per cardiology consult Pulm cons requested , antitussives and nebulized bd ordered .Also UTI suspected and started on iv abx , id cons called Palliative care consult requested ,to discuss advance directives and complete MOLST  (20 Aug 2023 15:35)        SUBJECTIVE: Patient feeling better .      ALLERGIES:  Allergies    penicillin (Unknown)    Intolerances          MEDICATIONS  (STANDING):  acetaminophen     Tablet .. 325 milliGRAM(s) Oral every 8 hours  aMIOdarone    Tablet 200 milliGRAM(s) Oral daily  amLODIPine   Tablet 10 milliGRAM(s) Oral daily  apixaban 2.5 milliGRAM(s) Oral two times a day  artificial  tears Solution 1 Drop(s) Both EYES two times a day  dextrose 5% + sodium chloride 0.45%. 1000 milliLiter(s) (50 mL/Hr) IV Continuous <Continuous>  ferrous    sulfate 325 milliGRAM(s) Oral daily  lactobacillus acidophilus 1 Tablet(s) Oral every 12 hours  levothyroxine 75 MICROGram(s) Oral daily  metoprolol tartrate 50 milliGRAM(s) Oral two times a day  multivitamin/minerals 1 Tablet(s) Oral daily  pantoprazole    Tablet 40 milliGRAM(s) Oral daily  polyethylene glycol 3350 17 Gram(s) Oral daily    MEDICATIONS  (PRN):  acetaminophen     Tablet .. 650 milliGRAM(s) Oral every 6 hours PRN Temp greater or equal to 38C (100.4F), Mild Pain (1 - 3)  albuterol/ipratropium for Nebulization 3 milliLiter(s) Nebulizer every 6 hours PRN Shortness of Breath and/or Wheezing  aluminum hydroxide/magnesium hydroxide/simethicone Suspension 30 milliLiter(s) Oral every 4 hours PRN Dyspepsia  guaiFENesin Oral Liquid (Sugar-Free) 200 milliGRAM(s) Oral every 6 hours PRN Cough  melatonin 3 milliGRAM(s) Oral at bedtime PRN Insomnia  ondansetron Injectable 4 milliGRAM(s) IV Push every 8 hours PRN Nausea and/or Vomiting      REVIEW OF SYSTEMS:  CONSTITUTIONAL: No fever,  RESPIRATORY: No cough, wheezing, shortness of breath  CARDIOVASCULAR: No chest pain, dyspnea, palpitations, dizziness, syncope, paroxysmal nocturnal dyspnea, orthopnea, or arm or leg swelling  GASTROINTESTINAL: No abdominal  or epigastric pain, nausea, vomiting,  diarrhea  NEUROLOGICAL: No headaches,  loss of strength, numbness, or tremors    Vital Signs Last 24 Hrs  T(C): 36.7 (29 Aug 2023 04:52), Max: 36.7 (28 Aug 2023 12:03)  T(F): 98.1 (29 Aug 2023 04:52), Max: 98.1 (29 Aug 2023 04:52)  HR: 88 (29 Aug 2023 04:52) (76 - 88)  BP: 124/67 (29 Aug 2023 04:52) (124/67 - 147/80)  BP(mean): --  RR: 18 (29 Aug 2023 04:52) (18 - 18)  SpO2: 96% (29 Aug 2023 04:52) (90% - 96%)    Parameters below as of 29 Aug 2023 04:52  Patient On (Oxygen Delivery Method): room air        PHYSICAL EXAM:  HEAD:  Atraumatic, Normocephalic  NECK: Supple and normal thyroid.  No JVD or carotid bruit.   HEART: S1, S2 regular , 1/6 soft ejection systolic murmur in mitral area , no thrill and no gallops .  PULMONARY: Bilateral vesicular breathing , few scattered rhonchi and few scattered rales are present .  ABDOMEN: Soft nontender and positive bowl sounds   EXTREMITIES:  No clubbing, cyanosis, or pedal  edema  NEUROLOGICAL: AA and mild confused  , no focal deficit .  Skin : No rashes .  Musculoskeletal : No joint swellings .    LABS:                        11.4   11.95 )-----------( 337      ( 28 Aug 2023 08:22 )             36.1     08-28    141  |  107  |  17  ----------------------------<  88  4.7   |  26  |  0.52    Ca    8.9      28 Aug 2023 08:22            Urinalysis Basic - ( 28 Aug 2023 08:22 )    Color: x / Appearance: x / SG: x / pH: x  Gluc: 88 mg/dL / Ketone: x  / Bili: x / Urobili: x   Blood: x / Protein: x / Nitrite: x   Leuk Esterase: x / RBC: x / WBC x   Sq Epi: x / Non Sq Epi: x / Bacteria: x      Assessment/Plan  Patient has :  1) R/O pneumonia and sepsis . Possibility of pneumnia less likely as per ID . Chronic cough can be due to enalapril so will change it to different antihypertensive medication amlodipine 5 mg daily   2) UTI   3) paroxysmal atrial fibrillation and stable .  4) SSS and S/P PPM .  5) Mild chronic diastolic heart failure and under control   6) H/O hypothyroidism   7) Mild  Anemia   8) Hypertension   Plan : 1) I/V antibiotics as per ID 2) Rest of medications as such   3) Monitor hemoglobin and electrolytes . 4) Increase ambulation . 5) Discontinue enalapril due to cough and start amlodipine 5 mg daily 6) Discontinue monitor   Will continue to follow during hospital course and give further recommendations as needed . Thanks for your referral . if any questions please contact me at office ([phone] cell [phone])      NINOSKA DAVENPORT MD [place] [id number]  [address]  OFFICE : [phone]  CELL : [phone]  CARDIOLOGY F/U  :       HPI:  89-year-old female with history of hypothyroidism, A-fib on Eliquis, s/p recent pacemaker for complete heart block brought in by ambulance from [facility] living home for cough for the past 2 weeks.  Associated with generalized weakness and decreased p.o. intake.  No fall or trauma.  Patient states she has had cold symptoms for a while.  Denies fever, chills, chest pain, shortness of breath, abdominal pain, nausea, vomiting, upper or lower extremity weakness or paresthesias. pmd: Dr. Crawley, cards: [facility] Seen by card Dr Davenport Admitted  to telemetry unit for monitoring , send 3 sets of cardiac enzymes to rule out acute coronary event, obtain ECHO to evaluate LVEF, cardiology consult  ,continue current management, O2 supply, anticoagulation plan as per cardiology consult Pulm cons requested , antitussives and nebulized bd ordered .Also UTI suspected and started on iv abx , id cons called Palliative care consult requested ,to discuss advance directives and complete MOLST  (20 Aug 2023 15:35)        SUBJECTIVE: Patient feeling better .      ALLERGIES:  Allergies    penicillin (Unknown)    Intolerances          MEDICATIONS  (STANDING):  acetaminophen     Tablet .. 325 milliGRAM(s) Oral every 8 hours  aMIOdarone    Tablet 200 milliGRAM(s) Oral daily  amLODIPine   Tablet 10 milliGRAM(s) Oral daily  apixaban 2.5 milliGRAM(s) Oral two times a day  artificial  tears Solution 1 Drop(s) Both EYES two times a day  dextrose 5% + sodium chloride 0.45%. 1000 milliLiter(s) (50 mL/Hr) IV Continuous <Continuous>  ferrous    sulfate 325 milliGRAM(s) Oral daily  lactobacillus acidophilus 1 Tablet(s) Oral every 12 hours  levothyroxine 75 MICROGram(s) Oral daily  metoprolol tartrate 50 milliGRAM(s) Oral two times a day  multivitamin/minerals 1 Tablet(s) Oral daily  pantoprazole    Tablet 40 milliGRAM(s) Oral daily  polyethylene glycol 3350 17 Gram(s) Oral daily    MEDICATIONS  (PRN):  acetaminophen     Tablet .. 650 milliGRAM(s) Oral every 6 hours PRN Temp greater or equal to 38C (100.4F), Mild Pain (1 - 3)  albuterol/ipratropium for Nebulization 3 milliLiter(s) Nebulizer every 6 hours PRN Shortness of Breath and/or Wheezing  aluminum hydroxide/magnesium hydroxide/simethicone Suspension 30 milliLiter(s) Oral every 4 hours PRN Dyspepsia  guaiFENesin Oral Liquid (Sugar-Free) 200 milliGRAM(s) Oral every 6 hours PRN Cough  melatonin 3 milliGRAM(s) Oral at bedtime PRN Insomnia  ondansetron Injectable 4 milliGRAM(s) IV Push every 8 hours PRN Nausea and/or Vomiting      REVIEW OF SYSTEMS:  CONSTITUTIONAL: No fever,  RESPIRATORY: No cough, wheezing, shortness of breath  CARDIOVASCULAR: No chest pain, dyspnea, palpitations, dizziness, syncope, paroxysmal nocturnal dyspnea, orthopnea, or arm or leg swelling  GASTROINTESTINAL: No abdominal  or epigastric pain, nausea, vomiting,  diarrhea  NEUROLOGICAL: No headaches,  loss of strength, numbness, or tremors    Vital Signs Last 24 Hrs  T(C): 36.7 (29 Aug 2023 04:52), Max: 36.7 (28 Aug 2023 12:03)  T(F): 98.1 (29 Aug 2023 04:52), Max: 98.1 (29 Aug 2023 04:52)  HR: 88 (29 Aug 2023 04:52) (76 - 88)  BP: 124/67 (29 Aug 2023 04:52) (124/67 - 147/80)  BP(mean): --  RR: 18 (29 Aug 2023 04:52) (18 - 18)  SpO2: 96% (29 Aug 2023 04:52) (90% - 96%)    Parameters below as of 29 Aug 2023 04:52  Patient On (Oxygen Delivery Method): room air        PHYSICAL EXAM:  HEAD:  Atraumatic, Normocephalic  NECK: Supple and normal thyroid.  No JVD or carotid bruit.   HEART: S1, S2 regular , 1/6 soft ejection systolic murmur in mitral area , no thrill and no gallops .  PULMONARY: Bilateral vesicular breathing , few scattered rhonchi and few scattered rales are present .  ABDOMEN: Soft nontender and positive bowl sounds   EXTREMITIES:  No clubbing, cyanosis, or pedal  edema  NEUROLOGICAL: AA and mild confused  , no focal deficit .  Skin : No rashes .  Musculoskeletal : No joint swellings .    LABS:                        11.4   11.95 )-----------( 337      ( 28 Aug 2023 08:22 )             36.1     08-28    141  |  107  |  17  ----------------------------<  88  4.7   |  26  |  0.52    Ca    8.9      28 Aug 2023 08:22            Urinalysis Basic - ( 28 Aug 2023 08:22 )    Color: x / Appearance: x / SG: x / pH: x  Gluc: 88 mg/dL / Ketone: x  / Bili: x / Urobili: x   Blood: x / Protein: x / Nitrite: x   Leuk Esterase: x / RBC: x / WBC x   Sq Epi: x / Non Sq Epi: x / Bacteria: x      Assessment/Plan  Patient has :  1) R/O pneumonia and sepsis . Possibility of pneumnia less likely as per ID . Chronic cough can be due to enalapril so will change it to different antihypertensive medication amlodipine 5 mg daily   2) UTI   3) paroxysmal atrial fibrillation and stable .  4) SSS and S/P PPM .  5) Mild chronic diastolic heart failure and under control   6) H/O hypothyroidism   7) Mild  Anemia   8) Hypertension   Plan : 1) I/V antibiotics as per ID 2) Rest of medications as such   3) Monitor hemoglobin and electrolytes . 4) Increase ambulation . 5) Discontinue enalapril due to cough and start amlodipine 5 mg daily 6) Discontinue monitor   Will continue to follow during hospital course and give further recommendations as needed . Thanks for your referral . if any questions please contact me at office ([phone] cell [phone])      NINOSKA DAVENPORT MD [place] [id number]  [address]  OFFICE : [phone]  CELL : [phone]  CARDIOLOGY F/U  :       HPI:  89-year-old female with history of hypothyroidism, A-fib on Eliquis, s/p recent pacemaker for complete heart block brought in by ambulance from [facility] living home for cough for the past 2 weeks.  Associated with generalized weakness and decreased p.o. intake.  No fall or trauma.  Patient states she has had cold symptoms for a while.  Denies fever, chills, chest pain, shortness of breath, abdominal pain, nausea, vomiting, upper or lower extremity weakness or paresthesias. pmd: Dr. Crawley, cards: [facility] Seen by card Dr Davenport Admitted  to telemetry unit for monitoring , send 3 sets of cardiac enzymes to rule out acute coronary event, obtain ECHO to evaluate LVEF, cardiology consult  ,continue current management, O2 supply, anticoagulation plan as per cardiology consult Pulm cons requested , antitussives and nebulized bd ordered .Also UTI suspected and started on iv abx , id cons called Palliative care consult requested ,to discuss advance directives and complete MOLST  (20 Aug 2023 15:35)        SUBJECTIVE: Patient feeling better .      ALLERGIES:  Allergies    penicillin (Unknown)    Intolerances          MEDICATIONS  (STANDING):  acetaminophen     Tablet .. 325 milliGRAM(s) Oral every 8 hours  aMIOdarone    Tablet 200 milliGRAM(s) Oral daily  amLODIPine   Tablet 10 milliGRAM(s) Oral daily  apixaban 2.5 milliGRAM(s) Oral two times a day  artificial  tears Solution 1 Drop(s) Both EYES two times a day  dextrose 5% + sodium chloride 0.45%. 1000 milliLiter(s) (50 mL/Hr) IV Continuous <Continuous>  ferrous    sulfate 325 milliGRAM(s) Oral daily  lactobacillus acidophilus 1 Tablet(s) Oral every 12 hours  levothyroxine 75 MICROGram(s) Oral daily  metoprolol tartrate 50 milliGRAM(s) Oral two times a day  multivitamin/minerals 1 Tablet(s) Oral daily  pantoprazole    Tablet 40 milliGRAM(s) Oral daily  polyethylene glycol 3350 17 Gram(s) Oral daily    MEDICATIONS  (PRN):  acetaminophen     Tablet .. 650 milliGRAM(s) Oral every 6 hours PRN Temp greater or equal to 38C (100.4F), Mild Pain (1 - 3)  albuterol/ipratropium for Nebulization 3 milliLiter(s) Nebulizer every 6 hours PRN Shortness of Breath and/or Wheezing  aluminum hydroxide/magnesium hydroxide/simethicone Suspension 30 milliLiter(s) Oral every 4 hours PRN Dyspepsia  guaiFENesin Oral Liquid (Sugar-Free) 200 milliGRAM(s) Oral every 6 hours PRN Cough  melatonin 3 milliGRAM(s) Oral at bedtime PRN Insomnia  ondansetron Injectable 4 milliGRAM(s) IV Push every 8 hours PRN Nausea and/or Vomiting      REVIEW OF SYSTEMS:  CONSTITUTIONAL: No fever,  RESPIRATORY: Improvement in mild  cough and  wheezing  shortness of breath  CARDIOVASCULAR: No chest pain,  palpitations, dizziness, syncope, paroxysmal nocturnal dyspnea,  or arm or leg swelling and improvement in SOB   GASTROINTESTINAL: No abdominal  or epigastric pain, nausea, vomiting,  diarrhea  NEUROLOGICAL: No headaches,  numbness, or tremors  Skin : No itching .  Hematology : No active bleeding .  Endocrinology : No heat and cold intolerance .  Psychiatry : Patient is calm .  Genitourinary : No dysuria .  Musculoskeletal : Patient has mild arthritis     Vital Signs Last 24 Hrs  T(C): 36.7 (29 Aug 2023 04:52), Max: 36.7 (28 Aug 2023 12:03)  T(F): 98.1 (29 Aug 2023 04:52), Max: 98.1 (29 Aug 2023 04:52)  HR: 88 (29 Aug 2023 04:52) (76 - 88)  BP: 124/67 (29 Aug 2023 04:52) (124/67 - 147/80)  BP(mean): --  RR: 18 (29 Aug 2023 04:52) (18 - 18)  SpO2: 96% (29 Aug 2023 04:52) (90% - 96%)    Parameters below as of 29 Aug 2023 04:52  Patient On (Oxygen Delivery Method): room air        PHYSICAL EXAM:  HEAD:  Atraumatic, Normocephalic  NECK: Supple and normal thyroid.  No JVD or carotid bruit.   HEART: S1, S2 regular , 1/6 soft ejection systolic murmur in mitral area , no thrill and no gallops .  PULMONARY: Bilateral vesicular breathing , few scattered rhonchi and few scattered rales are present .  ABDOMEN: Soft nontender and positive bowl sounds   EXTREMITIES:  No clubbing, cyanosis, or pedal  edema  NEUROLOGICAL: AA and mild confused  , no focal deficit .  Skin : No rashes .  Musculoskeletal : No joint swellings .    LABS:                        11.4   11.95 )-----------( 337      ( 28 Aug 2023 08:22 )             36.1     08-28    141  |  107  |  17  ----------------------------<  88  4.7   |  26  |  0.52    Ca    8.9      28 Aug 2023 08:22            Urinalysis Basic - ( 28 Aug 2023 08:22 )    Color: x / Appearance: x / SG: x / pH: x  Gluc: 88 mg/dL / Ketone: x  / Bili: x / Urobili: x   Blood: x / Protein: x / Nitrite: x   Leuk Esterase: x / RBC: x / WBC x   Sq Epi: x / Non Sq Epi: x / Bacteria: x      Assessment/Plan  Patient has :  1) R/O pneumonia and sepsis . Possibility of pneumonia less likely as per ID . Chronic cough can be due to enalapril so it was changed to amlodipine   2) UTI   3) paroxysmal atrial fibrillation and stable .  4) SSS and S/P PPM .  5) Mild chronic diastolic heart failure and under control   6) H/O hypothyroidism   7) Mild  Anemia   8) Hypertension   Plan : 1) I/V antibiotics as per ID 2) Rest of medications as such   3) Monitor hemoglobin and electrolytes . 4) Increase ambulation . 5) Discontinue enalapril due to cough and continue amlodipine  6) Discontinue monitor   Will continue to follow during hospital course and give further recommendations as needed . Thanks for your referral . if any questions please contact me at office ([phone] cell [phone])      NINOSKA DAVENPORT MD [place] [id number]  [address]  OFFICE : [phone]  CELL : [phone]  CARDIOLOGY F/U  :       HPI:  89-year-old female with history of hypothyroidism, A-fib on Eliquis, s/p recent pacemaker for complete heart block brought in by ambulance from [facility] living home for cough for the past 2 weeks.  Associated with generalized weakness and decreased p.o. intake.  No fall or trauma.  Patient states she has had cold symptoms for a while.  Denies fever, chills, chest pain, shortness of breath, abdominal pain, nausea, vomiting, upper or lower extremity weakness or paresthesias. pmd: Dr. Crawley, cards: [facility] Seen by card Dr Davenport Admitted  to telemetry unit for monitoring , send 3 sets of cardiac enzymes to rule out acute coronary event, obtain ECHO to evaluate LVEF, cardiology consult  ,continue current management, O2 supply, anticoagulation plan as per cardiology consult Pulm cons requested , antitussives and nebulized bd ordered .Also UTI suspected and started on iv abx , id cons called Palliative care consult requested ,to discuss advance directives and complete MOLST  (20 Aug 2023 15:35)        SUBJECTIVE: Patient feeling better .      ALLERGIES:  Allergies    penicillin (Unknown)    Intolerances          MEDICATIONS  (STANDING):  acetaminophen     Tablet .. 325 milliGRAM(s) Oral every 8 hours  aMIOdarone    Tablet 200 milliGRAM(s) Oral daily  amLODIPine   Tablet 10 milliGRAM(s) Oral daily  apixaban 2.5 milliGRAM(s) Oral two times a day  artificial  tears Solution 1 Drop(s) Both EYES two times a day  dextrose 5% + sodium chloride 0.45%. 1000 milliLiter(s) (50 mL/Hr) IV Continuous <Continuous>  ferrous    sulfate 325 milliGRAM(s) Oral daily  lactobacillus acidophilus 1 Tablet(s) Oral every 12 hours  levothyroxine 75 MICROGram(s) Oral daily  metoprolol tartrate 50 milliGRAM(s) Oral two times a day  multivitamin/minerals 1 Tablet(s) Oral daily  pantoprazole    Tablet 40 milliGRAM(s) Oral daily  polyethylene glycol 3350 17 Gram(s) Oral daily    MEDICATIONS  (PRN):  acetaminophen     Tablet .. 650 milliGRAM(s) Oral every 6 hours PRN Temp greater or equal to 38C (100.4F), Mild Pain (1 - 3)  albuterol/ipratropium for Nebulization 3 milliLiter(s) Nebulizer every 6 hours PRN Shortness of Breath and/or Wheezing  aluminum hydroxide/magnesium hydroxide/simethicone Suspension 30 milliLiter(s) Oral every 4 hours PRN Dyspepsia  guaiFENesin Oral Liquid (Sugar-Free) 200 milliGRAM(s) Oral every 6 hours PRN Cough  melatonin 3 milliGRAM(s) Oral at bedtime PRN Insomnia  ondansetron Injectable 4 milliGRAM(s) IV Push every 8 hours PRN Nausea and/or Vomiting      REVIEW OF SYSTEMS:  CONSTITUTIONAL: No fever,  RESPIRATORY: Improvement in mild  cough and  wheezing  shortness of breath  CARDIOVASCULAR: No chest pain,  palpitations, dizziness, syncope, paroxysmal nocturnal dyspnea,  or arm or leg swelling and improvement in SOB   GASTROINTESTINAL: No abdominal  or epigastric pain, nausea, vomiting,  diarrhea  NEUROLOGICAL: No headaches,  numbness, or tremors  Skin : No itching .  Hematology : No active bleeding .  Endocrinology : No heat and cold intolerance .  Psychiatry : Patient is calm .  Genitourinary : No dysuria .  Musculoskeletal : Patient has mild arthritis     Vital Signs Last 24 Hrs  T(C): 36.7 (29 Aug 2023 04:52), Max: 36.7 (28 Aug 2023 12:03)  T(F): 98.1 (29 Aug 2023 04:52), Max: 98.1 (29 Aug 2023 04:52)  HR: 88 (29 Aug 2023 04:52) (76 - 88)  BP: 124/67 (29 Aug 2023 04:52) (124/67 - 147/80)  BP(mean): --  RR: 18 (29 Aug 2023 04:52) (18 - 18)  SpO2: 96% (29 Aug 2023 04:52) (90% - 96%)    Parameters below as of 29 Aug 2023 04:52  Patient On (Oxygen Delivery Method): room air        PHYSICAL EXAM:  HEAD:  Atraumatic, Normocephalic  NECK: Supple and normal thyroid.  No JVD or carotid bruit.   HEART: S1, S2 regular , 1/6 soft ejection systolic murmur in mitral area , no thrill and no gallops .  PULMONARY: Bilateral vesicular breathing , few scattered rhonchi and few scattered rales are present .  ABDOMEN: Soft nontender and positive bowl sounds   EXTREMITIES:  No clubbing, cyanosis, or pedal  edema  NEUROLOGICAL: AA and mild confused  , no focal deficit .  Skin : No rashes .  Musculoskeletal : No joint swellings .    LABS:                        11.4   11.95 )-----------( 337      ( 28 Aug 2023 08:22 )             36.1     08-28    141  |  107  |  17  ----------------------------<  88  4.7   |  26  |  0.52    Ca    8.9      28 Aug 2023 08:22            Urinalysis Basic - ( 28 Aug 2023 08:22 )    Color: x / Appearance: x / SG: x / pH: x  Gluc: 88 mg/dL / Ketone: x  / Bili: x / Urobili: x   Blood: x / Protein: x / Nitrite: x   Leuk Esterase: x / RBC: x / WBC x   Sq Epi: x / Non Sq Epi: x / Bacteria: x      Assessment/Plan  Patient has :  1) R/O pneumonia and sepsis . Possibility of pneumonia less likely as per ID . Chronic cough can be due to enalapril so it was changed to amlodipine   2) UTI   3) paroxysmal atrial fibrillation and stable .  4) SSS and S/P PPM .  5) Mild chronic diastolic heart failure and under control   6) H/O hypothyroidism   7) Mild  Anemia   8) Hypertension   Plan : 1) I/V antibiotics as per ID 2) Rest of medications as such   3) Monitor hemoglobin and electrolytes . 4) Increase ambulation . 5) Discontinue enalapril due to cough and continue amlodipine  6) Discontinue monitor   Will continue to follow during hospital course and give further recommendations as needed . Thanks for your referral . if any questions please contact me at office ([phone] cell [phone])      NINOSKA DAVENPORT MD [place] [id number]  [address]  OFFICE : [phone]  CELL : [phone]  CARDIOLOGY F/U  :       HPI:  89-year-old female with history of hypothyroidism, A-fib on Eliquis, s/p recent pacemaker for complete heart block brought in by ambulance from [facility] living home for cough for the past 2 weeks.  Associated with generalized weakness and decreased p.o. intake.  No fall or trauma.  Patient states she has had cold symptoms for a while.  Denies fever, chills, chest pain, shortness of breath, abdominal pain, nausea, vomiting, upper or lower extremity weakness or paresthesias. pmd: Dr. Crawley, cards: [facility] Seen by card Dr Davenport Admitted  to telemetry unit for monitoring , send 3 sets of cardiac enzymes to rule out acute coronary event, obtain ECHO to evaluate LVEF, cardiology consult  ,continue current management, O2 supply, anticoagulation plan as per cardiology consult Pulm cons requested , antitussives and nebulized bd ordered .Also UTI suspected and started on iv abx , id cons called Palliative care consult requested ,to discuss advance directives and complete MOLST  (20 Aug 2023 15:35)        SUBJECTIVE: Patient feeling better .      ALLERGIES:  Allergies    penicillin (Unknown)    Intolerances          MEDICATIONS  (STANDING):  acetaminophen     Tablet .. 325 milliGRAM(s) Oral every 8 hours  aMIOdarone    Tablet 200 milliGRAM(s) Oral daily  amLODIPine   Tablet 10 milliGRAM(s) Oral daily  apixaban 2.5 milliGRAM(s) Oral two times a day  artificial  tears Solution 1 Drop(s) Both EYES two times a day  dextrose 5% + sodium chloride 0.45%. 1000 milliLiter(s) (50 mL/Hr) IV Continuous <Continuous>  ferrous    sulfate 325 milliGRAM(s) Oral daily  lactobacillus acidophilus 1 Tablet(s) Oral every 12 hours  levothyroxine 75 MICROGram(s) Oral daily  metoprolol tartrate 50 milliGRAM(s) Oral two times a day  multivitamin/minerals 1 Tablet(s) Oral daily  pantoprazole    Tablet 40 milliGRAM(s) Oral daily  polyethylene glycol 3350 17 Gram(s) Oral daily    MEDICATIONS  (PRN):  acetaminophen     Tablet .. 650 milliGRAM(s) Oral every 6 hours PRN Temp greater or equal to 38C (100.4F), Mild Pain (1 - 3)  albuterol/ipratropium for Nebulization 3 milliLiter(s) Nebulizer every 6 hours PRN Shortness of Breath and/or Wheezing  aluminum hydroxide/magnesium hydroxide/simethicone Suspension 30 milliLiter(s) Oral every 4 hours PRN Dyspepsia  guaiFENesin Oral Liquid (Sugar-Free) 200 milliGRAM(s) Oral every 6 hours PRN Cough  melatonin 3 milliGRAM(s) Oral at bedtime PRN Insomnia  ondansetron Injectable 4 milliGRAM(s) IV Push every 8 hours PRN Nausea and/or Vomiting      REVIEW OF SYSTEMS:  CONSTITUTIONAL: No fever,  RESPIRATORY: Improvement in mild  cough and  wheezing  shortness of breath  CARDIOVASCULAR: No chest pain,  palpitations, dizziness, syncope, paroxysmal nocturnal dyspnea,  or arm or leg swelling and improvement in SOB   GASTROINTESTINAL: No abdominal  or epigastric pain, nausea, vomiting,  diarrhea  NEUROLOGICAL: No headaches,  numbness, or tremors  Skin : No itching .  Hematology : No active bleeding .  Endocrinology : No heat and cold intolerance .  Psychiatry : Patient is calm .  Genitourinary : No dysuria .  Musculoskeletal : Patient has mild arthritis     Vital Signs Last 24 Hrs  T(C): 36.7 (29 Aug 2023 04:52), Max: 36.7 (28 Aug 2023 12:03)  T(F): 98.1 (29 Aug 2023 04:52), Max: 98.1 (29 Aug 2023 04:52)  HR: 88 (29 Aug 2023 04:52) (76 - 88)  BP: 124/67 (29 Aug 2023 04:52) (124/67 - 147/80)  BP(mean): --  RR: 18 (29 Aug 2023 04:52) (18 - 18)  SpO2: 96% (29 Aug 2023 04:52) (90% - 96%)    Parameters below as of 29 Aug 2023 04:52  Patient On (Oxygen Delivery Method): room air        PHYSICAL EXAM:  HEAD:  Atraumatic, Normocephalic  NECK: Supple and normal thyroid.  No JVD or carotid bruit.   HEART: S1, S2 regular , 1/6 soft ejection systolic murmur in mitral area , no thrill and no gallops .  PULMONARY: Bilateral vesicular breathing , few scattered rhonchi and few scattered rales are present .  ABDOMEN: Soft nontender and positive bowl sounds   EXTREMITIES:  No clubbing, cyanosis, or pedal  edema  NEUROLOGICAL: AA and mild confused  , no focal deficit .  Skin : No rashes .  Musculoskeletal : No joint swellings .    LABS:                        11.4   11.95 )-----------( 337      ( 28 Aug 2023 08:22 )             36.1     08-28    141  |  107  |  17  ----------------------------<  88  4.7   |  26  |  0.52    Ca    8.9      28 Aug 2023 08:22            Urinalysis Basic - ( 28 Aug 2023 08:22 )    Color: x / Appearance: x / SG: x / pH: x  Gluc: 88 mg/dL / Ketone: x  / Bili: x / Urobili: x   Blood: x / Protein: x / Nitrite: x   Leuk Esterase: x / RBC: x / WBC x   Sq Epi: x / Non Sq Epi: x / Bacteria: x      Assessment/Plan  Patient has :  1) R/O pneumonia and sepsis . Possibility of pneumonia less likely as per ID . Chronic cough can be due to enalapril so it was changed to amlodipine   2) UTI   3) paroxysmal atrial fibrillation and stable .  4) SSS and S/P PPM .  5) Mild chronic diastolic heart failure and under control   6) H/O hypothyroidism   7) Mild  Anemia   8) Hypertension   Plan : 1) I/V antibiotics as per ID 2) Rest of medications as such   3) Monitor hemoglobin and electrolytes . 4) Increase ambulation . 5) Discontinue enalapril due to cough and continue amlodipine  6) Discontinue monitor   Will continue to follow during hospital course and give further recommendations as needed . Thanks for your referral . if any questions please contact me at office ([phone] cell [phone])

## 2023-08-28 NOTE — PROGRESS NOTE ADULT - SUBJECTIVE AND OBJECTIVE BOX
Interval History:    CENTRAL LINE:   [  ] YES       [  ] NO  SKELTON:                 [  ] YES       [  ] NO         REVIEW OF SYSTEMS:  All Systems below were reviewed and are negative [  ]  HEENT:  ID:  Pulmonary:  Cardiac:  GI:  Renal:  Musculoskeletal:  All other systems above were reviewed and are negative   [  ]      MEDICATIONS  (STANDING):  acetaminophen     Tablet .. 325 milliGRAM(s) Oral every 8 hours  aMIOdarone    Tablet 200 milliGRAM(s) Oral daily  amLODIPine   Tablet 10 milliGRAM(s) Oral daily  apixaban 2.5 milliGRAM(s) Oral two times a day  artificial  tears Solution 1 Drop(s) Both EYES two times a day  dextrose 5% + sodium chloride 0.45%. 1000 milliLiter(s) (50 mL/Hr) IV Continuous <Continuous>  ferrous    sulfate 325 milliGRAM(s) Oral daily  lactobacillus acidophilus 1 Tablet(s) Oral every 12 hours  levothyroxine 75 MICROGram(s) Oral daily  metoprolol tartrate 50 milliGRAM(s) Oral two times a day  multivitamin/minerals 1 Tablet(s) Oral daily  pantoprazole    Tablet 40 milliGRAM(s) Oral daily  polyethylene glycol 3350 17 Gram(s) Oral daily    MEDICATIONS  (PRN):  acetaminophen     Tablet .. 650 milliGRAM(s) Oral every 6 hours PRN Temp greater or equal to 38C (100.4F), Mild Pain (1 - 3)  albuterol/ipratropium for Nebulization 3 milliLiter(s) Nebulizer every 6 hours PRN Shortness of Breath and/or Wheezing  aluminum hydroxide/magnesium hydroxide/simethicone Suspension 30 milliLiter(s) Oral every 4 hours PRN Dyspepsia  guaiFENesin Oral Liquid (Sugar-Free) 200 milliGRAM(s) Oral every 6 hours PRN Cough  melatonin 3 milliGRAM(s) Oral at bedtime PRN Insomnia  ondansetron Injectable 4 milliGRAM(s) IV Push every 8 hours PRN Nausea and/or Vomiting      Vital Signs Last 24 Hrs  T(C): 36.7 (28 Aug 2023 12:03), Max: 36.8 (27 Aug 2023 20:20)  T(F): 98 (28 Aug 2023 12:03), Max: 98.2 (27 Aug 2023 20:20)  HR: 76 (28 Aug 2023 12:03) (63 - 76)  BP: 143/63 (28 Aug 2023 12:03) (143/63 - 175/82)  BP(mean): --  RR: 18 (28 Aug 2023 12:03) (18 - 18)  SpO2: 90% (28 Aug 2023 12:03) (90% - 94%)    Parameters below as of 28 Aug 2023 12:03  Patient On (Oxygen Delivery Method): room air        I&O's Summary    27 Aug 2023 07:01  -  28 Aug 2023 07:00  --------------------------------------------------------  IN: 50 mL / OUT: 750 mL / NET: -700 mL        PHYSICAL EXAM:  HEENT: NC/AT; PERRLA  Neck: Soft; no tenderness  Lungs: CTA bilaterally; no wheezing.   Heart:  Abdomen:  Genital/ Rectal:  Extremities:  Neurologic:  Vascular:      LABORATORY:    CBC Full  -  ( 28 Aug 2023 08:22 )  WBC Count : 11.95 K/uL  RBC Count : 3.89 M/uL  Hemoglobin : 11.4 g/dL  Hematocrit : 36.1 %  Platelet Count - Automated : 337 K/uL  Mean Cell Volume : 92.8 fl  Mean Cell Hemoglobin : 29.3 pg  Mean Cell Hemoglobin Concentration : 31.6 gm/dL  Auto Neutrophil # : x  Auto Lymphocyte # : x  Auto Monocyte # : x  Auto Eosinophil # : x  Auto Basophil # : x  Auto Neutrophil % : x  Auto Lymphocyte % : x  Auto Monocyte % : x  Auto Eosinophil % : x  Auto Basophil % : x      ESR:                   08-21 @ 06:24  --    C-Reactive Protein:     08-21 @ 06:24  --    Procalcitonin:           08-21 @ 06:24   7.59      08-28    141  |  107  |  17  ----------------------------<  88  4.7   |  26  |  0.52    Ca    8.9      28 Aug 2023 08:22        Rapid Respiratory Viral Panel Result        08-20 @ 11:55  Rapid RVP NotDetec  Coronovirus --  Adenovirus --  Bordetella Pertussis --  Chlamydia Pneumonia --  Entero/Rhinovirus--  HKU1 Coronovirus --  HMPV Coronovirus --  Influenza A --  Influenza AH1 --  Influenza AH1 2009 --  Influenza AH3 --  Influenza B --  Mycoplasma Pneumoniae --  NL63 Coronovirus --  OC43 Coronovirus --  Parainfluenza 1 --  Parainfluenza 2 --  Parainfluenza 3 --  Parainfluenza 4 --  Resp Syncytial Virus --      Assessment and Plan:          Edwardo Gonzalez MD   [phone].      Interval History:    CENTRAL LINE:   [  ] YES       [  ] NO  SKELTON:                 [  ] YES       [  ] NO         REVIEW OF SYSTEMS:  All Systems below were reviewed and are negative [  ]  HEENT:  ID:  Pulmonary:  Cardiac:  GI:  Renal:  Musculoskeletal:  All other systems above were reviewed and are negative   [  ]      MEDICATIONS  (STANDING):  acetaminophen     Tablet .. 325 milliGRAM(s) Oral every 8 hours  aMIOdarone    Tablet 200 milliGRAM(s) Oral daily  amLODIPine   Tablet 10 milliGRAM(s) Oral daily  apixaban 2.5 milliGRAM(s) Oral two times a day  artificial  tears Solution 1 Drop(s) Both EYES two times a day  dextrose 5% + sodium chloride 0.45%. 1000 milliLiter(s) (50 mL/Hr) IV Continuous <Continuous>  ferrous    sulfate 325 milliGRAM(s) Oral daily  lactobacillus acidophilus 1 Tablet(s) Oral every 12 hours  levothyroxine 75 MICROGram(s) Oral daily  metoprolol tartrate 50 milliGRAM(s) Oral two times a day  multivitamin/minerals 1 Tablet(s) Oral daily  pantoprazole    Tablet 40 milliGRAM(s) Oral daily  polyethylene glycol 3350 17 Gram(s) Oral daily    MEDICATIONS  (PRN):  acetaminophen     Tablet .. 650 milliGRAM(s) Oral every 6 hours PRN Temp greater or equal to 38C (100.4F), Mild Pain (1 - 3)  albuterol/ipratropium for Nebulization 3 milliLiter(s) Nebulizer every 6 hours PRN Shortness of Breath and/or Wheezing  aluminum hydroxide/magnesium hydroxide/simethicone Suspension 30 milliLiter(s) Oral every 4 hours PRN Dyspepsia  guaiFENesin Oral Liquid (Sugar-Free) 200 milliGRAM(s) Oral every 6 hours PRN Cough  melatonin 3 milliGRAM(s) Oral at bedtime PRN Insomnia  ondansetron Injectable 4 milliGRAM(s) IV Push every 8 hours PRN Nausea and/or Vomiting      Vital Signs Last 24 Hrs  T(C): 36.7 (28 Aug 2023 12:03), Max: 36.8 (27 Aug 2023 20:20)  T(F): 98 (28 Aug 2023 12:03), Max: 98.2 (27 Aug 2023 20:20)  HR: 76 (28 Aug 2023 12:03) (63 - 76)  BP: 143/63 (28 Aug 2023 12:03) (143/63 - 175/82)  BP(mean): --  RR: 18 (28 Aug 2023 12:03) (18 - 18)  SpO2: 90% (28 Aug 2023 12:03) (90% - 94%)    Parameters below as of 28 Aug 2023 12:03  Patient On (Oxygen Delivery Method): room air        I&O's Summary    27 Aug 2023 07:01  -  28 Aug 2023 07:00  --------------------------------------------------------  IN: 50 mL / OUT: 750 mL / NET: -700 mL        PHYSICAL EXAM:  HEENT: NC/AT; PERRLA  Neck: Soft; no tenderness  Lungs: CTA bilaterally; no wheezing.   Heart:  Abdomen:  Genital/ Rectal:  Extremities:  Neurologic:  Vascular:      LABORATORY:    CBC Full  -  ( 28 Aug 2023 08:22 )  WBC Count : 11.95 K/uL  RBC Count : 3.89 M/uL  Hemoglobin : 11.4 g/dL  Hematocrit : 36.1 %  Platelet Count - Automated : 337 K/uL  Mean Cell Volume : 92.8 fl  Mean Cell Hemoglobin : 29.3 pg  Mean Cell Hemoglobin Concentration : 31.6 gm/dL  Auto Neutrophil # : x  Auto Lymphocyte # : x  Auto Monocyte # : x  Auto Eosinophil # : x  Auto Basophil # : x  Auto Neutrophil % : x  Auto Lymphocyte % : x  Auto Monocyte % : x  Auto Eosinophil % : x  Auto Basophil % : x      ESR:                   08-21 @ 06:24  --    C-Reactive Protein:     08-21 @ 06:24  --    Procalcitonin:           08-21 @ 06:24   7.59      08-28    141  |  107  |  17  ----------------------------<  88  4.7   |  26  |  0.52    Ca    8.9      28 Aug 2023 08:22        Rapid Respiratory Viral Panel Result        08-20 @ 11:55  Rapid RVP NotDetec  Coronovirus --  Adenovirus --  Bordetella Pertussis --  Chlamydia Pneumonia --  Entero/Rhinovirus--  HKU1 Coronovirus --  HMPV Coronovirus --  Influenza A --  Influenza AH1 --  Influenza AH1 2009 --  Influenza AH3 --  Influenza B --  Mycoplasma Pneumoniae --  NL63 Coronovirus --  OC43 Coronovirus --  Parainfluenza 1 --  Parainfluenza 2 --  Parainfluenza 3 --  Parainfluenza 4 --  Resp Syncytial Virus --      Assessment and Plan:          Edwardo Gonzalez MD   [phone].      Interval History:    CENTRAL LINE:   [  ] YES       [  ] NO  SKELTON:                 [  ] YES       [  ] NO         REVIEW OF SYSTEMS:  All Systems below were reviewed and are negative [  ]  HEENT:  ID:  Pulmonary:  Cardiac:  GI:  Renal:  Musculoskeletal:  All other systems above were reviewed and are negative   [  ]      MEDICATIONS  (STANDING):  acetaminophen     Tablet .. 325 milliGRAM(s) Oral every 8 hours  aMIOdarone    Tablet 200 milliGRAM(s) Oral daily  amLODIPine   Tablet 10 milliGRAM(s) Oral daily  apixaban 2.5 milliGRAM(s) Oral two times a day  artificial  tears Solution 1 Drop(s) Both EYES two times a day  dextrose 5% + sodium chloride 0.45%. 1000 milliLiter(s) (50 mL/Hr) IV Continuous <Continuous>  ferrous    sulfate 325 milliGRAM(s) Oral daily  lactobacillus acidophilus 1 Tablet(s) Oral every 12 hours  levothyroxine 75 MICROGram(s) Oral daily  metoprolol tartrate 50 milliGRAM(s) Oral two times a day  multivitamin/minerals 1 Tablet(s) Oral daily  pantoprazole    Tablet 40 milliGRAM(s) Oral daily  polyethylene glycol 3350 17 Gram(s) Oral daily    MEDICATIONS  (PRN):  acetaminophen     Tablet .. 650 milliGRAM(s) Oral every 6 hours PRN Temp greater or equal to 38C (100.4F), Mild Pain (1 - 3)  albuterol/ipratropium for Nebulization 3 milliLiter(s) Nebulizer every 6 hours PRN Shortness of Breath and/or Wheezing  aluminum hydroxide/magnesium hydroxide/simethicone Suspension 30 milliLiter(s) Oral every 4 hours PRN Dyspepsia  guaiFENesin Oral Liquid (Sugar-Free) 200 milliGRAM(s) Oral every 6 hours PRN Cough  melatonin 3 milliGRAM(s) Oral at bedtime PRN Insomnia  ondansetron Injectable 4 milliGRAM(s) IV Push every 8 hours PRN Nausea and/or Vomiting      Vital Signs Last 24 Hrs  T(C): 36.7 (28 Aug 2023 12:03), Max: 36.8 (27 Aug 2023 20:20)  T(F): 98 (28 Aug 2023 12:03), Max: 98.2 (27 Aug 2023 20:20)  HR: 76 (28 Aug 2023 12:03) (63 - 76)  BP: 143/63 (28 Aug 2023 12:03) (143/63 - 175/82)  BP(mean): --  RR: 18 (28 Aug 2023 12:03) (18 - 18)  SpO2: 90% (28 Aug 2023 12:03) (90% - 94%)    Parameters below as of 28 Aug 2023 12:03  Patient On (Oxygen Delivery Method): room air        I&O's Summary    27 Aug 2023 07:01  -  28 Aug 2023 07:00  --------------------------------------------------------  IN: 50 mL / OUT: 750 mL / NET: -700 mL        PHYSICAL EXAM:  HEENT: NC/AT; PERRLA  Neck: Soft; no tenderness  Lungs: CTA bilaterally; no wheezing.   Heart:  Abdomen:  Genital/ Rectal:  Extremities:  Neurologic:  Vascular:      LABORATORY:    CBC Full  -  ( 28 Aug 2023 08:22 )  WBC Count : 11.95 K/uL  RBC Count : 3.89 M/uL  Hemoglobin : 11.4 g/dL  Hematocrit : 36.1 %  Platelet Count - Automated : 337 K/uL  Mean Cell Volume : 92.8 fl  Mean Cell Hemoglobin : 29.3 pg  Mean Cell Hemoglobin Concentration : 31.6 gm/dL  Auto Neutrophil # : x  Auto Lymphocyte # : x  Auto Monocyte # : x  Auto Eosinophil # : x  Auto Basophil # : x  Auto Neutrophil % : x  Auto Lymphocyte % : x  Auto Monocyte % : x  Auto Eosinophil % : x  Auto Basophil % : x      ESR:                   08-21 @ 06:24  --    C-Reactive Protein:     08-21 @ 06:24  --    Procalcitonin:           08-21 @ 06:24   7.59      08-28    141  |  107  |  17  ----------------------------<  88  4.7   |  26  |  0.52    Ca    8.9      28 Aug 2023 08:22        Rapid Respiratory Viral Panel Result        08-20 @ 11:55  Rapid RVP NotDetec  Coronovirus --  Adenovirus --  Bordetella Pertussis --  Chlamydia Pneumonia --  Entero/Rhinovirus--  HKU1 Coronovirus --  HMPV Coronovirus --  Influenza A --  Influenza AH1 --  Influenza AH1 2009 --  Influenza AH3 --  Influenza B --  Mycoplasma Pneumoniae --  NL63 Coronovirus --  OC43 Coronovirus --  Parainfluenza 1 --  Parainfluenza 2 --  Parainfluenza 3 --  Parainfluenza 4 --  Resp Syncytial Virus --      Assessment and Plan:          Edwardo Gonzalez MD   [phone].    She is sleeping in bed  No fevers      MEDICATIONS  (STANDING):  acetaminophen     Tablet .. 325 milliGRAM(s) Oral every 8 hours  aMIOdarone    Tablet 200 milliGRAM(s) Oral daily  amLODIPine   Tablet 10 milliGRAM(s) Oral daily  apixaban 2.5 milliGRAM(s) Oral two times a day  artificial  tears Solution 1 Drop(s) Both EYES two times a day  dextrose 5% + sodium chloride 0.45%. 1000 milliLiter(s) (50 mL/Hr) IV Continuous <Continuous>  ferrous    sulfate 325 milliGRAM(s) Oral daily  lactobacillus acidophilus 1 Tablet(s) Oral every 12 hours  levothyroxine 75 MICROGram(s) Oral daily  metoprolol tartrate 50 milliGRAM(s) Oral two times a day  multivitamin/minerals 1 Tablet(s) Oral daily  pantoprazole    Tablet 40 milliGRAM(s) Oral daily  polyethylene glycol 3350 17 Gram(s) Oral daily    MEDICATIONS  (PRN):  acetaminophen     Tablet .. 650 milliGRAM(s) Oral every 6 hours PRN Temp greater or equal to 38C (100.4F), Mild Pain (1 - 3)  albuterol/ipratropium for Nebulization 3 milliLiter(s) Nebulizer every 6 hours PRN Shortness of Breath and/or Wheezing  aluminum hydroxide/magnesium hydroxide/simethicone Suspension 30 milliLiter(s) Oral every 4 hours PRN Dyspepsia  guaiFENesin Oral Liquid (Sugar-Free) 200 milliGRAM(s) Oral every 6 hours PRN Cough  melatonin 3 milliGRAM(s) Oral at bedtime PRN Insomnia  ondansetron Injectable 4 milliGRAM(s) IV Push every 8 hours PRN Nausea and/or Vomiting      Vital Signs Last 24 Hrs  T(C): 36.7 (28 Aug 2023 12:03), Max: 36.8 (27 Aug 2023 20:20)  T(F): 98 (28 Aug 2023 12:03), Max: 98.2 (27 Aug 2023 20:20)  HR: 76 (28 Aug 2023 12:03) (63 - 76)  BP: 143/63 (28 Aug 2023 12:03) (143/63 - 175/82)  BP(mean): --  RR: 18 (28 Aug 2023 12:03) (18 - 18)  SpO2: 90% (28 Aug 2023 12:03) (90% - 94%)    Parameters below as of 28 Aug 2023 12:03  Patient On (Oxygen Delivery Method): room air        I&O's Summary    27 Aug 2023 07:01  -  28 Aug 2023 07:00  --------------------------------------------------------  IN: 50 mL / OUT: 750 mL / NET: -700 mL        PHYSICAL EXAM:  HEENT: NC/AT; PERRLA  Neck: Soft; no tenderness  Lungs: CTA bilaterally; no wheezing.   Heart:  Abdomen:  Genital/ Rectal:  Extremities:  Neurologic:  Vascular:      LABORATORY:    CBC Full  -  ( 28 Aug 2023 08:22 )  WBC Count : 11.95 K/uL  RBC Count : 3.89 M/uL  Hemoglobin : 11.4 g/dL  Hematocrit : 36.1 %  Platelet Count - Automated : 337 K/uL  Mean Cell Volume : 92.8 fl  Mean Cell Hemoglobin : 29.3 pg  Mean Cell Hemoglobin Concentration : 31.6 gm/dL  Auto Neutrophil # : x  Auto Lymphocyte # : x  Auto Monocyte # : x  Auto Eosinophil # : x  Auto Basophil # : x  Auto Neutrophil % : x  Auto Lymphocyte % : x  Auto Monocyte % : x  Auto Eosinophil % : x  Auto Basophil % : x      ESR:                   08-21 @ 06:24  --    C-Reactive Protein:     08-21 @ 06:24  --    Procalcitonin:           08-21 @ 06:24   7.59      08-28    141  |  107  |  17  ----------------------------<  88  4.7   |  26  |  0.52    Ca    8.9      28 Aug 2023 08:22        Rapid Respiratory Viral Panel Result        08-20 @ 11:55  Rapid RVP NotDete  Coronovirus --  Adenovirus --  Bordetella Pertussis --  Chlamydia Pneumonia --  Entero/Rhinovirus--  HKU1 Coronovirus --  HMPV Coronovirus --  Influenza A --  Influenza AH1 --  Influenza AH1 2009 --  Influenza AH3 --  Influenza B --  Mycoplasma Pneumoniae --  NL63 Coronovirus --  OC43 Coronovirus --  Parainfluenza 1 --  Parainfluenza 2 --  Parainfluenza 3 --  Parainfluenza 4 --  Resp Syncytial Virus --      Assessment and Plan:      1. UTI with E coli  2. Atelectasis vs pneumonia of lower lobes.  3. Generalized weakness.  4. Dysphagia.  5. Bacteremia with ESBL E Coli      . Chest CT showed atelectasis vs infiltrates of lower lobes, No clear evidence of pneumonia.  . The patient has had a nonproductive cough for more than 2 weeks. Would look for other possible sources of this persistent cough: GERD, dysphagia.  . Blood cultures on admission grew ESBL E coli, likely due to UTI. Urine culture also grew ESBL E coli.  . Completed  IV Invanz for total of  8 days today for ESBL E coli bacteremia.  Repeated blood culture are negative.   . The patient had lethargy yesterday, probably from the side effects of IV Invanz. Discussed with the daughter yesterday. Monitor mental status.          Edwardo Gonzalez MD   [phone].    She is sleeping in bed  No fevers      MEDICATIONS  (STANDING):  acetaminophen     Tablet .. 325 milliGRAM(s) Oral every 8 hours  aMIOdarone    Tablet 200 milliGRAM(s) Oral daily  amLODIPine   Tablet 10 milliGRAM(s) Oral daily  apixaban 2.5 milliGRAM(s) Oral two times a day  artificial  tears Solution 1 Drop(s) Both EYES two times a day  dextrose 5% + sodium chloride 0.45%. 1000 milliLiter(s) (50 mL/Hr) IV Continuous <Continuous>  ferrous    sulfate 325 milliGRAM(s) Oral daily  lactobacillus acidophilus 1 Tablet(s) Oral every 12 hours  levothyroxine 75 MICROGram(s) Oral daily  metoprolol tartrate 50 milliGRAM(s) Oral two times a day  multivitamin/minerals 1 Tablet(s) Oral daily  pantoprazole    Tablet 40 milliGRAM(s) Oral daily  polyethylene glycol 3350 17 Gram(s) Oral daily    MEDICATIONS  (PRN):  acetaminophen     Tablet .. 650 milliGRAM(s) Oral every 6 hours PRN Temp greater or equal to 38C (100.4F), Mild Pain (1 - 3)  albuterol/ipratropium for Nebulization 3 milliLiter(s) Nebulizer every 6 hours PRN Shortness of Breath and/or Wheezing  aluminum hydroxide/magnesium hydroxide/simethicone Suspension 30 milliLiter(s) Oral every 4 hours PRN Dyspepsia  guaiFENesin Oral Liquid (Sugar-Free) 200 milliGRAM(s) Oral every 6 hours PRN Cough  melatonin 3 milliGRAM(s) Oral at bedtime PRN Insomnia  ondansetron Injectable 4 milliGRAM(s) IV Push every 8 hours PRN Nausea and/or Vomiting      Vital Signs Last 24 Hrs  T(C): 36.7 (28 Aug 2023 12:03), Max: 36.8 (27 Aug 2023 20:20)  T(F): 98 (28 Aug 2023 12:03), Max: 98.2 (27 Aug 2023 20:20)  HR: 76 (28 Aug 2023 12:03) (63 - 76)  BP: 143/63 (28 Aug 2023 12:03) (143/63 - 175/82)  BP(mean): --  RR: 18 (28 Aug 2023 12:03) (18 - 18)  SpO2: 90% (28 Aug 2023 12:03) (90% - 94%)    Parameters below as of 28 Aug 2023 12:03  Patient On (Oxygen Delivery Method): room air        I&O's Summary    27 Aug 2023 07:01  -  28 Aug 2023 07:00  --------------------------------------------------------  IN: 50 mL / OUT: 750 mL / NET: -700 mL        PHYSICAL EXAM:  HEENT: NC/AT; PERRLA  Neck: Soft; no tenderness  Lungs: CTA bilaterally; no wheezing.   Heart:  Abdomen:  Genital/ Rectal:  Extremities:  Neurologic:  Vascular:      LABORATORY:    CBC Full  -  ( 28 Aug 2023 08:22 )  WBC Count : 11.95 K/uL  RBC Count : 3.89 M/uL  Hemoglobin : 11.4 g/dL  Hematocrit : 36.1 %  Platelet Count - Automated : 337 K/uL  Mean Cell Volume : 92.8 fl  Mean Cell Hemoglobin : 29.3 pg  Mean Cell Hemoglobin Concentration : 31.6 gm/dL  Auto Neutrophil # : x  Auto Lymphocyte # : x  Auto Monocyte # : x  Auto Eosinophil # : x  Auto Basophil # : x  Auto Neutrophil % : x  Auto Lymphocyte % : x  Auto Monocyte % : x  Auto Eosinophil % : x  Auto Basophil % : x      ESR:                   08-21 @ 06:24  --    C-Reactive Protein:     08-21 @ 06:24  --    Procalcitonin:           08-21 @ 06:24   7.59      08-28    141  |  107  |  17  ----------------------------<  88  4.7   |  26  |  0.52    Ca    8.9      28 Aug 2023 08:22        Rapid Respiratory Viral Panel Result        08-20 @ 11:55  Rapid RVP NotDete  Coronovirus --  Adenovirus --  Bordetella Pertussis --  Chlamydia Pneumonia --  Entero/Rhinovirus--  HKU1 Coronovirus --  HMPV Coronovirus --  Influenza A --  Influenza AH1 --  Influenza AH1 2009 --  Influenza AH3 --  Influenza B --  Mycoplasma Pneumoniae --  NL63 Coronovirus --  OC43 Coronovirus --  Parainfluenza 1 --  Parainfluenza 2 --  Parainfluenza 3 --  Parainfluenza 4 --  Resp Syncytial Virus --      Assessment and Plan:      1. UTI with E coli  2. Atelectasis vs pneumonia of lower lobes.  3. Generalized weakness.  4. Dysphagia.  5. Bacteremia with ESBL E Coli      . Chest CT showed atelectasis vs infiltrates of lower lobes, No clear evidence of pneumonia.  . The patient has had a nonproductive cough for more than 2 weeks. Would look for other possible sources of this persistent cough: GERD, dysphagia.  . Blood cultures on admission grew ESBL E coli, likely due to UTI. Urine culture also grew ESBL E coli.  . Completed  IV Invanz for total of  8 days today for ESBL E coli bacteremia.  Repeated blood culture are negative.   . The patient had lethargy yesterday, probably from the side effects of IV Invanz. Discussed with the daughter yesterday. Monitor mental status.          Edwardo Gonzalez MD   [phone].    She is sleeping in bed  No fevers      MEDICATIONS  (STANDING):  acetaminophen     Tablet .. 325 milliGRAM(s) Oral every 8 hours  aMIOdarone    Tablet 200 milliGRAM(s) Oral daily  amLODIPine   Tablet 10 milliGRAM(s) Oral daily  apixaban 2.5 milliGRAM(s) Oral two times a day  artificial  tears Solution 1 Drop(s) Both EYES two times a day  dextrose 5% + sodium chloride 0.45%. 1000 milliLiter(s) (50 mL/Hr) IV Continuous <Continuous>  ferrous    sulfate 325 milliGRAM(s) Oral daily  lactobacillus acidophilus 1 Tablet(s) Oral every 12 hours  levothyroxine 75 MICROGram(s) Oral daily  metoprolol tartrate 50 milliGRAM(s) Oral two times a day  multivitamin/minerals 1 Tablet(s) Oral daily  pantoprazole    Tablet 40 milliGRAM(s) Oral daily  polyethylene glycol 3350 17 Gram(s) Oral daily    MEDICATIONS  (PRN):  acetaminophen     Tablet .. 650 milliGRAM(s) Oral every 6 hours PRN Temp greater or equal to 38C (100.4F), Mild Pain (1 - 3)  albuterol/ipratropium for Nebulization 3 milliLiter(s) Nebulizer every 6 hours PRN Shortness of Breath and/or Wheezing  aluminum hydroxide/magnesium hydroxide/simethicone Suspension 30 milliLiter(s) Oral every 4 hours PRN Dyspepsia  guaiFENesin Oral Liquid (Sugar-Free) 200 milliGRAM(s) Oral every 6 hours PRN Cough  melatonin 3 milliGRAM(s) Oral at bedtime PRN Insomnia  ondansetron Injectable 4 milliGRAM(s) IV Push every 8 hours PRN Nausea and/or Vomiting      Vital Signs Last 24 Hrs  T(C): 36.7 (28 Aug 2023 12:03), Max: 36.8 (27 Aug 2023 20:20)  T(F): 98 (28 Aug 2023 12:03), Max: 98.2 (27 Aug 2023 20:20)  HR: 76 (28 Aug 2023 12:03) (63 - 76)  BP: 143/63 (28 Aug 2023 12:03) (143/63 - 175/82)  BP(mean): --  RR: 18 (28 Aug 2023 12:03) (18 - 18)  SpO2: 90% (28 Aug 2023 12:03) (90% - 94%)    Parameters below as of 28 Aug 2023 12:03  Patient On (Oxygen Delivery Method): room air        I&O's Summary    27 Aug 2023 07:01  -  28 Aug 2023 07:00  --------------------------------------------------------  IN: 50 mL / OUT: 750 mL / NET: -700 mL        PHYSICAL EXAM:  HEENT: NC/AT; PERRLA  Neck: Soft; no tenderness  Lungs: CTA bilaterally; no wheezing.   Heart:  Abdomen:  Genital/ Rectal:  Extremities:  Neurologic:  Vascular:      LABORATORY:    CBC Full  -  ( 28 Aug 2023 08:22 )  WBC Count : 11.95 K/uL  RBC Count : 3.89 M/uL  Hemoglobin : 11.4 g/dL  Hematocrit : 36.1 %  Platelet Count - Automated : 337 K/uL  Mean Cell Volume : 92.8 fl  Mean Cell Hemoglobin : 29.3 pg  Mean Cell Hemoglobin Concentration : 31.6 gm/dL  Auto Neutrophil # : x  Auto Lymphocyte # : x  Auto Monocyte # : x  Auto Eosinophil # : x  Auto Basophil # : x  Auto Neutrophil % : x  Auto Lymphocyte % : x  Auto Monocyte % : x  Auto Eosinophil % : x  Auto Basophil % : x      ESR:                   08-21 @ 06:24  --    C-Reactive Protein:     08-21 @ 06:24  --    Procalcitonin:           08-21 @ 06:24   7.59      08-28    141  |  107  |  17  ----------------------------<  88  4.7   |  26  |  0.52    Ca    8.9      28 Aug 2023 08:22        Rapid Respiratory Viral Panel Result        08-20 @ 11:55  Rapid RVP NotDete  Coronovirus --  Adenovirus --  Bordetella Pertussis --  Chlamydia Pneumonia --  Entero/Rhinovirus--  HKU1 Coronovirus --  HMPV Coronovirus --  Influenza A --  Influenza AH1 --  Influenza AH1 2009 --  Influenza AH3 --  Influenza B --  Mycoplasma Pneumoniae --  NL63 Coronovirus --  OC43 Coronovirus --  Parainfluenza 1 --  Parainfluenza 2 --  Parainfluenza 3 --  Parainfluenza 4 --  Resp Syncytial Virus --      Assessment and Plan:      1. UTI with E coli  2. Atelectasis vs pneumonia of lower lobes.  3. Generalized weakness.  4. Dysphagia.  5. Bacteremia with ESBL E Coli      . Chest CT showed atelectasis vs infiltrates of lower lobes, No clear evidence of pneumonia.  . The patient has had a nonproductive cough for more than 2 weeks. Would look for other possible sources of this persistent cough: GERD, dysphagia.  . Blood cultures on admission grew ESBL E coli, likely due to UTI. Urine culture also grew ESBL E coli.  . Completed  IV Invanz for total of  8 days today for ESBL E coli bacteremia.  Repeated blood culture are negative.   . The patient had lethargy yesterday, probably from the side effects of IV Invanz. Discussed with the daughter yesterday. Monitor mental status.          Edwardo Gonzalez MD   [phone].    She is sleeping in bed  No fevers      MEDICATIONS  (STANDING):  acetaminophen     Tablet .. 325 milliGRAM(s) Oral every 8 hours  aMIOdarone    Tablet 200 milliGRAM(s) Oral daily  amLODIPine   Tablet 10 milliGRAM(s) Oral daily  apixaban 2.5 milliGRAM(s) Oral two times a day  artificial  tears Solution 1 Drop(s) Both EYES two times a day  dextrose 5% + sodium chloride 0.45%. 1000 milliLiter(s) (50 mL/Hr) IV Continuous <Continuous>  ferrous    sulfate 325 milliGRAM(s) Oral daily  lactobacillus acidophilus 1 Tablet(s) Oral every 12 hours  levothyroxine 75 MICROGram(s) Oral daily  metoprolol tartrate 50 milliGRAM(s) Oral two times a day  multivitamin/minerals 1 Tablet(s) Oral daily  pantoprazole    Tablet 40 milliGRAM(s) Oral daily  polyethylene glycol 3350 17 Gram(s) Oral daily    MEDICATIONS  (PRN):  acetaminophen     Tablet .. 650 milliGRAM(s) Oral every 6 hours PRN Temp greater or equal to 38C (100.4F), Mild Pain (1 - 3)  albuterol/ipratropium for Nebulization 3 milliLiter(s) Nebulizer every 6 hours PRN Shortness of Breath and/or Wheezing  aluminum hydroxide/magnesium hydroxide/simethicone Suspension 30 milliLiter(s) Oral every 4 hours PRN Dyspepsia  guaiFENesin Oral Liquid (Sugar-Free) 200 milliGRAM(s) Oral every 6 hours PRN Cough  melatonin 3 milliGRAM(s) Oral at bedtime PRN Insomnia  ondansetron Injectable 4 milliGRAM(s) IV Push every 8 hours PRN Nausea and/or Vomiting      Vital Signs Last 24 Hrs  T(C): 36.7 (28 Aug 2023 12:03), Max: 36.8 (27 Aug 2023 20:20)  T(F): 98 (28 Aug 2023 12:03), Max: 98.2 (27 Aug 2023 20:20)  HR: 76 (28 Aug 2023 12:03) (63 - 76)  BP: 143/63 (28 Aug 2023 12:03) (143/63 - 175/82)  BP(mean): --  RR: 18 (28 Aug 2023 12:03) (18 - 18)  SpO2: 90% (28 Aug 2023 12:03) (90% - 94%)    Parameters below as of 28 Aug 2023 12:03  Patient On (Oxygen Delivery Method): room air        I&O's Summary    27 Aug 2023 07:01  -  28 Aug 2023 07:00  --------------------------------------------------------  IN: 50 mL / OUT: 750 mL / NET: -700 mL        PHYSICAL EXAM:  HEENT: NC/AT; PERRLA  Neck: Soft; no tenderness  Lungs: CTA bilaterally; no wheezing.   Heart: RRR, no murmurs  Abdomen: Soft, no tenderness.  Genital/ Rectal: No khan catheter  Extremities: No ulcers.  Neurologic: No focal weakness.        LABORATORY:    CBC Full  -  ( 28 Aug 2023 08:22 )  WBC Count : 11.95 K/uL  RBC Count : 3.89 M/uL  Hemoglobin : 11.4 g/dL  Hematocrit : 36.1 %  Platelet Count - Automated : 337 K/uL  Mean Cell Volume : 92.8 fl  Mean Cell Hemoglobin : 29.3 pg  Mean Cell Hemoglobin Concentration : 31.6 gm/dL  Auto Neutrophil # : x  Auto Lymphocyte # : x  Auto Monocyte # : x  Auto Eosinophil # : x  Auto Basophil # : x  Auto Neutrophil % : x  Auto Lymphocyte % : x  Auto Monocyte % : x  Auto Eosinophil % : x  Auto Basophil % : x      ESR:                   08-21 @ 06:24  --    C-Reactive Protein:     08-21 @ 06:24  --    Procalcitonin:           08-21 @ 06:24   7.59      08-28    141  |  107  |  17  ----------------------------<  88  4.7   |  26  |  0.52    Ca    8.9      28 Aug 2023 08:22        Rapid Respiratory Viral Panel Result        08-20 @ 11:55  Rapid RVP NotDete  Coronovirus --  Adenovirus --  Bordetella Pertussis --  Chlamydia Pneumonia --  Entero/Rhinovirus--  HKU1 Coronovirus --  HMPV Coronovirus --  Influenza A --  Influenza AH1 --  Influenza AH1 2009 --  Influenza AH3 --  Influenza B --  Mycoplasma Pneumoniae --  NL63 Coronovirus --  OC43 Coronovirus --  Parainfluenza 1 --  Parainfluenza 2 --  Parainfluenza 3 --  Parainfluenza 4 --  Resp Syncytial Virus --      Assessment and Plan:      1. UTI with E coli  2. Atelectasis vs pneumonia of lower lobes.  3. Generalized weakness.  4. Dysphagia.  5. Bacteremia with ESBL E Coli      . Chest CT showed atelectasis vs infiltrates of lower lobes, No clear evidence of pneumonia.  . The patient has had a nonproductive cough for more than 2 weeks. Would look for other possible sources of this persistent cough: GERD, dysphagia.  . Blood cultures on admission grew ESBL E coli, likely due to UTI. Urine culture also grew ESBL E coli.  . Completed  IV Invanz for total of  8 days today for ESBL E coli bacteremia.  Repeated blood culture are negative.   . The patient had lethargy yesterday, probably from the side effects of IV Invanz. Discussed with the daughter yesterday. Monitor mental status.  . Discharge planning.         Edwardo Gonzalez MD   [phone].    She is sleeping in bed  No fevers      MEDICATIONS  (STANDING):  acetaminophen     Tablet .. 325 milliGRAM(s) Oral every 8 hours  aMIOdarone    Tablet 200 milliGRAM(s) Oral daily  amLODIPine   Tablet 10 milliGRAM(s) Oral daily  apixaban 2.5 milliGRAM(s) Oral two times a day  artificial  tears Solution 1 Drop(s) Both EYES two times a day  dextrose 5% + sodium chloride 0.45%. 1000 milliLiter(s) (50 mL/Hr) IV Continuous <Continuous>  ferrous    sulfate 325 milliGRAM(s) Oral daily  lactobacillus acidophilus 1 Tablet(s) Oral every 12 hours  levothyroxine 75 MICROGram(s) Oral daily  metoprolol tartrate 50 milliGRAM(s) Oral two times a day  multivitamin/minerals 1 Tablet(s) Oral daily  pantoprazole    Tablet 40 milliGRAM(s) Oral daily  polyethylene glycol 3350 17 Gram(s) Oral daily    MEDICATIONS  (PRN):  acetaminophen     Tablet .. 650 milliGRAM(s) Oral every 6 hours PRN Temp greater or equal to 38C (100.4F), Mild Pain (1 - 3)  albuterol/ipratropium for Nebulization 3 milliLiter(s) Nebulizer every 6 hours PRN Shortness of Breath and/or Wheezing  aluminum hydroxide/magnesium hydroxide/simethicone Suspension 30 milliLiter(s) Oral every 4 hours PRN Dyspepsia  guaiFENesin Oral Liquid (Sugar-Free) 200 milliGRAM(s) Oral every 6 hours PRN Cough  melatonin 3 milliGRAM(s) Oral at bedtime PRN Insomnia  ondansetron Injectable 4 milliGRAM(s) IV Push every 8 hours PRN Nausea and/or Vomiting      Vital Signs Last 24 Hrs  T(C): 36.7 (28 Aug 2023 12:03), Max: 36.8 (27 Aug 2023 20:20)  T(F): 98 (28 Aug 2023 12:03), Max: 98.2 (27 Aug 2023 20:20)  HR: 76 (28 Aug 2023 12:03) (63 - 76)  BP: 143/63 (28 Aug 2023 12:03) (143/63 - 175/82)  BP(mean): --  RR: 18 (28 Aug 2023 12:03) (18 - 18)  SpO2: 90% (28 Aug 2023 12:03) (90% - 94%)    Parameters below as of 28 Aug 2023 12:03  Patient On (Oxygen Delivery Method): room air        I&O's Summary    27 Aug 2023 07:01  -  28 Aug 2023 07:00  --------------------------------------------------------  IN: 50 mL / OUT: 750 mL / NET: -700 mL        PHYSICAL EXAM:  HEENT: NC/AT; PERRLA  Neck: Soft; no tenderness  Lungs: CTA bilaterally; no wheezing.   Heart: RRR, no murmurs  Abdomen: Soft, no tenderness.  Genital/ Rectal: No khan catheter  Extremities: No ulcers.  Neurologic: No focal weakness.        LABORATORY:    CBC Full  -  ( 28 Aug 2023 08:22 )  WBC Count : 11.95 K/uL  RBC Count : 3.89 M/uL  Hemoglobin : 11.4 g/dL  Hematocrit : 36.1 %  Platelet Count - Automated : 337 K/uL  Mean Cell Volume : 92.8 fl  Mean Cell Hemoglobin : 29.3 pg  Mean Cell Hemoglobin Concentration : 31.6 gm/dL  Auto Neutrophil # : x  Auto Lymphocyte # : x  Auto Monocyte # : x  Auto Eosinophil # : x  Auto Basophil # : x  Auto Neutrophil % : x  Auto Lymphocyte % : x  Auto Monocyte % : x  Auto Eosinophil % : x  Auto Basophil % : x      ESR:                   08-21 @ 06:24  --    C-Reactive Protein:     08-21 @ 06:24  --    Procalcitonin:           08-21 @ 06:24   7.59      08-28    141  |  107  |  17  ----------------------------<  88  4.7   |  26  |  0.52    Ca    8.9      28 Aug 2023 08:22        Rapid Respiratory Viral Panel Result        08-20 @ 11:55  Rapid RVP NotDete  Coronovirus --  Adenovirus --  Bordetella Pertussis --  Chlamydia Pneumonia --  Entero/Rhinovirus--  HKU1 Coronovirus --  HMPV Coronovirus --  Influenza A --  Influenza AH1 --  Influenza AH1 2009 --  Influenza AH3 --  Influenza B --  Mycoplasma Pneumoniae --  NL63 Coronovirus --  OC43 Coronovirus --  Parainfluenza 1 --  Parainfluenza 2 --  Parainfluenza 3 --  Parainfluenza 4 --  Resp Syncytial Virus --      Assessment and Plan:      1. UTI with E coli  2. Atelectasis vs pneumonia of lower lobes.  3. Generalized weakness.  4. Dysphagia.  5. Bacteremia with ESBL E Coli      . Chest CT showed atelectasis vs infiltrates of lower lobes, No clear evidence of pneumonia.  . The patient has had a nonproductive cough for more than 2 weeks. Would look for other possible sources of this persistent cough: GERD, dysphagia.  . Blood cultures on admission grew ESBL E coli, likely due to UTI. Urine culture also grew ESBL E coli.  . Completed  IV Invanz for total of  8 days today for ESBL E coli bacteremia.  Repeated blood culture are negative.   . The patient had lethargy yesterday, probably from the side effects of IV Invanz. Discussed with the daughter yesterday. Monitor mental status.  . Discharge planning.         Edwardo Gonzalez MD   [phone].    She is sleeping in bed  No fevers      MEDICATIONS  (STANDING):  acetaminophen     Tablet .. 325 milliGRAM(s) Oral every 8 hours  aMIOdarone    Tablet 200 milliGRAM(s) Oral daily  amLODIPine   Tablet 10 milliGRAM(s) Oral daily  apixaban 2.5 milliGRAM(s) Oral two times a day  artificial  tears Solution 1 Drop(s) Both EYES two times a day  dextrose 5% + sodium chloride 0.45%. 1000 milliLiter(s) (50 mL/Hr) IV Continuous <Continuous>  ferrous    sulfate 325 milliGRAM(s) Oral daily  lactobacillus acidophilus 1 Tablet(s) Oral every 12 hours  levothyroxine 75 MICROGram(s) Oral daily  metoprolol tartrate 50 milliGRAM(s) Oral two times a day  multivitamin/minerals 1 Tablet(s) Oral daily  pantoprazole    Tablet 40 milliGRAM(s) Oral daily  polyethylene glycol 3350 17 Gram(s) Oral daily    MEDICATIONS  (PRN):  acetaminophen     Tablet .. 650 milliGRAM(s) Oral every 6 hours PRN Temp greater or equal to 38C (100.4F), Mild Pain (1 - 3)  albuterol/ipratropium for Nebulization 3 milliLiter(s) Nebulizer every 6 hours PRN Shortness of Breath and/or Wheezing  aluminum hydroxide/magnesium hydroxide/simethicone Suspension 30 milliLiter(s) Oral every 4 hours PRN Dyspepsia  guaiFENesin Oral Liquid (Sugar-Free) 200 milliGRAM(s) Oral every 6 hours PRN Cough  melatonin 3 milliGRAM(s) Oral at bedtime PRN Insomnia  ondansetron Injectable 4 milliGRAM(s) IV Push every 8 hours PRN Nausea and/or Vomiting      Vital Signs Last 24 Hrs  T(C): 36.7 (28 Aug 2023 12:03), Max: 36.8 (27 Aug 2023 20:20)  T(F): 98 (28 Aug 2023 12:03), Max: 98.2 (27 Aug 2023 20:20)  HR: 76 (28 Aug 2023 12:03) (63 - 76)  BP: 143/63 (28 Aug 2023 12:03) (143/63 - 175/82)  BP(mean): --  RR: 18 (28 Aug 2023 12:03) (18 - 18)  SpO2: 90% (28 Aug 2023 12:03) (90% - 94%)    Parameters below as of 28 Aug 2023 12:03  Patient On (Oxygen Delivery Method): room air        I&O's Summary    27 Aug 2023 07:01  -  28 Aug 2023 07:00  --------------------------------------------------------  IN: 50 mL / OUT: 750 mL / NET: -700 mL        PHYSICAL EXAM:  HEENT: NC/AT; PERRLA  Neck: Soft; no tenderness  Lungs: CTA bilaterally; no wheezing.   Heart: RRR, no murmurs  Abdomen: Soft, no tenderness.  Genital/ Rectal: No khan catheter  Extremities: No ulcers.  Neurologic: No focal weakness.        LABORATORY:    CBC Full  -  ( 28 Aug 2023 08:22 )  WBC Count : 11.95 K/uL  RBC Count : 3.89 M/uL  Hemoglobin : 11.4 g/dL  Hematocrit : 36.1 %  Platelet Count - Automated : 337 K/uL  Mean Cell Volume : 92.8 fl  Mean Cell Hemoglobin : 29.3 pg  Mean Cell Hemoglobin Concentration : 31.6 gm/dL  Auto Neutrophil # : x  Auto Lymphocyte # : x  Auto Monocyte # : x  Auto Eosinophil # : x  Auto Basophil # : x  Auto Neutrophil % : x  Auto Lymphocyte % : x  Auto Monocyte % : x  Auto Eosinophil % : x  Auto Basophil % : x      ESR:                   08-21 @ 06:24  --    C-Reactive Protein:     08-21 @ 06:24  --    Procalcitonin:           08-21 @ 06:24   7.59      08-28    141  |  107  |  17  ----------------------------<  88  4.7   |  26  |  0.52    Ca    8.9      28 Aug 2023 08:22        Rapid Respiratory Viral Panel Result        08-20 @ 11:55  Rapid RVP NotDete  Coronovirus --  Adenovirus --  Bordetella Pertussis --  Chlamydia Pneumonia --  Entero/Rhinovirus--  HKU1 Coronovirus --  HMPV Coronovirus --  Influenza A --  Influenza AH1 --  Influenza AH1 2009 --  Influenza AH3 --  Influenza B --  Mycoplasma Pneumoniae --  NL63 Coronovirus --  OC43 Coronovirus --  Parainfluenza 1 --  Parainfluenza 2 --  Parainfluenza 3 --  Parainfluenza 4 --  Resp Syncytial Virus --      Assessment and Plan:      1. UTI with E coli  2. Atelectasis vs pneumonia of lower lobes.  3. Generalized weakness.  4. Dysphagia.  5. Bacteremia with ESBL E Coli      . Chest CT showed atelectasis vs infiltrates of lower lobes, No clear evidence of pneumonia.  . The patient has had a nonproductive cough for more than 2 weeks. Would look for other possible sources of this persistent cough: GERD, dysphagia.  . Blood cultures on admission grew ESBL E coli, likely due to UTI. Urine culture also grew ESBL E coli.  . Completed  IV Invanz for total of  8 days today for ESBL E coli bacteremia.  Repeated blood culture are negative.   . The patient had lethargy yesterday, probably from the side effects of IV Invanz. Discussed with the daughter yesterday. Monitor mental status.  . Discharge planning.         Edwardo Gonzalez MD   [phone].

## 2023-08-28 NOTE — CASE MANAGEMENT PROGRESS NOTE - NSCMPROGRESSNOTE_GEN_ALL_CORE
Patient is for possible transition home on 8/30/2023. 3122 places in front of chart to be completed and sent to the [facility]. Patient is for possible transition home on 8/30/2023. 3122 places in front of chart to be completed and sent to the [facility]. Patient is for possible transition home on 8/30/2023. 3122 places in front of chart to be completed and sent to the [facility].

## 2023-08-28 NOTE — PROGRESS NOTE ADULT - SUBJECTIVE AND OBJECTIVE BOX
[facility]  Víctor Robertson PA-C  [address] [place] [address]  [phone]      INTERVAL HPI/OVERNIGHT EVENTS:  Pt s/e  Tolerating diet  No new GI events    MEDICATIONS  (STANDING):  aMIOdarone    Tablet 200 milliGRAM(s) Oral daily  amLODIPine   Tablet 10 milliGRAM(s) Oral daily  apixaban 2.5 milliGRAM(s) Oral two times a day  artificial  tears Solution 1 Drop(s) Both EYES two times a day  ertapenem  IVPB 1000 milliGRAM(s) IV Intermittent every 24 hours  ferrous    sulfate 325 milliGRAM(s) Oral daily  lactobacillus acidophilus 1 Tablet(s) Oral every 12 hours  levothyroxine 75 MICROGram(s) Oral daily  metoprolol tartrate 50 milliGRAM(s) Oral two times a day  multivitamin/minerals 1 Tablet(s) Oral daily  pantoprazole    Tablet 40 milliGRAM(s) Oral daily  polyethylene glycol 3350 17 Gram(s) Oral daily  potassium chloride    Tablet ER 10 milliEquivalent(s) Oral two times a day    MEDICATIONS  (PRN):  acetaminophen     Tablet .. 650 milliGRAM(s) Oral every 6 hours PRN Temp greater or equal to 38C (100.4F), Mild Pain (1 - 3)  albuterol/ipratropium for Nebulization 3 milliLiter(s) Nebulizer every 6 hours PRN Shortness of Breath and/or Wheezing  aluminum hydroxide/magnesium hydroxide/simethicone Suspension 30 milliLiter(s) Oral every 4 hours PRN Dyspepsia  guaiFENesin Oral Liquid (Sugar-Free) 200 milliGRAM(s) Oral every 6 hours PRN Cough  melatonin 3 milliGRAM(s) Oral at bedtime PRN Insomnia  ondansetron Injectable 4 milliGRAM(s) IV Push every 8 hours PRN Nausea and/or Vomiting      Allergies    penicillin (Unknown)    PHYSICAL EXAM:   Vital Signs:  Vital Signs Last 24 Hrs  T(C): 36.7 (28 Aug 2023 12:03), Max: 36.8 (27 Aug 2023 20:20)  T(F): 98 (28 Aug 2023 12:03), Max: 98.2 (27 Aug 2023 20:20)  HR: 76 (28 Aug 2023 12:03) (63 - 76)  BP: 143/63 (28 Aug 2023 12:03) (143/63 - 175/82)  BP(mean): --  RR: 18 (28 Aug 2023 12:03) (18 - 18)  SpO2: 90% (28 Aug 2023 12:03) (90% - 94%)    Parameters below as of 28 Aug 2023 12:03  Patient On (Oxygen Delivery Method): room air      Daily     Daily Weight in k.5 (28 Aug 2023 04:54)    GENERAL:  Appears stated age  HEENT:  NC/AT  CHEST:  Full & symmetric excursion  HEART:  Regular rhythm  ABDOMEN:  Soft, non-tender, non-distended  EXTEREMITIES:  no cyanosis  SKIN:  No rash  NEURO:  Alert      LABS:                        11.4   11.95 )-----------( 337      ( 28 Aug 2023 08:22 )             36.1     08-28    141  |  107  |  17  ----------------------------<  88  4.7   |  26  |  0.52    Ca    8.9      28 Aug 2023 08:22        Urinalysis Basic - ( 28 Aug 2023 08:22 )    Color: x / Appearance: x / SG: x / pH: x  Gluc: 88 mg/dL / Ketone: x  / Bili: x / Urobili: x   Blood: x / Protein: x / Nitrite: x   Leuk Esterase: x / RBC: x / WBC x   Sq Epi: x / Non Sq Epi: x / Bacteria: x   [facility]  Víctor Robertson PA-C  [address] [place] [address]  [phone]      INTERVAL HPI/OVERNIGHT EVENTS:  Pt s/e  Tolerating diet  No new GI events    MEDICATIONS  (STANDING):  aMIOdarone    Tablet 200 milliGRAM(s) Oral daily  amLODIPine   Tablet 10 milliGRAM(s) Oral daily  apixaban 2.5 milliGRAM(s) Oral two times a day  artificial  tears Solution 1 Drop(s) Both EYES two times a day  ertapenem  IVPB 1000 milliGRAM(s) IV Intermittent every 24 hours  ferrous    sulfate 325 milliGRAM(s) Oral daily  lactobacillus acidophilus 1 Tablet(s) Oral every 12 hours  levothyroxine 75 MICROGram(s) Oral daily  metoprolol tartrate 50 milliGRAM(s) Oral two times a day  multivitamin/minerals 1 Tablet(s) Oral daily  pantoprazole    Tablet 40 milliGRAM(s) Oral daily  polyethylene glycol 3350 17 Gram(s) Oral daily  potassium chloride    Tablet ER 10 milliEquivalent(s) Oral two times a day    MEDICATIONS  (PRN):  acetaminophen     Tablet .. 650 milliGRAM(s) Oral every 6 hours PRN Temp greater or equal to 38C (100.4F), Mild Pain (1 - 3)  albuterol/ipratropium for Nebulization 3 milliLiter(s) Nebulizer every 6 hours PRN Shortness of Breath and/or Wheezing  aluminum hydroxide/magnesium hydroxide/simethicone Suspension 30 milliLiter(s) Oral every 4 hours PRN Dyspepsia  guaiFENesin Oral Liquid (Sugar-Free) 200 milliGRAM(s) Oral every 6 hours PRN Cough  melatonin 3 milliGRAM(s) Oral at bedtime PRN Insomnia  ondansetron Injectable 4 milliGRAM(s) IV Push every 8 hours PRN Nausea and/or Vomiting      Allergies    penicillin (Unknown)    PHYSICAL EXAM:   Vital Signs:  Vital Signs Last 24 Hrs  T(C): 36.7 (28 Aug 2023 12:03), Max: 36.8 (27 Aug 2023 20:20)  T(F): 98 (28 Aug 2023 12:03), Max: 98.2 (27 Aug 2023 20:20)  HR: 76 (28 Aug 2023 12:03) (63 - 76)  BP: 143/63 (28 Aug 2023 12:03) (143/63 - 175/82)  BP(mean): --  RR: 18 (28 Aug 2023 12:03) (18 - 18)  SpO2: 90% (28 Aug 2023 12:03) (90% - 94%)    Parameters below as of 28 Aug 2023 12:03  Patient On (Oxygen Delivery Method): room air      Daily     Daily Weight in k.5 (28 Aug 2023 04:54)    GENERAL:  Appears stated age  HEENT:  NC/AT  CHEST:  Full & symmetric excursion  HEART:  Regular rhythm  ABDOMEN:  Soft, non-tender, non-distended  EXTEREMITIES:  no cyanosis  SKIN:  No rash  NEURO:  Alert      LABS:                        11.4   11.95 )-----------( 337      ( 28 Aug 2023 08:22 )             36.1     08-28    141  |  107  |  17  ----------------------------<  88  4.7   |  26  |  0.52    Ca    8.9      28 Aug 2023 08:22        Urinalysis Basic - ( 28 Aug 2023 08:22 )    Color: x / Appearance: x / SG: x / pH: x  Gluc: 88 mg/dL / Ketone: x  / Bili: x / Urobili: x   Blood: x / Protein: x / Nitrite: x   Leuk Esterase: x / RBC: x / WBC x   Sq Epi: x / Non Sq Epi: x / Bacteria: x   [facility]  Víctor Robertson PA-C  [address] [place] [address]  [phone]      INTERVAL HPI/OVERNIGHT EVENTS:  Pt s/e  Tolerating diet  No new GI events    MEDICATIONS  (STANDING):  aMIOdarone    Tablet 200 milliGRAM(s) Oral daily  amLODIPine   Tablet 10 milliGRAM(s) Oral daily  apixaban 2.5 milliGRAM(s) Oral two times a day  artificial  tears Solution 1 Drop(s) Both EYES two times a day  ertapenem  IVPB 1000 milliGRAM(s) IV Intermittent every 24 hours  ferrous    sulfate 325 milliGRAM(s) Oral daily  lactobacillus acidophilus 1 Tablet(s) Oral every 12 hours  levothyroxine 75 MICROGram(s) Oral daily  metoprolol tartrate 50 milliGRAM(s) Oral two times a day  multivitamin/minerals 1 Tablet(s) Oral daily  pantoprazole    Tablet 40 milliGRAM(s) Oral daily  polyethylene glycol 3350 17 Gram(s) Oral daily  potassium chloride    Tablet ER 10 milliEquivalent(s) Oral two times a day    MEDICATIONS  (PRN):  acetaminophen     Tablet .. 650 milliGRAM(s) Oral every 6 hours PRN Temp greater or equal to 38C (100.4F), Mild Pain (1 - 3)  albuterol/ipratropium for Nebulization 3 milliLiter(s) Nebulizer every 6 hours PRN Shortness of Breath and/or Wheezing  aluminum hydroxide/magnesium hydroxide/simethicone Suspension 30 milliLiter(s) Oral every 4 hours PRN Dyspepsia  guaiFENesin Oral Liquid (Sugar-Free) 200 milliGRAM(s) Oral every 6 hours PRN Cough  melatonin 3 milliGRAM(s) Oral at bedtime PRN Insomnia  ondansetron Injectable 4 milliGRAM(s) IV Push every 8 hours PRN Nausea and/or Vomiting      Allergies    penicillin (Unknown)    PHYSICAL EXAM:   Vital Signs:  Vital Signs Last 24 Hrs  T(C): 36.7 (28 Aug 2023 12:03), Max: 36.8 (27 Aug 2023 20:20)  T(F): 98 (28 Aug 2023 12:03), Max: 98.2 (27 Aug 2023 20:20)  HR: 76 (28 Aug 2023 12:03) (63 - 76)  BP: 143/63 (28 Aug 2023 12:03) (143/63 - 175/82)  BP(mean): --  RR: 18 (28 Aug 2023 12:03) (18 - 18)  SpO2: 90% (28 Aug 2023 12:03) (90% - 94%)    Parameters below as of 28 Aug 2023 12:03  Patient On (Oxygen Delivery Method): room air      Daily     Daily Weight in k.5 (28 Aug 2023 04:54)    GENERAL:  Appears stated age  HEENT:  NC/AT  CHEST:  Full & symmetric excursion  HEART:  Regular rhythm  ABDOMEN:  Soft, non-tender, non-distended  EXTEREMITIES:  no cyanosis  SKIN:  No rash  NEURO:  Alert      LABS:                        11.4   11.95 )-----------( 337      ( 28 Aug 2023 08:22 )             36.1     08-28    141  |  107  |  17  ----------------------------<  88  4.7   |  26  |  0.52    Ca    8.9      28 Aug 2023 08:22        Urinalysis Basic - ( 28 Aug 2023 08:22 )    Color: x / Appearance: x / SG: x / pH: x  Gluc: 88 mg/dL / Ketone: x  / Bili: x / Urobili: x   Blood: x / Protein: x / Nitrite: x   Leuk Esterase: x / RBC: x / WBC x   Sq Epi: x / Non Sq Epi: x / Bacteria: x

## 2023-08-28 NOTE — SWALLOW BEDSIDE ASSESSMENT ADULT - COMMENTS
H&P: 89-year-old female with history of hypothyroidism, A-fib on Eliquis, s/p recent pacemaker for complete heart block brought in by ambulance from [facility] assisted living home for cough for the past 2 weeks.  Associated with generalized weakness and decreased p.o. intake.  No fall or trauma.  Patient states she has had cold symptoms for a while.    CT Chest: Atelectasis vs. infiltrates of lower lobes no clear evidence of pneumonia    Pt is known to this dept. from prior bedside swallow evaluation completed 7/12/23 and 8/21/23 at which time she was recommended soft and bite sized solids with thin fluids. Please see full report for details    Pt was received asleep and was not easily aroused despite provision of multi modal cues. Therefore, plan for SLP to revisit pt at a later time as scheduled permits. D/w Carlene H&P: 89-year-old female with history of hypothyroidism, A-fib on Eliquis, s/p recent pacemaker for complete heart block brought in by ambulance from [facility] assisted living home for cough for the past 2 weeks.  Associated with generalized weakness and decreased p.o. intake.  No fall or trauma.  Patient states she has had cold symptoms for a while.    CT Chest: Atelectasis vs. infiltrates of lower lobes no clear evidence of pneumonia    Pt is known to this dept. from prior bedside swallow evaluation completed 7/12/23 and 8/21/23 at which time she was recommended soft and bite sized solids with thin fluids. Please see full report for details    Pt was received asleep and was not easily aroused despite provision of multi modal cues. Therefore, plan for SLP to revisit pt at a later time as scheduled permits. D/w Carlene H&P: 89-year-old female with history of hypothyroidism, A-fib on Eliquis, s/p recent pacemaker for complete heart block brought in by ambulance from [facility] assisted living home for cough for the past 2 weeks.  Associated with generalized weakness and decreased p.o. intake.  No fall or trauma.  Patient states she has had cold symptoms for a while.    CT Chest: Atelectasis vs. infiltrates of lower lobes no clear evidence of pneumonia    Pt is known to this dept. from prior bedside swallow evaluation completed 7/12/23 and 8/21/23 at which time she was recommended soft and bite sized solids with thin fluids. Please see full report for details    Pt was received asleep and was not easily aroused despite provision of multi modal cues. Therefore, plan for SLP to revisit pt at a later time as scheduled permits. D/w Carlene

## 2023-08-29 DIAGNOSIS — G93.41 METABOLIC ENCEPHALOPATHY: ICD-10-CM

## 2023-08-29 DIAGNOSIS — U07.1 COVID-19: ICD-10-CM

## 2023-08-29 LAB
ALBUMIN SERPL ELPH-MCNC: 2.3 G/DL — LOW (ref 3.3–5)
ALBUMIN SERPL ELPH-MCNC: 2.4 G/DL — LOW (ref 3.3–5)
ALP SERPL-CCNC: 86 U/L — SIGNIFICANT CHANGE UP (ref 40–120)
ALP SERPL-CCNC: 94 U/L — SIGNIFICANT CHANGE UP (ref 40–120)
ALT FLD-CCNC: 141 U/L — HIGH (ref 12–78)
ALT FLD-CCNC: 97 U/L — HIGH (ref 12–78)
ANION GAP SERPL CALC-SCNC: 7 MMOL/L — SIGNIFICANT CHANGE UP (ref 5–17)
AST SERPL-CCNC: 111 U/L — HIGH (ref 15–37)
AST SERPL-CCNC: 84 U/L — HIGH (ref 15–37)
BILIRUB DIRECT SERPL-MCNC: 0.3 MG/DL — SIGNIFICANT CHANGE UP (ref 0–0.3)
BILIRUB INDIRECT FLD-MCNC: 0.5 MG/DL — SIGNIFICANT CHANGE UP (ref 0.2–1)
BILIRUB SERPL-MCNC: 0.8 MG/DL — SIGNIFICANT CHANGE UP (ref 0.2–1.2)
BILIRUB SERPL-MCNC: 0.9 MG/DL — SIGNIFICANT CHANGE UP (ref 0.2–1.2)
BUN SERPL-MCNC: 22 MG/DL — SIGNIFICANT CHANGE UP (ref 7–23)
CALCIUM SERPL-MCNC: 8.6 MG/DL — SIGNIFICANT CHANGE UP (ref 8.5–10.1)
CHLORIDE SERPL-SCNC: 109 MMOL/L — HIGH (ref 96–108)
CO2 SERPL-SCNC: 23 MMOL/L — SIGNIFICANT CHANGE UP (ref 22–31)
CREAT SERPL-MCNC: 0.6 MG/DL — SIGNIFICANT CHANGE UP (ref 0.5–1.3)
CREAT SERPL-MCNC: 0.78 MG/DL — SIGNIFICANT CHANGE UP (ref 0.5–1.3)
CULTURE RESULTS: SIGNIFICANT CHANGE UP
EGFR: 71 ML/MIN/1.73M2 — SIGNIFICANT CHANGE UP
EGFR: 84 ML/MIN/1.73M2 — SIGNIFICANT CHANGE UP
GLUCOSE SERPL-MCNC: 99 MG/DL — SIGNIFICANT CHANGE UP (ref 70–99)
INR BLD: 1.38 RATIO — HIGH (ref 0.85–1.18)
POTASSIUM SERPL-MCNC: 4.4 MMOL/L — SIGNIFICANT CHANGE UP (ref 3.5–5.3)
POTASSIUM SERPL-SCNC: 4.4 MMOL/L — SIGNIFICANT CHANGE UP (ref 3.5–5.3)
PROT SERPL-MCNC: 6.3 G/DL — SIGNIFICANT CHANGE UP (ref 6–8.3)
PROTHROM AB SERPL-ACNC: 16 SEC — HIGH (ref 9.5–13)
SARS-COV-2 RNA SPEC QL NAA+PROBE: DETECTED
SODIUM SERPL-SCNC: 139 MMOL/L — SIGNIFICANT CHANGE UP (ref 135–145)
SPECIMEN SOURCE: SIGNIFICANT CHANGE UP

## 2023-08-29 PROCEDURE — 93971 EXTREMITY STUDY: CPT | Mod: 26,LT

## 2023-08-29 RX ORDER — REMDESIVIR 5 MG/ML
100 INJECTION INTRAVENOUS EVERY 24 HOURS
Refills: 0 | Status: COMPLETED | OUTPATIENT
Start: 2023-08-30 | End: 2023-08-31

## 2023-08-29 RX ORDER — REMDESIVIR 5 MG/ML
INJECTION INTRAVENOUS
Refills: 0 | Status: COMPLETED | OUTPATIENT
Start: 2023-08-29 | End: 2023-08-31

## 2023-08-29 RX ORDER — REMDESIVIR 5 MG/ML
200 INJECTION INTRAVENOUS EVERY 24 HOURS
Refills: 0 | Status: COMPLETED | OUTPATIENT
Start: 2023-08-29 | End: 2023-08-29

## 2023-08-29 RX ADMIN — Medication 325 MILLIGRAM(S): at 22:37

## 2023-08-29 RX ADMIN — Medication 75 MICROGRAM(S): at 06:02

## 2023-08-29 RX ADMIN — REMDESIVIR 200 MILLIGRAM(S): 5 INJECTION INTRAVENOUS at 22:36

## 2023-08-29 RX ADMIN — Medication 1 TABLET(S): at 06:02

## 2023-08-29 RX ADMIN — Medication 1 DROP(S): at 18:06

## 2023-08-29 RX ADMIN — Medication 325 MILLIGRAM(S): at 23:37

## 2023-08-29 RX ADMIN — Medication 325 MILLIGRAM(S): at 14:09

## 2023-08-29 RX ADMIN — AMIODARONE HYDROCHLORIDE 200 MILLIGRAM(S): 400 TABLET ORAL at 06:02

## 2023-08-29 RX ADMIN — AMLODIPINE BESYLATE 10 MILLIGRAM(S): 2.5 TABLET ORAL at 06:02

## 2023-08-29 RX ADMIN — Medication 50 MILLIGRAM(S): at 18:06

## 2023-08-29 RX ADMIN — Medication 325 MILLIGRAM(S): at 06:03

## 2023-08-29 RX ADMIN — Medication 1 DROP(S): at 06:03

## 2023-08-29 RX ADMIN — APIXABAN 2.5 MILLIGRAM(S): 2.5 TABLET, FILM COATED ORAL at 18:06

## 2023-08-29 RX ADMIN — Medication 1 TABLET(S): at 18:06

## 2023-08-29 RX ADMIN — POLYETHYLENE GLYCOL 3350 17 GRAM(S): 17 POWDER, FOR SOLUTION ORAL at 12:11

## 2023-08-29 RX ADMIN — Medication 1 TABLET(S): at 12:10

## 2023-08-29 RX ADMIN — Medication 325 MILLIGRAM(S): at 12:11

## 2023-08-29 RX ADMIN — APIXABAN 2.5 MILLIGRAM(S): 2.5 TABLET, FILM COATED ORAL at 06:02

## 2023-08-29 RX ADMIN — Medication 50 MILLIGRAM(S): at 06:02

## 2023-08-29 RX ADMIN — PANTOPRAZOLE SODIUM 40 MILLIGRAM(S): 20 TABLET, DELAYED RELEASE ORAL at 12:10

## 2023-08-29 NOTE — SWALLOW BEDSIDE ASSESSMENT ADULT - SWALLOW EVAL: RECOMMENDED FEEDING/EATING TECHNIQUES
allow for swallow between intakes/crush medication (when feasible)/maintain upright posture during/after eating for 30 mins/oral hygiene/position upright (90 degrees)/small sips/bites
maintain upright posture during/after eating for 30 mins/position upright (90 degrees)

## 2023-08-29 NOTE — CASE MANAGEMENT PROGRESS NOTE - NSCMPROGRESSNOTE_GEN_ALL_CORE
Discussed patient with Dr. Mallory. Anticipate plan to return to [facility] possible for tomorrow. After speaking with Physical Therapy and Dr. Mallory, patient is not at her baseline and is requiring a hoya lift for oob to chair and needs to be fed meals at this time. I called and spoke with Michael at [facility] to address changes and if patient can return with these needs. Need to confirm with [facility] that they can use Hoya if we supply and they have staff to feed patient. Michael is unable to confirm info and will have director call back our CM dept to address changes/needs. CM will continue to follow.  Discussed patient with Dr. Mallory. Anticipate plan to return to [facility] possible for tomorrow. After speaking with Physical Therapy and Dr. Mallory, patient is not at her baseline and is requiring a hoya lift for oob to chair and needs to be fed meals at this time. I called and spoke with Michael at [facility] to address changes and if patient can return with these needs. Need to confirm with [facility] that they can use Hoya if we supply and they have staff to feed patient. Michael is unable to confirm info and will have director call back our CM dept to address changes/needs. CM will continue to follow.  Discussed patient with Dr. Mallory. Anticipate plan to return to [facility] possible for tomorrow. After speaking with Physical Therapy and Dr. Mallory, patient is not at her baseline and is requiring a hoya lift for oob to chair and needs to be fed meals at this time. I called and spoke with Michael at [facility] to address changes and if patient can return with these needs. Need to confirm with [facility] that they can use H[employer] if we supply and they have staff to feed patient. Michael is unable to confirm info and will have director call back our CM dept to address changes/needs. CM will continue to follow.

## 2023-08-29 NOTE — PROGRESS NOTE ADULT - SUBJECTIVE AND OBJECTIVE BOX
CHIEF COMPLAINT/ REASON FOR VISIT  .. Patient was seen to address the  issue listed under PROBLEM LIST which is located toward bottom of this note     DEACON ROBERT    PLV 1EAS 106 W1    Allergies    penicillin (Unknown)    Intolerances        PAST MEDICAL & SURGICAL HISTORY:  HTN (hypertension)      Afib      Spinal stenosis      PFO (patent foramen ovale)      Degeneration macular      Osteoporosis      History of complete heart block      Hypothyroidism      Cardiac pacemaker          FAMILY HISTORY:      Home Medications:  Albuterol (Eqv-ProAir HFA) 90 mcg/inh inhalation aerosol: 1 puff(s) inhaled 4 times a day as needed for  cough or wheezing (21 Aug 2023 09:56)  amiodarone 200 mg oral tablet: 1 tab(s) orally once a day (21 Aug 2023 09:55)  clindamycin 1% topical lotion: Apply topically to affected area once a day to face (21 Aug 2023 09:55)  Eliquis 2.5 mg oral tablet: 1 tab(s) orally 2 times a day (21 Aug 2023 09:55)  enalapril 10 mg oral tablet: 1 tab(s) orally once a day (21 Aug 2023 09:55)  ferrous sulfate 325 mg (65 mg elemental iron) oral tablet: 1 tab(s) orally once a day (21 Aug 2023 09:55)  levothyroxine 75 mcg (0.075 mg) oral tablet: 1 tab(s) orally once a day (21 Aug 2023 09:56)  Metoprolol Tartrate 50 mg oral tablet: 1 tab(s) orally 2 times a day (21 Aug 2023 09:56)  Myrbetriq 50 mg oral tablet, extended release: 1 tab(s) orally once a day (21 Aug 2023 09:56)  polyethylene glycol 3350 oral powder for reconstitution: 17 gram(s) orally once a day (21 Aug 2023 09:56)  PreserVision AREDS oral capsule: 1 cap(s) orally once a day (21 Aug 2023 09:56)  Refresh Dry Eye Therapy ophthalmic solution: 1 drop(s) in each eye 4 times a day (21 Aug 2023 09:56)  Tylenol 325 mg oral tablet: 2 tab(s) orally every 4 hours as needed for pain or fever (21 Aug 2023 09:56)      MEDICATIONS  (STANDING):  acetaminophen     Tablet .. 325 milliGRAM(s) Oral every 8 hours  aMIOdarone    Tablet 200 milliGRAM(s) Oral daily  amLODIPine   Tablet 10 milliGRAM(s) Oral daily  apixaban 2.5 milliGRAM(s) Oral two times a day  artificial  tears Solution 1 Drop(s) Both EYES two times a day  dextrose 5% + sodium chloride 0.45%. 1000 milliLiter(s) (50 mL/Hr) IV Continuous <Continuous>  ferrous    sulfate 325 milliGRAM(s) Oral daily  lactobacillus acidophilus 1 Tablet(s) Oral every 12 hours  levothyroxine 75 MICROGram(s) Oral daily  metoprolol tartrate 50 milliGRAM(s) Oral two times a day  multivitamin/minerals 1 Tablet(s) Oral daily  pantoprazole    Tablet 40 milliGRAM(s) Oral daily  polyethylene glycol 3350 17 Gram(s) Oral daily    MEDICATIONS  (PRN):  acetaminophen     Tablet .. 650 milliGRAM(s) Oral every 6 hours PRN Temp greater or equal to 38C (100.4F), Mild Pain (1 - 3)  albuterol/ipratropium for Nebulization 3 milliLiter(s) Nebulizer every 6 hours PRN Shortness of Breath and/or Wheezing  aluminum hydroxide/magnesium hydroxide/simethicone Suspension 30 milliLiter(s) Oral every 4 hours PRN Dyspepsia  guaiFENesin Oral Liquid (Sugar-Free) 200 milliGRAM(s) Oral every 6 hours PRN Cough  melatonin 3 milliGRAM(s) Oral at bedtime PRN Insomnia  ondansetron Injectable 4 milliGRAM(s) IV Push every 8 hours PRN Nausea and/or Vomiting              Vital Signs Last 24 Hrs  T(C): 36.7 (29 Aug 2023 04:52), Max: 36.7 (28 Aug 2023 12:03)  T(F): 98.1 (29 Aug 2023 04:52), Max: 98.1 (29 Aug 2023 04:52)  HR: 88 (29 Aug 2023 04:52) (76 - 88)  BP: 124/67 (29 Aug 2023 04:52) (124/67 - 147/80)  BP(mean): --  RR: 18 (29 Aug 2023 04:52) (18 - 18)  SpO2: 96% (29 Aug 2023 04:52) (90% - 96%)    Parameters below as of 29 Aug 2023 04:52  Patient On (Oxygen Delivery Method): room air          08-27-23 @ 07:01  -  08-28-23 @ 07:00  --------------------------------------------------------  IN: 50 mL / OUT: 750 mL / NET: -700 mL              LABS:                        11.4   11.95 )-----------( 337      ( 28 Aug 2023 08:22 )             36.1     08-28    141  |  107  |  17  ----------------------------<  88  4.7   |  26  |  0.52    Ca    8.9      28 Aug 2023 08:22        Urinalysis Basic - ( 28 Aug 2023 08:22 )    Color: x / Appearance: x / SG: x / pH: x  Gluc: 88 mg/dL / Ketone: x  / Bili: x / Urobili: x   Blood: x / Protein: x / Nitrite: x   Leuk Esterase: x / RBC: x / WBC x   Sq Epi: x / Non Sq Epi: x / Bacteria: x            WBC:  WBC Count: 11.95 K/uL (08-28 @ 08:22)  WBC Count: 10.12 K/uL (08-27 @ 07:14)      MICROBIOLOGY:  RECENT CULTURES:  08-24 .Blood Blood XXXX XXXX   No growth at 4 days    08-22 .Blood Blood XXXX XXXX   No growth at 5 days                    Sodium:  Sodium: 141 mmol/L (08-28 @ 08:22)  Sodium: 141 mmol/L (08-27 @ 07:14)      0.52 mg/dL 08-28 @ 08:22  0.52 mg/dL 08-27 @ 07:14      Hemoglobin:  Hemoglobin: 11.4 g/dL (08-28 @ 08:22)  Hemoglobin: 11.4 g/dL (08-27 @ 07:14)      Platelets: Platelet Count - Automated: 337 K/uL (08-28 @ 08:22)  Platelet Count - Automated: 302 K/uL (08-27 @ 07:14)          Urinalysis Basic - ( 28 Aug 2023 08:22 )    Color: x / Appearance: x / SG: x / pH: x  Gluc: 88 mg/dL / Ketone: x  / Bili: x / Urobili: x   Blood: x / Protein: x / Nitrite: x   Leuk Esterase: x / RBC: x / WBC x   Sq Epi: x / Non Sq Epi: x / Bacteria: x        RADIOLOGY & ADDITIONAL STUDIES:      MICROBIOLOGY:  RECENT CULTURES:  08-24 .Blood Blood XXXX XXXX   No growth at 4 days    08-22 .Blood Blood XXXX XXXX   No growth at 5 days

## 2023-08-29 NOTE — CASE MANAGEMENT PROGRESS NOTE - NSCMPROGRESSNOTE_GEN_ALL_CORE
[facility] back.  [facility].  [facility] no [facility] back.

## 2023-08-29 NOTE — PROGRESS NOTE ADULT - SUBJECTIVE AND OBJECTIVE BOX
Interval History:    CENTRAL LINE:   [  ] YES       [  ] NO  SKELTON:                 [  ] YES       [  ] NO         REVIEW OF SYSTEMS:  All Systems below were reviewed and are negative [  ]  HEENT:  ID:  Pulmonary:  Cardiac:  GI:  Renal:  Musculoskeletal:  All other systems above were reviewed and are negative   [  ]      MEDICATIONS  (STANDING):  acetaminophen     Tablet .. 325 milliGRAM(s) Oral every 8 hours  aMIOdarone    Tablet 200 milliGRAM(s) Oral daily  amLODIPine   Tablet 10 milliGRAM(s) Oral daily  apixaban 2.5 milliGRAM(s) Oral two times a day  artificial  tears Solution 1 Drop(s) Both EYES two times a day  dextrose 5% + sodium chloride 0.45%. 1000 milliLiter(s) (50 mL/Hr) IV Continuous <Continuous>  ferrous    sulfate 325 milliGRAM(s) Oral daily  lactobacillus acidophilus 1 Tablet(s) Oral every 12 hours  levothyroxine 75 MICROGram(s) Oral daily  metoprolol tartrate 50 milliGRAM(s) Oral two times a day  multivitamin/minerals 1 Tablet(s) Oral daily  pantoprazole    Tablet 40 milliGRAM(s) Oral daily  polyethylene glycol 3350 17 Gram(s) Oral daily    MEDICATIONS  (PRN):  acetaminophen     Tablet .. 650 milliGRAM(s) Oral every 6 hours PRN Temp greater or equal to 38C (100.4F), Mild Pain (1 - 3)  albuterol/ipratropium for Nebulization 3 milliLiter(s) Nebulizer every 6 hours PRN Shortness of Breath and/or Wheezing  aluminum hydroxide/magnesium hydroxide/simethicone Suspension 30 milliLiter(s) Oral every 4 hours PRN Dyspepsia  guaiFENesin Oral Liquid (Sugar-Free) 200 milliGRAM(s) Oral every 6 hours PRN Cough  melatonin 3 milliGRAM(s) Oral at bedtime PRN Insomnia  ondansetron Injectable 4 milliGRAM(s) IV Push every 8 hours PRN Nausea and/or Vomiting      Vital Signs Last 24 Hrs  T(C): 36.8 (29 Aug 2023 18:07), Max: 36.8 (29 Aug 2023 18:07)  T(F): 98.2 (29 Aug 2023 18:07), Max: 98.2 (29 Aug 2023 18:07)  HR: 85 (29 Aug 2023 18:07) (76 - 88)  BP: 137/65 (29 Aug 2023 18:07) (124/67 - 171/70)  BP(mean): --  RR: 18 (29 Aug 2023 18:07) (18 - 18)  SpO2: 91% (29 Aug 2023 18:07) (91% - 96%)    Parameters below as of 29 Aug 2023 18:07  Patient On (Oxygen Delivery Method): room air        I&O's Summary      PHYSICAL EXAM:  HEENT: NC/AT; PERRLA  Neck: Soft; no tenderness  Lungs: CTA bilaterally; no wheezing.   Heart:  Abdomen:  Genital/ Rectal:  Extremities:  Neurologic:  Vascular:      LABORATORY:    CBC Full  -  ( 29 Aug 2023 10:13 )  WBC Count : 11.52 K/uL  RBC Count : 3.88 M/uL  Hemoglobin : 11.3 g/dL  Hematocrit : 36.5 %  Platelet Count - Automated : 340 K/uL  Mean Cell Volume : 94.1 fl  Mean Cell Hemoglobin : 29.1 pg  Mean Cell Hemoglobin Concentration : 31.0 gm/dL  Auto Neutrophil # : 10.48 K/uL  Auto Lymphocyte # : 0.46 K/uL  Auto Monocyte # : 0.58 K/uL  Auto Eosinophil # : 0.00 K/uL  Auto Basophil # : 0.00 K/uL  Auto Neutrophil % : 91.0 %  Auto Lymphocyte % : 4.0 %  Auto Monocyte % : 5.0 %  Auto Eosinophil % : 0.0 %  Auto Basophil % : 0.0 %      ESR:                   08-21 @ 06:24  --    C-Reactive Protein:     08-21 @ 06:24  --    Procalcitonin:           08-21 @ 06:24   7.59      08-29    139  |  109<H>  |  22  ----------------------------<  99  4.4   |  23  |  0.60    Ca    8.6      29 Aug 2023 06:54    TPro  6.3  /  Alb  2.3<L>  /  TBili  0.9  /  DBili  x   /  AST  84<H>  /  ALT  97<H>  /  AlkPhos  86  08-29      Rapid Respiratory Viral Panel Result        08-20 @ 11:55  Rapid RVP NotDetec  Coronovirus --  Adenovirus --  Bordetella Pertussis --  Chlamydia Pneumonia --  Entero/Rhinovirus--  HKU1 Coronovirus --  HMPV Coronovirus --  Influenza A --  Influenza AH1 --  Influenza AH1 2009 --  Influenza AH3 --  Influenza B --  Mycoplasma Pneumoniae --  NL63 Coronovirus --  OC43 Coronovirus --  Parainfluenza 1 --  Parainfluenza 2 --  Parainfluenza 3 --  Parainfluenza 4 --  Resp Syncytial Virus --      Assessment and Plan:          Edwardo Gonzalez MD   [phone].      Interval History:    CENTRAL LINE:   [  ] YES       [  ] NO  SKELTON:                 [  ] YES       [  ] NO         REVIEW OF SYSTEMS:  All Systems below were reviewed and are negative [  ]  HEENT:  ID:  Pulmonary:  Cardiac:  GI:  Renal:  Musculoskeletal:  All other systems above were reviewed and are negative   [  ]      MEDICATIONS  (STANDING):  acetaminophen     Tablet .. 325 milliGRAM(s) Oral every 8 hours  aMIOdarone    Tablet 200 milliGRAM(s) Oral daily  amLODIPine   Tablet 10 milliGRAM(s) Oral daily  apixaban 2.5 milliGRAM(s) Oral two times a day  artificial  tears Solution 1 Drop(s) Both EYES two times a day  dextrose 5% + sodium chloride 0.45%. 1000 milliLiter(s) (50 mL/Hr) IV Continuous <Continuous>  ferrous    sulfate 325 milliGRAM(s) Oral daily  lactobacillus acidophilus 1 Tablet(s) Oral every 12 hours  levothyroxine 75 MICROGram(s) Oral daily  metoprolol tartrate 50 milliGRAM(s) Oral two times a day  multivitamin/minerals 1 Tablet(s) Oral daily  pantoprazole    Tablet 40 milliGRAM(s) Oral daily  polyethylene glycol 3350 17 Gram(s) Oral daily    MEDICATIONS  (PRN):  acetaminophen     Tablet .. 650 milliGRAM(s) Oral every 6 hours PRN Temp greater or equal to 38C (100.4F), Mild Pain (1 - 3)  albuterol/ipratropium for Nebulization 3 milliLiter(s) Nebulizer every 6 hours PRN Shortness of Breath and/or Wheezing  aluminum hydroxide/magnesium hydroxide/simethicone Suspension 30 milliLiter(s) Oral every 4 hours PRN Dyspepsia  guaiFENesin Oral Liquid (Sugar-Free) 200 milliGRAM(s) Oral every 6 hours PRN Cough  melatonin 3 milliGRAM(s) Oral at bedtime PRN Insomnia  ondansetron Injectable 4 milliGRAM(s) IV Push every 8 hours PRN Nausea and/or Vomiting      Vital Signs Last 24 Hrs  T(C): 36.8 (29 Aug 2023 18:07), Max: 36.8 (29 Aug 2023 18:07)  T(F): 98.2 (29 Aug 2023 18:07), Max: 98.2 (29 Aug 2023 18:07)  HR: 85 (29 Aug 2023 18:07) (76 - 88)  BP: 137/65 (29 Aug 2023 18:07) (124/67 - 171/70)  BP(mean): --  RR: 18 (29 Aug 2023 18:07) (18 - 18)  SpO2: 91% (29 Aug 2023 18:07) (91% - 96%)    Parameters below as of 29 Aug 2023 18:07  Patient On (Oxygen Delivery Method): room air        I&O's Summary      PHYSICAL EXAM:  HEENT: NC/AT; PERRLA  Neck: Soft; no tenderness  Lungs: CTA bilaterally; no wheezing.   Heart:  Abdomen:  Genital/ Rectal:  Extremities:  Neurologic:  Vascular:      LABORATORY:    CBC Full  -  ( 29 Aug 2023 10:13 )  WBC Count : 11.52 K/uL  RBC Count : 3.88 M/uL  Hemoglobin : 11.3 g/dL  Hematocrit : 36.5 %  Platelet Count - Automated : 340 K/uL  Mean Cell Volume : 94.1 fl  Mean Cell Hemoglobin : 29.1 pg  Mean Cell Hemoglobin Concentration : 31.0 gm/dL  Auto Neutrophil # : 10.48 K/uL  Auto Lymphocyte # : 0.46 K/uL  Auto Monocyte # : 0.58 K/uL  Auto Eosinophil # : 0.00 K/uL  Auto Basophil # : 0.00 K/uL  Auto Neutrophil % : 91.0 %  Auto Lymphocyte % : 4.0 %  Auto Monocyte % : 5.0 %  Auto Eosinophil % : 0.0 %  Auto Basophil % : 0.0 %      ESR:                   08-21 @ 06:24  --    C-Reactive Protein:     08-21 @ 06:24  --    Procalcitonin:           08-21 @ 06:24   7.59      08-29    139  |  109<H>  |  22  ----------------------------<  99  4.4   |  23  |  0.60    Ca    8.6      29 Aug 2023 06:54    TPro  6.3  /  Alb  2.3<L>  /  TBili  0.9  /  DBili  x   /  AST  84<H>  /  ALT  97<H>  /  AlkPhos  86  08-29      Rapid Respiratory Viral Panel Result        08-20 @ 11:55  Rapid RVP NotDetec  Coronovirus --  Adenovirus --  Bordetella Pertussis --  Chlamydia Pneumonia --  Entero/Rhinovirus--  HKU1 Coronovirus --  HMPV Coronovirus --  Influenza A --  Influenza AH1 --  Influenza AH1 2009 --  Influenza AH3 --  Influenza B --  Mycoplasma Pneumoniae --  NL63 Coronovirus --  OC43 Coronovirus --  Parainfluenza 1 --  Parainfluenza 2 --  Parainfluenza 3 --  Parainfluenza 4 --  Resp Syncytial Virus --      Assessment and Plan:          Edwardo Gonzalez MD   [phone].      Interval History:    CENTRAL LINE:   [  ] YES       [  ] NO  SKELTON:                 [  ] YES       [  ] NO         REVIEW OF SYSTEMS:  All Systems below were reviewed and are negative [  ]  HEENT:  ID:  Pulmonary:  Cardiac:  GI:  Renal:  Musculoskeletal:  All other systems above were reviewed and are negative   [  ]      MEDICATIONS  (STANDING):  acetaminophen     Tablet .. 325 milliGRAM(s) Oral every 8 hours  aMIOdarone    Tablet 200 milliGRAM(s) Oral daily  amLODIPine   Tablet 10 milliGRAM(s) Oral daily  apixaban 2.5 milliGRAM(s) Oral two times a day  artificial  tears Solution 1 Drop(s) Both EYES two times a day  dextrose 5% + sodium chloride 0.45%. 1000 milliLiter(s) (50 mL/Hr) IV Continuous <Continuous>  ferrous    sulfate 325 milliGRAM(s) Oral daily  lactobacillus acidophilus 1 Tablet(s) Oral every 12 hours  levothyroxine 75 MICROGram(s) Oral daily  metoprolol tartrate 50 milliGRAM(s) Oral two times a day  multivitamin/minerals 1 Tablet(s) Oral daily  pantoprazole    Tablet 40 milliGRAM(s) Oral daily  polyethylene glycol 3350 17 Gram(s) Oral daily    MEDICATIONS  (PRN):  acetaminophen     Tablet .. 650 milliGRAM(s) Oral every 6 hours PRN Temp greater or equal to 38C (100.4F), Mild Pain (1 - 3)  albuterol/ipratropium for Nebulization 3 milliLiter(s) Nebulizer every 6 hours PRN Shortness of Breath and/or Wheezing  aluminum hydroxide/magnesium hydroxide/simethicone Suspension 30 milliLiter(s) Oral every 4 hours PRN Dyspepsia  guaiFENesin Oral Liquid (Sugar-Free) 200 milliGRAM(s) Oral every 6 hours PRN Cough  melatonin 3 milliGRAM(s) Oral at bedtime PRN Insomnia  ondansetron Injectable 4 milliGRAM(s) IV Push every 8 hours PRN Nausea and/or Vomiting      Vital Signs Last 24 Hrs  T(C): 36.8 (29 Aug 2023 18:07), Max: 36.8 (29 Aug 2023 18:07)  T(F): 98.2 (29 Aug 2023 18:07), Max: 98.2 (29 Aug 2023 18:07)  HR: 85 (29 Aug 2023 18:07) (76 - 88)  BP: 137/65 (29 Aug 2023 18:07) (124/67 - 171/70)  BP(mean): --  RR: 18 (29 Aug 2023 18:07) (18 - 18)  SpO2: 91% (29 Aug 2023 18:07) (91% - 96%)    Parameters below as of 29 Aug 2023 18:07  Patient On (Oxygen Delivery Method): room air        I&O's Summary      PHYSICAL EXAM:  HEENT: NC/AT; PERRLA  Neck: Soft; no tenderness  Lungs: CTA bilaterally; no wheezing.   Heart:  Abdomen:  Genital/ Rectal:  Extremities:  Neurologic:  Vascular:      LABORATORY:    CBC Full  -  ( 29 Aug 2023 10:13 )  WBC Count : 11.52 K/uL  RBC Count : 3.88 M/uL  Hemoglobin : 11.3 g/dL  Hematocrit : 36.5 %  Platelet Count - Automated : 340 K/uL  Mean Cell Volume : 94.1 fl  Mean Cell Hemoglobin : 29.1 pg  Mean Cell Hemoglobin Concentration : 31.0 gm/dL  Auto Neutrophil # : 10.48 K/uL  Auto Lymphocyte # : 0.46 K/uL  Auto Monocyte # : 0.58 K/uL  Auto Eosinophil # : 0.00 K/uL  Auto Basophil # : 0.00 K/uL  Auto Neutrophil % : 91.0 %  Auto Lymphocyte % : 4.0 %  Auto Monocyte % : 5.0 %  Auto Eosinophil % : 0.0 %  Auto Basophil % : 0.0 %      ESR:                   08-21 @ 06:24  --    C-Reactive Protein:     08-21 @ 06:24  --    Procalcitonin:           08-21 @ 06:24   7.59      08-29    139  |  109<H>  |  22  ----------------------------<  99  4.4   |  23  |  0.60    Ca    8.6      29 Aug 2023 06:54    TPro  6.3  /  Alb  2.3<L>  /  TBili  0.9  /  DBili  x   /  AST  84<H>  /  ALT  97<H>  /  AlkPhos  86  08-29      Rapid Respiratory Viral Panel Result        08-20 @ 11:55  Rapid RVP NotDetec  Coronovirus --  Adenovirus --  Bordetella Pertussis --  Chlamydia Pneumonia --  Entero/Rhinovirus--  HKU1 Coronovirus --  HMPV Coronovirus --  Influenza A --  Influenza AH1 --  Influenza AH1 2009 --  Influenza AH3 --  Influenza B --  Mycoplasma Pneumoniae --  NL63 Coronovirus --  OC43 Coronovirus --  Parainfluenza 1 --  Parainfluenza 2 --  Parainfluenza 3 --  Parainfluenza 4 --  Resp Syncytial Virus --      Assessment and Plan:          Edwardo Gonzalez MD   [phone].    She is lethargic  Tested positive for COVID 19      MEDICATIONS  (STANDING):  acetaminophen     Tablet .. 325 milliGRAM(s) Oral every 8 hours  aMIOdarone    Tablet 200 milliGRAM(s) Oral daily  amLODIPine   Tablet 10 milliGRAM(s) Oral daily  apixaban 2.5 milliGRAM(s) Oral two times a day  artificial  tears Solution 1 Drop(s) Both EYES two times a day  dextrose 5% + sodium chloride 0.45%. 1000 milliLiter(s) (50 mL/Hr) IV Continuous <Continuous>  ferrous    sulfate 325 milliGRAM(s) Oral daily  lactobacillus acidophilus 1 Tablet(s) Oral every 12 hours  levothyroxine 75 MICROGram(s) Oral daily  metoprolol tartrate 50 milliGRAM(s) Oral two times a day  multivitamin/minerals 1 Tablet(s) Oral daily  pantoprazole    Tablet 40 milliGRAM(s) Oral daily  polyethylene glycol 3350 17 Gram(s) Oral daily    MEDICATIONS  (PRN):  acetaminophen     Tablet .. 650 milliGRAM(s) Oral every 6 hours PRN Temp greater or equal to 38C (100.4F), Mild Pain (1 - 3)  albuterol/ipratropium for Nebulization 3 milliLiter(s) Nebulizer every 6 hours PRN Shortness of Breath and/or Wheezing  aluminum hydroxide/magnesium hydroxide/simethicone Suspension 30 milliLiter(s) Oral every 4 hours PRN Dyspepsia  guaiFENesin Oral Liquid (Sugar-Free) 200 milliGRAM(s) Oral every 6 hours PRN Cough  melatonin 3 milliGRAM(s) Oral at bedtime PRN Insomnia  ondansetron Injectable 4 milliGRAM(s) IV Push every 8 hours PRN Nausea and/or Vomiting      Vital Signs Last 24 Hrs  T(C): 36.8 (29 Aug 2023 18:07), Max: 36.8 (29 Aug 2023 18:07)  T(F): 98.2 (29 Aug 2023 18:07), Max: 98.2 (29 Aug 2023 18:07)  HR: 85 (29 Aug 2023 18:07) (76 - 88)  BP: 137/65 (29 Aug 2023 18:07) (124/67 - 171/70)  BP(mean): --  RR: 18 (29 Aug 2023 18:07) (18 - 18)  SpO2: 91% (29 Aug 2023 18:07) (91% - 96%)    Parameters below as of 29 Aug 2023 18:07  Patient On (Oxygen Delivery Method): room air        I&O's Summary      PHYSICAL EXAM:  HEENT: NC/AT; PERRLA  Neck: Soft; no tenderness  Lungs: CTA bilaterally; no wheezing.   Heart:  Abdomen:  Genital/ Rectal:  Extremities:  Neurologic:  Vascular:      LABORATORY:    CBC Full  -  ( 29 Aug 2023 10:13 )  WBC Count : 11.52 K/uL  RBC Count : 3.88 M/uL  Hemoglobin : 11.3 g/dL  Hematocrit : 36.5 %  Platelet Count - Automated : 340 K/uL  Mean Cell Volume : 94.1 fl  Mean Cell Hemoglobin : 29.1 pg  Mean Cell Hemoglobin Concentration : 31.0 gm/dL  Auto Neutrophil # : 10.48 K/uL  Auto Lymphocyte # : 0.46 K/uL  Auto Monocyte # : 0.58 K/uL  Auto Eosinophil # : 0.00 K/uL  Auto Basophil # : 0.00 K/uL  Auto Neutrophil % : 91.0 %  Auto Lymphocyte % : 4.0 %  Auto Monocyte % : 5.0 %  Auto Eosinophil % : 0.0 %  Auto Basophil % : 0.0 %      ESR:                   08-21 @ 06:24  --    C-Reactive Protein:     08-21 @ 06:24  --    Procalcitonin:           08-21 @ 06:24   7.59      08-29    139  |  109<H>  |  22  ----------------------------<  99  4.4   |  23  |  0.60    Ca    8.6      29 Aug 2023 06:54    TPro  6.3  /  Alb  2.3<L>  /  TBili  0.9  /  DBili  x   /  AST  84<H>  /  ALT  97<H>  /  AlkPhos  86  08-29      Rapid Respiratory Viral Panel Result        08-20 @ 11:55  Rapid RVP NotDete  Coronovirus --  Adenovirus --  Bordetella Pertussis --  Chlamydia Pneumonia --  Entero/Rhinovirus--  HKU1 Coronovirus --  HMPV Coronovirus --  Influenza A --  Influenza AH1 --  Influenza AH1 2009 --  Influenza AH3 --  Influenza B --  Mycoplasma Pneumoniae --  NL63 Coronovirus --  OC43 Coronovirus --  Parainfluenza 1 --  Parainfluenza 2 --  Parainfluenza 3 --  Parainfluenza 4 --  Resp Syncytial Virus --      Assessment and Plan:      . She tested positive for COIVUD 19. She is at risks for progression to severe COVID 19. Will add IV Remdesivir for 3 days.  . No good IV access with edema of upper extremities. Get midline placement.          Edwardo Gonzalez MD   [phone].    She is lethargic  Tested positive for COVID 19      MEDICATIONS  (STANDING):  acetaminophen     Tablet .. 325 milliGRAM(s) Oral every 8 hours  aMIOdarone    Tablet 200 milliGRAM(s) Oral daily  amLODIPine   Tablet 10 milliGRAM(s) Oral daily  apixaban 2.5 milliGRAM(s) Oral two times a day  artificial  tears Solution 1 Drop(s) Both EYES two times a day  dextrose 5% + sodium chloride 0.45%. 1000 milliLiter(s) (50 mL/Hr) IV Continuous <Continuous>  ferrous    sulfate 325 milliGRAM(s) Oral daily  lactobacillus acidophilus 1 Tablet(s) Oral every 12 hours  levothyroxine 75 MICROGram(s) Oral daily  metoprolol tartrate 50 milliGRAM(s) Oral two times a day  multivitamin/minerals 1 Tablet(s) Oral daily  pantoprazole    Tablet 40 milliGRAM(s) Oral daily  polyethylene glycol 3350 17 Gram(s) Oral daily    MEDICATIONS  (PRN):  acetaminophen     Tablet .. 650 milliGRAM(s) Oral every 6 hours PRN Temp greater or equal to 38C (100.4F), Mild Pain (1 - 3)  albuterol/ipratropium for Nebulization 3 milliLiter(s) Nebulizer every 6 hours PRN Shortness of Breath and/or Wheezing  aluminum hydroxide/magnesium hydroxide/simethicone Suspension 30 milliLiter(s) Oral every 4 hours PRN Dyspepsia  guaiFENesin Oral Liquid (Sugar-Free) 200 milliGRAM(s) Oral every 6 hours PRN Cough  melatonin 3 milliGRAM(s) Oral at bedtime PRN Insomnia  ondansetron Injectable 4 milliGRAM(s) IV Push every 8 hours PRN Nausea and/or Vomiting      Vital Signs Last 24 Hrs  T(C): 36.8 (29 Aug 2023 18:07), Max: 36.8 (29 Aug 2023 18:07)  T(F): 98.2 (29 Aug 2023 18:07), Max: 98.2 (29 Aug 2023 18:07)  HR: 85 (29 Aug 2023 18:07) (76 - 88)  BP: 137/65 (29 Aug 2023 18:07) (124/67 - 171/70)  BP(mean): --  RR: 18 (29 Aug 2023 18:07) (18 - 18)  SpO2: 91% (29 Aug 2023 18:07) (91% - 96%)    Parameters below as of 29 Aug 2023 18:07  Patient On (Oxygen Delivery Method): room air        I&O's Summary      PHYSICAL EXAM:  HEENT: NC/AT; PERRLA  Neck: Soft; no tenderness  Lungs: CTA bilaterally; no wheezing.   Heart:  Abdomen:  Genital/ Rectal:  Extremities:  Neurologic:  Vascular:      LABORATORY:    CBC Full  -  ( 29 Aug 2023 10:13 )  WBC Count : 11.52 K/uL  RBC Count : 3.88 M/uL  Hemoglobin : 11.3 g/dL  Hematocrit : 36.5 %  Platelet Count - Automated : 340 K/uL  Mean Cell Volume : 94.1 fl  Mean Cell Hemoglobin : 29.1 pg  Mean Cell Hemoglobin Concentration : 31.0 gm/dL  Auto Neutrophil # : 10.48 K/uL  Auto Lymphocyte # : 0.46 K/uL  Auto Monocyte # : 0.58 K/uL  Auto Eosinophil # : 0.00 K/uL  Auto Basophil # : 0.00 K/uL  Auto Neutrophil % : 91.0 %  Auto Lymphocyte % : 4.0 %  Auto Monocyte % : 5.0 %  Auto Eosinophil % : 0.0 %  Auto Basophil % : 0.0 %      ESR:                   08-21 @ 06:24  --    C-Reactive Protein:     08-21 @ 06:24  --    Procalcitonin:           08-21 @ 06:24   7.59      08-29    139  |  109<H>  |  22  ----------------------------<  99  4.4   |  23  |  0.60    Ca    8.6      29 Aug 2023 06:54    TPro  6.3  /  Alb  2.3<L>  /  TBili  0.9  /  DBili  x   /  AST  84<H>  /  ALT  97<H>  /  AlkPhos  86  08-29      Rapid Respiratory Viral Panel Result        08-20 @ 11:55  Rapid RVP NotDete  Coronovirus --  Adenovirus --  Bordetella Pertussis --  Chlamydia Pneumonia --  Entero/Rhinovirus--  HKU1 Coronovirus --  HMPV Coronovirus --  Influenza A --  Influenza AH1 --  Influenza AH1 2009 --  Influenza AH3 --  Influenza B --  Mycoplasma Pneumoniae --  NL63 Coronovirus --  OC43 Coronovirus --  Parainfluenza 1 --  Parainfluenza 2 --  Parainfluenza 3 --  Parainfluenza 4 --  Resp Syncytial Virus --      Assessment and Plan:      . She tested positive for COIVUD 19. She is at risks for progression to severe COVID 19. Will add IV Remdesivir for 3 days.  . No good IV access with edema of upper extremities. Get midline placement.          Edwardo Gonzalez MD   [phone].    She is lethargic  Tested positive for COVID 19      MEDICATIONS  (STANDING):  acetaminophen     Tablet .. 325 milliGRAM(s) Oral every 8 hours  aMIOdarone    Tablet 200 milliGRAM(s) Oral daily  amLODIPine   Tablet 10 milliGRAM(s) Oral daily  apixaban 2.5 milliGRAM(s) Oral two times a day  artificial  tears Solution 1 Drop(s) Both EYES two times a day  dextrose 5% + sodium chloride 0.45%. 1000 milliLiter(s) (50 mL/Hr) IV Continuous <Continuous>  ferrous    sulfate 325 milliGRAM(s) Oral daily  lactobacillus acidophilus 1 Tablet(s) Oral every 12 hours  levothyroxine 75 MICROGram(s) Oral daily  metoprolol tartrate 50 milliGRAM(s) Oral two times a day  multivitamin/minerals 1 Tablet(s) Oral daily  pantoprazole    Tablet 40 milliGRAM(s) Oral daily  polyethylene glycol 3350 17 Gram(s) Oral daily    MEDICATIONS  (PRN):  acetaminophen     Tablet .. 650 milliGRAM(s) Oral every 6 hours PRN Temp greater or equal to 38C (100.4F), Mild Pain (1 - 3)  albuterol/ipratropium for Nebulization 3 milliLiter(s) Nebulizer every 6 hours PRN Shortness of Breath and/or Wheezing  aluminum hydroxide/magnesium hydroxide/simethicone Suspension 30 milliLiter(s) Oral every 4 hours PRN Dyspepsia  guaiFENesin Oral Liquid (Sugar-Free) 200 milliGRAM(s) Oral every 6 hours PRN Cough  melatonin 3 milliGRAM(s) Oral at bedtime PRN Insomnia  ondansetron Injectable 4 milliGRAM(s) IV Push every 8 hours PRN Nausea and/or Vomiting      Vital Signs Last 24 Hrs  T(C): 36.8 (29 Aug 2023 18:07), Max: 36.8 (29 Aug 2023 18:07)  T(F): 98.2 (29 Aug 2023 18:07), Max: 98.2 (29 Aug 2023 18:07)  HR: 85 (29 Aug 2023 18:07) (76 - 88)  BP: 137/65 (29 Aug 2023 18:07) (124/67 - 171/70)  BP(mean): --  RR: 18 (29 Aug 2023 18:07) (18 - 18)  SpO2: 91% (29 Aug 2023 18:07) (91% - 96%)    Parameters below as of 29 Aug 2023 18:07  Patient On (Oxygen Delivery Method): room air        I&O's Summary      PHYSICAL EXAM:  HEENT: NC/AT; PERRLA  Neck: Soft; no tenderness  Lungs: CTA bilaterally; no wheezing.   Heart:  Abdomen:  Genital/ Rectal:  Extremities:  Neurologic:  Vascular:      LABORATORY:    CBC Full  -  ( 29 Aug 2023 10:13 )  WBC Count : 11.52 K/uL  RBC Count : 3.88 M/uL  Hemoglobin : 11.3 g/dL  Hematocrit : 36.5 %  Platelet Count - Automated : 340 K/uL  Mean Cell Volume : 94.1 fl  Mean Cell Hemoglobin : 29.1 pg  Mean Cell Hemoglobin Concentration : 31.0 gm/dL  Auto Neutrophil # : 10.48 K/uL  Auto Lymphocyte # : 0.46 K/uL  Auto Monocyte # : 0.58 K/uL  Auto Eosinophil # : 0.00 K/uL  Auto Basophil # : 0.00 K/uL  Auto Neutrophil % : 91.0 %  Auto Lymphocyte % : 4.0 %  Auto Monocyte % : 5.0 %  Auto Eosinophil % : 0.0 %  Auto Basophil % : 0.0 %      ESR:                   08-21 @ 06:24  --    C-Reactive Protein:     08-21 @ 06:24  --    Procalcitonin:           08-21 @ 06:24   7.59      08-29    139  |  109<H>  |  22  ----------------------------<  99  4.4   |  23  |  0.60    Ca    8.6      29 Aug 2023 06:54    TPro  6.3  /  Alb  2.3<L>  /  TBili  0.9  /  DBili  x   /  AST  84<H>  /  ALT  97<H>  /  AlkPhos  86  08-29      Rapid Respiratory Viral Panel Result        08-20 @ 11:55  Rapid RVP NotDete  Coronovirus --  Adenovirus --  Bordetella Pertussis --  Chlamydia Pneumonia --  Entero/Rhinovirus--  HKU1 Coronovirus --  HMPV Coronovirus --  Influenza A --  Influenza AH1 --  Influenza AH1 2009 --  Influenza AH3 --  Influenza B --  Mycoplasma Pneumoniae --  NL63 Coronovirus --  OC43 Coronovirus --  Parainfluenza 1 --  Parainfluenza 2 --  Parainfluenza 3 --  Parainfluenza 4 --  Resp Syncytial Virus --      Assessment and Plan:      . She tested positive for COIVUD 19. She is at risks for progression to severe COVID 19. Will add IV Remdesivir for 3 days.  . No good IV access with edema of upper extremities. Get midline placement.          Edwardo Gonzalez MD   [phone].

## 2023-08-29 NOTE — SWALLOW BEDSIDE ASSESSMENT ADULT - COMMENTS
H&P: 89-year-old female with history of hypothyroidism, A-fib on Eliquis, s/p recent pacemaker for complete heart block brought in by ambulance from [facility] assisted living home for cough for the past 2 weeks.  Associated with generalized weakness and decreased p.o. intake.  No fall or trauma.  Patient states she has had cold symptoms for a while.    CT Chest: Atelectasis vs. infiltrates of lower lobes no clear evidence of pneumonia    Pt known to this service and seen previously on 8/21 and 8/28 for bedside swallow evaluations with recommendations for soft and bite sized solids and thin liquids (see report for details).     Pt seen at bedside, this AM for swallow re-evaluation. Pt noted with confusion and unable to answer simple questions or follow simple directions. Pt needed verbal encouragement to accept PO trials. H&P: 89-year-old female with history of hypothyroidism, A-fib on Eliquis, s/p recent pacemaker for complete heart block brought in by ambulance from [facility] assisted living home for cough for the past 2 weeks.  Associated with generalized weakness and decreased p.o. intake.  No fall or trauma.  Patient states she has had cold symptoms for a while.    CT Chest: Atelectasis vs. infiltrates of lower lobes no clear evidence of pneumonia    Pt known to this service and seen previously on 8/21 and 8/28 for bedside swallow evaluations with recommendations for soft and bite sized solids and thin liquids (see report for details).     Pt seen at bedside, this AM for swallow re-evaluation. Pt noted with confusion and unable to answer simple questions or follow simple directions. Pt needed verbal encouragement to accept PO trials. H&P: 89-year-old female with history of hypothyroidism, A-fib on Eliquis, s/p recent pacemaker for complete heart block brought in by ambulance from [facility] assisted living home for cough for the past 2 weeks.  Associated with generalized weakness and decreased p.o. intake.  No fall or trauma.  Patient states she has had cold symptoms for a while.    CT Chest: Atelectasis vs. infiltrates of lower lobes no clear evidence of pneumonia    Pt known to this service and seen previously on 8/21 and 8/28 for bedside swallow evaluations with recommendations for soft and bite sized solids and thin liquids (see report for details).     Pt seen at bedside, this AM for swallow re-evaluation. Pt noted with confusion and unable to answer simple questions or follow simple directions. Pt needed verbal encouragement to accept PO trials.

## 2023-08-29 NOTE — PROGRESS NOTE ADULT - ASSESSMENT
REASON FOR VISIT  .. Management of problems listed below      PHYSICAL EXAM    HEENT Unremarkable  atraumatic   RESP Fair air entry  Harsh breath sound   CARDIAC S1 S2 No S3     NO JVD    ABDOMEN No hepatosplenomegaly   PEDAL EDEMA present No calf tenderness  NO rash       GENERAL DATA .     GOC.    .. 8/26/2023 dnr  ICU STAY.   .. none  COVID.   .. scv2 8/29/2023 (+)   .. scv2 8/21/2023 (-)    BEST PRACTICE ISSUES.    HOB ELEVATN.   .. Yes  DVT PPLX.   ..  8/20/2023 apixaba 2.5 x 2 (af)     SPEECH SWALLOW RECOMMENDATIONS.    ..    8/21/2023 soft bite     DIET.    ..  8/20/2023 soft bite size   IV fl.  .. 8/22/2023 D5 1/2 ns  50   STRESS ULCER PPLX.   ..    8/20/2023 protonix 40   INFECTION PPLX.   ..     ALLGY.  ..    pncl                     WT.  ..  8/20/2023 56  BMI.  ..   8/20/2023 21    PROCEDURES.  ..     ABGS.   .     VS/ PO/IO/ VENT/ DRIPS.   8/29/2023 afeb 85 130/60   8/29/2023 ra 92%    DOA C/C.     . 8/20/2023 · Chief Complaint Quote sent by [facility] for cough x 2 weeks. negative cxr and covid swab. poor PO intake today.           INITIAL PRESENTATION.   . 8/20/2023 89-year-old female with history of hypothyroidism, A-fib on Eliquis, recent pacemaker for complete heart block brought in by ambulance from [facility] assisted living home for cough for the past 2 weeks.  Associated with generalized weakness and decreased p.o. intake.  No fall or trauma.  Patient states she has had cold symptoms for a while.  Denies fever, chills, chest pain, shortness of breath, abdominal pain, nausea, vomiting, upper or lower extremity weakness or paresthesias.   pmd: Dr. Crawley, cards: [facility].   . hypothyroidism   . A-fib on Eliquis (hx AFL ablation),  .  PFO  HOME MEDS.   . levoxyl eliquis 2.5 x 2   COURSE.  . 8/20/2023 pulm consultd    . 8/20/2023 5:39 PM ER meds given already include cefepime 2g           PROBLEMS/ASSESSMENT/RECOMMENDATIONS (A/R).  PULMONARY.  . GAS EXCHANGE.  .. A/R.   .. Monitor pulse oximetry and target po 90-95%    . COPD.  .. 8/20/2023 duoneb.4p   .. 8/20/2023 benzonatate 100.3 x3d   .. 8/20/2023 gauiafenesin     INFECTION.  . COVID (+) 8/29/2023   .. 8/29/2023 rdsv   .. 8/29/2023 will add dexa if hypoxia develops     . E coli ESBL CTX M bacteremia 8/20  .. w 8/20-8/21-8/22-8/23-8/27-8/29/2023   .. w 16 - 21- 6.9 - 6 - 10 - 11.5   .. pr 8/21/2023 pr 7.5   .. ua 8/20/2023 ua 1012 w tntc l estrase large r 25   .. ct chest 8/20/2023   .... l upper ant cardiac device leads terminating in r atrium and rv   .... no enlarged hilar or mediastinal ln   .... mild doe mod diffuse distention of mid to upper esophagus   .... cm   .... increased density of liver ? amiodarone effect   .... bl lower lobe partial areas of compressive atelectasis sl progressd since ctap 3/4/2023   .... mild bl lower lung areas of linear and compressive atelectasis lower lobes and lingula   .... mild bl upper lobe subsegmental linear and dependent atelectasis   .... nonsp mild interlobular septal thickening   .. MICRO  .. bc 8/20 E coli ESBL CTX M bacteremia 8/20   .. bc 8/21 e coli esbl    .. bc 8/22 (-)   .. legn 8/21 (-)   .. rvp 8/20 (-)   .. EVENTS   .. 8/20/2023 will start empiric levaquin   .. 8/21/2023 message sent to ID to change to carbapenem   .. ABIO  .. 8/21 ertapenem 8d dr green   .. AR   .. ESBL e coli bacteremia on 8/20 likely from urine on ertapenem stratd 8/21     . Hemodynamics.  .. la 8/20/2023 la 1.4  .. BP ok      . A fib   .. 8/20/2023 eliquis   .. 8/22/2023 amiodaron 200   .. 8/22/2023 metporolol 50.2     . CAD.  .. Tr 8/21/2023 Tr 31     . CHF   .. bnp 8/20-8/23/2023 bnp 4545 - 3219   .. echo 8/21/2023 ef 56% pasp 63   .. 8/22/2023 enalapril 10       HEMAT   .. Hb 8/20-8/21-8/23-8/29/2023 Hb 12 - 10.5 - 10 - 11.3   .. plt 8/20/2023 plt 225   .. inr 8/20/2023 inr 129     RENAL   .. Na 8/20/2023 Na 144   .. K 8/20/2023 K 3.3   .. co2 8/20/2023 co2 29   .. Cr 8/20/2023 Cr .8     . Dysphagia  .. esophagogram 8/22/2023 smooth tapering o distal esophagus     LFTS   .. AP 8/20/2023 124  .. ast 23  .. alt 25     MAIN ISSUES.  . CC 8/20/2023 89-year-old female with history of hypothyroidism, A-fib on Eliquis, recent pacemaker for complete heart block from [facility] for cogh 2 w found to have E coli ESBL cateremia   . COUGH x 2 wk poa 8/20/2023  . COVID (+) 8/29/2023   .. 8/29/2023 rdsv   . Pneumonia   .. ct chest 8/20/2023   .... distention of mid to upper esophagus   . E coli ESBL CTX M bacteremia 8/20 8/21  .. bc 8/22 (-)   .. 8/20/2023 levaquin -> 8/21 ertapenem 1 x 8d  Dr Green completed   . COPD   . A fib   .. 8/20/2023 apixaba   . CHF   .. bnp 8/20-8/23/2023 bnp 4545 - 3219   .. echo 8/21/2023 ef 56% pasp 63   .. 8/22/2023 enalapril 10   . Dysphagia  .. esophagogram 8/22/2023 smooth tapering o distal esophagus    TIME SPENT.   . Over 36 minutes aggregate care time spent on encounter; activities included   direct patient care, counseling and/or coordinating care reviewing notes, lab data/ imaging , discussion with multidisciplinary team/ patient  /family and explaining in detail risks, benefits, alternatives  of the recommendations     JAKOB WORTHY 89 f 8/20/2023 9/22/1929 DR BAM HERRERA      REASON FOR VISIT  .. Management of problems listed below      PHYSICAL EXAM    HEENT Unremarkable  atraumatic   RESP Fair air entry  Harsh breath sound   CARDIAC S1 S2 No S3     NO JVD    ABDOMEN No hepatosplenomegaly   PEDAL EDEMA present No calf tenderness  NO rash       GENERAL DATA .     GOC.    .. 8/26/2023 dnr  ICU STAY.   .. none  COVID.   .. scv2 8/29/2023 (+)   .. scv2 8/21/2023 (-)    BEST PRACTICE ISSUES.    HOB ELEVATN.   .. Yes  DVT PPLX.   ..  8/20/2023 apixaba 2.5 x 2 (af)     SPEECH SWALLOW RECOMMENDATIONS.    ..    8/21/2023 soft bite     DIET.    ..  8/20/2023 soft bite size   IV fl.  .. 8/22/2023 D5 1/2 ns  50   STRESS ULCER PPLX.   ..    8/20/2023 protonix 40   INFECTION PPLX.   ..     ALLGY.  ..    pncl                     WT.  ..  8/20/2023 56  BMI.  ..   8/20/2023 21    PROCEDURES.  ..     ABGS.   .     VS/ PO/IO/ VENT/ DRIPS.   8/29/2023 afeb 85 130/60   8/29/2023 ra 92%    DOA C/C.     . 8/20/2023 · Chief Complaint Quote sent by [employer] [facility] for cough x 2 weeks. negative cxr and covid swab. poor PO intake today.           INITIAL PRESENTATION.   . 8/20/2023 89-year-old female with history of hypothyroidism, A-fib on Eliquis, recent pacemaker for complete heart block brought in by ambulance from [facility] assisted living home for cough for the past 2 weeks.  Associated with generalized weakness and decreased p.o. intake.  No fall or trauma.  Patient states she has had cold symptoms for a while.  Denies fever, chills, chest pain, shortness of breath, abdominal pain, nausea, vomiting, upper or lower extremity weakness or paresthesias.   pmd: Dr. Crawley, cards: [facility].   . hypothyroidism   . A-fib on Eliquis (hx AFL ablation),  .  PFO  HOME MEDS.   . levoxyl eliquis 2.5 x 2   COURSE.  . 8/20/2023 pulm consultd    . 8/20/2023 5:39 PM ER meds given already include cefepime 2g           PROBLEMS/ASSESSMENT/RECOMMENDATIONS (A/R).  PULMONARY.  . GAS EXCHANGE.  .. A/R.   .. Monitor pulse oximetry and target po 90-95%    . COPD.  .. 8/20/2023 duoneb.4p   .. 8/20/2023 benzonatate 100.3 x3d   .. 8/20/2023 gauiafenesin     INFECTION.  . COVID (+) 8/29/2023   .. 8/29/2023 rdsv   .. 8/29/2023 will add dexa if hypoxia develops     . E coli ESBL CTX M bacteremia 8/20  .. w 8/20-8/21-8/22-8/23-8/27-8/29/2023   .. w 16 - 21- 6.9 - 6 - 10 - 11.5   .. pr 8/21/2023 pr 7.5   .. ua 8/20/2023 ua 1012 w tntc l estrase large r 25   .. ct chest 8/20/2023   .... l upper ant cardiac device leads terminating in r atrium and rv   .... no enlarged hilar or mediastinal ln   .... mild doe mod diffuse distention of mid to upper esophagus   .... cm   .... increased density of liver ? amiodarone effect   .... bl lower lobe partial areas of compressive atelectasis sl progressd since ctap 3/4/2023   .... mild bl lower lung areas of linear and compressive atelectasis lower lobes and lingula   .... mild bl upper lobe subsegmental linear and dependent atelectasis   .... nonsp mild interlobular septal thickening   .. MICRO  .. bc 8/20 E coli ESBL CTX M bacteremia 8/20   .. bc 8/21 e coli esbl    .. bc 8/22 (-)   .. legn 8/21 (-)   .. rvp 8/20 (-)   .. EVENTS   .. 8/20/2023 will start empiric levaquin   .. 8/21/2023 message sent to ID to change to carbapenem   .. ABIO  .. 8/21 ertapenem 8d dr green   .. AR   .. ESBL e coli bacteremia on 8/20 likely from urine on ertapenem stratd 8/21     . Hemodynamics.  .. la 8/20/2023 la 1.4  .. BP ok      . A fib   .. 8/20/2023 eliquis   .. 8/22/2023 amiodaron 200   .. 8/22/2023 metporolol 50.2     . CAD.  .. Tr 8/21/2023 Tr 31     . CHF   .. bnp 8/20-8/23/2023 bnp 4545 - 3219   .. echo 8/21/2023 ef 56% pasp 63   .. 8/22/2023 enalapril 10       HEMAT   .. Hb 8/20-8/21-8/23-8/29/2023 Hb 12 - 10.5 - 10 - 11.3   .. plt 8/20/2023 plt 225   .. inr 8/20/2023 inr 129     RENAL   .. Na 8/20/2023 Na 144   .. K 8/20/2023 K 3.3   .. co2 8/20/2023 co2 29   .. Cr 8/20/2023 Cr .8     . Dysphagia  .. esophagogram 8/22/2023 smooth tapering o distal esophagus     LFTS   .. AP 8/20/2023 124  .. ast 23  .. alt 25     MAIN ISSUES.  . CC 8/20/2023 89-year-old female with history of hypothyroidism, A-fib on Eliquis, recent pacemaker for complete heart block from [facility] for cogh 2 w found to have E coli ESBL cateremia   . COUGH x 2 wk poa 8/20/2023  . COVID (+) 8/29/2023   .. 8/29/2023 rdsv   . Pneumonia   .. ct chest 8/20/2023   .... distention of mid to upper esophagus   . E coli ESBL CTX M bacteremia 8/20 8/21  .. bc 8/22 (-)   .. 8/20/2023 levaquin -> 8/21 ertapenem 1 x 8d  Dr Green completed   . COPD   . A fib   .. 8/20/2023 apixaba   . CHF   .. bnp 8/20-8/23/2023 bnp 4545 - 3219   .. echo 8/21/2023 ef 56% pasp 63   .. 8/22/2023 enalapril 10   . Dysphagia  .. esophagogram 8/22/2023 smooth tapering o distal esophagus    TIME SPENT.   . Over 36 minutes aggregate care time spent on encounter; activities included   direct patient care, counseling and/or coordinating care reviewing notes, lab data/ imaging , discussion with multidisciplinary team/ patient  /family and explaining in detail risks, benefits, alternatives  of the recommendations     JAKOB WORTHY 89 f 8/20/2023 9/22/1929 DR BAM HERRERA      REASON FOR VISIT  .. Management of problems listed below      PHYSICAL EXAM    HEENT Unremarkable  atraumatic   RESP Fair air entry  Harsh breath sound   CARDIAC S1 S2 No S3     NO JVD    ABDOMEN No hepatosplenomegaly   PEDAL EDEMA present No calf tenderness  NO rash       GENERAL DATA .     GOC.    .. 8/26/2023 dnr  ICU STAY.   .. none  COVID.   .. scv2 8/29/2023 (+)   .. scv2 8/21/2023 (-)    BEST PRACTICE ISSUES.    HOB ELEVATN.   .. Yes  DVT PPLX.   ..  8/20/2023 apixaba 2.5 x 2 (af)     SPEECH SWALLOW RECOMMENDATIONS.    ..    8/21/2023 soft bite     DIET.    ..  8/20/2023 soft bite size   IV fl.  .. 8/22/2023 D5 1/2 ns  50   STRESS ULCER PPLX.   ..    8/20/2023 protonix 40   INFECTION PPLX.   ..     ALLGY.  ..    pncl                     WT.  ..  8/20/2023 56  BMI.  ..   8/20/2023 21    PROCEDURES.  ..     ABGS.   .     VS/ PO/IO/ VENT/ DRIPS.   8/29/2023 afeb 85 130/60   8/29/2023 ra 92%    DOA C/C.     . 8/20/2023 · Chief Complaint Quote sent by [employer] [facility] for cough x 2 weeks. negative cxr and covid swab. poor PO intake today.           INITIAL PRESENTATION.   . 8/20/2023 89-year-old female with history of hypothyroidism, A-fib on Eliquis, recent pacemaker for complete heart block brought in by ambulance from [facility] assisted living home for cough for the past 2 weeks.  Associated with generalized weakness and decreased p.o. intake.  No fall or trauma.  Patient states she has had cold symptoms for a while.  Denies fever, chills, chest pain, shortness of breath, abdominal pain, nausea, vomiting, upper or lower extremity weakness or paresthesias.   pmd: Dr. Crawley, cards: [facility].   . hypothyroidism   . A-fib on Eliquis (hx AFL ablation),  .  PFO  HOME MEDS.   . levoxyl eliquis 2.5 x 2   COURSE.  . 8/20/2023 pulm consultd    . 8/20/2023 5:39 PM ER meds given already include cefepime 2g           PROBLEMS/ASSESSMENT/RECOMMENDATIONS (A/R).  PULMONARY.  . GAS EXCHANGE.  .. A/R.   .. Monitor pulse oximetry and target po 90-95%    . COPD.  .. 8/20/2023 duoneb.4p   .. 8/20/2023 benzonatate 100.3 x3d   .. 8/20/2023 gauiafenesin     INFECTION.  . COVID (+) 8/29/2023   .. 8/29/2023 rdsv   .. 8/29/2023 will add dexa if hypoxia develops     . E coli ESBL CTX M bacteremia 8/20  .. w 8/20-8/21-8/22-8/23-8/27-8/29/2023   .. w 16 - 21- 6.9 - 6 - 10 - 11.5   .. pr 8/21/2023 pr 7.5   .. ua 8/20/2023 ua 1012 w tntc l estrase large r 25   .. ct chest 8/20/2023   .... l upper ant cardiac device leads terminating in r atrium and rv   .... no enlarged hilar or mediastinal ln   .... mild doe mod diffuse distention of mid to upper esophagus   .... cm   .... increased density of liver ? amiodarone effect   .... bl lower lobe partial areas of compressive atelectasis sl progressd since ctap 3/4/2023   .... mild bl lower lung areas of linear and compressive atelectasis lower lobes and lingula   .... mild bl upper lobe subsegmental linear and dependent atelectasis   .... nonsp mild interlobular septal thickening   .. MICRO  .. bc 8/20 E coli ESBL CTX M bacteremia 8/20   .. bc 8/21 e coli esbl    .. bc 8/22 (-)   .. legn 8/21 (-)   .. rvp 8/20 (-)   .. EVENTS   .. 8/20/2023 will start empiric levaquin   .. 8/21/2023 message sent to ID to change to carbapenem   .. ABIO  .. 8/21 ertapenem 8d dr green   .. AR   .. ESBL e coli bacteremia on 8/20 likely from urine on ertapenem stratd 8/21     . Hemodynamics.  .. la 8/20/2023 la 1.4  .. BP ok      . A fib   .. 8/20/2023 eliquis   .. 8/22/2023 amiodaron 200   .. 8/22/2023 metporolol 50.2     . CAD.  .. Tr 8/21/2023 Tr 31     . CHF   .. bnp 8/20-8/23/2023 bnp 4545 - 3219   .. echo 8/21/2023 ef 56% pasp 63   .. 8/22/2023 enalapril 10       HEMAT   .. Hb 8/20-8/21-8/23-8/29/2023 Hb 12 - 10.5 - 10 - 11.3   .. plt 8/20/2023 plt 225   .. inr 8/20/2023 inr 129     RENAL   .. Na 8/20/2023 Na 144   .. K 8/20/2023 K 3.3   .. co2 8/20/2023 co2 29   .. Cr 8/20/2023 Cr .8     . Dysphagia  .. esophagogram 8/22/2023 smooth tapering o distal esophagus     LFTS   .. AP 8/20/2023 124  .. ast 23  .. alt 25     MAIN ISSUES.  . CC 8/20/2023 89-year-old female with history of hypothyroidism, A-fib on Eliquis, recent pacemaker for complete heart block from [facility] for cogh 2 w found to have E coli ESBL cateremia   . COUGH x 2 wk poa 8/20/2023  . COVID (+) 8/29/2023   .. 8/29/2023 rdsv   . Pneumonia   .. ct chest 8/20/2023   .... distention of mid to upper esophagus   . E coli ESBL CTX M bacteremia 8/20 8/21  .. bc 8/22 (-)   .. 8/20/2023 levaquin -> 8/21 ertapenem 1 x 8d  Dr Green completed   . COPD   . A fib   .. 8/20/2023 apixaba   . CHF   .. bnp 8/20-8/23/2023 bnp 4545 - 3219   .. echo 8/21/2023 ef 56% pasp 63   .. 8/22/2023 enalapril 10   . Dysphagia  .. esophagogram 8/22/2023 smooth tapering o distal esophagus    TIME SPENT.   . Over 36 minutes aggregate care time spent on encounter; activities included   direct patient care, counseling and/or coordinating care reviewing notes, lab data/ imaging , discussion with multidisciplinary team/ patient  /family and explaining in detail risks, benefits, alternatives  of the recommendations     JAKOB WORTHY 89 f 8/20/2023 9/22/1929 DR BAM HERRERA

## 2023-08-29 NOTE — SWALLOW BEDSIDE ASSESSMENT ADULT - ASR SWALLOW RECOMMEND DIAG
Consider MBS at MD's discretion, as discussed with NP Carlene if concern for aspiration pneumonia and to assess oropharyngeal swallow function. However, per NP, pt with recent Esophagram with no evidence of aspiration viewed./VFSS/MBS
to be considered if suspicious for PNA

## 2023-08-29 NOTE — PROGRESS NOTE ADULT - SUBJECTIVE AND OBJECTIVE BOX
PROGRESS NOTE  Patient is a 89y old  Female who presents with a chief complaint of cough (29 Aug 2023 12:03)  Chart and available morning labs /imaging are reviewed electronically , urgent issues addressed . More information  is being added upon completion of rounds , when more information is collected and management discussed with consultants , medical staff and social service/case management on the floor     OVERNIGHT  Lethargy and poor po intake  reported by medical staff . All above noted Patient is resting in a bed comfortably .Confused ,poor mentation .No distress noted   Spoke to the daughter at bedside ,neurology input is appreciated . Patient converted to covid positive status later during the day , daughter is aware on a phone   HPI:  89-year-old female with history of hypothyroidism, A-fib on Eliquis, s/p recent pacemaker for complete heart block brought in by ambulance from [facility] living [place] for cough for the past 2 weeks.  Associated with generalized weakness and decreased p.o. intake.  No fall or trauma.  Patient states she has had cold symptoms for a while.  Denies fever, chills, chest pain, shortness of breath, abdominal pain, nausea, vomiting, upper or lower extremity weakness or paresthesias. pmd: Dr. Crawley, cards: [facility] Seen by card Dr Reich Admitted  to telemetry unit for monitoring , send 3 sets of cardiac enzymes to rule out acute coronary event, obtain ECHO to evaluate LVEF, cardiology consult  ,continue current management, O2 supply, anticoagulation plan as per cardiology consult Pulm cons requested , antitussives and nebulized bd ordered .Also UTI suspected and started on iv abx , id cons called Palliative care consult requested ,to discuss advance directives and complete MOLST  (20 Aug 2023 15:35)    PAST MEDICAL & SURGICAL HISTORY:  HTN (hypertension)      Afib      Spinal stenosis      PFO (patent foramen ovale)      Degeneration macular      Osteoporosis      History of complete heart block      Hypothyroidism      Cardiac pacemaker          MEDICATIONS  (STANDING):  acetaminophen     Tablet .. 325 milliGRAM(s) Oral every 8 hours  aMIOdarone    Tablet 200 milliGRAM(s) Oral daily  amLODIPine   Tablet 10 milliGRAM(s) Oral daily  apixaban 2.5 milliGRAM(s) Oral two times a day  artificial  tears Solution 1 Drop(s) Both EYES two times a day  dextrose 5% + sodium chloride 0.45%. 1000 milliLiter(s) (50 mL/Hr) IV Continuous <Continuous>  ferrous    sulfate 325 milliGRAM(s) Oral daily  lactobacillus acidophilus 1 Tablet(s) Oral every 12 hours  levothyroxine 75 MICROGram(s) Oral daily  metoprolol tartrate 50 milliGRAM(s) Oral two times a day  multivitamin/minerals 1 Tablet(s) Oral daily  pantoprazole    Tablet 40 milliGRAM(s) Oral daily  polyethylene glycol 3350 17 Gram(s) Oral daily    MEDICATIONS  (PRN):  acetaminophen     Tablet .. 650 milliGRAM(s) Oral every 6 hours PRN Temp greater or equal to 38C (100.4F), Mild Pain (1 - 3)  albuterol/ipratropium for Nebulization 3 milliLiter(s) Nebulizer every 6 hours PRN Shortness of Breath and/or Wheezing  aluminum hydroxide/magnesium hydroxide/simethicone Suspension 30 milliLiter(s) Oral every 4 hours PRN Dyspepsia  guaiFENesin Oral Liquid (Sugar-Free) 200 milliGRAM(s) Oral every 6 hours PRN Cough  melatonin 3 milliGRAM(s) Oral at bedtime PRN Insomnia  ondansetron Injectable 4 milliGRAM(s) IV Push every 8 hours PRN Nausea and/or Vomiting      OBJECTIVE    T(C): 36.8 (08-29-23 @ 18:07), Max: 36.8 (08-29-23 @ 18:07)  HR: 85 (08-29-23 @ 18:07) (76 - 88)  BP: 137/65 (08-29-23 @ 18:07) (124/67 - 171/70)  RR: 18 (08-29-23 @ 18:07) (18 - 18)  SpO2: 91% (08-29-23 @ 18:07) (91% - 96%)  Wt(kg): --  I&O's Summary        REVIEW OF SYSTEMS:  Patient is  unable to provide any information/ROS  due to baseline mental status.     PHYSICAL EXAM:  Appearance: NAD. VS past 24 hrs -as above   HEENT:   Moist oral mucosa. Conjunctiva clear b/l.   Neck : supple  Respiratory: Lungs CTAB.  Gastrointestinal:  Soft, nontender. No rebound. No rigidity. BS present	  Cardiovascular: RRR ,S1S2 present  Neurologic: Non-focal. Moving all extremities.  Extremities: No edema. No erythema. No calf tenderness.  Skin: No rashes, No ecchymoses, No cyanosis.	  wounds ,skin lesions-See skin assesment flow sheet   LABS:                        11.3   11.52 )-----------( 340      ( 29 Aug 2023 10:13 )             36.5     08-29    139  |  109<H>  |  22  ----------------------------<  99  4.4   |  23  |  0.60    Ca    8.6      29 Aug 2023 06:54    TPro  6.3  /  Alb  2.3<L>  /  TBili  0.9  /  DBili  x   /  AST  84<H>  /  ALT  97<H>  /  AlkPhos  86  08-29    CAPILLARY BLOOD GLUCOSE          Urinalysis Basic - ( 29 Aug 2023 06:54 )    Color: x / Appearance: x / SG: x / pH: x  Gluc: 99 mg/dL / Ketone: x  / Bili: x / Urobili: x   Blood: x / Protein: x / Nitrite: x   Leuk Esterase: x / RBC: x / WBC x   Sq Epi: x / Non Sq Epi: x / Bacteria: x        Culture - Blood (collected 24 Aug 2023 06:48)  Source: .Blood Blood  Final Report (29 Aug 2023 10:01):    No growth at 5 days    Culture - Blood (collected 22 Aug 2023 11:01)  Source: .Blood Blood  Final Report (27 Aug 2023 15:01):    No growth at 5 days    Culture - Blood (collected 22 Aug 2023 06:20)  Source: .Blood Blood  Final Report (27 Aug 2023 12:00):    No growth at 5 days    Culture - Blood (collected 21 Aug 2023 09:35)  Source: .Blood Blood  Gram Stain (23 Aug 2023 01:21):    Growth in aerobic bottle: Gram Negative Rods  Final Report (23 Aug 2023 20:36):    Growth in aerobic bottle: Escherichia coli ESBL    See previous culture [id number]    Culture - Blood (collected 21 Aug 2023 09:30)  Source: .Blood Blood  Gram Stain (22 Aug 2023 17:29):    Growth in anaerobic bottle: Gram Negative Rods    Growth in aerobic bottle: Gram Negative Rods  Final Report (23 Aug 2023 10:15):    Growth in aerobic and anaerobic bottles: Escherichia coli ESBL    See previous culture [id number]    Culture - Urine (collected 20 Aug 2023 11:55)  Source: Clean Catch Clean Catch (Midstream)  Final Report (23 Aug 2023 09:17):    >100,000 CFU/ml Escherichia coli ESBL  Organism: Escherichia coli ESBL (23 Aug 2023 09:17)  Organism: Escherichia coli ESBL (23 Aug 2023 09:17)    Culture - Blood (collected 20 Aug 2023 11:50)  Source: .Blood Blood-Peripheral  Gram Stain (21 Aug 2023 02:35):    Growth in aerobic bottle: Gram Negative Rods  Final Report (22 Aug 2023 17:18):    Growth in aerobic bottle: Escherichia coli ESBL    See previous culture [id number]    Culture - Blood (collected 20 Aug 2023 11:45)  Source: .Blood Blood-Peripheral  Gram Stain (21 Aug 2023 02:35):    Growth in aerobic and anaerobic bottles: Gram Negative Rods  Final Report (22 Aug 2023 17:15):    Growth in aerobic and anaerobic bottles: Escherichia coli ESBL    Direct identification is available within approximately 3-5    hours either by Blood Panel Multiplexed PCR or Direct    MALDI-TOF. Details: [email or web address]  Organism: Blood Culture PCR  Escherichia coli ESBL (22 Aug 2023 17:15)  Organism: Escherichia coli ESBL (22 Aug 2023 17:15)  Organism: Blood Culture PCR (22 Aug 2023 17:15)      RADIOLOGY & ADDITIONAL TESTS:   reviewed elctronically  ASSESSMENT/PLAN: 	    25 minutes aggregate time was spent on this visit, 50% visit time spent in care co-ordination with other attendings and counselling patient .I have discussed care plan with patient / HCP/family member Beth  ,who expressed understanding of problems treatment and their effect and side effects, alternatives in details. I have asked if they have any questions and concerns and appropriately addressed them to best of my ability. ACP-Advance care planning was discussed , pallitaive care issues ,CMO ,GOC ,MOLST  form ,advance directives were reviewed .All questions were answered to the best of my knowledge - 25 min  PROGRESS NOTE  Patient is a 89y old  Female who presents with a chief complaint of cough (29 Aug 2023 12:03)  Chart and available morning labs /imaging are reviewed electronically , urgent issues addressed . More information  is being added upon completion of rounds , when more information is collected and management discussed with consultants , medical staff and social service/case management on the floor     OVERNIGHT  Lethargy and poor po intake  reported by medical staff . All above noted Patient is resting in a bed comfortably .Confused ,poor mentation .No distress noted   Spoke to the daughter at bedside ,neurology input is appreciated . Patient converted to covid positive status later during the day , daughter is aware on a phone   HPI:  89-year-old female with history of hypothyroidism, A-fib on Eliquis, s/p recent pacemaker for complete heart block brought in by ambulance from [facility] living [place] for cough for the past 2 weeks.  Associated with generalized weakness and decreased p.o. intake.  No fall or trauma.  Patient states she has had cold symptoms for a while.  Denies fever, chills, chest pain, shortness of breath, abdominal pain, nausea, vomiting, upper or lower extremity weakness or paresthesias. pmd: Dr. Crawley, cards: [facility] Seen by card Dr Reich Admitted  to telemetry unit for monitoring , send 3 sets of cardiac enzymes to rule out acute coronary event, obtain ECHO to evaluate LVEF, cardiology consult  ,continue current management, O2 supply, anticoagulation plan as per cardiology consult Pulm cons requested , antitussives and nebulized bd ordered .Also UTI suspected and started on iv abx , id cons called Palliative care consult requested ,to discuss advance directives and complete MOLST  (20 Aug 2023 15:35)    PAST MEDICAL & SURGICAL HISTORY:  HTN (hypertension)      Afib      Spinal stenosis      PFO (patent foramen ovale)      Degeneration macular      Osteoporosis      History of complete heart block      Hypothyroidism      Cardiac pacemaker          MEDICATIONS  (STANDING):  acetaminophen     Tablet .. 325 milliGRAM(s) Oral every 8 hours  aMIOdarone    Tablet 200 milliGRAM(s) Oral daily  amLODIPine   Tablet 10 milliGRAM(s) Oral daily  apixaban 2.5 milliGRAM(s) Oral two times a day  artificial  tears Solution 1 Drop(s) Both EYES two times a day  dextrose 5% + sodium chloride 0.45%. 1000 milliLiter(s) (50 mL/Hr) IV Continuous <Continuous>  ferrous    sulfate 325 milliGRAM(s) Oral daily  lactobacillus acidophilus 1 Tablet(s) Oral every 12 hours  levothyroxine 75 MICROGram(s) Oral daily  metoprolol tartrate 50 milliGRAM(s) Oral two times a day  multivitamin/minerals 1 Tablet(s) Oral daily  pantoprazole    Tablet 40 milliGRAM(s) Oral daily  polyethylene glycol 3350 17 Gram(s) Oral daily    MEDICATIONS  (PRN):  acetaminophen     Tablet .. 650 milliGRAM(s) Oral every 6 hours PRN Temp greater or equal to 38C (100.4F), Mild Pain (1 - 3)  albuterol/ipratropium for Nebulization 3 milliLiter(s) Nebulizer every 6 hours PRN Shortness of Breath and/or Wheezing  aluminum hydroxide/magnesium hydroxide/simethicone Suspension 30 milliLiter(s) Oral every 4 hours PRN Dyspepsia  guaiFENesin Oral Liquid (Sugar-Free) 200 milliGRAM(s) Oral every 6 hours PRN Cough  melatonin 3 milliGRAM(s) Oral at bedtime PRN Insomnia  ondansetron Injectable 4 milliGRAM(s) IV Push every 8 hours PRN Nausea and/or Vomiting      OBJECTIVE    T(C): 36.8 (08-29-23 @ 18:07), Max: 36.8 (08-29-23 @ 18:07)  HR: 85 (08-29-23 @ 18:07) (76 - 88)  BP: 137/65 (08-29-23 @ 18:07) (124/67 - 171/70)  RR: 18 (08-29-23 @ 18:07) (18 - 18)  SpO2: 91% (08-29-23 @ 18:07) (91% - 96%)  Wt(kg): --  I&O's Summary        REVIEW OF SYSTEMS:  Patient is  unable to provide any information/ROS  due to baseline mental status.     PHYSICAL EXAM:  Appearance: NAD. VS past 24 hrs -as above   HEENT:   Moist oral mucosa. Conjunctiva clear b/l.   Neck : supple  Respiratory: Lungs CTAB.  Gastrointestinal:  Soft, nontender. No rebound. No rigidity. BS present	  Cardiovascular: RRR ,S1S2 present  Neurologic: Non-focal. Moving all extremities.  Extremities: No edema. No erythema. No calf tenderness.  Skin: No rashes, No ecchymoses, No cyanosis.	  wounds ,skin lesions-See skin assesment flow sheet   LABS:                        11.3   11.52 )-----------( 340      ( 29 Aug 2023 10:13 )             36.5     08-29    139  |  109<H>  |  22  ----------------------------<  99  4.4   |  23  |  0.60    Ca    8.6      29 Aug 2023 06:54    TPro  6.3  /  Alb  2.3<L>  /  TBili  0.9  /  DBili  x   /  AST  84<H>  /  ALT  97<H>  /  AlkPhos  86  08-29    CAPILLARY BLOOD GLUCOSE          Urinalysis Basic - ( 29 Aug 2023 06:54 )    Color: x / Appearance: x / SG: x / pH: x  Gluc: 99 mg/dL / Ketone: x  / Bili: x / Urobili: x   Blood: x / Protein: x / Nitrite: x   Leuk Esterase: x / RBC: x / WBC x   Sq Epi: x / Non Sq Epi: x / Bacteria: x        Culture - Blood (collected 24 Aug 2023 06:48)  Source: .Blood Blood  Final Report (29 Aug 2023 10:01):    No growth at 5 days    Culture - Blood (collected 22 Aug 2023 11:01)  Source: .Blood Blood  Final Report (27 Aug 2023 15:01):    No growth at 5 days    Culture - Blood (collected 22 Aug 2023 06:20)  Source: .Blood Blood  Final Report (27 Aug 2023 12:00):    No growth at 5 days    Culture - Blood (collected 21 Aug 2023 09:35)  Source: .Blood Blood  Gram Stain (23 Aug 2023 01:21):    Growth in aerobic bottle: Gram Negative Rods  Final Report (23 Aug 2023 20:36):    Growth in aerobic bottle: Escherichia coli ESBL    See previous culture [id number]    Culture - Blood (collected 21 Aug 2023 09:30)  Source: .Blood Blood  Gram Stain (22 Aug 2023 17:29):    Growth in anaerobic bottle: Gram Negative Rods    Growth in aerobic bottle: Gram Negative Rods  Final Report (23 Aug 2023 10:15):    Growth in aerobic and anaerobic bottles: Escherichia coli ESBL    See previous culture [id number]    Culture - Urine (collected 20 Aug 2023 11:55)  Source: Clean Catch Clean Catch (Midstream)  Final Report (23 Aug 2023 09:17):    >100,000 CFU/ml Escherichia coli ESBL  Organism: Escherichia coli ESBL (23 Aug 2023 09:17)  Organism: Escherichia coli ESBL (23 Aug 2023 09:17)    Culture - Blood (collected 20 Aug 2023 11:50)  Source: .Blood Blood-Peripheral  Gram Stain (21 Aug 2023 02:35):    Growth in aerobic bottle: Gram Negative Rods  Final Report (22 Aug 2023 17:18):    Growth in aerobic bottle: Escherichia coli ESBL    See previous culture [id number]    Culture - Blood (collected 20 Aug 2023 11:45)  Source: .Blood Blood-Peripheral  Gram Stain (21 Aug 2023 02:35):    Growth in aerobic and anaerobic bottles: Gram Negative Rods  Final Report (22 Aug 2023 17:15):    Growth in aerobic and anaerobic bottles: Escherichia coli ESBL    Direct identification is available within approximately 3-5    hours either by Blood Panel Multiplexed PCR or Direct    MALDI-TOF. Details: [email or web address]  Organism: Blood Culture PCR  Escherichia coli ESBL (22 Aug 2023 17:15)  Organism: Escherichia coli ESBL (22 Aug 2023 17:15)  Organism: Blood Culture PCR (22 Aug 2023 17:15)      RADIOLOGY & ADDITIONAL TESTS:   reviewed elctronically  ASSESSMENT/PLAN: 	    25 minutes aggregate time was spent on this visit, 50% visit time spent in care co-ordination with other attendings and counselling patient .I have discussed care plan with patient / HCP/family member Beth  ,who expressed understanding of problems treatment and their effect and side effects, alternatives in details. I have asked if they have any questions and concerns and appropriately addressed them to best of my ability. ACP-Advance care planning was discussed , pallitaive care issues ,CMO ,GOC ,MOLST  form ,advance directives were reviewed .All questions were answered to the best of my knowledge - 25 min  PROGRESS NOTE  Patient is a 89y old  Female who presents with a chief complaint of cough (29 Aug 2023 12:03)  Chart and available morning labs /imaging are reviewed electronically , urgent issues addressed . More information  is being added upon completion of rounds , when more information is collected and management discussed with consultants , medical staff and social service/case management on the floor     OVERNIGHT  Lethargy and poor po intake  reported by medical staff . All above noted Patient is resting in a bed comfortably .Confused ,poor mentation .No distress noted   Spoke to the daughter at bedside ,neurology input is appreciated . Patient converted to covid positive status later during the day , daughter is aware on a phone   HPI:  89-year-old female with history of hypothyroidism, A-fib on Eliquis, s/p recent pacemaker for complete heart block brought in by ambulance from [facility] living [place] for cough for the past 2 weeks.  Associated with generalized weakness and decreased p.o. intake.  No fall or trauma.  Patient states she has had cold symptoms for a while.  Denies fever, chills, chest pain, shortness of breath, abdominal pain, nausea, vomiting, upper or lower extremity weakness or paresthesias. pmd: Dr. Crawley, cards: [facility] Seen by card Dr Reich Admitted  to telemetry unit for monitoring , send 3 sets of cardiac enzymes to rule out acute coronary event, obtain ECHO to evaluate LVEF, cardiology consult  ,continue current management, O2 supply, anticoagulation plan as per cardiology consult Pulm cons requested , antitussives and nebulized bd ordered .Also UTI suspected and started on iv abx , id cons called Palliative care consult requested ,to discuss advance directives and complete MOLST  (20 Aug 2023 15:35)    PAST MEDICAL & SURGICAL HISTORY:  HTN (hypertension)      Afib      Spinal stenosis      PFO (patent foramen ovale)      Degeneration macular      Osteoporosis      History of complete heart block      Hypothyroidism      Cardiac pacemaker          MEDICATIONS  (STANDING):  acetaminophen     Tablet .. 325 milliGRAM(s) Oral every 8 hours  aMIOdarone    Tablet 200 milliGRAM(s) Oral daily  amLODIPine   Tablet 10 milliGRAM(s) Oral daily  apixaban 2.5 milliGRAM(s) Oral two times a day  artificial  tears Solution 1 Drop(s) Both EYES two times a day  dextrose 5% + sodium chloride 0.45%. 1000 milliLiter(s) (50 mL/Hr) IV Continuous <Continuous>  ferrous    sulfate 325 milliGRAM(s) Oral daily  lactobacillus acidophilus 1 Tablet(s) Oral every 12 hours  levothyroxine 75 MICROGram(s) Oral daily  metoprolol tartrate 50 milliGRAM(s) Oral two times a day  multivitamin/minerals 1 Tablet(s) Oral daily  pantoprazole    Tablet 40 milliGRAM(s) Oral daily  polyethylene glycol 3350 17 Gram(s) Oral daily    MEDICATIONS  (PRN):  acetaminophen     Tablet .. 650 milliGRAM(s) Oral every 6 hours PRN Temp greater or equal to 38C (100.4F), Mild Pain (1 - 3)  albuterol/ipratropium for Nebulization 3 milliLiter(s) Nebulizer every 6 hours PRN Shortness of Breath and/or Wheezing  aluminum hydroxide/magnesium hydroxide/simethicone Suspension 30 milliLiter(s) Oral every 4 hours PRN Dyspepsia  guaiFENesin Oral Liquid (Sugar-Free) 200 milliGRAM(s) Oral every 6 hours PRN Cough  melatonin 3 milliGRAM(s) Oral at bedtime PRN Insomnia  ondansetron Injectable 4 milliGRAM(s) IV Push every 8 hours PRN Nausea and/or Vomiting      OBJECTIVE    T(C): 36.8 (08-29-23 @ 18:07), Max: 36.8 (08-29-23 @ 18:07)  HR: 85 (08-29-23 @ 18:07) (76 - 88)  BP: 137/65 (08-29-23 @ 18:07) (124/67 - 171/70)  RR: 18 (08-29-23 @ 18:07) (18 - 18)  SpO2: 91% (08-29-23 @ 18:07) (91% - 96%)  Wt(kg): --  I&O's Summary        REVIEW OF SYSTEMS:  Patient is  unable to provide any information/ROS  due to baseline mental status.     PHYSICAL EXAM:  Appearance: NAD. VS past 24 hrs -as above   HEENT:   Moist oral mucosa. Conjunctiva clear b/l.   Neck : supple  Respiratory: Lungs CTAB.  Gastrointestinal:  Soft, nontender. No rebound. No rigidity. BS present	  Cardiovascular: RRR ,S1S2 present  Neurologic: Non-focal. Moving all extremities.  Extremities: No edema. No erythema. No calf tenderness.  Skin: No rashes, No ecchymoses, No cyanosis.	  wounds ,skin lesions-See skin assesment flow sheet   LABS:                        11.3   11.52 )-----------( 340      ( 29 Aug 2023 10:13 )             36.5     08-29    139  |  109<H>  |  22  ----------------------------<  99  4.4   |  23  |  0.60    Ca    8.6      29 Aug 2023 06:54    TPro  6.3  /  Alb  2.3<L>  /  TBili  0.9  /  DBili  x   /  AST  84<H>  /  ALT  97<H>  /  AlkPhos  86  08-29    CAPILLARY BLOOD GLUCOSE          Urinalysis Basic - ( 29 Aug 2023 06:54 )    Color: x / Appearance: x / SG: x / pH: x  Gluc: 99 mg/dL / Ketone: x  / Bili: x / Urobili: x   Blood: x / Protein: x / Nitrite: x   Leuk Esterase: x / RBC: x / WBC x   Sq Epi: x / Non Sq Epi: x / Bacteria: x        Culture - Blood (collected 24 Aug 2023 06:48)  Source: .Blood Blood  Final Report (29 Aug 2023 10:01):    No growth at 5 days    Culture - Blood (collected 22 Aug 2023 11:01)  Source: .Blood Blood  Final Report (27 Aug 2023 15:01):    No growth at 5 days    Culture - Blood (collected 22 Aug 2023 06:20)  Source: .Blood Blood  Final Report (27 Aug 2023 12:00):    No growth at 5 days    Culture - Blood (collected 21 Aug 2023 09:35)  Source: .Blood Blood  Gram Stain (23 Aug 2023 01:21):    Growth in aerobic bottle: Gram Negative Rods  Final Report (23 Aug 2023 20:36):    Growth in aerobic bottle: Escherichia coli ESBL    See previous culture [id number]    Culture - Blood (collected 21 Aug 2023 09:30)  Source: .Blood Blood  Gram Stain (22 Aug 2023 17:29):    Growth in anaerobic bottle: Gram Negative Rods    Growth in aerobic bottle: Gram Negative Rods  Final Report (23 Aug 2023 10:15):    Growth in aerobic and anaerobic bottles: Escherichia coli ESBL    See previous culture [id number]    Culture - Urine (collected 20 Aug 2023 11:55)  Source: Clean Catch Clean Catch (Midstream)  Final Report (23 Aug 2023 09:17):    >100,000 CFU/ml Escherichia coli ESBL  Organism: Escherichia coli ESBL (23 Aug 2023 09:17)  Organism: Escherichia coli ESBL (23 Aug 2023 09:17)    Culture - Blood (collected 20 Aug 2023 11:50)  Source: .Blood Blood-Peripheral  Gram Stain (21 Aug 2023 02:35):    Growth in aerobic bottle: Gram Negative Rods  Final Report (22 Aug 2023 17:18):    Growth in aerobic bottle: Escherichia coli ESBL    See previous culture [id number]    Culture - Blood (collected 20 Aug 2023 11:45)  Source: .Blood Blood-Peripheral  Gram Stain (21 Aug 2023 02:35):    Growth in aerobic and anaerobic bottles: Gram Negative Rods  Final Report (22 Aug 2023 17:15):    Growth in aerobic and anaerobic bottles: Escherichia coli ESBL    Direct identification is available within approximately 3-5    hours either by Blood Panel Multiplexed PCR or Direct    MALDI-TOF. Details: [email or web address]  Organism: Blood Culture PCR  Escherichia coli ESBL (22 Aug 2023 17:15)  Organism: Escherichia coli ESBL (22 Aug 2023 17:15)  Organism: Blood Culture PCR (22 Aug 2023 17:15)      RADIOLOGY & ADDITIONAL TESTS:   reviewed elctronically  ASSESSMENT/PLAN: 	    25 minutes aggregate time was spent on this visit, 50% visit time spent in care co-ordination with other attendings and counselling patient .I have discussed care plan with patient / HCP/family member Beth  ,who expressed understanding of problems treatment and their effect and side effects, alternatives in details. I have asked if they have any questions and concerns and appropriately addressed them to best of my ability. ACP-Advance care planning was discussed , pallitaive care issues ,CMO ,GOC ,MOLST  form ,advance directives were reviewed .All questions were answered to the best of my knowledge - 25 min

## 2023-08-29 NOTE — CHART NOTE - NSCHARTNOTEFT_GEN_A_CORE
Assessment/Follow up: Pt lethargic at time of visit; pts daughter at bedside. Poor appetite/po intake ongoing on soft and bite sized diet; 0-50% per EMR past 3 days. Pt has declined oral nutritional supplements; greek yogurt and mighty shakes provided BID as high calorie/high protein snacks. Per RN aide pt consumed only greek yogurt for breakfast today 8/29 and soup & applesauce for lunch 8/29. S/p SLP evaluation 8/29 with recommendation for puree diet with thin liquids. Recommend changing diet per SLP recommendation. Will continue greek yogurt & mighty shakes. Additional food preferences obtained through pts daughter to maximize po intake. Possible discharge 8/30.     Factors impacting intake: [ ] none [ ] nausea  [ ] vomiting [ ] diarrhea [ ] constipation  [x ]chewing problems [x ] swallowing issues  [x ] other: lethargy    Diet Presciption: Soft and bite sized with thin liquids    Intake: Poor; 0-50% past 3 days    Current Weight: 104.7lbs(8/28)      Pertinent Medications: MEDICATIONS  (STANDING):  acetaminophen     Tablet .. 325 milliGRAM(s) Oral every 8 hours  aMIOdarone    Tablet 200 milliGRAM(s) Oral daily  amLODIPine   Tablet 10 milliGRAM(s) Oral daily  apixaban 2.5 milliGRAM(s) Oral two times a day  artificial  tears Solution 1 Drop(s) Both EYES two times a day  dextrose 5% + sodium chloride 0.45%. 1000 milliLiter(s) (50 mL/Hr) IV Continuous <Continuous>  ferrous    sulfate 325 milliGRAM(s) Oral daily  lactobacillus acidophilus 1 Tablet(s) Oral every 12 hours  levothyroxine 75 MICROGram(s) Oral daily  metoprolol tartrate 50 milliGRAM(s) Oral two times a day  multivitamin/minerals 1 Tablet(s) Oral daily  pantoprazole    Tablet 40 milliGRAM(s) Oral daily  polyethylene glycol 3350 17 Gram(s) Oral daily    MEDICATIONS  (PRN):  acetaminophen     Tablet .. 650 milliGRAM(s) Oral every 6 hours PRN Temp greater or equal to 38C (100.4F), Mild Pain (1 - 3)  albuterol/ipratropium for Nebulization 3 milliLiter(s) Nebulizer every 6 hours PRN Shortness of Breath and/or Wheezing  aluminum hydroxide/magnesium hydroxide/simethicone Suspension 30 milliLiter(s) Oral every 4 hours PRN Dyspepsia  guaiFENesin Oral Liquid (Sugar-Free) 200 milliGRAM(s) Oral every 6 hours PRN Cough  melatonin 3 milliGRAM(s) Oral at bedtime PRN Insomnia  ondansetron Injectable 4 milliGRAM(s) IV Push every 8 hours PRN Nausea and/or Vomiting    Pertinent Labs: 08-29 Na139 mmol/L Glu 99 mg/dL K+ 4.4 mmol/L Cr  0.60 mg/dL BUN 22 mg/dL 08-29 Alb 2.3 g/dL<L> 08-21 Chol 98 mg/dL LDL --    HDL 49 mg/dL<L> Trig 53 mg/dL     CAPILLARY BLOOD GLUCOSE        Skin: sacrum/coccyx I. Dashawn 12.     Estimated Needs:   [X] no change since previous assessment: based on bedscale wt on admit 103.8#/47kg  30-35kcal/kg (1410-1645kcal)  1.2-1.4g pro/kg (56-66gm protein)  [ ] recalculated:     Previous Nutrition Diagnosis:   [ ] Inadequate Energy Intake [ ]Inadequate Oral Intake [ ] Excessive Energy Intake   [ ] Underweight [ ] Increased Nutrient Needs [ ] Overweight/Obesity   [ ] Altered GI Function [ ] Unintended Weight Loss [ ] Food & Nutrition Related Knowledge Deficit [X] Malnutrition (moderate/chronic)    Nutrition Diagnosis is [X] ongoing  [ ] resolved [ ] not applicable     New Nutrition Diagnosis: [X] not applicable     Interventions:   Recommend  [ x] Change Diet To: Puree diet with thin liquids  [ ] Nutrition Supplement  [ ] Nutrition Support  [x ] Other: Will continue greek yogurt BID, mighty shakes 4oz BID.     Monitoring and Evaluation:   [x ] PO intake [ x ] Tolerance to diet prescription [ x ] weights [ x ] labs[ x ] follow up per protocol  [ ] other: Assessment/Follow up: Pt lethargic at time of visit; pts daughter at bedside. Poor appetite/po intake ongoing on soft and bite sized diet; 0-50% per EMR past 3 days. Pt has declined oral nutritional supplements; greek yogurt and mighty shakes provided BID as high calorie/high protein snacks. Per RN aide pt consumed only greek yogurt for breakfast today 8/29 and soup & applesauce for lunch 8/29. S/p SLP evaluation 8/29 with recommendation for puree diet with thin liquids. Recommend changing diet per SLP recommendation. Will continue greek yogurt & mighty shakes. Additional food preferences obtained through pts daughter to maximize po intake.  Possible discharge back to [facility] 8/30.     Factors impacting intake: [ ] none [ ] nausea  [ ] vomiting [ ] diarrhea [ ] constipation  [x ]chewing problems [x ] swallowing issues  [x ] other: lethargy    Diet Presciption: Soft and bite sized with thin liquids    Intake: Poor; 0-50% past 3 days    Current Weight: 104.7lbs(8/28)      Pertinent Medications: MEDICATIONS  (STANDING):  acetaminophen     Tablet .. 325 milliGRAM(s) Oral every 8 hours  aMIOdarone    Tablet 200 milliGRAM(s) Oral daily  amLODIPine   Tablet 10 milliGRAM(s) Oral daily  apixaban 2.5 milliGRAM(s) Oral two times a day  artificial  tears Solution 1 Drop(s) Both EYES two times a day  dextrose 5% + sodium chloride 0.45%. 1000 milliLiter(s) (50 mL/Hr) IV Continuous <Continuous>  ferrous    sulfate 325 milliGRAM(s) Oral daily  lactobacillus acidophilus 1 Tablet(s) Oral every 12 hours  levothyroxine 75 MICROGram(s) Oral daily  metoprolol tartrate 50 milliGRAM(s) Oral two times a day  multivitamin/minerals 1 Tablet(s) Oral daily  pantoprazole    Tablet 40 milliGRAM(s) Oral daily  polyethylene glycol 3350 17 Gram(s) Oral daily    MEDICATIONS  (PRN):  acetaminophen     Tablet .. 650 milliGRAM(s) Oral every 6 hours PRN Temp greater or equal to 38C (100.4F), Mild Pain (1 - 3)  albuterol/ipratropium for Nebulization 3 milliLiter(s) Nebulizer every 6 hours PRN Shortness of Breath and/or Wheezing  aluminum hydroxide/magnesium hydroxide/simethicone Suspension 30 milliLiter(s) Oral every 4 hours PRN Dyspepsia  guaiFENesin Oral Liquid (Sugar-Free) 200 milliGRAM(s) Oral every 6 hours PRN Cough  melatonin 3 milliGRAM(s) Oral at bedtime PRN Insomnia  ondansetron Injectable 4 milliGRAM(s) IV Push every 8 hours PRN Nausea and/or Vomiting    Pertinent Labs: 08-29 Na139 mmol/L Glu 99 mg/dL K+ 4.4 mmol/L Cr  0.60 mg/dL BUN 22 mg/dL 08-29 Alb 2.3 g/dL<L> 08-21 Chol 98 mg/dL LDL --    HDL 49 mg/dL<L> Trig 53 mg/dL     CAPILLARY BLOOD GLUCOSE        Skin: sacrum/coccyx I. Dashawn 12.     Estimated Needs:   [X] no change since previous assessment: based on bedscale wt on admit 103.8#/47kg  30-35kcal/kg (1410-1645kcal)  1.2-1.4g pro/kg (56-66gm protein)  [ ] recalculated:     Previous Nutrition Diagnosis:   [ ] Inadequate Energy Intake [ ]Inadequate Oral Intake [ ] Excessive Energy Intake   [ ] Underweight [ ] Increased Nutrient Needs [ ] Overweight/Obesity   [ ] Altered GI Function [ ] Unintended Weight Loss [ ] Food & Nutrition Related Knowledge Deficit [X] Malnutrition (moderate/chronic)    Nutrition Diagnosis is [X] ongoing  [ ] resolved [ ] not applicable     New Nutrition Diagnosis: [X] not applicable     Interventions:   Recommend  [ x] Change Diet To: Puree diet with thin liquids  [ ] Nutrition Supplement  [ ] Nutrition Support  [x ] Other: Continued assistance/encouragement with meals. Will continue greek yogurt BID, mighty shakes 4oz BID.     Monitoring and Evaluation:   [x ] PO intake [ x ] Tolerance to diet prescription [ x ] weights [ x ] labs[ x ] follow up per protocol  [ ] other: Assessment/Follow up: Pt lethargic at time of visit; pts daughter at bedside. Poor appetite/po intake ongoing on soft and bite sized diet; 0-50% per EMR past 3 days. Pt has declined oral nutritional supplements; greek yogurt and mighty shakes provided BID as high calorie/high protein snacks. Per RN aide pt consumed only greek yogurt for breakfast today 8/29 and soup & applesauce for lunch 8/29. S/p SLP evaluation 8/29 with recommendation for puree diet with thin liquids. Recommend changing diet per SLP recommendation. Will continue greek yogurt & mighty shakes. Additional food preferences obtained through pts daughter to maximize po intake.  Possible discharge back to [facility] 8/30. Per [facility], pt DNR/DNI, TF not addressed.     Factors impacting intake: [ ] none [ ] nausea  [ ] vomiting [ ] diarrhea [ ] constipation  [x ]chewing problems [x ] swallowing issues  [x ] other: lethargy    Diet Presciption: Soft and bite sized with thin liquids    Intake: Poor; 0-50% past 3 days    Current Weight: 104.7lbs(8/28)      Pertinent Medications: MEDICATIONS  (STANDING):  acetaminophen     Tablet .. 325 milliGRAM(s) Oral every 8 hours  aMIOdarone    Tablet 200 milliGRAM(s) Oral daily  amLODIPine   Tablet 10 milliGRAM(s) Oral daily  apixaban 2.5 milliGRAM(s) Oral two times a day  artificial  tears Solution 1 Drop(s) Both EYES two times a day  dextrose 5% + sodium chloride 0.45%. 1000 milliLiter(s) (50 mL/Hr) IV Continuous <Continuous>  ferrous    sulfate 325 milliGRAM(s) Oral daily  lactobacillus acidophilus 1 Tablet(s) Oral every 12 hours  levothyroxine 75 MICROGram(s) Oral daily  metoprolol tartrate 50 milliGRAM(s) Oral two times a day  multivitamin/minerals 1 Tablet(s) Oral daily  pantoprazole    Tablet 40 milliGRAM(s) Oral daily  polyethylene glycol 3350 17 Gram(s) Oral daily    MEDICATIONS  (PRN):  acetaminophen     Tablet .. 650 milliGRAM(s) Oral every 6 hours PRN Temp greater or equal to 38C (100.4F), Mild Pain (1 - 3)  albuterol/ipratropium for Nebulization 3 milliLiter(s) Nebulizer every 6 hours PRN Shortness of Breath and/or Wheezing  aluminum hydroxide/magnesium hydroxide/simethicone Suspension 30 milliLiter(s) Oral every 4 hours PRN Dyspepsia  guaiFENesin Oral Liquid (Sugar-Free) 200 milliGRAM(s) Oral every 6 hours PRN Cough  melatonin 3 milliGRAM(s) Oral at bedtime PRN Insomnia  ondansetron Injectable 4 milliGRAM(s) IV Push every 8 hours PRN Nausea and/or Vomiting    Pertinent Labs: 08-29 Na139 mmol/L Glu 99 mg/dL K+ 4.4 mmol/L Cr  0.60 mg/dL BUN 22 mg/dL 08-29 Alb 2.3 g/dL<L> 08-21 Chol 98 mg/dL LDL --    HDL 49 mg/dL<L> Trig 53 mg/dL     CAPILLARY BLOOD GLUCOSE        Skin: sacrum/coccyx IMaylin Tamez 12.     Estimated Needs:   [X] no change since previous assessment: based on bedscale wt on admit 103.8#/47kg  30-35kcal/kg (1410-1645kcal)  1.2-1.4g pro/kg (56-66gm protein)  [ ] recalculated:     Previous Nutrition Diagnosis:   [ ] Inadequate Energy Intake [ ]Inadequate Oral Intake [ ] Excessive Energy Intake   [ ] Underweight [ ] Increased Nutrient Needs [ ] Overweight/Obesity   [ ] Altered GI Function [ ] Unintended Weight Loss [ ] Food & Nutrition Related Knowledge Deficit [X] Malnutrition (moderate/chronic)    Nutrition Diagnosis is [X] ongoing  [ ] resolved [ ] not applicable     New Nutrition Diagnosis: [X] not applicable     Interventions:   Recommend  [ x] Change Diet To: Puree diet with thin liquids  [ ] Nutrition Supplement  [ ] Nutrition Support  [x ] Other: Continued assistance/encouragement with meals/snacks. Will continue greek yogurt BID, mighty shakes 4oz BID. Palliative re-evaluation to determine pts wishes on nutrition support.     Monitoring and Evaluation:   [x ] PO intake [ x ] Tolerance to diet prescription [ x ] weights [ x ] labs[ x ] follow up per protocol  [ ] other:

## 2023-08-29 NOTE — SWALLOW BEDSIDE ASSESSMENT ADULT - SWALLOW EVAL: DIAGNOSIS
1) Mild to moderate oral dysphagia for soft/bite sized solids marked by reduced utensil stripping, increased bolus prep/mastication time, and delayed posterior transfer. Pt with lingal stasis noted which was cleared with liquid wash. 2) Mild oral dysphagia for puree, mildly thick and thin liquids marked by adequate reduced utensil stripping, bolus manipulation, and delayed coordination. Anterior to posterior oral transit/transfer noted. 3) Functional pharyngeal stage of swallow for soft/bite sized, puree, mildly thick, and thin fluids marked by initiation of pharyngeal swallow trigger and hyolaryngeal excursion noted upon digital palpation. No overt signs/symptoms of penetration/aspiration noted across all consistencies.
Patient presents with mild oral dysphagia and overtly functional pharyngeal stage. Oral stages were marked by mildly reduced mastication efficiency for regular solids, improved with easy to chew/soft bite sized. Pharyngeal stage was assessed via digital palpation and marked by a +prompt swallow trigger and +hyolaryngeal elevation. No overt s/s of penetration/aspiration exhibited.

## 2023-08-29 NOTE — PROGRESS NOTE ADULT - SUBJECTIVE AND OBJECTIVE BOX
[facility]  Víctor Robertson PA-C  [address] [place] [address]  [phone]      INTERVAL HPI/OVERNIGHT EVENTS:  Pt s/e  S/S eval noted  Diet per SLP recs    MEDICATIONS  (STANDING):  acetaminophen     Tablet .. 325 milliGRAM(s) Oral every 8 hours  aMIOdarone    Tablet 200 milliGRAM(s) Oral daily  amLODIPine   Tablet 10 milliGRAM(s) Oral daily  apixaban 2.5 milliGRAM(s) Oral two times a day  artificial  tears Solution 1 Drop(s) Both EYES two times a day  dextrose 5% + sodium chloride 0.45%. 1000 milliLiter(s) (50 mL/Hr) IV Continuous <Continuous>  ferrous    sulfate 325 milliGRAM(s) Oral daily  lactobacillus acidophilus 1 Tablet(s) Oral every 12 hours  levothyroxine 75 MICROGram(s) Oral daily  metoprolol tartrate 50 milliGRAM(s) Oral two times a day  multivitamin/minerals 1 Tablet(s) Oral daily  pantoprazole    Tablet 40 milliGRAM(s) Oral daily  polyethylene glycol 3350 17 Gram(s) Oral daily    MEDICATIONS  (PRN):  acetaminophen     Tablet .. 650 milliGRAM(s) Oral every 6 hours PRN Temp greater or equal to 38C (100.4F), Mild Pain (1 - 3)  albuterol/ipratropium for Nebulization 3 milliLiter(s) Nebulizer every 6 hours PRN Shortness of Breath and/or Wheezing  aluminum hydroxide/magnesium hydroxide/simethicone Suspension 30 milliLiter(s) Oral every 4 hours PRN Dyspepsia  guaiFENesin Oral Liquid (Sugar-Free) 200 milliGRAM(s) Oral every 6 hours PRN Cough  melatonin 3 milliGRAM(s) Oral at bedtime PRN Insomnia  ondansetron Injectable 4 milliGRAM(s) IV Push every 8 hours PRN Nausea and/or Vomiting      Allergies    penicillin (Unknown)    PHYSICAL EXAM:   Vital Signs:  Vital Signs Last 24 Hrs  T(C): 36.7 (29 Aug 2023 04:52), Max: 36.7 (28 Aug 2023 20:33)  T(F): 98.1 (29 Aug 2023 04:52), Max: 98.1 (29 Aug 2023 04:52)  HR: 88 (29 Aug 2023 04:52) (76 - 88)  BP: 124/67 (29 Aug 2023 04:52) (124/67 - 147/80)  BP(mean): --  RR: 18 (29 Aug 2023 04:52) (18 - 18)  SpO2: 96% (29 Aug 2023 04:52) (92% - 96%)    Parameters below as of 29 Aug 2023 04:52  Patient On (Oxygen Delivery Method): room air    GENERAL:  Appears stated age  HEENT:  NC/AT  CHEST:  Full & symmetric excursion  HEART:  Regular rhythm  ABDOMEN:  Soft, non-tender, non-distended  EXTEREMITIES:  no cyanosis  SKIN:  No rash  NEURO:  Alert      LABS:                        11.3   11.52 )-----------( 340      ( 29 Aug 2023 10:13 )             36.5     08-29    139  |  109<H>  |  22  ----------------------------<  99  4.4   |  23  |  0.60    Ca    8.6      29 Aug 2023 06:54    TPro  6.3  /  Alb  2.3<L>  /  TBili  0.9  /  DBili  x   /  AST  84<H>  /  ALT  97<H>  /  AlkPhos  86  08-29      Urinalysis Basic - ( 29 Aug 2023 06:54 )    Color: x / Appearance: x / SG: x / pH: x  Gluc: 99 mg/dL / Ketone: x  / Bili: x / Urobili: x   Blood: x / Protein: x / Nitrite: x   Leuk Esterase: x / RBC: x / WBC x   Sq Epi: x / Non Sq Epi: x / Bacteria: x   [facility]  Víctor Robertson PA-C  [address] [place] [address]  [phone]      INTERVAL HPI/OVERNIGHT EVENTS:  Pt s/e  S/S eval noted  Diet per SLP recs    MEDICATIONS  (STANDING):  acetaminophen     Tablet .. 325 milliGRAM(s) Oral every 8 hours  aMIOdarone    Tablet 200 milliGRAM(s) Oral daily  amLODIPine   Tablet 10 milliGRAM(s) Oral daily  apixaban 2.5 milliGRAM(s) Oral two times a day  artificial  tears Solution 1 Drop(s) Both EYES two times a day  dextrose 5% + sodium chloride 0.45%. 1000 milliLiter(s) (50 mL/Hr) IV Continuous <Continuous>  ferrous    sulfate 325 milliGRAM(s) Oral daily  lactobacillus acidophilus 1 Tablet(s) Oral every 12 hours  levothyroxine 75 MICROGram(s) Oral daily  metoprolol tartrate 50 milliGRAM(s) Oral two times a day  multivitamin/minerals 1 Tablet(s) Oral daily  pantoprazole    Tablet 40 milliGRAM(s) Oral daily  polyethylene glycol 3350 17 Gram(s) Oral daily    MEDICATIONS  (PRN):  acetaminophen     Tablet .. 650 milliGRAM(s) Oral every 6 hours PRN Temp greater or equal to 38C (100.4F), Mild Pain (1 - 3)  albuterol/ipratropium for Nebulization 3 milliLiter(s) Nebulizer every 6 hours PRN Shortness of Breath and/or Wheezing  aluminum hydroxide/magnesium hydroxide/simethicone Suspension 30 milliLiter(s) Oral every 4 hours PRN Dyspepsia  guaiFENesin Oral Liquid (Sugar-Free) 200 milliGRAM(s) Oral every 6 hours PRN Cough  melatonin 3 milliGRAM(s) Oral at bedtime PRN Insomnia  ondansetron Injectable 4 milliGRAM(s) IV Push every 8 hours PRN Nausea and/or Vomiting      Allergies    penicillin (Unknown)    PHYSICAL EXAM:   Vital Signs:  Vital Signs Last 24 Hrs  T(C): 36.7 (29 Aug 2023 04:52), Max: 36.7 (28 Aug 2023 20:33)  T(F): 98.1 (29 Aug 2023 04:52), Max: 98.1 (29 Aug 2023 04:52)  HR: 88 (29 Aug 2023 04:52) (76 - 88)  BP: 124/67 (29 Aug 2023 04:52) (124/67 - 147/80)  BP(mean): --  RR: 18 (29 Aug 2023 04:52) (18 - 18)  SpO2: 96% (29 Aug 2023 04:52) (92% - 96%)    Parameters below as of 29 Aug 2023 04:52  Patient On (Oxygen Delivery Method): room air    GENERAL:  Appears stated age  HEENT:  NC/AT  CHEST:  Full & symmetric excursion  HEART:  Regular rhythm  ABDOMEN:  Soft, non-tender, non-distended  EXTEREMITIES:  no cyanosis  SKIN:  No rash  NEURO:  Alert      LABS:                        11.3   11.52 )-----------( 340      ( 29 Aug 2023 10:13 )             36.5     08-29    139  |  109<H>  |  22  ----------------------------<  99  4.4   |  23  |  0.60    Ca    8.6      29 Aug 2023 06:54    TPro  6.3  /  Alb  2.3<L>  /  TBili  0.9  /  DBili  x   /  AST  84<H>  /  ALT  97<H>  /  AlkPhos  86  08-29      Urinalysis Basic - ( 29 Aug 2023 06:54 )    Color: x / Appearance: x / SG: x / pH: x  Gluc: 99 mg/dL / Ketone: x  / Bili: x / Urobili: x   Blood: x / Protein: x / Nitrite: x   Leuk Esterase: x / RBC: x / WBC x   Sq Epi: x / Non Sq Epi: x / Bacteria: x   [facility]  Víctor Robertson PA-C  [address] [place] [address]  [phone]      INTERVAL HPI/OVERNIGHT EVENTS:  Pt s/e  S/S eval noted  Diet per SLP recs    MEDICATIONS  (STANDING):  acetaminophen     Tablet .. 325 milliGRAM(s) Oral every 8 hours  aMIOdarone    Tablet 200 milliGRAM(s) Oral daily  amLODIPine   Tablet 10 milliGRAM(s) Oral daily  apixaban 2.5 milliGRAM(s) Oral two times a day  artificial  tears Solution 1 Drop(s) Both EYES two times a day  dextrose 5% + sodium chloride 0.45%. 1000 milliLiter(s) (50 mL/Hr) IV Continuous <Continuous>  ferrous    sulfate 325 milliGRAM(s) Oral daily  lactobacillus acidophilus 1 Tablet(s) Oral every 12 hours  levothyroxine 75 MICROGram(s) Oral daily  metoprolol tartrate 50 milliGRAM(s) Oral two times a day  multivitamin/minerals 1 Tablet(s) Oral daily  pantoprazole    Tablet 40 milliGRAM(s) Oral daily  polyethylene glycol 3350 17 Gram(s) Oral daily    MEDICATIONS  (PRN):  acetaminophen     Tablet .. 650 milliGRAM(s) Oral every 6 hours PRN Temp greater or equal to 38C (100.4F), Mild Pain (1 - 3)  albuterol/ipratropium for Nebulization 3 milliLiter(s) Nebulizer every 6 hours PRN Shortness of Breath and/or Wheezing  aluminum hydroxide/magnesium hydroxide/simethicone Suspension 30 milliLiter(s) Oral every 4 hours PRN Dyspepsia  guaiFENesin Oral Liquid (Sugar-Free) 200 milliGRAM(s) Oral every 6 hours PRN Cough  melatonin 3 milliGRAM(s) Oral at bedtime PRN Insomnia  ondansetron Injectable 4 milliGRAM(s) IV Push every 8 hours PRN Nausea and/or Vomiting      Allergies    penicillin (Unknown)    PHYSICAL EXAM:   Vital Signs:  Vital Signs Last 24 Hrs  T(C): 36.7 (29 Aug 2023 04:52), Max: 36.7 (28 Aug 2023 20:33)  T(F): 98.1 (29 Aug 2023 04:52), Max: 98.1 (29 Aug 2023 04:52)  HR: 88 (29 Aug 2023 04:52) (76 - 88)  BP: 124/67 (29 Aug 2023 04:52) (124/67 - 147/80)  BP(mean): --  RR: 18 (29 Aug 2023 04:52) (18 - 18)  SpO2: 96% (29 Aug 2023 04:52) (92% - 96%)    Parameters below as of 29 Aug 2023 04:52  Patient On (Oxygen Delivery Method): room air    GENERAL:  Appears stated age  HEENT:  NC/AT  CHEST:  Full & symmetric excursion  HEART:  Regular rhythm  ABDOMEN:  Soft, non-tender, non-distended  EXTEREMITIES:  no cyanosis  SKIN:  No rash  NEURO:  Alert      LABS:                        11.3   11.52 )-----------( 340      ( 29 Aug 2023 10:13 )             36.5     08-29    139  |  109<H>  |  22  ----------------------------<  99  4.4   |  23  |  0.60    Ca    8.6      29 Aug 2023 06:54    TPro  6.3  /  Alb  2.3<L>  /  TBili  0.9  /  DBili  x   /  AST  84<H>  /  ALT  97<H>  /  AlkPhos  86  08-29      Urinalysis Basic - ( 29 Aug 2023 06:54 )    Color: x / Appearance: x / SG: x / pH: x  Gluc: 99 mg/dL / Ketone: x  / Bili: x / Urobili: x   Blood: x / Protein: x / Nitrite: x   Leuk Esterase: x / RBC: x / WBC x   Sq Epi: x / Non Sq Epi: x / Bacteria: x

## 2023-08-29 NOTE — PROGRESS NOTE ADULT - TIME BILLING
in direct care of patient , reviewing labs and other results and adjusting medications and in discussion with other consultants , RN and PMD
as above
as above

## 2023-08-29 NOTE — SWALLOW BEDSIDE ASSESSMENT ADULT - ORAL PHASE
Decreased anterior-posterior movement of the bolus/Delayed oral transit time
Within functional limits

## 2023-08-29 NOTE — PROGRESS NOTE ADULT - SUBJECTIVE AND OBJECTIVE BOX
[facility] [place]       HPI:  89-year-old female with history of hypothyroidism, A-fib on Eliquis, s/p recent pacemaker for complete heart block brought in by ambulance from [facility] assisted living home for cough for the past 2 weeks.  Associated with generalized weakness and decreased p.o. intake.  No fall or trauma.  Patient states she has had cold symptoms for a while.  Denies fever, chills, chest pain, shortness of breath, abdominal pain, nausea, vomiting, upper or lower extremity weakness or paresthesias. pmd: Dr. Crawley, cards: [facility] Seen by card Dr Reich Admitted  to telemetry unit for monitoring , send 3 sets of cardiac enzymes to rule out acute coronary event, obtain ECHO to evaluate LVEF, cardiology consult  ,continue current management, O2 supply, anticoagulation plan as per cardiology consult Pulm cons requested , antitussives and nebulized bd ordered .Also UTI suspected and started on iv abx , id cons called Palliative care consult requested ,to discuss advance directives and complete MOLST  (20 Aug 2023 15:35)        SUBJECTIVE:      ALLERGIES:  Allergies    penicillin (Unknown)    Intolerances          MEDICATIONS  (STANDING):  acetaminophen     Tablet .. 325 milliGRAM(s) Oral every 8 hours  aMIOdarone    Tablet 200 milliGRAM(s) Oral daily  amLODIPine   Tablet 10 milliGRAM(s) Oral daily  apixaban 2.5 milliGRAM(s) Oral two times a day  artificial  tears Solution 1 Drop(s) Both EYES two times a day  dextrose 5% + sodium chloride 0.45%. 1000 milliLiter(s) (50 mL/Hr) IV Continuous <Continuous>  ferrous    sulfate 325 milliGRAM(s) Oral daily  lactobacillus acidophilus 1 Tablet(s) Oral every 12 hours  levothyroxine 75 MICROGram(s) Oral daily  metoprolol tartrate 50 milliGRAM(s) Oral two times a day  multivitamin/minerals 1 Tablet(s) Oral daily  pantoprazole    Tablet 40 milliGRAM(s) Oral daily  polyethylene glycol 3350 17 Gram(s) Oral daily  remdesivir  IVPB   IV Intermittent   remdesivir  IVPB 100 milliGRAM(s) IV Intermittent every 24 hours    MEDICATIONS  (PRN):  acetaminophen     Tablet .. 650 milliGRAM(s) Oral every 6 hours PRN Temp greater or equal to 38C (100.4F), Mild Pain (1 - 3)  albuterol/ipratropium for Nebulization 3 milliLiter(s) Nebulizer every 6 hours PRN Shortness of Breath and/or Wheezing  aluminum hydroxide/magnesium hydroxide/simethicone Suspension 30 milliLiter(s) Oral every 4 hours PRN Dyspepsia  guaiFENesin Oral Liquid (Sugar-Free) 200 milliGRAM(s) Oral every 6 hours PRN Cough  melatonin 3 milliGRAM(s) Oral at bedtime PRN Insomnia  ondansetron Injectable 4 milliGRAM(s) IV Push every 8 hours PRN Nausea and/or Vomiting      REVIEW OF SYSTEMS:  CONSTITUTIONAL: No fever,  RESPIRATORY: No cough, wheezing, shortness of breath  CARDIOVASCULAR: No chest pain, dyspnea, palpitations, dizziness, syncope, paroxysmal nocturnal dyspnea, orthopnea, or arm or leg swelling  GASTROINTESTINAL: No abdominal  or epigastric pain, nausea, vomiting,  diarrhea  NEUROLOGICAL: No headaches,  loss of strength, numbness, or tremors    Vital Signs Last 24 Hrs  T(C): 37.1 (30 Aug 2023 05:30), Max: 37.1 (30 Aug 2023 05:30)  T(F): 98.7 (30 Aug 2023 05:30), Max: 98.7 (30 Aug 2023 05:30)  HR: 85 (30 Aug 2023 05:30) (81 - 85)  BP: 137/77 (30 Aug 2023 05:30) (137/65 - 171/70)  BP(mean): --  RR: 18 (30 Aug 2023 05:30) (18 - 18)  SpO2: 90% (30 Aug 2023 05:30) (90% - 92%)    Parameters below as of 30 Aug 2023 05:30  Patient On (Oxygen Delivery Method): room air        PHYSICAL EXAM:  HEAD:  Atraumatic, Normocephalic  NECK: Supple and normal thyroid.  No JVD or carotid bruit.   HEART: S1, S2 regular , 1/6 soft ejection systolic murmur in mitral area , no thrill and no gallops .  PULMONARY: Bilateral vesicular breathing , few scattered ronchi and few scattered rales are present .  ABDOMEN: Soft nontender and positive bowl sounds   EXTREMITIES:  No clubbing, cyanosis, or pedal  edema  NEUROLOGICAL: AAOX3 , no focal deficit .    LABS:                        11.3   11.52 )-----------( 340      ( 29 Aug 2023 10:13 )             36.5     08-29    x   |  x   |  x   ----------------------------<  x   x    |  x   |  0.78    Ca    8.6      29 Aug 2023 06:54    TPro  6.3  /  Alb  2.4<L>  /  TBili  0.8  /  DBili  0.3  /  AST  111<H>  /  ALT  141<H>  /  AlkPhos  94  08-29        PT/INR - ( 29 Aug 2023 22:20 )   PT: 16.0 sec;   INR: 1.38 ratio           Urinalysis Basic - ( 29 Aug 2023 06:54 )    Color: x / Appearance: x / SG: x / pH: x  Gluc: 99 mg/dL / Ketone: x  / Bili: x / Urobili: x   Blood: x / Protein: x / Nitrite: x   Leuk Esterase: x / RBC: x / WBC x   Sq Epi: x / Non Sq Epi: x / Bacteria: x      BNP      EKG:  ECHO:  IMAGING:    Assessment/Plan    Will continue to follow during hospital course and give further recommendations as needed . Thanks for your referral . if any questions please contact me at office ([phone])cell [phone])  [facility] [place]       HPI:  89-year-old female with history of hypothyroidism, A-fib on Eliquis, s/p recent pacemaker for complete heart block brought in by ambulance from [facility] assisted living home for cough for the past 2 weeks.  Associated with generalized weakness and decreased p.o. intake.  No fall or trauma.  Patient states she has had cold symptoms for a while.  Denies fever, chills, chest pain, shortness of breath, abdominal pain, nausea, vomiting, upper or lower extremity weakness or paresthesias. pmd: Dr. Crawley, cards: [facility] Seen by card Dr Reich Admitted  to telemetry unit for monitoring , send 3 sets of cardiac enzymes to rule out acute coronary event, obtain ECHO to evaluate LVEF, cardiology consult  ,continue current management, O2 supply, anticoagulation plan as per cardiology consult Pulm cons requested , antitussives and nebulized bd ordered .Also UTI suspected and started on iv abx , id cons called Palliative care consult requested ,to discuss advance directives and complete MOLST  (20 Aug 2023 15:35)        SUBJECTIVE:      ALLERGIES:  Allergies    penicillin (Unknown)    Intolerances          MEDICATIONS  (STANDING):  acetaminophen     Tablet .. 325 milliGRAM(s) Oral every 8 hours  aMIOdarone    Tablet 200 milliGRAM(s) Oral daily  amLODIPine   Tablet 10 milliGRAM(s) Oral daily  apixaban 2.5 milliGRAM(s) Oral two times a day  artificial  tears Solution 1 Drop(s) Both EYES two times a day  dextrose 5% + sodium chloride 0.45%. 1000 milliLiter(s) (50 mL/Hr) IV Continuous <Continuous>  ferrous    sulfate 325 milliGRAM(s) Oral daily  lactobacillus acidophilus 1 Tablet(s) Oral every 12 hours  levothyroxine 75 MICROGram(s) Oral daily  metoprolol tartrate 50 milliGRAM(s) Oral two times a day  multivitamin/minerals 1 Tablet(s) Oral daily  pantoprazole    Tablet 40 milliGRAM(s) Oral daily  polyethylene glycol 3350 17 Gram(s) Oral daily  remdesivir  IVPB   IV Intermittent   remdesivir  IVPB 100 milliGRAM(s) IV Intermittent every 24 hours    MEDICATIONS  (PRN):  acetaminophen     Tablet .. 650 milliGRAM(s) Oral every 6 hours PRN Temp greater or equal to 38C (100.4F), Mild Pain (1 - 3)  albuterol/ipratropium for Nebulization 3 milliLiter(s) Nebulizer every 6 hours PRN Shortness of Breath and/or Wheezing  aluminum hydroxide/magnesium hydroxide/simethicone Suspension 30 milliLiter(s) Oral every 4 hours PRN Dyspepsia  guaiFENesin Oral Liquid (Sugar-Free) 200 milliGRAM(s) Oral every 6 hours PRN Cough  melatonin 3 milliGRAM(s) Oral at bedtime PRN Insomnia  ondansetron Injectable 4 milliGRAM(s) IV Push every 8 hours PRN Nausea and/or Vomiting      REVIEW OF SYSTEMS:  CONSTITUTIONAL: No fever,  RESPIRATORY: No cough, wheezing, shortness of breath  CARDIOVASCULAR: No chest pain, dyspnea, palpitations, dizziness, syncope, paroxysmal nocturnal dyspnea, orthopnea, or arm or leg swelling  GASTROINTESTINAL: No abdominal  or epigastric pain, nausea, vomiting,  diarrhea  NEUROLOGICAL: No headaches,  loss of strength, numbness, or tremors    Vital Signs Last 24 Hrs  T(C): 37.1 (30 Aug 2023 05:30), Max: 37.1 (30 Aug 2023 05:30)  T(F): 98.7 (30 Aug 2023 05:30), Max: 98.7 (30 Aug 2023 05:30)  HR: 85 (30 Aug 2023 05:30) (81 - 85)  BP: 137/77 (30 Aug 2023 05:30) (137/65 - 171/70)  BP(mean): --  RR: 18 (30 Aug 2023 05:30) (18 - 18)  SpO2: 90% (30 Aug 2023 05:30) (90% - 92%)    Parameters below as of 30 Aug 2023 05:30  Patient On (Oxygen Delivery Method): room air        PHYSICAL EXAM:  HEAD:  Atraumatic, Normocephalic  NECK: Supple and normal thyroid.  No JVD or carotid bruit.   HEART: S1, S2 regular , 1/6 soft ejection systolic murmur in mitral area , no thrill and no gallops .  PULMONARY: Bilateral vesicular breathing , few scattered ronchi and few scattered rales are present .  ABDOMEN: Soft nontender and positive bowl sounds   EXTREMITIES:  No clubbing, cyanosis, or pedal  edema  NEUROLOGICAL: AAOX3 , no focal deficit .    LABS:                        11.3   11.52 )-----------( 340      ( 29 Aug 2023 10:13 )             36.5     08-29    x   |  x   |  x   ----------------------------<  x   x    |  x   |  0.78    Ca    8.6      29 Aug 2023 06:54    TPro  6.3  /  Alb  2.4<L>  /  TBili  0.8  /  DBili  0.3  /  AST  111<H>  /  ALT  141<H>  /  AlkPhos  94  08-29        PT/INR - ( 29 Aug 2023 22:20 )   PT: 16.0 sec;   INR: 1.38 ratio           Urinalysis Basic - ( 29 Aug 2023 06:54 )    Color: x / Appearance: x / SG: x / pH: x  Gluc: 99 mg/dL / Ketone: x  / Bili: x / Urobili: x   Blood: x / Protein: x / Nitrite: x   Leuk Esterase: x / RBC: x / WBC x   Sq Epi: x / Non Sq Epi: x / Bacteria: x      BNP      EKG:  ECHO:  IMAGING:    Assessment/Plan    Will continue to follow during hospital course and give further recommendations as needed . Thanks for your referral . if any questions please contact me at office ([phone])cell [phone])  [facility] [place]       HPI:  89-year-old female with history of hypothyroidism, A-fib on Eliquis, s/p recent pacemaker for complete heart block brought in by ambulance from [facility] assisted living home for cough for the past 2 weeks.  Associated with generalized weakness and decreased p.o. intake.  No fall or trauma.  Patient states she has had cold symptoms for a while.  Denies fever, chills, chest pain, shortness of breath, abdominal pain, nausea, vomiting, upper or lower extremity weakness or paresthesias. pmd: Dr. Crawley, cards: [facility] Seen by card Dr Reich Admitted  to telemetry unit for monitoring , send 3 sets of cardiac enzymes to rule out acute coronary event, obtain ECHO to evaluate LVEF, cardiology consult  ,continue current management, O2 supply, anticoagulation plan as per cardiology consult Pulm cons requested , antitussives and nebulized bd ordered .Also UTI suspected and started on iv abx , id cons called Palliative care consult requested ,to discuss advance directives and complete MOLST  (20 Aug 2023 15:35)        SUBJECTIVE:      ALLERGIES:  Allergies    penicillin (Unknown)    Intolerances          MEDICATIONS  (STANDING):  acetaminophen     Tablet .. 325 milliGRAM(s) Oral every 8 hours  aMIOdarone    Tablet 200 milliGRAM(s) Oral daily  amLODIPine   Tablet 10 milliGRAM(s) Oral daily  apixaban 2.5 milliGRAM(s) Oral two times a day  artificial  tears Solution 1 Drop(s) Both EYES two times a day  dextrose 5% + sodium chloride 0.45%. 1000 milliLiter(s) (50 mL/Hr) IV Continuous <Continuous>  ferrous    sulfate 325 milliGRAM(s) Oral daily  lactobacillus acidophilus 1 Tablet(s) Oral every 12 hours  levothyroxine 75 MICROGram(s) Oral daily  metoprolol tartrate 50 milliGRAM(s) Oral two times a day  multivitamin/minerals 1 Tablet(s) Oral daily  pantoprazole    Tablet 40 milliGRAM(s) Oral daily  polyethylene glycol 3350 17 Gram(s) Oral daily  remdesivir  IVPB   IV Intermittent   remdesivir  IVPB 100 milliGRAM(s) IV Intermittent every 24 hours    MEDICATIONS  (PRN):  acetaminophen     Tablet .. 650 milliGRAM(s) Oral every 6 hours PRN Temp greater or equal to 38C (100.4F), Mild Pain (1 - 3)  albuterol/ipratropium for Nebulization 3 milliLiter(s) Nebulizer every 6 hours PRN Shortness of Breath and/or Wheezing  aluminum hydroxide/magnesium hydroxide/simethicone Suspension 30 milliLiter(s) Oral every 4 hours PRN Dyspepsia  guaiFENesin Oral Liquid (Sugar-Free) 200 milliGRAM(s) Oral every 6 hours PRN Cough  melatonin 3 milliGRAM(s) Oral at bedtime PRN Insomnia  ondansetron Injectable 4 milliGRAM(s) IV Push every 8 hours PRN Nausea and/or Vomiting      REVIEW OF SYSTEMS:  CONSTITUTIONAL: No fever,  RESPIRATORY: No cough, wheezing, shortness of breath  CARDIOVASCULAR: No chest pain, dyspnea, palpitations, dizziness, syncope, paroxysmal nocturnal dyspnea, orthopnea, or arm or leg swelling  GASTROINTESTINAL: No abdominal  or epigastric pain, nausea, vomiting,  diarrhea  NEUROLOGICAL: No headaches,  loss of strength, numbness, or tremors    Vital Signs Last 24 Hrs  T(C): 37.1 (30 Aug 2023 05:30), Max: 37.1 (30 Aug 2023 05:30)  T(F): 98.7 (30 Aug 2023 05:30), Max: 98.7 (30 Aug 2023 05:30)  HR: 85 (30 Aug 2023 05:30) (81 - 85)  BP: 137/77 (30 Aug 2023 05:30) (137/65 - 171/70)  BP(mean): --  RR: 18 (30 Aug 2023 05:30) (18 - 18)  SpO2: 90% (30 Aug 2023 05:30) (90% - 92%)    Parameters below as of 30 Aug 2023 05:30  Patient On (Oxygen Delivery Method): room air        PHYSICAL EXAM:  HEAD:  Atraumatic, Normocephalic  NECK: Supple and normal thyroid.  No JVD or carotid bruit.   HEART: S1, S2 regular , 1/6 soft ejection systolic murmur in mitral area , no thrill and no gallops .  PULMONARY: Bilateral vesicular breathing , few scattered ronchi and few scattered rales are present .  ABDOMEN: Soft nontender and positive bowl sounds   EXTREMITIES:  No clubbing, cyanosis, or pedal  edema  NEUROLOGICAL: AAOX3 , no focal deficit .    LABS:                        11.3   11.52 )-----------( 340      ( 29 Aug 2023 10:13 )             36.5     08-29    x   |  x   |  x   ----------------------------<  x   x    |  x   |  0.78    Ca    8.6      29 Aug 2023 06:54    TPro  6.3  /  Alb  2.4<L>  /  TBili  0.8  /  DBili  0.3  /  AST  111<H>  /  ALT  141<H>  /  AlkPhos  94  08-29        PT/INR - ( 29 Aug 2023 22:20 )   PT: 16.0 sec;   INR: 1.38 ratio           Urinalysis Basic - ( 29 Aug 2023 06:54 )    Color: x / Appearance: x / SG: x / pH: x  Gluc: 99 mg/dL / Ketone: x  / Bili: x / Urobili: x   Blood: x / Protein: x / Nitrite: x   Leuk Esterase: x / RBC: x / WBC x   Sq Epi: x / Non Sq Epi: x / Bacteria: x      BNP      EKG:  ECHO:  IMAGING:    Assessment/Plan    Will continue to follow during hospital course and give further recommendations as needed . Thanks for your referral . if any questions please contact me at office ([phone])cell [phone])  [facility] [place]       HPI:  89-year-old female with history of hypothyroidism, A-fib on Eliquis, s/p recent pacemaker for complete heart block brought in by ambulance from [facility] assisted living home for cough for the past 2 weeks.  Associated with generalized weakness and decreased p.o. intake.  No fall or trauma.  Patient states she has had cold symptoms for a while.  Denies fever, chills, chest pain, shortness of breath, abdominal pain, nausea, vomiting, upper or lower extremity weakness or paresthesias. pmd: Dr. Crawley, cards: [facility] Seen by card Dr Reich Admitted  to telemetry unit for monitoring , send 3 sets of cardiac enzymes to rule out acute coronary event, obtain ECHO to evaluate LVEF, cardiology consult  ,continue current management, O2 supply, anticoagulation plan as per cardiology consult Pulm cons requested , antitussives and nebulized bd ordered .Also UTI suspected and started on iv abx , id cons called Palliative care consult requested ,to discuss advance directives and complete MOLST  (20 Aug 2023 15:35)        SUBJECTIVE:      ALLERGIES:  Allergies    penicillin (Unknown)    Intolerances          MEDICATIONS  (STANDING):  acetaminophen     Tablet .. 325 milliGRAM(s) Oral every 8 hours  aMIOdarone    Tablet 200 milliGRAM(s) Oral daily  amLODIPine   Tablet 10 milliGRAM(s) Oral daily  apixaban 2.5 milliGRAM(s) Oral two times a day  artificial  tears Solution 1 Drop(s) Both EYES two times a day  dextrose 5% + sodium chloride 0.45%. 1000 milliLiter(s) (50 mL/Hr) IV Continuous <Continuous>  ferrous    sulfate 325 milliGRAM(s) Oral daily  lactobacillus acidophilus 1 Tablet(s) Oral every 12 hours  levothyroxine 75 MICROGram(s) Oral daily  metoprolol tartrate 50 milliGRAM(s) Oral two times a day  multivitamin/minerals 1 Tablet(s) Oral daily  pantoprazole    Tablet 40 milliGRAM(s) Oral daily  polyethylene glycol 3350 17 Gram(s) Oral daily  remdesivir  IVPB   IV Intermittent   remdesivir  IVPB 100 milliGRAM(s) IV Intermittent every 24 hours    MEDICATIONS  (PRN):  acetaminophen     Tablet .. 650 milliGRAM(s) Oral every 6 hours PRN Temp greater or equal to 38C (100.4F), Mild Pain (1 - 3)  albuterol/ipratropium for Nebulization 3 milliLiter(s) Nebulizer every 6 hours PRN Shortness of Breath and/or Wheezing  aluminum hydroxide/magnesium hydroxide/simethicone Suspension 30 milliLiter(s) Oral every 4 hours PRN Dyspepsia  guaiFENesin Oral Liquid (Sugar-Free) 200 milliGRAM(s) Oral every 6 hours PRN Cough  melatonin 3 milliGRAM(s) Oral at bedtime PRN Insomnia  ondansetron Injectable 4 milliGRAM(s) IV Push every 8 hours PRN Nausea and/or Vomiting      REVIEW OF SYSTEMS:  CONSTITUTIONAL: No fever,  RESPIRATORY: No cough, wheezing, shortness of breath  CARDIOVASCULAR: No chest pain, dyspnea, palpitations, dizziness, syncope, paroxysmal nocturnal dyspnea, orthopnea, or arm or leg swelling  GASTROINTESTINAL: No abdominal  or epigastric pain, nausea, vomiting,  diarrhea  NEUROLOGICAL: No headaches,  loss of strength, numbness, or tremors    Vital Signs Last 24 Hrs  T(C): 37.1 (30 Aug 2023 05:30), Max: 37.1 (30 Aug 2023 05:30)  T(F): 98.7 (30 Aug 2023 05:30), Max: 98.7 (30 Aug 2023 05:30)  HR: 85 (30 Aug 2023 05:30) (81 - 85)  BP: 137/77 (30 Aug 2023 05:30) (137/65 - 171/70)  BP(mean): --  RR: 18 (30 Aug 2023 05:30) (18 - 18)  SpO2: 90% (30 Aug 2023 05:30) (90% - 92%)    Parameters below as of 30 Aug 2023 05:30  Patient On (Oxygen Delivery Method): room air        PHYSICAL EXAM:  HEAD:  Atraumatic, Normocephalic  NECK: Supple and normal thyroid.  No JVD or carotid bruit.   HEART: S1, S2 regular , 1/6 soft ejection systolic murmur in mitral area , no thrill and no gallops .  PULMONARY: Bilateral vesicular breathing , few scattered ronchi and few scattered rales are present .  ABDOMEN: Soft nontender and positive bowl sounds   EXTREMITIES:  No clubbing, cyanosis, or pedal  edema  NEUROLOGICAL: AAOX3 , no focal deficit .    LABS:                        11.3   11.52 )-----------( 340      ( 29 Aug 2023 10:13 )             36.5     08-29    x   |  x   |  x   ----------------------------<  x   x    |  x   |  0.78    Ca    8.6      29 Aug 2023 06:54    TPro  6.3  /  Alb  2.4<L>  /  TBili  0.8  /  DBili  0.3  /  AST  111<H>  /  ALT  141<H>  /  AlkPhos  94  08-29        PT/INR - ( 29 Aug 2023 22:20 )   PT: 16.0 sec;   INR: 1.38 ratio           Urinalysis Basic - ( 29 Aug 2023 06:54 )    Color: x / Appearance: x / SG: x / pH: x  Gluc: 99 mg/dL / Ketone: x  / Bili: x / Urobili: x   Blood: x / Protein: x / Nitrite: x   Leuk Esterase: x / RBC: x / WBC x   Sq Epi: x / Non Sq Epi: x / Bacteria: x  Assessment/Plan  Patient has :  1) R/O pneumonia and sepsis . Possibility of pneumonia less likely as per ID . Chronic cough can be due to enalapril so it was changed to amlodipine . Patient has developed now COVID pneumonia while in Hospital stay and getting  remdesvir   2) UTI   3) paroxysmal atrial fibrillation and stable .  4) SSS and S/P PPM .  5) Mild chronic diastolic heart failure and under control   6) H/O hypothyroidism   7) Mild  Anemia   8) Hypertension   Plan : 1) I/V antibiotics as per ID 2) Rest of medications as such   3) Monitor hemoglobin and electrolytes . 4) Increase ambulation . 5) Discontinue enalapril due to cough and continue amlodipine as out patient   6) Discontinue monitor   Will continue to follow during hospital course and give further recommendations as needed . Thanks for your referral . if any questions please contact me at office ([phone] cell [phone])       [facility] [place]       HPI:  89-year-old female with history of hypothyroidism, A-fib on Eliquis, s/p recent pacemaker for complete heart block brought in by ambulance from [facility] assisted living home for cough for the past 2 weeks.  Associated with generalized weakness and decreased p.o. intake.  No fall or trauma.  Patient states she has had cold symptoms for a while.  Denies fever, chills, chest pain, shortness of breath, abdominal pain, nausea, vomiting, upper or lower extremity weakness or paresthesias. pmd: Dr. Crawley, cards: [facility] Seen by card Dr Reich Admitted  to telemetry unit for monitoring , send 3 sets of cardiac enzymes to rule out acute coronary event, obtain ECHO to evaluate LVEF, cardiology consult  ,continue current management, O2 supply, anticoagulation plan as per cardiology consult Pulm cons requested , antitussives and nebulized bd ordered .Also UTI suspected and started on iv abx , id cons called Palliative care consult requested ,to discuss advance directives and complete MOLST  (20 Aug 2023 15:35)        SUBJECTIVE:      ALLERGIES:  Allergies    penicillin (Unknown)    Intolerances          MEDICATIONS  (STANDING):  acetaminophen     Tablet .. 325 milliGRAM(s) Oral every 8 hours  aMIOdarone    Tablet 200 milliGRAM(s) Oral daily  amLODIPine   Tablet 10 milliGRAM(s) Oral daily  apixaban 2.5 milliGRAM(s) Oral two times a day  artificial  tears Solution 1 Drop(s) Both EYES two times a day  dextrose 5% + sodium chloride 0.45%. 1000 milliLiter(s) (50 mL/Hr) IV Continuous <Continuous>  ferrous    sulfate 325 milliGRAM(s) Oral daily  lactobacillus acidophilus 1 Tablet(s) Oral every 12 hours  levothyroxine 75 MICROGram(s) Oral daily  metoprolol tartrate 50 milliGRAM(s) Oral two times a day  multivitamin/minerals 1 Tablet(s) Oral daily  pantoprazole    Tablet 40 milliGRAM(s) Oral daily  polyethylene glycol 3350 17 Gram(s) Oral daily  remdesivir  IVPB   IV Intermittent   remdesivir  IVPB 100 milliGRAM(s) IV Intermittent every 24 hours    MEDICATIONS  (PRN):  acetaminophen     Tablet .. 650 milliGRAM(s) Oral every 6 hours PRN Temp greater or equal to 38C (100.4F), Mild Pain (1 - 3)  albuterol/ipratropium for Nebulization 3 milliLiter(s) Nebulizer every 6 hours PRN Shortness of Breath and/or Wheezing  aluminum hydroxide/magnesium hydroxide/simethicone Suspension 30 milliLiter(s) Oral every 4 hours PRN Dyspepsia  guaiFENesin Oral Liquid (Sugar-Free) 200 milliGRAM(s) Oral every 6 hours PRN Cough  melatonin 3 milliGRAM(s) Oral at bedtime PRN Insomnia  ondansetron Injectable 4 milliGRAM(s) IV Push every 8 hours PRN Nausea and/or Vomiting      REVIEW OF SYSTEMS:  CONSTITUTIONAL: No fever,  RESPIRATORY: No cough, wheezing, shortness of breath  CARDIOVASCULAR: No chest pain, dyspnea, palpitations, dizziness, syncope, paroxysmal nocturnal dyspnea, orthopnea, or arm or leg swelling  GASTROINTESTINAL: No abdominal  or epigastric pain, nausea, vomiting,  diarrhea  NEUROLOGICAL: No headaches,  loss of strength, numbness, or tremors    Vital Signs Last 24 Hrs  T(C): 37.1 (30 Aug 2023 05:30), Max: 37.1 (30 Aug 2023 05:30)  T(F): 98.7 (30 Aug 2023 05:30), Max: 98.7 (30 Aug 2023 05:30)  HR: 85 (30 Aug 2023 05:30) (81 - 85)  BP: 137/77 (30 Aug 2023 05:30) (137/65 - 171/70)  BP(mean): --  RR: 18 (30 Aug 2023 05:30) (18 - 18)  SpO2: 90% (30 Aug 2023 05:30) (90% - 92%)    Parameters below as of 30 Aug 2023 05:30  Patient On (Oxygen Delivery Method): room air        PHYSICAL EXAM:  HEAD:  Atraumatic, Normocephalic  NECK: Supple and normal thyroid.  No JVD or carotid bruit.   HEART: S1, S2 regular , 1/6 soft ejection systolic murmur in mitral area , no thrill and no gallops .  PULMONARY: Bilateral vesicular breathing , few scattered ronchi and few scattered rales are present .  ABDOMEN: Soft nontender and positive bowl sounds   EXTREMITIES:  No clubbing, cyanosis, or pedal  edema  NEUROLOGICAL: AAOX3 , no focal deficit .    LABS:                        11.3   11.52 )-----------( 340      ( 29 Aug 2023 10:13 )             36.5     08-29    x   |  x   |  x   ----------------------------<  x   x    |  x   |  0.78    Ca    8.6      29 Aug 2023 06:54    TPro  6.3  /  Alb  2.4<L>  /  TBili  0.8  /  DBili  0.3  /  AST  111<H>  /  ALT  141<H>  /  AlkPhos  94  08-29        PT/INR - ( 29 Aug 2023 22:20 )   PT: 16.0 sec;   INR: 1.38 ratio           Urinalysis Basic - ( 29 Aug 2023 06:54 )    Color: x / Appearance: x / SG: x / pH: x  Gluc: 99 mg/dL / Ketone: x  / Bili: x / Urobili: x   Blood: x / Protein: x / Nitrite: x   Leuk Esterase: x / RBC: x / WBC x   Sq Epi: x / Non Sq Epi: x / Bacteria: x  Assessment/Plan  Patient has :  1) R/O pneumonia and sepsis . Possibility of pneumonia less likely as per ID . Chronic cough can be due to enalapril so it was changed to amlodipine . Patient has developed now COVID pneumonia while in Hospital stay and getting  remdesvir   2) UTI   3) paroxysmal atrial fibrillation and stable .  4) SSS and S/P PPM .  5) Mild chronic diastolic heart failure and under control   6) H/O hypothyroidism   7) Mild  Anemia   8) Hypertension   Plan : 1) I/V antibiotics as per ID 2) Rest of medications as such   3) Monitor hemoglobin and electrolytes . 4) Increase ambulation . 5) Discontinue enalapril due to cough and continue amlodipine as out patient   6) Discontinue monitor   Will continue to follow during hospital course and give further recommendations as needed . Thanks for your referral . if any questions please contact me at office ([phone] cell [phone])       [facility] [place]       HPI:  89-year-old female with history of hypothyroidism, A-fib on Eliquis, s/p recent pacemaker for complete heart block brought in by ambulance from [facility] assisted living home for cough for the past 2 weeks.  Associated with generalized weakness and decreased p.o. intake.  No fall or trauma.  Patient states she has had cold symptoms for a while.  Denies fever, chills, chest pain, shortness of breath, abdominal pain, nausea, vomiting, upper or lower extremity weakness or paresthesias. pmd: Dr. Crawley, cards: [facility] Seen by card Dr Reich Admitted  to telemetry unit for monitoring , send 3 sets of cardiac enzymes to rule out acute coronary event, obtain ECHO to evaluate LVEF, cardiology consult  ,continue current management, O2 supply, anticoagulation plan as per cardiology consult Pulm cons requested , antitussives and nebulized bd ordered .Also UTI suspected and started on iv abx , id cons called Palliative care consult requested ,to discuss advance directives and complete MOLST  (20 Aug 2023 15:35)        SUBJECTIVE:      ALLERGIES:  Allergies    penicillin (Unknown)    Intolerances          MEDICATIONS  (STANDING):  acetaminophen     Tablet .. 325 milliGRAM(s) Oral every 8 hours  aMIOdarone    Tablet 200 milliGRAM(s) Oral daily  amLODIPine   Tablet 10 milliGRAM(s) Oral daily  apixaban 2.5 milliGRAM(s) Oral two times a day  artificial  tears Solution 1 Drop(s) Both EYES two times a day  dextrose 5% + sodium chloride 0.45%. 1000 milliLiter(s) (50 mL/Hr) IV Continuous <Continuous>  ferrous    sulfate 325 milliGRAM(s) Oral daily  lactobacillus acidophilus 1 Tablet(s) Oral every 12 hours  levothyroxine 75 MICROGram(s) Oral daily  metoprolol tartrate 50 milliGRAM(s) Oral two times a day  multivitamin/minerals 1 Tablet(s) Oral daily  pantoprazole    Tablet 40 milliGRAM(s) Oral daily  polyethylene glycol 3350 17 Gram(s) Oral daily  remdesivir  IVPB   IV Intermittent   remdesivir  IVPB 100 milliGRAM(s) IV Intermittent every 24 hours    MEDICATIONS  (PRN):  acetaminophen     Tablet .. 650 milliGRAM(s) Oral every 6 hours PRN Temp greater or equal to 38C (100.4F), Mild Pain (1 - 3)  albuterol/ipratropium for Nebulization 3 milliLiter(s) Nebulizer every 6 hours PRN Shortness of Breath and/or Wheezing  aluminum hydroxide/magnesium hydroxide/simethicone Suspension 30 milliLiter(s) Oral every 4 hours PRN Dyspepsia  guaiFENesin Oral Liquid (Sugar-Free) 200 milliGRAM(s) Oral every 6 hours PRN Cough  melatonin 3 milliGRAM(s) Oral at bedtime PRN Insomnia  ondansetron Injectable 4 milliGRAM(s) IV Push every 8 hours PRN Nausea and/or Vomiting      REVIEW OF SYSTEMS:  CONSTITUTIONAL: No fever,  RESPIRATORY: No cough, wheezing, shortness of breath  CARDIOVASCULAR: No chest pain, dyspnea, palpitations, dizziness, syncope, paroxysmal nocturnal dyspnea, orthopnea, or arm or leg swelling  GASTROINTESTINAL: No abdominal  or epigastric pain, nausea, vomiting,  diarrhea  NEUROLOGICAL: No headaches,  loss of strength, numbness, or tremors    Vital Signs Last 24 Hrs  T(C): 37.1 (30 Aug 2023 05:30), Max: 37.1 (30 Aug 2023 05:30)  T(F): 98.7 (30 Aug 2023 05:30), Max: 98.7 (30 Aug 2023 05:30)  HR: 85 (30 Aug 2023 05:30) (81 - 85)  BP: 137/77 (30 Aug 2023 05:30) (137/65 - 171/70)  BP(mean): --  RR: 18 (30 Aug 2023 05:30) (18 - 18)  SpO2: 90% (30 Aug 2023 05:30) (90% - 92%)    Parameters below as of 30 Aug 2023 05:30  Patient On (Oxygen Delivery Method): room air        PHYSICAL EXAM:  HEAD:  Atraumatic, Normocephalic  NECK: Supple and normal thyroid.  No JVD or carotid bruit.   HEART: S1, S2 regular , 1/6 soft ejection systolic murmur in mitral area , no thrill and no gallops .  PULMONARY: Bilateral vesicular breathing , few scattered ronchi and few scattered rales are present .  ABDOMEN: Soft nontender and positive bowl sounds   EXTREMITIES:  No clubbing, cyanosis, or pedal  edema  NEUROLOGICAL: AAOX3 , no focal deficit .    LABS:                        11.3   11.52 )-----------( 340      ( 29 Aug 2023 10:13 )             36.5     08-29    x   |  x   |  x   ----------------------------<  x   x    |  x   |  0.78    Ca    8.6      29 Aug 2023 06:54    TPro  6.3  /  Alb  2.4<L>  /  TBili  0.8  /  DBili  0.3  /  AST  111<H>  /  ALT  141<H>  /  AlkPhos  94  08-29        PT/INR - ( 29 Aug 2023 22:20 )   PT: 16.0 sec;   INR: 1.38 ratio           Urinalysis Basic - ( 29 Aug 2023 06:54 )    Color: x / Appearance: x / SG: x / pH: x  Gluc: 99 mg/dL / Ketone: x  / Bili: x / Urobili: x   Blood: x / Protein: x / Nitrite: x   Leuk Esterase: x / RBC: x / WBC x   Sq Epi: x / Non Sq Epi: x / Bacteria: x  Assessment/Plan  Patient has :  1) R/O pneumonia and sepsis . Possibility of pneumonia less likely as per ID . Chronic cough can be due to enalapril so it was changed to amlodipine . Patient has developed now COVID pneumonia while in Hospital stay and getting  remdesvir   2) UTI   3) paroxysmal atrial fibrillation and stable .  4) SSS and S/P PPM .  5) Mild chronic diastolic heart failure and under control   6) H/O hypothyroidism   7) Mild  Anemia   8) Hypertension   Plan : 1) I/V antibiotics as per ID 2) Rest of medications as such   3) Monitor hemoglobin and electrolytes . 4) Increase ambulation . 5) Discontinue enalapril due to cough and continue amlodipine as out patient   6) Discontinue monitor   Will continue to follow during hospital course and give further recommendations as needed . Thanks for your referral . if any questions please contact me at office ([phone] cell [phone])       NINOSKA DAVENPORT MD [place] [id number]  [address]  OFFICE : [phone]  CELL : [phone]  CARDIOLOGY F/U  :       HPI:  89-year-old female with history of hypothyroidism, A-fib on Eliquis, s/p recent pacemaker for complete heart block brought in by ambulance from [facility] living home for cough for the past 2 weeks.  Associated with generalized weakness and decreased p.o. intake.  No fall or trauma.  Patient states she has had cold symptoms for a while.  Denies fever, chills, chest pain, shortness of breath, abdominal pain, nausea, vomiting, upper or lower extremity weakness or paresthesias. pmd: Dr. Crawley, cards: [facility] Seen by card Dr Davenport Admitted  to telemetry unit for monitoring , send 3 sets of cardiac enzymes to rule out acute coronary event, obtain ECHO to evaluate LVEF, cardiology consult  ,continue current management, O2 supply, anticoagulation plan as per cardiology consult Pulm cons requested , antitussives and nebulized bd ordered .Also UTI suspected and started on iv abx , id cons called Palliative care consult requested ,to discuss advance directives and complete MOLST  (20 Aug 2023 15:35)        SUBJECTIVE: Patient feeling tired and fatigue       ALLERGIES:  Allergies    penicillin (Unknown)    Intolerances          MEDICATIONS  (STANDING):  acetaminophen     Tablet .. 325 milliGRAM(s) Oral every 8 hours  aMIOdarone    Tablet 200 milliGRAM(s) Oral daily  amLODIPine   Tablet 10 milliGRAM(s) Oral daily  apixaban 2.5 milliGRAM(s) Oral two times a day  artificial  tears Solution 1 Drop(s) Both EYES two times a day  dextrose 5% + sodium chloride 0.45%. 1000 milliLiter(s) (50 mL/Hr) IV Continuous <Continuous>  ferrous    sulfate 325 milliGRAM(s) Oral daily  lactobacillus acidophilus 1 Tablet(s) Oral every 12 hours  levothyroxine 75 MICROGram(s) Oral daily  metoprolol tartrate 50 milliGRAM(s) Oral two times a day  multivitamin/minerals 1 Tablet(s) Oral daily  pantoprazole    Tablet 40 milliGRAM(s) Oral daily  polyethylene glycol 3350 17 Gram(s) Oral daily  remdesivir  IVPB   IV Intermittent   remdesivir  IVPB 100 milliGRAM(s) IV Intermittent every 24 hours    MEDICATIONS  (PRN):  acetaminophen     Tablet .. 650 milliGRAM(s) Oral every 6 hours PRN Temp greater or equal to 38C (100.4F), Mild Pain (1 - 3)  albuterol/ipratropium for Nebulization 3 milliLiter(s) Nebulizer every 6 hours PRN Shortness of Breath and/or Wheezing  aluminum hydroxide/magnesium hydroxide/simethicone Suspension 30 milliLiter(s) Oral every 4 hours PRN Dyspepsia  guaiFENesin Oral Liquid (Sugar-Free) 200 milliGRAM(s) Oral every 6 hours PRN Cough  melatonin 3 milliGRAM(s) Oral at bedtime PRN Insomnia  ondansetron Injectable 4 milliGRAM(s) IV Push every 8 hours PRN Nausea and/or Vomiting      REVIEW OF SYSTEMS:  CONSTITUTIONAL: No fever,  RESPIRATORY: No cough, wheezing, shortness of breath  CARDIOVASCULAR: No chest pain, dyspnea, palpitations, dizziness, syncope, paroxysmal nocturnal dyspnea, orthopnea, or arm or leg swelling  GASTROINTESTINAL: No abdominal  or epigastric pain, nausea, vomiting,  diarrhea  NEUROLOGICAL: No headaches,  loss of strength, numbness, or tremors    Vital Signs Last 24 Hrs  T(C): 37.1 (30 Aug 2023 05:30), Max: 37.1 (30 Aug 2023 05:30)  T(F): 98.7 (30 Aug 2023 05:30), Max: 98.7 (30 Aug 2023 05:30)  HR: 85 (30 Aug 2023 05:30) (81 - 85)  BP: 137/77 (30 Aug 2023 05:30) (137/65 - 171/70)  BP(mean): --  RR: 18 (30 Aug 2023 05:30) (18 - 18)  SpO2: 90% (30 Aug 2023 05:30) (90% - 92%)    Parameters below as of 30 Aug 2023 05:30  Patient On (Oxygen Delivery Method): room air        PHYSICAL EXAM:  HEAD:  Atraumatic, Normocephalic  NECK: Supple and normal thyroid.  No JVD or carotid bruit.   HEART: S1, S2 irregular , 1/6 soft ejection systolic murmur in mitral area , no thrill and no gallops .  PULMONARY: Bilateral vesicular breathing , few scattered rhonchi and few scattered rales are present .  ABDOMEN: Soft nontender and positive bowl sounds   EXTREMITIES:  No clubbing, cyanosis, or pedal  edema  NEUROLOGICAL: AA and mild confused  , no focal deficit .  Skin : No rashes .  Musculoskeletal : No joint swellings .    LABS:                        11.3   11.52 )-----------( 340      ( 29 Aug 2023 10:13 )             36.5     08-29    x   |  x   |  x   ----------------------------<  x   x    |  x   |  0.78    Ca    8.6      29 Aug 2023 06:54    TPro  6.3  /  Alb  2.4<L>  /  TBili  0.8  /  DBili  0.3  /  AST  111<H>  /  ALT  141<H>  /  AlkPhos  94  08-29        PT/INR - ( 29 Aug 2023 22:20 )   PT: 16.0 sec;   INR: 1.38 ratio           Urinalysis Basic - ( 29 Aug 2023 06:54 )    Color: x / Appearance: x / SG: x / pH: x  Gluc: 99 mg/dL / Ketone: x  / Bili: x / Urobili: x   Blood: x / Protein: x / Nitrite: x   Leuk Esterase: x / RBC: x / WBC x   Sq Epi: x / Non Sq Epi: x / Bacteria: x  Assessment/Plan  Patient has :  1) R/O pneumonia and sepsis . Possibility of pneumonia less likely as per ID . Chronic cough can be due to enalapril so it was changed to amlodipine . Patient has developed now COVID pneumonia while in Hospital stay and getting  remdesvir   2) UTI   3) paroxysmal atrial fibrillation and stable .  4) SSS and S/P PPM .  5) Mild chronic diastolic heart failure and under control   6) H/O hypothyroidism   7) Mild  Anemia   8) Hypertension   Plan : 1) I/V antibiotics as per ID 2) Rest of medications as such   3) Monitor hemoglobin and electrolytes . 4) Increase ambulation . 5) Discontinue enalapril due to cough and continue amlodipine as out patient   6) Discontinue monitor   Will continue to follow during hospital course and give further recommendations as needed . Thanks for your referral . if any questions please contact me at office ([phone] cell [phone])       NINOSKA DAVENPORT MD [place] [id number]  [address]  OFFICE : [phone]  CELL : [phone]  CARDIOLOGY F/U  :       HPI:  89-year-old female with history of hypothyroidism, A-fib on Eliquis, s/p recent pacemaker for complete heart block brought in by ambulance from [facility] living home for cough for the past 2 weeks.  Associated with generalized weakness and decreased p.o. intake.  No fall or trauma.  Patient states she has had cold symptoms for a while.  Denies fever, chills, chest pain, shortness of breath, abdominal pain, nausea, vomiting, upper or lower extremity weakness or paresthesias. pmd: Dr. Crawley, cards: [facility] Seen by card Dr Davenport Admitted  to telemetry unit for monitoring , send 3 sets of cardiac enzymes to rule out acute coronary event, obtain ECHO to evaluate LVEF, cardiology consult  ,continue current management, O2 supply, anticoagulation plan as per cardiology consult Pulm cons requested , antitussives and nebulized bd ordered .Also UTI suspected and started on iv abx , id cons called Palliative care consult requested ,to discuss advance directives and complete MOLST  (20 Aug 2023 15:35)        SUBJECTIVE: Patient feeling tired and fatigue       ALLERGIES:  Allergies    penicillin (Unknown)    Intolerances          MEDICATIONS  (STANDING):  acetaminophen     Tablet .. 325 milliGRAM(s) Oral every 8 hours  aMIOdarone    Tablet 200 milliGRAM(s) Oral daily  amLODIPine   Tablet 10 milliGRAM(s) Oral daily  apixaban 2.5 milliGRAM(s) Oral two times a day  artificial  tears Solution 1 Drop(s) Both EYES two times a day  dextrose 5% + sodium chloride 0.45%. 1000 milliLiter(s) (50 mL/Hr) IV Continuous <Continuous>  ferrous    sulfate 325 milliGRAM(s) Oral daily  lactobacillus acidophilus 1 Tablet(s) Oral every 12 hours  levothyroxine 75 MICROGram(s) Oral daily  metoprolol tartrate 50 milliGRAM(s) Oral two times a day  multivitamin/minerals 1 Tablet(s) Oral daily  pantoprazole    Tablet 40 milliGRAM(s) Oral daily  polyethylene glycol 3350 17 Gram(s) Oral daily  remdesivir  IVPB   IV Intermittent   remdesivir  IVPB 100 milliGRAM(s) IV Intermittent every 24 hours    MEDICATIONS  (PRN):  acetaminophen     Tablet .. 650 milliGRAM(s) Oral every 6 hours PRN Temp greater or equal to 38C (100.4F), Mild Pain (1 - 3)  albuterol/ipratropium for Nebulization 3 milliLiter(s) Nebulizer every 6 hours PRN Shortness of Breath and/or Wheezing  aluminum hydroxide/magnesium hydroxide/simethicone Suspension 30 milliLiter(s) Oral every 4 hours PRN Dyspepsia  guaiFENesin Oral Liquid (Sugar-Free) 200 milliGRAM(s) Oral every 6 hours PRN Cough  melatonin 3 milliGRAM(s) Oral at bedtime PRN Insomnia  ondansetron Injectable 4 milliGRAM(s) IV Push every 8 hours PRN Nausea and/or Vomiting      REVIEW OF SYSTEMS:  CONSTITUTIONAL: No fever,  RESPIRATORY: No cough, wheezing, shortness of breath  CARDIOVASCULAR: No chest pain, dyspnea, palpitations, dizziness, syncope, paroxysmal nocturnal dyspnea, orthopnea, or arm or leg swelling  GASTROINTESTINAL: No abdominal  or epigastric pain, nausea, vomiting,  diarrhea  NEUROLOGICAL: No headaches,  loss of strength, numbness, or tremors    Vital Signs Last 24 Hrs  T(C): 37.1 (30 Aug 2023 05:30), Max: 37.1 (30 Aug 2023 05:30)  T(F): 98.7 (30 Aug 2023 05:30), Max: 98.7 (30 Aug 2023 05:30)  HR: 85 (30 Aug 2023 05:30) (81 - 85)  BP: 137/77 (30 Aug 2023 05:30) (137/65 - 171/70)  BP(mean): --  RR: 18 (30 Aug 2023 05:30) (18 - 18)  SpO2: 90% (30 Aug 2023 05:30) (90% - 92%)    Parameters below as of 30 Aug 2023 05:30  Patient On (Oxygen Delivery Method): room air        PHYSICAL EXAM:  HEAD:  Atraumatic, Normocephalic  NECK: Supple and normal thyroid.  No JVD or carotid bruit.   HEART: S1, S2 irregular , 1/6 soft ejection systolic murmur in mitral area , no thrill and no gallops .  PULMONARY: Bilateral vesicular breathing , few scattered rhonchi and few scattered rales are present .  ABDOMEN: Soft nontender and positive bowl sounds   EXTREMITIES:  No clubbing, cyanosis, or pedal  edema  NEUROLOGICAL: AA and mild confused  , no focal deficit .  Skin : No rashes .  Musculoskeletal : No joint swellings .    LABS:                        11.3   11.52 )-----------( 340      ( 29 Aug 2023 10:13 )             36.5     08-29    x   |  x   |  x   ----------------------------<  x   x    |  x   |  0.78    Ca    8.6      29 Aug 2023 06:54    TPro  6.3  /  Alb  2.4<L>  /  TBili  0.8  /  DBili  0.3  /  AST  111<H>  /  ALT  141<H>  /  AlkPhos  94  08-29        PT/INR - ( 29 Aug 2023 22:20 )   PT: 16.0 sec;   INR: 1.38 ratio           Urinalysis Basic - ( 29 Aug 2023 06:54 )    Color: x / Appearance: x / SG: x / pH: x  Gluc: 99 mg/dL / Ketone: x  / Bili: x / Urobili: x   Blood: x / Protein: x / Nitrite: x   Leuk Esterase: x / RBC: x / WBC x   Sq Epi: x / Non Sq Epi: x / Bacteria: x  Assessment/Plan  Patient has :  1) R/O pneumonia and sepsis . Possibility of pneumonia less likely as per ID . Chronic cough can be due to enalapril so it was changed to amlodipine . Patient has developed now COVID pneumonia while in Hospital stay and getting  remdesvir   2) UTI   3) paroxysmal atrial fibrillation and stable .  4) SSS and S/P PPM .  5) Mild chronic diastolic heart failure and under control   6) H/O hypothyroidism   7) Mild  Anemia   8) Hypertension   Plan : 1) I/V antibiotics as per ID 2) Rest of medications as such   3) Monitor hemoglobin and electrolytes . 4) Increase ambulation . 5) Discontinue enalapril due to cough and continue amlodipine as out patient   6) Discontinue monitor   Will continue to follow during hospital course and give further recommendations as needed . Thanks for your referral . if any questions please contact me at office ([phone] cell [phone])       NINOSKA DAVENPORT MD [place] [id number]  [address]  OFFICE : [phone]  CELL : [phone]  CARDIOLOGY F/U  :       HPI:  89-year-old female with history of hypothyroidism, A-fib on Eliquis, s/p recent pacemaker for complete heart block brought in by ambulance from [facility] living home for cough for the past 2 weeks.  Associated with generalized weakness and decreased p.o. intake.  No fall or trauma.  Patient states she has had cold symptoms for a while.  Denies fever, chills, chest pain, shortness of breath, abdominal pain, nausea, vomiting, upper or lower extremity weakness or paresthesias. pmd: Dr. Crawley, cards: [facility] Seen by card Dr Davenport Admitted  to telemetry unit for monitoring , send 3 sets of cardiac enzymes to rule out acute coronary event, obtain ECHO to evaluate LVEF, cardiology consult  ,continue current management, O2 supply, anticoagulation plan as per cardiology consult Pulm cons requested , antitussives and nebulized bd ordered .Also UTI suspected and started on iv abx , id cons called Palliative care consult requested ,to discuss advance directives and complete MOLST  (20 Aug 2023 15:35)        SUBJECTIVE: Patient feeling tired and fatigue       ALLERGIES:  Allergies    penicillin (Unknown)    Intolerances          MEDICATIONS  (STANDING):  acetaminophen     Tablet .. 325 milliGRAM(s) Oral every 8 hours  aMIOdarone    Tablet 200 milliGRAM(s) Oral daily  amLODIPine   Tablet 10 milliGRAM(s) Oral daily  apixaban 2.5 milliGRAM(s) Oral two times a day  artificial  tears Solution 1 Drop(s) Both EYES two times a day  dextrose 5% + sodium chloride 0.45%. 1000 milliLiter(s) (50 mL/Hr) IV Continuous <Continuous>  ferrous    sulfate 325 milliGRAM(s) Oral daily  lactobacillus acidophilus 1 Tablet(s) Oral every 12 hours  levothyroxine 75 MICROGram(s) Oral daily  metoprolol tartrate 50 milliGRAM(s) Oral two times a day  multivitamin/minerals 1 Tablet(s) Oral daily  pantoprazole    Tablet 40 milliGRAM(s) Oral daily  polyethylene glycol 3350 17 Gram(s) Oral daily  remdesivir  IVPB   IV Intermittent   remdesivir  IVPB 100 milliGRAM(s) IV Intermittent every 24 hours    MEDICATIONS  (PRN):  acetaminophen     Tablet .. 650 milliGRAM(s) Oral every 6 hours PRN Temp greater or equal to 38C (100.4F), Mild Pain (1 - 3)  albuterol/ipratropium for Nebulization 3 milliLiter(s) Nebulizer every 6 hours PRN Shortness of Breath and/or Wheezing  aluminum hydroxide/magnesium hydroxide/simethicone Suspension 30 milliLiter(s) Oral every 4 hours PRN Dyspepsia  guaiFENesin Oral Liquid (Sugar-Free) 200 milliGRAM(s) Oral every 6 hours PRN Cough  melatonin 3 milliGRAM(s) Oral at bedtime PRN Insomnia  ondansetron Injectable 4 milliGRAM(s) IV Push every 8 hours PRN Nausea and/or Vomiting      REVIEW OF SYSTEMS:  CONSTITUTIONAL: No fever,  RESPIRATORY: No cough, wheezing, shortness of breath  CARDIOVASCULAR: No chest pain, dyspnea, palpitations, dizziness, syncope, paroxysmal nocturnal dyspnea, orthopnea, or arm or leg swelling  GASTROINTESTINAL: No abdominal  or epigastric pain, nausea, vomiting,  diarrhea  NEUROLOGICAL: No headaches,  loss of strength, numbness, or tremors    Vital Signs Last 24 Hrs  T(C): 37.1 (30 Aug 2023 05:30), Max: 37.1 (30 Aug 2023 05:30)  T(F): 98.7 (30 Aug 2023 05:30), Max: 98.7 (30 Aug 2023 05:30)  HR: 85 (30 Aug 2023 05:30) (81 - 85)  BP: 137/77 (30 Aug 2023 05:30) (137/65 - 171/70)  BP(mean): --  RR: 18 (30 Aug 2023 05:30) (18 - 18)  SpO2: 90% (30 Aug 2023 05:30) (90% - 92%)    Parameters below as of 30 Aug 2023 05:30  Patient On (Oxygen Delivery Method): room air        PHYSICAL EXAM:  HEAD:  Atraumatic, Normocephalic  NECK: Supple and normal thyroid.  No JVD or carotid bruit.   HEART: S1, S2 irregular , 1/6 soft ejection systolic murmur in mitral area , no thrill and no gallops .  PULMONARY: Bilateral vesicular breathing , few scattered rhonchi and few scattered rales are present .  ABDOMEN: Soft nontender and positive bowl sounds   EXTREMITIES:  No clubbing, cyanosis, or pedal  edema  NEUROLOGICAL: AA and mild confused  , no focal deficit .  Skin : No rashes .  Musculoskeletal : No joint swellings .    LABS:                        11.3   11.52 )-----------( 340      ( 29 Aug 2023 10:13 )             36.5     08-29    x   |  x   |  x   ----------------------------<  x   x    |  x   |  0.78    Ca    8.6      29 Aug 2023 06:54    TPro  6.3  /  Alb  2.4<L>  /  TBili  0.8  /  DBili  0.3  /  AST  111<H>  /  ALT  141<H>  /  AlkPhos  94  08-29        PT/INR - ( 29 Aug 2023 22:20 )   PT: 16.0 sec;   INR: 1.38 ratio           Urinalysis Basic - ( 29 Aug 2023 06:54 )    Color: x / Appearance: x / SG: x / pH: x  Gluc: 99 mg/dL / Ketone: x  / Bili: x / Urobili: x   Blood: x / Protein: x / Nitrite: x   Leuk Esterase: x / RBC: x / WBC x   Sq Epi: x / Non Sq Epi: x / Bacteria: x  Assessment/Plan  Patient has :  1) R/O pneumonia and sepsis . Possibility of pneumonia less likely as per ID . Chronic cough can be due to enalapril so it was changed to amlodipine . Patient has developed now COVID pneumonia while in Hospital stay and getting  remdesvir   2) UTI   3) paroxysmal atrial fibrillation and stable .  4) SSS and S/P PPM .  5) Mild chronic diastolic heart failure and under control   6) H/O hypothyroidism   7) Mild  Anemia   8) Hypertension   Plan : 1) I/V antibiotics as per ID 2) Rest of medications as such   3) Monitor hemoglobin and electrolytes . 4) Increase ambulation . 5) Discontinue enalapril due to cough and continue amlodipine as out patient   6) Discontinue monitor   Will continue to follow during hospital course and give further recommendations as needed . Thanks for your referral . if any questions please contact me at office ([phone] cell [phone])       NINOSKA DAVENPORT MD [place] [id number]  [address]  OFFICE : [phone]  CELL : [phone]  CARDIOLOGY F/U  :       HPI:  89-year-old female with history of hypothyroidism, A-fib on Eliquis, s/p recent pacemaker for complete heart block brought in by ambulance from [facility] living home for cough for the past 2 weeks.  Associated with generalized weakness and decreased p.o. intake.  No fall or trauma.  Patient states she has had cold symptoms for a while.  Denies fever, chills, chest pain, shortness of breath, abdominal pain, nausea, vomiting, upper or lower extremity weakness or paresthesias. pmd: Dr. Crawley, cards: [facility] Seen by card Dr Davenport Admitted  to telemetry unit for monitoring , send 3 sets of cardiac enzymes to rule out acute coronary event, obtain ECHO to evaluate LVEF, cardiology consult  ,continue current management, O2 supply, anticoagulation plan as per cardiology consult Pulm cons requested , antitussives and nebulized bd ordered .Also UTI suspected and started on iv abx , id cons called Palliative care consult requested ,to discuss advance directives and complete MOLST  (20 Aug 2023 15:35)        SUBJECTIVE: Patient feeling tired and fatigue       ALLERGIES:  Allergies    penicillin (Unknown)    Intolerances          MEDICATIONS  (STANDING):  acetaminophen     Tablet .. 325 milliGRAM(s) Oral every 8 hours  aMIOdarone    Tablet 200 milliGRAM(s) Oral daily  amLODIPine   Tablet 10 milliGRAM(s) Oral daily  apixaban 2.5 milliGRAM(s) Oral two times a day  artificial  tears Solution 1 Drop(s) Both EYES two times a day  dextrose 5% + sodium chloride 0.45%. 1000 milliLiter(s) (50 mL/Hr) IV Continuous <Continuous>  ferrous    sulfate 325 milliGRAM(s) Oral daily  lactobacillus acidophilus 1 Tablet(s) Oral every 12 hours  levothyroxine 75 MICROGram(s) Oral daily  metoprolol tartrate 50 milliGRAM(s) Oral two times a day  multivitamin/minerals 1 Tablet(s) Oral daily  pantoprazole    Tablet 40 milliGRAM(s) Oral daily  polyethylene glycol 3350 17 Gram(s) Oral daily  remdesivir  IVPB   IV Intermittent   remdesivir  IVPB 100 milliGRAM(s) IV Intermittent every 24 hours    MEDICATIONS  (PRN):  acetaminophen     Tablet .. 650 milliGRAM(s) Oral every 6 hours PRN Temp greater or equal to 38C (100.4F), Mild Pain (1 - 3)  albuterol/ipratropium for Nebulization 3 milliLiter(s) Nebulizer every 6 hours PRN Shortness of Breath and/or Wheezing  aluminum hydroxide/magnesium hydroxide/simethicone Suspension 30 milliLiter(s) Oral every 4 hours PRN Dyspepsia  guaiFENesin Oral Liquid (Sugar-Free) 200 milliGRAM(s) Oral every 6 hours PRN Cough  melatonin 3 milliGRAM(s) Oral at bedtime PRN Insomnia  ondansetron Injectable 4 milliGRAM(s) IV Push every 8 hours PRN Nausea and/or Vomiting      REVIEW OF SYSTEMS:  CONSTITUTIONAL: No fever,  RESPIRATORY: Improvement in mild  cough and  wheezing  shortness of breath  CARDIOVASCULAR: No chest pain,  palpitations, dizziness, syncope, paroxysmal nocturnal dyspnea,  or arm or leg swelling and improvement in SOB   GASTROINTESTINAL: No abdominal  or epigastric pain, nausea, vomiting,  diarrhea  NEUROLOGICAL: No headaches,  numbness, or tremors  Skin : No itching .  Hematology : No active bleeding .  Endocrinology : No heat and cold intolerance .  Psychiatry : Patient is calm .  Genitourinary : No dysuria .  Musculoskeletal : Patient has mild arthritis       Vital Signs Last 24 Hrs  T(C): 37.1 (30 Aug 2023 05:30), Max: 37.1 (30 Aug 2023 05:30)  T(F): 98.7 (30 Aug 2023 05:30), Max: 98.7 (30 Aug 2023 05:30)  HR: 85 (30 Aug 2023 05:30) (81 - 85)  BP: 137/77 (30 Aug 2023 05:30) (137/65 - 171/70)  BP(mean): --  RR: 18 (30 Aug 2023 05:30) (18 - 18)  SpO2: 90% (30 Aug 2023 05:30) (90% - 92%)    Parameters below as of 30 Aug 2023 05:30  Patient On (Oxygen Delivery Method): room air        PHYSICAL EXAM:  HEAD:  Atraumatic, Normocephalic  NECK: Supple and normal thyroid.  No JVD or carotid bruit.   HEART: S1, S2 irregular , 1/6 soft ejection systolic murmur in mitral area , no thrill and no gallops .  PULMONARY: Bilateral vesicular breathing , few scattered rhonchi and few scattered rales are present .  ABDOMEN: Soft nontender and positive bowl sounds   EXTREMITIES:  No clubbing, cyanosis, or pedal  edema  NEUROLOGICAL: AA and mild confused  , no focal deficit .  Skin : No rashes .  Musculoskeletal : No joint swellings .    LABS:                        11.3   11.52 )-----------( 340      ( 29 Aug 2023 10:13 )             36.5     08-29    x   |  x   |  x   ----------------------------<  x   x    |  x   |  0.78    Ca    8.6      29 Aug 2023 06:54    TPro  6.3  /  Alb  2.4<L>  /  TBili  0.8  /  DBili  0.3  /  AST  111<H>  /  ALT  141<H>  /  AlkPhos  94  08-29        PT/INR - ( 29 Aug 2023 22:20 )   PT: 16.0 sec;   INR: 1.38 ratio           Urinalysis Basic - ( 29 Aug 2023 06:54 )    Color: x / Appearance: x / SG: x / pH: x  Gluc: 99 mg/dL / Ketone: x  / Bili: x / Urobili: x   Blood: x / Protein: x / Nitrite: x   Leuk Esterase: x / RBC: x / WBC x   Sq Epi: x / Non Sq Epi: x / Bacteria: x  Assessment/Plan  Patient has :  1) R/O pneumonia and sepsis . Possibility of pneumonia less likely as per ID . Chronic cough can be due to enalapril so it was changed to amlodipine . Patient has developed now COVID pneumonia while in Hospital stay and getting  remdesvir   2) UTI   3) paroxysmal atrial fibrillation and stable .  4) SSS and S/P PPM .  5) Mild chronic diastolic heart failure and under control   6) H/O hypothyroidism   7) Mild  Anemia   8) Hypertension   Plan : 1) I/V antibiotics as per ID 2) Rest of medications as such   3) Monitor hemoglobin and electrolytes . 4) Increase ambulation . 5) Discontinue enalapril due to cough and continue amlodipine as out patient   6) Discontinue monitor   Will continue to follow during hospital course and give further recommendations as needed . Thanks for your referral . if any questions please contact me at office ([phone] cell [phone])       NINOSKA DAVENPORT MD [place] [id number]  [address]  OFFICE : [phone]  CELL : [phone]  CARDIOLOGY F/U  :       HPI:  89-year-old female with history of hypothyroidism, A-fib on Eliquis, s/p recent pacemaker for complete heart block brought in by ambulance from [facility] living home for cough for the past 2 weeks.  Associated with generalized weakness and decreased p.o. intake.  No fall or trauma.  Patient states she has had cold symptoms for a while.  Denies fever, chills, chest pain, shortness of breath, abdominal pain, nausea, vomiting, upper or lower extremity weakness or paresthesias. pmd: Dr. Crawley, cards: [facility] Seen by card Dr Davenport Admitted  to telemetry unit for monitoring , send 3 sets of cardiac enzymes to rule out acute coronary event, obtain ECHO to evaluate LVEF, cardiology consult  ,continue current management, O2 supply, anticoagulation plan as per cardiology consult Pulm cons requested , antitussives and nebulized bd ordered .Also UTI suspected and started on iv abx , id cons called Palliative care consult requested ,to discuss advance directives and complete MOLST  (20 Aug 2023 15:35)        SUBJECTIVE: Patient feeling tired and fatigue       ALLERGIES:  Allergies    penicillin (Unknown)    Intolerances          MEDICATIONS  (STANDING):  acetaminophen     Tablet .. 325 milliGRAM(s) Oral every 8 hours  aMIOdarone    Tablet 200 milliGRAM(s) Oral daily  amLODIPine   Tablet 10 milliGRAM(s) Oral daily  apixaban 2.5 milliGRAM(s) Oral two times a day  artificial  tears Solution 1 Drop(s) Both EYES two times a day  dextrose 5% + sodium chloride 0.45%. 1000 milliLiter(s) (50 mL/Hr) IV Continuous <Continuous>  ferrous    sulfate 325 milliGRAM(s) Oral daily  lactobacillus acidophilus 1 Tablet(s) Oral every 12 hours  levothyroxine 75 MICROGram(s) Oral daily  metoprolol tartrate 50 milliGRAM(s) Oral two times a day  multivitamin/minerals 1 Tablet(s) Oral daily  pantoprazole    Tablet 40 milliGRAM(s) Oral daily  polyethylene glycol 3350 17 Gram(s) Oral daily  remdesivir  IVPB   IV Intermittent   remdesivir  IVPB 100 milliGRAM(s) IV Intermittent every 24 hours    MEDICATIONS  (PRN):  acetaminophen     Tablet .. 650 milliGRAM(s) Oral every 6 hours PRN Temp greater or equal to 38C (100.4F), Mild Pain (1 - 3)  albuterol/ipratropium for Nebulization 3 milliLiter(s) Nebulizer every 6 hours PRN Shortness of Breath and/or Wheezing  aluminum hydroxide/magnesium hydroxide/simethicone Suspension 30 milliLiter(s) Oral every 4 hours PRN Dyspepsia  guaiFENesin Oral Liquid (Sugar-Free) 200 milliGRAM(s) Oral every 6 hours PRN Cough  melatonin 3 milliGRAM(s) Oral at bedtime PRN Insomnia  ondansetron Injectable 4 milliGRAM(s) IV Push every 8 hours PRN Nausea and/or Vomiting      REVIEW OF SYSTEMS:  CONSTITUTIONAL: No fever,  RESPIRATORY: Improvement in mild  cough and  wheezing  shortness of breath  CARDIOVASCULAR: No chest pain,  palpitations, dizziness, syncope, paroxysmal nocturnal dyspnea,  or arm or leg swelling and improvement in SOB   GASTROINTESTINAL: No abdominal  or epigastric pain, nausea, vomiting,  diarrhea  NEUROLOGICAL: No headaches,  numbness, or tremors  Skin : No itching .  Hematology : No active bleeding .  Endocrinology : No heat and cold intolerance .  Psychiatry : Patient is calm .  Genitourinary : No dysuria .  Musculoskeletal : Patient has mild arthritis       Vital Signs Last 24 Hrs  T(C): 37.1 (30 Aug 2023 05:30), Max: 37.1 (30 Aug 2023 05:30)  T(F): 98.7 (30 Aug 2023 05:30), Max: 98.7 (30 Aug 2023 05:30)  HR: 85 (30 Aug 2023 05:30) (81 - 85)  BP: 137/77 (30 Aug 2023 05:30) (137/65 - 171/70)  BP(mean): --  RR: 18 (30 Aug 2023 05:30) (18 - 18)  SpO2: 90% (30 Aug 2023 05:30) (90% - 92%)    Parameters below as of 30 Aug 2023 05:30  Patient On (Oxygen Delivery Method): room air        PHYSICAL EXAM:  HEAD:  Atraumatic, Normocephalic  NECK: Supple and normal thyroid.  No JVD or carotid bruit.   HEART: S1, S2 irregular , 1/6 soft ejection systolic murmur in mitral area , no thrill and no gallops .  PULMONARY: Bilateral vesicular breathing , few scattered rhonchi and few scattered rales are present .  ABDOMEN: Soft nontender and positive bowl sounds   EXTREMITIES:  No clubbing, cyanosis, or pedal  edema  NEUROLOGICAL: AA and mild confused  , no focal deficit .  Skin : No rashes .  Musculoskeletal : No joint swellings .    LABS:                        11.3   11.52 )-----------( 340      ( 29 Aug 2023 10:13 )             36.5     08-29    x   |  x   |  x   ----------------------------<  x   x    |  x   |  0.78    Ca    8.6      29 Aug 2023 06:54    TPro  6.3  /  Alb  2.4<L>  /  TBili  0.8  /  DBili  0.3  /  AST  111<H>  /  ALT  141<H>  /  AlkPhos  94  08-29        PT/INR - ( 29 Aug 2023 22:20 )   PT: 16.0 sec;   INR: 1.38 ratio           Urinalysis Basic - ( 29 Aug 2023 06:54 )    Color: x / Appearance: x / SG: x / pH: x  Gluc: 99 mg/dL / Ketone: x  / Bili: x / Urobili: x   Blood: x / Protein: x / Nitrite: x   Leuk Esterase: x / RBC: x / WBC x   Sq Epi: x / Non Sq Epi: x / Bacteria: x  Assessment/Plan  Patient has :  1) R/O pneumonia and sepsis . Possibility of pneumonia less likely as per ID . Chronic cough can be due to enalapril so it was changed to amlodipine . Patient has developed now COVID pneumonia while in Hospital stay and getting  remdesvir   2) UTI   3) paroxysmal atrial fibrillation and stable .  4) SSS and S/P PPM .  5) Mild chronic diastolic heart failure and under control   6) H/O hypothyroidism   7) Mild  Anemia   8) Hypertension   Plan : 1) I/V antibiotics as per ID 2) Rest of medications as such   3) Monitor hemoglobin and electrolytes . 4) Increase ambulation . 5) Discontinue enalapril due to cough and continue amlodipine as out patient   6) Discontinue monitor   Will continue to follow during hospital course and give further recommendations as needed . Thanks for your referral . if any questions please contact me at office ([phone] cell [phone])       NINOSKA DAVENPORT MD [place] [id number]  [address]  OFFICE : [id number]  CELL : [phone]  CARDIOLOGY F/U  :       HPI:  89-year-old female with history of hypothyroidism, A-fib on Eliquis, s/p recent pacemaker for complete heart block brought in by ambulance from [facility] living home for cough for the past 2 weeks.  Associated with generalized weakness and decreased p.o. intake.  No fall or trauma.  Patient states she has had cold symptoms for a while.  Denies fever, chills, chest pain, shortness of breath, abdominal pain, nausea, vomiting, upper or lower extremity weakness or paresthesias. pmd: Dr. Crawley, cards: [facility] Seen by card Dr Davenpotr Admitted  to telemetry unit for monitoring , send 3 sets of cardiac enzymes to rule out acute coronary event, obtain ECHO to evaluate LVEF, cardiology consult  ,continue current management, O2 supply, anticoagulation plan as per cardiology consult Pulm cons requested , antitussives and nebulized bd ordered .Also UTI suspected and started on iv abx , id cons called Palliative care consult requested ,to discuss advance directives and complete MOLST  (20 Aug 2023 15:35)        SUBJECTIVE: Patient feeling tired and fatigue       ALLERGIES:  Allergies    penicillin (Unknown)    Intolerances          MEDICATIONS  (STANDING):  acetaminophen     Tablet .. 325 milliGRAM(s) Oral every 8 hours  aMIOdarone    Tablet 200 milliGRAM(s) Oral daily  amLODIPine   Tablet 10 milliGRAM(s) Oral daily  apixaban 2.5 milliGRAM(s) Oral two times a day  artificial  tears Solution 1 Drop(s) Both EYES two times a day  dextrose 5% + sodium chloride 0.45%. 1000 milliLiter(s) (50 mL/Hr) IV Continuous <Continuous>  ferrous    sulfate 325 milliGRAM(s) Oral daily  lactobacillus acidophilus 1 Tablet(s) Oral every 12 hours  levothyroxine 75 MICROGram(s) Oral daily  metoprolol tartrate 50 milliGRAM(s) Oral two times a day  multivitamin/minerals 1 Tablet(s) Oral daily  pantoprazole    Tablet 40 milliGRAM(s) Oral daily  polyethylene glycol 3350 17 Gram(s) Oral daily  remdesivir  IVPB   IV Intermittent   remdesivir  IVPB 100 milliGRAM(s) IV Intermittent every 24 hours    MEDICATIONS  (PRN):  acetaminophen     Tablet .. 650 milliGRAM(s) Oral every 6 hours PRN Temp greater or equal to 38C (100.4F), Mild Pain (1 - 3)  albuterol/ipratropium for Nebulization 3 milliLiter(s) Nebulizer every 6 hours PRN Shortness of Breath and/or Wheezing  aluminum hydroxide/magnesium hydroxide/simethicone Suspension 30 milliLiter(s) Oral every 4 hours PRN Dyspepsia  guaiFENesin Oral Liquid (Sugar-Free) 200 milliGRAM(s) Oral every 6 hours PRN Cough  melatonin 3 milliGRAM(s) Oral at bedtime PRN Insomnia  ondansetron Injectable 4 milliGRAM(s) IV Push every 8 hours PRN Nausea and/or Vomiting      REVIEW OF SYSTEMS:  CONSTITUTIONAL: No fever,  RESPIRATORY: Improvement in mild  cough and  wheezing  shortness of breath  CARDIOVASCULAR: No chest pain,  palpitations, dizziness, syncope, paroxysmal nocturnal dyspnea,  or arm or leg swelling and improvement in SOB   GASTROINTESTINAL: No abdominal  or epigastric pain, nausea, vomiting,  diarrhea  NEUROLOGICAL: No headaches,  numbness, or tremors  Skin : No itching .  Hematology : No active bleeding .  Endocrinology : No heat and cold intolerance .  Psychiatry : Patient is calm .  Genitourinary : No dysuria .  Musculoskeletal : Patient has mild arthritis       Vital Signs Last 24 Hrs  T(C): 37.1 (30 Aug 2023 05:30), Max: 37.1 (30 Aug 2023 05:30)  T(F): 98.7 (30 Aug 2023 05:30), Max: 98.7 (30 Aug 2023 05:30)  HR: 85 (30 Aug 2023 05:30) (81 - 85)  BP: 137/77 (30 Aug 2023 05:30) (137/65 - 171/70)  BP(mean): --  RR: 18 (30 Aug 2023 05:30) (18 - 18)  SpO2: 90% (30 Aug 2023 05:30) (90% - 92%)    Parameters below as of 30 Aug 2023 05:30  Patient On (Oxygen Delivery Method): room air        PHYSICAL EXAM:  HEAD:  Atraumatic, Normocephalic  NECK: Supple and normal thyroid.  No JVD or carotid bruit.   HEART: S1, S2 irregular , 1/6 soft ejection systolic murmur in mitral area , no thrill and no gallops .  PULMONARY: Bilateral vesicular breathing , few scattered rhonchi and few scattered rales are present .  ABDOMEN: Soft nontender and positive bowl sounds   EXTREMITIES:  No clubbing, cyanosis, or pedal  edema  NEUROLOGICAL: AA and mild confused  , no focal deficit .  Skin : No rashes .  Musculoskeletal : No joint swellings .    LABS:                        11.3   11.52 )-----------( 340      ( 29 Aug 2023 10:13 )             36.5     08-29    x   |  x   |  x   ----------------------------<  x   x    |  x   |  0.78    Ca    8.6      29 Aug 2023 06:54    TPro  6.3  /  Alb  2.4<L>  /  TBili  0.8  /  DBili  0.3  /  AST  111<H>  /  ALT  141<H>  /  AlkPhos  94  08-29        PT/INR - ( 29 Aug 2023 22:20 )   PT: 16.0 sec;   INR: 1.38 ratio           Urinalysis Basic - ( 29 Aug 2023 06:54 )    Color: x / Appearance: x / SG: x / pH: x  Gluc: 99 mg/dL / Ketone: x  / Bili: x / Urobili: x   Blood: x / Protein: x / Nitrite: x   Leuk Esterase: x / RBC: x / WBC x   Sq Epi: x / Non Sq Epi: x / Bacteria: x  Assessment/Plan  Patient has :  1) R/O pneumonia and sepsis . Possibility of pneumonia less likely as per ID . Chronic cough can be due to enalapril so it was changed to amlodipine . Patient has developed now COVID pneumonia while in Hospital stay and getting  remdesvir   2) UTI   3) paroxysmal atrial fibrillation and stable .  4) SSS and S/P PPM .  5) Mild chronic diastolic heart failure and under control   6) H/O hypothyroidism   7) Mild  Anemia   8) Hypertension   Plan : 1) I/V antibiotics as per ID 2) Rest of medications as such   3) Monitor hemoglobin and electrolytes . 4) Increase ambulation . 5) Discontinue enalapril due to cough and continue amlodipine as out patient   6) Discontinue monitor   Will continue to follow during hospital course and give further recommendations as needed . Thanks for your referral . if any questions please contact me at office ([phone] cell [phone])

## 2023-08-29 NOTE — SWALLOW BEDSIDE ASSESSMENT ADULT - ADDITIONAL RECOMMENDATIONS
1. Please reconsult this dept with any changes in status/diet intolerance  2. This dept. to cont. to follow while in house as schedule permits for diet tolerance/trial swallow therapy
This department to follow up as schedule permits to assess for diet tolerance, possible diet advancement and/or need for objective assessment. Medical team further advised to reconsult if there is a change in medical status and/or observed change in patient's tolerance of recommended PO diet.

## 2023-08-29 NOTE — PROGRESS NOTE ADULT - SUBJECTIVE AND OBJECTIVE BOX
Neurology follow up note    DEACON [id number]      Interval History:    Patient feels ok no new complaints.    Allergies    penicillin (Unknown)    Intolerances        MEDICATIONS    acetaminophen     Tablet .. 650 milliGRAM(s) Oral every 6 hours PRN  acetaminophen     Tablet .. 325 milliGRAM(s) Oral every 8 hours  albuterol/ipratropium for Nebulization 3 milliLiter(s) Nebulizer every 6 hours PRN  aluminum hydroxide/magnesium hydroxide/simethicone Suspension 30 milliLiter(s) Oral every 4 hours PRN  aMIOdarone    Tablet 200 milliGRAM(s) Oral daily  amLODIPine   Tablet 10 milliGRAM(s) Oral daily  apixaban 2.5 milliGRAM(s) Oral two times a day  artificial  tears Solution 1 Drop(s) Both EYES two times a day  dextrose 5% + sodium chloride 0.45%. 1000 milliLiter(s) IV Continuous <Continuous>  ferrous    sulfate 325 milliGRAM(s) Oral daily  guaiFENesin Oral Liquid (Sugar-Free) 200 milliGRAM(s) Oral every 6 hours PRN  lactobacillus acidophilus 1 Tablet(s) Oral every 12 hours  levothyroxine 75 MICROGram(s) Oral daily  melatonin 3 milliGRAM(s) Oral at bedtime PRN  metoprolol tartrate 50 milliGRAM(s) Oral two times a day  multivitamin/minerals 1 Tablet(s) Oral daily  ondansetron Injectable 4 milliGRAM(s) IV Push every 8 hours PRN  pantoprazole    Tablet 40 milliGRAM(s) Oral daily  polyethylene glycol 3350 17 Gram(s) Oral daily              Vital Signs Last 24 Hrs  T(C): 36.7 (29 Aug 2023 04:52), Max: 36.7 (28 Aug 2023 12:03)  T(F): 98.1 (29 Aug 2023 04:52), Max: 98.1 (29 Aug 2023 04:52)  HR: 88 (29 Aug 2023 04:52) (76 - 88)  BP: 124/67 (29 Aug 2023 04:52) (124/67 - 147/80)  BP(mean): --  RR: 18 (29 Aug 2023 04:52) (18 - 18)  SpO2: 96% (29 Aug 2023 04:52) (90% - 96%)    Parameters below as of 29 Aug 2023 04:52  Patient On (Oxygen Delivery Method): room air        REVIEW OF SYSTEMS:  Very limited secondary to the patient has underlying cognitive impairment, confusion feels ok     PHYSICAL EXAMINATION: .  HEENT:  Head:  Normocephalic, atraumatic.  Eyes:  No scleral icterus.  Ears:  Hard to evaluate, but from pervious note was intact.  NECK:  Supple.  RESPIRATORY:  Decreased air entry bilaterally.  CARDIOVASCULAR:  S1 and S2 heard.  ABDOMEN:  Soft and nontender.  EXTREMITIES:  No clubbing or cyanosis was noted.     NEUROLOGIC:  The patient was arousable to verbal stimuli.  Location was hospital.  Year unknown.  The patient would keep her eyes closed, would not open her eyes, attempted to open her eyes, she would actually resist.  Speech:  The patient would articulate words, no dysarthria was noted, but would say irrelevant words.  Smile appeared to be symmetric when talking.  Motor:  Examination is limited secondary to the patient is uncooperative.  While attempting to evaluate bilateral upper extremities, the patient would actively resist, pull both arms down, would say 3+/5.  Left lower was 3/5, right was 3-/5, but does have a history of leg weakness to begin with.       LABS:  CBC Full  -  ( 29 Aug 2023 10:13 )  WBC Count : 11.52 K/uL  RBC Count : 3.88 M/uL  Hemoglobin : 11.3 g/dL  Hematocrit : 36.5 %  Platelet Count - Automated : 340 K/uL  Mean Cell Volume : 94.1 fl  Mean Cell Hemoglobin : 29.1 pg  Mean Cell Hemoglobin Concentration : 31.0 gm/dL  Auto Neutrophil # : x  Auto Lymphocyte # : x  Auto Monocyte # : x  Auto Eosinophil # : x  Auto Basophil # : x  Auto Neutrophil % : x  Auto Lymphocyte % : x  Auto Monocyte % : x  Auto Eosinophil % : x  Auto Basophil % : x    Urinalysis Basic - ( 29 Aug 2023 06:54 )    Color: x / Appearance: x / SG: x / pH: x  Gluc: 99 mg/dL / Ketone: x  / Bili: x / Urobili: x   Blood: x / Protein: x / Nitrite: x   Leuk Esterase: x / RBC: x / WBC x   Sq Epi: x / Non Sq Epi: x / Bacteria: x      08-29    139  |  109<H>  |  22  ----------------------------<  99  4.4   |  23  |  0.60    Ca    8.6      29 Aug 2023 06:54    TPro  6.3  /  Alb  2.3<L>  /  TBili  0.9  /  DBili  x   /  AST  84<H>  /  ALT  97<H>  /  AlkPhos  86  08-29    Hemoglobin A1C:     LIVER FUNCTIONS - ( 29 Aug 2023 06:54 )  Alb: 2.3 g/dL / Pro: 6.3 g/dL / ALK PHOS: 86 U/L / ALT: 97 U/L / AST: 84 U/L / GGT: x           Vitamin B12         RADIOLOGY    ANALYSIS AND PLAN:  A 89-year-old with episode of altered mental status.  For episode of altered mental status, I suspect most likely metabolic encephalopathy secondary to underlying infectious-type process, possibly urinary tract infection, also with positive blood cultures along with dementia, mild cognitive impairment becoming more prominent in hospital setting.  Examination is severely limited to evaluate for new cerebrovascular accident.  We will recommend antibiotics as needed.  Sepsis workup as needed.  For history of underlying mild cognitive impairment versus subtle dementia, appears to be somewhat progressive, would recommend supportive therapy for now.  For history of atrial fibrillation, risks versus benefits of any type of blood-thinning medications, Cardiology medical followup.  For hypertension, monitor systolic blood pressure.  For hypothyroidism, continue the patient on Synthroid.  wax and weaning of mental status most probably h/o infection hospital setting     Attempted to contact daughter, Beth, at [phone] 8/29     Greater than 45 minutes of time was spent with the patient, plan of care, reviewing data, with greater than 50% of the visit was spent counseling and/or coordinating care with multidisciplinary healthcare team   Neurology follow up note    DEACON [id number]      Interval History:    Patient feels ok no new complaints.    Allergies    penicillin (Unknown)    Intolerances        MEDICATIONS    acetaminophen     Tablet .. 650 milliGRAM(s) Oral every 6 hours PRN  acetaminophen     Tablet .. 325 milliGRAM(s) Oral every 8 hours  albuterol/ipratropium for Nebulization 3 milliLiter(s) Nebulizer every 6 hours PRN  aluminum hydroxide/magnesium hydroxide/simethicone Suspension 30 milliLiter(s) Oral every 4 hours PRN  aMIOdarone    Tablet 200 milliGRAM(s) Oral daily  amLODIPine   Tablet 10 milliGRAM(s) Oral daily  apixaban 2.5 milliGRAM(s) Oral two times a day  artificial  tears Solution 1 Drop(s) Both EYES two times a day  dextrose 5% + sodium chloride 0.45%. 1000 milliLiter(s) IV Continuous <Continuous>  ferrous    sulfate 325 milliGRAM(s) Oral daily  guaiFENesin Oral Liquid (Sugar-Free) 200 milliGRAM(s) Oral every 6 hours PRN  lactobacillus acidophilus 1 Tablet(s) Oral every 12 hours  levothyroxine 75 MICROGram(s) Oral daily  melatonin 3 milliGRAM(s) Oral at bedtime PRN  metoprolol tartrate 50 milliGRAM(s) Oral two times a day  multivitamin/minerals 1 Tablet(s) Oral daily  ondansetron Injectable 4 milliGRAM(s) IV Push every 8 hours PRN  pantoprazole    Tablet 40 milliGRAM(s) Oral daily  polyethylene glycol 3350 17 Gram(s) Oral daily              Vital Signs Last 24 Hrs  T(C): 36.7 (29 Aug 2023 04:52), Max: 36.7 (28 Aug 2023 12:03)  T(F): 98.1 (29 Aug 2023 04:52), Max: 98.1 (29 Aug 2023 04:52)  HR: 88 (29 Aug 2023 04:52) (76 - 88)  BP: 124/67 (29 Aug 2023 04:52) (124/67 - 147/80)  BP(mean): --  RR: 18 (29 Aug 2023 04:52) (18 - 18)  SpO2: 96% (29 Aug 2023 04:52) (90% - 96%)    Parameters below as of 29 Aug 2023 04:52  Patient On (Oxygen Delivery Method): room air        REVIEW OF SYSTEMS:  Very limited secondary to the patient has underlying cognitive impairment, confusion feels ok     PHYSICAL EXAMINATION: .  HEENT:  Head:  Normocephalic, atraumatic.  Eyes:  No scleral icterus.  Ears:  Hard to evaluate, but from pervious note was intact.  NECK:  Supple.  RESPIRATORY:  Decreased air entry bilaterally.  CARDIOVASCULAR:  S1 and S2 heard.  ABDOMEN:  Soft and nontender.  EXTREMITIES:  No clubbing or cyanosis was noted.     NEUROLOGIC:  The patient was arousable to verbal stimuli.  Location was hospital.  Year unknown.  The patient would keep her eyes closed, would not open her eyes, attempted to open her eyes, she would actually resist.  Speech:  The patient would articulate words, no dysarthria was noted, but would say irrelevant words.  Smile appeared to be symmetric when talking.  Motor:  Examination is limited secondary to the patient is uncooperative.  While attempting to evaluate bilateral upper extremities, the patient would actively resist, pull both arms down, would say 3+/5.  Left lower was 3/5, right was 3-/5, but does have a history of leg weakness to begin with.       LABS:  CBC Full  -  ( 29 Aug 2023 10:13 )  WBC Count : 11.52 K/uL  RBC Count : 3.88 M/uL  Hemoglobin : 11.3 g/dL  Hematocrit : 36.5 %  Platelet Count - Automated : 340 K/uL  Mean Cell Volume : 94.1 fl  Mean Cell Hemoglobin : 29.1 pg  Mean Cell Hemoglobin Concentration : 31.0 gm/dL  Auto Neutrophil # : x  Auto Lymphocyte # : x  Auto Monocyte # : x  Auto Eosinophil # : x  Auto Basophil # : x  Auto Neutrophil % : x  Auto Lymphocyte % : x  Auto Monocyte % : x  Auto Eosinophil % : x  Auto Basophil % : x    Urinalysis Basic - ( 29 Aug 2023 06:54 )    Color: x / Appearance: x / SG: x / pH: x  Gluc: 99 mg/dL / Ketone: x  / Bili: x / Urobili: x   Blood: x / Protein: x / Nitrite: x   Leuk Esterase: x / RBC: x / WBC x   Sq Epi: x / Non Sq Epi: x / Bacteria: x      08-29    139  |  109<H>  |  22  ----------------------------<  99  4.4   |  23  |  0.60    Ca    8.6      29 Aug 2023 06:54    TPro  6.3  /  Alb  2.3<L>  /  TBili  0.9  /  DBili  x   /  AST  84<H>  /  ALT  97<H>  /  AlkPhos  86  08-29    Hemoglobin A1C:     LIVER FUNCTIONS - ( 29 Aug 2023 06:54 )  Alb: 2.3 g/dL / Pro: 6.3 g/dL / ALK PHOS: 86 U/L / ALT: 97 U/L / AST: 84 U/L / GGT: x           Vitamin B12         RADIOLOGY    ANALYSIS AND PLAN:  A 89-year-old with episode of altered mental status.  For episode of altered mental status, I suspect most likely metabolic encephalopathy secondary to underlying infectious-type process, possibly urinary tract infection, also with positive blood cultures along with dementia, mild cognitive impairment becoming more prominent in hospital setting.  Examination is severely limited to evaluate for new cerebrovascular accident.  We will recommend antibiotics as needed.  Sepsis workup as needed.  For history of underlying mild cognitive impairment versus subtle dementia, appears to be somewhat progressive, would recommend supportive therapy for now.  For history of atrial fibrillation, risks versus benefits of any type of blood-thinning medications, Cardiology medical followup.  For hypertension, monitor systolic blood pressure.  For hypothyroidism, continue the patient on Synthroid.  wax and weaning of mental status most probably h/o infection hospital setting     Attempted to contact daughter, Beth, at [phone] 8/29     Greater than 45 minutes of time was spent with the patient, plan of care, reviewing data, with greater than 50% of the visit was spent counseling and/or coordinating care with multidisciplinary healthcare team   Neurology follow up note    DEACON [id number]      Interval History:    Patient feels ok no new complaints.    Allergies    penicillin (Unknown)    Intolerances        MEDICATIONS    acetaminophen     Tablet .. 650 milliGRAM(s) Oral every 6 hours PRN  acetaminophen     Tablet .. 325 milliGRAM(s) Oral every 8 hours  albuterol/ipratropium for Nebulization 3 milliLiter(s) Nebulizer every 6 hours PRN  aluminum hydroxide/magnesium hydroxide/simethicone Suspension 30 milliLiter(s) Oral every 4 hours PRN  aMIOdarone    Tablet 200 milliGRAM(s) Oral daily  amLODIPine   Tablet 10 milliGRAM(s) Oral daily  apixaban 2.5 milliGRAM(s) Oral two times a day  artificial  tears Solution 1 Drop(s) Both EYES two times a day  dextrose 5% + sodium chloride 0.45%. 1000 milliLiter(s) IV Continuous <Continuous>  ferrous    sulfate 325 milliGRAM(s) Oral daily  guaiFENesin Oral Liquid (Sugar-Free) 200 milliGRAM(s) Oral every 6 hours PRN  lactobacillus acidophilus 1 Tablet(s) Oral every 12 hours  levothyroxine 75 MICROGram(s) Oral daily  melatonin 3 milliGRAM(s) Oral at bedtime PRN  metoprolol tartrate 50 milliGRAM(s) Oral two times a day  multivitamin/minerals 1 Tablet(s) Oral daily  ondansetron Injectable 4 milliGRAM(s) IV Push every 8 hours PRN  pantoprazole    Tablet 40 milliGRAM(s) Oral daily  polyethylene glycol 3350 17 Gram(s) Oral daily              Vital Signs Last 24 Hrs  T(C): 36.7 (29 Aug 2023 04:52), Max: 36.7 (28 Aug 2023 12:03)  T(F): 98.1 (29 Aug 2023 04:52), Max: 98.1 (29 Aug 2023 04:52)  HR: 88 (29 Aug 2023 04:52) (76 - 88)  BP: 124/67 (29 Aug 2023 04:52) (124/67 - 147/80)  BP(mean): --  RR: 18 (29 Aug 2023 04:52) (18 - 18)  SpO2: 96% (29 Aug 2023 04:52) (90% - 96%)    Parameters below as of 29 Aug 2023 04:52  Patient On (Oxygen Delivery Method): room air        REVIEW OF SYSTEMS:  Very limited secondary to the patient has underlying cognitive impairment, confusion feels ok     PHYSICAL EXAMINATION: .  HEENT:  Head:  Normocephalic, atraumatic.  Eyes:  No scleral icterus.  Ears:  Hard to evaluate, but from pervious note was intact.  NECK:  Supple.  RESPIRATORY:  Decreased air entry bilaterally.  CARDIOVASCULAR:  S1 and S2 heard.  ABDOMEN:  Soft and nontender.  EXTREMITIES:  No clubbing or cyanosis was noted.     NEUROLOGIC:  The patient was arousable to verbal stimuli.  Location was hospital.  Year unknown.  The patient would keep her eyes closed, would not open her eyes, attempted to open her eyes, she would actually resist.  Speech:  The patient would articulate words, no dysarthria was noted, but would say irrelevant words.  Smile appeared to be symmetric when talking.  Motor:  Examination is limited secondary to the patient is uncooperative.  While attempting to evaluate bilateral upper extremities, the patient would actively resist, pull both arms down, would say 3+/5.  Left lower was 3/5, right was 3-/5, but does have a history of leg weakness to begin with.       LABS:  CBC Full  -  ( 29 Aug 2023 10:13 )  WBC Count : 11.52 K/uL  RBC Count : 3.88 M/uL  Hemoglobin : 11.3 g/dL  Hematocrit : 36.5 %  Platelet Count - Automated : 340 K/uL  Mean Cell Volume : 94.1 fl  Mean Cell Hemoglobin : 29.1 pg  Mean Cell Hemoglobin Concentration : 31.0 gm/dL  Auto Neutrophil # : x  Auto Lymphocyte # : x  Auto Monocyte # : x  Auto Eosinophil # : x  Auto Basophil # : x  Auto Neutrophil % : x  Auto Lymphocyte % : x  Auto Monocyte % : x  Auto Eosinophil % : x  Auto Basophil % : x    Urinalysis Basic - ( 29 Aug 2023 06:54 )    Color: x / Appearance: x / SG: x / pH: x  Gluc: 99 mg/dL / Ketone: x  / Bili: x / Urobili: x   Blood: x / Protein: x / Nitrite: x   Leuk Esterase: x / RBC: x / WBC x   Sq Epi: x / Non Sq Epi: x / Bacteria: x      08-29    139  |  109<H>  |  22  ----------------------------<  99  4.4   |  23  |  0.60    Ca    8.6      29 Aug 2023 06:54    TPro  6.3  /  Alb  2.3<L>  /  TBili  0.9  /  DBili  x   /  AST  84<H>  /  ALT  97<H>  /  AlkPhos  86  08-29    Hemoglobin A1C:     LIVER FUNCTIONS - ( 29 Aug 2023 06:54 )  Alb: 2.3 g/dL / Pro: 6.3 g/dL / ALK PHOS: 86 U/L / ALT: 97 U/L / AST: 84 U/L / GGT: x           Vitamin B12         RADIOLOGY    ANALYSIS AND PLAN:  A 89-year-old with episode of altered mental status.  For episode of altered mental status, I suspect most likely metabolic encephalopathy secondary to underlying infectious-type process, possibly urinary tract infection, also with positive blood cultures along with dementia, mild cognitive impairment becoming more prominent in hospital setting.  Examination is severely limited to evaluate for new cerebrovascular accident.  We will recommend antibiotics as needed.  Sepsis workup as needed.  For history of underlying mild cognitive impairment versus subtle dementia, appears to be somewhat progressive, would recommend supportive therapy for now.  For history of atrial fibrillation, risks versus benefits of any type of blood-thinning medications, Cardiology medical followup.  For hypertension, monitor systolic blood pressure.  For hypothyroidism, continue the patient on Synthroid.  wax and weaning of mental status most probably h/o infection hospital setting     Attempted to contact daughter, Beth, at [phone] 8/29     Greater than 45 minutes of time was spent with the patient, plan of care, reviewing data, with greater than 50% of the visit was spent counseling and/or coordinating care with multidisciplinary healthcare team

## 2023-08-29 NOTE — PROGRESS NOTE ADULT - ASSESSMENT
89-year-old female with history of hypothyroidism, A-fib on Eliquis, s/p recent pacemaker for complete heart block brought in by ambulance from [facility] assisted living home for cough for the past 2 weeks.  Associated with generalized weakness and decreased p.o. intake.  No fall or trauma.  Patient states she has had cold symptoms for a while.  Denies fever, chills, chest pain, shortness of breath, abdominal pain, nausea, vomiting, upper or lower extremity weakness or paresthesias. pmd: Dr. Crawley, cards: [facility] Seen by card Dr Reich Admitted  to telemetry unit for monitoring , send 3 sets of cardiac enzymes to rule out acute coronary event, obtain ECHO to evaluate LVEF, cardiology consult  ,continue current management, O2 supply, anticoagulation plan as per cardiology consult Pulm cons requested , antitussives and nebulized bd ordered .Also UTI suspected and started on iv abx , id cons called Palliative care consult requested ,to discuss advance directives and complete MOLST  89-year-old female with history of hypothyroidism, A-fib on Eliquis, s/p recent pacemaker for complete heart block brought in by ambulance from [facility] assisted living home for cough for the past 2 weeks.  Associated with generalized weakness and decreased p.o. intake.  No fall or trauma.  Patient states she has had cold symptoms for a while.  Denies fever, chills, chest pain, shortness of breath, abdominal pain, nausea, vomiting, upper or lower extremity weakness or paresthesias. pmd: Dr. Crawley, cards: [facility] Seen by card Dr Reich Admitted  to telemetry unit for monitoring , send 3 sets of cardiac enzymes to rule out acute coronary event, obtain ECHO to evaluate LVEF, cardiology consult  ,continue current management, O2 supply, anticoagulation plan as per cardiology consult Pulm cons requested , antitussives and nebulized bd ordered .Also UTI suspected and started on iv abx , id cons called Palliative care consult requested ,to discuss advance directives and complete MOLST  89-year-old female with history of hypothyroidism, A-fib on Eliquis, s/p recent pacemaker for complete heart block brought in by ambulance from [facility] assisted living home for cough for the past 2 weeks.  Associated with generalized weakness and decreased p.o. intake.  No fall or trauma.  Patient states she has had cold symptoms for a while.  Denies fever, chills, chest pain, shortness of breath, abdominal pain, nausea, vomiting, upper or lower extremity weakness or paresthesias. pmd: Dr. Crawley, cards: [facility] Seen by card Dr Reich Admitted  to telemetry unit for monitoring , send 3 sets of cardiac enzymes to rule out acute coronary event, obtain ECHO to evaluate LVEF, cardiology consult  ,continue current management, O2 supply, anticoagulation plan as per cardiology consult Pulm cons requested , antitussives and nebulized bd ordered .Also UTI suspected and started on iv abx , id cons called Palliative care consult requested ,to discuss advance directives and complete MOLST

## 2023-08-29 NOTE — PROVIDER CONTACT NOTE (OTHER) - ACTION/TREATMENT ORDERED:
Dr. Soliz aware. place patient on isolation precaution. Dr. Soliz will notify daughter. will continue to assess and monitor.
Continue current plan of care

## 2023-08-30 LAB
ALBUMIN SERPL ELPH-MCNC: 2.6 G/DL — LOW (ref 3.3–5)
ALP SERPL-CCNC: 103 U/L — SIGNIFICANT CHANGE UP (ref 40–120)
ALP SERPL-CCNC: 99 U/L — SIGNIFICANT CHANGE UP (ref 40–120)
ALT FLD-CCNC: 187 U/L — HIGH (ref 12–78)
ALT FLD-CCNC: 188 U/L — HIGH (ref 12–78)
ANION GAP SERPL CALC-SCNC: 4 MMOL/L — LOW (ref 5–17)
AST SERPL-CCNC: 158 U/L — HIGH (ref 15–37)
AST SERPL-CCNC: 160 U/L — HIGH (ref 15–37)
BASOPHILS # BLD AUTO: 0.05 K/UL — SIGNIFICANT CHANGE UP (ref 0–0.2)
BASOPHILS NFR BLD AUTO: 0.4 % — SIGNIFICANT CHANGE UP (ref 0–2)
BILIRUB DIRECT SERPL-MCNC: 0.3 MG/DL — SIGNIFICANT CHANGE UP (ref 0–0.3)
BILIRUB INDIRECT FLD-MCNC: 0.4 MG/DL — SIGNIFICANT CHANGE UP (ref 0.2–1)
BILIRUB SERPL-MCNC: 0.7 MG/DL — SIGNIFICANT CHANGE UP (ref 0.2–1.2)
BILIRUB SERPL-MCNC: 0.8 MG/DL — SIGNIFICANT CHANGE UP (ref 0.2–1.2)
BUN SERPL-MCNC: 25 MG/DL — HIGH (ref 7–23)
CALCIUM SERPL-MCNC: 9 MG/DL — SIGNIFICANT CHANGE UP (ref 8.5–10.1)
CHLORIDE SERPL-SCNC: 107 MMOL/L — SIGNIFICANT CHANGE UP (ref 96–108)
CO2 SERPL-SCNC: 28 MMOL/L — SIGNIFICANT CHANGE UP (ref 22–31)
CREAT SERPL-MCNC: 0.68 MG/DL — SIGNIFICANT CHANGE UP (ref 0.5–1.3)
EGFR: 81 ML/MIN/1.73M2 — SIGNIFICANT CHANGE UP
EOSINOPHIL # BLD AUTO: 0.02 K/UL — SIGNIFICANT CHANGE UP (ref 0–0.5)
EOSINOPHIL NFR BLD AUTO: 0.2 % — SIGNIFICANT CHANGE UP (ref 0–6)
GLUCOSE SERPL-MCNC: 101 MG/DL — HIGH (ref 70–99)
HCT VFR BLD CALC: 39.3 % — SIGNIFICANT CHANGE UP (ref 34.5–45)
HGB BLD-MCNC: 12.3 G/DL — SIGNIFICANT CHANGE UP (ref 11.5–15.5)
IMM GRANULOCYTES NFR BLD AUTO: 1.2 % — HIGH (ref 0–0.9)
INR BLD: 1.28 RATIO — HIGH (ref 0.85–1.18)
LYMPHOCYTES # BLD AUTO: 0.77 K/UL — LOW (ref 1–3.3)
LYMPHOCYTES # BLD AUTO: 6.5 % — LOW (ref 13–44)
MCHC RBC-ENTMCNC: 29.1 PG — SIGNIFICANT CHANGE UP (ref 27–34)
MCHC RBC-ENTMCNC: 31.3 GM/DL — LOW (ref 32–36)
MCV RBC AUTO: 93.1 FL — SIGNIFICANT CHANGE UP (ref 80–100)
MONOCYTES # BLD AUTO: 0.62 K/UL — SIGNIFICANT CHANGE UP (ref 0–0.9)
MONOCYTES NFR BLD AUTO: 5.2 % — SIGNIFICANT CHANGE UP (ref 2–14)
NEUTROPHILS # BLD AUTO: 10.29 K/UL — HIGH (ref 1.8–7.4)
NEUTROPHILS NFR BLD AUTO: 86.5 % — HIGH (ref 43–77)
NRBC # BLD: 0 /100 WBCS — SIGNIFICANT CHANGE UP (ref 0–0)
PLATELET # BLD AUTO: 383 K/UL — SIGNIFICANT CHANGE UP (ref 150–400)
POTASSIUM SERPL-MCNC: 3.8 MMOL/L — SIGNIFICANT CHANGE UP (ref 3.5–5.3)
POTASSIUM SERPL-SCNC: 3.8 MMOL/L — SIGNIFICANT CHANGE UP (ref 3.5–5.3)
PROT SERPL-MCNC: 6.7 G/DL — SIGNIFICANT CHANGE UP (ref 6–8.3)
PROTHROM AB SERPL-ACNC: 14.9 SEC — HIGH (ref 9.5–13)
RBC # BLD: 4.22 M/UL — SIGNIFICANT CHANGE UP (ref 3.8–5.2)
RBC # FLD: 16.3 % — HIGH (ref 10.3–14.5)
SODIUM SERPL-SCNC: 139 MMOL/L — SIGNIFICANT CHANGE UP (ref 135–145)
WBC # BLD: 11.89 K/UL — HIGH (ref 3.8–10.5)
WBC # FLD AUTO: 11.89 K/UL — HIGH (ref 3.8–10.5)

## 2023-08-30 RX ADMIN — AMLODIPINE BESYLATE 10 MILLIGRAM(S): 2.5 TABLET ORAL at 06:20

## 2023-08-30 RX ADMIN — Medication 325 MILLIGRAM(S): at 22:34

## 2023-08-30 RX ADMIN — Medication 50 MILLIGRAM(S): at 18:54

## 2023-08-30 RX ADMIN — Medication 325 MILLIGRAM(S): at 21:45

## 2023-08-30 RX ADMIN — Medication 325 MILLIGRAM(S): at 14:07

## 2023-08-30 RX ADMIN — Medication 1 TABLET(S): at 18:54

## 2023-08-30 RX ADMIN — Medication 325 MILLIGRAM(S): at 06:20

## 2023-08-30 RX ADMIN — POLYETHYLENE GLYCOL 3350 17 GRAM(S): 17 POWDER, FOR SOLUTION ORAL at 11:34

## 2023-08-30 RX ADMIN — REMDESIVIR 200 MILLIGRAM(S): 5 INJECTION INTRAVENOUS at 21:53

## 2023-08-30 RX ADMIN — Medication 50 MILLIGRAM(S): at 06:20

## 2023-08-30 RX ADMIN — Medication 1 DROP(S): at 06:16

## 2023-08-30 RX ADMIN — Medication 1 TABLET(S): at 06:20

## 2023-08-30 RX ADMIN — APIXABAN 2.5 MILLIGRAM(S): 2.5 TABLET, FILM COATED ORAL at 06:21

## 2023-08-30 RX ADMIN — Medication 1 DROP(S): at 18:55

## 2023-08-30 RX ADMIN — Medication 325 MILLIGRAM(S): at 11:34

## 2023-08-30 RX ADMIN — APIXABAN 2.5 MILLIGRAM(S): 2.5 TABLET, FILM COATED ORAL at 18:54

## 2023-08-30 RX ADMIN — PANTOPRAZOLE SODIUM 40 MILLIGRAM(S): 20 TABLET, DELAYED RELEASE ORAL at 11:34

## 2023-08-30 RX ADMIN — Medication 200 MILLIGRAM(S): at 18:54

## 2023-08-30 RX ADMIN — Medication 1 TABLET(S): at 11:34

## 2023-08-30 RX ADMIN — Medication 325 MILLIGRAM(S): at 15:07

## 2023-08-30 RX ADMIN — AMIODARONE HYDROCHLORIDE 200 MILLIGRAM(S): 400 TABLET ORAL at 06:20

## 2023-08-30 RX ADMIN — Medication 75 MICROGRAM(S): at 06:20

## 2023-08-30 RX ADMIN — Medication 325 MILLIGRAM(S): at 07:20

## 2023-08-30 NOTE — PROGRESS NOTE ADULT - ASSESSMENT
89-year-old female with history of hypothyroidism, A-fib on Eliquis, s/p recent pacemaker for complete heart block brought in by ambulance from [facility] assisted living home for cough for the past 2 weeks.  Associated with generalized weakness and decreased p.o. intake.  No fall or trauma.  Patient states she has had cold symptoms for a while.  Denies fever, chills, chest pain, shortness of breath, abdominal pain, nausea, vomiting, upper or lower extremity weakness or paresthesias. pmd: Dr. Crawley, cards: [facility] Seen by card Dr Reich Admitted  to telemetry unit for monitoring , send 3 sets of cardiac enzymes to rule out acute coronary event, obtain ECHO to evaluate LVEF, cardiology consult  ,continue current management, O2 supply, anticoagulation plan as per cardiology consult Pulm cons requested , antitussives and nebulized bd ordered .Also UTI suspected and started on iv abx , id cons called Palliative care consult requested ,to discuss advance directives and complete MOLST  89-year-old female with history of hypothyroidism, A-fib on Eliquis, s/p recent pacemaker for complete heart block brought in by ambulance from [facility] assisted living home for cough for the past 2 weeks.  Associated with generalized weakness and decreased p.o. intake.  No fall or trauma.  Patient states she has had cold symptoms for a while.  Denies fever, chills, chest pain, shortness of breath, abdominal pain, nausea, vomiting, upper or lower extremity weakness or paresthesias. pmd: Dr. Crawley, cards: [facility] Seen by card Dr Reich Admitted  to telemetry unit for monitoring , send 3 sets of cardiac enzymes to rule out acute coronary event, obtain ECHO to evaluate LVEF, cardiology consult  ,continue current management, O2 supply, anticoagulation plan as per cardiology consult Pulm cons requested , antitussives and nebulized bd ordered .Also UTI suspected and started on iv abx , id cons called Palliative care consult requested ,to discuss advance directives and complete MOLST  89-year-old female with history of hypothyroidism, A-fib on Eliquis, s/p recent pacemaker for complete heart block brought in by ambulance from [facility] assisted living home for cough for the past 2 weeks.  Associated with generalized weakness and decreased p.o. intake.  No fall or trauma.  Patient states she has had cold symptoms for a while.  Denies fever, chills, chest pain, shortness of breath, abdominal pain, nausea, vomiting, upper or lower extremity weakness or paresthesias. pmd: Dr. Crawley, cards: [facility] Seen by card Dr Reich Admitted  to telemetry unit for monitoring , send 3 sets of cardiac enzymes to rule out acute coronary event, obtain ECHO to evaluate LVEF, cardiology consult  ,continue current management, O2 supply, anticoagulation plan as per cardiology consult Pulm cons requested , antitussives and nebulized bd ordered .Also UTI suspected and started on iv abx , id cons called Palliative care consult requested ,to discuss advance directives and complete MOLST

## 2023-08-30 NOTE — PROGRESS NOTE ADULT - SUBJECTIVE AND OBJECTIVE BOX
Interval History:    CENTRAL LINE:   [  ] YES       [  ] NO  SKELTON:                 [  ] YES       [  ] NO         REVIEW OF SYSTEMS:  All Systems below were reviewed and are negative [  ]  HEENT:  ID:  Pulmonary:  Cardiac:  GI:  Renal:  Musculoskeletal:  All other systems above were reviewed and are negative   [  ]      MEDICATIONS  (STANDING):  acetaminophen     Tablet .. 325 milliGRAM(s) Oral every 8 hours  aMIOdarone    Tablet 200 milliGRAM(s) Oral daily  amLODIPine   Tablet 10 milliGRAM(s) Oral daily  apixaban 2.5 milliGRAM(s) Oral two times a day  artificial  tears Solution 1 Drop(s) Both EYES two times a day  dextrose 5% + sodium chloride 0.45%. 1000 milliLiter(s) (50 mL/Hr) IV Continuous <Continuous>  ferrous    sulfate 325 milliGRAM(s) Oral daily  lactobacillus acidophilus 1 Tablet(s) Oral every 12 hours  levothyroxine 75 MICROGram(s) Oral daily  metoprolol tartrate 50 milliGRAM(s) Oral two times a day  multivitamin/minerals 1 Tablet(s) Oral daily  pantoprazole    Tablet 40 milliGRAM(s) Oral daily  polyethylene glycol 3350 17 Gram(s) Oral daily  remdesivir  IVPB   IV Intermittent   remdesivir  IVPB 100 milliGRAM(s) IV Intermittent every 24 hours    MEDICATIONS  (PRN):  acetaminophen     Tablet .. 650 milliGRAM(s) Oral every 6 hours PRN Temp greater or equal to 38C (100.4F), Mild Pain (1 - 3)  albuterol/ipratropium for Nebulization 3 milliLiter(s) Nebulizer every 6 hours PRN Shortness of Breath and/or Wheezing  aluminum hydroxide/magnesium hydroxide/simethicone Suspension 30 milliLiter(s) Oral every 4 hours PRN Dyspepsia  guaiFENesin Oral Liquid (Sugar-Free) 200 milliGRAM(s) Oral every 6 hours PRN Cough  melatonin 3 milliGRAM(s) Oral at bedtime PRN Insomnia  ondansetron Injectable 4 milliGRAM(s) IV Push every 8 hours PRN Nausea and/or Vomiting      Vital Signs Last 24 Hrs  T(C): 36.8 (30 Aug 2023 19:01), Max: 37.1 (30 Aug 2023 05:30)  T(F): 98.2 (30 Aug 2023 19:01), Max: 98.7 (30 Aug 2023 05:30)  HR: 86 (30 Aug 2023 19:01) (84 - 86)  BP: 135/74 (30 Aug 2023 19:01) (129/63 - 146/75)  BP(mean): --  RR: 18 (30 Aug 2023 19:01) (18 - 18)  SpO2: 91% (30 Aug 2023 19:01) (90% - 98%)    Parameters below as of 30 Aug 2023 19:01  Patient On (Oxygen Delivery Method): room air        I&O's Summary    29 Aug 2023 07:01  -  30 Aug 2023 07:00  --------------------------------------------------------  IN: 600 mL / OUT: 250 mL / NET: 350 mL    30 Aug 2023 07:01  -  30 Aug 2023 19:29  --------------------------------------------------------  IN: 910 mL / OUT: 0 mL / NET: 910 mL        PHYSICAL EXAM:  HEENT: NC/AT; PERRLA  Neck: Soft; no tenderness  Lungs: CTA bilaterally; no wheezing.   Heart:  Abdomen:  Genital/ Rectal:  Extremities:  Neurologic:  Vascular:      LABORATORY:    CBC Full  -  ( 30 Aug 2023 06:27 )  WBC Count : 11.89 K/uL  RBC Count : 4.22 M/uL  Hemoglobin : 12.3 g/dL  Hematocrit : 39.3 %  Platelet Count - Automated : 383 K/uL  Mean Cell Volume : 93.1 fl  Mean Cell Hemoglobin : 29.1 pg  Mean Cell Hemoglobin Concentration : 31.3 gm/dL  Auto Neutrophil # : 10.29 K/uL  Auto Lymphocyte # : 0.77 K/uL  Auto Monocyte # : 0.62 K/uL  Auto Eosinophil # : 0.02 K/uL  Auto Basophil # : 0.05 K/uL  Auto Neutrophil % : 86.5 %  Auto Lymphocyte % : 6.5 %  Auto Monocyte % : 5.2 %  Auto Eosinophil % : 0.2 %  Auto Basophil % : 0.4 %      ESR:                   08-21 @ 06:24  --    C-Reactive Protein:     08-21 @ 06:24  --    Procalcitonin:           08-21 @ 06:24   7.59      08-30    139  |  107  |  25<H>  ----------------------------<  101<H>  3.8   |  28  |  0.68    Ca    9.0      30 Aug 2023 06:27    TPro  6.7  /  Alb  2.6<L>  /  TBili  0.8  /  DBili  0.3  /  AST  158<H>  /  ALT  188<H>  /  AlkPhos  99  08-30      Rapid Respiratory Viral Panel Result        08-20 @ 11:55  Rapid RVP [facility]  Coronovirus --  Adenovirus --  Bordetella Pertussis --  Chlamydia Pneumonia --  Entero/Rhinovirus--  HKU1 Coronovirus --  HMPV Coronovirus --  Influenza A --  Influenza AH1 --  Influenza AH1 2009 --  Influenza AH3 --  Influenza B --  Mycoplasma Pneumoniae --  NL63 Coronovirus --  OC43 Coronovirus --  Parainfluenza 1 --  Parainfluenza 2 --  Parainfluenza 3 --  Parainfluenza 4 --  Resp Syncytial Virus --      Assessment and Plan:          Edwardo Gonzalez MD   [phone].      Interval History:    CENTRAL LINE:   [  ] YES       [  ] NO  SKELTON:                 [  ] YES       [  ] NO         REVIEW OF SYSTEMS:  All Systems below were reviewed and are negative [  ]  HEENT:  ID:  Pulmonary:  Cardiac:  GI:  Renal:  Musculoskeletal:  All other systems above were reviewed and are negative   [  ]      MEDICATIONS  (STANDING):  acetaminophen     Tablet .. 325 milliGRAM(s) Oral every 8 hours  aMIOdarone    Tablet 200 milliGRAM(s) Oral daily  amLODIPine   Tablet 10 milliGRAM(s) Oral daily  apixaban 2.5 milliGRAM(s) Oral two times a day  artificial  tears Solution 1 Drop(s) Both EYES two times a day  dextrose 5% + sodium chloride 0.45%. 1000 milliLiter(s) (50 mL/Hr) IV Continuous <Continuous>  ferrous    sulfate 325 milliGRAM(s) Oral daily  lactobacillus acidophilus 1 Tablet(s) Oral every 12 hours  levothyroxine 75 MICROGram(s) Oral daily  metoprolol tartrate 50 milliGRAM(s) Oral two times a day  multivitamin/minerals 1 Tablet(s) Oral daily  pantoprazole    Tablet 40 milliGRAM(s) Oral daily  polyethylene glycol 3350 17 Gram(s) Oral daily  remdesivir  IVPB   IV Intermittent   remdesivir  IVPB 100 milliGRAM(s) IV Intermittent every 24 hours    MEDICATIONS  (PRN):  acetaminophen     Tablet .. 650 milliGRAM(s) Oral every 6 hours PRN Temp greater or equal to 38C (100.4F), Mild Pain (1 - 3)  albuterol/ipratropium for Nebulization 3 milliLiter(s) Nebulizer every 6 hours PRN Shortness of Breath and/or Wheezing  aluminum hydroxide/magnesium hydroxide/simethicone Suspension 30 milliLiter(s) Oral every 4 hours PRN Dyspepsia  guaiFENesin Oral Liquid (Sugar-Free) 200 milliGRAM(s) Oral every 6 hours PRN Cough  melatonin 3 milliGRAM(s) Oral at bedtime PRN Insomnia  ondansetron Injectable 4 milliGRAM(s) IV Push every 8 hours PRN Nausea and/or Vomiting      Vital Signs Last 24 Hrs  T(C): 36.8 (30 Aug 2023 19:01), Max: 37.1 (30 Aug 2023 05:30)  T(F): 98.2 (30 Aug 2023 19:01), Max: 98.7 (30 Aug 2023 05:30)  HR: 86 (30 Aug 2023 19:01) (84 - 86)  BP: 135/74 (30 Aug 2023 19:01) (129/63 - 146/75)  BP(mean): --  RR: 18 (30 Aug 2023 19:01) (18 - 18)  SpO2: 91% (30 Aug 2023 19:01) (90% - 98%)    Parameters below as of 30 Aug 2023 19:01  Patient On (Oxygen Delivery Method): room air        I&O's Summary    29 Aug 2023 07:01  -  30 Aug 2023 07:00  --------------------------------------------------------  IN: 600 mL / OUT: 250 mL / NET: 350 mL    30 Aug 2023 07:01  -  30 Aug 2023 19:29  --------------------------------------------------------  IN: 910 mL / OUT: 0 mL / NET: 910 mL        PHYSICAL EXAM:  HEENT: NC/AT; PERRLA  Neck: Soft; no tenderness  Lungs: CTA bilaterally; no wheezing.   Heart:  Abdomen:  Genital/ Rectal:  Extremities:  Neurologic:  Vascular:      LABORATORY:    CBC Full  -  ( 30 Aug 2023 06:27 )  WBC Count : 11.89 K/uL  RBC Count : 4.22 M/uL  Hemoglobin : 12.3 g/dL  Hematocrit : 39.3 %  Platelet Count - Automated : 383 K/uL  Mean Cell Volume : 93.1 fl  Mean Cell Hemoglobin : 29.1 pg  Mean Cell Hemoglobin Concentration : 31.3 gm/dL  Auto Neutrophil # : 10.29 K/uL  Auto Lymphocyte # : 0.77 K/uL  Auto Monocyte # : 0.62 K/uL  Auto Eosinophil # : 0.02 K/uL  Auto Basophil # : 0.05 K/uL  Auto Neutrophil % : 86.5 %  Auto Lymphocyte % : 6.5 %  Auto Monocyte % : 5.2 %  Auto Eosinophil % : 0.2 %  Auto Basophil % : 0.4 %      ESR:                   08-21 @ 06:24  --    C-Reactive Protein:     08-21 @ 06:24  --    Procalcitonin:           08-21 @ 06:24   7.59      08-30    139  |  107  |  25<H>  ----------------------------<  101<H>  3.8   |  28  |  0.68    Ca    9.0      30 Aug 2023 06:27    TPro  6.7  /  Alb  2.6<L>  /  TBili  0.8  /  DBili  0.3  /  AST  158<H>  /  ALT  188<H>  /  AlkPhos  99  08-30      Rapid Respiratory Viral Panel Result        08-20 @ 11:55  Rapid RVP [facility]  Coronovirus --  Adenovirus --  Bordetella Pertussis --  Chlamydia Pneumonia --  Entero/Rhinovirus--  HKU1 Coronovirus --  HMPV Coronovirus --  Influenza A --  Influenza AH1 --  Influenza AH1 2009 --  Influenza AH3 --  Influenza B --  Mycoplasma Pneumoniae --  NL63 Coronovirus --  OC43 Coronovirus --  Parainfluenza 1 --  Parainfluenza 2 --  Parainfluenza 3 --  Parainfluenza 4 --  Resp Syncytial Virus --      Assessment and Plan:          Edwardo Gonzalez MD   [phone].      Interval History:    CENTRAL LINE:   [  ] YES       [  ] NO  SKELTON:                 [  ] YES       [  ] NO         REVIEW OF SYSTEMS:  All Systems below were reviewed and are negative [  ]  HEENT:  ID:  Pulmonary:  Cardiac:  GI:  Renal:  Musculoskeletal:  All other systems above were reviewed and are negative   [  ]      MEDICATIONS  (STANDING):  acetaminophen     Tablet .. 325 milliGRAM(s) Oral every 8 hours  aMIOdarone    Tablet 200 milliGRAM(s) Oral daily  amLODIPine   Tablet 10 milliGRAM(s) Oral daily  apixaban 2.5 milliGRAM(s) Oral two times a day  artificial  tears Solution 1 Drop(s) Both EYES two times a day  dextrose 5% + sodium chloride 0.45%. 1000 milliLiter(s) (50 mL/Hr) IV Continuous <Continuous>  ferrous    sulfate 325 milliGRAM(s) Oral daily  lactobacillus acidophilus 1 Tablet(s) Oral every 12 hours  levothyroxine 75 MICROGram(s) Oral daily  metoprolol tartrate 50 milliGRAM(s) Oral two times a day  multivitamin/minerals 1 Tablet(s) Oral daily  pantoprazole    Tablet 40 milliGRAM(s) Oral daily  polyethylene glycol 3350 17 Gram(s) Oral daily  remdesivir  IVPB   IV Intermittent   remdesivir  IVPB 100 milliGRAM(s) IV Intermittent every 24 hours    MEDICATIONS  (PRN):  acetaminophen     Tablet .. 650 milliGRAM(s) Oral every 6 hours PRN Temp greater or equal to 38C (100.4F), Mild Pain (1 - 3)  albuterol/ipratropium for Nebulization 3 milliLiter(s) Nebulizer every 6 hours PRN Shortness of Breath and/or Wheezing  aluminum hydroxide/magnesium hydroxide/simethicone Suspension 30 milliLiter(s) Oral every 4 hours PRN Dyspepsia  guaiFENesin Oral Liquid (Sugar-Free) 200 milliGRAM(s) Oral every 6 hours PRN Cough  melatonin 3 milliGRAM(s) Oral at bedtime PRN Insomnia  ondansetron Injectable 4 milliGRAM(s) IV Push every 8 hours PRN Nausea and/or Vomiting      Vital Signs Last 24 Hrs  T(C): 36.8 (30 Aug 2023 19:01), Max: 37.1 (30 Aug 2023 05:30)  T(F): 98.2 (30 Aug 2023 19:01), Max: 98.7 (30 Aug 2023 05:30)  HR: 86 (30 Aug 2023 19:01) (84 - 86)  BP: 135/74 (30 Aug 2023 19:01) (129/63 - 146/75)  BP(mean): --  RR: 18 (30 Aug 2023 19:01) (18 - 18)  SpO2: 91% (30 Aug 2023 19:01) (90% - 98%)    Parameters below as of 30 Aug 2023 19:01  Patient On (Oxygen Delivery Method): room air        I&O's Summary    29 Aug 2023 07:01  -  30 Aug 2023 07:00  --------------------------------------------------------  IN: 600 mL / OUT: 250 mL / NET: 350 mL    30 Aug 2023 07:01  -  30 Aug 2023 19:29  --------------------------------------------------------  IN: 910 mL / OUT: 0 mL / NET: 910 mL        PHYSICAL EXAM:  HEENT: NC/AT; PERRLA  Neck: Soft; no tenderness  Lungs: CTA bilaterally; no wheezing.   Heart:  Abdomen:  Genital/ Rectal:  Extremities:  Neurologic:  Vascular:      LABORATORY:    CBC Full  -  ( 30 Aug 2023 06:27 )  WBC Count : 11.89 K/uL  RBC Count : 4.22 M/uL  Hemoglobin : 12.3 g/dL  Hematocrit : 39.3 %  Platelet Count - Automated : 383 K/uL  Mean Cell Volume : 93.1 fl  Mean Cell Hemoglobin : 29.1 pg  Mean Cell Hemoglobin Concentration : 31.3 gm/dL  Auto Neutrophil # : 10.29 K/uL  Auto Lymphocyte # : 0.77 K/uL  Auto Monocyte # : 0.62 K/uL  Auto Eosinophil # : 0.02 K/uL  Auto Basophil # : 0.05 K/uL  Auto Neutrophil % : 86.5 %  Auto Lymphocyte % : 6.5 %  Auto Monocyte % : 5.2 %  Auto Eosinophil % : 0.2 %  Auto Basophil % : 0.4 %      ESR:                   08-21 @ 06:24  --    C-Reactive Protein:     08-21 @ 06:24  --    Procalcitonin:           08-21 @ 06:24   7.59      08-30    139  |  107  |  25<H>  ----------------------------<  101<H>  3.8   |  28  |  0.68    Ca    9.0      30 Aug 2023 06:27    TPro  6.7  /  Alb  2.6<L>  /  TBili  0.8  /  DBili  0.3  /  AST  158<H>  /  ALT  188<H>  /  AlkPhos  99  08-30      Rapid Respiratory Viral Panel Result        08-20 @ 11:55  Rapid RVP [facility]  Coronovirus --  Adenovirus --  Bordetella Pertussis --  Chlamydia Pneumonia --  Entero/Rhinovirus--  HKU1 Coronovirus --  HMPV Coronovirus --  Influenza A --  Influenza AH1 --  Influenza AH1 2009 --  Influenza AH3 --  Influenza B --  Mycoplasma Pneumoniae --  NL63 Coronovirus --  OC43 Coronovirus --  Parainfluenza 1 --  Parainfluenza 2 --  Parainfluenza 3 --  Parainfluenza 4 --  Resp Syncytial Virus --      Assessment and Plan:          Edwardo Gonzalez MD   [phone].    She is afebrile  Comfortable      MEDICATIONS  (STANDING):  acetaminophen     Tablet .. 325 milliGRAM(s) Oral every 8 hours  aMIOdarone    Tablet 200 milliGRAM(s) Oral daily  amLODIPine   Tablet 10 milliGRAM(s) Oral daily  apixaban 2.5 milliGRAM(s) Oral two times a day  artificial  tears Solution 1 Drop(s) Both EYES two times a day  dextrose 5% + sodium chloride 0.45%. 1000 milliLiter(s) (50 mL/Hr) IV Continuous <Continuous>  ferrous    sulfate 325 milliGRAM(s) Oral daily  lactobacillus acidophilus 1 Tablet(s) Oral every 12 hours  levothyroxine 75 MICROGram(s) Oral daily  metoprolol tartrate 50 milliGRAM(s) Oral two times a day  multivitamin/minerals 1 Tablet(s) Oral daily  pantoprazole    Tablet 40 milliGRAM(s) Oral daily  polyethylene glycol 3350 17 Gram(s) Oral daily  remdesivir  IVPB   IV Intermittent   remdesivir  IVPB 100 milliGRAM(s) IV Intermittent every 24 hours    MEDICATIONS  (PRN):  acetaminophen     Tablet .. 650 milliGRAM(s) Oral every 6 hours PRN Temp greater or equal to 38C (100.4F), Mild Pain (1 - 3)  albuterol/ipratropium for Nebulization 3 milliLiter(s) Nebulizer every 6 hours PRN Shortness of Breath and/or Wheezing  aluminum hydroxide/magnesium hydroxide/simethicone Suspension 30 milliLiter(s) Oral every 4 hours PRN Dyspepsia  guaiFENesin Oral Liquid (Sugar-Free) 200 milliGRAM(s) Oral every 6 hours PRN Cough  melatonin 3 milliGRAM(s) Oral at bedtime PRN Insomnia  ondansetron Injectable 4 milliGRAM(s) IV Push every 8 hours PRN Nausea and/or Vomiting      Vital Signs Last 24 Hrs  T(C): 36.8 (30 Aug 2023 19:01), Max: 37.1 (30 Aug 2023 05:30)  T(F): 98.2 (30 Aug 2023 19:01), Max: 98.7 (30 Aug 2023 05:30)  HR: 86 (30 Aug 2023 19:01) (84 - 86)  BP: 135/74 (30 Aug 2023 19:01) (129/63 - 146/75)  BP(mean): --  RR: 18 (30 Aug 2023 19:01) (18 - 18)  SpO2: 91% (30 Aug 2023 19:01) (90% - 98%)    Parameters below as of 30 Aug 2023 19:01  Patient On (Oxygen Delivery Method): room air        I&O's Summary    29 Aug 2023 07:01  -  30 Aug 2023 07:00  --------------------------------------------------------  IN: 600 mL / OUT: 250 mL / NET: 350 mL    30 Aug 2023 07:01  -  30 Aug 2023 19:29  --------------------------------------------------------  IN: 910 mL / OUT: 0 mL / NET: 910 mL        PHYSICAL EXAM:  HEENT: NC/AT; PERRLA  Neck: Soft; no tenderness  Lungs: CTA bilaterally; no wheezing.   Heart:  Abdomen:  Genital/ Rectal:  Extremities:  Neurologic:  Vascular:      LABORATORY:    CBC Full  -  ( 30 Aug 2023 06:27 )  WBC Count : 11.89 K/uL  RBC Count : 4.22 M/uL  Hemoglobin : 12.3 g/dL  Hematocrit : 39.3 %  Platelet Count - Automated : 383 K/uL  Mean Cell Volume : 93.1 fl  Mean Cell Hemoglobin : 29.1 pg  Mean Cell Hemoglobin Concentration : 31.3 gm/dL  Auto Neutrophil # : 10.29 K/uL  Auto Lymphocyte # : 0.77 K/uL  Auto Monocyte # : 0.62 K/uL  Auto Eosinophil # : 0.02 K/uL  Auto Basophil # : 0.05 K/uL  Auto Neutrophil % : 86.5 %  Auto Lymphocyte % : 6.5 %  Auto Monocyte % : 5.2 %  Auto Eosinophil % : 0.2 %  Auto Basophil % : 0.4 %      ESR:                   08-21 @ 06:24  --    C-Reactive Protein:     08-21 @ 06:24  --    Procalcitonin:           08-21 @ 06:24   7.59      08-30    139  |  107  |  25<H>  ----------------------------<  101<H>  3.8   |  28  |  0.68    Ca    9.0      30 Aug 2023 06:27    TPro  6.7  /  Alb  2.6<L>  /  TBili  0.8  /  DBili  0.3  /  AST  158<H>  /  ALT  188<H>  /  AlkPhos  99  08-30      Rapid Respiratory Viral Panel Result        08-20 @ 11:55  Rapid RVP [facility]  Coronovirus --  Adenovirus --  Bordetella Pertussis --  Chlamydia Pneumonia --  Entero/Rhinovirus--  HKU1 Coronovirus --  HMPV Coronovirus --  Influenza A --  Influenza AH1 --  Influenza AH1 2009 --  Influenza AH3 --  Influenza B --  Mycoplasma Pneumoniae --  NL63 Coronovirus --  OC43 Coronovirus --  Parainfluenza 1 --  Parainfluenza 2 --  Parainfluenza 3 --  Parainfluenza 4 --  Resp Syncytial Virus --      Assessment and Plan:    1. UTI with E coli  2. Bacteremia with ESBL E coli.   3. Generalized weakness.  4. Dysphagia.  5. COVID 19.    . Continue IV Remdesivir for 3 days.  . Supportive care.            Edwardo Gonzalez MD   [phone].    She is afebrile  Comfortable      MEDICATIONS  (STANDING):  acetaminophen     Tablet .. 325 milliGRAM(s) Oral every 8 hours  aMIOdarone    Tablet 200 milliGRAM(s) Oral daily  amLODIPine   Tablet 10 milliGRAM(s) Oral daily  apixaban 2.5 milliGRAM(s) Oral two times a day  artificial  tears Solution 1 Drop(s) Both EYES two times a day  dextrose 5% + sodium chloride 0.45%. 1000 milliLiter(s) (50 mL/Hr) IV Continuous <Continuous>  ferrous    sulfate 325 milliGRAM(s) Oral daily  lactobacillus acidophilus 1 Tablet(s) Oral every 12 hours  levothyroxine 75 MICROGram(s) Oral daily  metoprolol tartrate 50 milliGRAM(s) Oral two times a day  multivitamin/minerals 1 Tablet(s) Oral daily  pantoprazole    Tablet 40 milliGRAM(s) Oral daily  polyethylene glycol 3350 17 Gram(s) Oral daily  remdesivir  IVPB   IV Intermittent   remdesivir  IVPB 100 milliGRAM(s) IV Intermittent every 24 hours    MEDICATIONS  (PRN):  acetaminophen     Tablet .. 650 milliGRAM(s) Oral every 6 hours PRN Temp greater or equal to 38C (100.4F), Mild Pain (1 - 3)  albuterol/ipratropium for Nebulization 3 milliLiter(s) Nebulizer every 6 hours PRN Shortness of Breath and/or Wheezing  aluminum hydroxide/magnesium hydroxide/simethicone Suspension 30 milliLiter(s) Oral every 4 hours PRN Dyspepsia  guaiFENesin Oral Liquid (Sugar-Free) 200 milliGRAM(s) Oral every 6 hours PRN Cough  melatonin 3 milliGRAM(s) Oral at bedtime PRN Insomnia  ondansetron Injectable 4 milliGRAM(s) IV Push every 8 hours PRN Nausea and/or Vomiting      Vital Signs Last 24 Hrs  T(C): 36.8 (30 Aug 2023 19:01), Max: 37.1 (30 Aug 2023 05:30)  T(F): 98.2 (30 Aug 2023 19:01), Max: 98.7 (30 Aug 2023 05:30)  HR: 86 (30 Aug 2023 19:01) (84 - 86)  BP: 135/74 (30 Aug 2023 19:01) (129/63 - 146/75)  BP(mean): --  RR: 18 (30 Aug 2023 19:01) (18 - 18)  SpO2: 91% (30 Aug 2023 19:01) (90% - 98%)    Parameters below as of 30 Aug 2023 19:01  Patient On (Oxygen Delivery Method): room air        I&O's Summary    29 Aug 2023 07:01  -  30 Aug 2023 07:00  --------------------------------------------------------  IN: 600 mL / OUT: 250 mL / NET: 350 mL    30 Aug 2023 07:01  -  30 Aug 2023 19:29  --------------------------------------------------------  IN: 910 mL / OUT: 0 mL / NET: 910 mL        PHYSICAL EXAM:  HEENT: NC/AT; PERRLA  Neck: Soft; no tenderness  Lungs: CTA bilaterally; no wheezing.   Heart:  Abdomen:  Genital/ Rectal:  Extremities:  Neurologic:  Vascular:      LABORATORY:    CBC Full  -  ( 30 Aug 2023 06:27 )  WBC Count : 11.89 K/uL  RBC Count : 4.22 M/uL  Hemoglobin : 12.3 g/dL  Hematocrit : 39.3 %  Platelet Count - Automated : 383 K/uL  Mean Cell Volume : 93.1 fl  Mean Cell Hemoglobin : 29.1 pg  Mean Cell Hemoglobin Concentration : 31.3 gm/dL  Auto Neutrophil # : 10.29 K/uL  Auto Lymphocyte # : 0.77 K/uL  Auto Monocyte # : 0.62 K/uL  Auto Eosinophil # : 0.02 K/uL  Auto Basophil # : 0.05 K/uL  Auto Neutrophil % : 86.5 %  Auto Lymphocyte % : 6.5 %  Auto Monocyte % : 5.2 %  Auto Eosinophil % : 0.2 %  Auto Basophil % : 0.4 %      ESR:                   08-21 @ 06:24  --    C-Reactive Protein:     08-21 @ 06:24  --    Procalcitonin:           08-21 @ 06:24   7.59      08-30    139  |  107  |  25<H>  ----------------------------<  101<H>  3.8   |  28  |  0.68    Ca    9.0      30 Aug 2023 06:27    TPro  6.7  /  Alb  2.6<L>  /  TBili  0.8  /  DBili  0.3  /  AST  158<H>  /  ALT  188<H>  /  AlkPhos  99  08-30      Rapid Respiratory Viral Panel Result        08-20 @ 11:55  Rapid RVP [facility]  Coronovirus --  Adenovirus --  Bordetella Pertussis --  Chlamydia Pneumonia --  Entero/Rhinovirus--  HKU1 Coronovirus --  HMPV Coronovirus --  Influenza A --  Influenza AH1 --  Influenza AH1 2009 --  Influenza AH3 --  Influenza B --  Mycoplasma Pneumoniae --  NL63 Coronovirus --  OC43 Coronovirus --  Parainfluenza 1 --  Parainfluenza 2 --  Parainfluenza 3 --  Parainfluenza 4 --  Resp Syncytial Virus --      Assessment and Plan:    1. UTI with E coli  2. Bacteremia with ESBL E coli.   3. Generalized weakness.  4. Dysphagia.  5. COVID 19.    . Continue IV Remdesivir for 3 days.  . Supportive care.            Edwardo Gonzalez MD   [phone].    She is afebrile  Comfortable      MEDICATIONS  (STANDING):  acetaminophen     Tablet .. 325 milliGRAM(s) Oral every 8 hours  aMIOdarone    Tablet 200 milliGRAM(s) Oral daily  amLODIPine   Tablet 10 milliGRAM(s) Oral daily  apixaban 2.5 milliGRAM(s) Oral two times a day  artificial  tears Solution 1 Drop(s) Both EYES two times a day  dextrose 5% + sodium chloride 0.45%. 1000 milliLiter(s) (50 mL/Hr) IV Continuous <Continuous>  ferrous    sulfate 325 milliGRAM(s) Oral daily  lactobacillus acidophilus 1 Tablet(s) Oral every 12 hours  levothyroxine 75 MICROGram(s) Oral daily  metoprolol tartrate 50 milliGRAM(s) Oral two times a day  multivitamin/minerals 1 Tablet(s) Oral daily  pantoprazole    Tablet 40 milliGRAM(s) Oral daily  polyethylene glycol 3350 17 Gram(s) Oral daily  remdesivir  IVPB   IV Intermittent   remdesivir  IVPB 100 milliGRAM(s) IV Intermittent every 24 hours    MEDICATIONS  (PRN):  acetaminophen     Tablet .. 650 milliGRAM(s) Oral every 6 hours PRN Temp greater or equal to 38C (100.4F), Mild Pain (1 - 3)  albuterol/ipratropium for Nebulization 3 milliLiter(s) Nebulizer every 6 hours PRN Shortness of Breath and/or Wheezing  aluminum hydroxide/magnesium hydroxide/simethicone Suspension 30 milliLiter(s) Oral every 4 hours PRN Dyspepsia  guaiFENesin Oral Liquid (Sugar-Free) 200 milliGRAM(s) Oral every 6 hours PRN Cough  melatonin 3 milliGRAM(s) Oral at bedtime PRN Insomnia  ondansetron Injectable 4 milliGRAM(s) IV Push every 8 hours PRN Nausea and/or Vomiting      Vital Signs Last 24 Hrs  T(C): 36.8 (30 Aug 2023 19:01), Max: 37.1 (30 Aug 2023 05:30)  T(F): 98.2 (30 Aug 2023 19:01), Max: 98.7 (30 Aug 2023 05:30)  HR: 86 (30 Aug 2023 19:01) (84 - 86)  BP: 135/74 (30 Aug 2023 19:01) (129/63 - 146/75)  BP(mean): --  RR: 18 (30 Aug 2023 19:01) (18 - 18)  SpO2: 91% (30 Aug 2023 19:01) (90% - 98%)    Parameters below as of 30 Aug 2023 19:01  Patient On (Oxygen Delivery Method): room air        I&O's Summary    29 Aug 2023 07:01  -  30 Aug 2023 07:00  --------------------------------------------------------  IN: 600 mL / OUT: 250 mL / NET: 350 mL    30 Aug 2023 07:01  -  30 Aug 2023 19:29  --------------------------------------------------------  IN: 910 mL / OUT: 0 mL / NET: 910 mL        PHYSICAL EXAM:  HEENT: NC/AT; PERRLA  Neck: Soft; no tenderness  Lungs: CTA bilaterally; no wheezing.   Heart:  Abdomen:  Genital/ Rectal:  Extremities:  Neurologic:  Vascular:      LABORATORY:    CBC Full  -  ( 30 Aug 2023 06:27 )  WBC Count : 11.89 K/uL  RBC Count : 4.22 M/uL  Hemoglobin : 12.3 g/dL  Hematocrit : 39.3 %  Platelet Count - Automated : 383 K/uL  Mean Cell Volume : 93.1 fl  Mean Cell Hemoglobin : 29.1 pg  Mean Cell Hemoglobin Concentration : 31.3 gm/dL  Auto Neutrophil # : 10.29 K/uL  Auto Lymphocyte # : 0.77 K/uL  Auto Monocyte # : 0.62 K/uL  Auto Eosinophil # : 0.02 K/uL  Auto Basophil # : 0.05 K/uL  Auto Neutrophil % : 86.5 %  Auto Lymphocyte % : 6.5 %  Auto Monocyte % : 5.2 %  Auto Eosinophil % : 0.2 %  Auto Basophil % : 0.4 %      ESR:                   08-21 @ 06:24  --    C-Reactive Protein:     08-21 @ 06:24  --    Procalcitonin:           08-21 @ 06:24   7.59      08-30    139  |  107  |  25<H>  ----------------------------<  101<H>  3.8   |  28  |  0.68    Ca    9.0      30 Aug 2023 06:27    TPro  6.7  /  Alb  2.6<L>  /  TBili  0.8  /  DBili  0.3  /  AST  158<H>  /  ALT  188<H>  /  AlkPhos  99  08-30      Rapid Respiratory Viral Panel Result        08-20 @ 11:55  Rapid RVP [facility]  Coronovirus --  Adenovirus --  Bordetella Pertussis --  Chlamydia Pneumonia --  Entero/Rhinovirus--  HKU1 Coronovirus --  HMPV Coronovirus --  Influenza A --  Influenza AH1 --  Influenza AH1 2009 --  Influenza AH3 --  Influenza B --  Mycoplasma Pneumoniae --  NL63 Coronovirus --  OC43 Coronovirus --  Parainfluenza 1 --  Parainfluenza 2 --  Parainfluenza 3 --  Parainfluenza 4 --  Resp Syncytial Virus --      Assessment and Plan:    1. UTI with E coli  2. Bacteremia with ESBL E coli.   3. Generalized weakness.  4. Dysphagia.  5. COVID 19.    . Continue IV Remdesivir for 3 days.  . Supportive care.            Edwardo Gonzalez MD   [phone].    She is afebrile  Comfortable  Still with a cough.     MEDICATIONS  (STANDING):  acetaminophen     Tablet .. 325 milliGRAM(s) Oral every 8 hours  aMIOdarone    Tablet 200 milliGRAM(s) Oral daily  amLODIPine   Tablet 10 milliGRAM(s) Oral daily  apixaban 2.5 milliGRAM(s) Oral two times a day  artificial  tears Solution 1 Drop(s) Both EYES two times a day  dextrose 5% + sodium chloride 0.45%. 1000 milliLiter(s) (50 mL/Hr) IV Continuous <Continuous>  ferrous    sulfate 325 milliGRAM(s) Oral daily  lactobacillus acidophilus 1 Tablet(s) Oral every 12 hours  levothyroxine 75 MICROGram(s) Oral daily  metoprolol tartrate 50 milliGRAM(s) Oral two times a day  multivitamin/minerals 1 Tablet(s) Oral daily  pantoprazole    Tablet 40 milliGRAM(s) Oral daily  polyethylene glycol 3350 17 Gram(s) Oral daily  remdesivir  IVPB   IV Intermittent   remdesivir  IVPB 100 milliGRAM(s) IV Intermittent every 24 hours    MEDICATIONS  (PRN):  acetaminophen     Tablet .. 650 milliGRAM(s) Oral every 6 hours PRN Temp greater or equal to 38C (100.4F), Mild Pain (1 - 3)  albuterol/ipratropium for Nebulization 3 milliLiter(s) Nebulizer every 6 hours PRN Shortness of Breath and/or Wheezing  aluminum hydroxide/magnesium hydroxide/simethicone Suspension 30 milliLiter(s) Oral every 4 hours PRN Dyspepsia  guaiFENesin Oral Liquid (Sugar-Free) 200 milliGRAM(s) Oral every 6 hours PRN Cough  melatonin 3 milliGRAM(s) Oral at bedtime PRN Insomnia  ondansetron Injectable 4 milliGRAM(s) IV Push every 8 hours PRN Nausea and/or Vomiting      Vital Signs Last 24 Hrs  T(C): 36.8 (30 Aug 2023 19:01), Max: 37.1 (30 Aug 2023 05:30)  T(F): 98.2 (30 Aug 2023 19:01), Max: 98.7 (30 Aug 2023 05:30)  HR: 86 (30 Aug 2023 19:01) (84 - 86)  BP: 135/74 (30 Aug 2023 19:01) (129/63 - 146/75)  BP(mean): --  RR: 18 (30 Aug 2023 19:01) (18 - 18)  SpO2: 91% (30 Aug 2023 19:01) (90% - 98%)    Parameters below as of 30 Aug 2023 19:01  Patient On (Oxygen Delivery Method): room air        I&O's Summary    29 Aug 2023 07:01  -  30 Aug 2023 07:00  --------------------------------------------------------  IN: 600 mL / OUT: 250 mL / NET: 350 mL    30 Aug 2023 07:01  -  30 Aug 2023 19:29  --------------------------------------------------------  IN: 910 mL / OUT: 0 mL / NET: 910 mL        PHYSICAL EXAM:  HEENT: NC/AT; PERRLA  Neck: Soft; no tenderness  Lungs: Coarse BS bilaterally; no wheezing.   Heart: RRR, no murmurs.   Abdomen: Soft, no tenderness.   Genital/ Rectal: No khan cather  Extremities: Moderate edema of LUE.  Neurologic: Confused.       LABORATORY:    CBC Full  -  ( 30 Aug 2023 06:27 )  WBC Count : 11.89 K/uL  RBC Count : 4.22 M/uL  Hemoglobin : 12.3 g/dL  Hematocrit : 39.3 %  Platelet Count - Automated : 383 K/uL  Mean Cell Volume : 93.1 fl  Mean Cell Hemoglobin : 29.1 pg  Mean Cell Hemoglobin Concentration : 31.3 gm/dL  Auto Neutrophil # : 10.29 K/uL  Auto Lymphocyte # : 0.77 K/uL  Auto Monocyte # : 0.62 K/uL  Auto Eosinophil # : 0.02 K/uL  Auto Basophil # : 0.05 K/uL  Auto Neutrophil % : 86.5 %  Auto Lymphocyte % : 6.5 %  Auto Monocyte % : 5.2 %  Auto Eosinophil % : 0.2 %  Auto Basophil % : 0.4 %      ESR:                   08-21 @ 06:24  --    C-Reactive Protein:     08-21 @ 06:24  --    Procalcitonin:           08-21 @ 06:24   7.59      08-30    139  |  107  |  25<H>  ----------------------------<  101<H>  3.8   |  28  |  0.68    Ca    9.0      30 Aug 2023 06:27    TPro  6.7  /  Alb  2.6<L>  /  TBili  0.8  /  DBili  0.3  /  AST  158<H>  /  ALT  188<H>  /  AlkPhos  99  08-30      Rapid Respiratory Viral Panel Result        08-20 @ 11:55  Rapid RVP [facility]  Coronovirus --  Adenovirus --  Bordetella Pertussis --  Chlamydia Pneumonia --  Entero/Rhinovirus--  HKU1 Coronovirus --  HMPV Coronovirus --  Influenza A --  Influenza AH1 --  Influenza AH1 2009 --  Influenza AH3 --  Influenza B --  Mycoplasma Pneumoniae --  NL63 Coronovirus --  OC43 Coronovirus --  Parainfluenza 1 --  Parainfluenza 2 --  Parainfluenza 3 --  Parainfluenza 4 --  Resp Syncytial Virus --      Assessment and Plan:    1. UTI with E coli  2. Bacteremia with ESBL E coli.   3. Generalized weakness.  4. Dysphagia.  5. COVID 19.    . She is at risks for progression to severe COVID 19.   . Continue IV Remdesivir for 3 days.  . Completed IV Invanz for ESBL E coli bacteremia.   . Supportive care.            Edwardo Gonzalez MD   [phone].    She is afebrile  Comfortable  Still with a cough.     MEDICATIONS  (STANDING):  acetaminophen     Tablet .. 325 milliGRAM(s) Oral every 8 hours  aMIOdarone    Tablet 200 milliGRAM(s) Oral daily  amLODIPine   Tablet 10 milliGRAM(s) Oral daily  apixaban 2.5 milliGRAM(s) Oral two times a day  artificial  tears Solution 1 Drop(s) Both EYES two times a day  dextrose 5% + sodium chloride 0.45%. 1000 milliLiter(s) (50 mL/Hr) IV Continuous <Continuous>  ferrous    sulfate 325 milliGRAM(s) Oral daily  lactobacillus acidophilus 1 Tablet(s) Oral every 12 hours  levothyroxine 75 MICROGram(s) Oral daily  metoprolol tartrate 50 milliGRAM(s) Oral two times a day  multivitamin/minerals 1 Tablet(s) Oral daily  pantoprazole    Tablet 40 milliGRAM(s) Oral daily  polyethylene glycol 3350 17 Gram(s) Oral daily  remdesivir  IVPB   IV Intermittent   remdesivir  IVPB 100 milliGRAM(s) IV Intermittent every 24 hours    MEDICATIONS  (PRN):  acetaminophen     Tablet .. 650 milliGRAM(s) Oral every 6 hours PRN Temp greater or equal to 38C (100.4F), Mild Pain (1 - 3)  albuterol/ipratropium for Nebulization 3 milliLiter(s) Nebulizer every 6 hours PRN Shortness of Breath and/or Wheezing  aluminum hydroxide/magnesium hydroxide/simethicone Suspension 30 milliLiter(s) Oral every 4 hours PRN Dyspepsia  guaiFENesin Oral Liquid (Sugar-Free) 200 milliGRAM(s) Oral every 6 hours PRN Cough  melatonin 3 milliGRAM(s) Oral at bedtime PRN Insomnia  ondansetron Injectable 4 milliGRAM(s) IV Push every 8 hours PRN Nausea and/or Vomiting      Vital Signs Last 24 Hrs  T(C): 36.8 (30 Aug 2023 19:01), Max: 37.1 (30 Aug 2023 05:30)  T(F): 98.2 (30 Aug 2023 19:01), Max: 98.7 (30 Aug 2023 05:30)  HR: 86 (30 Aug 2023 19:01) (84 - 86)  BP: 135/74 (30 Aug 2023 19:01) (129/63 - 146/75)  BP(mean): --  RR: 18 (30 Aug 2023 19:01) (18 - 18)  SpO2: 91% (30 Aug 2023 19:01) (90% - 98%)    Parameters below as of 30 Aug 2023 19:01  Patient On (Oxygen Delivery Method): room air        I&O's Summary    29 Aug 2023 07:01  -  30 Aug 2023 07:00  --------------------------------------------------------  IN: 600 mL / OUT: 250 mL / NET: 350 mL    30 Aug 2023 07:01  -  30 Aug 2023 19:29  --------------------------------------------------------  IN: 910 mL / OUT: 0 mL / NET: 910 mL        PHYSICAL EXAM:  HEENT: NC/AT; PERRLA  Neck: Soft; no tenderness  Lungs: Coarse BS bilaterally; no wheezing.   Heart: RRR, no murmurs.   Abdomen: Soft, no tenderness.   Genital/ Rectal: No khan cather  Extremities: Moderate edema of LUE.  Neurologic: Confused.       LABORATORY:    CBC Full  -  ( 30 Aug 2023 06:27 )  WBC Count : 11.89 K/uL  RBC Count : 4.22 M/uL  Hemoglobin : 12.3 g/dL  Hematocrit : 39.3 %  Platelet Count - Automated : 383 K/uL  Mean Cell Volume : 93.1 fl  Mean Cell Hemoglobin : 29.1 pg  Mean Cell Hemoglobin Concentration : 31.3 gm/dL  Auto Neutrophil # : 10.29 K/uL  Auto Lymphocyte # : 0.77 K/uL  Auto Monocyte # : 0.62 K/uL  Auto Eosinophil # : 0.02 K/uL  Auto Basophil # : 0.05 K/uL  Auto Neutrophil % : 86.5 %  Auto Lymphocyte % : 6.5 %  Auto Monocyte % : 5.2 %  Auto Eosinophil % : 0.2 %  Auto Basophil % : 0.4 %      ESR:                   08-21 @ 06:24  --    C-Reactive Protein:     08-21 @ 06:24  --    Procalcitonin:           08-21 @ 06:24   7.59      08-30    139  |  107  |  25<H>  ----------------------------<  101<H>  3.8   |  28  |  0.68    Ca    9.0      30 Aug 2023 06:27    TPro  6.7  /  Alb  2.6<L>  /  TBili  0.8  /  DBili  0.3  /  AST  158<H>  /  ALT  188<H>  /  AlkPhos  99  08-30      Rapid Respiratory Viral Panel Result        08-20 @ 11:55  Rapid R[facility]  Coronovirus --  Adenovirus --  Bordetella Pertussis --  Chlamydia Pneumonia --  Entero/Rhinovirus--  HKU1 Coronovirus --  HMPV Coronovirus --  Influenza A --  Influenza AH1 --  Influenza AH1 2009 --  Influenza AH3 --  Influenza B --  Mycoplasma Pneumoniae --  NL63 Coronovirus --  OC43 Coronovirus --  Parainfluenza 1 --  Parainfluenza 2 --  Parainfluenza 3 --  Parainfluenza 4 --  Resp Syncytial Virus --      Assessment and Plan:    1. UTI with E coli  2. Bacteremia with ESBL E coli.   3. Generalized weakness.  4. Dysphagia.  5. COVID 19.    . She is at risks for progression to severe COVID 19.   . Continue IV Remdesivir for 3 days.  . Completed IV Invanz for ESBL E coli bacteremia.   . Supportive care.            Edwardo Gonzalez MD   [phone].    She is afebrile  Comfortable  Still with a cough.     MEDICATIONS  (STANDING):  acetaminophen     Tablet .. 325 milliGRAM(s) Oral every 8 hours  aMIOdarone    Tablet 200 milliGRAM(s) Oral daily  amLODIPine   Tablet 10 milliGRAM(s) Oral daily  apixaban 2.5 milliGRAM(s) Oral two times a day  artificial  tears Solution 1 Drop(s) Both EYES two times a day  dextrose 5% + sodium chloride 0.45%. 1000 milliLiter(s) (50 mL/Hr) IV Continuous <Continuous>  ferrous    sulfate 325 milliGRAM(s) Oral daily  lactobacillus acidophilus 1 Tablet(s) Oral every 12 hours  levothyroxine 75 MICROGram(s) Oral daily  metoprolol tartrate 50 milliGRAM(s) Oral two times a day  multivitamin/minerals 1 Tablet(s) Oral daily  pantoprazole    Tablet 40 milliGRAM(s) Oral daily  polyethylene glycol 3350 17 Gram(s) Oral daily  remdesivir  IVPB   IV Intermittent   remdesivir  IVPB 100 milliGRAM(s) IV Intermittent every 24 hours    MEDICATIONS  (PRN):  acetaminophen     Tablet .. 650 milliGRAM(s) Oral every 6 hours PRN Temp greater or equal to 38C (100.4F), Mild Pain (1 - 3)  albuterol/ipratropium for Nebulization 3 milliLiter(s) Nebulizer every 6 hours PRN Shortness of Breath and/or Wheezing  aluminum hydroxide/magnesium hydroxide/simethicone Suspension 30 milliLiter(s) Oral every 4 hours PRN Dyspepsia  guaiFENesin Oral Liquid (Sugar-Free) 200 milliGRAM(s) Oral every 6 hours PRN Cough  melatonin 3 milliGRAM(s) Oral at bedtime PRN Insomnia  ondansetron Injectable 4 milliGRAM(s) IV Push every 8 hours PRN Nausea and/or Vomiting      Vital Signs Last 24 Hrs  T(C): 36.8 (30 Aug 2023 19:01), Max: 37.1 (30 Aug 2023 05:30)  T(F): 98.2 (30 Aug 2023 19:01), Max: 98.7 (30 Aug 2023 05:30)  HR: 86 (30 Aug 2023 19:01) (84 - 86)  BP: 135/74 (30 Aug 2023 19:01) (129/63 - 146/75)  BP(mean): --  RR: 18 (30 Aug 2023 19:01) (18 - 18)  SpO2: 91% (30 Aug 2023 19:01) (90% - 98%)    Parameters below as of 30 Aug 2023 19:01  Patient On (Oxygen Delivery Method): room air        I&O's Summary    29 Aug 2023 07:01  -  30 Aug 2023 07:00  --------------------------------------------------------  IN: 600 mL / OUT: 250 mL / NET: 350 mL    30 Aug 2023 07:01  -  30 Aug 2023 19:29  --------------------------------------------------------  IN: 910 mL / OUT: 0 mL / NET: 910 mL        PHYSICAL EXAM:  HEENT: NC/AT; PERRLA  Neck: Soft; no tenderness  Lungs: Coarse BS bilaterally; no wheezing.   Heart: RRR, no murmurs.   Abdomen: Soft, no tenderness.   Genital/ Rectal: No khan cather  Extremities: Moderate edema of LUE.  Neurologic: Confused.       LABORATORY:    CBC Full  -  ( 30 Aug 2023 06:27 )  WBC Count : 11.89 K/uL  RBC Count : 4.22 M/uL  Hemoglobin : 12.3 g/dL  Hematocrit : 39.3 %  Platelet Count - Automated : 383 K/uL  Mean Cell Volume : 93.1 fl  Mean Cell Hemoglobin : 29.1 pg  Mean Cell Hemoglobin Concentration : 31.3 gm/dL  Auto Neutrophil # : 10.29 K/uL  Auto Lymphocyte # : 0.77 K/uL  Auto Monocyte # : 0.62 K/uL  Auto Eosinophil # : 0.02 K/uL  Auto Basophil # : 0.05 K/uL  Auto Neutrophil % : 86.5 %  Auto Lymphocyte % : 6.5 %  Auto Monocyte % : 5.2 %  Auto Eosinophil % : 0.2 %  Auto Basophil % : 0.4 %      ESR:                   08-21 @ 06:24  --    C-Reactive Protein:     08-21 @ 06:24  --    Procalcitonin:           08-21 @ 06:24   7.59      08-30    139  |  107  |  25<H>  ----------------------------<  101<H>  3.8   |  28  |  0.68    Ca    9.0      30 Aug 2023 06:27    TPro  6.7  /  Alb  2.6<L>  /  TBili  0.8  /  DBili  0.3  /  AST  158<H>  /  ALT  188<H>  /  AlkPhos  99  08-30      Rapid Respiratory Viral Panel Result        08-20 @ 11:55  Rapid R[facility]  Coronovirus --  Adenovirus --  Bordetella Pertussis --  Chlamydia Pneumonia --  Entero/Rhinovirus--  HKU1 Coronovirus --  HMPV Coronovirus --  Influenza A --  Influenza AH1 --  Influenza AH1 2009 --  Influenza AH3 --  Influenza B --  Mycoplasma Pneumoniae --  NL63 Coronovirus --  OC43 Coronovirus --  Parainfluenza 1 --  Parainfluenza 2 --  Parainfluenza 3 --  Parainfluenza 4 --  Resp Syncytial Virus --      Assessment and Plan:    1. UTI with E coli  2. Bacteremia with ESBL E coli.   3. Generalized weakness.  4. Dysphagia.  5. COVID 19.    . She is at risks for progression to severe COVID 19.   . Continue IV Remdesivir for 3 days.  . Completed IV Invanz for ESBL E coli bacteremia.   . Supportive care.            Edwardo Gonzalez MD   [phone].

## 2023-08-30 NOTE — PROGRESS NOTE ADULT - SUBJECTIVE AND OBJECTIVE BOX
PROGRESS NOTE  Patient is a 89y old  Female who presents with a chief complaint of cough (30 Aug 2023 19:29)    Chart and available morning labs /imaging are reviewed electronically , urgent issues addressed . More information  is being added upon completion of rounds , when more information is collected and management discussed with consultants , medical staff and social service/case management on the floor   OVERNIGHT  No new issues reported by medical staff . All above noted Patient is resting in a bed comfortably .Confused ,poor mentation .No distress noted   Appetitive is better today Ortho team cons requested due to xray findings   HPI:  89-year-old female with history of hypothyroidism, A-fib on Eliquis, s/p recent pacemaker for complete heart block brought in by ambulance from [facility] living home for cough for the past 2 weeks.  Associated with generalized weakness and decreased p.o. intake.  No fall or trauma.  Patient states she has had cold symptoms for a while.  Denies fever, chills, chest pain, shortness of breath, abdominal pain, nausea, vomiting, upper or lower extremity weakness or paresthesias. pmd: Dr. Crawley, cards: [facility] Seen by card Dr Reich Admitted  to telemetry unit for monitoring , send 3 sets of cardiac enzymes to rule out acute coronary event, obtain ECHO to evaluate LVEF, cardiology consult  ,continue current management, O2 supply, anticoagulation plan as per cardiology consult Pulm cons requested , antitussives and nebulized bd ordered .Also UTI suspected and started on iv abx , id cons called Palliative care consult requested ,to discuss advance directives and complete MOLST  (20 Aug 2023 15:35)    PAST MEDICAL & SURGICAL HISTORY:  HTN (hypertension)      Afib      Spinal stenosis      PFO (patent foramen ovale)      Degeneration macular      Osteoporosis      History of complete heart block      Hypothyroidism      Cardiac pacemaker          MEDICATIONS  (STANDING):  acetaminophen     Tablet .. 325 milliGRAM(s) Oral every 8 hours  aMIOdarone    Tablet 200 milliGRAM(s) Oral daily  amLODIPine   Tablet 10 milliGRAM(s) Oral daily  apixaban 2.5 milliGRAM(s) Oral two times a day  artificial  tears Solution 1 Drop(s) Both EYES two times a day  dextrose 5% + sodium chloride 0.45%. 1000 milliLiter(s) (50 mL/Hr) IV Continuous <Continuous>  ferrous    sulfate 325 milliGRAM(s) Oral daily  lactobacillus acidophilus 1 Tablet(s) Oral every 12 hours  levothyroxine 75 MICROGram(s) Oral daily  metoprolol tartrate 50 milliGRAM(s) Oral two times a day  multivitamin/minerals 1 Tablet(s) Oral daily  pantoprazole    Tablet 40 milliGRAM(s) Oral daily  polyethylene glycol 3350 17 Gram(s) Oral daily  remdesivir  IVPB   IV Intermittent   remdesivir  IVPB 100 milliGRAM(s) IV Intermittent every 24 hours    MEDICATIONS  (PRN):  acetaminophen     Tablet .. 650 milliGRAM(s) Oral every 6 hours PRN Temp greater or equal to 38C (100.4F), Mild Pain (1 - 3)  albuterol/ipratropium for Nebulization 3 milliLiter(s) Nebulizer every 6 hours PRN Shortness of Breath and/or Wheezing  aluminum hydroxide/magnesium hydroxide/simethicone Suspension 30 milliLiter(s) Oral every 4 hours PRN Dyspepsia  guaiFENesin Oral Liquid (Sugar-Free) 200 milliGRAM(s) Oral every 6 hours PRN Cough  melatonin 3 milliGRAM(s) Oral at bedtime PRN Insomnia  ondansetron Injectable 4 milliGRAM(s) IV Push every 8 hours PRN Nausea and/or Vomiting      OBJECTIVE    T(C): 36.8 (08-30-23 @ 19:01), Max: 37.1 (08-30-23 @ 05:30)  HR: 86 (08-30-23 @ 19:01) (84 - 86)  BP: 135/74 (08-30-23 @ 19:01) (129/63 - 137/77)  RR: 18 (08-30-23 @ 19:01) (18 - 18)  SpO2: 91% (08-30-23 @ 19:01) (90% - 98%)  Wt(kg): --  I&O's Summary    29 Aug 2023 07:01  -  30 Aug 2023 07:00  --------------------------------------------------------  IN: 600 mL / OUT: 250 mL / NET: 350 mL    30 Aug 2023 07:01  -  30 Aug 2023 21:50  --------------------------------------------------------  IN: 910 mL / OUT: 0 mL / NET: 910 mL          REVIEW OF SYSTEMS:  Patient is  unable to provide any information/ROS  due to baseline mental status.     PHYSICAL EXAM:  Appearance: NAD. VS past 24 hrs -as above   HEENT:   Moist oral mucosa. Conjunctiva clear b/l.   Neck : supple  Respiratory: Lungs CTAB.  Gastrointestinal:  Soft, nontender. No rebound. No rigidity. BS present	  Cardiovascular: RRR ,S1S2 present  Neurologic: Non-focal. Moving all extremities.  Extremities: No edema. No erythema. No calf tenderness.  Skin: No rashes, No ecchymoses, No cyanosis.	  wounds ,skin lesions-See skin assesment flow sheet   LABS:                        12.3   11.89 )-----------( 383      ( 30 Aug 2023 06:27 )             39.3     08-30    139  |  107  |  25<H>  ----------------------------<  101<H>  3.8   |  28  |  0.68    Ca    9.0      30 Aug 2023 06:27    TPro  6.7  /  Alb  2.6<L>  /  TBili  0.8  /  DBili  0.3  /  AST  158<H>  /  ALT  188<H>  /  AlkPhos  99  08-30    CAPILLARY BLOOD GLUCOSE        PT/INR - ( 30 Aug 2023 06:27 )   PT: 14.9 sec;   INR: 1.28 ratio           Urinalysis Basic - ( 30 Aug 2023 06:27 )    Color: x / Appearance: x / SG: x / pH: x  Gluc: 101 mg/dL / Ketone: x  / Bili: x / Urobili: x   Blood: x / Protein: x / Nitrite: x   Leuk Esterase: x / RBC: x / WBC x   Sq Epi: x / Non Sq Epi: x / Bacteria: x        Culture - Blood (collected 24 Aug 2023 06:48)  Source: .Blood Blood  Final Report (29 Aug 2023 10:01):    No growth at 5 days    Culture - Blood (collected 22 Aug 2023 11:01)  Source: .Blood Blood  Final Report (27 Aug 2023 15:01):    No growth at 5 days    Culture - Blood (collected 22 Aug 2023 06:20)  Source: .Blood Blood  Final Report (27 Aug 2023 12:00):    No growth at 5 days    Culture - Blood (collected 21 Aug 2023 09:35)  Source: .Blood Blood  Gram Stain (23 Aug 2023 01:21):    Growth in aerobic bottle: Gram Negative Rods  Final Report (23 Aug 2023 20:36):    Growth in aerobic bottle: Escherichia coli ESBL    See previous culture [id number]    Culture - Blood (collected 21 Aug 2023 09:30)  Source: .Blood Blood  Gram Stain (22 Aug 2023 17:29):    Growth in anaerobic bottle: Gram Negative Rods    Growth in aerobic bottle: Gram Negative Rods  Final Report (23 Aug 2023 10:15):    Growth in aerobic and anaerobic bottles: Escherichia coli ESBL    See previous culture [id number]      RADIOLOGY & ADDITIONAL TESTS:   reviewed elctronically  ASSESSMENT/PLAN: 	    25 minutes aggregate time was spent on this visit, 50% visit time spent in care co-ordination with other attendings and counselling patient .I have discussed care plan with patient / HCP/family member ,who expressed understanding of problems treatment and their effect and side effects, alternatives in details. I have asked if they have any questions and concerns and appropriately addressed them to best of my ability.  PROGRESS NOTE  Patient is a 89y old  Female who presents with a chief complaint of cough (30 Aug 2023 19:29)    Chart and available morning labs /imaging are reviewed electronically , urgent issues addressed . More information  is being added upon completion of rounds , when more information is collected and management discussed with consultants , medical staff and social service/case management on the floor   OVERNIGHT  No new issues reported by medical staff . All above noted Patient is resting in a bed comfortably .Confused ,poor mentation .No distress noted   Appetitive is better today Ortho team cons requested due to xray findings   HPI:  89-year-old female with history of hypothyroidism, A-fib on Eliquis, s/p recent pacemaker for complete heart block brought in by ambulance from [facility] living home for cough for the past 2 weeks.  Associated with generalized weakness and decreased p.o. intake.  No fall or trauma.  Patient states she has had cold symptoms for a while.  Denies fever, chills, chest pain, shortness of breath, abdominal pain, nausea, vomiting, upper or lower extremity weakness or paresthesias. pmd: Dr. Crawley, cards: [facility] Seen by card Dr Reich Admitted  to telemetry unit for monitoring , send 3 sets of cardiac enzymes to rule out acute coronary event, obtain ECHO to evaluate LVEF, cardiology consult  ,continue current management, O2 supply, anticoagulation plan as per cardiology consult Pulm cons requested , antitussives and nebulized bd ordered .Also UTI suspected and started on iv abx , id cons called Palliative care consult requested ,to discuss advance directives and complete MOLST  (20 Aug 2023 15:35)    PAST MEDICAL & SURGICAL HISTORY:  HTN (hypertension)      Afib      Spinal stenosis      PFO (patent foramen ovale)      Degeneration macular      Osteoporosis      History of complete heart block      Hypothyroidism      Cardiac pacemaker          MEDICATIONS  (STANDING):  acetaminophen     Tablet .. 325 milliGRAM(s) Oral every 8 hours  aMIOdarone    Tablet 200 milliGRAM(s) Oral daily  amLODIPine   Tablet 10 milliGRAM(s) Oral daily  apixaban 2.5 milliGRAM(s) Oral two times a day  artificial  tears Solution 1 Drop(s) Both EYES two times a day  dextrose 5% + sodium chloride 0.45%. 1000 milliLiter(s) (50 mL/Hr) IV Continuous <Continuous>  ferrous    sulfate 325 milliGRAM(s) Oral daily  lactobacillus acidophilus 1 Tablet(s) Oral every 12 hours  levothyroxine 75 MICROGram(s) Oral daily  metoprolol tartrate 50 milliGRAM(s) Oral two times a day  multivitamin/minerals 1 Tablet(s) Oral daily  pantoprazole    Tablet 40 milliGRAM(s) Oral daily  polyethylene glycol 3350 17 Gram(s) Oral daily  remdesivir  IVPB   IV Intermittent   remdesivir  IVPB 100 milliGRAM(s) IV Intermittent every 24 hours    MEDICATIONS  (PRN):  acetaminophen     Tablet .. 650 milliGRAM(s) Oral every 6 hours PRN Temp greater or equal to 38C (100.4F), Mild Pain (1 - 3)  albuterol/ipratropium for Nebulization 3 milliLiter(s) Nebulizer every 6 hours PRN Shortness of Breath and/or Wheezing  aluminum hydroxide/magnesium hydroxide/simethicone Suspension 30 milliLiter(s) Oral every 4 hours PRN Dyspepsia  guaiFENesin Oral Liquid (Sugar-Free) 200 milliGRAM(s) Oral every 6 hours PRN Cough  melatonin 3 milliGRAM(s) Oral at bedtime PRN Insomnia  ondansetron Injectable 4 milliGRAM(s) IV Push every 8 hours PRN Nausea and/or Vomiting      OBJECTIVE    T(C): 36.8 (08-30-23 @ 19:01), Max: 37.1 (08-30-23 @ 05:30)  HR: 86 (08-30-23 @ 19:01) (84 - 86)  BP: 135/74 (08-30-23 @ 19:01) (129/63 - 137/77)  RR: 18 (08-30-23 @ 19:01) (18 - 18)  SpO2: 91% (08-30-23 @ 19:01) (90% - 98%)  Wt(kg): --  I&O's Summary    29 Aug 2023 07:01  -  30 Aug 2023 07:00  --------------------------------------------------------  IN: 600 mL / OUT: 250 mL / NET: 350 mL    30 Aug 2023 07:01  -  30 Aug 2023 21:50  --------------------------------------------------------  IN: 910 mL / OUT: 0 mL / NET: 910 mL          REVIEW OF SYSTEMS:  Patient is  unable to provide any information/ROS  due to baseline mental status.     PHYSICAL EXAM:  Appearance: NAD. VS past 24 hrs -as above   HEENT:   Moist oral mucosa. Conjunctiva clear b/l.   Neck : supple  Respiratory: Lungs CTAB.  Gastrointestinal:  Soft, nontender. No rebound. No rigidity. BS present	  Cardiovascular: RRR ,S1S2 present  Neurologic: Non-focal. Moving all extremities.  Extremities: No edema. No erythema. No calf tenderness.  Skin: No rashes, No ecchymoses, No cyanosis.	  wounds ,skin lesions-See skin assesment flow sheet   LABS:                        12.3   11.89 )-----------( 383      ( 30 Aug 2023 06:27 )             39.3     08-30    139  |  107  |  25<H>  ----------------------------<  101<H>  3.8   |  28  |  0.68    Ca    9.0      30 Aug 2023 06:27    TPro  6.7  /  Alb  2.6<L>  /  TBili  0.8  /  DBili  0.3  /  AST  158<H>  /  ALT  188<H>  /  AlkPhos  99  08-30    CAPILLARY BLOOD GLUCOSE        PT/INR - ( 30 Aug 2023 06:27 )   PT: 14.9 sec;   INR: 1.28 ratio           Urinalysis Basic - ( 30 Aug 2023 06:27 )    Color: x / Appearance: x / SG: x / pH: x  Gluc: 101 mg/dL / Ketone: x  / Bili: x / Urobili: x   Blood: x / Protein: x / Nitrite: x   Leuk Esterase: x / RBC: x / WBC x   Sq Epi: x / Non Sq Epi: x / Bacteria: x        Culture - Blood (collected 24 Aug 2023 06:48)  Source: .Blood Blood  Final Report (29 Aug 2023 10:01):    No growth at 5 days    Culture - Blood (collected 22 Aug 2023 11:01)  Source: .Blood Blood  Final Report (27 Aug 2023 15:01):    No growth at 5 days    Culture - Blood (collected 22 Aug 2023 06:20)  Source: .Blood Blood  Final Report (27 Aug 2023 12:00):    No growth at 5 days    Culture - Blood (collected 21 Aug 2023 09:35)  Source: .Blood Blood  Gram Stain (23 Aug 2023 01:21):    Growth in aerobic bottle: Gram Negative Rods  Final Report (23 Aug 2023 20:36):    Growth in aerobic bottle: Escherichia coli ESBL    See previous culture [id number]    Culture - Blood (collected 21 Aug 2023 09:30)  Source: .Blood Blood  Gram Stain (22 Aug 2023 17:29):    Growth in anaerobic bottle: Gram Negative Rods    Growth in aerobic bottle: Gram Negative Rods  Final Report (23 Aug 2023 10:15):    Growth in aerobic and anaerobic bottles: Escherichia coli ESBL    See previous culture [id number]      RADIOLOGY & ADDITIONAL TESTS:   reviewed elctronically  ASSESSMENT/PLAN: 	    25 minutes aggregate time was spent on this visit, 50% visit time spent in care co-ordination with other attendings and counselling patient .I have discussed care plan with patient / HCP/family member ,who expressed understanding of problems treatment and their effect and side effects, alternatives in details. I have asked if they have any questions and concerns and appropriately addressed them to best of my ability.  PROGRESS NOTE  Patient is a 89y old  Female who presents with a chief complaint of cough (30 Aug 2023 19:29)    Chart and available morning labs /imaging are reviewed electronically , urgent issues addressed . More information  is being added upon completion of rounds , when more information is collected and management discussed with consultants , medical staff and social service/case management on the floor   OVERNIGHT  No new issues reported by medical staff . All above noted Patient is resting in a bed comfortably .Confused ,poor mentation .No distress noted   Appetitive is better today Ortho team cons requested due to xray findings   HPI:  89-year-old female with history of hypothyroidism, A-fib on Eliquis, s/p recent pacemaker for complete heart block brought in by ambulance from [facility] living home for cough for the past 2 weeks.  Associated with generalized weakness and decreased p.o. intake.  No fall or trauma.  Patient states she has had cold symptoms for a while.  Denies fever, chills, chest pain, shortness of breath, abdominal pain, nausea, vomiting, upper or lower extremity weakness or paresthesias. pmd: Dr. Crawley, cards: [facility] Seen by card Dr Reich Admitted  to telemetry unit for monitoring , send 3 sets of cardiac enzymes to rule out acute coronary event, obtain ECHO to evaluate LVEF, cardiology consult  ,continue current management, O2 supply, anticoagulation plan as per cardiology consult Pulm cons requested , antitussives and nebulized bd ordered .Also UTI suspected and started on iv abx , id cons called Palliative care consult requested ,to discuss advance directives and complete MOLST  (20 Aug 2023 15:35)    PAST MEDICAL & SURGICAL HISTORY:  HTN (hypertension)      Afib      Spinal stenosis      PFO (patent foramen ovale)      Degeneration macular      Osteoporosis      History of complete heart block      Hypothyroidism      Cardiac pacemaker          MEDICATIONS  (STANDING):  acetaminophen     Tablet .. 325 milliGRAM(s) Oral every 8 hours  aMIOdarone    Tablet 200 milliGRAM(s) Oral daily  amLODIPine   Tablet 10 milliGRAM(s) Oral daily  apixaban 2.5 milliGRAM(s) Oral two times a day  artificial  tears Solution 1 Drop(s) Both EYES two times a day  dextrose 5% + sodium chloride 0.45%. 1000 milliLiter(s) (50 mL/Hr) IV Continuous <Continuous>  ferrous    sulfate 325 milliGRAM(s) Oral daily  lactobacillus acidophilus 1 Tablet(s) Oral every 12 hours  levothyroxine 75 MICROGram(s) Oral daily  metoprolol tartrate 50 milliGRAM(s) Oral two times a day  multivitamin/minerals 1 Tablet(s) Oral daily  pantoprazole    Tablet 40 milliGRAM(s) Oral daily  polyethylene glycol 3350 17 Gram(s) Oral daily  remdesivir  IVPB   IV Intermittent   remdesivir  IVPB 100 milliGRAM(s) IV Intermittent every 24 hours    MEDICATIONS  (PRN):  acetaminophen     Tablet .. 650 milliGRAM(s) Oral every 6 hours PRN Temp greater or equal to 38C (100.4F), Mild Pain (1 - 3)  albuterol/ipratropium for Nebulization 3 milliLiter(s) Nebulizer every 6 hours PRN Shortness of Breath and/or Wheezing  aluminum hydroxide/magnesium hydroxide/simethicone Suspension 30 milliLiter(s) Oral every 4 hours PRN Dyspepsia  guaiFENesin Oral Liquid (Sugar-Free) 200 milliGRAM(s) Oral every 6 hours PRN Cough  melatonin 3 milliGRAM(s) Oral at bedtime PRN Insomnia  ondansetron Injectable 4 milliGRAM(s) IV Push every 8 hours PRN Nausea and/or Vomiting      OBJECTIVE    T(C): 36.8 (08-30-23 @ 19:01), Max: 37.1 (08-30-23 @ 05:30)  HR: 86 (08-30-23 @ 19:01) (84 - 86)  BP: 135/74 (08-30-23 @ 19:01) (129/63 - 137/77)  RR: 18 (08-30-23 @ 19:01) (18 - 18)  SpO2: 91% (08-30-23 @ 19:01) (90% - 98%)  Wt(kg): --  I&O's Summary    29 Aug 2023 07:01  -  30 Aug 2023 07:00  --------------------------------------------------------  IN: 600 mL / OUT: 250 mL / NET: 350 mL    30 Aug 2023 07:01  -  30 Aug 2023 21:50  --------------------------------------------------------  IN: 910 mL / OUT: 0 mL / NET: 910 mL          REVIEW OF SYSTEMS:  Patient is  unable to provide any information/ROS  due to baseline mental status.     PHYSICAL EXAM:  Appearance: NAD. VS past 24 hrs -as above   HEENT:   Moist oral mucosa. Conjunctiva clear b/l.   Neck : supple  Respiratory: Lungs CTAB.  Gastrointestinal:  Soft, nontender. No rebound. No rigidity. BS present	  Cardiovascular: RRR ,S1S2 present  Neurologic: Non-focal. Moving all extremities.  Extremities: No edema. No erythema. No calf tenderness.  Skin: No rashes, No ecchymoses, No cyanosis.	  wounds ,skin lesions-See skin assesment flow sheet   LABS:                        12.3   11.89 )-----------( 383      ( 30 Aug 2023 06:27 )             39.3     08-30    139  |  107  |  25<H>  ----------------------------<  101<H>  3.8   |  28  |  0.68    Ca    9.0      30 Aug 2023 06:27    TPro  6.7  /  Alb  2.6<L>  /  TBili  0.8  /  DBili  0.3  /  AST  158<H>  /  ALT  188<H>  /  AlkPhos  99  08-30    CAPILLARY BLOOD GLUCOSE        PT/INR - ( 30 Aug 2023 06:27 )   PT: 14.9 sec;   INR: 1.28 ratio           Urinalysis Basic - ( 30 Aug 2023 06:27 )    Color: x / Appearance: x / SG: x / pH: x  Gluc: 101 mg/dL / Ketone: x  / Bili: x / Urobili: x   Blood: x / Protein: x / Nitrite: x   Leuk Esterase: x / RBC: x / WBC x   Sq Epi: x / Non Sq Epi: x / Bacteria: x        Culture - Blood (collected 24 Aug 2023 06:48)  Source: .Blood Blood  Final Report (29 Aug 2023 10:01):    No growth at 5 days    Culture - Blood (collected 22 Aug 2023 11:01)  Source: .Blood Blood  Final Report (27 Aug 2023 15:01):    No growth at 5 days    Culture - Blood (collected 22 Aug 2023 06:20)  Source: .Blood Blood  Final Report (27 Aug 2023 12:00):    No growth at 5 days    Culture - Blood (collected 21 Aug 2023 09:35)  Source: .Blood Blood  Gram Stain (23 Aug 2023 01:21):    Growth in aerobic bottle: Gram Negative Rods  Final Report (23 Aug 2023 20:36):    Growth in aerobic bottle: Escherichia coli ESBL    See previous culture [id number]    Culture - Blood (collected 21 Aug 2023 09:30)  Source: .Blood Blood  Gram Stain (22 Aug 2023 17:29):    Growth in anaerobic bottle: Gram Negative Rods    Growth in aerobic bottle: Gram Negative Rods  Final Report (23 Aug 2023 10:15):    Growth in aerobic and anaerobic bottles: Escherichia coli ESBL    See previous culture [id number]      RADIOLOGY & ADDITIONAL TESTS:   reviewed elctronically  ASSESSMENT/PLAN: 	    25 minutes aggregate time was spent on this visit, 50% visit time spent in care co-ordination with other attendings and counselling patient .I have discussed care plan with patient / HCP/family member ,who expressed understanding of problems treatment and their effect and side effects, alternatives in details. I have asked if they have any questions and concerns and appropriately addressed them to best of my ability.

## 2023-08-30 NOTE — PROGRESS NOTE ADULT - SUBJECTIVE AND OBJECTIVE BOX
CHIEF COMPLAINT/ REASON FOR VISIT  .. Patient was seen to address the  issue listed under PROBLEM LIST which is located toward bottom of this note     DEACON ROBERT    PLV 1EAS 105 D1    Allergies    penicillin (Unknown)    Intolerances        PAST MEDICAL & SURGICAL HISTORY:  HTN (hypertension)      Afib      Spinal stenosis      PFO (patent foramen ovale)      Degeneration macular      Osteoporosis      History of complete heart block      Hypothyroidism      Cardiac pacemaker          FAMILY HISTORY:      Home Medications:  Albuterol (Eqv-ProAir HFA) 90 mcg/inh inhalation aerosol: 1 puff(s) inhaled 4 times a day as needed for  cough or wheezing (21 Aug 2023 09:56)  amiodarone 200 mg oral tablet: 1 tab(s) orally once a day (21 Aug 2023 09:55)  clindamycin 1% topical lotion: Apply topically to affected area once a day to face (21 Aug 2023 09:55)  Eliquis 2.5 mg oral tablet: 1 tab(s) orally 2 times a day (21 Aug 2023 09:55)  enalapril 10 mg oral tablet: 1 tab(s) orally once a day (21 Aug 2023 09:55)  ferrous sulfate 325 mg (65 mg elemental iron) oral tablet: 1 tab(s) orally once a day (21 Aug 2023 09:55)  levothyroxine 75 mcg (0.075 mg) oral tablet: 1 tab(s) orally once a day (21 Aug 2023 09:56)  Metoprolol Tartrate 50 mg oral tablet: 1 tab(s) orally 2 times a day (21 Aug 2023 09:56)  Myrbetriq 50 mg oral tablet, extended release: 1 tab(s) orally once a day (21 Aug 2023 09:56)  polyethylene glycol 3350 oral powder for reconstitution: 17 gram(s) orally once a day (21 Aug 2023 09:56)  PreserVision AREDS oral capsule: 1 cap(s) orally once a day (21 Aug 2023 09:56)  Refresh Dry Eye Therapy ophthalmic solution: 1 drop(s) in each eye 4 times a day (21 Aug 2023 09:56)  Tylenol 325 mg oral tablet: 2 tab(s) orally every 4 hours as needed for pain or fever (21 Aug 2023 09:56)      MEDICATIONS  (STANDING):  acetaminophen     Tablet .. 325 milliGRAM(s) Oral every 8 hours  aMIOdarone    Tablet 200 milliGRAM(s) Oral daily  amLODIPine   Tablet 10 milliGRAM(s) Oral daily  apixaban 2.5 milliGRAM(s) Oral two times a day  artificial  tears Solution 1 Drop(s) Both EYES two times a day  dextrose 5% + sodium chloride 0.45%. 1000 milliLiter(s) (50 mL/Hr) IV Continuous <Continuous>  ferrous    sulfate 325 milliGRAM(s) Oral daily  lactobacillus acidophilus 1 Tablet(s) Oral every 12 hours  levothyroxine 75 MICROGram(s) Oral daily  metoprolol tartrate 50 milliGRAM(s) Oral two times a day  multivitamin/minerals 1 Tablet(s) Oral daily  pantoprazole    Tablet 40 milliGRAM(s) Oral daily  polyethylene glycol 3350 17 Gram(s) Oral daily  remdesivir  IVPB   IV Intermittent   remdesivir  IVPB 100 milliGRAM(s) IV Intermittent every 24 hours    MEDICATIONS  (PRN):  acetaminophen     Tablet .. 650 milliGRAM(s) Oral every 6 hours PRN Temp greater or equal to 38C (100.4F), Mild Pain (1 - 3)  albuterol/ipratropium for Nebulization 3 milliLiter(s) Nebulizer every 6 hours PRN Shortness of Breath and/or Wheezing  aluminum hydroxide/magnesium hydroxide/simethicone Suspension 30 milliLiter(s) Oral every 4 hours PRN Dyspepsia  guaiFENesin Oral Liquid (Sugar-Free) 200 milliGRAM(s) Oral every 6 hours PRN Cough  melatonin 3 milliGRAM(s) Oral at bedtime PRN Insomnia  ondansetron Injectable 4 milliGRAM(s) IV Push every 8 hours PRN Nausea and/or Vomiting      Diet, Pureed:   Supplement Feeding Modality:  Oral  Health Shake Cans or Servings Per Day:  1       Frequency:  Two Times a day (08-29-23 @ 14:36) [Active]          Vital Signs Last 24 Hrs  T(C): 37.1 (30 Aug 2023 05:30), Max: 37.1 (30 Aug 2023 05:30)  T(F): 98.7 (30 Aug 2023 05:30), Max: 98.7 (30 Aug 2023 05:30)  HR: 85 (30 Aug 2023 05:30) (81 - 85)  BP: 137/77 (30 Aug 2023 05:30) (137/65 - 171/70)  BP(mean): --  RR: 18 (30 Aug 2023 05:30) (18 - 18)  SpO2: 90% (30 Aug 2023 05:30) (90% - 92%)    Parameters below as of 30 Aug 2023 05:30  Patient On (Oxygen Delivery Method): room air          08-28-23 @ 07:01  -  08-29-23 @ 07:00  --------------------------------------------------------  IN: 50 mL / OUT: 0 mL / NET: 50 mL    08-29-23 @ 07:01  -  08-30-23 @ 05:59  --------------------------------------------------------  IN: 600 mL / OUT: 0 mL / NET: 600 mL              LABS:                        11.3   11.52 )-----------( 340      ( 29 Aug 2023 10:13 )             36.5     08-29    x   |  x   |  x   ----------------------------<  x   x    |  x   |  0.78    Ca    8.6      29 Aug 2023 06:54    TPro  6.3  /  Alb  2.4<L>  /  TBili  0.8  /  DBili  0.3  /  AST  111<H>  /  ALT  141<H>  /  AlkPhos  94  08-29    PT/INR - ( 29 Aug 2023 22:20 )   PT: 16.0 sec;   INR: 1.38 ratio           Urinalysis Basic - ( 29 Aug 2023 06:54 )    Color: x / Appearance: x / SG: x / pH: x  Gluc: 99 mg/dL / Ketone: x  / Bili: x / Urobili: x   Blood: x / Protein: x / Nitrite: x   Leuk Esterase: x / RBC: x / WBC x   Sq Epi: x / Non Sq Epi: x / Bacteria: x            WBC:  WBC Count: 11.52 K/uL (08-29 @ 10:13)  WBC Count: 11.95 K/uL (08-28 @ 08:22)  WBC Count: 10.12 K/uL (08-27 @ 07:14)      MICROBIOLOGY:  RECENT CULTURES:  08-24 .Blood Blood XXXX XXXX   No growth at 5 days                PT/INR - ( 29 Aug 2023 22:20 )   PT: 16.0 sec;   INR: 1.38 ratio             Sodium:  Sodium: 139 mmol/L (08-29 @ 06:54)  Sodium: 141 mmol/L (08-28 @ 08:22)  Sodium: 141 mmol/L (08-27 @ 07:14)      0.78 mg/dL 08-29 @ 22:20  0.60 mg/dL 08-29 @ 06:54  0.52 mg/dL 08-28 @ 08:22  0.52 mg/dL 08-27 @ 07:14      Hemoglobin:  Hemoglobin: 11.3 g/dL (08-29 @ 10:13)  Hemoglobin: 11.4 g/dL (08-28 @ 08:22)  Hemoglobin: 11.4 g/dL (08-27 @ 07:14)      Platelets: Platelet Count - Automated: 340 K/uL (08-29 @ 10:13)  Platelet Count - Automated: 337 K/uL (08-28 @ 08:22)  Platelet Count - Automated: 302 K/uL (08-27 @ 07:14)      LIVER FUNCTIONS - ( 29 Aug 2023 22:20 )  Alb: 2.4 g/dL / Pro: 6.3 g/dL / ALK PHOS: 94 U/L / ALT: 141 U/L / AST: 111 U/L / GGT: x             Urinalysis Basic - ( 29 Aug 2023 06:54 )    Color: x / Appearance: x / SG: x / pH: x  Gluc: 99 mg/dL / Ketone: x  / Bili: x / Urobili: x   Blood: x / Protein: x / Nitrite: x   Leuk Esterase: x / RBC: x / WBC x   Sq Epi: x / Non Sq Epi: x / Bacteria: x        RADIOLOGY & ADDITIONAL STUDIES:      MICROBIOLOGY:  RECENT CULTURES:  08-24 .Blood Blood XXXX XXXX   No growth at 5 days

## 2023-08-30 NOTE — PROGRESS NOTE ADULT - SUBJECTIVE AND OBJECTIVE BOX
Neurology follow up note    DEACON [id number]      Interval History:    Patient resting in bed     Allergies    penicillin (Unknown)    Intolerances        MEDICATIONS    acetaminophen     Tablet .. 650 milliGRAM(s) Oral every 6 hours PRN  acetaminophen     Tablet .. 325 milliGRAM(s) Oral every 8 hours  albuterol/ipratropium for Nebulization 3 milliLiter(s) Nebulizer every 6 hours PRN  aluminum hydroxide/magnesium hydroxide/simethicone Suspension 30 milliLiter(s) Oral every 4 hours PRN  aMIOdarone    Tablet 200 milliGRAM(s) Oral daily  amLODIPine   Tablet 10 milliGRAM(s) Oral daily  apixaban 2.5 milliGRAM(s) Oral two times a day  artificial  tears Solution 1 Drop(s) Both EYES two times a day  dextrose 5% + sodium chloride 0.45%. 1000 milliLiter(s) IV Continuous <Continuous>  ferrous    sulfate 325 milliGRAM(s) Oral daily  guaiFENesin Oral Liquid (Sugar-Free) 200 milliGRAM(s) Oral every 6 hours PRN  lactobacillus acidophilus 1 Tablet(s) Oral every 12 hours  levothyroxine 75 MICROGram(s) Oral daily  melatonin 3 milliGRAM(s) Oral at bedtime PRN  metoprolol tartrate 50 milliGRAM(s) Oral two times a day  multivitamin/minerals 1 Tablet(s) Oral daily  ondansetron Injectable 4 milliGRAM(s) IV Push every 8 hours PRN  pantoprazole    Tablet 40 milliGRAM(s) Oral daily  polyethylene glycol 3350 17 Gram(s) Oral daily  remdesivir  IVPB   IV Intermittent   remdesivir  IVPB 100 milliGRAM(s) IV Intermittent every 24 hours              Vital Signs Last 24 Hrs  T(C): 37.1 (30 Aug 2023 05:30), Max: 37.1 (30 Aug 2023 05:30)  T(F): 98.7 (30 Aug 2023 05:30), Max: 98.7 (30 Aug 2023 05:30)  HR: 85 (30 Aug 2023 05:30) (81 - 85)  BP: 137/77 (30 Aug 2023 05:30) (137/65 - 171/70)  BP(mean): --  RR: 18 (30 Aug 2023 05:30) (18 - 18)  SpO2: 90% (30 Aug 2023 05:30) (90% - 92%)    Parameters below as of 30 Aug 2023 05:30  Patient On (Oxygen Delivery Method): room air      REVIEW OF SYSTEMS:  Very limited secondary to the patient has underlying cognitive impairment, confusion feels ok     PHYSICAL EXAMINATION: .  HEENT:  Head:  Normocephalic, atraumatic.  Eyes:  No scleral icterus.  Ears:  Hard to evaluate, but from pervious note was intact.  NECK:  Supple.  RESPIRATORY:  Decreased air entry bilaterally.  CARDIOVASCULAR:  S1 and S2 heard.  ABDOMEN:  Soft and nontender.  EXTREMITIES:  No clubbing or cyanosis was noted.     NEUROLOGIC:  The patient was arousable to verbal stimuli.  Location was hospital.  Year unknown.  The patient would keep her eyes closed, would not open her eyes, attempted to open her eyes, she would actually resist.  Speech:  The patient would articulate words, no dysarthria was noted, but would say irrelevant words.  Smile appeared to be symmetric when talking.  Motor:  Examination is limited secondary to the patient is uncooperative.  While attempting to evaluate bilateral upper extremities, the patient would actively resist, pull both arms down, would say 3+/5.  Left lower was 3/5, right was 3-/5, but does have a history of leg weakness to begin with.     LABS:  CBC Full  -  ( 29 Aug 2023 10:13 )  WBC Count : 11.52 K/uL  RBC Count : 3.88 M/uL  Hemoglobin : 11.3 g/dL  Hematocrit : 36.5 %  Platelet Count - Automated : 340 K/uL  Mean Cell Volume : 94.1 fl  Mean Cell Hemoglobin : 29.1 pg  Mean Cell Hemoglobin Concentration : 31.0 gm/dL  Auto Neutrophil # : 10.48 K/uL  Auto Lymphocyte # : 0.46 K/uL  Auto Monocyte # : 0.58 K/uL  Auto Eosinophil # : 0.00 K/uL  Auto Basophil # : 0.00 K/uL  Auto Neutrophil % : 91.0 %  Auto Lymphocyte % : 4.0 %  Auto Monocyte % : 5.0 %  Auto Eosinophil % : 0.0 %  Auto Basophil % : 0.0 %    Urinalysis Basic - ( 29 Aug 2023 06:54 )    Color: x / Appearance: x / SG: x / pH: x  Gluc: 99 mg/dL / Ketone: x  / Bili: x / Urobili: x   Blood: x / Protein: x / Nitrite: x   Leuk Esterase: x / RBC: x / WBC x   Sq Epi: x / Non Sq Epi: x / Bacteria: x      08-29    x   |  x   |  x   ----------------------------<  x   x    |  x   |  0.78    Ca    8.6      29 Aug 2023 06:54    TPro  6.3  /  Alb  2.4<L>  /  TBili  0.8  /  DBili  0.3  /  AST  111<H>  /  ALT  141<H>  /  AlkPhos  94  08-29    Hemoglobin A1C:     LIVER FUNCTIONS - ( 29 Aug 2023 22:20 )  Alb: 2.4 g/dL / Pro: 6.3 g/dL / ALK PHOS: 94 U/L / ALT: 141 U/L / AST: 111 U/L / GGT: x           Vitamin B12   PT/INR - ( 29 Aug 2023 22:20 )   PT: 16.0 sec;   INR: 1.38 ratio               RADIOLOGY    ANALYSIS AND PLAN:  A 89-year-old with episode of altered mental status.  For episode of altered mental status, I suspect most likely metabolic encephalopathy secondary to underlying infectious-type process, possibly urinary tract infection, also with positive blood cultures along with dementia, mild cognitive impairment becoming more prominent in hospital setting.  Examination is severely limited to evaluate for new cerebrovascular accident.  We will recommend antibiotics as needed.  Sepsis workup as needed.  For history of underlying mild cognitive impairment versus subtle dementia, appears to be somewhat progressive, would recommend supportive therapy for now.  For history of atrial fibrillation, risks versus benefits of any type of blood-thinning medications, Cardiology medical followup.  For hypertension, monitor systolic blood pressure.  For hypothyroidism, continue the patient on Synthroid.  wax and weaning of mental status most probably h/o infection hospital setting  possible COVID    spoke to daughter, Beth, at [phone] 8/29      45 minutes of time was spent with the patient, plan of care, reviewing data, with greater than 50% of the visit was spent counseling and/or coordinating care with multidisciplinary healthcare team   Neurology follow up note    DEACON [id number]      Interval History:    Patient resting in bed     Allergies    penicillin (Unknown)    Intolerances        MEDICATIONS    acetaminophen     Tablet .. 650 milliGRAM(s) Oral every 6 hours PRN  acetaminophen     Tablet .. 325 milliGRAM(s) Oral every 8 hours  albuterol/ipratropium for Nebulization 3 milliLiter(s) Nebulizer every 6 hours PRN  aluminum hydroxide/magnesium hydroxide/simethicone Suspension 30 milliLiter(s) Oral every 4 hours PRN  aMIOdarone    Tablet 200 milliGRAM(s) Oral daily  amLODIPine   Tablet 10 milliGRAM(s) Oral daily  apixaban 2.5 milliGRAM(s) Oral two times a day  artificial  tears Solution 1 Drop(s) Both EYES two times a day  dextrose 5% + sodium chloride 0.45%. 1000 milliLiter(s) IV Continuous <Continuous>  ferrous    sulfate 325 milliGRAM(s) Oral daily  guaiFENesin Oral Liquid (Sugar-Free) 200 milliGRAM(s) Oral every 6 hours PRN  lactobacillus acidophilus 1 Tablet(s) Oral every 12 hours  levothyroxine 75 MICROGram(s) Oral daily  melatonin 3 milliGRAM(s) Oral at bedtime PRN  metoprolol tartrate 50 milliGRAM(s) Oral two times a day  multivitamin/minerals 1 Tablet(s) Oral daily  ondansetron Injectable 4 milliGRAM(s) IV Push every 8 hours PRN  pantoprazole    Tablet 40 milliGRAM(s) Oral daily  polyethylene glycol 3350 17 Gram(s) Oral daily  remdesivir  IVPB   IV Intermittent   remdesivir  IVPB 100 milliGRAM(s) IV Intermittent every 24 hours              Vital Signs Last 24 Hrs  T(C): 37.1 (30 Aug 2023 05:30), Max: 37.1 (30 Aug 2023 05:30)  T(F): 98.7 (30 Aug 2023 05:30), Max: 98.7 (30 Aug 2023 05:30)  HR: 85 (30 Aug 2023 05:30) (81 - 85)  BP: 137/77 (30 Aug 2023 05:30) (137/65 - 171/70)  BP(mean): --  RR: 18 (30 Aug 2023 05:30) (18 - 18)  SpO2: 90% (30 Aug 2023 05:30) (90% - 92%)    Parameters below as of 30 Aug 2023 05:30  Patient On (Oxygen Delivery Method): room air      REVIEW OF SYSTEMS:  Very limited secondary to the patient has underlying cognitive impairment, confusion feels ok     PHYSICAL EXAMINATION: .  HEENT:  Head:  Normocephalic, atraumatic.  Eyes:  No scleral icterus.  Ears:  Hard to evaluate, but from pervious note was intact.  NECK:  Supple.  RESPIRATORY:  Decreased air entry bilaterally.  CARDIOVASCULAR:  S1 and S2 heard.  ABDOMEN:  Soft and nontender.  EXTREMITIES:  No clubbing or cyanosis was noted.     NEUROLOGIC:  The patient was arousable to verbal stimuli.  Location was hospital.  Year unknown.  The patient would keep her eyes closed, would not open her eyes, attempted to open her eyes, she would actually resist.  Speech:  The patient would articulate words, no dysarthria was noted, but would say irrelevant words.  Smile appeared to be symmetric when talking.  Motor:  Examination is limited secondary to the patient is uncooperative.  While attempting to evaluate bilateral upper extremities, the patient would actively resist, pull both arms down, would say 3+/5.  Left lower was 3/5, right was 3-/5, but does have a history of leg weakness to begin with.     LABS:  CBC Full  -  ( 29 Aug 2023 10:13 )  WBC Count : 11.52 K/uL  RBC Count : 3.88 M/uL  Hemoglobin : 11.3 g/dL  Hematocrit : 36.5 %  Platelet Count - Automated : 340 K/uL  Mean Cell Volume : 94.1 fl  Mean Cell Hemoglobin : 29.1 pg  Mean Cell Hemoglobin Concentration : 31.0 gm/dL  Auto Neutrophil # : 10.48 K/uL  Auto Lymphocyte # : 0.46 K/uL  Auto Monocyte # : 0.58 K/uL  Auto Eosinophil # : 0.00 K/uL  Auto Basophil # : 0.00 K/uL  Auto Neutrophil % : 91.0 %  Auto Lymphocyte % : 4.0 %  Auto Monocyte % : 5.0 %  Auto Eosinophil % : 0.0 %  Auto Basophil % : 0.0 %    Urinalysis Basic - ( 29 Aug 2023 06:54 )    Color: x / Appearance: x / SG: x / pH: x  Gluc: 99 mg/dL / Ketone: x  / Bili: x / Urobili: x   Blood: x / Protein: x / Nitrite: x   Leuk Esterase: x / RBC: x / WBC x   Sq Epi: x / Non Sq Epi: x / Bacteria: x      08-29    x   |  x   |  x   ----------------------------<  x   x    |  x   |  0.78    Ca    8.6      29 Aug 2023 06:54    TPro  6.3  /  Alb  2.4<L>  /  TBili  0.8  /  DBili  0.3  /  AST  111<H>  /  ALT  141<H>  /  AlkPhos  94  08-29    Hemoglobin A1C:     LIVER FUNCTIONS - ( 29 Aug 2023 22:20 )  Alb: 2.4 g/dL / Pro: 6.3 g/dL / ALK PHOS: 94 U/L / ALT: 141 U/L / AST: 111 U/L / GGT: x           Vitamin B12   PT/INR - ( 29 Aug 2023 22:20 )   PT: 16.0 sec;   INR: 1.38 ratio               RADIOLOGY    ANALYSIS AND PLAN:  A 89-year-old with episode of altered mental status.  For episode of altered mental status, I suspect most likely metabolic encephalopathy secondary to underlying infectious-type process, possibly urinary tract infection, also with positive blood cultures along with dementia, mild cognitive impairment becoming more prominent in hospital setting.  Examination is severely limited to evaluate for new cerebrovascular accident.  We will recommend antibiotics as needed.  Sepsis workup as needed.  For history of underlying mild cognitive impairment versus subtle dementia, appears to be somewhat progressive, would recommend supportive therapy for now.  For history of atrial fibrillation, risks versus benefits of any type of blood-thinning medications, Cardiology medical followup.  For hypertension, monitor systolic blood pressure.  For hypothyroidism, continue the patient on Synthroid.  wax and weaning of mental status most probably h/o infection hospital setting  possible COVID    spoke to daughter, Beth, at [phone] 8/29      45 minutes of time was spent with the patient, plan of care, reviewing data, with greater than 50% of the visit was spent counseling and/or coordinating care with multidisciplinary healthcare team   Neurology follow up note    DEACON [id number]      Interval History:    Patient resting in bed     Allergies    penicillin (Unknown)    Intolerances        MEDICATIONS    acetaminophen     Tablet .. 650 milliGRAM(s) Oral every 6 hours PRN  acetaminophen     Tablet .. 325 milliGRAM(s) Oral every 8 hours  albuterol/ipratropium for Nebulization 3 milliLiter(s) Nebulizer every 6 hours PRN  aluminum hydroxide/magnesium hydroxide/simethicone Suspension 30 milliLiter(s) Oral every 4 hours PRN  aMIOdarone    Tablet 200 milliGRAM(s) Oral daily  amLODIPine   Tablet 10 milliGRAM(s) Oral daily  apixaban 2.5 milliGRAM(s) Oral two times a day  artificial  tears Solution 1 Drop(s) Both EYES two times a day  dextrose 5% + sodium chloride 0.45%. 1000 milliLiter(s) IV Continuous <Continuous>  ferrous    sulfate 325 milliGRAM(s) Oral daily  guaiFENesin Oral Liquid (Sugar-Free) 200 milliGRAM(s) Oral every 6 hours PRN  lactobacillus acidophilus 1 Tablet(s) Oral every 12 hours  levothyroxine 75 MICROGram(s) Oral daily  melatonin 3 milliGRAM(s) Oral at bedtime PRN  metoprolol tartrate 50 milliGRAM(s) Oral two times a day  multivitamin/minerals 1 Tablet(s) Oral daily  ondansetron Injectable 4 milliGRAM(s) IV Push every 8 hours PRN  pantoprazole    Tablet 40 milliGRAM(s) Oral daily  polyethylene glycol 3350 17 Gram(s) Oral daily  remdesivir  IVPB   IV Intermittent   remdesivir  IVPB 100 milliGRAM(s) IV Intermittent every 24 hours              Vital Signs Last 24 Hrs  T(C): 37.1 (30 Aug 2023 05:30), Max: 37.1 (30 Aug 2023 05:30)  T(F): 98.7 (30 Aug 2023 05:30), Max: 98.7 (30 Aug 2023 05:30)  HR: 85 (30 Aug 2023 05:30) (81 - 85)  BP: 137/77 (30 Aug 2023 05:30) (137/65 - 171/70)  BP(mean): --  RR: 18 (30 Aug 2023 05:30) (18 - 18)  SpO2: 90% (30 Aug 2023 05:30) (90% - 92%)    Parameters below as of 30 Aug 2023 05:30  Patient On (Oxygen Delivery Method): room air      REVIEW OF SYSTEMS:  Very limited secondary to the patient has underlying cognitive impairment, confusion feels ok     PHYSICAL EXAMINATION: .  HEENT:  Head:  Normocephalic, atraumatic.  Eyes:  No scleral icterus.  Ears:  Hard to evaluate, but from pervious note was intact.  NECK:  Supple.  RESPIRATORY:  Decreased air entry bilaterally.  CARDIOVASCULAR:  S1 and S2 heard.  ABDOMEN:  Soft and nontender.  EXTREMITIES:  No clubbing or cyanosis was noted.     NEUROLOGIC:  The patient was arousable to verbal stimuli.  Location was hospital.  Year unknown.  The patient would keep her eyes closed, would not open her eyes, attempted to open her eyes, she would actually resist.  Speech:  The patient would articulate words, no dysarthria was noted, but would say irrelevant words.  Smile appeared to be symmetric when talking.  Motor:  Examination is limited secondary to the patient is uncooperative.  While attempting to evaluate bilateral upper extremities, the patient would actively resist, pull both arms down, would say 3+/5.  Left lower was 3/5, right was 3-/5, but does have a history of leg weakness to begin with.     LABS:  CBC Full  -  ( 29 Aug 2023 10:13 )  WBC Count : 11.52 K/uL  RBC Count : 3.88 M/uL  Hemoglobin : 11.3 g/dL  Hematocrit : 36.5 %  Platelet Count - Automated : 340 K/uL  Mean Cell Volume : 94.1 fl  Mean Cell Hemoglobin : 29.1 pg  Mean Cell Hemoglobin Concentration : 31.0 gm/dL  Auto Neutrophil # : 10.48 K/uL  Auto Lymphocyte # : 0.46 K/uL  Auto Monocyte # : 0.58 K/uL  Auto Eosinophil # : 0.00 K/uL  Auto Basophil # : 0.00 K/uL  Auto Neutrophil % : 91.0 %  Auto Lymphocyte % : 4.0 %  Auto Monocyte % : 5.0 %  Auto Eosinophil % : 0.0 %  Auto Basophil % : 0.0 %    Urinalysis Basic - ( 29 Aug 2023 06:54 )    Color: x / Appearance: x / SG: x / pH: x  Gluc: 99 mg/dL / Ketone: x  / Bili: x / Urobili: x   Blood: x / Protein: x / Nitrite: x   Leuk Esterase: x / RBC: x / WBC x   Sq Epi: x / Non Sq Epi: x / Bacteria: x      08-29    x   |  x   |  x   ----------------------------<  x   x    |  x   |  0.78    Ca    8.6      29 Aug 2023 06:54    TPro  6.3  /  Alb  2.4<L>  /  TBili  0.8  /  DBili  0.3  /  AST  111<H>  /  ALT  141<H>  /  AlkPhos  94  08-29    Hemoglobin A1C:     LIVER FUNCTIONS - ( 29 Aug 2023 22:20 )  Alb: 2.4 g/dL / Pro: 6.3 g/dL / ALK PHOS: 94 U/L / ALT: 141 U/L / AST: 111 U/L / GGT: x           Vitamin B12   PT/INR - ( 29 Aug 2023 22:20 )   PT: 16.0 sec;   INR: 1.38 ratio               RADIOLOGY    ANALYSIS AND PLAN:  A 89-year-old with episode of altered mental status.  For episode of altered mental status, I suspect most likely metabolic encephalopathy secondary to underlying infectious-type process, possibly urinary tract infection, also with positive blood cultures along with dementia, mild cognitive impairment becoming more prominent in hospital setting.  Examination is severely limited to evaluate for new cerebrovascular accident.  We will recommend antibiotics as needed.  Sepsis workup as needed.  For history of underlying mild cognitive impairment versus subtle dementia, appears to be somewhat progressive, would recommend supportive therapy for now.  For history of atrial fibrillation, risks versus benefits of any type of blood-thinning medications, Cardiology medical followup.  For hypertension, monitor systolic blood pressure.  For hypothyroidism, continue the patient on Synthroid.  wax and weaning of mental status most probably h/o infection hospital setting  possible COVID    spoke to daughter, Beth, at [phone] 8/29      45 minutes of time was spent with the patient, plan of care, reviewing data, with greater than 50% of the visit was spent counseling and/or coordinating care with multidisciplinary healthcare team   Neurology follow up note    DEACON [id number]      Interval History:    Patient resting in bed     Allergies    penicillin (Unknown)    Intolerances        MEDICATIONS    acetaminophen     Tablet .. 650 milliGRAM(s) Oral every 6 hours PRN  acetaminophen     Tablet .. 325 milliGRAM(s) Oral every 8 hours  albuterol/ipratropium for Nebulization 3 milliLiter(s) Nebulizer every 6 hours PRN  aluminum hydroxide/magnesium hydroxide/simethicone Suspension 30 milliLiter(s) Oral every 4 hours PRN  aMIOdarone    Tablet 200 milliGRAM(s) Oral daily  amLODIPine   Tablet 10 milliGRAM(s) Oral daily  apixaban 2.5 milliGRAM(s) Oral two times a day  artificial  tears Solution 1 Drop(s) Both EYES two times a day  dextrose 5% + sodium chloride 0.45%. 1000 milliLiter(s) IV Continuous <Continuous>  ferrous    sulfate 325 milliGRAM(s) Oral daily  guaiFENesin Oral Liquid (Sugar-Free) 200 milliGRAM(s) Oral every 6 hours PRN  lactobacillus acidophilus 1 Tablet(s) Oral every 12 hours  levothyroxine 75 MICROGram(s) Oral daily  melatonin 3 milliGRAM(s) Oral at bedtime PRN  metoprolol tartrate 50 milliGRAM(s) Oral two times a day  multivitamin/minerals 1 Tablet(s) Oral daily  ondansetron Injectable 4 milliGRAM(s) IV Push every 8 hours PRN  pantoprazole    Tablet 40 milliGRAM(s) Oral daily  polyethylene glycol 3350 17 Gram(s) Oral daily  remdesivir  IVPB   IV Intermittent   remdesivir  IVPB 100 milliGRAM(s) IV Intermittent every 24 hours              Vital Signs Last 24 Hrs  T(C): 37.1 (30 Aug 2023 05:30), Max: 37.1 (30 Aug 2023 05:30)  T(F): 98.7 (30 Aug 2023 05:30), Max: 98.7 (30 Aug 2023 05:30)  HR: 85 (30 Aug 2023 05:30) (81 - 85)  BP: 137/77 (30 Aug 2023 05:30) (137/65 - 171/70)  BP(mean): --  RR: 18 (30 Aug 2023 05:30) (18 - 18)  SpO2: 90% (30 Aug 2023 05:30) (90% - 92%)    Parameters below as of 30 Aug 2023 05:30  Patient On (Oxygen Delivery Method): room air      REVIEW OF SYSTEMS:  Very limited secondary to the patient has underlying cognitive impairment, confusion feels ok     PHYSICAL EXAMINATION: .  HEENT:  Head:  Normocephalic, atraumatic.  Eyes:  No scleral icterus.  Ears:  Hard to evaluate, but from pervious note was intact.  NECK:  Supple.  RESPIRATORY:  Decreased air entry bilaterally.  CARDIOVASCULAR:  S1 and S2 heard.  ABDOMEN:  Soft and nontender.  EXTREMITIES:  No clubbing or cyanosis was noted.     NEUROLOGIC:  The patient was arousable to verbal stimuli.  Location was hospital.  Year unknown.  The patient would keep her eyes closed, would not open her eyes, attempted to open her eyes, she would actually resist.  Speech:  The patient would articulate words, no dysarthria was noted, but would say irrelevant words.  Smile appeared to be symmetric when talking.  Motor:  Examination is limited secondary to the patient is uncooperative.  While attempting to evaluate bilateral upper extremities, the patient would actively resist, pull both arms down, would say 3+/5.  Left lower was 3/5, right was 3-/5, but does have a history of leg weakness to begin with.     LABS:  CBC Full  -  ( 29 Aug 2023 10:13 )  WBC Count : 11.52 K/uL  RBC Count : 3.88 M/uL  Hemoglobin : 11.3 g/dL  Hematocrit : 36.5 %  Platelet Count - Automated : 340 K/uL  Mean Cell Volume : 94.1 fl  Mean Cell Hemoglobin : 29.1 pg  Mean Cell Hemoglobin Concentration : 31.0 gm/dL  Auto Neutrophil # : 10.48 K/uL  Auto Lymphocyte # : 0.46 K/uL  Auto Monocyte # : 0.58 K/uL  Auto Eosinophil # : 0.00 K/uL  Auto Basophil # : 0.00 K/uL  Auto Neutrophil % : 91.0 %  Auto Lymphocyte % : 4.0 %  Auto Monocyte % : 5.0 %  Auto Eosinophil % : 0.0 %  Auto Basophil % : 0.0 %    Urinalysis Basic - ( 29 Aug 2023 06:54 )    Color: x / Appearance: x / SG: x / pH: x  Gluc: 99 mg/dL / Ketone: x  / Bili: x / Urobili: x   Blood: x / Protein: x / Nitrite: x   Leuk Esterase: x / RBC: x / WBC x   Sq Epi: x / Non Sq Epi: x / Bacteria: x      08-29    x   |  x   |  x   ----------------------------<  x   x    |  x   |  0.78    Ca    8.6      29 Aug 2023 06:54    TPro  6.3  /  Alb  2.4<L>  /  TBili  0.8  /  DBili  0.3  /  AST  111<H>  /  ALT  141<H>  /  AlkPhos  94  08-29    Hemoglobin A1C:     LIVER FUNCTIONS - ( 29 Aug 2023 22:20 )  Alb: 2.4 g/dL / Pro: 6.3 g/dL / ALK PHOS: 94 U/L / ALT: 141 U/L / AST: 111 U/L / GGT: x           Vitamin B12   PT/INR - ( 29 Aug 2023 22:20 )   PT: 16.0 sec;   INR: 1.38 ratio               RADIOLOGY    ANALYSIS AND PLAN:  A 89-year-old with episode of altered mental status.  For episode of altered mental status, I suspect most likely metabolic encephalopathy secondary to underlying infectious-type process, possibly urinary tract infection, also with positive blood cultures along with dementia, mild cognitive impairment becoming more prominent in hospital setting.  Examination is severely limited to evaluate for new cerebrovascular accident.  We will recommend antibiotics as needed.  Sepsis workup as needed.  For history of underlying mild cognitive impairment versus subtle dementia, appears to be somewhat progressive, would recommend supportive therapy for now.  For history of atrial fibrillation, risks versus benefits of any type of blood-thinning medications, Cardiology medical followup.  For hypertension, monitor systolic blood pressure.  For hypothyroidism, continue the patient on Synthroid.  wax and weaning of mental status most probably h/o infection hospital setting  along with possible COVID    spoke to daughter, Beth, at [phone] 8/30     45 minutes of time was spent with the patient, plan of care, reviewing data, with greater than 50% of the visit was spent counseling and/or coordinating care with multidisciplinary healthcare team   Neurology follow up note    DEACON [id number]      Interval History:    Patient resting in bed     Allergies    penicillin (Unknown)    Intolerances        MEDICATIONS    acetaminophen     Tablet .. 650 milliGRAM(s) Oral every 6 hours PRN  acetaminophen     Tablet .. 325 milliGRAM(s) Oral every 8 hours  albuterol/ipratropium for Nebulization 3 milliLiter(s) Nebulizer every 6 hours PRN  aluminum hydroxide/magnesium hydroxide/simethicone Suspension 30 milliLiter(s) Oral every 4 hours PRN  aMIOdarone    Tablet 200 milliGRAM(s) Oral daily  amLODIPine   Tablet 10 milliGRAM(s) Oral daily  apixaban 2.5 milliGRAM(s) Oral two times a day  artificial  tears Solution 1 Drop(s) Both EYES two times a day  dextrose 5% + sodium chloride 0.45%. 1000 milliLiter(s) IV Continuous <Continuous>  ferrous    sulfate 325 milliGRAM(s) Oral daily  guaiFENesin Oral Liquid (Sugar-Free) 200 milliGRAM(s) Oral every 6 hours PRN  lactobacillus acidophilus 1 Tablet(s) Oral every 12 hours  levothyroxine 75 MICROGram(s) Oral daily  melatonin 3 milliGRAM(s) Oral at bedtime PRN  metoprolol tartrate 50 milliGRAM(s) Oral two times a day  multivitamin/minerals 1 Tablet(s) Oral daily  ondansetron Injectable 4 milliGRAM(s) IV Push every 8 hours PRN  pantoprazole    Tablet 40 milliGRAM(s) Oral daily  polyethylene glycol 3350 17 Gram(s) Oral daily  remdesivir  IVPB   IV Intermittent   remdesivir  IVPB 100 milliGRAM(s) IV Intermittent every 24 hours              Vital Signs Last 24 Hrs  T(C): 37.1 (30 Aug 2023 05:30), Max: 37.1 (30 Aug 2023 05:30)  T(F): 98.7 (30 Aug 2023 05:30), Max: 98.7 (30 Aug 2023 05:30)  HR: 85 (30 Aug 2023 05:30) (81 - 85)  BP: 137/77 (30 Aug 2023 05:30) (137/65 - 171/70)  BP(mean): --  RR: 18 (30 Aug 2023 05:30) (18 - 18)  SpO2: 90% (30 Aug 2023 05:30) (90% - 92%)    Parameters below as of 30 Aug 2023 05:30  Patient On (Oxygen Delivery Method): room air      REVIEW OF SYSTEMS:  Very limited secondary to the patient has underlying cognitive impairment, confusion feels ok     PHYSICAL EXAMINATION: .  HEENT:  Head:  Normocephalic, atraumatic.  Eyes:  No scleral icterus.  Ears:  Hard to evaluate, but from pervious note was intact.  NECK:  Supple.  RESPIRATORY:  Decreased air entry bilaterally.  CARDIOVASCULAR:  S1 and S2 heard.  ABDOMEN:  Soft and nontender.  EXTREMITIES:  No clubbing or cyanosis was noted.     NEUROLOGIC:  The patient was arousable to verbal stimuli.  Location was hospital.  Year unknown.  The patient would keep her eyes closed, would not open her eyes, attempted to open her eyes, she would actually resist.  Speech:  The patient would articulate words, no dysarthria was noted, but would say irrelevant words.  Smile appeared to be symmetric when talking.  Motor:  Examination is limited secondary to the patient is uncooperative.  While attempting to evaluate bilateral upper extremities, the patient would actively resist, pull both arms down, would say 3+/5.  Left lower was 3/5, right was 3-/5, but does have a history of leg weakness to begin with.     LABS:  CBC Full  -  ( 29 Aug 2023 10:13 )  WBC Count : 11.52 K/uL  RBC Count : 3.88 M/uL  Hemoglobin : 11.3 g/dL  Hematocrit : 36.5 %  Platelet Count - Automated : 340 K/uL  Mean Cell Volume : 94.1 fl  Mean Cell Hemoglobin : 29.1 pg  Mean Cell Hemoglobin Concentration : 31.0 gm/dL  Auto Neutrophil # : 10.48 K/uL  Auto Lymphocyte # : 0.46 K/uL  Auto Monocyte # : 0.58 K/uL  Auto Eosinophil # : 0.00 K/uL  Auto Basophil # : 0.00 K/uL  Auto Neutrophil % : 91.0 %  Auto Lymphocyte % : 4.0 %  Auto Monocyte % : 5.0 %  Auto Eosinophil % : 0.0 %  Auto Basophil % : 0.0 %    Urinalysis Basic - ( 29 Aug 2023 06:54 )    Color: x / Appearance: x / SG: x / pH: x  Gluc: 99 mg/dL / Ketone: x  / Bili: x / Urobili: x   Blood: x / Protein: x / Nitrite: x   Leuk Esterase: x / RBC: x / WBC x   Sq Epi: x / Non Sq Epi: x / Bacteria: x      08-29    x   |  x   |  x   ----------------------------<  x   x    |  x   |  0.78    Ca    8.6      29 Aug 2023 06:54    TPro  6.3  /  Alb  2.4<L>  /  TBili  0.8  /  DBili  0.3  /  AST  111<H>  /  ALT  141<H>  /  AlkPhos  94  08-29    Hemoglobin A1C:     LIVER FUNCTIONS - ( 29 Aug 2023 22:20 )  Alb: 2.4 g/dL / Pro: 6.3 g/dL / ALK PHOS: 94 U/L / ALT: 141 U/L / AST: 111 U/L / GGT: x           Vitamin B12   PT/INR - ( 29 Aug 2023 22:20 )   PT: 16.0 sec;   INR: 1.38 ratio               RADIOLOGY    ANALYSIS AND PLAN:  A 89-year-old with episode of altered mental status.  For episode of altered mental status, I suspect most likely metabolic encephalopathy secondary to underlying infectious-type process, possibly urinary tract infection, also with positive blood cultures along with dementia, mild cognitive impairment becoming more prominent in hospital setting.  Examination is severely limited to evaluate for new cerebrovascular accident.  We will recommend antibiotics as needed.  Sepsis workup as needed.  For history of underlying mild cognitive impairment versus subtle dementia, appears to be somewhat progressive, would recommend supportive therapy for now.  For history of atrial fibrillation, risks versus benefits of any type of blood-thinning medications, Cardiology medical followup.  For hypertension, monitor systolic blood pressure.  For hypothyroidism, continue the patient on Synthroid.  wax and weaning of mental status most probably h/o infection hospital setting  along with possible COVID    spoke to daughter, Beth, at [phone] 8/30     45 minutes of time was spent with the patient, plan of care, reviewing data, with greater than 50% of the visit was spent counseling and/or coordinating care with multidisciplinary healthcare team   Neurology follow up note    DEACON [id number]      Interval History:    Patient resting in bed     Allergies    penicillin (Unknown)    Intolerances        MEDICATIONS    acetaminophen     Tablet .. 650 milliGRAM(s) Oral every 6 hours PRN  acetaminophen     Tablet .. 325 milliGRAM(s) Oral every 8 hours  albuterol/ipratropium for Nebulization 3 milliLiter(s) Nebulizer every 6 hours PRN  aluminum hydroxide/magnesium hydroxide/simethicone Suspension 30 milliLiter(s) Oral every 4 hours PRN  aMIOdarone    Tablet 200 milliGRAM(s) Oral daily  amLODIPine   Tablet 10 milliGRAM(s) Oral daily  apixaban 2.5 milliGRAM(s) Oral two times a day  artificial  tears Solution 1 Drop(s) Both EYES two times a day  dextrose 5% + sodium chloride 0.45%. 1000 milliLiter(s) IV Continuous <Continuous>  ferrous    sulfate 325 milliGRAM(s) Oral daily  guaiFENesin Oral Liquid (Sugar-Free) 200 milliGRAM(s) Oral every 6 hours PRN  lactobacillus acidophilus 1 Tablet(s) Oral every 12 hours  levothyroxine 75 MICROGram(s) Oral daily  melatonin 3 milliGRAM(s) Oral at bedtime PRN  metoprolol tartrate 50 milliGRAM(s) Oral two times a day  multivitamin/minerals 1 Tablet(s) Oral daily  ondansetron Injectable 4 milliGRAM(s) IV Push every 8 hours PRN  pantoprazole    Tablet 40 milliGRAM(s) Oral daily  polyethylene glycol 3350 17 Gram(s) Oral daily  remdesivir  IVPB   IV Intermittent   remdesivir  IVPB 100 milliGRAM(s) IV Intermittent every 24 hours              Vital Signs Last 24 Hrs  T(C): 37.1 (30 Aug 2023 05:30), Max: 37.1 (30 Aug 2023 05:30)  T(F): 98.7 (30 Aug 2023 05:30), Max: 98.7 (30 Aug 2023 05:30)  HR: 85 (30 Aug 2023 05:30) (81 - 85)  BP: 137/77 (30 Aug 2023 05:30) (137/65 - 171/70)  BP(mean): --  RR: 18 (30 Aug 2023 05:30) (18 - 18)  SpO2: 90% (30 Aug 2023 05:30) (90% - 92%)    Parameters below as of 30 Aug 2023 05:30  Patient On (Oxygen Delivery Method): room air      REVIEW OF SYSTEMS:  Very limited secondary to the patient has underlying cognitive impairment, confusion feels ok     PHYSICAL EXAMINATION: .  HEENT:  Head:  Normocephalic, atraumatic.  Eyes:  No scleral icterus.  Ears:  Hard to evaluate, but from pervious note was intact.  NECK:  Supple.  RESPIRATORY:  Decreased air entry bilaterally.  CARDIOVASCULAR:  S1 and S2 heard.  ABDOMEN:  Soft and nontender.  EXTREMITIES:  No clubbing or cyanosis was noted.     NEUROLOGIC:  The patient was arousable to verbal stimuli.  Location was hospital.  Year unknown.  The patient would keep her eyes closed, would not open her eyes, attempted to open her eyes, she would actually resist.  Speech:  The patient would articulate words, no dysarthria was noted, but would say irrelevant words.  Smile appeared to be symmetric when talking.  Motor:  Examination is limited secondary to the patient is uncooperative.  While attempting to evaluate bilateral upper extremities, the patient would actively resist, pull both arms down, would say 3+/5.  Left lower was 3/5, right was 3-/5, but does have a history of leg weakness to begin with.     LABS:  CBC Full  -  ( 29 Aug 2023 10:13 )  WBC Count : 11.52 K/uL  RBC Count : 3.88 M/uL  Hemoglobin : 11.3 g/dL  Hematocrit : 36.5 %  Platelet Count - Automated : 340 K/uL  Mean Cell Volume : 94.1 fl  Mean Cell Hemoglobin : 29.1 pg  Mean Cell Hemoglobin Concentration : 31.0 gm/dL  Auto Neutrophil # : 10.48 K/uL  Auto Lymphocyte # : 0.46 K/uL  Auto Monocyte # : 0.58 K/uL  Auto Eosinophil # : 0.00 K/uL  Auto Basophil # : 0.00 K/uL  Auto Neutrophil % : 91.0 %  Auto Lymphocyte % : 4.0 %  Auto Monocyte % : 5.0 %  Auto Eosinophil % : 0.0 %  Auto Basophil % : 0.0 %    Urinalysis Basic - ( 29 Aug 2023 06:54 )    Color: x / Appearance: x / SG: x / pH: x  Gluc: 99 mg/dL / Ketone: x  / Bili: x / Urobili: x   Blood: x / Protein: x / Nitrite: x   Leuk Esterase: x / RBC: x / WBC x   Sq Epi: x / Non Sq Epi: x / Bacteria: x      08-29    x   |  x   |  x   ----------------------------<  x   x    |  x   |  0.78    Ca    8.6      29 Aug 2023 06:54    TPro  6.3  /  Alb  2.4<L>  /  TBili  0.8  /  DBili  0.3  /  AST  111<H>  /  ALT  141<H>  /  AlkPhos  94  08-29    Hemoglobin A1C:     LIVER FUNCTIONS - ( 29 Aug 2023 22:20 )  Alb: 2.4 g/dL / Pro: 6.3 g/dL / ALK PHOS: 94 U/L / ALT: 141 U/L / AST: 111 U/L / GGT: x           Vitamin B12   PT/INR - ( 29 Aug 2023 22:20 )   PT: 16.0 sec;   INR: 1.38 ratio               RADIOLOGY    ANALYSIS AND PLAN:  A 89-year-old with episode of altered mental status.  For episode of altered mental status, I suspect most likely metabolic encephalopathy secondary to underlying infectious-type process, possibly urinary tract infection, also with positive blood cultures along with dementia, mild cognitive impairment becoming more prominent in hospital setting.  Examination is severely limited to evaluate for new cerebrovascular accident.  We will recommend antibiotics as needed.  Sepsis workup as needed.  For history of underlying mild cognitive impairment versus subtle dementia, appears to be somewhat progressive, would recommend supportive therapy for now.  For history of atrial fibrillation, risks versus benefits of any type of blood-thinning medications, Cardiology medical followup.  For hypertension, monitor systolic blood pressure.  For hypothyroidism, continue the patient on Synthroid.  wax and weaning of mental status most probably h/o infection hospital setting  along with possible COVID    spoke to daughter, Beth, at [phone] 8/30     45 minutes of time was spent with the patient, plan of care, reviewing data, with greater than 50% of the visit was spent counseling and/or coordinating care with multidisciplinary healthcare team

## 2023-08-30 NOTE — PROGRESS NOTE ADULT - ASSESSMENT
REASON FOR VISIT  .. Management of problems listed below      PHYSICAL EXAM    HEENT Unremarkable  atraumatic   RESP Fair air entry  Harsh breath sound   CARDIAC S1 S2 No S3     NO JVD    ABDOMEN No hepatosplenomegaly   PEDAL EDEMA present No calf tenderness  NO rash       GENERAL DATA .     GOC.    .. 8/26/2023 dnr  ICU STAY.   .. none  COVID.   .. scv2 8/29/2023 (+)   .. scv2 8/21/2023 (-)    BEST PRACTICE ISSUES.    HOB ELEVATN.   .. Yes  DVT PPLX.   ..  8/20/2023 apixaba 2.5 x 2 (af)     SPEECH SWALLOW RECOMMENDATIONS.    ..    8/21/2023 soft bite     DIET.    ..  8/20/2023 soft bite size   IV fl.  .. 8/22/2023 D5 1/2 ns  50   STRESS ULCER PPLX.   ..    8/20/2023 protonix 40   INFECTION PPLX.   ..     ALLGY.  ..    pncl                     WT.  ..  8/20/2023 56  BMI.  ..   8/20/2023 21    PROCEDURES.    ABGS.   .     VS/ PO/IO/ VENT/ DRIPS.   8/30/2023 afeb 85 130/70   8/30/2023 ra 90%           MAIN ISSUES .   . CC 8/20/2023 89-year-old female with history of hypothyroidism, A-fib on Eliquis, recent pacemaker for complete heart block from [facility] for cogh 2 w found to have E coli ESBL cateremia   . COUGH x 2 wk poa 8/20/2023  . COVID (+) 8/29/2023   .. LFTS 8/29-8/30  .. ap 86-99  .. ast   .. alt    .. 8/29/2023 rdsv   . Pneumonia   .. ct chest 8/20/2023   .... distention of mid to upper esophagus   . E coli ESBL CTX M bacteremia 8/20 8/21  .. bc 8/22 (-)   .. 8/20/2023 levaquin -> 8/21 ertapenem 1 x 8d  Dr Roberts completed   . COPD   . A fib   .. 8/20/2023 apixaba   . CHF   .. bnp 8/20-8/23/2023 bnp 4545 - 3219   .. echo 8/21/2023 ef 56% pasp 63   .. 8/22/2023 enalapril 10   . Dysphagia  .. esophagogram 8/22/2023 smooth tapering o distal esophagus       TIME SPENT.   . Over 36 minutes aggregate care time spent on encounter; activities included   direct patient care, counseling and/or coordinating care reviewing notes, lab data/ imaging , discussion with multidisciplinary team/ patient  /family and explaining in detail risks, benefits, alternatives  of the recommendations     JAKOB WORTHY 89 f 8/20/2023 9/22/1929 DR BAM HERRERA

## 2023-08-30 NOTE — PROGRESS NOTE ADULT - SUBJECTIVE AND OBJECTIVE BOX
[facility]  Víctor Robertson PA-C  [address] [place] [address]  [phone]      INTERVAL HPI/OVERNIGHT EVENTS:  Pt s/e  Tolerating diet without issue    MEDICATIONS  (STANDING):  acetaminophen     Tablet .. 325 milliGRAM(s) Oral every 8 hours  aMIOdarone    Tablet 200 milliGRAM(s) Oral daily  amLODIPine   Tablet 10 milliGRAM(s) Oral daily  apixaban 2.5 milliGRAM(s) Oral two times a day  artificial  tears Solution 1 Drop(s) Both EYES two times a day  dextrose 5% + sodium chloride 0.45%. 1000 milliLiter(s) (50 mL/Hr) IV Continuous <Continuous>  ferrous    sulfate 325 milliGRAM(s) Oral daily  lactobacillus acidophilus 1 Tablet(s) Oral every 12 hours  levothyroxine 75 MICROGram(s) Oral daily  metoprolol tartrate 50 milliGRAM(s) Oral two times a day  multivitamin/minerals 1 Tablet(s) Oral daily  pantoprazole    Tablet 40 milliGRAM(s) Oral daily  polyethylene glycol 3350 17 Gram(s) Oral daily  remdesivir  IVPB   IV Intermittent   remdesivir  IVPB 100 milliGRAM(s) IV Intermittent every 24 hours    MEDICATIONS  (PRN):  acetaminophen     Tablet .. 650 milliGRAM(s) Oral every 6 hours PRN Temp greater or equal to 38C (100.4F), Mild Pain (1 - 3)  albuterol/ipratropium for Nebulization 3 milliLiter(s) Nebulizer every 6 hours PRN Shortness of Breath and/or Wheezing  aluminum hydroxide/magnesium hydroxide/simethicone Suspension 30 milliLiter(s) Oral every 4 hours PRN Dyspepsia  guaiFENesin Oral Liquid (Sugar-Free) 200 milliGRAM(s) Oral every 6 hours PRN Cough  melatonin 3 milliGRAM(s) Oral at bedtime PRN Insomnia  ondansetron Injectable 4 milliGRAM(s) IV Push every 8 hours PRN Nausea and/or Vomiting      Allergies    penicillin (Unknown)      PHYSICAL EXAM:   Vital Signs:  Vital Signs Last 24 Hrs  T(C): 37.1 (30 Aug 2023 05:30), Max: 37.1 (30 Aug 2023 05:30)  T(F): 98.7 (30 Aug 2023 05:30), Max: 98.7 (30 Aug 2023 05:30)  HR: 85 (30 Aug 2023 05:30) (81 - 85)  BP: 137/77 (30 Aug 2023 05:30) (137/65 - 171/70)  BP(mean): --  RR: 18 (30 Aug 2023 05:30) (18 - 18)  SpO2: 90% (30 Aug 2023 05:30) (90% - 92%)    Parameters below as of 30 Aug 2023 05:30  Patient On (Oxygen Delivery Method): room air      Daily     Daily Weight in k.2 (30 Aug 2023 05:30)    GENERAL:  Appears stated age  HEENT:  NC/AT  CHEST:  Full & symmetric excursion  HEART:  Regular rhythm  ABDOMEN:  Soft, non-tender, non-distended  EXTEREMITIES:  no cyanosis  SKIN:  No rash  NEURO:  Alert      LABS:                        12.3   11.89 )-----------( 383      ( 30 Aug 2023 06:27 )             39.3     08-30    139  |  107  |  25<H>  ----------------------------<  101<H>  3.8   |  28  |  0.68    Ca    9.0      30 Aug 2023 06:27    TPro  6.7  /  Alb  2.6<L>  /  TBili  0.8  /  DBili  0.3  /  AST  158<H>  /  ALT  188<H>  /  AlkPhos  99  08-30    PT/INR - ( 30 Aug 2023 06:27 )   PT: 14.9 sec;   INR: 1.28 ratio           Urinalysis Basic - ( 30 Aug 2023 06:27 )    Color: x / Appearance: x / SG: x / pH: x  Gluc: 101 mg/dL / Ketone: x  / Bili: x / Urobili: x   Blood: x / Protein: x / Nitrite: x   Leuk Esterase: x / RBC: x / WBC x   Sq Epi: x / Non Sq Epi: x / Bacteria: x   [facility]  Víctor Robertson PA-C  [address] [place] [address]  [phone]      INTERVAL HPI/OVERNIGHT EVENTS:  Pt s/e  Tolerating diet without issue    MEDICATIONS  (STANDING):  acetaminophen     Tablet .. 325 milliGRAM(s) Oral every 8 hours  aMIOdarone    Tablet 200 milliGRAM(s) Oral daily  amLODIPine   Tablet 10 milliGRAM(s) Oral daily  apixaban 2.5 milliGRAM(s) Oral two times a day  artificial  tears Solution 1 Drop(s) Both EYES two times a day  dextrose 5% + sodium chloride 0.45%. 1000 milliLiter(s) (50 mL/Hr) IV Continuous <Continuous>  ferrous    sulfate 325 milliGRAM(s) Oral daily  lactobacillus acidophilus 1 Tablet(s) Oral every 12 hours  levothyroxine 75 MICROGram(s) Oral daily  metoprolol tartrate 50 milliGRAM(s) Oral two times a day  multivitamin/minerals 1 Tablet(s) Oral daily  pantoprazole    Tablet 40 milliGRAM(s) Oral daily  polyethylene glycol 3350 17 Gram(s) Oral daily  remdesivir  IVPB   IV Intermittent   remdesivir  IVPB 100 milliGRAM(s) IV Intermittent every 24 hours    MEDICATIONS  (PRN):  acetaminophen     Tablet .. 650 milliGRAM(s) Oral every 6 hours PRN Temp greater or equal to 38C (100.4F), Mild Pain (1 - 3)  albuterol/ipratropium for Nebulization 3 milliLiter(s) Nebulizer every 6 hours PRN Shortness of Breath and/or Wheezing  aluminum hydroxide/magnesium hydroxide/simethicone Suspension 30 milliLiter(s) Oral every 4 hours PRN Dyspepsia  guaiFENesin Oral Liquid (Sugar-Free) 200 milliGRAM(s) Oral every 6 hours PRN Cough  melatonin 3 milliGRAM(s) Oral at bedtime PRN Insomnia  ondansetron Injectable 4 milliGRAM(s) IV Push every 8 hours PRN Nausea and/or Vomiting      Allergies    penicillin (Unknown)      PHYSICAL EXAM:   Vital Signs:  Vital Signs Last 24 Hrs  T(C): 37.1 (30 Aug 2023 05:30), Max: 37.1 (30 Aug 2023 05:30)  T(F): 98.7 (30 Aug 2023 05:30), Max: 98.7 (30 Aug 2023 05:30)  HR: 85 (30 Aug 2023 05:30) (81 - 85)  BP: 137/77 (30 Aug 2023 05:30) (137/65 - 171/70)  BP(mean): --  RR: 18 (30 Aug 2023 05:30) (18 - 18)  SpO2: 90% (30 Aug 2023 05:30) (90% - 92%)    Parameters below as of 30 Aug 2023 05:30  Patient On (Oxygen Delivery Method): room air      Daily     Daily Weight in k.2 (30 Aug 2023 05:30)    GENERAL:  Appears stated age  HEENT:  NC/AT  CHEST:  Full & symmetric excursion  HEART:  Regular rhythm  ABDOMEN:  Soft, non-tender, non-distended  EXTEREMITIES:  no cyanosis  SKIN:  No rash  NEURO:  Alert      LABS:                        12.3   11.89 )-----------( 383      ( 30 Aug 2023 06:27 )             39.3     08-30    139  |  107  |  25<H>  ----------------------------<  101<H>  3.8   |  28  |  0.68    Ca    9.0      30 Aug 2023 06:27    TPro  6.7  /  Alb  2.6<L>  /  TBili  0.8  /  DBili  0.3  /  AST  158<H>  /  ALT  188<H>  /  AlkPhos  99  08-30    PT/INR - ( 30 Aug 2023 06:27 )   PT: 14.9 sec;   INR: 1.28 ratio           Urinalysis Basic - ( 30 Aug 2023 06:27 )    Color: x / Appearance: x / SG: x / pH: x  Gluc: 101 mg/dL / Ketone: x  / Bili: x / Urobili: x   Blood: x / Protein: x / Nitrite: x   Leuk Esterase: x / RBC: x / WBC x   Sq Epi: x / Non Sq Epi: x / Bacteria: x   [facility]  Víctor Robertson PA-C  [address] [place] [address]  [phone]      INTERVAL HPI/OVERNIGHT EVENTS:  Pt s/e  Tolerating diet without issue    MEDICATIONS  (STANDING):  acetaminophen     Tablet .. 325 milliGRAM(s) Oral every 8 hours  aMIOdarone    Tablet 200 milliGRAM(s) Oral daily  amLODIPine   Tablet 10 milliGRAM(s) Oral daily  apixaban 2.5 milliGRAM(s) Oral two times a day  artificial  tears Solution 1 Drop(s) Both EYES two times a day  dextrose 5% + sodium chloride 0.45%. 1000 milliLiter(s) (50 mL/Hr) IV Continuous <Continuous>  ferrous    sulfate 325 milliGRAM(s) Oral daily  lactobacillus acidophilus 1 Tablet(s) Oral every 12 hours  levothyroxine 75 MICROGram(s) Oral daily  metoprolol tartrate 50 milliGRAM(s) Oral two times a day  multivitamin/minerals 1 Tablet(s) Oral daily  pantoprazole    Tablet 40 milliGRAM(s) Oral daily  polyethylene glycol 3350 17 Gram(s) Oral daily  remdesivir  IVPB   IV Intermittent   remdesivir  IVPB 100 milliGRAM(s) IV Intermittent every 24 hours    MEDICATIONS  (PRN):  acetaminophen     Tablet .. 650 milliGRAM(s) Oral every 6 hours PRN Temp greater or equal to 38C (100.4F), Mild Pain (1 - 3)  albuterol/ipratropium for Nebulization 3 milliLiter(s) Nebulizer every 6 hours PRN Shortness of Breath and/or Wheezing  aluminum hydroxide/magnesium hydroxide/simethicone Suspension 30 milliLiter(s) Oral every 4 hours PRN Dyspepsia  guaiFENesin Oral Liquid (Sugar-Free) 200 milliGRAM(s) Oral every 6 hours PRN Cough  melatonin 3 milliGRAM(s) Oral at bedtime PRN Insomnia  ondansetron Injectable 4 milliGRAM(s) IV Push every 8 hours PRN Nausea and/or Vomiting      Allergies    penicillin (Unknown)      PHYSICAL EXAM:   Vital Signs:  Vital Signs Last 24 Hrs  T(C): 37.1 (30 Aug 2023 05:30), Max: 37.1 (30 Aug 2023 05:30)  T(F): 98.7 (30 Aug 2023 05:30), Max: 98.7 (30 Aug 2023 05:30)  HR: 85 (30 Aug 2023 05:30) (81 - 85)  BP: 137/77 (30 Aug 2023 05:30) (137/65 - 171/70)  BP(mean): --  RR: 18 (30 Aug 2023 05:30) (18 - 18)  SpO2: 90% (30 Aug 2023 05:30) (90% - 92%)    Parameters below as of 30 Aug 2023 05:30  Patient On (Oxygen Delivery Method): room air      Daily     Daily Weight in k.2 (30 Aug 2023 05:30)    GENERAL:  Appears stated age  HEENT:  NC/AT  CHEST:  Full & symmetric excursion  HEART:  Regular rhythm  ABDOMEN:  Soft, non-tender, non-distended  EXTEREMITIES:  no cyanosis  SKIN:  No rash  NEURO:  Alert      LABS:                        12.3   11.89 )-----------( 383      ( 30 Aug 2023 06:27 )             39.3     08-30    139  |  107  |  25<H>  ----------------------------<  101<H>  3.8   |  28  |  0.68    Ca    9.0      30 Aug 2023 06:27    TPro  6.7  /  Alb  2.6<L>  /  TBili  0.8  /  DBili  0.3  /  AST  158<H>  /  ALT  188<H>  /  AlkPhos  99  08-30    PT/INR - ( 30 Aug 2023 06:27 )   PT: 14.9 sec;   INR: 1.28 ratio           Urinalysis Basic - ( 30 Aug 2023 06:27 )    Color: x / Appearance: x / SG: x / pH: x  Gluc: 101 mg/dL / Ketone: x  / Bili: x / Urobili: x   Blood: x / Protein: x / Nitrite: x   Leuk Esterase: x / RBC: x / WBC x   Sq Epi: x / Non Sq Epi: x / Bacteria: x

## 2023-08-30 NOTE — CHART NOTE - NSCHARTNOTEFT_GEN_A_CORE
The patient has a mobility limitation that significantly secondary to osteoporosis and spinal stenosis that impairs the ability to participate in one or more mobility related activities of daily living in the home. Patient is wheelchair bound, requires 2 person assist. Requires Oksana Lift to be transitioned from bed to chair, safely. The functional mobility deficit can be sufficiently resolved by use of a Oksana Lift, otherwise patient would be confined to the bed.       Carlene Soto, AGACNP-BC  Tel   Cell

## 2023-08-31 LAB
ALBUMIN SERPL ELPH-MCNC: 2.1 G/DL — LOW (ref 3.3–5)
ALP SERPL-CCNC: 91 U/L — SIGNIFICANT CHANGE UP (ref 40–120)
ALP SERPL-CCNC: 92 U/L — SIGNIFICANT CHANGE UP (ref 40–120)
ALT FLD-CCNC: 204 U/L — HIGH (ref 12–78)
ALT FLD-CCNC: 207 U/L — HIGH (ref 12–78)
ANION GAP SERPL CALC-SCNC: 5 MMOL/L — SIGNIFICANT CHANGE UP (ref 5–17)
AST SERPL-CCNC: 155 U/L — HIGH (ref 15–37)
AST SERPL-CCNC: 172 U/L — HIGH (ref 15–37)
BILIRUB DIRECT SERPL-MCNC: 0.2 MG/DL — SIGNIFICANT CHANGE UP (ref 0–0.3)
BILIRUB INDIRECT FLD-MCNC: 0.4 MG/DL — SIGNIFICANT CHANGE UP (ref 0.2–1)
BILIRUB SERPL-MCNC: 0.5 MG/DL — SIGNIFICANT CHANGE UP (ref 0.2–1.2)
BILIRUB SERPL-MCNC: 0.6 MG/DL — SIGNIFICANT CHANGE UP (ref 0.2–1.2)
BUN SERPL-MCNC: 23 MG/DL — SIGNIFICANT CHANGE UP (ref 7–23)
CALCIUM SERPL-MCNC: 8.2 MG/DL — LOW (ref 8.5–10.1)
CHLORIDE SERPL-SCNC: 110 MMOL/L — HIGH (ref 96–108)
CO2 SERPL-SCNC: 27 MMOL/L — SIGNIFICANT CHANGE UP (ref 22–31)
CREAT SERPL-MCNC: 0.62 MG/DL — SIGNIFICANT CHANGE UP (ref 0.5–1.3)
EGFR: 83 ML/MIN/1.73M2 — SIGNIFICANT CHANGE UP
GLUCOSE SERPL-MCNC: 120 MG/DL — HIGH (ref 70–99)
HCT VFR BLD CALC: 36.3 % — SIGNIFICANT CHANGE UP (ref 34.5–45)
HGB BLD-MCNC: 11 G/DL — LOW (ref 11.5–15.5)
INR BLD: 1.8 RATIO — HIGH (ref 0.85–1.18)
LEGIONELLA AG UR QL: NEGATIVE — SIGNIFICANT CHANGE UP
MCHC RBC-ENTMCNC: 28.9 PG — SIGNIFICANT CHANGE UP (ref 27–34)
MCHC RBC-ENTMCNC: 30.3 GM/DL — LOW (ref 32–36)
MCV RBC AUTO: 95.3 FL — SIGNIFICANT CHANGE UP (ref 80–100)
NRBC # BLD: 0 /100 WBCS — SIGNIFICANT CHANGE UP (ref 0–0)
PLATELET # BLD AUTO: 248 K/UL — SIGNIFICANT CHANGE UP (ref 150–400)
POTASSIUM SERPL-MCNC: 3.7 MMOL/L — SIGNIFICANT CHANGE UP (ref 3.5–5.3)
POTASSIUM SERPL-SCNC: 3.7 MMOL/L — SIGNIFICANT CHANGE UP (ref 3.5–5.3)
PROT SERPL-MCNC: 5.7 G/DL — LOW (ref 6–8.3)
PROT SERPL-MCNC: 5.8 G/DL — LOW (ref 6–8.3)
PROTHROM AB SERPL-ACNC: 20.7 SEC — HIGH (ref 9.5–13)
RBC # BLD: 3.81 M/UL — SIGNIFICANT CHANGE UP (ref 3.8–5.2)
RBC # FLD: 16.2 % — HIGH (ref 10.3–14.5)
SODIUM SERPL-SCNC: 142 MMOL/L — SIGNIFICANT CHANGE UP (ref 135–145)
WBC # BLD: 8.63 K/UL — SIGNIFICANT CHANGE UP (ref 3.8–10.5)
WBC # FLD AUTO: 8.63 K/UL — SIGNIFICANT CHANGE UP (ref 3.8–10.5)

## 2023-08-31 RX ORDER — AMLODIPINE BESYLATE 2.5 MG/1
1 TABLET ORAL
Qty: 14 | Refills: 0
Start: 2023-08-31 | End: 2023-09-13

## 2023-08-31 RX ORDER — PANTOPRAZOLE SODIUM 20 MG/1
1 TABLET, DELAYED RELEASE ORAL
Qty: 14 | Refills: 0
Start: 2023-08-31 | End: 2023-09-13

## 2023-08-31 RX ADMIN — Medication 1 TABLET(S): at 13:30

## 2023-08-31 RX ADMIN — Medication 1 TABLET(S): at 18:54

## 2023-08-31 RX ADMIN — Medication 1 DROP(S): at 18:55

## 2023-08-31 RX ADMIN — AMIODARONE HYDROCHLORIDE 200 MILLIGRAM(S): 400 TABLET ORAL at 05:49

## 2023-08-31 RX ADMIN — Medication 50 MILLIGRAM(S): at 18:54

## 2023-08-31 RX ADMIN — Medication 325 MILLIGRAM(S): at 13:30

## 2023-08-31 RX ADMIN — Medication 325 MILLIGRAM(S): at 05:49

## 2023-08-31 RX ADMIN — Medication 1 TABLET(S): at 05:48

## 2023-08-31 RX ADMIN — SODIUM CHLORIDE 50 MILLILITER(S): 9 INJECTION, SOLUTION INTRAVENOUS at 05:48

## 2023-08-31 RX ADMIN — AMLODIPINE BESYLATE 10 MILLIGRAM(S): 2.5 TABLET ORAL at 05:49

## 2023-08-31 RX ADMIN — Medication 325 MILLIGRAM(S): at 06:49

## 2023-08-31 RX ADMIN — Medication 75 MICROGRAM(S): at 05:49

## 2023-08-31 RX ADMIN — REMDESIVIR 200 MILLIGRAM(S): 5 INJECTION INTRAVENOUS at 15:23

## 2023-08-31 RX ADMIN — PANTOPRAZOLE SODIUM 40 MILLIGRAM(S): 20 TABLET, DELAYED RELEASE ORAL at 13:31

## 2023-08-31 RX ADMIN — APIXABAN 2.5 MILLIGRAM(S): 2.5 TABLET, FILM COATED ORAL at 18:54

## 2023-08-31 RX ADMIN — POLYETHYLENE GLYCOL 3350 17 GRAM(S): 17 POWDER, FOR SOLUTION ORAL at 13:30

## 2023-08-31 RX ADMIN — Medication 1 DROP(S): at 05:50

## 2023-08-31 RX ADMIN — Medication 50 MILLIGRAM(S): at 05:49

## 2023-08-31 RX ADMIN — APIXABAN 2.5 MILLIGRAM(S): 2.5 TABLET, FILM COATED ORAL at 05:49

## 2023-08-31 NOTE — CHART NOTE - NSCHARTNOTESELECT_GEN_ALL_CORE
Event Note
Event Note
Nutrition Services
DME Oksana Lift/Event Note
Resident/Event Note
Event Note
ACP/Event Note
Event Note
Event Note
Nutrition Services
Resident/Event Note

## 2023-08-31 NOTE — PROGRESS NOTE ADULT - SUBJECTIVE AND OBJECTIVE BOX
PROGRESS NOTE  Patient is a 89y old  Female who presents with a chief complaint of cough (31 Aug 2023 11:09)    Chart and available morning labs /imaging are reviewed electronically , urgent issues addressed . More information  is being added upon completion of rounds , when more information is collected and management discussed with consultants , medical staff and social service/case management on the floor   OVERNIGHT  No new issues reported by medical staff . All above noted Patient is resting in a bed comfortably .Confused ,poor mentation BUT MENTAL  STATUS is much better , po intake is fair  .No distress noted   Spoke to orhto team several times today , also hand surgeon Dr Lipscomb and ortho attending Dr Briscoe today -patient may be discharged with RUE splint /brace and f/up with hand specialist in at he office for possible cast .Likely will require MARY GRACE since NWB RUE .D/w    HPI:  89-year-old female with history of hypothyroidism, A-fib on Eliquis, s/p recent pacemaker for complete heart block brought in by ambulance from [facility] living home for cough for the past 2 weeks.  Associated with generalized weakness and decreased p.o. intake.  No fall or trauma.  Patient states she has had cold symptoms for a while.  Denies fever, chills, chest pain, shortness of breath, abdominal pain, nausea, vomiting, upper or lower extremity weakness or paresthesias. pmd: Dr. Crawley, cards: [facility] Seen by card Dr Reich Admitted  to telemetry unit for monitoring , send 3 sets of cardiac enzymes to rule out acute coronary event, obtain ECHO to evaluate LVEF, cardiology consult  ,continue current management, O2 supply, anticoagulation plan as per cardiology consult Pulm cons requested , antitussives and nebulized bd ordered .Also UTI suspected and started on iv abx , id cons called Palliative care consult requested ,to discuss advance directives and complete MOLST  (20 Aug 2023 15:35)    PAST MEDICAL & SURGICAL HISTORY:  HTN (hypertension)      Afib      Spinal stenosis      PFO (patent foramen ovale)      Degeneration macular      Osteoporosis      History of complete heart block      Hypothyroidism      Cardiac pacemaker          MEDICATIONS  (STANDING):  aMIOdarone    Tablet 200 milliGRAM(s) Oral daily  amLODIPine   Tablet 10 milliGRAM(s) Oral daily  apixaban 2.5 milliGRAM(s) Oral two times a day  artificial  tears Solution 1 Drop(s) Both EYES two times a day  dextrose 5% + sodium chloride 0.45%. 1000 milliLiter(s) (50 mL/Hr) IV Continuous <Continuous>  ferrous    sulfate 325 milliGRAM(s) Oral daily  lactobacillus acidophilus 1 Tablet(s) Oral every 12 hours  levothyroxine 75 MICROGram(s) Oral daily  metoprolol tartrate 50 milliGRAM(s) Oral two times a day  multivitamin/minerals 1 Tablet(s) Oral daily  pantoprazole    Tablet 40 milliGRAM(s) Oral daily  polyethylene glycol 3350 17 Gram(s) Oral daily    MEDICATIONS  (PRN):  acetaminophen     Tablet .. 650 milliGRAM(s) Oral every 6 hours PRN Temp greater or equal to 38C (100.4F), Mild Pain (1 - 3)  albuterol/ipratropium for Nebulization 3 milliLiter(s) Nebulizer every 6 hours PRN Shortness of Breath and/or Wheezing  aluminum hydroxide/magnesium hydroxide/simethicone Suspension 30 milliLiter(s) Oral every 4 hours PRN Dyspepsia  guaiFENesin Oral Liquid (Sugar-Free) 200 milliGRAM(s) Oral every 6 hours PRN Cough  melatonin 3 milliGRAM(s) Oral at bedtime PRN Insomnia  ondansetron Injectable 4 milliGRAM(s) IV Push every 8 hours PRN Nausea and/or Vomiting      OBJECTIVE    T(C): 36.7 (08-31-23 @ 12:31), Max: 36.7 (08-31-23 @ 05:04)  HR: 82 (08-31-23 @ 18:55) (64 - 90)  BP: 155/69 (08-31-23 @ 18:55) (136/72 - 155/69)  RR: 18 (08-31-23 @ 12:31) (18 - 18)  SpO2: 94% (08-31-23 @ 12:31) (92% - 94%)  Wt(kg): --  I&O's Summary    30 Aug 2023 07:01  -  31 Aug 2023 07:00  --------------------------------------------------------  IN: 1110 mL / OUT: 0 mL / NET: 1110 mL    31 Aug 2023 07:01  -  31 Aug 2023 20:04  --------------------------------------------------------  IN: 600 mL / OUT: 250 mL / NET: 350 mL          REVIEW OF SYSTEMS:  Patient is  unable to provide any information/ROS  due to baseline mental status.     PHYSICAL EXAM:  Appearance: NAD. VS past 24 hrs -as above   HEENT:   Moist oral mucosa. Conjunctiva clear b/l.   Neck : supple  Respiratory: Lungs CTAB.  Gastrointestinal:  Soft, nontender. No rebound. No rigidity. BS present	  Cardiovascular: RRR ,S1S2 present  Neurologic: Non-focal. Moving all extremities.  Extremities: No edema. No erythema. No calf tenderness.  Skin: No rashes, No ecchymoses, No cyanosis.	  wounds ,skin lesions-See skin assesment flow sheet   LABS:                        11.0   8.63  )-----------( 248      ( 31 Aug 2023 08:40 )             36.3     08-31    142  |  110<H>  |  23  ----------------------------<  120<H>  3.7   |  27  |  0.62    Ca    8.2<L>      31 Aug 2023 08:40    TPro  5.7<L>  /  Alb  2.1<L>  /  TBili  0.5  /  DBili  0.2  /  AST  155<H>  /  ALT  204<H>  /  AlkPhos  92  08-31    CAPILLARY BLOOD GLUCOSE        PT/INR - ( 31 Aug 2023 08:40 )   PT: 20.7 sec;   INR: 1.80 ratio           Urinalysis Basic - ( 31 Aug 2023 08:40 )    Color: x / Appearance: x / SG: x / pH: x  Gluc: 120 mg/dL / Ketone: x  / Bili: x / Urobili: x   Blood: x / Protein: x / Nitrite: x   Leuk Esterase: x / RBC: x / WBC x   Sq Epi: x / Non Sq Epi: x / Bacteria: x        Culture - Blood (collected 24 Aug 2023 06:48)  Source: .Blood Blood  Final Report (29 Aug 2023 10:01):    No growth at 5 days    Culture - Blood (collected 22 Aug 2023 11:01)  Source: .Blood Blood  Final Report (27 Aug 2023 15:01):    No growth at 5 days    Culture - Blood (collected 22 Aug 2023 06:20)  Source: .Blood Blood  Final Report (27 Aug 2023 12:00):    No growth at 5 days      RADIOLOGY & ADDITIONAL TESTS:   reviewed elctronically  ASSESSMENT/PLAN: 	    25 minutes aggregate time was spent on this visit, 50% visit time spent in care co-ordination with other attendings and counselling patient .I have discussed care plan with patient / HCP/family member ,who expressed understanding of problems treatment and their effect and side effects, alternatives in details. I have asked if they have any questions and concerns and appropriately addressed them to best of my ability.  PROGRESS NOTE  Patient is a 89y old  Female who presents with a chief complaint of cough (31 Aug 2023 11:09)    Chart and available morning labs /imaging are reviewed electronically , urgent issues addressed . More information  is being added upon completion of rounds , when more information is collected and management discussed with consultants , medical staff and social service/case management on the floor   OVERNIGHT  No new issues reported by medical staff . All above noted Patient is resting in a bed comfortably .Confused ,poor mentation BUT MENTAL  STATUS is much better , po intake is fair  .No distress noted   Spoke to orhto team several times today , also hand surgeon Dr Lipscomb and ortho attending Dr Briscoe today -patient may be discharged with RUE splint /brace and f/up with hand specialist in at he office for possible cast .Likely will require MARY GRACE since NWB RUE .D/w    HPI:  89-year-old female with history of hypothyroidism, A-fib on Eliquis, s/p recent pacemaker for complete heart block brought in by ambulance from [facility] living home for cough for the past 2 weeks.  Associated with generalized weakness and decreased p.o. intake.  No fall or trauma.  Patient states she has had cold symptoms for a while.  Denies fever, chills, chest pain, shortness of breath, abdominal pain, nausea, vomiting, upper or lower extremity weakness or paresthesias. pmd: Dr. Crawley, cards: [facility] Seen by card Dr Reich Admitted  to telemetry unit for monitoring , send 3 sets of cardiac enzymes to rule out acute coronary event, obtain ECHO to evaluate LVEF, cardiology consult  ,continue current management, O2 supply, anticoagulation plan as per cardiology consult Pulm cons requested , antitussives and nebulized bd ordered .Also UTI suspected and started on iv abx , id cons called Palliative care consult requested ,to discuss advance directives and complete MOLST  (20 Aug 2023 15:35)    PAST MEDICAL & SURGICAL HISTORY:  HTN (hypertension)      Afib      Spinal stenosis      PFO (patent foramen ovale)      Degeneration macular      Osteoporosis      History of complete heart block      Hypothyroidism      Cardiac pacemaker          MEDICATIONS  (STANDING):  aMIOdarone    Tablet 200 milliGRAM(s) Oral daily  amLODIPine   Tablet 10 milliGRAM(s) Oral daily  apixaban 2.5 milliGRAM(s) Oral two times a day  artificial  tears Solution 1 Drop(s) Both EYES two times a day  dextrose 5% + sodium chloride 0.45%. 1000 milliLiter(s) (50 mL/Hr) IV Continuous <Continuous>  ferrous    sulfate 325 milliGRAM(s) Oral daily  lactobacillus acidophilus 1 Tablet(s) Oral every 12 hours  levothyroxine 75 MICROGram(s) Oral daily  metoprolol tartrate 50 milliGRAM(s) Oral two times a day  multivitamin/minerals 1 Tablet(s) Oral daily  pantoprazole    Tablet 40 milliGRAM(s) Oral daily  polyethylene glycol 3350 17 Gram(s) Oral daily    MEDICATIONS  (PRN):  acetaminophen     Tablet .. 650 milliGRAM(s) Oral every 6 hours PRN Temp greater or equal to 38C (100.4F), Mild Pain (1 - 3)  albuterol/ipratropium for Nebulization 3 milliLiter(s) Nebulizer every 6 hours PRN Shortness of Breath and/or Wheezing  aluminum hydroxide/magnesium hydroxide/simethicone Suspension 30 milliLiter(s) Oral every 4 hours PRN Dyspepsia  guaiFENesin Oral Liquid (Sugar-Free) 200 milliGRAM(s) Oral every 6 hours PRN Cough  melatonin 3 milliGRAM(s) Oral at bedtime PRN Insomnia  ondansetron Injectable 4 milliGRAM(s) IV Push every 8 hours PRN Nausea and/or Vomiting      OBJECTIVE    T(C): 36.7 (08-31-23 @ 12:31), Max: 36.7 (08-31-23 @ 05:04)  HR: 82 (08-31-23 @ 18:55) (64 - 90)  BP: 155/69 (08-31-23 @ 18:55) (136/72 - 155/69)  RR: 18 (08-31-23 @ 12:31) (18 - 18)  SpO2: 94% (08-31-23 @ 12:31) (92% - 94%)  Wt(kg): --  I&O's Summary    30 Aug 2023 07:01  -  31 Aug 2023 07:00  --------------------------------------------------------  IN: 1110 mL / OUT: 0 mL / NET: 1110 mL    31 Aug 2023 07:01  -  31 Aug 2023 20:04  --------------------------------------------------------  IN: 600 mL / OUT: 250 mL / NET: 350 mL          REVIEW OF SYSTEMS:  Patient is  unable to provide any information/ROS  due to baseline mental status.     PHYSICAL EXAM:  Appearance: NAD. VS past 24 hrs -as above   HEENT:   Moist oral mucosa. Conjunctiva clear b/l.   Neck : supple  Respiratory: Lungs CTAB.  Gastrointestinal:  Soft, nontender. No rebound. No rigidity. BS present	  Cardiovascular: RRR ,S1S2 present  Neurologic: Non-focal. Moving all extremities.  Extremities: No edema. No erythema. No calf tenderness.  Skin: No rashes, No ecchymoses, No cyanosis.	  wounds ,skin lesions-See skin assesment flow sheet   LABS:                        11.0   8.63  )-----------( 248      ( 31 Aug 2023 08:40 )             36.3     08-31    142  |  110<H>  |  23  ----------------------------<  120<H>  3.7   |  27  |  0.62    Ca    8.2<L>      31 Aug 2023 08:40    TPro  5.7<L>  /  Alb  2.1<L>  /  TBili  0.5  /  DBili  0.2  /  AST  155<H>  /  ALT  204<H>  /  AlkPhos  92  08-31    CAPILLARY BLOOD GLUCOSE        PT/INR - ( 31 Aug 2023 08:40 )   PT: 20.7 sec;   INR: 1.80 ratio           Urinalysis Basic - ( 31 Aug 2023 08:40 )    Color: x / Appearance: x / SG: x / pH: x  Gluc: 120 mg/dL / Ketone: x  / Bili: x / Urobili: x   Blood: x / Protein: x / Nitrite: x   Leuk Esterase: x / RBC: x / WBC x   Sq Epi: x / Non Sq Epi: x / Bacteria: x        Culture - Blood (collected 24 Aug 2023 06:48)  Source: .Blood Blood  Final Report (29 Aug 2023 10:01):    No growth at 5 days    Culture - Blood (collected 22 Aug 2023 11:01)  Source: .Blood Blood  Final Report (27 Aug 2023 15:01):    No growth at 5 days    Culture - Blood (collected 22 Aug 2023 06:20)  Source: .Blood Blood  Final Report (27 Aug 2023 12:00):    No growth at 5 days      RADIOLOGY & ADDITIONAL TESTS:   reviewed elctronically  ASSESSMENT/PLAN: 	    25 minutes aggregate time was spent on this visit, 50% visit time spent in care co-ordination with other attendings and counselling patient .I have discussed care plan with patient / HCP/family member ,who expressed understanding of problems treatment and their effect and side effects, alternatives in details. I have asked if they have any questions and concerns and appropriately addressed them to best of my ability.  PROGRESS NOTE  Patient is a 89y old  Female who presents with a chief complaint of cough (31 Aug 2023 11:09)    Chart and available morning labs /imaging are reviewed electronically , urgent issues addressed . More information  is being added upon completion of rounds , when more information is collected and management discussed with consultants , medical staff and social service/case management on the floor   OVERNIGHT  No new issues reported by medical staff . All above noted Patient is resting in a bed comfortably .Confused ,poor mentation BUT MENTAL  STATUS is much better , po intake is fair  .No distress noted   Spoke to orhto team several times today , also hand surgeon Dr Lipscomb and ortho attending Dr Briscoe today -patient may be discharged with RUE splint /brace and f/up with hand specialist in at he office for possible cast .Likely will require MARY GRACE since NWB RUE .D/w    HPI:  89-year-old female with history of hypothyroidism, A-fib on Eliquis, s/p recent pacemaker for complete heart block brought in by ambulance from [facility] living home for cough for the past 2 weeks.  Associated with generalized weakness and decreased p.o. intake.  No fall or trauma.  Patient states she has had cold symptoms for a while.  Denies fever, chills, chest pain, shortness of breath, abdominal pain, nausea, vomiting, upper or lower extremity weakness or paresthesias. pmd: Dr. Crawley, cards: [facility] Seen by card Dr Reich Admitted  to telemetry unit for monitoring , send 3 sets of cardiac enzymes to rule out acute coronary event, obtain ECHO to evaluate LVEF, cardiology consult  ,continue current management, O2 supply, anticoagulation plan as per cardiology consult Pulm cons requested , antitussives and nebulized bd ordered .Also UTI suspected and started on iv abx , id cons called Palliative care consult requested ,to discuss advance directives and complete MOLST  (20 Aug 2023 15:35)    PAST MEDICAL & SURGICAL HISTORY:  HTN (hypertension)      Afib      Spinal stenosis      PFO (patent foramen ovale)      Degeneration macular      Osteoporosis      History of complete heart block      Hypothyroidism      Cardiac pacemaker          MEDICATIONS  (STANDING):  aMIOdarone    Tablet 200 milliGRAM(s) Oral daily  amLODIPine   Tablet 10 milliGRAM(s) Oral daily  apixaban 2.5 milliGRAM(s) Oral two times a day  artificial  tears Solution 1 Drop(s) Both EYES two times a day  dextrose 5% + sodium chloride 0.45%. 1000 milliLiter(s) (50 mL/Hr) IV Continuous <Continuous>  ferrous    sulfate 325 milliGRAM(s) Oral daily  lactobacillus acidophilus 1 Tablet(s) Oral every 12 hours  levothyroxine 75 MICROGram(s) Oral daily  metoprolol tartrate 50 milliGRAM(s) Oral two times a day  multivitamin/minerals 1 Tablet(s) Oral daily  pantoprazole    Tablet 40 milliGRAM(s) Oral daily  polyethylene glycol 3350 17 Gram(s) Oral daily    MEDICATIONS  (PRN):  acetaminophen     Tablet .. 650 milliGRAM(s) Oral every 6 hours PRN Temp greater or equal to 38C (100.4F), Mild Pain (1 - 3)  albuterol/ipratropium for Nebulization 3 milliLiter(s) Nebulizer every 6 hours PRN Shortness of Breath and/or Wheezing  aluminum hydroxide/magnesium hydroxide/simethicone Suspension 30 milliLiter(s) Oral every 4 hours PRN Dyspepsia  guaiFENesin Oral Liquid (Sugar-Free) 200 milliGRAM(s) Oral every 6 hours PRN Cough  melatonin 3 milliGRAM(s) Oral at bedtime PRN Insomnia  ondansetron Injectable 4 milliGRAM(s) IV Push every 8 hours PRN Nausea and/or Vomiting      OBJECTIVE    T(C): 36.7 (08-31-23 @ 12:31), Max: 36.7 (08-31-23 @ 05:04)  HR: 82 (08-31-23 @ 18:55) (64 - 90)  BP: 155/69 (08-31-23 @ 18:55) (136/72 - 155/69)  RR: 18 (08-31-23 @ 12:31) (18 - 18)  SpO2: 94% (08-31-23 @ 12:31) (92% - 94%)  Wt(kg): --  I&O's Summary    30 Aug 2023 07:01  -  31 Aug 2023 07:00  --------------------------------------------------------  IN: 1110 mL / OUT: 0 mL / NET: 1110 mL    31 Aug 2023 07:01  -  31 Aug 2023 20:04  --------------------------------------------------------  IN: 600 mL / OUT: 250 mL / NET: 350 mL          REVIEW OF SYSTEMS:  Patient is  unable to provide any information/ROS  due to baseline mental status.     PHYSICAL EXAM:  Appearance: NAD. VS past 24 hrs -as above   HEENT:   Moist oral mucosa. Conjunctiva clear b/l.   Neck : supple  Respiratory: Lungs CTAB.  Gastrointestinal:  Soft, nontender. No rebound. No rigidity. BS present	  Cardiovascular: RRR ,S1S2 present  Neurologic: Non-focal. Moving all extremities.  Extremities: No edema. No erythema. No calf tenderness.  Skin: No rashes, No ecchymoses, No cyanosis.	  wounds ,skin lesions-See skin assesment flow sheet   LABS:                        11.0   8.63  )-----------( 248      ( 31 Aug 2023 08:40 )             36.3     08-31    142  |  110<H>  |  23  ----------------------------<  120<H>  3.7   |  27  |  0.62    Ca    8.2<L>      31 Aug 2023 08:40    TPro  5.7<L>  /  Alb  2.1<L>  /  TBili  0.5  /  DBili  0.2  /  AST  155<H>  /  ALT  204<H>  /  AlkPhos  92  08-31    CAPILLARY BLOOD GLUCOSE        PT/INR - ( 31 Aug 2023 08:40 )   PT: 20.7 sec;   INR: 1.80 ratio           Urinalysis Basic - ( 31 Aug 2023 08:40 )    Color: x / Appearance: x / SG: x / pH: x  Gluc: 120 mg/dL / Ketone: x  / Bili: x / Urobili: x   Blood: x / Protein: x / Nitrite: x   Leuk Esterase: x / RBC: x / WBC x   Sq Epi: x / Non Sq Epi: x / Bacteria: x        Culture - Blood (collected 24 Aug 2023 06:48)  Source: .Blood Blood  Final Report (29 Aug 2023 10:01):    No growth at 5 days    Culture - Blood (collected 22 Aug 2023 11:01)  Source: .Blood Blood  Final Report (27 Aug 2023 15:01):    No growth at 5 days    Culture - Blood (collected 22 Aug 2023 06:20)  Source: .Blood Blood  Final Report (27 Aug 2023 12:00):    No growth at 5 days      RADIOLOGY & ADDITIONAL TESTS:   reviewed elctronically  ASSESSMENT/PLAN: 	    25 minutes aggregate time was spent on this visit, 50% visit time spent in care co-ordination with other attendings and counselling patient .I have discussed care plan with patient / HCP/family member ,who expressed understanding of problems treatment and their effect and side effects, alternatives in details. I have asked if they have any questions and concerns and appropriately addressed them to best of my ability.

## 2023-08-31 NOTE — PROGRESS NOTE ADULT - ASSESSMENT
REASON FOR VISIT  .. Management of problems listed below      PHYSICAL EXAM    HEENT Unremarkable  atraumatic   RESP Fair air entry  Harsh breath sound   CARDIAC S1 S2 No S3     NO JVD    ABDOMEN No hepatosplenomegaly   PEDAL EDEMA present No calf tenderness  NO rash       GENERAL DATA .     GOC.    .. 8/26/2023 dnr  ICU STAY.   .. none  COVID.   .. scv2 8/29/2023 (+)   .. scv2 8/21/2023 (-)    BEST PRACTICE ISSUES.    HOB ELEVATN.   .. Yes  DVT PPLX.   ..  8/20/2023 apixaba 2.5 x 2 (af)     SPEECH SWALLOW RECOMMENDATIONS.    ..    8/21/2023 soft bite     DIET.    ..  8/20/2023 soft bite size   IV fl.  .. 8/22/2023 D5 1/2 ns  50   STRESS ULCER PPLX.   ..    8/20/2023 protonix 40   INFECTION PPLX.   ..     ALLGY.  ..    pncl                     WT.  ..  8/20/2023 56  BMI.  ..   8/20/2023 21    PROCEDURES.  ..     ABGS.   .     VS/ PO/IO/ VENT/ DRIPS.   8/31/2023 afeb 90 140/70   8/31/2023 ra 94%     DOA C/C.     . 8/20/2023 · Chief Complaint Quote sent by [employer] [facility] for cough x 2 weeks. negative cxr and covid swab. poor PO intake today.           INITIAL PRESENTATION.   . 8/20/2023 89-year-old female with history of hypothyroidism, A-fib on Eliquis, recent pacemaker for complete heart block brought in by ambulance from [facility] assisted living home for cough for the past 2 weeks.  Associated with generalized weakness and decreased p.o. intake.  No fall or trauma.  Patient states she has had cold symptoms for a while.  Denies fever, chills, chest pain, shortness of breath, abdominal pain, nausea, vomiting, upper or lower extremity weakness or paresthesias.   pmd: Dr. Crawley, cards: [facility].   . hypothyroidism   . A-fib on Eliquis (hx AFL ablation),  .  PFO  HOME MEDS.   . levoxyl eliquis 2.5 x 2   COURSE.  . 8/20/2023 pulm consultd    . 8/20/2023 5:39 PM ER meds given already include cefepime 2g     PROBLEMS/ASSESSMENT/RECOMMENDATIONS (A/R).  PULMONARY.  . GAS EXCHANGE.  .. A/R.   .. Monitor pulse oximetry and target po 90-95%    . COPD.  .. 8/20/2023 duoneb.4p   .. 8/20/2023 benzonatate 100.3 x3d   .. 8/20/2023 gauiafenesin     INFECTION.  . COVID (+) 8/29/2023   .. LFTS 8/29-8/30  .. ap 86-99  .. ast   .. alt    .. 8/29/2023 rdsv   .. 8/29/2023 will add dexa if hypoxia develops     . E coli ESBL CTX M bacteremia 8/20  .. w 8/20-8/21-8/22-8/23-8/27-8/29-8/30/2023   .. w 16 - 21- 6.9 - 6 - 10 - 11.5 -11.8   .. pr 8/21/2023 pr 7.5   .. ua 8/20/2023 ua 1012 w tntc l estrase large r 25   .. ct chest 8/20/2023   .... l upper ant cardiac device leads terminating in r atrium and rv   .... no enlarged hilar or mediastinal ln   .... mild doe mod diffuse distention of mid to upper esophagus   .... cm   .... increased density of liver ? amiodarone effect   .... bl lower lobe partial areas of compressive atelectasis sl progressd since ctap 3/4/2023   .... mild bl lower lung areas of linear and compressive atelectasis lower lobes and lingula   .... mild bl upper lobe subsegmental linear and dependent atelectasis   .... nonsp mild interlobular septal thickening   .. MICRO  .. bc 8/20 E coli ESBL CTX M bacteremia 8/20   .. bc 8/21 e coli esbl    .. bc 8/22 (-)   .. legn 8/21 (-)   .. rvp 8/20 (-)   .. EVENTS   .. 8/20/2023 will start empiric levaquin   .. 8/21/2023 message sent to ID to change to carbapenem   .. ABIO  .. 8/21 ertapenem 8d dr green   .. AR   .. ESBL e coli bacteremia on 8/20 likely from urine on ertapenem stratd 8/21     . Hemodynamics.  .. la 8/20/2023 la 1.4  .. BP ok      . A fib   .. 8/20/2023 eliquis   .. 8/22/2023 amiodaron 200   .. 8/22/2023 metporolol 50.2     . CAD.  .. Tr 8/21/2023 Tr 31     . CHF   .. bnp 8/20-8/23/2023 bnp 4545 - 3219   .. echo 8/21/2023 ef 56% pasp 63   .. 8/22/2023 enalapril 10       HEMAT   .. Hb 8/20-8/21-8/23-8/29/2023 Hb 12 - 10.5 - 10 - 11.3   .. plt 8/20/2023 plt 225   .. inr 8/20/2023 inr 129     RENAL   .. Na 8/20/2023 Na 144   .. K 8/20/2023 K 3.3   .. co2 8/20/2023 co2 29   .. Cr 8/20/2023 Cr .8     . Dysphagia  .. esophagogram 8/22/2023 smooth tapering o distal esophagus     LFTS   .. AP 8/20/2023 124  .. ast 23  .. alt 25     MAIN ISSUES.  . COUGH x 2 wk poa 8/20/2023  . COVID (+) 8/29/2023 .. 8/29/2023 rdsv   . Pneumonia  .. ct chest 8/20/2023   .... distention of mid to upper esophagus   . E coli ESBL CTX M bacteremia 8/20 8/21.. bc 8/22 (-)   .. 8/20/2023 levaquin -> 8/21 ertapenem 1 x 8d  Dr Green completed   . COPD   . A fib .. 8/20/2023 apixaba   . CHF . bnp 8/20-8/23/2023 bnp 4545 - 3219   .. echo 8/21/2023 ef 56% pasp 63 .. 8/22/2023 enalapril 10   . Dysphagia.. esophagogram 8/22/2023 smooth tapering o distal esophagus    TIME SPENT.   . Over 36 minutes aggregate care time spent on encounter; activities included   direct patient care, counseling and/or coordinating care reviewing notes, lab data/ imaging , discussion with multidisciplinary team/ patient  /family and explaining in detail risks, benefits, alternatives  of the recommendations     JAKOB WORTHY 89 f 8/20/2023 9/22/1929 DR BAM HERRERA        REASON FOR VISIT  .. Management of problems listed below      PHYSICAL EXAM    HEENT Unremarkable  atraumatic   RESP Fair air entry  Harsh breath sound   CARDIAC S1 S2 No S3     NO JVD    ABDOMEN No hepatosplenomegaly   PEDAL EDEMA present No calf tenderness  NO rash       GENERAL DATA .     GOC.    .. 8/26/2023 dnr  ICU STAY.   .. none  COVID.   .. scv2 8/29/2023 (+)   .. scv2 8/21/2023 (-)    BEST PRACTICE ISSUES.    HOB ELEVATN.   .. Yes  DVT PPLX.   ..  8/20/2023 apixaba 2.5 x 2 (af)     SPEECH SWALLOW RECOMMENDATIONS.    ..    8/21/2023 soft bite     DIET.    ..  8/20/2023 soft bite size   IV fl.  .. 8/22/2023 D5 1/2 ns  50   STRESS ULCER PPLX.   ..    8/20/2023 protonix 40   INFECTION PPLX.   ..     ALLGY.  ..    pncl                     WT.  ..  8/20/2023 56  BMI.  ..   8/20/2023 21    PROCEDURES.  ..     ABGS.   .     VS/ PO/IO/ VENT/ DRIPS.   8/31/2023 afeb 90 140/70   8/31/2023 ra 94%     DOA C/C.     . 8/20/2023 · Chief Complaint Quote sent by [employer] [facility] for cough x 2 weeks. negative cxr and covid swab. poor PO intake today.           INITIAL PRESENTATION.   . 8/20/2023 89-year-old female with history of hypothyroidism, A-fib on Eliquis, recent pacemaker for complete heart block brought in by ambulance from [facility] assisted living home for cough for the past 2 weeks.  Associated with generalized weakness and decreased p.o. intake.  No fall or trauma.  Patient states she has had cold symptoms for a while.  Denies fever, chills, chest pain, shortness of breath, abdominal pain, nausea, vomiting, upper or lower extremity weakness or paresthesias.   pmd: Dr. Crawley, cards: [facility].   . hypothyroidism   . A-fib on Eliquis (hx AFL ablation),  .  PFO  HOME MEDS.   . levoxyl eliquis 2.5 x 2   COURSE.  . 8/20/2023 pulm consultd    . 8/20/2023 5:39 PM ER meds given already include cefepime 2g     PROBLEMS/ASSESSMENT/RECOMMENDATIONS (A/R).  PULMONARY.  . GAS EXCHANGE.  .. A/R.   .. Monitor pulse oximetry and target po 90-95%    . COPD.  .. 8/20/2023 duoneb.4p   .. 8/20/2023 benzonatate 100.3 x3d   .. 8/20/2023 gauiafenesin     INFECTION.  . COVID (+) 8/29/2023   .. LFTS 8/29-8/30  .. ap 86-99  .. ast   .. alt    .. 8/29/2023 rdsv   .. 8/29/2023 will add dexa if hypoxia develops     . E coli ESBL CTX M bacteremia 8/20  .. w 8/20-8/21-8/22-8/23-8/27-8/29-8/30/2023   .. w 16 - 21- 6.9 - 6 - 10 - 11.5 -11.8   .. pr 8/21/2023 pr 7.5   .. ua 8/20/2023 ua 1012 w tntc l estrase large r 25   .. ct chest 8/20/2023   .... l upper ant cardiac device leads terminating in r atrium and rv   .... no enlarged hilar or mediastinal ln   .... mild doe mod diffuse distention of mid to upper esophagus   .... cm   .... increased density of liver ? amiodarone effect   .... bl lower lobe partial areas of compressive atelectasis sl progressd since ctap 3/4/2023   .... mild bl lower lung areas of linear and compressive atelectasis lower lobes and lingula   .... mild bl upper lobe subsegmental linear and dependent atelectasis   .... nonsp mild interlobular septal thickening   .. MICRO  .. bc 8/20 E coli ESBL CTX M bacteremia 8/20   .. bc 8/21 e coli esbl    .. bc 8/22 (-)   .. legn 8/21 (-)   .. rvp 8/20 (-)   .. EVENTS   .. 8/20/2023 will start empiric levaquin   .. 8/21/2023 message sent to ID to change to carbapenem   .. ABIO  .. 8/21 ertapenem 8d dr green   .. AR   .. ESBL e coli bacteremia on 8/20 likely from urine on ertapenem stratd 8/21     . Hemodynamics.  .. la 8/20/2023 la 1.4  .. BP ok      . A fib   .. 8/20/2023 eliquis   .. 8/22/2023 amiodaron 200   .. 8/22/2023 metporolol 50.2     . CAD.  .. Tr 8/21/2023 Tr 31     . CHF   .. bnp 8/20-8/23/2023 bnp 4545 - 3219   .. echo 8/21/2023 ef 56% pasp 63   .. 8/22/2023 enalapril 10       HEMAT   .. Hb 8/20-8/21-8/23-8/29/2023 Hb 12 - 10.5 - 10 - 11.3   .. plt 8/20/2023 plt 225   .. inr 8/20/2023 inr 129     RENAL   .. Na 8/20/2023 Na 144   .. K 8/20/2023 K 3.3   .. co2 8/20/2023 co2 29   .. Cr 8/20/2023 Cr .8     . Dysphagia  .. esophagogram 8/22/2023 smooth tapering o distal esophagus     LFTS   .. AP 8/20/2023 124  .. ast 23  .. alt 25     MAIN ISSUES.  . COUGH x 2 wk poa 8/20/2023  . COVID (+) 8/29/2023 .. 8/29/2023 rdsv   . Pneumonia  .. ct chest 8/20/2023   .... distention of mid to upper esophagus   . E coli ESBL CTX M bacteremia 8/20 8/21.. bc 8/22 (-)   .. 8/20/2023 levaquin -> 8/21 ertapenem 1 x 8d  Dr Green completed   . COPD   . A fib .. 8/20/2023 apixaba   . CHF . bnp 8/20-8/23/2023 bnp 4545 - 3219   .. echo 8/21/2023 ef 56% pasp 63 .. 8/22/2023 enalapril 10   . Dysphagia.. esophagogram 8/22/2023 smooth tapering o distal esophagus    TIME SPENT.   . Over 36 minutes aggregate care time spent on encounter; activities included   direct patient care, counseling and/or coordinating care reviewing notes, lab data/ imaging , discussion with multidisciplinary team/ patient  /family and explaining in detail risks, benefits, alternatives  of the recommendations     JAKOB WORTHY 89 f 8/20/2023 9/22/1929 DR BAM HERRERA        REASON FOR VISIT  .. Management of problems listed below      PHYSICAL EXAM    HEENT Unremarkable  atraumatic   RESP Fair air entry  Harsh breath sound   CARDIAC S1 S2 No S3     NO JVD    ABDOMEN No hepatosplenomegaly   PEDAL EDEMA present No calf tenderness  NO rash       GENERAL DATA .     GOC.    .. 8/26/2023 dnr  ICU STAY.   .. none  COVID.   .. scv2 8/29/2023 (+)   .. scv2 8/21/2023 (-)    BEST PRACTICE ISSUES.    HOB ELEVATN.   .. Yes  DVT PPLX.   ..  8/20/2023 apixaba 2.5 x 2 (af)     SPEECH SWALLOW RECOMMENDATIONS.    ..    8/21/2023 soft bite     DIET.    ..  8/20/2023 soft bite size   IV fl.  .. 8/22/2023 D5 1/2 ns  50   STRESS ULCER PPLX.   ..    8/20/2023 protonix 40   INFECTION PPLX.   ..     ALLGY.  ..    pncl                     WT.  ..  8/20/2023 56  BMI.  ..   8/20/2023 21    PROCEDURES.  ..     ABGS.   .     VS/ PO/IO/ VENT/ DRIPS.   8/31/2023 afeb 90 140/70   8/31/2023 ra 94%     DOA C/C.     . 8/20/2023 · Chief Complaint Quote sent by [facility] for cough x 2 weeks. negative cxr and covid swab. poor PO intake today.           INITIAL PRESENTATION.   . 8/20/2023 89-year-old female with history of hypothyroidism, A-fib on Eliquis, recent pacemaker for complete heart block brought in by ambulance from [facility] assisted living home for cough for the past 2 weeks.  Associated with generalized weakness and decreased p.o. intake.  No fall or trauma.  Patient states she has had cold symptoms for a while.  Denies fever, chills, chest pain, shortness of breath, abdominal pain, nausea, vomiting, upper or lower extremity weakness or paresthesias.   pmd: Dr. Crawley, cards: [facility].   . hypothyroidism   . A-fib on Eliquis (hx AFL ablation),  .  PFO  HOME MEDS.   . levoxyl eliquis 2.5 x 2   COURSE.  . 8/20/2023 pulm consultd    . 8/20/2023 5:39 PM ER meds given already include cefepime 2g     PROBLEMS/ASSESSMENT/RECOMMENDATIONS (A/R).  PULMONARY.  . GAS EXCHANGE.  .. A/R.   .. Monitor pulse oximetry and target po 90-95%    . COPD.  .. 8/20/2023 duoneb.4p   .. 8/20/2023 benzonatate 100.3 x3d   .. 8/20/2023 gauiafenesin     INFECTION.  . COVID (+) 8/29/2023   .. LFTS 8/29-8/30  .. ap 86-99  .. ast   .. alt    .. 8/29/2023 rdsv   .. 8/29/2023 will add dexa if hypoxia develops     . E coli ESBL CTX M bacteremia 8/20  .. w 8/20-8/21-8/22-8/23-8/27-8/29-8/30/2023   .. w 16 - 21- 6.9 - 6 - 10 - 11.5 -11.8   .. pr 8/21/2023 pr 7.5   .. ua 8/20/2023 ua 1012 w tntc l estrase large r 25   .. ct chest 8/20/2023   .... l upper ant cardiac device leads terminating in r atrium and rv   .... no enlarged hilar or mediastinal ln   .... mild doe mod diffuse distention of mid to upper esophagus   .... cm   .... increased density of liver ? amiodarone effect   .... bl lower lobe partial areas of compressive atelectasis sl progressd since ctap 3/4/2023   .... mild bl lower lung areas of linear and compressive atelectasis lower lobes and lingula   .... mild bl upper lobe subsegmental linear and dependent atelectasis   .... nonsp mild interlobular septal thickening   .. MICRO  .. bc 8/20 E coli ESBL CTX M bacteremia 8/20   .. bc 8/21 e coli esbl    .. bc 8/22 (-)   .. legn 8/21 (-)   .. rvp 8/20 (-)   .. EVENTS   .. 8/20/2023 will start empiric levaquin   .. 8/21/2023 message sent to ID to change to carbapenem   .. ABIO  .. 8/21 ertapenem 8d dr green   .. AR   .. ESBL e coli bacteremia on 8/20 likely from urine on ertapenem stratd 8/21     . Hemodynamics.  .. la 8/20/2023 la 1.4  .. BP ok      . A fib   .. 8/20/2023 eliquis   .. 8/22/2023 amiodaron 200   .. 8/22/2023 metporolol 50.2     . CAD.  .. Tr 8/21/2023 Tr 31     . CHF   .. bnp 8/20-8/23/2023 bnp 4545 - 3219   .. echo 8/21/2023 ef 56% pasp 63   .. 8/22/2023 enalapril 10       HEMAT   .. Hb 8/20-8/21-8/23-8/29/2023 Hb 12 - 10.5 - 10 - 11.3   .. plt 8/20/2023 plt 225   .. inr 8/20/2023 inr 129     RENAL   .. Na 8/20/2023 Na 144   .. K 8/20/2023 K 3.3   .. co2 8/20/2023 co2 29   .. Cr 8/20/2023 Cr .8     . Dysphagia  .. esophagogram 8/22/2023 smooth tapering o distal esophagus     LFTS   .. AP 8/20/2023 124  .. ast 23  .. alt 25     MAIN ISSUES.  . COUGH x 2 wk poa 8/20/2023  . COVID (+) 8/29/2023 .. 8/29/2023 rdsv   . Pneumonia  .. ct chest 8/20/2023   .... distention of mid to upper esophagus   . E coli ESBL CTX M bacteremia 8/20 8/21.. bc 8/22 (-)   .. 8/20/2023 levaquin -> 8/21 ertapenem 1 x 8d  Dr Green completed   . COPD   . A fib .. 8/20/2023 apixaba   . CHF . bnp 8/20-8/23/2023 bnp 4545 - 3219   .. echo 8/21/2023 ef 56% pasp 63 .. 8/22/2023 enalapril 10   . Dysphagia.. esophagogram 8/22/2023 smooth tapering o distal esophagus    TIME SPENT.   . Over 36 minutes aggregate care time spent on encounter; activities included   direct patient care, counseling and/or coordinating care reviewing notes, lab data/ imaging , discussion with multidisciplinary team/ patient  /family and explaining in detail risks, benefits, alternatives  of the recommendations     JAKOB WORTHY 89 f 8/20/2023 9/22/1929 DR BAM HERRERA

## 2023-08-31 NOTE — PROGRESS NOTE ADULT - SUBJECTIVE AND OBJECTIVE BOX
Neurology follow up note    DEACON [id number]      Interval History:    Patient resting in bed     Allergies    penicillin (Unknown)    Intolerances        MEDICATIONS    acetaminophen     Tablet .. 650 milliGRAM(s) Oral every 6 hours PRN  albuterol/ipratropium for Nebulization 3 milliLiter(s) Nebulizer every 6 hours PRN  aluminum hydroxide/magnesium hydroxide/simethicone Suspension 30 milliLiter(s) Oral every 4 hours PRN  aMIOdarone    Tablet 200 milliGRAM(s) Oral daily  amLODIPine   Tablet 10 milliGRAM(s) Oral daily  apixaban 2.5 milliGRAM(s) Oral two times a day  artificial  tears Solution 1 Drop(s) Both EYES two times a day  dextrose 5% + sodium chloride 0.45%. 1000 milliLiter(s) IV Continuous <Continuous>  ferrous    sulfate 325 milliGRAM(s) Oral daily  guaiFENesin Oral Liquid (Sugar-Free) 200 milliGRAM(s) Oral every 6 hours PRN  lactobacillus acidophilus 1 Tablet(s) Oral every 12 hours  levothyroxine 75 MICROGram(s) Oral daily  melatonin 3 milliGRAM(s) Oral at bedtime PRN  metoprolol tartrate 50 milliGRAM(s) Oral two times a day  multivitamin/minerals 1 Tablet(s) Oral daily  ondansetron Injectable 4 milliGRAM(s) IV Push every 8 hours PRN  pantoprazole    Tablet 40 milliGRAM(s) Oral daily  polyethylene glycol 3350 17 Gram(s) Oral daily  remdesivir  IVPB 100 milliGRAM(s) IV Intermittent every 24 hours  remdesivir  IVPB   IV Intermittent               Vital Signs Last 24 Hrs  T(C): 36.7 (31 Aug 2023 05:04), Max: 36.8 (30 Aug 2023 13:07)  T(F): 98 (31 Aug 2023 05:04), Max: 98.2 (30 Aug 2023 13:07)  HR: 64 (31 Aug 2023 05:04) (64 - 86)  BP: 136/72 (31 Aug 2023 05:04) (129/63 - 136/72)  BP(mean): --  RR: 18 (31 Aug 2023 05:04) (18 - 18)  SpO2: 92% (31 Aug 2023 05:04) (91% - 98%)    Parameters below as of 31 Aug 2023 05:04  Patient On (Oxygen Delivery Method): room air      REVIEW OF SYSTEMS:  Very limited secondary to the patient has underlying cognitive impairment, confusion feels ok     PHYSICAL EXAMINATION: .  HEENT:  Head:  Normocephalic, atraumatic.  Eyes:  No scleral icterus.  Ears:  Hard to evaluate, but from pervious note was intact.  NECK:  Supple.  RESPIRATORY:  Decreased air entry bilaterally.  CARDIOVASCULAR:  S1 and S2 heard.  ABDOMEN:  Soft and nontender.  EXTREMITIES:  No clubbing or cyanosis was noted.     NEUROLOGIC:  The patient was arousable to verbal stimuli.  Location was hospital.  Year unknown.  The patient would keep her eyes closed, would not open her eyes, attempted to open her eyes, she would actually resist.  Speech:  The patient would articulate words, no dysarthria was noted, but would say irrelevant words.  Smile appeared to be symmetric when talking.  Motor:  Examination is limited secondary to the patient is uncooperative.  While attempting to evaluate bilateral upper extremities, the patient would actively resist, pull both arms down, would say 3+/5.  Left lower was 3/5, right was 3-/5, but does have a history of leg weakness to begin with.         LABS:  CBC Full  -  ( 30 Aug 2023 06:27 )  WBC Count : 11.89 K/uL  RBC Count : 4.22 M/uL  Hemoglobin : 12.3 g/dL  Hematocrit : 39.3 %  Platelet Count - Automated : 383 K/uL  Mean Cell Volume : 93.1 fl  Mean Cell Hemoglobin : 29.1 pg  Mean Cell Hemoglobin Concentration : 31.3 gm/dL  Auto Neutrophil # : 10.29 K/uL  Auto Lymphocyte # : 0.77 K/uL  Auto Monocyte # : 0.62 K/uL  Auto Eosinophil # : 0.02 K/uL  Auto Basophil # : 0.05 K/uL  Auto Neutrophil % : 86.5 %  Auto Lymphocyte % : 6.5 %  Auto Monocyte % : 5.2 %  Auto Eosinophil % : 0.2 %  Auto Basophil % : 0.4 %    Urinalysis Basic - ( 30 Aug 2023 06:27 )    Color: x / Appearance: x / SG: x / pH: x  Gluc: 101 mg/dL / Ketone: x  / Bili: x / Urobili: x   Blood: x / Protein: x / Nitrite: x   Leuk Esterase: x / RBC: x / WBC x   Sq Epi: x / Non Sq Epi: x / Bacteria: x      08-30    139  |  107  |  25<H>  ----------------------------<  101<H>  3.8   |  28  |  0.68    Ca    9.0      30 Aug 2023 06:27    TPro  6.7  /  Alb  2.6<L>  /  TBili  0.8  /  DBili  0.3  /  AST  158<H>  /  ALT  188<H>  /  AlkPhos  99  08-30    Hemoglobin A1C:     LIVER FUNCTIONS - ( 30 Aug 2023 06:27 )  Alb: 2.6 g/dL / Pro: 6.7 g/dL / ALK PHOS: 99 U/L / ALT: 188 U/L / AST: 158 U/L / GGT: x           Vitamin B12   PT/INR - ( 30 Aug 2023 06:27 )   PT: 14.9 sec;   INR: 1.28 ratio               RADIOLOGY    ANALYSIS AND PLAN:  A 89-year-old with episode of altered mental status.  For episode of altered mental status, I suspect most likely metabolic encephalopathy secondary to underlying infectious-type process, possibly urinary tract infection, also with positive blood cultures along with dementia, mild cognitive impairment becoming more prominent in hospital setting.  Examination is severely limited to evaluate for new cerebrovascular accident.  We will recommend antibiotics as needed.  Sepsis workup as needed.  For history of underlying mild cognitive impairment versus subtle dementia, appears to be somewhat progressive, would recommend supportive therapy for now.  For history of atrial fibrillation, risks versus benefits of any type of blood-thinning medications, Cardiology medical followup.  For hypertension, monitor systolic blood pressure.  For hypothyroidism, continue the patient on Synthroid.  wax and weaning of mental status most probably h/o infection hospital setting  along with possible COVID    spoke to daughter, Beth, at [phone] 8/30     45 minutes of time was spent with the patient, plan of care, reviewing data, with greater than 50% of the visit was spent counseling and/or coordinating care with multidisciplinary healthcare team     Neurology follow up note    DEACON [id number]      Interval History:    Patient resting in bed     Allergies    penicillin (Unknown)    Intolerances        MEDICATIONS    acetaminophen     Tablet .. 650 milliGRAM(s) Oral every 6 hours PRN  albuterol/ipratropium for Nebulization 3 milliLiter(s) Nebulizer every 6 hours PRN  aluminum hydroxide/magnesium hydroxide/simethicone Suspension 30 milliLiter(s) Oral every 4 hours PRN  aMIOdarone    Tablet 200 milliGRAM(s) Oral daily  amLODIPine   Tablet 10 milliGRAM(s) Oral daily  apixaban 2.5 milliGRAM(s) Oral two times a day  artificial  tears Solution 1 Drop(s) Both EYES two times a day  dextrose 5% + sodium chloride 0.45%. 1000 milliLiter(s) IV Continuous <Continuous>  ferrous    sulfate 325 milliGRAM(s) Oral daily  guaiFENesin Oral Liquid (Sugar-Free) 200 milliGRAM(s) Oral every 6 hours PRN  lactobacillus acidophilus 1 Tablet(s) Oral every 12 hours  levothyroxine 75 MICROGram(s) Oral daily  melatonin 3 milliGRAM(s) Oral at bedtime PRN  metoprolol tartrate 50 milliGRAM(s) Oral two times a day  multivitamin/minerals 1 Tablet(s) Oral daily  ondansetron Injectable 4 milliGRAM(s) IV Push every 8 hours PRN  pantoprazole    Tablet 40 milliGRAM(s) Oral daily  polyethylene glycol 3350 17 Gram(s) Oral daily  remdesivir  IVPB 100 milliGRAM(s) IV Intermittent every 24 hours  remdesivir  IVPB   IV Intermittent               Vital Signs Last 24 Hrs  T(C): 36.7 (31 Aug 2023 05:04), Max: 36.8 (30 Aug 2023 13:07)  T(F): 98 (31 Aug 2023 05:04), Max: 98.2 (30 Aug 2023 13:07)  HR: 64 (31 Aug 2023 05:04) (64 - 86)  BP: 136/72 (31 Aug 2023 05:04) (129/63 - 136/72)  BP(mean): --  RR: 18 (31 Aug 2023 05:04) (18 - 18)  SpO2: 92% (31 Aug 2023 05:04) (91% - 98%)    Parameters below as of 31 Aug 2023 05:04  Patient On (Oxygen Delivery Method): room air      REVIEW OF SYSTEMS:  Very limited secondary to the patient has underlying cognitive impairment, confusion feels ok     PHYSICAL EXAMINATION: .  HEENT:  Head:  Normocephalic, atraumatic.  Eyes:  No scleral icterus.  Ears:  Hard to evaluate, but from pervious note was intact.  NECK:  Supple.  RESPIRATORY:  Decreased air entry bilaterally.  CARDIOVASCULAR:  S1 and S2 heard.  ABDOMEN:  Soft and nontender.  EXTREMITIES:  No clubbing or cyanosis was noted.     NEUROLOGIC:  The patient was arousable to verbal stimuli.  Location was hospital.  Year unknown.  The patient would keep her eyes closed, would not open her eyes, attempted to open her eyes, she would actually resist.  Speech:  The patient would articulate words, no dysarthria was noted, but would say irrelevant words.  Smile appeared to be symmetric when talking.  Motor:  Examination is limited secondary to the patient is uncooperative.  While attempting to evaluate bilateral upper extremities, the patient would actively resist, pull both arms down, would say 3+/5.  Left lower was 3/5, right was 3-/5, but does have a history of leg weakness to begin with.         LABS:  CBC Full  -  ( 30 Aug 2023 06:27 )  WBC Count : 11.89 K/uL  RBC Count : 4.22 M/uL  Hemoglobin : 12.3 g/dL  Hematocrit : 39.3 %  Platelet Count - Automated : 383 K/uL  Mean Cell Volume : 93.1 fl  Mean Cell Hemoglobin : 29.1 pg  Mean Cell Hemoglobin Concentration : 31.3 gm/dL  Auto Neutrophil # : 10.29 K/uL  Auto Lymphocyte # : 0.77 K/uL  Auto Monocyte # : 0.62 K/uL  Auto Eosinophil # : 0.02 K/uL  Auto Basophil # : 0.05 K/uL  Auto Neutrophil % : 86.5 %  Auto Lymphocyte % : 6.5 %  Auto Monocyte % : 5.2 %  Auto Eosinophil % : 0.2 %  Auto Basophil % : 0.4 %    Urinalysis Basic - ( 30 Aug 2023 06:27 )    Color: x / Appearance: x / SG: x / pH: x  Gluc: 101 mg/dL / Ketone: x  / Bili: x / Urobili: x   Blood: x / Protein: x / Nitrite: x   Leuk Esterase: x / RBC: x / WBC x   Sq Epi: x / Non Sq Epi: x / Bacteria: x      08-30    139  |  107  |  25<H>  ----------------------------<  101<H>  3.8   |  28  |  0.68    Ca    9.0      30 Aug 2023 06:27    TPro  6.7  /  Alb  2.6<L>  /  TBili  0.8  /  DBili  0.3  /  AST  158<H>  /  ALT  188<H>  /  AlkPhos  99  08-30    Hemoglobin A1C:     LIVER FUNCTIONS - ( 30 Aug 2023 06:27 )  Alb: 2.6 g/dL / Pro: 6.7 g/dL / ALK PHOS: 99 U/L / ALT: 188 U/L / AST: 158 U/L / GGT: x           Vitamin B12   PT/INR - ( 30 Aug 2023 06:27 )   PT: 14.9 sec;   INR: 1.28 ratio               RADIOLOGY    ANALYSIS AND PLAN:  A 89-year-old with episode of altered mental status.  For episode of altered mental status, I suspect most likely metabolic encephalopathy secondary to underlying infectious-type process, possibly urinary tract infection, also with positive blood cultures along with dementia, mild cognitive impairment becoming more prominent in hospital setting.  Examination is severely limited to evaluate for new cerebrovascular accident.  We will recommend antibiotics as needed.  Sepsis workup as needed.  For history of underlying mild cognitive impairment versus subtle dementia, appears to be somewhat progressive, would recommend supportive therapy for now.  For history of atrial fibrillation, risks versus benefits of any type of blood-thinning medications, Cardiology medical followup.  For hypertension, monitor systolic blood pressure.  For hypothyroidism, continue the patient on Synthroid.  wax and weaning of mental status most probably h/o infection hospital setting  along with possible COVID    spoke to daughter, Beth, at [phone] 8/30     45 minutes of time was spent with the patient, plan of care, reviewing data, with greater than 50% of the visit was spent counseling and/or coordinating care with multidisciplinary healthcare team     Neurology follow up note    DEACON [id number]      Interval History:    Patient resting in bed     Allergies    penicillin (Unknown)    Intolerances        MEDICATIONS    acetaminophen     Tablet .. 650 milliGRAM(s) Oral every 6 hours PRN  albuterol/ipratropium for Nebulization 3 milliLiter(s) Nebulizer every 6 hours PRN  aluminum hydroxide/magnesium hydroxide/simethicone Suspension 30 milliLiter(s) Oral every 4 hours PRN  aMIOdarone    Tablet 200 milliGRAM(s) Oral daily  amLODIPine   Tablet 10 milliGRAM(s) Oral daily  apixaban 2.5 milliGRAM(s) Oral two times a day  artificial  tears Solution 1 Drop(s) Both EYES two times a day  dextrose 5% + sodium chloride 0.45%. 1000 milliLiter(s) IV Continuous <Continuous>  ferrous    sulfate 325 milliGRAM(s) Oral daily  guaiFENesin Oral Liquid (Sugar-Free) 200 milliGRAM(s) Oral every 6 hours PRN  lactobacillus acidophilus 1 Tablet(s) Oral every 12 hours  levothyroxine 75 MICROGram(s) Oral daily  melatonin 3 milliGRAM(s) Oral at bedtime PRN  metoprolol tartrate 50 milliGRAM(s) Oral two times a day  multivitamin/minerals 1 Tablet(s) Oral daily  ondansetron Injectable 4 milliGRAM(s) IV Push every 8 hours PRN  pantoprazole    Tablet 40 milliGRAM(s) Oral daily  polyethylene glycol 3350 17 Gram(s) Oral daily  remdesivir  IVPB 100 milliGRAM(s) IV Intermittent every 24 hours  remdesivir  IVPB   IV Intermittent               Vital Signs Last 24 Hrs  T(C): 36.7 (31 Aug 2023 05:04), Max: 36.8 (30 Aug 2023 13:07)  T(F): 98 (31 Aug 2023 05:04), Max: 98.2 (30 Aug 2023 13:07)  HR: 64 (31 Aug 2023 05:04) (64 - 86)  BP: 136/72 (31 Aug 2023 05:04) (129/63 - 136/72)  BP(mean): --  RR: 18 (31 Aug 2023 05:04) (18 - 18)  SpO2: 92% (31 Aug 2023 05:04) (91% - 98%)    Parameters below as of 31 Aug 2023 05:04  Patient On (Oxygen Delivery Method): room air      REVIEW OF SYSTEMS:  Very limited secondary to the patient has underlying cognitive impairment, confusion feels ok     PHYSICAL EXAMINATION: .  HEENT:  Head:  Normocephalic, atraumatic.  Eyes:  No scleral icterus.  Ears:  Hard to evaluate, but from pervious note was intact.  NECK:  Supple.  RESPIRATORY:  Decreased air entry bilaterally.  CARDIOVASCULAR:  S1 and S2 heard.  ABDOMEN:  Soft and nontender.  EXTREMITIES:  No clubbing or cyanosis was noted.     NEUROLOGIC:  The patient was arousable to verbal stimuli.  Location was hospital.  Year unknown.  The patient would keep her eyes closed, would not open her eyes, attempted to open her eyes, she would actually resist.  Speech:  The patient would articulate words, no dysarthria was noted, but would say irrelevant words.  Smile appeared to be symmetric when talking.  Motor:  Examination is limited secondary to the patient is uncooperative.  While attempting to evaluate bilateral upper extremities, the patient would actively resist, pull both arms down, would say 3+/5.  Left lower was 3/5, right was 3-/5, but does have a history of leg weakness to begin with.         LABS:  CBC Full  -  ( 30 Aug 2023 06:27 )  WBC Count : 11.89 K/uL  RBC Count : 4.22 M/uL  Hemoglobin : 12.3 g/dL  Hematocrit : 39.3 %  Platelet Count - Automated : 383 K/uL  Mean Cell Volume : 93.1 fl  Mean Cell Hemoglobin : 29.1 pg  Mean Cell Hemoglobin Concentration : 31.3 gm/dL  Auto Neutrophil # : 10.29 K/uL  Auto Lymphocyte # : 0.77 K/uL  Auto Monocyte # : 0.62 K/uL  Auto Eosinophil # : 0.02 K/uL  Auto Basophil # : 0.05 K/uL  Auto Neutrophil % : 86.5 %  Auto Lymphocyte % : 6.5 %  Auto Monocyte % : 5.2 %  Auto Eosinophil % : 0.2 %  Auto Basophil % : 0.4 %    Urinalysis Basic - ( 30 Aug 2023 06:27 )    Color: x / Appearance: x / SG: x / pH: x  Gluc: 101 mg/dL / Ketone: x  / Bili: x / Urobili: x   Blood: x / Protein: x / Nitrite: x   Leuk Esterase: x / RBC: x / WBC x   Sq Epi: x / Non Sq Epi: x / Bacteria: x      08-30    139  |  107  |  25<H>  ----------------------------<  101<H>  3.8   |  28  |  0.68    Ca    9.0      30 Aug 2023 06:27    TPro  6.7  /  Alb  2.6<L>  /  TBili  0.8  /  DBili  0.3  /  AST  158<H>  /  ALT  188<H>  /  AlkPhos  99  08-30    Hemoglobin A1C:     LIVER FUNCTIONS - ( 30 Aug 2023 06:27 )  Alb: 2.6 g/dL / Pro: 6.7 g/dL / ALK PHOS: 99 U/L / ALT: 188 U/L / AST: 158 U/L / GGT: x           Vitamin B12   PT/INR - ( 30 Aug 2023 06:27 )   PT: 14.9 sec;   INR: 1.28 ratio               RADIOLOGY    ANALYSIS AND PLAN:  A 89-year-old with episode of altered mental status.  For episode of altered mental status, I suspect most likely metabolic encephalopathy secondary to underlying infectious-type process, possibly urinary tract infection, also with positive blood cultures along with dementia, mild cognitive impairment becoming more prominent in hospital setting.  Examination is severely limited to evaluate for new cerebrovascular accident.  We will recommend antibiotics as needed.  Sepsis workup as needed.  For history of underlying mild cognitive impairment versus subtle dementia, appears to be somewhat progressive, would recommend supportive therapy for now.  For history of atrial fibrillation, risks versus benefits of any type of blood-thinning medications, Cardiology medical followup.  For hypertension, monitor systolic blood pressure.  For hypothyroidism, continue the patient on Synthroid.  wax and weaning of mental status most probably h/o infection hospital setting  along with possible COVID    spoke to daughter, Beth, at [phone] 8/30     45 minutes of time was spent with the patient, plan of care, reviewing data, with greater than 50% of the visit was spent counseling and/or coordinating care with multidisciplinary healthcare team     Neurology follow up note    DEACON [id number]      Interval History:    Patient awake   in bed     Allergies    penicillin (Unknown)    Intolerances        MEDICATIONS    acetaminophen     Tablet .. 650 milliGRAM(s) Oral every 6 hours PRN  albuterol/ipratropium for Nebulization 3 milliLiter(s) Nebulizer every 6 hours PRN  aluminum hydroxide/magnesium hydroxide/simethicone Suspension 30 milliLiter(s) Oral every 4 hours PRN  aMIOdarone    Tablet 200 milliGRAM(s) Oral daily  amLODIPine   Tablet 10 milliGRAM(s) Oral daily  apixaban 2.5 milliGRAM(s) Oral two times a day  artificial  tears Solution 1 Drop(s) Both EYES two times a day  dextrose 5% + sodium chloride 0.45%. 1000 milliLiter(s) IV Continuous <Continuous>  ferrous    sulfate 325 milliGRAM(s) Oral daily  guaiFENesin Oral Liquid (Sugar-Free) 200 milliGRAM(s) Oral every 6 hours PRN  lactobacillus acidophilus 1 Tablet(s) Oral every 12 hours  levothyroxine 75 MICROGram(s) Oral daily  melatonin 3 milliGRAM(s) Oral at bedtime PRN  metoprolol tartrate 50 milliGRAM(s) Oral two times a day  multivitamin/minerals 1 Tablet(s) Oral daily  ondansetron Injectable 4 milliGRAM(s) IV Push every 8 hours PRN  pantoprazole    Tablet 40 milliGRAM(s) Oral daily  polyethylene glycol 3350 17 Gram(s) Oral daily  remdesivir  IVPB 100 milliGRAM(s) IV Intermittent every 24 hours  remdesivir  IVPB   IV Intermittent               Vital Signs Last 24 Hrs  T(C): 36.7 (31 Aug 2023 05:04), Max: 36.8 (30 Aug 2023 13:07)  T(F): 98 (31 Aug 2023 05:04), Max: 98.2 (30 Aug 2023 13:07)  HR: 64 (31 Aug 2023 05:04) (64 - 86)  BP: 136/72 (31 Aug 2023 05:04) (129/63 - 136/72)  BP(mean): --  RR: 18 (31 Aug 2023 05:04) (18 - 18)  SpO2: 92% (31 Aug 2023 05:04) (91% - 98%)    Parameters below as of 31 Aug 2023 05:04  Patient On (Oxygen Delivery Method): room air      REVIEW OF SYSTEMS:  Very limited secondary to the patient has underlying cognitive impairment, confusion feels ok     PHYSICAL EXAMINATION: .  HEENT:  Head:  Normocephalic, atraumatic.  Eyes:  No scleral icterus.  Ears:  Hard to evaluate, but from pervious note was intact.  NECK:  Supple.  RESPIRATORY:  Decreased air entry bilaterally.  CARDIOVASCULAR:  S1 and S2 heard.  ABDOMEN:  Soft and nontender.  EXTREMITIES:  No clubbing or cyanosis was noted.       NEUROLOGIC:  The patient is awake and alert.  Location was hospital.  tells dtr name  Was able to name simple objects.    Speech was fluent.  Smile symmetric.  Motor:  Bilateral upper 3/5, left lower 3-/5, right lower 2/5, but does have a history of right leg weakness as per my conversation with daughter, history of falls, bed bound, and hip revision.      LABS:  CBC Full  -  ( 30 Aug 2023 06:27 )  WBC Count : 11.89 K/uL  RBC Count : 4.22 M/uL  Hemoglobin : 12.3 g/dL  Hematocrit : 39.3 %  Platelet Count - Automated : 383 K/uL  Mean Cell Volume : 93.1 fl  Mean Cell Hemoglobin : 29.1 pg  Mean Cell Hemoglobin Concentration : 31.3 gm/dL  Auto Neutrophil # : 10.29 K/uL  Auto Lymphocyte # : 0.77 K/uL  Auto Monocyte # : 0.62 K/uL  Auto Eosinophil # : 0.02 K/uL  Auto Basophil # : 0.05 K/uL  Auto Neutrophil % : 86.5 %  Auto Lymphocyte % : 6.5 %  Auto Monocyte % : 5.2 %  Auto Eosinophil % : 0.2 %  Auto Basophil % : 0.4 %    Urinalysis Basic - ( 30 Aug 2023 06:27 )    Color: x / Appearance: x / SG: x / pH: x  Gluc: 101 mg/dL / Ketone: x  / Bili: x / Urobili: x   Blood: x / Protein: x / Nitrite: x   Leuk Esterase: x / RBC: x / WBC x   Sq Epi: x / Non Sq Epi: x / Bacteria: x      08-30    139  |  107  |  25<H>  ----------------------------<  101<H>  3.8   |  28  |  0.68    Ca    9.0      30 Aug 2023 06:27    TPro  6.7  /  Alb  2.6<L>  /  TBili  0.8  /  DBili  0.3  /  AST  158<H>  /  ALT  188<H>  /  AlkPhos  99  08-30    Hemoglobin A1C:     LIVER FUNCTIONS - ( 30 Aug 2023 06:27 )  Alb: 2.6 g/dL / Pro: 6.7 g/dL / ALK PHOS: 99 U/L / ALT: 188 U/L / AST: 158 U/L / GGT: x           Vitamin B12   PT/INR - ( 30 Aug 2023 06:27 )   PT: 14.9 sec;   INR: 1.28 ratio               RADIOLOGY    ANALYSIS AND PLAN:  A 89-year-old with episode of altered mental status.  For episode of altered mental status, I suspect most likely metabolic encephalopathy secondary to underlying infectious-type process, possibly urinary tract infection, also with positive blood cultures along with dementia, mild cognitive impairment becoming more prominent in hospital setting.  Examination is severely limited to evaluate for new cerebrovascular accident.  We will recommend antibiotics as needed.  Sepsis workup as needed.  For history of underlying mild cognitive impairment versus subtle dementia, appears to be somewhat progressive, would recommend supportive therapy for now.  For history of atrial fibrillation, risks versus benefits of any type of blood-thinning medications, Cardiology medical followup.  For hypertension, monitor systolic blood pressure.  For hypothyroidism, continue the patient on Synthroid.  wax and weaning of mental status most probably h/o infection hospital setting  along with possible COVID  more awake and interactive today 8/31     spoke to daughter, Beth, at [phone] 8/31     45 minutes of time was spent with the patient, plan of care, reviewing data, with greater than 50% of the visit was spent counseling and/or coordinating care with multidisciplinary healthcare team     Neurology follow up note    DEACON [id number]      Interval History:    Patient awake   in bed     Allergies    penicillin (Unknown)    Intolerances        MEDICATIONS    acetaminophen     Tablet .. 650 milliGRAM(s) Oral every 6 hours PRN  albuterol/ipratropium for Nebulization 3 milliLiter(s) Nebulizer every 6 hours PRN  aluminum hydroxide/magnesium hydroxide/simethicone Suspension 30 milliLiter(s) Oral every 4 hours PRN  aMIOdarone    Tablet 200 milliGRAM(s) Oral daily  amLODIPine   Tablet 10 milliGRAM(s) Oral daily  apixaban 2.5 milliGRAM(s) Oral two times a day  artificial  tears Solution 1 Drop(s) Both EYES two times a day  dextrose 5% + sodium chloride 0.45%. 1000 milliLiter(s) IV Continuous <Continuous>  ferrous    sulfate 325 milliGRAM(s) Oral daily  guaiFENesin Oral Liquid (Sugar-Free) 200 milliGRAM(s) Oral every 6 hours PRN  lactobacillus acidophilus 1 Tablet(s) Oral every 12 hours  levothyroxine 75 MICROGram(s) Oral daily  melatonin 3 milliGRAM(s) Oral at bedtime PRN  metoprolol tartrate 50 milliGRAM(s) Oral two times a day  multivitamin/minerals 1 Tablet(s) Oral daily  ondansetron Injectable 4 milliGRAM(s) IV Push every 8 hours PRN  pantoprazole    Tablet 40 milliGRAM(s) Oral daily  polyethylene glycol 3350 17 Gram(s) Oral daily  remdesivir  IVPB 100 milliGRAM(s) IV Intermittent every 24 hours  remdesivir  IVPB   IV Intermittent               Vital Signs Last 24 Hrs  T(C): 36.7 (31 Aug 2023 05:04), Max: 36.8 (30 Aug 2023 13:07)  T(F): 98 (31 Aug 2023 05:04), Max: 98.2 (30 Aug 2023 13:07)  HR: 64 (31 Aug 2023 05:04) (64 - 86)  BP: 136/72 (31 Aug 2023 05:04) (129/63 - 136/72)  BP(mean): --  RR: 18 (31 Aug 2023 05:04) (18 - 18)  SpO2: 92% (31 Aug 2023 05:04) (91% - 98%)    Parameters below as of 31 Aug 2023 05:04  Patient On (Oxygen Delivery Method): room air      REVIEW OF SYSTEMS:  Very limited secondary to the patient has underlying cognitive impairment, confusion feels ok     PHYSICAL EXAMINATION: .  HEENT:  Head:  Normocephalic, atraumatic.  Eyes:  No scleral icterus.  Ears:  Hard to evaluate, but from pervious note was intact.  NECK:  Supple.  RESPIRATORY:  Decreased air entry bilaterally.  CARDIOVASCULAR:  S1 and S2 heard.  ABDOMEN:  Soft and nontender.  EXTREMITIES:  No clubbing or cyanosis was noted.       NEUROLOGIC:  The patient is awake and alert.  Location was hospital.  tells dtr name  Was able to name simple objects.    Speech was fluent.  Smile symmetric.  Motor:  Bilateral upper 3/5, left lower 3-/5, right lower 2/5, but does have a history of right leg weakness as per my conversation with daughter, history of falls, bed bound, and hip revision.      LABS:  CBC Full  -  ( 30 Aug 2023 06:27 )  WBC Count : 11.89 K/uL  RBC Count : 4.22 M/uL  Hemoglobin : 12.3 g/dL  Hematocrit : 39.3 %  Platelet Count - Automated : 383 K/uL  Mean Cell Volume : 93.1 fl  Mean Cell Hemoglobin : 29.1 pg  Mean Cell Hemoglobin Concentration : 31.3 gm/dL  Auto Neutrophil # : 10.29 K/uL  Auto Lymphocyte # : 0.77 K/uL  Auto Monocyte # : 0.62 K/uL  Auto Eosinophil # : 0.02 K/uL  Auto Basophil # : 0.05 K/uL  Auto Neutrophil % : 86.5 %  Auto Lymphocyte % : 6.5 %  Auto Monocyte % : 5.2 %  Auto Eosinophil % : 0.2 %  Auto Basophil % : 0.4 %    Urinalysis Basic - ( 30 Aug 2023 06:27 )    Color: x / Appearance: x / SG: x / pH: x  Gluc: 101 mg/dL / Ketone: x  / Bili: x / Urobili: x   Blood: x / Protein: x / Nitrite: x   Leuk Esterase: x / RBC: x / WBC x   Sq Epi: x / Non Sq Epi: x / Bacteria: x      08-30    139  |  107  |  25<H>  ----------------------------<  101<H>  3.8   |  28  |  0.68    Ca    9.0      30 Aug 2023 06:27    TPro  6.7  /  Alb  2.6<L>  /  TBili  0.8  /  DBili  0.3  /  AST  158<H>  /  ALT  188<H>  /  AlkPhos  99  08-30    Hemoglobin A1C:     LIVER FUNCTIONS - ( 30 Aug 2023 06:27 )  Alb: 2.6 g/dL / Pro: 6.7 g/dL / ALK PHOS: 99 U/L / ALT: 188 U/L / AST: 158 U/L / GGT: x           Vitamin B12   PT/INR - ( 30 Aug 2023 06:27 )   PT: 14.9 sec;   INR: 1.28 ratio               RADIOLOGY    ANALYSIS AND PLAN:  A 89-year-old with episode of altered mental status.  For episode of altered mental status, I suspect most likely metabolic encephalopathy secondary to underlying infectious-type process, possibly urinary tract infection, also with positive blood cultures along with dementia, mild cognitive impairment becoming more prominent in hospital setting.  Examination is severely limited to evaluate for new cerebrovascular accident.  We will recommend antibiotics as needed.  Sepsis workup as needed.  For history of underlying mild cognitive impairment versus subtle dementia, appears to be somewhat progressive, would recommend supportive therapy for now.  For history of atrial fibrillation, risks versus benefits of any type of blood-thinning medications, Cardiology medical followup.  For hypertension, monitor systolic blood pressure.  For hypothyroidism, continue the patient on Synthroid.  wax and weaning of mental status most probably h/o infection hospital setting  along with possible COVID  more awake and interactive today 8/31     spoke to daughter, Beth, at [phone] 8/31     45 minutes of time was spent with the patient, plan of care, reviewing data, with greater than 50% of the visit was spent counseling and/or coordinating care with multidisciplinary healthcare team     Neurology follow up note    DEACON [id number]      Interval History:    Patient awake   in bed     Allergies    penicillin (Unknown)    Intolerances        MEDICATIONS    acetaminophen     Tablet .. 650 milliGRAM(s) Oral every 6 hours PRN  albuterol/ipratropium for Nebulization 3 milliLiter(s) Nebulizer every 6 hours PRN  aluminum hydroxide/magnesium hydroxide/simethicone Suspension 30 milliLiter(s) Oral every 4 hours PRN  aMIOdarone    Tablet 200 milliGRAM(s) Oral daily  amLODIPine   Tablet 10 milliGRAM(s) Oral daily  apixaban 2.5 milliGRAM(s) Oral two times a day  artificial  tears Solution 1 Drop(s) Both EYES two times a day  dextrose 5% + sodium chloride 0.45%. 1000 milliLiter(s) IV Continuous <Continuous>  ferrous    sulfate 325 milliGRAM(s) Oral daily  guaiFENesin Oral Liquid (Sugar-Free) 200 milliGRAM(s) Oral every 6 hours PRN  lactobacillus acidophilus 1 Tablet(s) Oral every 12 hours  levothyroxine 75 MICROGram(s) Oral daily  melatonin 3 milliGRAM(s) Oral at bedtime PRN  metoprolol tartrate 50 milliGRAM(s) Oral two times a day  multivitamin/minerals 1 Tablet(s) Oral daily  ondansetron Injectable 4 milliGRAM(s) IV Push every 8 hours PRN  pantoprazole    Tablet 40 milliGRAM(s) Oral daily  polyethylene glycol 3350 17 Gram(s) Oral daily  remdesivir  IVPB 100 milliGRAM(s) IV Intermittent every 24 hours  remdesivir  IVPB   IV Intermittent               Vital Signs Last 24 Hrs  T(C): 36.7 (31 Aug 2023 05:04), Max: 36.8 (30 Aug 2023 13:07)  T(F): 98 (31 Aug 2023 05:04), Max: 98.2 (30 Aug 2023 13:07)  HR: 64 (31 Aug 2023 05:04) (64 - 86)  BP: 136/72 (31 Aug 2023 05:04) (129/63 - 136/72)  BP(mean): --  RR: 18 (31 Aug 2023 05:04) (18 - 18)  SpO2: 92% (31 Aug 2023 05:04) (91% - 98%)    Parameters below as of 31 Aug 2023 05:04  Patient On (Oxygen Delivery Method): room air      REVIEW OF SYSTEMS:  Very limited secondary to the patient has underlying cognitive impairment, confusion feels ok     PHYSICAL EXAMINATION: .  HEENT:  Head:  Normocephalic, atraumatic.  Eyes:  No scleral icterus.  Ears:  Hard to evaluate, but from pervious note was intact.  NECK:  Supple.  RESPIRATORY:  Decreased air entry bilaterally.  CARDIOVASCULAR:  S1 and S2 heard.  ABDOMEN:  Soft and nontender.  EXTREMITIES:  No clubbing or cyanosis was noted.       NEUROLOGIC:  The patient is awake and alert.  Location was hospital.  tells dtr name  Was able to name simple objects.    Speech was fluent.  Smile symmetric.  Motor:  Bilateral upper 3/5, left lower 3-/5, right lower 2/5, but does have a history of right leg weakness as per my conversation with daughter, history of falls, bed bound, and hip revision.      LABS:  CBC Full  -  ( 30 Aug 2023 06:27 )  WBC Count : 11.89 K/uL  RBC Count : 4.22 M/uL  Hemoglobin : 12.3 g/dL  Hematocrit : 39.3 %  Platelet Count - Automated : 383 K/uL  Mean Cell Volume : 93.1 fl  Mean Cell Hemoglobin : 29.1 pg  Mean Cell Hemoglobin Concentration : 31.3 gm/dL  Auto Neutrophil # : 10.29 K/uL  Auto Lymphocyte # : 0.77 K/uL  Auto Monocyte # : 0.62 K/uL  Auto Eosinophil # : 0.02 K/uL  Auto Basophil # : 0.05 K/uL  Auto Neutrophil % : 86.5 %  Auto Lymphocyte % : 6.5 %  Auto Monocyte % : 5.2 %  Auto Eosinophil % : 0.2 %  Auto Basophil % : 0.4 %    Urinalysis Basic - ( 30 Aug 2023 06:27 )    Color: x / Appearance: x / SG: x / pH: x  Gluc: 101 mg/dL / Ketone: x  / Bili: x / Urobili: x   Blood: x / Protein: x / Nitrite: x   Leuk Esterase: x / RBC: x / WBC x   Sq Epi: x / Non Sq Epi: x / Bacteria: x      08-30    139  |  107  |  25<H>  ----------------------------<  101<H>  3.8   |  28  |  0.68    Ca    9.0      30 Aug 2023 06:27    TPro  6.7  /  Alb  2.6<L>  /  TBili  0.8  /  DBili  0.3  /  AST  158<H>  /  ALT  188<H>  /  AlkPhos  99  08-30    Hemoglobin A1C:     LIVER FUNCTIONS - ( 30 Aug 2023 06:27 )  Alb: 2.6 g/dL / Pro: 6.7 g/dL / ALK PHOS: 99 U/L / ALT: 188 U/L / AST: 158 U/L / GGT: x           Vitamin B12   PT/INR - ( 30 Aug 2023 06:27 )   PT: 14.9 sec;   INR: 1.28 ratio               RADIOLOGY    ANALYSIS AND PLAN:  A 89-year-old with episode of altered mental status.  For episode of altered mental status, I suspect most likely metabolic encephalopathy secondary to underlying infectious-type process, possibly urinary tract infection, also with positive blood cultures along with dementia, mild cognitive impairment becoming more prominent in hospital setting.  Examination is severely limited to evaluate for new cerebrovascular accident.  We will recommend antibiotics as needed.  Sepsis workup as needed.  For history of underlying mild cognitive impairment versus subtle dementia, appears to be somewhat progressive, would recommend supportive therapy for now.  For history of atrial fibrillation, risks versus benefits of any type of blood-thinning medications, Cardiology medical followup.  For hypertension, monitor systolic blood pressure.  For hypothyroidism, continue the patient on Synthroid.  wax and weaning of mental status most probably h/o infection hospital setting  along with possible COVID  more awake and interactive today 8/31     spoke to daughter, Beth, at [phone] 8/31     45 minutes of time was spent with the patient, plan of care, reviewing data, with greater than 50% of the visit was spent counseling and/or coordinating care with multidisciplinary healthcare team

## 2023-08-31 NOTE — CHART NOTE - NSCHARTNOTEFT_GEN_A_CORE
Xray Hand 2 Views, - Right Age-indeterminate triquetrum fracture on dorsum of wrist.   Spoke to the ortho team and requested consult 08/30  they state it is hand ,not wrist fracture and has to be seen by hand surgeon .Spoke to hand sx on call Dr Sparks today  , he advised to place wrist splint ( since it is wrist fx ) and discharge the patient with followup in his office on Monday for cast placement .Ortho team is informed that patient requires cast and likely it would be easier for the patient wrist cast to be placed here prior to discharge since it is not clear if assisted living can bring the patient for Monday appointment .Discussed with case management .

## 2023-08-31 NOTE — PROGRESS NOTE ADULT - ASSESSMENT
89-year-old female with history of hypothyroidism, A-fib on Eliquis, s/p recent pacemaker for complete heart block brought in by ambulance from [facility] assisted living home for cough for the past 2 weeks.  Associated with generalized weakness and decreased p.o. intake.  No fall or trauma.  Patient states she has had cold symptoms for a while.  Denies fever, chills, chest pain, shortness of breath, abdominal pain, nausea, vomiting, upper or lower extremity weakness or paresthesias. pmd: Dr. Crawley, cards: [facility] Seen by card Dr Reich Admitted  to telemetry unit for monitoring , send 3 sets of cardiac enzymes to rule out acute coronary event, obtain ECHO to evaluate LVEF, cardiology consult  ,continue current management, O2 supply, anticoagulation plan as per cardiology consult Pulm cons requested , antitussives and nebulized bd ordered .Also UTI suspected and started on iv abx , id cons called Palliative care consult requested ,to discuss advance directives and complete MOLST  89-year-old female with history of hypothyroidism, A-fib on Eliquis, s/p recent pacemaker for complete heart block brought in by ambulance from [facility] assisted living home for cough for the past 2 weeks.  Associated with generalized weakness and decreased p.o. intake.  No fall or trauma.  Patient states she has had cold symptoms for a while.  Denies fever, chills, chest pain, shortness of breath, abdominal pain, nausea, vomiting, upper or lower extremity weakness or paresthesias. pmd: Dr. Crawley, cards: [facility] Seen by card Dr Reich Admitted  to telemetry unit for monitoring , send 3 sets of cardiac enzymes to rule out acute coronary event, obtain ECHO to evaluate LVEF, cardiology consult  ,continue current management, O2 supply, anticoagulation plan as per cardiology consult Pulm cons requested , antitussives and nebulized bd ordered .Also UTI suspected and started on iv abx , id cons called Palliative care consult requested ,to discuss advance directives and complete MOLST  89-year-old female with history of hypothyroidism, A-fib on Eliquis, s/p recent pacemaker for complete heart block brought in by ambulance from [facility] living home for cough for the past 2 weeks.  Associated with generalized weakness and decreased p.o. intake.  No fall or trauma.  Patient states she has had cold symptoms for a while.  Denies fever, chills, chest pain, shortness of breath, abdominal pain, nausea, vomiting, upper or lower extremity weakness or paresthesias. pmd: Dr. Crawley, cards: [facility] Seen by card Dr Reich Admitted  to telemetry unit for monitoring , send 3 sets of cardiac enzymes to rule out acute coronary event, obtain ECHO to evaluate LVEF, cardiology consult  ,continue current management, O2 supply, anticoagulation plan as per cardiology consult Pulm cons requested , antitussives and nebulized bd ordered .Also UTI suspected and started on iv abx , id cons called Palliative care consult requested ,to discuss advance directives and complete MOLST

## 2023-08-31 NOTE — PROGRESS NOTE ADULT - SUBJECTIVE AND OBJECTIVE BOX
CHIEF COMPLAINT/ REASON FOR VISIT  .. Patient was seen to address the  issue listed under PROBLEM LIST which is located toward bottom of this note     DEACONLUIS ROBERT    PLV 1EAS 105 D1    Allergies    penicillin (Unknown)    Intolerances        PAST MEDICAL & SURGICAL HISTORY:  HTN (hypertension)      Afib      Spinal stenosis      PFO (patent foramen ovale)      Degeneration macular      Osteoporosis      History of complete heart block      Hypothyroidism      Cardiac pacemaker          FAMILY HISTORY:      Home Medications:  Albuterol (Eqv-ProAir HFA) 90 mcg/inh inhalation aerosol: 1 puff(s) inhaled 4 times a day as needed for  cough or wheezing (21 Aug 2023 09:56)  amiodarone 200 mg oral tablet: 1 tab(s) orally once a day (21 Aug 2023 09:55)  clindamycin 1% topical lotion: Apply topically to affected area once a day to face (21 Aug 2023 09:55)  Eliquis 2.5 mg oral tablet: 1 tab(s) orally 2 times a day (21 Aug 2023 09:55)  enalapril 10 mg oral tablet: 1 tab(s) orally once a day (21 Aug 2023 09:55)  ferrous sulfate 325 mg (65 mg elemental iron) oral tablet: 1 tab(s) orally once a day (21 Aug 2023 09:55)  levothyroxine 75 mcg (0.075 mg) oral tablet: 1 tab(s) orally once a day (21 Aug 2023 09:56)  Metoprolol Tartrate 50 mg oral tablet: 1 tab(s) orally 2 times a day (21 Aug 2023 09:56)  Myrbetriq 50 mg oral tablet, extended release: 1 tab(s) orally once a day (21 Aug 2023 09:56)  polyethylene glycol 3350 oral powder for reconstitution: 17 gram(s) orally once a day (21 Aug 2023 09:56)  PreserVision AREDS oral capsule: 1 cap(s) orally once a day (21 Aug 2023 09:56)  Refresh Dry Eye Therapy ophthalmic solution: 1 drop(s) in each eye 4 times a day (21 Aug 2023 09:56)  Tylenol 325 mg oral tablet: 2 tab(s) orally every 4 hours as needed for pain or fever (21 Aug 2023 09:56)      MEDICATIONS  (STANDING):  aMIOdarone    Tablet 200 milliGRAM(s) Oral daily  amLODIPine   Tablet 10 milliGRAM(s) Oral daily  apixaban 2.5 milliGRAM(s) Oral two times a day  artificial  tears Solution 1 Drop(s) Both EYES two times a day  dextrose 5% + sodium chloride 0.45%. 1000 milliLiter(s) (50 mL/Hr) IV Continuous <Continuous>  ferrous    sulfate 325 milliGRAM(s) Oral daily  lactobacillus acidophilus 1 Tablet(s) Oral every 12 hours  levothyroxine 75 MICROGram(s) Oral daily  metoprolol tartrate 50 milliGRAM(s) Oral two times a day  multivitamin/minerals 1 Tablet(s) Oral daily  pantoprazole    Tablet 40 milliGRAM(s) Oral daily  polyethylene glycol 3350 17 Gram(s) Oral daily  remdesivir  IVPB 100 milliGRAM(s) IV Intermittent every 24 hours  remdesivir  IVPB   IV Intermittent     MEDICATIONS  (PRN):  acetaminophen     Tablet .. 650 milliGRAM(s) Oral every 6 hours PRN Temp greater or equal to 38C (100.4F), Mild Pain (1 - 3)  albuterol/ipratropium for Nebulization 3 milliLiter(s) Nebulizer every 6 hours PRN Shortness of Breath and/or Wheezing  aluminum hydroxide/magnesium hydroxide/simethicone Suspension 30 milliLiter(s) Oral every 4 hours PRN Dyspepsia  guaiFENesin Oral Liquid (Sugar-Free) 200 milliGRAM(s) Oral every 6 hours PRN Cough  melatonin 3 milliGRAM(s) Oral at bedtime PRN Insomnia  ondansetron Injectable 4 milliGRAM(s) IV Push every 8 hours PRN Nausea and/or Vomiting      Diet, Pureed:   Supplement Feeding Modality:  Oral  Health Shake Cans or Servings Per Day:  1       Frequency:  Two Times a day (08-29-23 @ 14:36) [Active]          Vital Signs Last 24 Hrs  T(C): 36.7 (31 Aug 2023 05:04), Max: 36.8 (30 Aug 2023 13:07)  T(F): 98 (31 Aug 2023 05:04), Max: 98.2 (30 Aug 2023 13:07)  HR: 64 (31 Aug 2023 05:04) (64 - 86)  BP: 136/72 (31 Aug 2023 05:04) (129/63 - 136/72)  BP(mean): --  RR: 18 (31 Aug 2023 05:04) (18 - 18)  SpO2: 92% (31 Aug 2023 05:04) (91% - 98%)    Parameters below as of 31 Aug 2023 05:04  Patient On (Oxygen Delivery Method): room air          08-30-23 @ 07:01  -  08-31-23 @ 07:00  --------------------------------------------------------  IN: 1060 mL / OUT: 0 mL / NET: 1060 mL              LABS:                        12.3   11.89 )-----------( 383      ( 30 Aug 2023 06:27 )             39.3     08-30    139  |  107  |  25<H>  ----------------------------<  101<H>  3.8   |  28  |  0.68    Ca    9.0      30 Aug 2023 06:27    TPro  6.7  /  Alb  2.6<L>  /  TBili  0.8  /  DBili  0.3  /  AST  158<H>  /  ALT  188<H>  /  AlkPhos  99  08-30    PT/INR - ( 30 Aug 2023 06:27 )   PT: 14.9 sec;   INR: 1.28 ratio           Urinalysis Basic - ( 30 Aug 2023 06:27 )    Color: x / Appearance: x / SG: x / pH: x  Gluc: 101 mg/dL / Ketone: x  / Bili: x / Urobili: x   Blood: x / Protein: x / Nitrite: x   Leuk Esterase: x / RBC: x / WBC x   Sq Epi: x / Non Sq Epi: x / Bacteria: x            WBC:  WBC Count: 11.89 K/uL (08-30 @ 06:27)  WBC Count: 11.52 K/uL (08-29 @ 10:13)  WBC Count: 11.95 K/uL (08-28 @ 08:22)      MICROBIOLOGY:  RECENT CULTURES:              PT/INR - ( 30 Aug 2023 06:27 )   PT: 14.9 sec;   INR: 1.28 ratio             Sodium:  Sodium: 139 mmol/L (08-30 @ 06:27)  Sodium: 139 mmol/L (08-29 @ 06:54)  Sodium: 141 mmol/L (08-28 @ 08:22)      0.68 mg/dL 08-30 @ 06:27  0.78 mg/dL 08-29 @ 22:20  0.60 mg/dL 08-29 @ 06:54  0.52 mg/dL 08-28 @ 08:22      Hemoglobin:  Hemoglobin: 12.3 g/dL (08-30 @ 06:27)  Hemoglobin: 11.3 g/dL (08-29 @ 10:13)  Hemoglobin: 11.4 g/dL (08-28 @ 08:22)      Platelets: Platelet Count - Automated: 383 K/uL (08-30 @ 06:27)  Platelet Count - Automated: 340 K/uL (08-29 @ 10:13)  Platelet Count - Automated: 337 K/uL (08-28 @ 08:22)      LIVER FUNCTIONS - ( 30 Aug 2023 06:27 )  Alb: 2.6 g/dL / Pro: 6.7 g/dL / ALK PHOS: 99 U/L / ALT: 188 U/L / AST: 158 U/L / GGT: x             Urinalysis Basic - ( 30 Aug 2023 06:27 )    Color: x / Appearance: x / SG: x / pH: x  Gluc: 101 mg/dL / Ketone: x  / Bili: x / Urobili: x   Blood: x / Protein: x / Nitrite: x   Leuk Esterase: x / RBC: x / WBC x   Sq Epi: x / Non Sq Epi: x / Bacteria: x        RADIOLOGY & ADDITIONAL STUDIES:      MICROBIOLOGY:  RECENT CULTURES:

## 2023-08-31 NOTE — SOCIAL WORK PROGRESS NOTE - NSSWPROGRESSNOTE_GEN_ALL_CORE
Per PT, dc recommendations are for [facility] at this time. KIEL spoke with pt daughter Beth to discuss [facility] choices. Pt daughter wishes for [facility] referral to be sent to 1) [facility] 2) . KIEL to send referral to selected facilities. MARCIAL requested. KIEL will continue to follow.  Per PT, dc recommendations are for [facility] at this time. KIEL spoke with pt daughter Beth to discuss [facility] choices. Pt daughter wishes for [facility] referral to be sent to 1) [facility] 2) . KIEL to send referral to selected facilities. MARCIAL requested. KIEL will continue to follow.  Per PT, dc recommendations are for [facility] at this time. KIEL spoke with pt daughter Beth to discuss [facility] choices. Pt daughter wishes for [facility] referral to be sent to 1) [facility] 2) . KIEL to send referral to selected facilities. MARCIAL requested. KIEL will continue to follow.

## 2023-08-31 NOTE — PROGRESS NOTE ADULT - SUBJECTIVE AND OBJECTIVE BOX
[facility]  Víctor Robertson PA-C  [address] [place] [address]  [phone]      INTERVAL HPI/OVERNIGHT EVENTS:  Pt s/e  Tolerating diet    MEDICATIONS  (STANDING):  aMIOdarone    Tablet 200 milliGRAM(s) Oral daily  amLODIPine   Tablet 10 milliGRAM(s) Oral daily  apixaban 2.5 milliGRAM(s) Oral two times a day  artificial  tears Solution 1 Drop(s) Both EYES two times a day  dextrose 5% + sodium chloride 0.45%. 1000 milliLiter(s) (50 mL/Hr) IV Continuous <Continuous>  ferrous    sulfate 325 milliGRAM(s) Oral daily  lactobacillus acidophilus 1 Tablet(s) Oral every 12 hours  levothyroxine 75 MICROGram(s) Oral daily  metoprolol tartrate 50 milliGRAM(s) Oral two times a day  multivitamin/minerals 1 Tablet(s) Oral daily  pantoprazole    Tablet 40 milliGRAM(s) Oral daily  polyethylene glycol 3350 17 Gram(s) Oral daily  remdesivir  IVPB   IV Intermittent   remdesivir  IVPB 100 milliGRAM(s) IV Intermittent every 24 hours    MEDICATIONS  (PRN):  acetaminophen     Tablet .. 650 milliGRAM(s) Oral every 6 hours PRN Temp greater or equal to 38C (100.4F), Mild Pain (1 - 3)  albuterol/ipratropium for Nebulization 3 milliLiter(s) Nebulizer every 6 hours PRN Shortness of Breath and/or Wheezing  aluminum hydroxide/magnesium hydroxide/simethicone Suspension 30 milliLiter(s) Oral every 4 hours PRN Dyspepsia  guaiFENesin Oral Liquid (Sugar-Free) 200 milliGRAM(s) Oral every 6 hours PRN Cough  melatonin 3 milliGRAM(s) Oral at bedtime PRN Insomnia  ondansetron Injectable 4 milliGRAM(s) IV Push every 8 hours PRN Nausea and/or Vomiting      Allergies  penicillin (Unknown)      PHYSICAL EXAM:   Vital Signs:  Vital Signs Last 24 Hrs  T(C): 36.7 (31 Aug 2023 05:04), Max: 36.8 (30 Aug 2023 13:07)  T(F): 98 (31 Aug 2023 05:04), Max: 98.2 (30 Aug 2023 13:07)  HR: 64 (31 Aug 2023 05:04) (64 - 86)  BP: 136/72 (31 Aug 2023 05:04) (129/63 - 136/72)  BP(mean): --  RR: 18 (31 Aug 2023 05:04) (18 - 18)  SpO2: 92% (31 Aug 2023 05:04) (91% - 98%)    Parameters below as of 31 Aug 2023 05:04  Patient On (Oxygen Delivery Method): room air      Daily     Daily Weight in k (31 Aug 2023 05:04)    GENERAL:  Appears stated age  HEENT:  NC/AT  CHEST:  Full & symmetric excursion  HEART:  Regular rhythm  ABDOMEN:  Soft, non-tender, non-distended  EXTEREMITIES:  no cyanosis  SKIN:  No rash  NEURO:  Alert      LABS:                        11.0   8.63  )-----------( 248      ( 31 Aug 2023 08:40 )             36.3         142  |  110<H>  |  23  ----------------------------<  120<H>  3.7   |  27  |  0.62    Ca    8.2<L>      31 Aug 2023 08:40    TPro  5.7<L>  /  Alb  2.1<L>  /  TBili  0.5  /  DBili  0.2  /  AST  155<H>  /  ALT  204<H>  /  AlkPhos  92      PT/INR - ( 31 Aug 2023 08:40 )   PT: 20.7 sec;   INR: 1.80 ratio           Urinalysis Basic - ( 31 Aug 2023 08:40 )    Color: x / Appearance: x / SG: x / pH: x  Gluc: 120 mg/dL / Ketone: x  / Bili: x / Urobili: x   Blood: x / Protein: x / Nitrite: x   Leuk Esterase: x / RBC: x / WBC x   Sq Epi: x / Non Sq Epi: x / Bacteria: x   [facility]  Víctor Robertson PA-C  [address] [address]  [phone]      INTERVAL HPI/OVERNIGHT EVENTS:  Pt s/e  Tolerating diet    MEDICATIONS  (STANDING):  aMIOdarone    Tablet 200 milliGRAM(s) Oral daily  amLODIPine   Tablet 10 milliGRAM(s) Oral daily  apixaban 2.5 milliGRAM(s) Oral two times a day  artificial  tears Solution 1 Drop(s) Both EYES two times a day  dextrose 5% + sodium chloride 0.45%. 1000 milliLiter(s) (50 mL/Hr) IV Continuous <Continuous>  ferrous    sulfate 325 milliGRAM(s) Oral daily  lactobacillus acidophilus 1 Tablet(s) Oral every 12 hours  levothyroxine 75 MICROGram(s) Oral daily  metoprolol tartrate 50 milliGRAM(s) Oral two times a day  multivitamin/minerals 1 Tablet(s) Oral daily  pantoprazole    Tablet 40 milliGRAM(s) Oral daily  polyethylene glycol 3350 17 Gram(s) Oral daily  remdesivir  IVPB   IV Intermittent   remdesivir  IVPB 100 milliGRAM(s) IV Intermittent every 24 hours    MEDICATIONS  (PRN):  acetaminophen     Tablet .. 650 milliGRAM(s) Oral every 6 hours PRN Temp greater or equal to 38C (100.4F), Mild Pain (1 - 3)  albuterol/ipratropium for Nebulization 3 milliLiter(s) Nebulizer every 6 hours PRN Shortness of Breath and/or Wheezing  aluminum hydroxide/magnesium hydroxide/simethicone Suspension 30 milliLiter(s) Oral every 4 hours PRN Dyspepsia  guaiFENesin Oral Liquid (Sugar-Free) 200 milliGRAM(s) Oral every 6 hours PRN Cough  melatonin 3 milliGRAM(s) Oral at bedtime PRN Insomnia  ondansetron Injectable 4 milliGRAM(s) IV Push every 8 hours PRN Nausea and/or Vomiting      Allergies  penicillin (Unknown)      PHYSICAL EXAM:   Vital Signs:  Vital Signs Last 24 Hrs  T(C): 36.7 (31 Aug 2023 05:04), Max: 36.8 (30 Aug 2023 13:07)  T(F): 98 (31 Aug 2023 05:04), Max: 98.2 (30 Aug 2023 13:07)  HR: 64 (31 Aug 2023 05:04) (64 - 86)  BP: 136/72 (31 Aug 2023 05:04) (129/63 - 136/72)  BP(mean): --  RR: 18 (31 Aug 2023 05:04) (18 - 18)  SpO2: 92% (31 Aug 2023 05:04) (91% - 98%)    Parameters below as of 31 Aug 2023 05:04  Patient On (Oxygen Delivery Method): room air      Daily     Daily Weight in k (31 Aug 2023 05:04)    GENERAL:  Appears stated age  HEENT:  NC/AT  CHEST:  Full & symmetric excursion  HEART:  Regular rhythm  ABDOMEN:  Soft, non-tender, non-distended  EXTEREMITIES:  no cyanosis  SKIN:  No rash  NEURO:  Alert      LABS:                        11.0   8.63  )-----------( 248      ( 31 Aug 2023 08:40 )             36.3         142  |  110<H>  |  23  ----------------------------<  120<H>  3.7   |  27  |  0.62    Ca    8.2<L>      31 Aug 2023 08:40    TPro  5.7<L>  /  Alb  2.1<L>  /  TBili  0.5  /  DBili  0.2  /  AST  155<H>  /  ALT  204<H>  /  AlkPhos  92      PT/INR - ( 31 Aug 2023 08:40 )   PT: 20.7 sec;   INR: 1.80 ratio           Urinalysis Basic - ( 31 Aug 2023 08:40 )    Color: x / Appearance: x / SG: x / pH: x  Gluc: 120 mg/dL / Ketone: x  / Bili: x / Urobili: x   Blood: x / Protein: x / Nitrite: x   Leuk Esterase: x / RBC: x / WBC x   Sq Epi: x / Non Sq Epi: x / Bacteria: x   [facility]  Víctor Robertson PA-C  [address] [place] [address]  [phone]      INTERVAL HPI/OVERNIGHT EVENTS:  Pt s/e  Tolerating diet    MEDICATIONS  (STANDING):  aMIOdarone    Tablet 200 milliGRAM(s) Oral daily  amLODIPine   Tablet 10 milliGRAM(s) Oral daily  apixaban 2.5 milliGRAM(s) Oral two times a day  artificial  tears Solution 1 Drop(s) Both EYES two times a day  dextrose 5% + sodium chloride 0.45%. 1000 milliLiter(s) (50 mL/Hr) IV Continuous <Continuous>  ferrous    sulfate 325 milliGRAM(s) Oral daily  lactobacillus acidophilus 1 Tablet(s) Oral every 12 hours  levothyroxine 75 MICROGram(s) Oral daily  metoprolol tartrate 50 milliGRAM(s) Oral two times a day  multivitamin/minerals 1 Tablet(s) Oral daily  pantoprazole    Tablet 40 milliGRAM(s) Oral daily  polyethylene glycol 3350 17 Gram(s) Oral daily  remdesivir  IVPB   IV Intermittent   remdesivir  IVPB 100 milliGRAM(s) IV Intermittent every 24 hours    MEDICATIONS  (PRN):  acetaminophen     Tablet .. 650 milliGRAM(s) Oral every 6 hours PRN Temp greater or equal to 38C (100.4F), Mild Pain (1 - 3)  albuterol/ipratropium for Nebulization 3 milliLiter(s) Nebulizer every 6 hours PRN Shortness of Breath and/or Wheezing  aluminum hydroxide/magnesium hydroxide/simethicone Suspension 30 milliLiter(s) Oral every 4 hours PRN Dyspepsia  guaiFENesin Oral Liquid (Sugar-Free) 200 milliGRAM(s) Oral every 6 hours PRN Cough  melatonin 3 milliGRAM(s) Oral at bedtime PRN Insomnia  ondansetron Injectable 4 milliGRAM(s) IV Push every 8 hours PRN Nausea and/or Vomiting      Allergies  penicillin (Unknown)      PHYSICAL EXAM:   Vital Signs:  Vital Signs Last 24 Hrs  T(C): 36.7 (31 Aug 2023 05:04), Max: 36.8 (30 Aug 2023 13:07)  T(F): 98 (31 Aug 2023 05:04), Max: 98.2 (30 Aug 2023 13:07)  HR: 64 (31 Aug 2023 05:04) (64 - 86)  BP: 136/72 (31 Aug 2023 05:04) (129/63 - 136/72)  BP(mean): --  RR: 18 (31 Aug 2023 05:04) (18 - 18)  SpO2: 92% (31 Aug 2023 05:04) (91% - 98%)    Parameters below as of 31 Aug 2023 05:04  Patient On (Oxygen Delivery Method): room air      Daily     Daily Weight in k (31 Aug 2023 05:04)    GENERAL:  Appears stated age  HEENT:  NC/AT  CHEST:  Full & symmetric excursion  HEART:  Regular rhythm  ABDOMEN:  Soft, non-tender, non-distended  EXTEREMITIES:  no cyanosis  SKIN:  No rash  NEURO:  Alert      LABS:                        11.0   8.63  )-----------( 248      ( 31 Aug 2023 08:40 )             36.3         142  |  110<H>  |  23  ----------------------------<  120<H>  3.7   |  27  |  0.62    Ca    8.2<L>      31 Aug 2023 08:40    TPro  5.7<L>  /  Alb  2.1<L>  /  TBili  0.5  /  DBili  0.2  /  AST  155<H>  /  ALT  204<H>  /  AlkPhos  92      PT/INR - ( 31 Aug 2023 08:40 )   PT: 20.7 sec;   INR: 1.80 ratio           Urinalysis Basic - ( 31 Aug 2023 08:40 )    Color: x / Appearance: x / SG: x / pH: x  Gluc: 120 mg/dL / Ketone: x  / Bili: x / Urobili: x   Blood: x / Protein: x / Nitrite: x   Leuk Esterase: x / RBC: x / WBC x   Sq Epi: x / Non Sq Epi: x / Bacteria: x

## 2023-08-31 NOTE — CASE MANAGEMENT PROGRESS NOTE - NSCMPROGRESSNOTE_GEN_ALL_CORE
Patient is for possible transition back to [facility] today. [phone] faxed to Michael/Yevgeniy . Referral for Oksana lift initiated ad sent to [facility] surgical. [phone] Patient is for possible transition back to [facility] today. [phone] faxed to Michael/Yevgeniy . Referral for Oksana lift initiated ad sent to [facility] surgical. [phone] Patient is for possible transition back to [facility] today. [phone] faxed to Michael/Yevgeniy . Referral for Oksana lift initiated ad sent to [facility] surgical. [phone]

## 2023-09-01 ENCOUNTER — TRANSCRIPTION ENCOUNTER (OUTPATIENT)
Age: 88
End: 2023-09-01

## 2023-09-01 VITALS
RESPIRATION RATE: 17 BRPM | OXYGEN SATURATION: 95 % | HEART RATE: 62 BPM | DIASTOLIC BLOOD PRESSURE: 72 MMHG | SYSTOLIC BLOOD PRESSURE: 171 MMHG | TEMPERATURE: 98 F

## 2023-09-01 LAB
ALBUMIN SERPL ELPH-MCNC: 2.2 G/DL — LOW (ref 3.3–5)
ALBUMIN SERPL ELPH-MCNC: 2.3 G/DL — LOW (ref 3.3–5)
ALP SERPL-CCNC: 91 U/L — SIGNIFICANT CHANGE UP (ref 40–120)
ALP SERPL-CCNC: 94 U/L — SIGNIFICANT CHANGE UP (ref 40–120)
ALT FLD-CCNC: 172 U/L — HIGH (ref 12–78)
ALT FLD-CCNC: 173 U/L — HIGH (ref 12–78)
ANION GAP SERPL CALC-SCNC: 6 MMOL/L — SIGNIFICANT CHANGE UP (ref 5–17)
AST SERPL-CCNC: 122 U/L — HIGH (ref 15–37)
AST SERPL-CCNC: 125 U/L — HIGH (ref 15–37)
BILIRUB DIRECT SERPL-MCNC: 0.2 MG/DL — SIGNIFICANT CHANGE UP (ref 0–0.3)
BILIRUB INDIRECT FLD-MCNC: 0.3 MG/DL — SIGNIFICANT CHANGE UP (ref 0.2–1)
BILIRUB SERPL-MCNC: 0.5 MG/DL — SIGNIFICANT CHANGE UP (ref 0.2–1.2)
BUN SERPL-MCNC: 16 MG/DL — SIGNIFICANT CHANGE UP (ref 7–23)
CALCIUM SERPL-MCNC: 8.1 MG/DL — LOW (ref 8.5–10.1)
CHLORIDE SERPL-SCNC: 111 MMOL/L — HIGH (ref 96–108)
CO2 SERPL-SCNC: 25 MMOL/L — SIGNIFICANT CHANGE UP (ref 22–31)
CREAT SERPL-MCNC: 0.46 MG/DL — LOW (ref 0.5–1.3)
EGFR: 89 ML/MIN/1.73M2 — SIGNIFICANT CHANGE UP
GLUCOSE SERPL-MCNC: 98 MG/DL — SIGNIFICANT CHANGE UP (ref 70–99)
INR BLD: 1.58 RATIO — HIGH (ref 0.85–1.18)
POTASSIUM SERPL-MCNC: 3.4 MMOL/L — LOW (ref 3.5–5.3)
POTASSIUM SERPL-SCNC: 3.4 MMOL/L — LOW (ref 3.5–5.3)
PROT SERPL-MCNC: 5.6 G/DL — LOW (ref 6–8.3)
PROT SERPL-MCNC: 5.7 G/DL — LOW (ref 6–8.3)
PROTHROM AB SERPL-ACNC: 18.2 SEC — HIGH (ref 9.5–13)
SODIUM SERPL-SCNC: 142 MMOL/L — SIGNIFICANT CHANGE UP (ref 135–145)

## 2023-09-01 PROCEDURE — 80048 BASIC METABOLIC PNL TOTAL CA: CPT

## 2023-09-01 PROCEDURE — 80053 COMPREHEN METABOLIC PANEL: CPT

## 2023-09-01 PROCEDURE — 87086 URINE CULTURE/COLONY COUNT: CPT

## 2023-09-01 PROCEDURE — 96365 THER/PROPH/DIAG IV INF INIT: CPT

## 2023-09-01 PROCEDURE — 84443 ASSAY THYROID STIM HORMONE: CPT

## 2023-09-01 PROCEDURE — 97530 THERAPEUTIC ACTIVITIES: CPT

## 2023-09-01 PROCEDURE — 87449 NOS EACH ORGANISM AG IA: CPT

## 2023-09-01 PROCEDURE — 93005 ELECTROCARDIOGRAM TRACING: CPT

## 2023-09-01 PROCEDURE — 87040 BLOOD CULTURE FOR BACTERIA: CPT

## 2023-09-01 PROCEDURE — 93306 TTE W/DOPPLER COMPLETE: CPT

## 2023-09-01 PROCEDURE — 74220 X-RAY XM ESOPHAGUS 1CNTRST: CPT

## 2023-09-01 PROCEDURE — 87186 SC STD MICRODIL/AGAR DIL: CPT

## 2023-09-01 PROCEDURE — 96367 TX/PROPH/DG ADDL SEQ IV INF: CPT

## 2023-09-01 PROCEDURE — 84484 ASSAY OF TROPONIN QUANT: CPT

## 2023-09-01 PROCEDURE — 87150 DNA/RNA AMPLIFIED PROBE: CPT

## 2023-09-01 PROCEDURE — 85730 THROMBOPLASTIN TIME PARTIAL: CPT

## 2023-09-01 PROCEDURE — 71250 CT THORAX DX C-: CPT | Mod: ME

## 2023-09-01 PROCEDURE — 92610 EVALUATE SWALLOWING FUNCTION: CPT

## 2023-09-01 PROCEDURE — 73110 X-RAY EXAM OF WRIST: CPT

## 2023-09-01 PROCEDURE — 36415 COLL VENOUS BLD VENIPUNCTURE: CPT

## 2023-09-01 PROCEDURE — 85610 PROTHROMBIN TIME: CPT

## 2023-09-01 PROCEDURE — 84145 PROCALCITONIN (PCT): CPT

## 2023-09-01 PROCEDURE — 83735 ASSAY OF MAGNESIUM: CPT

## 2023-09-01 PROCEDURE — 96375 TX/PRO/DX INJ NEW DRUG ADDON: CPT

## 2023-09-01 PROCEDURE — 73120 X-RAY EXAM OF HAND: CPT

## 2023-09-01 PROCEDURE — 99285 EMERGENCY DEPT VISIT HI MDM: CPT

## 2023-09-01 PROCEDURE — 0225U NFCT DS DNA&RNA 21 SARSCOV2: CPT

## 2023-09-01 PROCEDURE — 87635 SARS-COV-2 COVID-19 AMP PRB: CPT

## 2023-09-01 PROCEDURE — 80061 LIPID PANEL: CPT

## 2023-09-01 PROCEDURE — 80076 HEPATIC FUNCTION PANEL: CPT

## 2023-09-01 PROCEDURE — 83605 ASSAY OF LACTIC ACID: CPT

## 2023-09-01 PROCEDURE — 85025 COMPLETE CBC W/AUTO DIFF WBC: CPT

## 2023-09-01 PROCEDURE — 82565 ASSAY OF CREATININE: CPT

## 2023-09-01 PROCEDURE — 85027 COMPLETE CBC AUTOMATED: CPT

## 2023-09-01 PROCEDURE — 71045 X-RAY EXAM CHEST 1 VIEW: CPT

## 2023-09-01 PROCEDURE — 84100 ASSAY OF PHOSPHORUS: CPT

## 2023-09-01 PROCEDURE — G1004: CPT

## 2023-09-01 PROCEDURE — 87077 CULTURE AEROBIC IDENTIFY: CPT

## 2023-09-01 PROCEDURE — 97162 PT EVAL MOD COMPLEX 30 MIN: CPT

## 2023-09-01 PROCEDURE — 83880 ASSAY OF NATRIURETIC PEPTIDE: CPT

## 2023-09-01 PROCEDURE — 97110 THERAPEUTIC EXERCISES: CPT

## 2023-09-01 PROCEDURE — 81001 URINALYSIS AUTO W/SCOPE: CPT

## 2023-09-01 PROCEDURE — 93971 EXTREMITY STUDY: CPT

## 2023-09-01 RX ORDER — POTASSIUM CHLORIDE 20 MEQ
40 PACKET (EA) ORAL ONCE
Refills: 0 | Status: COMPLETED | OUTPATIENT
Start: 2023-09-01 | End: 2023-09-01

## 2023-09-01 RX ADMIN — PANTOPRAZOLE SODIUM 40 MILLIGRAM(S): 20 TABLET, DELAYED RELEASE ORAL at 12:40

## 2023-09-01 RX ADMIN — Medication 1 TABLET(S): at 06:17

## 2023-09-01 RX ADMIN — AMIODARONE HYDROCHLORIDE 200 MILLIGRAM(S): 400 TABLET ORAL at 06:17

## 2023-09-01 RX ADMIN — Medication 50 MILLIGRAM(S): at 06:18

## 2023-09-01 RX ADMIN — AMLODIPINE BESYLATE 10 MILLIGRAM(S): 2.5 TABLET ORAL at 06:18

## 2023-09-01 RX ADMIN — Medication 325 MILLIGRAM(S): at 12:40

## 2023-09-01 RX ADMIN — Medication 1 TABLET(S): at 12:40

## 2023-09-01 RX ADMIN — Medication 40 MILLIEQUIVALENT(S): at 14:33

## 2023-09-01 RX ADMIN — APIXABAN 2.5 MILLIGRAM(S): 2.5 TABLET, FILM COATED ORAL at 06:17

## 2023-09-01 RX ADMIN — Medication 200 MILLIGRAM(S): at 06:17

## 2023-09-01 RX ADMIN — Medication 1 DROP(S): at 06:18

## 2023-09-01 RX ADMIN — Medication 75 MICROGRAM(S): at 06:17

## 2023-09-01 RX ADMIN — POLYETHYLENE GLYCOL 3350 17 GRAM(S): 17 POWDER, FOR SOLUTION ORAL at 12:40

## 2023-09-01 NOTE — PROGRESS NOTE ADULT - PROBLEM SELECTOR PROBLEM 12
Spinal stenosis
Prophylactic measure

## 2023-09-01 NOTE — DISCHARGE NOTE NURSING/CASE MANAGEMENT/SOCIAL WORK - PATIENT PORTAL LINK FT
You can access the FollowMyHealth Patient Portal offered by [facility] by registering at the following website: [email or web address]. By joining [employer]’s FollowMyHealth portal, you will also be able to view your health information using other applications (apps) compatible with our system. You can access the FollowMyHealth Patient Portal offered by [facility] by registering at the following website: [email or web address]. By joining [employer]’s FollowMyHealth portal, you will also be able to view your health information using other applications (apps) compatible with our system. You can access the FollowMyHealth Patient Portal offered by [facility] by registering at the following website: [email or web address]. By joining [employer]’s FollowMyHealth portal, you will also be able to view your health information using other applications (apps) compatible with our system.

## 2023-09-01 NOTE — PROGRESS NOTE ADULT - PROBLEM SELECTOR PLAN 12
Gastrointestinal stress ulcer prophylaxis and DVT prophylaxis administered
Gastrointestinal stress ulcer prophylaxis and DVT prophylaxis administered
pain management , ot/pt
Gastrointestinal stress ulcer prophylaxis and DVT prophylaxis administered .LUE US neg for doppler ,likely selling was caused by phlebitis 2/2 to infiltrated iv line
Gastrointestinal stress ulcer prophylaxis and DVT prophylaxis administered .LUE US neg for doppler ,likely selling was caused by phlebitis 2/2 to infiltrated iv line
Gastrointestinal stress ulcer prophylaxis and DVT prophylaxis administered
Gastrointestinal stress ulcer prophylaxis and DVT prophylaxis administered .LUE US neg for doppler ,likely selling was caused by phlebitis 2/2 to infiltrated iv line
Gastrointestinal stress ulcer prophylaxis and DVT prophylaxis administered
Gastrointestinal stress ulcer prophylaxis and DVT prophylaxis administered
Gastrointestinal stress ulcer prophylaxis and DVT prophylaxis administered .LUE US neg for doppler ,likely selling was caused by phlebitis 2/2 to infiltrated iv line

## 2023-09-01 NOTE — PROGRESS NOTE ADULT - PROBLEM SELECTOR PROBLEM 10
Hypothyroidism
Esophageal dilatation
Hypothyroidism
HTN (hypertension)
Esophageal dilatation
Hypothyroidism

## 2023-09-01 NOTE — PROGRESS NOTE ADULT - PROBLEM SELECTOR PLAN 1
likely 2/2 to UTI ,POA -Final Report (22 Aug 2023 17:15):BLOOD CX    Growth in aerobic and anaerobic bottles: Escherichia coli ESBL  POC D/W HCP at bedside
likely 2/2 to UTI ,POA -Final Report (22 Aug 2023 17:15):BLOOD CX    Growth in aerobic and anaerobic bottles: Escherichia coli ESBL  POC D/W HCP at bedside  IV invanz
ruled in for SEPSIS ,POA likely 2/2 to UTI ,poa -ID cons ,follow septic workup ,iv abx
2/2 to recent  infection -urosepsis ,poor po intake and viral illness ( covid)
2/2 to recent  infection -urosepsis ,poor po intake and viral illness ( covid)
likely 2/2 to UTI ,POA -Final Report (22 Aug 2023 17:15):BLOOD CX    Growth in aerobic and anaerobic bottles: Escherichia coli ESBL  POC D/W HCP on a phone 08/22/23
ruled in for SEPSIS ,POA likely 2/2 to UTI ,poa -ID cons ,follow septic workup ,iv abx
likely 2/2 to UTI ,POA -Final Report (22 Aug 2023 17:15):BLOOD CX    Growth in aerobic and anaerobic bottles: Escherichia coli ESBL  POC D/W HCP at bedside  IV invanz
likely 2/2 to UTI ,POA -Final Report (22 Aug 2023 17:15):BLOOD CX    Growth in aerobic and anaerobic bottles: Escherichia coli ESBL  POC D/W HCP at bedside  IV invanz
2/2 to recent  infection -urosepsis ,poor po intake and viral illness ( covid)
2/2 to recent  infection -urosepsis ,poor po intake and viral illness ( covid)
likely 2/2 to UTI ,POA -Final Report (22 Aug 2023 17:15):BLOOD CX    Growth in aerobic and anaerobic bottles: Escherichia coli ESBL  POC D/W HCP at bedside  IV invanz

## 2023-09-01 NOTE — PROGRESS NOTE ADULT - PROBLEM SELECTOR PLAN 11
pain management , ot/pt
Gastrointestinal stress ulcer prophylaxis and DVT prophylaxis administered
pain management , ot/pt
Gastrointestinal stress ulcer prophylaxis and DVT prophylaxis administered
pain management , ot/pt
continue home medications

## 2023-09-01 NOTE — PROGRESS NOTE ADULT - PROBLEM SELECTOR PROBLEM 5
Acute bronchitis
Acute UTI
Afib
Hypokalemia
Acute UTI
Afib
Afib
Hypokalemia
Afib
Afib

## 2023-09-01 NOTE — PROGRESS NOTE ADULT - PROBLEM SELECTOR PLAN 5
ID cons , follow urine cx , continue cefepime Source: Clean Catch Clean Catch (Midstream)  Preliminary Report (22 Aug 2023 00:01):    >100,000 CFU/ml Escherichia coli  ID eval
serial bmp , monitor electrolytes closely
ID cons , follow urine cx , continue cefepime Source: Clean Catch Clean Catch (Midstream)  Preliminary Report (22 Aug 2023 00:01):    >100,000 CFU/ml Escherichia coli  ID eval
serial bmp , monitor electrolytes closely
continue home medications
continue home medications
Chest CT showed atelectasis vs infiltrates of lower lobes, No clear evidence of pneumonia.  . The patient has had a nonproductive cough for more than 2 weeks. Would look for other possible sources of this persistent cough: GERD, dysphagia.  . Follow cultures.   . SLP re-evaluation for dysphagia.
ID cons , follow urine cx , continue cefepime Source: Clean Catch Clean Catch (Midstream)  Preliminary Report (22 Aug 2023 00:01):    >100,000 CFU/ml Escherichia coli  ID eval
continue home medications
continue home medications
ID cons , follow urine cx , continue cefepime Source: Clean Catch Clean Catch (Midstream)  Preliminary Report (22 Aug 2023 00:01):    >100,000 CFU/ml Escherichia coli  ID eval
continue home medications

## 2023-09-01 NOTE — PROGRESS NOTE ADULT - REASON FOR ADMISSION
cough

## 2023-09-01 NOTE — PROGRESS NOTE ADULT - SUBJECTIVE AND OBJECTIVE BOX
[facility]  Víctor Robertson PA-C  [address] [place] [address]  [phone]      INTERVAL HPI/OVERNIGHT EVENTS:  Pt s/e  Tolerating diet without GI complaints    MEDICATIONS  (STANDING):  aMIOdarone    Tablet 200 milliGRAM(s) Oral daily  amLODIPine   Tablet 10 milliGRAM(s) Oral daily  apixaban 2.5 milliGRAM(s) Oral two times a day  artificial  tears Solution 1 Drop(s) Both EYES two times a day  ferrous    sulfate 325 milliGRAM(s) Oral daily  lactobacillus acidophilus 1 Tablet(s) Oral every 12 hours  levothyroxine 75 MICROGram(s) Oral daily  metoprolol tartrate 50 milliGRAM(s) Oral two times a day  multivitamin/minerals 1 Tablet(s) Oral daily  pantoprazole    Tablet 40 milliGRAM(s) Oral daily  polyethylene glycol 3350 17 Gram(s) Oral daily    MEDICATIONS  (PRN):  acetaminophen     Tablet .. 650 milliGRAM(s) Oral every 6 hours PRN Temp greater or equal to 38C (100.4F), Mild Pain (1 - 3)  albuterol/ipratropium for Nebulization 3 milliLiter(s) Nebulizer every 6 hours PRN Shortness of Breath and/or Wheezing  aluminum hydroxide/magnesium hydroxide/simethicone Suspension 30 milliLiter(s) Oral every 4 hours PRN Dyspepsia  guaiFENesin Oral Liquid (Sugar-Free) 200 milliGRAM(s) Oral every 6 hours PRN Cough  melatonin 3 milliGRAM(s) Oral at bedtime PRN Insomnia  ondansetron Injectable 4 milliGRAM(s) IV Push every 8 hours PRN Nausea and/or Vomiting      Allergies    penicillin (Unknown)      PHYSICAL EXAM:   Vital Signs:  Vital Signs Last 24 Hrs  T(C): 36.8 (01 Sep 2023 05:15), Max: 36.8 (01 Sep 2023 05:15)  T(F): 98.2 (01 Sep 2023 05:15), Max: 98.2 (01 Sep 2023 05:15)  HR: 65 (01 Sep 2023 05:15) (65 - 90)  BP: 115/62 (01 Sep 2023 05:15) (115/62 - 158/82)  BP(mean): --  RR: 16 (01 Sep 2023 05:15) (16 - 18)  SpO2: 95% (01 Sep 2023 05:15) (94% - 95%)    Parameters below as of 01 Sep 2023 05:15  Patient On (Oxygen Delivery Method): room air      Daily     Daily Weight in k.4 (01 Sep 2023 05:15)    GENERAL:  Appears stated age  HEENT:  NC/AT  CHEST:  Full & symmetric excursion  HEART:  Regular rhythm  ABDOMEN:  Soft, non-tender, non-distended  EXTEREMITIES:  no cyanosis  SKIN:  No rash  NEURO:  Alert      LABS:                        11.0   8.63  )-----------( 248      ( 31 Aug 2023 08:40 )             36.3         142  |  111<H>  |  16  ----------------------------<  98  3.4<L>   |  25  |  0.46<L>    Ca    8.1<L>      01 Sep 2023 07:07    TPro  5.7<L>  /  Alb  2.2<L>  /  TBili  0.5  /  DBili  0.2  /  AST  125<H>  /  ALT  173<H>  /  AlkPhos  91  -    PT/INR - ( 01 Sep 2023 07:07 )   PT: 18.2 sec;   INR: 1.58 ratio           Urinalysis Basic - ( 01 Sep 2023 07:07 )    Color: x / Appearance: x / SG: x / pH: x  Gluc: 98 mg/dL / Ketone: x  / Bili: x / Urobili: x   Blood: x / Protein: x / Nitrite: x   Leuk Esterase: x / RBC: x / WBC x   Sq Epi: x / Non Sq Epi: x / Bacteria: x     [facility]  Víctor Robertson PA-C  [address] [place] [address]  [phone]      INTERVAL HPI/OVERNIGHT EVENTS:  Pt s/e  Tolerating diet without GI complaints    MEDICATIONS  (STANDING):  aMIOdarone    Tablet 200 milliGRAM(s) Oral daily  amLODIPine   Tablet 10 milliGRAM(s) Oral daily  apixaban 2.5 milliGRAM(s) Oral two times a day  artificial  tears Solution 1 Drop(s) Both EYES two times a day  ferrous    sulfate 325 milliGRAM(s) Oral daily  lactobacillus acidophilus 1 Tablet(s) Oral every 12 hours  levothyroxine 75 MICROGram(s) Oral daily  metoprolol tartrate 50 milliGRAM(s) Oral two times a day  multivitamin/minerals 1 Tablet(s) Oral daily  pantoprazole    Tablet 40 milliGRAM(s) Oral daily  polyethylene glycol 3350 17 Gram(s) Oral daily    MEDICATIONS  (PRN):  acetaminophen     Tablet .. 650 milliGRAM(s) Oral every 6 hours PRN Temp greater or equal to 38C (100.4F), Mild Pain (1 - 3)  albuterol/ipratropium for Nebulization 3 milliLiter(s) Nebulizer every 6 hours PRN Shortness of Breath and/or Wheezing  aluminum hydroxide/magnesium hydroxide/simethicone Suspension 30 milliLiter(s) Oral every 4 hours PRN Dyspepsia  guaiFENesin Oral Liquid (Sugar-Free) 200 milliGRAM(s) Oral every 6 hours PRN Cough  melatonin 3 milliGRAM(s) Oral at bedtime PRN Insomnia  ondansetron Injectable 4 milliGRAM(s) IV Push every 8 hours PRN Nausea and/or Vomiting      Allergies    penicillin (Unknown)      PHYSICAL EXAM:   Vital Signs:  Vital Signs Last 24 Hrs  T(C): 36.8 (01 Sep 2023 05:15), Max: 36.8 (01 Sep 2023 05:15)  T(F): 98.2 (01 Sep 2023 05:15), Max: 98.2 (01 Sep 2023 05:15)  HR: 65 (01 Sep 2023 05:15) (65 - 90)  BP: 115/62 (01 Sep 2023 05:15) (115/62 - 158/82)  BP(mean): --  RR: 16 (01 Sep 2023 05:15) (16 - 18)  SpO2: 95% (01 Sep 2023 05:15) (94% - 95%)    Parameters below as of 01 Sep 2023 05:15  Patient On (Oxygen Delivery Method): room air      Daily     Daily Weight in k.4 (01 Sep 2023 05:15)    GENERAL:  Appears stated age  HEENT:  NC/AT  CHEST:  Full & symmetric excursion  HEART:  Regular rhythm  ABDOMEN:  Soft, non-tender, non-distended  EXTEREMITIES:  no cyanosis  SKIN:  No rash  NEURO:  Alert      LABS:                        11.0   8.63  )-----------( 248      ( 31 Aug 2023 08:40 )             36.3         142  |  111<H>  |  16  ----------------------------<  98  3.4<L>   |  25  |  0.46<L>    Ca    8.1<L>      01 Sep 2023 07:07    TPro  5.7<L>  /  Alb  2.2<L>  /  TBili  0.5  /  DBili  0.2  /  AST  125<H>  /  ALT  173<H>  /  AlkPhos  91  -    PT/INR - ( 01 Sep 2023 07:07 )   PT: 18.2 sec;   INR: 1.58 ratio           Urinalysis Basic - ( 01 Sep 2023 07:07 )    Color: x / Appearance: x / SG: x / pH: x  Gluc: 98 mg/dL / Ketone: x  / Bili: x / Urobili: x   Blood: x / Protein: x / Nitrite: x   Leuk Esterase: x / RBC: x / WBC x   Sq Epi: x / Non Sq Epi: x / Bacteria: x     [facility]  Víctor Robertson PA-C  [address] [place] [address]  [phone]      INTERVAL HPI/OVERNIGHT EVENTS:  Pt s/e  Tolerating diet without GI complaints    MEDICATIONS  (STANDING):  aMIOdarone    Tablet 200 milliGRAM(s) Oral daily  amLODIPine   Tablet 10 milliGRAM(s) Oral daily  apixaban 2.5 milliGRAM(s) Oral two times a day  artificial  tears Solution 1 Drop(s) Both EYES two times a day  ferrous    sulfate 325 milliGRAM(s) Oral daily  lactobacillus acidophilus 1 Tablet(s) Oral every 12 hours  levothyroxine 75 MICROGram(s) Oral daily  metoprolol tartrate 50 milliGRAM(s) Oral two times a day  multivitamin/minerals 1 Tablet(s) Oral daily  pantoprazole    Tablet 40 milliGRAM(s) Oral daily  polyethylene glycol 3350 17 Gram(s) Oral daily    MEDICATIONS  (PRN):  acetaminophen     Tablet .. 650 milliGRAM(s) Oral every 6 hours PRN Temp greater or equal to 38C (100.4F), Mild Pain (1 - 3)  albuterol/ipratropium for Nebulization 3 milliLiter(s) Nebulizer every 6 hours PRN Shortness of Breath and/or Wheezing  aluminum hydroxide/magnesium hydroxide/simethicone Suspension 30 milliLiter(s) Oral every 4 hours PRN Dyspepsia  guaiFENesin Oral Liquid (Sugar-Free) 200 milliGRAM(s) Oral every 6 hours PRN Cough  melatonin 3 milliGRAM(s) Oral at bedtime PRN Insomnia  ondansetron Injectable 4 milliGRAM(s) IV Push every 8 hours PRN Nausea and/or Vomiting      Allergies    penicillin (Unknown)      PHYSICAL EXAM:   Vital Signs:  Vital Signs Last 24 Hrs  T(C): 36.8 (01 Sep 2023 05:15), Max: 36.8 (01 Sep 2023 05:15)  T(F): 98.2 (01 Sep 2023 05:15), Max: 98.2 (01 Sep 2023 05:15)  HR: 65 (01 Sep 2023 05:15) (65 - 90)  BP: 115/62 (01 Sep 2023 05:15) (115/62 - 158/82)  BP(mean): --  RR: 16 (01 Sep 2023 05:15) (16 - 18)  SpO2: 95% (01 Sep 2023 05:15) (94% - 95%)    Parameters below as of 01 Sep 2023 05:15  Patient On (Oxygen Delivery Method): room air      Daily     Daily Weight in k.4 (01 Sep 2023 05:15)    GENERAL:  Appears stated age  HEENT:  NC/AT  CHEST:  Full & symmetric excursion  HEART:  Regular rhythm  ABDOMEN:  Soft, non-tender, non-distended  EXTEREMITIES:  no cyanosis  SKIN:  No rash  NEURO:  Alert      LABS:                        11.0   8.63  )-----------( 248      ( 31 Aug 2023 08:40 )             36.3         142  |  111<H>  |  16  ----------------------------<  98  3.4<L>   |  25  |  0.46<L>    Ca    8.1<L>      01 Sep 2023 07:07    TPro  5.7<L>  /  Alb  2.2<L>  /  TBili  0.5  /  DBili  0.2  /  AST  125<H>  /  ALT  173<H>  /  AlkPhos  91  -    PT/INR - ( 01 Sep 2023 07:07 )   PT: 18.2 sec;   INR: 1.58 ratio           Urinalysis Basic - ( 01 Sep 2023 07:07 )    Color: x / Appearance: x / SG: x / pH: x  Gluc: 98 mg/dL / Ketone: x  / Bili: x / Urobili: x   Blood: x / Protein: x / Nitrite: x   Leuk Esterase: x / RBC: x / WBC x   Sq Epi: x / Non Sq Epi: x / Bacteria: x

## 2023-09-01 NOTE — PROGRESS NOTE ADULT - SUBJECTIVE AND OBJECTIVE BOX
PROGRESS NOTE  Patient is a 89y old  Female who presents with a chief complaint of cough (01 Sep 2023 10:43)    Chart and available morning labs /imaging are reviewed electronically , urgent issues addressed . More information  is being added upon completion of rounds , when more information is collected and management discussed with consultants , medical staff and social service/case management on the floor   OVERNIGHT      HPI:  89-year-old female with history of hypothyroidism, A-fib on Eliquis, s/p recent pacemaker for complete heart block brought in by ambulance from [facility] living home for cough for the past 2 weeks.  Associated with generalized weakness and decreased p.o. intake.  No fall or trauma.  Patient states she has had cold symptoms for a while.  Denies fever, chills, chest pain, shortness of breath, abdominal pain, nausea, vomiting, upper or lower extremity weakness or paresthesias. pmd: Dr. Crawley, cards: [facility] Seen by card Dr Reich Admitted  to telemetry unit for monitoring , send 3 sets of cardiac enzymes to rule out acute coronary event, obtain ECHO to evaluate LVEF, cardiology consult  ,continue current management, O2 supply, anticoagulation plan as per cardiology consult Pulm cons requested , antitussives and nebulized bd ordered .Also UTI suspected and started on iv abx , id cons called Palliative care consult requested ,to discuss advance directives and complete MOLST  (20 Aug 2023 15:35)    PAST MEDICAL & SURGICAL HISTORY:  HTN (hypertension)      Afib      Spinal stenosis      PFO (patent foramen ovale)      Degeneration macular      Osteoporosis      History of complete heart block      Hypothyroidism      Cardiac pacemaker          MEDICATIONS  (STANDING):  aMIOdarone    Tablet 200 milliGRAM(s) Oral daily  amLODIPine   Tablet 10 milliGRAM(s) Oral daily  apixaban 2.5 milliGRAM(s) Oral two times a day  artificial  tears Solution 1 Drop(s) Both EYES two times a day  ferrous    sulfate 325 milliGRAM(s) Oral daily  lactobacillus acidophilus 1 Tablet(s) Oral every 12 hours  levothyroxine 75 MICROGram(s) Oral daily  metoprolol tartrate 50 milliGRAM(s) Oral two times a day  multivitamin/minerals 1 Tablet(s) Oral daily  pantoprazole    Tablet 40 milliGRAM(s) Oral daily  polyethylene glycol 3350 17 Gram(s) Oral daily  potassium chloride    Tablet ER 40 milliEquivalent(s) Oral once    MEDICATIONS  (PRN):  acetaminophen     Tablet .. 650 milliGRAM(s) Oral every 6 hours PRN Temp greater or equal to 38C (100.4F), Mild Pain (1 - 3)  albuterol/ipratropium for Nebulization 3 milliLiter(s) Nebulizer every 6 hours PRN Shortness of Breath and/or Wheezing  aluminum hydroxide/magnesium hydroxide/simethicone Suspension 30 milliLiter(s) Oral every 4 hours PRN Dyspepsia  guaiFENesin Oral Liquid (Sugar-Free) 200 milliGRAM(s) Oral every 6 hours PRN Cough  melatonin 3 milliGRAM(s) Oral at bedtime PRN Insomnia  ondansetron Injectable 4 milliGRAM(s) IV Push every 8 hours PRN Nausea and/or Vomiting      OBJECTIVE    T(C): 36.4 (09-01-23 @ 12:35), Max: 36.8 (09-01-23 @ 05:15)  HR: 62 (09-01-23 @ 12:35) (62 - 82)  BP: 171/72 (09-01-23 @ 12:35) (115/62 - 171/72)  RR: 17 (09-01-23 @ 12:35) (16 - 18)  SpO2: 95% (09-01-23 @ 12:35) (94% - 95%)  Wt(kg): --  I&O's Summary    31 Aug 2023 07:01  -  01 Sep 2023 07:00  --------------------------------------------------------  IN: 900 mL / OUT: 950 mL / NET: -50 mL    01 Sep 2023 07:01  -  01 Sep 2023 14:12  --------------------------------------------------------  IN: 450 mL / OUT: 0 mL / NET: 450 mL          REVIEW OF SYSTEMS:  CONSTITUTIONAL: No fever, weight loss, or fatigue  EYES: No eye pain, visual disturbances, or discharge  ENMT:   No sinus or throat pain  NECK: No pain or stiffness  RESPIRATORY: No cough, wheezing, chills or hemoptysis; No shortness of breath  CARDIOVASCULAR: No chest pain, palpitations, dizziness, or leg swelling  GASTROINTESTINAL: No abdominal pain. No nausea, vomiting; No diarrhea or constipation. No melena or hematochezia.  GENITOURINARY: No dysuria, frequency, hematuria, or incontinence  NEUROLOGICAL: No headaches, memory loss, loss of strength, numbness, or tremors  SKIN: No itching, burning, rashes, or lesions   MUSCULOSKELETAL: No joint pain or swelling; No muscle, back, or extremity pain    PHYSICAL EXAM:  Appearance: NAD. VS past 24 hrs -as above   HEENT:   Moist oral mucosa. Conjunctiva clear b/l.   Neck : supple  Respiratory: Lungs CTAB.  Gastrointestinal:  Soft, nontender. No rebound. No rigidity. BS present	  Cardiovascular: RRR ,S1S2 present  Neurologic: Non-focal. Moving all extremities.  Extremities: No edema. No erythema. No calf tenderness.  Skin: No rashes, No ecchymoses, No cyanosis.	  wounds ,skin lesions-See skin assesment flow sheet   LABS:                        11.0   8.63  )-----------( 248      ( 31 Aug 2023 08:40 )             36.3     09-01    142  |  111<H>  |  16  ----------------------------<  98  3.4<L>   |  25  |  0.46<L>    Ca    8.1<L>      01 Sep 2023 07:07    TPro  5.7<L>  /  Alb  2.2<L>  /  TBili  0.5  /  DBili  0.2  /  AST  125<H>  /  ALT  173<H>  /  AlkPhos  91  09-01    CAPILLARY BLOOD GLUCOSE        PT/INR - ( 01 Sep 2023 07:07 )   PT: 18.2 sec;   INR: 1.58 ratio           Urinalysis Basic - ( 01 Sep 2023 07:07 )    Color: x / Appearance: x / SG: x / pH: x  Gluc: 98 mg/dL / Ketone: x  / Bili: x / Urobili: x   Blood: x / Protein: x / Nitrite: x   Leuk Esterase: x / RBC: x / WBC x   Sq Epi: x / Non Sq Epi: x / Bacteria: x        Culture - Blood (collected 24 Aug 2023 06:48)  Source: .Blood Blood  Final Report (29 Aug 2023 10:01):    No growth at 5 days      RADIOLOGY & ADDITIONAL TESTS:   reviewed elctronically  ASSESSMENT/PLAN: 	     PROGRESS NOTE  Patient is a 89y old  Female who presents with a chief complaint of cough (01 Sep 2023 10:43)    Chart and available morning labs /imaging are reviewed electronically , urgent issues addressed . More information  is being added upon completion of rounds , when more information is collected and management discussed with consultants , medical staff and social service/case management on the floor   OVERNIGHT      HPI:  89-year-old female with history of hypothyroidism, A-fib on Eliquis, s/p recent pacemaker for complete heart block brought in by ambulance from [facility] living home for cough for the past 2 weeks.  Associated with generalized weakness and decreased p.o. intake.  No fall or trauma.  Patient states she has had cold symptoms for a while.  Denies fever, chills, chest pain, shortness of breath, abdominal pain, nausea, vomiting, upper or lower extremity weakness or paresthesias. pmd: Dr. Crawley, cards: [facility] Seen by card Dr Reich Admitted  to telemetry unit for monitoring , send 3 sets of cardiac enzymes to rule out acute coronary event, obtain ECHO to evaluate LVEF, cardiology consult  ,continue current management, O2 supply, anticoagulation plan as per cardiology consult Pulm cons requested , antitussives and nebulized bd ordered .Also UTI suspected and started on iv abx , id cons called Palliative care consult requested ,to discuss advance directives and complete MOLST  (20 Aug 2023 15:35)    PAST MEDICAL & SURGICAL HISTORY:  HTN (hypertension)      Afib      Spinal stenosis      PFO (patent foramen ovale)      Degeneration macular      Osteoporosis      History of complete heart block      Hypothyroidism      Cardiac pacemaker          MEDICATIONS  (STANDING):  aMIOdarone    Tablet 200 milliGRAM(s) Oral daily  amLODIPine   Tablet 10 milliGRAM(s) Oral daily  apixaban 2.5 milliGRAM(s) Oral two times a day  artificial  tears Solution 1 Drop(s) Both EYES two times a day  ferrous    sulfate 325 milliGRAM(s) Oral daily  lactobacillus acidophilus 1 Tablet(s) Oral every 12 hours  levothyroxine 75 MICROGram(s) Oral daily  metoprolol tartrate 50 milliGRAM(s) Oral two times a day  multivitamin/minerals 1 Tablet(s) Oral daily  pantoprazole    Tablet 40 milliGRAM(s) Oral daily  polyethylene glycol 3350 17 Gram(s) Oral daily  potassium chloride    Tablet ER 40 milliEquivalent(s) Oral once    MEDICATIONS  (PRN):  acetaminophen     Tablet .. 650 milliGRAM(s) Oral every 6 hours PRN Temp greater or equal to 38C (100.4F), Mild Pain (1 - 3)  albuterol/ipratropium for Nebulization 3 milliLiter(s) Nebulizer every 6 hours PRN Shortness of Breath and/or Wheezing  aluminum hydroxide/magnesium hydroxide/simethicone Suspension 30 milliLiter(s) Oral every 4 hours PRN Dyspepsia  guaiFENesin Oral Liquid (Sugar-Free) 200 milliGRAM(s) Oral every 6 hours PRN Cough  melatonin 3 milliGRAM(s) Oral at bedtime PRN Insomnia  ondansetron Injectable 4 milliGRAM(s) IV Push every 8 hours PRN Nausea and/or Vomiting      OBJECTIVE    T(C): 36.4 (09-01-23 @ 12:35), Max: 36.8 (09-01-23 @ 05:15)  HR: 62 (09-01-23 @ 12:35) (62 - 82)  BP: 171/72 (09-01-23 @ 12:35) (115/62 - 171/72)  RR: 17 (09-01-23 @ 12:35) (16 - 18)  SpO2: 95% (09-01-23 @ 12:35) (94% - 95%)  Wt(kg): --  I&O's Summary    31 Aug 2023 07:01  -  01 Sep 2023 07:00  --------------------------------------------------------  IN: 900 mL / OUT: 950 mL / NET: -50 mL    01 Sep 2023 07:01  -  01 Sep 2023 14:12  --------------------------------------------------------  IN: 450 mL / OUT: 0 mL / NET: 450 mL          REVIEW OF SYSTEMS:  CONSTITUTIONAL: No fever, weight loss, or fatigue  EYES: No eye pain, visual disturbances, or discharge  ENMT:   No sinus or throat pain  NECK: No pain or stiffness  RESPIRATORY: No cough, wheezing, chills or hemoptysis; No shortness of breath  CARDIOVASCULAR: No chest pain, palpitations, dizziness, or leg swelling  GASTROINTESTINAL: No abdominal pain. No nausea, vomiting; No diarrhea or constipation. No melena or hematochezia.  GENITOURINARY: No dysuria, frequency, hematuria, or incontinence  NEUROLOGICAL: No headaches, memory loss, loss of strength, numbness, or tremors  SKIN: No itching, burning, rashes, or lesions   MUSCULOSKELETAL: No joint pain or swelling; No muscle, back, or extremity pain    PHYSICAL EXAM:  Appearance: NAD. VS past 24 hrs -as above   HEENT:   Moist oral mucosa. Conjunctiva clear b/l.   Neck : supple  Respiratory: Lungs CTAB.  Gastrointestinal:  Soft, nontender. No rebound. No rigidity. BS present	  Cardiovascular: RRR ,S1S2 present  Neurologic: Non-focal. Moving all extremities.  Extremities: No edema. No erythema. No calf tenderness.  Skin: No rashes, No ecchymoses, No cyanosis.	  wounds ,skin lesions-See skin assesment flow sheet   LABS:                        11.0   8.63  )-----------( 248      ( 31 Aug 2023 08:40 )             36.3     09-01    142  |  111<H>  |  16  ----------------------------<  98  3.4<L>   |  25  |  0.46<L>    Ca    8.1<L>      01 Sep 2023 07:07    TPro  5.7<L>  /  Alb  2.2<L>  /  TBili  0.5  /  DBili  0.2  /  AST  125<H>  /  ALT  173<H>  /  AlkPhos  91  09-01    CAPILLARY BLOOD GLUCOSE        PT/INR - ( 01 Sep 2023 07:07 )   PT: 18.2 sec;   INR: 1.58 ratio           Urinalysis Basic - ( 01 Sep 2023 07:07 )    Color: x / Appearance: x / SG: x / pH: x  Gluc: 98 mg/dL / Ketone: x  / Bili: x / Urobili: x   Blood: x / Protein: x / Nitrite: x   Leuk Esterase: x / RBC: x / WBC x   Sq Epi: x / Non Sq Epi: x / Bacteria: x        Culture - Blood (collected 24 Aug 2023 06:48)  Source: .Blood Blood  Final Report (29 Aug 2023 10:01):    No growth at 5 days      RADIOLOGY & ADDITIONAL TESTS:   reviewed elctronically  ASSESSMENT/PLAN: 	     PROGRESS NOTE  Patient is a 89y old  Female who presents with a chief complaint of cough (01 Sep 2023 10:43)    Chart and available morning labs /imaging are reviewed electronically , urgent issues addressed . More information  is being added upon completion of rounds , when more information is collected and management discussed with consultants , medical staff and social service/case management on the floor   OVERNIGHT      HPI:  89-year-old female with history of hypothyroidism, A-fib on Eliquis, s/p recent pacemaker for complete heart block brought in by ambulance from [facility] living home for cough for the past 2 weeks.  Associated with generalized weakness and decreased p.o. intake.  No fall or trauma.  Patient states she has had cold symptoms for a while.  Denies fever, chills, chest pain, shortness of breath, abdominal pain, nausea, vomiting, upper or lower extremity weakness or paresthesias. pmd: Dr. Crawley, cards: [facility] Seen by card Dr Reich Admitted  to telemetry unit for monitoring , send 3 sets of cardiac enzymes to rule out acute coronary event, obtain ECHO to evaluate LVEF, cardiology consult  ,continue current management, O2 supply, anticoagulation plan as per cardiology consult Pulm cons requested , antitussives and nebulized bd ordered .Also UTI suspected and started on iv abx , id cons called Palliative care consult requested ,to discuss advance directives and complete MOLST  (20 Aug 2023 15:35)    PAST MEDICAL & SURGICAL HISTORY:  HTN (hypertension)      Afib      Spinal stenosis      PFO (patent foramen ovale)      Degeneration macular      Osteoporosis      History of complete heart block      Hypothyroidism      Cardiac pacemaker          MEDICATIONS  (STANDING):  aMIOdarone    Tablet 200 milliGRAM(s) Oral daily  amLODIPine   Tablet 10 milliGRAM(s) Oral daily  apixaban 2.5 milliGRAM(s) Oral two times a day  artificial  tears Solution 1 Drop(s) Both EYES two times a day  ferrous    sulfate 325 milliGRAM(s) Oral daily  lactobacillus acidophilus 1 Tablet(s) Oral every 12 hours  levothyroxine 75 MICROGram(s) Oral daily  metoprolol tartrate 50 milliGRAM(s) Oral two times a day  multivitamin/minerals 1 Tablet(s) Oral daily  pantoprazole    Tablet 40 milliGRAM(s) Oral daily  polyethylene glycol 3350 17 Gram(s) Oral daily  potassium chloride    Tablet ER 40 milliEquivalent(s) Oral once    MEDICATIONS  (PRN):  acetaminophen     Tablet .. 650 milliGRAM(s) Oral every 6 hours PRN Temp greater or equal to 38C (100.4F), Mild Pain (1 - 3)  albuterol/ipratropium for Nebulization 3 milliLiter(s) Nebulizer every 6 hours PRN Shortness of Breath and/or Wheezing  aluminum hydroxide/magnesium hydroxide/simethicone Suspension 30 milliLiter(s) Oral every 4 hours PRN Dyspepsia  guaiFENesin Oral Liquid (Sugar-Free) 200 milliGRAM(s) Oral every 6 hours PRN Cough  melatonin 3 milliGRAM(s) Oral at bedtime PRN Insomnia  ondansetron Injectable 4 milliGRAM(s) IV Push every 8 hours PRN Nausea and/or Vomiting      OBJECTIVE    T(C): 36.4 (09-01-23 @ 12:35), Max: 36.8 (09-01-23 @ 05:15)  HR: 62 (09-01-23 @ 12:35) (62 - 82)  BP: 171/72 (09-01-23 @ 12:35) (115/62 - 171/72)  RR: 17 (09-01-23 @ 12:35) (16 - 18)  SpO2: 95% (09-01-23 @ 12:35) (94% - 95%)  Wt(kg): --  I&O's Summary    31 Aug 2023 07:01  -  01 Sep 2023 07:00  --------------------------------------------------------  IN: 900 mL / OUT: 950 mL / NET: -50 mL    01 Sep 2023 07:01  -  01 Sep 2023 14:12  --------------------------------------------------------  IN: 450 mL / OUT: 0 mL / NET: 450 mL          REVIEW OF SYSTEMS:  CONSTITUTIONAL: No fever, weight loss, or fatigue  EYES: No eye pain, visual disturbances, or discharge  ENMT:   No sinus or throat pain  NECK: No pain or stiffness  RESPIRATORY: No cough, wheezing, chills or hemoptysis; No shortness of breath  CARDIOVASCULAR: No chest pain, palpitations, dizziness, or leg swelling  GASTROINTESTINAL: No abdominal pain. No nausea, vomiting; No diarrhea or constipation. No melena or hematochezia.  GENITOURINARY: No dysuria, frequency, hematuria, or incontinence  NEUROLOGICAL: No headaches, memory loss, loss of strength, numbness, or tremors  SKIN: No itching, burning, rashes, or lesions   MUSCULOSKELETAL: No joint pain or swelling; No muscle, back, or extremity pain    PHYSICAL EXAM:  Appearance: NAD. VS past 24 hrs -as above   HEENT:   Moist oral mucosa. Conjunctiva clear b/l.   Neck : supple  Respiratory: Lungs CTAB.  Gastrointestinal:  Soft, nontender. No rebound. No rigidity. BS present	  Cardiovascular: RRR ,S1S2 present  Neurologic: Non-focal. Moving all extremities.  Extremities: No edema. No erythema. No calf tenderness.  Skin: No rashes, No ecchymoses, No cyanosis.	  wounds ,skin lesions-See skin assesment flow sheet   LABS:                        11.0   8.63  )-----------( 248      ( 31 Aug 2023 08:40 )             36.3     09-01    142  |  111<H>  |  16  ----------------------------<  98  3.4<L>   |  25  |  0.46<L>    Ca    8.1<L>      01 Sep 2023 07:07    TPro  5.7<L>  /  Alb  2.2<L>  /  TBili  0.5  /  DBili  0.2  /  AST  125<H>  /  ALT  173<H>  /  AlkPhos  91  09-01    CAPILLARY BLOOD GLUCOSE        PT/INR - ( 01 Sep 2023 07:07 )   PT: 18.2 sec;   INR: 1.58 ratio           Urinalysis Basic - ( 01 Sep 2023 07:07 )    Color: x / Appearance: x / SG: x / pH: x  Gluc: 98 mg/dL / Ketone: x  / Bili: x / Urobili: x   Blood: x / Protein: x / Nitrite: x   Leuk Esterase: x / RBC: x / WBC x   Sq Epi: x / Non Sq Epi: x / Bacteria: x        Culture - Blood (collected 24 Aug 2023 06:48)  Source: .Blood Blood  Final Report (29 Aug 2023 10:01):    No growth at 5 days      RADIOLOGY & ADDITIONAL TESTS:   reviewed elctronically  ASSESSMENT/PLAN:

## 2023-09-01 NOTE — PROGRESS NOTE ADULT - PROBLEM SELECTOR PLAN 6
Chest CT showed atelectasis vs infiltrates of lower lobes, No clear evidence of pneumonia.  . The patient has had a nonproductive cough for more than 2 weeks. Would look for other possible sources of this persistent cough: GERD, dysphagia.  . Follow cultures.   . SLP re-evaluation for dysphagia.
serial bmp , monitor electrolytes closely
Chest CT showed atelectasis vs infiltrates of lower lobes, No clear evidence of pneumonia.  . The patient has had a nonproductive cough for more than 2 weeks. Would look for other possible sources of this persistent cough: GERD, dysphagia.  . Follow cultures.   . SLP re-evaluation for dysphagia.
continue home medications
serial bmp , monitor electrolytes closely
continue home medications ,card f/up
continue home medications ,card f/up
Chest CT showed atelectasis vs infiltrates of lower lobes, No clear evidence of pneumonia.  . The patient has had a nonproductive cough for more than 2 weeks. Would look for other possible sources of this persistent cough: GERD, dysphagia.  . Follow cultures.   . SLP re-evaluation for dysphagia.
serial bmp , monitor electrolytes closely
Chest CT showed atelectasis vs infiltrates of lower lobes, No clear evidence of pneumonia.  . The patient has had a nonproductive cough for more than 2 weeks. Would look for other possible sources of this persistent cough: GERD, dysphagia.  . Follow cultures.   . SLP re-evaluation for dysphagia.
serial bmp , monitor electrolytes closely
serial bmp , monitor electrolytes closely

## 2023-09-01 NOTE — PROGRESS NOTE ADULT - PROVIDER SPECIALTY LIST ADULT
Gastroenterology
Infectious Disease
Infectious Disease
Neurology
Pulmonology
Cardiology
Gastroenterology
Pulmonology
Cardiology
Hospitalist
Infectious Disease
Neurology
Neurology
Cardiology
Family Medicine
Gastroenterology
Pulmonology
Cardiology
Neurology
Pulmonology
Gastroenterology
Infectious Disease
Infectious Disease
Pulmonology
Gastroenterology
Gastroenterology
Pulmonology
Gastroenterology
Hospitalist
Gastroenterology
Gastroenterology
Hospitalist
Internal Medicine
Hospitalist
Hospitalist
Internal Medicine
Family Medicine
Hospitalist
Hospitalist

## 2023-09-01 NOTE — SOCIAL WORK PROGRESS NOTE - NSSWPROGRESSNOTE_GEN_ALL_CORE
SW spoke with pt daughter Beth who wishes for pt to be dc to [facility]. Per medical team, pt is cleared for dc. Pt to be dc to [facility] today. P/u via [facility] at 2PM. Daughter Beth made aware of dc and is in agreement. No further SW needs indicated at this time and will remain available as needed. SW spoke with pt daughter Beth who wishes for pt to be dc to [facility]. Per medical team, pt is cleared for dc. Pt to be dc to [facility] today. P/u via [facility] at 2PM. Daughter Beth made aware of dc and is in agreement. No further SW needs indicated at this time and will remain available as needed. SW spoke with pt daughter Beth who wishes for pt to be dc to [facility]. Per medical team, pt is cleared for dc. Pt to be dc to [facility] today. P/u via [facility] at 2PM. Daughter Beth made aware of dc and is in agreement. No further SW needs indicated at this time and will remain available as needed.

## 2023-09-01 NOTE — PROGRESS NOTE ADULT - SUBJECTIVE AND OBJECTIVE BOX
CHIEF COMPLAINT/ REASON FOR VISIT  .. Patient was seen to address the  issue listed under PROBLEM LIST which is located toward bottom of this note     DEACONLUIS ROBERT    PLV 1EAS 105 D1    Allergies    penicillin (Unknown)    Intolerances        PAST MEDICAL & SURGICAL HISTORY:  HTN (hypertension)      Afib      Spinal stenosis      PFO (patent foramen ovale)      Degeneration macular      Osteoporosis      History of complete heart block      Hypothyroidism      Cardiac pacemaker          FAMILY HISTORY:      Home Medications:  Albuterol (Eqv-ProAir HFA) 90 mcg/inh inhalation aerosol: 1 puff(s) inhaled 4 times a day as needed for  cough or wheezing (21 Aug 2023 09:56)  amiodarone 200 mg oral tablet: 1 tab(s) orally once a day (21 Aug 2023 09:55)  clindamycin 1% topical lotion: Apply topically to affected area once a day to face (21 Aug 2023 09:55)  Eliquis 2.5 mg oral tablet: 1 tab(s) orally 2 times a day (21 Aug 2023 09:55)  enalapril 10 mg oral tablet: 1 tab(s) orally once a day (21 Aug 2023 09:55)  ferrous sulfate 325 mg (65 mg elemental iron) oral tablet: 1 tab(s) orally once a day (21 Aug 2023 09:55)  levothyroxine 75 mcg (0.075 mg) oral tablet: 1 tab(s) orally once a day (21 Aug 2023 09:56)  Metoprolol Tartrate 50 mg oral tablet: 1 tab(s) orally 2 times a day (21 Aug 2023 09:56)  Myrbetriq 50 mg oral tablet, extended release: 1 tab(s) orally once a day (21 Aug 2023 09:56)  polyethylene glycol 3350 oral powder for reconstitution: 17 gram(s) orally once a day (21 Aug 2023 09:56)  PreserVision AREDS oral capsule: 1 cap(s) orally once a day (21 Aug 2023 09:56)  Refresh Dry Eye Therapy ophthalmic solution: 1 drop(s) in each eye 4 times a day (21 Aug 2023 09:56)  Tylenol 325 mg oral tablet: 2 tab(s) orally every 4 hours as needed for pain or fever (21 Aug 2023 09:56)      MEDICATIONS  (STANDING):  aMIOdarone    Tablet 200 milliGRAM(s) Oral daily  amLODIPine   Tablet 10 milliGRAM(s) Oral daily  apixaban 2.5 milliGRAM(s) Oral two times a day  artificial  tears Solution 1 Drop(s) Both EYES two times a day  dextrose 5% + sodium chloride 0.45%. 1000 milliLiter(s) (50 mL/Hr) IV Continuous <Continuous>  ferrous    sulfate 325 milliGRAM(s) Oral daily  lactobacillus acidophilus 1 Tablet(s) Oral every 12 hours  levothyroxine 75 MICROGram(s) Oral daily  metoprolol tartrate 50 milliGRAM(s) Oral two times a day  multivitamin/minerals 1 Tablet(s) Oral daily  pantoprazole    Tablet 40 milliGRAM(s) Oral daily  polyethylene glycol 3350 17 Gram(s) Oral daily    MEDICATIONS  (PRN):  acetaminophen     Tablet .. 650 milliGRAM(s) Oral every 6 hours PRN Temp greater or equal to 38C (100.4F), Mild Pain (1 - 3)  albuterol/ipratropium for Nebulization 3 milliLiter(s) Nebulizer every 6 hours PRN Shortness of Breath and/or Wheezing  aluminum hydroxide/magnesium hydroxide/simethicone Suspension 30 milliLiter(s) Oral every 4 hours PRN Dyspepsia  guaiFENesin Oral Liquid (Sugar-Free) 200 milliGRAM(s) Oral every 6 hours PRN Cough  melatonin 3 milliGRAM(s) Oral at bedtime PRN Insomnia  ondansetron Injectable 4 milliGRAM(s) IV Push every 8 hours PRN Nausea and/or Vomiting      Diet, Pureed:   Supplement Feeding Modality:  Oral  Health Shake Cans or Servings Per Day:  1       Frequency:  Two Times a day (08-29-23 @ 14:36) [Active]          Vital Signs Last 24 Hrs  T(C): 36.8 (01 Sep 2023 05:15), Max: 36.8 (01 Sep 2023 05:15)  T(F): 98.2 (01 Sep 2023 05:15), Max: 98.2 (01 Sep 2023 05:15)  HR: 65 (01 Sep 2023 05:15) (65 - 90)  BP: 115/62 (01 Sep 2023 05:15) (115/62 - 158/82)  BP(mean): --  RR: 16 (01 Sep 2023 05:15) (16 - 18)  SpO2: 95% (01 Sep 2023 05:15) (94% - 95%)    Parameters below as of 01 Sep 2023 05:15  Patient On (Oxygen Delivery Method): room air          08-30-23 @ 07:01  -  08-31-23 @ 07:00  --------------------------------------------------------  IN: 1110 mL / OUT: 0 mL / NET: 1110 mL    08-31-23 @ 07:01  -  09-01-23 @ 06:47  --------------------------------------------------------  IN: 600 mL / OUT: 250 mL / NET: 350 mL              LABS:                        11.0   8.63  )-----------( 248      ( 31 Aug 2023 08:40 )             36.3     08-31    142  |  110<H>  |  23  ----------------------------<  120<H>  3.7   |  27  |  0.62    Ca    8.2<L>      31 Aug 2023 08:40    TPro  5.7<L>  /  Alb  2.1<L>  /  TBili  0.5  /  DBili  0.2  /  AST  155<H>  /  ALT  204<H>  /  AlkPhos  92  08-31    PT/INR - ( 31 Aug 2023 08:40 )   PT: 20.7 sec;   INR: 1.80 ratio           Urinalysis Basic - ( 31 Aug 2023 08:40 )    Color: x / Appearance: x / SG: x / pH: x  Gluc: 120 mg/dL / Ketone: x  / Bili: x / Urobili: x   Blood: x / Protein: x / Nitrite: x   Leuk Esterase: x / RBC: x / WBC x   Sq Epi: x / Non Sq Epi: x / Bacteria: x            WBC:  WBC Count: 8.63 K/uL (08-31 @ 08:40)  WBC Count: 11.89 K/uL (08-30 @ 06:27)  WBC Count: 11.52 K/uL (08-29 @ 10:13)  WBC Count: 11.95 K/uL (08-28 @ 08:22)      MICROBIOLOGY:  RECENT CULTURES:              PT/INR - ( 31 Aug 2023 08:40 )   PT: 20.7 sec;   INR: 1.80 ratio             Sodium:  Sodium: 142 mmol/L (08-31 @ 08:40)  Sodium: 139 mmol/L (08-30 @ 06:27)  Sodium: 139 mmol/L (08-29 @ 06:54)  Sodium: 141 mmol/L (08-28 @ 08:22)      0.62 mg/dL 08-31 @ 08:40  0.68 mg/dL 08-30 @ 06:27  0.78 mg/dL 08-29 @ 22:20  0.60 mg/dL 08-29 @ 06:54  0.52 mg/dL 08-28 @ 08:22      Hemoglobin:  Hemoglobin: 11.0 g/dL (08-31 @ 08:40)  Hemoglobin: 12.3 g/dL (08-30 @ 06:27)  Hemoglobin: 11.3 g/dL (08-29 @ 10:13)  Hemoglobin: 11.4 g/dL (08-28 @ 08:22)      Platelets: Platelet Count - Automated: 248 K/uL (08-31 @ 08:40)  Platelet Count - Automated: 383 K/uL (08-30 @ 06:27)  Platelet Count - Automated: 340 K/uL (08-29 @ 10:13)  Platelet Count - Automated: 337 K/uL (08-28 @ 08:22)      LIVER FUNCTIONS - ( 31 Aug 2023 08:40 )  Alb: 2.1 g/dL / Pro: 5.7 g/dL / ALK PHOS: 92 U/L / ALT: 204 U/L / AST: 155 U/L / GGT: x             Urinalysis Basic - ( 31 Aug 2023 08:40 )    Color: x / Appearance: x / SG: x / pH: x  Gluc: 120 mg/dL / Ketone: x  / Bili: x / Urobili: x   Blood: x / Protein: x / Nitrite: x   Leuk Esterase: x / RBC: x / WBC x   Sq Epi: x / Non Sq Epi: x / Bacteria: x        RADIOLOGY & ADDITIONAL STUDIES:      MICROBIOLOGY:  RECENT CULTURES:

## 2023-09-01 NOTE — PROGRESS NOTE ADULT - PROBLEM SELECTOR PLAN 2
ruled in for SEPSIS ,POA likely 2/2 to UTI ,poa -ID cons ,follow septic workup ,iv abx - invanz
ID cons , follow urine cx , continue cefepime Source: Clean Catch Clean Catch (Midstream)  Preliminary Report (22 Aug 2023 00:01):    >100,000 CFU/ml Escherichia coli
ruled in for SEPSIS ,POA likely 2/2 to UTI ,poa -ID cons ,follow septic workup ,iv abx - invanz
ID cons , follow urine cx , continue cefepime
as per ID cons and pulm cons
ruled in for SEPSIS ,POA likely 2/2 to UTI ,poa -ID cons ,follow septic workup ,iv abx
ruled in for SEPSIS ,POA likely 2/2 to UTI ,poa -ID cons ,follow septic workup ,iv abx
ruled in for SEPSIS ,POA likely 2/2 to UTI ,poa -ID cons ,follow septic workup ,iv abx - invanz
as per ID cons and pulm cons
as per ID cons and pulm cons
ID cons , follow urine cx , continue cefepime Source: Clean Catch Clean Catch (Midstream)  Preliminary Report (22 Aug 2023 00:01):    >100,000 CFU/ml Escherichia coli  ID eval
as per ID cons and pulm cons

## 2023-09-01 NOTE — PROGRESS NOTE ADULT - NUTRITIONAL ASSESSMENT
This patient has been assessed with a concern for Malnutrition and has been determined to have a diagnosis/diagnoses of Moderate protein-calorie malnutrition and Underweight (BMI < 19).    This patient is being managed with:   Diet Soft and Bite Sized-  Entered: Aug 20 2023  2:18PM  
This patient has been assessed with a concern for Malnutrition and has been determined to have a diagnosis/diagnoses of Moderate protein-calorie malnutrition and Underweight (BMI < 19).    This patient is being managed with:   Diet Pureed-  Supplement Feeding Modality:  Oral  Health Shake Cans or Servings Per Day:  1       Frequency:  Two Times a day  Entered: Aug 29 2023  2:36PM  
This patient has been assessed with a concern for Malnutrition and has been determined to have a diagnosis/diagnoses of Moderate protein-calorie malnutrition and Underweight (BMI < 19).    This patient is being managed with:   Diet Soft and Bite Sized-  Entered: Aug 20 2023  2:18PM    The following pending diet order is being considered for treatment of Moderate protein-calorie malnutrition and Underweight (BMI < 19):  Diet Pureed-  Supplement Feeding Modality:  Oral  Health Shake Cans or Servings Per Day:  1       Frequency:  Two Times a day  Entered: Aug 29 2023  2:36PM  
This patient has been assessed with a concern for Malnutrition and has been determined to have a diagnosis/diagnoses of Moderate protein-calorie malnutrition and Underweight (BMI < 19).    This patient is being managed with:   Diet Soft and Bite Sized-  Entered: Aug 20 2023  2:18PM  
This patient has been assessed with a concern for Malnutrition and has been determined to have a diagnosis/diagnoses of Moderate protein-calorie malnutrition and Underweight (BMI < 19).    This patient is being managed with:   Diet Pureed-  Supplement Feeding Modality:  Oral  Health Shake Cans or Servings Per Day:  1       Frequency:  Two Times a day  Entered: Aug 29 2023  2:36PM  
This patient has been assessed with a concern for Malnutrition and has been determined to have a diagnosis/diagnoses of Moderate protein-calorie malnutrition and Underweight (BMI < 19).    This patient is being managed with:   Diet Soft and Bite Sized-  Entered: Aug 20 2023  2:18PM  
This patient has been assessed with a concern for Malnutrition and has been determined to have a diagnosis/diagnoses of Moderate protein-calorie malnutrition and Underweight (BMI < 19).    This patient is being managed with:   Diet Pureed-  Supplement Feeding Modality:  Oral  Health Shake Cans or Servings Per Day:  1       Frequency:  Two Times a day  Entered: Aug 29 2023  2:36PM  
This patient has been assessed with a concern for Malnutrition and has been determined to have a diagnosis/diagnoses of Moderate protein-calorie malnutrition and Underweight (BMI < 19).    This patient is being managed with:   Diet Soft and Bite Sized-  Entered: Aug 20 2023  2:18PM

## 2023-09-01 NOTE — PROGRESS NOTE ADULT - PROBLEM SELECTOR PROBLEM 8
Esophageal dilatation
Hypothyroidism
S/P placement of cardiac pacemaker
Esophageal dilatation
S/P placement of cardiac pacemaker
Hypothyroidism
Esophageal dilatation

## 2023-09-01 NOTE — DISCHARGE NOTE NURSING/CASE MANAGEMENT/SOCIAL WORK - NSDCPEFALRISK_GEN_ALL_CORE
For information on Fall & Injury Prevention, visit: [email or web address]/[email or web address]-[email or web address]-mobility OR  [email or web address].[email or web address]/[email or web address]-tips-[email or web address]-avoid-injury OR  https://www.cdc.gov/steadi/patient.html For information on Fall & Injury Prevention, visit: https://www.[facility]/news/fall-prevention-protects-[email or web address]-maintains-health-and-mobility OR  https://www.[facility]/news/fall-prevention-tips-to-avoid-injury OR  https://www.cdc.gov/steadi/patient.html For information on Fall & Injury Prevention, visit: [email or web address]://www.[email or web address]/news/fall-prevention-protects-[email or web address]-maintains-health-[email or web address]-mobility OR  https://www.[email or web address]/news/fall-prevention-tips-to-avoid-injury OR  https://www.cdc.gov/steadi/patient.html

## 2023-09-01 NOTE — PROGRESS NOTE ADULT - ASSESSMENT
89-year-old female with history of hypothyroidism, A-fib on Eliquis, s/p recent pacemaker for complete heart block brought in by ambulance from [facility] assisted living home for cough for the past 2 weeks.  Associated with generalized weakness and decreased p.o. intake.  No fall or trauma.  Patient states she has had cold symptoms for a while.  Denies fever, chills, chest pain, shortness of breath, abdominal pain, nausea, vomiting, upper or lower extremity weakness or paresthesias. pmd: Dr. Crawley, cards: [facility] Seen by card Dr Reich Admitted  to telemetry unit for monitoring , send 3 sets of cardiac enzymes to rule out acute coronary event, obtain ECHO to evaluate LVEF, cardiology consult  ,continue current management, O2 supply, anticoagulation plan as per cardiology consult Pulm cons requested , antitussives and nebulized bd ordered .Also UTI suspected and started on iv abx , id cons called Palliative care consult requested ,to discuss advance directives and complete MOLST  89-year-old female with history of hypothyroidism, A-fib on Eliquis, s/p recent pacemaker for complete heart block brought in by ambulance from [facility] assisted living home for cough for the past 2 weeks.  Associated with generalized weakness and decreased p.o. intake.  No fall or trauma.  Patient states she has had cold symptoms for a while.  Denies fever, chills, chest pain, shortness of breath, abdominal pain, nausea, vomiting, upper or lower extremity weakness or paresthesias. pmd: Dr. Crawley, cards: [facility] Seen by card Dr Reich Admitted  to telemetry unit for monitoring , send 3 sets of cardiac enzymes to rule out acute coronary event, obtain ECHO to evaluate LVEF, cardiology consult  ,continue current management, O2 supply, anticoagulation plan as per cardiology consult Pulm cons requested , antitussives and nebulized bd ordered .Also UTI suspected and started on iv abx , id cons called Palliative care consult requested ,to discuss advance directives and complete MOLST  89-year-old female with history of hypothyroidism, A-fib on Eliquis, s/p recent pacemaker for complete heart block brought in by ambulance from [facility] assisted living home for cough for the past 2 weeks.  Associated with generalized weakness and decreased p.o. intake.  No fall or trauma.  Patient states she has had cold symptoms for a while.  Denies fever, chills, chest pain, shortness of breath, abdominal pain, nausea, vomiting, upper or lower extremity weakness or paresthesias. pmd: Dr. Crawley, cards: [facility] Seen by card Dr Reich Admitted  to telemetry unit for monitoring , send 3 sets of cardiac enzymes to rule out acute coronary event, obtain ECHO to evaluate LVEF, cardiology consult  ,continue current management, O2 supply, anticoagulation plan as per cardiology consult Pulm cons requested , antitussives and nebulized bd ordered .Also UTI suspected and started on iv abx , id cons called Palliative care consult requested ,to discuss advance directives and complete MOLST

## 2023-09-01 NOTE — PROGRESS NOTE ADULT - PROBLEM SELECTOR PLAN 4
likely phlebitis 2/2 to post IV infiltration site  -RUE elevation ,warm compress , Tylenol , xrays wrist/hand
improving likely phlebitis 2/2 to post IV infiltration site  -RUE elevation ,warm compress , Tylenol , xrays wrist/hand .Xryay showed Age-indeterminate triquetrum fracture on dorsum of wrist. Spoke to ortho team 08/30 ,consult requested 08/31.23 -Spoke to orhto team several times today , also hand surgeon Dr Lipscomb and ortho attending Dr Briscoe today -patient may be discharged with RUE splint /brace and f/up with hand specialist in at he office for possible cast .Likely will require MARY GRACE since NWB RUE .D/w 
Chest CT showed atelectasis vs infiltrates of lower lobes, No clear evidence of pneumonia.  . The patient has had a nonproductive cough for more than 2 weeks. Would look for other possible sources of this persistent cough: GERD, dysphagia.  . Follow cultures.   . SLP re-evaluation for dysphagia.
improving likely phlebitis 2/2 to post IV infiltration site  -RUE elevation ,warm compress , Tylenol , xrays wrist/hand .Xryay showed Age-indeterminate triquetrum fracture on dorsum of wrist. Spoke to ortho team 08/30 ,consult requested 08/31.23 -Spoke to orhto team several times today , also hand surgeon Dr Lipscomb and ortho attending Dr Briscoe today -patient may be discharged with RUE splint /brace and f/up with hand specialist in at he office for possible cast .Likely will require MARY GRACE since NWB RUE .D/w 
improving likely phlebitis 2/2 to post IV infiltration site  -RUE elevation ,warm compress , Tylenol , xrays wrist/hand .Xryay showed Age-indeterminate triquetrum fracture on dorsum of wrist. Spoke to ortho team 08/30 ,consult requested
continue home medications
Chest CT showed atelectasis vs infiltrates of lower lobes, No clear evidence of pneumonia.  . The patient has had a nonproductive cough for more than 2 weeks. Would look for other possible sources of this persistent cough: GERD, dysphagia.  . Levaquin was changed to IV Cefepime due to prolonged QTc and Zithromax was discontinued .    . Continue IV Cefepime for now,  . Follow cultures.   . SLP re-evaluation for dysphagia.
improving likely phlebitis 2/2 to post IV infiltration site  -RUE elevation ,warm compress , Tylenol , xrays wrist/hand .Xryay showed Age-indeterminate triquetrum fracture on dorsum of wrist.
continue home medications
Chest CT showed atelectasis vs infiltrates of lower lobes, No clear evidence of pneumonia.  . The patient has had a nonproductive cough for more than 2 weeks. Would look for other possible sources of this persistent cough: GERD, dysphagia.  . Follow cultures.   . SLP re-evaluation for dysphagia.
Chest CT showed atelectasis vs infiltrates of lower lobes, No clear evidence of pneumonia.  . The patient has had a nonproductive cough for more than 2 weeks. Would look for other possible sources of this persistent cough: GERD, dysphagia.  . Levaquin was changed to IV Cefepime due to prolonged QTc and Zithromax was discontinued .    . Continue IV Cefepime for now,  . Follow cultures.   . SLP re-evaluation for dysphagia.
Chest CT showed atelectasis vs infiltrates of lower lobes, No clear evidence of pneumonia.  . The patient has had a nonproductive cough for more than 2 weeks. Would look for other possible sources of this persistent cough: GERD, dysphagia.  . Follow cultures.   . SLP re-evaluation for dysphagia.

## 2023-09-01 NOTE — PROGRESS NOTE ADULT - SUBJECTIVE AND OBJECTIVE BOX
Neurology follow up note    DEACON [id number]      Interval History:    Patient feels ok no new complaints.    Allergies    penicillin (Unknown)    Intolerances        MEDICATIONS    acetaminophen     Tablet .. 650 milliGRAM(s) Oral every 6 hours PRN  albuterol/ipratropium for Nebulization 3 milliLiter(s) Nebulizer every 6 hours PRN  aluminum hydroxide/magnesium hydroxide/simethicone Suspension 30 milliLiter(s) Oral every 4 hours PRN  aMIOdarone    Tablet 200 milliGRAM(s) Oral daily  amLODIPine   Tablet 10 milliGRAM(s) Oral daily  apixaban 2.5 milliGRAM(s) Oral two times a day  artificial  tears Solution 1 Drop(s) Both EYES two times a day  dextrose 5% + sodium chloride 0.45%. 1000 milliLiter(s) IV Continuous <Continuous>  ferrous    sulfate 325 milliGRAM(s) Oral daily  guaiFENesin Oral Liquid (Sugar-Free) 200 milliGRAM(s) Oral every 6 hours PRN  lactobacillus acidophilus 1 Tablet(s) Oral every 12 hours  levothyroxine 75 MICROGram(s) Oral daily  melatonin 3 milliGRAM(s) Oral at bedtime PRN  metoprolol tartrate 50 milliGRAM(s) Oral two times a day  multivitamin/minerals 1 Tablet(s) Oral daily  ondansetron Injectable 4 milliGRAM(s) IV Push every 8 hours PRN  pantoprazole    Tablet 40 milliGRAM(s) Oral daily  polyethylene glycol 3350 17 Gram(s) Oral daily              Vital Signs Last 24 Hrs  T(C): 36.8 (01 Sep 2023 05:15), Max: 36.8 (01 Sep 2023 05:15)  T(F): 98.2 (01 Sep 2023 05:15), Max: 98.2 (01 Sep 2023 05:15)  HR: 65 (01 Sep 2023 05:15) (65 - 90)  BP: 115/62 (01 Sep 2023 05:15) (115/62 - 158/82)  BP(mean): --  RR: 16 (01 Sep 2023 05:15) (16 - 18)  SpO2: 95% (01 Sep 2023 05:15) (94% - 95%)    Parameters below as of 01 Sep 2023 05:15  Patient On (Oxygen Delivery Method): room air        REVIEW OF SYSTEMS:  Very limited secondary to the patient has underlying cognitive impairment, confusion feels ok     PHYSICAL EXAMINATION: .  HEENT:  Head:  Normocephalic, atraumatic.  Eyes:  No scleral icterus.  Ears:  Hard to evaluate, but from pervious note was intact.  NECK:  Supple.  RESPIRATORY:  Decreased air entry bilaterally.  CARDIOVASCULAR:  S1 and S2 heard.  ABDOMEN:  Soft and nontender.  EXTREMITIES:  No clubbing or cyanosis was noted.       NEUROLOGIC:  The patient is awake and alert.  Location was hospital.  tells dtr name  Was able to name simple objects.    Speech was fluent.  Smile symmetric.  Motor:  Bilateral upper 3/5, left lower 3-/5, right lower 2/5, but does have a history of right leg weakness as per my conversation with daughter, history of falls, bed bound, and hip revision.      LABS:  CBC Full  -  ( 31 Aug 2023 08:40 )  WBC Count : 8.63 K/uL  RBC Count : 3.81 M/uL  Hemoglobin : 11.0 g/dL  Hematocrit : 36.3 %  Platelet Count - Automated : 248 K/uL  Mean Cell Volume : 95.3 fl  Mean Cell Hemoglobin : 28.9 pg  Mean Cell Hemoglobin Concentration : 30.3 gm/dL  Auto Neutrophil # : x  Auto Lymphocyte # : x  Auto Monocyte # : x  Auto Eosinophil # : x  Auto Basophil # : x  Auto Neutrophil % : x  Auto Lymphocyte % : x  Auto Monocyte % : x  Auto Eosinophil % : x  Auto Basophil % : x    Urinalysis Basic - ( 01 Sep 2023 07:07 )    Color: x / Appearance: x / SG: x / pH: x  Gluc: 98 mg/dL / Ketone: x  / Bili: x / Urobili: x   Blood: x / Protein: x / Nitrite: x   Leuk Esterase: x / RBC: x / WBC x   Sq Epi: x / Non Sq Epi: x / Bacteria: x      09-01    142  |  111<H>  |  16  ----------------------------<  98  3.4<L>   |  25  |  0.46<L>    Ca    8.1<L>      01 Sep 2023 07:07    TPro  5.7<L>  /  Alb  2.2<L>  /  TBili  0.5  /  DBili  0.2  /  AST  125<H>  /  ALT  173<H>  /  AlkPhos  91  09-01    Hemoglobin A1C:     LIVER FUNCTIONS - ( 01 Sep 2023 07:07 )  Alb: 2.2 g/dL / Pro: 5.7 g/dL / ALK PHOS: 91 U/L / ALT: 173 U/L / AST: 125 U/L / GGT: x           Vitamin B12   PT/INR - ( 01 Sep 2023 07:07 )   PT: 18.2 sec;   INR: 1.58 ratio               RADIOLOGY      ANALYSIS AND PLAN:  A 89-year-old with episode of altered mental status.  For episode of altered mental status, I suspect most likely metabolic encephalopathy secondary to underlying infectious-type process, possibly urinary tract infection, also with positive blood cultures along with dementia, mild cognitive impairment becoming more prominent in hospital setting.  Examination is severely limited to evaluate for new cerebrovascular accident.  We will recommend antibiotics as needed.  Sepsis workup as needed.  For history of underlying mild cognitive impairment versus subtle dementia, appears to be somewhat progressive, would recommend supportive therapy for now.  For history of atrial fibrillation, risks versus benefits of any type of blood-thinning medications, Cardiology medical followup.  For hypertension, monitor systolic blood pressure.  For hypothyroidism, continue the patient on Synthroid.  wax and weaning of mental status most probably h/o infection hospital setting  along with possible COVID  more awake and interactive today 8/31     spoke to daughter, Beth, at [phone] 8/31     45 minutes of time was spent with the patient, plan of care, reviewing data, with greater than 50% of the visit was spent counseling and/or coordinating care with multidisciplinary healthcare team     Neurology follow up note    DEACON [id number]      Interval History:    Patient feels ok no new complaints.    Allergies    penicillin (Unknown)    Intolerances        MEDICATIONS    acetaminophen     Tablet .. 650 milliGRAM(s) Oral every 6 hours PRN  albuterol/ipratropium for Nebulization 3 milliLiter(s) Nebulizer every 6 hours PRN  aluminum hydroxide/magnesium hydroxide/simethicone Suspension 30 milliLiter(s) Oral every 4 hours PRN  aMIOdarone    Tablet 200 milliGRAM(s) Oral daily  amLODIPine   Tablet 10 milliGRAM(s) Oral daily  apixaban 2.5 milliGRAM(s) Oral two times a day  artificial  tears Solution 1 Drop(s) Both EYES two times a day  dextrose 5% + sodium chloride 0.45%. 1000 milliLiter(s) IV Continuous <Continuous>  ferrous    sulfate 325 milliGRAM(s) Oral daily  guaiFENesin Oral Liquid (Sugar-Free) 200 milliGRAM(s) Oral every 6 hours PRN  lactobacillus acidophilus 1 Tablet(s) Oral every 12 hours  levothyroxine 75 MICROGram(s) Oral daily  melatonin 3 milliGRAM(s) Oral at bedtime PRN  metoprolol tartrate 50 milliGRAM(s) Oral two times a day  multivitamin/minerals 1 Tablet(s) Oral daily  ondansetron Injectable 4 milliGRAM(s) IV Push every 8 hours PRN  pantoprazole    Tablet 40 milliGRAM(s) Oral daily  polyethylene glycol 3350 17 Gram(s) Oral daily              Vital Signs Last 24 Hrs  T(C): 36.8 (01 Sep 2023 05:15), Max: 36.8 (01 Sep 2023 05:15)  T(F): 98.2 (01 Sep 2023 05:15), Max: 98.2 (01 Sep 2023 05:15)  HR: 65 (01 Sep 2023 05:15) (65 - 90)  BP: 115/62 (01 Sep 2023 05:15) (115/62 - 158/82)  BP(mean): --  RR: 16 (01 Sep 2023 05:15) (16 - 18)  SpO2: 95% (01 Sep 2023 05:15) (94% - 95%)    Parameters below as of 01 Sep 2023 05:15  Patient On (Oxygen Delivery Method): room air        REVIEW OF SYSTEMS:  Very limited secondary to the patient has underlying cognitive impairment, confusion feels ok     PHYSICAL EXAMINATION: .  HEENT:  Head:  Normocephalic, atraumatic.  Eyes:  No scleral icterus.  Ears:  Hard to evaluate, but from pervious note was intact.  NECK:  Supple.  RESPIRATORY:  Decreased air entry bilaterally.  CARDIOVASCULAR:  S1 and S2 heard.  ABDOMEN:  Soft and nontender.  EXTREMITIES:  No clubbing or cyanosis was noted.       NEUROLOGIC:  The patient is awake and alert.  Location was hospital.  tells dtr name  Was able to name simple objects.    Speech was fluent.  Smile symmetric.  Motor:  Bilateral upper 3/5, left lower 3-/5, right lower 2/5, but does have a history of right leg weakness as per my conversation with daughter, history of falls, bed bound, and hip revision.      LABS:  CBC Full  -  ( 31 Aug 2023 08:40 )  WBC Count : 8.63 K/uL  RBC Count : 3.81 M/uL  Hemoglobin : 11.0 g/dL  Hematocrit : 36.3 %  Platelet Count - Automated : 248 K/uL  Mean Cell Volume : 95.3 fl  Mean Cell Hemoglobin : 28.9 pg  Mean Cell Hemoglobin Concentration : 30.3 gm/dL  Auto Neutrophil # : x  Auto Lymphocyte # : x  Auto Monocyte # : x  Auto Eosinophil # : x  Auto Basophil # : x  Auto Neutrophil % : x  Auto Lymphocyte % : x  Auto Monocyte % : x  Auto Eosinophil % : x  Auto Basophil % : x    Urinalysis Basic - ( 01 Sep 2023 07:07 )    Color: x / Appearance: x / SG: x / pH: x  Gluc: 98 mg/dL / Ketone: x  / Bili: x / Urobili: x   Blood: x / Protein: x / Nitrite: x   Leuk Esterase: x / RBC: x / WBC x   Sq Epi: x / Non Sq Epi: x / Bacteria: x      09-01    142  |  111<H>  |  16  ----------------------------<  98  3.4<L>   |  25  |  0.46<L>    Ca    8.1<L>      01 Sep 2023 07:07    TPro  5.7<L>  /  Alb  2.2<L>  /  TBili  0.5  /  DBili  0.2  /  AST  125<H>  /  ALT  173<H>  /  AlkPhos  91  09-01    Hemoglobin A1C:     LIVER FUNCTIONS - ( 01 Sep 2023 07:07 )  Alb: 2.2 g/dL / Pro: 5.7 g/dL / ALK PHOS: 91 U/L / ALT: 173 U/L / AST: 125 U/L / GGT: x           Vitamin B12   PT/INR - ( 01 Sep 2023 07:07 )   PT: 18.2 sec;   INR: 1.58 ratio               RADIOLOGY      ANALYSIS AND PLAN:  A 89-year-old with episode of altered mental status.  For episode of altered mental status, I suspect most likely metabolic encephalopathy secondary to underlying infectious-type process, possibly urinary tract infection, also with positive blood cultures along with dementia, mild cognitive impairment becoming more prominent in hospital setting.  Examination is severely limited to evaluate for new cerebrovascular accident.  We will recommend antibiotics as needed.  Sepsis workup as needed.  For history of underlying mild cognitive impairment versus subtle dementia, appears to be somewhat progressive, would recommend supportive therapy for now.  For history of atrial fibrillation, risks versus benefits of any type of blood-thinning medications, Cardiology medical followup.  For hypertension, monitor systolic blood pressure.  For hypothyroidism, continue the patient on Synthroid.  wax and weaning of mental status most probably h/o infection hospital setting  along with possible COVID  more awake and interactive today 8/31     spoke to daughter, Beth, at [phone] 8/31     45 minutes of time was spent with the patient, plan of care, reviewing data, with greater than 50% of the visit was spent counseling and/or coordinating care with multidisciplinary healthcare team     Neurology follow up note    DEACON [id number]      Interval History:    Patient feels ok no new complaints.    Allergies    penicillin (Unknown)    Intolerances        MEDICATIONS    acetaminophen     Tablet .. 650 milliGRAM(s) Oral every 6 hours PRN  albuterol/ipratropium for Nebulization 3 milliLiter(s) Nebulizer every 6 hours PRN  aluminum hydroxide/magnesium hydroxide/simethicone Suspension 30 milliLiter(s) Oral every 4 hours PRN  aMIOdarone    Tablet 200 milliGRAM(s) Oral daily  amLODIPine   Tablet 10 milliGRAM(s) Oral daily  apixaban 2.5 milliGRAM(s) Oral two times a day  artificial  tears Solution 1 Drop(s) Both EYES two times a day  dextrose 5% + sodium chloride 0.45%. 1000 milliLiter(s) IV Continuous <Continuous>  ferrous    sulfate 325 milliGRAM(s) Oral daily  guaiFENesin Oral Liquid (Sugar-Free) 200 milliGRAM(s) Oral every 6 hours PRN  lactobacillus acidophilus 1 Tablet(s) Oral every 12 hours  levothyroxine 75 MICROGram(s) Oral daily  melatonin 3 milliGRAM(s) Oral at bedtime PRN  metoprolol tartrate 50 milliGRAM(s) Oral two times a day  multivitamin/minerals 1 Tablet(s) Oral daily  ondansetron Injectable 4 milliGRAM(s) IV Push every 8 hours PRN  pantoprazole    Tablet 40 milliGRAM(s) Oral daily  polyethylene glycol 3350 17 Gram(s) Oral daily              Vital Signs Last 24 Hrs  T(C): 36.8 (01 Sep 2023 05:15), Max: 36.8 (01 Sep 2023 05:15)  T(F): 98.2 (01 Sep 2023 05:15), Max: 98.2 (01 Sep 2023 05:15)  HR: 65 (01 Sep 2023 05:15) (65 - 90)  BP: 115/62 (01 Sep 2023 05:15) (115/62 - 158/82)  BP(mean): --  RR: 16 (01 Sep 2023 05:15) (16 - 18)  SpO2: 95% (01 Sep 2023 05:15) (94% - 95%)    Parameters below as of 01 Sep 2023 05:15  Patient On (Oxygen Delivery Method): room air        REVIEW OF SYSTEMS:  Very limited secondary to the patient has underlying cognitive impairment, confusion feels ok     PHYSICAL EXAMINATION: .  HEENT:  Head:  Normocephalic, atraumatic.  Eyes:  No scleral icterus.  Ears:  Hard to evaluate, but from pervious note was intact.  NECK:  Supple.  RESPIRATORY:  Decreased air entry bilaterally.  CARDIOVASCULAR:  S1 and S2 heard.  ABDOMEN:  Soft and nontender.  EXTREMITIES:  No clubbing or cyanosis was noted.       NEUROLOGIC:  The patient is awake and alert.  Location was hospital.  tells dtr name  Was able to name simple objects.    Speech was fluent.  Smile symmetric.  Motor:  Bilateral upper 3/5, left lower 3-/5, right lower 2/5, but does have a history of right leg weakness as per my conversation with daughter, history of falls, bed bound, and hip revision.      LABS:  CBC Full  -  ( 31 Aug 2023 08:40 )  WBC Count : 8.63 K/uL  RBC Count : 3.81 M/uL  Hemoglobin : 11.0 g/dL  Hematocrit : 36.3 %  Platelet Count - Automated : 248 K/uL  Mean Cell Volume : 95.3 fl  Mean Cell Hemoglobin : 28.9 pg  Mean Cell Hemoglobin Concentration : 30.3 gm/dL  Auto Neutrophil # : x  Auto Lymphocyte # : x  Auto Monocyte # : x  Auto Eosinophil # : x  Auto Basophil # : x  Auto Neutrophil % : x  Auto Lymphocyte % : x  Auto Monocyte % : x  Auto Eosinophil % : x  Auto Basophil % : x    Urinalysis Basic - ( 01 Sep 2023 07:07 )    Color: x / Appearance: x / SG: x / pH: x  Gluc: 98 mg/dL / Ketone: x  / Bili: x / Urobili: x   Blood: x / Protein: x / Nitrite: x   Leuk Esterase: x / RBC: x / WBC x   Sq Epi: x / Non Sq Epi: x / Bacteria: x      09-01    142  |  111<H>  |  16  ----------------------------<  98  3.4<L>   |  25  |  0.46<L>    Ca    8.1<L>      01 Sep 2023 07:07    TPro  5.7<L>  /  Alb  2.2<L>  /  TBili  0.5  /  DBili  0.2  /  AST  125<H>  /  ALT  173<H>  /  AlkPhos  91  09-01    Hemoglobin A1C:     LIVER FUNCTIONS - ( 01 Sep 2023 07:07 )  Alb: 2.2 g/dL / Pro: 5.7 g/dL / ALK PHOS: 91 U/L / ALT: 173 U/L / AST: 125 U/L / GGT: x           Vitamin B12   PT/INR - ( 01 Sep 2023 07:07 )   PT: 18.2 sec;   INR: 1.58 ratio               RADIOLOGY      ANALYSIS AND PLAN:  A 89-year-old with episode of altered mental status.  For episode of altered mental status, I suspect most likely metabolic encephalopathy secondary to underlying infectious-type process, possibly urinary tract infection, also with positive blood cultures along with dementia, mild cognitive impairment becoming more prominent in hospital setting.  Examination is severely limited to evaluate for new cerebrovascular accident.  We will recommend antibiotics as needed.  Sepsis workup as needed.  For history of underlying mild cognitive impairment versus subtle dementia, appears to be somewhat progressive, would recommend supportive therapy for now.  For history of atrial fibrillation, risks versus benefits of any type of blood-thinning medications, Cardiology medical followup.  For hypertension, monitor systolic blood pressure.  For hypothyroidism, continue the patient on Synthroid.  wax and weaning of mental status most probably h/o infection hospital setting  along with possible COVID  more awake and interactive today 8/31     spoke to daughter, Beth, at [phone] 8/31     45 minutes of time was spent with the patient, plan of care, reviewing data, with greater than 50% of the visit was spent counseling and/or coordinating care with multidisciplinary healthcare team     Neurology follow up note    DEACON [id number]      Interval History:    Patient feels ok no new complaints.    Allergies    penicillin (Unknown)    Intolerances        MEDICATIONS    acetaminophen     Tablet .. 650 milliGRAM(s) Oral every 6 hours PRN  albuterol/ipratropium for Nebulization 3 milliLiter(s) Nebulizer every 6 hours PRN  aluminum hydroxide/magnesium hydroxide/simethicone Suspension 30 milliLiter(s) Oral every 4 hours PRN  aMIOdarone    Tablet 200 milliGRAM(s) Oral daily  amLODIPine   Tablet 10 milliGRAM(s) Oral daily  apixaban 2.5 milliGRAM(s) Oral two times a day  artificial  tears Solution 1 Drop(s) Both EYES two times a day  dextrose 5% + sodium chloride 0.45%. 1000 milliLiter(s) IV Continuous <Continuous>  ferrous    sulfate 325 milliGRAM(s) Oral daily  guaiFENesin Oral Liquid (Sugar-Free) 200 milliGRAM(s) Oral every 6 hours PRN  lactobacillus acidophilus 1 Tablet(s) Oral every 12 hours  levothyroxine 75 MICROGram(s) Oral daily  melatonin 3 milliGRAM(s) Oral at bedtime PRN  metoprolol tartrate 50 milliGRAM(s) Oral two times a day  multivitamin/minerals 1 Tablet(s) Oral daily  ondansetron Injectable 4 milliGRAM(s) IV Push every 8 hours PRN  pantoprazole    Tablet 40 milliGRAM(s) Oral daily  polyethylene glycol 3350 17 Gram(s) Oral daily              Vital Signs Last 24 Hrs  T(C): 36.8 (01 Sep 2023 05:15), Max: 36.8 (01 Sep 2023 05:15)  T(F): 98.2 (01 Sep 2023 05:15), Max: 98.2 (01 Sep 2023 05:15)  HR: 65 (01 Sep 2023 05:15) (65 - 90)  BP: 115/62 (01 Sep 2023 05:15) (115/62 - 158/82)  BP(mean): --  RR: 16 (01 Sep 2023 05:15) (16 - 18)  SpO2: 95% (01 Sep 2023 05:15) (94% - 95%)    Parameters below as of 01 Sep 2023 05:15  Patient On (Oxygen Delivery Method): room air        REVIEW OF SYSTEMS:  Very limited secondary to the patient has underlying cognitive impairment, confusion feels ok     PHYSICAL EXAMINATION: .  HEENT:  Head:  Normocephalic, atraumatic.  Eyes:  No scleral icterus.  Ears:  Hard to evaluate, but from pervious note was intact.  NECK:  Supple.  RESPIRATORY:  Decreased air entry bilaterally.  CARDIOVASCULAR:  S1 and S2 heard.  ABDOMEN:  Soft and nontender.  EXTREMITIES:  No clubbing or cyanosis was noted.       NEUROLOGIC:  The patient is awake and alert.  Location was hospital.  tells dtr name  Was able to name simple objects.    Speech was fluent.  Smile symmetric.  Motor:  Bilateral upper 3/5, left lower 3-/5, right lower 2/5, but does have a history of right leg weakness as per my conversation with daughter, history of falls, bed bound, and hip revision.      LABS:  CBC Full  -  ( 31 Aug 2023 08:40 )  WBC Count : 8.63 K/uL  RBC Count : 3.81 M/uL  Hemoglobin : 11.0 g/dL  Hematocrit : 36.3 %  Platelet Count - Automated : 248 K/uL  Mean Cell Volume : 95.3 fl  Mean Cell Hemoglobin : 28.9 pg  Mean Cell Hemoglobin Concentration : 30.3 gm/dL  Auto Neutrophil # : x  Auto Lymphocyte # : x  Auto Monocyte # : x  Auto Eosinophil # : x  Auto Basophil # : x  Auto Neutrophil % : x  Auto Lymphocyte % : x  Auto Monocyte % : x  Auto Eosinophil % : x  Auto Basophil % : x    Urinalysis Basic - ( 01 Sep 2023 07:07 )    Color: x / Appearance: x / SG: x / pH: x  Gluc: 98 mg/dL / Ketone: x  / Bili: x / Urobili: x   Blood: x / Protein: x / Nitrite: x   Leuk Esterase: x / RBC: x / WBC x   Sq Epi: x / Non Sq Epi: x / Bacteria: x      09-01    142  |  111<H>  |  16  ----------------------------<  98  3.4<L>   |  25  |  0.46<L>    Ca    8.1<L>      01 Sep 2023 07:07    TPro  5.7<L>  /  Alb  2.2<L>  /  TBili  0.5  /  DBili  0.2  /  AST  125<H>  /  ALT  173<H>  /  AlkPhos  91  09-01    Hemoglobin A1C:     LIVER FUNCTIONS - ( 01 Sep 2023 07:07 )  Alb: 2.2 g/dL / Pro: 5.7 g/dL / ALK PHOS: 91 U/L / ALT: 173 U/L / AST: 125 U/L / GGT: x           Vitamin B12   PT/INR - ( 01 Sep 2023 07:07 )   PT: 18.2 sec;   INR: 1.58 ratio               RADIOLOGY      ANALYSIS AND PLAN:  A 89-year-old with episode of altered mental status.  For episode of altered mental status, I suspect most likely metabolic encephalopathy secondary to underlying infectious-type process, possibly urinary tract infection, also with positive blood cultures along with dementia, mild cognitive impairment becoming more prominent in hospital setting.  Examination is severely limited to evaluate for new cerebrovascular accident.  We will recommend antibiotics as needed.  Sepsis workup as needed.  For history of underlying mild cognitive impairment versus subtle dementia, appears to be somewhat progressive, would recommend supportive therapy for now.  For history of atrial fibrillation, risks versus benefits of any type of blood-thinning medications, Cardiology medical followup.  For hypertension, monitor systolic blood pressure.  For hypothyroidism, continue the patient on Synthroid.  wax and weaning of mental status most probably h/o infection hospital setting  along with possible COVID  more awake and interactive today 9/1     spoke to daughter, Beth, at [phone] 9/1     45 minutes of time was spent with the patient, plan of care, reviewing data, with greater than 50% of the visit was spent counseling and/or coordinating care with multidisciplinary healthcare team     Neurology follow up note    DEACON [id number]      Interval History:    Patient feels ok no new complaints.    Allergies    penicillin (Unknown)    Intolerances        MEDICATIONS    acetaminophen     Tablet .. 650 milliGRAM(s) Oral every 6 hours PRN  albuterol/ipratropium for Nebulization 3 milliLiter(s) Nebulizer every 6 hours PRN  aluminum hydroxide/magnesium hydroxide/simethicone Suspension 30 milliLiter(s) Oral every 4 hours PRN  aMIOdarone    Tablet 200 milliGRAM(s) Oral daily  amLODIPine   Tablet 10 milliGRAM(s) Oral daily  apixaban 2.5 milliGRAM(s) Oral two times a day  artificial  tears Solution 1 Drop(s) Both EYES two times a day  dextrose 5% + sodium chloride 0.45%. 1000 milliLiter(s) IV Continuous <Continuous>  ferrous    sulfate 325 milliGRAM(s) Oral daily  guaiFENesin Oral Liquid (Sugar-Free) 200 milliGRAM(s) Oral every 6 hours PRN  lactobacillus acidophilus 1 Tablet(s) Oral every 12 hours  levothyroxine 75 MICROGram(s) Oral daily  melatonin 3 milliGRAM(s) Oral at bedtime PRN  metoprolol tartrate 50 milliGRAM(s) Oral two times a day  multivitamin/minerals 1 Tablet(s) Oral daily  ondansetron Injectable 4 milliGRAM(s) IV Push every 8 hours PRN  pantoprazole    Tablet 40 milliGRAM(s) Oral daily  polyethylene glycol 3350 17 Gram(s) Oral daily              Vital Signs Last 24 Hrs  T(C): 36.8 (01 Sep 2023 05:15), Max: 36.8 (01 Sep 2023 05:15)  T(F): 98.2 (01 Sep 2023 05:15), Max: 98.2 (01 Sep 2023 05:15)  HR: 65 (01 Sep 2023 05:15) (65 - 90)  BP: 115/62 (01 Sep 2023 05:15) (115/62 - 158/82)  BP(mean): --  RR: 16 (01 Sep 2023 05:15) (16 - 18)  SpO2: 95% (01 Sep 2023 05:15) (94% - 95%)    Parameters below as of 01 Sep 2023 05:15  Patient On (Oxygen Delivery Method): room air        REVIEW OF SYSTEMS:  Very limited secondary to the patient has underlying cognitive impairment, confusion feels ok     PHYSICAL EXAMINATION: .  HEENT:  Head:  Normocephalic, atraumatic.  Eyes:  No scleral icterus.  Ears:  Hard to evaluate, but from pervious note was intact.  NECK:  Supple.  RESPIRATORY:  Decreased air entry bilaterally.  CARDIOVASCULAR:  S1 and S2 heard.  ABDOMEN:  Soft and nontender.  EXTREMITIES:  No clubbing or cyanosis was noted.       NEUROLOGIC:  The patient is awake and alert.  Location was hospital.  tells dtr name  Was able to name simple objects.    Speech was fluent.  Smile symmetric.  Motor:  Bilateral upper 3/5, left lower 3-/5, right lower 2/5, but does have a history of right leg weakness as per my conversation with daughter, history of falls, bed bound, and hip revision.      LABS:  CBC Full  -  ( 31 Aug 2023 08:40 )  WBC Count : 8.63 K/uL  RBC Count : 3.81 M/uL  Hemoglobin : 11.0 g/dL  Hematocrit : 36.3 %  Platelet Count - Automated : 248 K/uL  Mean Cell Volume : 95.3 fl  Mean Cell Hemoglobin : 28.9 pg  Mean Cell Hemoglobin Concentration : 30.3 gm/dL  Auto Neutrophil # : x  Auto Lymphocyte # : x  Auto Monocyte # : x  Auto Eosinophil # : x  Auto Basophil # : x  Auto Neutrophil % : x  Auto Lymphocyte % : x  Auto Monocyte % : x  Auto Eosinophil % : x  Auto Basophil % : x    Urinalysis Basic - ( 01 Sep 2023 07:07 )    Color: x / Appearance: x / SG: x / pH: x  Gluc: 98 mg/dL / Ketone: x  / Bili: x / Urobili: x   Blood: x / Protein: x / Nitrite: x   Leuk Esterase: x / RBC: x / WBC x   Sq Epi: x / Non Sq Epi: x / Bacteria: x      09-01    142  |  111<H>  |  16  ----------------------------<  98  3.4<L>   |  25  |  0.46<L>    Ca    8.1<L>      01 Sep 2023 07:07    TPro  5.7<L>  /  Alb  2.2<L>  /  TBili  0.5  /  DBili  0.2  /  AST  125<H>  /  ALT  173<H>  /  AlkPhos  91  09-01    Hemoglobin A1C:     LIVER FUNCTIONS - ( 01 Sep 2023 07:07 )  Alb: 2.2 g/dL / Pro: 5.7 g/dL / ALK PHOS: 91 U/L / ALT: 173 U/L / AST: 125 U/L / GGT: x           Vitamin B12   PT/INR - ( 01 Sep 2023 07:07 )   PT: 18.2 sec;   INR: 1.58 ratio               RADIOLOGY      ANALYSIS AND PLAN:  A 89-year-old with episode of altered mental status.  For episode of altered mental status, I suspect most likely metabolic encephalopathy secondary to underlying infectious-type process, possibly urinary tract infection, also with positive blood cultures along with dementia, mild cognitive impairment becoming more prominent in hospital setting.  Examination is severely limited to evaluate for new cerebrovascular accident.  We will recommend antibiotics as needed.  Sepsis workup as needed.  For history of underlying mild cognitive impairment versus subtle dementia, appears to be somewhat progressive, would recommend supportive therapy for now.  For history of atrial fibrillation, risks versus benefits of any type of blood-thinning medications, Cardiology medical followup.  For hypertension, monitor systolic blood pressure.  For hypothyroidism, continue the patient on Synthroid.  wax and weaning of mental status most probably h/o infection hospital setting  along with possible COVID  more awake and interactive today 9/1     spoke to daughter, Beth, at [phone] 9/1     45 minutes of time was spent with the patient, plan of care, reviewing data, with greater than 50% of the visit was spent counseling and/or coordinating care with multidisciplinary healthcare team     Neurology follow up note    DEACON [id number]      Interval History:    Patient feels ok no new complaints.    Allergies    penicillin (Unknown)    Intolerances        MEDICATIONS    acetaminophen     Tablet .. 650 milliGRAM(s) Oral every 6 hours PRN  albuterol/ipratropium for Nebulization 3 milliLiter(s) Nebulizer every 6 hours PRN  aluminum hydroxide/magnesium hydroxide/simethicone Suspension 30 milliLiter(s) Oral every 4 hours PRN  aMIOdarone    Tablet 200 milliGRAM(s) Oral daily  amLODIPine   Tablet 10 milliGRAM(s) Oral daily  apixaban 2.5 milliGRAM(s) Oral two times a day  artificial  tears Solution 1 Drop(s) Both EYES two times a day  dextrose 5% + sodium chloride 0.45%. 1000 milliLiter(s) IV Continuous <Continuous>  ferrous    sulfate 325 milliGRAM(s) Oral daily  guaiFENesin Oral Liquid (Sugar-Free) 200 milliGRAM(s) Oral every 6 hours PRN  lactobacillus acidophilus 1 Tablet(s) Oral every 12 hours  levothyroxine 75 MICROGram(s) Oral daily  melatonin 3 milliGRAM(s) Oral at bedtime PRN  metoprolol tartrate 50 milliGRAM(s) Oral two times a day  multivitamin/minerals 1 Tablet(s) Oral daily  ondansetron Injectable 4 milliGRAM(s) IV Push every 8 hours PRN  pantoprazole    Tablet 40 milliGRAM(s) Oral daily  polyethylene glycol 3350 17 Gram(s) Oral daily              Vital Signs Last 24 Hrs  T(C): 36.8 (01 Sep 2023 05:15), Max: 36.8 (01 Sep 2023 05:15)  T(F): 98.2 (01 Sep 2023 05:15), Max: 98.2 (01 Sep 2023 05:15)  HR: 65 (01 Sep 2023 05:15) (65 - 90)  BP: 115/62 (01 Sep 2023 05:15) (115/62 - 158/82)  BP(mean): --  RR: 16 (01 Sep 2023 05:15) (16 - 18)  SpO2: 95% (01 Sep 2023 05:15) (94% - 95%)    Parameters below as of 01 Sep 2023 05:15  Patient On (Oxygen Delivery Method): room air        REVIEW OF SYSTEMS:  Very limited secondary to the patient has underlying cognitive impairment, confusion feels ok     PHYSICAL EXAMINATION: .  HEENT:  Head:  Normocephalic, atraumatic.  Eyes:  No scleral icterus.  Ears:  Hard to evaluate, but from pervious note was intact.  NECK:  Supple.  RESPIRATORY:  Decreased air entry bilaterally.  CARDIOVASCULAR:  S1 and S2 heard.  ABDOMEN:  Soft and nontender.  EXTREMITIES:  No clubbing or cyanosis was noted.       NEUROLOGIC:  The patient is awake and alert.  Location was hospital.  tells dtr name  Was able to name simple objects.    Speech was fluent.  Smile symmetric.  Motor:  Bilateral upper 3/5, left lower 3-/5, right lower 2/5, but does have a history of right leg weakness as per my conversation with daughter, history of falls, bed bound, and hip revision.      LABS:  CBC Full  -  ( 31 Aug 2023 08:40 )  WBC Count : 8.63 K/uL  RBC Count : 3.81 M/uL  Hemoglobin : 11.0 g/dL  Hematocrit : 36.3 %  Platelet Count - Automated : 248 K/uL  Mean Cell Volume : 95.3 fl  Mean Cell Hemoglobin : 28.9 pg  Mean Cell Hemoglobin Concentration : 30.3 gm/dL  Auto Neutrophil # : x  Auto Lymphocyte # : x  Auto Monocyte # : x  Auto Eosinophil # : x  Auto Basophil # : x  Auto Neutrophil % : x  Auto Lymphocyte % : x  Auto Monocyte % : x  Auto Eosinophil % : x  Auto Basophil % : x    Urinalysis Basic - ( 01 Sep 2023 07:07 )    Color: x / Appearance: x / SG: x / pH: x  Gluc: 98 mg/dL / Ketone: x  / Bili: x / Urobili: x   Blood: x / Protein: x / Nitrite: x   Leuk Esterase: x / RBC: x / WBC x   Sq Epi: x / Non Sq Epi: x / Bacteria: x      09-01    142  |  111<H>  |  16  ----------------------------<  98  3.4<L>   |  25  |  0.46<L>    Ca    8.1<L>      01 Sep 2023 07:07    TPro  5.7<L>  /  Alb  2.2<L>  /  TBili  0.5  /  DBili  0.2  /  AST  125<H>  /  ALT  173<H>  /  AlkPhos  91  09-01    Hemoglobin A1C:     LIVER FUNCTIONS - ( 01 Sep 2023 07:07 )  Alb: 2.2 g/dL / Pro: 5.7 g/dL / ALK PHOS: 91 U/L / ALT: 173 U/L / AST: 125 U/L / GGT: x           Vitamin B12   PT/INR - ( 01 Sep 2023 07:07 )   PT: 18.2 sec;   INR: 1.58 ratio               RADIOLOGY      ANALYSIS AND PLAN:  A 89-year-old with episode of altered mental status.  For episode of altered mental status, I suspect most likely metabolic encephalopathy secondary to underlying infectious-type process, possibly urinary tract infection, also with positive blood cultures along with dementia, mild cognitive impairment becoming more prominent in hospital setting.  Examination is severely limited to evaluate for new cerebrovascular accident.  We will recommend antibiotics as needed.  Sepsis workup as needed.  For history of underlying mild cognitive impairment versus subtle dementia, appears to be somewhat progressive, would recommend supportive therapy for now.  For history of atrial fibrillation, risks versus benefits of any type of blood-thinning medications, Cardiology medical followup.  For hypertension, monitor systolic blood pressure.  For hypothyroidism, continue the patient on Synthroid.  wax and weaning of mental status most probably h/o infection hospital setting  along with possible COVID  more awake and interactive today 9/1     spoke to daughter, Beth, at [phone] 9/1     45 minutes of time was spent with the patient, plan of care, reviewing data, with greater than 50% of the visit was spent counseling and/or coordinating care with multidisciplinary healthcare team

## 2023-09-01 NOTE — PROGRESS NOTE ADULT - PROBLEM SELECTOR PLAN 7
continue home medications
gi cons ,ppi Seen by GI -ESOPHAGOGRAM REVIEWED ppi once a day .POC D/W HCP on a phone 08/22/23  gerd precautions  aspiration precautions  TTE 8/21/2023 reviewed; estimated pulmonary artery systolic pressure is 63 mmHg, consistent with moderate-to-severe pulmonary HTN  pt is not candidate for egd at this facility  currently asymptomatic and tolerating diet  outpatient follow up; may need egd at tertiary facility if symptomatic  cont soft diet
gi cons ,ppi Seen by GI -ESOPHAGOGRAM REVIEWED ppi once a day .POC D/W HCP on a phone 08/22/23  gerd precautions  aspiration precautions  TTE 8/21/2023 reviewed; estimated pulmonary artery systolic pressure is 63 mmHg, consistent with moderate-to-severe pulmonary HTN  pt is not candidate for egd at this facility  currently asymptomatic and tolerating diet  outpatient follow up; may need egd at tertiary facility if symptomatic  cont soft diet
serial bmp , monitor electrolytes closely
continue home medications
gi cons ,ppi Seen by GI -ESOPHAGOGRAM REVIEWED ppi once a day .POC D/W HCP on a phone 08/22/23  gerd precautions  aspiration precautions  TTE 8/21/2023 reviewed; estimated pulmonary artery systolic pressure is 63 mmHg, consistent with moderate-to-severe pulmonary HTN  pt is not candidate for egd at this facility  currently asymptomatic and tolerating diet  outpatient follow up; may need egd at tertiary facility if symptomatic  cont soft diet
continue home medications
continue home medications
gi cons ,ppi Seen by GI -ESOPHAGOGRAM REVIEWED ppi once a day .POC D/W HCP on a phone 08/22/23  gerd precautions  aspiration precautions  TTE 8/21/2023 reviewed; estimated pulmonary artery systolic pressure is 63 mmHg, consistent with moderate-to-severe pulmonary HTN  pt is not candidate for egd at this facility  currently asymptomatic and tolerating diet  outpatient follow up; may need egd at tertiary facility if symptomatic  cont soft diet
continue home medications
gi cons ,ppi Seen by GI -ESOPHAGOGRAM REVIEWED ppi once a day .POC D/W HCP on a phone 08/22/23  gerd precautions  aspiration precautions  TTE 8/21/2023 reviewed; estimated pulmonary artery systolic pressure is 63 mmHg, consistent with moderate-to-severe pulmonary HTN  pt is not candidate for egd at this facility  currently asymptomatic and tolerating diet  outpatient follow up; may need egd at tertiary facility if symptomatic  cont soft diet
continue home medications

## 2023-09-01 NOTE — PROGRESS NOTE ADULT - PROBLEM SELECTOR PLAN 10
continue home medications
gi cons ,ppi
continue home medications
continue home medications
gi cons ,ppi
continue home medications

## 2023-09-01 NOTE — PROGRESS NOTE ADULT - PROBLEM SELECTOR PROBLEM 6
Afib
Hypokalemia
Acute bronchitis
Hypokalemia
S/P placement of cardiac pacemaker
Hypokalemia
Hypokalemia
S/P placement of cardiac pacemaker
Hypokalemia
Acute bronchitis

## 2023-09-01 NOTE — PROGRESS NOTE ADULT - PROBLEM SELECTOR PROBLEM 7
Afib
Afib
Esophageal dilatation
Esophageal dilatation
Afib
Hypokalemia
HTN (hypertension)
Esophageal dilatation
Afib
HTN (hypertension)
Esophageal dilatation
Esophageal dilatation

## 2023-09-01 NOTE — PROGRESS NOTE ADULT - PROBLEM SELECTOR PLAN 8
gi cons ,ppi Seen by GI -ESOPHAGOGRAM REVIEWED ppi once a day .POC D/W HCP on a phone 08/22/23  gerd precautions  aspiration precautions  TTE 8/21/2023 reviewed; estimated pulmonary artery systolic pressure is 63 mmHg, consistent with moderate-to-severe pulmonary HTN  pt is not candidate for egd at this facility  currently asymptomatic and tolerating diet  outpatient follow up; may need egd at tertiary facility if symptomatic  cont soft diet
gi cons ,ppi Seen by GI -ESOPHAGOGRAM REVIEWED ppi once a day .POC D/W HCP on a phone 08/22/23  gerd precautions  aspiration precautions  TTE 8/21/2023 reviewed; estimated pulmonary artery systolic pressure is 63 mmHg, consistent with moderate-to-severe pulmonary HTN  pt is not candidate for egd at this facility  currently asymptomatic and tolerating diet  outpatient follow up; may need egd at tertiary facility if symptomatic  cont soft diet
continue home medications
gi cons ,ppi Seen by GI -ESOPHAGOGRAM REVIEWED ppi once a day .POC D/W HCP on a phone 08/22/23  gerd precautions  aspiration precautions  TTE 8/21/2023 reviewed; estimated pulmonary artery systolic pressure is 63 mmHg, consistent with moderate-to-severe pulmonary HTN  pt is not candidate for egd at this facility  currently asymptomatic and tolerating diet  outpatient follow up; may need egd at tertiary facility if symptomatic  cont soft diet
continue home medications ,card f/up
continue home medications ,card f/up
continue home medications
gi cons ,ppi Seen by GI -ESOPHAGOGRAM REVIEWED ppi once a day .POC D/W HCP on a phone 08/22/23  gerd precautions  aspiration precautions  TTE 8/21/2023 reviewed; estimated pulmonary artery systolic pressure is 63 mmHg, consistent with moderate-to-severe pulmonary HTN  pt is not candidate for egd at this facility  currently asymptomatic and tolerating diet  outpatient follow up; may need egd at tertiary facility if symptomatic  cont soft diet
gi cons ,ppi Seen by GI -ESOPHAGOGRAM REVIEWED ppi once a day .POC D/W HCP on a phone 08/22/23  gerd precautions  aspiration precautions  TTE 8/21/2023 reviewed; estimated pulmonary artery systolic pressure is 63 mmHg, consistent with moderate-to-severe pulmonary HTN  pt is not candidate for egd at this facility  currently asymptomatic and tolerating diet  outpatient follow up; may need egd at tertiary facility if symptomatic  cont soft diet
continue home medications ,card f/up

## 2023-09-01 NOTE — PROGRESS NOTE ADULT - PROBLEM SELECTOR PROBLEM 2
2019 novel coronavirus disease (COVID-19)
2019 novel coronavirus disease (COVID-19)
Gram-negative bacteremia
Acute UTI
2019 novel coronavirus disease (COVID-19)
Acute UTI
Gram-negative bacteremia
Gram-negative bacteremia
Acute UTI
Gram-negative bacteremia
2019 novel coronavirus disease (COVID-19)
Gram-negative bacteremia

## 2023-09-01 NOTE — PROGRESS NOTE ADULT - PROBLEM SELECTOR PROBLEM 9
Spinal stenosis
HTN (hypertension)
Spinal stenosis
HTN (hypertension)
S/P placement of cardiac pacemaker
HTN (hypertension)

## 2023-09-01 NOTE — PROGRESS NOTE ADULT - PROBLEM SELECTOR PROBLEM 4
Hand pain
Afib
Acute bronchitis
Hand pain
Afib
Acute bronchitis
Acute bronchitis
Hand pain
Hand pain
Acute bronchitis
Hand pain
Acute bronchitis

## 2023-09-01 NOTE — PROGRESS NOTE ADULT - ASSESSMENT
REASON FOR VISIT  .. Management of problems listed below      PHYSICAL EXAM    HEENT Unremarkable  atraumatic   RESP Fair air entry  Harsh breath sound   CARDIAC S1 S2 No S3     NO JVD    ABDOMEN No hepatosplenomegaly   PEDAL EDEMA present No calf tenderness  NO rash       GENERAL DATA .     GOC.    .. 8/26/2023 dnr  ICU STAY.   .. none  COVID.   .. scv2 8/29/2023 (+)   .. scv2 8/21/2023 (-)    BEST PRACTICE ISSUES.    HOB ELEVATN.   .. Yes  DVT PPLX.   ..  8/20/2023 apixaba 2.5 x 2 (af)     SPEECH SWALLOW RECOMMENDATIONS.    ..    8/21/2023 soft bite     DIET.    ..  8/20/2023 soft bite size   IV fl.  .. 8/22/2023 D5 1/2 ns  50   STRESS ULCER PPLX.   ..    8/20/2023 protonix 40   INFECTION PPLX.   ..     ALLGY.  ..    pncl                     WT.  ..  8/20/2023 56  BMI.  ..   8/20/2023 21    PROCEDURES.  ..     ABGS.   .     VS/ PO/IO/ VENT/ DRIPS.   9/1/2023 afeb 65 115/60   9/1/2023 ra 95%           MAIN ISSUES .   . CC 8/20/2023 89-year-old female with history of hypothyroidism, A-fib on Eliquis, recent pacemaker for complete heart block from [facility] for cogh 2 w found to have E coli ESBL cateremia   . COUGH x 2 wk poa 8/20/2023  . COVID (+) 8/29/2023   .. LFTS 8/29-8/30  .. ap 86-99  .. ast   .. alt    .. 8/29/2023 rdsv   . Pneumonia   .. ct chest 8/20/2023   .... distention of mid to upper esophagus   . E coli ESBL CTX M bacteremia 8/20 8/21  .. bc 8/22 (-)   .. 8/20/2023 levaquin -> 8/21 ertapenem 1 x 8d  Dr Roberts completed   . COPD   . A fib   .. 8/20/2023 apixaba   . CHF   .. bnp 8/20-8/23/2023 bnp 4545 - 3219   .. echo 8/21/2023 ef 56% pasp 63   .. 8/22/2023 enalapril 10   . Dysphagia  .. esophagogram 8/22/2023 smooth tapering o distal esophagus       TIME SPENT.   . Over 36 minutes aggregate care time spent on encounter; activities included   direct patient care, counseling and/or coordinating care reviewing notes, lab data/ imaging , discussion with multidisciplinary team/ patient  /family and explaining in detail risks, benefits, alternatives  of the recommendations     JAKOB WORTHY 89 f 8/20/2023 9/22/1929 DR BAM HERRERA      REASON FOR VISIT  .. Management of problems listed below      PHYSICAL EXAM    HEENT Unremarkable  atraumatic   RESP Fair air entry  Harsh breath sound   CARDIAC S1 S2 No S3     NO JVD    ABDOMEN No hepatosplenomegaly   PEDAL EDEMA present No calf tenderness  NO rash       GENERAL DATA .     GOC.    .. 8/26/2023 dnr  ICU STAY.   .. none  COVID.   .. scv2 8/29/2023 (+)   .. scv2 8/21/2023 (-)    BEST PRACTICE ISSUES.    HOB ELEVATN.   .. Yes  DVT PPLX.   ..  8/20/2023 apixaba 2.5 x 2 (af)     SPEECH SWALLOW RECOMMENDATIONS.    ..    8/21/2023 soft bite     DIET.    ..  8/20/2023 soft bite size   IV fl.  .. 8/22/2023 D5 1/2 ns  50   STRESS ULCER PPLX.   ..    8/20/2023 protonix 40   INFECTION PPLX.   ..     ALLGY.  ..    pncl                     WT.  ..  8/20/2023 56  BMI.  ..   8/20/2023 21    PROCEDURES.  ..     ABGS.   .     VS/ PO/IO/ VENT/ DRIPS.   9/1/2023 afeb 65 115/60   9/1/2023 ra 95%           MAIN ISSUES .   . CC 8/20/2023 89-year-old female with history of hypothyroidism, A-fib on Eliquis, recent pacemaker for complete heart block from care home for cogh 2 w found to have E coli ESBL cateremia   . COUGH x 2 wk poa 8/20/2023  . COVID (+) 8/29/2023   .. LFTS 8/29-8/30  .. ap 86-99  .. ast   .. alt    .. 8/29/2023 rdsv   . Pneumonia   .. ct chest 8/20/2023   .... distention of mid to upper esophagus   . E coli ESBL CTX M bacteremia 8/20 8/21  .. bc 8/22 (-)   .. 8/20/2023 levaquin -> 8/21 ertapenem 1 x 8d  Dr Roberts completed   . COPD   . A fib   .. 8/20/2023 apixaba   . CHF   .. bnp 8/20-8/23/2023 bnp 4545 - 3219   .. echo 8/21/2023 ef 56% pasp 63   .. 8/22/2023 enalapril 10   . Dysphagia  .. esophagogram 8/22/2023 smooth tapering o distal esophagus       TIME SPENT.   . Over 36 minutes aggregate care time spent on encounter; activities included   direct patient care, counseling and/or coordinating care reviewing notes, lab data/ imaging , discussion with multidisciplinary team/ patient  /family and explaining in detail risks, benefits, alternatives  of the recommendations     JAKOB WORTHY 89 f 8/20/2023 9/22/1929 DR BAM HERRERA      REASON FOR VISIT  .. Management of problems listed below      PHYSICAL EXAM    HEENT Unremarkable  atraumatic   RESP Fair air entry  Harsh breath sound   CARDIAC S1 S2 No S3     NO JVD    ABDOMEN No hepatosplenomegaly   PEDAL EDEMA present No calf tenderness  NO rash       GENERAL DATA .     GOC.    .. 8/26/2023 dnr  ICU STAY.   .. none  COVID.   .. scv2 8/29/2023 (+)   .. scv2 8/21/2023 (-)    BEST PRACTICE ISSUES.    HOB ELEVATN.   .. Yes  DVT PPLX.   ..  8/20/2023 apixaba 2.5 x 2 (af)     SPEECH SWALLOW RECOMMENDATIONS.    ..    8/21/2023 soft bite     DIET.    ..  8/20/2023 soft bite size   IV fl.  .. 8/22/2023 D5 1/2 ns  50   STRESS ULCER PPLX.   ..    8/20/2023 protonix 40   INFECTION PPLX.   ..     ALLGY.  ..    pncl                     WT.  ..  8/20/2023 56  BMI.  ..   8/20/2023 21    PROCEDURES.  ..     ABGS.   .     VS/ PO/IO/ VENT/ DRIPS.   9/1/2023 afeb 65 115/60   9/1/2023 ra 95%           MAIN ISSUES .   . CC 8/20/2023 89-year-old female with history of hypothyroidism, A-fib on Eliquis, recent pacemaker for complete heart block from [facility] for cogh 2 w found to have E coli ESBL cateremia   . COUGH x 2 wk poa 8/20/2023  . COVID (+) 8/29/2023   .. LFTS 8/29-8/30  .. ap 86-99  .. ast   .. alt    .. 8/29/2023 rdsv   . Pneumonia   .. ct chest 8/20/2023   .... distention of mid to upper esophagus   . E coli ESBL CTX M bacteremia 8/20 8/21  .. bc 8/22 (-)   .. 8/20/2023 levaquin -> 8/21 ertapenem 1 x 8d  Dr Roberts completed   . COPD   . A fib   .. 8/20/2023 apixaba   . CHF   .. bnp 8/20-8/23/2023 bnp 4545 - 3219   .. echo 8/21/2023 ef 56% pasp 63   .. 8/22/2023 enalapril 10   . Dysphagia  .. esophagogram 8/22/2023 smooth tapering o distal esophagus       TIME SPENT.   . Over 36 minutes aggregate care time spent on encounter; activities included   direct patient care, counseling and/or coordinating care reviewing notes, lab data/ imaging , discussion with multidisciplinary team/ patient  /family and explaining in detail risks, benefits, alternatives  of the recommendations     JAKOB WORTHY 89 f 8/20/2023 9/22/1929 DR BAM HERRERA

## 2023-09-01 NOTE — PROGRESS NOTE ADULT - PROBLEM SELECTOR PROBLEM 11
Prophylactic measure
Spinal stenosis
Prophylactic measure
Spinal stenosis
Hypothyroidism
Spinal stenosis

## 2023-09-01 NOTE — PROGRESS NOTE ADULT - PROBLEM SELECTOR PROBLEM 3
Acute UTI
Gram-negative bacteremia
Acute bronchitis
Acute UTI
Sepsis
Sepsis
Acute UTI
Acute bronchitis
Acute UTI
Sepsis
Sepsis
Acute UTI

## 2023-09-01 NOTE — PROGRESS NOTE ADULT - PROBLEM SELECTOR PLAN 3
likely 2/2 to UTI ,POA -Final Report (22 Aug 2023 17:15):BLOOD CX    Growth in aerobic and anaerobic bottles: Escherichia coli ESBL  POC D/W HCP at bedside  completed IV invanz
Chest CT showed atelectasis vs infiltrates of lower lobes, No clear evidence of pneumonia.  . The patient has had a nonproductive cough for more than 2 weeks. Would look for other possible sources of this persistent cough: GERD, dysphagia.  . Levaquin was changed to IV Cefepime due to prolonged QTc and Zithromax was discontinued .    . Continue IV Cefepime for now,  . Follow cultures.   . SLP re-evaluation for dysphagia.
Chest CT showed atelectasis vs infiltrates of lower lobes, No clear evidence of pneumonia.  . The patient has had a nonproductive cough for more than 2 weeks. Would look for other possible sources of this persistent cough: GERD, dysphagia.  . Levaquin was changed to IV Cefepime due to prolonged QTc and Zithromax was discontinued .    . Continue IV Cefepime for now,  . Follow cultures.   . SLP re-evaluation for dysphagia.
likely 2/2 to UTI ,POA -Final Report (22 Aug 2023 17:15):BLOOD CX    Growth in aerobic and anaerobic bottles: Escherichia coli ESBL  POC D/W HCP at bedside  completed IV invanz
ruled in for SEPSIS ,POA likely 2/2 to UTI ,poa -ID cons ,follow septic workup ,iv abx - invanz
ID cons , follow urine cx , continue cefepime Source: Clean Catch Clean Catch (Midstream)  Preliminary Report (22 Aug 2023 00:01):    >100,000 CFU/ml Escherichia coli  ID eval
ID cons , follow urine cx , continue cefepime Source: Clean Catch Clean Catch (Midstream)  Preliminary Report (22 Aug 2023 00:01):    >100,000 CFU/ml Escherichia coli
likely 2/2 to UTI ,POA -Final Report (22 Aug 2023 17:15):BLOOD CX    Growth in aerobic and anaerobic bottles: Escherichia coli ESBL  POC D/W HCP at bedside  completed IV invanz
ID cons , follow urine cx , continue cefepime Source: Clean Catch Clean Catch (Midstream)  Preliminary Report (22 Aug 2023 00:01):    >100,000 CFU/ml Escherichia coli  ID eval
likely 2/2 to UTI ,POA -Final Report (22 Aug 2023 17:15):BLOOD CX    Growth in aerobic and anaerobic bottles: Escherichia coli ESBL  POC D/W HCP at bedside  completed IV invanz
ID cons , follow urine cx , continue cefepime Source: Clean Catch Clean Catch (Midstream)  Preliminary Report (22 Aug 2023 00:01):    >100,000 CFU/ml Escherichia coli
ID cons , follow urine cx , continue cefepime Source: Clean Catch Clean Catch (Midstream)  Preliminary Report (22 Aug 2023 00:01):    >100,000 CFU/ml Escherichia coli  ID eval

## 2023-09-01 NOTE — PROGRESS NOTE ADULT - PROBLEM SELECTOR PROBLEM 1
Sepsis
Acute metabolic encephalopathy
Acute metabolic encephalopathy
Sepsis
Gram-negative bacteremia
Acute metabolic encephalopathy
Sepsis
Gram-negative bacteremia
Sepsis
Sepsis
Acute metabolic encephalopathy
Sepsis

## 2023-09-01 NOTE — PROGRESS NOTE ADULT - PROBLEM SELECTOR PLAN 9
continue home medications
continue home medications
continue home medications ,card f/up
pain management , ot/pt
pain management , ot/pt
continue home medications

## 2023-09-25 ENCOUNTER — TRANSCRIPTION ENCOUNTER (OUTPATIENT)
Age: 88
End: 2023-09-25

## 2023-11-11 ENCOUNTER — INPATIENT (INPATIENT)
Facility: HOSPITAL | Age: 88
LOS: 4 days | Discharge: NURSING, REHAB OR LONG-TERM CARE | DRG: 689 | End: 2023-11-16
Attending: INTERNAL MEDICINE | Admitting: INTERNAL MEDICINE
Payer: MEDICARE

## 2023-11-11 VITALS
DIASTOLIC BLOOD PRESSURE: 69 MMHG | SYSTOLIC BLOOD PRESSURE: 110 MMHG | HEART RATE: 69 BPM | OXYGEN SATURATION: 98 % | RESPIRATION RATE: 16 BRPM | WEIGHT: 120.15 LBS | TEMPERATURE: 99 F

## 2023-11-11 DIAGNOSIS — G45.9 TRANSIENT CEREBRAL ISCHEMIC ATTACK, UNSPECIFIED: ICD-10-CM

## 2023-11-11 DIAGNOSIS — Z95.0 PRESENCE OF CARDIAC PACEMAKER: Chronic | ICD-10-CM

## 2023-11-11 LAB
ALBUMIN SERPL ELPH-MCNC: 2.2 G/DL — LOW (ref 3.3–5)
ALBUMIN SERPL ELPH-MCNC: 2.2 G/DL — LOW (ref 3.3–5)
ALP SERPL-CCNC: 129 U/L — HIGH (ref 40–120)
ALP SERPL-CCNC: 129 U/L — HIGH (ref 40–120)
ALT FLD-CCNC: 34 U/L — SIGNIFICANT CHANGE UP (ref 12–78)
ALT FLD-CCNC: 34 U/L — SIGNIFICANT CHANGE UP (ref 12–78)
ANION GAP SERPL CALC-SCNC: 9 MMOL/L — SIGNIFICANT CHANGE UP (ref 5–17)
ANION GAP SERPL CALC-SCNC: 9 MMOL/L — SIGNIFICANT CHANGE UP (ref 5–17)
APPEARANCE UR: ABNORMAL
APPEARANCE UR: ABNORMAL
APTT BLD: 30.7 SEC — SIGNIFICANT CHANGE UP (ref 24.5–35.6)
APTT BLD: 30.7 SEC — SIGNIFICANT CHANGE UP (ref 24.5–35.6)
AST SERPL-CCNC: 28 U/L — SIGNIFICANT CHANGE UP (ref 15–37)
AST SERPL-CCNC: 28 U/L — SIGNIFICANT CHANGE UP (ref 15–37)
BASOPHILS # BLD AUTO: 0.03 K/UL — SIGNIFICANT CHANGE UP (ref 0–0.2)
BASOPHILS # BLD AUTO: 0.03 K/UL — SIGNIFICANT CHANGE UP (ref 0–0.2)
BASOPHILS NFR BLD AUTO: 0.2 % — SIGNIFICANT CHANGE UP (ref 0–2)
BASOPHILS NFR BLD AUTO: 0.2 % — SIGNIFICANT CHANGE UP (ref 0–2)
BILIRUB SERPL-MCNC: 0.7 MG/DL — SIGNIFICANT CHANGE UP (ref 0.2–1.2)
BILIRUB SERPL-MCNC: 0.7 MG/DL — SIGNIFICANT CHANGE UP (ref 0.2–1.2)
BILIRUB UR-MCNC: NEGATIVE — SIGNIFICANT CHANGE UP
BILIRUB UR-MCNC: NEGATIVE — SIGNIFICANT CHANGE UP
BUN SERPL-MCNC: 37 MG/DL — HIGH (ref 7–23)
BUN SERPL-MCNC: 37 MG/DL — HIGH (ref 7–23)
CALCIUM SERPL-MCNC: 8.3 MG/DL — LOW (ref 8.5–10.1)
CALCIUM SERPL-MCNC: 8.3 MG/DL — LOW (ref 8.5–10.1)
CHLORIDE SERPL-SCNC: 113 MMOL/L — HIGH (ref 96–108)
CHLORIDE SERPL-SCNC: 113 MMOL/L — HIGH (ref 96–108)
CO2 SERPL-SCNC: 22 MMOL/L — SIGNIFICANT CHANGE UP (ref 22–31)
CO2 SERPL-SCNC: 22 MMOL/L — SIGNIFICANT CHANGE UP (ref 22–31)
COLOR SPEC: ABNORMAL
COLOR SPEC: ABNORMAL
CREAT SERPL-MCNC: 1 MG/DL — SIGNIFICANT CHANGE UP (ref 0.5–1.3)
CREAT SERPL-MCNC: 1 MG/DL — SIGNIFICANT CHANGE UP (ref 0.5–1.3)
DIFF PNL FLD: ABNORMAL
DIFF PNL FLD: ABNORMAL
EGFR: 52 ML/MIN/1.73M2 — LOW
EGFR: 52 ML/MIN/1.73M2 — LOW
EOSINOPHIL # BLD AUTO: 0.02 K/UL — SIGNIFICANT CHANGE UP (ref 0–0.5)
EOSINOPHIL # BLD AUTO: 0.02 K/UL — SIGNIFICANT CHANGE UP (ref 0–0.5)
EOSINOPHIL NFR BLD AUTO: 0.2 % — SIGNIFICANT CHANGE UP (ref 0–6)
EOSINOPHIL NFR BLD AUTO: 0.2 % — SIGNIFICANT CHANGE UP (ref 0–6)
GLUCOSE SERPL-MCNC: 120 MG/DL — HIGH (ref 70–99)
GLUCOSE SERPL-MCNC: 120 MG/DL — HIGH (ref 70–99)
GLUCOSE UR QL: NEGATIVE MG/DL — SIGNIFICANT CHANGE UP
GLUCOSE UR QL: NEGATIVE MG/DL — SIGNIFICANT CHANGE UP
HCT VFR BLD CALC: 29.8 % — LOW (ref 34.5–45)
HCT VFR BLD CALC: 29.8 % — LOW (ref 34.5–45)
HGB BLD-MCNC: 9.6 G/DL — LOW (ref 11.5–15.5)
HGB BLD-MCNC: 9.6 G/DL — LOW (ref 11.5–15.5)
IMM GRANULOCYTES NFR BLD AUTO: 1.9 % — HIGH (ref 0–0.9)
IMM GRANULOCYTES NFR BLD AUTO: 1.9 % — HIGH (ref 0–0.9)
INR BLD: 1.45 RATIO — HIGH (ref 0.85–1.18)
INR BLD: 1.45 RATIO — HIGH (ref 0.85–1.18)
KETONES UR-MCNC: NEGATIVE MG/DL — SIGNIFICANT CHANGE UP
KETONES UR-MCNC: NEGATIVE MG/DL — SIGNIFICANT CHANGE UP
LEUKOCYTE ESTERASE UR-ACNC: ABNORMAL
LEUKOCYTE ESTERASE UR-ACNC: ABNORMAL
LYMPHOCYTES # BLD AUTO: 0.72 K/UL — LOW (ref 1–3.3)
LYMPHOCYTES # BLD AUTO: 0.72 K/UL — LOW (ref 1–3.3)
LYMPHOCYTES # BLD AUTO: 5.6 % — LOW (ref 13–44)
LYMPHOCYTES # BLD AUTO: 5.6 % — LOW (ref 13–44)
MCHC RBC-ENTMCNC: 28.5 PG — SIGNIFICANT CHANGE UP (ref 27–34)
MCHC RBC-ENTMCNC: 28.5 PG — SIGNIFICANT CHANGE UP (ref 27–34)
MCHC RBC-ENTMCNC: 32.2 GM/DL — SIGNIFICANT CHANGE UP (ref 32–36)
MCHC RBC-ENTMCNC: 32.2 GM/DL — SIGNIFICANT CHANGE UP (ref 32–36)
MCV RBC AUTO: 88.4 FL — SIGNIFICANT CHANGE UP (ref 80–100)
MCV RBC AUTO: 88.4 FL — SIGNIFICANT CHANGE UP (ref 80–100)
MONOCYTES # BLD AUTO: 0.91 K/UL — HIGH (ref 0–0.9)
MONOCYTES # BLD AUTO: 0.91 K/UL — HIGH (ref 0–0.9)
MONOCYTES NFR BLD AUTO: 7.1 % — SIGNIFICANT CHANGE UP (ref 2–14)
MONOCYTES NFR BLD AUTO: 7.1 % — SIGNIFICANT CHANGE UP (ref 2–14)
NEUTROPHILS # BLD AUTO: 10.97 K/UL — HIGH (ref 1.8–7.4)
NEUTROPHILS # BLD AUTO: 10.97 K/UL — HIGH (ref 1.8–7.4)
NEUTROPHILS NFR BLD AUTO: 85 % — HIGH (ref 43–77)
NEUTROPHILS NFR BLD AUTO: 85 % — HIGH (ref 43–77)
NITRITE UR-MCNC: NEGATIVE — SIGNIFICANT CHANGE UP
NITRITE UR-MCNC: NEGATIVE — SIGNIFICANT CHANGE UP
NRBC # BLD: 0 /100 WBCS — SIGNIFICANT CHANGE UP (ref 0–0)
NRBC # BLD: 0 /100 WBCS — SIGNIFICANT CHANGE UP (ref 0–0)
PH UR: 6 — SIGNIFICANT CHANGE UP (ref 5–8)
PH UR: 6 — SIGNIFICANT CHANGE UP (ref 5–8)
PLATELET # BLD AUTO: 307 K/UL — SIGNIFICANT CHANGE UP (ref 150–400)
PLATELET # BLD AUTO: 307 K/UL — SIGNIFICANT CHANGE UP (ref 150–400)
POTASSIUM SERPL-MCNC: 3.8 MMOL/L — SIGNIFICANT CHANGE UP (ref 3.5–5.3)
POTASSIUM SERPL-MCNC: 3.8 MMOL/L — SIGNIFICANT CHANGE UP (ref 3.5–5.3)
POTASSIUM SERPL-SCNC: 3.8 MMOL/L — SIGNIFICANT CHANGE UP (ref 3.5–5.3)
POTASSIUM SERPL-SCNC: 3.8 MMOL/L — SIGNIFICANT CHANGE UP (ref 3.5–5.3)
PROT SERPL-MCNC: 6.1 G/DL — SIGNIFICANT CHANGE UP (ref 6–8.3)
PROT SERPL-MCNC: 6.1 G/DL — SIGNIFICANT CHANGE UP (ref 6–8.3)
PROT UR-MCNC: 300 MG/DL
PROT UR-MCNC: 300 MG/DL
PROTHROM AB SERPL-ACNC: 16.8 SEC — HIGH (ref 9.5–13)
PROTHROM AB SERPL-ACNC: 16.8 SEC — HIGH (ref 9.5–13)
RBC # BLD: 3.37 M/UL — LOW (ref 3.8–5.2)
RBC # BLD: 3.37 M/UL — LOW (ref 3.8–5.2)
RBC # FLD: 17.7 % — HIGH (ref 10.3–14.5)
RBC # FLD: 17.7 % — HIGH (ref 10.3–14.5)
SODIUM SERPL-SCNC: 144 MMOL/L — SIGNIFICANT CHANGE UP (ref 135–145)
SODIUM SERPL-SCNC: 144 MMOL/L — SIGNIFICANT CHANGE UP (ref 135–145)
SP GR SPEC: 1.03 — SIGNIFICANT CHANGE UP (ref 1–1.03)
SP GR SPEC: 1.03 — SIGNIFICANT CHANGE UP (ref 1–1.03)
TROPONIN I, HIGH SENSITIVITY RESULT: 23.8 NG/L — SIGNIFICANT CHANGE UP
TROPONIN I, HIGH SENSITIVITY RESULT: 23.8 NG/L — SIGNIFICANT CHANGE UP
UROBILINOGEN FLD QL: 1 MG/DL — SIGNIFICANT CHANGE UP (ref 0.2–1)
UROBILINOGEN FLD QL: 1 MG/DL — SIGNIFICANT CHANGE UP (ref 0.2–1)
WBC # BLD: 12.89 K/UL — HIGH (ref 3.8–10.5)
WBC # BLD: 12.89 K/UL — HIGH (ref 3.8–10.5)
WBC # FLD AUTO: 12.89 K/UL — HIGH (ref 3.8–10.5)
WBC # FLD AUTO: 12.89 K/UL — HIGH (ref 3.8–10.5)

## 2023-11-11 PROCEDURE — 70498 CT ANGIOGRAPHY NECK: CPT | Mod: 26,MA

## 2023-11-11 PROCEDURE — 93010 ELECTROCARDIOGRAM REPORT: CPT

## 2023-11-11 PROCEDURE — 70496 CT ANGIOGRAPHY HEAD: CPT | Mod: 26,MA

## 2023-11-11 PROCEDURE — 71045 X-RAY EXAM CHEST 1 VIEW: CPT | Mod: 26

## 2023-11-11 PROCEDURE — 99285 EMERGENCY DEPT VISIT HI MDM: CPT | Mod: FS

## 2023-11-11 RX ORDER — PANTOPRAZOLE SODIUM 20 MG/1
40 TABLET, DELAYED RELEASE ORAL
Refills: 0 | Status: DISCONTINUED | OUTPATIENT
Start: 2023-11-11 | End: 2023-11-16

## 2023-11-11 RX ORDER — ERTAPENEM SODIUM 1 G/1
500 INJECTION, POWDER, LYOPHILIZED, FOR SOLUTION INTRAMUSCULAR; INTRAVENOUS EVERY 24 HOURS
Refills: 0 | Status: COMPLETED | OUTPATIENT
Start: 2023-11-11 | End: 2023-11-13

## 2023-11-11 RX ORDER — POLYETHYLENE GLYCOL 3350 17 G/17G
17 POWDER, FOR SOLUTION ORAL DAILY
Refills: 0 | Status: DISCONTINUED | OUTPATIENT
Start: 2023-11-11 | End: 2023-11-16

## 2023-11-11 RX ORDER — CEFTRIAXONE 500 MG/1
1000 INJECTION, POWDER, FOR SOLUTION INTRAMUSCULAR; INTRAVENOUS ONCE
Refills: 0 | Status: COMPLETED | OUTPATIENT
Start: 2023-11-11 | End: 2023-11-11

## 2023-11-11 RX ORDER — APIXABAN 2.5 MG/1
2.5 TABLET, FILM COATED ORAL EVERY 12 HOURS
Refills: 0 | Status: DISCONTINUED | OUTPATIENT
Start: 2023-11-11 | End: 2023-11-16

## 2023-11-11 RX ORDER — SODIUM CHLORIDE 9 MG/ML
500 INJECTION INTRAMUSCULAR; INTRAVENOUS; SUBCUTANEOUS ONCE
Refills: 0 | Status: COMPLETED | OUTPATIENT
Start: 2023-11-11 | End: 2023-11-11

## 2023-11-11 RX ORDER — METOPROLOL TARTRATE 50 MG
50 TABLET ORAL
Refills: 0 | Status: DISCONTINUED | OUTPATIENT
Start: 2023-11-11 | End: 2023-11-16

## 2023-11-11 RX ORDER — AMIODARONE HYDROCHLORIDE 400 MG/1
200 TABLET ORAL DAILY
Refills: 0 | Status: DISCONTINUED | OUTPATIENT
Start: 2023-11-11 | End: 2023-11-16

## 2023-11-11 RX ORDER — LEVOTHYROXINE SODIUM 125 MCG
75 TABLET ORAL DAILY
Refills: 0 | Status: DISCONTINUED | OUTPATIENT
Start: 2023-11-11 | End: 2023-11-16

## 2023-11-11 RX ORDER — ASCORBIC ACID 60 MG
500 TABLET,CHEWABLE ORAL DAILY
Refills: 0 | Status: DISCONTINUED | OUTPATIENT
Start: 2023-11-11 | End: 2023-11-16

## 2023-11-11 RX ORDER — FERROUS SULFATE 325(65) MG
325 TABLET ORAL THREE TIMES A DAY
Refills: 0 | Status: DISCONTINUED | OUTPATIENT
Start: 2023-11-11 | End: 2023-11-16

## 2023-11-11 RX ADMIN — SODIUM CHLORIDE 500 MILLILITER(S): 9 INJECTION INTRAMUSCULAR; INTRAVENOUS; SUBCUTANEOUS at 19:54

## 2023-11-11 RX ADMIN — CEFTRIAXONE 100 MILLIGRAM(S): 500 INJECTION, POWDER, FOR SOLUTION INTRAMUSCULAR; INTRAVENOUS at 17:03

## 2023-11-11 RX ADMIN — ERTAPENEM SODIUM 100 MILLIGRAM(S): 1 INJECTION, POWDER, LYOPHILIZED, FOR SOLUTION INTRAMUSCULAR; INTRAVENOUS at 22:30

## 2023-11-11 RX ADMIN — Medication 325 MILLIGRAM(S): at 22:30

## 2023-11-11 NOTE — ED ADULT TRIAGE NOTE - CHIEF COMPLAINT QUOTE
Patient arrived via [facility] from [facility] with staff concern for possible stroke 2/2 patient leaning to the left while sitting which is abnormal for her, symptoms since 11:30am. Patient with possible left arm weakness as well.   PHx: HTN, HLD, af-b with ablation, spinal stenosis, arthritis Patient arrived via [facility] from [facility] with staff concern for possible stroke 2/2 patient leaning to the left while sitting which is abnormal for her, symptoms since 11:30am. Patient with possible left arm weakness as well.   PHx: HTN, HLD, af-b with ablation, spinal stenosis, arthritis Patient arrived via [facility] from [facility] with staff concern for possible stroke 2/2 patient leaning to the left while sitting which is abnormal for her, symptoms since 11:30am. Patient with possible left arm weakness as well.   PHx: HTN, HLD, af-b with ablation, spinal stenosis, arthritis Patient arrived via [facility] from [facility] with staff concern for possible stroke 2/2 patient leaning to the left while sitting which is abnormal for her. Per report patient was seen in normal state of health at 11:30, given her morning pills, then between 11:30 - 11:45 started leaning to the side.  Patient with possible left arm weakness as well.   PHx: MR, HTN, HLD, af-b with ablation, spinal stenosis, arthritis Patient arrived via [facility] from [facility] with staff concern for possible stroke 2/2 patient leaning to the left while sitting which is abnormal for her. Per report patient was seen in normal state of health at 11:30, given her morning pills, then between 11:30 - 11:45 started leaning to the side.  Patient with possible left arm weakness as well.   PHx: MR, HTN, HLD, af-b with ablation, spinal stenosis, arthritis Patient arrived via [facility] from [facility] with staff concern for possible stroke 2/2 patient leaning to the left while sitting which is abnormal for her. Per report patient was seen in normal state of health at 11:30, given her morning pills, then between 11:30 - 11:45 started leaning to the side.  Patient with possible left arm weakness as well.   PHx: MR, HTN, HLD, af-b with ablation, spinal stenosis, arthritis

## 2023-11-11 NOTE — ED PROVIDER NOTE - ATTENDING APP SHARED VISIT CONTRIBUTION OF CARE
Patient is a 89-year-old female with a history of most, A-fib on Eliquis-taking her a.m. medications including this this morning, hypertension MR spinal stenosis and dementia.  She is a resident of a assisted living.  She has 24-hour care in her apartment.  She took her a.m. medications, and the home care aide reports that around 11:00 or so patient began to lean to the left, was unable to swallow liquids.  And has been leaning to the left since.  The nurse at the assisted living facility recommended they call 911 because of the possible stroke.  No reports of trauma, fever or chills.  No other injury.  Patient is otherwise awake and alert but confused at her baseline.    On evaluation is an elderly female who leans to the left has left-sided weakness including left ptosis of her eye, unable to raise her left arm.  Will raise both of her legs.  She has mild bilateral pedal edema.  She is not drooling.  She is otherwise nontoxic-appearing.  Cardiac exam is regular rate and rhythm with a murmur.  Lung exam is clear to auscultation without respiratory distress.  Skin exam is unremarkable for any acute pathology.  Neurologic exam as noted above, patient has left-sided weakness.  With an NIHSS stroke score of 3.  This is limited because patient is confused and participation is minimal.  Plan of care includes activated as a stroke protocol.  Patient is not eligible for TNK thrombolytic therapy because of her recent dosing of Eliquis.  We will obtain a CT imaging of the brain and CT angiogram of the brain CT perfusion study of the brain laboratory studies rule out electrolyte abnormality, admit to the hospital for the TIA stroke.  This chart was made with dictation software and may contain typographical errors.

## 2023-11-11 NOTE — ED PROVIDER NOTE - NEUROLOGICAL, MLM
Alert and oriented, no focal deficits, no motor or sensory deficits. + leaning towards left sided, + slight left sided pronator drift.

## 2023-11-11 NOTE — H&P ADULT - ASSESSMENT
< from: CT Brain Perfusion Maps Stroke (11.11.23 @ 14:12) >    ACC: [id number] EXAM:  CT BRAIN PERFUSION MAPS STROKE   ORDERED BY:   JORGE FELDER     ACC: [id number] EXAM:  CT ANGIO BRAIN STROKE PROTC IC   ORDERED BY:   JORGE FELDER     ACC: [id number] EXAM:  CT BRAIN STROKE PROTOCOL   ORDERED BY: JORGE FELDER     ACC: [id number] EXAM:  CT ANGIO NECK STROKE PROTCL IC   ORDERED BY:   JORGE FELDER     PROCEDURE DATE:  11/11/2023          INTERPRETATION:  CT HEAD STROKE PROTOCOL, CT ANGIO NECK STROKE PROTOCOL,   CT ANGIO HEAD STROKE PROTOCOL, CT PERFUSION WITH MAPS STROKE PROTOCOL    CT BRAIN WITHOUT CONTRAST    INDICATIONS:  Stroke code Leaning to left side. Slight left arm drift.    TECHNIQUE:  Serial axial images were obtained from the skull base to the   vertex without the use of contrast.    COMPARISON EXAM: 7/11/2023    FINDINGS:  Ventricles and sulci: Parenchymal volume loss is present which is   commensurate with patient age.  Intra-axial: Periventricular small vessel white matter ischemic changes.   No intracranial mass, acute hemorrhage, or midline shift is present.  Extra-axial: No extra-axial fluid collection is identified. Deposition of   calcified plaque at the level of the bilateral carotid siphons.  Calvarium: Intact.    Left optic lens replacement, as on prior. Bilateral optic globes are   intact. Imaged paranasal sinuses, bilateral mastoid air cells, middle ear   cavities are clear.        CT PERFUSION  CTA OF THE NECK AND HEAD:      CONTRAST/COMPLICATIONS:  IV Contrast: NONE (accession [id number]), IV contrast documented in   unlinked concurrent exam (accession [id number])  Complications: None reported at time of study completion      TECHNIQUE PERFUSION:  After the intravenous power injection of non-ionic contrast material,   repeat serial thin sections were obtained through the intracranial   circulation on a multislice CT scanner. Artificial intelligence was then   used for analysis of perfusion and intracranial large vessel occlusion.      TECHNIQUE CTA NECK AND HEAD:  After the intravenous power injection of non-ionic contrast material,   serial thin sections were obtained through the cervical and intracranial   circulation on a multislice CT scanner. 2D and 3D MIP reformatted images   were obtained.  Images were reformatted using a dedicated 3D software   package.    FINDINGS:    CT PERFUSION:    COMPARISON EXAMINATION: None.    Technical limitations: Significant patient motion in all 3 planes during   image acquisition likely renders results suboptimal and nondiagnostic.   Arterial and venous waveforms are also not optimized.    Miscellaneous: None.      CTA NECK WITH CONTRAST:    CAROTID STENOSIS REFERENCE: (NASCET = 100x1-(N/D)). N=greatest narrowing.   D=normal distal diameter - MILD = <50% stenosis. - MODERATE = 50-69%   stenosis.- SEVERE = 70-89% stenosis. - HAIRLINE/CRITICAL = 90-99%   stenosis. - OCCLUDED = 100% stenosis.      COMPARISON: None.    FINDINGS:  Aortic arch and visualized great vessels: Within normal limits.    RIGHT:  Common carotid artery: Follows a tortuouscourse and is normal in caliber   to the carotid bifurcation.  Internal carotid artery: Mild to moderate deposition of calcified plaque   at the level of the right common carotid artery bifurcation with   extension into the proximal aspect of the right internal carotid artery,   without significant stenosis based on NASCET criteria. Normal course and   caliber to the intracranial circulation.    Vertebral artery: Emanates from the right subclavian artery. The right   vertebral artery is normal incourse and caliber to the intracranial   circulation.      LEFT:  Common carotid artery :Normal in course and caliber to the carotid   bifurcation.  Internal carotid artery: Mild to moderate deposition of calcified plaque   at the level of the left common carotid artery bifurcation with extension   into the proximal left internal carotid artery without significant   stenosis based on NASCET criteria. Normal course and caliber to the   intracranial circulation.    Vertebral artery: Emanates from the left subclavian artery. The left   vertebral artery is normal in course and caliber to the intracranial   circulation.      Miscellaneous: None    CTA HEAD WITH CONTRAST:    COMPARISON EXAM: None.      Internal carotid arteries: Bilateral petrous, precavernous, cavernous,   and supraclinoid regions are patent. Mild deposition of calcified plaque   at the level of the carotid siphons without significant stenosis in this   location.    [place]:  No aneurysm about the [place]. Tiny aneurysms   can be beyond the resolution of CTA technique.    Anterior Cerebral arteries: No significant stenosis or occlusion.  Middle Cerebral arteries: No significant stenosis or occlusion.    Posterior Circulation: Distal intracranial bilateralvertebral arteries   are visualized. The vertebrobasilar confluence is unremarkable. There is   good flow within the basilar artery. Bilateral superior cerebellar   arteries and bilateral posterior cerebral arteries emanate from the   distal aspect of the basilar artery. There is a hypoplastic right P1   segment. A prominent right posterior communicating artery contributes to   flow within the right posterior cerebral artery.      Venous structures: Visualized supericial and deep veins appear patent.    Miscellaneous: No other vascular abnormality is seen.      IMPRESSION:      CT BRAIN WITHOUT CONTRAST: Age-appropriate involutional changes.   Atherosclerotic changes. Gray/white matter differentiation is preserved.   No acute intracranial hemorrhage.    Findings were communicated by Dr. Behr-Ventura to MARS Felder   in the emergency department at approximately 1:40 PM on 11/11/2023.      PLEASE NOTE: CT perfusion and CTA portions of the study were somewhat   delayed due to issues with initial IV access and need to reestablish   adequate IV access.    CT PERFUSION:    Technical limitations: Significant patient motion in all 3 planes during   image acquisition likely renders results suboptimal and nondiagnostic.   Arterial andvenous waveforms are also not optimized.    If symptoms persist consider follow up head CT or MRI, MRA  if no   contraindication.    CTA HEAD:  1. Hypoplastic right P1 segment. A prominent right posterior   communicating artery contributes to flow within the right posterior   cerebral artery.  2. No evidence of aneurysm formation at the level of the [place]. Tiny aneurysms can be beyond the resolution of CTA technique.    CTA NECK: No evidence of significant stenosis or occlusion.     If the patient has persistent symptoms, consideration could be given to   brain MRI brain and/or MRA if clinically warranted and if there are no   MRI contraindications.    Remainder of the findings were communicated by Dr. Behr-Ventura to MARS Felder  in the emergency department at approximately 2:17 PM   on 11/11/2023..    --- End of Report ---            DAWN BEHR-VENTURA MD; Attending Radiologist  This document has been electronically signed. Nov 11 2023  2:19PM    < end of copied text >  < from: CT Angio Brain Stroke Protocol  w/ IV Cont (11.11.23 @ 14:11) >    ACC: [id number] EXAM:  CT BRAIN PERFUSION MAPS STROKE   ORDERED BY:   JORGE FELDER     ACC: [id number] EXAM:  CT ANGIO BRAIN STROKE PROTC IC   ORDERED BY:   JORGE FELDER     ACC: [id number] EXAM:  CT BRAIN STROKE PROTOCOL   ORDERED BY: JORGE FELDER     ACC: [id number] EXAM:  CT ANGIO NECK STROKE PROTCL IC   ORDERED BY:   JORGE FELDER     PROCEDURE DATE:  11/11/2023          INTERPRETATION:  CT HEAD STROKE PROTOCOL, CT ANGIO NECK STROKE PROTOCOL,   CT ANGIO HEAD STROKE PROTOCOL, CT PERFUSION WITH MAPS STROKE PROTOCOL    CT BRAIN WITHOUT CONTRAST    INDICATIONS:  Stroke code Leaning to left side. Slight left arm drift.    TECHNIQUE:  Serial axial images were obtained from the skull base to the   vertex without the use of contrast.    COMPARISON EXAM: 7/11/2023    FINDINGS:  Ventricles and sulci: Parenchymal volume loss is present which is   commensurate with patient age.  Intra-axial: Periventricular small vessel white matter ischemic changes.   No intracranial mass, acute hemorrhage, or midline shift is present.  Extra-axial: No extra-axial fluid collection is identified. Deposition of   calcified plaque at the level of the bilateral carotid siphons.  Calvarium: Intact.    Left optic lens replacement, as on prior. Bilateral optic globes are   intact. Imaged paranasal sinuses, bilateral mastoid air cells, middle ear   cavities are clear.        CT PERFUSION  CTA OF THE NECK AND HEAD:      CONTRAST/COMPLICATIONS:  IV Contrast: NONE (accession [id number]), IV contrast documented in   unlinked concurrent exam (accession [id number])  Complications: None reported at time of study completion      TECHNIQUE PERFUSION:  After the intravenous power injection of non-ionic contrast material,   repeat serial thin sections were obtained through the intracranial   circulation on a multislice CT scanner. Artificial intelligence was then   used for analysis of perfusion and intracranial large vessel occlusion.      TECHNIQUE CTA NECK AND HEAD:  After the intravenous power injection of non-ionic contrast material,   serial thin sections were obtained through the cervical and intracranial   circulation on a multislice CT scanner. 2D and 3D MIP reformatted images   were obtained.  Images were reformatted using a dedicated 3D software   package.    FINDINGS:    CT PERFUSION:    COMPARISON EXAMINATION: None.    Technical limitations: Significant patient motion in all 3 planes during   image acquisition likely renders results suboptimal and nondiagnostic.   Arterial and venous waveforms are also not optimized.    Miscellaneous: None.      CTA NECK WITH CONTRAST:    CAROTID STENOSIS REFERENCE: (NASCET = 100x1-(N/D)). N=greatest narrowing.   D=normal distal diameter - MILD = <50% stenosis. - MODERATE = 50-69%   stenosis.- SEVERE = 70-89% stenosis. - HAIRLINE/CRITICAL = 90-99%   stenosis. - OCCLUDED = 100% stenosis.      COMPARISON: None.    FINDINGS:  Aortic arch and visualized great vessels: Within normal limits.    RIGHT:  Common carotid artery: Follows a tortuouscourse and is normal in caliber   to the carotid bifurcation.  Internal carotid artery: Mild to moderate deposition of calcified plaque   at the level of the right common carotid artery bifurcation with   extension into the proximal aspect of the right internal carotid artery,   without significant stenosis based on NASCET criteria. Normal course and   caliber to the intracranial circulation.    Vertebral artery: Emanates from the right subclavian artery. The right   vertebral artery is normal incourse and caliber to the intracranial   circulation.      LEFT:  Common carotid artery :Normal in course and caliber to the carotid   bifurcation.  Internal carotid artery: Mild to moderate deposition of calcified plaque   at the level of the left common carotid artery bifurcation with extension   into the proximal left internal carotid artery without significant   stenosis based on NASCET criteria. Normal course and caliber to the   intracranial circulation.    Vertebral artery: Emanates from the left subclavian artery. The left   vertebral artery is normal in course and caliber to the intracranial   circulation.      Miscellaneous: None    CTA HEAD WITH CONTRAST:    COMPARISON EXAM: None.      Internal carotid arteries: Bilateral petrous, precavernous, cavernous,   and supraclinoid regions are patent. Mild deposition of calcified plaque   at the level of the carotid siphons without significant stenosis in this   location.    [place]:  No aneurysm about the [place]. Tiny aneurysms   can be beyond the resolution of CTA technique.    Anterior Cerebral arteries: No significant stenosis or occlusion.  Middle Cerebral arteries: No significant stenosis or occlusion.    Posterior Circulation: Distal intracranial bilateralvertebral arteries   are visualized. The vertebrobasilar confluence is unremarkable. There is   good flow within the basilar artery. Bilateral superior cerebellar   arteries and bilateral posterior cerebral arteries emanate from the   distal aspect of the basilar artery. There is a hypoplastic right P1   segment. A prominent right posterior communicating artery contributes to   flow within the right posterior cerebral artery.      Venous structures: Visualized supericial and deep veins appear patent.    Miscellaneous: No other vascular abnormality is seen.      IMPRESSION:      CT BRAIN WITHOUT CONTRAST: Age-appropriate involutional changes.   Atherosclerotic changes. Gray/white matter differentiation is preserved.   No acute intracranial hemorrhage.    Findings were communicated by Dr. Behr-Ventura to MARS Felder   in the emergency department at approximately 1:40 PM on 11/11/2023.      PLEASE NOTE: CT perfusion and CTA portions of the study were somewhat   delayed due to issues with initial IV access and need to reestablish   adequate IV access.    CT PERFUSION:    Technical limitations: Significant patient motion in all 3 planes during   image acquisition likely renders results suboptimal and nondiagnostic.   Arterial andvenous waveforms are also not optimized.    If symptoms persist consider follow up head CT or MRI, MRA  if no   contraindication.    CTA HEAD:  1. Hypoplastic right P1 segment. A prominent right posterior   communicating artery contributes to flow within the right posterior   cerebral artery.  2. No evidence of aneurysm formation at the level of the [place]. Tiny aneurysms can be beyond the resolution of CTA technique.    CTA NECK: No evidence of significant stenosis or occlusion.     If the patient has persistent symptoms, consideration could be given to   brain MRI brain and/or MRA if clinically warranted and if there are no   MRI contraindications.    Remainder of the findings were communicated by Dr. Behr-Ventura to MARS Felder  in the emergency department at approximately 2:17 PM   on 11/11/2023..    --- End of Report ---            DAWN BEHR-VENTURA MD; Attending Radiologist  This document has been electronically signed. Nov 11 2023  2:19PM    < end of copied text >< from: CT Brain Stroke Protocol (11.11.23 @ 14:10) >    ACC: [id number] EXAM:  CT BRAIN PERFUSION MAPS STROKE   ORDERED BY:   JORGE FELDER     ACC: [id number] EXAM:  CT ANGIO BRAIN STROKE PROTC IC   ORDERED BY:   JORGE FELDER     ACC: [id number] EXAM:  CT BRAIN STROKE PROTOCOL   ORDERED BY: JORGE FELDER     ACC: [id number] EXAM:  CT ANGIO NECK STROKE PROTCL IC   ORDERED BY:   JORGE FELDER     PROCEDURE DATE:  11/11/2023          INTERPRETATION:  CT HEAD STROKE PROTOCOL, CT ANGIO NECK STROKE PROTOCOL,   CT ANGIO HEAD STROKE PROTOCOL, CT PERFUSION WITH MAPS STROKE PROTOCOL    CT BRAIN WITHOUT CONTRAST    INDICATIONS:  Stroke code Leaning to left side. Slight left arm drift.    TECHNIQUE:  Serial axial images were obtained from the skull base to the   vertex without the use of contrast.    COMPARISON EXAM: 7/11/2023    FINDINGS:  Ventricles and sulci: Parenchymal volume loss is present which is   commensurate with patient age.  Intra-axial: Periventricular small vessel white matter ischemic changes.   No intracranial mass, acute hemorrhage, or midline shift is present.  Extra-axial: No extra-axial fluid collection is identified. Deposition of   calcified plaque at the level of the bilateral carotid siphons.  Calvarium: Intact.    Left optic lens replacement, as on prior. Bilateral optic globes are   intact. Imaged paranasal sinuses, bilateral mastoid air cells, middle ear   cavities are clear.        CT PERFUSION  CTA OF THE NECK AND HEAD:      CONTRAST/COMPLICATIONS:  IV Contrast: NONE (accession [id number]), IV contrast documented in   unlinked concurrent exam (accession [id number])  Complications: None reported at time of study completion      TECHNIQUE PERFUSION:  After the intravenous power injection of non-ionic contrast material,   repeat serial thin sections were obtained through the intracranial   circulation on a multislice CT scanner. Artificial intelligence was then   used for analysis of perfusion and intracranial large vessel occlusion.      TECHNIQUE CTA NECK AND HEAD:  After the intravenous power injection of non-ionic contrast material,   serial thin sections were obtained through the cervical and intracranial   circulation on a multislice CT scanner. 2D and 3D MIP reformatted images   were obtained.  Images were reformatted using a dedicated 3D software   package.    FINDINGS:    CT PERFUSION:    < end of copied text >     < from: CT Brain Perfusion Maps Stroke (11.11.23 @ 14:12) >    ACC: [id number] EXAM:  CT BRAIN PERFUSION MAPS STROKE   ORDERED BY:   JORGE FELDER     ACC: [id number] EXAM:  CT ANGIO BRAIN STROKE PROTC IC   ORDERED BY:   JORGE FELDER     ACC: [id number] EXAM:  CT BRAIN STROKE PROTOCOL   ORDERED BY: JORGE FELDER     ACC: [id number] EXAM:  CT ANGIO NECK STROKE PROTCL IC   ORDERED BY:   JORGE FELDER     PROCEDURE DATE:  11/11/2023          INTERPRETATION:  CT HEAD STROKE PROTOCOL, CT ANGIO NECK STROKE PROTOCOL,   CT ANGIO HEAD STROKE PROTOCOL, CT PERFUSION WITH MAPS STROKE PROTOCOL    CT BRAIN WITHOUT CONTRAST    INDICATIONS:  Stroke code Leaning to left side. Slight left arm drift.    TECHNIQUE:  Serial axial images were obtained from the skull base to the   vertex without the use of contrast.    COMPARISON EXAM: 7/11/2023    FINDINGS:  Ventricles and sulci: Parenchymal volume loss is present which is   commensurate with patient age.  Intra-axial: Periventricular small vessel white matter ischemic changes.   No intracranial mass, acute hemorrhage, or midline shift is present.  Extra-axial: No extra-axial fluid collection is identified. Deposition of   calcified plaque at the level of the bilateral carotid siphons.  Calvarium: Intact.    Left optic lens replacement, as on prior. Bilateral optic globes are   intact. Imaged paranasal sinuses, bilateral mastoid air cells, middle ear   cavities are clear.        CT PERFUSION  CTA OF THE NECK AND HEAD:      CONTRAST/COMPLICATIONS:  IV Contrast: NONE (accession [id number]), IV contrast documented in   unlinked concurrent exam (accession [id number])  Complications: None reported at time of study completion      TECHNIQUE PERFUSION:  After the intravenous power injection of non-ionic contrast material,   repeat serial thin sections were obtained through the intracranial   circulation on a multislice CT scanner. Artificial intelligence was then   used for analysis of perfusion and intracranial large vessel occlusion.      TECHNIQUE CTA NECK AND HEAD:  After the intravenous power injection of non-ionic contrast material,   serial thin sections were obtained through the cervical and intracranial   circulation on a multislice CT scanner. 2D and 3D MIP reformatted images   were obtained.  Images were reformatted using a dedicated 3D software   package.    FINDINGS:    CT PERFUSION:    COMPARISON EXAMINATION: None.    Technical limitations: Significant patient motion in all 3 planes during   image acquisition likely renders results suboptimal and nondiagnostic.   Arterial and venous waveforms are also not optimized.    Miscellaneous: None.      CTA NECK WITH CONTRAST:    CAROTID STENOSIS REFERENCE: (NASCET = 100x1-(N/D)). N=greatest narrowing.   D=normal distal diameter - MILD = <50% stenosis. - MODERATE = 50-69%   stenosis.- SEVERE = 70-89% stenosis. - HAIRLINE/CRITICAL = 90-99%   stenosis. - OCCLUDED = 100% stenosis.      COMPARISON: None.    FINDINGS:  Aortic arch and visualized great vessels: Within normal limits.    RIGHT:  Common carotid artery: Follows a tortuouscourse and is normal in caliber   to the carotid bifurcation.  Internal carotid artery: Mild to moderate deposition of calcified plaque   at the level of the right common carotid artery bifurcation with   extension into the proximal aspect of the right internal carotid artery,   without significant stenosis based on NASCET criteria. Normal course and   caliber to the intracranial circulation.    Vertebral artery: Emanates from the right subclavian artery. The right   vertebral artery is normal incourse and caliber to the intracranial   circulation.      LEFT:  Common carotid artery :Normal in course and caliber to the carotid   bifurcation.  Internal carotid artery: Mild to moderate deposition of calcified plaque   at the level of the left common carotid artery bifurcation with extension   into the proximal left internal carotid artery without significant   stenosis based on NASCET criteria. Normal course and caliber to the   intracranial circulation.    Vertebral artery: Emanates from the left subclavian artery. The left   vertebral artery is normal in course and caliber to the intracranial   circulation.      Miscellaneous: None    CTA HEAD WITH CONTRAST:    COMPARISON EXAM: None.      Internal carotid arteries: Bilateral petrous, precavernous, cavernous,   and supraclinoid regions are patent. Mild deposition of calcified plaque   at the level of the carotid siphons without significant stenosis in this   location.    [place]:  No aneurysm about the [place]. Tiny aneurysms   can be beyond the resolution of CTA technique.    Anterior Cerebral arteries: No significant stenosis or occlusion.  Middle Cerebral arteries: No significant stenosis or occlusion.    Posterior Circulation: Distal intracranial bilateralvertebral arteries   are visualized. The vertebrobasilar confluence is unremarkable. There is   good flow within the basilar artery. Bilateral superior cerebellar   arteries and bilateral posterior cerebral arteries emanate from the   distal aspect of the basilar artery. There is a hypoplastic right P1   segment. A prominent right posterior communicating artery contributes to   flow within the right posterior cerebral artery.      Venous structures: Visualized supericial and deep veins appear patent.    Miscellaneous: No other vascular abnormality is seen.      IMPRESSION:      CT BRAIN WITHOUT CONTRAST: Age-appropriate involutional changes.   Atherosclerotic changes. Gray/white matter differentiation is preserved.   No acute intracranial hemorrhage.    Findings were communicated by Dr. Behr-Ventura to MARS Felder   in the emergency department at approximately 1:40 PM on 11/11/2023.      PLEASE NOTE: CT perfusion and CTA portions of the study were somewhat   delayed due to issues with initial IV access and need to reestablish   adequate IV access.    CT PERFUSION:    Technical limitations: Significant patient motion in all 3 planes during   image acquisition likely renders results suboptimal and nondiagnostic.   Arterial andvenous waveforms are also not optimized.    If symptoms persist consider follow up head CT or MRI, MRA  if no   contraindication.    CTA HEAD:  1. Hypoplastic right P1 segment. A prominent right posterior   communicating artery contributes to flow within the right posterior   cerebral artery.  2. No evidence of aneurysm formation at the level of the [place]. Tiny aneurysms can be beyond the resolution of CTA technique.    CTA NECK: No evidence of significant stenosis or occlusion.     If the patient has persistent symptoms, consideration could be given to   brain MRI brain and/or MRA if clinically warranted and if there are no   MRI contraindications.    Remainder of the findings were communicated by Dr. Behr-Ventura to MARS Felder  in the emergency department at approximately 2:17 PM   on 11/11/2023..    --- End of Report ---            DAWN BEHR-VENTURA MD; Attending Radiologist  This document has been electronically signed. Nov 11 2023  2:19PM    < end of copied text >  < from: CT Angio Brain Stroke Protocol  w/ IV Cont (11.11.23 @ 14:11) >    ACC: [id number] EXAM:  CT BRAIN PERFUSION MAPS STROKE   ORDERED BY:   JORGE FELDER     ACC: [id number] EXAM:  CT ANGIO BRAIN STROKE PROTC IC   ORDERED BY:   JORGE FELDER     ACC: [id number] EXAM:  CT BRAIN STROKE PROTOCOL   ORDERED BY: JORGE FELDER     ACC: [id number] EXAM:  CT ANGIO NECK STROKE PROTCL IC   ORDERED BY:   JORGE FELDER     PROCEDURE DATE:  11/11/2023          INTERPRETATION:  CT HEAD STROKE PROTOCOL, CT ANGIO NECK STROKE PROTOCOL,   CT ANGIO HEAD STROKE PROTOCOL, CT PERFUSION WITH MAPS STROKE PROTOCOL    CT BRAIN WITHOUT CONTRAST    INDICATIONS:  Stroke code Leaning to left side. Slight left arm drift.    TECHNIQUE:  Serial axial images were obtained from the skull base to the   vertex without the use of contrast.    COMPARISON EXAM: 7/11/2023    FINDINGS:  Ventricles and sulci: Parenchymal volume loss is present which is   commensurate with patient age.  Intra-axial: Periventricular small vessel white matter ischemic changes.   No intracranial mass, acute hemorrhage, or midline shift is present.  Extra-axial: No extra-axial fluid collection is identified. Deposition of   calcified plaque at the level of the bilateral carotid siphons.  Calvarium: Intact.    Left optic lens replacement, as on prior. Bilateral optic globes are   intact. Imaged paranasal sinuses, bilateral mastoid air cells, middle ear   cavities are clear.        CT PERFUSION  CTA OF THE NECK AND HEAD:      CONTRAST/COMPLICATIONS:  IV Contrast: NONE (accession [id number]), IV contrast documented in   unlinked concurrent exam (accession [id number])  Complications: None reported at time of study completion      TECHNIQUE PERFUSION:  After the intravenous power injection of non-ionic contrast material,   repeat serial thin sections were obtained through the intracranial   circulation on a multislice CT scanner. Artificial intelligence was then   used for analysis of perfusion and intracranial large vessel occlusion.      TECHNIQUE CTA NECK AND HEAD:  After the intravenous power injection of non-ionic contrast material,   serial thin sections were obtained through the cervical and intracranial   circulation on a multislice CT scanner. 2D and 3D MIP reformatted images   were obtained.  Images were reformatted using a dedicated 3D software   package.    FINDINGS:    CT PERFUSION:    COMPARISON EXAMINATION: None.    Technical limitations: Significant patient motion in all 3 planes during   image acquisition likely renders results suboptimal and nondiagnostic.   Arterial and venous waveforms are also not optimized.    Miscellaneous: None.      CTA NECK WITH CONTRAST:    CAROTID STENOSIS REFERENCE: (NASCET = 100x1-(N/D)). N=greatest narrowing.   D=normal distal diameter - MILD = <50% stenosis. - MODERATE = 50-69%   stenosis.- SEVERE = 70-89% stenosis. - HAIRLINE/CRITICAL = 90-99%   stenosis. - OCCLUDED = 100% stenosis.      COMPARISON: None.    FINDINGS:  Aortic arch and visualized great vessels: Within normal limits.    RIGHT:  Common carotid artery: Follows a tortuouscourse and is normal in caliber   to the carotid bifurcation.  Internal carotid artery: Mild to moderate deposition of calcified plaque   at the level of the right common carotid artery bifurcation with   extension into the proximal aspect of the right internal carotid artery,   without significant stenosis based on NASCET criteria. Normal course and   caliber to the intracranial circulation.    Vertebral artery: Emanates from the right subclavian artery. The right   vertebral artery is normal incourse and caliber to the intracranial   circulation.      LEFT:  Common carotid artery :Normal in course and caliber to the carotid   bifurcation.  Internal carotid artery: Mild to moderate deposition of calcified plaque   at the level of the left common carotid artery bifurcation with extension   into the proximal left internal carotid artery without significant   stenosis based on NASCET criteria. Normal course and caliber to the   intracranial circulation.    Vertebral artery: Emanates from the left subclavian artery. The left   vertebral artery is normal in course and caliber to the intracranial   circulation.      Miscellaneous: None    CTA HEAD WITH CONTRAST:    COMPARISON EXAM: None.      Internal carotid arteries: Bilateral petrous, precavernous, cavernous,   and supraclinoid regions are patent. Mild deposition of calcified plaque   at the level of the carotid siphons without significant stenosis in this   location.    [place]:  No aneurysm about the [place]. Tiny aneurysms   can be beyond the resolution of CTA technique.    Anterior Cerebral arteries: No significant stenosis or occlusion.  Middle Cerebral arteries: No significant stenosis or occlusion.    Posterior Circulation: Distal intracranial bilateralvertebral arteries   are visualized. The vertebrobasilar confluence is unremarkable. There is   good flow within the basilar artery. Bilateral superior cerebellar   arteries and bilateral posterior cerebral arteries emanate from the   distal aspect of the basilar artery. There is a hypoplastic right P1   segment. A prominent right posterior communicating artery contributes to   flow within the right posterior cerebral artery.      Venous structures: Visualized supericial and deep veins appear patent.    Miscellaneous: No other vascular abnormality is seen.      IMPRESSION:      CT BRAIN WITHOUT CONTRAST: Age-appropriate involutional changes.   Atherosclerotic changes. Gray/white matter differentiation is preserved.   No acute intracranial hemorrhage.    Findings were communicated by Dr. Behr-Ventura to MARS Felder   in the emergency department at approximately 1:40 PM on 11/11/2023.      PLEASE NOTE: CT perfusion and CTA portions of the study were somewhat   delayed due to issues with initial IV access and need to reestablish   adequate IV access.    CT PERFUSION:    Technical limitations: Significant patient motion in all 3 planes during   image acquisition likely renders results suboptimal and nondiagnostic.   Arterial andvenous waveforms are also not optimized.    If symptoms persist consider follow up head CT or MRI, MRA  if no   contraindication.    CTA HEAD:  1. Hypoplastic right P1 segment. A prominent right posterior   communicating artery contributes to flow within the right posterior   cerebral artery.  2. No evidence of aneurysm formation at the level of the [place]. Tiny aneurysms can be beyond the resolution of CTA technique.    CTA NECK: No evidence of significant stenosis or occlusion.     If the patient has persistent symptoms, consideration could be given to   brain MRI brain and/or MRA if clinically warranted and if there are no   MRI contraindications.    Remainder of the findings were communicated by Dr. Behr-Ventura to MARS Felder  in the emergency department at approximately 2:17 PM   on 11/11/2023..    --- End of Report ---            DAWN BEHR-VENTURA MD; Attending Radiologist  This document has been electronically signed. Nov 11 2023  2:19PM    < end of copied text >< from: CT Brain Stroke Protocol (11.11.23 @ 14:10) >    ACC: [id number] EXAM:  CT BRAIN PERFUSION MAPS STROKE   ORDERED BY:   JORGE FELDER     ACC: [id number] EXAM:  CT ANGIO BRAIN STROKE PROTC IC   ORDERED BY:   JORGE FELDER     ACC: [id number] EXAM:  CT BRAIN STROKE PROTOCOL   ORDERED BY: JORGE FELDER     ACC: [id number] EXAM:  CT ANGIO NECK STROKE PROTCL IC   ORDERED BY:   JORGE FELDER     PROCEDURE DATE:  11/11/2023          INTERPRETATION:  CT HEAD STROKE PROTOCOL, CT ANGIO NECK STROKE PROTOCOL,   CT ANGIO HEAD STROKE PROTOCOL, CT PERFUSION WITH MAPS STROKE PROTOCOL    CT BRAIN WITHOUT CONTRAST    INDICATIONS:  Stroke code Leaning to left side. Slight left arm drift.    TECHNIQUE:  Serial axial images were obtained from the skull base to the   vertex without the use of contrast.    COMPARISON EXAM: 7/11/2023    FINDINGS:  Ventricles and sulci: Parenchymal volume loss is present which is   commensurate with patient age.  Intra-axial: Periventricular small vessel white matter ischemic changes.   No intracranial mass, acute hemorrhage, or midline shift is present.  Extra-axial: No extra-axial fluid collection is identified. Deposition of   calcified plaque at the level of the bilateral carotid siphons.  Calvarium: Intact.    Left optic lens replacement, as on prior. Bilateral optic globes are   intact. Imaged paranasal sinuses, bilateral mastoid air cells, middle ear   cavities are clear.        CT PERFUSION  CTA OF THE NECK AND HEAD:      CONTRAST/COMPLICATIONS:  IV Contrast: NONE (accession [id number]), IV contrast documented in   unlinked concurrent exam (accession [id number])  Complications: None reported at time of study completion      TECHNIQUE PERFUSION:  After the intravenous power injection of non-ionic contrast material,   repeat serial thin sections were obtained through the intracranial   circulation on a multislice CT scanner. Artificial intelligence was then   used for analysis of perfusion and intracranial large vessel occlusion.      TECHNIQUE CTA NECK AND HEAD:  After the intravenous power injection of non-ionic contrast material,   serial thin sections were obtained through the cervical and intracranial   circulation on a multislice CT scanner. 2D and 3D MIP reformatted images   were obtained.  Images were reformatted using a dedicated 3D software   package.    FINDINGS:    CT PERFUSION:    < end of copied text >     < from: CT Brain Perfusion Maps Stroke (11.11.23 @ 14:12) >    ACC: [id number] EXAM:  CT BRAIN PERFUSION MAPS STROKE   ORDERED BY:   JORGE FELDER     ACC: [id number] EXAM:  CT ANGIO BRAIN STROKE PROTC IC   ORDERED BY:   JORGE FELDER     ACC: [id number] EXAM:  CT BRAIN STROKE PROTOCOL   ORDERED BY: JORGE FELDER     ACC: [id number] EXAM:  CT ANGIO NECK STROKE PROTCL IC   ORDERED BY:   JORGE FELDER     PROCEDURE DATE:  11/11/2023          INTERPRETATION:  CT HEAD STROKE PROTOCOL, CT ANGIO NECK STROKE PROTOCOL,   CT ANGIO HEAD STROKE PROTOCOL, CT PERFUSION WITH MAPS STROKE PROTOCOL    CT BRAIN WITHOUT CONTRAST    INDICATIONS:  Stroke code Leaning to left side. Slight left arm drift.    TECHNIQUE:  Serial axial images were obtained from the skull base to the   vertex without the use of contrast.    COMPARISON EXAM: 7/11/2023    FINDINGS:  Ventricles and sulci: Parenchymal volume loss is present which is   commensurate with patient age.  Intra-axial: Periventricular small vessel white matter ischemic changes.   No intracranial mass, acute hemorrhage, or midline shift is present.  Extra-axial: No extra-axial fluid collection is identified. Deposition of   calcified plaque at the level of the bilateral carotid siphons.  Calvarium: Intact.    Left optic lens replacement, as on prior. Bilateral optic globes are   intact. Imaged paranasal sinuses, bilateral mastoid air cells, middle ear   cavities are clear.        CT PERFUSION  CTA OF THE NECK AND HEAD:      CONTRAST/COMPLICATIONS:  IV Contrast: NONE (accession [id number]), IV contrast documented in   unlinked concurrent exam (accession [id number])  Complications: None reported at time of study completion      TECHNIQUE PERFUSION:  After the intravenous power injection of non-ionic contrast material,   repeat serial thin sections were obtained through the intracranial   circulation on a multislice CT scanner. Artificial intelligence was then   used for analysis of perfusion and intracranial large vessel occlusion.      TECHNIQUE CTA NECK AND HEAD:  After the intravenous power injection of non-ionic contrast material,   serial thin sections were obtained through the cervical and intracranial   circulation on a multislice CT scanner. 2D and 3D MIP reformatted images   were obtained.  Images were reformatted using a dedicated 3D software   package.    FINDINGS:    CT PERFUSION:    COMPARISON EXAMINATION: None.    Technical limitations: Significant patient motion in all 3 planes during   image acquisition likely renders results suboptimal and nondiagnostic.   Arterial and venous waveforms are also not optimized.    Miscellaneous: None.      CTA NECK WITH CONTRAST:    CAROTID STENOSIS REFERENCE: (NASCET = 100x1-(N/D)). N=greatest narrowing.   D=normal distal diameter - MILD = <50% stenosis. - MODERATE = 50-69%   stenosis.- SEVERE = 70-89% stenosis. - HAIRLINE/CRITICAL = 90-99%   stenosis. - OCCLUDED = 100% stenosis.      COMPARISON: None.    FINDINGS:  Aortic arch and visualized great vessels: Within normal limits.    RIGHT:  Common carotid artery: Follows a tortuouscourse and is normal in caliber   to the carotid bifurcation.  Internal carotid artery: Mild to moderate deposition of calcified plaque   at the level of the right common carotid artery bifurcation with   extension into the proximal aspect of the right internal carotid artery,   without significant stenosis based on NASCET criteria. Normal course and   caliber to the intracranial circulation.    Vertebral artery: Emanates from the right subclavian artery. The right   vertebral artery is normal incourse and caliber to the intracranial   circulation.      LEFT:  Common carotid artery :Normal in course and caliber to the carotid   bifurcation.  Internal carotid artery: Mild to moderate deposition of calcified plaque   at the level of the left common carotid artery bifurcation with extension   into the proximal left internal carotid artery without significant   stenosis based on NASCET criteria. Normal course and caliber to the   intracranial circulation.    Vertebral artery: Emanates from the left subclavian artery. The left   vertebral artery is normal in course and caliber to the intracranial   circulation.      Miscellaneous: None    CTA HEAD WITH CONTRAST:    COMPARISON EXAM: None.      Internal carotid arteries: Bilateral petrous, precavernous, cavernous,   and supraclinoid regions are patent. Mild deposition of calcified plaque   at the level of the carotid siphons without significant stenosis in this   location.    [place]:  No aneurysm about the [place]. Tiny aneurysms   can be beyond the resolution of CTA technique.    Anterior Cerebral arteries: No significant stenosis or occlusion.  Middle Cerebral arteries: No significant stenosis or occlusion.    Posterior Circulation: Distal intracranial bilateralvertebral arteries   are visualized. The vertebrobasilar confluence is unremarkable. There is   good flow within the basilar artery. Bilateral superior cerebellar   arteries and bilateral posterior cerebral arteries emanate from the   distal aspect of the basilar artery. There is a hypoplastic right P1   segment. A prominent right posterior communicating artery contributes to   flow within the right posterior cerebral artery.      Venous structures: Visualized supericial and deep veins appear patent.    Miscellaneous: No other vascular abnormality is seen.      IMPRESSION:      CT BRAIN WITHOUT CONTRAST: Age-appropriate involutional changes.   Atherosclerotic changes. Gray/white matter differentiation is preserved.   No acute intracranial hemorrhage.    Findings were communicated by Dr. Behr-Ventura to MARS Felder   in the emergency department at approximately 1:40 PM on 11/11/2023.      PLEASE NOTE: CT perfusion and CTA portions of the study were somewhat   delayed due to issues with initial IV access and need to reestablish   adequate IV access.    CT PERFUSION:    Technical limitations: Significant patient motion in all 3 planes during   image acquisition likely renders results suboptimal and nondiagnostic.   Arterial andvenous waveforms are also not optimized.    If symptoms persist consider follow up head CT or MRI, MRA  if no   contraindication.    CTA HEAD:  1. Hypoplastic right P1 segment. A prominent right posterior   communicating artery contributes to flow within the right posterior   cerebral artery.  2. No evidence of aneurysm formation at the level of the [place]. Tiny aneurysms can be beyond the resolution of CTA technique.    CTA NECK: No evidence of significant stenosis or occlusion.     If the patient has persistent symptoms, consideration could be given to   brain MRI brain and/or MRA if clinically warranted and if there are no   MRI contraindications.    Remainder of the findings were communicated by Dr. Behr-Ventura to MARS Felder  in the emergency department at approximately 2:17 PM   on 11/11/2023..    --- End of Report ---            DAWN BEHR-VENTURA MD; Attending Radiologist  This document has been electronically signed. Nov 11 2023  2:19PM    < end of copied text >  < from: CT Angio Brain Stroke Protocol  w/ IV Cont (11.11.23 @ 14:11) >    ACC: [id number] EXAM:  CT BRAIN PERFUSION MAPS STROKE   ORDERED BY:   JORGE FELDER     ACC: [id number] EXAM:  CT ANGIO BRAIN STROKE PROTC IC   ORDERED BY:   JORGE FELDER     ACC: [id number] EXAM:  CT BRAIN STROKE PROTOCOL   ORDERED BY: JORGE FELDER     ACC: [id number] EXAM:  CT ANGIO NECK STROKE PROTCL IC   ORDERED BY:   JORGE FELDER     PROCEDURE DATE:  11/11/2023          INTERPRETATION:  CT HEAD STROKE PROTOCOL, CT ANGIO NECK STROKE PROTOCOL,   CT ANGIO HEAD STROKE PROTOCOL, CT PERFUSION WITH MAPS STROKE PROTOCOL    CT BRAIN WITHOUT CONTRAST    INDICATIONS:  Stroke code Leaning to left side. Slight left arm drift.    TECHNIQUE:  Serial axial images were obtained from the skull base to the   vertex without the use of contrast.    COMPARISON EXAM: 7/11/2023    FINDINGS:  Ventricles and sulci: Parenchymal volume loss is present which is   commensurate with patient age.  Intra-axial: Periventricular small vessel white matter ischemic changes.   No intracranial mass, acute hemorrhage, or midline shift is present.  Extra-axial: No extra-axial fluid collection is identified. Deposition of   calcified plaque at the level of the bilateral carotid siphons.  Calvarium: Intact.    Left optic lens replacement, as on prior. Bilateral optic globes are   intact. Imaged paranasal sinuses, bilateral mastoid air cells, middle ear   cavities are clear.        CT PERFUSION  CTA OF THE NECK AND HEAD:      CONTRAST/COMPLICATIONS:  IV Contrast: NONE (accession [id number]), IV contrast documented in   unlinked concurrent exam (accession [id number])  Complications: None reported at time of study completion      TECHNIQUE PERFUSION:  After the intravenous power injection of non-ionic contrast material,   repeat serial thin sections were obtained through the intracranial   circulation on a multislice CT scanner. Artificial intelligence was then   used for analysis of perfusion and intracranial large vessel occlusion.      TECHNIQUE CTA NECK AND HEAD:  After the intravenous power injection of non-ionic contrast material,   serial thin sections were obtained through the cervical and intracranial   circulation on a multislice CT scanner. 2D and 3D MIP reformatted images   were obtained.  Images were reformatted using a dedicated 3D software   package.    FINDINGS:    CT PERFUSION:    COMPARISON EXAMINATION: None.    Technical limitations: Significant patient motion in all 3 planes during   image acquisition likely renders results suboptimal and nondiagnostic.   Arterial and venous waveforms are also not optimized.    Miscellaneous: None.      CTA NECK WITH CONTRAST:    CAROTID STENOSIS REFERENCE: (NASCET = 100x1-(N/D)). N=greatest narrowing.   D=normal distal diameter - MILD = <50% stenosis. - MODERATE = 50-69%   stenosis.- SEVERE = 70-89% stenosis. - HAIRLINE/CRITICAL = 90-99%   stenosis. - OCCLUDED = 100% stenosis.      COMPARISON: None.    FINDINGS:  Aortic arch and visualized great vessels: Within normal limits.    RIGHT:  Common carotid artery: Follows a tortuouscourse and is normal in caliber   to the carotid bifurcation.  Internal carotid artery: Mild to moderate deposition of calcified plaque   at the level of the right common carotid artery bifurcation with   extension into the proximal aspect of the right internal carotid artery,   without significant stenosis based on NASCET criteria. Normal course and   caliber to the intracranial circulation.    Vertebral artery: Emanates from the right subclavian artery. The right   vertebral artery is normal incourse and caliber to the intracranial   circulation.      LEFT:  Common carotid artery :Normal in course and caliber to the carotid   bifurcation.  Internal carotid artery: Mild to moderate deposition of calcified plaque   at the level of the left common carotid artery bifurcation with extension   into the proximal left internal carotid artery without significant   stenosis based on NASCET criteria. Normal course and caliber to the   intracranial circulation.    Vertebral artery: Emanates from the left subclavian artery. The left   vertebral artery is normal in course and caliber to the intracranial   circulation.      Miscellaneous: None    CTA HEAD WITH CONTRAST:    COMPARISON EXAM: None.      Internal carotid arteries: Bilateral petrous, precavernous, cavernous,   and supraclinoid regions are patent. Mild deposition of calcified plaque   at the level of the carotid siphons without significant stenosis in this   location.    [place]:  No aneurysm about the [place]. Tiny aneurysms   can be beyond the resolution of CTA technique.    Anterior Cerebral arteries: No significant stenosis or occlusion.  Middle Cerebral arteries: No significant stenosis or occlusion.    Posterior Circulation: Distal intracranial bilateralvertebral arteries   are visualized. The vertebrobasilar confluence is unremarkable. There is   good flow within the basilar artery. Bilateral superior cerebellar   arteries and bilateral posterior cerebral arteries emanate from the   distal aspect of the basilar artery. There is a hypoplastic right P1   segment. A prominent right posterior communicating artery contributes to   flow within the right posterior cerebral artery.      Venous structures: Visualized supericial and deep veins appear patent.    Miscellaneous: No other vascular abnormality is seen.      IMPRESSION:      CT BRAIN WITHOUT CONTRAST: Age-appropriate involutional changes.   Atherosclerotic changes. Gray/white matter differentiation is preserved.   No acute intracranial hemorrhage.    Findings were communicated by Dr. Behr-Ventura to MARS Felder   in the emergency department at approximately 1:40 PM on 11/11/2023.      PLEASE NOTE: CT perfusion and CTA portions of the study were somewhat   delayed due to issues with initial IV access and need to reestablish   adequate IV access.    CT PERFUSION:    Technical limitations: Significant patient motion in all 3 planes during   image acquisition likely renders results suboptimal and nondiagnostic.   Arterial andvenous waveforms are also not optimized.    If symptoms persist consider follow up head CT or MRI, MRA  if no   contraindication.    CTA HEAD:  1. Hypoplastic right P1 segment. A prominent right posterior   communicating artery contributes to flow within the right posterior   cerebral artery.  2. No evidence of aneurysm formation at the level of the [place]. Tiny aneurysms can be beyond the resolution of CTA technique.    CTA NECK: No evidence of significant stenosis or occlusion.     If the patient has persistent symptoms, consideration could be given to   brain MRI brain and/or MRA if clinically warranted and if there are no   MRI contraindications.    Remainder of the findings were communicated by Dr. Behr-Ventura to MARS Felder  in the emergency department at approximately 2:17 PM   on 11/11/2023..    --- End of Report ---            DAWN BEHR-VENTURA MD; Attending Radiologist  This document has been electronically signed. Nov 11 2023  2:19PM    < end of copied text >< from: CT Brain Stroke Protocol (11.11.23 @ 14:10) >    ACC: [id number] EXAM:  CT BRAIN PERFUSION MAPS STROKE   ORDERED BY:   JORGE FELDER     ACC: [id number] EXAM:  CT ANGIO BRAIN STROKE PROTC IC   ORDERED BY:   JORGE FELDER     ACC: [id number] EXAM:  CT BRAIN STROKE PROTOCOL   ORDERED BY: JORGE FELDER     ACC: [id number] EXAM:  CT ANGIO NECK STROKE PROTCL IC   ORDERED BY:   JORGE FELDER     PROCEDURE DATE:  11/11/2023          INTERPRETATION:  CT HEAD STROKE PROTOCOL, CT ANGIO NECK STROKE PROTOCOL,   CT ANGIO HEAD STROKE PROTOCOL, CT PERFUSION WITH MAPS STROKE PROTOCOL    CT BRAIN WITHOUT CONTRAST    INDICATIONS:  Stroke code Leaning to left side. Slight left arm drift.    TECHNIQUE:  Serial axial images were obtained from the skull base to the   vertex without the use of contrast.    COMPARISON EXAM: 7/11/2023    FINDINGS:  Ventricles and sulci: Parenchymal volume loss is present which is   commensurate with patient age.  Intra-axial: Periventricular small vessel white matter ischemic changes.   No intracranial mass, acute hemorrhage, or midline shift is present.  Extra-axial: No extra-axial fluid collection is identified. Deposition of   calcified plaque at the level of the bilateral carotid siphons.  Calvarium: Intact.    Left optic lens replacement, as on prior. Bilateral optic globes are   intact. Imaged paranasal sinuses, bilateral mastoid air cells, middle ear   cavities are clear.        CT PERFUSION  CTA OF THE NECK AND HEAD:      CONTRAST/COMPLICATIONS:  IV Contrast: NONE (accession [id number]), IV contrast documented in   unlinked concurrent exam (accession [id number])  Complications: None reported at time of study completion      TECHNIQUE PERFUSION:  After the intravenous power injection of non-ionic contrast material,   repeat serial thin sections were obtained through the intracranial   circulation on a multislice CT scanner. Artificial intelligence was then   used for analysis of perfusion and intracranial large vessel occlusion.      TECHNIQUE CTA NECK AND HEAD:  After the intravenous power injection of non-ionic contrast material,   serial thin sections were obtained through the cervical and intracranial   circulation on a multislice CT scanner. 2D and 3D MIP reformatted images   were obtained.  Images were reformatted using a dedicated 3D software   package.    FINDINGS:    CT PERFUSION:    < end of copied text >

## 2023-11-11 NOTE — H&P ADULT - NSHPADDITIONALINFOADULT_GEN_ALL_CORE
MEDICATIONS  (STANDING):  aMIOdarone    Tablet 200 milliGRAM(s) Oral daily  apixaban 2.5 milliGRAM(s) Oral every 12 hours  artificial  tears Solution 1 Drop(s) Both EYES four times a day  ascorbic acid 500 milliGRAM(s) Oral daily  enalapril 10 milliGRAM(s) Oral daily  ertapenem  IVPB 500 milliGRAM(s) IV Intermittent every 24 hours  ferrous    sulfate 325 milliGRAM(s) Oral three times a day  levothyroxine 75 MICROGram(s) Oral daily  metoprolol tartrate 50 milliGRAM(s) Oral two times a day  multivitamin 1 Tablet(s) Oral daily  pantoprazole    Tablet 40 milliGRAM(s) Oral before breakfast  polyethylene glycol 3350 17 Gram(s) Oral daily

## 2023-11-11 NOTE — CONSULT NOTE ADULT - ASSESSMENT
89-year-old female with history of hypertension, hyperlipidemia, A-fib with ablation on Eliquis, MR, bladder tumor, PFO, osteoporosis, spinal stenosis brought in by ambulance for possible stroke.     eval cva  spinal stenosis  OP  OA  HLD  HTN  AF  Bladder tumor  PFO

## 2023-11-11 NOTE — H&P ADULT - PROBLEM SELECTOR PLAN 2
. UA was suggestive of UTI. The patient just had UTI with  ESBL E coli bacteremia last month.  . Add IV Invanz for presumptive recurrent UTI with ESBL

## 2023-11-11 NOTE — CONSULT NOTE ADULT - SUBJECTIVE AND OBJECTIVE BOX
Date/Time Patient Seen:  		  Referring MD:   Data Reviewed	       Patient is a 89y old  Female who presents with a chief complaint of     Subjective/HPI   89-year-old female with history of hypertension, hyperlipidemia, A-fib with ablation on Eliquis, MR, bladder tumor, PFO, osteoporosis, spinal stenosis brought in by ambulance for possible stroke.   PAST MEDICAL & SURGICAL HISTORY:  HTN (hypertension)    Afib    Spinal stenosis    PFO (patent foramen ovale)    Degeneration macular    Osteoporosis    History of complete heart block    Hypothyroidism    Cardiac pacemaker      	pmd: Dr. Collins    HIV:    HIV Test Questions:  · In accordance with [place] State law, we offer every patient who comes to our ED an HIV test. Would you like to be tested today?	Previously Declined (within the last year)    ALLERGIES AND HOME MEDICATIONS:   Allergies:        Allergies:  	penicillin: Drug, Unknown, Tolerated cefepime and ertapenem during 8/2023 admission.    Home Medications:   * Patient Currently Takes Medications as of 01-Sep-2023 14:10 documented in Structured Notes  · 	amLODIPine 10 mg oral tablet: 1 tab(s) orally once a day  · 	Protonix 40 mg oral delayed release tablet: 1 tab(s) orally once a day  · 	amiodarone 200 mg oral tablet: 1 tab(s) orally once a day  · 	enalapril 10 mg oral tablet: 1 tab(s) orally once a day  · 	Tylenol 325 mg oral tablet: 2 tab(s) orally every 4 hours as needed for pain or fever  · 	Metoprolol Tartrate 50 mg oral tablet: 1 tab(s) orally 2 times a day  · 	levothyroxine 75 mcg (0.075 mg) oral tablet: 1 tab(s) orally once a day  · 	Albuterol (Eqv-ProAir HFA) 90 mcg/inh inhalation aerosol: 1 puff(s) inhaled 4 times a day as needed for  cough or wheezing  · 	clindamycin 1% topical lotion: Apply topically to affected area once a day to face  · 	PreserVision AREDS oral capsule: 1 cap(s) orally once a day  · 	Refresh Dry Eye Therapy ophthalmic solution: 1 drop(s) in each eye 4 times a day  · 	ferrous sulfate 325 mg (65 mg elemental iron) oral tablet: 1 tab(s) orally once a day  · 	polyethylene glycol 3350 oral powder for reconstitution: 17 gram(s) orally once a day  · 	Eliquis 2.5 mg oral tablet: 1 tab(s) orally 2 times a day  · 	Myrbetriq 50 mg oral tablet, extended release: 1 tab(s) orally once a day    REVIEW OF SYSTEMS:    Review of Systems:  · NEUROLOGICAL: - - -  · Neurological [+]: leaning towards left side        Medication list         MEDICATIONS  (STANDING):    MEDICATIONS  (PRN):         Vitals log        ICU Vital Signs Last 24 Hrs  T(C): 37 (11 Nov 2023 12:55), Max: 37 (11 Nov 2023 12:55)  T(F): 98.6 (11 Nov 2023 12:55), Max: 98.6 (11 Nov 2023 12:55)  HR: 69 (11 Nov 2023 12:55) (69 - 69)  BP: 110/69 (11 Nov 2023 12:55) (110/69 - 110/69)  BP(mean): --  ABP: --  ABP(mean): --  RR: 16 (11 Nov 2023 12:55) (16 - 16)  SpO2: 98% (11 Nov 2023 12:55) (98% - 98%)    O2 Parameters below as of 11 Nov 2023 12:55  Patient On (Oxygen Delivery Method): room air                 Input and Output:  I&O's Detail      Lab Data                        9.6    12.89 )-----------( 307      ( 11 Nov 2023 13:20 )             29.8     11-11    144  |  113<H>  |  37<H>  ----------------------------<  120<H>  3.8   |  22  |  1.00    Ca    8.3<L>      11 Nov 2023 13:20    TPro  6.1  /  Alb  2.2<L>  /  TBili  0.7  /  DBili  x   /  AST  28  /  ALT  34  /  AlkPhos  129<H>  11-11            Review of Systems	      Objective     Physical Examination        Pertinent Lab findings & Imaging      Mela:  NO   Adequate UO     I&O's Detail           Discussed with:     Cultures:	        Radiology    ACC: [id number] EXAM:  CT BRAIN PERFUSION MAPS STROKE   ORDERED BY:   JORGE FELDER     ACC: [id number] EXAM:  CT ANGIO BRAIN STROKE PROTC IC   ORDERED BY:   JORGE FELDER     ACC: [id number] EXAM:  CT BRAIN STROKE PROTOCOL   ORDERED BY: JORGE FELDER     ACC: [id number] EXAM:  CT ANGIO NECK STROKE PROTCL IC   ORDERED BY:   JORGE FELDER     PROCEDURE DATE:  11/11/2023          INTERPRETATION:  CT HEAD STROKE PROTOCOL, CT ANGIO NECK STROKE PROTOCOL,   CT ANGIO HEAD STROKE PROTOCOL, CT PERFUSION WITH MAPS STROKE PROTOCOL    CT BRAIN WITHOUT CONTRAST    INDICATIONS:  Stroke code Leaning to left side. Slight left arm drift.    TECHNIQUE:  Serial axial images were obtained from the skull base to the   vertex without the use of contrast.    COMPARISON EXAM: 7/11/2023    FINDINGS:  Ventricles and sulci: Parenchymal volume loss is present which is   commensurate with patient age.  Intra-axial: Periventricular small vessel white matter ischemic changes.   No intracranial mass, acute hemorrhage, or midline shift is present.  Extra-axial: No extra-axial fluid collection is identified. Deposition of   calcified plaque at the level of the bilateral carotid siphons.  Calvarium: Intact.    Left optic lens replacement, as on prior. Bilateral optic globes are   intact. Imaged paranasal sinuses, bilateral mastoid air cells, middle ear   cavities are clear.        CT PERFUSION  CTA OF THE NECK AND HEAD:      CONTRAST/COMPLICATIONS:  IV Contrast: NONE (accession [id number]), IV contrast documented in   unlinked concurrent exam (accession [id number])  Complications: None reported at time of study completion      TECHNIQUE PERFUSION:  After the intravenous power injection of non-ionic contrast material,   repeat serial thin sections were obtained through the intracranial   circulation on a multislice CT scanner. Artificial intelligence was then   used for analysis of perfusion and intracranial large vessel occlusion.      TECHNIQUE CTA NECK AND HEAD:  After the intravenous power injection of non-ionic contrast material,   serial thin sections were obtained through the cervical and intracranial   circulation on a multislice CT scanner. 2D and 3D MIP reformatted images   were obtained.  Images were reformatted using a dedicated 3D software   package.    FINDINGS:    CT PERFUSION:    COMPARISON EXAMINATION: None.    Technical limitations: Significant patient motion in all 3 planes during   image acquisition likely renders results suboptimal and nondiagnostic.   Arterial and venous waveforms are also not optimized.    Miscellaneous: None.      CTA NECK WITH CONTRAST:    CAROTID STENOSIS REFERENCE: (NASCET = 100x1-(N/D)). N=greatest narrowing.   D=normal distal diameter - MILD = <50% stenosis. - MODERATE = 50-69%   stenosis. - SEVERE = 70-89% stenosis. - HAIRLINE/CRITICAL = 90-99%   stenosis. - OCCLUDED = 100% stenosis.      COMPARISON: None.    FINDINGS:  Aortic arch and visualized great vessels: Within normal limits.    RIGHT:  Common carotid artery: Follows a tortuous course and is normal in caliber   to the carotid bifurcation.  Internal carotid artery: Mild to moderate deposition of calcified plaque   at the level of the right common carotid artery bifurcation with   extension into the proximal aspect of the right internal carotid artery,   without significant stenosis based on NASCET criteria. Normal course and   caliber to the intracranial circulation.    Vertebral artery: Emanates from the right subclavian artery. The right   vertebral artery is normal in course and caliber to the intracranial   circulation.      LEFT:  Common carotid artery :Normal in course and caliber to the carotid   bifurcation.  Internal carotid artery: Mild to moderate deposition of calcified plaque   at the level of the left common carotid artery bifurcation with extension   into the proximal left internal carotid artery without significant   stenosis based on NASCET criteria. Normal course and caliber to the   intracranial circulation.    Vertebral artery: Emanates from the left subclavian artery. The left   vertebral artery is normal in course and caliber to the intracranial   circulation.      Miscellaneous: None    CTA HEAD WITH CONTRAST:    COMPARISON EXAM: None.      Internal carotid arteries: Bilateral petrous, precavernous, cavernous,   and supraclinoid regions are patent. Mild deposition of calcified plaque   at the level of the carotid siphons without significant stenosis in this   location.    [place]:  No aneurysm about the [place]. Tiny aneurysms   can be beyond the resolution of CTA technique.    Anterior Cerebral arteries: No significant stenosis or occlusion.  Middle Cerebral arteries: No significant stenosis or occlusion.    Posterior Circulation: Distal intracranial bilateral vertebral arteries   are visualized. The vertebrobasilar confluence is unremarkable. There is   good flow within the basilar artery. Bilateral superior cerebellar   arteries and bilateral posterior cerebral arteries emanate from the   distal aspect of the basilar artery. There is a hypoplastic right P1   segment. A prominent right posterior communicating artery contributes to   flow within the right posterior cerebral artery.      Venous structures: Visualized supericial and deep veins appear patent.    Miscellaneous: No other vascular abnormality is seen.      IMPRESSION:      CT BRAIN WITHOUT CONTRAST: Age-appropriate involutional changes.   Atherosclerotic changes. Gray/white matter differentiation is preserved.   No acute intracranial hemorrhage.    Findings were communicated by Dr. Behr-Ventura to MARS Felder   in the emergency department at approximately 1:40 PM on 11/11/2023.      PLEASE NOTE: CT perfusion and CTA portions of the study were somewhat   delayed due to issues with initial IV access and need to reestablish   adequate IV access.    CT PERFUSION:    Technical limitations: Significant patient motion in all 3 planes during   image acquisition likely renders results suboptimal and nondiagnostic.   Arterial and venous waveforms are also not optimized.    If symptoms persist consider follow up head CT or MRI, MRA  if no   contraindication.    CTA HEAD:  1. Hypoplastic right P1 segment. A prominent right posterior   communicating artery contributes to flow within the right posterior   cerebral artery.  2. No evidence of aneurysm formation at the level of the [place]. Tiny aneurysms can be beyond the resolution of CTA technique.    CTA NECK: No evidence of significant stenosis or occlusion.     If the patient has persistent symptoms, consideration could be given to   brain MRI brain and/or MRA if clinically warranted and if there are no   MRI contraindications.    Remainder of the findings were communicated by Dr. Behr-Ventura to MARS Felder  in the emergency department at approximately 2:17 PM   on 11/11/2023..    --- End of Report ---            DAWN BEHR-VENTURA MD; Attending Radiologist  This document has been electronically signed. Nov 11 2023  2:19PM                           Date/Time Patient Seen:  		  Referring MD:   Data Reviewed	       Patient is a 89y old  Female who presents with a chief complaint of     Subjective/HPI   89-year-old female with history of hypertension, hyperlipidemia, A-fib with ablation on Eliquis, MR, bladder tumor, PFO, osteoporosis, spinal stenosis brought in by ambulance for possible stroke.   PAST MEDICAL & SURGICAL HISTORY:  HTN (hypertension)    Afib    Spinal stenosis    PFO (patent foramen ovale)    Degeneration macular    Osteoporosis    History of complete heart block    Hypothyroidism    Cardiac pacemaker      	pmd: Dr. Collins    HIV:    HIV Test Questions:  · In accordance with [place] State law, we offer every patient who comes to our ED an HIV test. Would you like to be tested today?	Previously Declined (within the last year)    ALLERGIES AND HOME MEDICATIONS:   Allergies:        Allergies:  	penicillin: Drug, Unknown, Tolerated cefepime and ertapenem during 8/2023 admission.    Home Medications:   * Patient Currently Takes Medications as of 01-Sep-2023 14:10 documented in Structured Notes  · 	amLODIPine 10 mg oral tablet: 1 tab(s) orally once a day  · 	Protonix 40 mg oral delayed release tablet: 1 tab(s) orally once a day  · 	amiodarone 200 mg oral tablet: 1 tab(s) orally once a day  · 	enalapril 10 mg oral tablet: 1 tab(s) orally once a day  · 	Tylenol 325 mg oral tablet: 2 tab(s) orally every 4 hours as needed for pain or fever  · 	Metoprolol Tartrate 50 mg oral tablet: 1 tab(s) orally 2 times a day  · 	levothyroxine 75 mcg (0.075 mg) oral tablet: 1 tab(s) orally once a day  · 	Albuterol (Eqv-ProAir HFA) 90 mcg/inh inhalation aerosol: 1 puff(s) inhaled 4 times a day as needed for  cough or wheezing  · 	clindamycin 1% topical lotion: Apply topically to affected area once a day to face  · 	PreserVision AREDS oral capsule: 1 cap(s) orally once a day  · 	Refresh Dry Eye Therapy ophthalmic solution: 1 drop(s) in each eye 4 times a day  · 	ferrous sulfate 325 mg (65 mg elemental iron) oral tablet: 1 tab(s) orally once a day  · 	polyethylene glycol 3350 oral powder for reconstitution: 17 gram(s) orally once a day  · 	Eliquis 2.5 mg oral tablet: 1 tab(s) orally 2 times a day  · 	Myrbetriq 50 mg oral tablet, extended release: 1 tab(s) orally once a day    REVIEW OF SYSTEMS:    Review of Systems:  · NEUROLOGICAL: - - -  · Neurological [+]: leaning towards left side        Medication list         MEDICATIONS  (STANDING):    MEDICATIONS  (PRN):         Vitals log        ICU Vital Signs Last 24 Hrs  T(C): 37 (11 Nov 2023 12:55), Max: 37 (11 Nov 2023 12:55)  T(F): 98.6 (11 Nov 2023 12:55), Max: 98.6 (11 Nov 2023 12:55)  HR: 69 (11 Nov 2023 12:55) (69 - 69)  BP: 110/69 (11 Nov 2023 12:55) (110/69 - 110/69)  BP(mean): --  ABP: --  ABP(mean): --  RR: 16 (11 Nov 2023 12:55) (16 - 16)  SpO2: 98% (11 Nov 2023 12:55) (98% - 98%)    O2 Parameters below as of 11 Nov 2023 12:55  Patient On (Oxygen Delivery Method): room air                 Input and Output:  I&O's Detail      Lab Data                        9.6    12.89 )-----------( 307      ( 11 Nov 2023 13:20 )             29.8     11-11    144  |  113<H>  |  37<H>  ----------------------------<  120<H>  3.8   |  22  |  1.00    Ca    8.3<L>      11 Nov 2023 13:20    TPro  6.1  /  Alb  2.2<L>  /  TBili  0.7  /  DBili  x   /  AST  28  /  ALT  34  /  AlkPhos  129<H>  11-11            Review of Systems	      Objective     Physical Examination        Pertinent Lab findings & Imaging      Mela:  NO   Adequate UO     I&O's Detail           Discussed with:     Cultures:	        Radiology    ACC: [id number] EXAM:  CT BRAIN PERFUSION MAPS STROKE   ORDERED BY:   JORGE FELDER     ACC: [id number] EXAM:  CT ANGIO BRAIN STROKE PROTC IC   ORDERED BY:   JORGE FELDER     ACC: [id number] EXAM:  CT BRAIN STROKE PROTOCOL   ORDERED BY: JORGE FELDER     ACC: [id number] EXAM:  CT ANGIO NECK STROKE PROTCL IC   ORDERED BY:   JORGE FELDER     PROCEDURE DATE:  11/11/2023          INTERPRETATION:  CT HEAD STROKE PROTOCOL, CT ANGIO NECK STROKE PROTOCOL,   CT ANGIO HEAD STROKE PROTOCOL, CT PERFUSION WITH MAPS STROKE PROTOCOL    CT BRAIN WITHOUT CONTRAST    INDICATIONS:  Stroke code Leaning to left side. Slight left arm drift.    TECHNIQUE:  Serial axial images were obtained from the skull base to the   vertex without the use of contrast.    COMPARISON EXAM: 7/11/2023    FINDINGS:  Ventricles and sulci: Parenchymal volume loss is present which is   commensurate with patient age.  Intra-axial: Periventricular small vessel white matter ischemic changes.   No intracranial mass, acute hemorrhage, or midline shift is present.  Extra-axial: No extra-axial fluid collection is identified. Deposition of   calcified plaque at the level of the bilateral carotid siphons.  Calvarium: Intact.    Left optic lens replacement, as on prior. Bilateral optic globes are   intact. Imaged paranasal sinuses, bilateral mastoid air cells, middle ear   cavities are clear.        CT PERFUSION  CTA OF THE NECK AND HEAD:      CONTRAST/COMPLICATIONS:  IV Contrast: NONE (accession [id number]), IV contrast documented in   unlinked concurrent exam (accession [id number])  Complications: None reported at time of study completion      TECHNIQUE PERFUSION:  After the intravenous power injection of non-ionic contrast material,   repeat serial thin sections were obtained through the intracranial   circulation on a multislice CT scanner. Artificial intelligence was then   used for analysis of perfusion and intracranial large vessel occlusion.      TECHNIQUE CTA NECK AND HEAD:  After the intravenous power injection of non-ionic contrast material,   serial thin sections were obtained through the cervical and intracranial   circulation on a multislice CT scanner. 2D and 3D MIP reformatted images   were obtained.  Images were reformatted using a dedicated 3D software   package.    FINDINGS:    CT PERFUSION:    COMPARISON EXAMINATION: None.    Technical limitations: Significant patient motion in all 3 planes during   image acquisition likely renders results suboptimal and nondiagnostic.   Arterial and venous waveforms are also not optimized.    Miscellaneous: None.      CTA NECK WITH CONTRAST:    CAROTID STENOSIS REFERENCE: (NASCET = 100x1-(N/D)). N=greatest narrowing.   D=normal distal diameter - MILD = <50% stenosis. - MODERATE = 50-69%   stenosis. - SEVERE = 70-89% stenosis. - HAIRLINE/CRITICAL = 90-99%   stenosis. - OCCLUDED = 100% stenosis.      COMPARISON: None.    FINDINGS:  Aortic arch and visualized great vessels: Within normal limits.    RIGHT:  Common carotid artery: Follows a tortuous course and is normal in caliber   to the carotid bifurcation.  Internal carotid artery: Mild to moderate deposition of calcified plaque   at the level of the right common carotid artery bifurcation with   extension into the proximal aspect of the right internal carotid artery,   without significant stenosis based on NASCET criteria. Normal course and   caliber to the intracranial circulation.    Vertebral artery: Emanates from the right subclavian artery. The right   vertebral artery is normal in course and caliber to the intracranial   circulation.      LEFT:  Common carotid artery :Normal in course and caliber to the carotid   bifurcation.  Internal carotid artery: Mild to moderate deposition of calcified plaque   at the level of the left common carotid artery bifurcation with extension   into the proximal left internal carotid artery without significant   stenosis based on NASCET criteria. Normal course and caliber to the   intracranial circulation.    Vertebral artery: Emanates from the left subclavian artery. The left   vertebral artery is normal in course and caliber to the intracranial   circulation.      Miscellaneous: None    CTA HEAD WITH CONTRAST:    COMPARISON EXAM: None.      Internal carotid arteries: Bilateral petrous, precavernous, cavernous,   and supraclinoid regions are patent. Mild deposition of calcified plaque   at the level of the carotid siphons without significant stenosis in this   location.    [place]:  No aneurysm about the [place]. Tiny aneurysms   can be beyond the resolution of CTA technique.    Anterior Cerebral arteries: No significant stenosis or occlusion.  Middle Cerebral arteries: No significant stenosis or occlusion.    Posterior Circulation: Distal intracranial bilateral vertebral arteries   are visualized. The vertebrobasilar confluence is unremarkable. There is   good flow within the basilar artery. Bilateral superior cerebellar   arteries and bilateral posterior cerebral arteries emanate from the   distal aspect of the basilar artery. There is a hypoplastic right P1   segment. A prominent right posterior communicating artery contributes to   flow within the right posterior cerebral artery.      Venous structures: Visualized supericial and deep veins appear patent.    Miscellaneous: No other vascular abnormality is seen.      IMPRESSION:      CT BRAIN WITHOUT CONTRAST: Age-appropriate involutional changes.   Atherosclerotic changes. Gray/white matter differentiation is preserved.   No acute intracranial hemorrhage.    Findings were communicated by Dr. Behr-Ventura to MARS Felder   in the emergency department at approximately 1:40 PM on 11/11/2023.      PLEASE NOTE: CT perfusion and CTA portions of the study were somewhat   delayed due to issues with initial IV access and need to reestablish   adequate IV access.    CT PERFUSION:    Technical limitations: Significant patient motion in all 3 planes during   image acquisition likely renders results suboptimal and nondiagnostic.   Arterial and venous waveforms are also not optimized.    If symptoms persist consider follow up head CT or MRI, MRA  if no   contraindication.    CTA HEAD:  1. Hypoplastic right P1 segment. A prominent right posterior   communicating artery contributes to flow within the right posterior   cerebral artery.  2. No evidence of aneurysm formation at the level of the [place]. Tiny aneurysms can be beyond the resolution of CTA technique.    CTA NECK: No evidence of significant stenosis or occlusion.     If the patient has persistent symptoms, consideration could be given to   brain MRI brain and/or MRA if clinically warranted and if there are no   MRI contraindications.    Remainder of the findings were communicated by Dr. Behr-Ventura to MARS Felder  in the emergency department at approximately 2:17 PM   on 11/11/2023..    --- End of Report ---            DAWN BEHR-VENTURA MD; Attending Radiologist  This document has been electronically signed. Nov 11 2023  2:19PM                           Date/Time Patient Seen:  		  Referring MD:   Data Reviewed	       Patient is a 89y old  Female who presents with a chief complaint of     Subjective/HPI   89-year-old female with history of hypertension, hyperlipidemia, A-fib with ablation on Eliquis, MR, bladder tumor, PFO, osteoporosis, spinal stenosis brought in by ambulance for possible stroke.   PAST MEDICAL & SURGICAL HISTORY:  HTN (hypertension)    Afib    Spinal stenosis    PFO (patent foramen ovale)    Degeneration macular    Osteoporosis    History of complete heart block    Hypothyroidism    Cardiac pacemaker      	pmd: Dr. Collins    HIV:    HIV Test Questions:  · In accordance with [place] State law, we offer every patient who comes to our ED an HIV test. Would you like to be tested today?	Previously Declined (within the last year)    ALLERGIES AND HOME MEDICATIONS:   Allergies:        Allergies:  	penicillin: Drug, Unknown, Tolerated cefepime and ertapenem during 8/2023 admission.    Home Medications:   * Patient Currently Takes Medications as of 01-Sep-2023 14:10 documented in Structured Notes  · 	amLODIPine 10 mg oral tablet: 1 tab(s) orally once a day  · 	Protonix 40 mg oral delayed release tablet: 1 tab(s) orally once a day  · 	amiodarone 200 mg oral tablet: 1 tab(s) orally once a day  · 	enalapril 10 mg oral tablet: 1 tab(s) orally once a day  · 	Tylenol 325 mg oral tablet: 2 tab(s) orally every 4 hours as needed for pain or fever  · 	Metoprolol Tartrate 50 mg oral tablet: 1 tab(s) orally 2 times a day  · 	levothyroxine 75 mcg (0.075 mg) oral tablet: 1 tab(s) orally once a day  · 	Albuterol (Eqv-ProAir HFA) 90 mcg/inh inhalation aerosol: 1 puff(s) inhaled 4 times a day as needed for  cough or wheezing  · 	clindamycin 1% topical lotion: Apply topically to affected area once a day to face  · 	PreserVision AREDS oral capsule: 1 cap(s) orally once a day  · 	Refresh Dry Eye Therapy ophthalmic solution: 1 drop(s) in each eye 4 times a day  · 	ferrous sulfate 325 mg (65 mg elemental iron) oral tablet: 1 tab(s) orally once a day  · 	polyethylene glycol 3350 oral powder for reconstitution: 17 gram(s) orally once a day  · 	Eliquis 2.5 mg oral tablet: 1 tab(s) orally 2 times a day  · 	Myrbetriq 50 mg oral tablet, extended release: 1 tab(s) orally once a day    REVIEW OF SYSTEMS:    Review of Systems:  · NEUROLOGICAL: - - -  · Neurological [+]: leaning towards left side        Medication list         MEDICATIONS  (STANDING):    MEDICATIONS  (PRN):         Vitals log        ICU Vital Signs Last 24 Hrs  T(C): 37 (11 Nov 2023 12:55), Max: 37 (11 Nov 2023 12:55)  T(F): 98.6 (11 Nov 2023 12:55), Max: 98.6 (11 Nov 2023 12:55)  HR: 69 (11 Nov 2023 12:55) (69 - 69)  BP: 110/69 (11 Nov 2023 12:55) (110/69 - 110/69)  BP(mean): --  ABP: --  ABP(mean): --  RR: 16 (11 Nov 2023 12:55) (16 - 16)  SpO2: 98% (11 Nov 2023 12:55) (98% - 98%)    O2 Parameters below as of 11 Nov 2023 12:55  Patient On (Oxygen Delivery Method): room air                 Input and Output:  I&O's Detail      Lab Data                        9.6    12.89 )-----------( 307      ( 11 Nov 2023 13:20 )             29.8     11-11    144  |  113<H>  |  37<H>  ----------------------------<  120<H>  3.8   |  22  |  1.00    Ca    8.3<L>      11 Nov 2023 13:20    TPro  6.1  /  Alb  2.2<L>  /  TBili  0.7  /  DBili  x   /  AST  28  /  ALT  34  /  AlkPhos  129<H>  11-11            Review of Systems	      Objective     Physical Examination        Pertinent Lab findings & Imaging      Mela:  NO   Adequate UO     I&O's Detail           Discussed with:     Cultures:	        Radiology    ACC: [id number] EXAM:  CT BRAIN PERFUSION MAPS STROKE   ORDERED BY:   JORGE FELDER     ACC: [id number] EXAM:  CT ANGIO BRAIN STROKE PROTC IC   ORDERED BY:   JORGE FELDER     ACC: [id number] EXAM:  CT BRAIN STROKE PROTOCOL   ORDERED BY: JORGE FELDER     ACC: [id number] EXAM:  CT ANGIO NECK STROKE PROTCL IC   ORDERED BY:   JORGE FELDER     PROCEDURE DATE:  11/11/2023          INTERPRETATION:  CT HEAD STROKE PROTOCOL, CT ANGIO NECK STROKE PROTOCOL,   CT ANGIO HEAD STROKE PROTOCOL, CT PERFUSION WITH MAPS STROKE PROTOCOL    CT BRAIN WITHOUT CONTRAST    INDICATIONS:  Stroke code Leaning to left side. Slight left arm drift.    TECHNIQUE:  Serial axial images were obtained from the skull base to the   vertex without the use of contrast.    COMPARISON EXAM: 7/11/2023    FINDINGS:  Ventricles and sulci: Parenchymal volume loss is present which is   commensurate with patient age.  Intra-axial: Periventricular small vessel white matter ischemic changes.   No intracranial mass, acute hemorrhage, or midline shift is present.  Extra-axial: No extra-axial fluid collection is identified. Deposition of   calcified plaque at the level of the bilateral carotid siphons.  Calvarium: Intact.    Left optic lens replacement, as on prior. Bilateral optic globes are   intact. Imaged paranasal sinuses, bilateral mastoid air cells, middle ear   cavities are clear.        CT PERFUSION  CTA OF THE NECK AND HEAD:      CONTRAST/COMPLICATIONS:  IV Contrast: NONE (accession [id number]), IV contrast documented in   unlinked concurrent exam (accession [id number])  Complications: None reported at time of study completion      TECHNIQUE PERFUSION:  After the intravenous power injection of non-ionic contrast material,   repeat serial thin sections were obtained through the intracranial   circulation on a multislice CT scanner. Artificial intelligence was then   used for analysis of perfusion and intracranial large vessel occlusion.      TECHNIQUE CTA NECK AND HEAD:  After the intravenous power injection of non-ionic contrast material,   serial thin sections were obtained through the cervical and intracranial   circulation on a multislice CT scanner. 2D and 3D MIP reformatted images   were obtained.  Images were reformatted using a dedicated 3D software   package.    FINDINGS:    CT PERFUSION:    COMPARISON EXAMINATION: None.    Technical limitations: Significant patient motion in all 3 planes during   image acquisition likely renders results suboptimal and nondiagnostic.   Arterial and venous waveforms are also not optimized.    Miscellaneous: None.      CTA NECK WITH CONTRAST:    CAROTID STENOSIS REFERENCE: (NASCET = 100x1-(N/D)). N=greatest narrowing.   D=normal distal diameter - MILD = <50% stenosis. - MODERATE = 50-69%   stenosis. - SEVERE = 70-89% stenosis. - HAIRLINE/CRITICAL = 90-99%   stenosis. - OCCLUDED = 100% stenosis.      COMPARISON: None.    FINDINGS:  Aortic arch and visualized great vessels: Within normal limits.    RIGHT:  Common carotid artery: Follows a tortuous course and is normal in caliber   to the carotid bifurcation.  Internal carotid artery: Mild to moderate deposition of calcified plaque   at the level of the right common carotid artery bifurcation with   extension into the proximal aspect of the right internal carotid artery,   without significant stenosis based on NASCET criteria. Normal course and   caliber to the intracranial circulation.    Vertebral artery: [place] from the right subclavian artery. The right   vertebral artery is normal in course and caliber to the intracranial   circulation.      LEFT:  Common carotid artery :Normal in course and caliber to the carotid   bifurcation.  Internal carotid artery: Mild to moderate deposition of calcified plaque   at the level of the left common carotid artery bifurcation with extension   into the proximal left internal carotid artery without significant   stenosis based on NASCET criteria. Normal course and caliber to the   intracranial circulation.    Vertebral artery: [place]manates from the left subclavian artery. The left   vertebral artery is normal in course and caliber to the intracranial   circulation.      Miscellaneous: None    CTA HEAD WITH CONTRAST:    COMPARISON EXAM: None.      Internal carotid arteries: Bilateral petrous, precavernous, cavernous,   and supraclinoid regions are patent. Mild deposition of calcified plaque   at the level of the carotid siphons without significant stenosis in this   location.    [place]:  No aneurysm about the [place]. Tiny aneurysms   can be beyond the resolution of CTA technique.    Anterior Cerebral arteries: No significant stenosis or occlusion.  Middle Cerebral arteries: No significant stenosis or occlusion.    Posterior Circulation: Distal intracranial bilateral vertebral arteries   are visualized. The vertebrobasilar confluence is unremarkable. There is   good flow within the basilar artery. Bilateral superior cerebellar   arteries and bilateral posterior cerebral arteries emanate from the   distal aspect of the basilar artery. There is a hypoplastic right P1   segment. A prominent right posterior communicating artery contributes to   flow within the right posterior cerebral artery.      Venous structures: Visualized supericial and deep veins appear patent.    Miscellaneous: No other vascular abnormality is seen.      IMPRESSION:      CT BRAIN WITHOUT CONTRAST: Age-appropriate involutional changes.   Atherosclerotic changes. Gray/white matter differentiation is preserved.   No acute intracranial hemorrhage.    Findings were communicated by Dr. Behr-Ventura to MARS Felder   in the emergency department at approximately 1:40 PM on 11/11/2023.      PLEASE NOTE: CT perfusion and CTA portions of the study were somewhat   delayed due to issues with initial IV access and need to reestablish   adequate IV access.    CT PERFUSION:    Technical limitations: Significant patient motion in all 3 planes during   image acquisition likely renders results suboptimal and nondiagnostic.   Arterial and venous waveforms are also not optimized.    If symptoms persist consider follow up head CT or MRI, MRA  if no   contraindication.    CTA HEAD:  1. Hypoplastic right P1 segment. A prominent right posterior   communicating artery contributes to flow within the right posterior   cerebral artery.  2. No evidence of aneurysm formation at the level of the [place]. Tiny aneurysms can be beyond the resolution of CTA technique.    CTA NECK: No evidence of significant stenosis or occlusion.     If the patient has persistent symptoms, consideration could be given to   brain MRI brain and/or MRA if clinically warranted and if there are no   MRI contraindications.    Remainder of the findings were communicated by Dr. Behr-Ventura to MARS Felder  in the emergency department at approximately 2:17 PM   on 11/11/2023..    --- End of Report ---            DAWN BEHR-VENTURA MD; Attending Radiologist  This document has been electronically signed. Nov 11 2023  2:19PM

## 2023-11-11 NOTE — PATIENT PROFILE ADULT - FALL HARM RISK - HARM RISK INTERVENTIONS
Assistance with ambulation/Assistance OOB with selected safe patient handling equipment/Communicate Risk of Fall with Harm to all staff/Discuss with provider need for PT consult/Monitor gait and stability/Provide patient with walking aids - walker, cane, crutches/Reinforce activity limits and safety measures with patient and family/Tailored Fall Risk Interventions/Visual Cue: Yellow wristband and red socks/Bed in lowest position, wheels locked, appropriate side rails in place/Call bell, personal items and telephone in reach/Instruct patient to call for assistance before getting out of bed or chair/Non-slip footwear when patient is out of bed/[place] to call system/Physically safe environment - no spills, clutter or unnecessary equipment/Purposeful Proactive Rounding/Room/bathroom lighting operational, light cord in reach Assistance with ambulation/Assistance OOB with selected safe patient handling equipment/Communicate Risk of Fall with Harm to all staff/Discuss with provider need for PT consult/Monitor gait and stability/Provide patient with walking aids - walker, cane, crutches/Reinforce activity limits and safety measures with patient and family/Tailored Fall Risk Interventions/Visual Cue: Yellow wristband and red socks/Bed in lowest position, wheels locked, appropriate side rails in place/Call bell, personal items and telephone in reach/Instruct patient to call for assistance before getting out of bed or chair/Non-slip footwear when patient is out of bed/[place] to call system/Physically safe environment - no spills, clutter or unnecessary equipment/Purposeful Proactive Rounding/Room/bathroom lighting operational, light cord in reach Assistance with ambulation/Assistance OOB with selected safe patient handling equipment/Communicate Risk of Fall with Harm to all staff/Discuss with provider need for PT consult/Monitor gait and stability/Provide patient with walking aids - walker, cane, crutches/Reinforce activity limits and safety measures with patient and family/Tailored Fall Risk Interventions/Visual Cue: Yellow wristband and red socks/Bed in lowest position, wheels locked, appropriate side rails in place/Call bell, personal items and telephone in reach/Instruct patient to call for assistance before getting out of bed or chair/Non-slip footwear when patient is out of bed/[place] to call system/Physically safe environment - no spills, clutter or unnecessary equipment/Purposeful Proactive Rounding/Room/bathroom lighting operational, light cord in reach

## 2023-11-11 NOTE — ED PROVIDER NOTE - CONSTITUTIONAL, MLM
normal... well appearing elderly female, awake, alert, oriented to person, place, time/situation and in no apparent distress.

## 2023-11-11 NOTE — ED ADULT NURSE NOTE - CHIEF COMPLAINT QUOTE
Patient arrived via [facility] from [facility] (formerly [facility]) with staff concern for possible stroke 2/2 patient leaning to the left while sitting which is abnormal for her. Per report patient was seen in normal state of health at 11:30, given her morning pills, then between 11:30 - 11:45 started leaning to the side.  Patient with possible left arm weakness as well.   PHx: MR, HTN, HLD, af-b with ablation, spinal stenosis, arthritis Patient arrived via [facility] from [facility] with staff concern for possible stroke 2/2 patient leaning to the left while sitting which is abnormal for her. Per report patient was seen in normal state of health at 11:30, given her morning pills, then between 11:30 - 11:45 started leaning to the side.  Patient with possible left arm weakness as well.   PHx: MR, HTN, HLD, af-b with ablation, spinal stenosis, arthritis Patient arrived via [facility] from [facility] with staff concern for possible stroke 2/2 patient leaning to the left while sitting which is abnormal for her. Per report patient was seen in normal state of health at 11:30, given her morning pills, then between 11:30 - 11:45 started leaning to the side.  Patient with possible left arm weakness as well.   PHx: MR, HTN, HLD, af-b with ablation, spinal stenosis, arthritis

## 2023-11-11 NOTE — H&P ADULT - HISTORY OF PRESENT ILLNESS
89-year-old female with history of hypertension, hyperlipidemia, A-fib with ablation on Eliquis, MR, bladder tumor, PFO, osteoporosis, spinal stenosis brought in by ambulance for possible stroke.  As per facility patient ate breakfast and had her medication between 9:45AM and 10 AM this morning and was at her baseline mental status.  Aide noticed around 11/1130 patient was leaning towards her left side, had water poured out of her mouth when she attempted to drink and had possible slurred speech.  Patient limited historian due to MR.  As per facility patient was recently in a short-term rehab facility, recently returned back to [facility] however patient has been weaker since then.< from: CT Brain Perfusion Maps Stroke (11.11.23 @ 14:12) >    < from: CT Brain Perfusion Maps Stroke (11.11.23 @ 14:12) >    ACC: [id number] EXAM:  CT BRAIN PERFUSION MAPS STROKE   ORDERED BY:   JORGE FELDER     ACC: [id number] EXAM:  CT ANGIO BRAIN STROKE PROTC IC   ORDERED BY:   JORGE FELDER     ACC: [id number] EXAM:  CT BRAIN STROKE PROTOCOL   ORDERED BY: JORGE FELDER     ACC: [id number] EXAM:  CT ANGIO NECK STROKE PROTCL IC   ORDERED BY:   JORGE FELDER     PROCEDURE DATE:  11/11/2023          INTERPRETATION:  CT HEAD STROKE PROTOCOL, CT ANGIO NECK STROKE PROTOCOL,   CT ANGIO HEAD STROKE PROTOCOL, CT PERFUSION WITH MAPS STROKE PROTOCOL    CT BRAIN WITHOUT CONTRAST    INDICATIONS:  Stroke code Leaning to left side. Slight left arm drift.    TECHNIQUE:  Serial axial images were obtained from the skull base to the   vertex without the use of contrast.    COMPARISON EXAM: 7/11/2023    FINDINGS:  Ventricles and sulci: Parenchymal volume loss is present which is   commensurate with patient age.  Intra-axial: Periventricular small vessel white matter ischemic changes.   No intracranial mass, acute hemorrhage, or midline shift is present.  Extra-axial: No extra-axial fluid collection is identified. Deposition of   calcified plaque at the level of the bilateral carotid siphons.  Calvarium: Intact.    Left optic lens replacement, as on prior. Bilateral optic globes are   intact. Imaged paranasal sinuses, bilateral mastoid air cells, middle ear   cavities are clear.        CT PERFUSION  CTA OF THE NECK AND HEAD:      CONTRAST/COMPLICATIONS:  IV Contrast: NONE (accession [id number]), IV contrast documented in   unlinked concurrent exam (accession [id number])  Complications: None reported at time of study completion      TECHNIQUE PERFUSION:  After the intravenous power injection of non-ionic contrast material,   repeat serial thin sections were obtained through the intracranial   circulation on a multislice CT scanner. Artificial intelligence was then   used for analysis of perfusion and intracranial large vessel occlusion.      TECHNIQUE CTA NECK AND HEAD:  After the intravenous power injection of non-ionic contrast material,   serial thin sections were obtained through the cervical and intracranial   circulation on a multislice CT scanner. 2D and 3D MIP reformatted images   were obtained.  Images were reformatted using a dedicated 3D software   package.    FINDINGS:    CT PERFUSION:    COMPARISON EXAMINATION: None.    Technical limitations: Significant patient motion in all 3 planes during   image acquisition likely renders results suboptimal and nondiagnostic.   Arterial and venous waveforms are also not optimized.    Miscellaneous: None.      CTA NECK WITH CONTRAST:    CAROTID STENOSIS REFERENCE: (NASCET = 100x1-(N/D)). N=greatest narrowing.   D=normal distal diameter - MILD = <50% stenosis. - MODERATE = 50-69%   stenosis.- SEVERE = 70-89% stenosis. - HAIRLINE/CRITICAL = 90-99%   stenosis. - OCCLUDED = 100% stenosis.      COMPARISON: None.    FINDINGS:  Aortic arch and visualized great vessels: Within normal limits.    RIGHT:  Common carotid artery: Follows a tortuouscourse and is normal in caliber   to the carotid bifurcation.  Internal carotid artery: Mild to moderate deposition of calcified plaque   at the level of the right common carotid artery bifurcation with   extension into the proximal aspect of the right internal carotid artery,   without significant stenosis based on NASCET criteria. Normal course and   caliber to the intracranial circulation.    Vertebral artery: Emanates from the right subclavian artery. The right   vertebral artery is normal incourse and caliber to the intracranial   circulation.      LEFT:  Common carotid artery :Normal in course and caliber to the carotid   bifurcation.  Internal carotid artery: Mild to moderate deposition of calcified plaque   at the level of the left common carotid artery bifurcation with extension   into the proximal left internal carotid artery without significant   stenosis based on NASCET criteria. Normal course and caliber to the   intracranial circulation.    Vertebral artery: Emanates from the left subclavian artery. The left   vertebral artery is normal in course and caliber to the intracranial   circulation.      Miscellaneous: None    CTA HEAD WITH CONTRAST:    COMPARISON EXAM: None.      Internal carotid arteries: Bilateral petrous, precavernous, cavernous,   and supraclinoid regions are patent. Mild deposition of calcified plaque   at the level of the carotid siphons without significant stenosis in this   location.    [place]:  No aneurysm about the [place]. Tiny aneurysms   can be beyond the resolution of CTA technique.    Anterior Cerebral arteries: No significant stenosis or occlusion.  Middle Cerebral arteries: No significant stenosis or occlusion.    Posterior Circulation: Distal intracranial bilateralvertebral arteries   are visualized. The vertebrobasilar confluence is unremarkable. There is   good flow within the basilar artery. Bilateral superior cerebellar   arteries and bilateral posterior cerebral arteries emanate from the   distal aspect of the basilar artery. There is a hypoplastic right P1   segment. A prominent right posterior communicating artery contributes to   flow within the right posterior cerebral artery.    < end of copied text >     89-year-old female with history of hypertension, hyperlipidemia, A-fib with ablation on Eliquis, MR, bladder tumor, PFO, osteoporosis, spinal stenosis brought in by ambulance for possible stroke.  As per facility patient ate breakfast and had her medication between 9:45AM and 10 AM this morning and was at her baseline mental status.  Aide noticed around 11/1130 patient was leaning towards her left side, had water poured out of her mouth when she attempted to drink and had possible slurred speech.  Patient limited historian due to MR.  As per facility patient was recently in a short-term rehab facility, recently returned back to [facility] in [place] however patient has been weaker since then.< from: CT Brain Perfusion Maps Stroke (11.11.23 @ 14:12) >    < from: CT Brain Perfusion Maps Stroke (11.11.23 @ 14:12) >    ACC: [id number] EXAM:  CT BRAIN PERFUSION MAPS STROKE   ORDERED BY:   JORGE FELDER     ACC: [id number] EXAM:  CT ANGIO BRAIN STROKE PROTC IC   ORDERED BY:   JORGE FELDER     ACC: [id number] EXAM:  CT BRAIN STROKE PROTOCOL   ORDERED BY: JORGE FELDER     ACC: [id number] EXAM:  CT ANGIO NECK STROKE PROTCL IC   ORDERED BY:   JORGE FELDER     PROCEDURE DATE:  11/11/2023          INTERPRETATION:  CT HEAD STROKE PROTOCOL, CT ANGIO NECK STROKE PROTOCOL,   CT ANGIO HEAD STROKE PROTOCOL, CT PERFUSION WITH MAPS STROKE PROTOCOL    CT BRAIN WITHOUT CONTRAST    INDICATIONS:  Stroke code Leaning to left side. Slight left arm drift.    TECHNIQUE:  Serial axial images were obtained from the skull base to the   vertex without the use of contrast.    COMPARISON EXAM: 7/11/2023    FINDINGS:  Ventricles and sulci: Parenchymal volume loss is present which is   commensurate with patient age.  Intra-axial: Periventricular small vessel white matter ischemic changes.   No intracranial mass, acute hemorrhage, or midline shift is present.  Extra-axial: No extra-axial fluid collection is identified. Deposition of   calcified plaque at the level of the bilateral carotid siphons.  Calvarium: Intact.    Left optic lens replacement, as on prior. Bilateral optic globes are   intact. Imaged paranasal sinuses, bilateral mastoid air cells, middle ear   cavities are clear.        CT PERFUSION  CTA OF THE NECK AND HEAD:      CONTRAST/COMPLICATIONS:  IV Contrast: NONE (accession [id number]), IV contrast documented in   unlinked concurrent exam (accession [id number])  Complications: None reported at time of study completion      TECHNIQUE PERFUSION:  After the intravenous power injection of non-ionic contrast material,   repeat serial thin sections were obtained through the intracranial   circulation on a multislice CT scanner. Artificial intelligence was then   used for analysis of perfusion and intracranial large vessel occlusion.      TECHNIQUE CTA NECK AND HEAD:  After the intravenous power injection of non-ionic contrast material,   serial thin sections were obtained through the cervical and intracranial   circulation on a multislice CT scanner. 2D and 3D MIP reformatted images   were obtained.  Images were reformatted using a dedicated 3D software   package.    FINDINGS:    CT PERFUSION:    COMPARISON EXAMINATION: None.    Technical limitations: Significant patient motion in all 3 planes during   image acquisition likely renders results suboptimal and nondiagnostic.   Arterial and venous waveforms are also not optimized.    Miscellaneous: None.      CTA NECK WITH CONTRAST:    CAROTID STENOSIS REFERENCE: (NASCET = 100x1-(N/D)). N=greatest narrowing.   D=normal distal diameter - MILD = <50% stenosis. - MODERATE = 50-69%   stenosis.- SEVERE = 70-89% stenosis. - HAIRLINE/CRITICAL = 90-99%   stenosis. - OCCLUDED = 100% stenosis.      COMPARISON: None.    FINDINGS:  Aortic arch and visualized great vessels: Within normal limits.    RIGHT:  Common carotid artery: Follows a tortuouscourse and is normal in caliber   to the carotid bifurcation.  Internal carotid artery: Mild to moderate deposition of calcified plaque   at the level of the right common carotid artery bifurcation with   extension into the proximal aspect of the right internal carotid artery,   without significant stenosis based on NASCET criteria. Normal course and   caliber to the intracranial circulation.    Vertebral artery: Emanates from the right subclavian artery. The right   vertebral artery is normal incourse and caliber to the intracranial   circulation.      LEFT:  Common carotid artery :Normal in course and caliber to the carotid   bifurcation.  Internal carotid artery: Mild to moderate deposition of calcified plaque   at the level of the left common carotid artery bifurcation with extension   into the proximal left internal carotid artery without significant   stenosis based on NASCET criteria. Normal course and caliber to the   intracranial circulation.    Vertebral artery: Emanates from the left subclavian artery. The left   vertebral artery is normal in course and caliber to the intracranial   circulation.      Miscellaneous: None    CTA HEAD WITH CONTRAST:    COMPARISON EXAM: None.      Internal carotid arteries: Bilateral petrous, precavernous, cavernous,   and supraclinoid regions are patent. Mild deposition of calcified plaque   at the level of the carotid siphons without significant stenosis in this   location.    [place]:  No aneurysm about the [place]. Tiny aneurysms   can be beyond the resolution of CTA technique.    Anterior Cerebral arteries: No significant stenosis or occlusion.  Middle Cerebral arteries: No significant stenosis or occlusion.    Posterior Circulation: Distal intracranial bilateralvertebral arteries   are visualized. The vertebrobasilar confluence is unremarkable. There is   good flow within the basilar artery. Bilateral superior cerebellar   arteries and bilateral posterior cerebral arteries emanate from the   distal aspect of the basilar artery. There is a hypoplastic right P1   segment. A prominent right posterior communicating artery contributes to   flow within the right posterior cerebral artery.    < end of copied text >     89-year-old female with history of hypertension, hyperlipidemia, A-fib with ablation on Eliquis, MR, bladder tumor, PFO, osteoporosis, spinal stenosis brought in by ambulance for possible stroke.  As per facility patient ate breakfast and had her medication between 9:45AM and 10 AM this morning and was at her baseline mental status.  Aide noticed around 11/1130 patient was leaning towards her left side, had water poured out of her mouth when she attempted to drink and had possible slurred speech.  Patient limited historian due to MR.  As per facility patient was recently in a short-term rehab facility, recently returned back to [facility] however patient has been weaker since then.< from: CT Brain Perfusion Maps Stroke (11.11.23 @ 14:12) >    < from: CT Brain Perfusion Maps Stroke (11.11.23 @ 14:12) >    ACC: [id number] EXAM:  CT BRAIN PERFUSION MAPS STROKE   ORDERED BY:   JORGE FELDER     ACC: [id number] EXAM:  CT ANGIO BRAIN STROKE PROTC IC   ORDERED BY:   JORGE FELDER     ACC: [id number] EXAM:  CT BRAIN STROKE PROTOCOL   ORDERED BY: JORGE FELDER     ACC: [id number] EXAM:  CT ANGIO NECK STROKE PROTCL IC   ORDERED BY:   JORGE FELDER     PROCEDURE DATE:  11/11/2023          INTERPRETATION:  CT HEAD STROKE PROTOCOL, CT ANGIO NECK STROKE PROTOCOL,   CT ANGIO HEAD STROKE PROTOCOL, CT PERFUSION WITH MAPS STROKE PROTOCOL    CT BRAIN WITHOUT CONTRAST    INDICATIONS:  Stroke code Leaning to left side. Slight left arm drift.    TECHNIQUE:  Serial axial images were obtained from the skull base to the   vertex without the use of contrast.    COMPARISON EXAM: 7/11/2023    FINDINGS:  Ventricles and sulci: Parenchymal volume loss is present which is   commensurate with patient age.  Intra-axial: Periventricular small vessel white matter ischemic changes.   No intracranial mass, acute hemorrhage, or midline shift is present.  Extra-axial: No extra-axial fluid collection is identified. Deposition of   calcified plaque at the level of the bilateral carotid siphons.  Calvarium: Intact.    Left optic lens replacement, as on prior. Bilateral optic globes are   intact. Imaged paranasal sinuses, bilateral mastoid air cells, middle ear   cavities are clear.        CT PERFUSION  CTA OF THE NECK AND HEAD:      CONTRAST/COMPLICATIONS:  IV Contrast: NONE (accession [id number]), IV contrast documented in   unlinked concurrent exam (accession [id number])  Complications: None reported at time of study completion      TECHNIQUE PERFUSION:  After the intravenous power injection of non-ionic contrast material,   repeat serial thin sections were obtained through the intracranial   circulation on a multislice CT scanner. Artificial intelligence was then   used for analysis of perfusion and intracranial large vessel occlusion.      TECHNIQUE CTA NECK AND HEAD:  After the intravenous power injection of non-ionic contrast material,   serial thin sections were obtained through the cervical and intracranial   circulation on a multislice CT scanner. 2D and 3D MIP reformatted images   were obtained.  Images were reformatted using a dedicated 3D software   package.    FINDINGS:    CT PERFUSION:    COMPARISON EXAMINATION: None.    Technical limitations: Significant patient motion in all 3 planes during   image acquisition likely renders results suboptimal and nondiagnostic.   Arterial and venous waveforms are also not optimized.    Miscellaneous: None.      CTA NECK WITH CONTRAST:    CAROTID STENOSIS REFERENCE: (NASCET = 100x1-(N/D)). N=greatest narrowing.   D=normal distal diameter - MILD = <50% stenosis. - MODERATE = 50-69%   stenosis.- SEVERE = 70-89% stenosis. - HAIRLINE/CRITICAL = 90-99%   stenosis. - OCCLUDED = 100% stenosis.      COMPARISON: None.    FINDINGS:  Aortic arch and visualized great vessels: Within normal limits.    RIGHT:  Common carotid artery: Follows a tortuouscourse and is normal in caliber   to the carotid bifurcation.  Internal carotid artery: Mild to moderate deposition of calcified plaque   at the level of the right common carotid artery bifurcation with   extension into the proximal aspect of the right internal carotid artery,   without significant stenosis based on NASCET criteria. Normal course and   caliber to the intracranial circulation.    Vertebral artery: Emanates from the right subclavian artery. The right   vertebral artery is normal incourse and caliber to the intracranial   circulation.      LEFT:  Common carotid artery :Normal in course and caliber to the carotid   bifurcation.  Internal carotid artery: Mild to moderate deposition of calcified plaque   at the level of the left common carotid artery bifurcation with extension   into the proximal left internal carotid artery without significant   stenosis based on NASCET criteria. Normal course and caliber to the   intracranial circulation.    Vertebral artery: Emanates from the left subclavian artery. The left   vertebral artery is normal in course and caliber to the intracranial   circulation.      Miscellaneous: None    CTA HEAD WITH CONTRAST:    COMPARISON EXAM: None.      Internal carotid arteries: Bilateral petrous, precavernous, cavernous,   and supraclinoid regions are patent. Mild deposition of calcified plaque   at the level of the carotid siphons without significant stenosis in this   location.    [place]:  No aneurysm about the [place]. Tiny aneurysms   can be beyond the resolution of CTA technique.    Anterior Cerebral arteries: No significant stenosis or occlusion.  Middle Cerebral arteries: No significant stenosis or occlusion.    Posterior Circulation: Distal intracranial bilateralvertebral arteries   are visualized. The vertebrobasilar confluence is unremarkable. There is   good flow within the basilar artery. Bilateral superior cerebellar   arteries and bilateral posterior cerebral arteries emanate from the   distal aspect of the basilar artery. There is a hypoplastic right P1   segment. A prominent right posterior communicating artery contributes to   flow within the right posterior cerebral artery.    < end of copied text >

## 2023-11-11 NOTE — PHARMACOTHERAPY INTERVENTION NOTE - COMMENTS
89 yo female presenting as Code Stroke. Medication history completed with [facility] medication record. Of note:     - Facility list reflects patient is currently prescribed Eliquis 2.5 mg BID; per facility RN last dose taken ~10 AM today, 11/11/2023.     Medication reconciliation completed. OMR updated to reflect facility medications. Findings relayed to MARS Kenny.  89 yo female presenting as Code Stroke. Medication history completed with [facility] medication record. Of note:     - Facility list reflects patient is currently prescribed Eliquis 2.5 mg BID; per facility RN last dose taken ~10 AM today, 11/11/2023.     Medication reconciliation completed. OMR updated to reflect facility medications. Findings relayed to MARS Kenny.  89 yo female presenting as Code Stroke. Medication history completed with [facility] medication record. Of note:     - Facility list reflects patient is currently prescribed Eliquis 2.5 mg BID; per facility RN last dose taken ~10 AM today, 11/11/2023.     Medication reconciliation completed. OMR updated to reflect facility medications. Findings relayed to MARS Kenny.

## 2023-11-11 NOTE — ED ADULT NURSE NOTE - NSFALLRISKINTERV_ED_ALL_ED
Assistance OOB with selected safe patient handling equipment if applicable/Assistance with ambulation/Communicate fall risk and risk factors to all staff, patient, and family/Monitor gait and stability/Provide patient with walking aids/Provide visual cue: yellow wristband, yellow gown, etc/Reinforce activity limits and safety measures with patient and family/Call bell, personal items and telephone in reach/Instruct patient to call for assistance before getting out of bed/chair/stretcher/Non-slip footwear applied when patient is off stretcher/[place] to call system/Physically safe environment - no spills, clutter or unnecessary equipment/Purposeful Proactive Rounding/Room/bathroom lighting operational, light cord in reach Assistance OOB with selected safe patient handling equipment if applicable/Assistance with ambulation/Communicate fall risk and risk factors to all staff, patient, and family/Monitor gait and stability/Provide patient with walking aids/Provide visual cue: yellow wristband, yellow gown, etc/Reinforce activity limits and safety measures with patient and family/Call bell, personal items and telephone in reach/Instruct patient to call for assistance before getting out of bed/chair/stretcher/Non-slip footwear applied when patient is off stretcher/[place] to call system/Physically safe environment - no spills, clutter or unnecessary equipment/Purposeful Proactive Rounding/Room/bathroom lighting operational, light cord in reach Assistance OOB with selected safe patient handling equipment if applicable/Assistance with ambulation/Communicate fall risk and risk factors to all staff, patient, and family/Monitor gait and stability/Provide patient with walking aids/Provide visual cue: yellow wristband, yellow gown, etc/Reinforce activity limits and safety measures with patient and family/Call bell, personal items and telephone in reach/Instruct patient to call for assistance before getting out of bed/chair/stretcher/Non-slip footwear applied when patient is off stretcher/[place] to call system/Physically safe environment - no spills, clutter or unnecessary equipment/Purposeful Proactive Rounding/Room/bathroom lighting operational, light cord in reach

## 2023-11-11 NOTE — PATIENT PROFILE ADULT - FUNCTIONAL ASSESSMENT - BASIC MOBILITY 6.
2-calculated by average/Not able to assess (calculate score using [facility] averaging method) 2-calculated by average/Not able to assess (calculate score using [facility] averaging method) 2-calculated by average/Not able to assess (calculate score using [facility] averaging method)

## 2023-11-11 NOTE — CONSULT NOTE ADULT - SUBJECTIVE AND OBJECTIVE BOX
Patient is a 89y old  Female who presents with a chief complaint of     HPI:      Asked to see patient for ID Consult    PAST MEDICAL & SURGICAL HISTORY:  HTN (hypertension)      Afib      Spinal stenosis      PFO (patent foramen ovale)      Degeneration macular      Osteoporosis      History of complete heart block      Hypothyroidism      Cardiac pacemaker          Allergies    penicillin (Unknown)    Intolerances        REVIEW OF SYSTEMS:  All systems below were reviewed and are negative [  ]  HEENT:  ID:  Pulmonary:  Cardiac:  GI:  Renal:  Musculoskeletal:  All other systems above were reviewed and are negative   [  ]    HOME MEDICATIONS:    MEDICATIONS  (STANDING):  sodium chloride 0.9% Bolus 500 milliLiter(s) IV Bolus once    MEDICATIONS  (PRN):      Vital Signs Last 24 Hrs  T(C): 37 (11 Nov 2023 12:55), Max: 37 (11 Nov 2023 12:55)  T(F): 98.6 (11 Nov 2023 12:55), Max: 98.6 (11 Nov 2023 12:55)  HR: 69 (11 Nov 2023 12:55) (69 - 69)  BP: 110/69 (11 Nov 2023 12:55) (110/69 - 110/69)  BP(mean): --  RR: 16 (11 Nov 2023 12:55) (16 - 16)  SpO2: 98% (11 Nov 2023 12:55) (98% - 98%)    Parameters below as of 11 Nov 2023 12:55  Patient On (Oxygen Delivery Method): room air        PHYSICAL EXAM:  HEENT:  Neck:  Lungs:  Heart:  Abdomen:  Genital/ Rectal:  Extremities:  Neurologic:  Vascular:    I&O's Summary      LABORATORY:                          9.6    12.89 )-----------( 307      ( 11 Nov 2023 13:20 )             29.8           11-11    144  |  113<H>  |  37<H>  ----------------------------<  120<H>  3.8   |  22  |  1.00    Ca    8.3<L>      11 Nov 2023 13:20    TPro  6.1  /  Alb  2.2<L>  /  TBili  0.7  /  DBili  x   /  AST  28  /  ALT  34  /  AlkPhos  129<H>  11-11          LABORATORY:    CBC Full  -  ( 11 Nov 2023 13:20 )  WBC Count : 12.89 K/uL  RBC Count : 3.37 M/uL  Hemoglobin : 9.6 g/dL  Hematocrit : 29.8 %  Platelet Count - Automated : 307 K/uL  Mean Cell Volume : 88.4 fl  Mean Cell Hemoglobin : 28.5 pg  Mean Cell Hemoglobin Concentration : 32.2 gm/dL  Auto Neutrophil # : 10.97 K/uL  Auto Lymphocyte # : 0.72 K/uL  Auto Monocyte # : 0.91 K/uL  Auto Eosinophil # : 0.02 K/uL  Auto Basophil # : 0.03 K/uL  Auto Neutrophil % : 85.0 %  Auto Lymphocyte % : 5.6 %  Auto Monocyte % : 7.1 %  Auto Eosinophil % : 0.2 %  Auto Basophil % : 0.2 %          11-11    144  |  113<H>  |  37<H>  ----------------------------<  120<H>  3.8   |  22  |  1.00    Ca    8.3<L>      11 Nov 2023 13:20    TPro  6.1  /  Alb  2.2<L>  /  TBili  0.7  /  DBili  x   /  AST  28  /  ALT  34  /  AlkPhos  129<H>  11-11                                                   Patient is a 89y old  Female who presents with a chief complaint of altered menta status    The patient is a 89 year old female with a PMH of UTI, bacteremia with ESBL E coli who was brought to the ED today for altered mental status. CT of brain was negative for infarcts.   UA was suggestive of UTI       PAST MEDICAL & SURGICAL HISTORY:  HTN (hypertension)      Afib      Spinal stenosis      PFO (patent foramen ovale)      Degeneration macular      Osteoporosis      History of complete heart block      Hypothyroidism      Cardiac pacemaker          Allergies    penicillin (Unknown)    Intolerances        REVIEW OF SYSTEMS:  All systems below were reviewed and are negative [  ]  HEENT:  ID:  Pulmonary:  Cardiac:  GI:  Renal:  Musculoskeletal:  All other systems above were reviewed and are negative   [  ]    HOME MEDICATIONS:    MEDICATIONS  (STANDING):  sodium chloride 0.9% Bolus 500 milliLiter(s) IV Bolus once    MEDICATIONS  (PRN):      Vital Signs Last 24 Hrs  T(C): 37 (11 Nov 2023 12:55), Max: 37 (11 Nov 2023 12:55)  T(F): 98.6 (11 Nov 2023 12:55), Max: 98.6 (11 Nov 2023 12:55)  HR: 69 (11 Nov 2023 12:55) (69 - 69)  BP: 110/69 (11 Nov 2023 12:55) (110/69 - 110/69)  BP(mean): --  RR: 16 (11 Nov 2023 12:55) (16 - 16)  SpO2: 98% (11 Nov 2023 12:55) (98% - 98%)    Parameters below as of 11 Nov 2023 12:55  Patient On (Oxygen Delivery Method): room air        PHYSICAL EXAM:  HEENT: NC/AT, PERRLA  Neck: Soft  Lungs:  Heart: RRR, no murmurs  Abdomen: Soft, no tenderness  Genital/ Rectal: No khan   Extremities: R heel ulcer  Neurologic: Confused.   Vascular:    I&O's Summary      LABORATORY:                          9.6    12.89 )-----------( 307      ( 11 Nov 2023 13:20 )             29.8           11-11    144  |  113<H>  |  37<H>  ----------------------------<  120<H>  3.8   |  22  |  1.00    Ca    8.3<L>      11 Nov 2023 13:20    TPro  6.1  /  Alb  2.2<L>  /  TBili  0.7  /  DBili  x   /  AST  28  /  ALT  34  /  AlkPhos  129<H>  11-11          LABORATORY:    CBC Full  -  ( 11 Nov 2023 13:20 )  WBC Count : 12.89 K/uL  RBC Count : 3.37 M/uL  Hemoglobin : 9.6 g/dL  Hematocrit : 29.8 %  Platelet Count - Automated : 307 K/uL  Mean Cell Volume : 88.4 fl  Mean Cell Hemoglobin : 28.5 pg  Mean Cell Hemoglobin Concentration : 32.2 gm/dL  Auto Neutrophil # : 10.97 K/uL  Auto Lymphocyte # : 0.72 K/uL  Auto Monocyte # : 0.91 K/uL  Auto Eosinophil # : 0.02 K/uL  Auto Basophil # : 0.03 K/uL  Auto Neutrophil % : 85.0 %  Auto Lymphocyte % : 5.6 %  Auto Monocyte % : 7.1 %  Auto Eosinophil % : 0.2 %  Auto Basophil % : 0.2 %          11-11    144  |  113<H>  |  37<H>  ----------------------------<  120<H>  3.8   |  22  |  1.00    Ca    8.3<L>      11 Nov 2023 13:20    TPro  6.1  /  Alb  2.2<L>  /  TBili  0.7  /  DBili  x   /  AST  28  /  ALT  34  /  AlkPhos  129<H>  11-11      Assessment and plan:    1, UTI.  2, Toxic metabolic encephalopathy.    . UA was suggestive of UTI, The patient just had UTI with  ESBL E coli bacteremia last month.  . Add IV Invanz for presumptive recurrent UTI with ESBL  . Monitor mental status.    Thank you                                              Patient is a 89y old  Female who presents with a chief complaint of altered menta status    The patient is a 89 year old female with a PMH of UTI, bacteremia with ESBL E coli in October 2023, spinal stenosis, PFO, osteoporosis, CHB with PPM and HTN who was brought to the ED today for altered mental status. CT of brain was negative for infarcts.   UA was suggestive of UTI and she was given IV Rocephin in the ED. She was afebrile in the ED.         PAST MEDICAL & SURGICAL HISTORY:  HTN (hypertension)      Afib      Spinal stenosis      PFO (patent foramen ovale)      Degeneration macular      Osteoporosis      History of complete heart block      Hypothyroidism      Cardiac pacemaker          Allergies    penicillin (Unknown)    Intolerances        REVIEW OF SYSTEMS:  No cough or SOB  No vomiting or diarrhea      HOME MEDICATIONS:    MEDICATIONS  (STANDING):  sodium chloride 0.9% Bolus 500 milliLiter(s) IV Bolus once    MEDICATIONS  (PRN):      Vital Signs Last 24 Hrs  T(C): 37 (11 Nov 2023 12:55), Max: 37 (11 Nov 2023 12:55)  T(F): 98.6 (11 Nov 2023 12:55), Max: 98.6 (11 Nov 2023 12:55)  HR: 69 (11 Nov 2023 12:55) (69 - 69)  BP: 110/69 (11 Nov 2023 12:55) (110/69 - 110/69)  BP(mean): --  RR: 16 (11 Nov 2023 12:55) (16 - 16)  SpO2: 98% (11 Nov 2023 12:55) (98% - 98%)    Parameters below as of 11 Nov 2023 12:55  Patient On (Oxygen Delivery Method): room air        PHYSICAL EXAM:  HEENT: NC/AT, PERRLA  Neck: Soft  Lungs: Coarse BS bilaterally, no wheezing.   Heart: RRR, no murmurs  Abdomen: Soft, no tenderness  Genital/ Rectal: No khan   Extremities: R heel ulcer  Neurologic: Confused.     I&O's Summary      LABORATORY:                          9.6    12.89 )-----------( 307      ( 11 Nov 2023 13:20 )             29.8           11-11    144  |  113<H>  |  37<H>  ----------------------------<  120<H>  3.8   |  22  |  1.00    Ca    8.3<L>      11 Nov 2023 13:20    TPro  6.1  /  Alb  2.2<L>  /  TBili  0.7  /  DBili  x   /  AST  28  /  ALT  34  /  AlkPhos  129<H>  11-11          LABORATORY:    CBC Full  -  ( 11 Nov 2023 13:20 )  WBC Count : 12.89 K/uL  RBC Count : 3.37 M/uL  Hemoglobin : 9.6 g/dL  Hematocrit : 29.8 %  Platelet Count - Automated : 307 K/uL  Mean Cell Volume : 88.4 fl  Mean Cell Hemoglobin : 28.5 pg  Mean Cell Hemoglobin Concentration : 32.2 gm/dL  Auto Neutrophil # : 10.97 K/uL  Auto Lymphocyte # : 0.72 K/uL  Auto Monocyte # : 0.91 K/uL  Auto Eosinophil # : 0.02 K/uL  Auto Basophil # : 0.03 K/uL  Auto Neutrophil % : 85.0 %  Auto Lymphocyte % : 5.6 %  Auto Monocyte % : 7.1 %  Auto Eosinophil % : 0.2 %  Auto Basophil % : 0.2 %          11-11    144  |  113<H>  |  37<H>  ----------------------------<  120<H>  3.8   |  22  |  1.00    Ca    8.3<L>      11 Nov 2023 13:20    TPro  6.1  /  Alb  2.2<L>  /  TBili  0.7  /  DBili  x   /  AST  28  /  ALT  34  /  AlkPhos  129<H>  11-11      Assessment and plan:    1. UTI.  2. Toxic metabolic encephalopathy.  3. Generalized weakness.     . UA was suggestive of UTI. The patient just had UTI with  ESBL E coli bacteremia last month.  . Add IV Invanz for presumptive recurrent UTI with ESBL  . Monitor mental status.  . Follow all cultures.        Thank you

## 2023-11-11 NOTE — ED PROVIDER NOTE - OBJECTIVE STATEMENT
89-year-old female with history of hypertension, hyperlipidemia, A-fib with ablation on Eliquis, MR, bladder tumor, PFO, osteoporosis, spinal stenosis brought in by ambulance for possible stroke.  As per facility patient ate breakfast and had her medication between 9:45AM and 10 AM this morning and was at her baseline mental status.  Aide noticed around 11/1130 patient was leaning towards her left side, had water poured out of her mouth when she attempted to drink and had possible slurred speech.  Patient limited historian due to MR.  As per facility patient was recently in a short-term rehab facility, recently returned back to [email or web address] however patient has been weaker since then.    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  As per facility patient ate breakfast and had her medication between 9:45AM and 10 AM this morning and was at her baseline mental status.  Aide noticed around 11/1130 patient was leaning towards her left side, had water poured out of her mouth when she attempted to drink and had possible slurred speech.  Patient limited historian due to MR.  As per facility patient was recently in a short-term rehab facility, recently returned back to [facility] however patient has been weaker since then.    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  As per facility patient ate breakfast and had her medication between 9:45AM and 10 AM this morning and was at her baseline mental status.  Aide noticed around 11/1130 patient was leaning towards her left side, had water poured out of her mouth when she attempted to drink and had possible slurred speech.  Patient limited historian due to MR.  As per facility patient was recently in a short-term rehab facility, recently returned back to [facility] however patient has been weaker since then.    pmd: Dr. Collins

## 2023-11-11 NOTE — H&P ADULT - NSHPSOCIALHISTORY_GEN_ALL_CORE
Social History:    Marital Status:   Occupation:   Lives with:     Substance Use :  none   Tobacco Usage:  (   ) never smoked   (   ) former smoker   (  no ) current smoker  (     ) pack year  (        ) last tobacco use date  Alcohol Usage:  none       Health Management     For female:   Last Mammo:   Last Pap:     For male:  Last prostate exam:          [  ] date:            (  ) findings      Immunization Hx:   (  ) flu shot                               (     ) date   (  ) pneumonia shot               (     ) date  (  ) tetanus                               (     ) date     (     ) Advanced Directives: (     ) declined   [  ] health care proxy

## 2023-11-11 NOTE — ED ADULT NURSE NOTE - OBJECTIVE STATEMENT
the patient comes to the er as a code stroke.  per facility., the patient is leaning toward left side and seems to be slurring her words.  Upon her arrival, fingerstick performed (131) and iv access obtained to RAC (#20).  labs collected.  PA / MD at bedside. good equal strength upper extremities.  sensation intact.  able to hold arms up against gravity, slight drift of the left upper extremity.  there is no effort on the patient's behalf to raise either leg against gravity.  able to move toes, ankle.   the patient is able to speak, she is aware of where she is and is able to state her name (could not name the hospital, but knew she was at home(, states she was brought here because "I had trouble picking my head up.  she denies any pain/ nauseas / vomiting / headache / sob.  she was placed on monitor and brought down to CT.  she tolerated well and was taken back to room in ER.  dysphagia perfomed, (passed all 3).  continues to deny pain.  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  she states "I wished they would have contacted me before they sent her"  urine collected.

## 2023-11-11 NOTE — ED PROVIDER NOTE - PROGRESS NOTE DETAILS
Patient is not tenecteplase candidate due to taking eliquis at 9:30/10 AM today with abnormal coags Attempted to get in touch with teleneuro, unable to at this time. Case discussed with Dr. Randolph, covering for Dr. Soliz

## 2023-11-11 NOTE — H&P ADULT - NSHPLABSRESULTS_GEN_ALL_CORE
9.6    12.89 )-----------( 307      ( 2023 13:20 )             29.8       CBC Full  -  ( 2023 13:20 )  WBC Count : 12.89 K/uL  RBC Count : 3.37 M/uL  Hemoglobin : 9.6 g/dL  Hematocrit : 29.8 %  Platelet Count - Automated : 307 K/uL  Mean Cell Volume : 88.4 fl  Mean Cell Hemoglobin : 28.5 pg  Mean Cell Hemoglobin Concentration : 32.2 gm/dL  Auto Neutrophil # : 10.97 K/uL  Auto Lymphocyte # : 0.72 K/uL  Auto Monocyte # : 0.91 K/uL  Auto Eosinophil # : 0.02 K/uL  Auto Basophil # : 0.03 K/uL  Auto Neutrophil % : 85.0 %  Auto Lymphocyte % : 5.6 %  Auto Monocyte % : 7.1 %  Auto Eosinophil % : 0.2 %  Auto Basophil % : 0.2 %          144  |  113<H>  |  37<H>  ----------------------------<  120<H>  3.8   |  22  |  1.00    Ca    8.3<L>      2023 13:20    TPro  6.1  /  Alb  2.2<L>  /  TBili  0.7  /  DBili  x   /  AST  28  /  ALT  34  /  AlkPhos  129<H>        CAPILLARY BLOOD GLUCOSE      POCT Blood Glucose.: 131 mg/dL (2023 13:12)      Vital Signs Last 24 Hrs  T(C): 36.5 (2023 17:10), Max: 37.1 (2023 13:15)  T(F): 97.7 (2023 17:10), Max: 98.8 (2023 13:15)  HR: 69 (2023 17:10) (66 - 77)  BP: 118/73 (2023 17:10) (110/69 - 132/77)  BP(mean): --  RR: 16 (2023 17:10) (16 - 17)  SpO2: 98% (2023 17:10) (97% - 98%)    Parameters below as of 2023 17:10  Patient On (Oxygen Delivery Method): room air        Urinalysis Basic - ( 2023 15:41 )    Color: Orange / Appearance: Turbid / S.030 / pH: x  Gluc: x / Ketone: Negative mg/dL  / Bili: Negative / Urobili: 1.0 mg/dL   Blood: x / Protein: 300 mg/dL / Nitrite: Negative   Leuk Esterase: Large / RBC: 28 /HPF / WBC Too Numerous to count /HPF   Sq Epi: x / Non Sq Epi: x / Bacteria: Too Numerous to count /HPF        PT/INR - ( 2023 13:20 )   PT: 16.8 sec;   INR: 1.45 ratio         PTT - ( 2023 13:20 )  PTT:30.7 sec

## 2023-11-12 DIAGNOSIS — E87.6 HYPOKALEMIA: ICD-10-CM

## 2023-11-12 DIAGNOSIS — I48.91 UNSPECIFIED ATRIAL FIBRILLATION: ICD-10-CM

## 2023-11-12 DIAGNOSIS — R39.89 OTHER SYMPTOMS AND SIGNS INVOLVING THE GENITOURINARY SYSTEM: ICD-10-CM

## 2023-11-12 DIAGNOSIS — E87.0 HYPEROSMOLALITY AND HYPERNATREMIA: ICD-10-CM

## 2023-11-12 DIAGNOSIS — M48.00 SPINAL STENOSIS, SITE UNSPECIFIED: ICD-10-CM

## 2023-11-12 DIAGNOSIS — G45.9 TRANSIENT CEREBRAL ISCHEMIC ATTACK, UNSPECIFIED: ICD-10-CM

## 2023-11-12 DIAGNOSIS — E03.9 HYPOTHYROIDISM, UNSPECIFIED: ICD-10-CM

## 2023-11-12 DIAGNOSIS — Z29.9 ENCOUNTER FOR PROPHYLACTIC MEASURES, UNSPECIFIED: ICD-10-CM

## 2023-11-12 DIAGNOSIS — G93.41 METABOLIC ENCEPHALOPATHY: ICD-10-CM

## 2023-11-12 DIAGNOSIS — R13.10 DYSPHAGIA, UNSPECIFIED: ICD-10-CM

## 2023-11-12 DIAGNOSIS — I10 ESSENTIAL (PRIMARY) HYPERTENSION: ICD-10-CM

## 2023-11-12 LAB
ALBUMIN SERPL ELPH-MCNC: 1.8 G/DL — LOW (ref 3.3–5)
ALBUMIN SERPL ELPH-MCNC: 1.8 G/DL — LOW (ref 3.3–5)
ALP SERPL-CCNC: 101 U/L — SIGNIFICANT CHANGE UP (ref 40–120)
ALP SERPL-CCNC: 101 U/L — SIGNIFICANT CHANGE UP (ref 40–120)
ALT FLD-CCNC: 32 U/L — SIGNIFICANT CHANGE UP (ref 12–78)
ALT FLD-CCNC: 32 U/L — SIGNIFICANT CHANGE UP (ref 12–78)
ANION GAP SERPL CALC-SCNC: 7 MMOL/L — SIGNIFICANT CHANGE UP (ref 5–17)
ANION GAP SERPL CALC-SCNC: 7 MMOL/L — SIGNIFICANT CHANGE UP (ref 5–17)
AST SERPL-CCNC: 26 U/L — SIGNIFICANT CHANGE UP (ref 15–37)
AST SERPL-CCNC: 26 U/L — SIGNIFICANT CHANGE UP (ref 15–37)
BILIRUB DIRECT SERPL-MCNC: 0.2 MG/DL — SIGNIFICANT CHANGE UP (ref 0–0.3)
BILIRUB DIRECT SERPL-MCNC: 0.2 MG/DL — SIGNIFICANT CHANGE UP (ref 0–0.3)
BILIRUB INDIRECT FLD-MCNC: 0.3 MG/DL — SIGNIFICANT CHANGE UP (ref 0.2–1)
BILIRUB INDIRECT FLD-MCNC: 0.3 MG/DL — SIGNIFICANT CHANGE UP (ref 0.2–1)
BILIRUB SERPL-MCNC: 0.5 MG/DL — SIGNIFICANT CHANGE UP (ref 0.2–1.2)
BILIRUB SERPL-MCNC: 0.5 MG/DL — SIGNIFICANT CHANGE UP (ref 0.2–1.2)
BUN SERPL-MCNC: 29 MG/DL — HIGH (ref 7–23)
BUN SERPL-MCNC: 29 MG/DL — HIGH (ref 7–23)
CALCIUM SERPL-MCNC: 7.7 MG/DL — LOW (ref 8.5–10.1)
CALCIUM SERPL-MCNC: 7.7 MG/DL — LOW (ref 8.5–10.1)
CHLORIDE SERPL-SCNC: 117 MMOL/L — HIGH (ref 96–108)
CHLORIDE SERPL-SCNC: 117 MMOL/L — HIGH (ref 96–108)
CHOLEST SERPL-MCNC: 59 MG/DL — SIGNIFICANT CHANGE UP
CHOLEST SERPL-MCNC: 59 MG/DL — SIGNIFICANT CHANGE UP
CO2 SERPL-SCNC: 23 MMOL/L — SIGNIFICANT CHANGE UP (ref 22–31)
CO2 SERPL-SCNC: 23 MMOL/L — SIGNIFICANT CHANGE UP (ref 22–31)
CREAT SERPL-MCNC: 0.69 MG/DL — SIGNIFICANT CHANGE UP (ref 0.5–1.3)
CREAT SERPL-MCNC: 0.69 MG/DL — SIGNIFICANT CHANGE UP (ref 0.5–1.3)
EGFR: 80 ML/MIN/1.73M2 — SIGNIFICANT CHANGE UP
EGFR: 80 ML/MIN/1.73M2 — SIGNIFICANT CHANGE UP
GLUCOSE SERPL-MCNC: 82 MG/DL — SIGNIFICANT CHANGE UP (ref 70–99)
GLUCOSE SERPL-MCNC: 82 MG/DL — SIGNIFICANT CHANGE UP (ref 70–99)
HCT VFR BLD CALC: 24 % — LOW (ref 34.5–45)
HCT VFR BLD CALC: 24 % — LOW (ref 34.5–45)
HDLC SERPL-MCNC: 28 MG/DL — LOW
HDLC SERPL-MCNC: 28 MG/DL — LOW
HGB BLD-MCNC: 7.9 G/DL — LOW (ref 11.5–15.5)
HGB BLD-MCNC: 7.9 G/DL — LOW (ref 11.5–15.5)
IRON SATN MFR SERPL: 22 % — SIGNIFICANT CHANGE UP (ref 14–50)
IRON SATN MFR SERPL: 22 % — SIGNIFICANT CHANGE UP (ref 14–50)
IRON SATN MFR SERPL: 23 UG/DL — LOW (ref 30–160)
IRON SATN MFR SERPL: 23 UG/DL — LOW (ref 30–160)
LIPID PNL WITH DIRECT LDL SERPL: 18 MG/DL — SIGNIFICANT CHANGE UP
LIPID PNL WITH DIRECT LDL SERPL: 18 MG/DL — SIGNIFICANT CHANGE UP
MAGNESIUM SERPL-MCNC: 1.8 MG/DL — SIGNIFICANT CHANGE UP (ref 1.6–2.6)
MAGNESIUM SERPL-MCNC: 1.8 MG/DL — SIGNIFICANT CHANGE UP (ref 1.6–2.6)
MCHC RBC-ENTMCNC: 28.1 PG — SIGNIFICANT CHANGE UP (ref 27–34)
MCHC RBC-ENTMCNC: 28.1 PG — SIGNIFICANT CHANGE UP (ref 27–34)
MCHC RBC-ENTMCNC: 32.9 GM/DL — SIGNIFICANT CHANGE UP (ref 32–36)
MCHC RBC-ENTMCNC: 32.9 GM/DL — SIGNIFICANT CHANGE UP (ref 32–36)
MCV RBC AUTO: 85.4 FL — SIGNIFICANT CHANGE UP (ref 80–100)
MCV RBC AUTO: 85.4 FL — SIGNIFICANT CHANGE UP (ref 80–100)
NON HDL CHOLESTEROL: 31 MG/DL — SIGNIFICANT CHANGE UP
NON HDL CHOLESTEROL: 31 MG/DL — SIGNIFICANT CHANGE UP
NRBC # BLD: 0 /100 WBCS — SIGNIFICANT CHANGE UP (ref 0–0)
NRBC # BLD: 0 /100 WBCS — SIGNIFICANT CHANGE UP (ref 0–0)
PLATELET # BLD AUTO: 275 K/UL — SIGNIFICANT CHANGE UP (ref 150–400)
PLATELET # BLD AUTO: 275 K/UL — SIGNIFICANT CHANGE UP (ref 150–400)
POTASSIUM SERPL-MCNC: 3.1 MMOL/L — LOW (ref 3.5–5.3)
POTASSIUM SERPL-MCNC: 3.1 MMOL/L — LOW (ref 3.5–5.3)
POTASSIUM SERPL-SCNC: 3.1 MMOL/L — LOW (ref 3.5–5.3)
POTASSIUM SERPL-SCNC: 3.1 MMOL/L — LOW (ref 3.5–5.3)
PROT SERPL-MCNC: 4.9 G/DL — LOW (ref 6–8.3)
PROT SERPL-MCNC: 4.9 G/DL — LOW (ref 6–8.3)
RBC # BLD: 2.81 M/UL — LOW (ref 3.8–5.2)
RBC # BLD: 2.81 M/UL — LOW (ref 3.8–5.2)
RBC # FLD: 17.7 % — HIGH (ref 10.3–14.5)
RBC # FLD: 17.7 % — HIGH (ref 10.3–14.5)
SODIUM SERPL-SCNC: 147 MMOL/L — HIGH (ref 135–145)
SODIUM SERPL-SCNC: 147 MMOL/L — HIGH (ref 135–145)
TIBC SERPL-MCNC: 108 UG/DL — LOW (ref 220–430)
TIBC SERPL-MCNC: 108 UG/DL — LOW (ref 220–430)
TRIGL SERPL-MCNC: 50 MG/DL — SIGNIFICANT CHANGE UP
TRIGL SERPL-MCNC: 50 MG/DL — SIGNIFICANT CHANGE UP
TSH SERPL-MCNC: 2.12 UIU/ML — SIGNIFICANT CHANGE UP (ref 0.36–3.74)
TSH SERPL-MCNC: 2.12 UIU/ML — SIGNIFICANT CHANGE UP (ref 0.36–3.74)
UIBC SERPL-MCNC: 84 UG/DL — LOW (ref 110–370)
UIBC SERPL-MCNC: 84 UG/DL — LOW (ref 110–370)
URATE SERPL-MCNC: 3.8 MG/DL — SIGNIFICANT CHANGE UP (ref 2.5–7)
URATE SERPL-MCNC: 3.8 MG/DL — SIGNIFICANT CHANGE UP (ref 2.5–7)
VIT B12 SERPL-MCNC: 460 PG/ML — SIGNIFICANT CHANGE UP (ref 232–1245)
VIT B12 SERPL-MCNC: 460 PG/ML — SIGNIFICANT CHANGE UP (ref 232–1245)
WBC # BLD: 11.36 K/UL — HIGH (ref 3.8–10.5)
WBC # BLD: 11.36 K/UL — HIGH (ref 3.8–10.5)
WBC # FLD AUTO: 11.36 K/UL — HIGH (ref 3.8–10.5)
WBC # FLD AUTO: 11.36 K/UL — HIGH (ref 3.8–10.5)

## 2023-11-12 RX ORDER — POTASSIUM CHLORIDE 20 MEQ
10 PACKET (EA) ORAL
Refills: 0 | Status: COMPLETED | OUTPATIENT
Start: 2023-11-12 | End: 2023-11-12

## 2023-11-12 RX ORDER — DEXTROSE MONOHYDRATE, SODIUM CHLORIDE, AND POTASSIUM CHLORIDE 50; .745; 4.5 G/1000ML; G/1000ML; G/1000ML
1000 INJECTION, SOLUTION INTRAVENOUS
Refills: 0 | Status: DISCONTINUED | OUTPATIENT
Start: 2023-11-12 | End: 2023-11-14

## 2023-11-12 RX ORDER — POTASSIUM CHLORIDE 20 MEQ
40 PACKET (EA) ORAL ONCE
Refills: 0 | Status: COMPLETED | OUTPATIENT
Start: 2023-11-12 | End: 2023-11-12

## 2023-11-12 RX ADMIN — Medication 100 MILLIEQUIVALENT(S): at 12:14

## 2023-11-12 RX ADMIN — Medication 50 MILLIGRAM(S): at 17:34

## 2023-11-12 RX ADMIN — APIXABAN 2.5 MILLIGRAM(S): 2.5 TABLET, FILM COATED ORAL at 05:09

## 2023-11-12 RX ADMIN — Medication 1 DROP(S): at 17:38

## 2023-11-12 RX ADMIN — Medication 1 TABLET(S): at 12:46

## 2023-11-12 RX ADMIN — AMIODARONE HYDROCHLORIDE 200 MILLIGRAM(S): 400 TABLET ORAL at 05:09

## 2023-11-12 RX ADMIN — Medication 325 MILLIGRAM(S): at 21:35

## 2023-11-12 RX ADMIN — Medication 1 DROP(S): at 05:16

## 2023-11-12 RX ADMIN — POLYETHYLENE GLYCOL 3350 17 GRAM(S): 17 POWDER, FOR SOLUTION ORAL at 12:46

## 2023-11-12 RX ADMIN — Medication 100 MILLIEQUIVALENT(S): at 14:07

## 2023-11-12 RX ADMIN — Medication 50 MILLIGRAM(S): at 05:09

## 2023-11-12 RX ADMIN — Medication 100 MILLIEQUIVALENT(S): at 13:04

## 2023-11-12 RX ADMIN — DEXTROSE MONOHYDRATE, SODIUM CHLORIDE, AND POTASSIUM CHLORIDE 50 MILLILITER(S): 50; .745; 4.5 INJECTION, SOLUTION INTRAVENOUS at 21:34

## 2023-11-12 RX ADMIN — Medication 325 MILLIGRAM(S): at 14:49

## 2023-11-12 RX ADMIN — Medication 500 MILLIGRAM(S): at 12:46

## 2023-11-12 RX ADMIN — APIXABAN 2.5 MILLIGRAM(S): 2.5 TABLET, FILM COATED ORAL at 17:34

## 2023-11-12 RX ADMIN — Medication 1 DROP(S): at 21:34

## 2023-11-12 RX ADMIN — Medication 75 MICROGRAM(S): at 05:09

## 2023-11-12 RX ADMIN — Medication 1 DROP(S): at 12:58

## 2023-11-12 RX ADMIN — Medication 325 MILLIGRAM(S): at 05:09

## 2023-11-12 RX ADMIN — DEXTROSE MONOHYDRATE, SODIUM CHLORIDE, AND POTASSIUM CHLORIDE 50 MILLILITER(S): 50; .745; 4.5 INJECTION, SOLUTION INTRAVENOUS at 11:25

## 2023-11-12 RX ADMIN — PANTOPRAZOLE SODIUM 40 MILLIGRAM(S): 20 TABLET, DELAYED RELEASE ORAL at 05:09

## 2023-11-12 RX ADMIN — ERTAPENEM SODIUM 100 MILLIGRAM(S): 1 INJECTION, POWDER, LYOPHILIZED, FOR SOLUTION INTRAMUSCULAR; INTRAVENOUS at 21:35

## 2023-11-12 NOTE — PROGRESS NOTE ADULT - SUBJECTIVE AND OBJECTIVE BOX
PROGRESS NOTE  Patient is a 89y old  Female who presents with a chief complaint of   Chart and available morning labs /imaging are reviewed electronically , urgent issues addressed . More information  is being added upon completion of rounds , when more information is collected and management discussed with consultants , medical staff and social service/case management on the floor   OVERNIGHT  No new issues reported by medical staff . All above noted Patient is resting in a bed comfortably .Confused ,poor mentation .No distress noted     HPI:  89-year-old female with history of hypertension, hyperlipidemia, A-fib with ablation on Eliquis, MR, bladder tumor, PFO, osteoporosis, spinal stenosis brought in by ambulance for possible stroke.  As per facility patient ate breakfast and had her medication between 9:45AM and 10 AM this morning and was at her baseline mental status.  Aide noticed around 11/1130 patient was leaning towards her left side, had water poured out of her mouth when she attempted to drink and had possible slurred speech.  Patient limited historian due to MR.  As per facility patient was recently in a short-term rehab facility, recently returned back to [facility] however patient has been weaker since then.< from: CT Brain Perfusion Maps Stroke (11.11.23 @ 14:12) >    < from: CT Brain Perfusion Maps Stroke (11.11.23 @ 14:12) >    ACC: [id number] EXAM:  CT BRAIN PERFUSION MAPS STROKE   ORDERED BY:   JORGE FELDER     ACC: [id number] EXAM:  CT ANGIO BRAIN STROKE PROTC IC   ORDERED BY:   JORGE FELDER     ACC: [id number] EXAM:  CT BRAIN STROKE PROTOCOL   ORDERED BY: JORGE FELDER     ACC: [id number] EXAM:  CT ANGIO NECK STROKE PROTCL IC   ORDERED BY:   JORGE FELDER     PROCEDURE DATE:  11/11/2023          INTERPRETATION:  CT HEAD STROKE PROTOCOL, CT ANGIO NECK STROKE PROTOCOL,   CT ANGIO HEAD STROKE PROTOCOL, CT PERFUSION WITH MAPS STROKE PROTOCOL    CT BRAIN WITHOUT CONTRAST    INDICATIONS:  Stroke code Leaning to left side. Slight left arm drift.    TECHNIQUE:  Serial axial images were obtained from the skull base to the   vertex without the use of contrast.    COMPARISON EXAM: 7/11/2023    FINDINGS:  Ventricles and sulci: Parenchymal volume loss is present which is   commensurate with patient age.  Intra-axial: Periventricular small vessel white matter ischemic changes.   No intracranial mass, acute hemorrhage, or midline shift is present.  Extra-axial: No extra-axial fluid collection is identified. Deposition of   calcified plaque at the level of the bilateral carotid siphons.  Calvarium: Intact.    Left optic lens replacement, as on prior. Bilateral optic globes are   intact. Imaged paranasal sinuses, bilateral mastoid air cells, middle ear   cavities are clear.        CT PERFUSION  CTA OF THE NECK AND HEAD:      CONTRAST/COMPLICATIONS:  IV Contrast: NONE (accession [id number]), IV contrast documented in   unlinked concurrent exam (accession [id number])  Complications: None reported at time of study completion      TECHNIQUE PERFUSION:  After the intravenous power injection of non-ionic contrast material,   repeat serial thin sections were obtained through the intracranial   circulation on a multislice CT scanner. Artificial intelligence was then   used for analysis of perfusion and intracranial large vessel occlusion.      TECHNIQUE CTA NECK AND HEAD:  After the intravenous power injection of non-ionic contrast material,   serial thin sections were obtained through the cervical and intracranial   circulation on a multislice CT scanner. 2D and 3D MIP reformatted images   were obtained.  Images were reformatted using a dedicated 3D software   package.    FINDINGS:    CT PERFUSION:    COMPARISON EXAMINATION: None.    Technical limitations: Significant patient motion in all 3 planes during   image acquisition likely renders results suboptimal and nondiagnostic.   Arterial and venous waveforms are also not optimized.    Miscellaneous: None.      CTA NECK WITH CONTRAST:    CAROTID STENOSIS REFERENCE: (NASCET = 100x1-(N/D)). N=greatest narrowing.   D=normal distal diameter - MILD = <50% stenosis. - MODERATE = 50-69%   stenosis.- SEVERE = 70-89% stenosis. - HAIRLINE/CRITICAL = 90-99%   stenosis. - OCCLUDED = 100% stenosis.      COMPARISON: None.    FINDINGS:  Aortic arch and visualized great vessels: Within normal limits.    RIGHT:  Common carotid artery: Follows a tortuouscourse and is normal in caliber   to the carotid bifurcation.  Internal carotid artery: Mild to moderate deposition of calcified plaque   at the level of the right common carotid artery bifurcation with   extension into the proximal aspect of the right internal carotid artery,   without significant stenosis based on NASCET criteria. Normal course and   caliber to the intracranial circulation.    Vertebral artery: Emanates from the right subclavian artery. The right   vertebral artery is normal incourse and caliber to the intracranial   circulation.      LEFT:  Common carotid artery :Normal in course and caliber to the carotid   bifurcation.  Internal carotid artery: Mild to moderate deposition of calcified plaque   at the level of the left common carotid artery bifurcation with extension   into the proximal left internal carotid artery without significant   stenosis based on NASCET criteria. Normal course and caliber to the   intracranial circulation.    Vertebral artery: Emanates from the left subclavian artery. The left   vertebral artery is normal in course and caliber to the intracranial   circulation.      Miscellaneous: None    CTA HEAD WITH CONTRAST:    COMPARISON EXAM: None.      Internal carotid arteries: Bilateral petrous, precavernous, cavernous,   and supraclinoid regions are patent. Mild deposition of calcified plaque   at the level of the carotid siphons without significant stenosis in this   location.    [place]:  No aneurysm about the [place]. Tiny aneurysms   can be beyond the resolution of CTA technique.    Anterior Cerebral arteries: No significant stenosis or occlusion.  Middle Cerebral arteries: No significant stenosis or occlusion.    Posterior Circulation: Distal intracranial bilateralvertebral arteries   are visualized. The vertebrobasilar confluence is unremarkable. There is   good flow within the basilar artery. Bilateral superior cerebellar   arteries and bilateral posterior cerebral arteries emanate from the   distal aspect of the basilar artery. There is a hypoplastic right P1   segment. A prominent right posterior communicating artery contributes to   flow within the right posterior cerebral artery.    < end of copied text >     (11 Nov 2023 21:59)    PAST MEDICAL & SURGICAL HISTORY:  HTN (hypertension)      Afib      Spinal stenosis      PFO (patent foramen ovale)      Degeneration macular      Osteoporosis      History of complete heart block      Hypothyroidism      Cardiac pacemaker          MEDICATIONS  (STANDING):  aMIOdarone    Tablet 200 milliGRAM(s) Oral daily  apixaban 2.5 milliGRAM(s) Oral every 12 hours  artificial  tears Solution 1 Drop(s) Both EYES four times a day  ascorbic acid 500 milliGRAM(s) Oral daily  dextrose 5% + sodium chloride 0.45% with potassium chloride 20 mEq/L 1000 milliLiter(s) (50 mL/Hr) IV Continuous <Continuous>  enalapril 10 milliGRAM(s) Oral daily  ertapenem  IVPB 500 milliGRAM(s) IV Intermittent every 24 hours  ferrous    sulfate 325 milliGRAM(s) Oral three times a day  levothyroxine 75 MICROGram(s) Oral daily  metoprolol tartrate 50 milliGRAM(s) Oral two times a day  multivitamin 1 Tablet(s) Oral daily  pantoprazole    Tablet 40 milliGRAM(s) Oral before breakfast  polyethylene glycol 3350 17 Gram(s) Oral daily  potassium chloride   Powder 40 milliEquivalent(s) Oral once    MEDICATIONS  (PRN):      OBJECTIVE    T(C): 36.8 (11-12-23 @ 04:46), Max: 37.1 (11-11-23 @ 13:15)  HR: 70 (11-12-23 @ 04:46) (66 - 77)  BP: 115/61 (11-12-23 @ 04:46) (110/69 - 132/77)  RR: 17 (11-12-23 @ 04:46) (16 - 17)  SpO2: 91% (11-12-23 @ 04:46) (91% - 98%)  Wt(kg): --  I&O's Summary        REVIEW OF SYSTEMS:  CONSTITUTIONAL: No fever, weight loss, or fatigue  EYES: No eye pain, visual disturbances, or discharge  ENMT:   No sinus or throat pain  NECK: No pain or stiffness  RESPIRATORY: No cough, wheezing, chills or hemoptysis; No shortness of breath  CARDIOVASCULAR: No chest pain, palpitations, dizziness, or leg swelling  GASTROINTESTINAL: No abdominal pain. No nausea, vomiting; No diarrhea or constipation. No melena or hematochezia.  GENITOURINARY: No dysuria, frequency, hematuria, or incontinence  NEUROLOGICAL: No headaches, memory loss, loss of strength, numbness, or tremors  SKIN: No itching, burning, rashes, or lesions   MUSCULOSKELETAL: No joint pain or swelling; No muscle, back, or extremity pain    PHYSICAL EXAM:  Appearance: NAD. VS past 24 hrs -as above   HEENT:   Moist oral mucosa. Conjunctiva clear b/l.   Neck : supple  Respiratory: Lungs CTAB.  Gastrointestinal:  Soft, nontender. No rebound. No rigidity. BS present	  Cardiovascular: RRR ,S1S2 present  Neurologic: Non-focal. Moving all extremities.  Extremities: No edema. No erythema. No calf tenderness.  Skin: No rashes, No ecchymoses, No cyanosis.	  wounds ,skin lesions-See skin assesment flow sheet   LABS:                        7.9    11.36 )-----------( 275      ( 12 Nov 2023 06:37 )             24.0     11-12    147<H>  |  117<H>  |  29<H>  ----------------------------<  82  3.1<L>   |  23  |  0.69    Ca    7.7<L>      12 Nov 2023 06:37  Mg     1.8     11-12    TPro  4.9<L>  /  Alb  1.8<L>  /  TBili  0.5  /  DBili  0.2  /  AST  26  /  ALT  32  /  AlkPhos  101  11-12    CAPILLARY BLOOD GLUCOSE      POCT Blood Glucose.: 131 mg/dL (11 Nov 2023 13:12)    PT/INR - ( 11 Nov 2023 13:20 )   PT: 16.8 sec;   INR: 1.45 ratio         PTT - ( 11 Nov 2023 13:20 )  PTT:30.7 sec  Urinalysis Basic - ( 12 Nov 2023 06:37 )    Color: x / Appearance: x / SG: x / pH: x  Gluc: 82 mg/dL / Ketone: x  / Bili: x / Urobili: x   Blood: x / Protein: x / Nitrite: x   Leuk Esterase: x / RBC: x / WBC x   Sq Epi: x / Non Sq Epi: x / Bacteria: x        RADIOLOGY & ADDITIONAL TESTS:< from: TTE W or WO Ultrasound Enhancing Agent (08.21.23 @ 11:28) >  CONCLUSIONS:      1. Left ventricular systolic function is normal with an ejection fraction of 56 % by Thomas's method of disks.   2. The left atrium is mildly dilated in size.   3. Trace mitral regurgitation.   4. Estimated pulmonary artery systolic pressure is 63 mmHg, consistent with moderate-to-severe pulmonary hypertension.   5. Mild aortic regurgitation.   6. Moderate mitral valve leaflet calcification.   7. Moderate tricuspid regurgitation.   8. Technically difficult image quality.   9. There is calcification of the mitral valve annulus.  10. There is mild mitral valve stenosis.  11. Trace pulmonic regurgitation.    ________________________________________________________________________________________  FINDINGS:     Left Ventricle:  Left ventricular systolic function is normal with a calculated ejection fraction of 56 % by the Thomas's biplane method of disks.     Right Ventricle:  Normal right ventricular cavity size, normalwall thickness and normal right ventricular systolic function.     Left Atrium:  The left atrium is mildly dilated in size with an indexed volume of 35.45 ml/m².     Right Atrium:  The right atrium is normal in size.     Aortic Valve:  The aortic valve appears trileaflet. There is mild aortic regurgitation.     Mitral Valve:  There is mitral valve thickening of the anterior and posterior leaflets. There is calcification of the mitral valve annulus. There is moderate leaflet calcification. There is mild mitral valve stenosis. There is trace mitral regurgitation.     Tricuspid Valve:  There is moderate tricuspid regurgitation. Estimated pulmonary artery systolic pressure is 63 mmHg, consistent with moderate-to-severe pulmonary hypertension.     Pulmonic Valve:  There is trace pulmonic regurgitation.     Pericardium:  No pericardial effusion seen.     Systemic Veins:  The inferior vena cava is dilated measuring 2.46 cm in diameter, (dilated >2.1cm) with abnormal inspiratory collapse (abnormal <50%) consistent with elevated right atrial pressure (~15, range 10-20mmHg).  ____________________________________________________________________  Quantitative Data:  Left Ventricle Measurements: (Indexed to BSA)     IVSd (2D):   0.9 cm  LVPWd (2D):  0.6 cm  LVIDd (2D):  4.8 cm  LVIDs (2D):  3.4 cm  LV Mass:     125 g  78.0 g/m²  LV Vol d, MOD A2C: 49.8 ml 31.19 ml/m²  LV Vol d, MOD A4C: 67.6 ml 42.34 ml/m²  LV Vol d, MOD BP:  59.1 ml 37.03 ml/m²  LV Vol s, MOD A2C: 24.0 ml 15.03 ml/m²  LV Vol s, MOD A4C: 27.5 ml 17.22 ml/m²  LV Vol s, MOD BP:  26.1 ml 16.34 ml/m²  LVOT SV MOD BP:    33.0 ml  LV EF% MOD BP:     56 %     MV E Vmax:    1.38 m/s  MV A Vmax:    0.59 m/s  MV E/A:       2.32  e' lateral:   9.41 cm/s  e' medial:    5.59 cm/s  E/e'lateral: 14.67  E/e' medial:  24.69  E/e' Average: 18.40  MV DT:        257 msec    Aorta Measurements: (normal range) (Indexed to BSA)     Sinuses of Valsalva: 3.11 cm (2.7 - 3.3 cm)cm       Left Atrium Measurements: (Indexed to BSA)  LA Diam 2D: 4.60 cm       LVOT / RVOT/ Qp/Qs Data: (Indexed to BSA)  LVOT Diameter: 1.30 cm    Mitral Valve Measurements:     MV Vmax:        3.42 m/s   MR Vmax:          3.42 m/s  MV Peak Grad:   46.8 mmHg  MR Peak Gradient: 46.8 mmHg  MV E Vmax:      1.4 m/s  MV A Vmax:      0.6 m/s  MV E/A:         2.3  MV Gradient HR: 60 bpm       Tricuspid Valve Measurements:     TR Vmax:          3.4 m/s  TR Peak Gradient: 47.6 mmHg  RA Pressure:      15 mmHg  PASP:             63 mmHg    ________________________________________________________________________________________  Electronically signed on 8/22/2023 at 8:20:04 AM by Valente Swenson         < end of copied text >     reviewed elctronically  ASSESSMENT/PLAN: 	     PROGRESS NOTE  Patient is a 89y old  Female who presents with a chief complaint of   Chart and available morning labs /imaging are reviewed electronically , urgent issues addressed . More information  is being added upon completion of rounds , when more information is collected and management discussed with consultants , medical staff and social service/case management on the floor   OVERNIGHT  No new issues reported by medical staff . All above noted Patient is resting in a bed comfortably .Confused ,poor mentation .No distress noted     HPI:  89-year-old female with history of hypertension, hyperlipidemia, A-fib with ablation on Eliquis, MR, bladder tumor, PFO, osteoporosis, spinal stenosis brought in by ambulance for possible stroke.  As per facility patient ate breakfast and had her medication between 9:45AM and 10 AM this morning and was at her baseline mental status.  Aide noticed around 11/1130 patient was leaning towards her left side, had water poured out of her mouth when she attempted to drink and had possible slurred speech.  Patient limited historian due to MR.  As per facility patient was recently in a short-term rehab facility, recently returned back to [facility] however patient has been weaker since then.< from: CT Brain Perfusion Maps Stroke (11.11.23 @ 14:12) >    < from: CT Brain Perfusion Maps Stroke (11.11.23 @ 14:12) >    ACC: [id number] EXAM:  CT BRAIN PERFUSION MAPS STROKE   ORDERED BY:   JORGE FELDER     ACC: [id number] EXAM:  CT ANGIO BRAIN STROKE PROTC IC   ORDERED BY:   JORGE FELDER     ACC: [id number] EXAM:  CT BRAIN STROKE PROTOCOL   ORDERED BY: JORGE FELDER     ACC: [id number] EXAM:  CT ANGIO NECK STROKE PROTCL IC   ORDERED BY:   JORGE FELDER     PROCEDURE DATE:  11/11/2023          INTERPRETATION:  CT HEAD STROKE PROTOCOL, CT ANGIO NECK STROKE PROTOCOL,   CT ANGIO HEAD STROKE PROTOCOL, CT PERFUSION WITH MAPS STROKE PROTOCOL    CT BRAIN WITHOUT CONTRAST    INDICATIONS:  Stroke code Leaning to left side. Slight left arm drift.    TECHNIQUE:  Serial axial images were obtained from the skull base to the   vertex without the use of contrast.    COMPARISON EXAM: 7/11/2023    FINDINGS:  Ventricles and sulci: Parenchymal volume loss is present which is   commensurate with patient age.  Intra-axial: Periventricular small vessel white matter ischemic changes.   No intracranial mass, acute hemorrhage, or midline shift is present.  Extra-axial: No extra-axial fluid collection is identified. Deposition of   calcified plaque at the level of the bilateral carotid siphons.  Calvarium: Intact.    Left optic lens replacement, as on prior. Bilateral optic globes are   intact. Imaged paranasal sinuses, bilateral mastoid air cells, middle ear   cavities are clear.        CT PERFUSION  CTA OF THE NECK AND HEAD:      CONTRAST/COMPLICATIONS:  IV Contrast: NONE (accession [id number]), IV contrast documented in   unlinked concurrent exam (accession [id number])  Complications: None reported at time of study completion      TECHNIQUE PERFUSION:  After the intravenous power injection of non-ionic contrast material,   repeat serial thin sections were obtained through the intracranial   circulation on a multislice CT scanner. Artificial intelligence was then   used for analysis of perfusion and intracranial large vessel occlusion.      TECHNIQUE CTA NECK AND HEAD:  After the intravenous power injection of non-ionic contrast material,   serial thin sections were obtained through the cervical and intracranial   circulation on a multislice CT scanner. 2D and 3D MIP reformatted images   were obtained.  Images were reformatted using a dedicated 3D software   package.    FINDINGS:    CT PERFUSION:    COMPARISON EXAMINATION: None.    Technical limitations: Significant patient motion in all 3 planes during   image acquisition likely renders results suboptimal and nondiagnostic.   Arterial and venous waveforms are also not optimized.    Miscellaneous: None.      CTA NECK WITH CONTRAST:    CAROTID STENOSIS REFERENCE: (NASCET = 100x1-(N/D)). N=greatest narrowing.   D=normal distal diameter - MILD = <50% stenosis. - MODERATE = 50-69%   stenosis.- SEVERE = 70-89% stenosis. - HAIRLINE/CRITICAL = 90-99%   stenosis. - OCCLUDED = 100% stenosis.      COMPARISON: None.    FINDINGS:  Aortic arch and visualized great vessels: Within normal limits.    RIGHT:  Common carotid artery: Follows a tortuouscourse and is normal in caliber   to the carotid bifurcation.  Internal carotid artery: Mild to moderate deposition of calcified plaque   at the level of the right common carotid artery bifurcation with   extension into the proximal aspect of the right internal carotid artery,   without significant stenosis based on NASCET criteria. Normal course and   caliber to the intracranial circulation.    Vertebral artery: Emanates from the right subclavian artery. The right   vertebral artery is normal incourse and caliber to the intracranial   circulation.      LEFT:  Common carotid artery :Normal in course and caliber to the carotid   bifurcation.  Internal carotid artery: Mild to moderate deposition of calcified plaque   at the level of the left common carotid artery bifurcation with extension   into the proximal left internal carotid artery without significant   stenosis based on NASCET criteria. Normal course and caliber to the   intracranial circulation.    Vertebral artery: Emanates from the left subclavian artery. The left   vertebral artery is normal in course and caliber to the intracranial   circulation.      Miscellaneous: None    CTA HEAD WITH CONTRAST:    COMPARISON EXAM: None.      Internal carotid arteries: Bilateral petrous, precavernous, cavernous,   and supraclinoid regions are patent. Mild deposition of calcified plaque   at the level of the carotid siphons without significant stenosis in this   location.    [place]:  No aneurysm about the [place]. Tiny aneurysms   can be beyond the resolution of CTA technique.    Anterior Cerebral arteries: No significant stenosis or occlusion.  Middle Cerebral arteries: No significant stenosis or occlusion.    Posterior Circulation: Distal intracranial bilateralvertebral arteries   are visualized. The vertebrobasilar confluence is unremarkable. There is   good flow within the basilar artery. Bilateral superior cerebellar   arteries and bilateral posterior cerebral arteries emanate from the   distal aspect of the basilar artery. There is a hypoplastic right P1   segment. A prominent right posterior communicating artery contributes to   flow within the right posterior cerebral artery.    < end of copied text >     (11 Nov 2023 21:59)    PAST MEDICAL & SURGICAL HISTORY:  HTN (hypertension)      Afib      Spinal stenosis      PFO (patent foramen ovale)      Degeneration macular      Osteoporosis      History of complete heart block      Hypothyroidism      Cardiac pacemaker          MEDICATIONS  (STANDING):  aMIOdarone    Tablet 200 milliGRAM(s) Oral daily  apixaban 2.5 milliGRAM(s) Oral every 12 hours  artificial  tears Solution 1 Drop(s) Both EYES four times a day  ascorbic acid 500 milliGRAM(s) Oral daily  dextrose 5% + sodium chloride 0.45% with potassium chloride 20 mEq/L 1000 milliLiter(s) (50 mL/Hr) IV Continuous <Continuous>  enalapril 10 milliGRAM(s) Oral daily  ertapenem  IVPB 500 milliGRAM(s) IV Intermittent every 24 hours  ferrous    sulfate 325 milliGRAM(s) Oral three times a day  levothyroxine 75 MICROGram(s) Oral daily  metoprolol tartrate 50 milliGRAM(s) Oral two times a day  multivitamin 1 Tablet(s) Oral daily  pantoprazole    Tablet 40 milliGRAM(s) Oral before breakfast  polyethylene glycol 3350 17 Gram(s) Oral daily  potassium chloride   Powder 40 milliEquivalent(s) Oral once    MEDICATIONS  (PRN):      OBJECTIVE    T(C): 36.8 (11-12-23 @ 04:46), Max: 37.1 (11-11-23 @ 13:15)  HR: 70 (11-12-23 @ 04:46) (66 - 77)  BP: 115/61 (11-12-23 @ 04:46) (110/69 - 132/77)  RR: 17 (11-12-23 @ 04:46) (16 - 17)  SpO2: 91% (11-12-23 @ 04:46) (91% - 98%)  Wt(kg): --  I&O's Summary        REVIEW OF SYSTEMS:  CONSTITUTIONAL: No fever, weight loss, or fatigue  EYES: No eye pain, visual disturbances, or discharge  ENMT:   No sinus or throat pain  NECK: No pain or stiffness  RESPIRATORY: No cough, wheezing, chills or hemoptysis; No shortness of breath  CARDIOVASCULAR: No chest pain, palpitations, dizziness, or leg swelling  GASTROINTESTINAL: No abdominal pain. No nausea, vomiting; No diarrhea or constipation. No melena or hematochezia.  GENITOURINARY: No dysuria, frequency, hematuria, or incontinence  NEUROLOGICAL: No headaches, memory loss, loss of strength, numbness, or tremors  SKIN: No itching, burning, rashes, or lesions   MUSCULOSKELETAL: No joint pain or swelling; No muscle, back, or extremity pain    PHYSICAL EXAM:  Appearance: NAD. VS past 24 hrs -as above   HEENT:   Moist oral mucosa. Conjunctiva clear b/l.   Neck : supple  Respiratory: Lungs CTAB.  Gastrointestinal:  Soft, nontender. No rebound. No rigidity. BS present	  Cardiovascular: RRR ,S1S2 present  Neurologic: Non-focal. Moving all extremities.  Extremities: No edema. No erythema. No calf tenderness.  Skin: No rashes, No ecchymoses, No cyanosis.	  wounds ,skin lesions-See skin assesment flow sheet   LABS:                        7.9    11.36 )-----------( 275      ( 12 Nov 2023 06:37 )             24.0     11-12    147<H>  |  117<H>  |  29<H>  ----------------------------<  82  3.1<L>   |  23  |  0.69    Ca    7.7<L>      12 Nov 2023 06:37  Mg     1.8     11-12    TPro  4.9<L>  /  Alb  1.8<L>  /  TBili  0.5  /  DBili  0.2  /  AST  26  /  ALT  32  /  AlkPhos  101  11-12    CAPILLARY BLOOD GLUCOSE      POCT Blood Glucose.: 131 mg/dL (11 Nov 2023 13:12)    PT/INR - ( 11 Nov 2023 13:20 )   PT: 16.8 sec;   INR: 1.45 ratio         PTT - ( 11 Nov 2023 13:20 )  PTT:30.7 sec  Urinalysis Basic - ( 12 Nov 2023 06:37 )    Color: x / Appearance: x / SG: x / pH: x  Gluc: 82 mg/dL / Ketone: x  / Bili: x / Urobili: x   Blood: x / Protein: x / Nitrite: x   Leuk Esterase: x / RBC: x / WBC x   Sq Epi: x / Non Sq Epi: x / Bacteria: x        RADIOLOGY & ADDITIONAL TESTS:< from: TTE W or WO Ultrasound Enhancing Agent (08.21.23 @ 11:28) >  CONCLUSIONS:      1. Left ventricular systolic function is normal with an ejection fraction of 56 % by Thomas's method of disks.   2. The left atrium is mildly dilated in size.   3. Trace mitral regurgitation.   4. Estimated pulmonary artery systolic pressure is 63 mmHg, consistent with moderate-to-severe pulmonary hypertension.   5. Mild aortic regurgitation.   6. Moderate mitral valve leaflet calcification.   7. Moderate tricuspid regurgitation.   8. Technically difficult image quality.   9. There is calcification of the mitral valve annulus.  10. There is mild mitral valve stenosis.  11. Trace pulmonic regurgitation.    ________________________________________________________________________________________  FINDINGS:     Left Ventricle:  Left ventricular systolic function is normal with a calculated ejection fraction of 56 % by the Thomas's biplane method of disks.     Right Ventricle:  Normal right ventricular cavity size, normalwall thickness and normal right ventricular systolic function.     Left Atrium:  The left atrium is mildly dilated in size with an indexed volume of 35.45 ml/m².     Right Atrium:  The right atrium is normal in size.     Aortic Valve:  The aortic valve appears trileaflet. There is mild aortic regurgitation.     Mitral Valve:  There is mitral valve thickening of the anterior and posterior leaflets. There is calcification of the mitral valve annulus. There is moderate leaflet calcification. There is mild mitral valve stenosis. There is trace mitral regurgitation.     Tricuspid Valve:  There is moderate tricuspid regurgitation. Estimated pulmonary artery systolic pressure is 63 mmHg, consistent with moderate-to-severe pulmonary hypertension.     Pulmonic Valve:  There is trace pulmonic regurgitation.     Pericardium:  No pericardial effusion seen.     Systemic Veins:  The inferior vena cava is dilated measuring 2.46 cm in diameter, (dilated >2.1cm) with abnormal inspiratory collapse (abnormal <50%) consistent with elevated right atrial pressure (~15, range 10-20mmHg).  ____________________________________________________________________  Quantitative Data:  Left Ventricle Measurements: (Indexed to BSA)     IVSd (2D):   0.9 cm  LVPWd (2D):  0.6 cm  LVIDd (2D):  4.8 cm  LVIDs (2D):  3.4 cm  LV Mass:     125 g  78.0 g/m²  LV Vol d, MOD A2C: 49.8 ml 31.19 ml/m²  LV Vol d, MOD A4C: 67.6 ml 42.34 ml/m²  LV Vol d, MOD BP:  59.1 ml 37.03 ml/m²  LV Vol s, MOD A2C: 24.0 ml 15.03 ml/m²  LV Vol s, MOD A4C: 27.5 ml 17.22 ml/m²  LV Vol s, MOD BP:  26.1 ml 16.34 ml/m²  LVOT SV MOD BP:    33.0 ml  LV EF% MOD BP:     56 %     MV E Vmax:    1.38 m/s  MV A Vmax:    0.59 m/s  MV E/A:       2.32  e' lateral:   9.41 cm/s  e' medial:    5.59 cm/s  E/e'lateral: 14.67  E/e' medial:  24.69  E/e' Average: 18.40  MV DT:        257 msec    Aorta Measurements: (normal range) (Indexed to BSA)     Sinuses of Valsalva: 3.11 cm (2.7 - 3.3 cm)cm       Left Atrium Measurements: (Indexed to BSA)  LA Diam 2D: 4.60 cm       LVOT / RVOT/ Qp/Qs Data: (Indexed to BSA)  LVOT Diameter: 1.30 cm    Mitral Valve Measurements:     MV Vmax:        3.42 m/s   MR Vmax:          3.42 m/s  MV Peak Grad:   46.8 mmHg  MR Peak Gradient: 46.8 mmHg  MV E Vmax:      1.4 m/s  MV A Vmax:      0.6 m/s  MV E/A:         2.3  MV Gradient HR: 60 bpm       Tricuspid Valve Measurements:     TR Vmax:          3.4 m/s  TR Peak Gradient: 47.6 mmHg  RA Pressure:      15 mmHg  PASP:             63 mmHg    ________________________________________________________________________________________  Electronically signed on 8/22/2023 at 8:20:04 AM by Valente Swenson         < end of copied text >     reviewed elctronically  ASSESSMENT/PLAN: 	     PROGRESS NOTE  Patient is a 89y old  Female who presents with a chief complaint of   Chart and available morning labs /imaging are reviewed electronically , urgent issues addressed . More information  is being added upon completion of rounds , when more information is collected and management discussed with consultants , medical staff and social service/case management on the floor   OVERNIGHT  No new issues reported by medical staff . All above noted Patient is resting in a bed comfortably .Confused ,poor mentation .No distress noted     HPI:  89-year-old female with history of hypertension, hyperlipidemia, A-fib with ablation on Eliquis, MR, bladder tumor, PFO, osteoporosis, spinal stenosis brought in by ambulance for possible stroke.  As per facility patient ate breakfast and had her medication between 9:45AM and 10 AM this morning and was at her baseline mental status.  Aide noticed around 11/1130 patient was leaning towards her left side, had water poured out of her mouth when she attempted to drink and had possible slurred speech.  Patient limited historian due to MR.  As per facility patient was recently in a short-term rehab facility, recently returned back to [facility] however patient has been weaker since then.< from: CT Brain Perfusion Maps Stroke (11.11.23 @ 14:12) >    < from: CT Brain Perfusion Maps Stroke (11.11.23 @ 14:12) >    ACC: [id number] EXAM:  CT BRAIN PERFUSION MAPS STROKE   ORDERED BY:   JORGE FELDER     ACC: [id number] EXAM:  CT ANGIO BRAIN STROKE PROTC IC   ORDERED BY:   JORGE FELDER     ACC: [id number] EXAM:  CT BRAIN STROKE PROTOCOL   ORDERED BY: JORGE FELDER     ACC: [id number] EXAM:  CT ANGIO NECK STROKE PROTCL IC   ORDERED BY:   JORGE FELDER     PROCEDURE DATE:  11/11/2023          INTERPRETATION:  CT HEAD STROKE PROTOCOL, CT ANGIO NECK STROKE PROTOCOL,   CT ANGIO HEAD STROKE PROTOCOL, CT PERFUSION WITH MAPS STROKE PROTOCOL    CT BRAIN WITHOUT CONTRAST    INDICATIONS:  Stroke code Leaning to left side. Slight left arm drift.    TECHNIQUE:  Serial axial images were obtained from the skull base to the   vertex without the use of contrast.    COMPARISON EXAM: 7/11/2023    FINDINGS:  Ventricles and sulci: Parenchymal volume loss is present which is   commensurate with patient age.  Intra-axial: Periventricular small vessel white matter ischemic changes.   No intracranial mass, acute hemorrhage, or midline shift is present.  Extra-axial: No extra-axial fluid collection is identified. Deposition of   calcified plaque at the level of the bilateral carotid siphons.  Calvarium: Intact.    Left optic lens replacement, as on prior. Bilateral optic globes are   intact. Imaged paranasal sinuses, bilateral mastoid air cells, middle ear   cavities are clear.        CT PERFUSION  CTA OF THE NECK AND HEAD:      CONTRAST/COMPLICATIONS:  IV Contrast: NONE (accession [id number]), IV contrast documented in   unlinked concurrent exam (accession [id number])  Complications: None reported at time of study completion      TECHNIQUE PERFUSION:  After the intravenous power injection of non-ionic contrast material,   repeat serial thin sections were obtained through the intracranial   circulation on a multislice CT scanner. Artificial intelligence was then   used for analysis of perfusion and intracranial large vessel occlusion.      TECHNIQUE CTA NECK AND HEAD:  After the intravenous power injection of non-ionic contrast material,   serial thin sections were obtained through the cervical and intracranial   circulation on a multislice CT scanner. 2D and 3D MIP reformatted images   were obtained.  Images were reformatted using a dedicated 3D software   package.    FINDINGS:    CT PERFUSION:    COMPARISON EXAMINATION: None.    Technical limitations: Significant patient motion in all 3 planes during   image acquisition likely renders results suboptimal and nondiagnostic.   Arterial and venous waveforms are also not optimized.    Miscellaneous: None.      CTA NECK WITH CONTRAST:    CAROTID STENOSIS REFERENCE: (NASCET = 100x1-(N/D)). N=greatest narrowing.   D=normal distal diameter - MILD = <50% stenosis. - MODERATE = 50-69%   stenosis.- SEVERE = 70-89% stenosis. - HAIRLINE/CRITICAL = 90-99%   stenosis. - OCCLUDED = 100% stenosis.      COMPARISON: None.    FINDINGS:  Aortic arch and visualized great vessels: Within normal limits.    RIGHT:  Common carotid artery: Follows a tortuouscourse and is normal in caliber   to the carotid bifurcation.  Internal carotid artery: Mild to moderate deposition of calcified plaque   at the level of the right common carotid artery bifurcation with   extension into the proximal aspect of the right internal carotid artery,   without significant stenosis based on NASCET criteria. Normal course and   caliber to the intracranial circulation.    Vertebral artery: Emanates from the right subclavian artery. The right   vertebral artery is normal incourse and caliber to the intracranial   circulation.      LEFT:  Common carotid artery :Normal in course and caliber to the carotid   bifurcation.  Internal carotid artery: Mild to moderate deposition of calcified plaque   at the level of the left common carotid artery bifurcation with extension   into the proximal left internal carotid artery without significant   stenosis based on NASCET criteria. Normal course and caliber to the   intracranial circulation.    Vertebral artery: Emanates from the left subclavian artery. The left   vertebral artery is normal in course and caliber to the intracranial   circulation.      Miscellaneous: None    CTA HEAD WITH CONTRAST:    COMPARISON EXAM: None.      Internal carotid arteries: Bilateral petrous, precavernous, cavernous,   and supraclinoid regions are patent. Mild deposition of calcified plaque   at the level of the carotid siphons without significant stenosis in this   location.    [place]:  No aneurysm about the [place]. Tiny aneurysms   can be beyond the resolution of CTA technique.    Anterior Cerebral arteries: No significant stenosis or occlusion.  Middle Cerebral arteries: No significant stenosis or occlusion.    Posterior Circulation: Distal intracranial bilateralvertebral arteries   are visualized. The vertebrobasilar confluence is unremarkable. There is   good flow within the basilar artery. Bilateral superior cerebellar   arteries and bilateral posterior cerebral arteries emanate from the   distal aspect of the basilar artery. There is a hypoplastic right P1   segment. A prominent right posterior communicating artery contributes to   flow within the right posterior cerebral artery.    < end of copied text >     (11 Nov 2023 21:59)    PAST MEDICAL & SURGICAL HISTORY:  HTN (hypertension)      Afib      Spinal stenosis      PFO (patent foramen ovale)      Degeneration macular      Osteoporosis      History of complete heart block      Hypothyroidism      Cardiac pacemaker          MEDICATIONS  (STANDING):  aMIOdarone    Tablet 200 milliGRAM(s) Oral daily  apixaban 2.5 milliGRAM(s) Oral every 12 hours  artificial  tears Solution 1 Drop(s) Both EYES four times a day  ascorbic acid 500 milliGRAM(s) Oral daily  dextrose 5% + sodium chloride 0.45% with potassium chloride 20 mEq/L 1000 milliLiter(s) (50 mL/Hr) IV Continuous <Continuous>  enalapril 10 milliGRAM(s) Oral daily  ertapenem  IVPB 500 milliGRAM(s) IV Intermittent every 24 hours  ferrous    sulfate 325 milliGRAM(s) Oral three times a day  levothyroxine 75 MICROGram(s) Oral daily  metoprolol tartrate 50 milliGRAM(s) Oral two times a day  multivitamin 1 Tablet(s) Oral daily  pantoprazole    Tablet 40 milliGRAM(s) Oral before breakfast  polyethylene glycol 3350 17 Gram(s) Oral daily  potassium chloride   Powder 40 milliEquivalent(s) Oral once    MEDICATIONS  (PRN):      OBJECTIVE    T(C): 36.8 (11-12-23 @ 04:46), Max: 37.1 (11-11-23 @ 13:15)  HR: 70 (11-12-23 @ 04:46) (66 - 77)  BP: 115/61 (11-12-23 @ 04:46) (110/69 - 132/77)  RR: 17 (11-12-23 @ 04:46) (16 - 17)  SpO2: 91% (11-12-23 @ 04:46) (91% - 98%)  Wt(kg): --  I&O's Summary        REVIEW OF SYSTEMS:  CONSTITUTIONAL: No fever, weight loss, or fatigue  EYES: No eye pain, visual disturbances, or discharge  ENMT:   No sinus or throat pain  NECK: No pain or stiffness  RESPIRATORY: No cough, wheezing, chills or hemoptysis; No shortness of breath  CARDIOVASCULAR: No chest pain, palpitations, dizziness, or leg swelling  GASTROINTESTINAL: No abdominal pain. No nausea, vomiting; No diarrhea or constipation. No melena or hematochezia.  GENITOURINARY: No dysuria, frequency, hematuria, or incontinence  NEUROLOGICAL: No headaches, memory loss, loss of strength, numbness, or tremors  SKIN: No itching, burning, rashes, or lesions   MUSCULOSKELETAL: No joint pain or swelling; No muscle, back, or extremity pain    PHYSICAL EXAM:  Appearance: NAD. VS past 24 hrs -as above   HEENT:   Moist oral mucosa. Conjunctiva clear b/l.   Neck : supple  Respiratory: Lungs CTAB.  Gastrointestinal:  Soft, nontender. No rebound. No rigidity. BS present	  Cardiovascular: RRR ,S1S2 present  Neurologic: Non-focal. Moving all extremities.  Extremities: No edema. No erythema. No calf tenderness.  Skin: No rashes, No ecchymoses, No cyanosis.	  wounds ,skin lesions-See skin assesment flow sheet   LABS:                        7.9    11.36 )-----------( 275      ( 12 Nov 2023 06:37 )             24.0     11-12    147<H>  |  117<H>  |  29<H>  ----------------------------<  82  3.1<L>   |  23  |  0.69    Ca    7.7<L>      12 Nov 2023 06:37  Mg     1.8     11-12    TPro  4.9<L>  /  Alb  1.8<L>  /  TBili  0.5  /  DBili  0.2  /  AST  26  /  ALT  32  /  AlkPhos  101  11-12    CAPILLARY BLOOD GLUCOSE      POCT Blood Glucose.: 131 mg/dL (11 Nov 2023 13:12)    PT/INR - ( 11 Nov 2023 13:20 )   PT: 16.8 sec;   INR: 1.45 ratio         PTT - ( 11 Nov 2023 13:20 )  PTT:30.7 sec  Urinalysis Basic - ( 12 Nov 2023 06:37 )    Color: x / Appearance: x / SG: x / pH: x  Gluc: 82 mg/dL / Ketone: x  / Bili: x / Urobili: x   Blood: x / Protein: x / Nitrite: x   Leuk Esterase: x / RBC: x / WBC x   Sq Epi: x / Non Sq Epi: x / Bacteria: x        RADIOLOGY & ADDITIONAL TESTS:< from: TTE W or WO Ultrasound Enhancing Agent (08.21.23 @ 11:28) >  CONCLUSIONS:      1. Left ventricular systolic function is normal with an ejection fraction of 56 % by Thomas's method of disks.   2. The left atrium is mildly dilated in size.   3. Trace mitral regurgitation.   4. Estimated pulmonary artery systolic pressure is 63 mmHg, consistent with moderate-to-severe pulmonary hypertension.   5. Mild aortic regurgitation.   6. Moderate mitral valve leaflet calcification.   7. Moderate tricuspid regurgitation.   8. Technically difficult image quality.   9. There is calcification of the mitral valve annulus.  10. There is mild mitral valve stenosis.  11. Trace pulmonic regurgitation.    ________________________________________________________________________________________  FINDINGS:     Left Ventricle:  Left ventricular systolic function is normal with a calculated ejection fraction of 56 % by the Thomas's biplane method of disks.     Right Ventricle:  Normal right ventricular cavity size, normalwall thickness and normal right ventricular systolic function.     Left Atrium:  The left atrium is mildly dilated in size with an indexed volume of 35.45 ml/m².     Right Atrium:  The right atrium is normal in size.     Aortic Valve:  The aortic valve appears trileaflet. There is mild aortic regurgitation.     Mitral Valve:  There is mitral valve thickening of the anterior and posterior leaflets. There is calcification of the mitral valve annulus. There is moderate leaflet calcification. There is mild mitral valve stenosis. There is trace mitral regurgitation.     Tricuspid Valve:  There is moderate tricuspid regurgitation. Estimated pulmonary artery systolic pressure is 63 mmHg, consistent with moderate-to-severe pulmonary hypertension.     Pulmonic Valve:  There is trace pulmonic regurgitation.     Pericardium:  No pericardial effusion seen.     Systemic Veins:  The inferior vena cava is dilated measuring 2.46 cm in diameter, (dilated >2.1cm) with abnormal inspiratory collapse (abnormal <50%) consistent with elevated right atrial pressure (~15, range 10-20mmHg).  ____________________________________________________________________  Quantitative Data:  Left Ventricle Measurements: (Indexed to BSA)     IVSd (2D):   0.9 cm  LVPWd (2D):  0.6 cm  LVIDd (2D):  4.8 cm  LVIDs (2D):  3.4 cm  LV Mass:     125 g  78.0 g/m²  LV Vol d, MOD A2C: 49.8 ml 31.19 ml/m²  LV Vol d, MOD A4C: 67.6 ml 42.34 ml/m²  LV Vol d, MOD BP:  59.1 ml 37.03 ml/m²  LV Vol s, MOD A2C: 24.0 ml 15.03 ml/m²  LV Vol s, MOD A4C: 27.5 ml 17.22 ml/m²  LV Vol s, MOD BP:  26.1 ml 16.34 ml/m²  LVOT SV MOD BP:    33.0 ml  LV EF% MOD BP:     56 %     MV E Vmax:    1.38 m/s  MV A Vmax:    0.59 m/s  MV E/A:       2.32  e' lateral:   9.41 cm/s  e' medial:    5.59 cm/s  E/e'lateral: 14.67  E/e' medial:  24.69  E/e' Average: 18.40  MV DT:        257 msec    Aorta Measurements: (normal range) (Indexed to BSA)     Sinuses of Valsalva: 3.11 cm (2.7 - 3.3 cm)cm       Left Atrium Measurements: (Indexed to BSA)  LA Diam 2D: 4.60 cm       LVOT / RVOT/ Qp/Qs Data: (Indexed to BSA)  LVOT Diameter: 1.30 cm    Mitral Valve Measurements:     MV Vmax:        3.42 m/s   MR Vmax:          3.42 m/s  MV Peak Grad:   46.8 mmHg  MR Peak Gradient: 46.8 mmHg  MV E Vmax:      1.4 m/s  MV A Vmax:      0.6 m/s  MV E/A:         2.3  MV Gradient HR: 60 bpm       Tricuspid Valve Measurements:     TR Vmax:          3.4 m/s  TR Peak Gradient: 47.6 mmHg  RA Pressure:      15 mmHg  PASP:             63 mmHg    ________________________________________________________________________________________  Electronically signed on 8/22/2023 at 8:20:04 AM by Valente Swenson         < end of copied text >     reviewed elctronically  ASSESSMENT/PLAN: 	     PROGRESS NOTE-IM INITIAL EVALUATION  Patient is a 89y old  Female who presents with a chief complaint of   Chart and available morning labs /imaging are reviewed electronically , urgent issues addressed . More information  is being added upon completion of rounds , when more information is collected and management discussed with consultants , medical staff and social service/case management on the floor   OVERNIGHT  No new issues reported by medical staff . All above noted Patient is resting in a bed comfortably .Confused ,poor mentation .No distress noted   HPI:  89-year-old female with history of hypertension, hyperlipidemia, A-fib with ablation on Eliquis, MR, bladder tumor, PFO, osteoporosis, spinal stenosis brought in by ambulance for possible stroke.  As per facility patient ate breakfast and had her medication between 9:45AM and 10 AM this morning and was at her baseline mental status.  Aide noticed around 11/1130 patient was leaning towards her left side, had water poured out of her mouth when she attempted to drink and had possible slurred speech.  Patient limited historian due to MR.  As per facility patient was recently in a short-term rehab facility, recently returned back to [facility] however patient has been weaker since then.< from: CT Brain Perfusion Maps Stroke (11.11.23 @ 14:12) >    < from: CT Brain Perfusion Maps Stroke (11.11.23 @ 14:12) >    ACC: [id number] EXAM:  CT BRAIN PERFUSION MAPS STROKE   ORDERED BY:   JORGE FELDER     ACC: [id number] EXAM:  CT ANGIO BRAIN STROKE PROTC IC   ORDERED BY:   JORGE FELDER     ACC: [id number] EXAM:  CT BRAIN STROKE PROTOCOL   ORDERED BY: JORGE FELDER     ACC: [id number] EXAM:  CT ANGIO NECK STROKE PROTCL IC   ORDERED BY:   JORGE FELDER     PROCEDURE DATE:  11/11/2023          INTERPRETATION:  CT HEAD STROKE PROTOCOL, CT ANGIO NECK STROKE PROTOCOL,   CT ANGIO HEAD STROKE PROTOCOL, CT PERFUSION WITH MAPS STROKE PROTOCOL    CT BRAIN WITHOUT CONTRAST    INDICATIONS:  Stroke code Leaning to left side. Slight left arm drift.    TECHNIQUE:  Serial axial images were obtained from the skull base to the   vertex without the use of contrast.    COMPARISON EXAM: 7/11/2023    FINDINGS:  Ventricles and sulci: Parenchymal volume loss is present which is   commensurate with patient age.  Intra-axial: Periventricular small vessel white matter ischemic changes.   No intracranial mass, acute hemorrhage, or midline shift is present.  Extra-axial: No extra-axial fluid collection is identified. Deposition of   calcified plaque at the level of the bilateral carotid siphons.  Calvarium: Intact.    Left optic lens replacement, as on prior. Bilateral optic globes are   intact. Imaged paranasal sinuses, bilateral mastoid air cells, middle ear   cavities are clear.        CT PERFUSION  CTA OF THE NECK AND HEAD:      CONTRAST/COMPLICATIONS:  IV Contrast: NONE (accession [id number]), IV contrast documented in   unlinked concurrent exam (accession [id number])  Complications: None reported at time of study completion      TECHNIQUE PERFUSION:  After the intravenous power injection of non-ionic contrast material,   repeat serial thin sections were obtained through the intracranial   circulation on a multislice CT scanner. Artificial intelligence was then   used for analysis of perfusion and intracranial large vessel occlusion.      TECHNIQUE CTA NECK AND HEAD:  After the intravenous power injection of non-ionic contrast material,   serial thin sections were obtained through the cervical and intracranial   circulation on a multislice CT scanner. 2D and 3D MIP reformatted images   were obtained.  Images were reformatted using a dedicated 3D software   package.    FINDINGS:    CT PERFUSION:    COMPARISON EXAMINATION: None.    Technical limitations: Significant patient motion in all 3 planes during   image acquisition likely renders results suboptimal and nondiagnostic.   Arterial and venous waveforms are also not optimized.    Miscellaneous: None.      CTA NECK WITH CONTRAST:    CAROTID STENOSIS REFERENCE: (NASCET = 100x1-(N/D)). N=greatest narrowing.   D=normal distal diameter - MILD = <50% stenosis. - MODERATE = 50-69%   stenosis.- SEVERE = 70-89% stenosis. - HAIRLINE/CRITICAL = 90-99%   stenosis. - OCCLUDED = 100% stenosis.      COMPARISON: None.    FINDINGS:  Aortic arch and visualized great vessels: Within normal limits.    RIGHT:  Common carotid artery: Follows a tortuouscourse and is normal in caliber   to the carotid bifurcation.  Internal carotid artery: Mild to moderate deposition of calcified plaque   at the level of the right common carotid artery bifurcation with   extension into the proximal aspect of the right internal carotid artery,   without significant stenosis based on NASCET criteria. Normal course and   caliber to the intracranial circulation.    Vertebral artery: Emanates from the right subclavian artery. The right   vertebral artery is normal incourse and caliber to the intracranial   circulation.      LEFT:  Common carotid artery :Normal in course and caliber to the carotid   bifurcation.  Internal carotid artery: Mild to moderate deposition of calcified plaque   at the level of the left common carotid artery bifurcation with extension   into the proximal left internal carotid artery without significant   stenosis based on NASCET criteria. Normal course and caliber to the   intracranial circulation.    Vertebral artery: Emanates from the left subclavian artery. The left   vertebral artery is normal in course and caliber to the intracranial   circulation.      Miscellaneous: None    CTA HEAD WITH CONTRAST:    COMPARISON EXAM: None.      Internal carotid arteries: Bilateral petrous, precavernous, cavernous,   and supraclinoid regions are patent. Mild deposition of calcified plaque   at the level of the carotid siphons without significant stenosis in this   location.    [place]:  No aneurysm about the [place]. Tiny aneurysms   can be beyond the resolution of CTA technique.    Anterior Cerebral arteries: No significant stenosis or occlusion.  Middle Cerebral arteries: No significant stenosis or occlusion.    Posterior Circulation: Distal intracranial bilateralvertebral arteries   are visualized. The vertebrobasilar confluence is unremarkable. There is   good flow within the basilar artery. Bilateral superior cerebellar   arteries and bilateral posterior cerebral arteries emanate from the   distal aspect of the basilar artery. There is a hypoplastic right P1   segment. A prominent right posterior communicating artery contributes to   flow within the right posterior cerebral artery.    < end of copied text >     (11 Nov 2023 21:59)    PAST MEDICAL & SURGICAL HISTORY:  HTN (hypertension)      Afib      Spinal stenosis      PFO (patent foramen ovale)      Degeneration macular      Osteoporosis      History of complete heart block      Hypothyroidism      Cardiac pacemaker          MEDICATIONS  (STANDING):  aMIOdarone    Tablet 200 milliGRAM(s) Oral daily  apixaban 2.5 milliGRAM(s) Oral every 12 hours  artificial  tears Solution 1 Drop(s) Both EYES four times a day  ascorbic acid 500 milliGRAM(s) Oral daily  dextrose 5% + sodium chloride 0.45% with potassium chloride 20 mEq/L 1000 milliLiter(s) (50 mL/Hr) IV Continuous <Continuous>  enalapril 10 milliGRAM(s) Oral daily  ertapenem  IVPB 500 milliGRAM(s) IV Intermittent every 24 hours  ferrous    sulfate 325 milliGRAM(s) Oral three times a day  levothyroxine 75 MICROGram(s) Oral daily  metoprolol tartrate 50 milliGRAM(s) Oral two times a day  multivitamin 1 Tablet(s) Oral daily  pantoprazole    Tablet 40 milliGRAM(s) Oral before breakfast  polyethylene glycol 3350 17 Gram(s) Oral daily  potassium chloride   Powder 40 milliEquivalent(s) Oral once    MEDICATIONS  (PRN):      OBJECTIVE    T(C): 36.8 (11-12-23 @ 04:46), Max: 37.1 (11-11-23 @ 13:15)  HR: 70 (11-12-23 @ 04:46) (66 - 77)  BP: 115/61 (11-12-23 @ 04:46) (110/69 - 132/77)  RR: 17 (11-12-23 @ 04:46) (16 - 17)  SpO2: 91% (11-12-23 @ 04:46) (91% - 98%)  Wt(kg): --  I&O's Summary        REVIEW OF SYSTEMS:  CONSTITUTIONAL: No fever, weight loss, or fatigue  EYES: No eye pain, visual disturbances, or discharge  ENMT:   No sinus or throat pain  NECK: No pain or stiffness  RESPIRATORY: No cough, wheezing, chills or hemoptysis; No shortness of breath  CARDIOVASCULAR: No chest pain, palpitations, dizziness, or leg swelling  GASTROINTESTINAL: No abdominal pain. No nausea, vomiting; No diarrhea or constipation. No melena or hematochezia.  GENITOURINARY: No dysuria, frequency, hematuria, or incontinence  NEUROLOGICAL: No headaches, memory loss, loss of strength, numbness, or tremors  SKIN: No itching, burning, rashes, or lesions   MUSCULOSKELETAL: No joint pain or swelling; No muscle, back, or extremity pain    PHYSICAL EXAM:  Appearance: NAD. VS past 24 hrs -as above   HEENT:   Moist oral mucosa. Conjunctiva clear b/l.   Neck : supple  Respiratory: Lungs CTAB.  Gastrointestinal:  Soft, nontender. No rebound. No rigidity. BS present	  Cardiovascular: RRR ,S1S2 present  Neurologic: Non-focal. Moving all extremities.  Extremities: No edema. No erythema. No calf tenderness.  Skin: No rashes, No ecchymoses, No cyanosis.	  wounds ,skin lesions-See skin assesment flow sheet   LABS:                        7.9    11.36 )-----------( 275      ( 12 Nov 2023 06:37 )             24.0     11-12    147<H>  |  117<H>  |  29<H>  ----------------------------<  82  3.1<L>   |  23  |  0.69    Ca    7.7<L>      12 Nov 2023 06:37  Mg     1.8     11-12    TPro  4.9<L>  /  Alb  1.8<L>  /  TBili  0.5  /  DBili  0.2  /  AST  26  /  ALT  32  /  AlkPhos  101  11-12    CAPILLARY BLOOD GLUCOSE      POCT Blood Glucose.: 131 mg/dL (11 Nov 2023 13:12)    PT/INR - ( 11 Nov 2023 13:20 )   PT: 16.8 sec;   INR: 1.45 ratio         PTT - ( 11 Nov 2023 13:20 )  PTT:30.7 sec  Urinalysis Basic - ( 12 Nov 2023 06:37 )    Color: x / Appearance: x / SG: x / pH: x  Gluc: 82 mg/dL / Ketone: x  / Bili: x / Urobili: x   Blood: x / Protein: x / Nitrite: x   Leuk Esterase: x / RBC: x / WBC x   Sq Epi: x / Non Sq Epi: x / Bacteria: x        RADIOLOGY & ADDITIONAL TESTS:< from: TTE W or WO Ultrasound Enhancing Agent (08.21.23 @ 11:28) >  CONCLUSIONS:      1. Left ventricular systolic function is normal with an ejection fraction of 56 % by Thomas's method of disks.   2. The left atrium is mildly dilated in size.   3. Trace mitral regurgitation.   4. Estimated pulmonary artery systolic pressure is 63 mmHg, consistent with moderate-to-severe pulmonary hypertension.   5. Mild aortic regurgitation.   6. Moderate mitral valve leaflet calcification.   7. Moderate tricuspid regurgitation.   8. Technically difficult image quality.   9. There is calcification of the mitral valve annulus.  10. There is mild mitral valve stenosis.  11. Trace pulmonic regurgitation.    ________________________________________________________________________________________  FINDINGS:     Left Ventricle:  Left ventricular systolic function is normal with a calculated ejection fraction of 56 % by the Thomas's biplane method of disks.     Right Ventricle:  Normal right ventricular cavity size, normalwall thickness and normal right ventricular systolic function.     Left Atrium:  The left atrium is mildly dilated in size with an indexed volume of 35.45 ml/m².     Right Atrium:  The right atrium is normal in size.     Aortic Valve:  The aortic valve appears trileaflet. There is mild aortic regurgitation.     Mitral Valve:  There is mitral valve thickening of the anterior and posterior leaflets. There is calcification of the mitral valve annulus. There is moderate leaflet calcification. There is mild mitral valve stenosis. There is trace mitral regurgitation.     Tricuspid Valve:  There is moderate tricuspid regurgitation. Estimated pulmonary artery systolic pressure is 63 mmHg, consistent with moderate-to-severe pulmonary hypertension.     Pulmonic Valve:  There is trace pulmonic regurgitation.     Pericardium:  No pericardial effusion seen.     Systemic Veins:  The inferior vena cava is dilated measuring 2.46 cm in diameter, (dilated >2.1cm) with abnormal inspiratory collapse (abnormal <50%) consistent with elevated right atrial pressure (~15, range 10-20mmHg).  ____________________________________________________________________  Quantitative Data:  Left Ventricle Measurements: (Indexed to BSA)     IVSd (2D):   0.9 cm  LVPWd (2D):  0.6 cm  LVIDd (2D):  4.8 cm  LVIDs (2D):  3.4 cm  LV Mass:     125 g  78.0 g/m²  LV Vol d, MOD A2C: 49.8 ml 31.19 ml/m²  LV Vol d, MOD A4C: 67.6 ml 42.34 ml/m²  LV Vol d, MOD BP:  59.1 ml 37.03 ml/m²  LV Vol s, MOD A2C: 24.0 ml 15.03 ml/m²  LV Vol s, MOD A4C: 27.5 ml 17.22 ml/m²  LV Vol s, MOD BP:  26.1 ml 16.34 ml/m²  LVOT SV MOD BP:    33.0 ml  LV EF% MOD BP:     56 %     MV E Vmax:    1.38 m/s  MV A Vmax:    0.59 m/s  MV E/A:       2.32  e' lateral:   9.41 cm/s  e' medial:    5.59 cm/s  E/e'lateral: 14.67  E/e' medial:  24.69  E/e' Average: 18.40  MV DT:        257 msec    Aorta Measurements: (normal range) (Indexed to BSA)     Sinuses of Valsalva: 3.11 cm (2.7 - 3.3 cm)cm       Left Atrium Measurements: (Indexed to BSA)  LA Diam 2D: 4.60 cm       LVOT / RVOT/ Qp/Qs Data: (Indexed to BSA)  LVOT Diameter: 1.30 cm    Mitral Valve Measurements:     MV Vmax:        3.42 m/s   MR Vmax:          3.42 m/s  MV Peak Grad:   46.8 mmHg  MR Peak Gradient: 46.8 mmHg  MV E Vmax:      1.4 m/s  MV A Vmax:      0.6 m/s  MV E/A:         2.3  MV Gradient HR: 60 bpm       Tricuspid Valve Measurements:     TR Vmax:          3.4 m/s  TR Peak Gradient: 47.6 mmHg  RA Pressure:      15 mmHg  PASP:             63 mmHg    ________________________________________________________________________________________  Electronically signed on 8/22/2023 at 8:20:04 AM by Valente Swenson         < end of copied text >     reviewed elctronically  ASSESSMENT/PLAN: 	    55minutes spent on this visit, 50% visit time spent in care co-ordination with other attendings and counselling patient ,writing admission orders ( see complete and current orders and order section) ,requesting necessary consults ,informing family about status & plan of care .I have discussed care plan with [facility] wellness/admitting /nursing   department ,outpatient PCP , hospital consultants , ER physician & med staff .  PROGRESS NOTE-IM INITIAL EVALUATION  Patient is a 89y old  Female who presents with a chief complaint of   Chart and available morning labs /imaging are reviewed electronically , urgent issues addressed . More information  is being added upon completion of rounds , when more information is collected and management discussed with consultants , medical staff and social service/case management on the floor   OVERNIGHT  No new issues reported by medical staff . All above noted Patient is resting in a bed comfortably .Confused ,poor mentation .No distress noted   HPI:  89-year-old female with history of hypertension, hyperlipidemia, A-fib with ablation on Eliquis, MR, bladder tumor, PFO, osteoporosis, spinal stenosis brought in by ambulance for possible stroke.  As per facility patient ate breakfast and had her medication between 9:45AM and 10 AM this morning and was at her baseline mental status.  Aide noticed around 11/1130 patient was leaning towards her left side, had water poured out of her mouth when she attempted to drink and had possible slurred speech.  Patient limited historian due to MR.  As per facility patient was recently in a short-term rehab facility, recently returned back to [facility] however patient has been weaker since then.< from: CT Brain Perfusion Maps Stroke (11.11.23 @ 14:12) >    < from: CT Brain Perfusion Maps Stroke (11.11.23 @ 14:12) >    ACC: [id number] EXAM:  CT BRAIN PERFUSION MAPS STROKE   ORDERED BY:   JORGE FELDER     ACC: [id number] EXAM:  CT ANGIO BRAIN STROKE PROTC IC   ORDERED BY:   JORGE FELDER     ACC: [id number] EXAM:  CT BRAIN STROKE PROTOCOL   ORDERED BY: JORGE FELDER     ACC: [id number] EXAM:  CT ANGIO NECK STROKE PROTCL IC   ORDERED BY:   JORGE FELDER     PROCEDURE DATE:  11/11/2023          INTERPRETATION:  CT HEAD STROKE PROTOCOL, CT ANGIO NECK STROKE PROTOCOL,   CT ANGIO HEAD STROKE PROTOCOL, CT PERFUSION WITH MAPS STROKE PROTOCOL    CT BRAIN WITHOUT CONTRAST    INDICATIONS:  Stroke code Leaning to left side. Slight left arm drift.    TECHNIQUE:  Serial axial images were obtained from the skull base to the   vertex without the use of contrast.    COMPARISON EXAM: 7/11/2023    FINDINGS:  Ventricles and sulci: Parenchymal volume loss is present which is   commensurate with patient age.  Intra-axial: Periventricular small vessel white matter ischemic changes.   No intracranial mass, acute hemorrhage, or midline shift is present.  Extra-axial: No extra-axial fluid collection is identified. Deposition of   calcified plaque at the level of the bilateral carotid siphons.  Calvarium: Intact.    Left optic lens replacement, as on prior. Bilateral optic globes are   intact. Imaged paranasal sinuses, bilateral mastoid air cells, middle ear   cavities are clear.        CT PERFUSION  CTA OF THE NECK AND HEAD:      CONTRAST/COMPLICATIONS:  IV Contrast: NONE (accession [id number]), IV contrast documented in   unlinked concurrent exam (accession [id number])  Complications: None reported at time of study completion      TECHNIQUE PERFUSION:  After the intravenous power injection of non-ionic contrast material,   repeat serial thin sections were obtained through the intracranial   circulation on a multislice CT scanner. Artificial intelligence was then   used for analysis of perfusion and intracranial large vessel occlusion.      TECHNIQUE CTA NECK AND HEAD:  After the intravenous power injection of non-ionic contrast material,   serial thin sections were obtained through the cervical and intracranial   circulation on a multislice CT scanner. 2D and 3D MIP reformatted images   were obtained.  Images were reformatted using a dedicated 3D software   package.    FINDINGS:    CT PERFUSION:    COMPARISON EXAMINATION: None.    Technical limitations: Significant patient motion in all 3 planes during   image acquisition likely renders results suboptimal and nondiagnostic.   Arterial and venous waveforms are also not optimized.    Miscellaneous: None.      CTA NECK WITH CONTRAST:    CAROTID STENOSIS REFERENCE: (NASCET = 100x1-(N/D)). N=greatest narrowing.   D=normal distal diameter - MILD = <50% stenosis. - MODERATE = 50-69%   stenosis.- SEVERE = 70-89% stenosis. - HAIRLINE/CRITICAL = 90-99%   stenosis. - OCCLUDED = 100% stenosis.      COMPARISON: None.    FINDINGS:  Aortic arch and visualized great vessels: Within normal limits.    RIGHT:  Common carotid artery: Follows a tortuouscourse and is normal in caliber   to the carotid bifurcation.  Internal carotid artery: Mild to moderate deposition of calcified plaque   at the level of the right common carotid artery bifurcation with   extension into the proximal aspect of the right internal carotid artery,   without significant stenosis based on NASCET criteria. Normal course and   caliber to the intracranial circulation.    Vertebral artery: Emanates from the right subclavian artery. The right   vertebral artery is normal incourse and caliber to the intracranial   circulation.      LEFT:  Common carotid artery :Normal in course and caliber to the carotid   bifurcation.  Internal carotid artery: Mild to moderate deposition of calcified plaque   at the level of the left common carotid artery bifurcation with extension   into the proximal left internal carotid artery without significant   stenosis based on NASCET criteria. Normal course and caliber to the   intracranial circulation.    Vertebral artery: Emanates from the left subclavian artery. The left   vertebral artery is normal in course and caliber to the intracranial   circulation.      Miscellaneous: None    CTA HEAD WITH CONTRAST:    COMPARISON EXAM: None.      Internal carotid arteries: Bilateral petrous, precavernous, cavernous,   and supraclinoid regions are patent. Mild deposition of calcified plaque   at the level of the carotid siphons without significant stenosis in this   location.    [place]:  No aneurysm about the [place]. Tiny aneurysms   can be beyond the resolution of CTA technique.    Anterior Cerebral arteries: No significant stenosis or occlusion.  Middle Cerebral arteries: No significant stenosis or occlusion.    Posterior Circulation: Distal intracranial bilateralvertebral arteries   are visualized. The vertebrobasilar confluence is unremarkable. There is   good flow within the basilar artery. Bilateral superior cerebellar   arteries and bilateral posterior cerebral arteries emanate from the   distal aspect of the basilar artery. There is a hypoplastic right P1   segment. A prominent right posterior communicating artery contributes to   flow within the right posterior cerebral artery.    < end of copied text >     (11 Nov 2023 21:59)    PAST MEDICAL & SURGICAL HISTORY:  HTN (hypertension)      Afib      Spinal stenosis      PFO (patent foramen ovale)      Degeneration macular      Osteoporosis      History of complete heart block      Hypothyroidism      Cardiac pacemaker          MEDICATIONS  (STANDING):  aMIOdarone    Tablet 200 milliGRAM(s) Oral daily  apixaban 2.5 milliGRAM(s) Oral every 12 hours  artificial  tears Solution 1 Drop(s) Both EYES four times a day  ascorbic acid 500 milliGRAM(s) Oral daily  dextrose 5% + sodium chloride 0.45% with potassium chloride 20 mEq/L 1000 milliLiter(s) (50 mL/Hr) IV Continuous <Continuous>  enalapril 10 milliGRAM(s) Oral daily  ertapenem  IVPB 500 milliGRAM(s) IV Intermittent every 24 hours  ferrous    sulfate 325 milliGRAM(s) Oral three times a day  levothyroxine 75 MICROGram(s) Oral daily  metoprolol tartrate 50 milliGRAM(s) Oral two times a day  multivitamin 1 Tablet(s) Oral daily  pantoprazole    Tablet 40 milliGRAM(s) Oral before breakfast  polyethylene glycol 3350 17 Gram(s) Oral daily  potassium chloride   Powder 40 milliEquivalent(s) Oral once    MEDICATIONS  (PRN):      OBJECTIVE    T(C): 36.8 (11-12-23 @ 04:46), Max: 37.1 (11-11-23 @ 13:15)  HR: 70 (11-12-23 @ 04:46) (66 - 77)  BP: 115/61 (11-12-23 @ 04:46) (110/69 - 132/77)  RR: 17 (11-12-23 @ 04:46) (16 - 17)  SpO2: 91% (11-12-23 @ 04:46) (91% - 98%)  Wt(kg): --  I&O's Summary        REVIEW OF SYSTEMS:  CONSTITUTIONAL: No fever, weight loss, or fatigue  EYES: No eye pain, visual disturbances, or discharge  ENMT:   No sinus or throat pain  NECK: No pain or stiffness  RESPIRATORY: No cough, wheezing, chills or hemoptysis; No shortness of breath  CARDIOVASCULAR: No chest pain, palpitations, dizziness, or leg swelling  GASTROINTESTINAL: No abdominal pain. No nausea, vomiting; No diarrhea or constipation. No melena or hematochezia.  GENITOURINARY: No dysuria, frequency, hematuria, or incontinence  NEUROLOGICAL: No headaches, memory loss, loss of strength, numbness, or tremors  SKIN: No itching, burning, rashes, or lesions   MUSCULOSKELETAL: No joint pain or swelling; No muscle, back, or extremity pain    PHYSICAL EXAM:  Appearance: NAD. VS past 24 hrs -as above   HEENT:   Moist oral mucosa. Conjunctiva clear b/l.   Neck : supple  Respiratory: Lungs CTAB.  Gastrointestinal:  Soft, nontender. No rebound. No rigidity. BS present	  Cardiovascular: RRR ,S1S2 present  Neurologic: Non-focal. Moving all extremities.  Extremities: No edema. No erythema. No calf tenderness.  Skin: No rashes, No ecchymoses, No cyanosis.	  wounds ,skin lesions-See skin assesment flow sheet   LABS:                        7.9    11.36 )-----------( 275      ( 12 Nov 2023 06:37 )             24.0     11-12    147<H>  |  117<H>  |  29<H>  ----------------------------<  82  3.1<L>   |  23  |  0.69    Ca    7.7<L>      12 Nov 2023 06:37  Mg     1.8     11-12    TPro  4.9<L>  /  Alb  1.8<L>  /  TBili  0.5  /  DBili  0.2  /  AST  26  /  ALT  32  /  AlkPhos  101  11-12    CAPILLARY BLOOD GLUCOSE      POCT Blood Glucose.: 131 mg/dL (11 Nov 2023 13:12)    PT/INR - ( 11 Nov 2023 13:20 )   PT: 16.8 sec;   INR: 1.45 ratio         PTT - ( 11 Nov 2023 13:20 )  PTT:30.7 sec  Urinalysis Basic - ( 12 Nov 2023 06:37 )    Color: x / Appearance: x / SG: x / pH: x  Gluc: 82 mg/dL / Ketone: x  / Bili: x / Urobili: x   Blood: x / Protein: x / Nitrite: x   Leuk Esterase: x / RBC: x / WBC x   Sq Epi: x / Non Sq Epi: x / Bacteria: x        RADIOLOGY & ADDITIONAL TESTS:< from: TTE W or WO Ultrasound Enhancing Agent (08.21.23 @ 11:28) >  CONCLUSIONS:      1. Left ventricular systolic function is normal with an ejection fraction of 56 % by Thomas's method of disks.   2. The left atrium is mildly dilated in size.   3. Trace mitral regurgitation.   4. Estimated pulmonary artery systolic pressure is 63 mmHg, consistent with moderate-to-severe pulmonary hypertension.   5. Mild aortic regurgitation.   6. Moderate mitral valve leaflet calcification.   7. Moderate tricuspid regurgitation.   8. Technically difficult image quality.   9. There is calcification of the mitral valve annulus.  10. There is mild mitral valve stenosis.  11. Trace pulmonic regurgitation.    ________________________________________________________________________________________  FINDINGS:     Left Ventricle:  Left ventricular systolic function is normal with a calculated ejection fraction of 56 % by the Thomas's biplane method of disks.     Right Ventricle:  Normal right ventricular cavity size, normalwall thickness and normal right ventricular systolic function.     Left Atrium:  The left atrium is mildly dilated in size with an indexed volume of 35.45 ml/m².     Right Atrium:  The right atrium is normal in size.     Aortic Valve:  The aortic valve appears trileaflet. There is mild aortic regurgitation.     Mitral Valve:  There is mitral valve thickening of the anterior and posterior leaflets. There is calcification of the mitral valve annulus. There is moderate leaflet calcification. There is mild mitral valve stenosis. There is trace mitral regurgitation.     Tricuspid Valve:  There is moderate tricuspid regurgitation. Estimated pulmonary artery systolic pressure is 63 mmHg, consistent with moderate-to-severe pulmonary hypertension.     Pulmonic Valve:  There is trace pulmonic regurgitation.     Pericardium:  No pericardial effusion seen.     Systemic Veins:  The inferior vena cava is dilated measuring 2.46 cm in diameter, (dilated >2.1cm) with abnormal inspiratory collapse (abnormal <50%) consistent with elevated right atrial pressure (~15, range 10-20mmHg).  ____________________________________________________________________  Quantitative Data:  Left Ventricle Measurements: (Indexed to BSA)     IVSd (2D):   0.9 cm  LVPWd (2D):  0.6 cm  LVIDd (2D):  4.8 cm  LVIDs (2D):  3.4 cm  LV Mass:     125 g  78.0 g/m²  LV Vol d, MOD A2C: 49.8 ml 31.19 ml/m²  LV Vol d, MOD A4C: 67.6 ml 42.34 ml/m²  LV Vol d, MOD BP:  59.1 ml 37.03 ml/m²  LV Vol s, MOD A2C: 24.0 ml 15.03 ml/m²  LV Vol s, MOD A4C: 27.5 ml 17.22 ml/m²  LV Vol s, MOD BP:  26.1 ml 16.34 ml/m²  LVOT SV MOD BP:    33.0 ml  LV EF% MOD BP:     56 %     MV E Vmax:    1.38 m/s  MV A Vmax:    0.59 m/s  MV E/A:       2.32  e' lateral:   9.41 cm/s  e' medial:    5.59 cm/s  E/e'lateral: 14.67  E/e' medial:  24.69  E/e' Average: 18.40  MV DT:        257 msec    Aorta Measurements: (normal range) (Indexed to BSA)     Sinuses of Valsalva: 3.11 cm (2.7 - 3.3 cm)cm       Left Atrium Measurements: (Indexed to BSA)  LA Diam 2D: 4.60 cm       LVOT / RVOT/ Qp/Qs Data: (Indexed to BSA)  LVOT Diameter: 1.30 cm    Mitral Valve Measurements:     MV Vmax:        3.42 m/s   MR Vmax:          3.42 m/s  MV Peak Grad:   46.8 mmHg  MR Peak Gradient: 46.8 mmHg  MV E Vmax:      1.4 m/s  MV A Vmax:      0.6 m/s  MV E/A:         2.3  MV Gradient HR: 60 bpm       Tricuspid Valve Measurements:     TR Vmax:          3.4 m/s  TR Peak Gradient: 47.6 mmHg  RA Pressure:      15 mmHg  PASP:             63 mmHg    ________________________________________________________________________________________  Electronically signed on 8/22/2023 at 8:20:04 AM by Valente Swenson         < end of copied text >     reviewed elctronically  ASSESSMENT/PLAN: 	    55minutes spent on this visit, 50% visit time spent in care co-ordination with other attendings and counselling patient ,writing admission orders ( see complete and current orders and order section) ,requesting necessary consults ,informing family about status & plan of care .I have discussed care plan with [facility] wellness/admitting /nursing   department ,outpatient PCP , hospital consultants , ER physician & med staff .  PROGRESS NOTE-IM INITIAL EVALUATION  Patient is a 89y old  Female who presents with a chief complaint of   Chart and available morning labs /imaging are reviewed electronically , urgent issues addressed . More information  is being added upon completion of rounds , when more information is collected and management discussed with consultants , medical staff and social service/case management on the floor   OVERNIGHT  No new issues reported by medical staff . All above noted Patient is resting in a bed comfortably .Confused ,poor mentation .No distress noted   HPI:  89-year-old female with history of hypertension, hyperlipidemia, A-fib with ablation on Eliquis, MR, bladder tumor, PFO, osteoporosis, spinal stenosis brought in by ambulance for possible stroke.  As per facility patient ate breakfast and had her medication between 9:45AM and 10 AM this morning and was at her baseline mental status.  Aide noticed around 11/1130 patient was leaning towards her left side, had water poured out of her mouth when she attempted to drink and had possible slurred speech.  Patient limited historian due to MR.  As per facility patient was recently in a short-term rehab facility, recently returned back to [facility] however patient has been weaker since then.< from: CT Brain Perfusion Maps Stroke (11.11.23 @ 14:12) >    < from: CT Brain Perfusion Maps Stroke (11.11.23 @ 14:12) >    ACC: [id number] EXAM:  CT BRAIN PERFUSION MAPS STROKE   ORDERED BY:   JORGE FELDER     ACC: [id number] EXAM:  CT ANGIO BRAIN STROKE PROTC IC   ORDERED BY:   JORGE FELDER     ACC: [id number] EXAM:  CT BRAIN STROKE PROTOCOL   ORDERED BY: JORGE FELDER     ACC: [id number] EXAM:  CT ANGIO NECK STROKE PROTCL IC   ORDERED BY:   JORGE FELDER     PROCEDURE DATE:  11/11/2023          INTERPRETATION:  CT HEAD STROKE PROTOCOL, CT ANGIO NECK STROKE PROTOCOL,   CT ANGIO HEAD STROKE PROTOCOL, CT PERFUSION WITH MAPS STROKE PROTOCOL    CT BRAIN WITHOUT CONTRAST    INDICATIONS:  Stroke code Leaning to left side. Slight left arm drift.    TECHNIQUE:  Serial axial images were obtained from the skull base to the   vertex without the use of contrast.    COMPARISON EXAM: 7/11/2023    FINDINGS:  Ventricles and sulci: Parenchymal volume loss is present which is   commensurate with patient age.  Intra-axial: Periventricular small vessel white matter ischemic changes.   No intracranial mass, acute hemorrhage, or midline shift is present.  Extra-axial: No extra-axial fluid collection is identified. Deposition of   calcified plaque at the level of the bilateral carotid siphons.  Calvarium: Intact.    Left optic lens replacement, as on prior. Bilateral optic globes are   intact. Imaged paranasal sinuses, bilateral mastoid air cells, middle ear   cavities are clear.        CT PERFUSION  CTA OF THE NECK AND HEAD:      CONTRAST/COMPLICATIONS:  IV Contrast: NONE (accession [id number]), IV contrast documented in   unlinked concurrent exam (accession [id number])  Complications: None reported at time of study completion      TECHNIQUE PERFUSION:  After the intravenous power injection of non-ionic contrast material,   repeat serial thin sections were obtained through the intracranial   circulation on a multislice CT scanner. Artificial intelligence was then   used for analysis of perfusion and intracranial large vessel occlusion.      TECHNIQUE CTA NECK AND HEAD:  After the intravenous power injection of non-ionic contrast material,   serial thin sections were obtained through the cervical and intracranial   circulation on a multislice CT scanner. 2D and 3D MIP reformatted images   were obtained.  Images were reformatted using a dedicated 3D software   package.    FINDINGS:    CT PERFUSION:    COMPARISON EXAMINATION: None.    Technical limitations: Significant patient motion in all 3 planes during   image acquisition likely renders results suboptimal and nondiagnostic.   Arterial and venous waveforms are also not optimized.    Miscellaneous: None.      CTA NECK WITH CONTRAST:    CAROTID STENOSIS REFERENCE: (NASCET = 100x1-(N/D)). N=greatest narrowing.   D=normal distal diameter - MILD = <50% stenosis. - MODERATE = 50-69%   stenosis.- SEVERE = 70-89% stenosis. - HAIRLINE/CRITICAL = 90-99%   stenosis. - OCCLUDED = 100% stenosis.      COMPARISON: None.    FINDINGS:  Aortic arch and visualized great vessels: Within normal limits.    RIGHT:  Common carotid artery: Follows a tortuouscourse and is normal in caliber   to the carotid bifurcation.  Internal carotid artery: Mild to moderate deposition of calcified plaque   at the level of the right common carotid artery bifurcation with   extension into the proximal aspect of the right internal carotid artery,   without significant stenosis based on NASCET criteria. Normal course and   caliber to the intracranial circulation.    Vertebral artery: [place] from the right subclavian artery. The right   vertebral artery is normal incourse and caliber to the intracranial   circulation.      LEFT:  Common carotid artery :Normal in course and caliber to the carotid   bifurcation.  Internal carotid artery: Mild to moderate deposition of calcified plaque   at the level of the left common carotid artery bifurcation with extension   into the proximal left internal carotid artery without significant   stenosis based on NASCET criteria. Normal course and caliber to the   intracranial circulation.    Vertebral artery: Emanates from the left subclavian artery. The left   vertebral artery is normal in course and caliber to the intracranial   circulation.      Miscellaneous: None    CTA HEAD WITH CONTRAST:    COMPARISON EXAM: None.      Internal carotid arteries: Bilateral petrous, precavernous, cavernous,   and supraclinoid regions are patent. Mild deposition of calcified plaque   at the level of the carotid siphons without significant stenosis in this   location.    [place]:  No aneurysm about the [place]. Tiny aneurysms   can be beyond the resolution of CTA technique.    Anterior Cerebral arteries: No significant stenosis or occlusion.  Middle Cerebral arteries: No significant stenosis or occlusion.    Posterior Circulation: Distal intracranial bilateralvertebral arteries   are visualized. The vertebrobasilar confluence is unremarkable. There is   good flow within the basilar artery. Bilateral superior cerebellar   arteries and bilateral posterior cerebral arteries emanate from the   distal aspect of the basilar artery. There is a hypoplastic right P1   segment. A prominent right posterior communicating artery contributes to   flow within the right posterior cerebral artery.    < end of copied text >     (11 Nov 2023 21:59)    PAST MEDICAL & SURGICAL HISTORY:  HTN (hypertension)      Afib      Spinal stenosis      PFO (patent foramen ovale)      Degeneration macular      Osteoporosis      History of complete heart block      Hypothyroidism      Cardiac pacemaker          MEDICATIONS  (STANDING):  aMIOdarone    Tablet 200 milliGRAM(s) Oral daily  apixaban 2.5 milliGRAM(s) Oral every 12 hours  artificial  tears Solution 1 Drop(s) Both EYES four times a day  ascorbic acid 500 milliGRAM(s) Oral daily  dextrose 5% + sodium chloride 0.45% with potassium chloride 20 mEq/L 1000 milliLiter(s) (50 mL/Hr) IV Continuous <Continuous>  enalapril 10 milliGRAM(s) Oral daily  ertapenem  IVPB 500 milliGRAM(s) IV Intermittent every 24 hours  ferrous    sulfate 325 milliGRAM(s) Oral three times a day  levothyroxine 75 MICROGram(s) Oral daily  metoprolol tartrate 50 milliGRAM(s) Oral two times a day  multivitamin 1 Tablet(s) Oral daily  pantoprazole    Tablet 40 milliGRAM(s) Oral before breakfast  polyethylene glycol 3350 17 Gram(s) Oral daily  potassium chloride   Powder 40 milliEquivalent(s) Oral once    MEDICATIONS  (PRN):      OBJECTIVE    T(C): 36.8 (11-12-23 @ 04:46), Max: 37.1 (11-11-23 @ 13:15)  HR: 70 (11-12-23 @ 04:46) (66 - 77)  BP: 115/61 (11-12-23 @ 04:46) (110/69 - 132/77)  RR: 17 (11-12-23 @ 04:46) (16 - 17)  SpO2: 91% (11-12-23 @ 04:46) (91% - 98%)  Wt(kg): --  I&O's Summary        REVIEW OF SYSTEMS:  CONSTITUTIONAL: No fever, weight loss, or fatigue  EYES: No eye pain, visual disturbances, or discharge  ENMT:   No sinus or throat pain  NECK: No pain or stiffness  RESPIRATORY: No cough, wheezing, chills or hemoptysis; No shortness of breath  CARDIOVASCULAR: No chest pain, palpitations, dizziness, or leg swelling  GASTROINTESTINAL: No abdominal pain. No nausea, vomiting; No diarrhea or constipation. No melena or hematochezia.  GENITOURINARY: No dysuria, frequency, hematuria, or incontinence  NEUROLOGICAL: No headaches, memory loss, loss of strength, numbness, or tremors  SKIN: No itching, burning, rashes, or lesions   MUSCULOSKELETAL: No joint pain or swelling; No muscle, back, or extremity pain    PHYSICAL EXAM:  Appearance: NAD. VS past 24 hrs -as above   HEENT:   Moist oral mucosa. Conjunctiva clear b/l.   Neck : supple  Respiratory: Lungs CTAB.  Gastrointestinal:  Soft, nontender. No rebound. No rigidity. BS present	  Cardiovascular: RRR ,S1S2 present  Neurologic: Non-focal. Moving all extremities.  Extremities: No edema. No erythema. No calf tenderness.  Skin: No rashes, No ecchymoses, No cyanosis.	  wounds ,skin lesions-See skin assesment flow sheet   LABS:                        7.9    11.36 )-----------( 275      ( 12 Nov 2023 06:37 )             24.0     11-12    147<H>  |  117<H>  |  29<H>  ----------------------------<  82  3.1<L>   |  23  |  0.69    Ca    7.7<L>      12 Nov 2023 06:37  Mg     1.8     11-12    TPro  4.9<L>  /  Alb  1.8<L>  /  TBili  0.5  /  DBili  0.2  /  AST  26  /  ALT  32  /  AlkPhos  101  11-12    CAPILLARY BLOOD GLUCOSE      POCT Blood Glucose.: 131 mg/dL (11 Nov 2023 13:12)    PT/INR - ( 11 Nov 2023 13:20 )   PT: 16.8 sec;   INR: 1.45 ratio         PTT - ( 11 Nov 2023 13:20 )  PTT:30.7 sec  Urinalysis Basic - ( 12 Nov 2023 06:37 )    Color: x / Appearance: x / SG: x / pH: x  Gluc: 82 mg/dL / Ketone: x  / Bili: x / Urobili: x   Blood: x / Protein: x / Nitrite: x   Leuk Esterase: x / RBC: x / WBC x   Sq Epi: x / Non Sq Epi: x / Bacteria: x        RADIOLOGY & ADDITIONAL TESTS:< from: TTE W or WO Ultrasound Enhancing Agent (08.21.23 @ 11:28) >  CONCLUSIONS:      1. Left ventricular systolic function is normal with an ejection fraction of 56 % by Thomas's method of disks.   2. The left atrium is mildly dilated in size.   3. Trace mitral regurgitation.   4. Estimated pulmonary artery systolic pressure is 63 mmHg, consistent with moderate-to-severe pulmonary hypertension.   5. Mild aortic regurgitation.   6. Moderate mitral valve leaflet calcification.   7. Moderate tricuspid regurgitation.   8. Technically difficult image quality.   9. There is calcification of the mitral valve annulus.  10. There is mild mitral valve stenosis.  11. Trace pulmonic regurgitation.    ________________________________________________________________________________________  FINDINGS:     Left Ventricle:  Left ventricular systolic function is normal with a calculated ejection fraction of 56 % by the Thomas's biplane method of disks.     Right Ventricle:  Normal right ventricular cavity size, normalwall thickness and normal right ventricular systolic function.     Left Atrium:  The left atrium is mildly dilated in size with an indexed volume of 35.45 ml/m².     Right Atrium:  The right atrium is normal in size.     Aortic Valve:  The aortic valve appears trileaflet. There is mild aortic regurgitation.     Mitral Valve:  There is mitral valve thickening of the anterior and posterior leaflets. There is calcification of the mitral valve annulus. There is moderate leaflet calcification. There is mild mitral valve stenosis. There is trace mitral regurgitation.     Tricuspid Valve:  There is moderate tricuspid regurgitation. Estimated pulmonary artery systolic pressure is 63 mmHg, consistent with moderate-to-severe pulmonary hypertension.     Pulmonic Valve:  There is trace pulmonic regurgitation.     Pericardium:  No pericardial effusion seen.     Systemic Veins:  The inferior vena cava is dilated measuring 2.46 cm in diameter, (dilated >2.1cm) with abnormal inspiratory collapse (abnormal <50%) consistent with elevated right atrial pressure (~15, range 10-20mmHg).  ____________________________________________________________________  Quantitative Data:  Left Ventricle Measurements: (Indexed to BSA)     IVSd (2D):   0.9 cm  LVPWd (2D):  0.6 cm  LVIDd (2D):  4.8 cm  LVIDs (2D):  3.4 cm  LV Mass:     125 g  78.0 g/m²  LV Vol d, MOD A2C: 49.8 ml 31.19 ml/m²  LV Vol d, MOD A4C: 67.6 ml 42.34 ml/m²  LV Vol d, MOD BP:  59.1 ml 37.03 ml/m²  LV Vol s, MOD A2C: 24.0 ml 15.03 ml/m²  LV Vol s, MOD A4C: 27.5 ml 17.22 ml/m²  LV Vol s, MOD BP:  26.1 ml 16.34 ml/m²  LVOT SV MOD BP:    33.0 ml  LV EF% MOD BP:     56 %     MV E Vmax:    1.38 m/s  MV A Vmax:    0.59 m/s  MV E/A:       2.32  e' lateral:   9.41 cm/s  e' medial:    5.59 cm/s  E/e'lateral: 14.67  E/e' medial:  24.69  E/e' Average: 18.40  MV DT:        257 msec    Aorta Measurements: (normal range) (Indexed to BSA)     Sinuses of Valsalva: 3.11 cm (2.7 - 3.3 cm)cm       Left Atrium Measurements: (Indexed to BSA)  LA Diam 2D: 4.60 cm       LVOT / RVOT/ Qp/Qs Data: (Indexed to BSA)  LVOT Diameter: 1.30 cm    Mitral Valve Measurements:     MV Vmax:        3.42 m/s   MR Vmax:          3.42 m/s  MV Peak Grad:   46.8 mmHg  MR Peak Gradient: 46.8 mmHg  MV E Vmax:      1.4 m/s  MV A Vmax:      0.6 m/s  MV E/A:         2.3  MV Gradient HR: 60 bpm       Tricuspid Valve Measurements:     TR Vmax:          3.4 m/s  TR Peak Gradient: 47.6 mmHg  RA Pressure:      15 mmHg  PASP:             63 mmHg    ________________________________________________________________________________________  Electronically signed on 8/22/2023 at 8:20:04 AM by Valente Swenson         < end of copied text >     reviewed elctronically  ASSESSMENT/PLAN: 	    55minutes spent on this visit, 50% visit time spent in care co-ordination with other attendings and counselling patient ,writing admission orders ( see complete and current orders and order section) ,requesting necessary consults ,informing family about status & plan of care .I have discussed care plan with [facility] wellness/admitting /nursing   department ,outpatient PCP , hospital consultants , ER physician & med staff .

## 2023-11-12 NOTE — PROGRESS NOTE ADULT - ASSESSMENT
HPI:  89-year-old female with history of hypertension, hyperlipidemia, A-fib with ablation on Eliquis, MR, bladder tumor, PFO, osteoporosis, spinal stenosis brought in by ambulance for possible stroke.  As per facility patient ate breakfast and had her medication between 9:45AM and 10 AM this morning and was at her baseline mental status.  Aide noticed around 11/1130 patient was leaning towards her left side, had water poured out of her mouth when she attempted to drink and had possible slurred speech.  Patient limited historian due to MR.  As per facility patient was recently in a short-term rehab facility, recently returned back to [place] however patient has been weaker since then.< from: CT Brain Perfusion Maps Stroke (11.11.23 @ 14:12) >    < from: CT Brain Perfusion Maps Stroke (11.11.23 @ 14:12) >    ACC: [id number] EXAM:  CT BRAIN PERFUSION MAPS STROKE   ORDERED BY:   JORGE FELDER     ACC: [id number] EXAM:  CT ANGIO BRAIN STROKE PROTC IC   ORDERED BY:   JORGE FELDER     ACC: [id number] EXAM:  CT BRAIN STROKE PROTOCOL   ORDERED BY: JORGE FELDER     ACC: [id number] EXAM:  CT ANGIO NECK STROKE PROTCL IC   ORDERED BY:   JORGE FELDER     PROCEDURE DATE:  11/11/2023          INTERPRETATION:  CT HEAD STROKE PROTOCOL, CT ANGIO NECK STROKE PROTOCOL,   CT ANGIO HEAD STROKE PROTOCOL, CT PERFUSION WITH MAPS STROKE PROTOCOL    CT BRAIN WITHOUT CONTRAST    INDICATIONS:  Stroke code Leaning to left side. Slight left arm drift.    TECHNIQUE:  Serial axial images were obtained from the skull base to the   vertex without the use of contrast.    COMPARISON EXAM: 7/11/2023    FINDINGS:  Ventricles and sulci: Parenchymal volume loss is present which is   commensurate with patient age.  Intra-axial: Periventricular small vessel white matter ischemic changes.   No intracranial mass, acute hemorrhage, or midline shift is present.  Extra-axial: No extra-axial fluid collection is identified. Deposition of   calcified plaque at the level of the bilateral carotid siphons.  Calvarium: Intact.    Left optic lens replacement, as on prior. Bilateral optic globes are   intact. Imaged paranasal sinuses, bilateral mastoid air cells, middle ear   cavities are clear.        CT PERFUSION  CTA OF THE NECK AND HEAD:      CONTRAST/COMPLICATIONS:  IV Contrast: NONE (accession [id number]), IV contrast documented in   unlinked concurrent exam (accession [id number])  Complications: None reported at time of study completion      TECHNIQUE PERFUSION:  After the intravenous power injection of non-ionic contrast material,   repeat serial thin sections were obtained through the intracranial   circulation on a multislice CT scanner. Artificial intelligence was then   used for analysis of perfusion and intracranial large vessel occlusion.      TECHNIQUE CTA NECK AND HEAD:  After the intravenous power injection of non-ionic contrast material,   serial thin sections were obtained through the cervical and intracranial   circulation on a multislice CT scanner. 2D and 3D MIP reformatted images   were obtained.  Images were reformatted using a dedicated 3D software   package.    FINDINGS:    CT PERFUSION:    COMPARISON EXAMINATION: None.    Technical limitations: Significant patient motion in all 3 planes during   image acquisition likely renders results suboptimal and nondiagnostic.   Arterial and venous waveforms are also not optimized.    Miscellaneous: None.      CTA NECK WITH CONTRAST:    CAROTID STENOSIS REFERENCE: (NASCET = 100x1-(N/D)). N=greatest narrowing.   D=normal distal diameter - MILD = <50% stenosis. - MODERATE = 50-69%   stenosis.- SEVERE = 70-89% stenosis. - HAIRLINE/CRITICAL = 90-99%   stenosis. - OCCLUDED = 100% stenosis.      COMPARISON: None.    FINDINGS:  Aortic arch and visualized great vessels: Within normal limits.    RIGHT:  Common carotid artery: Follows a tortuouscourse and is normal in caliber   to the carotid bifurcation.  Internal carotid artery: Mild to moderate deposition of calcified plaque   at the level of the right common carotid artery bifurcation with   extension into the proximal aspect of the right internal carotid artery,   without significant stenosis based on NASCET criteria. Normal course and   caliber to the intracranial circulation.    Vertebral artery: Emanates from the right subclavian artery. The right   vertebral artery is normal incourse and caliber to the intracranial   circulation.      LEFT:  Common carotid artery :Normal in course and caliber to the carotid   bifurcation.  Internal carotid artery: Mild to moderate deposition of calcified plaque   at the level of the left common carotid artery bifurcation with extension   into the proximal left internal carotid artery without significant   stenosis based on NASCET criteria. Normal course and caliber to the   intracranial circulation.    Vertebral artery: Emanates from the left subclavian artery. The left   vertebral artery is normal in course and caliber to the intracranial   circulation.      Miscellaneous: None    CTA HEAD WITH CONTRAST:    COMPARISON EXAM: None.      Internal carotid arteries: Bilateral petrous, precavernous, cavernous,   and supraclinoid regions are patent. Mild deposition of calcified plaque   at the level of the carotid siphons without significant stenosis in this   location.    [place]:  No aneurysm about the [place]. Tiny aneurysms   can be beyond the resolution of CTA technique.    Anterior Cerebral arteries: No significant stenosis or occlusion.  Middle Cerebral arteries: No significant stenosis or occlusion.    Posterior Circulation: Distal intracranial bilateralvertebral arteries   are visualized. The vertebrobasilar confluence is unremarkable. There is   good flow within the basilar artery. Bilateral superior cerebellar   arteries and bilateral posterior cerebral arteries emanate from the   distal aspect of the basilar artery. There is a hypoplastic right P1   segment. A prominent right posterior communicating artery contributes to   flow within the right posterior cerebral artery.    < end of copied text >     (11 Nov 2023 21:59)      hypokalemia   potassium chloride  10 mEq/100 mL IVPB 10 milliEquivalent(s) IV Intermittent every 1 hour    BP monitoring,continue current antihypertensive meds, low salt diet,followup with PMD in 1-2 weeks  enalapril 10 milliGRAM(s) Oral daily    Admit for septic workup and ID evaluation,send blood and urine cx,serial lactate levels,monitor vitals closley,ivfs hydration,monitor urine output and renal profile,iv abx as per id cons  ertapenem  IVPB 500 milliGRAM(s) IV Intermittent every 24 hours    hypernatremia   will check ua , urine osmolality , urine sodium , urine uric acid , serum sodium , serum osmolality , serum uric acid , f/u with hypernatremia work up , f/u with bmp , monitor i and o     HPI:  89-year-old female with history of hypertension, hyperlipidemia, A-fib with ablation on Eliquis, MR, bladder tumor, PFO, osteoporosis, spinal stenosis brought in by ambulance for possible stroke.  As per facility patient ate breakfast and had her medication between 9:45AM and 10 AM this morning and was at her baseline mental status.  Aide noticed around 11/1130 patient was leaning towards her left side, had water poured out of her mouth when she attempted to drink and had possible slurred speech.  Patient limited historian due to MR.  As per facility patient was recently in a short-term rehab facility, recently returned back to [facility] however patient has been weaker since then.< from: CT Brain Perfusion Maps Stroke (11.11.23 @ 14:12) >    < from: CT Brain Perfusion Maps Stroke (11.11.23 @ 14:12) >    ACC: [id number] EXAM:  CT BRAIN PERFUSION MAPS STROKE   ORDERED BY:   JORGE FELDER     ACC: [id number] EXAM:  CT ANGIO BRAIN STROKE PROTC IC   ORDERED BY:   JORGE FELDER     ACC: [id number] EXAM:  CT BRAIN STROKE PROTOCOL   ORDERED BY: JORGE FELDER     ACC: [id number] EXAM:  CT ANGIO NECK STROKE PROTCL IC   ORDERED BY:   JORGE FELDER     PROCEDURE DATE:  11/11/2023          INTERPRETATION:  CT HEAD STROKE PROTOCOL, CT ANGIO NECK STROKE PROTOCOL,   CT ANGIO HEAD STROKE PROTOCOL, CT PERFUSION WITH MAPS STROKE PROTOCOL    CT BRAIN WITHOUT CONTRAST    INDICATIONS:  Stroke code Leaning to left side. Slight left arm drift.    TECHNIQUE:  Serial axial images were obtained from the skull base to the   vertex without the use of contrast.    COMPARISON EXAM: 7/11/2023    FINDINGS:  Ventricles and sulci: Parenchymal volume loss is present which is   commensurate with patient age.  Intra-axial: Periventricular small vessel white matter ischemic changes.   No intracranial mass, acute hemorrhage, or midline shift is present.  Extra-axial: No extra-axial fluid collection is identified. Deposition of   calcified plaque at the level of the bilateral carotid siphons.  Calvarium: Intact.    Left optic lens replacement, as on prior. Bilateral optic globes are   intact. Imaged paranasal sinuses, bilateral mastoid air cells, middle ear   cavities are clear.        CT PERFUSION  CTA OF THE NECK AND HEAD:      CONTRAST/COMPLICATIONS:  IV Contrast: NONE (accession [id number]), IV contrast documented in   unlinked concurrent exam (accession [id number])  Complications: None reported at time of study completion      TECHNIQUE PERFUSION:  After the intravenous power injection of non-ionic contrast material,   repeat serial thin sections were obtained through the intracranial   circulation on a multislice CT scanner. Artificial intelligence was then   used for analysis of perfusion and intracranial large vessel occlusion.      TECHNIQUE CTA NECK AND HEAD:  After the intravenous power injection of non-ionic contrast material,   serial thin sections were obtained through the cervical and intracranial   circulation on a multislice CT scanner. 2D and 3D MIP reformatted images   were obtained.  Images were reformatted using a dedicated 3D software   package.    FINDINGS:    CT PERFUSION:    COMPARISON EXAMINATION: None.    Technical limitations: Significant patient motion in all 3 planes during   image acquisition likely renders results suboptimal and nondiagnostic.   Arterial and venous waveforms are also not optimized.    Miscellaneous: None.      CTA NECK WITH CONTRAST:    CAROTID STENOSIS REFERENCE: (NASCET = 100x1-(N/D)). N=greatest narrowing.   D=normal distal diameter - MILD = <50% stenosis. - MODERATE = 50-69%   stenosis.- SEVERE = 70-89% stenosis. - HAIRLINE/CRITICAL = 90-99%   stenosis. - OCCLUDED = 100% stenosis.      COMPARISON: None.    FINDINGS:  Aortic arch and visualized great vessels: Within normal limits.    RIGHT:  Common carotid artery: Follows a tortuouscourse and is normal in caliber   to the carotid bifurcation.  Internal carotid artery: Mild to moderate deposition of calcified plaque   at the level of the right common carotid artery bifurcation with   extension into the proximal aspect of the right internal carotid artery,   without significant stenosis based on NASCET criteria. Normal course and   caliber to the intracranial circulation.    Vertebral artery: Emanates from the right subclavian artery. The right   vertebral artery is normal incourse and caliber to the intracranial   circulation.      LEFT:  Common carotid artery :Normal in course and caliber to the carotid   bifurcation.  Internal carotid artery: Mild to moderate deposition of calcified plaque   at the level of the left common carotid artery bifurcation with extension   into the proximal left internal carotid artery without significant   stenosis based on NASCET criteria. Normal course and caliber to the   intracranial circulation.    Vertebral artery: Emanates from the left subclavian artery. The left   vertebral artery is normal in course and caliber to the intracranial   circulation.      Miscellaneous: None    CTA HEAD WITH CONTRAST:    COMPARISON EXAM: None.      Internal carotid arteries: Bilateral petrous, precavernous, cavernous,   and supraclinoid regions are patent. Mild deposition of calcified plaque   at the level of the carotid siphons without significant stenosis in this   location.    [place]:  No aneurysm about the [place]. Tiny aneurysms   can be beyond the resolution of CTA technique.    Anterior Cerebral arteries: No significant stenosis or occlusion.  Middle Cerebral arteries: No significant stenosis or occlusion.    Posterior Circulation: Distal intracranial bilateralvertebral arteries   are visualized. The vertebrobasilar confluence is unremarkable. There is   good flow within the basilar artery. Bilateral superior cerebellar   arteries and bilateral posterior cerebral arteries emanate from the   distal aspect of the basilar artery. There is a hypoplastic right P1   segment. A prominent right posterior communicating artery contributes to   flow within the right posterior cerebral artery.    < end of copied text >     (11 Nov 2023 21:59)      hypokalemia   potassium chloride  10 mEq/100 mL IVPB 10 milliEquivalent(s) IV Intermittent every 1 hour    BP monitoring,continue current antihypertensive meds, low salt diet,followup with PMD in 1-2 weeks  enalapril 10 milliGRAM(s) Oral daily    Admit for septic workup and ID evaluation,send blood and urine cx,serial lactate levels,monitor vitals closley,ivfs hydration,monitor urine output and renal profile,iv abx as per id cons  ertapenem  IVPB 500 milliGRAM(s) IV Intermittent every 24 hours    hypernatremia   will check ua , urine osmolality , urine sodium , urine uric acid , serum sodium , serum osmolality , serum uric acid , f/u with hypernatremia work up , f/u with bmp , monitor i and o     HPI:  89-year-old female with history of hypertension, hyperlipidemia, A-fib with ablation on Eliquis, MR, bladder tumor, PFO, osteoporosis, spinal stenosis brought in by ambulance for possible stroke.  As per facility patient ate breakfast and had her medication between 9:45AM and 10 AM this morning and was at her baseline mental status.  Aide noticed around 11/1130 patient was leaning towards her left side, had water poured out of her mouth when she attempted to drink and had possible slurred speech.  Patient limited historian due to MR.  As per facility patient was recently in a short-term rehab facility, recently returned back to [place] however patient has been weaker since then.< from: CT Brain Perfusion Maps Stroke (11.11.23 @ 14:12) >    < from: CT Brain Perfusion Maps Stroke (11.11.23 @ 14:12) >    ACC: [id number] EXAM:  CT BRAIN PERFUSION MAPS STROKE   ORDERED BY:   JORGE FELDER     ACC: [id number] EXAM:  CT ANGIO BRAIN STROKE PROTC IC   ORDERED BY:   JORGE FELDER     ACC: [id number] EXAM:  CT BRAIN STROKE PROTOCOL   ORDERED BY: JORGE FELDER     ACC: [id number] EXAM:  CT ANGIO NECK STROKE PROTCL IC   ORDERED BY:   JORGE FELDER     PROCEDURE DATE:  11/11/2023          INTERPRETATION:  CT HEAD STROKE PROTOCOL, CT ANGIO NECK STROKE PROTOCOL,   CT ANGIO HEAD STROKE PROTOCOL, CT PERFUSION WITH MAPS STROKE PROTOCOL    CT BRAIN WITHOUT CONTRAST    INDICATIONS:  Stroke code Leaning to left side. Slight left arm drift.    TECHNIQUE:  Serial axial images were obtained from the skull base to the   vertex without the use of contrast.    COMPARISON EXAM: 7/11/2023    FINDINGS:  Ventricles and sulci: Parenchymal volume loss is present which is   commensurate with patient age.  Intra-axial: Periventricular small vessel white matter ischemic changes.   No intracranial mass, acute hemorrhage, or midline shift is present.  Extra-axial: No extra-axial fluid collection is identified. Deposition of   calcified plaque at the level of the bilateral carotid siphons.  Calvarium: Intact.    Left optic lens replacement, as on prior. Bilateral optic globes are   intact. Imaged paranasal sinuses, bilateral mastoid air cells, middle ear   cavities are clear.        CT PERFUSION  CTA OF THE NECK AND HEAD:      CONTRAST/COMPLICATIONS:  IV Contrast: NONE (accession [id number]), IV contrast documented in   unlinked concurrent exam (accession [id number])  Complications: None reported at time of study completion      TECHNIQUE PERFUSION:  After the intravenous power injection of non-ionic contrast material,   repeat serial thin sections were obtained through the intracranial   circulation on a multislice CT scanner. Artificial intelligence was then   used for analysis of perfusion and intracranial large vessel occlusion.      TECHNIQUE CTA NECK AND HEAD:  After the intravenous power injection of non-ionic contrast material,   serial thin sections were obtained through the cervical and intracranial   circulation on a multislice CT scanner. 2D and 3D MIP reformatted images   were obtained.  Images were reformatted using a dedicated 3D software   package.    FINDINGS:    CT PERFUSION:    COMPARISON EXAMINATION: None.    Technical limitations: Significant patient motion in all 3 planes during   image acquisition likely renders results suboptimal and nondiagnostic.   Arterial and venous waveforms are also not optimized.    Miscellaneous: None.      CTA NECK WITH CONTRAST:    CAROTID STENOSIS REFERENCE: (NASCET = 100x1-(N/D)). N=greatest narrowing.   D=normal distal diameter - MILD = <50% stenosis. - MODERATE = 50-69%   stenosis.- SEVERE = 70-89% stenosis. - HAIRLINE/CRITICAL = 90-99%   stenosis. - OCCLUDED = 100% stenosis.      COMPARISON: None.    FINDINGS:  Aortic arch and visualized great vessels: Within normal limits.    RIGHT:  Common carotid artery: Follows a tortuouscourse and is normal in caliber   to the carotid bifurcation.  Internal carotid artery: Mild to moderate deposition of calcified plaque   at the level of the right common carotid artery bifurcation with   extension into the proximal aspect of the right internal carotid artery,   without significant stenosis based on NASCET criteria. Normal course and   caliber to the intracranial circulation.    Vertebral artery: Emanates from the right subclavian artery. The right   vertebral artery is normal incourse and caliber to the intracranial   circulation.      LEFT:  Common carotid artery :Normal in course and caliber to the carotid   bifurcation.  Internal carotid artery: Mild to moderate deposition of calcified plaque   at the level of the left common carotid artery bifurcation with extension   into the proximal left internal carotid artery without significant   stenosis based on NASCET criteria. Normal course and caliber to the   intracranial circulation.    Vertebral artery: Emanates from the left subclavian artery. The left   vertebral artery is normal in course and caliber to the intracranial   circulation.      Miscellaneous: None    CTA HEAD WITH CONTRAST:    COMPARISON EXAM: None.      Internal carotid arteries: Bilateral petrous, precavernous, cavernous,   and supraclinoid regions are patent. Mild deposition of calcified plaque   at the level of the carotid siphons without significant stenosis in this   location.    [place]:  No aneurysm about the [place] of [place]. Tiny aneurysms   can be beyond the resolution of CTA technique.    Anterior Cerebral arteries: No significant stenosis or occlusion.  Middle Cerebral arteries: No significant stenosis or occlusion.    Posterior Circulation: Distal intracranial bilateralvertebral arteries   are visualized. The vertebrobasilar confluence is unremarkable. There is   good flow within the basilar artery. Bilateral superior cerebellar   arteries and bilateral posterior cerebral arteries emanate from the   distal aspect of the basilar artery. There is a hypoplastic right P1   segment. A prominent right posterior communicating artery contributes to   flow within the right posterior cerebral artery.    < end of copied text >     (11 Nov 2023 21:59)      hypokalemia   potassium chloride  10 mEq/100 mL IVPB 10 milliEquivalent(s) IV Intermittent every 1 hour    BP monitoring,continue current antihypertensive meds, low salt diet,followup with PMD in 1-2 weeks  enalapril 10 milliGRAM(s) Oral daily    Admit for septic workup and ID evaluation,send blood and urine cx,serial lactate levels,monitor vitals closley,ivfs hydration,monitor urine output and renal profile,iv abx as per id cons  ertapenem  IVPB 500 milliGRAM(s) IV Intermittent every 24 hours    hypernatremia   will check ua , urine osmolality , urine sodium , urine uric acid , serum sodium , serum osmolality , serum uric acid , f/u with hypernatremia work up , f/u with bmp , monitor i and o

## 2023-11-12 NOTE — PROVIDER CONTACT NOTE (OTHER) - ACTION/TREATMENT ORDERED:
will change the order from NPO to NPO except medications. Notify  if patient able to take the medications.

## 2023-11-12 NOTE — PROGRESS NOTE ADULT - SUBJECTIVE AND OBJECTIVE BOX
Interval History:    CENTRAL LINE:   [  ] YES       [  ] NO  SKELTON:                 [  ] YES       [  ] NO         REVIEW OF SYSTEMS:  All Systems below were reviewed and are negative [  ]  HEENT:  ID:  Pulmonary:  Cardiac:  GI:  Renal:  Musculoskeletal:  All other systems above were reviewed and are negative   [  ]      MEDICATIONS  (STANDING):  aMIOdarone    Tablet 200 milliGRAM(s) Oral daily  apixaban 2.5 milliGRAM(s) Oral every 12 hours  artificial  tears Solution 1 Drop(s) Both EYES four times a day  ascorbic acid 500 milliGRAM(s) Oral daily  dextrose 5% + sodium chloride 0.45% with potassium chloride 20 mEq/L 1000 milliLiter(s) (50 mL/Hr) IV Continuous <Continuous>  enalapril 10 milliGRAM(s) Oral daily  ertapenem  IVPB 500 milliGRAM(s) IV Intermittent every 24 hours  ferrous    sulfate 325 milliGRAM(s) Oral three times a day  levothyroxine 75 MICROGram(s) Oral daily  metoprolol tartrate 50 milliGRAM(s) Oral two times a day  multivitamin 1 Tablet(s) Oral daily  pantoprazole    Tablet 40 milliGRAM(s) Oral before breakfast  polyethylene glycol 3350 17 Gram(s) Oral daily    MEDICATIONS  (PRN):      Vital Signs Last 24 Hrs  T(C): 36.9 (12 Nov 2023 11:50), Max: 36.9 (12 Nov 2023 11:50)  T(F): 98.4 (12 Nov 2023 11:50), Max: 98.4 (12 Nov 2023 11:50)  HR: 63 (12 Nov 2023 11:50) (63 - 71)  BP: 128/72 (12 Nov 2023 13:12) (92/53 - 128/72)  BP(mean): --  RR: 18 (12 Nov 2023 11:50) (16 - 18)  SpO2: 90% (12 Nov 2023 11:50) (90% - 96%)    Parameters below as of 12 Nov 2023 11:50  Patient On (Oxygen Delivery Method): room air        I&O's Summary    12 Nov 2023 07:01  -  12 Nov 2023 19:45  --------------------------------------------------------  IN: 450 mL / OUT: 0 mL / NET: 450 mL        PHYSICAL EXAM:  HEENT: NC/AT; PERRLA  Neck: Soft; no tenderness  Lungs: CTA bilaterally; no wheezing.   Heart:  Abdomen:  Genital/ Rectal:  Extremities:  Neurologic:  Vascular:      LABORATORY:    CBC Full  -  ( 12 Nov 2023 06:37 )  WBC Count : 11.36 K/uL  RBC Count : 2.81 M/uL  Hemoglobin : 7.9 g/dL  Hematocrit : 24.0 %  Platelet Count - Automated : 275 K/uL  Mean Cell Volume : 85.4 fl  Mean Cell Hemoglobin : 28.1 pg  Mean Cell Hemoglobin Concentration : 32.9 gm/dL  Auto Neutrophil # : x  Auto Lymphocyte # : x  Auto Monocyte # : x  Auto Eosinophil # : x  Auto Basophil # : x  Auto Neutrophil % : x  Auto Lymphocyte % : x  Auto Monocyte % : x  Auto Eosinophil % : x  Auto Basophil % : x          11-12    147<H>  |  117<H>  |  29<H>  ----------------------------<  82  3.1<L>   |  23  |  0.69    Ca    7.7<L>      12 Nov 2023 06:37  Mg     1.8     11-12    TPro  4.9<L>  /  Alb  1.8<L>  /  TBili  0.5  /  DBili  0.2  /  AST  26  /  ALT  32  /  AlkPhos  101  11-12          Assessment and Plan:          Edwardo Gonzalez MD   [phone].      Interval History:    CENTRAL LINE:   [  ] YES       [  ] NO  SKELTON:                 [  ] YES       [  ] NO         REVIEW OF SYSTEMS:  All Systems below were reviewed and are negative [  ]  HEENT:  ID:  Pulmonary:  Cardiac:  GI:  Renal:  Musculoskeletal:  All other systems above were reviewed and are negative   [  ]      MEDICATIONS  (STANDING):  aMIOdarone    Tablet 200 milliGRAM(s) Oral daily  apixaban 2.5 milliGRAM(s) Oral every 12 hours  artificial  tears Solution 1 Drop(s) Both EYES four times a day  ascorbic acid 500 milliGRAM(s) Oral daily  dextrose 5% + sodium chloride 0.45% with potassium chloride 20 mEq/L 1000 milliLiter(s) (50 mL/Hr) IV Continuous <Continuous>  enalapril 10 milliGRAM(s) Oral daily  ertapenem  IVPB 500 milliGRAM(s) IV Intermittent every 24 hours  ferrous    sulfate 325 milliGRAM(s) Oral three times a day  levothyroxine 75 MICROGram(s) Oral daily  metoprolol tartrate 50 milliGRAM(s) Oral two times a day  multivitamin 1 Tablet(s) Oral daily  pantoprazole    Tablet 40 milliGRAM(s) Oral before breakfast  polyethylene glycol 3350 17 Gram(s) Oral daily    MEDICATIONS  (PRN):      Vital Signs Last 24 Hrs  T(C): 36.9 (12 Nov 2023 11:50), Max: 36.9 (12 Nov 2023 11:50)  T(F): 98.4 (12 Nov 2023 11:50), Max: 98.4 (12 Nov 2023 11:50)  HR: 63 (12 Nov 2023 11:50) (63 - 71)  BP: 128/72 (12 Nov 2023 13:12) (92/53 - 128/72)  BP(mean): --  RR: 18 (12 Nov 2023 11:50) (16 - 18)  SpO2: 90% (12 Nov 2023 11:50) (90% - 96%)    Parameters below as of 12 Nov 2023 11:50  Patient On (Oxygen Delivery Method): room air        I&O's Summary    12 Nov 2023 07:01  -  12 Nov 2023 19:45  --------------------------------------------------------  IN: 450 mL / OUT: 0 mL / NET: 450 mL        PHYSICAL EXAM:  HEENT: NC/AT; PERRLA  Neck: Soft; no tenderness  Lungs: CTA bilaterally; no wheezing.   Heart:  Abdomen:  Genital/ Rectal:  Extremities:  Neurologic:  Vascular:      LABORATORY:    CBC Full  -  ( 12 Nov 2023 06:37 )  WBC Count : 11.36 K/uL  RBC Count : 2.81 M/uL  Hemoglobin : 7.9 g/dL  Hematocrit : 24.0 %  Platelet Count - Automated : 275 K/uL  Mean Cell Volume : 85.4 fl  Mean Cell Hemoglobin : 28.1 pg  Mean Cell Hemoglobin Concentration : 32.9 gm/dL  Auto Neutrophil # : x  Auto Lymphocyte # : x  Auto Monocyte # : x  Auto Eosinophil # : x  Auto Basophil # : x  Auto Neutrophil % : x  Auto Lymphocyte % : x  Auto Monocyte % : x  Auto Eosinophil % : x  Auto Basophil % : x          11-12    147<H>  |  117<H>  |  29<H>  ----------------------------<  82  3.1<L>   |  23  |  0.69    Ca    7.7<L>      12 Nov 2023 06:37  Mg     1.8     11-12    TPro  4.9<L>  /  Alb  1.8<L>  /  TBili  0.5  /  DBili  0.2  /  AST  26  /  ALT  32  /  AlkPhos  101  11-12          Assessment and Plan:          Edwardo Gonzalez MD   [phone].      Interval History:    CENTRAL LINE:   [  ] YES       [  ] NO  SKELTON:                 [  ] YES       [  ] NO         REVIEW OF SYSTEMS:  All Systems below were reviewed and are negative [  ]  HEENT:  ID:  Pulmonary:  Cardiac:  GI:  Renal:  Musculoskeletal:  All other systems above were reviewed and are negative   [  ]      MEDICATIONS  (STANDING):  aMIOdarone    Tablet 200 milliGRAM(s) Oral daily  apixaban 2.5 milliGRAM(s) Oral every 12 hours  artificial  tears Solution 1 Drop(s) Both EYES four times a day  ascorbic acid 500 milliGRAM(s) Oral daily  dextrose 5% + sodium chloride 0.45% with potassium chloride 20 mEq/L 1000 milliLiter(s) (50 mL/Hr) IV Continuous <Continuous>  enalapril 10 milliGRAM(s) Oral daily  ertapenem  IVPB 500 milliGRAM(s) IV Intermittent every 24 hours  ferrous    sulfate 325 milliGRAM(s) Oral three times a day  levothyroxine 75 MICROGram(s) Oral daily  metoprolol tartrate 50 milliGRAM(s) Oral two times a day  multivitamin 1 Tablet(s) Oral daily  pantoprazole    Tablet 40 milliGRAM(s) Oral before breakfast  polyethylene glycol 3350 17 Gram(s) Oral daily    MEDICATIONS  (PRN):      Vital Signs Last 24 Hrs  T(C): 36.9 (12 Nov 2023 11:50), Max: 36.9 (12 Nov 2023 11:50)  T(F): 98.4 (12 Nov 2023 11:50), Max: 98.4 (12 Nov 2023 11:50)  HR: 63 (12 Nov 2023 11:50) (63 - 71)  BP: 128/72 (12 Nov 2023 13:12) (92/53 - 128/72)  BP(mean): --  RR: 18 (12 Nov 2023 11:50) (16 - 18)  SpO2: 90% (12 Nov 2023 11:50) (90% - 96%)    Parameters below as of 12 Nov 2023 11:50  Patient On (Oxygen Delivery Method): room air        I&O's Summary    12 Nov 2023 07:01  -  12 Nov 2023 19:45  --------------------------------------------------------  IN: 450 mL / OUT: 0 mL / NET: 450 mL        PHYSICAL EXAM:  HEENT: NC/AT; PERRLA  Neck: Soft; no tenderness  Lungs: CTA bilaterally; no wheezing.   Heart:  Abdomen:  Genital/ Rectal:  Extremities:  Neurologic:  Vascular:      LABORATORY:    CBC Full  -  ( 12 Nov 2023 06:37 )  WBC Count : 11.36 K/uL  RBC Count : 2.81 M/uL  Hemoglobin : 7.9 g/dL  Hematocrit : 24.0 %  Platelet Count - Automated : 275 K/uL  Mean Cell Volume : 85.4 fl  Mean Cell Hemoglobin : 28.1 pg  Mean Cell Hemoglobin Concentration : 32.9 gm/dL  Auto Neutrophil # : x  Auto Lymphocyte # : x  Auto Monocyte # : x  Auto Eosinophil # : x  Auto Basophil # : x  Auto Neutrophil % : x  Auto Lymphocyte % : x  Auto Monocyte % : x  Auto Eosinophil % : x  Auto Basophil % : x          11-12    147<H>  |  117<H>  |  29<H>  ----------------------------<  82  3.1<L>   |  23  |  0.69    Ca    7.7<L>      12 Nov 2023 06:37  Mg     1.8     11-12    TPro  4.9<L>  /  Alb  1.8<L>  /  TBili  0.5  /  DBili  0.2  /  AST  26  /  ALT  32  /  AlkPhos  101  11-12          Assessment and Plan:          Edwardo Gonzalez MD   [phone].  She is comfortable  No fevers      MEDICATIONS  (STANDING):  aMIOdarone    Tablet 200 milliGRAM(s) Oral daily  apixaban 2.5 milliGRAM(s) Oral every 12 hours  artificial  tears Solution 1 Drop(s) Both EYES four times a day  ascorbic acid 500 milliGRAM(s) Oral daily  dextrose 5% + sodium chloride 0.45% with potassium chloride 20 mEq/L 1000 milliLiter(s) (50 mL/Hr) IV Continuous <Continuous>  enalapril 10 milliGRAM(s) Oral daily  ertapenem  IVPB 500 milliGRAM(s) IV Intermittent every 24 hours  ferrous    sulfate 325 milliGRAM(s) Oral three times a day  levothyroxine 75 MICROGram(s) Oral daily  metoprolol tartrate 50 milliGRAM(s) Oral two times a day  multivitamin 1 Tablet(s) Oral daily  pantoprazole    Tablet 40 milliGRAM(s) Oral before breakfast  polyethylene glycol 3350 17 Gram(s) Oral daily    MEDICATIONS  (PRN):      Vital Signs Last 24 Hrs  T(C): 36.9 (12 Nov 2023 11:50), Max: 36.9 (12 Nov 2023 11:50)  T(F): 98.4 (12 Nov 2023 11:50), Max: 98.4 (12 Nov 2023 11:50)  HR: 63 (12 Nov 2023 11:50) (63 - 71)  BP: 128/72 (12 Nov 2023 13:12) (92/53 - 128/72)  BP(mean): --  RR: 18 (12 Nov 2023 11:50) (16 - 18)  SpO2: 90% (12 Nov 2023 11:50) (90% - 96%)    Parameters below as of 12 Nov 2023 11:50  Patient On (Oxygen Delivery Method): room air        I&O's Summary    12 Nov 2023 07:01  -  12 Nov 2023 19:45  --------------------------------------------------------  IN: 450 mL / OUT: 0 mL / NET: 450 mL        PHYSICAL EXAM:  HEENT: NC/AT; PERRLA  Neck: Soft; no tenderness  Lungs: CTA bilaterally; no wheezing.   Heart:  Abdomen:  Genital/ Rectal:  Extremities:  Neurologic:  Vascular:      LABORATORY:    CBC Full  -  ( 12 Nov 2023 06:37 )  WBC Count : 11.36 K/uL  RBC Count : 2.81 M/uL  Hemoglobin : 7.9 g/dL  Hematocrit : 24.0 %  Platelet Count - Automated : 275 K/uL  Mean Cell Volume : 85.4 fl  Mean Cell Hemoglobin : 28.1 pg  Mean Cell Hemoglobin Concentration : 32.9 gm/dL  Auto Neutrophil # : x  Auto Lymphocyte # : x  Auto Monocyte # : x  Auto Eosinophil # : x  Auto Basophil # : x  Auto Neutrophil % : x  Auto Lymphocyte % : x  Auto Monocyte % : x  Auto Eosinophil % : x  Auto Basophil % : x          11-12    147<H>  |  117<H>  |  29<H>  ----------------------------<  82  3.1<L>   |  23  |  0.69    Ca    7.7<L>      12 Nov 2023 06:37  Mg     1.8     11-12    TPro  4.9<L>  /  Alb  1.8<L>  /  TBili  0.5  /  DBili  0.2  /  AST  26  /  ALT  32  /  AlkPhos  101  11-12          Assessment and Plan:    1. UTI.  2. Toxic metabolic encephalopathy.  3. Generalized weakness.     . UA was suggestive of UTI. The patient just had UTI with  ESBL E coli bacteremia last month.  . Add IV Invanz for presumptive recurrent UTI with ESBL  . Monitor mental status.  . Follow all cultures.      Edwardo Gonzalez MD   [phone].  She is comfortable  No fevers      MEDICATIONS  (STANDING):  aMIOdarone    Tablet 200 milliGRAM(s) Oral daily  apixaban 2.5 milliGRAM(s) Oral every 12 hours  artificial  tears Solution 1 Drop(s) Both EYES four times a day  ascorbic acid 500 milliGRAM(s) Oral daily  dextrose 5% + sodium chloride 0.45% with potassium chloride 20 mEq/L 1000 milliLiter(s) (50 mL/Hr) IV Continuous <Continuous>  enalapril 10 milliGRAM(s) Oral daily  ertapenem  IVPB 500 milliGRAM(s) IV Intermittent every 24 hours  ferrous    sulfate 325 milliGRAM(s) Oral three times a day  levothyroxine 75 MICROGram(s) Oral daily  metoprolol tartrate 50 milliGRAM(s) Oral two times a day  multivitamin 1 Tablet(s) Oral daily  pantoprazole    Tablet 40 milliGRAM(s) Oral before breakfast  polyethylene glycol 3350 17 Gram(s) Oral daily    MEDICATIONS  (PRN):      Vital Signs Last 24 Hrs  T(C): 36.9 (12 Nov 2023 11:50), Max: 36.9 (12 Nov 2023 11:50)  T(F): 98.4 (12 Nov 2023 11:50), Max: 98.4 (12 Nov 2023 11:50)  HR: 63 (12 Nov 2023 11:50) (63 - 71)  BP: 128/72 (12 Nov 2023 13:12) (92/53 - 128/72)  BP(mean): --  RR: 18 (12 Nov 2023 11:50) (16 - 18)  SpO2: 90% (12 Nov 2023 11:50) (90% - 96%)    Parameters below as of 12 Nov 2023 11:50  Patient On (Oxygen Delivery Method): room air        I&O's Summary    12 Nov 2023 07:01  -  12 Nov 2023 19:45  --------------------------------------------------------  IN: 450 mL / OUT: 0 mL / NET: 450 mL        PHYSICAL EXAM:  HEENT: NC/AT; PERRLA  Neck: Soft; no tenderness  Lungs: CTA bilaterally; no wheezing.   Heart:  Abdomen:  Genital/ Rectal:  Extremities:  Neurologic:  Vascular:      LABORATORY:    CBC Full  -  ( 12 Nov 2023 06:37 )  WBC Count : 11.36 K/uL  RBC Count : 2.81 M/uL  Hemoglobin : 7.9 g/dL  Hematocrit : 24.0 %  Platelet Count - Automated : 275 K/uL  Mean Cell Volume : 85.4 fl  Mean Cell Hemoglobin : 28.1 pg  Mean Cell Hemoglobin Concentration : 32.9 gm/dL  Auto Neutrophil # : x  Auto Lymphocyte # : x  Auto Monocyte # : x  Auto Eosinophil # : x  Auto Basophil # : x  Auto Neutrophil % : x  Auto Lymphocyte % : x  Auto Monocyte % : x  Auto Eosinophil % : x  Auto Basophil % : x          11-12    147<H>  |  117<H>  |  29<H>  ----------------------------<  82  3.1<L>   |  23  |  0.69    Ca    7.7<L>      12 Nov 2023 06:37  Mg     1.8     11-12    TPro  4.9<L>  /  Alb  1.8<L>  /  TBili  0.5  /  DBili  0.2  /  AST  26  /  ALT  32  /  AlkPhos  101  11-12          Assessment and Plan:    1. UTI.  2. Toxic metabolic encephalopathy.  3. Generalized weakness.     . UA was suggestive of UTI. The patient just had UTI with  ESBL E coli bacteremia last month.  . Add IV Invanz for presumptive recurrent UTI with ESBL  . Monitor mental status.  . Follow all cultures.      Edwardo Gonzalez MD   [phone].  She is comfortable  No fevers      MEDICATIONS  (STANDING):  aMIOdarone    Tablet 200 milliGRAM(s) Oral daily  apixaban 2.5 milliGRAM(s) Oral every 12 hours  artificial  tears Solution 1 Drop(s) Both EYES four times a day  ascorbic acid 500 milliGRAM(s) Oral daily  dextrose 5% + sodium chloride 0.45% with potassium chloride 20 mEq/L 1000 milliLiter(s) (50 mL/Hr) IV Continuous <Continuous>  enalapril 10 milliGRAM(s) Oral daily  ertapenem  IVPB 500 milliGRAM(s) IV Intermittent every 24 hours  ferrous    sulfate 325 milliGRAM(s) Oral three times a day  levothyroxine 75 MICROGram(s) Oral daily  metoprolol tartrate 50 milliGRAM(s) Oral two times a day  multivitamin 1 Tablet(s) Oral daily  pantoprazole    Tablet 40 milliGRAM(s) Oral before breakfast  polyethylene glycol 3350 17 Gram(s) Oral daily    MEDICATIONS  (PRN):      Vital Signs Last 24 Hrs  T(C): 36.9 (12 Nov 2023 11:50), Max: 36.9 (12 Nov 2023 11:50)  T(F): 98.4 (12 Nov 2023 11:50), Max: 98.4 (12 Nov 2023 11:50)  HR: 63 (12 Nov 2023 11:50) (63 - 71)  BP: 128/72 (12 Nov 2023 13:12) (92/53 - 128/72)  BP(mean): --  RR: 18 (12 Nov 2023 11:50) (16 - 18)  SpO2: 90% (12 Nov 2023 11:50) (90% - 96%)    Parameters below as of 12 Nov 2023 11:50  Patient On (Oxygen Delivery Method): room air        I&O's Summary    12 Nov 2023 07:01  -  12 Nov 2023 19:45  --------------------------------------------------------  IN: 450 mL / OUT: 0 mL / NET: 450 mL        PHYSICAL EXAM:  HEENT: NC/AT; PERRLA  Neck: Soft; no tenderness  Lungs: CTA bilaterally; no wheezing.   Heart:  Abdomen:  Genital/ Rectal:  Extremities:  Neurologic:  Vascular:      LABORATORY:    CBC Full  -  ( 12 Nov 2023 06:37 )  WBC Count : 11.36 K/uL  RBC Count : 2.81 M/uL  Hemoglobin : 7.9 g/dL  Hematocrit : 24.0 %  Platelet Count - Automated : 275 K/uL  Mean Cell Volume : 85.4 fl  Mean Cell Hemoglobin : 28.1 pg  Mean Cell Hemoglobin Concentration : 32.9 gm/dL  Auto Neutrophil # : x  Auto Lymphocyte # : x  Auto Monocyte # : x  Auto Eosinophil # : x  Auto Basophil # : x  Auto Neutrophil % : x  Auto Lymphocyte % : x  Auto Monocyte % : x  Auto Eosinophil % : x  Auto Basophil % : x          11-12    147<H>  |  117<H>  |  29<H>  ----------------------------<  82  3.1<L>   |  23  |  0.69    Ca    7.7<L>      12 Nov 2023 06:37  Mg     1.8     11-12    TPro  4.9<L>  /  Alb  1.8<L>  /  TBili  0.5  /  DBili  0.2  /  AST  26  /  ALT  32  /  AlkPhos  101  11-12          Assessment and Plan:    1. UTI.  2. Toxic metabolic encephalopathy.  3. Generalized weakness.     . UA was suggestive of UTI. The patient just had UTI with  ESBL E coli bacteremia last month.  . Add IV Invanz for presumptive recurrent UTI with ESBL  . Monitor mental status.  . Follow all cultures.      Edwardo Gonzalez MD   [phone].  She is comfortable  No fevers      MEDICATIONS  (STANDING):  aMIOdarone    Tablet 200 milliGRAM(s) Oral daily  apixaban 2.5 milliGRAM(s) Oral every 12 hours  artificial  tears Solution 1 Drop(s) Both EYES four times a day  ascorbic acid 500 milliGRAM(s) Oral daily  dextrose 5% + sodium chloride 0.45% with potassium chloride 20 mEq/L 1000 milliLiter(s) (50 mL/Hr) IV Continuous <Continuous>  enalapril 10 milliGRAM(s) Oral daily  ertapenem  IVPB 500 milliGRAM(s) IV Intermittent every 24 hours  ferrous    sulfate 325 milliGRAM(s) Oral three times a day  levothyroxine 75 MICROGram(s) Oral daily  metoprolol tartrate 50 milliGRAM(s) Oral two times a day  multivitamin 1 Tablet(s) Oral daily  pantoprazole    Tablet 40 milliGRAM(s) Oral before breakfast  polyethylene glycol 3350 17 Gram(s) Oral daily    MEDICATIONS  (PRN):      Vital Signs Last 24 Hrs  T(C): 36.9 (12 Nov 2023 11:50), Max: 36.9 (12 Nov 2023 11:50)  T(F): 98.4 (12 Nov 2023 11:50), Max: 98.4 (12 Nov 2023 11:50)  HR: 63 (12 Nov 2023 11:50) (63 - 71)  BP: 128/72 (12 Nov 2023 13:12) (92/53 - 128/72)  BP(mean): --  RR: 18 (12 Nov 2023 11:50) (16 - 18)  SpO2: 90% (12 Nov 2023 11:50) (90% - 96%)    Parameters below as of 12 Nov 2023 11:50  Patient On (Oxygen Delivery Method): room air        I&O's Summary    12 Nov 2023 07:01  -  12 Nov 2023 19:45  --------------------------------------------------------  IN: 450 mL / OUT: 0 mL / NET: 450 mL        PHYSICAL EXAM:  HEENT: NC/AT; PERRLA  Neck: Soft; no tenderness  Lungs: CTA bilaterally; no wheezing.   Heart: RRR, no murmurs  Abdomen: Soft, no tenderness  Genital/ Rectal: No khan catheter.   Extremities: No ulcers.   Neurologic: Confused.     LABORATORY:    CBC Full  -  ( 12 Nov 2023 06:37 )  WBC Count : 11.36 K/uL  RBC Count : 2.81 M/uL  Hemoglobin : 7.9 g/dL  Hematocrit : 24.0 %  Platelet Count - Automated : 275 K/uL  Mean Cell Volume : 85.4 fl  Mean Cell Hemoglobin : 28.1 pg  Mean Cell Hemoglobin Concentration : 32.9 gm/dL  Auto Neutrophil # : x  Auto Lymphocyte # : x  Auto Monocyte # : x  Auto Eosinophil # : x  Auto Basophil # : x  Auto Neutrophil % : x  Auto Lymphocyte % : x  Auto Monocyte % : x  Auto Eosinophil % : x  Auto Basophil % : x          11-12    147<H>  |  117<H>  |  29<H>  ----------------------------<  82  3.1<L>   |  23  |  0.69    Ca    7.7<L>      12 Nov 2023 06:37  Mg     1.8     11-12    TPro  4.9<L>  /  Alb  1.8<L>  /  TBili  0.5  /  DBili  0.2  /  AST  26  /  ALT  32  /  AlkPhos  101  11-12    Assessment and Plan:    1. UTI.  2. Toxic metabolic encephalopathy.  3. Generalized weakness.     . UA was suggestive of UTI. The patient just had UTI with  ESBL E coli bacteremia last month.  . Continue IV Invanz for presumptive recurrent UTI with ESBL. Follow cultures.   . Monitor mental status.      Edwardo Gonzalez MD   [phone].  She is comfortable  No fevers      MEDICATIONS  (STANDING):  aMIOdarone    Tablet 200 milliGRAM(s) Oral daily  apixaban 2.5 milliGRAM(s) Oral every 12 hours  artificial  tears Solution 1 Drop(s) Both EYES four times a day  ascorbic acid 500 milliGRAM(s) Oral daily  dextrose 5% + sodium chloride 0.45% with potassium chloride 20 mEq/L 1000 milliLiter(s) (50 mL/Hr) IV Continuous <Continuous>  enalapril 10 milliGRAM(s) Oral daily  ertapenem  IVPB 500 milliGRAM(s) IV Intermittent every 24 hours  ferrous    sulfate 325 milliGRAM(s) Oral three times a day  levothyroxine 75 MICROGram(s) Oral daily  metoprolol tartrate 50 milliGRAM(s) Oral two times a day  multivitamin 1 Tablet(s) Oral daily  pantoprazole    Tablet 40 milliGRAM(s) Oral before breakfast  polyethylene glycol 3350 17 Gram(s) Oral daily    MEDICATIONS  (PRN):      Vital Signs Last 24 Hrs  T(C): 36.9 (12 Nov 2023 11:50), Max: 36.9 (12 Nov 2023 11:50)  T(F): 98.4 (12 Nov 2023 11:50), Max: 98.4 (12 Nov 2023 11:50)  HR: 63 (12 Nov 2023 11:50) (63 - 71)  BP: 128/72 (12 Nov 2023 13:12) (92/53 - 128/72)  BP(mean): --  RR: 18 (12 Nov 2023 11:50) (16 - 18)  SpO2: 90% (12 Nov 2023 11:50) (90% - 96%)    Parameters below as of 12 Nov 2023 11:50  Patient On (Oxygen Delivery Method): room air        I&O's Summary    12 Nov 2023 07:01  -  12 Nov 2023 19:45  --------------------------------------------------------  IN: 450 mL / OUT: 0 mL / NET: 450 mL        PHYSICAL EXAM:  HEENT: NC/AT; PERRLA  Neck: Soft; no tenderness  Lungs: CTA bilaterally; no wheezing.   Heart: RRR, no murmurs  Abdomen: Soft, no tenderness  Genital/ Rectal: No khan catheter.   Extremities: No ulcers.   Neurologic: Confused.     LABORATORY:    CBC Full  -  ( 12 Nov 2023 06:37 )  WBC Count : 11.36 K/uL  RBC Count : 2.81 M/uL  Hemoglobin : 7.9 g/dL  Hematocrit : 24.0 %  Platelet Count - Automated : 275 K/uL  Mean Cell Volume : 85.4 fl  Mean Cell Hemoglobin : 28.1 pg  Mean Cell Hemoglobin Concentration : 32.9 gm/dL  Auto Neutrophil # : x  Auto Lymphocyte # : x  Auto Monocyte # : x  Auto Eosinophil # : x  Auto Basophil # : x  Auto Neutrophil % : x  Auto Lymphocyte % : x  Auto Monocyte % : x  Auto Eosinophil % : x  Auto Basophil % : x          11-12    147<H>  |  117<H>  |  29<H>  ----------------------------<  82  3.1<L>   |  23  |  0.69    Ca    7.7<L>      12 Nov 2023 06:37  Mg     1.8     11-12    TPro  4.9<L>  /  Alb  1.8<L>  /  TBili  0.5  /  DBili  0.2  /  AST  26  /  ALT  32  /  AlkPhos  101  11-12    Assessment and Plan:    1. UTI.  2. Toxic metabolic encephalopathy.  3. Generalized weakness.     . UA was suggestive of UTI. The patient just had UTI with  ESBL E coli bacteremia last month.  . Continue IV Invanz for presumptive recurrent UTI with ESBL. Follow cultures.   . Monitor mental status.      Edwardo Gonzalez MD   [phone].  She is comfortable  No fevers      MEDICATIONS  (STANDING):  aMIOdarone    Tablet 200 milliGRAM(s) Oral daily  apixaban 2.5 milliGRAM(s) Oral every 12 hours  artificial  tears Solution 1 Drop(s) Both EYES four times a day  ascorbic acid 500 milliGRAM(s) Oral daily  dextrose 5% + sodium chloride 0.45% with potassium chloride 20 mEq/L 1000 milliLiter(s) (50 mL/Hr) IV Continuous <Continuous>  enalapril 10 milliGRAM(s) Oral daily  ertapenem  IVPB 500 milliGRAM(s) IV Intermittent every 24 hours  ferrous    sulfate 325 milliGRAM(s) Oral three times a day  levothyroxine 75 MICROGram(s) Oral daily  metoprolol tartrate 50 milliGRAM(s) Oral two times a day  multivitamin 1 Tablet(s) Oral daily  pantoprazole    Tablet 40 milliGRAM(s) Oral before breakfast  polyethylene glycol 3350 17 Gram(s) Oral daily    MEDICATIONS  (PRN):      Vital Signs Last 24 Hrs  T(C): 36.9 (12 Nov 2023 11:50), Max: 36.9 (12 Nov 2023 11:50)  T(F): 98.4 (12 Nov 2023 11:50), Max: 98.4 (12 Nov 2023 11:50)  HR: 63 (12 Nov 2023 11:50) (63 - 71)  BP: 128/72 (12 Nov 2023 13:12) (92/53 - 128/72)  BP(mean): --  RR: 18 (12 Nov 2023 11:50) (16 - 18)  SpO2: 90% (12 Nov 2023 11:50) (90% - 96%)    Parameters below as of 12 Nov 2023 11:50  Patient On (Oxygen Delivery Method): room air        I&O's Summary    12 Nov 2023 07:01  -  12 Nov 2023 19:45  --------------------------------------------------------  IN: 450 mL / OUT: 0 mL / NET: 450 mL        PHYSICAL EXAM:  HEENT: NC/AT; PERRLA  Neck: Soft; no tenderness  Lungs: CTA bilaterally; no wheezing.   Heart: RRR, no murmurs  Abdomen: Soft, no tenderness  Genital/ Rectal: No khan catheter.   Extremities: No ulcers.   Neurologic: Confused.     LABORATORY:    CBC Full  -  ( 12 Nov 2023 06:37 )  WBC Count : 11.36 K/uL  RBC Count : 2.81 M/uL  Hemoglobin : 7.9 g/dL  Hematocrit : 24.0 %  Platelet Count - Automated : 275 K/uL  Mean Cell Volume : 85.4 fl  Mean Cell Hemoglobin : 28.1 pg  Mean Cell Hemoglobin Concentration : 32.9 gm/dL  Auto Neutrophil # : x  Auto Lymphocyte # : x  Auto Monocyte # : x  Auto Eosinophil # : x  Auto Basophil # : x  Auto Neutrophil % : x  Auto Lymphocyte % : x  Auto Monocyte % : x  Auto Eosinophil % : x  Auto Basophil % : x          11-12    147<H>  |  117<H>  |  29<H>  ----------------------------<  82  3.1<L>   |  23  |  0.69    Ca    7.7<L>      12 Nov 2023 06:37  Mg     1.8     11-12    TPro  4.9<L>  /  Alb  1.8<L>  /  TBili  0.5  /  DBili  0.2  /  AST  26  /  ALT  32  /  AlkPhos  101  11-12    Assessment and Plan:    1. UTI.  2. Toxic metabolic encephalopathy.  3. Generalized weakness.     . UA was suggestive of UTI. The patient just had UTI with  ESBL E coli bacteremia last month.  . Continue IV Invanz for presumptive recurrent UTI with ESBL. Follow cultures.   . Monitor mental status.      Edwardo Gonzalez MD   [phone].

## 2023-11-12 NOTE — PROGRESS NOTE ADULT - ASSESSMENT
89-year-old female with history of hypertension, hyperlipidemia, A-fib with ablation on Eliquis, MR, bladder tumor, PFO, osteoporosis, spinal stenosis brought in by ambulance for possible stroke.     eval cva  spinal stenosis  OP  OA  HLD  HTN  AF  Bladder tumor  PFO    cns imaging reviewed  assist with needs  neuro follow up  HOB elev  asp prec  SLP eval  oral hygiene  curr NPO except meds  GOC -

## 2023-11-12 NOTE — CHART NOTE - NSCHARTNOTEFT_GEN_A_CORE
Called by RN that patient's diet specifies strict NPO however patient has multiple PO medications due in the morning. Patient tolerated PO medications in the ED. On bedside dysphagia screen she passed the 5mL and 20mL, however coughed with 90mL. RN to crush medications in AM. Will order official speech and swallow evaluation. Will change to NPO except medications as patient tolerated PO meds, RN to call with any changes.

## 2023-11-12 NOTE — PROVIDER CONTACT NOTE (OTHER) - SITUATION
Dysphagia screening was done. Patient pass the 5 and 20 ml but cough on the 90ml. Did not pass it. Patient has PO medications to be administered soon.

## 2023-11-12 NOTE — PROVIDER CONTACT NOTE (OTHER) - ASSESSMENT
Patient calm in bed. cough on the dysphagia screening at 90 ml. did not pass it. BP: 115/61, HR: 70, Temp: 98.2, OxSat: 91 at room air, RR: 17.

## 2023-11-12 NOTE — PROGRESS NOTE ADULT - ASSESSMENT
89-year-old female with history of hypertension, hyperlipidemia, A-fib with ablation on Eliquis, MR, bladder tumor, PFO, osteoporosis, spinal stenosis brought in by ambulance for possible stroke.  As per facility patient ate breakfast and had her medication between 9:45AM and 10 AM this morning and was at her baseline mental status.  Aide noticed around 11/1130 patient was leaning towards her left side, had water poured out of her mouth when she attempted to drink and had possible slurred speech.  Patient limited historian due to MR.  As per facility patient was recently in a short-term rehab facility, recently returned back to [facility] however patient has been weaker since then 1. UTI.  2. Toxic metabolic encephalopathy.  3. Generalized weakness.   . UA was suggestive of UTI. The patient just had UTI with  ESBL E coli bacteremia last month.  . Add IV Invanz for presumptive recurrent UTI with ESBL  . Monitor mental status.  . Follow all cultures. 89-year-old female with history of hypertension, hyperlipidemia, A-fib with ablation on Eliquis, MR, bladder tumor, PFO, osteoporosis, spinal stenosis brought in by ambulance for possible stroke.  As per facility patient ate breakfast and had her medication between 9:45AM and 10 AM this morning and was at her baseline mental status.  Aide noticed around 11/1130 patient was leaning towards her left side, had water poured out of her mouth when she attempted to drink and had possible slurred speech.  Patient limited historian due to MR.  As per facility patient was recently in a short-term rehab facility, recently returned back to [email or web address] however patient has been weaker since then 1. UTI.  2. Toxic metabolic encephalopathy.  3. Generalized weakness.   . UA was suggestive of UTI. The patient just had UTI with  ESBL E coli bacteremia last month.  . Add IV Invanz for presumptive recurrent UTI with ESBL  . Monitor mental status.  . Follow all cultures. 89-year-old female with history of hypertension, hyperlipidemia, A-fib with ablation on Eliquis, MR, bladder tumor, PFO, osteoporosis, spinal stenosis brought in by ambulance for possible stroke.  As per facility patient ate breakfast and had her medication between 9:45AM and 10 AM this morning and was at her baseline mental status.  Aide noticed around 11/1130 patient was leaning towards her left side, had water poured out of her mouth when she attempted to drink and had possible slurred speech.  Patient limited historian due to MR.  As per facility patient was recently in a short-term rehab facility, recently returned back to [email or web address] however patient has been weaker since then 1. UTI.  2. Toxic metabolic encephalopathy.  3. Generalized weakness.   . UA was suggestive of UTI. The patient just had UTI with  ESBL E coli bacteremia last month.  . Add IV Invanz for presumptive recurrent UTI with ESBL  . Monitor mental status.  . Follow all cultures.

## 2023-11-12 NOTE — PROGRESS NOTE ADULT - SUBJECTIVE AND OBJECTIVE BOX
Patient is a 89y Female whom presented to the hospital with hypokalemia , hypernatremia     PAST MEDICAL & SURGICAL HISTORY:  HTN (hypertension)      Afib      Spinal stenosis      PFO (patent foramen ovale)      Degeneration macular      Osteoporosis      History of complete heart block      Hypothyroidism      Cardiac pacemaker          MEDICATIONS  (STANDING):  aMIOdarone    Tablet 200 milliGRAM(s) Oral daily  apixaban 2.5 milliGRAM(s) Oral every 12 hours  artificial  tears Solution 1 Drop(s) Both EYES four times a day  ascorbic acid 500 milliGRAM(s) Oral daily  dextrose 5% + sodium chloride 0.45% with potassium chloride 20 mEq/L 1000 milliLiter(s) (50 mL/Hr) IV Continuous <Continuous>  enalapril 10 milliGRAM(s) Oral daily  ertapenem  IVPB 500 milliGRAM(s) IV Intermittent every 24 hours  ferrous    sulfate 325 milliGRAM(s) Oral three times a day  levothyroxine 75 MICROGram(s) Oral daily  metoprolol tartrate 50 milliGRAM(s) Oral two times a day  multivitamin 1 Tablet(s) Oral daily  pantoprazole    Tablet 40 milliGRAM(s) Oral before breakfast  polyethylene glycol 3350 17 Gram(s) Oral daily  potassium chloride   Powder 40 milliEquivalent(s) Oral once  potassium chloride  10 mEq/100 mL IVPB 10 milliEquivalent(s) IV Intermittent every 1 hour      Allergies    penicillin (Unknown)    Intolerances        SOCIAL HISTORY:  Denies ETOh,Smoking,     FAMILY HISTORY:      REVIEW OF SYSTEMS:    CONSTITUTIONAL: No weakness, fevers or chills  EYES/ENT: No visual changes;  no throat pain   NECK: No pain or stiffness  RESPIRATORY: No cough, wheezing, hemoptysis; No shortness of breath  CARDIOVASCULAR: No chest pain or palpitations  GASTROINTESTINAL: No abdominal or epigastric pain. No nausea, vomiting,     No diarrhea or constipation. No melena   GENITOURINARY: No dysuria, frequency or hematuria  NEUROLOGICAL: No numbness or weakness  SKIN: dry      VITAL:  T(C): , Max: 37.1 (11-11-23 @ 13:15)  T(F): , Max: 98.8 (11-11-23 @ 13:15)  HR: 63 (11-12-23 @ 11:50)  BP: 92/53 (11-12-23 @ 11:50)  BP(mean): --  RR: 18 (11-12-23 @ 11:50)  SpO2: 90% (11-12-23 @ 11:50)  Wt(kg): --    I and O's:      Weight (kg): 54.5 (11-11 @ 12:55)    PHYSICAL EXAM:    Constitutional: NAD  HEENT: conjunctive   clear   Neck:  No JVD  Respiratory: CTAB  Cardiovascular: S1 and S2  Gastrointestinal: BS+, soft, NT/ND  Extremities: No peripheral edema  Neurological: A/O x 3, no focal deficits  Psychiatric: Normal mood, normal affect  : No [place]  Skin: No rashes  Access: Not applicable    LABS:                        7.9    11.36 )-----------( 275      ( 12 Nov 2023 06:37 )             24.0     11-12    147<H>  |  117<H>  |  29<H>  ----------------------------<  82  3.1<L>   |  23  |  0.69    Ca    7.7<L>      12 Nov 2023 06:37  Mg     1.8     11-12    TPro  4.9<L>  /  Alb  1.8<L>  /  TBili  0.5  /  DBili  0.2  /  AST  26  /  ALT  32  /  AlkPhos  101  11-12      Urine Studies:  Urinalysis Basic - ( 12 Nov 2023 06:37 )    Color: x / Appearance: x / SG: x / pH: x  Gluc: 82 mg/dL / Ketone: x  / Bili: x / Urobili: x   Blood: x / Protein: x / Nitrite: x   Leuk Esterase: x / RBC: x / WBC x   Sq Epi: x / Non Sq Epi: x / Bacteria: x            RADIOLOGY & ADDITIONAL STUDIES:

## 2023-11-12 NOTE — PROGRESS NOTE ADULT - SUBJECTIVE AND OBJECTIVE BOX
Date/Time Patient Seen:  		  Referring MD:   Data Reviewed	       Patient is a 89y old  Female who presents with a chief complaint of     Subjective/HPI     PAST MEDICAL & SURGICAL HISTORY:  HTN (hypertension)    Afib    Spinal stenosis    PFO (patent foramen ovale)    Degeneration macular    Osteoporosis    History of complete heart block    Hypothyroidism    Cardiac pacemaker          Medication list         MEDICATIONS  (STANDING):  aMIOdarone    Tablet 200 milliGRAM(s) Oral daily  apixaban 2.5 milliGRAM(s) Oral every 12 hours  artificial  tears Solution 1 Drop(s) Both EYES four times a day  ascorbic acid 500 milliGRAM(s) Oral daily  enalapril 10 milliGRAM(s) Oral daily  ertapenem  IVPB 500 milliGRAM(s) IV Intermittent every 24 hours  ferrous    sulfate 325 milliGRAM(s) Oral three times a day  levothyroxine 75 MICROGram(s) Oral daily  metoprolol tartrate 50 milliGRAM(s) Oral two times a day  multivitamin 1 Tablet(s) Oral daily  pantoprazole    Tablet 40 milliGRAM(s) Oral before breakfast  polyethylene glycol 3350 17 Gram(s) Oral daily    MEDICATIONS  (PRN):         Vitals log        ICU Vital Signs Last 24 Hrs  T(C): 36.8 (12 Nov 2023 04:46), Max: 37.1 (11 Nov 2023 13:15)  T(F): 98.2 (12 Nov 2023 04:46), Max: 98.8 (11 Nov 2023 13:15)  HR: 70 (12 Nov 2023 04:46) (66 - 77)  BP: 115/61 (12 Nov 2023 04:46) (110/69 - 132/77)  BP(mean): --  ABP: --  ABP(mean): --  RR: 17 (12 Nov 2023 04:46) (16 - 17)  SpO2: 91% (12 Nov 2023 04:46) (91% - 98%)    O2 Parameters below as of 12 Nov 2023 04:46  Patient On (Oxygen Delivery Method): room air                 Input and Output:  I&O's Detail      Lab Data                        9.6    12.89 )-----------( 307      ( 11 Nov 2023 13:20 )             29.8     11-11    144  |  113<H>  |  37<H>  ----------------------------<  120<H>  3.8   |  22  |  1.00    Ca    8.3<L>      11 Nov 2023 13:20    TPro  6.1  /  Alb  2.2<L>  /  TBili  0.7  /  DBili  x   /  AST  28  /  ALT  34  /  AlkPhos  129<H>  11-11            Review of Systems	      Objective     Physical Examination    heart s1s2  lung dc BS  head nc      Pertinent Lab findings & Imaging      Mela:  NO   Adequate UO     I&O's Detail           Discussed with:     Cultures:	        Radiology

## 2023-11-13 LAB
ALBUMIN SERPL ELPH-MCNC: 1.7 G/DL — LOW (ref 3.3–5)
ALBUMIN SERPL ELPH-MCNC: 1.7 G/DL — LOW (ref 3.3–5)
ALP SERPL-CCNC: 99 U/L — SIGNIFICANT CHANGE UP (ref 40–120)
ALP SERPL-CCNC: 99 U/L — SIGNIFICANT CHANGE UP (ref 40–120)
ALT FLD-CCNC: 36 U/L — SIGNIFICANT CHANGE UP (ref 12–78)
ALT FLD-CCNC: 36 U/L — SIGNIFICANT CHANGE UP (ref 12–78)
ANION GAP SERPL CALC-SCNC: 4 MMOL/L — LOW (ref 5–17)
ANION GAP SERPL CALC-SCNC: 4 MMOL/L — LOW (ref 5–17)
AST SERPL-CCNC: 32 U/L — SIGNIFICANT CHANGE UP (ref 15–37)
AST SERPL-CCNC: 32 U/L — SIGNIFICANT CHANGE UP (ref 15–37)
BILIRUB SERPL-MCNC: 0.5 MG/DL — SIGNIFICANT CHANGE UP (ref 0.2–1.2)
BILIRUB SERPL-MCNC: 0.5 MG/DL — SIGNIFICANT CHANGE UP (ref 0.2–1.2)
BUN SERPL-MCNC: 20 MG/DL — SIGNIFICANT CHANGE UP (ref 7–23)
BUN SERPL-MCNC: 20 MG/DL — SIGNIFICANT CHANGE UP (ref 7–23)
CALCIUM SERPL-MCNC: 7.7 MG/DL — LOW (ref 8.5–10.1)
CALCIUM SERPL-MCNC: 7.7 MG/DL — LOW (ref 8.5–10.1)
CHLORIDE SERPL-SCNC: 120 MMOL/L — HIGH (ref 96–108)
CHLORIDE SERPL-SCNC: 120 MMOL/L — HIGH (ref 96–108)
CO2 SERPL-SCNC: 22 MMOL/L — SIGNIFICANT CHANGE UP (ref 22–31)
CO2 SERPL-SCNC: 22 MMOL/L — SIGNIFICANT CHANGE UP (ref 22–31)
CREAT SERPL-MCNC: 0.66 MG/DL — SIGNIFICANT CHANGE UP (ref 0.5–1.3)
CREAT SERPL-MCNC: 0.66 MG/DL — SIGNIFICANT CHANGE UP (ref 0.5–1.3)
EGFR: 81 ML/MIN/1.73M2 — SIGNIFICANT CHANGE UP
EGFR: 81 ML/MIN/1.73M2 — SIGNIFICANT CHANGE UP
FERRITIN SERPL-MCNC: 354 NG/ML — HIGH (ref 13–330)
FERRITIN SERPL-MCNC: 354 NG/ML — HIGH (ref 13–330)
FOLATE SERPL-MCNC: 9.1 NG/ML — SIGNIFICANT CHANGE UP
FOLATE SERPL-MCNC: 9.1 NG/ML — SIGNIFICANT CHANGE UP
GLUCOSE SERPL-MCNC: 91 MG/DL — SIGNIFICANT CHANGE UP (ref 70–99)
GLUCOSE SERPL-MCNC: 91 MG/DL — SIGNIFICANT CHANGE UP (ref 70–99)
HCT VFR BLD CALC: 25.8 % — LOW (ref 34.5–45)
HCT VFR BLD CALC: 25.8 % — LOW (ref 34.5–45)
HGB BLD-MCNC: 8.3 G/DL — LOW (ref 11.5–15.5)
HGB BLD-MCNC: 8.3 G/DL — LOW (ref 11.5–15.5)
IRON SATN MFR SERPL: 36 UG/DL — SIGNIFICANT CHANGE UP (ref 30–160)
IRON SATN MFR SERPL: 36 UG/DL — SIGNIFICANT CHANGE UP (ref 30–160)
IRON SATN MFR SERPL: 37 % — SIGNIFICANT CHANGE UP (ref 14–50)
IRON SATN MFR SERPL: 37 % — SIGNIFICANT CHANGE UP (ref 14–50)
MAGNESIUM SERPL-MCNC: 2 MG/DL — SIGNIFICANT CHANGE UP (ref 1.6–2.6)
MAGNESIUM SERPL-MCNC: 2 MG/DL — SIGNIFICANT CHANGE UP (ref 1.6–2.6)
MCHC RBC-ENTMCNC: 28.4 PG — SIGNIFICANT CHANGE UP (ref 27–34)
MCHC RBC-ENTMCNC: 28.4 PG — SIGNIFICANT CHANGE UP (ref 27–34)
MCHC RBC-ENTMCNC: 32.2 GM/DL — SIGNIFICANT CHANGE UP (ref 32–36)
MCHC RBC-ENTMCNC: 32.2 GM/DL — SIGNIFICANT CHANGE UP (ref 32–36)
MCV RBC AUTO: 88.4 FL — SIGNIFICANT CHANGE UP (ref 80–100)
MCV RBC AUTO: 88.4 FL — SIGNIFICANT CHANGE UP (ref 80–100)
NRBC # BLD: 0 /100 WBCS — SIGNIFICANT CHANGE UP (ref 0–0)
NRBC # BLD: 0 /100 WBCS — SIGNIFICANT CHANGE UP (ref 0–0)
PHOSPHATE SERPL-MCNC: 2.1 MG/DL — LOW (ref 2.5–4.5)
PHOSPHATE SERPL-MCNC: 2.1 MG/DL — LOW (ref 2.5–4.5)
PLATELET # BLD AUTO: 306 K/UL — SIGNIFICANT CHANGE UP (ref 150–400)
PLATELET # BLD AUTO: 306 K/UL — SIGNIFICANT CHANGE UP (ref 150–400)
POTASSIUM SERPL-MCNC: 3.7 MMOL/L — SIGNIFICANT CHANGE UP (ref 3.5–5.3)
POTASSIUM SERPL-MCNC: 3.7 MMOL/L — SIGNIFICANT CHANGE UP (ref 3.5–5.3)
POTASSIUM SERPL-SCNC: 3.7 MMOL/L — SIGNIFICANT CHANGE UP (ref 3.5–5.3)
POTASSIUM SERPL-SCNC: 3.7 MMOL/L — SIGNIFICANT CHANGE UP (ref 3.5–5.3)
PROT SERPL-MCNC: 4.9 G/DL — LOW (ref 6–8.3)
PROT SERPL-MCNC: 4.9 G/DL — LOW (ref 6–8.3)
RBC # BLD: 2.92 M/UL — LOW (ref 3.8–5.2)
RBC # FLD: 18 % — HIGH (ref 10.3–14.5)
RBC # FLD: 18 % — HIGH (ref 10.3–14.5)
RETICS #: 29.2 K/UL — SIGNIFICANT CHANGE UP (ref 25–125)
RETICS #: 29.2 K/UL — SIGNIFICANT CHANGE UP (ref 25–125)
RETICS/RBC NFR: 1 % — SIGNIFICANT CHANGE UP (ref 0.5–2.5)
RETICS/RBC NFR: 1 % — SIGNIFICANT CHANGE UP (ref 0.5–2.5)
SODIUM SERPL-SCNC: 146 MMOL/L — HIGH (ref 135–145)
SODIUM SERPL-SCNC: 146 MMOL/L — HIGH (ref 135–145)
TIBC SERPL-MCNC: 96 UG/DL — LOW (ref 220–430)
TIBC SERPL-MCNC: 96 UG/DL — LOW (ref 220–430)
UIBC SERPL-MCNC: 61 UG/DL — LOW (ref 110–370)
UIBC SERPL-MCNC: 61 UG/DL — LOW (ref 110–370)
VIT B12 SERPL-MCNC: 546 PG/ML — SIGNIFICANT CHANGE UP (ref 232–1245)
VIT B12 SERPL-MCNC: 546 PG/ML — SIGNIFICANT CHANGE UP (ref 232–1245)
WBC # BLD: 10.96 K/UL — HIGH (ref 3.8–10.5)
WBC # BLD: 10.96 K/UL — HIGH (ref 3.8–10.5)
WBC # FLD AUTO: 10.96 K/UL — HIGH (ref 3.8–10.5)
WBC # FLD AUTO: 10.96 K/UL — HIGH (ref 3.8–10.5)

## 2023-11-13 RX ADMIN — Medication 1 DROP(S): at 23:20

## 2023-11-13 RX ADMIN — Medication 50 MILLIGRAM(S): at 18:28

## 2023-11-13 RX ADMIN — APIXABAN 2.5 MILLIGRAM(S): 2.5 TABLET, FILM COATED ORAL at 18:28

## 2023-11-13 RX ADMIN — AMIODARONE HYDROCHLORIDE 200 MILLIGRAM(S): 400 TABLET ORAL at 05:05

## 2023-11-13 RX ADMIN — POLYETHYLENE GLYCOL 3350 17 GRAM(S): 17 POWDER, FOR SOLUTION ORAL at 12:53

## 2023-11-13 RX ADMIN — Medication 325 MILLIGRAM(S): at 23:20

## 2023-11-13 RX ADMIN — Medication 1 DROP(S): at 18:28

## 2023-11-13 RX ADMIN — Medication 50 MILLIGRAM(S): at 05:05

## 2023-11-13 RX ADMIN — Medication 325 MILLIGRAM(S): at 05:05

## 2023-11-13 RX ADMIN — Medication 1 TABLET(S): at 12:54

## 2023-11-13 RX ADMIN — Medication 75 MICROGRAM(S): at 05:05

## 2023-11-13 RX ADMIN — PANTOPRAZOLE SODIUM 40 MILLIGRAM(S): 20 TABLET, DELAYED RELEASE ORAL at 05:05

## 2023-11-13 RX ADMIN — ERTAPENEM SODIUM 100 MILLIGRAM(S): 1 INJECTION, POWDER, LYOPHILIZED, FOR SOLUTION INTRAMUSCULAR; INTRAVENOUS at 23:20

## 2023-11-13 RX ADMIN — Medication 1 DROP(S): at 12:52

## 2023-11-13 RX ADMIN — APIXABAN 2.5 MILLIGRAM(S): 2.5 TABLET, FILM COATED ORAL at 05:05

## 2023-11-13 RX ADMIN — Medication 325 MILLIGRAM(S): at 12:53

## 2023-11-13 RX ADMIN — Medication 500 MILLIGRAM(S): at 12:53

## 2023-11-13 RX ADMIN — Medication 1 DROP(S): at 05:05

## 2023-11-13 NOTE — SWALLOW BEDSIDE ASSESSMENT ADULT - SPECIFY REASON(S)
to assess swallow function. pt is familiar to this service from multiple clinical swallow evaluations completed during prior admissions (see reports for details)

## 2023-11-13 NOTE — PHYSICAL THERAPY INITIAL EVALUATION ADULT - TRANSFER TRAINING, PT EVAL
Patient will perform stand<>sit with moderate assist to be able to get up and go to the bathroom, within 3 to 5 sessions.

## 2023-11-13 NOTE — PHYSICAL THERAPY INITIAL EVALUATION ADULT - GENERAL OBSERVATIONS, REHAB EVAL
Patient chart reviewed, events noted. Patient cleared to be seen for therapy by RN prior to session. Patient received seated in bedside chair +R foot wrapped +IV +daughter Beth at bedside, in NAD. Agreeable to PT at this time.

## 2023-11-13 NOTE — PROGRESS NOTE ADULT - ASSESSMENT
89-year-old female with history of hypertension, hyperlipidemia, A-fib with ablation on Eliquis, MR, bladder tumor, PFO, osteoporosis, spinal stenosis brought in by ambulance for possible stroke.  As per facility patient ate breakfast and had her medication between 9:45AM and 10 AM this morning and was at her baseline mental status.  Aide noticed around 11/1130 patient was leaning towards her left side, had water poured out of her mouth when she attempted to drink and had possible slurred speech.  Patient limited historian due to MR.  As per facility patient was recently in a short-term rehab facility, recently returned back to [facility] however patient has been weaker since then 1. UTI.  2. Toxic metabolic encephalopathy.  3. Generalized weakness.   . UA was suggestive of UTI. The patient just had UTI with  ESBL E coli bacteremia last month.  . Add IV Invanz for presumptive recurrent UTI with ESBL  . Monitor mental status.  . Follow all cultures. 89-year-old female with history of hypertension, hyperlipidemia, A-fib with ablation on Eliquis, MR, bladder tumor, PFO, osteoporosis, spinal stenosis brought in by ambulance for possible stroke.  As per facility patient ate breakfast and had her medication between 9:45AM and 10 AM this morning and was at her baseline mental status.  Aide noticed around 11/1130 patient was leaning towards her left side, had water poured out of her mouth when she attempted to drink and had possible slurred speech.  Patient limited historian due to MR.  As per facility patient was recently in a short-term rehab facility, recently returned back to [facility] however patient has been weaker since then 1. UTI.  2. Toxic metabolic encephalopathy.  3. Generalized weakness.   . UA was suggestive of UTI. The patient just had UTI with  ESBL E coli bacteremia last month.  . Add IV Invanz for presumptive recurrent UTI with ESBL  . Monitor mental status.  . Follow all cultures. 89-year-old female with history of hypertension, hyperlipidemia, A-fib with ablation on Eliquis, MR, bladder tumor, PFO, osteoporosis, spinal stenosis brought in by ambulance for possible stroke.  As per facility patient ate breakfast and had her medication between 9:45AM and 10 AM this morning and was at her baseline mental status.  Aide noticed around 11/1130 patient was leaning towards her left side, had water poured out of her mouth when she attempted to drink and had possible slurred speech.  Patient limited historian due to MR.  As per facility patient was recently in a short-term rehab facility, recently returned back to [facility] however patient has been weaker since then 1. UTI.  2. Toxic metabolic encephalopathy.  3. Generalized weakness.   . UA was suggestive of UTI. The patient just had UTI with  ESBL E coli bacteremia last month.  . Add IV Invanz for presumptive recurrent UTI with ESBL  . Monitor mental status.  . Follow all cultures.

## 2023-11-13 NOTE — PROGRESS NOTE ADULT - SUBJECTIVE AND OBJECTIVE BOX
PROGRESS NOTE  Patient is a 89y old  Female who presents with a chief complaint of Transient cerebral ischemia     (13 Nov 2023 13:48)    Chart and available morning labs /imaging are reviewed electronically , urgent issues addressed . More information  is being added upon completion of rounds , when more information is collected and management discussed with consultants , medical staff and social service/case management on the floor   OVERNIGHT  No new issues reported by medical staff . All above noted Patient is resting in a bed comfortably .Confused ,poor mentation .No distress noted     HPI:  89-year-old female with history of hypertension, hyperlipidemia, A-fib with ablation on Eliquis, MR, bladder tumor, PFO, osteoporosis, spinal stenosis brought in by ambulance for possible stroke.  As per facility patient ate breakfast and had her medication between 9:45AM and 10 AM this morning and was at her baseline mental status.  Aide noticed around 11/1130 patient was leaning towards her left side, had water poured out of her mouth when she attempted to drink and had possible slurred speech.  Patient limited historian due to MR.  As per facility patient was recently in a short-term rehab facility, recently returned back to [facility] however patient has been weaker since then.< from: CT Brain Perfusion Maps Stroke (11.11.23 @ 14:12) >    < from: CT Brain Perfusion Maps Stroke (11.11.23 @ 14:12) >    ACC: [id number] EXAM:  CT BRAIN PERFUSION MAPS STROKE   ORDERED BY:   JORGE FELDER     ACC: [id number] EXAM:  CT ANGIO BRAIN STROKE PROTC IC   ORDERED BY:   JORGE FELDER     ACC: [id number] EXAM:  CT BRAIN STROKE PROTOCOL   ORDERED BY: JORGE FELDER     ACC: [id number] EXAM:  CT ANGIO NECK STROKE PROTCL IC   ORDERED BY:   JORGE FELDER     PROCEDURE DATE:  11/11/2023          INTERPRETATION:  CT HEAD STROKE PROTOCOL, CT ANGIO NECK STROKE PROTOCOL,   CT ANGIO HEAD STROKE PROTOCOL, CT PERFUSION WITH MAPS STROKE PROTOCOL    CT BRAIN WITHOUT CONTRAST    INDICATIONS:  Stroke code Leaning to left side. Slight left arm drift.    TECHNIQUE:  Serial axial images were obtained from the skull base to the   vertex without the use of contrast.    COMPARISON EXAM: 7/11/2023    FINDINGS:  Ventricles and sulci: Parenchymal volume loss is present which is   commensurate with patient age.  Intra-axial: Periventricular small vessel white matter ischemic changes.   No intracranial mass, acute hemorrhage, or midline shift is present.  Extra-axial: No extra-axial fluid collection is identified. Deposition of   calcified plaque at the level of the bilateral carotid siphons.  Calvarium: Intact.    Left optic lens replacement, as on prior. Bilateral optic globes are   intact. Imaged paranasal sinuses, bilateral mastoid air cells, middle ear   cavities are clear.        CT PERFUSION  CTA OF THE NECK AND HEAD:      CONTRAST/COMPLICATIONS:  IV Contrast: NONE (accession [id number]), IV contrast documented in   unlinked concurrent exam (accession [id number])  Complications: None reported at time of study completion      TECHNIQUE PERFUSION:  After the intravenous power injection of non-ionic contrast material,   repeat serial thin sections were obtained through the intracranial   circulation on a multislice CT scanner. Artificial intelligence was then   used for analysis of perfusion and intracranial large vessel occlusion.      TECHNIQUE CTA NECK AND HEAD:  After the intravenous power injection of non-ionic contrast material,   serial thin sections were obtained through the cervical and intracranial   circulation on a multislice CT scanner. 2D and 3D MIP reformatted images   were obtained.  Images were reformatted using a dedicated 3D software   package.    FINDINGS:    CT PERFUSION:    COMPARISON EXAMINATION: None.    Technical limitations: Significant patient motion in all 3 planes during   image acquisition likely renders results suboptimal and nondiagnostic.   Arterial and venous waveforms are also not optimized.    Miscellaneous: None.      CTA NECK WITH CONTRAST:    CAROTID STENOSIS REFERENCE: (NASCET = 100x1-(N/D)). N=greatest narrowing.   D=normal distal diameter - MILD = <50% stenosis. - MODERATE = 50-69%   stenosis.- SEVERE = 70-89% stenosis. - HAIRLINE/CRITICAL = 90-99%   stenosis. - OCCLUDED = 100% stenosis.      COMPARISON: None.    FINDINGS:  Aortic arch and visualized great vessels: Within normal limits.    RIGHT:  Common carotid artery: Follows a tortuouscourse and is normal in caliber   to the carotid bifurcation.  Internal carotid artery: Mild to moderate deposition of calcified plaque   at the level of the right common carotid artery bifurcation with   extension into the proximal aspect of the right internal carotid artery,   without significant stenosis based on NASCET criteria. Normal course and   caliber to the intracranial circulation.    Vertebral artery: Emanates from the right subclavian artery. The right   vertebral artery is normal incourse and caliber to the intracranial   circulation.      LEFT:  Common carotid artery :Normal in course and caliber to the carotid   bifurcation.  Internal carotid artery: Mild to moderate deposition of calcified plaque   at the level of the left common carotid artery bifurcation with extension   into the proximal left internal carotid artery without significant   stenosis based on NASCET criteria. Normal course and caliber to the   intracranial circulation.    Vertebral artery: Emanates from the left subclavian artery. The left   vertebral artery is normal in course and caliber to the intracranial   circulation.      Miscellaneous: None    CTA HEAD WITH CONTRAST:    COMPARISON EXAM: None.      Internal carotid arteries: Bilateral petrous, precavernous, cavernous,   and supraclinoid regions are patent. Mild deposition of calcified plaque   at the level of the carotid siphons without significant stenosis in this   location.    [place]:  No aneurysm about the [place]. Tiny aneurysms   can be beyond the resolution of CTA technique.    Anterior Cerebral arteries: No significant stenosis or occlusion.  Middle Cerebral arteries: No significant stenosis or occlusion.    Posterior Circulation: Distal intracranial bilateralvertebral arteries   are visualized. The vertebrobasilar confluence is unremarkable. There is   good flow within the basilar artery. Bilateral superior cerebellar   arteries and bilateral posterior cerebral arteries emanate from the   distal aspect of the basilar artery. There is a hypoplastic right P1   segment. A prominent right posterior communicating artery contributes to   flow within the right posterior cerebral artery.    < end of copied text >     (11 Nov 2023 21:59)    PAST MEDICAL & SURGICAL HISTORY:  Afib      PFO (patent foramen ovale)      Degeneration macular      Osteoporosis      History of complete heart block      HTN (hypertension)      Hypothyroidism      Spinal stenosis      Cardiac pacemaker          MEDICATIONS  (STANDING):  aMIOdarone    Tablet 200 milliGRAM(s) Oral daily  apixaban 2.5 milliGRAM(s) Oral every 12 hours  artificial  tears Solution 1 Drop(s) Both EYES four times a day  ascorbic acid 500 milliGRAM(s) Oral daily  dextrose 5% + sodium chloride 0.45% with potassium chloride 20 mEq/L 1000 milliLiter(s) (50 mL/Hr) IV Continuous <Continuous>  enalapril 10 milliGRAM(s) Oral daily  ertapenem  IVPB 500 milliGRAM(s) IV Intermittent every 24 hours  ferrous    sulfate 325 milliGRAM(s) Oral three times a day  levothyroxine 75 MICROGram(s) Oral daily  metoprolol tartrate 50 milliGRAM(s) Oral two times a day  multivitamin 1 Tablet(s) Oral daily  pantoprazole    Tablet 40 milliGRAM(s) Oral before breakfast  polyethylene glycol 3350 17 Gram(s) Oral daily    MEDICATIONS  (PRN):      OBJECTIVE    T(C): 36.7 (11-13-23 @ 12:05), Max: 36.8 (11-12-23 @ 19:43)  HR: 75 (11-13-23 @ 12:05) (60 - 75)  BP: 115/70 (11-13-23 @ 12:05) (110/58 - 128/60)  RR: 18 (11-13-23 @ 12:05) (18 - 19)  SpO2: 95% (11-13-23 @ 12:05) (95% - 95%)  Wt(kg): --  I&O's Summary    12 Nov 2023 07:01  -  13 Nov 2023 07:00  --------------------------------------------------------  IN: 450 mL / OUT: 0 mL / NET: 450 mL          REVIEW OF SYSTEMS:  CONSTITUTIONAL: No fever, weight loss, or fatigue  EYES: No eye pain, visual disturbances, or discharge  ENMT:   No sinus or throat pain  NECK: No pain or stiffness  RESPIRATORY: No cough, wheezing, chills or hemoptysis; No shortness of breath  CARDIOVASCULAR: No chest pain, palpitations, dizziness, or leg swelling  GASTROINTESTINAL: No abdominal pain. No nausea, vomiting; No diarrhea or constipation. No melena or hematochezia.  GENITOURINARY: No dysuria, frequency, hematuria, or incontinence  NEUROLOGICAL: No headaches, memory loss, loss of strength, numbness, or tremors  SKIN: No itching, burning, rashes, or lesions   MUSCULOSKELETAL: No joint pain or swelling; No muscle, back, or extremity pain    PHYSICAL EXAM:  Appearance: NAD. VS past 24 hrs -as above   HEENT:   Moist oral mucosa. Conjunctiva clear b/l.   Neck : supple  Respiratory: Lungs CTAB.  Gastrointestinal:  Soft, nontender. No rebound. No rigidity. BS present	  Cardiovascular: RRR ,S1S2 present  Neurologic: Non-focal. Moving all extremities.  Extremities: No edema. No erythema. No calf tenderness.  Skin: No rashes, No ecchymoses, No cyanosis.	  wounds ,skin lesions-See skin assesment flow sheet   LABS:                        8.3    10.96 )-----------( 306      ( 13 Nov 2023 07:39 )             25.8     11-13    146<H>  |  120<H>  |  20  ----------------------------<  91  3.7   |  22  |  0.66    Ca    7.7<L>      13 Nov 2023 07:39  Phos  2.1     11-13  Mg     2.0     11-13    TPro  4.9<L>  /  Alb  1.7<L>  /  TBili  0.5  /  DBili  x   /  AST  32  /  ALT  36  /  AlkPhos  99  11-13    CAPILLARY BLOOD GLUCOSE          Urinalysis Basic - ( 13 Nov 2023 07:39 )    Color: x / Appearance: x / SG: x / pH: x  Gluc: 91 mg/dL / Ketone: x  / Bili: x / Urobili: x   Blood: x / Protein: x / Nitrite: x   Leuk Esterase: x / RBC: x / WBC x   Sq Epi: x / Non Sq Epi: x / Bacteria: x        Culture - Urine (collected 11 Nov 2023 15:41)  Source: Clean Catch Clean Catch (Midstream)  Preliminary Report (13 Nov 2023 11:22):    >100,000 CFU/ml Escherichia coli      RADIOLOGY & ADDITIONAL TESTS:   reviewed elctronically  ASSESSMENT/PLAN: 	  25 minutes aggregate time was spent on this visit, 50% visit time spent in care co-ordination with other attendings and counselling patient .I have discussed care plan with patient / HCP/family member daughter on a  phone today  ,who expressed understanding of problems treatment and their effect and side effects, alternatives in details. I have asked if they have any questions and concerns and appropriately addressed them to best of my ability. ACP-Advance care planning was discussed , pallitaive care issues ,CMO ,GOC ,MOLST  form ,advance directives were reviewed .All questions were answered to the best of my knowledge - 25 m   PROGRESS NOTE  Patient is a 89y old  Female who presents with a chief complaint of Transient cerebral ischemia     (13 Nov 2023 13:48)    Chart and available morning labs /imaging are reviewed electronically , urgent issues addressed . More information  is being added upon completion of rounds , when more information is collected and management discussed with consultants , medical staff and social service/case management on the floor   OVERNIGHT  No new issues reported by medical staff . All above noted Patient is resting in a bed comfortably .Confused ,poor mentation .No distress noted     HPI:  89-year-old female with history of hypertension, hyperlipidemia, A-fib with ablation on Eliquis, MR, bladder tumor, PFO, osteoporosis, spinal stenosis brought in by ambulance for possible stroke.  As per facility patient ate breakfast and had her medication between 9:45AM and 10 AM this morning and was at her baseline mental status.  Aide noticed around 11/1130 patient was leaning towards her left side, had water poured out of her mouth when she attempted to drink and had possible slurred speech.  Patient limited historian due to MR.  As per facility patient was recently in a short-term rehab facility, recently returned back to [facility] however patient has been weaker since then.< from: CT Brain Perfusion Maps Stroke (11.11.23 @ 14:12) >    < from: CT Brain Perfusion Maps Stroke (11.11.23 @ 14:12) >    ACC: [id number] EXAM:  CT BRAIN PERFUSION MAPS STROKE   ORDERED BY:   JORGE FELDER     ACC: [id number] EXAM:  CT ANGIO BRAIN STROKE PROTC IC   ORDERED BY:   JORGE FELDER     ACC: [id number] EXAM:  CT BRAIN STROKE PROTOCOL   ORDERED BY: JORGE FELDER     ACC: [id number] EXAM:  CT ANGIO NECK STROKE PROTCL IC   ORDERED BY:   JORGE FELDER     PROCEDURE DATE:  11/11/2023          INTERPRETATION:  CT HEAD STROKE PROTOCOL, CT ANGIO NECK STROKE PROTOCOL,   CT ANGIO HEAD STROKE PROTOCOL, CT PERFUSION WITH MAPS STROKE PROTOCOL    CT BRAIN WITHOUT CONTRAST    INDICATIONS:  Stroke code Leaning to left side. Slight left arm drift.    TECHNIQUE:  Serial axial images were obtained from the skull base to the   vertex without the use of contrast.    COMPARISON EXAM: 7/11/2023    FINDINGS:  Ventricles and sulci: Parenchymal volume loss is present which is   commensurate with patient age.  Intra-axial: Periventricular small vessel white matter ischemic changes.   No intracranial mass, acute hemorrhage, or midline shift is present.  Extra-axial: No extra-axial fluid collection is identified. Deposition of   calcified plaque at the level of the bilateral carotid siphons.  Calvarium: Intact.    Left optic lens replacement, as on prior. Bilateral optic globes are   intact. Imaged paranasal sinuses, bilateral mastoid air cells, middle ear   cavities are clear.        CT PERFUSION  CTA OF THE NECK AND HEAD:      CONTRAST/COMPLICATIONS:  IV Contrast: NONE (accession [id number]), IV contrast documented in   unlinked concurrent exam (accession [id number])  Complications: None reported at time of study completion      TECHNIQUE PERFUSION:  After the intravenous power injection of non-ionic contrast material,   repeat serial thin sections were obtained through the intracranial   circulation on a multislice CT scanner. Artificial intelligence was then   used for analysis of perfusion and intracranial large vessel occlusion.      TECHNIQUE CTA NECK AND HEAD:  After the intravenous power injection of non-ionic contrast material,   serial thin sections were obtained through the cervical and intracranial   circulation on a multislice CT scanner. 2D and 3D MIP reformatted images   were obtained.  Images were reformatted using a dedicated 3D software   package.    FINDINGS:    CT PERFUSION:    COMPARISON EXAMINATION: None.    Technical limitations: Significant patient motion in all 3 planes during   image acquisition likely renders results suboptimal and nondiagnostic.   Arterial and venous waveforms are also not optimized.    Miscellaneous: None.      CTA NECK WITH CONTRAST:    CAROTID STENOSIS REFERENCE: (NASCET = 100x1-(N/D)). N=greatest narrowing.   D=normal distal diameter - MILD = <50% stenosis. - MODERATE = 50-69%   stenosis.- SEVERE = 70-89% stenosis. - HAIRLINE/CRITICAL = 90-99%   stenosis. - OCCLUDED = 100% stenosis.      COMPARISON: None.    FINDINGS:  Aortic arch and visualized great vessels: Within normal limits.    RIGHT:  Common carotid artery: Follows a tortuouscourse and is normal in caliber   to the carotid bifurcation.  Internal carotid artery: Mild to moderate deposition of calcified plaque   at the level of the right common carotid artery bifurcation with   extension into the proximal aspect of the right internal carotid artery,   without significant stenosis based on NASCET criteria. Normal course and   caliber to the intracranial circulation.    Vertebral artery: Emanates from the right subclavian artery. The right   vertebral artery is normal incourse and caliber to the intracranial   circulation.      LEFT:  Common carotid artery :Normal in course and caliber to the carotid   bifurcation.  Internal carotid artery: Mild to moderate deposition of calcified plaque   at the level of the left common carotid artery bifurcation with extension   into the proximal left internal carotid artery without significant   stenosis based on NASCET criteria. Normal course and caliber to the   intracranial circulation.    Vertebral artery: Emanates from the left subclavian artery. The left   vertebral artery is normal in course and caliber to the intracranial   circulation.      Miscellaneous: None    CTA HEAD WITH CONTRAST:    COMPARISON EXAM: None.      Internal carotid arteries: Bilateral petrous, precavernous, cavernous,   and supraclinoid regions are patent. Mild deposition of calcified plaque   at the level of the carotid siphons without significant stenosis in this   location.    [place]:  No aneurysm about the [place]. Tiny aneurysms   can be beyond the resolution of CTA technique.    Anterior Cerebral arteries: No significant stenosis or occlusion.  Middle Cerebral arteries: No significant stenosis or occlusion.    Posterior Circulation: Distal intracranial bilateralvertebral arteries   are visualized. The vertebrobasilar confluence is unremarkable. There is   good flow within the basilar artery. Bilateral superior cerebellar   arteries and bilateral posterior cerebral arteries emanate from the   distal aspect of the basilar artery. There is a hypoplastic right P1   segment. A prominent right posterior communicating artery contributes to   flow within the right posterior cerebral artery.    < end of copied text >     (11 Nov 2023 21:59)    PAST MEDICAL & SURGICAL HISTORY:  Afib      PFO (patent foramen ovale)      Degeneration macular      Osteoporosis      History of complete heart block      HTN (hypertension)      Hypothyroidism      Spinal stenosis      Cardiac pacemaker          MEDICATIONS  (STANDING):  aMIOdarone    Tablet 200 milliGRAM(s) Oral daily  apixaban 2.5 milliGRAM(s) Oral every 12 hours  artificial  tears Solution 1 Drop(s) Both EYES four times a day  ascorbic acid 500 milliGRAM(s) Oral daily  dextrose 5% + sodium chloride 0.45% with potassium chloride 20 mEq/L 1000 milliLiter(s) (50 mL/Hr) IV Continuous <Continuous>  enalapril 10 milliGRAM(s) Oral daily  ertapenem  IVPB 500 milliGRAM(s) IV Intermittent every 24 hours  ferrous    sulfate 325 milliGRAM(s) Oral three times a day  levothyroxine 75 MICROGram(s) Oral daily  metoprolol tartrate 50 milliGRAM(s) Oral two times a day  multivitamin 1 Tablet(s) Oral daily  pantoprazole    Tablet 40 milliGRAM(s) Oral before breakfast  polyethylene glycol 3350 17 Gram(s) Oral daily    MEDICATIONS  (PRN):      OBJECTIVE    T(C): 36.7 (11-13-23 @ 12:05), Max: 36.8 (11-12-23 @ 19:43)  HR: 75 (11-13-23 @ 12:05) (60 - 75)  BP: 115/70 (11-13-23 @ 12:05) (110/58 - 128/60)  RR: 18 (11-13-23 @ 12:05) (18 - 19)  SpO2: 95% (11-13-23 @ 12:05) (95% - 95%)  Wt(kg): --  I&O's Summary    12 Nov 2023 07:01  -  13 Nov 2023 07:00  --------------------------------------------------------  IN: 450 mL / OUT: 0 mL / NET: 450 mL          REVIEW OF SYSTEMS:  CONSTITUTIONAL: No fever, weight loss, or fatigue  EYES: No eye pain, visual disturbances, or discharge  ENMT:   No sinus or throat pain  NECK: No pain or stiffness  RESPIRATORY: No cough, wheezing, chills or hemoptysis; No shortness of breath  CARDIOVASCULAR: No chest pain, palpitations, dizziness, or leg swelling  GASTROINTESTINAL: No abdominal pain. No nausea, vomiting; No diarrhea or constipation. No melena or hematochezia.  GENITOURINARY: No dysuria, frequency, hematuria, or incontinence  NEUROLOGICAL: No headaches, memory loss, loss of strength, numbness, or tremors  SKIN: No itching, burning, rashes, or lesions   MUSCULOSKELETAL: No joint pain or swelling; No muscle, back, or extremity pain    PHYSICAL EXAM:  Appearance: NAD. VS past 24 hrs -as above   HEENT:   Moist oral mucosa. Conjunctiva clear b/l.   Neck : supple  Respiratory: Lungs CTAB.  Gastrointestinal:  Soft, nontender. No rebound. No rigidity. BS present	  Cardiovascular: RRR ,S1S2 present  Neurologic: Non-focal. Moving all extremities.  Extremities: No edema. No erythema. No calf tenderness.  Skin: No rashes, No ecchymoses, No cyanosis.	  wounds ,skin lesions-See skin assesment flow sheet   LABS:                        8.3    10.96 )-----------( 306      ( 13 Nov 2023 07:39 )             25.8     11-13    146<H>  |  120<H>  |  20  ----------------------------<  91  3.7   |  22  |  0.66    Ca    7.7<L>      13 Nov 2023 07:39  Phos  2.1     11-13  Mg     2.0     11-13    TPro  4.9<L>  /  Alb  1.7<L>  /  TBili  0.5  /  DBili  x   /  AST  32  /  ALT  36  /  AlkPhos  99  11-13    CAPILLARY BLOOD GLUCOSE          Urinalysis Basic - ( 13 Nov 2023 07:39 )    Color: x / Appearance: x / SG: x / pH: x  Gluc: 91 mg/dL / Ketone: x  / Bili: x / Urobili: x   Blood: x / Protein: x / Nitrite: x   Leuk Esterase: x / RBC: x / WBC x   Sq Epi: x / Non Sq Epi: x / Bacteria: x        Culture - Urine (collected 11 Nov 2023 15:41)  Source: Clean Catch Clean Catch (Midstream)  Preliminary Report (13 Nov 2023 11:22):    >100,000 CFU/ml Escherichia coli      RADIOLOGY & ADDITIONAL TESTS:   reviewed elctronically  ASSESSMENT/PLAN: 	  25 minutes aggregate time was spent on this visit, 50% visit time spent in care co-ordination with other attendings and counselling patient .I have discussed care plan with patient / HCP/family member daughter on a  phone today  ,who expressed understanding of problems treatment and their effect and side effects, alternatives in details. I have asked if they have any questions and concerns and appropriately addressed them to best of my ability. ACP-Advance care planning was discussed , pallitaive care issues ,CMO ,GOC ,MOLST  form ,advance directives were reviewed .All questions were answered to the best of my knowledge - 25 m   PROGRESS NOTE  Patient is a 89y old  Female who presents with a chief complaint of Transient cerebral ischemia     (13 Nov 2023 13:48)    Chart and available morning labs /imaging are reviewed electronically , urgent issues addressed . More information  is being added upon completion of rounds , when more information is collected and management discussed with consultants , medical staff and social service/case management on the floor   OVERNIGHT  No new issues reported by medical staff . All above noted Patient is resting in a bed comfortably .Confused ,poor mentation .No distress noted     HPI:  89-year-old female with history of hypertension, hyperlipidemia, A-fib with ablation on Eliquis, MR, bladder tumor, PFO, osteoporosis, spinal stenosis brought in by ambulance for possible stroke.  As per facility patient ate breakfast and had her medication between 9:45AM and 10 AM this morning and was at her baseline mental status.  Aide noticed around 11/1130 patient was leaning towards her left side, had water poured out of her mouth when she attempted to drink and had possible slurred speech.  Patient limited historian due to MR.  As per facility patient was recently in a short-term rehab facility, recently returned back to [facility] however patient has been weaker since then.< from: CT Brain Perfusion Maps Stroke (11.11.23 @ 14:12) >    < from: CT Brain Perfusion Maps Stroke (11.11.23 @ 14:12) >    ACC: [id number] EXAM:  CT BRAIN PERFUSION MAPS STROKE   ORDERED BY:   JORGE FELDER     ACC: [id number] EXAM:  CT ANGIO BRAIN STROKE PROTC IC   ORDERED BY:   JORGE FELDER     ACC: [id number] EXAM:  CT BRAIN STROKE PROTOCOL   ORDERED BY: JORGE FELDER     ACC: [id number] EXAM:  CT ANGIO NECK STROKE PROTCL IC   ORDERED BY:   JORGE FELDER     PROCEDURE DATE:  11/11/2023          INTERPRETATION:  CT HEAD STROKE PROTOCOL, CT ANGIO NECK STROKE PROTOCOL,   CT ANGIO HEAD STROKE PROTOCOL, CT PERFUSION WITH MAPS STROKE PROTOCOL    CT BRAIN WITHOUT CONTRAST    INDICATIONS:  Stroke code Leaning to left side. Slight left arm drift.    TECHNIQUE:  Serial axial images were obtained from the skull base to the   vertex without the use of contrast.    COMPARISON EXAM: 7/11/2023    FINDINGS:  Ventricles and sulci: Parenchymal volume loss is present which is   commensurate with patient age.  Intra-axial: Periventricular small vessel white matter ischemic changes.   No intracranial mass, acute hemorrhage, or midline shift is present.  Extra-axial: No extra-axial fluid collection is identified. Deposition of   calcified plaque at the level of the bilateral carotid siphons.  Calvarium: Intact.    Left optic lens replacement, as on prior. Bilateral optic globes are   intact. Imaged paranasal sinuses, bilateral mastoid air cells, middle ear   cavities are clear.        CT PERFUSION  CTA OF THE NECK AND HEAD:      CONTRAST/COMPLICATIONS:  IV Contrast: NONE (accession [id number]), IV contrast documented in   unlinked concurrent exam (accession [id number])  Complications: None reported at time of study completion      TECHNIQUE PERFUSION:  After the intravenous power injection of non-ionic contrast material,   repeat serial thin sections were obtained through the intracranial   circulation on a multislice CT scanner. Artificial intelligence was then   used for analysis of perfusion and intracranial large vessel occlusion.      TECHNIQUE CTA NECK AND HEAD:  After the intravenous power injection of non-ionic contrast material,   serial thin sections were obtained through the cervical and intracranial   circulation on a multislice CT scanner. 2D and 3D MIP reformatted images   were obtained.  Images were reformatted using a dedicated 3D software   package.    FINDINGS:    CT PERFUSION:    COMPARISON EXAMINATION: None.    Technical limitations: Significant patient motion in all 3 planes during   image acquisition likely renders results suboptimal and nondiagnostic.   Arterial and venous waveforms are also not optimized.    Miscellaneous: None.      CTA NECK WITH CONTRAST:    CAROTID STENOSIS REFERENCE: (NASCET = 100x1-(N/D)). N=greatest narrowing.   D=normal distal diameter - MILD = <50% stenosis. - MODERATE = 50-69%   stenosis.- SEVERE = 70-89% stenosis. - HAIRLINE/CRITICAL = 90-99%   stenosis. - OCCLUDED = 100% stenosis.      COMPARISON: None.    FINDINGS:  Aortic arch and visualized great vessels: Within normal limits.    RIGHT:  Common carotid artery: Follows a tortuouscourse and is normal in caliber   to the carotid bifurcation.  Internal carotid artery: Mild to moderate deposition of calcified plaque   at the level of the right common carotid artery bifurcation with   extension into the proximal aspect of the right internal carotid artery,   without significant stenosis based on NASCET criteria. Normal course and   caliber to the intracranial circulation.    Vertebral artery: Emanates from the right subclavian artery. The right   vertebral artery is normal incourse and caliber to the intracranial   circulation.      LEFT:  Common carotid artery :Normal in course and caliber to the carotid   bifurcation.  Internal carotid artery: Mild to moderate deposition of calcified plaque   at the level of the left common carotid artery bifurcation with extension   into the proximal left internal carotid artery without significant   stenosis based on NASCET criteria. Normal course and caliber to the   intracranial circulation.    Vertebral artery: Emanates from the left subclavian artery. The left   vertebral artery is normal in course and caliber to the intracranial   circulation.      Miscellaneous: None    CTA HEAD WITH CONTRAST:    COMPARISON EXAM: None.      Internal carotid arteries: Bilateral petrous, precavernous, cavernous,   and supraclinoid regions are patent. Mild deposition of calcified plaque   at the level of the carotid siphons without significant stenosis in this   location.    [place]:  No aneurysm about the [place]. Tiny aneurysms   can be beyond the resolution of CTA technique.    Anterior Cerebral arteries: No significant stenosis or occlusion.  Middle Cerebral arteries: No significant stenosis or occlusion.    Posterior Circulation: Distal intracranial bilateralvertebral arteries   are visualized. The vertebrobasilar confluence is unremarkable. There is   good flow within the basilar artery. Bilateral superior cerebellar   arteries and bilateral posterior cerebral arteries emanate from the   distal aspect of the basilar artery. There is a hypoplastic right P1   segment. A prominent right posterior communicating artery contributes to   flow within the right posterior cerebral artery.    < end of copied text >     (11 Nov 2023 21:59)    PAST MEDICAL & SURGICAL HISTORY:  Afib      PFO (patent foramen ovale)      Degeneration macular      Osteoporosis      History of complete heart block      HTN (hypertension)      Hypothyroidism      Spinal stenosis      Cardiac pacemaker          MEDICATIONS  (STANDING):  aMIOdarone    Tablet 200 milliGRAM(s) Oral daily  apixaban 2.5 milliGRAM(s) Oral every 12 hours  artificial  tears Solution 1 Drop(s) Both EYES four times a day  ascorbic acid 500 milliGRAM(s) Oral daily  dextrose 5% + sodium chloride 0.45% with potassium chloride 20 mEq/L 1000 milliLiter(s) (50 mL/Hr) IV Continuous <Continuous>  enalapril 10 milliGRAM(s) Oral daily  ertapenem  IVPB 500 milliGRAM(s) IV Intermittent every 24 hours  ferrous    sulfate 325 milliGRAM(s) Oral three times a day  levothyroxine 75 MICROGram(s) Oral daily  metoprolol tartrate 50 milliGRAM(s) Oral two times a day  multivitamin 1 Tablet(s) Oral daily  pantoprazole    Tablet 40 milliGRAM(s) Oral before breakfast  polyethylene glycol 3350 17 Gram(s) Oral daily    MEDICATIONS  (PRN):      OBJECTIVE    T(C): 36.7 (11-13-23 @ 12:05), Max: 36.8 (11-12-23 @ 19:43)  HR: 75 (11-13-23 @ 12:05) (60 - 75)  BP: 115/70 (11-13-23 @ 12:05) (110/58 - 128/60)  RR: 18 (11-13-23 @ 12:05) (18 - 19)  SpO2: 95% (11-13-23 @ 12:05) (95% - 95%)  Wt(kg): --  I&O's Summary    12 Nov 2023 07:01  -  13 Nov 2023 07:00  --------------------------------------------------------  IN: 450 mL / OUT: 0 mL / NET: 450 mL          REVIEW OF SYSTEMS:  CONSTITUTIONAL: No fever, weight loss, or fatigue  EYES: No eye pain, visual disturbances, or discharge  ENMT:   No sinus or throat pain  NECK: No pain or stiffness  RESPIRATORY: No cough, wheezing, chills or hemoptysis; No shortness of breath  CARDIOVASCULAR: No chest pain, palpitations, dizziness, or leg swelling  GASTROINTESTINAL: No abdominal pain. No nausea, vomiting; No diarrhea or constipation. No melena or hematochezia.  GENITOURINARY: No dysuria, frequency, hematuria, or incontinence  NEUROLOGICAL: No headaches, memory loss, loss of strength, numbness, or tremors  SKIN: No itching, burning, rashes, or lesions   MUSCULOSKELETAL: No joint pain or swelling; No muscle, back, or extremity pain    PHYSICAL EXAM:  Appearance: NAD. VS past 24 hrs -as above   HEENT:   Moist oral mucosa. Conjunctiva clear b/l.   Neck : supple  Respiratory: Lungs CTAB.  Gastrointestinal:  Soft, nontender. No rebound. No rigidity. BS present	  Cardiovascular: RRR ,S1S2 present  Neurologic: Non-focal. Moving all extremities.  Extremities: No edema. No erythema. No calf tenderness.  Skin: No rashes, No ecchymoses, No cyanosis.	  wounds ,skin lesions-See skin assesment flow sheet   LABS:                        8.3    10.96 )-----------( 306      ( 13 Nov 2023 07:39 )             25.8     11-13    146<H>  |  120<H>  |  20  ----------------------------<  91  3.7   |  22  |  0.66    Ca    7.7<L>      13 Nov 2023 07:39  Phos  2.1     11-13  Mg     2.0     11-13    TPro  4.9<L>  /  Alb  1.7<L>  /  TBili  0.5  /  DBili  x   /  AST  32  /  ALT  36  /  AlkPhos  99  11-13    CAPILLARY BLOOD GLUCOSE          Urinalysis Basic - ( 13 Nov 2023 07:39 )    Color: x / Appearance: x / SG: x / pH: x  Gluc: 91 mg/dL / Ketone: x  / Bili: x / Urobili: x   Blood: x / Protein: x / Nitrite: x   Leuk Esterase: x / RBC: x / WBC x   Sq Epi: x / Non Sq Epi: x / Bacteria: x        Culture - Urine (collected 11 Nov 2023 15:41)  Source: Clean Catch Clean Catch (Midstream)  Preliminary Report (13 Nov 2023 11:22):    >100,000 CFU/ml Escherichia coli      RADIOLOGY & ADDITIONAL TESTS:   reviewed elctronically  ASSESSMENT/PLAN: 	  25 minutes aggregate time was spent on this visit, 50% visit time spent in care co-ordination with other attendings and counselling patient .I have discussed care plan with patient / HCP/family member daughter on a  phone today  ,who expressed understanding of problems treatment and their effect and side effects, alternatives in details. I have asked if they have any questions and concerns and appropriately addressed them to best of my ability. ACP-Advance care planning was discussed , pallitaive care issues ,CMO ,GOC ,MOLST  form ,advance directives were reviewed .All questions were answered to the best of my knowledge - 25 m

## 2023-11-13 NOTE — PHYSICAL THERAPY INITIAL EVALUATION ADULT - LIVES WITH, PROFILE
Pt lives at [facility], has aide part of the day and pt states someone always present with patient. Per daughter, pt transfers mostly to and from bed to chair/wheelchair with 2 people, does not walk at baseline PTA. Pt lives at [facility], has aide part of the day and pt states someone always present with patient. Per daughter, pt transfers mostly to and from bed to chair/wheelchair with 2 people, does not walk at baseline PTA. Pt lives at [facility], has aide part of the day and pt states someone always present with patient. Per daughter, pt transfers mostly to and from bed to chair/wheelchair with 2 people, does not walk at baseline PTA.

## 2023-11-13 NOTE — CARE COORDINATION ASSESSMENT. - NSCAREPROVIDERS_GEN_ALL_CORE_FT
CARE PROVIDERS:  Accepting Physician: Bisi Soliz  Administration: Khai Montaño  Administration: Tariq Velazquez  Admitting: Bisi Soliz  Attending: Bisi Soliz  Case Management: Behringer, Megan  Consultant: Gideon Foster  Consultant: Parish Serra  Consultant: Renan Whittaker  Consultant: Edwardo Gonzalez  Consultant: Rei Reich  ED ACP: Shira Kenny  ED Attending: Valente Sen  ED Nurse: Narendra Mcwilliams  HIM/Billing & Coding: Chika Liz  Nurse: Violet Mccain  Ordered: ADM, User  Ordered: Pahlavan, Mohsen  Outpatient Provider: Valente Swenson  Outpatient Provider: Matt Lares  Outpatient Provider: Pahlavan, Mohsen  Outpatient Provider: Parish Serra  PCA/Nursing Assistant: Hannah Schmidt  Physical Therapy: Colin Dalton  Primary Team: Pawel Harrington  Registered Dietitian: Isabel Ren  Respiratory Therapy: Leelee Bee  : Kayley Garcia  Speech Pathology: Jamee Celeste  Support Svcs./Ancillary Svcs.: Rachana Cyr

## 2023-11-13 NOTE — CAREGIVER ENGAGEMENT NOTE - CAREGIVER NAME
[facility]

## 2023-11-13 NOTE — PATIENT CHOICE NOTE. - NSPTCHOICESTATE_GEN_ALL_CORE
I have met with the patient and/or caregiver to discuss discharge goals and treatment plan. Patient and/or caregiver also provided with instructions on accessing the CMS Compare websites for additional information related to Post Acute Provider quality and resource use measures to assist them in evaluation of the providers and in selecting their post-acute provider of choice. Patient and caregiver were informed of the facilities that are owned and/or operated by [facility]. I have discussed with the patient the availability of in-network facilities and providers. Patient and caregiver provided with a list of post-acute providers whose services are appropriate to the discharge plans and patient needs.     For patient requiring durable medical equipment, patient and/or caregiver were informed that they have the right to request who provides the required equipment. I have met with the patient and/or caregiver to discuss discharge goals and treatment plan. Patient and/or caregiver also provided with instructions on accessing the CMS Compare websites for additional information related to Post Acute Provider quality and resource use measures to assist them in evaluation of the providers and in selecting their post-acute provider of choice. Patient and caregiver were informed of the facilities that are owned and/or operated by [facility]. I have discussed with the patient the availability of in-network facilities and providers. Patient and caregiver provided with a list of post-acute providers whose services are appropriate to the discharge plans and patient needs.     For patient requiring durable medical equipment, patient and/or caregiver were informed that they have the right to request who provides the required equipment. I have met with the patient and/or caregiver to discuss discharge goals and treatment plan. Patient and/or caregiver also provided with instructions on accessing the CMS Compare websites for additional information related to Post Acute Provider quality and resource use measures to assist them in evaluation of the providers and in selecting their post-acute provider of choice. Patient and caregiver were informed of the facilities that are owned and/or operated by [facility]. I have discussed with the patient the availability of in-network facilities and providers. Patient and caregiver provided with a list of post-acute providers whose services are appropriate to the discharge plans and patient needs.     For patient requiring durable medical equipment, patient and/or caregiver were informed that they have the right to request who provides the required equipment.

## 2023-11-13 NOTE — PHYSICAL THERAPY INITIAL EVALUATION ADULT - BED MOBILITY TRAINING, PT EVAL
Patient will perform supine<>sit with moderate assist to be able to reposition in bed and maintain skin integrity, within 3 to 5 sessions.

## 2023-11-13 NOTE — DIETITIAN INITIAL EVALUATION ADULT - PERTINENT MEDS FT
MEDICATIONS  (STANDING):  aMIOdarone    Tablet 200 milliGRAM(s) Oral daily  apixaban 2.5 milliGRAM(s) Oral every 12 hours  artificial  tears Solution 1 Drop(s) Both EYES four times a day  ascorbic acid 500 milliGRAM(s) Oral daily  dextrose 5% + sodium chloride 0.45% with potassium chloride 20 mEq/L 1000 milliLiter(s) (50 mL/Hr) IV Continuous <Continuous>  enalapril 10 milliGRAM(s) Oral daily  ertapenem  IVPB 500 milliGRAM(s) IV Intermittent every 24 hours  ferrous    sulfate 325 milliGRAM(s) Oral three times a day  levothyroxine 75 MICROGram(s) Oral daily  metoprolol tartrate 50 milliGRAM(s) Oral two times a day  multivitamin 1 Tablet(s) Oral daily  pantoprazole    Tablet 40 milliGRAM(s) Oral before breakfast  polyethylene glycol 3350 17 Gram(s) Oral daily    MEDICATIONS  (PRN):

## 2023-11-13 NOTE — DIETITIAN INITIAL EVALUATION ADULT - SIGNS/SYMPTOMS
as evidenced by multiple pressure injuries.  as evidenced by NFPE findings- mild/moderate muscle depletion and fat loss.

## 2023-11-13 NOTE — SWALLOW BEDSIDE ASSESSMENT ADULT - ADDITIONAL RECOMMENDATIONS
This dept to continue to f/u as schedule permits to ensure diet tolerance and trial swallow therapy as schedule permits. MBS to be completed pending MD order. Consider continued swallow therapy upon discharge which can be scheduled at the [facility] at [phone]. This dept to continue to f/u as schedule permits to ensure diet tolerance and trial swallow therapy as schedule permits. MBS to be completed pending MD order. Consider continued swallow therapy upon discharge which can be scheduled at the [facility] at [phone]. This dept to continue to f/u as schedule permits to ensure diet tolerance and trial swallow therapy as schedule permits. MBS to be completed pending MD order. Consider continued swallow therapy upon discharge which can be scheduled at the [facility] at [phone].

## 2023-11-13 NOTE — PHYSICAL THERAPY INITIAL EVALUATION ADULT - NSPTDISCHREC_GEN_A_CORE
[facility] vs Home with assist, based on functional mobility; note pt and daughter want return to [facility]; daughter states PTA pt able to transfer with 2-person assist at facility and only transfers to/from bed and chair [facility] vs Home with assist, based on functional mobility; note pt and daughter want return to [facility]; daughter states PTA pt able to transfer with 2-person assist at facility and only transfers to/from bed and chair [facility] vs Home with assist, based on functional mobility; note pt and daughter want return to [facility]; daughter states PTA pt able to transfer with 2-person assist at facility and only transfers to/from bed and chair

## 2023-11-13 NOTE — SWALLOW BEDSIDE ASSESSMENT ADULT - ORAL PHASE
increased work of breathing across prolonged mastication/Decreased anterior-posterior movement of the bolus/Delayed oral transit time Within functional limits

## 2023-11-13 NOTE — PHYSICAL THERAPY INITIAL EVALUATION ADULT - PATIENT/FAMILY AGREES WITH PLAN
pt and daughter prefer to take pt back to [facility], daughter states the patient has 2-person assist PTA and no difficulties with transferring at facility. pt and daughter prefer to take pt back to [facility], daughter states the patient has 2-person assist PTA and no difficulties with transferring at facility. pt and daughter prefer to take pt back to [facility], daughter states the patient has 2-person assist PTA and no difficulties with transferring at facility.

## 2023-11-13 NOTE — PHYSICAL THERAPY INITIAL EVALUATION ADULT - IMPAIRED TRANSFERS: SIT/STAND, REHAB EVAL
Pt achieving <50% of full stand; pt/family states she does not stand fully at baseline/impaired balance/decreased flexibility/decreased strength

## 2023-11-13 NOTE — PROGRESS NOTE ADULT - SUBJECTIVE AND OBJECTIVE BOX
Interval History:    CENTRAL LINE:   [  ] YES       [  ] NO  SKELTON:                 [  ] YES       [  ] NO         REVIEW OF SYSTEMS:  All Systems below were reviewed and are negative [  ]  HEENT:  ID:  Pulmonary:  Cardiac:  GI:  Renal:  Musculoskeletal:  All other systems above were reviewed and are negative   [  ]      MEDICATIONS  (STANDING):  aMIOdarone    Tablet 200 milliGRAM(s) Oral daily  apixaban 2.5 milliGRAM(s) Oral every 12 hours  artificial  tears Solution 1 Drop(s) Both EYES four times a day  ascorbic acid 500 milliGRAM(s) Oral daily  dextrose 5% + sodium chloride 0.45% with potassium chloride 20 mEq/L 1000 milliLiter(s) (50 mL/Hr) IV Continuous <Continuous>  enalapril 10 milliGRAM(s) Oral daily  ertapenem  IVPB 500 milliGRAM(s) IV Intermittent every 24 hours  ferrous    sulfate 325 milliGRAM(s) Oral three times a day  levothyroxine 75 MICROGram(s) Oral daily  metoprolol tartrate 50 milliGRAM(s) Oral two times a day  multivitamin 1 Tablet(s) Oral daily  pantoprazole    Tablet 40 milliGRAM(s) Oral before breakfast  polyethylene glycol 3350 17 Gram(s) Oral daily    MEDICATIONS  (PRN):      Vital Signs Last 24 Hrs  T(C): 36.6 (13 Nov 2023 20:59), Max: 36.8 (13 Nov 2023 04:56)  T(F): 97.8 (13 Nov 2023 20:59), Max: 98.2 (13 Nov 2023 04:56)  HR: 74 (13 Nov 2023 20:59) (72 - 75)  BP: 119/74 (13 Nov 2023 20:59) (110/58 - 119/74)  BP(mean): --  RR: 18 (13 Nov 2023 20:59) (18 - 18)  SpO2: 96% (13 Nov 2023 20:59) (95% - 96%)    Parameters below as of 13 Nov 2023 20:59  Patient On (Oxygen Delivery Method): room air        I&O's Summary    12 Nov 2023 07:01  -  13 Nov 2023 07:00  --------------------------------------------------------  IN: 450 mL / OUT: 0 mL / NET: 450 mL        PHYSICAL EXAM:  HEENT: NC/AT; PERRLA  Neck: Soft; no tenderness  Lungs: CTA bilaterally; no wheezing.   Heart:  Abdomen:  Genital/ Rectal:  Extremities:  Neurologic:  Vascular:      LABORATORY:    CBC Full  -  ( 13 Nov 2023 07:39 )  WBC Count : 10.96 K/uL  RBC Count : 2.92 M/uL  Hemoglobin : 8.3 g/dL  Hematocrit : 25.8 %  Platelet Count - Automated : 306 K/uL  Mean Cell Volume : 88.4 fl  Mean Cell Hemoglobin : 28.4 pg  Mean Cell Hemoglobin Concentration : 32.2 gm/dL  Auto Neutrophil # : x  Auto Lymphocyte # : x  Auto Monocyte # : x  Auto Eosinophil # : x  Auto Basophil # : x  Auto Neutrophil % : x  Auto Lymphocyte % : x  Auto Monocyte % : x  Auto Eosinophil % : x  Auto Basophil % : x          11-13    146<H>  |  120<H>  |  20  ----------------------------<  91  3.7   |  22  |  0.66    Ca    7.7<L>      13 Nov 2023 07:39  Phos  2.1     11-13  Mg     2.0     11-13    TPro  4.9<L>  /  Alb  1.7<L>  /  TBili  0.5  /  DBili  x   /  AST  32  /  ALT  36  /  AlkPhos  99  11-13          Assessment and Plan:          Edwardo Gonzalez MD   [phone].      Interval History:    CENTRAL LINE:   [  ] YES       [  ] NO  SKELTON:                 [  ] YES       [  ] NO         REVIEW OF SYSTEMS:  All Systems below were reviewed and are negative [  ]  HEENT:  ID:  Pulmonary:  Cardiac:  GI:  Renal:  Musculoskeletal:  All other systems above were reviewed and are negative   [  ]      MEDICATIONS  (STANDING):  aMIOdarone    Tablet 200 milliGRAM(s) Oral daily  apixaban 2.5 milliGRAM(s) Oral every 12 hours  artificial  tears Solution 1 Drop(s) Both EYES four times a day  ascorbic acid 500 milliGRAM(s) Oral daily  dextrose 5% + sodium chloride 0.45% with potassium chloride 20 mEq/L 1000 milliLiter(s) (50 mL/Hr) IV Continuous <Continuous>  enalapril 10 milliGRAM(s) Oral daily  ertapenem  IVPB 500 milliGRAM(s) IV Intermittent every 24 hours  ferrous    sulfate 325 milliGRAM(s) Oral three times a day  levothyroxine 75 MICROGram(s) Oral daily  metoprolol tartrate 50 milliGRAM(s) Oral two times a day  multivitamin 1 Tablet(s) Oral daily  pantoprazole    Tablet 40 milliGRAM(s) Oral before breakfast  polyethylene glycol 3350 17 Gram(s) Oral daily    MEDICATIONS  (PRN):      Vital Signs Last 24 Hrs  T(C): 36.6 (13 Nov 2023 20:59), Max: 36.8 (13 Nov 2023 04:56)  T(F): 97.8 (13 Nov 2023 20:59), Max: 98.2 (13 Nov 2023 04:56)  HR: 74 (13 Nov 2023 20:59) (72 - 75)  BP: 119/74 (13 Nov 2023 20:59) (110/58 - 119/74)  BP(mean): --  RR: 18 (13 Nov 2023 20:59) (18 - 18)  SpO2: 96% (13 Nov 2023 20:59) (95% - 96%)    Parameters below as of 13 Nov 2023 20:59  Patient On (Oxygen Delivery Method): room air        I&O's Summary    12 Nov 2023 07:01  -  13 Nov 2023 07:00  --------------------------------------------------------  IN: 450 mL / OUT: 0 mL / NET: 450 mL        PHYSICAL EXAM:  HEENT: NC/AT; PERRLA  Neck: Soft; no tenderness  Lungs: CTA bilaterally; no wheezing.   Heart:  Abdomen:  Genital/ Rectal:  Extremities:  Neurologic:  Vascular:      LABORATORY:    CBC Full  -  ( 13 Nov 2023 07:39 )  WBC Count : 10.96 K/uL  RBC Count : 2.92 M/uL  Hemoglobin : 8.3 g/dL  Hematocrit : 25.8 %  Platelet Count - Automated : 306 K/uL  Mean Cell Volume : 88.4 fl  Mean Cell Hemoglobin : 28.4 pg  Mean Cell Hemoglobin Concentration : 32.2 gm/dL  Auto Neutrophil # : x  Auto Lymphocyte # : x  Auto Monocyte # : x  Auto Eosinophil # : x  Auto Basophil # : x  Auto Neutrophil % : x  Auto Lymphocyte % : x  Auto Monocyte % : x  Auto Eosinophil % : x  Auto Basophil % : x          11-13    146<H>  |  120<H>  |  20  ----------------------------<  91  3.7   |  22  |  0.66    Ca    7.7<L>      13 Nov 2023 07:39  Phos  2.1     11-13  Mg     2.0     11-13    TPro  4.9<L>  /  Alb  1.7<L>  /  TBili  0.5  /  DBili  x   /  AST  32  /  ALT  36  /  AlkPhos  99  11-13          Assessment and Plan:          Edwardo Gonzalez MD   [phone].      Interval History:    CENTRAL LINE:   [  ] YES       [  ] NO  SKELTON:                 [  ] YES       [  ] NO         REVIEW OF SYSTEMS:  All Systems below were reviewed and are negative [  ]  HEENT:  ID:  Pulmonary:  Cardiac:  GI:  Renal:  Musculoskeletal:  All other systems above were reviewed and are negative   [  ]      MEDICATIONS  (STANDING):  aMIOdarone    Tablet 200 milliGRAM(s) Oral daily  apixaban 2.5 milliGRAM(s) Oral every 12 hours  artificial  tears Solution 1 Drop(s) Both EYES four times a day  ascorbic acid 500 milliGRAM(s) Oral daily  dextrose 5% + sodium chloride 0.45% with potassium chloride 20 mEq/L 1000 milliLiter(s) (50 mL/Hr) IV Continuous <Continuous>  enalapril 10 milliGRAM(s) Oral daily  ertapenem  IVPB 500 milliGRAM(s) IV Intermittent every 24 hours  ferrous    sulfate 325 milliGRAM(s) Oral three times a day  levothyroxine 75 MICROGram(s) Oral daily  metoprolol tartrate 50 milliGRAM(s) Oral two times a day  multivitamin 1 Tablet(s) Oral daily  pantoprazole    Tablet 40 milliGRAM(s) Oral before breakfast  polyethylene glycol 3350 17 Gram(s) Oral daily    MEDICATIONS  (PRN):      Vital Signs Last 24 Hrs  T(C): 36.6 (13 Nov 2023 20:59), Max: 36.8 (13 Nov 2023 04:56)  T(F): 97.8 (13 Nov 2023 20:59), Max: 98.2 (13 Nov 2023 04:56)  HR: 74 (13 Nov 2023 20:59) (72 - 75)  BP: 119/74 (13 Nov 2023 20:59) (110/58 - 119/74)  BP(mean): --  RR: 18 (13 Nov 2023 20:59) (18 - 18)  SpO2: 96% (13 Nov 2023 20:59) (95% - 96%)    Parameters below as of 13 Nov 2023 20:59  Patient On (Oxygen Delivery Method): room air        I&O's Summary    12 Nov 2023 07:01  -  13 Nov 2023 07:00  --------------------------------------------------------  IN: 450 mL / OUT: 0 mL / NET: 450 mL        PHYSICAL EXAM:  HEENT: NC/AT; PERRLA  Neck: Soft; no tenderness  Lungs: CTA bilaterally; no wheezing.   Heart:  Abdomen:  Genital/ Rectal:  Extremities:  Neurologic:  Vascular:      LABORATORY:    CBC Full  -  ( 13 Nov 2023 07:39 )  WBC Count : 10.96 K/uL  RBC Count : 2.92 M/uL  Hemoglobin : 8.3 g/dL  Hematocrit : 25.8 %  Platelet Count - Automated : 306 K/uL  Mean Cell Volume : 88.4 fl  Mean Cell Hemoglobin : 28.4 pg  Mean Cell Hemoglobin Concentration : 32.2 gm/dL  Auto Neutrophil # : x  Auto Lymphocyte # : x  Auto Monocyte # : x  Auto Eosinophil # : x  Auto Basophil # : x  Auto Neutrophil % : x  Auto Lymphocyte % : x  Auto Monocyte % : x  Auto Eosinophil % : x  Auto Basophil % : x          11-13    146<H>  |  120<H>  |  20  ----------------------------<  91  3.7   |  22  |  0.66    Ca    7.7<L>      13 Nov 2023 07:39  Phos  2.1     11-13  Mg     2.0     11-13    TPro  4.9<L>  /  Alb  1.7<L>  /  TBili  0.5  /  DBili  x   /  AST  32  /  ALT  36  /  AlkPhos  99  11-13          Assessment and Plan:          Edwardo Gonzalez MD   [phone].  Comfortable  No fevers.        MEDICATIONS  (STANDING):  aMIOdarone    Tablet 200 milliGRAM(s) Oral daily  apixaban 2.5 milliGRAM(s) Oral every 12 hours  artificial  tears Solution 1 Drop(s) Both EYES four times a day  ascorbic acid 500 milliGRAM(s) Oral daily  dextrose 5% + sodium chloride 0.45% with potassium chloride 20 mEq/L 1000 milliLiter(s) (50 mL/Hr) IV Continuous <Continuous>  enalapril 10 milliGRAM(s) Oral daily  ertapenem  IVPB 500 milliGRAM(s) IV Intermittent every 24 hours  ferrous    sulfate 325 milliGRAM(s) Oral three times a day  levothyroxine 75 MICROGram(s) Oral daily  metoprolol tartrate 50 milliGRAM(s) Oral two times a day  multivitamin 1 Tablet(s) Oral daily  pantoprazole    Tablet 40 milliGRAM(s) Oral before breakfast  polyethylene glycol 3350 17 Gram(s) Oral daily    MEDICATIONS  (PRN):      Vital Signs Last 24 Hrs  T(C): 36.6 (13 Nov 2023 20:59), Max: 36.8 (13 Nov 2023 04:56)  T(F): 97.8 (13 Nov 2023 20:59), Max: 98.2 (13 Nov 2023 04:56)  HR: 74 (13 Nov 2023 20:59) (72 - 75)  BP: 119/74 (13 Nov 2023 20:59) (110/58 - 119/74)  BP(mean): --  RR: 18 (13 Nov 2023 20:59) (18 - 18)  SpO2: 96% (13 Nov 2023 20:59) (95% - 96%)    Parameters below as of 13 Nov 2023 20:59  Patient On (Oxygen Delivery Method): room air        I&O's Summary    12 Nov 2023 07:01  -  13 Nov 2023 07:00  --------------------------------------------------------  IN: 450 mL / OUT: 0 mL / NET: 450 mL        PHYSICAL EXAM:  HEENT: NC/AT; PERRLA  Neck: Soft; no tenderness  Lungs: CTA bilaterally; no wheezing.   Heart:  Abdomen:  Genital/ Rectal:  Extremities:  Neurologic:  Vascular:      LABORATORY:    CBC Full  -  ( 13 Nov 2023 07:39 )  WBC Count : 10.96 K/uL  RBC Count : 2.92 M/uL  Hemoglobin : 8.3 g/dL  Hematocrit : 25.8 %  Platelet Count - Automated : 306 K/uL  Mean Cell Volume : 88.4 fl  Mean Cell Hemoglobin : 28.4 pg  Mean Cell Hemoglobin Concentration : 32.2 gm/dL  Auto Neutrophil # : x  Auto Lymphocyte # : x  Auto Monocyte # : x  Auto Eosinophil # : x  Auto Basophil # : x  Auto Neutrophil % : x  Auto Lymphocyte % : x  Auto Monocyte % : x  Auto Eosinophil % : x  Auto Basophil % : x          11-13    146<H>  |  120<H>  |  20  ----------------------------<  91  3.7   |  22  |  0.66    Ca    7.7<L>      13 Nov 2023 07:39  Phos  2.1     11-13  Mg     2.0     11-13    TPro  4.9<L>  /  Alb  1.7<L>  /  TBili  0.5  /  DBili  x   /  AST  32  /  ALT  36  /  AlkPhos  99  11-13      Assessment and Plan:    1. UTI.  2. Toxic metabolic encephalopathy.  3. Generalized weakness.     . UA was suggestive of UTI. The patient just had UTI with  ESBL E coli bacteremia last month.  . Continue IV Invanz for presumptive recurrent UTI with ESBL. Urine culture is growing E coli.   . Monitor mental status.      Edwardo Gonzalez MD   [phone].  Comfortable  No fevers.        MEDICATIONS  (STANDING):  aMIOdarone    Tablet 200 milliGRAM(s) Oral daily  apixaban 2.5 milliGRAM(s) Oral every 12 hours  artificial  tears Solution 1 Drop(s) Both EYES four times a day  ascorbic acid 500 milliGRAM(s) Oral daily  dextrose 5% + sodium chloride 0.45% with potassium chloride 20 mEq/L 1000 milliLiter(s) (50 mL/Hr) IV Continuous <Continuous>  enalapril 10 milliGRAM(s) Oral daily  ertapenem  IVPB 500 milliGRAM(s) IV Intermittent every 24 hours  ferrous    sulfate 325 milliGRAM(s) Oral three times a day  levothyroxine 75 MICROGram(s) Oral daily  metoprolol tartrate 50 milliGRAM(s) Oral two times a day  multivitamin 1 Tablet(s) Oral daily  pantoprazole    Tablet 40 milliGRAM(s) Oral before breakfast  polyethylene glycol 3350 17 Gram(s) Oral daily    MEDICATIONS  (PRN):      Vital Signs Last 24 Hrs  T(C): 36.6 (13 Nov 2023 20:59), Max: 36.8 (13 Nov 2023 04:56)  T(F): 97.8 (13 Nov 2023 20:59), Max: 98.2 (13 Nov 2023 04:56)  HR: 74 (13 Nov 2023 20:59) (72 - 75)  BP: 119/74 (13 Nov 2023 20:59) (110/58 - 119/74)  BP(mean): --  RR: 18 (13 Nov 2023 20:59) (18 - 18)  SpO2: 96% (13 Nov 2023 20:59) (95% - 96%)    Parameters below as of 13 Nov 2023 20:59  Patient On (Oxygen Delivery Method): room air        I&O's Summary    12 Nov 2023 07:01  -  13 Nov 2023 07:00  --------------------------------------------------------  IN: 450 mL / OUT: 0 mL / NET: 450 mL        PHYSICAL EXAM:  HEENT: NC/AT; PERRLA  Neck: Soft; no tenderness  Lungs: CTA bilaterally; no wheezing.   Heart:  Abdomen:  Genital/ Rectal:  Extremities:  Neurologic:  Vascular:      LABORATORY:    CBC Full  -  ( 13 Nov 2023 07:39 )  WBC Count : 10.96 K/uL  RBC Count : 2.92 M/uL  Hemoglobin : 8.3 g/dL  Hematocrit : 25.8 %  Platelet Count - Automated : 306 K/uL  Mean Cell Volume : 88.4 fl  Mean Cell Hemoglobin : 28.4 pg  Mean Cell Hemoglobin Concentration : 32.2 gm/dL  Auto Neutrophil # : x  Auto Lymphocyte # : x  Auto Monocyte # : x  Auto Eosinophil # : x  Auto Basophil # : x  Auto Neutrophil % : x  Auto Lymphocyte % : x  Auto Monocyte % : x  Auto Eosinophil % : x  Auto Basophil % : x          11-13    146<H>  |  120<H>  |  20  ----------------------------<  91  3.7   |  22  |  0.66    Ca    7.7<L>      13 Nov 2023 07:39  Phos  2.1     11-13  Mg     2.0     11-13    TPro  4.9<L>  /  Alb  1.7<L>  /  TBili  0.5  /  DBili  x   /  AST  32  /  ALT  36  /  AlkPhos  99  11-13      Assessment and Plan:    1. UTI.  2. Toxic metabolic encephalopathy.  3. Generalized weakness.     . UA was suggestive of UTI. The patient just had UTI with  ESBL E coli bacteremia last month.  . Continue IV Invanz for presumptive recurrent UTI with ESBL. Urine culture is growing E coli.   . Monitor mental status.      Edwardo Gonzalez MD   [phone].  Comfortable  No fevers.        MEDICATIONS  (STANDING):  aMIOdarone    Tablet 200 milliGRAM(s) Oral daily  apixaban 2.5 milliGRAM(s) Oral every 12 hours  artificial  tears Solution 1 Drop(s) Both EYES four times a day  ascorbic acid 500 milliGRAM(s) Oral daily  dextrose 5% + sodium chloride 0.45% with potassium chloride 20 mEq/L 1000 milliLiter(s) (50 mL/Hr) IV Continuous <Continuous>  enalapril 10 milliGRAM(s) Oral daily  ertapenem  IVPB 500 milliGRAM(s) IV Intermittent every 24 hours  ferrous    sulfate 325 milliGRAM(s) Oral three times a day  levothyroxine 75 MICROGram(s) Oral daily  metoprolol tartrate 50 milliGRAM(s) Oral two times a day  multivitamin 1 Tablet(s) Oral daily  pantoprazole    Tablet 40 milliGRAM(s) Oral before breakfast  polyethylene glycol 3350 17 Gram(s) Oral daily    MEDICATIONS  (PRN):      Vital Signs Last 24 Hrs  T(C): 36.6 (13 Nov 2023 20:59), Max: 36.8 (13 Nov 2023 04:56)  T(F): 97.8 (13 Nov 2023 20:59), Max: 98.2 (13 Nov 2023 04:56)  HR: 74 (13 Nov 2023 20:59) (72 - 75)  BP: 119/74 (13 Nov 2023 20:59) (110/58 - 119/74)  BP(mean): --  RR: 18 (13 Nov 2023 20:59) (18 - 18)  SpO2: 96% (13 Nov 2023 20:59) (95% - 96%)    Parameters below as of 13 Nov 2023 20:59  Patient On (Oxygen Delivery Method): room air        I&O's Summary    12 Nov 2023 07:01  -  13 Nov 2023 07:00  --------------------------------------------------------  IN: 450 mL / OUT: 0 mL / NET: 450 mL        PHYSICAL EXAM:  HEENT: NC/AT; PERRLA  Neck: Soft; no tenderness  Lungs: CTA bilaterally; no wheezing.   Heart:  Abdomen:  Genital/ Rectal:  Extremities:  Neurologic:  Vascular:      LABORATORY:    CBC Full  -  ( 13 Nov 2023 07:39 )  WBC Count : 10.96 K/uL  RBC Count : 2.92 M/uL  Hemoglobin : 8.3 g/dL  Hematocrit : 25.8 %  Platelet Count - Automated : 306 K/uL  Mean Cell Volume : 88.4 fl  Mean Cell Hemoglobin : 28.4 pg  Mean Cell Hemoglobin Concentration : 32.2 gm/dL  Auto Neutrophil # : x  Auto Lymphocyte # : x  Auto Monocyte # : x  Auto Eosinophil # : x  Auto Basophil # : x  Auto Neutrophil % : x  Auto Lymphocyte % : x  Auto Monocyte % : x  Auto Eosinophil % : x  Auto Basophil % : x          11-13    146<H>  |  120<H>  |  20  ----------------------------<  91  3.7   |  22  |  0.66    Ca    7.7<L>      13 Nov 2023 07:39  Phos  2.1     11-13  Mg     2.0     11-13    TPro  4.9<L>  /  Alb  1.7<L>  /  TBili  0.5  /  DBili  x   /  AST  32  /  ALT  36  /  AlkPhos  99  11-13      Assessment and Plan:    1. UTI.  2. Toxic metabolic encephalopathy.  3. Generalized weakness.     . UA was suggestive of UTI. The patient just had UTI with  ESBL E coli bacteremia last month.  . Continue IV Invanz for presumptive recurrent UTI with ESBL. Urine culture is growing E coli.   . Monitor mental status.      Edwardo Gonzalez MD   [phone].  Comfortable  No fevers.        MEDICATIONS  (STANDING):  aMIOdarone    Tablet 200 milliGRAM(s) Oral daily  apixaban 2.5 milliGRAM(s) Oral every 12 hours  artificial  tears Solution 1 Drop(s) Both EYES four times a day  ascorbic acid 500 milliGRAM(s) Oral daily  dextrose 5% + sodium chloride 0.45% with potassium chloride 20 mEq/L 1000 milliLiter(s) (50 mL/Hr) IV Continuous <Continuous>  enalapril 10 milliGRAM(s) Oral daily  ertapenem  IVPB 500 milliGRAM(s) IV Intermittent every 24 hours  ferrous    sulfate 325 milliGRAM(s) Oral three times a day  levothyroxine 75 MICROGram(s) Oral daily  metoprolol tartrate 50 milliGRAM(s) Oral two times a day  multivitamin 1 Tablet(s) Oral daily  pantoprazole    Tablet 40 milliGRAM(s) Oral before breakfast  polyethylene glycol 3350 17 Gram(s) Oral daily    MEDICATIONS  (PRN):      Vital Signs Last 24 Hrs  T(C): 36.6 (13 Nov 2023 20:59), Max: 36.8 (13 Nov 2023 04:56)  T(F): 97.8 (13 Nov 2023 20:59), Max: 98.2 (13 Nov 2023 04:56)  HR: 74 (13 Nov 2023 20:59) (72 - 75)  BP: 119/74 (13 Nov 2023 20:59) (110/58 - 119/74)  BP(mean): --  RR: 18 (13 Nov 2023 20:59) (18 - 18)  SpO2: 96% (13 Nov 2023 20:59) (95% - 96%)    Parameters below as of 13 Nov 2023 20:59  Patient On (Oxygen Delivery Method): room air        I&O's Summary    12 Nov 2023 07:01  -  13 Nov 2023 07:00  --------------------------------------------------------  IN: 450 mL / OUT: 0 mL / NET: 450 mL        PHYSICAL EXAM:  HEENT: NC/AT; PERRLA  Neck: Soft; no tenderness  Lungs: CTA bilaterally; no wheezing.   Heart: RRR, no murmurs.  Abdomen: Soft, no tenderness.   Genital/ Rectal: No khan catheter  Extremities: No ulcers.  Neurologic: Confused.       LABORATORY:    CBC Full  -  ( 13 Nov 2023 07:39 )  WBC Count : 10.96 K/uL  RBC Count : 2.92 M/uL  Hemoglobin : 8.3 g/dL  Hematocrit : 25.8 %  Platelet Count - Automated : 306 K/uL  Mean Cell Volume : 88.4 fl  Mean Cell Hemoglobin : 28.4 pg  Mean Cell Hemoglobin Concentration : 32.2 gm/dL  Auto Neutrophil # : x  Auto Lymphocyte # : x  Auto Monocyte # : x  Auto Eosinophil # : x  Auto Basophil # : x  Auto Neutrophil % : x  Auto Lymphocyte % : x  Auto Monocyte % : x  Auto Eosinophil % : x  Auto Basophil % : x          11-13    146<H>  |  120<H>  |  20  ----------------------------<  91  3.7   |  22  |  0.66    Ca    7.7<L>      13 Nov 2023 07:39  Phos  2.1     11-13  Mg     2.0     11-13    TPro  4.9<L>  /  Alb  1.7<L>  /  TBili  0.5  /  DBili  x   /  AST  32  /  ALT  36  /  AlkPhos  99  11-13      Assessment and Plan:    1. UTI.  2. Toxic metabolic encephalopathy.  3. Generalized weakness.     . UA was suggestive of UTI. The patient just had UTI with  ESBL E coli bacteremia last month.  . Continue IV Invanz for presumptive recurrent UTI with ESBL. Urine culture is growing E coli.   . Monitor mental status.      Edwardo Gonzalez MD   [phone].  Comfortable  No fevers.        MEDICATIONS  (STANDING):  aMIOdarone    Tablet 200 milliGRAM(s) Oral daily  apixaban 2.5 milliGRAM(s) Oral every 12 hours  artificial  tears Solution 1 Drop(s) Both EYES four times a day  ascorbic acid 500 milliGRAM(s) Oral daily  dextrose 5% + sodium chloride 0.45% with potassium chloride 20 mEq/L 1000 milliLiter(s) (50 mL/Hr) IV Continuous <Continuous>  enalapril 10 milliGRAM(s) Oral daily  ertapenem  IVPB 500 milliGRAM(s) IV Intermittent every 24 hours  ferrous    sulfate 325 milliGRAM(s) Oral three times a day  levothyroxine 75 MICROGram(s) Oral daily  metoprolol tartrate 50 milliGRAM(s) Oral two times a day  multivitamin 1 Tablet(s) Oral daily  pantoprazole    Tablet 40 milliGRAM(s) Oral before breakfast  polyethylene glycol 3350 17 Gram(s) Oral daily    MEDICATIONS  (PRN):      Vital Signs Last 24 Hrs  T(C): 36.6 (13 Nov 2023 20:59), Max: 36.8 (13 Nov 2023 04:56)  T(F): 97.8 (13 Nov 2023 20:59), Max: 98.2 (13 Nov 2023 04:56)  HR: 74 (13 Nov 2023 20:59) (72 - 75)  BP: 119/74 (13 Nov 2023 20:59) (110/58 - 119/74)  BP(mean): --  RR: 18 (13 Nov 2023 20:59) (18 - 18)  SpO2: 96% (13 Nov 2023 20:59) (95% - 96%)    Parameters below as of 13 Nov 2023 20:59  Patient On (Oxygen Delivery Method): room air        I&O's Summary    12 Nov 2023 07:01  -  13 Nov 2023 07:00  --------------------------------------------------------  IN: 450 mL / OUT: 0 mL / NET: 450 mL        PHYSICAL EXAM:  HEENT: NC/AT; PERRLA  Neck: Soft; no tenderness  Lungs: CTA bilaterally; no wheezing.   Heart: RRR, no murmurs.  Abdomen: Soft, no tenderness.   Genital/ Rectal: No khan catheter  Extremities: No ulcers.  Neurologic: Confused.       LABORATORY:    CBC Full  -  ( 13 Nov 2023 07:39 )  WBC Count : 10.96 K/uL  RBC Count : 2.92 M/uL  Hemoglobin : 8.3 g/dL  Hematocrit : 25.8 %  Platelet Count - Automated : 306 K/uL  Mean Cell Volume : 88.4 fl  Mean Cell Hemoglobin : 28.4 pg  Mean Cell Hemoglobin Concentration : 32.2 gm/dL  Auto Neutrophil # : x  Auto Lymphocyte # : x  Auto Monocyte # : x  Auto Eosinophil # : x  Auto Basophil # : x  Auto Neutrophil % : x  Auto Lymphocyte % : x  Auto Monocyte % : x  Auto Eosinophil % : x  Auto Basophil % : x          11-13    146<H>  |  120<H>  |  20  ----------------------------<  91  3.7   |  22  |  0.66    Ca    7.7<L>      13 Nov 2023 07:39  Phos  2.1     11-13  Mg     2.0     11-13    TPro  4.9<L>  /  Alb  1.7<L>  /  TBili  0.5  /  DBili  x   /  AST  32  /  ALT  36  /  AlkPhos  99  11-13      Assessment and Plan:    1. UTI.  2. Toxic metabolic encephalopathy.  3. Generalized weakness.     . UA was suggestive of UTI. The patient just had UTI with  ESBL E coli bacteremia last month.  . Continue IV Invanz for presumptive recurrent UTI with ESBL. Urine culture is growing E coli.   . Monitor mental status.      Edwardo Gonzalez MD   [phone].  Comfortable  No fevers.        MEDICATIONS  (STANDING):  aMIOdarone    Tablet 200 milliGRAM(s) Oral daily  apixaban 2.5 milliGRAM(s) Oral every 12 hours  artificial  tears Solution 1 Drop(s) Both EYES four times a day  ascorbic acid 500 milliGRAM(s) Oral daily  dextrose 5% + sodium chloride 0.45% with potassium chloride 20 mEq/L 1000 milliLiter(s) (50 mL/Hr) IV Continuous <Continuous>  enalapril 10 milliGRAM(s) Oral daily  ertapenem  IVPB 500 milliGRAM(s) IV Intermittent every 24 hours  ferrous    sulfate 325 milliGRAM(s) Oral three times a day  levothyroxine 75 MICROGram(s) Oral daily  metoprolol tartrate 50 milliGRAM(s) Oral two times a day  multivitamin 1 Tablet(s) Oral daily  pantoprazole    Tablet 40 milliGRAM(s) Oral before breakfast  polyethylene glycol 3350 17 Gram(s) Oral daily    MEDICATIONS  (PRN):      Vital Signs Last 24 Hrs  T(C): 36.6 (13 Nov 2023 20:59), Max: 36.8 (13 Nov 2023 04:56)  T(F): 97.8 (13 Nov 2023 20:59), Max: 98.2 (13 Nov 2023 04:56)  HR: 74 (13 Nov 2023 20:59) (72 - 75)  BP: 119/74 (13 Nov 2023 20:59) (110/58 - 119/74)  BP(mean): --  RR: 18 (13 Nov 2023 20:59) (18 - 18)  SpO2: 96% (13 Nov 2023 20:59) (95% - 96%)    Parameters below as of 13 Nov 2023 20:59  Patient On (Oxygen Delivery Method): room air        I&O's Summary    12 Nov 2023 07:01  -  13 Nov 2023 07:00  --------------------------------------------------------  IN: 450 mL / OUT: 0 mL / NET: 450 mL        PHYSICAL EXAM:  HEENT: NC/AT; PERRLA  Neck: Soft; no tenderness  Lungs: CTA bilaterally; no wheezing.   Heart: RRR, no murmurs.  Abdomen: Soft, no tenderness.   Genital/ Rectal: No khan catheter  Extremities: No ulcers.  Neurologic: Confused.       LABORATORY:    CBC Full  -  ( 13 Nov 2023 07:39 )  WBC Count : 10.96 K/uL  RBC Count : 2.92 M/uL  Hemoglobin : 8.3 g/dL  Hematocrit : 25.8 %  Platelet Count - Automated : 306 K/uL  Mean Cell Volume : 88.4 fl  Mean Cell Hemoglobin : 28.4 pg  Mean Cell Hemoglobin Concentration : 32.2 gm/dL  Auto Neutrophil # : x  Auto Lymphocyte # : x  Auto Monocyte # : x  Auto Eosinophil # : x  Auto Basophil # : x  Auto Neutrophil % : x  Auto Lymphocyte % : x  Auto Monocyte % : x  Auto Eosinophil % : x  Auto Basophil % : x          11-13    146<H>  |  120<H>  |  20  ----------------------------<  91  3.7   |  22  |  0.66    Ca    7.7<L>      13 Nov 2023 07:39  Phos  2.1     11-13  Mg     2.0     11-13    TPro  4.9<L>  /  Alb  1.7<L>  /  TBili  0.5  /  DBili  x   /  AST  32  /  ALT  36  /  AlkPhos  99  11-13      Assessment and Plan:    1. UTI.  2. Toxic metabolic encephalopathy.  3. Generalized weakness.     . UA was suggestive of UTI. The patient just had UTI with  ESBL E coli bacteremia last month.  . Continue IV Invanz for presumptive recurrent UTI with ESBL. Urine culture is growing E coli.   . Monitor mental status.      Edwardo Gonzalez MD   [phone].

## 2023-11-13 NOTE — PHYSICAL THERAPY INITIAL EVALUATION ADULT - MANUAL MUSCLE TESTING RESULTS, REHAB EVAL
; B UE and B LE grossly < or = to 3-/5 via functional assessment, no resistance applied./grossly assessed due to

## 2023-11-13 NOTE — PHYSICAL THERAPY INITIAL EVALUATION ADULT - PERTINENT HX OF CURRENT PROBLEM, REHAB EVAL
As per EMR "89-year-old female with history of hypertension, hyperlipidemia, A-fib with ablation on Eliquis, MR, bladder tumor, PFO, osteoporosis, spinal stenosis brought in by ambulance for possible stroke. " CT (-) for ICH.

## 2023-11-13 NOTE — PROGRESS NOTE ADULT - SUBJECTIVE AND OBJECTIVE BOX
Date/Time Patient Seen:  		  Referring MD:   Data Reviewed	       Patient is a 89y old  Female who presents with a chief complaint of     Subjective/HPI     PAST MEDICAL & SURGICAL HISTORY:  HTN (hypertension)    Afib    Spinal stenosis    PFO (patent foramen ovale)    Degeneration macular    Osteoporosis    History of complete heart block    Hypothyroidism    Cardiac pacemaker          Medication list         MEDICATIONS  (STANDING):  aMIOdarone    Tablet 200 milliGRAM(s) Oral daily  apixaban 2.5 milliGRAM(s) Oral every 12 hours  artificial  tears Solution 1 Drop(s) Both EYES four times a day  ascorbic acid 500 milliGRAM(s) Oral daily  dextrose 5% + sodium chloride 0.45% with potassium chloride 20 mEq/L 1000 milliLiter(s) (50 mL/Hr) IV Continuous <Continuous>  enalapril 10 milliGRAM(s) Oral daily  ertapenem  IVPB 500 milliGRAM(s) IV Intermittent every 24 hours  ferrous    sulfate 325 milliGRAM(s) Oral three times a day  levothyroxine 75 MICROGram(s) Oral daily  metoprolol tartrate 50 milliGRAM(s) Oral two times a day  multivitamin 1 Tablet(s) Oral daily  pantoprazole    Tablet 40 milliGRAM(s) Oral before breakfast  polyethylene glycol 3350 17 Gram(s) Oral daily    MEDICATIONS  (PRN):         Vitals log        ICU Vital Signs Last 24 Hrs  T(C): 36.8 (13 Nov 2023 04:56), Max: 36.9 (12 Nov 2023 11:50)  T(F): 98.2 (13 Nov 2023 04:56), Max: 98.4 (12 Nov 2023 11:50)  HR: 72 (13 Nov 2023 04:56) (60 - 72)  BP: 110/58 (13 Nov 2023 04:56) (92/53 - 128/72)  BP(mean): --  ABP: --  ABP(mean): --  RR: 18 (13 Nov 2023 04:56) (18 - 19)  SpO2: 95% (13 Nov 2023 04:56) (90% - 95%)    O2 Parameters below as of 13 Nov 2023 04:56  Patient On (Oxygen Delivery Method): room air                 Input and Output:  I&O's Detail    12 Nov 2023 07:01  -  13 Nov 2023 05:37  --------------------------------------------------------  IN:    dextrose 5% + sodium chloride 0.45% w/ Additives: 450 mL  Total IN: 450 mL    OUT:  Total OUT: 0 mL    Total NET: 450 mL          Lab Data                        7.9    11.36 )-----------( 275      ( 12 Nov 2023 06:37 )             24.0     11-12    147<H>  |  117<H>  |  29<H>  ----------------------------<  82  3.1<L>   |  23  |  0.69    Ca    7.7<L>      12 Nov 2023 06:37  Mg     1.8     11-12    TPro  4.9<L>  /  Alb  1.8<L>  /  TBili  0.5  /  DBili  0.2  /  AST  26  /  ALT  32  /  AlkPhos  101  11-12            Review of Systems	      Objective     Physical Examination    heart s1s2  lung dec BS  head nc      Pertinent Lab findings & Imaging      Mela:  NO   Adequate UO     I&O's Detail    12 Nov 2023 07:01  -  13 Nov 2023 05:37  --------------------------------------------------------  IN:    dextrose 5% + sodium chloride 0.45% w/ Additives: 450 mL  Total IN: 450 mL    OUT:  Total OUT: 0 mL    Total NET: 450 mL               Discussed with:     Cultures:	        Radiology

## 2023-11-13 NOTE — DIETITIAN INITIAL EVALUATION ADULT - NS FNS DIET ORDER
Diet, Minced and Moist:   Supplement Feeding Modality:  Oral  Ensure Enlive Cans or Servings Per Day:  1       Frequency:  Daily  Ensure Pudding Cans or Servings Per Day:  1       Frequency:  Daily (11-13-23 @ 11:24)

## 2023-11-13 NOTE — CARE COORDINATION ASSESSMENT. - OTHER PERTINENT DISCHARGE PLANNING INFORMATION:
Home Care; Transportation; Met w/ pt and her daughter at bedside to discuss role of case management, medical plan of care, and discharge plan.  Pt verbalized understanding.  name and contact # provided. Patient is from the [facility] and the daughter's wishes is to return there upon discharge.  Per the daughter, the patient is wheelchair bound and does not walk.  She has a 6hr private aide 7 days a week and receive help from the facility staff.  Pt was receiving home care services through [facility] prior to the hospitalization.    CM spoke with nurse  at the facility, they will need a covid result within 72hours and a 3122 completed with all new meds to be sent to [facility] [phone]. Patient will need ambulance transportation upon discharge.     CM will continue to follow pt along with the treatment team to assist in safe discharge plans.   Home Care; Transportation; Met w/ pt and her daughter at bedside to discuss role of case management, medical plan of care, and discharge plan.  Pt verbalized understanding.  name and contact # provided. Patient is from the [facility] and the daughter's wishes is to return there upon discharge.  Per the daughter, the patient is wheelchair bound and does not walk.  She has a 6hr private aide 7 days a week and receive help from the facility staff.  Pt was receiving home care services through [facility] prior to the hospitalization.    CM spoke with nurse  at the facility, they will need a covid result within 72hours and a 3122 completed with all new meds to be sent to [facility] -[phone]. Patient will need ambulance transportation upon discharge.     CM will continue to follow pt along with the treatment team to assist in safe discharge plans.   Home Care; Transportation; Met w/ pt and her daughter at bedside to discuss role of case management, medical plan of care, and discharge plan.  Pt verbalized understanding.  name and contact # provided. Patient is from the [facility] and the daughter's wishes is to return there upon discharge.  Per the daughter, the patient is wheelchair bound and does not walk.  She has a 6hr private aide 7 days a week and receive help from the facility staff.  Pt was receiving home care services through [facility] prior to the hospitalization.    CM spoke with nurse  at the facility, they will need a covid result within 72hours and a 3122 completed with all new meds to be sent to [facility] [phone]. Patient will need ambulance transportation upon discharge.     CM will continue to follow pt along with the treatment team to assist in safe discharge plans.

## 2023-11-13 NOTE — DIETITIAN INITIAL EVALUATION ADULT - ORAL INTAKE PTA/DIET HISTORY
Pt admitted from [facility]. Reviewed transfer documents, pt on a puree diet PTA. Pt reports eating well PTA.  Pt admitted from [facility]. Reviewed transfer documents, pt on a puree diet PTA. Pt reports eating well PTA.  Pt admitted from [facility]. Reviewed transfer documents, pt on a puree diet PTA. Pt reports eating well PTA.

## 2023-11-13 NOTE — SWALLOW BEDSIDE ASSESSMENT ADULT - SWALLOW EVAL: DIAGNOSIS
1- functional oral management given minced and moist solids, puree and thin liquids marked by adequate bolus collection, transfer and transport. 2- mild oral dysphagia given soft and bite sized solids and easy to chew solids marked by prolonged mastication with increased work of breathing noted. mild oral clearance deficits observed marked by lingual stasis post swallow. 3- mild pharyngeal dysphagia given all provided consistencies marked by delayed pharyngeal swallow trigger and reduced hyolaryngeal excursion without overt s/s of penetration/aspiration noted.

## 2023-11-13 NOTE — SWALLOW BEDSIDE ASSESSMENT ADULT - COMMENTS
Pt was alert and cooperative for a clinical assessment of swallow function this AM. Per charting, pt is a "89-year-old female with history of hypertension, hyperlipidemia, A-fib with ablation on Eliquis, MR, bladder tumor, PFO, osteoporosis, spinal stenosis brought in by ambulance for possible stroke.  As per facility patient ate breakfast and had her medication between 9:45AM and 10 AM this morning and was at her baseline mental status.  Aide noticed around 11/1130 patient was leaning towards her left side, had water poured out of her mouth when she attempted to drink and had possible slurred speech.  Patient limited historian due to MR.  As per facility patient was recently in a short-term rehab facility, recently returned back to [facility] however patient has been weaker since then".    Recent CXR "LUNGS: Prominent interstitial markings, left parahilar patchy opacities, and right basilar atelectasis.. Findings are stable compared to prior."    Recent head CT "Age-appropriate involutional changes. Atherosclerotic changes. Gray/white matter differentiation is preserved. No acute intracranial hemorrhage."    At the time of today's assessment, pt is A&Ox2. She is able to follow low-level, verbally presented directives presented by clinician. Pt was alert and cooperative for a clinical assessment of swallow function this AM. Per charting, pt is a "89-year-old female with history of hypertension, hyperlipidemia, A-fib with ablation on Eliquis, MR, bladder tumor, PFO, osteoporosis, spinal stenosis brought in by ambulance for possible stroke.  As per facility patient ate breakfast and had her medication between 9:45AM and 10 AM this morning and was at her baseline mental status.  Aide noticed around 11/1130 patient was leaning towards her left side, had water poured out of her mouth when she attempted to drink and had possible slurred speech.  Patient limited historian due to MR.  As per facility patient was recently in a short-term rehab facility, recently returned back to [facility] however patient has been weaker since then".    Recent CXR "LUNGS: Prominent interstitial markings, left parahilar patchy opacities, and right basilar atelectasis.. Findings are stable compared to prior."    Recent head CT "Age-appropriate involutional changes. Atherosclerotic changes. Gray/white matter differentiation is preserved. No acute intracranial hemorrhage."    At the time of today's assessment, pt is A&Ox2. She is able to follow low-level, verbally presented directives presented by clinician. Pt was alert and cooperative for a clinical assessment of swallow function this AM. Per charting, pt is a "89-year-old female with history of hypertension, hyperlipidemia, A-fib with ablation on Eliquis, MR, bladder tumor, PFO, osteoporosis, spinal stenosis brought in by ambulance for possible stroke.  As per facility patient ate breakfast and had her medication between 9:45AM and 10 AM this morning and was at her baseline mental status.  Aide noticed around 11/1130 patient was leaning towards her left side, had water poured out of her mouth when she attempted to drink and had possible slurred speech.  Patient limited historian due to MR.  As per facility patient was recently in a short-term rehab facility, recently returned back to [facility] however patient has been weaker since then".    Recent CXR "LUNGS: Prominent interstitial markings, left parahilar patchy opacities, and right basilar atelectasis.. Findings are stable compared to prior."    Recent head CT "Age-appropriate involutional changes. Atherosclerotic changes. Gray/white matter differentiation is preserved. No acute intracranial hemorrhage."    At the time of today's assessment, pt is A&Ox2. She is able to follow low-level, verbally presented directives presented by clinician.

## 2023-11-13 NOTE — DIETITIAN INITIAL EVALUATION ADULT - NSICDXPASTMEDICALHX_GEN_ALL_CORE_FT
English
PAST MEDICAL HISTORY:  Afib     Degeneration macular     History of complete heart block     HTN (hypertension)     Hypothyroidism     Osteoporosis     PFO (patent foramen ovale)     Spinal stenosis

## 2023-11-13 NOTE — PROGRESS NOTE ADULT - SUBJECTIVE AND OBJECTIVE BOX
Patient is a 89y Female whom presented to the hospital with hypokalemia , hypernatremia     PAST MEDICAL & SURGICAL HISTORY:  HTN (hypertension)      Afib      Spinal stenosis      PFO (patent foramen ovale)      Degeneration macular      Osteoporosis      History of complete heart block      Hypothyroidism      Cardiac pacemaker          MEDICATIONS  (STANDING):  aMIOdarone    Tablet 200 milliGRAM(s) Oral daily  apixaban 2.5 milliGRAM(s) Oral every 12 hours  artificial  tears Solution 1 Drop(s) Both EYES four times a day  ascorbic acid 500 milliGRAM(s) Oral daily  dextrose 5% + sodium chloride 0.45% with potassium chloride 20 mEq/L 1000 milliLiter(s) (50 mL/Hr) IV Continuous <Continuous>  enalapril 10 milliGRAM(s) Oral daily  ertapenem  IVPB 500 milliGRAM(s) IV Intermittent every 24 hours  ferrous    sulfate 325 milliGRAM(s) Oral three times a day  levothyroxine 75 MICROGram(s) Oral daily  metoprolol tartrate 50 milliGRAM(s) Oral two times a day  multivitamin 1 Tablet(s) Oral daily  pantoprazole    Tablet 40 milliGRAM(s) Oral before breakfast  polyethylene glycol 3350 17 Gram(s) Oral daily  potassium chloride   Powder 40 milliEquivalent(s) Oral once  potassium chloride  10 mEq/100 mL IVPB 10 milliEquivalent(s) IV Intermittent every 1 hour      Allergies    penicillin (Unknown)    Intolerances        SOCIAL HISTORY:  Denies ETOh,Smoking,     FAMILY HISTORY:      REVIEW OF SYSTEMS:    CONSTITUTIONAL: No weakness, fevers or chills  EYES/ENT: No visual changes;  no throat pain   NECK: No pain or stiffness  RESPIRATORY: No cough, wheezing, hemoptysis; No shortness of breath  CARDIOVASCULAR: No chest pain or palpitations  GASTROINTESTINAL: No abdominal or epigastric pain. No nausea, vomiting,                                 8.3    10.96 )-----------( 306      ( 13 Nov 2023 07:39 )             25.8       CBC Full  -  ( 13 Nov 2023 07:39 )  WBC Count : 10.96 K/uL  RBC Count : 2.92 M/uL  Hemoglobin : 8.3 g/dL  Hematocrit : 25.8 %  Platelet Count - Automated : 306 K/uL  Mean Cell Volume : 88.4 fl  Mean Cell Hemoglobin : 28.4 pg  Mean Cell Hemoglobin Concentration : 32.2 gm/dL  Auto Neutrophil # : x  Auto Lymphocyte # : x  Auto Monocyte # : x  Auto Eosinophil # : x  Auto Basophil # : x  Auto Neutrophil % : x  Auto Lymphocyte % : x  Auto Monocyte % : x  Auto Eosinophil % : x  Auto Basophil % : x      11-13    146<H>  |  120<H>  |  20  ----------------------------<  91  3.7   |  22  |  0.66    Ca    7.7<L>      13 Nov 2023 07:39  Phos  2.1     11-13  Mg     2.0     11-13    TPro  4.9<L>  /  Alb  1.7<L>  /  TBili  0.5  /  DBili  x   /  AST  32  /  ALT  36  /  AlkPhos  99  11-13      CAPILLARY BLOOD GLUCOSE          Vital Signs Last 24 Hrs  T(C): 36.7 (13 Nov 2023 12:05), Max: 36.8 (13 Nov 2023 04:56)  T(F): 98.1 (13 Nov 2023 12:05), Max: 98.2 (13 Nov 2023 04:56)  HR: 75 (13 Nov 2023 12:05) (72 - 75)  BP: 115/70 (13 Nov 2023 12:05) (110/58 - 115/70)  BP(mean): --  RR: 18 (13 Nov 2023 12:05) (18 - 18)  SpO2: 95% (13 Nov 2023 12:05) (95% - 95%)    Parameters below as of 13 Nov 2023 12:05  Patient On (Oxygen Delivery Method): room air        Urinalysis Basic - ( 13 Nov 2023 07:39 )    Color: x / Appearance: x / SG: x / pH: x  Gluc: 91 mg/dL / Ketone: x  / Bili: x / Urobili: x   Blood: x / Protein: x / Nitrite: x   Leuk Esterase: x / RBC: x / WBC x   Sq Epi: x / Non Sq Epi: x / Bacteria: x            PHYSICAL EXAM:    Constitutional: NAD  HEENT: conjunctive   clear   Neck:  No JVD  Respiratory: CTAB  Cardiovascular: S1 and S2  Gastrointestinal: BS+, soft, NT/ND  Extremities: No peripheral edema  Neurological: A/O x 3, no focal deficits  Psychiatric: Normal mood, normal affect

## 2023-11-13 NOTE — DIETITIAN INITIAL EVALUATION ADULT - ETIOLOGY
related to inability to consume sufficient energy, advanced age related to increased demand for nutrient (protein, micronutrients)

## 2023-11-13 NOTE — CARE COORDINATION ASSESSMENT. - NSDCPLANSERVICES_GEN_ALL_CORE
as per cahrt asses the patient is wheelchair bound and does not walk.  She has a 6hr private aide 7 days a week and receive help from the facility staff.  Pt was receiving home care services through [facility] prior to the hospitalization./Home Care/Infusion Therapy/Same as Prior Admission as per cahrt asses the patient is wheelchair bound and does not walk.  She has a 6hr private aide 7 days a week and receive help from the facility staff.  Pt was receiving home care services through [facility] prior to the hospitalization./Home Care/Infusion Therapy/Same as Prior Admission as per cahrt asses the patient is wheelchair bound and does not walk.  She has a 6hr private aide 7 days a week and receive help from the facility staff.  Pt was receiving home care services through [facility] prior to the hospitalization./Home Care/Infusion Therapy/Same as Prior Admission

## 2023-11-13 NOTE — CARE COORDINATION ASSESSMENT. - PRO ARRIVE FROM
[facility]/assisted living facility [facility]/assisted living facility [facility]/assisted living facility

## 2023-11-13 NOTE — PHYSICAL THERAPY INITIAL EVALUATION ADULT - RANGE OF MOTION EXAMINATION, REHAB EVAL
B UE active functional range <50% via functional assessment; B LE active functional range <25% via functional assessment./deficits as listed below

## 2023-11-13 NOTE — DIETITIAN INITIAL EVALUATION ADULT - OTHER INFO
Pt is a "89-year-old female with history of hypertension, hyperlipidemia, A-fib with ablation on Eliquis, MR, bladder tumor, PFO, osteoporosis, spinal stenosis brought in by ambulance for possible stroke.  As per facility patient ate breakfast and had her medication between 9:45AM and 10 AM this morning and was at her baseline mental status.  Aide noticed around 11/1130 patient was leaning towards her left side, had water poured out of her mouth when she attempted to drink and had possible slurred speech.  Patient limited historian due to MR.  As per facility patient was recently in a short-term rehab facility, recently returned back to [facility] however patient has been weaker since then."    Visited pt this morning. Pt OOB to chair during visit today. Pt alert/confused at times during visit. Pt was NPO during visit this am. S/p swallow evaluation today; SLP recommended minced and moist solids with thin liquids. Diet now advanced per SLP recs. No po intakes thus far. NKFA. No N/V/D/C per chart review. +BM 11/11. CBW on admission 120#. Pt unaware of UBW. Bedscale wt of 119# obtained. 2+ BL foot, R arm, 1+ BL leg edema noted. Skin: SDTI to coccyx, stage I sacrum, SDTI to L heel, unstageable to R heel, stage I R elbow. Recommend assistance/encouragement at meal times. Will provide ensure plus HP BID for additional kcal/protein. Honor food preferences as able to optimize po intake/tolerance.  Pt is a "89-year-old female with history of hypertension, hyperlipidemia, A-fib with ablation on Eliquis, MR, bladder tumor, PFO, osteoporosis, spinal stenosis brought in by ambulance for possible stroke.  As per facility patient ate breakfast and had her medication between 9:45AM and 10 AM this morning and was at her baseline mental status.  Aide noticed around 11/1130 patient was leaning towards her left side, had water poured out of her mouth when she attempted to drink and had possible slurred speech.  Patient limited historian due to MR.  As per facility patient was recently in a short-term rehab facility, recently returned back to [facility] however patient has been weaker since then."    Visited pt this morning. Pt OOB to chair during visit today. Pt alert/confused at times during visit. Pt was NPO during visit this am. S/p swallow evaluation today; SLP recommended minced and moist solids with thin liquids. Diet now advanced per SLP recs. No po intakes thus far. NKFA. No N/V/D/C per chart review. +BM 11/11. CBW on admission 120#. Pt unaware of UBW. Bedscale wt of 119# obtained. 2+ BL foot, R arm, 1+ BL leg edema noted. Skin: SDTI to coccyx, stage I sacrum, SDTI to L heel, unstageable to R heel, stage I R elbow. Recommend assistance/encouragement at meal times. Will provide ensure plus HP BID for additional kcal/protein. Honor food preferences as able to optimize po intake/tolerance.  Pt is a "89-year-old female with history of hypertension, hyperlipidemia, A-fib with ablation on Eliquis, MR, bladder tumor, PFO, osteoporosis, spinal stenosis brought in by ambulance for possible stroke.  As per facility patient ate breakfast and had her medication between 9:45AM and 10 AM this morning and was at her baseline mental status.  Aide noticed around 11/1130 patient was leaning towards her left side, had water poured out of her mouth when she attempted to drink and had possible slurred speech.  Patient limited historian due to MR.  As per facility patient was recently in a short-term rehab facility, recently returned back to [facility] however patient has been weaker since then."    Visited pt this morning. Pt OOB to chair during visit today. Pt alert/confused at times during visit. Pt was NPO during visit this am. S/p swallow evaluation today; SLP recommended minced and moist solids with thin liquids. Diet now advanced per SLP recs. No po intakes thus far. NKFA. No N/V/D/C per chart review. +BM 11/11. CBW on admission 120#. Pt unaware of UBW. Bedscale wt of 119# obtained. 2+ BL foot, R arm, 1+ BL leg edema noted. Skin: SDTI to coccyx, stage I sacrum, SDTI to L heel, unstageable to R heel, stage I R elbow. Recommend assistance/encouragement at meal times. Will provide ensure plus HP BID for additional kcal/protein. Honor food preferences as able to optimize po intake/tolerance.

## 2023-11-13 NOTE — DIETITIAN INITIAL EVALUATION ADULT - PERTINENT LABORATORY DATA
11-13    146<H>  |  120<H>  |  20  ----------------------------<  91  3.7   |  22  |  0.66    Ca    7.7<L>      13 Nov 2023 07:39  Phos  2.1     11-13  Mg     2.0     11-13    TPro  4.9<L>  /  Alb  1.7<L>  /  TBili  0.5  /  DBili  x   /  AST  32  /  ALT  36  /  AlkPhos  99  11-13

## 2023-11-13 NOTE — DIETITIAN NUTRITION RISK NOTIFICATION - TREATMENT: THE FOLLOWING DIET HAS BEEN RECOMMENDED
Diet, Minced and Moist:   Supplement Feeding Modality:  Oral  Ensure Enlive Cans or Servings Per Day:  1       Frequency:  Daily  Ensure Pudding Cans or Servings Per Day:  1       Frequency:  Daily (11-13-23 @ 11:24) [Active]

## 2023-11-13 NOTE — PROGRESS NOTE ADULT - ASSESSMENT
HPI:  89-year-old female with history of hypertension, hyperlipidemia, A-fib with ablation on Eliquis, MR, bladder tumor, PFO, osteoporosis, spinal stenosis brought in by ambulance for possible stroke.  As per facility patient ate breakfast and had her medication between 9:45AM and 10 AM this morning and was at her baseline mental status.  Aide noticed around 11/1130 patient was leaning towards her left side, had water poured out of her mouth when she attempted to drink and had possible slurred speech.  Patient limited historian due to MR.  As per facility patient was recently in a short-term rehab facility, recently returned back to [email or web address] however patient has been weaker since then.< from: CT Brain Perfusion Maps Stroke (11.11.23 @ 14:12) >    < from: CT Brain Perfusion Maps Stroke (11.11.23 @ 14:12) >    ACC: [id number] EXAM:  CT BRAIN PERFUSION MAPS STROKE   ORDERED BY:   JOGRE FELDER     ACC: [id number] EXAM:  CT ANGIO BRAIN STROKE PROTC IC   ORDERED BY:   JORGE FELDER     ACC: [id number] EXAM:  CT BRAIN STROKE PROTOCOL   ORDERED BY: JORGE FELDER     ACC: [id number] EXAM:  CT ANGIO NECK STROKE PROTCL IC   ORDERED BY:   JORGE FELDER     PROCEDURE DATE:  11/11/2023          INTERPRETATION:  CT HEAD STROKE PROTOCOL, CT ANGIO NECK STROKE PROTOCOL,   CT ANGIO HEAD STROKE PROTOCOL, CT PERFUSION WITH MAPS STROKE PROTOCOL    CT BRAIN WITHOUT CONTRAST    INDICATIONS:  Stroke code Leaning to left side. Slight left arm drift.    TECHNIQUE:  Serial axial images were obtained from the skull base to the   vertex without the use of contrast.    COMPARISON EXAM: 7/11/2023    FINDINGS:  Ventricles and sulci: Parenchymal volume loss is present which is   commensurate with patient age.  Intra-axial: Periventricular small vessel white matter ischemic changes.   No intracranial mass, acute hemorrhage, or midline shift is present.  Extra-axial: No extra-axial fluid collection is identified. Deposition of   calcified plaque at the level of the bilateral carotid siphons.  Calvarium: Intact.    Left optic lens replacement, as on prior. Bilateral optic globes are   intact. Imaged paranasal sinuses, bilateral mastoid air cells, middle ear   cavities are clear.        CT PERFUSION  CTA OF THE NECK AND HEAD:      CONTRAST/COMPLICATIONS:  IV Contrast: NONE (accession [id number]), IV contrast documented in   unlinked concurrent exam (accession [id number])  Complications: None reported at time of study completion      TECHNIQUE PERFUSION:  After the intravenous power injection of non-ionic contrast material,   repeat serial thin sections were obtained through the intracranial   circulation on a multislice CT scanner. Artificial intelligence was then   used for analysis of perfusion and intracranial large vessel occlusion.      TECHNIQUE CTA NECK AND HEAD:  After the intravenous power injection of non-ionic contrast material,   serial thin sections were obtained through the cervical and intracranial   circulation on a multislice CT scanner. 2D and 3D MIP reformatted images   were obtained.  Images were reformatted using a dedicated 3D software   package.    FINDINGS:    CT PERFUSION:    COMPARISON EXAMINATION: None.    Technical limitations: Significant patient motion in all 3 planes during   image acquisition likely renders results suboptimal and nondiagnostic.   Arterial and venous waveforms are also not optimized.    Miscellaneous: None.      CTA NECK WITH CONTRAST:    CAROTID STENOSIS REFERENCE: (NASCET = 100x1-(N/D)). N=greatest narrowing.   D=normal distal diameter - MILD = <50% stenosis. - MODERATE = 50-69%   stenosis.- SEVERE = 70-89% stenosis. - HAIRLINE/CRITICAL = 90-99%   stenosis. - OCCLUDED = 100% stenosis.      COMPARISON: None.    FINDINGS:  Aortic arch and visualized great vessels: Within normal limits.    RIGHT:  Common carotid artery: Follows a tortuouscourse and is normal in caliber   to the carotid bifurcation.  Internal carotid artery: Mild to moderate deposition of calcified plaque   at the level of the right common carotid artery bifurcation with   extension into the proximal aspect of the right internal carotid artery,   without significant stenosis based on NASCET criteria. Normal course and   caliber to the intracranial circulation.    Vertebral artery: Emanates from the right subclavian artery. The right   vertebral artery is normal incourse and caliber to the intracranial   circulation.      LEFT:  Common carotid artery :Normal in course and caliber to the carotid   bifurcation.  Internal carotid artery: Mild to moderate deposition of calcified plaque   at the level of the left common carotid artery bifurcation with extension   into the proximal left internal carotid artery without significant   stenosis based on NASCET criteria. Normal course and caliber to the   intracranial circulation.    Vertebral artery: Emanates from the left subclavian artery. The left   vertebral artery is normal in course and caliber to the intracranial   circulation.      Miscellaneous: None    CTA HEAD WITH CONTRAST:    COMPARISON EXAM: None.      Internal carotid arteries: Bilateral petrous, precavernous, cavernous,   and supraclinoid regions are patent. Mild deposition of calcified plaque   at the level of the carotid siphons without significant stenosis in this   location.    [place] of [place]:  No aneurysm about the [place]. Tiny aneurysms   can be beyond the resolution of CTA technique.    Anterior Cerebral arteries: No significant stenosis or occlusion.  Middle Cerebral arteries: No significant stenosis or occlusion.    Posterior Circulation: Distal intracranial bilateralvertebral arteries   are visualized. The vertebrobasilar confluence is unremarkable. There is   good flow within the basilar artery. Bilateral superior cerebellar   arteries and bilateral posterior cerebral arteries emanate from the   distal aspect of the basilar artery. There is a hypoplastic right P1   segment. A prominent right posterior communicating artery contributes to   flow within the right posterior cerebral artery.    < end of copied text >     (11 Nov 2023 21:59)      hypokalemia   potassium chloride  10 mEq/100 mL IVPB 10 milliEquivalent(s) IV Intermittent every 1 hour    BP monitoring,continue current antihypertensive meds, low salt diet,followup with PMD in 1-2 weeks  enalapril 10 milliGRAM(s) Oral daily    Admit for septic workup and ID evaluation,send blood and urine cx,serial lactate levels,monitor vitals closley,ivfs hydration,monitor urine output and renal profile,iv abx as per id cons  ertapenem  IVPB 500 milliGRAM(s) IV Intermittent every 24 hours    hypernatremia   will check ua , urine osmolality , urine sodium , urine uric acid , serum sodium , serum osmolality , serum uric acid , f/u with hypernatremia work up , f/u with bmp , monitor i and o     HPI:  89-year-old female with history of hypertension, hyperlipidemia, A-fib with ablation on Eliquis, MR, bladder tumor, PFO, osteoporosis, spinal stenosis brought in by ambulance for possible stroke.  As per facility patient ate breakfast and had her medication between 9:45AM and 10 AM this morning and was at her baseline mental status.  Aide noticed around 11/1130 patient was leaning towards her left side, had water poured out of her mouth when she attempted to drink and had possible slurred speech.  Patient limited historian due to MR.  As per facility patient was recently in a short-term rehab facility, recently returned back to [facility] however patient has been weaker since then.< from: CT Brain Perfusion Maps Stroke (11.11.23 @ 14:12) >    < from: CT Brain Perfusion Maps Stroke (11.11.23 @ 14:12) >    ACC: [id number] EXAM:  CT BRAIN PERFUSION MAPS STROKE   ORDERED BY:   JORGE FELDER     ACC: [id number] EXAM:  CT ANGIO BRAIN STROKE PROTC IC   ORDERED BY:   JORGE FELDER     ACC: [id number] EXAM:  CT BRAIN STROKE PROTOCOL   ORDERED BY: JORGE FELDER     ACC: [id number] EXAM:  CT ANGIO NECK STROKE PROTCL IC   ORDERED BY:   JORGE FELDER     PROCEDURE DATE:  11/11/2023          INTERPRETATION:  CT HEAD STROKE PROTOCOL, CT ANGIO NECK STROKE PROTOCOL,   CT ANGIO HEAD STROKE PROTOCOL, CT PERFUSION WITH MAPS STROKE PROTOCOL    CT BRAIN WITHOUT CONTRAST    INDICATIONS:  Stroke code Leaning to left side. Slight left arm drift.    TECHNIQUE:  Serial axial images were obtained from the skull base to the   vertex without the use of contrast.    COMPARISON EXAM: 7/11/2023    FINDINGS:  Ventricles and sulci: Parenchymal volume loss is present which is   commensurate with patient age.  Intra-axial: Periventricular small vessel white matter ischemic changes.   No intracranial mass, acute hemorrhage, or midline shift is present.  Extra-axial: No extra-axial fluid collection is identified. Deposition of   calcified plaque at the level of the bilateral carotid siphons.  Calvarium: Intact.    Left optic lens replacement, as on prior. Bilateral optic globes are   intact. Imaged paranasal sinuses, bilateral mastoid air cells, middle ear   cavities are clear.        CT PERFUSION  CTA OF THE NECK AND HEAD:      CONTRAST/COMPLICATIONS:  IV Contrast: NONE (accession [id number]), IV contrast documented in   unlinked concurrent exam (accession [id number])  Complications: None reported at time of study completion      TECHNIQUE PERFUSION:  After the intravenous power injection of non-ionic contrast material,   repeat serial thin sections were obtained through the intracranial   circulation on a multislice CT scanner. Artificial intelligence was then   used for analysis of perfusion and intracranial large vessel occlusion.      TECHNIQUE CTA NECK AND HEAD:  After the intravenous power injection of non-ionic contrast material,   serial thin sections were obtained through the cervical and intracranial   circulation on a multislice CT scanner. 2D and 3D MIP reformatted images   were obtained.  Images were reformatted using a dedicated 3D software   package.    FINDINGS:    CT PERFUSION:    COMPARISON EXAMINATION: None.    Technical limitations: Significant patient motion in all 3 planes during   image acquisition likely renders results suboptimal and nondiagnostic.   Arterial and venous waveforms are also not optimized.    Miscellaneous: None.      CTA NECK WITH CONTRAST:    CAROTID STENOSIS REFERENCE: (NASCET = 100x1-(N/D)). N=greatest narrowing.   D=normal distal diameter - MILD = <50% stenosis. - MODERATE = 50-69%   stenosis.- SEVERE = 70-89% stenosis. - HAIRLINE/CRITICAL = 90-99%   stenosis. - OCCLUDED = 100% stenosis.      COMPARISON: None.    FINDINGS:  Aortic arch and visualized great vessels: Within normal limits.    RIGHT:  Common carotid artery: Follows a tortuouscourse and is normal in caliber   to the carotid bifurcation.  Internal carotid artery: Mild to moderate deposition of calcified plaque   at the level of the right common carotid artery bifurcation with   extension into the proximal aspect of the right internal carotid artery,   without significant stenosis based on NASCET criteria. Normal course and   caliber to the intracranial circulation.    Vertebral artery: Emanates from the right subclavian artery. The right   vertebral artery is normal incourse and caliber to the intracranial   circulation.      LEFT:  Common carotid artery :Normal in course and caliber to the carotid   bifurcation.  Internal carotid artery: Mild to moderate deposition of calcified plaque   at the level of the left common carotid artery bifurcation with extension   into the proximal left internal carotid artery without significant   stenosis based on NASCET criteria. Normal course and caliber to the   intracranial circulation.    Vertebral artery: Emanates from the left subclavian artery. The left   vertebral artery is normal in course and caliber to the intracranial   circulation.      Miscellaneous: None    CTA HEAD WITH CONTRAST:    COMPARISON EXAM: None.      Internal carotid arteries: Bilateral petrous, precavernous, cavernous,   and supraclinoid regions are patent. Mild deposition of calcified plaque   at the level of the carotid siphons without significant stenosis in this   location.    [place]:  No aneurysm about the [place]. Tiny aneurysms   can be beyond the resolution of CTA technique.    Anterior Cerebral arteries: No significant stenosis or occlusion.  Middle Cerebral arteries: No significant stenosis or occlusion.    Posterior Circulation: Distal intracranial bilateralvertebral arteries   are visualized. The vertebrobasilar confluence is unremarkable. There is   good flow within the basilar artery. Bilateral superior cerebellar   arteries and bilateral posterior cerebral arteries emanate from the   distal aspect of the basilar artery. There is a hypoplastic right P1   segment. A prominent right posterior communicating artery contributes to   flow within the right posterior cerebral artery.    < end of copied text >     (11 Nov 2023 21:59)      hypokalemia   potassium chloride  10 mEq/100 mL IVPB 10 milliEquivalent(s) IV Intermittent every 1 hour    BP monitoring,continue current antihypertensive meds, low salt diet,followup with PMD in 1-2 weeks  enalapril 10 milliGRAM(s) Oral daily    Admit for septic workup and ID evaluation,send blood and urine cx,serial lactate levels,monitor vitals closley,ivfs hydration,monitor urine output and renal profile,iv abx as per id cons  ertapenem  IVPB 500 milliGRAM(s) IV Intermittent every 24 hours    hypernatremia   will check ua , urine osmolality , urine sodium , urine uric acid , serum sodium , serum osmolality , serum uric acid , f/u with hypernatremia work up , f/u with bmp , monitor i and o     HPI:  89-year-old female with history of hypertension, hyperlipidemia, A-fib with ablation on Eliquis, MR, bladder tumor, PFO, osteoporosis, spinal stenosis brought in by ambulance for possible stroke.  As per facility patient ate breakfast and had her medication between 9:45AM and 10 AM this morning and was at her baseline mental status.  Aide noticed around 11/1130 patient was leaning towards her left side, had water poured out of her mouth when she attempted to drink and had possible slurred speech.  Patient limited historian due to MR.  As per facility patient was recently in a short-term rehab facility, recently returned back to [facility] however patient has been weaker since then.< from: CT Brain Perfusion Maps Stroke (11.11.23 @ 14:12) >    < from: CT Brain Perfusion Maps Stroke (11.11.23 @ 14:12) >    ACC: [id number] EXAM:  CT BRAIN PERFUSION MAPS STROKE   ORDERED BY:   JORGE FELDER     ACC: [id number] EXAM:  CT ANGIO BRAIN STROKE PROTC IC   ORDERED BY:   JORGE FELDER     ACC: [id number] EXAM:  CT BRAIN STROKE PROTOCOL   ORDERED BY: JORGE FELDER     ACC: [id number] EXAM:  CT ANGIO NECK STROKE PROTCL IC   ORDERED BY:   JORGE FELDER     PROCEDURE DATE:  11/11/2023          INTERPRETATION:  CT HEAD STROKE PROTOCOL, CT ANGIO NECK STROKE PROTOCOL,   CT ANGIO HEAD STROKE PROTOCOL, CT PERFUSION WITH MAPS STROKE PROTOCOL    CT BRAIN WITHOUT CONTRAST    INDICATIONS:  Stroke code Leaning to left side. Slight left arm drift.    TECHNIQUE:  Serial axial images were obtained from the skull base to the   vertex without the use of contrast.    COMPARISON EXAM: 7/11/2023    FINDINGS:  Ventricles and sulci: Parenchymal volume loss is present which is   commensurate with patient age.  Intra-axial: Periventricular small vessel white matter ischemic changes.   No intracranial mass, acute hemorrhage, or midline shift is present.  Extra-axial: No extra-axial fluid collection is identified. Deposition of   calcified plaque at the level of the bilateral carotid siphons.  Calvarium: Intact.    Left optic lens replacement, as on prior. Bilateral optic globes are   intact. Imaged paranasal sinuses, bilateral mastoid air cells, middle ear   cavities are clear.        CT PERFUSION  CTA OF THE NECK AND HEAD:      CONTRAST/COMPLICATIONS:  IV Contrast: NONE (accession [id number]), IV contrast documented in   unlinked concurrent exam (accession [id number])  Complications: None reported at time of study completion      TECHNIQUE PERFUSION:  After the intravenous power injection of non-ionic contrast material,   repeat serial thin sections were obtained through the intracranial   circulation on a multislice CT scanner. Artificial intelligence was then   used for analysis of perfusion and intracranial large vessel occlusion.      TECHNIQUE CTA NECK AND HEAD:  After the intravenous power injection of non-ionic contrast material,   serial thin sections were obtained through the cervical and intracranial   circulation on a multislice CT scanner. 2D and 3D MIP reformatted images   were obtained.  Images were reformatted using a dedicated 3D software   package.    FINDINGS:    CT PERFUSION:    COMPARISON EXAMINATION: None.    Technical limitations: Significant patient motion in all 3 planes during   image acquisition likely renders results suboptimal and nondiagnostic.   Arterial and venous waveforms are also not optimized.    Miscellaneous: None.      CTA NECK WITH CONTRAST:    CAROTID STENOSIS REFERENCE: (NASCET = 100x1-(N/D)). N=greatest narrowing.   D=normal distal diameter - MILD = <50% stenosis. - MODERATE = 50-69%   stenosis.- SEVERE = 70-89% stenosis. - HAIRLINE/CRITICAL = 90-99%   stenosis. - OCCLUDED = 100% stenosis.      COMPARISON: None.    FINDINGS:  Aortic arch and visualized great vessels: Within normal limits.    RIGHT:  Common carotid artery: Follows a tortuouscourse and is normal in caliber   to the carotid bifurcation.  Internal carotid artery: Mild to moderate deposition of calcified plaque   at the level of the right common carotid artery bifurcation with   extension into the proximal aspect of the right internal carotid artery,   without significant stenosis based on NASCET criteria. Normal course and   caliber to the intracranial circulation.    Vertebral artery: Emanates from the right subclavian artery. The right   vertebral artery is normal incourse and caliber to the intracranial   circulation.      LEFT:  Common carotid artery :Normal in course and caliber to the carotid   bifurcation.  Internal carotid artery: Mild to moderate deposition of calcified plaque   at the level of the left common carotid artery bifurcation with extension   into the proximal left internal carotid artery without significant   stenosis based on NASCET criteria. Normal course and caliber to the   intracranial circulation.    Vertebral artery: Emanates from the left subclavian artery. The left   vertebral artery is normal in course and caliber to the intracranial   circulation.      Miscellaneous: None    CTA HEAD WITH CONTRAST:    COMPARISON EXAM: None.      Internal carotid arteries: Bilateral petrous, precavernous, cavernous,   and supraclinoid regions are patent. Mild deposition of calcified plaque   at the level of the carotid siphons without significant stenosis in this   location.    [place]:  No aneurysm about the [place]. Tiny aneurysms   can be beyond the resolution of CTA technique.    Anterior Cerebral arteries: No significant stenosis or occlusion.  Middle Cerebral arteries: No significant stenosis or occlusion.    Posterior Circulation: Distal intracranial bilateralvertebral arteries   are visualized. The vertebrobasilar confluence is unremarkable. There is   good flow within the basilar artery. Bilateral superior cerebellar   arteries and bilateral posterior cerebral arteries emanate from the   distal aspect of the basilar artery. There is a hypoplastic right P1   segment. A prominent right posterior communicating artery contributes to   flow within the right posterior cerebral artery.    < end of copied text >     (11 Nov 2023 21:59)      hypokalemia   potassium chloride  10 mEq/100 mL IVPB 10 milliEquivalent(s) IV Intermittent every 1 hour    BP monitoring,continue current antihypertensive meds, low salt diet,followup with PMD in 1-2 weeks  enalapril 10 milliGRAM(s) Oral daily    Admit for septic workup and ID evaluation,send blood and urine cx,serial lactate levels,monitor vitals closley,ivfs hydration,monitor urine output and renal profile,iv abx as per id cons  ertapenem  IVPB 500 milliGRAM(s) IV Intermittent every 24 hours    hypernatremia   will check ua , urine osmolality , urine sodium , urine uric acid , serum sodium , serum osmolality , serum uric acid , f/u with hypernatremia work up , f/u with bmp , monitor i and o

## 2023-11-14 LAB
-  AMOXICILLIN/CLAVULANIC ACID: SIGNIFICANT CHANGE UP
-  AMOXICILLIN/CLAVULANIC ACID: SIGNIFICANT CHANGE UP
-  AMPICILLIN/SULBACTAM: SIGNIFICANT CHANGE UP
-  AMPICILLIN/SULBACTAM: SIGNIFICANT CHANGE UP
-  AMPICILLIN: SIGNIFICANT CHANGE UP
-  AMPICILLIN: SIGNIFICANT CHANGE UP
-  AZTREONAM: SIGNIFICANT CHANGE UP
-  AZTREONAM: SIGNIFICANT CHANGE UP
-  CEFAZOLIN: SIGNIFICANT CHANGE UP
-  CEFAZOLIN: SIGNIFICANT CHANGE UP
-  CEFEPIME: SIGNIFICANT CHANGE UP
-  CEFEPIME: SIGNIFICANT CHANGE UP
-  CEFTRIAXONE: SIGNIFICANT CHANGE UP
-  CEFTRIAXONE: SIGNIFICANT CHANGE UP
-  CEFUROXIME: SIGNIFICANT CHANGE UP
-  CEFUROXIME: SIGNIFICANT CHANGE UP
-  CIPROFLOXACIN: SIGNIFICANT CHANGE UP
-  CIPROFLOXACIN: SIGNIFICANT CHANGE UP
-  ERTAPENEM: SIGNIFICANT CHANGE UP
-  ERTAPENEM: SIGNIFICANT CHANGE UP
-  GENTAMICIN: SIGNIFICANT CHANGE UP
-  GENTAMICIN: SIGNIFICANT CHANGE UP
-  IMIPENEM: SIGNIFICANT CHANGE UP
-  IMIPENEM: SIGNIFICANT CHANGE UP
-  LEVOFLOXACIN: SIGNIFICANT CHANGE UP
-  LEVOFLOXACIN: SIGNIFICANT CHANGE UP
-  MEROPENEM: SIGNIFICANT CHANGE UP
-  MEROPENEM: SIGNIFICANT CHANGE UP
-  NITROFURANTOIN: SIGNIFICANT CHANGE UP
-  NITROFURANTOIN: SIGNIFICANT CHANGE UP
-  PIPERACILLIN/TAZOBACTAM: SIGNIFICANT CHANGE UP
-  PIPERACILLIN/TAZOBACTAM: SIGNIFICANT CHANGE UP
-  TOBRAMYCIN: SIGNIFICANT CHANGE UP
-  TOBRAMYCIN: SIGNIFICANT CHANGE UP
-  TRIMETHOPRIM/SULFAMETHOXAZOLE: SIGNIFICANT CHANGE UP
-  TRIMETHOPRIM/SULFAMETHOXAZOLE: SIGNIFICANT CHANGE UP
ANION GAP SERPL CALC-SCNC: 4 MMOL/L — LOW (ref 5–17)
ANION GAP SERPL CALC-SCNC: 4 MMOL/L — LOW (ref 5–17)
BUN SERPL-MCNC: 19 MG/DL — SIGNIFICANT CHANGE UP (ref 7–23)
BUN SERPL-MCNC: 19 MG/DL — SIGNIFICANT CHANGE UP (ref 7–23)
CALCIUM SERPL-MCNC: 7.8 MG/DL — LOW (ref 8.5–10.1)
CALCIUM SERPL-MCNC: 7.8 MG/DL — LOW (ref 8.5–10.1)
CHLORIDE SERPL-SCNC: 118 MMOL/L — HIGH (ref 96–108)
CHLORIDE SERPL-SCNC: 118 MMOL/L — HIGH (ref 96–108)
CO2 SERPL-SCNC: 23 MMOL/L — SIGNIFICANT CHANGE UP (ref 22–31)
CO2 SERPL-SCNC: 23 MMOL/L — SIGNIFICANT CHANGE UP (ref 22–31)
CREAT SERPL-MCNC: 0.79 MG/DL — SIGNIFICANT CHANGE UP (ref 0.5–1.3)
CREAT SERPL-MCNC: 0.79 MG/DL — SIGNIFICANT CHANGE UP (ref 0.5–1.3)
CULTURE RESULTS: ABNORMAL
CULTURE RESULTS: ABNORMAL
EGFR: 69 ML/MIN/1.73M2 — SIGNIFICANT CHANGE UP
EGFR: 69 ML/MIN/1.73M2 — SIGNIFICANT CHANGE UP
GLUCOSE SERPL-MCNC: 107 MG/DL — HIGH (ref 70–99)
GLUCOSE SERPL-MCNC: 107 MG/DL — HIGH (ref 70–99)
HCT VFR BLD CALC: 28.1 % — LOW (ref 34.5–45)
HCT VFR BLD CALC: 28.1 % — LOW (ref 34.5–45)
HGB BLD-MCNC: 9 G/DL — LOW (ref 11.5–15.5)
HGB BLD-MCNC: 9 G/DL — LOW (ref 11.5–15.5)
MCHC RBC-ENTMCNC: 28 PG — SIGNIFICANT CHANGE UP (ref 27–34)
MCHC RBC-ENTMCNC: 28 PG — SIGNIFICANT CHANGE UP (ref 27–34)
MCHC RBC-ENTMCNC: 32 GM/DL — SIGNIFICANT CHANGE UP (ref 32–36)
MCHC RBC-ENTMCNC: 32 GM/DL — SIGNIFICANT CHANGE UP (ref 32–36)
MCV RBC AUTO: 87.3 FL — SIGNIFICANT CHANGE UP (ref 80–100)
MCV RBC AUTO: 87.3 FL — SIGNIFICANT CHANGE UP (ref 80–100)
METHOD TYPE: SIGNIFICANT CHANGE UP
METHOD TYPE: SIGNIFICANT CHANGE UP
NRBC # BLD: 0 /100 WBCS — SIGNIFICANT CHANGE UP (ref 0–0)
NRBC # BLD: 0 /100 WBCS — SIGNIFICANT CHANGE UP (ref 0–0)
ORGANISM # SPEC MICROSCOPIC CNT: ABNORMAL
ORGANISM # SPEC MICROSCOPIC CNT: ABNORMAL
ORGANISM # SPEC MICROSCOPIC CNT: SIGNIFICANT CHANGE UP
ORGANISM # SPEC MICROSCOPIC CNT: SIGNIFICANT CHANGE UP
PLATELET # BLD AUTO: 396 K/UL — SIGNIFICANT CHANGE UP (ref 150–400)
PLATELET # BLD AUTO: 396 K/UL — SIGNIFICANT CHANGE UP (ref 150–400)
POTASSIUM SERPL-MCNC: 3.8 MMOL/L — SIGNIFICANT CHANGE UP (ref 3.5–5.3)
POTASSIUM SERPL-MCNC: 3.8 MMOL/L — SIGNIFICANT CHANGE UP (ref 3.5–5.3)
POTASSIUM SERPL-SCNC: 3.8 MMOL/L — SIGNIFICANT CHANGE UP (ref 3.5–5.3)
POTASSIUM SERPL-SCNC: 3.8 MMOL/L — SIGNIFICANT CHANGE UP (ref 3.5–5.3)
RBC # BLD: 3.22 M/UL — LOW (ref 3.8–5.2)
RBC # BLD: 3.22 M/UL — LOW (ref 3.8–5.2)
RBC # FLD: 18.1 % — HIGH (ref 10.3–14.5)
RBC # FLD: 18.1 % — HIGH (ref 10.3–14.5)
SARS-COV-2 RNA SPEC QL NAA+PROBE: SIGNIFICANT CHANGE UP
SARS-COV-2 RNA SPEC QL NAA+PROBE: SIGNIFICANT CHANGE UP
SODIUM SERPL-SCNC: 145 MMOL/L — SIGNIFICANT CHANGE UP (ref 135–145)
SODIUM SERPL-SCNC: 145 MMOL/L — SIGNIFICANT CHANGE UP (ref 135–145)
SPECIMEN SOURCE: SIGNIFICANT CHANGE UP
SPECIMEN SOURCE: SIGNIFICANT CHANGE UP
WBC # BLD: 12.12 K/UL — HIGH (ref 3.8–10.5)
WBC # BLD: 12.12 K/UL — HIGH (ref 3.8–10.5)
WBC # FLD AUTO: 12.12 K/UL — HIGH (ref 3.8–10.5)
WBC # FLD AUTO: 12.12 K/UL — HIGH (ref 3.8–10.5)

## 2023-11-14 PROCEDURE — 74230 X-RAY XM SWLNG FUNCJ C+: CPT | Mod: 26

## 2023-11-14 RX ORDER — ERTAPENEM SODIUM 1 G/1
1000 INJECTION, POWDER, LYOPHILIZED, FOR SOLUTION INTRAMUSCULAR; INTRAVENOUS EVERY 24 HOURS
Refills: 0 | Status: DISCONTINUED | OUTPATIENT
Start: 2023-11-14 | End: 2023-11-16

## 2023-11-14 RX ADMIN — Medication 1 DROP(S): at 06:13

## 2023-11-14 RX ADMIN — ERTAPENEM SODIUM 120 MILLIGRAM(S): 1 INJECTION, POWDER, LYOPHILIZED, FOR SOLUTION INTRAMUSCULAR; INTRAVENOUS at 21:27

## 2023-11-14 RX ADMIN — AMIODARONE HYDROCHLORIDE 200 MILLIGRAM(S): 400 TABLET ORAL at 06:14

## 2023-11-14 RX ADMIN — APIXABAN 2.5 MILLIGRAM(S): 2.5 TABLET, FILM COATED ORAL at 06:14

## 2023-11-14 RX ADMIN — Medication 10 MILLIGRAM(S): at 06:14

## 2023-11-14 RX ADMIN — Medication 325 MILLIGRAM(S): at 21:23

## 2023-11-14 RX ADMIN — Medication 1 DROP(S): at 12:29

## 2023-11-14 RX ADMIN — Medication 1 TABLET(S): at 12:37

## 2023-11-14 RX ADMIN — Medication 50 MILLIGRAM(S): at 06:15

## 2023-11-14 RX ADMIN — POLYETHYLENE GLYCOL 3350 17 GRAM(S): 17 POWDER, FOR SOLUTION ORAL at 12:38

## 2023-11-14 RX ADMIN — Medication 325 MILLIGRAM(S): at 14:31

## 2023-11-14 RX ADMIN — DEXTROSE MONOHYDRATE, SODIUM CHLORIDE, AND POTASSIUM CHLORIDE 50 MILLILITER(S): 50; .745; 4.5 INJECTION, SOLUTION INTRAVENOUS at 02:04

## 2023-11-14 RX ADMIN — PANTOPRAZOLE SODIUM 40 MILLIGRAM(S): 20 TABLET, DELAYED RELEASE ORAL at 06:14

## 2023-11-14 RX ADMIN — Medication 75 MICROGRAM(S): at 06:14

## 2023-11-14 RX ADMIN — Medication 500 MILLIGRAM(S): at 12:37

## 2023-11-14 RX ADMIN — Medication 325 MILLIGRAM(S): at 06:15

## 2023-11-14 RX ADMIN — Medication 1 DROP(S): at 18:00

## 2023-11-14 RX ADMIN — APIXABAN 2.5 MILLIGRAM(S): 2.5 TABLET, FILM COATED ORAL at 17:59

## 2023-11-14 RX ADMIN — Medication 50 MILLIGRAM(S): at 18:00

## 2023-11-14 NOTE — OCCUPATIONAL THERAPY INITIAL EVALUATION ADULT - RANGE OF MOTION EXAMINATION, UPPER EXTREMITY
impaired dexterity BUE observed during feeding/bilateral UE Active Assistive ROM was WFL  (within functional limits)

## 2023-11-14 NOTE — GOALS OF CARE CONVERSATION - ADVANCED CARE PLANNING - CONVERSATION DETAILS
Palliative care SW spoke with patient's daughter/HCP, Beth, by phone. Patient has HCP; POA; Last Will and Testament and MOLST form on chart sent over by [facility]. MOLST form not fully completed. Daughter confirms patient should have DNR/DNI orders in place. New MOLST form completed and placed on patient's chart. Palliative care SW spoke with patient's daughter/HCP, Beth, by phone. Patient has HCP; POA; Last Will and Testament and MOLST form on chart sent over by [facility]. MOLST form not fully completed. Daughter confirms patient should have DNR/DNI orders in place. New MOLST form completed and placed on patient's chart. Palliative care SW spoke with patient's daughter/HCP, Beth, by phone. Patient has HCP; POA; Last Will and Testament and MOLST form on chart sent over by [facility]. MOLST form not fully completed. Daughter confirms patient should have DNR/DNI orders in place. New MOLST form completed and placed on patient's chart.

## 2023-11-14 NOTE — PHARMACOTHERAPY INTERVENTION NOTE - COMMENTS
Modified penicillin allergy to state patient tolerated ertapenem and ceftriaxone during this admission.    Rodrigo Rolon, PharmD  Clinical Pharmacy Specialist, Infectious Diseases  Tele-Antimicrobial Stewardship Program (Tele-ASP)  Tele-ASP Phone: [phone]  Modified penicillin allergy to state patient tolerated ertapenem and ceftriaxone during this admission.    Rodrigo Rolon, PharmD  Clinical Pharmacy Specialist, Infectious Diseases  Tele-Antimicrobial Stewardship Program (Tele-ASP)  Tele-ASP Phone: [phone]  Modified penicillin allergy to state patient tolerated ertapenem and ceftriaxone during this admission.    Rodrigo Rolon, PharmD  Clinical Pharmacy Specialist, Infectious Diseases  Tele-Antimicrobial Stewardship Program (Tele-ASP)  Tele-ASP Phone: [phone]

## 2023-11-14 NOTE — OCCUPATIONAL THERAPY INITIAL EVALUATION ADULT - GENERAL OBSERVATIONS, REHAB EVAL
Pt received seated in bedside chair (+) external catheter, RLE dressing C/D/I, NAD, dtr at bedside. Pt agrees to participate with OT for eval and franklyn well.

## 2023-11-14 NOTE — PROGRESS NOTE ADULT - SUBJECTIVE AND OBJECTIVE BOX
Patient is a 89y Female whom presented to the hospital with hypokalemia , hypernatremia     PAST MEDICAL & SURGICAL HISTORY:  HTN (hypertension)      Afib      Spinal stenosis      PFO (patent foramen ovale)      Degeneration macular      Osteoporosis      History of complete heart block      Hypothyroidism      Cardiac pacemaker          MEDICATIONS  (STANDING):  aMIOdarone    Tablet 200 milliGRAM(s) Oral daily  apixaban 2.5 milliGRAM(s) Oral every 12 hours  artificial  tears Solution 1 Drop(s) Both EYES four times a day  ascorbic acid 500 milliGRAM(s) Oral daily  dextrose 5% + sodium chloride 0.45% with potassium chloride 20 mEq/L 1000 milliLiter(s) (50 mL/Hr) IV Continuous <Continuous>  enalapril 10 milliGRAM(s) Oral daily  ertapenem  IVPB 500 milliGRAM(s) IV Intermittent every 24 hours  ferrous    sulfate 325 milliGRAM(s) Oral three times a day  levothyroxine 75 MICROGram(s) Oral daily  metoprolol tartrate 50 milliGRAM(s) Oral two times a day  multivitamin 1 Tablet(s) Oral daily  pantoprazole    Tablet 40 milliGRAM(s) Oral before breakfast  polyethylene glycol 3350 17 Gram(s) Oral daily  potassium chloride   Powder 40 milliEquivalent(s) Oral once  potassium chloride  10 mEq/100 mL IVPB 10 milliEquivalent(s) IV Intermittent every 1 hour      Allergies    penicillin (Unknown)    Intolerances        SOCIAL HISTORY:  Denies ETOh,Smoking,     FAMILY HISTORY:      REVIEW OF SYSTEMS:    CONSTITUTIONAL: No weakness, fevers or chills  EYES/ENT: No visual changes;  no throat pain   NECK: No pain or stiffness  RESPIRATORY: No cough, wheezing, hemoptysis; No shortness of breath  CARDIOVASCULAR: No chest pain or palpitations  GASTROINTESTINAL: No abdominal or epigastric pain. No nausea, vomiting,                                                     9.0    12.12 )-----------( 396      ( 14 Nov 2023 08:35 )             28.1       CBC Full  -  ( 14 Nov 2023 08:35 )  WBC Count : 12.12 K/uL  RBC Count : 3.22 M/uL  Hemoglobin : 9.0 g/dL  Hematocrit : 28.1 %  Platelet Count - Automated : 396 K/uL  Mean Cell Volume : 87.3 fl  Mean Cell Hemoglobin : 28.0 pg  Mean Cell Hemoglobin Concentration : 32.0 gm/dL  Auto Neutrophil # : x  Auto Lymphocyte # : x  Auto Monocyte # : x  Auto Eosinophil # : x  Auto Basophil # : x  Auto Neutrophil % : x  Auto Lymphocyte % : x  Auto Monocyte % : x  Auto Eosinophil % : x  Auto Basophil % : x      11-14    145  |  118<H>  |  19  ----------------------------<  107<H>  3.8   |  23  |  0.79    Ca    7.8<L>      14 Nov 2023 08:35  Phos  2.1     11-13  Mg     2.0     11-13    TPro  4.9<L>  /  Alb  1.7<L>  /  TBili  0.5  /  DBili  x   /  AST  32  /  ALT  36  /  AlkPhos  99  11-13      CAPILLARY BLOOD GLUCOSE          Vital Signs Last 24 Hrs  T(C): 36.7 (14 Nov 2023 05:11), Max: 36.7 (13 Nov 2023 12:05)  T(F): 98 (14 Nov 2023 05:11), Max: 98.1 (13 Nov 2023 12:05)  HR: 75 (14 Nov 2023 05:11) (74 - 75)  BP: 130/71 (14 Nov 2023 05:11) (115/70 - 130/71)  BP(mean): --  RR: 28 (14 Nov 2023 05:11) (18 - 28)  SpO2: 91% (14 Nov 2023 05:11) (91% - 96%)    Parameters below as of 14 Nov 2023 05:11  Patient On (Oxygen Delivery Method): room air        Urinalysis Basic - ( 14 Nov 2023 08:35 )    Color: x / Appearance: x / SG: x / pH: x  Gluc: 107 mg/dL / Ketone: x  / Bili: x / Urobili: x   Blood: x / Protein: x / Nitrite: x   Leuk Esterase: x / RBC: x / WBC x   Sq Epi: x / Non Sq Epi: x / Bacteria: x                PHYSICAL EXAM:    Constitutional: NAD  HEENT: conjunctive   clear   Neck:  No JVD  Respiratory: CTAB  Cardiovascular: S1 and S2  Gastrointestinal: BS+, soft, NT/ND  Extremities: No peripheral edema  Neurological: A/O x 3, no focal deficits  Psychiatric: Normal mood, normal affect

## 2023-11-14 NOTE — OCCUPATIONAL THERAPY INITIAL EVALUATION ADULT - LEVEL OF INDEPENDENCE: SIT/STAND, REHAB EVAL
Dtr deferred at this time, requesting to let pt eat. Pt boosted in chair with modAx2 for optimal positioning for feeding.

## 2023-11-14 NOTE — PROGRESS NOTE ADULT - SUBJECTIVE AND OBJECTIVE BOX
Neurology follow up note    DEACON [id number]      Interval History:    Patient feels ok no new complaints.    Allergies    penicillin (Unknown)    Intolerances        MEDICATIONS    aMIOdarone    Tablet 200 milliGRAM(s) Oral daily  apixaban 2.5 milliGRAM(s) Oral every 12 hours  artificial  tears Solution 1 Drop(s) Both EYES four times a day  ascorbic acid 500 milliGRAM(s) Oral daily  dextrose 5% + sodium chloride 0.45% with potassium chloride 20 mEq/L 1000 milliLiter(s) IV Continuous <Continuous>  enalapril 10 milliGRAM(s) Oral daily  ferrous    sulfate 325 milliGRAM(s) Oral three times a day  levothyroxine 75 MICROGram(s) Oral daily  metoprolol tartrate 50 milliGRAM(s) Oral two times a day  multivitamin 1 Tablet(s) Oral daily  pantoprazole    Tablet 40 milliGRAM(s) Oral before breakfast  polyethylene glycol 3350 17 Gram(s) Oral daily              Vital Signs Last 24 Hrs  T(C): 36.7 (14 Nov 2023 05:11), Max: 36.7 (13 Nov 2023 12:05)  T(F): 98 (14 Nov 2023 05:11), Max: 98.1 (13 Nov 2023 12:05)  HR: 75 (14 Nov 2023 05:11) (74 - 75)  BP: 130/71 (14 Nov 2023 05:11) (115/70 - 130/71)  BP(mean): --  RR: 28 (14 Nov 2023 05:11) (18 - 28)  SpO2: 91% (14 Nov 2023 05:11) (91% - 96%)    Parameters below as of 14 Nov 2023 05:11  Patient On (Oxygen Delivery Method): room air          REVIEW OF SYSTEMS:  Slightly limited secondary to the patient has memory issues.  Constitutional:  She denies fever, chills, or night sweats.  Head:  No headache.  Eyes:  No double vision or blurry vision.  Ears:  No ringing in the ears.  Neck:  No neck pain.  Respiratory:  No shortness of breath.  Cardiovascular:  No chest pain.  Abdomen:  No nausea, vomiting, or abdominal pain.  Extremities/Neurological:  No numbness or tingling.  Musculoskeletal:  No joint pain.    PHYSICAL EXAMINATION:      HEENT:  Head:  Normocephalic, atraumatic.  Eyes:  No scleral icterus.  Ears:  Hearing bilaterally was intact.  NECK:  Supple.  RESPIRATORY:  Air entry bilaterally.  CARDIOVASCULAR:  S1 and S2 heard.  ABDOMEN:  Soft and nontender.  EXTREMITIES:  No clubbing or cyanosis was noted.      NEUROLOGIC:  The patient is awake and alert.   Positive signs of memory issues upon conversing with the patient.  Extraocular movements were intact.  She keeps her left eye closed, but is able to open it, which is her baseline.  Speech was fluent.  Smile symmetric.  Motor:  Bilateral upper 4/5, right lower was 3/5, left lower was 2/5 but does have history of leg issues to begin with.       LABS:  CBC Full  -  ( 13 Nov 2023 07:39 )  WBC Count : 10.96 K/uL  RBC Count : 2.92 M/uL  Hemoglobin : 8.3 g/dL  Hematocrit : 25.8 %  Platelet Count - Automated : 306 K/uL  Mean Cell Volume : 88.4 fl  Mean Cell Hemoglobin : 28.4 pg  Mean Cell Hemoglobin Concentration : 32.2 gm/dL  Auto Neutrophil # : x  Auto Lymphocyte # : x  Auto Monocyte # : x  Auto Eosinophil # : x  Auto Basophil # : x  Auto Neutrophil % : x  Auto Lymphocyte % : x  Auto Monocyte % : x  Auto Eosinophil % : x  Auto Basophil % : x    Urinalysis Basic - ( 13 Nov 2023 07:39 )    Color: x / Appearance: x / SG: x / pH: x  Gluc: 91 mg/dL / Ketone: x  / Bili: x / Urobili: x   Blood: x / Protein: x / Nitrite: x   Leuk Esterase: x / RBC: x / WBC x   Sq Epi: x / Non Sq Epi: x / Bacteria: x      11-13    146<H>  |  120<H>  |  20  ----------------------------<  91  3.7   |  22  |  0.66    Ca    7.7<L>      13 Nov 2023 07:39  Phos  2.1     11-13  Mg     2.0     11-13    TPro  4.9<L>  /  Alb  1.7<L>  /  TBili  0.5  /  DBili  x   /  AST  32  /  ALT  36  /  AlkPhos  99  11-13    Hemoglobin A1C:     LIVER FUNCTIONS - ( 13 Nov 2023 07:39 )  Alb: 1.7 g/dL / Pro: 4.9 g/dL / ALK PHOS: 99 U/L / ALT: 36 U/L / AST: 32 U/L / GGT: x           Vitamin B12         RADIOLOGY    ANALYSIS AND PLAN:  This is a 89-year-old with episode of altered mental status.  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  Examination was limited but as she appears to be at her baseline, suspect less likely this is a primary central nervous system event.  Antibiotics as needed.  For history of mild cognitive impairment versus subtle dementia, secondary to the patient's age, we would recommend supportive therapy.  For history of atrial fibrillation, risks versus benefits of anticoagulation.  For hypertension, monitor systolic blood pressure.  For history of hypothyroidism, continue the patient on Synthroid.    Spoke with daughter, Beth, at [phone].  She understands my reasoning and thought process.    58 minutes of time was spent with the patient, plan of care, reviewing data, speaking to multidisciplinary healthcare team with greater than 50% of the time in counseling and care coordination. Neurology follow up note    DEACON [id number]      Interval History:    Patient feels ok no new complaints.    Allergies    penicillin (Unknown)    Intolerances        MEDICATIONS    aMIOdarone    Tablet 200 milliGRAM(s) Oral daily  apixaban 2.5 milliGRAM(s) Oral every 12 hours  artificial  tears Solution 1 Drop(s) Both EYES four times a day  ascorbic acid 500 milliGRAM(s) Oral daily  dextrose 5% + sodium chloride 0.45% with potassium chloride 20 mEq/L 1000 milliLiter(s) IV Continuous <Continuous>  enalapril 10 milliGRAM(s) Oral daily  ferrous    sulfate 325 milliGRAM(s) Oral three times a day  levothyroxine 75 MICROGram(s) Oral daily  metoprolol tartrate 50 milliGRAM(s) Oral two times a day  multivitamin 1 Tablet(s) Oral daily  pantoprazole    Tablet 40 milliGRAM(s) Oral before breakfast  polyethylene glycol 3350 17 Gram(s) Oral daily              Vital Signs Last 24 Hrs  T(C): 36.7 (14 Nov 2023 05:11), Max: 36.7 (13 Nov 2023 12:05)  T(F): 98 (14 Nov 2023 05:11), Max: 98.1 (13 Nov 2023 12:05)  HR: 75 (14 Nov 2023 05:11) (74 - 75)  BP: 130/71 (14 Nov 2023 05:11) (115/70 - 130/71)  BP(mean): --  RR: 28 (14 Nov 2023 05:11) (18 - 28)  SpO2: 91% (14 Nov 2023 05:11) (91% - 96%)    Parameters below as of 14 Nov 2023 05:11  Patient On (Oxygen Delivery Method): room air          REVIEW OF SYSTEMS:  Slightly limited secondary to the patient has memory issues.  Constitutional:  She denies fever, chills, or night sweats.  Head:  No headache.  Eyes:  No double vision or blurry vision.  Ears:  No ringing in the ears.  Neck:  No neck pain.  Respiratory:  No shortness of breath.  Cardiovascular:  No chest pain.  Abdomen:  No nausea, vomiting, or abdominal pain.  Extremities/Neurological:  No numbness or tingling.  Musculoskeletal:  No joint pain.    PHYSICAL EXAMINATION:      HEENT:  Head:  Normocephalic, atraumatic.  Eyes:  No scleral icterus.  Ears:  Hearing bilaterally was intact.  NECK:  Supple.  RESPIRATORY:  Air entry bilaterally.  CARDIOVASCULAR:  S1 and S2 heard.  ABDOMEN:  Soft and nontender.  EXTREMITIES:  No clubbing or cyanosis was noted.      NEUROLOGIC:  The patient is awake and alert.   Positive signs of memory issues upon conversing with the patient.  Extraocular movements were intact.  She keeps her left eye closed, but is able to open it, which is her baseline.  Speech was fluent.  Smile symmetric.  Motor:  Bilateral upper 4/5, right lower was 3/5, left lower was 2/5 but does have history of leg issues to begin with.       LABS:  CBC Full  -  ( 13 Nov 2023 07:39 )  WBC Count : 10.96 K/uL  RBC Count : 2.92 M/uL  Hemoglobin : 8.3 g/dL  Hematocrit : 25.8 %  Platelet Count - Automated : 306 K/uL  Mean Cell Volume : 88.4 fl  Mean Cell Hemoglobin : 28.4 pg  Mean Cell Hemoglobin Concentration : 32.2 gm/dL  Auto Neutrophil # : x  Auto Lymphocyte # : x  Auto Monocyte # : x  Auto Eosinophil # : x  Auto Basophil # : x  Auto Neutrophil % : x  Auto Lymphocyte % : x  Auto Monocyte % : x  Auto Eosinophil % : x  Auto Basophil % : x    Urinalysis Basic - ( 13 Nov 2023 07:39 )    Color: x / Appearance: x / SG: x / pH: x  Gluc: 91 mg/dL / Ketone: x  / Bili: x / Urobili: x   Blood: x / Protein: x / Nitrite: x   Leuk Esterase: x / RBC: x / WBC x   Sq Epi: x / Non Sq Epi: x / Bacteria: x      11-13    146<H>  |  120<H>  |  20  ----------------------------<  91  3.7   |  22  |  0.66    Ca    7.7<L>      13 Nov 2023 07:39  Phos  2.1     11-13  Mg     2.0     11-13    TPro  4.9<L>  /  Alb  1.7<L>  /  TBili  0.5  /  DBili  x   /  AST  32  /  ALT  36  /  AlkPhos  99  11-13    Hemoglobin A1C:     LIVER FUNCTIONS - ( 13 Nov 2023 07:39 )  Alb: 1.7 g/dL / Pro: 4.9 g/dL / ALK PHOS: 99 U/L / ALT: 36 U/L / AST: 32 U/L / GGT: x           Vitamin B12         RADIOLOGY    ANALYSIS AND PLAN:  This is a 89-year-old with episode of altered mental status.  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  Examination was limited but as she appears to be at her baseline, suspect less likely this is a primary central nervous system event.  Antibiotics as needed.  For history of mild cognitive impairment versus subtle dementia, secondary to the patient's age, we would recommend supportive therapy.  For history of atrial fibrillation, risks versus benefits of anticoagulation.  For hypertension, monitor systolic blood pressure.  For history of hypothyroidism, continue the patient on Synthroid.    Spoke with daughter, Beth, at [phone].  She understands my reasoning and thought process.    58 minutes of time was spent with the patient, plan of care, reviewing data, speaking to multidisciplinary healthcare team with greater than 50% of the time in counseling and care coordination. Neurology follow up note    DEACON [id number]      Interval History:    Patient feels ok no new complaints.    Allergies    penicillin (Unknown)    Intolerances        MEDICATIONS    aMIOdarone    Tablet 200 milliGRAM(s) Oral daily  apixaban 2.5 milliGRAM(s) Oral every 12 hours  artificial  tears Solution 1 Drop(s) Both EYES four times a day  ascorbic acid 500 milliGRAM(s) Oral daily  dextrose 5% + sodium chloride 0.45% with potassium chloride 20 mEq/L 1000 milliLiter(s) IV Continuous <Continuous>  enalapril 10 milliGRAM(s) Oral daily  ferrous    sulfate 325 milliGRAM(s) Oral three times a day  levothyroxine 75 MICROGram(s) Oral daily  metoprolol tartrate 50 milliGRAM(s) Oral two times a day  multivitamin 1 Tablet(s) Oral daily  pantoprazole    Tablet 40 milliGRAM(s) Oral before breakfast  polyethylene glycol 3350 17 Gram(s) Oral daily              Vital Signs Last 24 Hrs  T(C): 36.7 (14 Nov 2023 05:11), Max: 36.7 (13 Nov 2023 12:05)  T(F): 98 (14 Nov 2023 05:11), Max: 98.1 (13 Nov 2023 12:05)  HR: 75 (14 Nov 2023 05:11) (74 - 75)  BP: 130/71 (14 Nov 2023 05:11) (115/70 - 130/71)  BP(mean): --  RR: 28 (14 Nov 2023 05:11) (18 - 28)  SpO2: 91% (14 Nov 2023 05:11) (91% - 96%)    Parameters below as of 14 Nov 2023 05:11  Patient On (Oxygen Delivery Method): room air          REVIEW OF SYSTEMS:  Slightly limited secondary to the patient has memory issues.  Constitutional:  She denies fever, chills, or night sweats.  Head:  No headache.  Eyes:  No double vision or blurry vision.  Ears:  No ringing in the ears.  Neck:  No neck pain.  Respiratory:  No shortness of breath.  Cardiovascular:  No chest pain.  Abdomen:  No nausea, vomiting, or abdominal pain.  Extremities/Neurological:  No numbness or tingling.  Musculoskeletal:  No joint pain.    PHYSICAL EXAMINATION:      HEENT:  Head:  Normocephalic, atraumatic.  Eyes:  No scleral icterus.  Ears:  Hearing bilaterally was intact.  NECK:  Supple.  RESPIRATORY:  Air entry bilaterally.  CARDIOVASCULAR:  S1 and S2 heard.  ABDOMEN:  Soft and nontender.  EXTREMITIES:  No clubbing or cyanosis was noted.      NEUROLOGIC:  The patient is awake and alert.   Positive signs of memory issues upon conversing with the patient.  Extraocular movements were intact.  She keeps her left eye closed, but is able to open it, which is her baseline.  Speech was fluent.  Smile symmetric.  Motor:  Bilateral upper 4/5, right lower was 3/5, left lower was 2/5 but does have history of leg issues to begin with.       LABS:  CBC Full  -  ( 13 Nov 2023 07:39 )  WBC Count : 10.96 K/uL  RBC Count : 2.92 M/uL  Hemoglobin : 8.3 g/dL  Hematocrit : 25.8 %  Platelet Count - Automated : 306 K/uL  Mean Cell Volume : 88.4 fl  Mean Cell Hemoglobin : 28.4 pg  Mean Cell Hemoglobin Concentration : 32.2 gm/dL  Auto Neutrophil # : x  Auto Lymphocyte # : x  Auto Monocyte # : x  Auto Eosinophil # : x  Auto Basophil # : x  Auto Neutrophil % : x  Auto Lymphocyte % : x  Auto Monocyte % : x  Auto Eosinophil % : x  Auto Basophil % : x    Urinalysis Basic - ( 13 Nov 2023 07:39 )    Color: x / Appearance: x / SG: x / pH: x  Gluc: 91 mg/dL / Ketone: x  / Bili: x / Urobili: x   Blood: x / Protein: x / Nitrite: x   Leuk Esterase: x / RBC: x / WBC x   Sq Epi: x / Non Sq Epi: x / Bacteria: x      11-13    146<H>  |  120<H>  |  20  ----------------------------<  91  3.7   |  22  |  0.66    Ca    7.7<L>      13 Nov 2023 07:39  Phos  2.1     11-13  Mg     2.0     11-13    TPro  4.9<L>  /  Alb  1.7<L>  /  TBili  0.5  /  DBili  x   /  AST  32  /  ALT  36  /  AlkPhos  99  11-13    Hemoglobin A1C:     LIVER FUNCTIONS - ( 13 Nov 2023 07:39 )  Alb: 1.7 g/dL / Pro: 4.9 g/dL / ALK PHOS: 99 U/L / ALT: 36 U/L / AST: 32 U/L / GGT: x           Vitamin B12         RADIOLOGY    ANALYSIS AND PLAN:  This is a 89-year-old with episode of altered mental status.  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  Examination was limited but as she appears to be at her baseline, suspect less likely this is a primary central nervous system event.  Antibiotics as needed.  For history of mild cognitive impairment versus subtle dementia, secondary to the patient's age, we would recommend supportive therapy.  For history of atrial fibrillation, risks versus benefits of anticoagulation.  For hypertension, monitor systolic blood pressure.  For history of hypothyroidism, continue the patient on Synthroid.    Spoke with daughter, Beth, at [phone].  She understands my reasoning and thought process.    58 minutes of time was spent with the patient, plan of care, reviewing data, speaking to multidisciplinary healthcare team with greater than 50% of the time in counseling and care coordination. Neurology follow up note    DECAON [id number]      Interval History:    Patient feels ok no new complaints.    Allergies    penicillin (Unknown)    Intolerances        MEDICATIONS    aMIOdarone    Tablet 200 milliGRAM(s) Oral daily  apixaban 2.5 milliGRAM(s) Oral every 12 hours  artificial  tears Solution 1 Drop(s) Both EYES four times a day  ascorbic acid 500 milliGRAM(s) Oral daily  dextrose 5% + sodium chloride 0.45% with potassium chloride 20 mEq/L 1000 milliLiter(s) IV Continuous <Continuous>  enalapril 10 milliGRAM(s) Oral daily  ferrous    sulfate 325 milliGRAM(s) Oral three times a day  levothyroxine 75 MICROGram(s) Oral daily  metoprolol tartrate 50 milliGRAM(s) Oral two times a day  multivitamin 1 Tablet(s) Oral daily  pantoprazole    Tablet 40 milliGRAM(s) Oral before breakfast  polyethylene glycol 3350 17 Gram(s) Oral daily              Vital Signs Last 24 Hrs  T(C): 36.7 (14 Nov 2023 05:11), Max: 36.7 (13 Nov 2023 12:05)  T(F): 98 (14 Nov 2023 05:11), Max: 98.1 (13 Nov 2023 12:05)  HR: 75 (14 Nov 2023 05:11) (74 - 75)  BP: 130/71 (14 Nov 2023 05:11) (115/70 - 130/71)  BP(mean): --  RR: 28 (14 Nov 2023 05:11) (18 - 28)  SpO2: 91% (14 Nov 2023 05:11) (91% - 96%)    Parameters below as of 14 Nov 2023 05:11  Patient On (Oxygen Delivery Method): room air          REVIEW OF SYSTEMS:  Slightly limited secondary to the patient has memory issues.  Constitutional:  She denies fever, chills, or night sweats.  Head:  No headache.  Eyes:  No double vision or blurry vision.  Ears:  No ringing in the ears.  Neck:  No neck pain.  Respiratory:  No shortness of breath.  Cardiovascular:  No chest pain.  Abdomen:  No nausea, vomiting, or abdominal pain.  Extremities/Neurological:  No numbness or tingling.  Musculoskeletal:  No joint pain.    PHYSICAL EXAMINATION:      HEENT:  Head:  Normocephalic, atraumatic.  Eyes:  No scleral icterus.  Ears:  Hearing bilaterally was intact.  NECK:  Supple.  RESPIRATORY:  Air entry bilaterally.  CARDIOVASCULAR:  S1 and S2 heard.  ABDOMEN:  Soft and nontender.  EXTREMITIES:  No clubbing or cyanosis was noted.      NEUROLOGIC:  The patient is awake and alert.   Positive signs of memory issues upon conversing with the patient.  Extraocular movements were intact.  She keeps her left eye closed, but is able to open it, which is her baseline.  Speech was fluent.  Smile symmetric.  Motor:  Bilateral upper 4/5, right lower was 3/5, left lower was 2/5 but does have history of leg issues to begin with.       LABS:  CBC Full  -  ( 13 Nov 2023 07:39 )  WBC Count : 10.96 K/uL  RBC Count : 2.92 M/uL  Hemoglobin : 8.3 g/dL  Hematocrit : 25.8 %  Platelet Count - Automated : 306 K/uL  Mean Cell Volume : 88.4 fl  Mean Cell Hemoglobin : 28.4 pg  Mean Cell Hemoglobin Concentration : 32.2 gm/dL  Auto Neutrophil # : x  Auto Lymphocyte # : x  Auto Monocyte # : x  Auto Eosinophil # : x  Auto Basophil # : x  Auto Neutrophil % : x  Auto Lymphocyte % : x  Auto Monocyte % : x  Auto Eosinophil % : x  Auto Basophil % : x    Urinalysis Basic - ( 13 Nov 2023 07:39 )    Color: x / Appearance: x / SG: x / pH: x  Gluc: 91 mg/dL / Ketone: x  / Bili: x / Urobili: x   Blood: x / Protein: x / Nitrite: x   Leuk Esterase: x / RBC: x / WBC x   Sq Epi: x / Non Sq Epi: x / Bacteria: x      11-13    146<H>  |  120<H>  |  20  ----------------------------<  91  3.7   |  22  |  0.66    Ca    7.7<L>      13 Nov 2023 07:39  Phos  2.1     11-13  Mg     2.0     11-13    TPro  4.9<L>  /  Alb  1.7<L>  /  TBili  0.5  /  DBili  x   /  AST  32  /  ALT  36  /  AlkPhos  99  11-13    Hemoglobin A1C:     LIVER FUNCTIONS - ( 13 Nov 2023 07:39 )  Alb: 1.7 g/dL / Pro: 4.9 g/dL / ALK PHOS: 99 U/L / ALT: 36 U/L / AST: 32 U/L / GGT: x           Vitamin B12         RADIOLOGY    ANALYSIS AND PLAN:  This is a 89-year-old with episode of altered mental status.  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  Examination was limited but as she appears to be at her baseline, suspect less likely this is a primary central nervous system event.  Antibiotics as needed.  For history of mild cognitive impairment versus subtle dementia, secondary to the patient's age, we would recommend supportive therapy.  For history of atrial fibrillation, risks versus benefits of anticoagulation.  For hypertension, monitor systolic blood pressure.  For history of hypothyroidism, continue the patient on Synthroid.  more interactive today 11/14    Spoke with daughter, Beth, at [phone] 11/14.  She understands my reasoning and thought process.    55 minutes of time was spent with the patient, plan of care, reviewing data, speaking to multidisciplinary healthcare team with greater than 50% of the time in counseling and care coordination. Neurology follow up note    DEACON [id number]      Interval History:    Patient feels ok no new complaints.    Allergies    penicillin (Unknown)    Intolerances        MEDICATIONS    aMIOdarone    Tablet 200 milliGRAM(s) Oral daily  apixaban 2.5 milliGRAM(s) Oral every 12 hours  artificial  tears Solution 1 Drop(s) Both EYES four times a day  ascorbic acid 500 milliGRAM(s) Oral daily  dextrose 5% + sodium chloride 0.45% with potassium chloride 20 mEq/L 1000 milliLiter(s) IV Continuous <Continuous>  enalapril 10 milliGRAM(s) Oral daily  ferrous    sulfate 325 milliGRAM(s) Oral three times a day  levothyroxine 75 MICROGram(s) Oral daily  metoprolol tartrate 50 milliGRAM(s) Oral two times a day  multivitamin 1 Tablet(s) Oral daily  pantoprazole    Tablet 40 milliGRAM(s) Oral before breakfast  polyethylene glycol 3350 17 Gram(s) Oral daily              Vital Signs Last 24 Hrs  T(C): 36.7 (14 Nov 2023 05:11), Max: 36.7 (13 Nov 2023 12:05)  T(F): 98 (14 Nov 2023 05:11), Max: 98.1 (13 Nov 2023 12:05)  HR: 75 (14 Nov 2023 05:11) (74 - 75)  BP: 130/71 (14 Nov 2023 05:11) (115/70 - 130/71)  BP(mean): --  RR: 28 (14 Nov 2023 05:11) (18 - 28)  SpO2: 91% (14 Nov 2023 05:11) (91% - 96%)    Parameters below as of 14 Nov 2023 05:11  Patient On (Oxygen Delivery Method): room air          REVIEW OF SYSTEMS:  Slightly limited secondary to the patient has memory issues.  Constitutional:  She denies fever, chills, or night sweats.  Head:  No headache.  Eyes:  No double vision or blurry vision.  Ears:  No ringing in the ears.  Neck:  No neck pain.  Respiratory:  No shortness of breath.  Cardiovascular:  No chest pain.  Abdomen:  No nausea, vomiting, or abdominal pain.  Extremities/Neurological:  No numbness or tingling.  Musculoskeletal:  No joint pain.    PHYSICAL EXAMINATION:      HEENT:  Head:  Normocephalic, atraumatic.  Eyes:  No scleral icterus.  Ears:  Hearing bilaterally was intact.  NECK:  Supple.  RESPIRATORY:  Air entry bilaterally.  CARDIOVASCULAR:  S1 and S2 heard.  ABDOMEN:  Soft and nontender.  EXTREMITIES:  No clubbing or cyanosis was noted.      NEUROLOGIC:  The patient is awake and alert.   Positive signs of memory issues upon conversing with the patient.  Extraocular movements were intact.  She keeps her left eye closed, but is able to open it, which is her baseline.  Speech was fluent.  Smile symmetric.  Motor:  Bilateral upper 4/5, right lower was 3/5, left lower was 2/5 but does have history of leg issues to begin with.       LABS:  CBC Full  -  ( 13 Nov 2023 07:39 )  WBC Count : 10.96 K/uL  RBC Count : 2.92 M/uL  Hemoglobin : 8.3 g/dL  Hematocrit : 25.8 %  Platelet Count - Automated : 306 K/uL  Mean Cell Volume : 88.4 fl  Mean Cell Hemoglobin : 28.4 pg  Mean Cell Hemoglobin Concentration : 32.2 gm/dL  Auto Neutrophil # : x  Auto Lymphocyte # : x  Auto Monocyte # : x  Auto Eosinophil # : x  Auto Basophil # : x  Auto Neutrophil % : x  Auto Lymphocyte % : x  Auto Monocyte % : x  Auto Eosinophil % : x  Auto Basophil % : x    Urinalysis Basic - ( 13 Nov 2023 07:39 )    Color: x / Appearance: x / SG: x / pH: x  Gluc: 91 mg/dL / Ketone: x  / Bili: x / Urobili: x   Blood: x / Protein: x / Nitrite: x   Leuk Esterase: x / RBC: x / WBC x   Sq Epi: x / Non Sq Epi: x / Bacteria: x      11-13    146<H>  |  120<H>  |  20  ----------------------------<  91  3.7   |  22  |  0.66    Ca    7.7<L>      13 Nov 2023 07:39  Phos  2.1     11-13  Mg     2.0     11-13    TPro  4.9<L>  /  Alb  1.7<L>  /  TBili  0.5  /  DBili  x   /  AST  32  /  ALT  36  /  AlkPhos  99  11-13    Hemoglobin A1C:     LIVER FUNCTIONS - ( 13 Nov 2023 07:39 )  Alb: 1.7 g/dL / Pro: 4.9 g/dL / ALK PHOS: 99 U/L / ALT: 36 U/L / AST: 32 U/L / GGT: x           Vitamin B12         RADIOLOGY    ANALYSIS AND PLAN:  This is a 89-year-old with episode of altered mental status.  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  Examination was limited but as she appears to be at her baseline, suspect less likely this is a primary central nervous system event.  Antibiotics as needed.  For history of mild cognitive impairment versus subtle dementia, secondary to the patient's age, we would recommend supportive therapy.  For history of atrial fibrillation, risks versus benefits of anticoagulation.  For hypertension, monitor systolic blood pressure.  For history of hypothyroidism, continue the patient on Synthroid.  more interactive today 11/14    Spoke with daughter, Beth, at [phone] 11/14.  She understands my reasoning and thought process.    55 minutes of time was spent with the patient, plan of care, reviewing data, speaking to multidisciplinary healthcare team with greater than 50% of the time in counseling and care coordination. Neurology follow up note    DEACON [id number]      Interval History:    Patient feels ok no new complaints.    Allergies    penicillin (Unknown)    Intolerances        MEDICATIONS    aMIOdarone    Tablet 200 milliGRAM(s) Oral daily  apixaban 2.5 milliGRAM(s) Oral every 12 hours  artificial  tears Solution 1 Drop(s) Both EYES four times a day  ascorbic acid 500 milliGRAM(s) Oral daily  dextrose 5% + sodium chloride 0.45% with potassium chloride 20 mEq/L 1000 milliLiter(s) IV Continuous <Continuous>  enalapril 10 milliGRAM(s) Oral daily  ferrous    sulfate 325 milliGRAM(s) Oral three times a day  levothyroxine 75 MICROGram(s) Oral daily  metoprolol tartrate 50 milliGRAM(s) Oral two times a day  multivitamin 1 Tablet(s) Oral daily  pantoprazole    Tablet 40 milliGRAM(s) Oral before breakfast  polyethylene glycol 3350 17 Gram(s) Oral daily              Vital Signs Last 24 Hrs  T(C): 36.7 (14 Nov 2023 05:11), Max: 36.7 (13 Nov 2023 12:05)  T(F): 98 (14 Nov 2023 05:11), Max: 98.1 (13 Nov 2023 12:05)  HR: 75 (14 Nov 2023 05:11) (74 - 75)  BP: 130/71 (14 Nov 2023 05:11) (115/70 - 130/71)  BP(mean): --  RR: 28 (14 Nov 2023 05:11) (18 - 28)  SpO2: 91% (14 Nov 2023 05:11) (91% - 96%)    Parameters below as of 14 Nov 2023 05:11  Patient On (Oxygen Delivery Method): room air          REVIEW OF SYSTEMS:  Slightly limited secondary to the patient has memory issues.  Constitutional:  She denies fever, chills, or night sweats.  Head:  No headache.  Eyes:  No double vision or blurry vision.  Ears:  No ringing in the ears.  Neck:  No neck pain.  Respiratory:  No shortness of breath.  Cardiovascular:  No chest pain.  Abdomen:  No nausea, vomiting, or abdominal pain.  Extremities/Neurological:  No numbness or tingling.  Musculoskeletal:  No joint pain.    PHYSICAL EXAMINATION:      HEENT:  Head:  Normocephalic, atraumatic.  Eyes:  No scleral icterus.  Ears:  Hearing bilaterally was intact.  NECK:  Supple.  RESPIRATORY:  Air entry bilaterally.  CARDIOVASCULAR:  S1 and S2 heard.  ABDOMEN:  Soft and nontender.  EXTREMITIES:  No clubbing or cyanosis was noted.      NEUROLOGIC:  The patient is awake and alert.   Positive signs of memory issues upon conversing with the patient.  Extraocular movements were intact.  She keeps her left eye closed, but is able to open it, which is her baseline.  Speech was fluent.  Smile symmetric.  Motor:  Bilateral upper 4/5, right lower was 3/5, left lower was 2/5 but does have history of leg issues to begin with.       LABS:  CBC Full  -  ( 13 Nov 2023 07:39 )  WBC Count : 10.96 K/uL  RBC Count : 2.92 M/uL  Hemoglobin : 8.3 g/dL  Hematocrit : 25.8 %  Platelet Count - Automated : 306 K/uL  Mean Cell Volume : 88.4 fl  Mean Cell Hemoglobin : 28.4 pg  Mean Cell Hemoglobin Concentration : 32.2 gm/dL  Auto Neutrophil # : x  Auto Lymphocyte # : x  Auto Monocyte # : x  Auto Eosinophil # : x  Auto Basophil # : x  Auto Neutrophil % : x  Auto Lymphocyte % : x  Auto Monocyte % : x  Auto Eosinophil % : x  Auto Basophil % : x    Urinalysis Basic - ( 13 Nov 2023 07:39 )    Color: x / Appearance: x / SG: x / pH: x  Gluc: 91 mg/dL / Ketone: x  / Bili: x / Urobili: x   Blood: x / Protein: x / Nitrite: x   Leuk Esterase: x / RBC: x / WBC x   Sq Epi: x / Non Sq Epi: x / Bacteria: x      11-13    146<H>  |  120<H>  |  20  ----------------------------<  91  3.7   |  22  |  0.66    Ca    7.7<L>      13 Nov 2023 07:39  Phos  2.1     11-13  Mg     2.0     11-13    TPro  4.9<L>  /  Alb  1.7<L>  /  TBili  0.5  /  DBili  x   /  AST  32  /  ALT  36  /  AlkPhos  99  11-13    Hemoglobin A1C:     LIVER FUNCTIONS - ( 13 Nov 2023 07:39 )  Alb: 1.7 g/dL / Pro: 4.9 g/dL / ALK PHOS: 99 U/L / ALT: 36 U/L / AST: 32 U/L / GGT: x           Vitamin B12         RADIOLOGY    ANALYSIS AND PLAN:  This is a 89-year-old with episode of altered mental status.  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  Examination was limited but as she appears to be at her baseline, suspect less likely this is a primary central nervous system event.  Antibiotics as needed.  For history of mild cognitive impairment versus subtle dementia, secondary to the patient's age, we would recommend supportive therapy.  For history of atrial fibrillation, risks versus benefits of anticoagulation.  For hypertension, monitor systolic blood pressure.  For history of hypothyroidism, continue the patient on Synthroid.  more interactive today 11/14    Spoke with daughter, Beth, at [phone] 11/14.  She understands my reasoning and thought process.    55 minutes of time was spent with the patient, plan of care, reviewing data, speaking to multidisciplinary healthcare team with greater than 50% of the time in counseling and care coordination.

## 2023-11-14 NOTE — PROGRESS NOTE ADULT - SUBJECTIVE AND OBJECTIVE BOX
Interval History:    CENTRAL LINE:   [  ] YES       [  ] NO  SKELTON:                 [  ] YES       [  ] NO         REVIEW OF SYSTEMS:  All Systems below were reviewed and are negative [  ]  HEENT:  ID:  Pulmonary:  Cardiac:  GI:  Renal:  Musculoskeletal:  All other systems above were reviewed and are negative   [  ]      MEDICATIONS  (STANDING):  aMIOdarone    Tablet 200 milliGRAM(s) Oral daily  apixaban 2.5 milliGRAM(s) Oral every 12 hours  artificial  tears Solution 1 Drop(s) Both EYES four times a day  ascorbic acid 500 milliGRAM(s) Oral daily  enalapril 10 milliGRAM(s) Oral daily  ertapenem  IVPB 1000 milliGRAM(s) IV Intermittent every 24 hours  ferrous    sulfate 325 milliGRAM(s) Oral three times a day  levothyroxine 75 MICROGram(s) Oral daily  metoprolol tartrate 50 milliGRAM(s) Oral two times a day  multivitamin 1 Tablet(s) Oral daily  pantoprazole    Tablet 40 milliGRAM(s) Oral before breakfast  polyethylene glycol 3350 17 Gram(s) Oral daily    MEDICATIONS  (PRN):      Vital Signs Last 24 Hrs  T(C): 36.7 (14 Nov 2023 17:50), Max: 36.7 (14 Nov 2023 05:11)  T(F): 98 (14 Nov 2023 17:50), Max: 98 (14 Nov 2023 05:11)  HR: 63 (14 Nov 2023 17:50) (63 - 75)  BP: 135/64 (14 Nov 2023 17:50) (119/74 - 135/64)  BP(mean): --  RR: 20 (14 Nov 2023 17:50) (18 - 28)  SpO2: 93% (14 Nov 2023 17:50) (90% - 96%)    Parameters below as of 14 Nov 2023 17:50  Patient On (Oxygen Delivery Method): room air        I&O's Summary    13 Nov 2023 07:01  -  14 Nov 2023 07:00  --------------------------------------------------------  IN: 50 mL / OUT: 0 mL / NET: 50 mL    14 Nov 2023 07:01 - 14 Nov 2023 20:29  --------------------------------------------------------  IN: 270 mL / OUT: 300 mL / NET: -30 mL        PHYSICAL EXAM:  HEENT: NC/AT; PERRLA  Neck: Soft; no tenderness  Lungs: CTA bilaterally; no wheezing.   Heart:  Abdomen:  Genital/ Rectal:  Extremities:  Neurologic:  Vascular:      LABORATORY:    CBC Full  -  ( 14 Nov 2023 08:35 )  WBC Count : 12.12 K/uL  RBC Count : 3.22 M/uL  Hemoglobin : 9.0 g/dL  Hematocrit : 28.1 %  Platelet Count - Automated : 396 K/uL  Mean Cell Volume : 87.3 fl  Mean Cell Hemoglobin : 28.0 pg  Mean Cell Hemoglobin Concentration : 32.0 gm/dL  Auto Neutrophil # : x  Auto Lymphocyte # : x  Auto Monocyte # : x  Auto Eosinophil # : x  Auto Basophil # : x  Auto Neutrophil % : x  Auto Lymphocyte % : x  Auto Monocyte % : x  Auto Eosinophil % : x  Auto Basophil % : x          11-14    145  |  118<H>  |  19  ----------------------------<  107<H>  3.8   |  23  |  0.79    Ca    7.8<L>      14 Nov 2023 08:35  Phos  2.1     11-13  Mg     2.0     11-13    TPro  4.9<L>  /  Alb  1.7<L>  /  TBili  0.5  /  DBili  x   /  AST  32  /  ALT  36  /  AlkPhos  99  11-13          Assessment and Plan:          Edwardo Gonzalez MD   [phone].      Interval History:    CENTRAL LINE:   [  ] YES       [  ] NO  SKELTON:                 [  ] YES       [  ] NO         REVIEW OF SYSTEMS:  All Systems below were reviewed and are negative [  ]  HEENT:  ID:  Pulmonary:  Cardiac:  GI:  Renal:  Musculoskeletal:  All other systems above were reviewed and are negative   [  ]      MEDICATIONS  (STANDING):  aMIOdarone    Tablet 200 milliGRAM(s) Oral daily  apixaban 2.5 milliGRAM(s) Oral every 12 hours  artificial  tears Solution 1 Drop(s) Both EYES four times a day  ascorbic acid 500 milliGRAM(s) Oral daily  enalapril 10 milliGRAM(s) Oral daily  ertapenem  IVPB 1000 milliGRAM(s) IV Intermittent every 24 hours  ferrous    sulfate 325 milliGRAM(s) Oral three times a day  levothyroxine 75 MICROGram(s) Oral daily  metoprolol tartrate 50 milliGRAM(s) Oral two times a day  multivitamin 1 Tablet(s) Oral daily  pantoprazole    Tablet 40 milliGRAM(s) Oral before breakfast  polyethylene glycol 3350 17 Gram(s) Oral daily    MEDICATIONS  (PRN):      Vital Signs Last 24 Hrs  T(C): 36.7 (14 Nov 2023 17:50), Max: 36.7 (14 Nov 2023 05:11)  T(F): 98 (14 Nov 2023 17:50), Max: 98 (14 Nov 2023 05:11)  HR: 63 (14 Nov 2023 17:50) (63 - 75)  BP: 135/64 (14 Nov 2023 17:50) (119/74 - 135/64)  BP(mean): --  RR: 20 (14 Nov 2023 17:50) (18 - 28)  SpO2: 93% (14 Nov 2023 17:50) (90% - 96%)    Parameters below as of 14 Nov 2023 17:50  Patient On (Oxygen Delivery Method): room air        I&O's Summary    13 Nov 2023 07:01  -  14 Nov 2023 07:00  --------------------------------------------------------  IN: 50 mL / OUT: 0 mL / NET: 50 mL    14 Nov 2023 07:01 - 14 Nov 2023 20:29  --------------------------------------------------------  IN: 270 mL / OUT: 300 mL / NET: -30 mL        PHYSICAL EXAM:  HEENT: NC/AT; PERRLA  Neck: Soft; no tenderness  Lungs: CTA bilaterally; no wheezing.   Heart:  Abdomen:  Genital/ Rectal:  Extremities:  Neurologic:  Vascular:      LABORATORY:    CBC Full  -  ( 14 Nov 2023 08:35 )  WBC Count : 12.12 K/uL  RBC Count : 3.22 M/uL  Hemoglobin : 9.0 g/dL  Hematocrit : 28.1 %  Platelet Count - Automated : 396 K/uL  Mean Cell Volume : 87.3 fl  Mean Cell Hemoglobin : 28.0 pg  Mean Cell Hemoglobin Concentration : 32.0 gm/dL  Auto Neutrophil # : x  Auto Lymphocyte # : x  Auto Monocyte # : x  Auto Eosinophil # : x  Auto Basophil # : x  Auto Neutrophil % : x  Auto Lymphocyte % : x  Auto Monocyte % : x  Auto Eosinophil % : x  Auto Basophil % : x          11-14    145  |  118<H>  |  19  ----------------------------<  107<H>  3.8   |  23  |  0.79    Ca    7.8<L>      14 Nov 2023 08:35  Phos  2.1     11-13  Mg     2.0     11-13    TPro  4.9<L>  /  Alb  1.7<L>  /  TBili  0.5  /  DBili  x   /  AST  32  /  ALT  36  /  AlkPhos  99  11-13          Assessment and Plan:          Edwardo Gonzalez MD   [phone].      Interval History:    CENTRAL LINE:   [  ] YES       [  ] NO  SKELTON:                 [  ] YES       [  ] NO         REVIEW OF SYSTEMS:  All Systems below were reviewed and are negative [  ]  HEENT:  ID:  Pulmonary:  Cardiac:  GI:  Renal:  Musculoskeletal:  All other systems above were reviewed and are negative   [  ]      MEDICATIONS  (STANDING):  aMIOdarone    Tablet 200 milliGRAM(s) Oral daily  apixaban 2.5 milliGRAM(s) Oral every 12 hours  artificial  tears Solution 1 Drop(s) Both EYES four times a day  ascorbic acid 500 milliGRAM(s) Oral daily  enalapril 10 milliGRAM(s) Oral daily  ertapenem  IVPB 1000 milliGRAM(s) IV Intermittent every 24 hours  ferrous    sulfate 325 milliGRAM(s) Oral three times a day  levothyroxine 75 MICROGram(s) Oral daily  metoprolol tartrate 50 milliGRAM(s) Oral two times a day  multivitamin 1 Tablet(s) Oral daily  pantoprazole    Tablet 40 milliGRAM(s) Oral before breakfast  polyethylene glycol 3350 17 Gram(s) Oral daily    MEDICATIONS  (PRN):      Vital Signs Last 24 Hrs  T(C): 36.7 (14 Nov 2023 17:50), Max: 36.7 (14 Nov 2023 05:11)  T(F): 98 (14 Nov 2023 17:50), Max: 98 (14 Nov 2023 05:11)  HR: 63 (14 Nov 2023 17:50) (63 - 75)  BP: 135/64 (14 Nov 2023 17:50) (119/74 - 135/64)  BP(mean): --  RR: 20 (14 Nov 2023 17:50) (18 - 28)  SpO2: 93% (14 Nov 2023 17:50) (90% - 96%)    Parameters below as of 14 Nov 2023 17:50  Patient On (Oxygen Delivery Method): room air        I&O's Summary    13 Nov 2023 07:01  -  14 Nov 2023 07:00  --------------------------------------------------------  IN: 50 mL / OUT: 0 mL / NET: 50 mL    14 Nov 2023 07:01 - 14 Nov 2023 20:29  --------------------------------------------------------  IN: 270 mL / OUT: 300 mL / NET: -30 mL        PHYSICAL EXAM:  HEENT: NC/AT; PERRLA  Neck: Soft; no tenderness  Lungs: CTA bilaterally; no wheezing.   Heart:  Abdomen:  Genital/ Rectal:  Extremities:  Neurologic:  Vascular:      LABORATORY:    CBC Full  -  ( 14 Nov 2023 08:35 )  WBC Count : 12.12 K/uL  RBC Count : 3.22 M/uL  Hemoglobin : 9.0 g/dL  Hematocrit : 28.1 %  Platelet Count - Automated : 396 K/uL  Mean Cell Volume : 87.3 fl  Mean Cell Hemoglobin : 28.0 pg  Mean Cell Hemoglobin Concentration : 32.0 gm/dL  Auto Neutrophil # : x  Auto Lymphocyte # : x  Auto Monocyte # : x  Auto Eosinophil # : x  Auto Basophil # : x  Auto Neutrophil % : x  Auto Lymphocyte % : x  Auto Monocyte % : x  Auto Eosinophil % : x  Auto Basophil % : x          11-14    145  |  118<H>  |  19  ----------------------------<  107<H>  3.8   |  23  |  0.79    Ca    7.8<L>      14 Nov 2023 08:35  Phos  2.1     11-13  Mg     2.0     11-13    TPro  4.9<L>  /  Alb  1.7<L>  /  TBili  0.5  /  DBili  x   /  AST  32  /  ALT  36  /  AlkPhos  99  11-13          Assessment and Plan:          Edwardo Gonzalez MD   [phone].    She is confused.  No fevers noted      MEDICATIONS  (STANDING):  aMIOdarone    Tablet 200 milliGRAM(s) Oral daily  apixaban 2.5 milliGRAM(s) Oral every 12 hours  artificial  tears Solution 1 Drop(s) Both EYES four times a day  ascorbic acid 500 milliGRAM(s) Oral daily  enalapril 10 milliGRAM(s) Oral daily  ertapenem  IVPB 1000 milliGRAM(s) IV Intermittent every 24 hours  ferrous    sulfate 325 milliGRAM(s) Oral three times a day  levothyroxine 75 MICROGram(s) Oral daily  metoprolol tartrate 50 milliGRAM(s) Oral two times a day  multivitamin 1 Tablet(s) Oral daily  pantoprazole    Tablet 40 milliGRAM(s) Oral before breakfast  polyethylene glycol 3350 17 Gram(s) Oral daily    MEDICATIONS  (PRN):      Vital Signs Last 24 Hrs  T(C): 36.7 (14 Nov 2023 17:50), Max: 36.7 (14 Nov 2023 05:11)  T(F): 98 (14 Nov 2023 17:50), Max: 98 (14 Nov 2023 05:11)  HR: 63 (14 Nov 2023 17:50) (63 - 75)  BP: 135/64 (14 Nov 2023 17:50) (119/74 - 135/64)  BP(mean): --  RR: 20 (14 Nov 2023 17:50) (18 - 28)  SpO2: 93% (14 Nov 2023 17:50) (90% - 96%)    Parameters below as of 14 Nov 2023 17:50  Patient On (Oxygen Delivery Method): room air        I&O's Summary    13 Nov 2023 07:01  -  14 Nov 2023 07:00  --------------------------------------------------------  IN: 50 mL / OUT: 0 mL / NET: 50 mL    14 Nov 2023 07:01  -  14 Nov 2023 20:29  --------------------------------------------------------  IN: 270 mL / OUT: 300 mL / NET: -30 mL        PHYSICAL EXAM:  HEENT: NC/AT; PERRLA  Neck: Soft; no tenderness  Lungs: CTA bilaterally; no wheezing.   Heart:  Abdomen:  Genital/ Rectal:  Extremities:  Neurologic:  Vascular:      LABORATORY:    CBC Full  -  ( 14 Nov 2023 08:35 )  WBC Count : 12.12 K/uL  RBC Count : 3.22 M/uL  Hemoglobin : 9.0 g/dL  Hematocrit : 28.1 %  Platelet Count - Automated : 396 K/uL  Mean Cell Volume : 87.3 fl  Mean Cell Hemoglobin : 28.0 pg  Mean Cell Hemoglobin Concentration : 32.0 gm/dL  Auto Neutrophil # : x  Auto Lymphocyte # : x  Auto Monocyte # : x  Auto Eosinophil # : x  Auto Basophil # : x  Auto Neutrophil % : x  Auto Lymphocyte % : x  Auto Monocyte % : x  Auto Eosinophil % : x  Auto Basophil % : x          11-14    145  |  118<H>  |  19  ----------------------------<  107<H>  3.8   |  23  |  0.79    Ca    7.8<L>      14 Nov 2023 08:35  Phos  2.1     11-13  Mg     2.0     11-13    TPro  4.9<L>  /  Alb  1.7<L>  /  TBili  0.5  /  DBili  x   /  AST  32  /  ALT  36  /  AlkPhos  99  11-13      Assessment and Plan:    1. Recurrent UTI with ESBL E coli.  2. Toxic metabolic encephalopathy.  3. Generalized weakness.     . UA was suggestive of UTI. Urine culture grew ESBL E coli.  . She had 3 days of IV Invanz but still has high WBC of 12K today. Continue IV Invanz for 3 more days ( a total of 6 days)for  recurrent UTI with ESBL E coli,  . Get bladder scan. If showed urinary retention, get urology evaluation.   . Monitor mental status.          Edwardo Gonzalez MD   [phone].    She is confused.  No fevers noted      MEDICATIONS  (STANDING):  aMIOdarone    Tablet 200 milliGRAM(s) Oral daily  apixaban 2.5 milliGRAM(s) Oral every 12 hours  artificial  tears Solution 1 Drop(s) Both EYES four times a day  ascorbic acid 500 milliGRAM(s) Oral daily  enalapril 10 milliGRAM(s) Oral daily  ertapenem  IVPB 1000 milliGRAM(s) IV Intermittent every 24 hours  ferrous    sulfate 325 milliGRAM(s) Oral three times a day  levothyroxine 75 MICROGram(s) Oral daily  metoprolol tartrate 50 milliGRAM(s) Oral two times a day  multivitamin 1 Tablet(s) Oral daily  pantoprazole    Tablet 40 milliGRAM(s) Oral before breakfast  polyethylene glycol 3350 17 Gram(s) Oral daily    MEDICATIONS  (PRN):      Vital Signs Last 24 Hrs  T(C): 36.7 (14 Nov 2023 17:50), Max: 36.7 (14 Nov 2023 05:11)  T(F): 98 (14 Nov 2023 17:50), Max: 98 (14 Nov 2023 05:11)  HR: 63 (14 Nov 2023 17:50) (63 - 75)  BP: 135/64 (14 Nov 2023 17:50) (119/74 - 135/64)  BP(mean): --  RR: 20 (14 Nov 2023 17:50) (18 - 28)  SpO2: 93% (14 Nov 2023 17:50) (90% - 96%)    Parameters below as of 14 Nov 2023 17:50  Patient On (Oxygen Delivery Method): room air        I&O's Summary    13 Nov 2023 07:01  -  14 Nov 2023 07:00  --------------------------------------------------------  IN: 50 mL / OUT: 0 mL / NET: 50 mL    14 Nov 2023 07:01  -  14 Nov 2023 20:29  --------------------------------------------------------  IN: 270 mL / OUT: 300 mL / NET: -30 mL        PHYSICAL EXAM:  HEENT: NC/AT; PERRLA  Neck: Soft; no tenderness  Lungs: CTA bilaterally; no wheezing.   Heart:  Abdomen:  Genital/ Rectal:  Extremities:  Neurologic:  Vascular:      LABORATORY:    CBC Full  -  ( 14 Nov 2023 08:35 )  WBC Count : 12.12 K/uL  RBC Count : 3.22 M/uL  Hemoglobin : 9.0 g/dL  Hematocrit : 28.1 %  Platelet Count - Automated : 396 K/uL  Mean Cell Volume : 87.3 fl  Mean Cell Hemoglobin : 28.0 pg  Mean Cell Hemoglobin Concentration : 32.0 gm/dL  Auto Neutrophil # : x  Auto Lymphocyte # : x  Auto Monocyte # : x  Auto Eosinophil # : x  Auto Basophil # : x  Auto Neutrophil % : x  Auto Lymphocyte % : x  Auto Monocyte % : x  Auto Eosinophil % : x  Auto Basophil % : x          11-14    145  |  118<H>  |  19  ----------------------------<  107<H>  3.8   |  23  |  0.79    Ca    7.8<L>      14 Nov 2023 08:35  Phos  2.1     11-13  Mg     2.0     11-13    TPro  4.9<L>  /  Alb  1.7<L>  /  TBili  0.5  /  DBili  x   /  AST  32  /  ALT  36  /  AlkPhos  99  11-13      Assessment and Plan:    1. Recurrent UTI with ESBL E coli.  2. Toxic metabolic encephalopathy.  3. Generalized weakness.     . UA was suggestive of UTI. Urine culture grew ESBL E coli.  . She had 3 days of IV Invanz but still has high WBC of 12K today. Continue IV Invanz for 3 more days ( a total of 6 days)for  recurrent UTI with ESBL E coli,  . Get bladder scan. If showed urinary retention, get urology evaluation.   . Monitor mental status.          Edwardo Gonzalez MD   [phone].    She is confused.  No fevers noted      MEDICATIONS  (STANDING):  aMIOdarone    Tablet 200 milliGRAM(s) Oral daily  apixaban 2.5 milliGRAM(s) Oral every 12 hours  artificial  tears Solution 1 Drop(s) Both EYES four times a day  ascorbic acid 500 milliGRAM(s) Oral daily  enalapril 10 milliGRAM(s) Oral daily  ertapenem  IVPB 1000 milliGRAM(s) IV Intermittent every 24 hours  ferrous    sulfate 325 milliGRAM(s) Oral three times a day  levothyroxine 75 MICROGram(s) Oral daily  metoprolol tartrate 50 milliGRAM(s) Oral two times a day  multivitamin 1 Tablet(s) Oral daily  pantoprazole    Tablet 40 milliGRAM(s) Oral before breakfast  polyethylene glycol 3350 17 Gram(s) Oral daily    MEDICATIONS  (PRN):      Vital Signs Last 24 Hrs  T(C): 36.7 (14 Nov 2023 17:50), Max: 36.7 (14 Nov 2023 05:11)  T(F): 98 (14 Nov 2023 17:50), Max: 98 (14 Nov 2023 05:11)  HR: 63 (14 Nov 2023 17:50) (63 - 75)  BP: 135/64 (14 Nov 2023 17:50) (119/74 - 135/64)  BP(mean): --  RR: 20 (14 Nov 2023 17:50) (18 - 28)  SpO2: 93% (14 Nov 2023 17:50) (90% - 96%)    Parameters below as of 14 Nov 2023 17:50  Patient On (Oxygen Delivery Method): room air        I&O's Summary    13 Nov 2023 07:01  -  14 Nov 2023 07:00  --------------------------------------------------------  IN: 50 mL / OUT: 0 mL / NET: 50 mL    14 Nov 2023 07:01  -  14 Nov 2023 20:29  --------------------------------------------------------  IN: 270 mL / OUT: 300 mL / NET: -30 mL        PHYSICAL EXAM:  HEENT: NC/AT; PERRLA  Neck: Soft; no tenderness  Lungs: CTA bilaterally; no wheezing.   Heart:  Abdomen:  Genital/ Rectal:  Extremities:  Neurologic:  Vascular:      LABORATORY:    CBC Full  -  ( 14 Nov 2023 08:35 )  WBC Count : 12.12 K/uL  RBC Count : 3.22 M/uL  Hemoglobin : 9.0 g/dL  Hematocrit : 28.1 %  Platelet Count - Automated : 396 K/uL  Mean Cell Volume : 87.3 fl  Mean Cell Hemoglobin : 28.0 pg  Mean Cell Hemoglobin Concentration : 32.0 gm/dL  Auto Neutrophil # : x  Auto Lymphocyte # : x  Auto Monocyte # : x  Auto Eosinophil # : x  Auto Basophil # : x  Auto Neutrophil % : x  Auto Lymphocyte % : x  Auto Monocyte % : x  Auto Eosinophil % : x  Auto Basophil % : x          11-14    145  |  118<H>  |  19  ----------------------------<  107<H>  3.8   |  23  |  0.79    Ca    7.8<L>      14 Nov 2023 08:35  Phos  2.1     11-13  Mg     2.0     11-13    TPro  4.9<L>  /  Alb  1.7<L>  /  TBili  0.5  /  DBili  x   /  AST  32  /  ALT  36  /  AlkPhos  99  11-13      Assessment and Plan:    1. Recurrent UTI with ESBL E coli.  2. Toxic metabolic encephalopathy.  3. Generalized weakness.     . UA was suggestive of UTI. Urine culture grew ESBL E coli.  . She had 3 days of IV Invanz but still has high WBC of 12K today. Continue IV Invanz for 3 more days ( a total of 6 days)for  recurrent UTI with ESBL E coli,  . Get bladder scan. If showed urinary retention, get urology evaluation.   . Monitor mental status.          Edwardo Gonzalez MD   [phone].

## 2023-11-14 NOTE — OCCUPATIONAL THERAPY INITIAL EVALUATION ADULT - ADDITIONAL COMMENTS
Pt is a poor historian. As per dtr at bedside, pt lives in [facility] and has a HHA who assists with all ADLs except pt was able to feed herself post setup. Pt wears diapers. Pt is non-ambulatory but was able to transfer bed to/from w/c with 2 person assist. Pt is a poor historian. As per dtr at bedside, pt lives in [facility] and has a HHA who assists with all ADLs except pt was able to feed herself post setup. Pt wears diapers. Pt is non-ambulatory but was able to transfer bed to/from w/c with 2 person assist. Pt is a poor historian. As per dtr at bedside, pt lives in [facility] and has a HHA who assists with all ADLs except pt was able to feed herself post setup. Pt wears diapers. Pt is non-ambulatory but was able to transfer bed to/from w/c with 2 person assist.

## 2023-11-14 NOTE — OCCUPATIONAL THERAPY INITIAL EVALUATION ADULT - PATIENT/FAMILY/SIGNIFICANT OTHER GOALS STATEMENT, OT EVAL
dtr wishes for pt to return to correction with PT/OT dtr wishes for pt to return to half-way with PT/OT dtr wishes for pt to return to skilled nursing with PT/OT

## 2023-11-14 NOTE — PROGRESS NOTE ADULT - ASSESSMENT
89-year-old female with history of hypertension, hyperlipidemia, A-fib with ablation on Eliquis, MR, bladder tumor, PFO, osteoporosis, spinal stenosis brought in by ambulance for possible stroke.  As per facility patient ate breakfast and had her medication between 9:45AM and 10 AM this morning and was at her baseline mental status.  Aide noticed around 11/1130 patient was leaning towards her left side, had water poured out of her mouth when she attempted to drink and had possible slurred speech.  Patient limited historian due to MR.  As per facility patient was recently in a short-term rehab facility, recently returned back to [facility] however patient has been weaker since then.< from: CT Brain Perfusion Maps Stroke (11.11.23 @ 14:12) >      hypokalemia   potassium chloride  10 mEq/100 mL IVPB 10 milliEquivalent(s) IV Intermittent every 1 hour  aMIOdarone    Tablet 200 milliGRAM(s) Oral daily    BP monitoring,continue current antihypertensive meds, low salt diet,followup with PMD in 1-2 weeks  enalapril 10 milliGRAM(s) Oral daily  metoprolol tartrate 50 milliGRAM(s) Oral two times a day    Admit for septic workup and ID evaluation,send blood and urine cx,serial lactate levels,monitor vitals closley,ivfs hydration,monitor urine output and renal profile,iv abx as per id cons    hypernatremia   will check ua , urine osmolality , urine sodium , urine uric acid , serum sodium , serum osmolality , serum uric acid , f/u with hypernatremia work up , f/u with bmp , monitor i and o     89-year-old female with history of hypertension, hyperlipidemia, A-fib with ablation on Eliquis, MR, bladder tumor, PFO, osteoporosis, spinal stenosis brought in by ambulance for possible stroke.  As per facility patient ate breakfast and had her medication between 9:45AM and 10 AM this morning and was at her baseline mental status.  Aide noticed around 11/1130 patient was leaning towards her left side, had water poured out of her mouth when she attempted to drink and had possible slurred speech.  Patient limited historian due to MR.  As per facility patient was recently in a short-term rehab facility, recently returned back to [facility] however patient has been weaker since then.< from: CT Brain Perfusion Maps Stroke (11.11.23 @ 14:12) >      hypokalemia   potassium chloride  10 mEq/100 mL IVPB 10 milliEquivalent(s) IV Intermittent every 1 hour  aMIOdarone    Tablet 200 milliGRAM(s) Oral daily    BP monitoring,continue current antihypertensive meds, low salt diet,followup with PMD in 1-2 weeks  enalapril 10 milliGRAM(s) Oral daily  metoprolol tartrate 50 milliGRAM(s) Oral two times a day    Admit for septic workup and ID evaluation,send blood and urine cx,serial lactate levels,monitor vitals closley,ivfs hydration,monitor urine output and renal profile,iv abx as per id cons    hypernatremia   will check ua , urine osmolality , urine sodium , urine uric acid , serum sodium , serum osmolality , serum uric acid , f/u with hypernatremia work up , f/u with bmp , monitor i and o     89-year-old female with history of hypertension, hyperlipidemia, A-fib with ablation on Eliquis, MR, bladder tumor, PFO, osteoporosis, spinal stenosis brought in by ambulance for possible stroke.  As per facility patient ate breakfast and had her medication between 9:45AM and 10 AM this morning and was at her baseline mental status.  Aide noticed around 11/1130 patient was leaning towards her left side, had water poured out of her mouth when she attempted to drink and had possible slurred speech.  Patient limited historian due to MR.  As per facility patient was recently in a short-term rehab facility, recently returned back to [facility] however patient has been weaker since then.< from: CT Brain Perfusion Maps Stroke (11.11.23 @ 14:12) >      hypokalemia   potassium chloride  10 mEq/100 mL IVPB 10 milliEquivalent(s) IV Intermittent every 1 hour  aMIOdarone    Tablet 200 milliGRAM(s) Oral daily    BP monitoring,continue current antihypertensive meds, low salt diet,followup with PMD in 1-2 weeks  enalapril 10 milliGRAM(s) Oral daily  metoprolol tartrate 50 milliGRAM(s) Oral two times a day    Admit for septic workup and ID evaluation,send blood and urine cx,serial lactate levels,monitor vitals closley,ivfs hydration,monitor urine output and renal profile,iv abx as per id cons    hypernatremia   will check ua , urine osmolality , urine sodium , urine uric acid , serum sodium , serum osmolality , serum uric acid , f/u with hypernatremia work up , f/u with bmp , monitor i and o

## 2023-11-14 NOTE — SWALLOW VFSS/MBS ASSESSMENT ADULT - COMMENTS
H&P: 89-year-old female with history of hypertension, hyperlipidemia, A-fib with ablation on Eliquis, MR, bladder tumor, PFO, osteoporosis, spinal stenosis brought in by ambulance for possible stroke.  As per facility patient ate breakfast and had her medication between 9:45AM and 10 AM this morning and was at her baseline mental status.  Aide noticed around 11/1130 patient was leaning towards her left side, had water poured out of her mouth when she attempted to drink and had possible slurred speech.  Patient limited historian due to MR.  As per facility patient was recently in a short-term rehab facility, recently returned back to [facility] however patient has been weaker since then".    Recent CXR "LUNGS: Prominent interstitial markings, left parahilar patchy opacities, and right basilar atelectasis.. Findings are stable compared to prior."    Recent head CT "Age-appropriate involutional changes. Atherosclerotic changes. Gray/white matter differentiation is preserved. No acute intracranial hemorrhage."    At the time of today's assessment, pt is A&Ox2. She is able to follow low-level, verbally presented directives presented by clinician. Pt is known to this service and seen for bedside swallow evaluations during prior admissions. H&P: 89-year-old female with history of hypertension, hyperlipidemia, A-fib with ablation on Eliquis, MR, bladder tumor, PFO, osteoporosis, spinal stenosis brought in by ambulance for possible stroke.  As per facility patient ate breakfast and had her medication between 9:45AM and 10 AM this morning and was at her baseline mental status.  Aide noticed around 11/1130 patient was leaning towards her left side, had water poured out of her mouth when she attempted to drink and had possible slurred speech.  Patient limited historian due to MR.  As per facility patient was recently in a short-term rehab facility, recently returned back to [facility] however patient has been weaker since then".    Recent CXR "LUNGS: Prominent interstitial markings, left parahilar patchy opacities, and right basilar atelectasis.. Findings are stable compared to prior."    Recent head CT "Age-appropriate involutional changes. Atherosclerotic changes. Gray/white matter differentiation is preserved. No acute intracranial hemorrhage."    At the time of today's assessment, pt is A&Ox2. She is able to follow low-level, verbally presented directives presented by clinician. Pt is known to this service and seen for bedside swallow evaluations during prior admissions. H&P: 89-year-old female with history of hypertension, hyperlipidemia, A-fib with ablation on Eliquis, MR, bladder tumor, PFO, osteoporosis, spinal stenosis brought in by ambulance for possible stroke.  As per facility patient ate breakfast and had her medication between 9:45AM and 10 AM this morning and was at her baseline mental status.  Aide noticed around 11/1130 patient was leaning towards her left side, had water poured out of her mouth when she attempted to drink and had possible slurred speech.  Patient limited historian due to MR.  As per facility patient was recently in a short-term rehab facility, recently returned back to [email or web address] however patient has been weaker since then".    Recent CXR "LUNGS: Prominent interstitial markings, left parahilar patchy opacities, and right basilar atelectasis.. Findings are stable compared to prior."    Recent head CT "Age-appropriate involutional changes. Atherosclerotic changes. Gray/white matter differentiation is preserved. No acute intracranial hemorrhage."    At the time of today's assessment, pt is A&Ox2. She is able to follow low-level, verbally presented directives presented by clinician. Pt is known to this service and seen for bedside swallow evaluations during prior admissions.

## 2023-11-14 NOTE — SWALLOW VFSS/MBS ASSESSMENT ADULT - DIAGNOSTIC IMPRESSIONS
Patient presents with mild oral dysphagia marked by oral holding of bolus, reduced AP transport and increased oral transit time. Pharyngeal stage was functional marked by prompt initiation, adequate tongue base retraction and pharyngeal contractibility, adequate hyolaryngeal elevation/complete epiglottic closure. No penetration/aspiration/residue viewed for all consistencies.

## 2023-11-14 NOTE — PROGRESS NOTE ADULT - ASSESSMENT
89-year-old female with history of hypertension, hyperlipidemia, A-fib with ablation on Eliquis, MR, bladder tumor, PFO, osteoporosis, spinal stenosis brought in by ambulance for possible stroke.     eval cva  spinal stenosis  OP  OA  HLD  HTN  AF  Bladder tumor  PFO    cns imaging reviewed  assist with needs  neuro follow up  HOB elev  asp prec  SLP eval  oral hygiene  PO diet  GOC - DNR

## 2023-11-14 NOTE — PROGRESS NOTE ADULT - SUBJECTIVE AND OBJECTIVE BOX
PROGRESS NOTE  Patient is a 89y old  Female who presents with a chief complaint of Transient cerebral ischemia     (13 Nov 2023 13:48)  Chart and available morning labs /imaging are reviewed electronically , urgent issues addressed . More information  is being added upon completion of rounds , when more information is collected and management discussed with consultants , medical staff and social service/case management on the floor     OVERNIGHT  No new issues reported by medical staff . All above noted Patient is resting in a bed comfortably .Confused ,poor mentation .No distress noted   MBS noted , POC d/w daughter at bedside Anticipate d/c in am   HPI:  89-year-old female with history of hypertension, hyperlipidemia, A-fib with ablation on Eliquis, MR, bladder tumor, PFO, osteoporosis, spinal stenosis brought in by ambulance for possible stroke.  As per facility patient ate breakfast and had her medication between 9:45AM and 10 AM this morning and was at her baseline mental status.  Aide noticed around 11/1130 patient was leaning towards her left side, had water poured out of her mouth when she attempted to drink and had possible slurred speech.  Patient limited historian due to MR.  As per facility patient was recently in a short-term rehab facility, recently returned back to [facility] however patient has been weaker since then.< from: CT Brain Perfusion Maps Stroke (11.11.23 @ 14:12) >    < from: CT Brain Perfusion Maps Stroke (11.11.23 @ 14:12) >    ACC: [id number] EXAM:  CT BRAIN PERFUSION MAPS STROKE   ORDERED BY:   JORGE FELDER     ACC: [id number] EXAM:  CT ANGIO BRAIN STROKE PROTC IC   ORDERED BY:   JORGE FELDER     ACC: [id number] EXAM:  CT BRAIN STROKE PROTOCOL   ORDERED BY: JORGE FELDER     ACC: [id number] EXAM:  CT ANGIO NECK STROKE PROTCL IC   ORDERED BY:   JORGE FELDER     PROCEDURE DATE:  11/11/2023          INTERPRETATION:  CT HEAD STROKE PROTOCOL, CT ANGIO NECK STROKE PROTOCOL,   CT ANGIO HEAD STROKE PROTOCOL, CT PERFUSION WITH MAPS STROKE PROTOCOL    CT BRAIN WITHOUT CONTRAST    INDICATIONS:  Stroke code Leaning to left side. Slight left arm drift.    TECHNIQUE:  Serial axial images were obtained from the skull base to the   vertex without the use of contrast.    COMPARISON EXAM: 7/11/2023    FINDINGS:  Ventricles and sulci: Parenchymal volume loss is present which is   commensurate with patient age.  Intra-axial: Periventricular small vessel white matter ischemic changes.   No intracranial mass, acute hemorrhage, or midline shift is present.  Extra-axial: No extra-axial fluid collection is identified. Deposition of   calcified plaque at the level of the bilateral carotid siphons.  Calvarium: Intact.    Left optic lens replacement, as on prior. Bilateral optic globes are   intact. Imaged paranasal sinuses, bilateral mastoid air cells, middle ear   cavities are clear.        CT PERFUSION  CTA OF THE NECK AND HEAD:      CONTRAST/COMPLICATIONS:  IV Contrast: NONE (accession [id number]), IV contrast documented in   unlinked concurrent exam (accession [id number])  Complications: None reported at time of study completion      TECHNIQUE PERFUSION:  After the intravenous power injection of non-ionic contrast material,   repeat serial thin sections were obtained through the intracranial   circulation on a multislice CT scanner. Artificial intelligence was then   used for analysis of perfusion and intracranial large vessel occlusion.      TECHNIQUE CTA NECK AND HEAD:  After the intravenous power injection of non-ionic contrast material,   serial thin sections were obtained through the cervical and intracranial   circulation on a multislice CT scanner. 2D and 3D MIP reformatted images   were obtained.  Images were reformatted using a dedicated 3D software   package.    FINDINGS:    CT PERFUSION:    COMPARISON EXAMINATION: None.    Technical limitations: Significant patient motion in all 3 planes during   image acquisition likely renders results suboptimal and nondiagnostic.   Arterial and venous waveforms are also not optimized.    Miscellaneous: None.      CTA NECK WITH CONTRAST:    CAROTID STENOSIS REFERENCE: (NASCET = 100x1-(N/D)). N=greatest narrowing.   D=normal distal diameter - MILD = <50% stenosis. - MODERATE = 50-69%   stenosis.- SEVERE = 70-89% stenosis. - HAIRLINE/CRITICAL = 90-99%   stenosis. - OCCLUDED = 100% stenosis.      COMPARISON: None.    FINDINGS:  Aortic arch and visualized great vessels: Within normal limits.    RIGHT:  Common carotid artery: Follows a tortuouscourse and is normal in caliber   to the carotid bifurcation.  Internal carotid artery: Mild to moderate deposition of calcified plaque   at the level of the right common carotid artery bifurcation with   extension into the proximal aspect of the right internal carotid artery,   without significant stenosis based on NASCET criteria. Normal course and   caliber to the intracranial circulation.    Vertebral artery: Emanates from the right subclavian artery. The right   vertebral artery is normal incourse and caliber to the intracranial   circulation.      LEFT:  Common carotid artery :Normal in course and caliber to the carotid   bifurcation.  Internal carotid artery: Mild to moderate deposition of calcified plaque   at the level of the left common carotid artery bifurcation with extension   into the proximal left internal carotid artery without significant   stenosis based on NASCET criteria. Normal course and caliber to the   intracranial circulation.    Vertebral artery: Emanates from the left subclavian artery. The left   vertebral artery is normal in course and caliber to the intracranial   circulation.      Miscellaneous: None    CTA HEAD WITH CONTRAST:    COMPARISON EXAM: None.      Internal carotid arteries: Bilateral petrous, precavernous, cavernous,   and supraclinoid regions are patent. Mild deposition of calcified plaque   at the level of the carotid siphons without significant stenosis in this   location.    [place]:  No aneurysm about the [place]. Tiny aneurysms   can be beyond the resolution of CTA technique.    Anterior Cerebral arteries: No significant stenosis or occlusion.  Middle Cerebral arteries: No significant stenosis or occlusion.    Posterior Circulation: Distal intracranial bilateralvertebral arteries   are visualized. The vertebrobasilar confluence is unremarkable. There is   good flow within the basilar artery. Bilateral superior cerebellar   arteries and bilateral posterior cerebral arteries emanate from the   distal aspect of the basilar artery. There is a hypoplastic right P1   segment. A prominent right posterior communicating artery contributes to   flow within the right posterior cerebral artery.    < end of copied text >     (11 Nov 2023 21:59)    PAST MEDICAL & SURGICAL HISTORY:  Afib      PFO (patent foramen ovale)      Degeneration macular      Osteoporosis      History of complete heart block      HTN (hypertension)      Hypothyroidism      Spinal stenosis      Cardiac pacemaker          MEDICATIONS  (STANDING):  aMIOdarone    Tablet 200 milliGRAM(s) Oral daily  apixaban 2.5 milliGRAM(s) Oral every 12 hours  artificial  tears Solution 1 Drop(s) Both EYES four times a day  ascorbic acid 500 milliGRAM(s) Oral daily  enalapril 10 milliGRAM(s) Oral daily  ertapenem  IVPB 1000 milliGRAM(s) IV Intermittent every 24 hours  ferrous    sulfate 325 milliGRAM(s) Oral three times a day  levothyroxine 75 MICROGram(s) Oral daily  metoprolol tartrate 50 milliGRAM(s) Oral two times a day  multivitamin 1 Tablet(s) Oral daily  pantoprazole    Tablet 40 milliGRAM(s) Oral before breakfast  polyethylene glycol 3350 17 Gram(s) Oral daily    MEDICATIONS  (PRN):      OBJECTIVE    T(C): 36.7 (11-14-23 @ 17:50), Max: 36.7 (11-14-23 @ 05:11)  HR: 63 (11-14-23 @ 17:50) (63 - 75)  BP: 135/64 (11-14-23 @ 17:50) (119/74 - 135/64)  RR: 20 (11-14-23 @ 17:50) (18 - 28)  SpO2: 93% (11-14-23 @ 17:50) (90% - 96%)  Wt(kg): --  I&O's Summary    14 Nov 2023 07:01  -  14 Nov 2023 18:37  --------------------------------------------------------  IN: 120 mL / OUT: 300 mL / NET: -180 mL          REVIEW OF SYSTEMS:  CONSTITUTIONAL: No fever, weight loss, or fatigue  EYES: No eye pain, visual disturbances, or discharge  ENMT:   No sinus or throat pain  NECK: No pain or stiffness  RESPIRATORY: No cough, wheezing, chills or hemoptysis; No shortness of breath  CARDIOVASCULAR: No chest pain, palpitations, dizziness, or leg swelling  GASTROINTESTINAL: No abdominal pain. No nausea, vomiting; No diarrhea or constipation. No melena or hematochezia.  GENITOURINARY: No dysuria, frequency, hematuria, or incontinence  NEUROLOGICAL: No headaches, memory loss, loss of strength, numbness, or tremors  SKIN: No itching, burning, rashes, or lesions   MUSCULOSKELETAL: No joint pain or swelling; No muscle, back, or extremity pain    PHYSICAL EXAM:  Appearance: NAD. VS past 24 hrs -as above   HEENT:   Moist oral mucosa. Conjunctiva clear b/l.   Neck : supple  Respiratory: Lungs CTAB.  Gastrointestinal:  Soft, nontender. No rebound. No rigidity. BS present	  Cardiovascular: RRR ,S1S2 present  Neurologic: Non-focal. Moving all extremities.  Extremities: No edema. No erythema. No calf tenderness.  Skin: No rashes, No ecchymoses, No cyanosis.	  wounds ,skin lesions-See skin assesment flow sheet   LABS:                        9.0    12.12 )-----------( 396      ( 14 Nov 2023 08:35 )             28.1     11-14    145  |  118<H>  |  19  ----------------------------<  107<H>  3.8   |  23  |  0.79    Ca    7.8<L>      14 Nov 2023 08:35  Phos  2.1     11-13  Mg     2.0     11-13    TPro  4.9<L>  /  Alb  1.7<L>  /  TBili  0.5  /  DBili  x   /  AST  32  /  ALT  36  /  AlkPhos  99  11-13    CAPILLARY BLOOD GLUCOSE          Urinalysis Basic - ( 14 Nov 2023 08:35 )    Color: x / Appearance: x / SG: x / pH: x  Gluc: 107 mg/dL / Ketone: x  / Bili: x / Urobili: x   Blood: x / Protein: x / Nitrite: x   Leuk Esterase: x / RBC: x / WBC x   Sq Epi: x / Non Sq Epi: x / Bacteria: x        Culture - Urine (collected 11 Nov 2023 15:41)  Source: Clean Catch Clean Catch (Midstream)  Final Report (14 Nov 2023 07:29):    >100,000 CFU/ml Escherichia coli ESBL  Organism: Escherichia coli ESBL (14 Nov 2023 07:29)  Organism: Escherichia coli ESBL (14 Nov 2023 07:29)      RADIOLOGY & ADDITIONAL TESTS:   reviewed elctronically  ASSESSMENT/PLAN: 	    25 minutes aggregate time was spent on this visit, 50% visit time spent in care co-ordination with other attendings and counselling patient .I have discussed care plan with patient / HCP/family member ,who expressed understanding of problems treatment and their effect and side effects, alternatives in details. I have asked if they have any questions and concerns and appropriately addressed them to best of my ability. ACP-Advance care planning was discussed , pallitaive care issues ,CMO ,GOC ,MOLST  form ,advance directives were reviewed .All questions were answered to the best of my knowledge - 25 m PROGRESS NOTE  Patient is a 89y old  Female who presents with a chief complaint of Transient cerebral ischemia     (13 Nov 2023 13:48)  Chart and available morning labs /imaging are reviewed electronically , urgent issues addressed . More information  is being added upon completion of rounds , when more information is collected and management discussed with consultants , medical staff and social service/case management on the floor     OVERNIGHT  No new issues reported by medical staff . All above noted Patient is resting in a bed comfortably .Confused ,poor mentation .No distress noted   MBS noted , POC d/w daughter at bedside Anticipate d/c in am   HPI:  89-year-old female with history of hypertension, hyperlipidemia, A-fib with ablation on Eliquis, MR, bladder tumor, PFO, osteoporosis, spinal stenosis brought in by ambulance for possible stroke.  As per facility patient ate breakfast and had her medication between 9:45AM and 10 AM this morning and was at her baseline mental status.  Aide noticed around 11/1130 patient was leaning towards her left side, had water poured out of her mouth when she attempted to drink and had possible slurred speech.  Patient limited historian due to MR.  As per facility patient was recently in a short-term rehab facility, recently returned back to [facility] however patient has been weaker since then.< from: CT Brain Perfusion Maps Stroke (11.11.23 @ 14:12) >    < from: CT Brain Perfusion Maps Stroke (11.11.23 @ 14:12) >    ACC: [id number] EXAM:  CT BRAIN PERFUSION MAPS STROKE   ORDERED BY:   JORGE FELDER     ACC: [id number] EXAM:  CT ANGIO BRAIN STROKE PROTC IC   ORDERED BY:   JORGE FELDER     ACC: [id number] EXAM:  CT BRAIN STROKE PROTOCOL   ORDERED BY: JORGE FELDER     ACC: [id number] EXAM:  CT ANGIO NECK STROKE PROTCL IC   ORDERED BY:   JORGE FELDER     PROCEDURE DATE:  11/11/2023          INTERPRETATION:  CT HEAD STROKE PROTOCOL, CT ANGIO NECK STROKE PROTOCOL,   CT ANGIO HEAD STROKE PROTOCOL, CT PERFUSION WITH MAPS STROKE PROTOCOL    CT BRAIN WITHOUT CONTRAST    INDICATIONS:  Stroke code Leaning to left side. Slight left arm drift.    TECHNIQUE:  Serial axial images were obtained from the skull base to the   vertex without the use of contrast.    COMPARISON EXAM: 7/11/2023    FINDINGS:  Ventricles and sulci: Parenchymal volume loss is present which is   commensurate with patient age.  Intra-axial: Periventricular small vessel white matter ischemic changes.   No intracranial mass, acute hemorrhage, or midline shift is present.  Extra-axial: No extra-axial fluid collection is identified. Deposition of   calcified plaque at the level of the bilateral carotid siphons.  Calvarium: Intact.    Left optic lens replacement, as on prior. Bilateral optic globes are   intact. Imaged paranasal sinuses, bilateral mastoid air cells, middle ear   cavities are clear.        CT PERFUSION  CTA OF THE NECK AND HEAD:      CONTRAST/COMPLICATIONS:  IV Contrast: NONE (accession [id number]), IV contrast documented in   unlinked concurrent exam (accession [id number])  Complications: None reported at time of study completion      TECHNIQUE PERFUSION:  After the intravenous power injection of non-ionic contrast material,   repeat serial thin sections were obtained through the intracranial   circulation on a multislice CT scanner. Artificial intelligence was then   used for analysis of perfusion and intracranial large vessel occlusion.      TECHNIQUE CTA NECK AND HEAD:  After the intravenous power injection of non-ionic contrast material,   serial thin sections were obtained through the cervical and intracranial   circulation on a multislice CT scanner. 2D and 3D MIP reformatted images   were obtained.  Images were reformatted using a dedicated 3D software   package.    FINDINGS:    CT PERFUSION:    COMPARISON EXAMINATION: None.    Technical limitations: Significant patient motion in all 3 planes during   image acquisition likely renders results suboptimal and nondiagnostic.   Arterial and venous waveforms are also not optimized.    Miscellaneous: None.      CTA NECK WITH CONTRAST:    CAROTID STENOSIS REFERENCE: (NASCET = 100x1-(N/D)). N=greatest narrowing.   D=normal distal diameter - MILD = <50% stenosis. - MODERATE = 50-69%   stenosis.- SEVERE = 70-89% stenosis. - HAIRLINE/CRITICAL = 90-99%   stenosis. - OCCLUDED = 100% stenosis.      COMPARISON: None.    FINDINGS:  Aortic arch and visualized great vessels: Within normal limits.    RIGHT:  Common carotid artery: Follows a tortuouscourse and is normal in caliber   to the carotid bifurcation.  Internal carotid artery: Mild to moderate deposition of calcified plaque   at the level of the right common carotid artery bifurcation with   extension into the proximal aspect of the right internal carotid artery,   without significant stenosis based on NASCET criteria. Normal course and   caliber to the intracranial circulation.    Vertebral artery: Emanates from the right subclavian artery. The right   vertebral artery is normal incourse and caliber to the intracranial   circulation.      LEFT:  Common carotid artery :Normal in course and caliber to the carotid   bifurcation.  Internal carotid artery: Mild to moderate deposition of calcified plaque   at the level of the left common carotid artery bifurcation with extension   into the proximal left internal carotid artery without significant   stenosis based on NASCET criteria. Normal course and caliber to the   intracranial circulation.    Vertebral artery: Emanates from the left subclavian artery. The left   vertebral artery is normal in course and caliber to the intracranial   circulation.      Miscellaneous: None    CTA HEAD WITH CONTRAST:    COMPARISON EXAM: None.      Internal carotid arteries: Bilateral petrous, precavernous, cavernous,   and supraclinoid regions are patent. Mild deposition of calcified plaque   at the level of the carotid siphons without significant stenosis in this   location.    [place]:  No aneurysm about the [place]. Tiny aneurysms   can be beyond the resolution of CTA technique.    Anterior Cerebral arteries: No significant stenosis or occlusion.  Middle Cerebral arteries: No significant stenosis or occlusion.    Posterior Circulation: Distal intracranial bilateralvertebral arteries   are visualized. The vertebrobasilar confluence is unremarkable. There is   good flow within the basilar artery. Bilateral superior cerebellar   arteries and bilateral posterior cerebral arteries emanate from the   distal aspect of the basilar artery. There is a hypoplastic right P1   segment. A prominent right posterior communicating artery contributes to   flow within the right posterior cerebral artery.    < end of copied text >     (11 Nov 2023 21:59)    PAST MEDICAL & SURGICAL HISTORY:  Afib      PFO (patent foramen ovale)      Degeneration macular      Osteoporosis      History of complete heart block      HTN (hypertension)      Hypothyroidism      Spinal stenosis      Cardiac pacemaker          MEDICATIONS  (STANDING):  aMIOdarone    Tablet 200 milliGRAM(s) Oral daily  apixaban 2.5 milliGRAM(s) Oral every 12 hours  artificial  tears Solution 1 Drop(s) Both EYES four times a day  ascorbic acid 500 milliGRAM(s) Oral daily  enalapril 10 milliGRAM(s) Oral daily  ertapenem  IVPB 1000 milliGRAM(s) IV Intermittent every 24 hours  ferrous    sulfate 325 milliGRAM(s) Oral three times a day  levothyroxine 75 MICROGram(s) Oral daily  metoprolol tartrate 50 milliGRAM(s) Oral two times a day  multivitamin 1 Tablet(s) Oral daily  pantoprazole    Tablet 40 milliGRAM(s) Oral before breakfast  polyethylene glycol 3350 17 Gram(s) Oral daily    MEDICATIONS  (PRN):      OBJECTIVE    T(C): 36.7 (11-14-23 @ 17:50), Max: 36.7 (11-14-23 @ 05:11)  HR: 63 (11-14-23 @ 17:50) (63 - 75)  BP: 135/64 (11-14-23 @ 17:50) (119/74 - 135/64)  RR: 20 (11-14-23 @ 17:50) (18 - 28)  SpO2: 93% (11-14-23 @ 17:50) (90% - 96%)  Wt(kg): --  I&O's Summary    14 Nov 2023 07:01  -  14 Nov 2023 18:37  --------------------------------------------------------  IN: 120 mL / OUT: 300 mL / NET: -180 mL          REVIEW OF SYSTEMS:  CONSTITUTIONAL: No fever, weight loss, or fatigue  EYES: No eye pain, visual disturbances, or discharge  ENMT:   No sinus or throat pain  NECK: No pain or stiffness  RESPIRATORY: No cough, wheezing, chills or hemoptysis; No shortness of breath  CARDIOVASCULAR: No chest pain, palpitations, dizziness, or leg swelling  GASTROINTESTINAL: No abdominal pain. No nausea, vomiting; No diarrhea or constipation. No melena or hematochezia.  GENITOURINARY: No dysuria, frequency, hematuria, or incontinence  NEUROLOGICAL: No headaches, memory loss, loss of strength, numbness, or tremors  SKIN: No itching, burning, rashes, or lesions   MUSCULOSKELETAL: No joint pain or swelling; No muscle, back, or extremity pain    PHYSICAL EXAM:  Appearance: NAD. VS past 24 hrs -as above   HEENT:   Moist oral mucosa. Conjunctiva clear b/l.   Neck : supple  Respiratory: Lungs CTAB.  Gastrointestinal:  Soft, nontender. No rebound. No rigidity. BS present	  Cardiovascular: RRR ,S1S2 present  Neurologic: Non-focal. Moving all extremities.  Extremities: No edema. No erythema. No calf tenderness.  Skin: No rashes, No ecchymoses, No cyanosis.	  wounds ,skin lesions-See skin assesment flow sheet   LABS:                        9.0    12.12 )-----------( 396      ( 14 Nov 2023 08:35 )             28.1     11-14    145  |  118<H>  |  19  ----------------------------<  107<H>  3.8   |  23  |  0.79    Ca    7.8<L>      14 Nov 2023 08:35  Phos  2.1     11-13  Mg     2.0     11-13    TPro  4.9<L>  /  Alb  1.7<L>  /  TBili  0.5  /  DBili  x   /  AST  32  /  ALT  36  /  AlkPhos  99  11-13    CAPILLARY BLOOD GLUCOSE          Urinalysis Basic - ( 14 Nov 2023 08:35 )    Color: x / Appearance: x / SG: x / pH: x  Gluc: 107 mg/dL / Ketone: x  / Bili: x / Urobili: x   Blood: x / Protein: x / Nitrite: x   Leuk Esterase: x / RBC: x / WBC x   Sq Epi: x / Non Sq Epi: x / Bacteria: x        Culture - Urine (collected 11 Nov 2023 15:41)  Source: Clean Catch Clean Catch (Midstream)  Final Report (14 Nov 2023 07:29):    >100,000 CFU/ml Escherichia coli ESBL  Organism: Escherichia coli ESBL (14 Nov 2023 07:29)  Organism: Escherichia coli ESBL (14 Nov 2023 07:29)      RADIOLOGY & ADDITIONAL TESTS:   reviewed elctronically  ASSESSMENT/PLAN: 	    25 minutes aggregate time was spent on this visit, 50% visit time spent in care co-ordination with other attendings and counselling patient .I have discussed care plan with patient / HCP/family member ,who expressed understanding of problems treatment and their effect and side effects, alternatives in details. I have asked if they have any questions and concerns and appropriately addressed them to best of my ability. ACP-Advance care planning was discussed , pallitaive care issues ,CMO ,GOC ,MOLST  form ,advance directives were reviewed .All questions were answered to the best of my knowledge - 25 m PROGRESS NOTE  Patient is a 89y old  Female who presents with a chief complaint of Transient cerebral ischemia     (13 Nov 2023 13:48)  Chart and available morning labs /imaging are reviewed electronically , urgent issues addressed . More information  is being added upon completion of rounds , when more information is collected and management discussed with consultants , medical staff and social service/case management on the floor     OVERNIGHT  No new issues reported by medical staff . All above noted Patient is resting in a bed comfortably .Confused ,poor mentation .No distress noted   MBS noted , POC d/w daughter at bedside Anticipate d/c in am   HPI:  89-year-old female with history of hypertension, hyperlipidemia, A-fib with ablation on Eliquis, MR, bladder tumor, PFO, osteoporosis, spinal stenosis brought in by ambulance for possible stroke.  As per facility patient ate breakfast and had her medication between 9:45AM and 10 AM this morning and was at her baseline mental status.  Aide noticed around 11/1130 patient was leaning towards her left side, had water poured out of her mouth when she attempted to drink and had possible slurred speech.  Patient limited historian due to MR.  As per facility patient was recently in a short-term rehab facility, recently returned back to [facility] however patient has been weaker since then.< from: CT Brain Perfusion Maps Stroke (11.11.23 @ 14:12) >    < from: CT Brain Perfusion Maps Stroke (11.11.23 @ 14:12) >    ACC: [id number] EXAM:  CT BRAIN PERFUSION MAPS STROKE   ORDERED BY:   JORGE FELDER     ACC: [id number] EXAM:  CT ANGIO BRAIN STROKE PROTC IC   ORDERED BY:   JORGE FELDER     ACC: [id number] EXAM:  CT BRAIN STROKE PROTOCOL   ORDERED BY: JORGE FELDER     ACC: [id number] EXAM:  CT ANGIO NECK STROKE PROTCL IC   ORDERED BY:   JORGE FELDER     PROCEDURE DATE:  11/11/2023          INTERPRETATION:  CT HEAD STROKE PROTOCOL, CT ANGIO NECK STROKE PROTOCOL,   CT ANGIO HEAD STROKE PROTOCOL, CT PERFUSION WITH MAPS STROKE PROTOCOL    CT BRAIN WITHOUT CONTRAST    INDICATIONS:  Stroke code Leaning to left side. Slight left arm drift.    TECHNIQUE:  Serial axial images were obtained from the skull base to the   vertex without the use of contrast.    COMPARISON EXAM: 7/11/2023    FINDINGS:  Ventricles and sulci: Parenchymal volume loss is present which is   commensurate with patient age.  Intra-axial: Periventricular small vessel white matter ischemic changes.   No intracranial mass, acute hemorrhage, or midline shift is present.  Extra-axial: No extra-axial fluid collection is identified. Deposition of   calcified plaque at the level of the bilateral carotid siphons.  Calvarium: Intact.    Left optic lens replacement, as on prior. Bilateral optic globes are   intact. Imaged paranasal sinuses, bilateral mastoid air cells, middle ear   cavities are clear.        CT PERFUSION  CTA OF THE NECK AND HEAD:      CONTRAST/COMPLICATIONS:  IV Contrast: NONE (accession [id number]), IV contrast documented in   unlinked concurrent exam (accession [id number])  Complications: None reported at time of study completion      TECHNIQUE PERFUSION:  After the intravenous power injection of non-ionic contrast material,   repeat serial thin sections were obtained through the intracranial   circulation on a multislice CT scanner. Artificial intelligence was then   used for analysis of perfusion and intracranial large vessel occlusion.      TECHNIQUE CTA NECK AND HEAD:  After the intravenous power injection of non-ionic contrast material,   serial thin sections were obtained through the cervical and intracranial   circulation on a multislice CT scanner. 2D and 3D MIP reformatted images   were obtained.  Images were reformatted using a dedicated 3D software   package.    FINDINGS:    CT PERFUSION:    COMPARISON EXAMINATION: None.    Technical limitations: Significant patient motion in all 3 planes during   image acquisition likely renders results suboptimal and nondiagnostic.   Arterial and venous waveforms are also not optimized.    Miscellaneous: None.      CTA NECK WITH CONTRAST:    CAROTID STENOSIS REFERENCE: (NASCET = 100x1-(N/D)). N=greatest narrowing.   D=normal distal diameter - MILD = <50% stenosis. - MODERATE = 50-69%   stenosis.- SEVERE = 70-89% stenosis. - HAIRLINE/CRITICAL = 90-99%   stenosis. - OCCLUDED = 100% stenosis.      COMPARISON: None.    FINDINGS:  Aortic arch and visualized great vessels: Within normal limits.    RIGHT:  Common carotid artery: Follows a tortuouscourse and is normal in caliber   to the carotid bifurcation.  Internal carotid artery: Mild to moderate deposition of calcified plaque   at the level of the right common carotid artery bifurcation with   extension into the proximal aspect of the right internal carotid artery,   without significant stenosis based on NASCET criteria. Normal course and   caliber to the intracranial circulation.    Vertebral artery: Emanates from the right subclavian artery. The right   vertebral artery is normal incourse and caliber to the intracranial   circulation.      LEFT:  Common carotid artery :Normal in course and caliber to the carotid   bifurcation.  Internal carotid artery: Mild to moderate deposition of calcified plaque   at the level of the left common carotid artery bifurcation with extension   into the proximal left internal carotid artery without significant   stenosis based on NASCET criteria. Normal course and caliber to the   intracranial circulation.    Vertebral artery: Emanates from the left subclavian artery. The left   vertebral artery is normal in course and caliber to the intracranial   circulation.      Miscellaneous: None    CTA HEAD WITH CONTRAST:    COMPARISON EXAM: None.      Internal carotid arteries: Bilateral petrous, precavernous, cavernous,   and supraclinoid regions are patent. Mild deposition of calcified plaque   at the level of the carotid siphons without significant stenosis in this   location.    [place]:  No aneurysm about the [place]. Tiny aneurysms   can be beyond the resolution of CTA technique.    Anterior Cerebral arteries: No significant stenosis or occlusion.  Middle Cerebral arteries: No significant stenosis or occlusion.    Posterior Circulation: Distal intracranial bilateralvertebral arteries   are visualized. The vertebrobasilar confluence is unremarkable. There is   good flow within the basilar artery. Bilateral superior cerebellar   arteries and bilateral posterior cerebral arteries emanate from the   distal aspect of the basilar artery. There is a hypoplastic right P1   segment. A prominent right posterior communicating artery contributes to   flow within the right posterior cerebral artery.    < end of copied text >     (11 Nov 2023 21:59)    PAST MEDICAL & SURGICAL HISTORY:  Afib      PFO (patent foramen ovale)      Degeneration macular      Osteoporosis      History of complete heart block      HTN (hypertension)      Hypothyroidism      Spinal stenosis      Cardiac pacemaker          MEDICATIONS  (STANDING):  aMIOdarone    Tablet 200 milliGRAM(s) Oral daily  apixaban 2.5 milliGRAM(s) Oral every 12 hours  artificial  tears Solution 1 Drop(s) Both EYES four times a day  ascorbic acid 500 milliGRAM(s) Oral daily  enalapril 10 milliGRAM(s) Oral daily  ertapenem  IVPB 1000 milliGRAM(s) IV Intermittent every 24 hours  ferrous    sulfate 325 milliGRAM(s) Oral three times a day  levothyroxine 75 MICROGram(s) Oral daily  metoprolol tartrate 50 milliGRAM(s) Oral two times a day  multivitamin 1 Tablet(s) Oral daily  pantoprazole    Tablet 40 milliGRAM(s) Oral before breakfast  polyethylene glycol 3350 17 Gram(s) Oral daily    MEDICATIONS  (PRN):      OBJECTIVE    T(C): 36.7 (11-14-23 @ 17:50), Max: 36.7 (11-14-23 @ 05:11)  HR: 63 (11-14-23 @ 17:50) (63 - 75)  BP: 135/64 (11-14-23 @ 17:50) (119/74 - 135/64)  RR: 20 (11-14-23 @ 17:50) (18 - 28)  SpO2: 93% (11-14-23 @ 17:50) (90% - 96%)  Wt(kg): --  I&O's Summary    14 Nov 2023 07:01  -  14 Nov 2023 18:37  --------------------------------------------------------  IN: 120 mL / OUT: 300 mL / NET: -180 mL          REVIEW OF SYSTEMS:  CONSTITUTIONAL: No fever, weight loss, or fatigue  EYES: No eye pain, visual disturbances, or discharge  ENMT:   No sinus or throat pain  NECK: No pain or stiffness  RESPIRATORY: No cough, wheezing, chills or hemoptysis; No shortness of breath  CARDIOVASCULAR: No chest pain, palpitations, dizziness, or leg swelling  GASTROINTESTINAL: No abdominal pain. No nausea, vomiting; No diarrhea or constipation. No melena or hematochezia.  GENITOURINARY: No dysuria, frequency, hematuria, or incontinence  NEUROLOGICAL: No headaches, memory loss, loss of strength, numbness, or tremors  SKIN: No itching, burning, rashes, or lesions   MUSCULOSKELETAL: No joint pain or swelling; No muscle, back, or extremity pain    PHYSICAL EXAM:  Appearance: NAD. VS past 24 hrs -as above   HEENT:   Moist oral mucosa. Conjunctiva clear b/l.   Neck : supple  Respiratory: Lungs CTAB.  Gastrointestinal:  Soft, nontender. No rebound. No rigidity. BS present	  Cardiovascular: RRR ,S1S2 present  Neurologic: Non-focal. Moving all extremities.  Extremities: No edema. No erythema. No calf tenderness.  Skin: No rashes, No ecchymoses, No cyanosis.	  wounds ,skin lesions-See skin assesment flow sheet   LABS:                        9.0    12.12 )-----------( 396      ( 14 Nov 2023 08:35 )             28.1     11-14    145  |  118<H>  |  19  ----------------------------<  107<H>  3.8   |  23  |  0.79    Ca    7.8<L>      14 Nov 2023 08:35  Phos  2.1     11-13  Mg     2.0     11-13    TPro  4.9<L>  /  Alb  1.7<L>  /  TBili  0.5  /  DBili  x   /  AST  32  /  ALT  36  /  AlkPhos  99  11-13    CAPILLARY BLOOD GLUCOSE          Urinalysis Basic - ( 14 Nov 2023 08:35 )    Color: x / Appearance: x / SG: x / pH: x  Gluc: 107 mg/dL / Ketone: x  / Bili: x / Urobili: x   Blood: x / Protein: x / Nitrite: x   Leuk Esterase: x / RBC: x / WBC x   Sq Epi: x / Non Sq Epi: x / Bacteria: x        Culture - Urine (collected 11 Nov 2023 15:41)  Source: Clean Catch Clean Catch (Midstream)  Final Report (14 Nov 2023 07:29):    >100,000 CFU/ml Escherichia coli ESBL  Organism: Escherichia coli ESBL (14 Nov 2023 07:29)  Organism: Escherichia coli ESBL (14 Nov 2023 07:29)      RADIOLOGY & ADDITIONAL TESTS:   reviewed elctronically  ASSESSMENT/PLAN: 	    25 minutes aggregate time was spent on this visit, 50% visit time spent in care co-ordination with other attendings and counselling patient .I have discussed care plan with patient / HCP/family member ,who expressed understanding of problems treatment and their effect and side effects, alternatives in details. I have asked if they have any questions and concerns and appropriately addressed them to best of my ability. ACP-Advance care planning was discussed , pallitaive care issues ,CMO ,GOC ,MOLST  form ,advance directives were reviewed .All questions were answered to the best of my knowledge - 25 m

## 2023-11-14 NOTE — GOALS OF CARE CONVERSATION - ADVANCED CARE PLANNING - NS PRO AD PATIENT TYPE ON CHART
MOLST incomplete/Health Care Proxy (HCP)/Medical Orders for Life-Sustaining Treatment (MOLST)/Power of  (POA) for Healthcare Issues

## 2023-11-14 NOTE — PROGRESS NOTE ADULT - SUBJECTIVE AND OBJECTIVE BOX
Date/Time Patient Seen:  		  Referring MD:   Data Reviewed	       Patient is a 89y old  Female who presents with a chief complaint of Transient cerebral ischemia     (13 Nov 2023 13:48)      Subjective/HPI     PAST MEDICAL & SURGICAL HISTORY:  HTN (hypertension)    Afib    Spinal stenosis    PFO (patent foramen ovale)    Degeneration macular    Osteoporosis    History of complete heart block    Hypothyroidism    Cardiac pacemaker          Medication list         MEDICATIONS  (STANDING):  aMIOdarone    Tablet 200 milliGRAM(s) Oral daily  apixaban 2.5 milliGRAM(s) Oral every 12 hours  artificial  tears Solution 1 Drop(s) Both EYES four times a day  ascorbic acid 500 milliGRAM(s) Oral daily  dextrose 5% + sodium chloride 0.45% with potassium chloride 20 mEq/L 1000 milliLiter(s) (50 mL/Hr) IV Continuous <Continuous>  enalapril 10 milliGRAM(s) Oral daily  ferrous    sulfate 325 milliGRAM(s) Oral three times a day  levothyroxine 75 MICROGram(s) Oral daily  metoprolol tartrate 50 milliGRAM(s) Oral two times a day  multivitamin 1 Tablet(s) Oral daily  pantoprazole    Tablet 40 milliGRAM(s) Oral before breakfast  polyethylene glycol 3350 17 Gram(s) Oral daily    MEDICATIONS  (PRN):         Vitals log        ICU Vital Signs Last 24 Hrs  T(C): 36.7 (14 Nov 2023 05:11), Max: 36.7 (13 Nov 2023 12:05)  T(F): 98 (14 Nov 2023 05:11), Max: 98.1 (13 Nov 2023 12:05)  HR: 75 (14 Nov 2023 05:11) (74 - 75)  BP: 130/71 (14 Nov 2023 05:11) (115/70 - 130/71)  BP(mean): --  ABP: --  ABP(mean): --  RR: 28 (14 Nov 2023 05:11) (18 - 28)  SpO2: 91% (14 Nov 2023 05:11) (91% - 96%)    O2 Parameters below as of 14 Nov 2023 05:11  Patient On (Oxygen Delivery Method): room air                 Input and Output:  I&O's Detail      Lab Data                        8.3    10.96 )-----------( 306      ( 13 Nov 2023 07:39 )             25.8     11-13    146<H>  |  120<H>  |  20  ----------------------------<  91  3.7   |  22  |  0.66    Ca    7.7<L>      13 Nov 2023 07:39  Phos  2.1     11-13  Mg     2.0     11-13    TPro  4.9<L>  /  Alb  1.7<L>  /  TBili  0.5  /  DBili  x   /  AST  32  /  ALT  36  /  AlkPhos  99  11-13            Review of Systems	      Objective     Physical Examination    heart s1s2  lung dc BS  head nc      Pertinent Lab findings & Imaging      Mela:  NO   Adequate UO     I&O's Detail           Discussed with:     Cultures:	        Radiology

## 2023-11-14 NOTE — OCCUPATIONAL THERAPY INITIAL EVALUATION ADULT - PERTINENT HX OF CURRENT PROBLEM, REHAB EVAL
89-year-old female with history of hypertension, hyperlipidemia, A-fib with ablation on Eliquis, MR, bladder tumor, PFO, osteoporosis, spinal stenosis brought in by ambulance for possible stroke.  As per facility patient ate breakfast and had her medication between 9:45AM and 10 AM this morning and was at her baseline mental status.  Aide noticed around 11/1130 patient was leaning towards her left side, had water poured out of her mouth when she attempted to drink and had possible slurred speech.  Patient limited historian due to MR.  As per facility patient was recently in a short-term rehab facility, recently returned back to [facility] however patient has been weaker since then. CT brain negative for CVA. Pt with Acute metabolic encephalopathy likely 2/2 to UTI. 89-year-old female with history of hypertension, hyperlipidemia, A-fib with ablation on Eliquis, MR, bladder tumor, PFO, osteoporosis, spinal stenosis brought in by ambulance for possible stroke.  As per facility patient ate breakfast and had her medication between 9:45AM and 10 AM this morning and was at her baseline mental status.  Aide noticed around 11/1130 patient was leaning towards her left side, had water poured out of her mouth when she attempted to drink and had possible slurred speech.  Patient limited historian due to MR.  As per facility patient was recently in a short-term rehab facility, recently returned back to [facility] however patient has been weaker since then. CT brain negative for CVA. Pt with Acute metabolic encephalopathy likely 2/2 to UTI. 89-year-old female with history of hypertension, hyperlipidemia, A-fib with ablation on Eliquis, MR, bladder tumor, PFO, osteoporosis, spinal stenosis brought in by ambulance for possible stroke.  As per facility patient ate breakfast and had her medication between 9:45AM and 10 AM this morning and was at her baseline mental status.  Aide noticed around 11/1130 patient was leaning towards her left side, had water poured out of her mouth when she attempted to drink and had possible slurred speech.  Patient limited historian due to MR.  As per facility patient was recently in a short-term rehab facility, recently returned back to [facility] however patient has been weaker since then. CT brain negative for CVA. Pt with Acute metabolic encephalopathy likely 2/2 to UTI.

## 2023-11-14 NOTE — OCCUPATIONAL THERAPY INITIAL EVALUATION ADULT - LEVEL OF INDEPENDENCE: BED TO CHAIR, REHAB EVAL
Dtr deferred assessment at this time, requesting to let pt eat lunch. As per CNA, pt transferred bed to chair with 1 person assist this morning.

## 2023-11-14 NOTE — PROGRESS NOTE ADULT - ASSESSMENT
89-year-old female with history of hypertension, hyperlipidemia, A-fib with ablation on Eliquis, MR, bladder tumor, PFO, osteoporosis, spinal stenosis brought in by ambulance for possible stroke.  As per facility patient ate breakfast and had her medication between 9:45AM and 10 AM this morning and was at her baseline mental status.  Aide noticed around 11/1130 patient was leaning towards her left side, had water poured out of her mouth when she attempted to drink and had possible slurred speech.  Patient limited historian due to MR.  As per facility patient was recently in a short-term rehab facility, recently returned back to [facility] however patient has been weaker since then 1. UTI.  2. Toxic metabolic encephalopathy.  3. Generalized weakness.   . UA was suggestive of UTI. The patient just had UTI with  ESBL E coli bacteremia last month.  . Add IV Invanz for presumptive recurrent UTI with ESBL  . Monitor mental status.  . Follow all cultures. 89-year-old female with history of hypertension, hyperlipidemia, A-fib with ablation on Eliquis, MR, bladder tumor, PFO, osteoporosis, spinal stenosis brought in by ambulance for possible stroke.  As per facility patient ate breakfast and had her medication between 9:45AM and 10 AM this morning and was at her baseline mental status.  Aide noticed around 11/1130 patient was leaning towards her left side, had water poured out of her mouth when she attempted to drink and had possible slurred speech.  Patient limited historian due to MR.  As per facility patient was recently in a short-term rehab facility, recently returned back to [facility] however patient has been weaker since then 1. UTI.  2. Toxic metabolic encephalopathy.  3. Generalized weakness.   . UA was suggestive of UTI. The patient just had UTI with  ESBL E coli bacteremia last month.  . Add IV Invanz for presumptive recurrent UTI with ESBL  . Monitor mental status.  . Follow all cultures. 89-year-old female with history of hypertension, hyperlipidemia, A-fib with ablation on Eliquis, MR, bladder tumor, PFO, osteoporosis, spinal stenosis brought in by ambulance for possible stroke.  As per facility patient ate breakfast and had her medication between 9:45AM and 10 AM this morning and was at her baseline mental status.  Aide noticed around 11/1130 patient was leaning towards her left side, had water poured out of her mouth when she attempted to drink and had possible slurred speech.  Patient limited historian due to MR.  As per facility patient was recently in a short-term rehab facility, recently returned back to [facility] however patient has been weaker since then 1. UTI.  2. Toxic metabolic encephalopathy.  3. Generalized weakness.   . UA was suggestive of UTI. The patient just had UTI with  ESBL E coli bacteremia last month.  . Add IV Invanz for presumptive recurrent UTI with ESBL  . Monitor mental status.  . Follow all cultures.

## 2023-11-14 NOTE — OCCUPATIONAL THERAPY INITIAL EVALUATION ADULT - LEVEL OF INDEPENDENCE: TOILET, REHAB EVAL
N/A; pt wears diapers at [facility]- external catheter in place at this time N/A; pt wears diapers at [facility]- external catheter in place at this time N/A; pt wears diapers at [facility]- external catheter in place at this time

## 2023-11-14 NOTE — SWALLOW VFSS/MBS ASSESSMENT ADULT - PHARYNGEAL PHASE COMMENTS
Prompt initiation, adequate tongue base retraction and pharyngeal contractibility, adequate hyolaryngeal elevation/complete epiglottic closure. No penetration/aspiration/residue viewed.

## 2023-11-14 NOTE — SWALLOW VFSS/MBS ASSESSMENT ADULT - ORAL PHASE
within functional limits Oral holding sign reduced AP transport / inc oral transit time/Delayed oral transit time/Reduced anterior - posterior transport Delayed oral transit time/Reduced anterior - posterior transport

## 2023-11-14 NOTE — SWALLOW VFSS/MBS ASSESSMENT ADULT - RECOMMENDED FEEDING/EATING TECHNIQUES
check mouth frequently for oral residue/pocketing/maintain upright posture during/after eating for 30 mins/provide rest periods between swallows/small sips/bites

## 2023-11-14 NOTE — SWALLOW VFSS/MBS ASSESSMENT ADULT - ADDITIONAL RECOMMENDATIONS
1. Given functional swallow and pt on baseline diet, this dept. to sign off at this time. Please re consult pending any changes in status which would warrant reassessment

## 2023-11-15 ENCOUNTER — TRANSCRIPTION ENCOUNTER (OUTPATIENT)
Age: 88
End: 2023-11-15

## 2023-11-15 LAB
ALBUMIN SERPL ELPH-MCNC: 1.7 G/DL — LOW (ref 3.3–5)
ALBUMIN SERPL ELPH-MCNC: 1.7 G/DL — LOW (ref 3.3–5)
ALP SERPL-CCNC: 114 U/L — SIGNIFICANT CHANGE UP (ref 40–120)
ALP SERPL-CCNC: 114 U/L — SIGNIFICANT CHANGE UP (ref 40–120)
ALT FLD-CCNC: 49 U/L — SIGNIFICANT CHANGE UP (ref 12–78)
ALT FLD-CCNC: 49 U/L — SIGNIFICANT CHANGE UP (ref 12–78)
ANION GAP SERPL CALC-SCNC: 4 MMOL/L — LOW (ref 5–17)
ANION GAP SERPL CALC-SCNC: 4 MMOL/L — LOW (ref 5–17)
AST SERPL-CCNC: 46 U/L — HIGH (ref 15–37)
AST SERPL-CCNC: 46 U/L — HIGH (ref 15–37)
BASOPHILS # BLD AUTO: 0 K/UL — SIGNIFICANT CHANGE UP (ref 0–0.2)
BASOPHILS # BLD AUTO: 0 K/UL — SIGNIFICANT CHANGE UP (ref 0–0.2)
BASOPHILS NFR BLD AUTO: 0 % — SIGNIFICANT CHANGE UP (ref 0–2)
BASOPHILS NFR BLD AUTO: 0 % — SIGNIFICANT CHANGE UP (ref 0–2)
BILIRUB SERPL-MCNC: 0.4 MG/DL — SIGNIFICANT CHANGE UP (ref 0.2–1.2)
BILIRUB SERPL-MCNC: 0.4 MG/DL — SIGNIFICANT CHANGE UP (ref 0.2–1.2)
BUN SERPL-MCNC: 18 MG/DL — SIGNIFICANT CHANGE UP (ref 7–23)
BUN SERPL-MCNC: 18 MG/DL — SIGNIFICANT CHANGE UP (ref 7–23)
CALCIUM SERPL-MCNC: 7.7 MG/DL — LOW (ref 8.5–10.1)
CALCIUM SERPL-MCNC: 7.7 MG/DL — LOW (ref 8.5–10.1)
CHLORIDE SERPL-SCNC: 119 MMOL/L — HIGH (ref 96–108)
CHLORIDE SERPL-SCNC: 119 MMOL/L — HIGH (ref 96–108)
CO2 SERPL-SCNC: 24 MMOL/L — SIGNIFICANT CHANGE UP (ref 22–31)
CO2 SERPL-SCNC: 24 MMOL/L — SIGNIFICANT CHANGE UP (ref 22–31)
CREAT SERPL-MCNC: 0.67 MG/DL — SIGNIFICANT CHANGE UP (ref 0.5–1.3)
CREAT SERPL-MCNC: 0.67 MG/DL — SIGNIFICANT CHANGE UP (ref 0.5–1.3)
EGFR: 81 ML/MIN/1.73M2 — SIGNIFICANT CHANGE UP
EGFR: 81 ML/MIN/1.73M2 — SIGNIFICANT CHANGE UP
EOSINOPHIL # BLD AUTO: 0 K/UL — SIGNIFICANT CHANGE UP (ref 0–0.5)
EOSINOPHIL # BLD AUTO: 0 K/UL — SIGNIFICANT CHANGE UP (ref 0–0.5)
EOSINOPHIL NFR BLD AUTO: 0 % — SIGNIFICANT CHANGE UP (ref 0–6)
EOSINOPHIL NFR BLD AUTO: 0 % — SIGNIFICANT CHANGE UP (ref 0–6)
GLUCOSE SERPL-MCNC: 84 MG/DL — SIGNIFICANT CHANGE UP (ref 70–99)
GLUCOSE SERPL-MCNC: 84 MG/DL — SIGNIFICANT CHANGE UP (ref 70–99)
HCT VFR BLD CALC: 24.5 % — LOW (ref 34.5–45)
HCT VFR BLD CALC: 24.5 % — LOW (ref 34.5–45)
HGB BLD-MCNC: 8.1 G/DL — LOW (ref 11.5–15.5)
HGB BLD-MCNC: 8.1 G/DL — LOW (ref 11.5–15.5)
LYMPHOCYTES # BLD AUTO: 1.48 K/UL — SIGNIFICANT CHANGE UP (ref 1–3.3)
LYMPHOCYTES # BLD AUTO: 1.48 K/UL — SIGNIFICANT CHANGE UP (ref 1–3.3)
LYMPHOCYTES # BLD AUTO: 12 % — LOW (ref 13–44)
LYMPHOCYTES # BLD AUTO: 12 % — LOW (ref 13–44)
MCHC RBC-ENTMCNC: 28.6 PG — SIGNIFICANT CHANGE UP (ref 27–34)
MCHC RBC-ENTMCNC: 28.6 PG — SIGNIFICANT CHANGE UP (ref 27–34)
MCHC RBC-ENTMCNC: 33.1 GM/DL — SIGNIFICANT CHANGE UP (ref 32–36)
MCHC RBC-ENTMCNC: 33.1 GM/DL — SIGNIFICANT CHANGE UP (ref 32–36)
MCV RBC AUTO: 86.6 FL — SIGNIFICANT CHANGE UP (ref 80–100)
MCV RBC AUTO: 86.6 FL — SIGNIFICANT CHANGE UP (ref 80–100)
MONOCYTES # BLD AUTO: 0.74 K/UL — SIGNIFICANT CHANGE UP (ref 0–0.9)
MONOCYTES # BLD AUTO: 0.74 K/UL — SIGNIFICANT CHANGE UP (ref 0–0.9)
MONOCYTES NFR BLD AUTO: 6 % — SIGNIFICANT CHANGE UP (ref 2–14)
MONOCYTES NFR BLD AUTO: 6 % — SIGNIFICANT CHANGE UP (ref 2–14)
NEUTROPHILS # BLD AUTO: 10.13 K/UL — HIGH (ref 1.8–7.4)
NEUTROPHILS # BLD AUTO: 10.13 K/UL — HIGH (ref 1.8–7.4)
NEUTROPHILS NFR BLD AUTO: 82 % — HIGH (ref 43–77)
NEUTROPHILS NFR BLD AUTO: 82 % — HIGH (ref 43–77)
NRBC # BLD: SIGNIFICANT CHANGE UP /100 WBCS (ref 0–0)
NRBC # BLD: SIGNIFICANT CHANGE UP /100 WBCS (ref 0–0)
PLATELET # BLD AUTO: 382 K/UL — SIGNIFICANT CHANGE UP (ref 150–400)
PLATELET # BLD AUTO: 382 K/UL — SIGNIFICANT CHANGE UP (ref 150–400)
POTASSIUM SERPL-MCNC: 4 MMOL/L — SIGNIFICANT CHANGE UP (ref 3.5–5.3)
POTASSIUM SERPL-MCNC: 4 MMOL/L — SIGNIFICANT CHANGE UP (ref 3.5–5.3)
POTASSIUM SERPL-SCNC: 4 MMOL/L — SIGNIFICANT CHANGE UP (ref 3.5–5.3)
POTASSIUM SERPL-SCNC: 4 MMOL/L — SIGNIFICANT CHANGE UP (ref 3.5–5.3)
PROT SERPL-MCNC: 4.7 G/DL — LOW (ref 6–8.3)
PROT SERPL-MCNC: 4.7 G/DL — LOW (ref 6–8.3)
RBC # BLD: 2.83 M/UL — LOW (ref 3.8–5.2)
RBC # BLD: 2.83 M/UL — LOW (ref 3.8–5.2)
RBC # FLD: 17.9 % — HIGH (ref 10.3–14.5)
RBC # FLD: 17.9 % — HIGH (ref 10.3–14.5)
SODIUM SERPL-SCNC: 147 MMOL/L — HIGH (ref 135–145)
SODIUM SERPL-SCNC: 147 MMOL/L — HIGH (ref 135–145)
WBC # BLD: 12.35 K/UL — HIGH (ref 3.8–10.5)
WBC # BLD: 12.35 K/UL — HIGH (ref 3.8–10.5)
WBC # FLD AUTO: 12.35 K/UL — HIGH (ref 3.8–10.5)
WBC # FLD AUTO: 12.35 K/UL — HIGH (ref 3.8–10.5)

## 2023-11-15 RX ORDER — SODIUM CHLORIDE 9 MG/ML
1000 INJECTION, SOLUTION INTRAVENOUS
Refills: 0 | Status: DISCONTINUED | OUTPATIENT
Start: 2023-11-15 | End: 2023-11-16

## 2023-11-15 RX ORDER — METOPROLOL TARTRATE 50 MG
1 TABLET ORAL
Qty: 0 | Refills: 0 | DISCHARGE
Start: 2023-11-15

## 2023-11-15 RX ORDER — LACTOBACILLUS ACIDOPHILUS 100MM CELL
2 CAPSULE ORAL
Qty: 28 | Refills: 0
Start: 2023-11-15 | End: 2023-11-28

## 2023-11-15 RX ORDER — AMIODARONE HYDROCHLORIDE 400 MG/1
1 TABLET ORAL
Qty: 0 | Refills: 0 | DISCHARGE
Start: 2023-11-15

## 2023-11-15 RX ORDER — LACTOBACILLUS ACIDOPHILUS 100MM CELL
1 CAPSULE ORAL EVERY 8 HOURS
Refills: 0 | Status: DISCONTINUED | OUTPATIENT
Start: 2023-11-15 | End: 2023-11-16

## 2023-11-15 RX ORDER — APIXABAN 2.5 MG/1
1 TABLET, FILM COATED ORAL
Qty: 0 | Refills: 0 | DISCHARGE
Start: 2023-11-15

## 2023-11-15 RX ADMIN — PANTOPRAZOLE SODIUM 40 MILLIGRAM(S): 20 TABLET, DELAYED RELEASE ORAL at 05:51

## 2023-11-15 RX ADMIN — APIXABAN 2.5 MILLIGRAM(S): 2.5 TABLET, FILM COATED ORAL at 05:51

## 2023-11-15 RX ADMIN — Medication 1 TABLET(S): at 12:51

## 2023-11-15 RX ADMIN — Medication 500 MILLIGRAM(S): at 12:51

## 2023-11-15 RX ADMIN — Medication 325 MILLIGRAM(S): at 21:21

## 2023-11-15 RX ADMIN — Medication 1 DROP(S): at 17:40

## 2023-11-15 RX ADMIN — POLYETHYLENE GLYCOL 3350 17 GRAM(S): 17 POWDER, FOR SOLUTION ORAL at 12:52

## 2023-11-15 RX ADMIN — AMIODARONE HYDROCHLORIDE 200 MILLIGRAM(S): 400 TABLET ORAL at 05:51

## 2023-11-15 RX ADMIN — SODIUM CHLORIDE 50 MILLILITER(S): 9 INJECTION, SOLUTION INTRAVENOUS at 12:52

## 2023-11-15 RX ADMIN — Medication 50 MILLIGRAM(S): at 17:40

## 2023-11-15 RX ADMIN — Medication 1 TABLET(S): at 21:21

## 2023-11-15 RX ADMIN — Medication 1 DROP(S): at 05:50

## 2023-11-15 RX ADMIN — Medication 1 TABLET(S): at 17:39

## 2023-11-15 RX ADMIN — Medication 50 MILLIGRAM(S): at 05:51

## 2023-11-15 RX ADMIN — Medication 1 DROP(S): at 12:51

## 2023-11-15 RX ADMIN — Medication 325 MILLIGRAM(S): at 05:51

## 2023-11-15 RX ADMIN — Medication 325 MILLIGRAM(S): at 12:51

## 2023-11-15 RX ADMIN — Medication 75 MICROGRAM(S): at 05:51

## 2023-11-15 RX ADMIN — ERTAPENEM SODIUM 120 MILLIGRAM(S): 1 INJECTION, POWDER, LYOPHILIZED, FOR SOLUTION INTRAMUSCULAR; INTRAVENOUS at 13:05

## 2023-11-15 RX ADMIN — APIXABAN 2.5 MILLIGRAM(S): 2.5 TABLET, FILM COATED ORAL at 17:40

## 2023-11-15 NOTE — PROGRESS NOTE ADULT - SUBJECTIVE AND OBJECTIVE BOX
Patient is a 89y Female whom presented to the hospital with hypokalemia , hypernatremia     PAST MEDICAL & SURGICAL HISTORY:  HTN (hypertension)      Afib      Spinal stenosis      PFO (patent foramen ovale)      Degeneration macular      Osteoporosis      History of complete heart block      Hypothyroidism      Cardiac pacemaker          MEDICATIONS  (STANDING):  aMIOdarone    Tablet 200 milliGRAM(s) Oral daily  apixaban 2.5 milliGRAM(s) Oral every 12 hours  artificial  tears Solution 1 Drop(s) Both EYES four times a day  ascorbic acid 500 milliGRAM(s) Oral daily  dextrose 5% + sodium chloride 0.45% with potassium chloride 20 mEq/L 1000 milliLiter(s) (50 mL/Hr) IV Continuous <Continuous>  enalapril 10 milliGRAM(s) Oral daily  ertapenem  IVPB 500 milliGRAM(s) IV Intermittent every 24 hours  ferrous    sulfate 325 milliGRAM(s) Oral three times a day  levothyroxine 75 MICROGram(s) Oral daily  metoprolol tartrate 50 milliGRAM(s) Oral two times a day  multivitamin 1 Tablet(s) Oral daily  pantoprazole    Tablet 40 milliGRAM(s) Oral before breakfast  polyethylene glycol 3350 17 Gram(s) Oral daily  potassium chloride   Powder 40 milliEquivalent(s) Oral once  potassium chloride  10 mEq/100 mL IVPB 10 milliEquivalent(s) IV Intermittent every 1 hour      Allergies    penicillin (Unknown)    Intolerances        SOCIAL HISTORY:  Denies ETOh,Smoking,     FAMILY HISTORY:      REVIEW OF SYSTEMS:    CONSTITUTIONAL: No weakness, fevers or chills  EYES/ENT: No visual changes;  no throat pain   NECK: No pain or stiffness  RESPIRATORY: No cough, wheezing, hemoptysis; No shortness of breath  CARDIOVASCULAR: No chest pain or palpitations  GASTROINTESTINAL: No abdominal or epigastric pain. No nausea, vomiting,                    Sq Epi: x / Non Sq Epi: x / Bacteria: x                            8.1    12.35 )-----------( 382      ( 15 Nov 2023 08:10 )             24.5       CBC Full  -  ( 15 Nov 2023 08:10 )  WBC Count : 12.35 K/uL  RBC Count : 2.83 M/uL  Hemoglobin : 8.1 g/dL  Hematocrit : 24.5 %  Platelet Count - Automated : 382 K/uL  Mean Cell Volume : 86.6 fl  Mean Cell Hemoglobin : 28.6 pg  Mean Cell Hemoglobin Concentration : 33.1 gm/dL  Auto Neutrophil # : x  Auto Lymphocyte # : x  Auto Monocyte # : x  Auto Eosinophil # : x  Auto Basophil # : x  Auto Neutrophil % : x  Auto Lymphocyte % : x  Auto Monocyte % : x  Auto Eosinophil % : x  Auto Basophil % : x      11-14    145  |  118<H>  |  19  ----------------------------<  107<H>  3.8   |  23  |  0.79    Ca    7.8<L>      14 Nov 2023 08:35        CAPILLARY BLOOD GLUCOSE          Vital Signs Last 24 Hrs  T(C): 36.7 (15 Nov 2023 04:18), Max: 36.7 (14 Nov 2023 17:50)  T(F): 98.1 (15 Nov 2023 04:18), Max: 98.1 (15 Nov 2023 04:18)  HR: 67 (15 Nov 2023 04:18) (63 - 70)  BP: 136/66 (15 Nov 2023 04:18) (128/68 - 136/66)  BP(mean): --  RR: 18 (15 Nov 2023 04:18) (18 - 20)  SpO2: 94% (15 Nov 2023 04:18) (90% - 94%)    Parameters below as of 15 Nov 2023 04:18  Patient On (Oxygen Delivery Method): room air        Urinalysis Basic - ( 14 Nov 2023 08:35 )    Color: x / Appearance: x / SG: x / pH: x  Gluc: 107 mg/dL / Ketone: x  / Bili: x / Urobili: x   Blood: x / Protein: x / Nitrite: x   Leuk Esterase: x / RBC: x / WBC x   Sq Epi: x / Non Sq Epi: x / Bacteria: x                  PHYSICAL EXAM:    Constitutional: NAD  HEENT: conjunctive   clear   Neck:  No JVD  Respiratory: CTAB  Cardiovascular: S1 and S2  Gastrointestinal: BS+, soft, NT/ND  Extremities: No peripheral edema  Neurological: A/O x 3, no focal deficits  Psychiatric: Normal mood, normal affect

## 2023-11-15 NOTE — DISCHARGE NOTE PROVIDER - NSDCCPCAREPLAN_GEN_ALL_CORE_FT
PRINCIPAL DISCHARGE DIAGNOSIS  Diagnosis: Acute metabolic encephalopathy  Assessment and Plan of Treatment: 2/2 to uti poa      SECONDARY DISCHARGE DIAGNOSES  Diagnosis: Acute UTI  Assessment and Plan of Treatment: recurrent with esbl ,poa    Diagnosis: Afib  Assessment and Plan of Treatment:     Diagnosis: Hypernatremia  Assessment and Plan of Treatment:     Diagnosis: Hypokalemia  Assessment and Plan of Treatment:     Diagnosis: Dysphagia  Assessment and Plan of Treatment:     Diagnosis: Hypothyroidism  Assessment and Plan of Treatment:     Diagnosis: Spinal stenosis  Assessment and Plan of Treatment:

## 2023-11-15 NOTE — DISCHARGE NOTE PROVIDER - NSDCMRMEDTOKEN_GEN_ALL_CORE_FT
Albuterol (Eqv-ProAir HFA) 90 mcg/inh inhalation aerosol: 1 puff(s) inhaled 4 times a day as needed for  cough or wheezing  amiodarone 200 mg oral tablet: 1 tab(s) orally once a day  amLODIPine 10 mg oral tablet: 1 tab(s) orally once a day  apixaban 2.5 mg oral tablet: 1 tab(s) orally every 12 hours  ascorbic acid 500 mg oral tablet: 1 tab(s) orally once a day  Bacid (LAC) oral tablet: 2 tab(s) orally once a day  docusate sodium 100 mg oral capsule: 2 cap(s) orally once a day  enalapril 10 mg oral tablet: 1 tab(s) orally once a day  ferrous sulfate 325 mg (65 mg elemental iron) oral tablet: 1 tab(s) orally 3 times a day at 09:00, 13:00 and 17:00  levothyroxine 75 mcg (0.075 mg) oral tablet: 1 tab(s) orally once a day  metoprolol tartrate 50 mg oral tablet: 1 tab(s) orally 2 times a day  Multiple Vitamins oral tablet: 1 tab(s) orally once a day  Myrbetriq 50 mg oral tablet, extended release: 1 tab(s) orally once a day  nystatin 100,000 units/g topical cream: Apply topically to affected area 2 times a day to perinial area  polyethylene glycol 3350 oral powder for reconstitution: 17 gram(s) orally once a day  Protonix 40 mg oral delayed release tablet: 1 tab(s) orally once a day  Refresh Dry Eye Therapy ophthalmic solution: 1 drop(s) in each eye 4 times a day

## 2023-11-15 NOTE — PROGRESS NOTE ADULT - SUBJECTIVE AND OBJECTIVE BOX
Date/Time Patient Seen:  		  Referring MD:   Data Reviewed	       Patient is a 89y old  Female who presents with a chief complaint of Transient cerebral ischemia     (13 Nov 2023 13:48)      Subjective/HPI     PAST MEDICAL & SURGICAL HISTORY:  HTN (hypertension)    Afib    Spinal stenosis    PFO (patent foramen ovale)    Degeneration macular    Osteoporosis    History of complete heart block    Hypothyroidism    Cardiac pacemaker          Medication list         MEDICATIONS  (STANDING):  aMIOdarone    Tablet 200 milliGRAM(s) Oral daily  apixaban 2.5 milliGRAM(s) Oral every 12 hours  artificial  tears Solution 1 Drop(s) Both EYES four times a day  ascorbic acid 500 milliGRAM(s) Oral daily  enalapril 10 milliGRAM(s) Oral daily  ertapenem  IVPB 1000 milliGRAM(s) IV Intermittent every 24 hours  ferrous    sulfate 325 milliGRAM(s) Oral three times a day  levothyroxine 75 MICROGram(s) Oral daily  metoprolol tartrate 50 milliGRAM(s) Oral two times a day  multivitamin 1 Tablet(s) Oral daily  pantoprazole    Tablet 40 milliGRAM(s) Oral before breakfast  polyethylene glycol 3350 17 Gram(s) Oral daily    MEDICATIONS  (PRN):         Vitals log        ICU Vital Signs Last 24 Hrs  T(C): 36.7 (15 Nov 2023 04:18), Max: 36.7 (14 Nov 2023 17:50)  T(F): 98.1 (15 Nov 2023 04:18), Max: 98.1 (15 Nov 2023 04:18)  HR: 67 (15 Nov 2023 04:18) (63 - 70)  BP: 136/66 (15 Nov 2023 04:18) (128/68 - 136/66)  BP(mean): --  ABP: --  ABP(mean): --  RR: 18 (15 Nov 2023 04:18) (18 - 20)  SpO2: 94% (15 Nov 2023 04:18) (90% - 94%)    O2 Parameters below as of 15 Nov 2023 04:18  Patient On (Oxygen Delivery Method): room air                 Input and Output:  I&O's Detail    13 Nov 2023 07:01  -  14 Nov 2023 07:00  --------------------------------------------------------  IN:    dextrose 5% + sodium chloride 0.45% w/ Additives: 50 mL  Total IN: 50 mL    OUT:  Total OUT: 0 mL    Total NET: 50 mL      14 Nov 2023 07:01  -  15 Nov 2023 05:45  --------------------------------------------------------  IN:    dextrose 5% + sodium chloride 0.45% w/ Additives: 150 mL    Oral Fluid: 120 mL  Total IN: 270 mL    OUT:    Voided (mL): 300 mL  Total OUT: 300 mL    Total NET: -30 mL          Lab Data                        9.0    12.12 )-----------( 396      ( 14 Nov 2023 08:35 )             28.1     11-14    145  |  118<H>  |  19  ----------------------------<  107<H>  3.8   |  23  |  0.79    Ca    7.8<L>      14 Nov 2023 08:35  Phos  2.1     11-13  Mg     2.0     11-13    TPro  4.9<L>  /  Alb  1.7<L>  /  TBili  0.5  /  DBili  x   /  AST  32  /  ALT  36  /  AlkPhos  99  11-13            Review of Systems	      Objective     Physical Examination  heart s1s2  lung dc BS  head nc        Pertinent Lab findings & Imaging      Mela:  NO   Adequate UO     I&O's Detail    13 Nov 2023 07:01  -  14 Nov 2023 07:00  --------------------------------------------------------  IN:    dextrose 5% + sodium chloride 0.45% w/ Additives: 50 mL  Total IN: 50 mL    OUT:  Total OUT: 0 mL    Total NET: 50 mL      14 Nov 2023 07:01  -  15 Nov 2023 05:45  --------------------------------------------------------  IN:    dextrose 5% + sodium chloride 0.45% w/ Additives: 150 mL    Oral Fluid: 120 mL  Total IN: 270 mL    OUT:    Voided (mL): 300 mL  Total OUT: 300 mL    Total NET: -30 mL               Discussed with:     Cultures:	        Radiology

## 2023-11-15 NOTE — PROGRESS NOTE ADULT - ASSESSMENT
89-year-old female with history of hypertension, hyperlipidemia, A-fib with ablation on Eliquis, MR, bladder tumor, PFO, osteoporosis, spinal stenosis brought in by ambulance for possible stroke.  As per facility patient ate breakfast and had her medication between 9:45AM and 10 AM this morning and was at her baseline mental status.  Aide noticed around 11/1130 patient was leaning towards her left side, had water poured out of her mouth when she attempted to drink and had possible slurred speech.  Patient limited historian due to MR.  As per facility patient was recently in a short-term rehab facility, recently returned back to [facility] however patient has been weaker since then.< from: CT Brain Perfusion Maps Stroke (11.11.23 @ 14:12) >      hypokalemia   potassium chloride  10 mEq/100 mL IVPB 10 milliEquivalent(s) IV Intermittent every 1 hour  aMIOdarone    Tablet 200 milliGRAM(s) Oral daily    BP monitoring,continue current antihypertensive meds, low salt diet,followup with PMD in 1-2 weeks  enalapril 10 milliGRAM(s) Oral daily  metoprolol tartrate 50 milliGRAM(s) Oral two times a day    Admit for septic workup and ID evaluation,send blood and urine cx,serial lactate levels,monitor vitals closley,ivfs hydration,monitor urine output and renal profile,iv abx as per id cons    hypernatremia   will check ua , urine osmolality , urine sodium , urine uric acid , serum sodium , serum osmolality , serum uric acid , f/u with hypernatremia work up , f/u with bmp , monitor i and o     89-year-old female with history of hypertension, hyperlipidemia, A-fib with ablation on Eliquis, MR, bladder tumor, PFO, osteoporosis, spinal stenosis brought in by ambulance for possible stroke.  As per facility patient ate breakfast and had her medication between 9:45AM and 10 AM this morning and was at her baseline mental status.  Aide noticed around 11/1130 patient was leaning towards her left side, had water poured out of her mouth when she attempted to drink and had possible slurred speech.  Patient limited historian due to MR.  As per facility patient was recently in a short-term rehab facility, recently returned back to [facility] however patient has been weaker since then.< from: CT Brain Perfusion Maps Stroke (11.11.23 @ 14:12) >      hypokalemia   potassium chloride  10 mEq/100 mL IVPB 10 milliEquivalent(s) IV Intermittent every 1 hour  aMIOdarone    Tablet 200 milliGRAM(s) Oral daily    BP monitoring,continue current antihypertensive meds, low salt diet,followup with PMD in 1-2 weeks  enalapril 10 milliGRAM(s) Oral daily  metoprolol tartrate 50 milliGRAM(s) Oral two times a day    Admit for septic workup and ID evaluation,send blood and urine cx,serial lactate levels,monitor vitals closley,ivfs hydration,monitor urine output and renal profile,iv abx as per id cons    hypernatremia   will check ua , urine osmolality , urine sodium , urine uric acid , serum sodium , serum osmolality , serum uric acid , f/u with hypernatremia work up , f/u with bmp , monitor i and o     89-year-old female with history of hypertension, hyperlipidemia, A-fib with ablation on Eliquis, MR, bladder tumor, PFO, osteoporosis, spinal stenosis brought in by ambulance for possible stroke.  As per facility patient ate breakfast and had her medication between 9:45AM and 10 AM this morning and was at her baseline mental status.  Aide noticed around 11/1130 patient was leaning towards her left side, had water poured out of her mouth when she attempted to drink and had possible slurred speech.  Patient limited historian due to MR.  As per facility patient was recently in a short-term rehab facility, recently returned back to [facility] however patient has been weaker since then.< from: CT Brain Perfusion Maps Stroke (11.11.23 @ 14:12) >      hypokalemia   potassium chloride  10 mEq/100 mL IVPB 10 milliEquivalent(s) IV Intermittent every 1 hour  aMIOdarone    Tablet 200 milliGRAM(s) Oral daily    BP monitoring,continue current antihypertensive meds, low salt diet,followup with PMD in 1-2 weeks  enalapril 10 milliGRAM(s) Oral daily  metoprolol tartrate 50 milliGRAM(s) Oral two times a day    Admit for septic workup and ID evaluation,send blood and urine cx,serial lactate levels,monitor vitals closley,ivfs hydration,monitor urine output and renal profile,iv abx as per id cons    hypernatremia   will check ua , urine osmolality , urine sodium , urine uric acid , serum sodium , serum osmolality , serum uric acid , f/u with hypernatremia work up , f/u with bmp , monitor i and o

## 2023-11-15 NOTE — CASE MANAGEMENT PROGRESS NOTE - NSCMPROGRESSNOTE_GEN_ALL_CORE
[facility] [phone] # [phone]. 3122 form, DC doc and COVID PCR w/in 72 hrs required for return. Only new meds to Chem Rx. [facility] accepted and all forms faxed and received.  [facility] will accept patient.  [facility] received all formes and will arrange transportation with [facility] for tomorrow.   [facility] [phone] # [phone]. 3122 form, DC doc and COVID PCR w/in 72 hrs required for return. Only new meds to Chem Rx. [facility] accepted and all forms faxed and received.  [facility] will accept patient.  [facility] received all formes and will arrange transportation with [facility] for tomorrow.   [facility] [phone] # [phone]. 3122 form, DC doc and COVID PCR w/in 72 hrs required for return. Only new meds to Chem Rx. [facility] accepted and all forms faxed and received.  [facility] will accept patient.  [facility] received all formes and will arrange transportation with [facility] for tomorrow.

## 2023-11-15 NOTE — PROGRESS NOTE ADULT - ASSESSMENT
89-year-old female with history of hypertension, hyperlipidemia, A-fib with ablation on Eliquis, MR, bladder tumor, PFO, osteoporosis, spinal stenosis brought in by ambulance for possible stroke.     eval cva  spinal stenosis  OP  OA  HLD  HTN  AF  Bladder tumor  PFO    on AMIO  cns imaging reviewed  assist with needs  neuro follow up  HOB elev  asp prec  SLP eval  oral hygiene  PO diet  GOC - DNR

## 2023-11-15 NOTE — DISCHARGE NOTE PROVIDER - NSDCCAREPROVSEEN_GEN_ALL_CORE_FT
Maryse, Parish Reich, Rei Harrington, Pawel Gonzalez, Edwardo Soliz, Bisi Whittaker, Renan Maryse, Parish Reich, Rei Harrington, Pawel Gonzalez, Edwardo Soliz, Bisi Whittaker, Renan Soliz, Bisi Harrington, Pawel Whittaker, Renan Maryse, Parish Reich, Rei Harrington, Pawel Gonzalez, Edwardo Soliz, Bisi Whittaker, Renan Soliz, Bisi Harrington, aPwel Whittaker, Renan

## 2023-11-15 NOTE — DISCHARGE NOTE PROVIDER - NPI NUMBER (FOR SYSADMIN USE ONLY) :
[[id number]],[[id number]],[[id number]],[[id number]] [[id number]],[[id number]],[[id number]],[[id number]] [[id number]],[[id number]],[[id number]]

## 2023-11-15 NOTE — PROGRESS NOTE ADULT - SUBJECTIVE AND OBJECTIVE BOX
PROGRESS NOTE  Patient is a 89y old  Female who presents with a chief complaint of Transient cerebral ischemia     (13 Nov 2023 13:48)    Chart and available morning labs /imaging are reviewed electronically , urgent issues addressed . More information  is being added upon completion of rounds , when more information is collected and management discussed with consultants , medical staff and social service/case management on the floor   OVERNIGHT  No new issues reported by medical staff . All above noted Patient is resting in a bed comfortably . .No distress noted     HPI:  89-year-old female with history of hypertension, hyperlipidemia, A-fib with ablation on Eliquis, MR, bladder tumor, PFO, osteoporosis, spinal stenosis brought in by ambulance for possible stroke.  As per facility patient ate breakfast and had her medication between 9:45AM and 10 AM this morning and was at her baseline mental status.  Aide noticed around 11/1130 patient was leaning towards her left side, had water poured out of her mouth when she attempted to drink and had possible slurred speech.  Patient limited historian due to MR.  As per facility patient was recently in a short-term rehab facility, recently returned back to [facility] however patient has been weaker since then.< from: CT Brain Perfusion Maps Stroke (11.11.23 @ 14:12) >    < from: CT Brain Perfusion Maps Stroke (11.11.23 @ 14:12) >    ACC: [id number] EXAM:  CT BRAIN PERFUSION MAPS STROKE   ORDERED BY:   JORGE FELDER     ACC: [id number] EXAM:  CT ANGIO BRAIN STROKE PROTC IC   ORDERED BY:   JORGE FELDER     ACC: [id number] EXAM:  CT BRAIN STROKE PROTOCOL   ORDERED BY: JORGE FELDER     ACC: [id number] EXAM:  CT ANGIO NECK STROKE PROTCL IC   ORDERED BY:   JORGE FELDER     PROCEDURE DATE:  11/11/2023          INTERPRETATION:  CT HEAD STROKE PROTOCOL, CT ANGIO NECK STROKE PROTOCOL,   CT ANGIO HEAD STROKE PROTOCOL, CT PERFUSION WITH MAPS STROKE PROTOCOL    CT BRAIN WITHOUT CONTRAST    INDICATIONS:  Stroke code Leaning to left side. Slight left arm drift.    TECHNIQUE:  Serial axial images were obtained from the skull base to the   vertex without the use of contrast.    COMPARISON EXAM: 7/11/2023    FINDINGS:  Ventricles and sulci: Parenchymal volume loss is present which is   commensurate with patient age.  Intra-axial: Periventricular small vessel white matter ischemic changes.   No intracranial mass, acute hemorrhage, or midline shift is present.  Extra-axial: No extra-axial fluid collection is identified. Deposition of   calcified plaque at the level of the bilateral carotid siphons.  Calvarium: Intact.    Left optic lens replacement, as on prior. Bilateral optic globes are   intact. Imaged paranasal sinuses, bilateral mastoid air cells, middle ear   cavities are clear.        CT PERFUSION  CTA OF THE NECK AND HEAD:      CONTRAST/COMPLICATIONS:  IV Contrast: NONE (accession [id number]), IV contrast documented in   unlinked concurrent exam (accession [id number])  Complications: None reported at time of study completion      TECHNIQUE PERFUSION:  After the intravenous power injection of non-ionic contrast material,   repeat serial thin sections were obtained through the intracranial   circulation on a multislice CT scanner. Artificial intelligence was then   used for analysis of perfusion and intracranial large vessel occlusion.      TECHNIQUE CTA NECK AND HEAD:  After the intravenous power injection of non-ionic contrast material,   serial thin sections were obtained through the cervical and intracranial   circulation on a multislice CT scanner. 2D and 3D MIP reformatted images   were obtained.  Images were reformatted using a dedicated 3D software   package.    FINDINGS:    CT PERFUSION:    COMPARISON EXAMINATION: None.    Technical limitations: Significant patient motion in all 3 planes during   image acquisition likely renders results suboptimal and nondiagnostic.   Arterial and venous waveforms are also not optimized.    Miscellaneous: None.      CTA NECK WITH CONTRAST:    CAROTID STENOSIS REFERENCE: (NASCET = 100x1-(N/D)). N=greatest narrowing.   D=normal distal diameter - MILD = <50% stenosis. - MODERATE = 50-69%   stenosis.- SEVERE = 70-89% stenosis. - HAIRLINE/CRITICAL = 90-99%   stenosis. - OCCLUDED = 100% stenosis.      COMPARISON: None.    FINDINGS:  Aortic arch and visualized great vessels: Within normal limits.    RIGHT:  Common carotid artery: Follows a tortuouscourse and is normal in caliber   to the carotid bifurcation.  Internal carotid artery: Mild to moderate deposition of calcified plaque   at the level of the right common carotid artery bifurcation with   extension into the proximal aspect of the right internal carotid artery,   without significant stenosis based on NASCET criteria. Normal course and   caliber to the intracranial circulation.    Vertebral artery: Emanates from the right subclavian artery. The right   vertebral artery is normal incourse and caliber to the intracranial   circulation.      LEFT:  Common carotid artery :Normal in course and caliber to the carotid   bifurcation.  Internal carotid artery: Mild to moderate deposition of calcified plaque   at the level of the left common carotid artery bifurcation with extension   into the proximal left internal carotid artery without significant   stenosis based on NASCET criteria. Normal course and caliber to the   intracranial circulation.    Vertebral artery: Emanates from the left subclavian artery. The left   vertebral artery is normal in course and caliber to the intracranial   circulation.      Miscellaneous: None    CTA HEAD WITH CONTRAST:    COMPARISON EXAM: None.      Internal carotid arteries: Bilateral petrous, precavernous, cavernous,   and supraclinoid regions are patent. Mild deposition of calcified plaque   at the level of the carotid siphons without significant stenosis in this   location.    [place]:  No aneurysm about the [place]. Tiny aneurysms   can be beyond the resolution of CTA technique.    Anterior Cerebral arteries: No significant stenosis or occlusion.  Middle Cerebral arteries: No significant stenosis or occlusion.    Posterior Circulation: Distal intracranial bilateralvertebral arteries   are visualized. The vertebrobasilar confluence is unremarkable. There is   good flow within the basilar artery. Bilateral superior cerebellar   arteries and bilateral posterior cerebral arteries emanate from the   distal aspect of the basilar artery. There is a hypoplastic right P1   segment. A prominent right posterior communicating artery contributes to   flow within the right posterior cerebral artery.    < end of copied text >     (11 Nov 2023 21:59)    PAST MEDICAL & SURGICAL HISTORY:  HTN (hypertension)      Afib      Spinal stenosis      PFO (patent foramen ovale)      Degeneration macular      Osteoporosis      History of complete heart block      Hypothyroidism      Cardiac pacemaker          MEDICATIONS  (STANDING):  aMIOdarone    Tablet 200 milliGRAM(s) Oral daily  apixaban 2.5 milliGRAM(s) Oral every 12 hours  artificial  tears Solution 1 Drop(s) Both EYES four times a day  ascorbic acid 500 milliGRAM(s) Oral daily  dextrose 5%. 1000 milliLiter(s) (50 mL/Hr) IV Continuous <Continuous>  enalapril 10 milliGRAM(s) Oral daily  ertapenem  IVPB 1000 milliGRAM(s) IV Intermittent every 24 hours  ferrous    sulfate 325 milliGRAM(s) Oral three times a day  lactobacillus acidophilus 1 Tablet(s) Oral every 8 hours  levothyroxine 75 MICROGram(s) Oral daily  metoprolol tartrate 50 milliGRAM(s) Oral two times a day  multivitamin 1 Tablet(s) Oral daily  pantoprazole    Tablet 40 milliGRAM(s) Oral before breakfast  polyethylene glycol 3350 17 Gram(s) Oral daily    MEDICATIONS  (PRN):      OBJECTIVE    T(C): 37.2 (11-15-23 @ 12:25), Max: 37.2 (11-15-23 @ 12:25)  HR: 74 (11-15-23 @ 12:25) (63 - 74)  BP: 128/66 (11-15-23 @ 12:25) (128/66 - 136/66)  RR: 26 (11-15-23 @ 12:25) (18 - 26)  SpO2: 94% (11-15-23 @ 12:25) (93% - 94%)  Wt(kg): --  I&O's Summary    14 Nov 2023 07:01  -  15 Nov 2023 07:00  --------------------------------------------------------  IN: 270 mL / OUT: 300 mL / NET: -30 mL    15 Nov 2023 07:01  -  15 Nov 2023 17:39  --------------------------------------------------------  IN: 420 mL / OUT: 0 mL / NET: 420 mL          REVIEW OF SYSTEMS:  CONSTITUTIONAL: No fever, weight loss, or fatigue  EYES: No eye pain, visual disturbances, or discharge  ENMT:   No sinus or throat pain  NECK: No pain or stiffness  RESPIRATORY: No cough, wheezing, chills or hemoptysis; No shortness of breath  CARDIOVASCULAR: No chest pain, palpitations, dizziness, or leg swelling  GASTROINTESTINAL: No abdominal pain. No nausea, vomiting; No diarrhea or constipation. No melena or hematochezia.  GENITOURINARY: No dysuria, frequency, hematuria, or incontinence  NEUROLOGICAL: No headaches, memory loss, loss of strength, numbness, or tremors  SKIN: No itching, burning, rashes, or lesions   MUSCULOSKELETAL: No joint pain or swelling; No muscle, back, or extremity pain    PHYSICAL EXAM:  Appearance: NAD. VS past 24 hrs -as above   HEENT:   Moist oral mucosa. Conjunctiva clear b/l.   Neck : supple  Respiratory: Lungs CTAB.  Gastrointestinal:  Soft, nontender. No rebound. No rigidity. BS present	  Cardiovascular: RRR ,S1S2 present  Neurologic: Non-focal. Moving all extremities.  Extremities: No edema. No erythema. No calf tenderness.  Skin: No rashes, No ecchymoses, No cyanosis.	  wounds ,skin lesions-See skin assesment flow sheet   LABS:                        8.1    12.35 )-----------( 382      ( 15 Nov 2023 08:10 )             24.5     11-15    147<H>  |  119<H>  |  18  ----------------------------<  84  4.0   |  24  |  0.67    Ca    7.7<L>      15 Nov 2023 08:10    TPro  4.7<L>  /  Alb  1.7<L>  /  TBili  0.4  /  DBili  x   /  AST  46<H>  /  ALT  49  /  AlkPhos  114  11-15    CAPILLARY BLOOD GLUCOSE          Urinalysis Basic - ( 15 Nov 2023 08:10 )    Color: x / Appearance: x / SG: x / pH: x  Gluc: 84 mg/dL / Ketone: x  / Bili: x / Urobili: x   Blood: x / Protein: x / Nitrite: x   Leuk Esterase: x / RBC: x / WBC x   Sq Epi: x / Non Sq Epi: x / Bacteria: x        Culture - Urine (collected 11 Nov 2023 15:41)  Source: Clean Catch Clean Catch (Midstream)  Final Report (14 Nov 2023 07:29):    >100,000 CFU/ml Escherichia coli ESBL  Organism: Escherichia coli ESBL (14 Nov 2023 07:29)  Organism: Escherichia coli ESBL (14 Nov 2023 07:29)      RADIOLOGY & ADDITIONAL TESTS:   reviewed elctronically  ASSESSMENT/PLAN: 	    25 minutes aggregate time was spent on this visit, 50% visit time spent in care co-ordination with other attendings and counselling patient .I have discussed care plan with patient / HCP/family member ,who expressed understanding of problems treatment and their effect and side effects, alternatives in details. I have asked if they have any questions and concerns and appropriately addressed them to best of my ability.  PROGRESS NOTE  Patient is a 89y old  Female who presents with a chief complaint of Transient cerebral ischemia     (13 Nov 2023 13:48)    Chart and available morning labs /imaging are reviewed electronically , urgent issues addressed . More information  is being added upon completion of rounds , when more information is collected and management discussed with consultants , medical staff and social service/case management on the floor   OVERNIGHT  No new issues reported by medical staff . All above noted Patient is resting in a bed comfortably . .No distress noted     HPI:  89-year-old female with history of hypertension, hyperlipidemia, A-fib with ablation on Eliquis, MR, bladder tumor, PFO, osteoporosis, spinal stenosis brought in by ambulance for possible stroke.  As per facility patient ate breakfast and had her medication between 9:45AM and 10 AM this morning and was at her baseline mental status.  Aide noticed around 11/1130 patient was leaning towards her left side, had water poured out of her mouth when she attempted to drink and had possible slurred speech.  Patient limited historian due to MR.  As per facility patient was recently in a short-term rehab facility, recently returned back to [facility] however patient has been weaker since then.< from: CT Brain Perfusion Maps Stroke (11.11.23 @ 14:12) >    < from: CT Brain Perfusion Maps Stroke (11.11.23 @ 14:12) >    ACC: [id number] EXAM:  CT BRAIN PERFUSION MAPS STROKE   ORDERED BY:   JORGE FELDER     ACC: [id number] EXAM:  CT ANGIO BRAIN STROKE PROTC IC   ORDERED BY:   JORGE FELDER     ACC: [id number] EXAM:  CT BRAIN STROKE PROTOCOL   ORDERED BY: JORGE FELDER     ACC: [id number] EXAM:  CT ANGIO NECK STROKE PROTCL IC   ORDERED BY:   JORGE FELDER     PROCEDURE DATE:  11/11/2023          INTERPRETATION:  CT HEAD STROKE PROTOCOL, CT ANGIO NECK STROKE PROTOCOL,   CT ANGIO HEAD STROKE PROTOCOL, CT PERFUSION WITH MAPS STROKE PROTOCOL    CT BRAIN WITHOUT CONTRAST    INDICATIONS:  Stroke code Leaning to left side. Slight left arm drift.    TECHNIQUE:  Serial axial images were obtained from the skull base to the   vertex without the use of contrast.    COMPARISON EXAM: 7/11/2023    FINDINGS:  Ventricles and sulci: Parenchymal volume loss is present which is   commensurate with patient age.  Intra-axial: Periventricular small vessel white matter ischemic changes.   No intracranial mass, acute hemorrhage, or midline shift is present.  Extra-axial: No extra-axial fluid collection is identified. Deposition of   calcified plaque at the level of the bilateral carotid siphons.  Calvarium: Intact.    Left optic lens replacement, as on prior. Bilateral optic globes are   intact. Imaged paranasal sinuses, bilateral mastoid air cells, middle ear   cavities are clear.        CT PERFUSION  CTA OF THE NECK AND HEAD:      CONTRAST/COMPLICATIONS:  IV Contrast: NONE (accession [id number]), IV contrast documented in   unlinked concurrent exam (accession [id number])  Complications: None reported at time of study completion      TECHNIQUE PERFUSION:  After the intravenous power injection of non-ionic contrast material,   repeat serial thin sections were obtained through the intracranial   circulation on a multislice CT scanner. Artificial intelligence was then   used for analysis of perfusion and intracranial large vessel occlusion.      TECHNIQUE CTA NECK AND HEAD:  After the intravenous power injection of non-ionic contrast material,   serial thin sections were obtained through the cervical and intracranial   circulation on a multislice CT scanner. 2D and 3D MIP reformatted images   were obtained.  Images were reformatted using a dedicated 3D software   package.    FINDINGS:    CT PERFUSION:    COMPARISON EXAMINATION: None.    Technical limitations: Significant patient motion in all 3 planes during   image acquisition likely renders results suboptimal and nondiagnostic.   Arterial and venous waveforms are also not optimized.    Miscellaneous: None.      CTA NECK WITH CONTRAST:    CAROTID STENOSIS REFERENCE: (NASCET = 100x1-(N/D)). N=greatest narrowing.   D=normal distal diameter - MILD = <50% stenosis. - MODERATE = 50-69%   stenosis.- SEVERE = 70-89% stenosis. - HAIRLINE/CRITICAL = 90-99%   stenosis. - OCCLUDED = 100% stenosis.      COMPARISON: None.    FINDINGS:  Aortic arch and visualized great vessels: Within normal limits.    RIGHT:  Common carotid artery: Follows a tortuouscourse and is normal in caliber   to the carotid bifurcation.  Internal carotid artery: Mild to moderate deposition of calcified plaque   at the level of the right common carotid artery bifurcation with   extension into the proximal aspect of the right internal carotid artery,   without significant stenosis based on NASCET criteria. Normal course and   caliber to the intracranial circulation.    Vertebral artery: Emanates from the right subclavian artery. The right   vertebral artery is normal incourse and caliber to the intracranial   circulation.      LEFT:  Common carotid artery :Normal in course and caliber to the carotid   bifurcation.  Internal carotid artery: Mild to moderate deposition of calcified plaque   at the level of the left common carotid artery bifurcation with extension   into the proximal left internal carotid artery without significant   stenosis based on NASCET criteria. Normal course and caliber to the   intracranial circulation.    Vertebral artery: Emanates from the left subclavian artery. The left   vertebral artery is normal in course and caliber to the intracranial   circulation.      Miscellaneous: None    CTA HEAD WITH CONTRAST:    COMPARISON EXAM: None.      Internal carotid arteries: Bilateral petrous, precavernous, cavernous,   and supraclinoid regions are patent. Mild deposition of calcified plaque   at the level of the carotid siphons without significant stenosis in this   location.    [place]:  No aneurysm about the [place]. Tiny aneurysms   can be beyond the resolution of CTA technique.    Anterior Cerebral arteries: No significant stenosis or occlusion.  Middle Cerebral arteries: No significant stenosis or occlusion.    Posterior Circulation: Distal intracranial bilateralvertebral arteries   are visualized. The vertebrobasilar confluence is unremarkable. There is   good flow within the basilar artery. Bilateral superior cerebellar   arteries and bilateral posterior cerebral arteries emanate from the   distal aspect of the basilar artery. There is a hypoplastic right P1   segment. A prominent right posterior communicating artery contributes to   flow within the right posterior cerebral artery.    < end of copied text >     (11 Nov 2023 21:59)    PAST MEDICAL & SURGICAL HISTORY:  HTN (hypertension)      Afib      Spinal stenosis      PFO (patent foramen ovale)      Degeneration macular      Osteoporosis      History of complete heart block      Hypothyroidism      Cardiac pacemaker          MEDICATIONS  (STANDING):  aMIOdarone    Tablet 200 milliGRAM(s) Oral daily  apixaban 2.5 milliGRAM(s) Oral every 12 hours  artificial  tears Solution 1 Drop(s) Both EYES four times a day  ascorbic acid 500 milliGRAM(s) Oral daily  dextrose 5%. 1000 milliLiter(s) (50 mL/Hr) IV Continuous <Continuous>  enalapril 10 milliGRAM(s) Oral daily  ertapenem  IVPB 1000 milliGRAM(s) IV Intermittent every 24 hours  ferrous    sulfate 325 milliGRAM(s) Oral three times a day  lactobacillus acidophilus 1 Tablet(s) Oral every 8 hours  levothyroxine 75 MICROGram(s) Oral daily  metoprolol tartrate 50 milliGRAM(s) Oral two times a day  multivitamin 1 Tablet(s) Oral daily  pantoprazole    Tablet 40 milliGRAM(s) Oral before breakfast  polyethylene glycol 3350 17 Gram(s) Oral daily    MEDICATIONS  (PRN):      OBJECTIVE    T(C): 37.2 (11-15-23 @ 12:25), Max: 37.2 (11-15-23 @ 12:25)  HR: 74 (11-15-23 @ 12:25) (63 - 74)  BP: 128/66 (11-15-23 @ 12:25) (128/66 - 136/66)  RR: 26 (11-15-23 @ 12:25) (18 - 26)  SpO2: 94% (11-15-23 @ 12:25) (93% - 94%)  Wt(kg): --  I&O's Summary    14 Nov 2023 07:01  -  15 Nov 2023 07:00  --------------------------------------------------------  IN: 270 mL / OUT: 300 mL / NET: -30 mL    15 Nov 2023 07:01  -  15 Nov 2023 17:39  --------------------------------------------------------  IN: 420 mL / OUT: 0 mL / NET: 420 mL          REVIEW OF SYSTEMS:  CONSTITUTIONAL: No fever, weight loss, or fatigue  EYES: No eye pain, visual disturbances, or discharge  ENMT:   No sinus or throat pain  NECK: No pain or stiffness  RESPIRATORY: No cough, wheezing, chills or hemoptysis; No shortness of breath  CARDIOVASCULAR: No chest pain, palpitations, dizziness, or leg swelling  GASTROINTESTINAL: No abdominal pain. No nausea, vomiting; No diarrhea or constipation. No melena or hematochezia.  GENITOURINARY: No dysuria, frequency, hematuria, or incontinence  NEUROLOGICAL: No headaches, memory loss, loss of strength, numbness, or tremors  SKIN: No itching, burning, rashes, or lesions   MUSCULOSKELETAL: No joint pain or swelling; No muscle, back, or extremity pain    PHYSICAL EXAM:  Appearance: NAD. VS past 24 hrs -as above   HEENT:   Moist oral mucosa. Conjunctiva clear b/l.   Neck : supple  Respiratory: Lungs CTAB.  Gastrointestinal:  Soft, nontender. No rebound. No rigidity. BS present	  Cardiovascular: RRR ,S1S2 present  Neurologic: Non-focal. Moving all extremities.  Extremities: No edema. No erythema. No calf tenderness.  Skin: No rashes, No ecchymoses, No cyanosis.	  wounds ,skin lesions-See skin assesment flow sheet   LABS:                        8.1    12.35 )-----------( 382      ( 15 Nov 2023 08:10 )             24.5     11-15    147<H>  |  119<H>  |  18  ----------------------------<  84  4.0   |  24  |  0.67    Ca    7.7<L>      15 Nov 2023 08:10    TPro  4.7<L>  /  Alb  1.7<L>  /  TBili  0.4  /  DBili  x   /  AST  46<H>  /  ALT  49  /  AlkPhos  114  11-15    CAPILLARY BLOOD GLUCOSE          Urinalysis Basic - ( 15 Nov 2023 08:10 )    Color: x / Appearance: x / SG: x / pH: x  Gluc: 84 mg/dL / Ketone: x  / Bili: x / Urobili: x   Blood: x / Protein: x / Nitrite: x   Leuk Esterase: x / RBC: x / WBC x   Sq Epi: x / Non Sq Epi: x / Bacteria: x        Culture - Urine (collected 11 Nov 2023 15:41)  Source: Clean Catch Clean Catch (Midstream)  Final Report (14 Nov 2023 07:29):    >100,000 CFU/ml Escherichia coli ESBL  Organism: Escherichia coli ESBL (14 Nov 2023 07:29)  Organism: Escherichia coli ESBL (14 Nov 2023 07:29)      RADIOLOGY & ADDITIONAL TESTS:   reviewed elctronically  ASSESSMENT/PLAN: 	    25 minutes aggregate time was spent on this visit, 50% visit time spent in care co-ordination with other attendings and counselling patient .I have discussed care plan with patient / HCP/family member ,who expressed understanding of problems treatment and their effect and side effects, alternatives in details. I have asked if they have any questions and concerns and appropriately addressed them to best of my ability.  PROGRESS NOTE  Patient is a 89y old  Female who presents with a chief complaint of Transient cerebral ischemia     (13 Nov 2023 13:48)    Chart and available morning labs /imaging are reviewed electronically , urgent issues addressed . More information  is being added upon completion of rounds , when more information is collected and management discussed with consultants , medical staff and social service/case management on the floor   OVERNIGHT  No new issues reported by medical staff . All above noted Patient is resting in a bed comfortably . .No distress noted     HPI:  89-year-old female with history of hypertension, hyperlipidemia, A-fib with ablation on Eliquis, MR, bladder tumor, PFO, osteoporosis, spinal stenosis brought in by ambulance for possible stroke.  As per facility patient ate breakfast and had her medication between 9:45AM and 10 AM this morning and was at her baseline mental status.  Aide noticed around 11/1130 patient was leaning towards her left side, had water poured out of her mouth when she attempted to drink and had possible slurred speech.  Patient limited historian due to MR.  As per facility patient was recently in a short-term rehab facility, recently returned back to [facility] however patient has been weaker since then.< from: CT Brain Perfusion Maps Stroke (11.11.23 @ 14:12) >    < from: CT Brain Perfusion Maps Stroke (11.11.23 @ 14:12) >    ACC: [id number] EXAM:  CT BRAIN PERFUSION MAPS STROKE   ORDERED BY:   JORGE FELDER     ACC: [id number] EXAM:  CT ANGIO BRAIN STROKE PROTC IC   ORDERED BY:   JORGE FELDER     ACC: [id number] EXAM:  CT BRAIN STROKE PROTOCOL   ORDERED BY: JORGE FELDER     ACC: [id number] EXAM:  CT ANGIO NECK STROKE PROTCL IC   ORDERED BY:   JORGE FELDER     PROCEDURE DATE:  11/11/2023          INTERPRETATION:  CT HEAD STROKE PROTOCOL, CT ANGIO NECK STROKE PROTOCOL,   CT ANGIO HEAD STROKE PROTOCOL, CT PERFUSION WITH MAPS STROKE PROTOCOL    CT BRAIN WITHOUT CONTRAST    INDICATIONS:  Stroke code Leaning to left side. Slight left arm drift.    TECHNIQUE:  Serial axial images were obtained from the skull base to the   vertex without the use of contrast.    COMPARISON EXAM: 7/11/2023    FINDINGS:  Ventricles and sulci: Parenchymal volume loss is present which is   commensurate with patient age.  Intra-axial: Periventricular small vessel white matter ischemic changes.   No intracranial mass, acute hemorrhage, or midline shift is present.  Extra-axial: No extra-axial fluid collection is identified. Deposition of   calcified plaque at the level of the bilateral carotid siphons.  Calvarium: Intact.    Left optic lens replacement, as on prior. Bilateral optic globes are   intact. Imaged paranasal sinuses, bilateral mastoid air cells, middle ear   cavities are clear.        CT PERFUSION  CTA OF THE NECK AND HEAD:      CONTRAST/COMPLICATIONS:  IV Contrast: NONE (accession [id number]), IV contrast documented in   unlinked concurrent exam (accession [id number])  Complications: None reported at time of study completion      TECHNIQUE PERFUSION:  After the intravenous power injection of non-ionic contrast material,   repeat serial thin sections were obtained through the intracranial   circulation on a multislice CT scanner. Artificial intelligence was then   used for analysis of perfusion and intracranial large vessel occlusion.      TECHNIQUE CTA NECK AND HEAD:  After the intravenous power injection of non-ionic contrast material,   serial thin sections were obtained through the cervical and intracranial   circulation on a multislice CT scanner. 2D and 3D MIP reformatted images   were obtained.  Images were reformatted using a dedicated 3D software   package.    FINDINGS:    CT PERFUSION:    COMPARISON EXAMINATION: None.    Technical limitations: Significant patient motion in all 3 planes during   image acquisition likely renders results suboptimal and nondiagnostic.   Arterial and venous waveforms are also not optimized.    Miscellaneous: None.      CTA NECK WITH CONTRAST:    CAROTID STENOSIS REFERENCE: (NASCET = 100x1-(N/D)). N=greatest narrowing.   D=normal distal diameter - MILD = <50% stenosis. - MODERATE = 50-69%   stenosis.- SEVERE = 70-89% stenosis. - HAIRLINE/CRITICAL = 90-99%   stenosis. - OCCLUDED = 100% stenosis.      COMPARISON: None.    FINDINGS:  Aortic arch and visualized great vessels: Within normal limits.    RIGHT:  Common carotid artery: Follows a tortuouscourse and is normal in caliber   to the carotid bifurcation.  Internal carotid artery: Mild to moderate deposition of calcified plaque   at the level of the right common carotid artery bifurcation with   extension into the proximal aspect of the right internal carotid artery,   without significant stenosis based on NASCET criteria. Normal course and   caliber to the intracranial circulation.    Vertebral artery: [place] from the right subclavian artery. The right   vertebral artery is normal incourse and caliber to the intracranial   circulation.      LEFT:  Common carotid artery :Normal in course and caliber to the carotid   bifurcation.  Internal carotid artery: Mild to moderate deposition of calcified plaque   at the level of the left common carotid artery bifurcation with extension   into the proximal left internal carotid artery without significant   stenosis based on NASCET criteria. Normal course and caliber to the   intracranial circulation.    Vertebral artery: Emanates from the left subclavian artery. The left   vertebral artery is normal in course and caliber to the intracranial   circulation.      Miscellaneous: None    CTA HEAD WITH CONTRAST:    COMPARISON EXAM: None.      Internal carotid arteries: Bilateral petrous, precavernous, cavernous,   and supraclinoid regions are patent. Mild deposition of calcified plaque   at the level of the carotid siphons without significant stenosis in this   location.    [place]:  No aneurysm about the [place]. Tiny aneurysms   can be beyond the resolution of CTA technique.    Anterior Cerebral arteries: No significant stenosis or occlusion.  Middle Cerebral arteries: No significant stenosis or occlusion.    Posterior Circulation: Distal intracranial bilateralvertebral arteries   are visualized. The vertebrobasilar confluence is unremarkable. There is   good flow within the basilar artery. Bilateral superior cerebellar   arteries and bilateral posterior cerebral arteries emanate from the   distal aspect of the basilar artery. There is a hypoplastic right P1   segment. A prominent right posterior communicating artery contributes to   flow within the right posterior cerebral artery.    < end of copied text >     (11 Nov 2023 21:59)    PAST MEDICAL & SURGICAL HISTORY:  HTN (hypertension)      Afib      Spinal stenosis      PFO (patent foramen ovale)      Degeneration macular      Osteoporosis      History of complete heart block      Hypothyroidism      Cardiac pacemaker          MEDICATIONS  (STANDING):  aMIOdarone    Tablet 200 milliGRAM(s) Oral daily  apixaban 2.5 milliGRAM(s) Oral every 12 hours  artificial  tears Solution 1 Drop(s) Both EYES four times a day  ascorbic acid 500 milliGRAM(s) Oral daily  dextrose 5%. 1000 milliLiter(s) (50 mL/Hr) IV Continuous <Continuous>  enalapril 10 milliGRAM(s) Oral daily  ertapenem  IVPB 1000 milliGRAM(s) IV Intermittent every 24 hours  ferrous    sulfate 325 milliGRAM(s) Oral three times a day  lactobacillus acidophilus 1 Tablet(s) Oral every 8 hours  levothyroxine 75 MICROGram(s) Oral daily  metoprolol tartrate 50 milliGRAM(s) Oral two times a day  multivitamin 1 Tablet(s) Oral daily  pantoprazole    Tablet 40 milliGRAM(s) Oral before breakfast  polyethylene glycol 3350 17 Gram(s) Oral daily    MEDICATIONS  (PRN):      OBJECTIVE    T(C): 37.2 (11-15-23 @ 12:25), Max: 37.2 (11-15-23 @ 12:25)  HR: 74 (11-15-23 @ 12:25) (63 - 74)  BP: 128/66 (11-15-23 @ 12:25) (128/66 - 136/66)  RR: 26 (11-15-23 @ 12:25) (18 - 26)  SpO2: 94% (11-15-23 @ 12:25) (93% - 94%)  Wt(kg): --  I&O's Summary    14 Nov 2023 07:01  -  15 Nov 2023 07:00  --------------------------------------------------------  IN: 270 mL / OUT: 300 mL / NET: -30 mL    15 Nov 2023 07:01  -  15 Nov 2023 17:39  --------------------------------------------------------  IN: 420 mL / OUT: 0 mL / NET: 420 mL          REVIEW OF SYSTEMS:  CONSTITUTIONAL: No fever, weight loss, or fatigue  EYES: No eye pain, visual disturbances, or discharge  ENMT:   No sinus or throat pain  NECK: No pain or stiffness  RESPIRATORY: No cough, wheezing, chills or hemoptysis; No shortness of breath  CARDIOVASCULAR: No chest pain, palpitations, dizziness, or leg swelling  GASTROINTESTINAL: No abdominal pain. No nausea, vomiting; No diarrhea or constipation. No melena or hematochezia.  GENITOURINARY: No dysuria, frequency, hematuria, or incontinence  NEUROLOGICAL: No headaches, memory loss, loss of strength, numbness, or tremors  SKIN: No itching, burning, rashes, or lesions   MUSCULOSKELETAL: No joint pain or swelling; No muscle, back, or extremity pain    PHYSICAL EXAM:  Appearance: NAD. VS past 24 hrs -as above   HEENT:   Moist oral mucosa. Conjunctiva clear b/l.   Neck : supple  Respiratory: Lungs CTAB.  Gastrointestinal:  Soft, nontender. No rebound. No rigidity. BS present	  Cardiovascular: RRR ,S1S2 present  Neurologic: Non-focal. Moving all extremities.  Extremities: No edema. No erythema. No calf tenderness.  Skin: No rashes, No ecchymoses, No cyanosis.	  wounds ,skin lesions-See skin assesment flow sheet   LABS:                        8.1    12.35 )-----------( 382      ( 15 Nov 2023 08:10 )             24.5     11-15    147<H>  |  119<H>  |  18  ----------------------------<  84  4.0   |  24  |  0.67    Ca    7.7<L>      15 Nov 2023 08:10    TPro  4.7<L>  /  Alb  1.7<L>  /  TBili  0.4  /  DBili  x   /  AST  46<H>  /  ALT  49  /  AlkPhos  114  11-15    CAPILLARY BLOOD GLUCOSE          Urinalysis Basic - ( 15 Nov 2023 08:10 )    Color: x / Appearance: x / SG: x / pH: x  Gluc: 84 mg/dL / Ketone: x  / Bili: x / Urobili: x   Blood: x / Protein: x / Nitrite: x   Leuk Esterase: x / RBC: x / WBC x   Sq Epi: x / Non Sq Epi: x / Bacteria: x        Culture - Urine (collected 11 Nov 2023 15:41)  Source: Clean Catch Clean Catch (Midstream)  Final Report (14 Nov 2023 07:29):    >100,000 CFU/ml Escherichia coli ESBL  Organism: Escherichia coli ESBL (14 Nov 2023 07:29)  Organism: Escherichia coli ESBL (14 Nov 2023 07:29)      RADIOLOGY & ADDITIONAL TESTS:   reviewed elctronically  ASSESSMENT/PLAN: 	    25 minutes aggregate time was spent on this visit, 50% visit time spent in care co-ordination with other attendings and counselling patient .I have discussed care plan with patient / HCP/family member ,who expressed understanding of problems treatment and their effect and side effects, alternatives in details. I have asked if they have any questions and concerns and appropriately addressed them to best of my ability.

## 2023-11-15 NOTE — DISCHARGE NOTE PROVIDER - HOSPITAL COURSE
Problem/Plan - 1:  ·  Problem: Acute metabolic encephalopathy.2/2 TO UTI AND ELECTROLYTE IMBALLANCE POA  ·  Plan: likely 2/2 to UTI ,poa.    Problem/Plan - 2:  ·  Problem:RECURRENT UTI WITH ESBL , POA   ·  Plan: . UA was suggestive of UTI. The patient just had UTI with  ESBL E coli bacteremia last month.  . Add IV Invanz for presumptive recurrent UTI with ESBL.    Problem/Plan - 3:  ·  Problem: Transient ischemic attack (TIA). RULED OUT AND UNLIKELY as pe neurologist   ·  tia is  unlikely  , neuro eval is noted -ams metabolic possible UTI as per Dr Noble   antibiotics a needed   neurologic wise stable  spoke to dtr on a phone ,requests to speak to ID about abx prophylaxis.    Problem/Plan - 4:  ·  Problem: Afib.   ·  Plan: continue home medications.    Problem/Plan - 5:  ·  Problem: Hypokalemia.   ·  Plan: replaced , serial bmp.    Problem/Plan - 6:  ·  Problem: Hypernatremia.   ·  Plan: iv fluids , serial bmp.    Problem/Plan - 7:  ·  Problem: Dysphagia.   ·  Plan: aspiration precautions.    Problem/Plan - 8:  ·  Problem: Hypothyroidism.   ·  Plan: continue home medications.    Problem/Plan - 9:  ·  Problem: Spinal stenosis.   ·  Plan: pain management.    Problem/Plan - 10:  ·  Problem: HTN (hypertension).   ·  Plan; continue home medications.    Problem/Plan - 11:  ·  Problem: Prophylactic measure.   ·  Plan: Gastrointestinal stress ulcer prophylaxis and DVT prophylaxis administered.

## 2023-11-15 NOTE — DISCHARGE NOTE PROVIDER - CARE PROVIDER_API CALL
Matt Lares  Gastroenterology  [address] [place] [address]  Phone: [phone]  Fax: [phone]  Follow Up Time: 2 weeks    Edwardo Gonzalez  Infectious Disease  [address] [place] [address]  Phone: [phone]  Fax: [phone]  Follow Up Time: 1 week    Ayad Collins  Hospice/Palliative Medicine  [address] [place] [address]  Phone: [phone]  Fax: [phone]  Follow Up Time: 1 week    Rei Reich  Cardiology  [address] [place], [address] [address]  Phone: [phone]  Fax: [phone]  Follow Up Time: 1 month   Matt Lares  Gastroenterology  [address] [place] [address]  Phone: [phone]  Fax: [phone]  Follow Up Time: 2 weeks    Edwardo Gonzalez  Infectious Disease  [address] [place] [address]  Phone: [phone]  Fax: [phone]  Follow Up Time: 1 week    Ayad Collins  Hospice/Palliative Medicine  [address] [place] [address]  Phone: [phone]  Fax: [phone]  Follow Up Time: 1 week    Rei Reich  Cardiology  [address], [address] [address]  Phone: [phone]  Fax: [phone]  Follow Up Time: 1 month   Matt Lares  Gastroenterology  [address] [place] [address]  Phone: [phone]  Fax: [phone]  Follow Up Time: 2 weeks    Edwardo Gonzalez  Infectious Disease  [address] [place] [address]  Phone: [phone]  Fax: [phone]  Follow Up Time: 1 week    Ayad Collins  Hospice/Palliative Medicine  [address] [place] [address]  Phone: [phone]  Fax: [phone]  Follow Up Time: 1 week    Rei Reich  Cardiology  [address] [place], [address] [address]  Phone: [phone]  Fax: [phone]  Follow Up Time: 1 month   Matt Lares  Gastroenterology  [address] [place] [address]  Phone: [phone]  Fax: [phone]  Follow Up Time: 2 weeks    Edwardo Gonzalez  Infectious Disease  [address] [place] [address]  Phone: [phone]  Fax: [phone]  Follow Up Time: 1 week    Ayad Collins  Hospice/Palliative Medicine  [address] [place] [address]  Phone: [phone]  Fax: [phone]  Follow Up Time: 1-3 days    Rei Reich  Cardiology  [address] [place], [facility] [address]  [place] [address]  Phone: [phone]  Fax: [phone]  Follow Up Time: 1 month   Matt Lares  Gastroenterology  [address] [place] [address]  Phone: [phone]  Fax: [phone]  Follow Up Time: 2 weeks    Edwardo Gonzalez  Infectious Disease  [address] [place] [address]  Phone: [phone]  Fax: [phone]  Follow Up Time: 1 week    Ayad Collins  Hospice/Palliative Medicine  [address] [place] [address]  Phone: [phone]  Fax: [phone]  Follow Up Time: 1-3 days    Rei Reich  Cardiology  [address] [place], [facility] [address]  [place] [address]  Phone: [phone]  Fax: [phone]  Follow Up Time: 1 month   Matt Lares  Gastroenterology  [address] [place] [address]  Phone: [phone]  Fax: [phone]  Follow Up Time: 2 weeks    Edwardo Gonzalez  Infectious Disease  [address] [place] [address]  Phone: [phone]  Fax: [phone]  Follow Up Time: 1 week    Ayad Collins  Hospice/Palliative Medicine  [address] [place] [address]  Phone: [phone]  Fax: [phone]  Follow Up Time: 1-3 days    Rei Reich  Cardiology  [address] [place], [facility] [address]  [place] [address]  Phone: [phone]  Fax: [phone]  Follow Up Time: 1 month

## 2023-11-15 NOTE — DISCHARGE NOTE PROVIDER - DETAILS OF MALNUTRITION DIAGNOSIS/DIAGNOSES
This patient has been assessed with a concern for Malnutrition and was treated during this hospitalization for the following Nutrition diagnosis/diagnoses:     -  11/13/2023: Moderate protein-calorie malnutrition

## 2023-11-15 NOTE — PROGRESS NOTE ADULT - SUBJECTIVE AND OBJECTIVE BOX
Interval History:    CENTRAL LINE:   [  ] YES       [  ] NO  SKELTON:                 [  ] YES       [  ] NO         REVIEW OF SYSTEMS:  All Systems below were reviewed and are negative [  ]  HEENT:  ID:  Pulmonary:  Cardiac:  GI:  Renal:  Musculoskeletal:  All other systems above were reviewed and are negative   [  ]      MEDICATIONS  (STANDING):  aMIOdarone    Tablet 200 milliGRAM(s) Oral daily  apixaban 2.5 milliGRAM(s) Oral every 12 hours  artificial  tears Solution 1 Drop(s) Both EYES four times a day  ascorbic acid 500 milliGRAM(s) Oral daily  dextrose 5%. 1000 milliLiter(s) (50 mL/Hr) IV Continuous <Continuous>  enalapril 10 milliGRAM(s) Oral daily  ertapenem  IVPB 1000 milliGRAM(s) IV Intermittent every 24 hours  ferrous    sulfate 325 milliGRAM(s) Oral three times a day  lactobacillus acidophilus 1 Tablet(s) Oral every 8 hours  levothyroxine 75 MICROGram(s) Oral daily  metoprolol tartrate 50 milliGRAM(s) Oral two times a day  multivitamin 1 Tablet(s) Oral daily  pantoprazole    Tablet 40 milliGRAM(s) Oral before breakfast  polyethylene glycol 3350 17 Gram(s) Oral daily    MEDICATIONS  (PRN):      Vital Signs Last 24 Hrs  T(C): 37.2 (15 Nov 2023 12:25), Max: 37.2 (15 Nov 2023 12:25)  T(F): 98.9 (15 Nov 2023 12:25), Max: 98.9 (15 Nov 2023 12:25)  HR: 76 (15 Nov 2023 17:46) (65 - 76)  BP: 152/53 (15 Nov 2023 17:46) (128/66 - 152/53)  BP(mean): --  RR: 20 (15 Nov 2023 17:46) (18 - 26)  SpO2: 96% (15 Nov 2023 17:46) (94% - 96%)    Parameters below as of 15 Nov 2023 12:25  Patient On (Oxygen Delivery Method): room air        I&O's Summary    14 Nov 2023 07:01  -  15 Nov 2023 07:00  --------------------------------------------------------  IN: 270 mL / OUT: 300 mL / NET: -30 mL    15 Nov 2023 07:01  -  15 Nov 2023 20:21  --------------------------------------------------------  IN: 420 mL / OUT: 0 mL / NET: 420 mL        PHYSICAL EXAM:  HEENT: NC/AT; PERRLA  Neck: Soft; no tenderness  Lungs: CTA bilaterally; no wheezing.   Heart:  Abdomen:  Genital/ Rectal:  Extremities:  Neurologic:  Vascular:      LABORATORY:    CBC Full  -  ( 15 Nov 2023 08:10 )  WBC Count : 12.35 K/uL  RBC Count : 2.83 M/uL  Hemoglobin : 8.1 g/dL  Hematocrit : 24.5 %  Platelet Count - Automated : 382 K/uL  Mean Cell Volume : 86.6 fl  Mean Cell Hemoglobin : 28.6 pg  Mean Cell Hemoglobin Concentration : 33.1 gm/dL  Auto Neutrophil # : 10.13 K/uL  Auto Lymphocyte # : 1.48 K/uL  Auto Monocyte # : 0.74 K/uL  Auto Eosinophil # : 0.00 K/uL  Auto Basophil # : 0.00 K/uL  Auto Neutrophil % : 82.0 %  Auto Lymphocyte % : 12.0 %  Auto Monocyte % : 6.0 %  Auto Eosinophil % : 0.0 %  Auto Basophil % : 0.0 %          11-15    147<H>  |  119<H>  |  18  ----------------------------<  84  4.0   |  24  |  0.67    Ca    7.7<L>      15 Nov 2023 08:10    TPro  4.7<L>  /  Alb  1.7<L>  /  TBili  0.4  /  DBili  x   /  AST  46<H>  /  ALT  49  /  AlkPhos  114  11-15          Assessment and Plan:          Edwardo Gonzalez MD   [phone].      Interval History:    CENTRAL LINE:   [  ] YES       [  ] NO  SKELTON:                 [  ] YES       [  ] NO         REVIEW OF SYSTEMS:  All Systems below were reviewed and are negative [  ]  HEENT:  ID:  Pulmonary:  Cardiac:  GI:  Renal:  Musculoskeletal:  All other systems above were reviewed and are negative   [  ]      MEDICATIONS  (STANDING):  aMIOdarone    Tablet 200 milliGRAM(s) Oral daily  apixaban 2.5 milliGRAM(s) Oral every 12 hours  artificial  tears Solution 1 Drop(s) Both EYES four times a day  ascorbic acid 500 milliGRAM(s) Oral daily  dextrose 5%. 1000 milliLiter(s) (50 mL/Hr) IV Continuous <Continuous>  enalapril 10 milliGRAM(s) Oral daily  ertapenem  IVPB 1000 milliGRAM(s) IV Intermittent every 24 hours  ferrous    sulfate 325 milliGRAM(s) Oral three times a day  lactobacillus acidophilus 1 Tablet(s) Oral every 8 hours  levothyroxine 75 MICROGram(s) Oral daily  metoprolol tartrate 50 milliGRAM(s) Oral two times a day  multivitamin 1 Tablet(s) Oral daily  pantoprazole    Tablet 40 milliGRAM(s) Oral before breakfast  polyethylene glycol 3350 17 Gram(s) Oral daily    MEDICATIONS  (PRN):      Vital Signs Last 24 Hrs  T(C): 37.2 (15 Nov 2023 12:25), Max: 37.2 (15 Nov 2023 12:25)  T(F): 98.9 (15 Nov 2023 12:25), Max: 98.9 (15 Nov 2023 12:25)  HR: 76 (15 Nov 2023 17:46) (65 - 76)  BP: 152/53 (15 Nov 2023 17:46) (128/66 - 152/53)  BP(mean): --  RR: 20 (15 Nov 2023 17:46) (18 - 26)  SpO2: 96% (15 Nov 2023 17:46) (94% - 96%)    Parameters below as of 15 Nov 2023 12:25  Patient On (Oxygen Delivery Method): room air        I&O's Summary    14 Nov 2023 07:01  -  15 Nov 2023 07:00  --------------------------------------------------------  IN: 270 mL / OUT: 300 mL / NET: -30 mL    15 Nov 2023 07:01  -  15 Nov 2023 20:21  --------------------------------------------------------  IN: 420 mL / OUT: 0 mL / NET: 420 mL        PHYSICAL EXAM:  HEENT: NC/AT; PERRLA  Neck: Soft; no tenderness  Lungs: CTA bilaterally; no wheezing.   Heart:  Abdomen:  Genital/ Rectal:  Extremities:  Neurologic:  Vascular:      LABORATORY:    CBC Full  -  ( 15 Nov 2023 08:10 )  WBC Count : 12.35 K/uL  RBC Count : 2.83 M/uL  Hemoglobin : 8.1 g/dL  Hematocrit : 24.5 %  Platelet Count - Automated : 382 K/uL  Mean Cell Volume : 86.6 fl  Mean Cell Hemoglobin : 28.6 pg  Mean Cell Hemoglobin Concentration : 33.1 gm/dL  Auto Neutrophil # : 10.13 K/uL  Auto Lymphocyte # : 1.48 K/uL  Auto Monocyte # : 0.74 K/uL  Auto Eosinophil # : 0.00 K/uL  Auto Basophil # : 0.00 K/uL  Auto Neutrophil % : 82.0 %  Auto Lymphocyte % : 12.0 %  Auto Monocyte % : 6.0 %  Auto Eosinophil % : 0.0 %  Auto Basophil % : 0.0 %          11-15    147<H>  |  119<H>  |  18  ----------------------------<  84  4.0   |  24  |  0.67    Ca    7.7<L>      15 Nov 2023 08:10    TPro  4.7<L>  /  Alb  1.7<L>  /  TBili  0.4  /  DBili  x   /  AST  46<H>  /  ALT  49  /  AlkPhos  114  11-15          Assessment and Plan:          Edwardo Gonzalez MD   [phone].      Interval History:    CENTRAL LINE:   [  ] YES       [  ] NO  SKELTON:                 [  ] YES       [  ] NO         REVIEW OF SYSTEMS:  All Systems below were reviewed and are negative [  ]  HEENT:  ID:  Pulmonary:  Cardiac:  GI:  Renal:  Musculoskeletal:  All other systems above were reviewed and are negative   [  ]      MEDICATIONS  (STANDING):  aMIOdarone    Tablet 200 milliGRAM(s) Oral daily  apixaban 2.5 milliGRAM(s) Oral every 12 hours  artificial  tears Solution 1 Drop(s) Both EYES four times a day  ascorbic acid 500 milliGRAM(s) Oral daily  dextrose 5%. 1000 milliLiter(s) (50 mL/Hr) IV Continuous <Continuous>  enalapril 10 milliGRAM(s) Oral daily  ertapenem  IVPB 1000 milliGRAM(s) IV Intermittent every 24 hours  ferrous    sulfate 325 milliGRAM(s) Oral three times a day  lactobacillus acidophilus 1 Tablet(s) Oral every 8 hours  levothyroxine 75 MICROGram(s) Oral daily  metoprolol tartrate 50 milliGRAM(s) Oral two times a day  multivitamin 1 Tablet(s) Oral daily  pantoprazole    Tablet 40 milliGRAM(s) Oral before breakfast  polyethylene glycol 3350 17 Gram(s) Oral daily    MEDICATIONS  (PRN):      Vital Signs Last 24 Hrs  T(C): 37.2 (15 Nov 2023 12:25), Max: 37.2 (15 Nov 2023 12:25)  T(F): 98.9 (15 Nov 2023 12:25), Max: 98.9 (15 Nov 2023 12:25)  HR: 76 (15 Nov 2023 17:46) (65 - 76)  BP: 152/53 (15 Nov 2023 17:46) (128/66 - 152/53)  BP(mean): --  RR: 20 (15 Nov 2023 17:46) (18 - 26)  SpO2: 96% (15 Nov 2023 17:46) (94% - 96%)    Parameters below as of 15 Nov 2023 12:25  Patient On (Oxygen Delivery Method): room air        I&O's Summary    14 Nov 2023 07:01  -  15 Nov 2023 07:00  --------------------------------------------------------  IN: 270 mL / OUT: 300 mL / NET: -30 mL    15 Nov 2023 07:01  -  15 Nov 2023 20:21  --------------------------------------------------------  IN: 420 mL / OUT: 0 mL / NET: 420 mL        PHYSICAL EXAM:  HEENT: NC/AT; PERRLA  Neck: Soft; no tenderness  Lungs: CTA bilaterally; no wheezing.   Heart:  Abdomen:  Genital/ Rectal:  Extremities:  Neurologic:  Vascular:      LABORATORY:    CBC Full  -  ( 15 Nov 2023 08:10 )  WBC Count : 12.35 K/uL  RBC Count : 2.83 M/uL  Hemoglobin : 8.1 g/dL  Hematocrit : 24.5 %  Platelet Count - Automated : 382 K/uL  Mean Cell Volume : 86.6 fl  Mean Cell Hemoglobin : 28.6 pg  Mean Cell Hemoglobin Concentration : 33.1 gm/dL  Auto Neutrophil # : 10.13 K/uL  Auto Lymphocyte # : 1.48 K/uL  Auto Monocyte # : 0.74 K/uL  Auto Eosinophil # : 0.00 K/uL  Auto Basophil # : 0.00 K/uL  Auto Neutrophil % : 82.0 %  Auto Lymphocyte % : 12.0 %  Auto Monocyte % : 6.0 %  Auto Eosinophil % : 0.0 %  Auto Basophil % : 0.0 %          11-15    147<H>  |  119<H>  |  18  ----------------------------<  84  4.0   |  24  |  0.67    Ca    7.7<L>      15 Nov 2023 08:10    TPro  4.7<L>  /  Alb  1.7<L>  /  TBili  0.4  /  DBili  x   /  AST  46<H>  /  ALT  49  /  AlkPhos  114  11-15          Assessment and Plan:          Edwardo Gonzalez MD   [phone].    No fevers  Confused      MEDICATIONS  (STANDING):  aMIOdarone    Tablet 200 milliGRAM(s) Oral daily  apixaban 2.5 milliGRAM(s) Oral every 12 hours  artificial  tears Solution 1 Drop(s) Both EYES four times a day  ascorbic acid 500 milliGRAM(s) Oral daily  dextrose 5%. 1000 milliLiter(s) (50 mL/Hr) IV Continuous <Continuous>  enalapril 10 milliGRAM(s) Oral daily  ertapenem  IVPB 1000 milliGRAM(s) IV Intermittent every 24 hours  ferrous    sulfate 325 milliGRAM(s) Oral three times a day  lactobacillus acidophilus 1 Tablet(s) Oral every 8 hours  levothyroxine 75 MICROGram(s) Oral daily  metoprolol tartrate 50 milliGRAM(s) Oral two times a day  multivitamin 1 Tablet(s) Oral daily  pantoprazole    Tablet 40 milliGRAM(s) Oral before breakfast  polyethylene glycol 3350 17 Gram(s) Oral daily    MEDICATIONS  (PRN):      Vital Signs Last 24 Hrs  T(C): 37.2 (15 Nov 2023 12:25), Max: 37.2 (15 Nov 2023 12:25)  T(F): 98.9 (15 Nov 2023 12:25), Max: 98.9 (15 Nov 2023 12:25)  HR: 76 (15 Nov 2023 17:46) (65 - 76)  BP: 152/53 (15 Nov 2023 17:46) (128/66 - 152/53)  BP(mean): --  RR: 20 (15 Nov 2023 17:46) (18 - 26)  SpO2: 96% (15 Nov 2023 17:46) (94% - 96%)    Parameters below as of 15 Nov 2023 12:25  Patient On (Oxygen Delivery Method): room air        I&O's Summary    14 Nov 2023 07:01  -  15 Nov 2023 07:00  --------------------------------------------------------  IN: 270 mL / OUT: 300 mL / NET: -30 mL    15 Nov 2023 07:01  -  15 Nov 2023 20:21  --------------------------------------------------------  IN: 420 mL / OUT: 0 mL / NET: 420 mL        PHYSICAL EXAM:  HEENT: NC/AT; PERRLA  Neck: Soft; no tenderness  Lungs: CTA bilaterally; no wheezing.   Heart:  Abdomen:  Genital/ Rectal:  Extremities:  Neurologic:  Vascular:      LABORATORY:    CBC Full  -  ( 15 Nov 2023 08:10 )  WBC Count : 12.35 K/uL  RBC Count : 2.83 M/uL  Hemoglobin : 8.1 g/dL  Hematocrit : 24.5 %  Platelet Count - Automated : 382 K/uL  Mean Cell Volume : 86.6 fl  Mean Cell Hemoglobin : 28.6 pg  Mean Cell Hemoglobin Concentration : 33.1 gm/dL  Auto Neutrophil # : 10.13 K/uL  Auto Lymphocyte # : 1.48 K/uL  Auto Monocyte # : 0.74 K/uL  Auto Eosinophil # : 0.00 K/uL  Auto Basophil # : 0.00 K/uL  Auto Neutrophil % : 82.0 %  Auto Lymphocyte % : 12.0 %  Auto Monocyte % : 6.0 %  Auto Eosinophil % : 0.0 %  Auto Basophil % : 0.0 %          11-15    147<H>  |  119<H>  |  18  ----------------------------<  84  4.0   |  24  |  0.67    Ca    7.7<L>      15 Nov 2023 08:10    TPro  4.7<L>  /  Alb  1.7<L>  /  TBili  0.4  /  DBili  x   /  AST  46<H>  /  ALT  49  /  AlkPhos  114  11-15          Assessment and Plan:      1. Recurrent UTI with ESBL E coli.  2. Toxic metabolic encephalopathy.  3. Generalized weakness.     . UA was suggestive of UTI. Urine culture grew ESBL E coli.  . She had 3 days of IV Invanz but still has high WBC of 12K today. Continue IV Invanz for 3 more days ( a total of 6 days)for  recurrent UTI with ESBL E coli,  . Get bladder scan. If showed urinary retention, get urology evaluation.   . Monitor mental status.      Edwardo Gonzalez MD   [phone].    No fevers  Confused      MEDICATIONS  (STANDING):  aMIOdarone    Tablet 200 milliGRAM(s) Oral daily  apixaban 2.5 milliGRAM(s) Oral every 12 hours  artificial  tears Solution 1 Drop(s) Both EYES four times a day  ascorbic acid 500 milliGRAM(s) Oral daily  dextrose 5%. 1000 milliLiter(s) (50 mL/Hr) IV Continuous <Continuous>  enalapril 10 milliGRAM(s) Oral daily  ertapenem  IVPB 1000 milliGRAM(s) IV Intermittent every 24 hours  ferrous    sulfate 325 milliGRAM(s) Oral three times a day  lactobacillus acidophilus 1 Tablet(s) Oral every 8 hours  levothyroxine 75 MICROGram(s) Oral daily  metoprolol tartrate 50 milliGRAM(s) Oral two times a day  multivitamin 1 Tablet(s) Oral daily  pantoprazole    Tablet 40 milliGRAM(s) Oral before breakfast  polyethylene glycol 3350 17 Gram(s) Oral daily    MEDICATIONS  (PRN):      Vital Signs Last 24 Hrs  T(C): 37.2 (15 Nov 2023 12:25), Max: 37.2 (15 Nov 2023 12:25)  T(F): 98.9 (15 Nov 2023 12:25), Max: 98.9 (15 Nov 2023 12:25)  HR: 76 (15 Nov 2023 17:46) (65 - 76)  BP: 152/53 (15 Nov 2023 17:46) (128/66 - 152/53)  BP(mean): --  RR: 20 (15 Nov 2023 17:46) (18 - 26)  SpO2: 96% (15 Nov 2023 17:46) (94% - 96%)    Parameters below as of 15 Nov 2023 12:25  Patient On (Oxygen Delivery Method): room air        I&O's Summary    14 Nov 2023 07:01  -  15 Nov 2023 07:00  --------------------------------------------------------  IN: 270 mL / OUT: 300 mL / NET: -30 mL    15 Nov 2023 07:01  -  15 Nov 2023 20:21  --------------------------------------------------------  IN: 420 mL / OUT: 0 mL / NET: 420 mL        PHYSICAL EXAM:  HEENT: NC/AT; PERRLA  Neck: Soft; no tenderness  Lungs: CTA bilaterally; no wheezing.   Heart:  Abdomen:  Genital/ Rectal:  Extremities:  Neurologic:  Vascular:      LABORATORY:    CBC Full  -  ( 15 Nov 2023 08:10 )  WBC Count : 12.35 K/uL  RBC Count : 2.83 M/uL  Hemoglobin : 8.1 g/dL  Hematocrit : 24.5 %  Platelet Count - Automated : 382 K/uL  Mean Cell Volume : 86.6 fl  Mean Cell Hemoglobin : 28.6 pg  Mean Cell Hemoglobin Concentration : 33.1 gm/dL  Auto Neutrophil # : 10.13 K/uL  Auto Lymphocyte # : 1.48 K/uL  Auto Monocyte # : 0.74 K/uL  Auto Eosinophil # : 0.00 K/uL  Auto Basophil # : 0.00 K/uL  Auto Neutrophil % : 82.0 %  Auto Lymphocyte % : 12.0 %  Auto Monocyte % : 6.0 %  Auto Eosinophil % : 0.0 %  Auto Basophil % : 0.0 %          11-15    147<H>  |  119<H>  |  18  ----------------------------<  84  4.0   |  24  |  0.67    Ca    7.7<L>      15 Nov 2023 08:10    TPro  4.7<L>  /  Alb  1.7<L>  /  TBili  0.4  /  DBili  x   /  AST  46<H>  /  ALT  49  /  AlkPhos  114  11-15          Assessment and Plan:      1. Recurrent UTI with ESBL E coli.  2. Toxic metabolic encephalopathy.  3. Generalized weakness.     . UA was suggestive of UTI. Urine culture grew ESBL E coli.  . She had 3 days of IV Invanz but still has high WBC of 12K today. Continue IV Invanz for 3 more days ( a total of 6 days)for  recurrent UTI with ESBL E coli,  . Get bladder scan. If showed urinary retention, get urology evaluation.   . Monitor mental status.      Edwardo Gonzalez MD   [phone].    No fevers  Confused      MEDICATIONS  (STANDING):  aMIOdarone    Tablet 200 milliGRAM(s) Oral daily  apixaban 2.5 milliGRAM(s) Oral every 12 hours  artificial  tears Solution 1 Drop(s) Both EYES four times a day  ascorbic acid 500 milliGRAM(s) Oral daily  dextrose 5%. 1000 milliLiter(s) (50 mL/Hr) IV Continuous <Continuous>  enalapril 10 milliGRAM(s) Oral daily  ertapenem  IVPB 1000 milliGRAM(s) IV Intermittent every 24 hours  ferrous    sulfate 325 milliGRAM(s) Oral three times a day  lactobacillus acidophilus 1 Tablet(s) Oral every 8 hours  levothyroxine 75 MICROGram(s) Oral daily  metoprolol tartrate 50 milliGRAM(s) Oral two times a day  multivitamin 1 Tablet(s) Oral daily  pantoprazole    Tablet 40 milliGRAM(s) Oral before breakfast  polyethylene glycol 3350 17 Gram(s) Oral daily    MEDICATIONS  (PRN):      Vital Signs Last 24 Hrs  T(C): 37.2 (15 Nov 2023 12:25), Max: 37.2 (15 Nov 2023 12:25)  T(F): 98.9 (15 Nov 2023 12:25), Max: 98.9 (15 Nov 2023 12:25)  HR: 76 (15 Nov 2023 17:46) (65 - 76)  BP: 152/53 (15 Nov 2023 17:46) (128/66 - 152/53)  BP(mean): --  RR: 20 (15 Nov 2023 17:46) (18 - 26)  SpO2: 96% (15 Nov 2023 17:46) (94% - 96%)    Parameters below as of 15 Nov 2023 12:25  Patient On (Oxygen Delivery Method): room air        I&O's Summary    14 Nov 2023 07:01  -  15 Nov 2023 07:00  --------------------------------------------------------  IN: 270 mL / OUT: 300 mL / NET: -30 mL    15 Nov 2023 07:01  -  15 Nov 2023 20:21  --------------------------------------------------------  IN: 420 mL / OUT: 0 mL / NET: 420 mL        PHYSICAL EXAM:  HEENT: NC/AT; PERRLA  Neck: Soft; no tenderness  Lungs: CTA bilaterally; no wheezing.   Heart:  Abdomen:  Genital/ Rectal:  Extremities:  Neurologic:  Vascular:      LABORATORY:    CBC Full  -  ( 15 Nov 2023 08:10 )  WBC Count : 12.35 K/uL  RBC Count : 2.83 M/uL  Hemoglobin : 8.1 g/dL  Hematocrit : 24.5 %  Platelet Count - Automated : 382 K/uL  Mean Cell Volume : 86.6 fl  Mean Cell Hemoglobin : 28.6 pg  Mean Cell Hemoglobin Concentration : 33.1 gm/dL  Auto Neutrophil # : 10.13 K/uL  Auto Lymphocyte # : 1.48 K/uL  Auto Monocyte # : 0.74 K/uL  Auto Eosinophil # : 0.00 K/uL  Auto Basophil # : 0.00 K/uL  Auto Neutrophil % : 82.0 %  Auto Lymphocyte % : 12.0 %  Auto Monocyte % : 6.0 %  Auto Eosinophil % : 0.0 %  Auto Basophil % : 0.0 %          11-15    147<H>  |  119<H>  |  18  ----------------------------<  84  4.0   |  24  |  0.67    Ca    7.7<L>      15 Nov 2023 08:10    TPro  4.7<L>  /  Alb  1.7<L>  /  TBili  0.4  /  DBili  x   /  AST  46<H>  /  ALT  49  /  AlkPhos  114  11-15          Assessment and Plan:      1. Recurrent UTI with ESBL E coli.  2. Toxic metabolic encephalopathy.  3. Generalized weakness.     . UA was suggestive of UTI. Urine culture grew ESBL E coli.  . She had 3 days of IV Invanz but still has high WBC of 12K today. Continue IV Invanz for 3 more days ( a total of 6 days)for  recurrent UTI with ESBL E coli,  . Get bladder scan. If showed urinary retention, get urology evaluation.   . Monitor mental status.      Edwardo Gonzalez MD   [phone].    No fevers  Confused      MEDICATIONS  (STANDING):  aMIOdarone    Tablet 200 milliGRAM(s) Oral daily  apixaban 2.5 milliGRAM(s) Oral every 12 hours  artificial  tears Solution 1 Drop(s) Both EYES four times a day  ascorbic acid 500 milliGRAM(s) Oral daily  dextrose 5%. 1000 milliLiter(s) (50 mL/Hr) IV Continuous <Continuous>  enalapril 10 milliGRAM(s) Oral daily  ertapenem  IVPB 1000 milliGRAM(s) IV Intermittent every 24 hours  ferrous    sulfate 325 milliGRAM(s) Oral three times a day  lactobacillus acidophilus 1 Tablet(s) Oral every 8 hours  levothyroxine 75 MICROGram(s) Oral daily  metoprolol tartrate 50 milliGRAM(s) Oral two times a day  multivitamin 1 Tablet(s) Oral daily  pantoprazole    Tablet 40 milliGRAM(s) Oral before breakfast  polyethylene glycol 3350 17 Gram(s) Oral daily    MEDICATIONS  (PRN):      Vital Signs Last 24 Hrs  T(C): 37.2 (15 Nov 2023 12:25), Max: 37.2 (15 Nov 2023 12:25)  T(F): 98.9 (15 Nov 2023 12:25), Max: 98.9 (15 Nov 2023 12:25)  HR: 76 (15 Nov 2023 17:46) (65 - 76)  BP: 152/53 (15 Nov 2023 17:46) (128/66 - 152/53)  BP(mean): --  RR: 20 (15 Nov 2023 17:46) (18 - 26)  SpO2: 96% (15 Nov 2023 17:46) (94% - 96%)    Parameters below as of 15 Nov 2023 12:25  Patient On (Oxygen Delivery Method): room air        I&O's Summary    14 Nov 2023 07:01  -  15 Nov 2023 07:00  --------------------------------------------------------  IN: 270 mL / OUT: 300 mL / NET: -30 mL    15 Nov 2023 07:01  -  15 Nov 2023 20:21  --------------------------------------------------------  IN: 420 mL / OUT: 0 mL / NET: 420 mL        PHYSICAL EXAM:  HEENT: NC/AT; PERRLA  Neck: Soft; no tenderness  Lungs: CTA bilaterally; no wheezing.   Heart: RRR, no murmurs.  Abdomen: Soft, no tenderness.   Genital/ Rectal: No khan catheter  Extremities: No ulcers. Edema of upper extremities  Neurologic: Confused.         LABORATORY:    CBC Full  -  ( 15 Nov 2023 08:10 )  WBC Count : 12.35 K/uL  RBC Count : 2.83 M/uL  Hemoglobin : 8.1 g/dL  Hematocrit : 24.5 %  Platelet Count - Automated : 382 K/uL  Mean Cell Volume : 86.6 fl  Mean Cell Hemoglobin : 28.6 pg  Mean Cell Hemoglobin Concentration : 33.1 gm/dL  Auto Neutrophil # : 10.13 K/uL  Auto Lymphocyte # : 1.48 K/uL  Auto Monocyte # : 0.74 K/uL  Auto Eosinophil # : 0.00 K/uL  Auto Basophil # : 0.00 K/uL  Auto Neutrophil % : 82.0 %  Auto Lymphocyte % : 12.0 %  Auto Monocyte % : 6.0 %  Auto Eosinophil % : 0.0 %  Auto Basophil % : 0.0 %          11-15    147<H>  |  119<H>  |  18  ----------------------------<  84  4.0   |  24  |  0.67    Ca    7.7<L>      15 Nov 2023 08:10    TPro  4.7<L>  /  Alb  1.7<L>  /  TBili  0.4  /  DBili  x   /  AST  46<H>  /  ALT  49  /  AlkPhos  114  11-15      Assessment and Plan:    1. Recurrent UTI with ESBL E coli.  2. Toxic metabolic encephalopathy.  3. Generalized weakness.     . UA was suggestive of UTI. Urine culture grew ESBL E coli.  . She had 3 days of IV Invanz but still had high WBC of 12K today. Continue IV Invanz for one more day tomorrow ( a total of 6 daysfor  recurrent UTI with ESBL E coli)  . Supportive care.  . Monitor mental status.      Edwardo Gonzalez MD   [phone].    No fevers  Confused      MEDICATIONS  (STANDING):  aMIOdarone    Tablet 200 milliGRAM(s) Oral daily  apixaban 2.5 milliGRAM(s) Oral every 12 hours  artificial  tears Solution 1 Drop(s) Both EYES four times a day  ascorbic acid 500 milliGRAM(s) Oral daily  dextrose 5%. 1000 milliLiter(s) (50 mL/Hr) IV Continuous <Continuous>  enalapril 10 milliGRAM(s) Oral daily  ertapenem  IVPB 1000 milliGRAM(s) IV Intermittent every 24 hours  ferrous    sulfate 325 milliGRAM(s) Oral three times a day  lactobacillus acidophilus 1 Tablet(s) Oral every 8 hours  levothyroxine 75 MICROGram(s) Oral daily  metoprolol tartrate 50 milliGRAM(s) Oral two times a day  multivitamin 1 Tablet(s) Oral daily  pantoprazole    Tablet 40 milliGRAM(s) Oral before breakfast  polyethylene glycol 3350 17 Gram(s) Oral daily    MEDICATIONS  (PRN):      Vital Signs Last 24 Hrs  T(C): 37.2 (15 Nov 2023 12:25), Max: 37.2 (15 Nov 2023 12:25)  T(F): 98.9 (15 Nov 2023 12:25), Max: 98.9 (15 Nov 2023 12:25)  HR: 76 (15 Nov 2023 17:46) (65 - 76)  BP: 152/53 (15 Nov 2023 17:46) (128/66 - 152/53)  BP(mean): --  RR: 20 (15 Nov 2023 17:46) (18 - 26)  SpO2: 96% (15 Nov 2023 17:46) (94% - 96%)    Parameters below as of 15 Nov 2023 12:25  Patient On (Oxygen Delivery Method): room air        I&O's Summary    14 Nov 2023 07:01  -  15 Nov 2023 07:00  --------------------------------------------------------  IN: 270 mL / OUT: 300 mL / NET: -30 mL    15 Nov 2023 07:01  -  15 Nov 2023 20:21  --------------------------------------------------------  IN: 420 mL / OUT: 0 mL / NET: 420 mL        PHYSICAL EXAM:  HEENT: NC/AT; PERRLA  Neck: Soft; no tenderness  Lungs: CTA bilaterally; no wheezing.   Heart: RRR, no murmurs.  Abdomen: Soft, no tenderness.   Genital/ Rectal: No khan catheter  Extremities: No ulcers. Edema of upper extremities  Neurologic: Confused.         LABORATORY:    CBC Full  -  ( 15 Nov 2023 08:10 )  WBC Count : 12.35 K/uL  RBC Count : 2.83 M/uL  Hemoglobin : 8.1 g/dL  Hematocrit : 24.5 %  Platelet Count - Automated : 382 K/uL  Mean Cell Volume : 86.6 fl  Mean Cell Hemoglobin : 28.6 pg  Mean Cell Hemoglobin Concentration : 33.1 gm/dL  Auto Neutrophil # : 10.13 K/uL  Auto Lymphocyte # : 1.48 K/uL  Auto Monocyte # : 0.74 K/uL  Auto Eosinophil # : 0.00 K/uL  Auto Basophil # : 0.00 K/uL  Auto Neutrophil % : 82.0 %  Auto Lymphocyte % : 12.0 %  Auto Monocyte % : 6.0 %  Auto Eosinophil % : 0.0 %  Auto Basophil % : 0.0 %          11-15    147<H>  |  119<H>  |  18  ----------------------------<  84  4.0   |  24  |  0.67    Ca    7.7<L>      15 Nov 2023 08:10    TPro  4.7<L>  /  Alb  1.7<L>  /  TBili  0.4  /  DBili  x   /  AST  46<H>  /  ALT  49  /  AlkPhos  114  11-15      Assessment and Plan:    1. Recurrent UTI with ESBL E coli.  2. Toxic metabolic encephalopathy.  3. Generalized weakness.     . UA was suggestive of UTI. Urine culture grew ESBL E coli.  . She had 3 days of IV Invanz but still had high WBC of 12K today. Continue IV Invanz for one more day tomorrow ( a total of 6 daysfor  recurrent UTI with ESBL E coli)  . Supportive care.  . Monitor mental status.      Edwardo Gonzalez MD   [phone].    No fevers  Confused      MEDICATIONS  (STANDING):  aMIOdarone    Tablet 200 milliGRAM(s) Oral daily  apixaban 2.5 milliGRAM(s) Oral every 12 hours  artificial  tears Solution 1 Drop(s) Both EYES four times a day  ascorbic acid 500 milliGRAM(s) Oral daily  dextrose 5%. 1000 milliLiter(s) (50 mL/Hr) IV Continuous <Continuous>  enalapril 10 milliGRAM(s) Oral daily  ertapenem  IVPB 1000 milliGRAM(s) IV Intermittent every 24 hours  ferrous    sulfate 325 milliGRAM(s) Oral three times a day  lactobacillus acidophilus 1 Tablet(s) Oral every 8 hours  levothyroxine 75 MICROGram(s) Oral daily  metoprolol tartrate 50 milliGRAM(s) Oral two times a day  multivitamin 1 Tablet(s) Oral daily  pantoprazole    Tablet 40 milliGRAM(s) Oral before breakfast  polyethylene glycol 3350 17 Gram(s) Oral daily    MEDICATIONS  (PRN):      Vital Signs Last 24 Hrs  T(C): 37.2 (15 Nov 2023 12:25), Max: 37.2 (15 Nov 2023 12:25)  T(F): 98.9 (15 Nov 2023 12:25), Max: 98.9 (15 Nov 2023 12:25)  HR: 76 (15 Nov 2023 17:46) (65 - 76)  BP: 152/53 (15 Nov 2023 17:46) (128/66 - 152/53)  BP(mean): --  RR: 20 (15 Nov 2023 17:46) (18 - 26)  SpO2: 96% (15 Nov 2023 17:46) (94% - 96%)    Parameters below as of 15 Nov 2023 12:25  Patient On (Oxygen Delivery Method): room air        I&O's Summary    14 Nov 2023 07:01  -  15 Nov 2023 07:00  --------------------------------------------------------  IN: 270 mL / OUT: 300 mL / NET: -30 mL    15 Nov 2023 07:01  -  15 Nov 2023 20:21  --------------------------------------------------------  IN: 420 mL / OUT: 0 mL / NET: 420 mL        PHYSICAL EXAM:  HEENT: NC/AT; PERRLA  Neck: Soft; no tenderness  Lungs: CTA bilaterally; no wheezing.   Heart: RRR, no murmurs.  Abdomen: Soft, no tenderness.   Genital/ Rectal: No khan catheter  Extremities: No ulcers. Edema of upper extremities  Neurologic: Confused.         LABORATORY:    CBC Full  -  ( 15 Nov 2023 08:10 )  WBC Count : 12.35 K/uL  RBC Count : 2.83 M/uL  Hemoglobin : 8.1 g/dL  Hematocrit : 24.5 %  Platelet Count - Automated : 382 K/uL  Mean Cell Volume : 86.6 fl  Mean Cell Hemoglobin : 28.6 pg  Mean Cell Hemoglobin Concentration : 33.1 gm/dL  Auto Neutrophil # : 10.13 K/uL  Auto Lymphocyte # : 1.48 K/uL  Auto Monocyte # : 0.74 K/uL  Auto Eosinophil # : 0.00 K/uL  Auto Basophil # : 0.00 K/uL  Auto Neutrophil % : 82.0 %  Auto Lymphocyte % : 12.0 %  Auto Monocyte % : 6.0 %  Auto Eosinophil % : 0.0 %  Auto Basophil % : 0.0 %          11-15    147<H>  |  119<H>  |  18  ----------------------------<  84  4.0   |  24  |  0.67    Ca    7.7<L>      15 Nov 2023 08:10    TPro  4.7<L>  /  Alb  1.7<L>  /  TBili  0.4  /  DBili  x   /  AST  46<H>  /  ALT  49  /  AlkPhos  114  11-15      Assessment and Plan:    1. Recurrent UTI with ESBL E coli.  2. Toxic metabolic encephalopathy.  3. Generalized weakness.     . UA was suggestive of UTI. Urine culture grew ESBL E coli.  . She had 3 days of IV Invanz but still had high WBC of 12K today. Continue IV Invanz for one more day tomorrow ( a total of 6 daysfor  recurrent UTI with ESBL E coli)  . Supportive care.  . Monitor mental status.      Edwardo Gonzalez MD   [phone].

## 2023-11-15 NOTE — PROGRESS NOTE ADULT - ASSESSMENT
89-year-old female with history of hypertension, hyperlipidemia, A-fib with ablation on Eliquis, MR, bladder tumor, PFO, osteoporosis, spinal stenosis brought in by ambulance for possible stroke.  As per facility patient ate breakfast and had her medication between 9:45AM and 10 AM this morning and was at her baseline mental status.  Aide noticed around 11/1130 patient was leaning towards her left side, had water poured out of her mouth when she attempted to drink and had possible slurred speech.  Patient limited historian due to MR.  As per facility patient was recently in a short-term rehab facility, recently returned back to [facility] however patient has been weaker since then 1. UTI.  2. Toxic metabolic encephalopathy.  3. Generalized weakness.   . UA was suggestive of UTI. The patient just had UTI with  ESBL E coli bacteremia last month.  . Add IV Invanz for presumptive recurrent UTI with ESBL  . Monitor mental status.  . Follow all cultures. 89-year-old female with history of hypertension, hyperlipidemia, A-fib with ablation on Eliquis, MR, bladder tumor, PFO, osteoporosis, spinal stenosis brought in by ambulance for possible stroke.  As per facility patient ate breakfast and had her medication between 9:45AM and 10 AM this morning and was at her baseline mental status.  Aide noticed around 11/1130 patient was leaning towards her left side, had water poured out of her mouth when she attempted to drink and had possible slurred speech.  Patient limited historian due to MR.  As per facility patient was recently in a short-term rehab facility, recently returned back to [facility] however patient has been weaker since then 1. UTI.  2. Toxic metabolic encephalopathy.  3. Generalized weakness.   . UA was suggestive of UTI. The patient just had UTI with  ESBL E coli bacteremia last month.  . Add IV Invanz for presumptive recurrent UTI with ESBL  . Monitor mental status.  . Follow all cultures. 89-year-old female with history of hypertension, hyperlipidemia, A-fib with ablation on Eliquis, MR, bladder tumor, PFO, osteoporosis, spinal stenosis brought in by ambulance for possible stroke.  As per facility patient ate breakfast and had her medication between 9:45AM and 10 AM this morning and was at her baseline mental status.  Aide noticed around 11/1130 patient was leaning towards her left side, had water poured out of her mouth when she attempted to drink and had possible slurred speech.  Patient limited historian due to MR.  As per facility patient was recently in a short-term rehab facility, recently returned back to [facility] however patient has been weaker since then 1. UTI.  2. Toxic metabolic encephalopathy.  3. Generalized weakness.   . UA was suggestive of UTI. The patient just had UTI with  ESBL E coli bacteremia last month.  . Add IV Invanz for presumptive recurrent UTI with ESBL  . Monitor mental status.  . Follow all cultures.

## 2023-11-15 NOTE — DISCHARGE NOTE PROVIDER - PROVIDER TOKENS
PROVIDER:[TOKEN:[[id number]],FOLLOWUP:[2 weeks]],PROVIDER:[TOKEN:[[id number]],FOLLOWUP:[1 week]],PROVIDER:[TOKEN:[[id number]],FOLLOWUP:[1 week]],PROVIDER:[TOKEN:[[id number]],FOLLOWUP:[1 month]] PROVIDER:[TOKEN:[[id number]],FOLLOWUP:[2 weeks]],PROVIDER:[TOKEN:[[id number]],FOLLOWUP:[1 week]],PROVIDER:[TOKEN:[[id number]],FOLLOWUP:[1 week]],PROVIDER:[TOKEN:[[id number]],FOLLOWUP:[1 month]] PROVIDER:[TOKEN:[[id number]],FOLLOWUP:[2 weeks]],PROVIDER:[TOKEN:[[id number]],FOLLOWUP:[1 week]],PROVIDER:[TOKEN:[[id number]],FOLLOWUP:[1 week]],PROVIDER:[TOKEN:[[id number]],FOLLOWUP:[1 month]] PROVIDER:[TOKEN:[[id number]],FOLLOWUP:[2 weeks]],PROVIDER:[TOKEN:[[id number]],FOLLOWUP:[1 week]],PROVIDER:[TOKEN:[[id number]],FOLLOWUP:[1-3 days]],PROVIDER:[TOKEN:[[id number]],FOLLOWUP:[1 month]] PROVIDER:[TOKEN:[[id number]],FOLLOWUP:[2 weeks]],PROVIDER:[TOKEN:[[id number]],FOLLOWUP:[1 week]],PROVIDER:[TOKEN:[[id number]],FOLLOWUP:[1-3 days]],PROVIDER:[TOKEN:[[id number]],FOLLOWUP:[1 month]] PROVIDER:[TOKEN:[[id number]],FOLLOWUP:[2 weeks]],PROVIDER:[TOKEN:[[id number]],FOLLOWUP:[1 week]],PROVIDER:[TOKEN:[[id number]],FOLLOWUP:[1-3 days]],PROVIDER:[TOKEN:[[id number]],FOLLOWUP:[1 month]]

## 2023-11-15 NOTE — DISCHARGE NOTE PROVIDER - YES NO FOR MLM POSITIVE OR NEGATIVE COVID RESULT
Patient reports that he has a kidney stone, was seen in the [facility] last week. Has been having pain and it is not getting better  
,

## 2023-11-15 NOTE — PROGRESS NOTE ADULT - SUBJECTIVE AND OBJECTIVE BOX
{\rtf1\arruxr34852\ansi\cpoodlv3800\ftnbj\uc1\deff0  {\fonttbl{\f0 \fnil Segoe UI;}{\f1 \fnil [place];}{\f2 \fnil \fcharset0 Segoe UI;}{\f3 \fnil Symbol;}{\f4 \fnil Wingdings;}}  {\colortbl ;\ner769\npluu364\wyqj475 ;\red0\green0\blue0 ;\red0\green0\blue0 ;}  {\stylesheet{\f0\fs20 Normal;}{\cs1 Default Paragraph Font;}{\cs2\f0\fs16 Line Number;}{\cs3\f1\fs24 Hyperlink;}}  {\*\revtbl{Unknown;}}  {\*\listtable  {\list\listtemplateid-1  {\listlevel\levelnfc0\levelfollow0\levelstartat1\levelnorestart{\leveltext \'02\'00.}{\levelnumbers \'01}\f1\fs24}  {\listlevel\levelnfc4\levelfollow0\levelstartat1\levelnorestart{\leveltext \'02\'01.}{\levelnumbers \'01}\f1\fs24}  {\listlevel\levelnfc0\levelfollow0\levelstartat1\levelnorestart{\leveltext \'02\'02.}{\levelnumbers \'01}\f1\fs24}  {\listlevel\levelnfc0\levelfollow0\levelstartat1\levelnorestart{\leveltext \'02\'03.}{\levelnumbers \'01}\f1\fs24}  {\listlevel\levelnfc0\levelfollow0\levelstartat1\levelnorestart{\leveltext \'02\'04.}{\levelnumbers \'01}\f1\fs24}  {\listlevel\levelnfc0\levelfollow0\levelstartat1\levelnorestart{\leveltext \'02\'05.}{\levelnumbers \'01}\f1\fs24}  {\listlevel\levelnfc0\levelfollow0\levelstartat1\levelnorestart{\leveltext \'02\'06.}{\levelnumbers \'01}\f1\fs24}  {\listlevel\levelnfc0\levelfollow0\levelstartat1\levelnorestart{\leveltext \'02\'07.}{\levelnumbers \'01}\f1\fs24}  {\listlevel\levelnfc0\levelfollow0\levelstartat1\levelnorestart{\leveltext \'02\'08.}{\levelnumbers \'01}\f1\fs24}  {\listname ;}\[id number]  }  }  {\*\listoverridetable  {\listoverride\[id number]\listoverridecount0\ls1}  }  \trtcmr70864\idweql12142\vliwl6154\whznk8411\ohrtf8084\mkwdq9433\ggejkdr613\iwsptxt034\nogrowautofit\btozox439\formshade\nofeaturethrottle1\dntblnsbdb\fet4\aendnotes\aftnnrlc\pgbrdrhead\pgbrdrfoot  \sectd\fglglp04409\glgyfj18648\guttersxn0\sxbgmdpg4258\saskrinb2363\gytihwzx8228\lkwsnjaz7416\ulccuds002\qdxtogz606\sbkpage\pgncont\pgndec  \plain\plain\f0\fs24\pard\plain\f0\fs24\plain\f0\fs20\joie4444\hich\f0\dbch\f0\loch\f0\fs20 Neurology follow up note\par  \par  DEACON SEGRLTR75jOtibmc\par  \par  \par  Interval History:\par  \par  Patient feels ok no new complaints.\par  \par  Allergies\par  \par  penicillin (Unknown)\par  \par  Intolerances\par  \par  \par  \par  MEDICATIONS\par  \par  aMIOdarone    Tablet 200 milliGRAM(s) Oral daily\par  apixaban 2.5 milliGRAM(s) Oral every 12 hours\par  artificial  tears Solution 1 Drop(s) Both EYES four times a day\par  ascorbic acid 500 milliGRAM(s) Oral daily\par  enalapril 10 milliGRAM(s) Oral daily\par  ertapenem  IVPB 1000 milliGRAM(s) IV Intermittent every 24 hours\par  ferrous    sulfate 325 milliGRAM(s) Oral three times a day\par  levothyroxine 75 MICROGram(s) Oral daily\par  metoprolol tartrate 50 milliGRAM(s) Oral two times a day\par  multivitamin 1 Tablet(s) Oral daily\par  pantoprazole    Tablet 40 milliGRAM(s) Oral before breakfast\par  polyethylene glycol 3350 17 Gram(s) Oral daily\par  \par  \par  \par  \par  \par  \par  Vital Signs Last 24 Hrs\par  T(C): 36.7 (15 Nov 2023 04:18), Max: 36.7 (14 Nov 2023 17:50)\par  T(F): 98.1 (15 Nov 2023 04:18), Max: 98.1 (15 Nov 2023 04:18)\par  HR: 67 (15 Nov 2023 04:18) (63 - 70)\par  BP: 136/66 (15 Nov 2023 04:18) (128/68 - 136/66)\par  BP(mean): --\par  RR: 18 (15 Nov 2023 04:18) (18 - 20)\par  SpO2: 94% (15 Nov 2023 04:18) (90% - 94%)\par  \par  Parameters below as of 15 Nov 2023 04:18\par  Patient On (Oxygen Delivery Method): room air\par  \par  \par  \ql\plain\f0\fs24\plain\f0\fs20\wmli4658\hich\f0\dbch\f0\loch\f0\fs20 REVIEW OF SYSTEMS:  Slightly limited secondary to the patient has memory issues.  Constitutional:  She denies fever, chills, or night sweats.  Head:  No headache.  Eyes:  No double   vision or blurry vision.  Ears:  No ringing in the ears.  Neck:  No neck pain.  Respiratory:  No shortness of breath.  Cardiovascular:  No chest pain.  Abdomen:  No nausea, vomiting, or abdominal pain.  Extremities/Neurological:  No numbness or tingling.    Musculoskeletal:  No joint pain.\par  \par  PHYSICAL EXAMINATION:  \par    HEENT:  Head:  Normocephalic, atraumatic.  Eyes:  No scleral icterus.  Ears:  Hearing bilaterally was intact.  NECK:  Supple.  RESPIRATORY:  Air entry bilaterally.  CARDIOVASCULAR:  S1 and S2 heard.  ABDOMEN:  Soft and nontender.  EXTREMITIES:  No clubbing   or cyanosis was noted.  \par  \par  NEUROLOGIC:  The patient is awake and alert.   Positive signs of memory issues upon conversing with the patient.  Extraocular movements were intact.  She keeps her left eye closed, but is able to open it, which is her baseline.  Speech was fluent.  Smile   symmetric.  Motor:  Bilateral upper 4/5, right lower was 3/5, left lower was 2/5 but does have history of leg issues to begin with.\par  \pard\plain\f0\fs24\plain\f0\fs20\vvzv4905\hich\f0\dbch\f0\loch\f0\fs20\par  \par  LABS:\par  CBC Full  -  ( 14 Nov 2023 08:35 )\par  WBC Count : 12.12 K/uL\par  RBC Count : 3.22 M/uL\par  Hemoglobin : 9.0 g/dL\par  Hematocrit : 28.1 %\par  Platelet Count - Automated : 396 K/uL\par  Mean Cell Volume : 87.3 fl\par  Mean Cell Hemoglobin : 28.0 pg\par  Mean Cell Hemoglobin Concentration : 32.0 gm/dL\par  Auto Neutrophil # : x\par  Auto Lymphocyte # : x\par  Auto Monocyte # : x\par  Auto Eosinophil # : x\par  Auto Basophil # : x\par  Auto Neutrophil % : x\par  Auto Lymphocyte % : x\par  Auto Monocyte % : x\par  Auto Eosinophil % : x\par  Auto Basophil % : x\par  \par  Urinalysis Basic - ( 14 Nov 2023 08:35 )\par  \par  Color: x / Appearance: x / SG: x / pH: x\par  Gluc: 107 mg/dL / Ketone: x  / Bili: x / Urobili: x \par  Blood: x / Protein: x / Nitrite: x \par  Leuk Esterase: x / RBC: x / WBC x \par  Sq Epi: x / Non Sq Epi: x / Bacteria: x\par  \par  \par  11-14\par  \par  145  |  118<H>  |  19\par  ----------------------------<  107<H>\par  3.8   |  23  |  0.79\par  \par  Ca    7.8<L>      14 Nov 2023 08:35\par  \par  \par  Hemoglobin A1C: \par  \par  \par  Vitamin B12 \par  \par  \par  \par  RADIOLOGY\par  \par  \ql\plain\f0\fs24\plain\f0\fs20\tfvb9842\hich\f0\dbch\f0\loch\f0\fs20 ANALYSIS AND PLAN:  This is a 89-year-old with episode of altered mental status.\par  {\listtext\pard\plain\f1\fs24 1.\tab}  \pard\ssparaaux0\s0\tx360\ls1\ilvl0\fi-360\li360\ql\plain\f0\fs24\plain\f0\fs20\kava6997\hich\f0\dbch\f0\loch\f0\fs20 For episode of altered mental status, suspect most likely metabolic encephalopathy which is multifactorial secondary to possibly infectious   type process, urinary tract, along with signs of dehydration from SMA-7 and slight mild cognitive impairment becoming more prominent.  Examination was limited but as she appears to be at her baseline, suspect less likely this is a primary central nervous   system event.\par  {\listtext\pard\plain\f1\fs24 2.\tab}  Antibiotics as needed.\par  {\listtext\pard\plain\f1\fs24 3.\tab}  For history of mild cognitive impairment versus subtle dementia, secondary to the patient's age, we would recommend supportive therapy.\par  {\listtext\pard\plain\f1\fs24 4.\tab}  For history of atrial fibrillation, risks versus benefits of anticoagulation.\par  {\listtext\pard\plain\f1\fs24 5.\tab}  For hypertension, monitor systolic blood pressure.\par  {\listtext\pard\plain\f1\fs24 6.\tab}  For history of hypothyroidism, continue the patient on Synthroid.\par  {\listtext\pard\plain\f1\fs24 7.\tab}  more interactive today 11/14\par  \pard\ssparaaux0\s0\ql\plain\f0\fs24\plain\f0\fs20\orjk1749\hich\f0\dbch\f0\loch\f0\fs20\par  Spoke with daughter, Beth, at [phone] 11/14.  She understands my reasoning and thought process.\par  \par  50 minutes of time was spent with the patient, plan of care, reviewing data, speaking to multidisciplinary healthcare team with greater than 50% of the time in counseling and care coordination.\par  \plain\f2\fs16\foyh8973\hich\f2\dbch\f2\loch\f2\cf2\fs16\par  \plain\f2\fs16\kweq1109\hich\f2\dbch\f2\loch\f2\cf2\fs16\strike\plain\f0\fs20\ugzs2986\hich\f0\dbch\f0\loch\f0\fs20\par  }   {\rtf1\yljwaa95461\ansi\ahgictq8992\ftnbj\uc1\deff0  {\fonttbl{\f0 \fnil Segoe UI;}{\f1 \fnil [place];}{\f2 \fnil \fcharset0 Segoe UI;}{\f3 \fnil Symbol;}{\f4 \fnil Wingdings;}}  {\colortbl ;\alj233\uclot112\bzxa813 ;\red0\green0\blue0 ;\red0\green0\blue0 ;}  {\stylesheet{\f0\fs20 Normal;}{\cs1 Default Paragraph Font;}{\cs2\f0\fs16 Line Number;}{\cs3\f1\fs24 Hyperlink;}}  {\*\revtbl{Unknown;}}  {\*\listtable  {\list\listtemplateid-1  {\listlevel\levelnfc0\levelfollow0\levelstartat1\levelnorestart{\leveltext \'02\'00.}{\levelnumbers \'01}\f1\fs24}  {\listlevel\levelnfc4\levelfollow0\levelstartat1\levelnorestart{\leveltext \'02\'01.}{\levelnumbers \'01}\f1\fs24}  {\listlevel\levelnfc0\levelfollow0\levelstartat1\levelnorestart{\leveltext \'02\'02.}{\levelnumbers \'01}\f1\fs24}  {\listlevel\levelnfc0\levelfollow0\levelstartat1\levelnorestart{\leveltext \'02\'03.}{\levelnumbers \'01}\f1\fs24}  {\listlevel\levelnfc0\levelfollow0\levelstartat1\levelnorestart{\leveltext \'02\'04.}{\levelnumbers \'01}\f1\fs24}  {\listlevel\levelnfc0\levelfollow0\levelstartat1\levelnorestart{\leveltext \'02\'05.}{\levelnumbers \'01}\f1\fs24}  {\listlevel\levelnfc0\levelfollow0\levelstartat1\levelnorestart{\leveltext \'02\'06.}{\levelnumbers \'01}\f1\fs24}  {\listlevel\levelnfc0\levelfollow0\levelstartat1\levelnorestart{\leveltext \'02\'07.}{\levelnumbers \'01}\f1\fs24}  {\listlevel\levelnfc0\levelfollow0\levelstartat1\levelnorestart{\leveltext \'02\'08.}{\levelnumbers \'01}\f1\fs24}  {\listname ;}\[id number]  }  }  {\*\listoverridetable  {\listoverride\[id number]\listoverridecount0\ls1}  }  \tdwyqy22270\hyzvii31086\egqrq3150\jymkb0373\huqla1169\fculu2711\kbsihnm082\fzqavth709\nogrowautofit\xtqsgz811\formshade\nofeaturethrottle1\dntblnsbdb\fet4\aendnotes\aftnnrlc\pgbrdrhead\pgbrdrfoot  \sectd\kbprui30527\ameyfr60612\guttersxn0\cinfyjut5854\ozqfhlli9684\figunxbj1884\tlqrfdqi6091\zjfkhcl643\ykyldpg048\sbkpage\pgncont\pgndec  \plain\plain\f0\fs24\pard\plain\f0\fs24\plain\f0\fs20\mufh6632\hich\f0\dbch\f0\loch\f0\fs20 Neurology follow up note\par  \par  DEACON WQVVTPB02oLsutpk\par  \par  \par  Interval History:\par  \par  Patient feels ok no new complaints.\par  \par  Allergies\par  \par  penicillin (Unknown)\par  \par  Intolerances\par  \par  \par  \par  MEDICATIONS\par  \par  aMIOdarone    Tablet 200 milliGRAM(s) Oral daily\par  apixaban 2.5 milliGRAM(s) Oral every 12 hours\par  artificial  tears Solution 1 Drop(s) Both EYES four times a day\par  ascorbic acid 500 milliGRAM(s) Oral daily\par  enalapril 10 milliGRAM(s) Oral daily\par  ertapenem  IVPB 1000 milliGRAM(s) IV Intermittent every 24 hours\par  ferrous    sulfate 325 milliGRAM(s) Oral three times a day\par  levothyroxine 75 MICROGram(s) Oral daily\par  metoprolol tartrate 50 milliGRAM(s) Oral two times a day\par  multivitamin 1 Tablet(s) Oral daily\par  pantoprazole    Tablet 40 milliGRAM(s) Oral before breakfast\par  polyethylene glycol 3350 17 Gram(s) Oral daily\par  \par  \par  \par  \par  \par  \par  Vital Signs Last 24 Hrs\par  T(C): 36.7 (15 Nov 2023 04:18), Max: 36.7 (14 Nov 2023 17:50)\par  T(F): 98.1 (15 Nov 2023 04:18), Max: 98.1 (15 Nov 2023 04:18)\par  HR: 67 (15 Nov 2023 04:18) (63 - 70)\par  BP: 136/66 (15 Nov 2023 04:18) (128/68 - 136/66)\par  BP(mean): --\par  RR: 18 (15 Nov 2023 04:18) (18 - 20)\par  SpO2: 94% (15 Nov 2023 04:18) (90% - 94%)\par  \par  Parameters below as of 15 Nov 2023 04:18\par  Patient On (Oxygen Delivery Method): room air\par  \par  \par  \ql\plain\f0\fs24\plain\f0\fs20\ipwp8403\hich\f0\dbch\f0\loch\f0\fs20 REVIEW OF SYSTEMS:  Slightly limited secondary to the patient has memory issues.  Constitutional:  She denies fever, chills, or night sweats.  Head:  No headache.  Eyes:  No double   vision or blurry vision.  Ears:  No ringing in the ears.  Neck:  No neck pain.  Respiratory:  No shortness of breath.  Cardiovascular:  No chest pain.  Abdomen:  No nausea, vomiting, or abdominal pain.  Extremities/Neurological:  No numbness or tingling.    Musculoskeletal:  No joint pain.\par  \par  PHYSICAL EXAMINATION:  \par    HEENT:  Head:  Normocephalic, atraumatic.  Eyes:  No scleral icterus.  Ears:  Hearing bilaterally was intact.  NECK:  Supple.  RESPIRATORY:  Air entry bilaterally.  CARDIOVASCULAR:  S1 and S2 heard.  ABDOMEN:  Soft and nontender.  EXTREMITIES:  No clubbing   or cyanosis was noted.  \par  \par  NEUROLOGIC:  The patient is awake and alert.   Positive signs of memory issues upon conversing with the patient.  Extraocular movements were intact.  She keeps her left eye closed, but is able to open it, which is her baseline.  Speech was fluent.  Smile   symmetric.  Motor:  Bilateral upper 4/5, right lower was 3/5, left lower was 2/5 but does have history of leg issues to begin with.\par  \pard\plain\f0\fs24\plain\f0\fs20\fmcv1625\hich\f0\dbch\f0\loch\f0\fs20\par  \par  LABS:\par  CBC Full  -  ( 14 Nov 2023 08:35 )\par  WBC Count : 12.12 K/uL\par  RBC Count : 3.22 M/uL\par  Hemoglobin : 9.0 g/dL\par  Hematocrit : 28.1 %\par  Platelet Count - Automated : 396 K/uL\par  Mean Cell Volume : 87.3 fl\par  Mean Cell Hemoglobin : 28.0 pg\par  Mean Cell Hemoglobin Concentration : 32.0 gm/dL\par  Auto Neutrophil # : x\par  Auto Lymphocyte # : x\par  Auto Monocyte # : x\par  Auto Eosinophil # : x\par  Auto Basophil # : x\par  Auto Neutrophil % : x\par  Auto Lymphocyte % : x\par  Auto Monocyte % : x\par  Auto Eosinophil % : x\par  Auto Basophil % : x\par  \par  Urinalysis Basic - ( 14 Nov 2023 08:35 )\par  \par  Color: x / Appearance: x / SG: x / pH: x\par  Gluc: 107 mg/dL / Ketone: x  / Bili: x / Urobili: x \par  Blood: x / Protein: x / Nitrite: x \par  Leuk Esterase: x / RBC: x / WBC x \par  Sq Epi: x / Non Sq Epi: x / Bacteria: x\par  \par  \par  11-14\par  \par  145  |  118<H>  |  19\par  ----------------------------<  107<H>\par  3.8   |  23  |  0.79\par  \par  Ca    7.8<L>      14 Nov 2023 08:35\par  \par  \par  Hemoglobin A1C: \par  \par  \par  Vitamin B12 \par  \par  \par  \par  RADIOLOGY\par  \par  \ql\plain\f0\fs24\plain\f0\fs20\smpo5671\hich\f0\dbch\f0\loch\f0\fs20 ANALYSIS AND PLAN:  This is a 89-year-old with episode of altered mental status.\par  {\listtext\pard\plain\f1\fs24 1.\tab}  \pard\ssparaaux0\s0\tx360\ls1\ilvl0\fi-360\li360\ql\plain\f0\fs24\plain\f0\fs20\feus0490\hich\f0\dbch\f0\loch\f0\fs20 For episode of altered mental status, suspect most likely metabolic encephalopathy which is multifactorial secondary to possibly infectious   type process, urinary tract, along with signs of dehydration from SMA-7 and slight mild cognitive impairment becoming more prominent.  Examination was limited but as she appears to be at her baseline, suspect less likely this is a primary central nervous   system event.\par  {\listtext\pard\plain\f1\fs24 2.\tab}  Antibiotics as needed.\par  {\listtext\pard\plain\f1\fs24 3.\tab}  For history of mild cognitive impairment versus subtle dementia, secondary to the patient's age, we would recommend supportive therapy.\par  {\listtext\pard\plain\f1\fs24 4.\tab}  For history of atrial fibrillation, risks versus benefits of anticoagulation.\par  {\listtext\pard\plain\f1\fs24 5.\tab}  For hypertension, monitor systolic blood pressure.\par  {\listtext\pard\plain\f1\fs24 6.\tab}  For history of hypothyroidism, continue the patient on Synthroid.\par  {\listtext\pard\plain\f1\fs24 7.\tab}  more interactive today 11/14\par  \pard\ssparaaux0\s0\ql\plain\f0\fs24\plain\f0\fs20\qhto2647\hich\f0\dbch\f0\loch\f0\fs20\par  Spoke with daughter, Beth, at [phone] 11/14.  She understands my reasoning and thought process.\par  \par  50 minutes of time was spent with the patient, plan of care, reviewing data, speaking to multidisciplinary healthcare team with greater than 50% of the time in counseling and care coordination.\par  \plain\f2\fs16\oefa9466\hich\f2\dbch\f2\loch\f2\cf2\fs16\par  \plain\f2\fs16\voya4411\hich\f2\dbch\f2\loch\f2\cf2\fs16\strike\plain\f0\fs20\ojxx6283\hich\f0\dbch\f0\loch\f0\fs20\par  }   {\rtf1\udjeyy35426\ansi\gsvubfi9245\ftnbj\uc1\deff0  {\fonttbl{\f0 \fnil Segoe UI;}{\f1 \fnil [place];}{\f2 \fnil \fcharset0 Segoe UI;}{\f3 \fnil Symbol;}{\f4 \fnil Wingdings;}}  {\colortbl ;\dta973\zqeth070\ulbm326 ;\red0\green0\blue0 ;\red0\green0\blue0 ;}  {\stylesheet{\f0\fs20 Normal;}{\cs1 Default Paragraph Font;}{\cs2\f0\fs16 Line Number;}{\cs3\f1\fs24 Hyperlink;}}  {\*\revtbl{Unknown;}}  {\*\listtable  {\list\listtemplateid-1  {\listlevel\levelnfc0\levelfollow0\levelstartat1\levelnorestart{\leveltext \'02\'00.}{\levelnumbers \'01}\f1\fs24}  {\listlevel\levelnfc4\levelfollow0\levelstartat1\levelnorestart{\leveltext \'02\'01.}{\levelnumbers \'01}\f1\fs24}  {\listlevel\levelnfc0\levelfollow0\levelstartat1\levelnorestart{\leveltext \'02\'02.}{\levelnumbers \'01}\f1\fs24}  {\listlevel\levelnfc0\levelfollow0\levelstartat1\levelnorestart{\leveltext \'02\'03.}{\levelnumbers \'01}\f1\fs24}  {\listlevel\levelnfc0\levelfollow0\levelstartat1\levelnorestart{\leveltext \'02\'04.}{\levelnumbers \'01}\f1\fs24}  {\listlevel\levelnfc0\levelfollow0\levelstartat1\levelnorestart{\leveltext \'02\'05.}{\levelnumbers \'01}\f1\fs24}  {\listlevel\levelnfc0\levelfollow0\levelstartat1\levelnorestart{\leveltext \'02\'06.}{\levelnumbers \'01}\f1\fs24}  {\listlevel\levelnfc0\levelfollow0\levelstartat1\levelnorestart{\leveltext \'02\'07.}{\levelnumbers \'01}\f1\fs24}  {\listlevel\levelnfc0\levelfollow0\levelstartat1\levelnorestart{\leveltext \'02\'08.}{\levelnumbers \'01}\f1\fs24}  {\listname ;}\[id number]  }  }  {\*\listoverridetable  {\listoverride\[id number]\listoverridecount0\ls1}  }  \hfwuxi81242\bgunko15621\dwkjc5066\cijiq2286\gqgka3365\dvwlu9261\lpjnwpk023\wutatpp981\nogrowautofit\pqtaqw234\formshade\nofeaturethrottle1\dntblnsbdb\fet4\aendnotes\aftnnrlc\pgbrdrhead\pgbrdrfoot  \sectd\gyoyqt00332\bfenrz11137\guttersxn0\qkofciwm2053\rxhnhokz6373\dxfvrcei2596\zrwnxgjk7756\vayuday999\ygfewvo547\sbkpage\pgncont\pgndec  \plain\plain\f0\fs24\pard\plain\f0\fs24\plain\f0\fs20\fgad0677\hich\f0\dbch\f0\loch\f0\fs20 Neurology follow up note\par  \par  DEACON VXZEIDB96xInrbkk\par  \par  \par  Interval History:\par  \par  Patient feels ok no new complaints.\par  \par  Allergies\par  \par  penicillin (Unknown)\par  \par  Intolerances\par  \par  \par  \par  MEDICATIONS\par  \par  aMIOdarone    Tablet 200 milliGRAM(s) Oral daily\par  apixaban 2.5 milliGRAM(s) Oral every 12 hours\par  artificial  tears Solution 1 Drop(s) Both EYES four times a day\par  ascorbic acid 500 milliGRAM(s) Oral daily\par  enalapril 10 milliGRAM(s) Oral daily\par  ertapenem  IVPB 1000 milliGRAM(s) IV Intermittent every 24 hours\par  ferrous    sulfate 325 milliGRAM(s) Oral three times a day\par  levothyroxine 75 MICROGram(s) Oral daily\par  metoprolol tartrate 50 milliGRAM(s) Oral two times a day\par  multivitamin 1 Tablet(s) Oral daily\par  pantoprazole    Tablet 40 milliGRAM(s) Oral before breakfast\par  polyethylene glycol 3350 17 Gram(s) Oral daily\par  \par  \par  \par  \par  \par  \par  Vital Signs Last 24 Hrs\par  T(C): 36.7 (15 Nov 2023 04:18), Max: 36.7 (14 Nov 2023 17:50)\par  T(F): 98.1 (15 Nov 2023 04:18), Max: 98.1 (15 Nov 2023 04:18)\par  HR: 67 (15 Nov 2023 04:18) (63 - 70)\par  BP: 136/66 (15 Nov 2023 04:18) (128/68 - 136/66)\par  BP(mean): --\par  RR: 18 (15 Nov 2023 04:18) (18 - 20)\par  SpO2: 94% (15 Nov 2023 04:18) (90% - 94%)\par  \par  Parameters below as of 15 Nov 2023 04:18\par  Patient On (Oxygen Delivery Method): room air\par  \par  \par  \ql\plain\f0\fs24\plain\f0\fs20\pjzb9408\hich\f0\dbch\f0\loch\f0\fs20 REVIEW OF SYSTEMS:  Slightly limited secondary to the patient has memory issues.  Constitutional:  She denies fever, chills, or night sweats.  Head:  No headache.  Eyes:  No double   vision or blurry vision.  Ears:  No ringing in the ears.  Neck:  No neck pain.  Respiratory:  No shortness of breath.  Cardiovascular:  No chest pain.  Abdomen:  No nausea, vomiting, or abdominal pain.  Extremities/Neurological:  No numbness or tingling.    Musculoskeletal:  No joint pain.\par  \par  PHYSICAL EXAMINATION:  \par    HEENT:  Head:  Normocephalic, atraumatic.  Eyes:  No scleral icterus.  Ears:  Hearing bilaterally was intact.  NECK:  Supple.  RESPIRATORY:  Air entry bilaterally.  CARDIOVASCULAR:  S1 and S2 heard.  ABDOMEN:  Soft and nontender.  EXTREMITIES:  No clubbing   or cyanosis was noted.  \par  \par  NEUROLOGIC:  The patient is awake and alert.   Positive signs of memory issues upon conversing with the patient.  Extraocular movements were intact.  She keeps her left eye closed, but is able to open it, which is her baseline.  Speech was fluent.  Smile   symmetric.  Motor:  Bilateral upper 4/5, right lower was 3/5, left lower was 2/5 but does have history of leg issues to begin with.\par  \pard\plain\f0\fs24\plain\f0\fs20\gubp1600\hich\f0\dbch\f0\loch\f0\fs20\par  \par  LABS:\par  CBC Full  -  ( 14 Nov 2023 08:35 )\par  WBC Count : 12.12 K/uL\par  RBC Count : 3.22 M/uL\par  Hemoglobin : 9.0 g/dL\par  Hematocrit : 28.1 %\par  Platelet Count - Automated : 396 K/uL\par  Mean Cell Volume : 87.3 fl\par  Mean Cell Hemoglobin : 28.0 pg\par  Mean Cell Hemoglobin Concentration : 32.0 gm/dL\par  Auto Neutrophil # : x\par  Auto Lymphocyte # : x\par  Auto Monocyte # : x\par  Auto Eosinophil # : x\par  Auto Basophil # : x\par  Auto Neutrophil % : x\par  Auto Lymphocyte % : x\par  Auto Monocyte % : x\par  Auto Eosinophil % : x\par  Auto Basophil % : x\par  \par  Urinalysis Basic - ( 14 Nov 2023 08:35 )\par  \par  Color: x / Appearance: x / SG: x / pH: x\par  Gluc: 107 mg/dL / Ketone: x  / Bili: x / Urobili: x \par  Blood: x / Protein: x / Nitrite: x \par  Leuk Esterase: x / RBC: x / WBC x \par  Sq Epi: x / Non Sq Epi: x / Bacteria: x\par  \par  \par  11-14\par  \par  145  |  118<H>  |  19\par  ----------------------------<  107<H>\par  3.8   |  23  |  0.79\par  \par  Ca    7.8<L>      14 Nov 2023 08:35\par  \par  \par  Hemoglobin A1C: \par  \par  \par  Vitamin B12 \par  \par  \par  \par  RADIOLOGY\par  \par  \ql\plain\f0\fs24\plain\f0\fs20\[id number]\hich\f0\dbch\f0\loch\f0\fs20 ANALYSIS AND PLAN:  This is a 89-year-old with episode of altered mental status.\par  {\listtext\pard\plain\f1\fs24 1.\tab}  \pard\ssparaaux0\s0\tx360\ls1\ilvl0\fi-360\li360\ql\plain\f0\fs24\plain\f0\fs20\[id number]\hich\f0\dbch\f0\loch\f0\fs20 For episode of altered mental status, suspect most likely metabolic encephalopathy which is multifactorial secondary to possibly infectious   type process, urinary tract, along with signs of dehydration from SMA-7 and slight mild cognitive impairment becoming more prominent.  Examination was limited but as she appears to be at her baseline, suspect less likely this is a primary central nervous   system event.\par  {\listtext\pard\plain\f1\fs24 2.\tab}  Antibiotics as needed.\par  {\listtext\pard\plain\f1\fs24 3.\tab}  For history of mild cognitive impairment versus subtle dementia, secondary to the patient's age, we would recommend supportive therapy.\par  {\listtext\pard\plain\f1\fs24 4.\tab}  For history of atrial fibrillation, risks versus benefits of anticoagulation.\par  {\listtext\pard\plain\f1\fs24 5.\tab}  For hypertension, monitor systolic blood pressure.\par  {\listtext\pard\plain\f1\fs24 6.\tab}  For history of hypothyroidism, continue the patient on Synthroid.\par  {\listtext\pard\plain\f1\fs24 7.\tab}  more interactive today 11/14\par  \pard\ssparaaux0\s0\ql\plain\f0\fs24\plain\f0\fs20\[id number]\hich\f0\dbch\f0\loch\f0\fs20\par  Spoke with daughter, Beth, at [phone] 11/14.  She understands my reasoning and thought process.\par  \par  50 minutes of time was spent with the patient, plan of care, reviewing data, speaking to multidisciplinary healthcare team with greater than 50% of the time in counseling and care coordination.\par  \plain\f2\fs16\[id number]\hich\f2\dbch\f2\loch\f2\cf2\fs16\par  \plain\f2\fs16\[id number]\hich\f2\dbch\f2\loch\f2\cf2\fs16\strike\plain\f0\fs20\[id number]\hich\f0\dbch\f0\loch\f0\fs20\par  }   Neurology follow up note    DEACON [id number]      Interval History:    Patient feels ok no new complaints.    Allergies    penicillin (Unknown)    Intolerances        MEDICATIONS    aMIOdarone    Tablet 200 milliGRAM(s) Oral daily  apixaban 2.5 milliGRAM(s) Oral every 12 hours  artificial  tears Solution 1 Drop(s) Both EYES four times a day  ascorbic acid 500 milliGRAM(s) Oral daily  enalapril 10 milliGRAM(s) Oral daily  ertapenem  IVPB 1000 milliGRAM(s) IV Intermittent every 24 hours  ferrous    sulfate 325 milliGRAM(s) Oral three times a day  levothyroxine 75 MICROGram(s) Oral daily  metoprolol tartrate 50 milliGRAM(s) Oral two times a day  multivitamin 1 Tablet(s) Oral daily  pantoprazole    Tablet 40 milliGRAM(s) Oral before breakfast  polyethylene glycol 3350 17 Gram(s) Oral daily              Vital Signs Last 24 Hrs  T(C): 36.7 (15 Nov 2023 04:18), Max: 36.7 (14 Nov 2023 17:50)  T(F): 98.1 (15 Nov 2023 04:18), Max: 98.1 (15 Nov 2023 04:18)  HR: 67 (15 Nov 2023 04:18) (63 - 70)  BP: 136/66 (15 Nov 2023 04:18) (128/68 - 136/66)  BP(mean): --  RR: 18 (15 Nov 2023 04:18) (18 - 20)  SpO2: 94% (15 Nov 2023 04:18) (90% - 94%)    Parameters below as of 15 Nov 2023 04:18  Patient On (Oxygen Delivery Method): room air      REVIEW OF SYSTEMS:  Slightly limited secondary to the patient has memory issues.  Constitutional:  She denies fever, chills, or night sweats.  Head:  No headache.  Eyes:  No double vision or blurry vision.  Ears:  No ringing in the ears.  Neck:  No neck pain.  Respiratory:  No shortness of breath.  Cardiovascular:  No chest pain.  Abdomen:  No nausea, vomiting, or abdominal pain.  Extremities/Neurological:  No numbness or tingling.  Musculoskeletal:  No joint pain.    PHYSICAL EXAMINATION:      HEENT:  Head:  Normocephalic, atraumatic.  Eyes:  No scleral icterus.  Ears:  Hearing bilaterally was intact.  NECK:  Supple.  RESPIRATORY:  Air entry bilaterally.  CARDIOVASCULAR:  S1 and S2 heard.  ABDOMEN:  Soft and nontender.  EXTREMITIES:  No clubbing or cyanosis was noted.      NEUROLOGIC:  The patient is awake and alert.   Positive signs of memory issues upon conversing with the patient.  Extraocular movements were intact.  She keeps her left eye closed, but is able to open it, which is her baseline.  Speech was fluent.  Smile symmetric.  Motor:  Bilateral upper 4/5, right lower was 3/5, left lower was 2/5 but does have history of leg issues to begin with.      LABS:  CBC Full  -  ( 14 Nov 2023 08:35 )  WBC Count : 12.12 K/uL  RBC Count : 3.22 M/uL  Hemoglobin : 9.0 g/dL  Hematocrit : 28.1 %  Platelet Count - Automated : 396 K/uL  Mean Cell Volume : 87.3 fl  Mean Cell Hemoglobin : 28.0 pg  Mean Cell Hemoglobin Concentration : 32.0 gm/dL  Auto Neutrophil # : x  Auto Lymphocyte # : x  Auto Monocyte # : x  Auto Eosinophil # : x  Auto Basophil # : x  Auto Neutrophil % : x  Auto Lymphocyte % : x  Auto Monocyte % : x  Auto Eosinophil % : x  Auto Basophil % : x    Urinalysis Basic - ( 14 Nov 2023 08:35 )    Color: x / Appearance: x / SG: x / pH: x  Gluc: 107 mg/dL / Ketone: x  / Bili: x / Urobili: x   Blood: x / Protein: x / Nitrite: x   Leuk Esterase: x / RBC: x / WBC x   Sq Epi: x / Non Sq Epi: x / Bacteria: x      11-14    145  |  118<H>  |  19  ----------------------------<  107<H>  3.8   |  23  |  0.79    Ca    7.8<L>      14 Nov 2023 08:35      Hemoglobin A1C:       Vitamin B12         RADIOLOGY    ANALYSIS AND PLAN:  This is a 89-year-old with episode of altered mental status.  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  Examination was limited but as she appears to be at her baseline, suspect less likely this is a primary central nervous system event.  Antibiotics as needed.  For history of mild cognitive impairment versus subtle dementia, secondary to the patient's age, we would recommend supportive therapy.  For history of atrial fibrillation, risks versus benefits of anticoagulation.  For hypertension, monitor systolic blood pressure.  For history of hypothyroidism, continue the patient on Synthroid.  appears sleepy today 11/15    Spoke with daughter, Beth, at [phone] 11/15.  She understands my reasoning and thought process.    50 minutes of time was spent with the patient, plan of care, reviewing data, speaking to multidisciplinary healthcare team with greater than 50% of the time in counseling and care coordination.     Neurology follow up note    DEACON [id number]      Interval History:    Patient feels ok no new complaints.    Allergies    penicillin (Unknown)    Intolerances        MEDICATIONS    aMIOdarone    Tablet 200 milliGRAM(s) Oral daily  apixaban 2.5 milliGRAM(s) Oral every 12 hours  artificial  tears Solution 1 Drop(s) Both EYES four times a day  ascorbic acid 500 milliGRAM(s) Oral daily  enalapril 10 milliGRAM(s) Oral daily  ertapenem  IVPB 1000 milliGRAM(s) IV Intermittent every 24 hours  ferrous    sulfate 325 milliGRAM(s) Oral three times a day  levothyroxine 75 MICROGram(s) Oral daily  metoprolol tartrate 50 milliGRAM(s) Oral two times a day  multivitamin 1 Tablet(s) Oral daily  pantoprazole    Tablet 40 milliGRAM(s) Oral before breakfast  polyethylene glycol 3350 17 Gram(s) Oral daily              Vital Signs Last 24 Hrs  T(C): 36.7 (15 Nov 2023 04:18), Max: 36.7 (14 Nov 2023 17:50)  T(F): 98.1 (15 Nov 2023 04:18), Max: 98.1 (15 Nov 2023 04:18)  HR: 67 (15 Nov 2023 04:18) (63 - 70)  BP: 136/66 (15 Nov 2023 04:18) (128/68 - 136/66)  BP(mean): --  RR: 18 (15 Nov 2023 04:18) (18 - 20)  SpO2: 94% (15 Nov 2023 04:18) (90% - 94%)    Parameters below as of 15 Nov 2023 04:18  Patient On (Oxygen Delivery Method): room air      REVIEW OF SYSTEMS:  Slightly limited secondary to the patient has memory issues.  Constitutional:  She denies fever, chills, or night sweats.  Head:  No headache.  Eyes:  No double vision or blurry vision.  Ears:  No ringing in the ears.  Neck:  No neck pain.  Respiratory:  No shortness of breath.  Cardiovascular:  No chest pain.  Abdomen:  No nausea, vomiting, or abdominal pain.  Extremities/Neurological:  No numbness or tingling.  Musculoskeletal:  No joint pain.    PHYSICAL EXAMINATION:      HEENT:  Head:  Normocephalic, atraumatic.  Eyes:  No scleral icterus.  Ears:  Hearing bilaterally was intact.  NECK:  Supple.  RESPIRATORY:  Air entry bilaterally.  CARDIOVASCULAR:  S1 and S2 heard.  ABDOMEN:  Soft and nontender.  EXTREMITIES:  No clubbing or cyanosis was noted.      NEUROLOGIC:  The patient is awake and alert.   Positive signs of memory issues upon conversing with the patient.  Extraocular movements were intact.  She keeps her left eye closed, but is able to open it, which is her baseline.  Speech was fluent.  Smile symmetric.  Motor:  Bilateral upper 4/5, right lower was 3/5, left lower was 2/5 but does have history of leg issues to begin with.      LABS:  CBC Full  -  ( 14 Nov 2023 08:35 )  WBC Count : 12.12 K/uL  RBC Count : 3.22 M/uL  Hemoglobin : 9.0 g/dL  Hematocrit : 28.1 %  Platelet Count - Automated : 396 K/uL  Mean Cell Volume : 87.3 fl  Mean Cell Hemoglobin : 28.0 pg  Mean Cell Hemoglobin Concentration : 32.0 gm/dL  Auto Neutrophil # : x  Auto Lymphocyte # : x  Auto Monocyte # : x  Auto Eosinophil # : x  Auto Basophil # : x  Auto Neutrophil % : x  Auto Lymphocyte % : x  Auto Monocyte % : x  Auto Eosinophil % : x  Auto Basophil % : x    Urinalysis Basic - ( 14 Nov 2023 08:35 )    Color: x / Appearance: x / SG: x / pH: x  Gluc: 107 mg/dL / Ketone: x  / Bili: x / Urobili: x   Blood: x / Protein: x / Nitrite: x   Leuk Esterase: x / RBC: x / WBC x   Sq Epi: x / Non Sq Epi: x / Bacteria: x      11-14    145  |  118<H>  |  19  ----------------------------<  107<H>  3.8   |  23  |  0.79    Ca    7.8<L>      14 Nov 2023 08:35      Hemoglobin A1C:       Vitamin B12         RADIOLOGY    ANALYSIS AND PLAN:  This is a 89-year-old with episode of altered mental status.  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  Examination was limited but as she appears to be at her baseline, suspect less likely this is a primary central nervous system event.  Antibiotics as needed.  For history of mild cognitive impairment versus subtle dementia, secondary to the patient's age, we would recommend supportive therapy.  For history of atrial fibrillation, risks versus benefits of anticoagulation.  For hypertension, monitor systolic blood pressure.  For history of hypothyroidism, continue the patient on Synthroid.  appears sleepy today 11/15    Spoke with daughter, Beth, at [phone] 11/15.  She understands my reasoning and thought process.    50 minutes of time was spent with the patient, plan of care, reviewing data, speaking to multidisciplinary healthcare team with greater than 50% of the time in counseling and care coordination.     Neurology follow up note    DEACON [id number]      Interval History:    Patient feels ok no new complaints.    Allergies    penicillin (Unknown)    Intolerances        MEDICATIONS    aMIOdarone    Tablet 200 milliGRAM(s) Oral daily  apixaban 2.5 milliGRAM(s) Oral every 12 hours  artificial  tears Solution 1 Drop(s) Both EYES four times a day  ascorbic acid 500 milliGRAM(s) Oral daily  enalapril 10 milliGRAM(s) Oral daily  ertapenem  IVPB 1000 milliGRAM(s) IV Intermittent every 24 hours  ferrous    sulfate 325 milliGRAM(s) Oral three times a day  levothyroxine 75 MICROGram(s) Oral daily  metoprolol tartrate 50 milliGRAM(s) Oral two times a day  multivitamin 1 Tablet(s) Oral daily  pantoprazole    Tablet 40 milliGRAM(s) Oral before breakfast  polyethylene glycol 3350 17 Gram(s) Oral daily              Vital Signs Last 24 Hrs  T(C): 36.7 (15 Nov 2023 04:18), Max: 36.7 (14 Nov 2023 17:50)  T(F): 98.1 (15 Nov 2023 04:18), Max: 98.1 (15 Nov 2023 04:18)  HR: 67 (15 Nov 2023 04:18) (63 - 70)  BP: 136/66 (15 Nov 2023 04:18) (128/68 - 136/66)  BP(mean): --  RR: 18 (15 Nov 2023 04:18) (18 - 20)  SpO2: 94% (15 Nov 2023 04:18) (90% - 94%)    Parameters below as of 15 Nov 2023 04:18  Patient On (Oxygen Delivery Method): room air      REVIEW OF SYSTEMS:  Slightly limited secondary to the patient has memory issues.  Constitutional:  She denies fever, chills, or night sweats.  Head:  No headache.  Eyes:  No double vision or blurry vision.  Ears:  No ringing in the ears.  Neck:  No neck pain.  Respiratory:  No shortness of breath.  Cardiovascular:  No chest pain.  Abdomen:  No nausea, vomiting, or abdominal pain.  Extremities/Neurological:  No numbness or tingling.  Musculoskeletal:  No joint pain.    PHYSICAL EXAMINATION:      HEENT:  Head:  Normocephalic, atraumatic.  Eyes:  No scleral icterus.  Ears:  Hearing bilaterally was intact.  NECK:  Supple.  RESPIRATORY:  Air entry bilaterally.  CARDIOVASCULAR:  S1 and S2 heard.  ABDOMEN:  Soft and nontender.  EXTREMITIES:  No clubbing or cyanosis was noted.      NEUROLOGIC:  The patient is awake and alert.   Positive signs of memory issues upon conversing with the patient.  Extraocular movements were intact.  She keeps her left eye closed, but is able to open it, which is her baseline.  Speech was fluent.  Smile symmetric.  Motor:  Bilateral upper 4/5, right lower was 3/5, left lower was 2/5 but does have history of leg issues to begin with.      LABS:  CBC Full  -  ( 14 Nov 2023 08:35 )  WBC Count : 12.12 K/uL  RBC Count : 3.22 M/uL  Hemoglobin : 9.0 g/dL  Hematocrit : 28.1 %  Platelet Count - Automated : 396 K/uL  Mean Cell Volume : 87.3 fl  Mean Cell Hemoglobin : 28.0 pg  Mean Cell Hemoglobin Concentration : 32.0 gm/dL  Auto Neutrophil # : x  Auto Lymphocyte # : x  Auto Monocyte # : x  Auto Eosinophil # : x  Auto Basophil # : x  Auto Neutrophil % : x  Auto Lymphocyte % : x  Auto Monocyte % : x  Auto Eosinophil % : x  Auto Basophil % : x    Urinalysis Basic - ( 14 Nov 2023 08:35 )    Color: x / Appearance: x / SG: x / pH: x  Gluc: 107 mg/dL / Ketone: x  / Bili: x / Urobili: x   Blood: x / Protein: x / Nitrite: x   Leuk Esterase: x / RBC: x / WBC x   Sq Epi: x / Non Sq Epi: x / Bacteria: x      11-14    145  |  118<H>  |  19  ----------------------------<  107<H>  3.8   |  23  |  0.79    Ca    7.8<L>      14 Nov 2023 08:35      Hemoglobin A1C:       Vitamin B12         RADIOLOGY    ANALYSIS AND PLAN:  This is a 89-year-old with episode of altered mental status.  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  Examination was limited but as she appears to be at her baseline, suspect less likely this is a primary central nervous system event.  Antibiotics as needed.  For history of mild cognitive impairment versus subtle dementia, secondary to the patient's age, we would recommend supportive therapy.  For history of atrial fibrillation, risks versus benefits of anticoagulation.  For hypertension, monitor systolic blood pressure.  For history of hypothyroidism, continue the patient on Synthroid.  appears sleepy today 11/15    Spoke with daughter, Beth, at [phone] 11/15.  She understands my reasoning and thought process.    50 minutes of time was spent with the patient, plan of care, reviewing data, speaking to multidisciplinary healthcare team with greater than 50% of the time in counseling and care coordination.

## 2023-11-16 ENCOUNTER — TRANSCRIPTION ENCOUNTER (OUTPATIENT)
Age: 88
End: 2023-11-16

## 2023-11-16 VITALS
OXYGEN SATURATION: 96 % | DIASTOLIC BLOOD PRESSURE: 79 MMHG | SYSTOLIC BLOOD PRESSURE: 146 MMHG | TEMPERATURE: 98 F | HEART RATE: 60 BPM | RESPIRATION RATE: 18 BRPM

## 2023-11-16 LAB
ANION GAP SERPL CALC-SCNC: 5 MMOL/L — SIGNIFICANT CHANGE UP (ref 5–17)
ANION GAP SERPL CALC-SCNC: 5 MMOL/L — SIGNIFICANT CHANGE UP (ref 5–17)
BUN SERPL-MCNC: 15 MG/DL — SIGNIFICANT CHANGE UP (ref 7–23)
BUN SERPL-MCNC: 15 MG/DL — SIGNIFICANT CHANGE UP (ref 7–23)
CALCIUM SERPL-MCNC: 7.8 MG/DL — LOW (ref 8.5–10.1)
CALCIUM SERPL-MCNC: 7.8 MG/DL — LOW (ref 8.5–10.1)
CHLORIDE SERPL-SCNC: 117 MMOL/L — HIGH (ref 96–108)
CHLORIDE SERPL-SCNC: 117 MMOL/L — HIGH (ref 96–108)
CO2 SERPL-SCNC: 23 MMOL/L — SIGNIFICANT CHANGE UP (ref 22–31)
CO2 SERPL-SCNC: 23 MMOL/L — SIGNIFICANT CHANGE UP (ref 22–31)
CREAT SERPL-MCNC: 0.61 MG/DL — SIGNIFICANT CHANGE UP (ref 0.5–1.3)
CREAT SERPL-MCNC: 0.61 MG/DL — SIGNIFICANT CHANGE UP (ref 0.5–1.3)
EGFR: 83 ML/MIN/1.73M2 — SIGNIFICANT CHANGE UP
EGFR: 83 ML/MIN/1.73M2 — SIGNIFICANT CHANGE UP
GLUCOSE SERPL-MCNC: 86 MG/DL — SIGNIFICANT CHANGE UP (ref 70–99)
GLUCOSE SERPL-MCNC: 86 MG/DL — SIGNIFICANT CHANGE UP (ref 70–99)
HCT VFR BLD CALC: 27.7 % — LOW (ref 34.5–45)
HCT VFR BLD CALC: 27.7 % — LOW (ref 34.5–45)
HGB BLD-MCNC: 9 G/DL — LOW (ref 11.5–15.5)
HGB BLD-MCNC: 9 G/DL — LOW (ref 11.5–15.5)
MCHC RBC-ENTMCNC: 28.6 PG — SIGNIFICANT CHANGE UP (ref 27–34)
MCHC RBC-ENTMCNC: 28.6 PG — SIGNIFICANT CHANGE UP (ref 27–34)
MCHC RBC-ENTMCNC: 32.5 GM/DL — SIGNIFICANT CHANGE UP (ref 32–36)
MCHC RBC-ENTMCNC: 32.5 GM/DL — SIGNIFICANT CHANGE UP (ref 32–36)
MCV RBC AUTO: 87.9 FL — SIGNIFICANT CHANGE UP (ref 80–100)
MCV RBC AUTO: 87.9 FL — SIGNIFICANT CHANGE UP (ref 80–100)
NRBC # BLD: 0 /100 WBCS — SIGNIFICANT CHANGE UP (ref 0–0)
NRBC # BLD: 0 /100 WBCS — SIGNIFICANT CHANGE UP (ref 0–0)
PLATELET # BLD AUTO: 373 K/UL — SIGNIFICANT CHANGE UP (ref 150–400)
PLATELET # BLD AUTO: 373 K/UL — SIGNIFICANT CHANGE UP (ref 150–400)
POTASSIUM SERPL-MCNC: 4.1 MMOL/L — SIGNIFICANT CHANGE UP (ref 3.5–5.3)
POTASSIUM SERPL-MCNC: 4.1 MMOL/L — SIGNIFICANT CHANGE UP (ref 3.5–5.3)
POTASSIUM SERPL-SCNC: 4.1 MMOL/L — SIGNIFICANT CHANGE UP (ref 3.5–5.3)
POTASSIUM SERPL-SCNC: 4.1 MMOL/L — SIGNIFICANT CHANGE UP (ref 3.5–5.3)
RBC # BLD: 3.15 M/UL — LOW (ref 3.8–5.2)
RBC # BLD: 3.15 M/UL — LOW (ref 3.8–5.2)
RBC # FLD: 18.2 % — HIGH (ref 10.3–14.5)
RBC # FLD: 18.2 % — HIGH (ref 10.3–14.5)
SODIUM SERPL-SCNC: 145 MMOL/L — SIGNIFICANT CHANGE UP (ref 135–145)
SODIUM SERPL-SCNC: 145 MMOL/L — SIGNIFICANT CHANGE UP (ref 135–145)
WBC # BLD: 14.72 K/UL — HIGH (ref 3.8–10.5)
WBC # BLD: 14.72 K/UL — HIGH (ref 3.8–10.5)
WBC # FLD AUTO: 14.72 K/UL — HIGH (ref 3.8–10.5)
WBC # FLD AUTO: 14.72 K/UL — HIGH (ref 3.8–10.5)

## 2023-11-16 PROCEDURE — 80076 HEPATIC FUNCTION PANEL: CPT

## 2023-11-16 PROCEDURE — 97162 PT EVAL MOD COMPLEX 30 MIN: CPT

## 2023-11-16 PROCEDURE — 83540 ASSAY OF IRON: CPT

## 2023-11-16 PROCEDURE — A9698: CPT

## 2023-11-16 PROCEDURE — 81001 URINALYSIS AUTO W/SCOPE: CPT

## 2023-11-16 PROCEDURE — 82607 VITAMIN B-12: CPT

## 2023-11-16 PROCEDURE — 84443 ASSAY THYROID STIM HORMONE: CPT

## 2023-11-16 PROCEDURE — 0042T: CPT | Mod: MA

## 2023-11-16 PROCEDURE — 82746 ASSAY OF FOLIC ACID SERUM: CPT

## 2023-11-16 PROCEDURE — 82728 ASSAY OF FERRITIN: CPT

## 2023-11-16 PROCEDURE — 80048 BASIC METABOLIC PNL TOTAL CA: CPT

## 2023-11-16 PROCEDURE — 85045 AUTOMATED RETICULOCYTE COUNT: CPT

## 2023-11-16 PROCEDURE — 92610 EVALUATE SWALLOWING FUNCTION: CPT

## 2023-11-16 PROCEDURE — 36415 COLL VENOUS BLD VENIPUNCTURE: CPT

## 2023-11-16 PROCEDURE — 84484 ASSAY OF TROPONIN QUANT: CPT

## 2023-11-16 PROCEDURE — 87186 SC STD MICRODIL/AGAR DIL: CPT

## 2023-11-16 PROCEDURE — 83550 IRON BINDING TEST: CPT

## 2023-11-16 PROCEDURE — 93005 ELECTROCARDIOGRAM TRACING: CPT

## 2023-11-16 PROCEDURE — 97166 OT EVAL MOD COMPLEX 45 MIN: CPT

## 2023-11-16 PROCEDURE — 74230 X-RAY XM SWLNG FUNCJ C+: CPT

## 2023-11-16 PROCEDURE — 80053 COMPREHEN METABOLIC PANEL: CPT

## 2023-11-16 PROCEDURE — 87086 URINE CULTURE/COLONY COUNT: CPT

## 2023-11-16 PROCEDURE — 85025 COMPLETE CBC W/AUTO DIFF WBC: CPT

## 2023-11-16 PROCEDURE — 84100 ASSAY OF PHOSPHORUS: CPT

## 2023-11-16 PROCEDURE — 84550 ASSAY OF BLOOD/URIC ACID: CPT

## 2023-11-16 PROCEDURE — 82962 GLUCOSE BLOOD TEST: CPT

## 2023-11-16 PROCEDURE — 83735 ASSAY OF MAGNESIUM: CPT

## 2023-11-16 PROCEDURE — 80061 LIPID PANEL: CPT

## 2023-11-16 PROCEDURE — 71045 X-RAY EXAM CHEST 1 VIEW: CPT

## 2023-11-16 PROCEDURE — 70498 CT ANGIOGRAPHY NECK: CPT | Mod: MA

## 2023-11-16 PROCEDURE — 70450 CT HEAD/BRAIN W/O DYE: CPT | Mod: MA

## 2023-11-16 PROCEDURE — 70496 CT ANGIOGRAPHY HEAD: CPT | Mod: MA

## 2023-11-16 PROCEDURE — 85027 COMPLETE CBC AUTOMATED: CPT

## 2023-11-16 PROCEDURE — 85610 PROTHROMBIN TIME: CPT

## 2023-11-16 PROCEDURE — 85730 THROMBOPLASTIN TIME PARTIAL: CPT

## 2023-11-16 PROCEDURE — 99285 EMERGENCY DEPT VISIT HI MDM: CPT

## 2023-11-16 PROCEDURE — 87635 SARS-COV-2 COVID-19 AMP PRB: CPT

## 2023-11-16 RX ADMIN — POLYETHYLENE GLYCOL 3350 17 GRAM(S): 17 POWDER, FOR SOLUTION ORAL at 11:02

## 2023-11-16 RX ADMIN — Medication 1 TABLET(S): at 05:17

## 2023-11-16 RX ADMIN — Medication 50 MILLIGRAM(S): at 05:17

## 2023-11-16 RX ADMIN — Medication 500 MILLIGRAM(S): at 11:02

## 2023-11-16 RX ADMIN — Medication 1 DROP(S): at 11:03

## 2023-11-16 RX ADMIN — Medication 325 MILLIGRAM(S): at 05:17

## 2023-11-16 RX ADMIN — Medication 10 MILLIGRAM(S): at 05:18

## 2023-11-16 RX ADMIN — APIXABAN 2.5 MILLIGRAM(S): 2.5 TABLET, FILM COATED ORAL at 05:18

## 2023-11-16 RX ADMIN — PANTOPRAZOLE SODIUM 40 MILLIGRAM(S): 20 TABLET, DELAYED RELEASE ORAL at 05:17

## 2023-11-16 RX ADMIN — Medication 1 TABLET(S): at 11:02

## 2023-11-16 RX ADMIN — AMIODARONE HYDROCHLORIDE 200 MILLIGRAM(S): 400 TABLET ORAL at 05:17

## 2023-11-16 RX ADMIN — ERTAPENEM SODIUM 120 MILLIGRAM(S): 1 INJECTION, POWDER, LYOPHILIZED, FOR SOLUTION INTRAMUSCULAR; INTRAVENOUS at 11:01

## 2023-11-16 RX ADMIN — SODIUM CHLORIDE 50 MILLILITER(S): 9 INJECTION, SOLUTION INTRAVENOUS at 05:18

## 2023-11-16 RX ADMIN — Medication 75 MICROGRAM(S): at 05:17

## 2023-11-16 NOTE — PROGRESS NOTE ADULT - PROBLEM SELECTOR PLAN 5
replaced , serial bmp

## 2023-11-16 NOTE — CAREGIVER ENGAGEMENT NOTE - CAREGIVER EDUCATION HOME CARE SERVICES - FREE TEXT
[facility]  [facility]  [facility]

## 2023-11-16 NOTE — DISCHARGE NOTE NURSING/CASE MANAGEMENT/SOCIAL WORK - NSDPFAC_GEN_ALL_CORE
[facility]  [facility]  [facility]

## 2023-11-16 NOTE — PROGRESS NOTE ADULT - SUBJECTIVE AND OBJECTIVE BOX
Patient is a 89y Female whom presented to the hospital with hypokalemia , hypernatremia     PAST MEDICAL & SURGICAL HISTORY:  HTN (hypertension)      Afib      Spinal stenosis      PFO (patent foramen ovale)      Degeneration macular      Osteoporosis      History of complete heart block      Hypothyroidism      Cardiac pacemaker          MEDICATIONS  (STANDING):  aMIOdarone    Tablet 200 milliGRAM(s) Oral daily  apixaban 2.5 milliGRAM(s) Oral every 12 hours  artificial  tears Solution 1 Drop(s) Both EYES four times a day  ascorbic acid 500 milliGRAM(s) Oral daily  dextrose 5% + sodium chloride 0.45% with potassium chloride 20 mEq/L 1000 milliLiter(s) (50 mL/Hr) IV Continuous <Continuous>  enalapril 10 milliGRAM(s) Oral daily  ertapenem  IVPB 500 milliGRAM(s) IV Intermittent every 24 hours  ferrous    sulfate 325 milliGRAM(s) Oral three times a day  levothyroxine 75 MICROGram(s) Oral daily  metoprolol tartrate 50 milliGRAM(s) Oral two times a day  multivitamin 1 Tablet(s) Oral daily  pantoprazole    Tablet 40 milliGRAM(s) Oral before breakfast  polyethylene glycol 3350 17 Gram(s) Oral daily  potassium chloride   Powder 40 milliEquivalent(s) Oral once  potassium chloride  10 mEq/100 mL IVPB 10 milliEquivalent(s) IV Intermittent every 1 hour      Allergies    penicillin (Unknown)    Intolerances        SOCIAL HISTORY:  Denies ETOh,Smoking,     FAMILY HISTORY:      REVIEW OF SYSTEMS:    CONSTITUTIONAL: No weakness, fevers or chills  EYES/ENT: No visual changes;  no throat pain   NECK: No pain or stiffness  RESPIRATORY: No cough, wheezing, hemoptysis; No shortness of breath  CARDIOVASCULAR: No chest pain or palpitations  GASTROINTESTINAL: No abdominal or epigastric pain. No nausea, vomiting,                                          9.0    14.72 )-----------( 373      ( 16 Nov 2023 06:41 )             27.7       CBC Full  -  ( 16 Nov 2023 06:41 )  WBC Count : 14.72 K/uL  RBC Count : 3.15 M/uL  Hemoglobin : 9.0 g/dL  Hematocrit : 27.7 %  Platelet Count - Automated : 373 K/uL  Mean Cell Volume : 87.9 fl  Mean Cell Hemoglobin : 28.6 pg  Mean Cell Hemoglobin Concentration : 32.5 gm/dL  Auto Neutrophil # : x  Auto Lymphocyte # : x  Auto Monocyte # : x  Auto Eosinophil # : x  Auto Basophil # : x  Auto Neutrophil % : x  Auto Lymphocyte % : x  Auto Monocyte % : x  Auto Eosinophil % : x  Auto Basophil % : x      11-15    147<H>  |  119<H>  |  18  ----------------------------<  84  4.0   |  24  |  0.67    Ca    7.7<L>      15 Nov 2023 08:10    TPro  4.7<L>  /  Alb  1.7<L>  /  TBili  0.4  /  DBili  x   /  AST  46<H>  /  ALT  49  /  AlkPhos  114  11-15      CAPILLARY BLOOD GLUCOSE          Vital Signs Last 24 Hrs  T(C): 36.7 (16 Nov 2023 04:56), Max: 37.2 (15 Nov 2023 12:25)  T(F): 98 (16 Nov 2023 04:56), Max: 98.9 (15 Nov 2023 12:25)  HR: 60 (16 Nov 2023 04:56) (60 - 76)  BP: 152/77 (16 Nov 2023 04:56) (128/66 - 152/77)  BP(mean): --  RR: 18 (16 Nov 2023 04:56) (18 - 26)  SpO2: 92% (16 Nov 2023 04:56) (92% - 96%)    Parameters below as of 16 Nov 2023 04:56  Patient On (Oxygen Delivery Method): room air        Urinalysis Basic - ( 15 Nov 2023 08:10 )    Color: x / Appearance: x / SG: x / pH: x  Gluc: 84 mg/dL / Ketone: x  / Bili: x / Urobili: x   Blood: x / Protein: x / Nitrite: x   Leuk Esterase: x / RBC: x / WBC x   Sq Epi: x / Non Sq Epi: x / Bacteria: x                    PHYSICAL EXAM:    Constitutional: NAD  HEENT: conjunctive   clear   Neck:  No JVD  Respiratory: CTAB  Cardiovascular: S1 and S2  Gastrointestinal: BS+, soft, NT/ND  Extremities: No peripheral edema  Neurological: A/O x 3, no focal deficits  Psychiatric: Normal mood, normal affect

## 2023-11-16 NOTE — PROGRESS NOTE ADULT - NUTRITIONAL ASSESSMENT
MEDICATIONS  (STANDING):  aMIOdarone    Tablet 200 milliGRAM(s) Oral daily  apixaban 2.5 milliGRAM(s) Oral every 12 hours  artificial  tears Solution 1 Drop(s) Both EYES four times a day  ascorbic acid 500 milliGRAM(s) Oral daily  dextrose 5% + sodium chloride 0.45% with potassium chloride 20 mEq/L 1000 milliLiter(s) (50 mL/Hr) IV Continuous <Continuous>  enalapril 10 milliGRAM(s) Oral daily  ertapenem  IVPB 500 milliGRAM(s) IV Intermittent every 24 hours  ferrous    sulfate 325 milliGRAM(s) Oral three times a day  levothyroxine 75 MICROGram(s) Oral daily  metoprolol tartrate 50 milliGRAM(s) Oral two times a day  multivitamin 1 Tablet(s) Oral daily  pantoprazole    Tablet 40 milliGRAM(s) Oral before breakfast  polyethylene glycol 3350 17 Gram(s) Oral daily  potassium chloride   Powder 40 milliEquivalent(s) Oral once  potassium chloride  10 mEq/100 mL IVPB 10 milliEquivalent(s) IV Intermittent every 1 hour
MEDICATIONS  (STANDING):  aMIOdarone    Tablet 200 milliGRAM(s) Oral daily  apixaban 2.5 milliGRAM(s) Oral every 12 hours  artificial  tears Solution 1 Drop(s) Both EYES four times a day  ascorbic acid 500 milliGRAM(s) Oral daily  dextrose 5%. 1000 milliLiter(s) (50 mL/Hr) IV Continuous <Continuous>  enalapril 10 milliGRAM(s) Oral daily  ertapenem  IVPB 1000 milliGRAM(s) IV Intermittent every 24 hours  ferrous    sulfate 325 milliGRAM(s) Oral three times a day  lactobacillus acidophilus 1 Tablet(s) Oral every 8 hours  levothyroxine 75 MICROGram(s) Oral daily  metoprolol tartrate 50 milliGRAM(s) Oral two times a day  multivitamin 1 Tablet(s) Oral daily  pantoprazole    Tablet 40 milliGRAM(s) Oral before breakfast  polyethylene glycol 3350 17 Gram(s) Oral daily
MEDICATIONS  (STANDING):  aMIOdarone    Tablet 200 milliGRAM(s) Oral daily  apixaban 2.5 milliGRAM(s) Oral every 12 hours  artificial  tears Solution 1 Drop(s) Both EYES four times a day  ascorbic acid 500 milliGRAM(s) Oral daily  dextrose 5% + sodium chloride 0.45% with potassium chloride 20 mEq/L 1000 milliLiter(s) (50 mL/Hr) IV Continuous <Continuous>  enalapril 10 milliGRAM(s) Oral daily  ferrous    sulfate 325 milliGRAM(s) Oral three times a day  levothyroxine 75 MICROGram(s) Oral daily  metoprolol tartrate 50 milliGRAM(s) Oral two times a day  multivitamin 1 Tablet(s) Oral daily  pantoprazole    Tablet 40 milliGRAM(s) Oral before breakfast  polyethylene glycol 3350 17 Gram(s) Oral daily
MEDICATIONS  (STANDING):  aMIOdarone    Tablet 200 milliGRAM(s) Oral daily  apixaban 2.5 milliGRAM(s) Oral every 12 hours  artificial  tears Solution 1 Drop(s) Both EYES four times a day  ascorbic acid 500 milliGRAM(s) Oral daily  dextrose 5% + sodium chloride 0.45% with potassium chloride 20 mEq/L 1000 milliLiter(s) (50 mL/Hr) IV Continuous <Continuous>  enalapril 10 milliGRAM(s) Oral daily  ertapenem  IVPB 500 milliGRAM(s) IV Intermittent every 24 hours  ferrous    sulfate 325 milliGRAM(s) Oral three times a day  levothyroxine 75 MICROGram(s) Oral daily  metoprolol tartrate 50 milliGRAM(s) Oral two times a day  multivitamin 1 Tablet(s) Oral daily  pantoprazole    Tablet 40 milliGRAM(s) Oral before breakfast  polyethylene glycol 3350 17 Gram(s) Oral daily  potassium chloride   Powder 40 milliEquivalent(s) Oral once  potassium chloride  10 mEq/100 mL IVPB 10 milliEquivalent(s) IV Intermittent every 1 hour
MEDICATIONS  (STANDING):  aMIOdarone    Tablet 200 milliGRAM(s) Oral daily  apixaban 2.5 milliGRAM(s) Oral every 12 hours  artificial  tears Solution 1 Drop(s) Both EYES four times a day  ascorbic acid 500 milliGRAM(s) Oral daily  dextrose 5% + sodium chloride 0.45% with potassium chloride 20 mEq/L 1000 milliLiter(s) (50 mL/Hr) IV Continuous <Continuous>  enalapril 10 milliGRAM(s) Oral daily  ferrous    sulfate 325 milliGRAM(s) Oral three times a day  levothyroxine 75 MICROGram(s) Oral daily  metoprolol tartrate 50 milliGRAM(s) Oral two times a day  multivitamin 1 Tablet(s) Oral daily  pantoprazole    Tablet 40 milliGRAM(s) Oral before breakfast  polyethylene glycol 3350 17 Gram(s) Oral daily
This patient has been assessed with a concern for Malnutrition and has been determined to have a diagnosis/diagnoses of Moderate protein-calorie malnutrition.    This patient is being managed with:   Diet Minced and Moist-  Supplement Feeding Modality:  Oral  Ensure Plus High Protein Cans or Servings Per Day:  1       Frequency:  Two Times a day  Entered: Nov 13 2023  2:16PM    Diet Minced and Moist-  Supplement Feeding Modality:  Oral  Ensure Enlive Cans or Servings Per Day:  1       Frequency:  Daily  Ensure Pudding Cans or Servings Per Day:  1       Frequency:  Daily  Entered: Nov 13 2023 11:24AM    The following pending diet order is being considered for treatment of Moderate protein-calorie malnutrition:null
This patient has been assessed with a concern for Malnutrition and has been determined to have a diagnosis/diagnoses of Moderate protein-calorie malnutrition.    This patient is being managed with:   Diet Soft and Bite Sized-  Entered: Nov 14 2023  1:48PM  

## 2023-11-16 NOTE — PROGRESS NOTE ADULT - ASSESSMENT
89-year-old female with history of hypertension, hyperlipidemia, A-fib with ablation on Eliquis, MR, bladder tumor, PFO, osteoporosis, spinal stenosis brought in by ambulance for possible stroke.  As per facility patient ate breakfast and had her medication between 9:45AM and 10 AM this morning and was at her baseline mental status.  Aide noticed around 11/1130 patient was leaning towards her left side, had water poured out of her mouth when she attempted to drink and had possible slurred speech.  Patient limited historian due to MR.  As per facility patient was recently in a short-term rehab facility, recently returned back to [facility] however patient has been weaker since then 1. UTI.  2. Toxic metabolic encephalopathy.  3. Generalized weakness.   . UA was suggestive of UTI. The patient just had UTI with  ESBL E coli bacteremia last month.  . Add IV Invanz for presumptive recurrent UTI with ESBL  . Monitor mental status.  . Follow all cultures. 89-year-old female with history of hypertension, hyperlipidemia, A-fib with ablation on Eliquis, MR, bladder tumor, PFO, osteoporosis, spinal stenosis brought in by ambulance for possible stroke.  As per facility patient ate breakfast and had her medication between 9:45AM and 10 AM this morning and was at her baseline mental status.  Aide noticed around 11/1130 patient was leaning towards her left side, had water poured out of her mouth when she attempted to drink and had possible slurred speech.  Patient limited historian due to MR.  As per facility patient was recently in a short-term rehab facility, recently returned back to [facility] however patient has been weaker since then 1. UTI.  2. Toxic metabolic encephalopathy.  3. Generalized weakness.   . UA was suggestive of UTI. The patient just had UTI with  ESBL E coli bacteremia last month.  . Add IV Invanz for presumptive recurrent UTI with ESBL  . Monitor mental status.  . Follow all cultures. 89-year-old female with history of hypertension, hyperlipidemia, A-fib with ablation on Eliquis, MR, bladder tumor, PFO, osteoporosis, spinal stenosis brought in by ambulance for possible stroke.  As per facility patient ate breakfast and had her medication between 9:45AM and 10 AM this morning and was at her baseline mental status.  Aide noticed around 11/1130 patient was leaning towards her left side, had water poured out of her mouth when she attempted to drink and had possible slurred speech.  Patient limited historian due to MR.  As per facility patient was recently in a short-term rehab facility, recently returned back to [facility] however patient has been weaker since then 1. UTI.  2. Toxic metabolic encephalopathy.  3. Generalized weakness.   . UA was suggestive of UTI. The patient just had UTI with  ESBL E coli bacteremia last month.  . Add IV Invanz for presumptive recurrent UTI with ESBL  . Monitor mental status.  . Follow all cultures.

## 2023-11-16 NOTE — PROGRESS NOTE ADULT - SUBJECTIVE AND OBJECTIVE BOX
PROGRESS NOTE  Patient is a 89y old  Female who presents with a chief complaint of Transient cerebral ischemia     (13 Nov 2023 13:48)  Chart and available morning labs /imaging are reviewed electronically , urgent issues addressed . More information  is being added upon completion of rounds , when more information is collected and management discussed with consultants , medical staff and social service/case management on the floor   OVERNIGHT  No new issues reported by medical staff . All above noted Patient is resting in a bed comfortably .Confused ,poor mentation .No distress noted Daughter insists on d/c asap Spoke to her last night on a phone Confirmed with ID cons -cleared for d/x after 6 doses of iv abx WBC elevation likely related to poor po intake of water and dehydrtaion  Oral hydration strongly encourged and repeat CBC IN AM AND F/UP WITH SONIYA ALATORRE ADVISED   HPI:  89-year-old female with history of hypertension, hyperlipidemia, A-fib with ablation on Eliquis, MR, bladder tumor, PFO, osteoporosis, spinal stenosis brought in by ambulance for possible stroke.  As per facility patient ate breakfast and had her medication between 9:45AM and 10 AM this morning and was at her baseline mental status.  Aide noticed around 11/1130 patient was leaning towards her left side, had water poured out of her mouth when she attempted to drink and had possible slurred speech.  Patient limited historian due to MR.  As per facility patient was recently in a short-term rehab facility, recently returned back to [facility] however patient has been weaker since then.< from: CT Brain Perfusion Maps Stroke (11.11.23 @ 14:12) >    < from: CT Brain Perfusion Maps Stroke (11.11.23 @ 14:12) >    ACC: [id number] EXAM:  CT BRAIN PERFUSION MAPS STROKE   ORDERED BY:   JORGE FELDER     ACC: [id number] EXAM:  CT ANGIO BRAIN STROKE PROTC IC   ORDERED BY:   JORGE FELDER     ACC: [id number] EXAM:  CT BRAIN STROKE PROTOCOL   ORDERED BY: JORGE FELDER     ACC: [id number] EXAM:  CT ANGIO NECK STROKE PROTCL IC   ORDERED BY:   JORGE FELDER     PROCEDURE DATE:  11/11/2023          INTERPRETATION:  CT HEAD STROKE PROTOCOL, CT ANGIO NECK STROKE PROTOCOL,   CT ANGIO HEAD STROKE PROTOCOL, CT PERFUSION WITH MAPS STROKE PROTOCOL    CT BRAIN WITHOUT CONTRAST    INDICATIONS:  Stroke code Leaning to left side. Slight left arm drift.    TECHNIQUE:  Serial axial images were obtained from the skull base to the   vertex without the use of contrast.    COMPARISON EXAM: 7/11/2023    FINDINGS:  Ventricles and sulci: Parenchymal volume loss is present which is   commensurate with patient age.  Intra-axial: Periventricular small vessel white matter ischemic changes.   No intracranial mass, acute hemorrhage, or midline shift is present.  Extra-axial: No extra-axial fluid collection is identified. Deposition of   calcified plaque at the level of the bilateral carotid siphons.  Calvarium: Intact.    Left optic lens replacement, as on prior. Bilateral optic globes are   intact. Imaged paranasal sinuses, bilateral mastoid air cells, middle ear   cavities are clear.        CT PERFUSION  CTA OF THE NECK AND HEAD:      CONTRAST/COMPLICATIONS:  IV Contrast: NONE (accession [id number]), IV contrast documented in   unlinked concurrent exam (accession [id number])  Complications: None reported at time of study completion      TECHNIQUE PERFUSION:  After the intravenous power injection of non-ionic contrast material,   repeat serial thin sections were obtained through the intracranial   circulation on a multislice CT scanner. Artificial intelligence was then   used for analysis of perfusion and intracranial large vessel occlusion.      TECHNIQUE CTA NECK AND HEAD:  After the intravenous power injection of non-ionic contrast material,   serial thin sections were obtained through the cervical and intracranial   circulation on a multislice CT scanner. 2D and 3D MIP reformatted images   were obtained.  Images were reformatted using a dedicated 3D software   package.    FINDINGS:    CT PERFUSION:    COMPARISON EXAMINATION: None.    Technical limitations: Significant patient motion in all 3 planes during   image acquisition likely renders results suboptimal and nondiagnostic.   Arterial and venous waveforms are also not optimized.    Miscellaneous: None.      CTA NECK WITH CONTRAST:    CAROTID STENOSIS REFERENCE: (NASCET = 100x1-(N/D)). N=greatest narrowing.   D=normal distal diameter - MILD = <50% stenosis. - MODERATE = 50-69%   stenosis.- SEVERE = 70-89% stenosis. - HAIRLINE/CRITICAL = 90-99%   stenosis. - OCCLUDED = 100% stenosis.      COMPARISON: None.    FINDINGS:  Aortic arch and visualized great vessels: Within normal limits.    RIGHT:  Common carotid artery: Follows a tortuouscourse and is normal in caliber   to the carotid bifurcation.  Internal carotid artery: Mild to moderate deposition of calcified plaque   at the level of the right common carotid artery bifurcation with   extension into the proximal aspect of the right internal carotid artery,   without significant stenosis based on NASCET criteria. Normal course and   caliber to the intracranial circulation.    Vertebral artery: Emanates from the right subclavian artery. The right   vertebral artery is normal incourse and caliber to the intracranial   circulation.      LEFT:  Common carotid artery :Normal in course and caliber to the carotid   bifurcation.  Internal carotid artery: Mild to moderate deposition of calcified plaque   at the level of the left common carotid artery bifurcation with extension   into the proximal left internal carotid artery without significant   stenosis based on NASCET criteria. Normal course and caliber to the   intracranial circulation.    Vertebral artery: Emanates from the left subclavian artery. The left   vertebral artery is normal in course and caliber to the intracranial   circulation.      Miscellaneous: None    CTA HEAD WITH CONTRAST:    COMPARISON EXAM: None.      Internal carotid arteries: Bilateral petrous, precavernous, cavernous,   and supraclinoid regions are patent. Mild deposition of calcified plaque   at the level of the carotid siphons without significant stenosis in this   location.    [place]:  No aneurysm about the [place]. Tiny aneurysms   can be beyond the resolution of CTA technique.    Anterior Cerebral arteries: No significant stenosis or occlusion.  Middle Cerebral arteries: No significant stenosis or occlusion.    Posterior Circulation: Distal intracranial bilateralvertebral arteries   are visualized. The vertebrobasilar confluence is unremarkable. There is   good flow within the basilar artery. Bilateral superior cerebellar   arteries and bilateral posterior cerebral arteries emanate from the   distal aspect of the basilar artery. There is a hypoplastic right P1   segment. A prominent right posterior communicating artery contributes to   flow within the right posterior cerebral artery.    < end of copied text >     (11 Nov 2023 21:59)    PAST MEDICAL & SURGICAL HISTORY:  HTN (hypertension)      Afib      Spinal stenosis      PFO (patent foramen ovale)      Degeneration macular      Osteoporosis      History of complete heart block      Hypothyroidism      Cardiac pacemaker          MEDICATIONS  (STANDING):  aMIOdarone    Tablet 200 milliGRAM(s) Oral daily  apixaban 2.5 milliGRAM(s) Oral every 12 hours  artificial  tears Solution 1 Drop(s) Both EYES four times a day  ascorbic acid 500 milliGRAM(s) Oral daily  dextrose 5%. 1000 milliLiter(s) (50 mL/Hr) IV Continuous <Continuous>  enalapril 10 milliGRAM(s) Oral daily  ertapenem  IVPB 1000 milliGRAM(s) IV Intermittent every 24 hours  ferrous    sulfate 325 milliGRAM(s) Oral three times a day  lactobacillus acidophilus 1 Tablet(s) Oral every 8 hours  levothyroxine 75 MICROGram(s) Oral daily  metoprolol tartrate 50 milliGRAM(s) Oral two times a day  multivitamin 1 Tablet(s) Oral daily  pantoprazole    Tablet 40 milliGRAM(s) Oral before breakfast  polyethylene glycol 3350 17 Gram(s) Oral daily    MEDICATIONS  (PRN):      OBJECTIVE    T(C): 36.7 (11-16-23 @ 04:56), Max: 37.2 (11-15-23 @ 12:25)  HR: 60 (11-16-23 @ 04:56) (60 - 76)  BP: 152/77 (11-16-23 @ 04:56) (128/66 - 152/77)  RR: 18 (11-16-23 @ 04:56) (18 - 26)  SpO2: 92% (11-16-23 @ 04:56) (92% - 96%)  Wt(kg): --  I&O's Summary    15 Nov 2023 07:01  -  16 Nov 2023 07:00  --------------------------------------------------------  IN: 1320 mL / OUT: 0 mL / NET: 1320 mL          REVIEW OF SYSTEMS:  CONSTITUTIONAL: No fever, weight loss, or fatigue  EYES: No eye pain, visual disturbances, or discharge  ENMT:   No sinus or throat pain  NECK: No pain or stiffness  RESPIRATORY: No cough, wheezing, chills or hemoptysis; No shortness of breath  CARDIOVASCULAR: No chest pain, palpitations, dizziness, or leg swelling  GASTROINTESTINAL: No abdominal pain. No nausea, vomiting; No diarrhea or constipation. No melena or hematochezia.  GENITOURINARY: No dysuria, frequency, hematuria, or incontinence  NEUROLOGICAL: No headaches, memory loss, loss of strength, numbness, or tremors  SKIN: No itching, burning, rashes, or lesions   MUSCULOSKELETAL: No joint pain or swelling; No muscle, back, or extremity pain    PHYSICAL EXAM:  Appearance: NAD. VS past 24 hrs -as above   HEENT:   Moist oral mucosa. Conjunctiva clear b/l.   Neck : supple  Respiratory: Lungs CTAB.  Gastrointestinal:  Soft, nontender. No rebound. No rigidity. BS present	  Cardiovascular: RRR ,S1S2 present  Neurologic: Non-focal. Moving all extremities.  Extremities: No edema. No erythema. No calf tenderness.  Skin: No rashes, No ecchymoses, No cyanosis.	  wounds ,skin lesions-See skin assesment flow sheet   LABS:                        9.0    14.72 )-----------( 373      ( 16 Nov 2023 06:41 )             27.7     11-16    145  |  117<H>  |  15  ----------------------------<  86  4.1   |  23  |  0.61    Ca    7.8<L>      16 Nov 2023 06:41    TPro  4.7<L>  /  Alb  1.7<L>  /  TBili  0.4  /  DBili  x   /  AST  46<H>  /  ALT  49  /  AlkPhos  114  11-15    CAPILLARY BLOOD GLUCOSE          Urinalysis Basic - ( 16 Nov 2023 06:41 )    Color: x / Appearance: x / SG: x / pH: x  Gluc: 86 mg/dL / Ketone: x  / Bili: x / Urobili: x   Blood: x / Protein: x / Nitrite: x   Leuk Esterase: x / RBC: x / WBC x   Sq Epi: x / Non Sq Epi: x / Bacteria: x        Culture - Urine (collected 11 Nov 2023 15:41)  Source: Clean Catch Clean Catch (Midstream)  Final Report (14 Nov 2023 07:29):    >100,000 CFU/ml Escherichia coli ESBL  Organism: Escherichia coli ESBL (14 Nov 2023 07:29)  Organism: Escherichia coli ESBL (14 Nov 2023 07:29)      RADIOLOGY & ADDITIONAL TESTS:   reviewed elctronically  ASSESSMENT/PLAN: 	    Patient was seen and examined on a day of discharge . Plan of care , discharge medications and recommendations discussed with consultants and clearance for discharge obtained .Social service , case management  and medical staff are aware of plan. Family is notified. Discharge summary  is  prepared electronically-see separate document prepared by me .75minutes spent on this visit, 50% visit time spent in care co-ordination with other attendings and counselling patient  I have discussed care plan with patient and HCP,expressed understanding of problems treatment and their effect and side effects, alternatives in detail,I have asked if they have any questions and concerns and appropriately addressed them to best of my ability [phone] PAGES COMPLETED ,spoke to Dr Lisa worthington to d/c today back to [facility] as per daughter request , f/up with PCP  PROGRESS NOTE  Patient is a 89y old  Female who presents with a chief complaint of Transient cerebral ischemia     (13 Nov 2023 13:48)  Chart and available morning labs /imaging are reviewed electronically , urgent issues addressed . More information  is being added upon completion of rounds , when more information is collected and management discussed with consultants , medical staff and social service/case management on the floor   OVERNIGHT  No new issues reported by medical staff . All above noted Patient is resting in a bed comfortably .Confused ,poor mentation .No distress noted Daughter insists on d/c asap Spoke to her last night on a phone Confirmed with ID cons -cleared for d/x after 6 doses of iv abx WBC elevation likely related to poor po intake of water and dehydrtaion  Oral hydration strongly encourged and repeat CBC IN AM AND F/UP WITH SONIYA ALATORRE ADVISED   HPI:  89-year-old female with history of hypertension, hyperlipidemia, A-fib with ablation on Eliquis, MR, bladder tumor, PFO, osteoporosis, spinal stenosis brought in by ambulance for possible stroke.  As per facility patient ate breakfast and had her medication between 9:45AM and 10 AM this morning and was at her baseline mental status.  Aide noticed around 11/1130 patient was leaning towards her left side, had water poured out of her mouth when she attempted to drink and had possible slurred speech.  Patient limited historian due to MR.  As per facility patient was recently in a short-term rehab facility, recently returned back to [facility] however patient has been weaker since then.< from: CT Brain Perfusion Maps Stroke (11.11.23 @ 14:12) >    < from: CT Brain Perfusion Maps Stroke (11.11.23 @ 14:12) >    ACC: [id number] EXAM:  CT BRAIN PERFUSION MAPS STROKE   ORDERED BY:   JORGE FELDER     ACC: [id number] EXAM:  CT ANGIO BRAIN STROKE PROTC IC   ORDERED BY:   JORGE FELDER     ACC: [id number] EXAM:  CT BRAIN STROKE PROTOCOL   ORDERED BY: JORGE FELDER     ACC: [id number] EXAM:  CT ANGIO NECK STROKE PROTCL IC   ORDERED BY:   JORGE FELDER     PROCEDURE DATE:  11/11/2023          INTERPRETATION:  CT HEAD STROKE PROTOCOL, CT ANGIO NECK STROKE PROTOCOL,   CT ANGIO HEAD STROKE PROTOCOL, CT PERFUSION WITH MAPS STROKE PROTOCOL    CT BRAIN WITHOUT CONTRAST    INDICATIONS:  Stroke code Leaning to left side. Slight left arm drift.    TECHNIQUE:  Serial axial images were obtained from the skull base to the   vertex without the use of contrast.    COMPARISON EXAM: 7/11/2023    FINDINGS:  Ventricles and sulci: Parenchymal volume loss is present which is   commensurate with patient age.  Intra-axial: Periventricular small vessel white matter ischemic changes.   No intracranial mass, acute hemorrhage, or midline shift is present.  Extra-axial: No extra-axial fluid collection is identified. Deposition of   calcified plaque at the level of the bilateral carotid siphons.  Calvarium: Intact.    Left optic lens replacement, as on prior. Bilateral optic globes are   intact. Imaged paranasal sinuses, bilateral mastoid air cells, middle ear   cavities are clear.        CT PERFUSION  CTA OF THE NECK AND HEAD:      CONTRAST/COMPLICATIONS:  IV Contrast: NONE (accession [id number]), IV contrast documented in   unlinked concurrent exam (accession [id number])  Complications: None reported at time of study completion      TECHNIQUE PERFUSION:  After the intravenous power injection of non-ionic contrast material,   repeat serial thin sections were obtained through the intracranial   circulation on a multislice CT scanner. Artificial intelligence was then   used for analysis of perfusion and intracranial large vessel occlusion.      TECHNIQUE CTA NECK AND HEAD:  After the intravenous power injection of non-ionic contrast material,   serial thin sections were obtained through the cervical and intracranial   circulation on a multislice CT scanner. 2D and 3D MIP reformatted images   were obtained.  Images were reformatted using a dedicated 3D software   package.    FINDINGS:    CT PERFUSION:    COMPARISON EXAMINATION: None.    Technical limitations: Significant patient motion in all 3 planes during   image acquisition likely renders results suboptimal and nondiagnostic.   Arterial and venous waveforms are also not optimized.    Miscellaneous: None.      CTA NECK WITH CONTRAST:    CAROTID STENOSIS REFERENCE: (NASCET = 100x1-(N/D)). N=greatest narrowing.   D=normal distal diameter - MILD = <50% stenosis. - MODERATE = 50-69%   stenosis.- SEVERE = 70-89% stenosis. - HAIRLINE/CRITICAL = 90-99%   stenosis. - OCCLUDED = 100% stenosis.      COMPARISON: None.    FINDINGS:  Aortic arch and visualized great vessels: Within normal limits.    RIGHT:  Common carotid artery: Follows a tortuouscourse and is normal in caliber   to the carotid bifurcation.  Internal carotid artery: Mild to moderate deposition of calcified plaque   at the level of the right common carotid artery bifurcation with   extension into the proximal aspect of the right internal carotid artery,   without significant stenosis based on NASCET criteria. Normal course and   caliber to the intracranial circulation.    Vertebral artery: Emanates from the right subclavian artery. The right   vertebral artery is normal incourse and caliber to the intracranial   circulation.      LEFT:  Common carotid artery :Normal in course and caliber to the carotid   bifurcation.  Internal carotid artery: Mild to moderate deposition of calcified plaque   at the level of the left common carotid artery bifurcation with extension   into the proximal left internal carotid artery without significant   stenosis based on NASCET criteria. Normal course and caliber to the   intracranial circulation.    Vertebral artery: Emanates from the left subclavian artery. The left   vertebral artery is normal in course and caliber to the intracranial   circulation.      Miscellaneous: None    CTA HEAD WITH CONTRAST:    COMPARISON EXAM: None.      Internal carotid arteries: Bilateral petrous, precavernous, cavernous,   and supraclinoid regions are patent. Mild deposition of calcified plaque   at the level of the carotid siphons without significant stenosis in this   location.    [place]:  No aneurysm about the [place]. Tiny aneurysms   can be beyond the resolution of CTA technique.    Anterior Cerebral arteries: No significant stenosis or occlusion.  Middle Cerebral arteries: No significant stenosis or occlusion.    Posterior Circulation: Distal intracranial bilateralvertebral arteries   are visualized. The vertebrobasilar confluence is unremarkable. There is   good flow within the basilar artery. Bilateral superior cerebellar   arteries and bilateral posterior cerebral arteries emanate from the   distal aspect of the basilar artery. There is a hypoplastic right P1   segment. A prominent right posterior communicating artery contributes to   flow within the right posterior cerebral artery.    < end of copied text >     (11 Nov 2023 21:59)    PAST MEDICAL & SURGICAL HISTORY:  HTN (hypertension)      Afib      Spinal stenosis      PFO (patent foramen ovale)      Degeneration macular      Osteoporosis      History of complete heart block      Hypothyroidism      Cardiac pacemaker          MEDICATIONS  (STANDING):  aMIOdarone    Tablet 200 milliGRAM(s) Oral daily  apixaban 2.5 milliGRAM(s) Oral every 12 hours  artificial  tears Solution 1 Drop(s) Both EYES four times a day  ascorbic acid 500 milliGRAM(s) Oral daily  dextrose 5%. 1000 milliLiter(s) (50 mL/Hr) IV Continuous <Continuous>  enalapril 10 milliGRAM(s) Oral daily  ertapenem  IVPB 1000 milliGRAM(s) IV Intermittent every 24 hours  ferrous    sulfate 325 milliGRAM(s) Oral three times a day  lactobacillus acidophilus 1 Tablet(s) Oral every 8 hours  levothyroxine 75 MICROGram(s) Oral daily  metoprolol tartrate 50 milliGRAM(s) Oral two times a day  multivitamin 1 Tablet(s) Oral daily  pantoprazole    Tablet 40 milliGRAM(s) Oral before breakfast  polyethylene glycol 3350 17 Gram(s) Oral daily    MEDICATIONS  (PRN):      OBJECTIVE    T(C): 36.7 (11-16-23 @ 04:56), Max: 37.2 (11-15-23 @ 12:25)  HR: 60 (11-16-23 @ 04:56) (60 - 76)  BP: 152/77 (11-16-23 @ 04:56) (128/66 - 152/77)  RR: 18 (11-16-23 @ 04:56) (18 - 26)  SpO2: 92% (11-16-23 @ 04:56) (92% - 96%)  Wt(kg): --  I&O's Summary    15 Nov 2023 07:01  -  16 Nov 2023 07:00  --------------------------------------------------------  IN: 1320 mL / OUT: 0 mL / NET: 1320 mL          REVIEW OF SYSTEMS:  CONSTITUTIONAL: No fever, weight loss, or fatigue  EYES: No eye pain, visual disturbances, or discharge  ENMT:   No sinus or throat pain  NECK: No pain or stiffness  RESPIRATORY: No cough, wheezing, chills or hemoptysis; No shortness of breath  CARDIOVASCULAR: No chest pain, palpitations, dizziness, or leg swelling  GASTROINTESTINAL: No abdominal pain. No nausea, vomiting; No diarrhea or constipation. No melena or hematochezia.  GENITOURINARY: No dysuria, frequency, hematuria, or incontinence  NEUROLOGICAL: No headaches, memory loss, loss of strength, numbness, or tremors  SKIN: No itching, burning, rashes, or lesions   MUSCULOSKELETAL: No joint pain or swelling; No muscle, back, or extremity pain    PHYSICAL EXAM:  Appearance: NAD. VS past 24 hrs -as above   HEENT:   Moist oral mucosa. Conjunctiva clear b/l.   Neck : supple  Respiratory: Lungs CTAB.  Gastrointestinal:  Soft, nontender. No rebound. No rigidity. BS present	  Cardiovascular: RRR ,S1S2 present  Neurologic: Non-focal. Moving all extremities.  Extremities: No edema. No erythema. No calf tenderness.  Skin: No rashes, No ecchymoses, No cyanosis.	  wounds ,skin lesions-See skin assesment flow sheet   LABS:                        9.0    14.72 )-----------( 373      ( 16 Nov 2023 06:41 )             27.7     11-16    145  |  117<H>  |  15  ----------------------------<  86  4.1   |  23  |  0.61    Ca    7.8<L>      16 Nov 2023 06:41    TPro  4.7<L>  /  Alb  1.7<L>  /  TBili  0.4  /  DBili  x   /  AST  46<H>  /  ALT  49  /  AlkPhos  114  11-15    CAPILLARY BLOOD GLUCOSE          Urinalysis Basic - ( 16 Nov 2023 06:41 )    Color: x / Appearance: x / SG: x / pH: x  Gluc: 86 mg/dL / Ketone: x  / Bili: x / Urobili: x   Blood: x / Protein: x / Nitrite: x   Leuk Esterase: x / RBC: x / WBC x   Sq Epi: x / Non Sq Epi: x / Bacteria: x        Culture - Urine (collected 11 Nov 2023 15:41)  Source: Clean Catch Clean Catch (Midstream)  Final Report (14 Nov 2023 07:29):    >100,000 CFU/ml Escherichia coli ESBL  Organism: Escherichia coli ESBL (14 Nov 2023 07:29)  Organism: Escherichia coli ESBL (14 Nov 2023 07:29)      RADIOLOGY & ADDITIONAL TESTS:   reviewed elctronically  ASSESSMENT/PLAN: 	    Patient was seen and examined on a day of discharge . Plan of care , discharge medications and recommendations discussed with consultants and clearance for discharge obtained .Social service , case management  and medical staff are aware of plan. Family is notified. Discharge summary  is  prepared electronically-see separate document prepared by me .75minutes spent on this visit, 50% visit time spent in care co-ordination with other attendings and counselling patient  I have discussed care plan with patient and HCP,expressed understanding of problems treatment and their effect and side effects, alternatives in detail,I have asked if they have any questions and concerns and appropriately addressed them to best of my ability [phone] 3 PAGES COMPLETED ,spoke to Dr Lisa worthington to d/c today back to [facility] as per daughter request , f/up with PCP  PROGRESS NOTE  Patient is a 89y old  Female who presents with a chief complaint of Transient cerebral ischemia     (13 Nov 2023 13:48)  Chart and available morning labs /imaging are reviewed electronically , urgent issues addressed . More information  is being added upon completion of rounds , when more information is collected and management discussed with consultants , medical staff and social service/case management on the floor   OVERNIGHT  No new issues reported by medical staff . All above noted Patient is resting in a bed comfortably .Confused ,poor mentation .No distress noted Daughter insists on d/c asap Spoke to her last night on a phone Confirmed with ID cons -cleared for d/x after 6 doses of iv abx WBC elevation likely related to poor po intake of water and dehydrtaion  Oral hydration strongly encourged and repeat CBC IN AM AND F/UP WITH SONIYA ALATORRE ADVISED   HPI:  89-year-old female with history of hypertension, hyperlipidemia, A-fib with ablation on Eliquis, MR, bladder tumor, PFO, osteoporosis, spinal stenosis brought in by ambulance for possible stroke.  As per facility patient ate breakfast and had her medication between 9:45AM and 10 AM this morning and was at her baseline mental status.  Aide noticed around 11/1130 patient was leaning towards her left side, had water poured out of her mouth when she attempted to drink and had possible slurred speech.  Patient limited historian due to MR.  As per facility patient was recently in a short-term rehab facility, recently returned back to [facility] however patient has been weaker since then.< from: CT Brain Perfusion Maps Stroke (11.11.23 @ 14:12) >    < from: CT Brain Perfusion Maps Stroke (11.11.23 @ 14:12) >    ACC: [id number] EXAM:  CT BRAIN PERFUSION MAPS STROKE   ORDERED BY:   JORGE FELDER     ACC: [id number] EXAM:  CT ANGIO BRAIN STROKE PROTC IC   ORDERED BY:   JORGE FELDER     ACC: [id number] EXAM:  CT BRAIN STROKE PROTOCOL   ORDERED BY: JORGE FELDER     ACC: [id number] EXAM:  CT ANGIO NECK STROKE PROTCL IC   ORDERED BY:   JORGE FELDER     PROCEDURE DATE:  11/11/2023          INTERPRETATION:  CT HEAD STROKE PROTOCOL, CT ANGIO NECK STROKE PROTOCOL,   CT ANGIO HEAD STROKE PROTOCOL, CT PERFUSION WITH MAPS STROKE PROTOCOL    CT BRAIN WITHOUT CONTRAST    INDICATIONS:  Stroke code Leaning to left side. Slight left arm drift.    TECHNIQUE:  Serial axial images were obtained from the skull base to the   vertex without the use of contrast.    COMPARISON EXAM: 7/11/2023    FINDINGS:  Ventricles and sulci: Parenchymal volume loss is present which is   commensurate with patient age.  Intra-axial: Periventricular small vessel white matter ischemic changes.   No intracranial mass, acute hemorrhage, or midline shift is present.  Extra-axial: No extra-axial fluid collection is identified. Deposition of   calcified plaque at the level of the bilateral carotid siphons.  Calvarium: Intact.    Left optic lens replacement, as on prior. Bilateral optic globes are   intact. Imaged paranasal sinuses, bilateral mastoid air cells, middle ear   cavities are clear.        CT PERFUSION  CTA OF THE NECK AND HEAD:      CONTRAST/COMPLICATIONS:  IV Contrast: NONE (accession [id number]), IV contrast documented in   unlinked concurrent exam (accession [id number])  Complications: None reported at time of study completion      TECHNIQUE PERFUSION:  After the intravenous power injection of non-ionic contrast material,   repeat serial thin sections were obtained through the intracranial   circulation on a multislice CT scanner. Artificial intelligence was then   used for analysis of perfusion and intracranial large vessel occlusion.      TECHNIQUE CTA NECK AND HEAD:  After the intravenous power injection of non-ionic contrast material,   serial thin sections were obtained through the cervical and intracranial   circulation on a multislice CT scanner. 2D and 3D MIP reformatted images   were obtained.  Images were reformatted using a dedicated 3D software   package.    FINDINGS:    CT PERFUSION:    COMPARISON EXAMINATION: None.    Technical limitations: Significant patient motion in all 3 planes during   image acquisition likely renders results suboptimal and nondiagnostic.   Arterial and venous waveforms are also not optimized.    Miscellaneous: None.      CTA NECK WITH CONTRAST:    CAROTID STENOSIS REFERENCE: (NASCET = 100x1-(N/D)). N=greatest narrowing.   D=normal distal diameter - MILD = <50% stenosis. - MODERATE = 50-69%   stenosis.- SEVERE = 70-89% stenosis. - HAIRLINE/CRITICAL = 90-99%   stenosis. - OCCLUDED = 100% stenosis.      COMPARISON: None.    FINDINGS:  Aortic arch and visualized great vessels: Within normal limits.    RIGHT:  Common carotid artery: Follows a tortuouscourse and is normal in caliber   to the carotid bifurcation.  Internal carotid artery: Mild to moderate deposition of calcified plaque   at the level of the right common carotid artery bifurcation with   extension into the proximal aspect of the right internal carotid artery,   without significant stenosis based on NASCET criteria. Normal course and   caliber to the intracranial circulation.    Vertebral artery: Emanates from the right subclavian artery. The right   vertebral artery is normal incourse and caliber to the intracranial   circulation.      LEFT:  Common carotid artery :Normal in course and caliber to the carotid   bifurcation.  Internal carotid artery: Mild to moderate deposition of calcified plaque   at the level of the left common carotid artery bifurcation with extension   into the proximal left internal carotid artery without significant   stenosis based on NASCET criteria. Normal course and caliber to the   intracranial circulation.    Vertebral artery: Emanates from the left subclavian artery. The left   vertebral artery is normal in course and caliber to the intracranial   circulation.      Miscellaneous: None    CTA HEAD WITH CONTRAST:    COMPARISON EXAM: None.      Internal carotid arteries: Bilateral petrous, precavernous, cavernous,   and supraclinoid regions are patent. Mild deposition of calcified plaque   at the level of the carotid siphons without significant stenosis in this   location.    [place]:  No aneurysm about the [place]. Tiny aneurysms   can be beyond the resolution of CTA technique.    Anterior Cerebral arteries: No significant stenosis or occlusion.  Middle Cerebral arteries: No significant stenosis or occlusion.    Posterior Circulation: Distal intracranial bilateralvertebral arteries   are visualized. The vertebrobasilar confluence is unremarkable. There is   good flow within the basilar artery. Bilateral superior cerebellar   arteries and bilateral posterior cerebral arteries emanate from the   distal aspect of the basilar artery. There is a hypoplastic right P1   segment. A prominent right posterior communicating artery contributes to   flow within the right posterior cerebral artery.    < end of copied text >     (11 Nov 2023 21:59)    PAST MEDICAL & SURGICAL HISTORY:  HTN (hypertension)      Afib      Spinal stenosis      PFO (patent foramen ovale)      Degeneration macular      Osteoporosis      History of complete heart block      Hypothyroidism      Cardiac pacemaker          MEDICATIONS  (STANDING):  aMIOdarone    Tablet 200 milliGRAM(s) Oral daily  apixaban 2.5 milliGRAM(s) Oral every 12 hours  artificial  tears Solution 1 Drop(s) Both EYES four times a day  ascorbic acid 500 milliGRAM(s) Oral daily  dextrose 5%. 1000 milliLiter(s) (50 mL/Hr) IV Continuous <Continuous>  enalapril 10 milliGRAM(s) Oral daily  ertapenem  IVPB 1000 milliGRAM(s) IV Intermittent every 24 hours  ferrous    sulfate 325 milliGRAM(s) Oral three times a day  lactobacillus acidophilus 1 Tablet(s) Oral every 8 hours  levothyroxine 75 MICROGram(s) Oral daily  metoprolol tartrate 50 milliGRAM(s) Oral two times a day  multivitamin 1 Tablet(s) Oral daily  pantoprazole    Tablet 40 milliGRAM(s) Oral before breakfast  polyethylene glycol 3350 17 Gram(s) Oral daily    MEDICATIONS  (PRN):      OBJECTIVE    T(C): 36.7 (11-16-23 @ 04:56), Max: 37.2 (11-15-23 @ 12:25)  HR: 60 (11-16-23 @ 04:56) (60 - 76)  BP: 152/77 (11-16-23 @ 04:56) (128/66 - 152/77)  RR: 18 (11-16-23 @ 04:56) (18 - 26)  SpO2: 92% (11-16-23 @ 04:56) (92% - 96%)  Wt(kg): --  I&O's Summary    15 Nov 2023 07:01  -  16 Nov 2023 07:00  --------------------------------------------------------  IN: 1320 mL / OUT: 0 mL / NET: 1320 mL          REVIEW OF SYSTEMS:  CONSTITUTIONAL: No fever, weight loss, or fatigue  EYES: No eye pain, visual disturbances, or discharge  ENMT:   No sinus or throat pain  NECK: No pain or stiffness  RESPIRATORY: No cough, wheezing, chills or hemoptysis; No shortness of breath  CARDIOVASCULAR: No chest pain, palpitations, dizziness, or leg swelling  GASTROINTESTINAL: No abdominal pain. No nausea, vomiting; No diarrhea or constipation. No melena or hematochezia.  GENITOURINARY: No dysuria, frequency, hematuria, or incontinence  NEUROLOGICAL: No headaches, memory loss, loss of strength, numbness, or tremors  SKIN: No itching, burning, rashes, or lesions   MUSCULOSKELETAL: No joint pain or swelling; No muscle, back, or extremity pain    PHYSICAL EXAM:  Appearance: NAD. VS past 24 hrs -as above   HEENT:   Moist oral mucosa. Conjunctiva clear b/l.   Neck : supple  Respiratory: Lungs CTAB.  Gastrointestinal:  Soft, nontender. No rebound. No rigidity. BS present	  Cardiovascular: RRR ,S1S2 present  Neurologic: Non-focal. Moving all extremities.  Extremities: No edema. No erythema. No calf tenderness.  Skin: No rashes, No ecchymoses, No cyanosis.	  wounds ,skin lesions-See skin assesment flow sheet   LABS:                        9.0    14.72 )-----------( 373      ( 16 Nov 2023 06:41 )             27.7     11-16    145  |  117<H>  |  15  ----------------------------<  86  4.1   |  23  |  0.61    Ca    7.8<L>      16 Nov 2023 06:41    TPro  4.7<L>  /  Alb  1.7<L>  /  TBili  0.4  /  DBili  x   /  AST  46<H>  /  ALT  49  /  AlkPhos  114  11-15    CAPILLARY BLOOD GLUCOSE          Urinalysis Basic - ( 16 Nov 2023 06:41 )    Color: x / Appearance: x / SG: x / pH: x  Gluc: 86 mg/dL / Ketone: x  / Bili: x / Urobili: x   Blood: x / Protein: x / Nitrite: x   Leuk Esterase: x / RBC: x / WBC x   Sq Epi: x / Non Sq Epi: x / Bacteria: x        Culture - Urine (collected 11 Nov 2023 15:41)  Source: Clean Catch Clean Catch (Midstream)  Final Report (14 Nov 2023 07:29):    >100,000 CFU/ml Escherichia coli ESBL  Organism: Escherichia coli ESBL (14 Nov 2023 07:29)  Organism: Escherichia coli ESBL (14 Nov 2023 07:29)      RADIOLOGY & ADDITIONAL TESTS:   reviewed elctronically  ASSESSMENT/PLAN: 	    Patient was seen and examined on a day of discharge . Plan of care , discharge medications and recommendations discussed with consultants and clearance for discharge obtained .Social service , case management  and medical staff are aware of plan. Family is notified. Discharge summary  is  prepared electronically-see separate document prepared by me .75minutes spent on this visit, 50% visit time spent in care co-ordination with other attendings and counselling patient  I have discussed care plan with patient and HCP,expressed understanding of problems treatment and their effect and side effects, alternatives in detail,I have asked if they have any questions and concerns and appropriately addressed them to best of my ability [phone] PAGES COMPLETED ,spoke to Dr Lisa worthington to d/c today back to [facility] as per daughter request , f/up with PCP

## 2023-11-16 NOTE — PROGRESS NOTE ADULT - PROBLEM SELECTOR PLAN 1
likely 2/2 to UTI ,poa

## 2023-11-16 NOTE — PROGRESS NOTE ADULT - ASSESSMENT
89-year-old female with history of hypertension, hyperlipidemia, A-fib with ablation on Eliquis, MR, bladder tumor, PFO, osteoporosis, spinal stenosis brought in by ambulance for possible stroke.  As per facility patient ate breakfast and had her medication between 9:45AM and 10 AM this morning and was at her baseline mental status.  Aide noticed around 11/1130 patient was leaning towards her left side, had water poured out of her mouth when she attempted to drink and had possible slurred speech.  Patient limited historian due to MR.  As per facility patient was recently in a short-term rehab facility, recently returned back to [facility] however patient has been weaker since then.< from: CT Brain Perfusion Maps Stroke (11.11.23 @ 14:12) >      hypokalemia   potassium chloride  10 mEq/100 mL IVPB 10 milliEquivalent(s) IV Intermittent every 1 hour  aMIOdarone    Tablet 200 milliGRAM(s) Oral daily    BP monitoring,continue current antihypertensive meds, low salt diet,followup with PMD in 1-2 weeks  enalapril 10 milliGRAM(s) Oral daily  metoprolol tartrate 50 milliGRAM(s) Oral two times a day    Admit for septic workup and ID evaluation,send blood and urine cx,serial lactate levels,monitor vitals closley,ivfs hydration,monitor urine output and renal profile,iv abx as per id cons    hypernatremia   will check ua , urine osmolality , urine sodium , urine uric acid , serum sodium , serum osmolality , serum uric acid , f/u with hypernatremia work up , f/u with bmp , monitor i and o     89-year-old female with history of hypertension, hyperlipidemia, A-fib with ablation on Eliquis, MR, bladder tumor, PFO, osteoporosis, spinal stenosis brought in by ambulance for possible stroke.  As per facility patient ate breakfast and had her medication between 9:45AM and 10 AM this morning and was at her baseline mental status.  Aide noticed around 11/1130 patient was leaning towards her left side, had water poured out of her mouth when she attempted to drink and had possible slurred speech.  Patient limited historian due to MR.  As per facility patient was recently in a short-term rehab facility, recently returned back to [facility] however patient has been weaker since then.< from: CT Brain Perfusion Maps Stroke (11.11.23 @ 14:12) >      hypokalemia   potassium chloride  10 mEq/100 mL IVPB 10 milliEquivalent(s) IV Intermittent every 1 hour  aMIOdarone    Tablet 200 milliGRAM(s) Oral daily    BP monitoring,continue current antihypertensive meds, low salt diet,followup with PMD in 1-2 weeks  enalapril 10 milliGRAM(s) Oral daily  metoprolol tartrate 50 milliGRAM(s) Oral two times a day    Admit for septic workup and ID evaluation,send blood and urine cx,serial lactate levels,monitor vitals closley,ivfs hydration,monitor urine output and renal profile,iv abx as per id cons    hypernatremia   will check ua , urine osmolality , urine sodium , urine uric acid , serum sodium , serum osmolality , serum uric acid , f/u with hypernatremia work up , f/u with bmp , monitor i and o     89-year-old female with history of hypertension, hyperlipidemia, A-fib with ablation on Eliquis, MR, bladder tumor, PFO, osteoporosis, spinal stenosis brought in by ambulance for possible stroke.  As per facility patient ate breakfast and had her medication between 9:45AM and 10 AM this morning and was at her baseline mental status.  Aide noticed around 11/1130 patient was leaning towards her left side, had water poured out of her mouth when she attempted to drink and had possible slurred speech.  Patient limited historian due to MR.  As per facility patient was recently in a short-term rehab facility, recently returned back to [facility] however patient has been weaker since then.< from: CT Brain Perfusion Maps Stroke (11.11.23 @ 14:12) >      hypokalemia   potassium chloride  10 mEq/100 mL IVPB 10 milliEquivalent(s) IV Intermittent every 1 hour  aMIOdarone    Tablet 200 milliGRAM(s) Oral daily    BP monitoring,continue current antihypertensive meds, low salt diet,followup with PMD in 1-2 weeks  enalapril 10 milliGRAM(s) Oral daily  metoprolol tartrate 50 milliGRAM(s) Oral two times a day    Admit for septic workup and ID evaluation,send blood and urine cx,serial lactate levels,monitor vitals closley,ivfs hydration,monitor urine output and renal profile,iv abx as per id cons    hypernatremia   will check ua , urine osmolality , urine sodium , urine uric acid , serum sodium , serum osmolality , serum uric acid , f/u with hypernatremia work up , f/u with bmp , monitor i and o

## 2023-11-16 NOTE — PROGRESS NOTE ADULT - PROBLEM SELECTOR PLAN 7
aspiration precautions

## 2023-11-16 NOTE — PROGRESS NOTE ADULT - PROBLEM SELECTOR PROBLEM 3
Transient ischemic attack (TIA)

## 2023-11-16 NOTE — PROGRESS NOTE ADULT - PROBLEM SELECTOR PLAN 6
iv fluids , serial bmp

## 2023-11-16 NOTE — PROGRESS NOTE ADULT - SUBJECTIVE AND OBJECTIVE BOX
Neurology follow up note    DEACON [id number]      Interval History:    Patient feels ok no new complaints.    Allergies    penicillin (Unknown)    Intolerances        MEDICATIONS    aMIOdarone    Tablet 200 milliGRAM(s) Oral daily  apixaban 2.5 milliGRAM(s) Oral every 12 hours  artificial  tears Solution 1 Drop(s) Both EYES four times a day  ascorbic acid 500 milliGRAM(s) Oral daily  dextrose 5%. 1000 milliLiter(s) IV Continuous <Continuous>  enalapril 10 milliGRAM(s) Oral daily  ertapenem  IVPB 1000 milliGRAM(s) IV Intermittent every 24 hours  ferrous    sulfate 325 milliGRAM(s) Oral three times a day  lactobacillus acidophilus 1 Tablet(s) Oral every 8 hours  levothyroxine 75 MICROGram(s) Oral daily  metoprolol tartrate 50 milliGRAM(s) Oral two times a day  multivitamin 1 Tablet(s) Oral daily  pantoprazole    Tablet 40 milliGRAM(s) Oral before breakfast  polyethylene glycol 3350 17 Gram(s) Oral daily              Vital Signs Last 24 Hrs  T(C): 36.7 (16 Nov 2023 04:56), Max: 37.2 (15 Nov 2023 12:25)  T(F): 98 (16 Nov 2023 04:56), Max: 98.9 (15 Nov 2023 12:25)  HR: 60 (16 Nov 2023 04:56) (60 - 76)  BP: 152/77 (16 Nov 2023 04:56) (128/66 - 152/77)  BP(mean): --  RR: 18 (16 Nov 2023 04:56) (18 - 26)  SpO2: 92% (16 Nov 2023 04:56) (92% - 96%)    Parameters below as of 16 Nov 2023 04:56  Patient On (Oxygen Delivery Method): room air      REVIEW OF SYSTEMS:  Slightly limited secondary to the patient has memory issues.  Constitutional:  She denies fever, chills, or night sweats.  Head:  No headache.  Eyes:  No double vision or blurry vision.  Ears:  No ringing in the ears.  Neck:  No neck pain.  Respiratory:  No shortness of breath.  Cardiovascular:  No chest pain.  Abdomen:  No nausea, vomiting, or abdominal pain.  Extremities/Neurological:  No numbness or tingling.  Musculoskeletal:  No joint pain.    PHYSICAL EXAMINATION:    HEENT:  Head:  Normocephalic, atraumatic.  Eyes:  No scleral icterus.  Ears:  Hearing bilaterally was intact.  NECK:  Supple.  RESPIRATORY:  Air entry bilaterally.  CARDIOVASCULAR:  S1 and S2 heard.  ABDOMEN:  Soft and nontender.  EXTREMITIES:  No clubbing or cyanosis was noted.      NEUROLOGIC:  The patient is awake and alert.   Positive signs of memory issues upon conversing with the patient.  Extraocular movements were intact.  She keeps her left eye closed, but is able to open it, which is her baseline.  Speech was fluent.  Smile symmetric.  Motor:  Bilateral upper 4/5, right lower was 3/5, left lower was 2/5 but does have history of leg issues to begin with.      LABS:  CBC Full  -  ( 15 Nov 2023 08:10 )  WBC Count : 12.35 K/uL  RBC Count : 2.83 M/uL  Hemoglobin : 8.1 g/dL  Hematocrit : 24.5 %  Platelet Count - Automated : 382 K/uL  Mean Cell Volume : 86.6 fl  Mean Cell Hemoglobin : 28.6 pg  Mean Cell Hemoglobin Concentration : 33.1 gm/dL  Auto Neutrophil # : 10.13 K/uL  Auto Lymphocyte # : 1.48 K/uL  Auto Monocyte # : 0.74 K/uL  Auto Eosinophil # : 0.00 K/uL  Auto Basophil # : 0.00 K/uL  Auto Neutrophil % : 82.0 %  Auto Lymphocyte % : 12.0 %  Auto Monocyte % : 6.0 %  Auto Eosinophil % : 0.0 %  Auto Basophil % : 0.0 %    Urinalysis Basic - ( 15 Nov 2023 08:10 )    Color: x / Appearance: x / SG: x / pH: x  Gluc: 84 mg/dL / Ketone: x  / Bili: x / Urobili: x   Blood: x / Protein: x / Nitrite: x   Leuk Esterase: x / RBC: x / WBC x   Sq Epi: x / Non Sq Epi: x / Bacteria: x      11-15    147<H>  |  119<H>  |  18  ----------------------------<  84  4.0   |  24  |  0.67    Ca    7.7<L>      15 Nov 2023 08:10    TPro  4.7<L>  /  Alb  1.7<L>  /  TBili  0.4  /  DBili  x   /  AST  46<H>  /  ALT  49  /  AlkPhos  114  11-15    Hemoglobin A1C:     LIVER FUNCTIONS - ( 15 Nov 2023 08:10 )  Alb: 1.7 g/dL / Pro: 4.7 g/dL / ALK PHOS: 114 U/L / ALT: 49 U/L / AST: 46 U/L / GGT: x           Vitamin B12         RADIOLOGY    ANALYSIS AND PLAN:  This is a 89-year-old with episode of altered mental status.  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  Examination was limited but as she appears to be at her baseline, suspect less likely this is a primary central nervous system event.  Antibiotics as needed.  For history of mild cognitive impairment versus subtle dementia, secondary to the patient's age, we would recommend supportive therapy.  For history of atrial fibrillation, risks versus benefits of anticoagulation.  For hypertension, monitor systolic blood pressure.  For history of hypothyroidism, continue the patient on Synthroid.  appears sleepy today 11/15    Spoke with daughter, Beth, at [phone] 11/15.  She understands my reasoning and thought process.    50 minutes of time was spent with the patient, plan of care, reviewing data, speaking to multidisciplinary healthcare team with greater than 50% of the time in counseling and care coordination.   Neurology follow up note    DEACON [id number]      Interval History:    Patient feels ok no new complaints.    Allergies    penicillin (Unknown)    Intolerances        MEDICATIONS    aMIOdarone    Tablet 200 milliGRAM(s) Oral daily  apixaban 2.5 milliGRAM(s) Oral every 12 hours  artificial  tears Solution 1 Drop(s) Both EYES four times a day  ascorbic acid 500 milliGRAM(s) Oral daily  dextrose 5%. 1000 milliLiter(s) IV Continuous <Continuous>  enalapril 10 milliGRAM(s) Oral daily  ertapenem  IVPB 1000 milliGRAM(s) IV Intermittent every 24 hours  ferrous    sulfate 325 milliGRAM(s) Oral three times a day  lactobacillus acidophilus 1 Tablet(s) Oral every 8 hours  levothyroxine 75 MICROGram(s) Oral daily  metoprolol tartrate 50 milliGRAM(s) Oral two times a day  multivitamin 1 Tablet(s) Oral daily  pantoprazole    Tablet 40 milliGRAM(s) Oral before breakfast  polyethylene glycol 3350 17 Gram(s) Oral daily              Vital Signs Last 24 Hrs  T(C): 36.7 (16 Nov 2023 04:56), Max: 37.2 (15 Nov 2023 12:25)  T(F): 98 (16 Nov 2023 04:56), Max: 98.9 (15 Nov 2023 12:25)  HR: 60 (16 Nov 2023 04:56) (60 - 76)  BP: 152/77 (16 Nov 2023 04:56) (128/66 - 152/77)  BP(mean): --  RR: 18 (16 Nov 2023 04:56) (18 - 26)  SpO2: 92% (16 Nov 2023 04:56) (92% - 96%)    Parameters below as of 16 Nov 2023 04:56  Patient On (Oxygen Delivery Method): room air      REVIEW OF SYSTEMS:  Slightly limited secondary to the patient has memory issues.  Constitutional:  She denies fever, chills, or night sweats.  Head:  No headache.  Eyes:  No double vision or blurry vision.  Ears:  No ringing in the ears.  Neck:  No neck pain.  Respiratory:  No shortness of breath.  Cardiovascular:  No chest pain.  Abdomen:  No nausea, vomiting, or abdominal pain.  Extremities/Neurological:  No numbness or tingling.  Musculoskeletal:  No joint pain.    PHYSICAL EXAMINATION:    HEENT:  Head:  Normocephalic, atraumatic.  Eyes:  No scleral icterus.  Ears:  Hearing bilaterally was intact.  NECK:  Supple.  RESPIRATORY:  Air entry bilaterally.  CARDIOVASCULAR:  S1 and S2 heard.  ABDOMEN:  Soft and nontender.  EXTREMITIES:  No clubbing or cyanosis was noted.      NEUROLOGIC:  The patient is awake and alert.   Positive signs of memory issues upon conversing with the patient.  Extraocular movements were intact.  She keeps her left eye closed, but is able to open it, which is her baseline.  Speech was fluent.  Smile symmetric.  Motor:  Bilateral upper 4/5, right lower was 3/5, left lower was 2/5 but does have history of leg issues to begin with.      LABS:  CBC Full  -  ( 15 Nov 2023 08:10 )  WBC Count : 12.35 K/uL  RBC Count : 2.83 M/uL  Hemoglobin : 8.1 g/dL  Hematocrit : 24.5 %  Platelet Count - Automated : 382 K/uL  Mean Cell Volume : 86.6 fl  Mean Cell Hemoglobin : 28.6 pg  Mean Cell Hemoglobin Concentration : 33.1 gm/dL  Auto Neutrophil # : 10.13 K/uL  Auto Lymphocyte # : 1.48 K/uL  Auto Monocyte # : 0.74 K/uL  Auto Eosinophil # : 0.00 K/uL  Auto Basophil # : 0.00 K/uL  Auto Neutrophil % : 82.0 %  Auto Lymphocyte % : 12.0 %  Auto Monocyte % : 6.0 %  Auto Eosinophil % : 0.0 %  Auto Basophil % : 0.0 %    Urinalysis Basic - ( 15 Nov 2023 08:10 )    Color: x / Appearance: x / SG: x / pH: x  Gluc: 84 mg/dL / Ketone: x  / Bili: x / Urobili: x   Blood: x / Protein: x / Nitrite: x   Leuk Esterase: x / RBC: x / WBC x   Sq Epi: x / Non Sq Epi: x / Bacteria: x      11-15    147<H>  |  119<H>  |  18  ----------------------------<  84  4.0   |  24  |  0.67    Ca    7.7<L>      15 Nov 2023 08:10    TPro  4.7<L>  /  Alb  1.7<L>  /  TBili  0.4  /  DBili  x   /  AST  46<H>  /  ALT  49  /  AlkPhos  114  11-15    Hemoglobin A1C:     LIVER FUNCTIONS - ( 15 Nov 2023 08:10 )  Alb: 1.7 g/dL / Pro: 4.7 g/dL / ALK PHOS: 114 U/L / ALT: 49 U/L / AST: 46 U/L / GGT: x           Vitamin B12         RADIOLOGY    ANALYSIS AND PLAN:  This is a 89-year-old with episode of altered mental status.  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  Examination was limited but as she appears to be at her baseline, suspect less likely this is a primary central nervous system event.  Antibiotics as needed.  For history of mild cognitive impairment versus subtle dementia, secondary to the patient's age, we would recommend supportive therapy.  For history of atrial fibrillation, risks versus benefits of anticoagulation.  For hypertension, monitor systolic blood pressure.  For history of hypothyroidism, continue the patient on Synthroid.  appears sleepy today 11/15    Spoke with daughter, Beth, at [phone] 11/15.  She understands my reasoning and thought process.    50 minutes of time was spent with the patient, plan of care, reviewing data, speaking to multidisciplinary healthcare team with greater than 50% of the time in counseling and care coordination.   Neurology follow up note    DEACON [id number]      Interval History:    Patient feels ok no new complaints.    Allergies    penicillin (Unknown)    Intolerances        MEDICATIONS    aMIOdarone    Tablet 200 milliGRAM(s) Oral daily  apixaban 2.5 milliGRAM(s) Oral every 12 hours  artificial  tears Solution 1 Drop(s) Both EYES four times a day  ascorbic acid 500 milliGRAM(s) Oral daily  dextrose 5%. 1000 milliLiter(s) IV Continuous <Continuous>  enalapril 10 milliGRAM(s) Oral daily  ertapenem  IVPB 1000 milliGRAM(s) IV Intermittent every 24 hours  ferrous    sulfate 325 milliGRAM(s) Oral three times a day  lactobacillus acidophilus 1 Tablet(s) Oral every 8 hours  levothyroxine 75 MICROGram(s) Oral daily  metoprolol tartrate 50 milliGRAM(s) Oral two times a day  multivitamin 1 Tablet(s) Oral daily  pantoprazole    Tablet 40 milliGRAM(s) Oral before breakfast  polyethylene glycol 3350 17 Gram(s) Oral daily              Vital Signs Last 24 Hrs  T(C): 36.7 (16 Nov 2023 04:56), Max: 37.2 (15 Nov 2023 12:25)  T(F): 98 (16 Nov 2023 04:56), Max: 98.9 (15 Nov 2023 12:25)  HR: 60 (16 Nov 2023 04:56) (60 - 76)  BP: 152/77 (16 Nov 2023 04:56) (128/66 - 152/77)  BP(mean): --  RR: 18 (16 Nov 2023 04:56) (18 - 26)  SpO2: 92% (16 Nov 2023 04:56) (92% - 96%)    Parameters below as of 16 Nov 2023 04:56  Patient On (Oxygen Delivery Method): room air      REVIEW OF SYSTEMS:  Slightly limited secondary to the patient has memory issues.  Constitutional:  She denies fever, chills, or night sweats.  Head:  No headache.  Eyes:  No double vision or blurry vision.  Ears:  No ringing in the ears.  Neck:  No neck pain.  Respiratory:  No shortness of breath.  Cardiovascular:  No chest pain.  Abdomen:  No nausea, vomiting, or abdominal pain.  Extremities/Neurological:  No numbness or tingling.  Musculoskeletal:  No joint pain.    PHYSICAL EXAMINATION:    HEENT:  Head:  Normocephalic, atraumatic.  Eyes:  No scleral icterus.  Ears:  Hearing bilaterally was intact.  NECK:  Supple.  RESPIRATORY:  Air entry bilaterally.  CARDIOVASCULAR:  S1 and S2 heard.  ABDOMEN:  Soft and nontender.  EXTREMITIES:  No clubbing or cyanosis was noted.      NEUROLOGIC:  The patient is awake and alert.   Positive signs of memory issues upon conversing with the patient.  Extraocular movements were intact.  She keeps her left eye closed, but is able to open it, which is her baseline.  Speech was fluent.  Smile symmetric.  Motor:  Bilateral upper 4/5, right lower was 3/5, left lower was 2/5 but does have history of leg issues to begin with.      LABS:  CBC Full  -  ( 15 Nov 2023 08:10 )  WBC Count : 12.35 K/uL  RBC Count : 2.83 M/uL  Hemoglobin : 8.1 g/dL  Hematocrit : 24.5 %  Platelet Count - Automated : 382 K/uL  Mean Cell Volume : 86.6 fl  Mean Cell Hemoglobin : 28.6 pg  Mean Cell Hemoglobin Concentration : 33.1 gm/dL  Auto Neutrophil # : 10.13 K/uL  Auto Lymphocyte # : 1.48 K/uL  Auto Monocyte # : 0.74 K/uL  Auto Eosinophil # : 0.00 K/uL  Auto Basophil # : 0.00 K/uL  Auto Neutrophil % : 82.0 %  Auto Lymphocyte % : 12.0 %  Auto Monocyte % : 6.0 %  Auto Eosinophil % : 0.0 %  Auto Basophil % : 0.0 %    Urinalysis Basic - ( 15 Nov 2023 08:10 )    Color: x / Appearance: x / SG: x / pH: x  Gluc: 84 mg/dL / Ketone: x  / Bili: x / Urobili: x   Blood: x / Protein: x / Nitrite: x   Leuk Esterase: x / RBC: x / WBC x   Sq Epi: x / Non Sq Epi: x / Bacteria: x      11-15    147<H>  |  119<H>  |  18  ----------------------------<  84  4.0   |  24  |  0.67    Ca    7.7<L>      15 Nov 2023 08:10    TPro  4.7<L>  /  Alb  1.7<L>  /  TBili  0.4  /  DBili  x   /  AST  46<H>  /  ALT  49  /  AlkPhos  114  11-15    Hemoglobin A1C:     LIVER FUNCTIONS - ( 15 Nov 2023 08:10 )  Alb: 1.7 g/dL / Pro: 4.7 g/dL / ALK PHOS: 114 U/L / ALT: 49 U/L / AST: 46 U/L / GGT: x           Vitamin B12         RADIOLOGY    ANALYSIS AND PLAN:  This is a 89-year-old with episode of altered mental status.  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  Examination was limited but as she appears to be at her baseline, suspect less likely this is a primary central nervous system event.  Antibiotics as needed.  For history of mild cognitive impairment versus subtle dementia, secondary to the patient's age, we would recommend supportive therapy.  For history of atrial fibrillation, risks versus benefits of anticoagulation.  For hypertension, monitor systolic blood pressure.  For history of hypothyroidism, continue the patient on Synthroid.  appears sleepy today 11/15    Spoke with daughter, Beth, at [phone] 11/15.  She understands my reasoning and thought process.    50 minutes of time was spent with the patient, plan of care, reviewing data, speaking to multidisciplinary healthcare team with greater than 50% of the time in counseling and care coordination.   Neurology follow up note    DEACON [id number]      Interval History:    Patient feels sleepy     Allergies    penicillin (Unknown)    Intolerances        MEDICATIONS    aMIOdarone    Tablet 200 milliGRAM(s) Oral daily  apixaban 2.5 milliGRAM(s) Oral every 12 hours  artificial  tears Solution 1 Drop(s) Both EYES four times a day  ascorbic acid 500 milliGRAM(s) Oral daily  dextrose 5%. 1000 milliLiter(s) IV Continuous <Continuous>  enalapril 10 milliGRAM(s) Oral daily  ertapenem  IVPB 1000 milliGRAM(s) IV Intermittent every 24 hours  ferrous    sulfate 325 milliGRAM(s) Oral three times a day  lactobacillus acidophilus 1 Tablet(s) Oral every 8 hours  levothyroxine 75 MICROGram(s) Oral daily  metoprolol tartrate 50 milliGRAM(s) Oral two times a day  multivitamin 1 Tablet(s) Oral daily  pantoprazole    Tablet 40 milliGRAM(s) Oral before breakfast  polyethylene glycol 3350 17 Gram(s) Oral daily              Vital Signs Last 24 Hrs  T(C): 36.7 (16 Nov 2023 04:56), Max: 37.2 (15 Nov 2023 12:25)  T(F): 98 (16 Nov 2023 04:56), Max: 98.9 (15 Nov 2023 12:25)  HR: 60 (16 Nov 2023 04:56) (60 - 76)  BP: 152/77 (16 Nov 2023 04:56) (128/66 - 152/77)  BP(mean): --  RR: 18 (16 Nov 2023 04:56) (18 - 26)  SpO2: 92% (16 Nov 2023 04:56) (92% - 96%)    Parameters below as of 16 Nov 2023 04:56  Patient On (Oxygen Delivery Method): room air      REVIEW OF SYSTEMS:  Slightly limited secondary to the patient has memory issues.  Constitutional:  She denies fever, chills, or night sweats.  Head:  No headache.  Eyes:  No double vision or blurry vision.  Ears:  No ringing in the ears.  Neck:  No neck pain.  Respiratory:  No shortness of breath.  Cardiovascular:  No chest pain.  Abdomen:  No nausea, vomiting, or abdominal pain.  Extremities/Neurological:  No numbness or tingling.  Musculoskeletal:  No joint pain.    PHYSICAL EXAMINATION:    HEENT:  Head:  Normocephalic, atraumatic.  Eyes:  No scleral icterus.  Ears:  Hearing bilaterally was intact.  NECK:  Supple.  RESPIRATORY:  Air entry bilaterally.  CARDIOVASCULAR:  S1 and S2 heard.  ABDOMEN:  Soft and nontender.  EXTREMITIES:  No clubbing or cyanosis was noted.      NEUROLOGIC:  The patient is sleepy.  Positive signs of memory issues upon conversing with the patient.  Extraocular movements were intact.  She keeps her left eye closed, but is able to open it, which is her baseline.  Speech was fluent.  Smile symmetric.  Motor:  Bilateral upper 4/5, right lower was 3/5, left lower was 2/5 but does have history of leg issues to begin with.      LABS:  CBC Full  -  ( 15 Nov 2023 08:10 )  WBC Count : 12.35 K/uL  RBC Count : 2.83 M/uL  Hemoglobin : 8.1 g/dL  Hematocrit : 24.5 %  Platelet Count - Automated : 382 K/uL  Mean Cell Volume : 86.6 fl  Mean Cell Hemoglobin : 28.6 pg  Mean Cell Hemoglobin Concentration : 33.1 gm/dL  Auto Neutrophil # : 10.13 K/uL  Auto Lymphocyte # : 1.48 K/uL  Auto Monocyte # : 0.74 K/uL  Auto Eosinophil # : 0.00 K/uL  Auto Basophil # : 0.00 K/uL  Auto Neutrophil % : 82.0 %  Auto Lymphocyte % : 12.0 %  Auto Monocyte % : 6.0 %  Auto Eosinophil % : 0.0 %  Auto Basophil % : 0.0 %    Urinalysis Basic - ( 15 Nov 2023 08:10 )    Color: x / Appearance: x / SG: x / pH: x  Gluc: 84 mg/dL / Ketone: x  / Bili: x / Urobili: x   Blood: x / Protein: x / Nitrite: x   Leuk Esterase: x / RBC: x / WBC x   Sq Epi: x / Non Sq Epi: x / Bacteria: x      11-15    147<H>  |  119<H>  |  18  ----------------------------<  84  4.0   |  24  |  0.67    Ca    7.7<L>      15 Nov 2023 08:10    TPro  4.7<L>  /  Alb  1.7<L>  /  TBili  0.4  /  DBili  x   /  AST  46<H>  /  ALT  49  /  AlkPhos  114  11-15    Hemoglobin A1C:     LIVER FUNCTIONS - ( 15 Nov 2023 08:10 )  Alb: 1.7 g/dL / Pro: 4.7 g/dL / ALK PHOS: 114 U/L / ALT: 49 U/L / AST: 46 U/L / GGT: x           Vitamin B12         RADIOLOGY    ANALYSIS AND PLAN:  This is a 89-year-old with episode of altered mental status.  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  Examination was limited but as she appears to be at her baseline, suspect less likely this is a primary central nervous system event.  Possible UTI Antibiotics as needed.  For history of mild cognitive impairment versus subtle dementia, secondary to the patient's age, we would recommend supportive therapy.  For history of atrial fibrillation, risks versus benefits of anticoagulation.  For hypertension, monitor systolic blood pressure.  For history of hypothyroidism, continue the patient on Synthroid.  appears sleepy today 11/16    Spoke with daughter, Beth, at [phone] 11/15.  She understands my reasoning and thought process.    50 minutes of time was spent with the patient, plan of care, reviewing data, speaking to multidisciplinary healthcare team with greater than 50% of the time in counseling and care coordination.   Neurology follow up note    DEACON [id number]      Interval History:    Patient feels sleepy     Allergies    penicillin (Unknown)    Intolerances        MEDICATIONS    aMIOdarone    Tablet 200 milliGRAM(s) Oral daily  apixaban 2.5 milliGRAM(s) Oral every 12 hours  artificial  tears Solution 1 Drop(s) Both EYES four times a day  ascorbic acid 500 milliGRAM(s) Oral daily  dextrose 5%. 1000 milliLiter(s) IV Continuous <Continuous>  enalapril 10 milliGRAM(s) Oral daily  ertapenem  IVPB 1000 milliGRAM(s) IV Intermittent every 24 hours  ferrous    sulfate 325 milliGRAM(s) Oral three times a day  lactobacillus acidophilus 1 Tablet(s) Oral every 8 hours  levothyroxine 75 MICROGram(s) Oral daily  metoprolol tartrate 50 milliGRAM(s) Oral two times a day  multivitamin 1 Tablet(s) Oral daily  pantoprazole    Tablet 40 milliGRAM(s) Oral before breakfast  polyethylene glycol 3350 17 Gram(s) Oral daily              Vital Signs Last 24 Hrs  T(C): 36.7 (16 Nov 2023 04:56), Max: 37.2 (15 Nov 2023 12:25)  T(F): 98 (16 Nov 2023 04:56), Max: 98.9 (15 Nov 2023 12:25)  HR: 60 (16 Nov 2023 04:56) (60 - 76)  BP: 152/77 (16 Nov 2023 04:56) (128/66 - 152/77)  BP(mean): --  RR: 18 (16 Nov 2023 04:56) (18 - 26)  SpO2: 92% (16 Nov 2023 04:56) (92% - 96%)    Parameters below as of 16 Nov 2023 04:56  Patient On (Oxygen Delivery Method): room air      REVIEW OF SYSTEMS:  Slightly limited secondary to the patient has memory issues.  Constitutional:  She denies fever, chills, or night sweats.  Head:  No headache.  Eyes:  No double vision or blurry vision.  Ears:  No ringing in the ears.  Neck:  No neck pain.  Respiratory:  No shortness of breath.  Cardiovascular:  No chest pain.  Abdomen:  No nausea, vomiting, or abdominal pain.  Extremities/Neurological:  No numbness or tingling.  Musculoskeletal:  No joint pain.    PHYSICAL EXAMINATION:    HEENT:  Head:  Normocephalic, atraumatic.  Eyes:  No scleral icterus.  Ears:  Hearing bilaterally was intact.  NECK:  Supple.  RESPIRATORY:  Air entry bilaterally.  CARDIOVASCULAR:  S1 and S2 heard.  ABDOMEN:  Soft and nontender.  EXTREMITIES:  No clubbing or cyanosis was noted.      NEUROLOGIC:  The patient is sleepy.  Positive signs of memory issues upon conversing with the patient.  Extraocular movements were intact.  She keeps her left eye closed, but is able to open it, which is her baseline.  Speech was fluent.  Smile symmetric.  Motor:  Bilateral upper 4/5, right lower was 3/5, left lower was 2/5 but does have history of leg issues to begin with.      LABS:  CBC Full  -  ( 15 Nov 2023 08:10 )  WBC Count : 12.35 K/uL  RBC Count : 2.83 M/uL  Hemoglobin : 8.1 g/dL  Hematocrit : 24.5 %  Platelet Count - Automated : 382 K/uL  Mean Cell Volume : 86.6 fl  Mean Cell Hemoglobin : 28.6 pg  Mean Cell Hemoglobin Concentration : 33.1 gm/dL  Auto Neutrophil # : 10.13 K/uL  Auto Lymphocyte # : 1.48 K/uL  Auto Monocyte # : 0.74 K/uL  Auto Eosinophil # : 0.00 K/uL  Auto Basophil # : 0.00 K/uL  Auto Neutrophil % : 82.0 %  Auto Lymphocyte % : 12.0 %  Auto Monocyte % : 6.0 %  Auto Eosinophil % : 0.0 %  Auto Basophil % : 0.0 %    Urinalysis Basic - ( 15 Nov 2023 08:10 )    Color: x / Appearance: x / SG: x / pH: x  Gluc: 84 mg/dL / Ketone: x  / Bili: x / Urobili: x   Blood: x / Protein: x / Nitrite: x   Leuk Esterase: x / RBC: x / WBC x   Sq Epi: x / Non Sq Epi: x / Bacteria: x      11-15    147<H>  |  119<H>  |  18  ----------------------------<  84  4.0   |  24  |  0.67    Ca    7.7<L>      15 Nov 2023 08:10    TPro  4.7<L>  /  Alb  1.7<L>  /  TBili  0.4  /  DBili  x   /  AST  46<H>  /  ALT  49  /  AlkPhos  114  11-15    Hemoglobin A1C:     LIVER FUNCTIONS - ( 15 Nov 2023 08:10 )  Alb: 1.7 g/dL / Pro: 4.7 g/dL / ALK PHOS: 114 U/L / ALT: 49 U/L / AST: 46 U/L / GGT: x           Vitamin B12         RADIOLOGY    ANALYSIS AND PLAN:  This is a 89-year-old with episode of altered mental status.  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  Examination was limited but as she appears to be at her baseline, suspect less likely this is a primary central nervous system event.  Possible UTI Antibiotics as needed.  For history of mild cognitive impairment versus subtle dementia, secondary to the patient's age, we would recommend supportive therapy.  For history of atrial fibrillation, risks versus benefits of anticoagulation.  For hypertension, monitor systolic blood pressure.  For history of hypothyroidism, continue the patient on Synthroid.  appears sleepy today 11/16    Spoke with daughter, Beth, at [phone] 11/15.  She understands my reasoning and thought process.    50 minutes of time was spent with the patient, plan of care, reviewing data, speaking to multidisciplinary healthcare team with greater than 50% of the time in counseling and care coordination.   Neurology follow up note    DEACON [id number]      Interval History:    Patient feels sleepy     Allergies    penicillin (Unknown)    Intolerances        MEDICATIONS    aMIOdarone    Tablet 200 milliGRAM(s) Oral daily  apixaban 2.5 milliGRAM(s) Oral every 12 hours  artificial  tears Solution 1 Drop(s) Both EYES four times a day  ascorbic acid 500 milliGRAM(s) Oral daily  dextrose 5%. 1000 milliLiter(s) IV Continuous <Continuous>  enalapril 10 milliGRAM(s) Oral daily  ertapenem  IVPB 1000 milliGRAM(s) IV Intermittent every 24 hours  ferrous    sulfate 325 milliGRAM(s) Oral three times a day  lactobacillus acidophilus 1 Tablet(s) Oral every 8 hours  levothyroxine 75 MICROGram(s) Oral daily  metoprolol tartrate 50 milliGRAM(s) Oral two times a day  multivitamin 1 Tablet(s) Oral daily  pantoprazole    Tablet 40 milliGRAM(s) Oral before breakfast  polyethylene glycol 3350 17 Gram(s) Oral daily              Vital Signs Last 24 Hrs  T(C): 36.7 (16 Nov 2023 04:56), Max: 37.2 (15 Nov 2023 12:25)  T(F): 98 (16 Nov 2023 04:56), Max: 98.9 (15 Nov 2023 12:25)  HR: 60 (16 Nov 2023 04:56) (60 - 76)  BP: 152/77 (16 Nov 2023 04:56) (128/66 - 152/77)  BP(mean): --  RR: 18 (16 Nov 2023 04:56) (18 - 26)  SpO2: 92% (16 Nov 2023 04:56) (92% - 96%)    Parameters below as of 16 Nov 2023 04:56  Patient On (Oxygen Delivery Method): room air      REVIEW OF SYSTEMS:  Slightly limited secondary to the patient has memory issues.  Constitutional:  She denies fever, chills, or night sweats.  Head:  No headache.  Eyes:  No double vision or blurry vision.  Ears:  No ringing in the ears.  Neck:  No neck pain.  Respiratory:  No shortness of breath.  Cardiovascular:  No chest pain.  Abdomen:  No nausea, vomiting, or abdominal pain.  Extremities/Neurological:  No numbness or tingling.  Musculoskeletal:  No joint pain.    PHYSICAL EXAMINATION:    HEENT:  Head:  Normocephalic, atraumatic.  Eyes:  No scleral icterus.  Ears:  Hearing bilaterally was intact.  NECK:  Supple.  RESPIRATORY:  Air entry bilaterally.  CARDIOVASCULAR:  S1 and S2 heard.  ABDOMEN:  Soft and nontender.  EXTREMITIES:  No clubbing or cyanosis was noted.      NEUROLOGIC:  The patient is sleepy.  Positive signs of memory issues upon conversing with the patient.  Extraocular movements were intact.  She keeps her left eye closed, but is able to open it, which is her baseline.  Speech was fluent.  Smile symmetric.  Motor:  Bilateral upper 4/5, right lower was 3/5, left lower was 2/5 but does have history of leg issues to begin with.      LABS:  CBC Full  -  ( 15 Nov 2023 08:10 )  WBC Count : 12.35 K/uL  RBC Count : 2.83 M/uL  Hemoglobin : 8.1 g/dL  Hematocrit : 24.5 %  Platelet Count - Automated : 382 K/uL  Mean Cell Volume : 86.6 fl  Mean Cell Hemoglobin : 28.6 pg  Mean Cell Hemoglobin Concentration : 33.1 gm/dL  Auto Neutrophil # : 10.13 K/uL  Auto Lymphocyte # : 1.48 K/uL  Auto Monocyte # : 0.74 K/uL  Auto Eosinophil # : 0.00 K/uL  Auto Basophil # : 0.00 K/uL  Auto Neutrophil % : 82.0 %  Auto Lymphocyte % : 12.0 %  Auto Monocyte % : 6.0 %  Auto Eosinophil % : 0.0 %  Auto Basophil % : 0.0 %    Urinalysis Basic - ( 15 Nov 2023 08:10 )    Color: x / Appearance: x / SG: x / pH: x  Gluc: 84 mg/dL / Ketone: x  / Bili: x / Urobili: x   Blood: x / Protein: x / Nitrite: x   Leuk Esterase: x / RBC: x / WBC x   Sq Epi: x / Non Sq Epi: x / Bacteria: x      11-15    147<H>  |  119<H>  |  18  ----------------------------<  84  4.0   |  24  |  0.67    Ca    7.7<L>      15 Nov 2023 08:10    TPro  4.7<L>  /  Alb  1.7<L>  /  TBili  0.4  /  DBili  x   /  AST  46<H>  /  ALT  49  /  AlkPhos  114  11-15    Hemoglobin A1C:     LIVER FUNCTIONS - ( 15 Nov 2023 08:10 )  Alb: 1.7 g/dL / Pro: 4.7 g/dL / ALK PHOS: 114 U/L / ALT: 49 U/L / AST: 46 U/L / GGT: x           Vitamin B12         RADIOLOGY    ANALYSIS AND PLAN:  This is a 89-year-old with episode of altered mental status.  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  Examination was limited but as she appears to be at her baseline, suspect less likely this is a primary central nervous system event.  Possible UTI Antibiotics as needed.  For history of mild cognitive impairment versus subtle dementia, secondary to the patient's age, we would recommend supportive therapy.  For history of atrial fibrillation, risks versus benefits of anticoagulation.  For hypertension, monitor systolic blood pressure.  For history of hypothyroidism, continue the patient on Synthroid.  appears sleepy today 11/16    Spoke with daughter, Beth, at [phone] 11/15.  She understands my reasoning and thought process.    50 minutes of time was spent with the patient, plan of care, reviewing data, speaking to multidisciplinary healthcare team with greater than 50% of the time in counseling and care coordination.

## 2023-11-16 NOTE — PROGRESS NOTE ADULT - SUBJECTIVE AND OBJECTIVE BOX
Date/Time Patient Seen:  		  Referring MD:   Data Reviewed	       Patient is a 89y old  Female who presents with a chief complaint of Transient cerebral ischemia     (13 Nov 2023 13:48)      Subjective/HPI     PAST MEDICAL & SURGICAL HISTORY:  HTN (hypertension)    Afib    Spinal stenosis    PFO (patent foramen ovale)    Degeneration macular    Osteoporosis    History of complete heart block    Hypothyroidism    Cardiac pacemaker          Medication list         MEDICATIONS  (STANDING):  aMIOdarone    Tablet 200 milliGRAM(s) Oral daily  apixaban 2.5 milliGRAM(s) Oral every 12 hours  artificial  tears Solution 1 Drop(s) Both EYES four times a day  ascorbic acid 500 milliGRAM(s) Oral daily  dextrose 5%. 1000 milliLiter(s) (50 mL/Hr) IV Continuous <Continuous>  enalapril 10 milliGRAM(s) Oral daily  ertapenem  IVPB 1000 milliGRAM(s) IV Intermittent every 24 hours  ferrous    sulfate 325 milliGRAM(s) Oral three times a day  lactobacillus acidophilus 1 Tablet(s) Oral every 8 hours  levothyroxine 75 MICROGram(s) Oral daily  metoprolol tartrate 50 milliGRAM(s) Oral two times a day  multivitamin 1 Tablet(s) Oral daily  pantoprazole    Tablet 40 milliGRAM(s) Oral before breakfast  polyethylene glycol 3350 17 Gram(s) Oral daily    MEDICATIONS  (PRN):         Vitals log        ICU Vital Signs Last 24 Hrs  T(C): 36.7 (16 Nov 2023 04:56), Max: 37.2 (15 Nov 2023 12:25)  T(F): 98 (16 Nov 2023 04:56), Max: 98.9 (15 Nov 2023 12:25)  HR: 60 (16 Nov 2023 04:56) (60 - 76)  BP: 152/77 (16 Nov 2023 04:56) (128/66 - 152/77)  BP(mean): --  ABP: --  ABP(mean): --  RR: 18 (16 Nov 2023 04:56) (18 - 26)  SpO2: 92% (16 Nov 2023 04:56) (92% - 96%)    O2 Parameters below as of 16 Nov 2023 04:56  Patient On (Oxygen Delivery Method): room air                 Input and Output:  I&O's Detail    14 Nov 2023 07:01  -  15 Nov 2023 07:00  --------------------------------------------------------  IN:    dextrose 5% + sodium chloride 0.45% w/ Additives: 150 mL    Oral Fluid: 120 mL  Total IN: 270 mL    OUT:    Voided (mL): 300 mL  Total OUT: 300 mL    Total NET: -30 mL      15 Nov 2023 07:01  -  16 Nov 2023 05:43  --------------------------------------------------------  IN:    Oral Fluid: 420 mL  Total IN: 420 mL    OUT:  Total OUT: 0 mL    Total NET: 420 mL          Lab Data                        8.1    12.35 )-----------( 382      ( 15 Nov 2023 08:10 )             24.5     11-15    147<H>  |  119<H>  |  18  ----------------------------<  84  4.0   |  24  |  0.67    Ca    7.7<L>      15 Nov 2023 08:10    TPro  4.7<L>  /  Alb  1.7<L>  /  TBili  0.4  /  DBili  x   /  AST  46<H>  /  ALT  49  /  AlkPhos  114  11-15            Review of Systems	      Objective     Physical Examination    heart s1s2  lung dc bS  head nc      Pertinent Lab findings & Imaging      Mela:  NO   Adequate UO     I&O's Detail    14 Nov 2023 07:01  -  15 Nov 2023 07:00  --------------------------------------------------------  IN:    dextrose 5% + sodium chloride 0.45% w/ Additives: 150 mL    Oral Fluid: 120 mL  Total IN: 270 mL    OUT:    Voided (mL): 300 mL  Total OUT: 300 mL    Total NET: -30 mL      15 Nov 2023 07:01  -  16 Nov 2023 05:43  --------------------------------------------------------  IN:    Oral Fluid: 420 mL  Total IN: 420 mL    OUT:  Total OUT: 0 mL    Total NET: 420 mL               Discussed with:     Cultures:	        Radiology

## 2023-11-16 NOTE — PROGRESS NOTE ADULT - ASSESSMENT
89-year-old female with history of hypertension, hyperlipidemia, A-fib with ablation on Eliquis, MR, bladder tumor, PFO, osteoporosis, spinal stenosis brought in by ambulance for possible stroke.     eval cva  spinal stenosis  OP  OA  HLD  HTN  AF  Bladder tumor  PFO    on ABX  on IVF  vs noted    on AMIO  cns imaging reviewed  assist with needs  neuro follow up  HOB elev  asp prec  SLP eval  oral hygiene  PO diet  GOC - DNR

## 2023-11-16 NOTE — DISCHARGE NOTE NURSING/CASE MANAGEMENT/SOCIAL WORK - NSDCPEFALRISK_GEN_ALL_CORE
For information on Fall & Injury Prevention, visit: https://www.[facility]/news/fall-prevention-protects-and-maintains-health-and-mobility OR  https://www.[facility]/news/fall-prevention-tips-[email or web address]-injury OR  https://www.cdc.gov/steadi/patient.html For information on Fall & Injury Prevention, visit: https://www.[facility]/news/fall-prevention-protects-and-maintains-health-and-mobility OR  https://www.[facility]/news/fall-prevention-tips-to-avoid-injury OR  https://www.cdc.gov/steadi/patient.html For information on Fall & Injury Prevention, visit: https://www.[facility]/news/fall-prevention-protects-and-maintains-health-and-mobility OR  https://www.[facility]/news/fall-prevention-tips-to-avoid-injury OR  https://www.cdc.gov/steadi/patient.html

## 2023-11-16 NOTE — CAREGIVER ENGAGEMENT NOTE - CAREGIVER OUTREACH NOTES - FREE TEXT
Patient is medically cleared for discharge today. CM spoke to Luana RN at [facility] [phone] who confirmed they have received the documentation yesterday and are aware that the patient is returning today. PIERRE spoke to Ana María at [facility] [phone] who stated they can't provide 2 person assist for transfers for wheelchair. Ambulance arranged for 12pm today. Referral to [facility] has been accepted for SOC 11/17/23. PIERRE spoke to [facility] [phone] who confirmed the patient has an aide 6 hours a day/7 days a week and the aide will be available tomorrow morning-Luana was made aware at [facility] and stated they can manage the patient. CM met with the patient -alert and oriented x 2 on my assessment and spoke to the patient's daughter Beth regarding the stated above. Beth verbalized understanding of the transition plan and is in agreement. Bedside RN aware of plan. CM remains available.  Patient is medically cleared for discharge today. CM spoke to Luana RN at [facility] [phone] who confirmed they have received the documentation yesterday and are aware that the patient is returning today. PIERRE spoke to Ana María at [facility] [phone] who stated they can't provide 2 person assist for transfers for wheelchair. Ambulance arranged for 12pm today. Referral to [facility] has been accepted for SOC 11/17/23. PIERRE spoke to [facility] [phone] who confirmed the patient has an aide 6 hours a day/7 days a week and the aide will be available tomorrow morning-Luana was made aware at [facility] and stated they can manage the patient. CM met with the patient -alert and oriented x 2 on my assessment and spoke to the patient's daughter Beth regarding the stated above. Beth verbalized understanding of the transition plan and is in agreement. Bedside RN aware of plan. CM remains available.  Patient is medically cleared for discharge today. CM spoke to Luana RN at [facility] [phone] who confirmed they have received the documentation yesterday and are aware that the patient is returning today. PIERRE spoke to Ana María at [facility] [phone] who stated they can't provide 2 person assist for transfers for wheelchair. Ambulance arranged for 12pm today. Referral to [facility] has been accepted for SOC 11/17/23. PIERRE spoke to [facility] [phone] who confirmed the patient has an aide 6 hours a day/7 days a week and the aide will be available tomorrow morning-Luana was made aware at [facility] and stated they can manage the patient. CM met with the patient -alert and oriented x 2 on my assessment and spoke to the patient's daughter Beth regarding the stated above. Beth verbalized understanding of the transition plan and is in agreement. Bedside RN aware of plan. CM remains available.

## 2023-11-16 NOTE — DISCHARGE NOTE NURSING/CASE MANAGEMENT/SOCIAL WORK - NSSCTYPOFSERV_GEN_ALL_CORE
Visiting Nurse/Physical Therapy- you should receive a call within 24-48 hours to schedule the first visit. If you have not received a call please contact the agency at the number listed above.

## 2023-11-16 NOTE — PROGRESS NOTE ADULT - PROVIDER SPECIALTY LIST ADULT
Infectious Disease
Nephrology
Nephrology
Neurology
Neurology
Palliative Care
Hospitalist
Neurology
Infectious Disease
Nephrology
Palliative Care
Nephrology
Nephrology
Hospitalist

## 2023-11-16 NOTE — DISCHARGE NOTE NURSING/CASE MANAGEMENT/SOCIAL WORK - PATIENT PORTAL LINK FT
You can access the FollowMyHealth Patient Portal offered by [facility] by registering at the following website: [email or web address]. By joining [employer]’s FollowMyHealth portal, you will also be able to view your health information using other applications (apps) compatible with our system. You can access the FollowMyHealth Patient Portal offered by [facility] by registering at the following website: [email or web address]. By joining [employer]’s FollowMyHealth portal, you will also be able to view your health information using other applications (apps) compatible with our system. You can access the FollowMyHealth Patient Portal offered by [facility] by registering at the following website: [email or web address]. By joining [employer]’s FollowMyHealth portal, you will also be able to view your health information using other applications (apps) compatible with our system.

## 2023-11-16 NOTE — PROGRESS NOTE ADULT - PROBLEM SELECTOR PLAN 3
suspected but unlikely  , neuro eval is noted -ams metabolic possible UTI as per Dr Noble   antibiotics a needed  neurologic wise stable  spoke to dtr on a phone ,requests to speak to ID about abx prophylaxis
suspected , neuro eval requested
suspected but unlikely  , neuro eval is noted -ams metabolic possible UTI as per Dr Noble   antibiotics a needed  neurologic wise stable  spoke to dtr on a phone ,requests to speak to ID about abx prophylaxis

## 2023-12-11 RX ORDER — ALBUTEROL 90 UG/1
1 AEROSOL, METERED ORAL
Refills: 0 | DISCHARGE

## 2023-12-11 RX ORDER — ACETAMINOPHEN 500 MG
0 TABLET ORAL
Qty: 0 | Refills: 0 | DISCHARGE

## 2023-12-11 RX ORDER — LEVOTHYROXINE SODIUM 125 MCG
1 TABLET ORAL
Qty: 0 | Refills: 0 | DISCHARGE

## 2023-12-11 RX ORDER — FUROSEMIDE 40 MG
1 TABLET ORAL
Qty: 0 | Refills: 0 | DISCHARGE

## 2023-12-11 RX ORDER — ASCORBIC ACID 60 MG
1 TABLET,CHEWABLE ORAL
Refills: 0 | DISCHARGE

## 2023-12-11 RX ORDER — APIXABAN 2.5 MG/1
1 TABLET, FILM COATED ORAL
Refills: 0 | DISCHARGE

## 2023-12-11 RX ORDER — CALCIUM CARBONATE 500(1250)
500 TABLET ORAL
Qty: 0 | Refills: 0 | DISCHARGE

## 2023-12-11 RX ORDER — ACETAMINOPHEN 500 MG
2 TABLET ORAL
Refills: 0 | DISCHARGE

## 2023-12-11 RX ORDER — MIRABEGRON 50 MG/1
1 TABLET, EXTENDED RELEASE ORAL
Qty: 0 | Refills: 0 | DISCHARGE

## 2023-12-11 RX ORDER — DOCUSATE SODIUM 100 MG
2 CAPSULE ORAL
Refills: 0 | DISCHARGE

## 2023-12-11 RX ORDER — FAMOTIDINE 10 MG/ML
0 INJECTION INTRAVENOUS
Qty: 0 | Refills: 0 | DISCHARGE

## 2023-12-11 RX ORDER — APIXABAN 2.5 MG/1
1 TABLET, FILM COATED ORAL
Qty: 0 | Refills: 0 | DISCHARGE

## 2023-12-11 RX ORDER — MIRABEGRON 50 MG/1
1 TABLET, EXTENDED RELEASE ORAL
Refills: 0 | DISCHARGE

## 2023-12-11 RX ORDER — CHOLECALCIFEROL (VITAMIN D3) 125 MCG
1 CAPSULE ORAL
Qty: 0 | Refills: 0 | DISCHARGE

## 2023-12-11 RX ORDER — NYSTATIN CREAM 100000 [USP'U]/G
1 CREAM TOPICAL
Refills: 0 | DISCHARGE

## 2023-12-11 RX ORDER — FERROUS SULFATE 325(65) MG
1 TABLET ORAL
Refills: 0 | DISCHARGE

## 2023-12-11 RX ORDER — FERROUS SULFATE 325(65) MG
0 TABLET ORAL
Qty: 0 | Refills: 0 | DISCHARGE

## 2023-12-11 RX ORDER — CLINDAMYCIN PHOSPHATE GEL USP, 1% 10 MG/G
1 GEL TOPICAL
Qty: 0 | Refills: 0 | DISCHARGE

## 2023-12-11 RX ORDER — METOPROLOL TARTRATE 50 MG
0 TABLET ORAL
Qty: 0 | Refills: 0 | DISCHARGE

## 2023-12-11 RX ORDER — POLYETHYLENE GLYCOL 3350 17 G/17G
17 POWDER, FOR SOLUTION ORAL
Refills: 0 | DISCHARGE

## 2023-12-11 RX ORDER — CLINDAMYCIN PHOSPHATE GEL USP, 1% 10 MG/G
1 GEL TOPICAL
Refills: 0 | DISCHARGE

## 2023-12-11 RX ORDER — POTASSIUM CHLORIDE 20 MEQ
0 PACKET (EA) ORAL
Qty: 0 | Refills: 0 | DISCHARGE

## 2023-12-11 RX ORDER — METOPROLOL TARTRATE 50 MG
1 TABLET ORAL
Refills: 0 | DISCHARGE

## 2023-12-11 RX ORDER — AMIODARONE HYDROCHLORIDE 400 MG/1
1 TABLET ORAL
Refills: 0 | DISCHARGE

## 2023-12-11 RX ORDER — MULTIVIT-MIN/FERROUS GLUCONATE 9 MG/15 ML
1 LIQUID (ML) ORAL
Refills: 0 | DISCHARGE

## 2023-12-11 RX ORDER — LEVOTHYROXINE SODIUM 125 MCG
1 TABLET ORAL
Refills: 0 | DISCHARGE

## 2023-12-11 RX ORDER — AMIODARONE HYDROCHLORIDE 400 MG/1
1 TABLET ORAL
Qty: 0 | Refills: 0 | DISCHARGE

## 2023-12-11 RX ORDER — POLYETHYLENE GLYCOL 3350 17 G/17G
0 POWDER, FOR SOLUTION ORAL
Qty: 0 | Refills: 0 | DISCHARGE

## 2024-02-05 NOTE — ED PROVIDER NOTE - DISCHARGE DATE
"Subjective     Alicia Lan is a 71 y.o. female who presents with   Chief Complaint   Patient presents with    Gynecologic Exam       History of Present Illness     She was on Carvediolol 6.25 twice a day and has a flushed  feeling on Saturday and then only 3.125 the whole day.  Had been taking 6.25mg BID and was running at goal but the feeling on Saturday scared her.     She has issues with diarrhea intermittently.  She has gluten issues and seems to be related to Carvedilol at times.  Chronic issue but seems a little different.     Shortness of breath has gotten better.    She's still having urinary leakage.  She felt like she had a UTI but the culture did not reveal that.  There's no burning or discomfort now.   She takes HRT and uses Combipatch for two weeks and then estrogen only for 2 weeks and has withdrawal bleeding every month.  She's been on this since when she lived in [place] at age 46.  No dysparunia.     She doesn't feel \"right\" overall and started with a respiratory infection over the holidays.              Review of Systems     Objective     BP (!) 181/112   Pulse 70   Ht 160 cm (62.99\")   Wt 60.8 kg (134 lb)   SpO2 97%   BMI 23.74 kg/m²     Physical Exam  Constitutional:       Appearance: Normal appearance.   Genitourinary:     General: Normal vulva.      Pubic Area: No rash.       Vagina: Normal.      Cervix: Normal.      Uterus: Normal.       Adnexa:         Left: Tenderness (mild) present.       Comments: Slightly left sided placed cervix/uterus that is slightly tender on exam.  Neurological:      Mental Status: She is alert.   Psychiatric:         Behavior: Behavior normal.         Thought Content: Thought content normal.         Procedures     Assessment & Plan   Diagnoses and all orders for this visit:    1. Loose stools (Primary)  Assessment & Plan:  With urgency    Orders:  -     Stool Culture (Reference Lab) - Stool, Per Rectum  -     Clostridioides difficile EIA - Stool, Per " Rectum  -     Ova & Parasite Examination - , Per Rectum    2. Urge incontinence of urine    3. Essential hypertension  Assessment & Plan:  Uncontrolled and skipping carvedilol doses.      4. 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 Abdominal pain, left lateral  Assessment & Plan:  With ordered CT and colonoscopy    Orders:  -     Stool Culture (Reference Lab) - Stool, Per Rectum  -     Clostridioides difficile EIA - Stool, Per Rectum  -     Ova & Parasite Examination - , Per Rectum         Discussion    Patient Instructions   We discussed resuming the Carvedilol regularly and you agreed.    We did your pap today but you still need to see a gynecologist.  I'd like to see your hormone regimen adjusted and you've been on this regimen since age 46 and there has been more research on HRT.  You are having progestion withdrawal periods every month at 71 because of switching from Combipatch to Vivelle every 2 weeks and there are other options.  The pap smear today and the CT scan will be very reassuring but I'm hoping a gynecologist can come up with a better option for your hormones.  I discussed just stopping the plain estrogen and using the combined patch routinely and that would stop the cycles.    Keep your upcoming colonoscopy and CT scan.              Klaudia Britt MD            11-Aug-2022

## 2024-02-28 NOTE — PROGRESS NOTE ADULT - ASSESSMENT
REASON FOR VISIT  .. Management of problems listed below      PHYSICAL EXAM    HEENT Unremarkable  atraumatic   RESP Fair air entry  Harsh breath sound   CARDIAC S1 S2 No S3     NO JVD    ABDOMEN No hepatosplenomegaly   PEDAL EDEMA present No calf tenderness  NO rash       GENERAL DATA .     GOC.    .. 8/26/2023 dnr  ICU STAY.   .. none  COVID.   .. scv2 8/21/2023 (-)    BEST PRACTICE ISSUES.    HOB ELEVATN.   .. Yes  DVT PPLX.   ..  8/20/2023 apixaba 2.5 x 2 (af)     SPEECH SWALLOW RECOMMENDATIONS.    ..    8/21/2023 soft bite     DIET.    ..  8/20/2023 soft bite size   IV fl.  .. 8/22/2023 D5 1/2 ns  50   STRESS ULCER PPLX.   ..    8/20/2023 protonix 40   INFECTION PPLX.   ..     ALLGY.  ..    pncl                     WT.  ..  8/20/2023 56  BMI.  ..   8/20/2023 21    PROCEDURES.  ..     ABGS.   .     VS/ PO/IO/ VENT/ DRIPS.   8/26/2023 afeb 98 170/80   8/26/2023 ra 91%     DOA C/C.     . 8/20/2023 · Chief Complaint Quote sent by [employer] [facility] for cough x 2 weeks. negative cxr and covid swab. poor PO intake today.           INITIAL PRESENTATION.   . 8/20/2023 89-year-old female with history of hypothyroidism, A-fib on Eliquis, recent pacemaker for complete heart block brought in by ambulance from [facility] assisted living home for cough for the past 2 weeks.  Associated with generalized weakness and decreased p.o. intake.  No fall or trauma.  Patient states she has had cold symptoms for a while.  Denies fever, chills, chest pain, shortness of breath, abdominal pain, nausea, vomiting, upper or lower extremity weakness or paresthesias.   pmd: Dr. Crawley, cards: [facility].   . hypothyroidism   . A-fib on Eliquis (hx AFL ablation),  .  PFO  HOME MEDS.   . levoxyl eliquis 2.5 x 2   COURSE.  . 8/20/2023 pulm consultd    . 8/20/2023 5:39 PM ER meds given already include cefepime 2g     PROBLEMS/ASSESSMENT/RECOMMENDATIONS (A/R).  PULMONARY.  . GAS EXCHANGE.  .. A/R.   .. Monitor pulse oximetry and target po 90-95%    . COPD.  .. 8/20/2023 duoneb.4p   .. 8/20/2023 benzonatate 100.3 x3d   .. 8/20/2023 gauiafenesin     INFECTION.  . E coli ESBL CTX M bacteremia 8/20  .. w 8/20-8/21-8/22-8/23/2023 w 16 - 21- 6.9 - 6   .. pr 8/21/2023 pr 7.5   .. ua 8/20/2023 ua 1012 w tntc l estrase large r 25   .. ct chest 8/20/2023   .... l upper ant cardiac device leads terminating in r atrium and rv   .... no enlarged hilar or mediastinal ln   .... mild doe mod diffuse distention of mid to upper esophagus   .... cm   .... increased density of liver ? amiodarone effect   .... bl lower lobe partial areas of compressive atelectasis sl progressd since ctap 3/4/2023   .... mild bl lower lung areas of linear and compressive atelectasis lower lobes and lingula   .... mild bl upper lobe subsegmental linear and dependent atelectasis   .... nonsp mild interlobular septal thickening   .. MICRO  .. bc 8/20 E coli ESBL CTX M bacteremia 8/20   .. bc 8/21 e coli esbl    .. bc 8/22 (-)   .. legn 8/21 (-)   .. rvp 8/20 (-)   .. EVENTS   .. 8/20/2023 will start empiric levaquin   .. 8/21/2023 message sent to ID to change to carbapenem   .. ABIO  .. 8/21 ertapenem 8d dr green   .. AR   .. ESBL e coli bacteremia on 8/20 likely from urine on ertapenem stratd 8/21     . Hemodynamics.  .. la 8/20/2023 la 1.4  .. BP ok      . A fib   .. 8/20/2023 eliquis   .. 8/22/2023 amiodaron 200   .. 8/22/2023 metporolol 50.2     . CAD.  .. Tr 8/21/2023 Tr 31     . CHF   .. bnp 8/20-8/23/2023 bnp 4545 - 3219   .. 8/22/2023 enalapril 10       HEMAT   .. Hb 8/20-8/21-8/23/2023 Hb 12 - 10.5 - 10   .. plt 8/20/2023 plt 225   .. inr 8/20/2023 inr 129     RENAL   .. Na 8/20/2023 Na 144   .. K 8/20/2023 K 3.3   .. co2 8/20/2023 co2 29   .. Cr 8/20/2023 Cr .8     . Dysphagia  .. esophagogram 8/22/2023 smooth tapering o distal esophagus     LFTS   .. AP 8/20/2023 124  .. ast 23  .. alt 25     MAIN ISSUES.  . Cough x 2 wk poa 8/20/2023  . Pneumonia   . E coli ESBL CTX M bacteremia 8/20 8/21  .. bc 8/22 (-)   .. 8/20/2023 levaquin -> 8/21 ertapenem 1 x 8d Dr Green  . COPD   . A fib   .. 8/20/2023 apixaba   . CHF     TIME SPENT.   . Over 36 minutes aggregate care time spent on encounter; activities included   direct patient care, counseling and/or coordinating care reviewing notes, lab data/ imaging , discussion with multidisciplinary team/ patient  /family and explaining in detail risks, benefits, alternatives  of the recommendations     JAKOB WORTHY 89 f 8/20/2023 9/22/1929 DR BAM HERRERA      REASON FOR VISIT  .. Management of problems listed below      PHYSICAL EXAM    HEENT Unremarkable  atraumatic   RESP Fair air entry  Harsh breath sound   CARDIAC S1 S2 No S3     NO JVD    ABDOMEN No hepatosplenomegaly   PEDAL EDEMA present No calf tenderness  NO rash       GENERAL DATA .     GOC.    .. 8/26/2023 dnr  ICU STAY.   .. none  COVID.   .. scv2 8/21/2023 (-)    BEST PRACTICE ISSUES.    HOB ELEVATN.   .. Yes  DVT PPLX.   ..  8/20/2023 apixaba 2.5 x 2 (af)     SPEECH SWALLOW RECOMMENDATIONS.    ..    8/21/2023 soft bite     DIET.    ..  8/20/2023 soft bite size   IV fl.  .. 8/22/2023 D5 1/2 ns  50   STRESS ULCER PPLX.   ..    8/20/2023 protonix 40   INFECTION PPLX.   ..     ALLGY.  ..    pncl                     WT.  ..  8/20/2023 56  BMI.  ..   8/20/2023 21    PROCEDURES.  ..     ABGS.   .     VS/ PO/IO/ VENT/ DRIPS.   8/26/2023 afeb 98 170/80   8/26/2023 ra 91%     DOA C/C.     . 8/20/2023 · Chief Complaint Quote sent by [employer] [facility] for cough x 2 weeks. negative cxr and covid swab. poor PO intake today.           INITIAL PRESENTATION.   . 8/20/2023 89-year-old female with history of hypothyroidism, A-fib on Eliquis, recent pacemaker for complete heart block brought in by ambulance from [facility] assisted living home for cough for the past 2 weeks.  Associated with generalized weakness and decreased p.o. intake.  No fall or trauma.  Patient states she has had cold symptoms for a while.  Denies fever, chills, chest pain, shortness of breath, abdominal pain, nausea, vomiting, upper or lower extremity weakness or paresthesias.   pmd: Dr. Crawley, cards: [facility].   . hypothyroidism   . A-fib on Eliquis (hx AFL ablation),  .  PFO  HOME MEDS.   . levoxyl eliquis 2.5 x 2   COURSE.  . 8/20/2023 pulm consultd    . 8/20/2023 5:39 PM ER meds given already include cefepime 2g     PROBLEMS/ASSESSMENT/RECOMMENDATIONS (A/R).  PULMONARY.  . GAS EXCHANGE.  .. A/R.   .. Monitor pulse oximetry and target po 90-95%    . COPD.  .. 8/20/2023 duoneb.4p   .. 8/20/2023 benzonatate 100.3 x3d   .. 8/20/2023 gauiafenesin     INFECTION.  . E coli ESBL CTX M bacteremia 8/20  .. w 8/20-8/21-8/22-8/23/2023 w 16 - 21- 6.9 - 6   .. pr 8/21/2023 pr 7.5   .. ua 8/20/2023 ua 1012 w tntc l estrase large r 25   .. ct chest 8/20/2023   .... l upper ant cardiac device leads terminating in r atrium and rv   .... no enlarged hilar or mediastinal ln   .... mild doe mod diffuse distention of mid to upper esophagus   .... cm   .... increased density of liver ? amiodarone effect   .... bl lower lobe partial areas of compressive atelectasis sl progressd since ctap 3/4/2023   .... mild bl lower lung areas of linear and compressive atelectasis lower lobes and lingula   .... mild bl upper lobe subsegmental linear and dependent atelectasis   .... nonsp mild interlobular septal thickening   .. MICRO  .. bc 8/20 E coli ESBL CTX M bacteremia 8/20   .. bc 8/21 e coli esbl    .. bc 8/22 (-)   .. legn 8/21 (-)   .. rvp 8/20 (-)   .. EVENTS   .. 8/20/2023 will start empiric levaquin   .. 8/21/2023 message sent to ID to change to carbapenem   .. ABIO  .. 8/21 ertapenem 8d dr green   .. AR   .. ESBL e coli bacteremia on 8/20 likely from urine on ertapenem stratd 8/21     . Hemodynamics.  .. la 8/20/2023 la 1.4  .. BP ok      . A fib   .. 8/20/2023 eliquis   .. 8/22/2023 amiodaron 200   .. 8/22/2023 metporolol 50.2     . CAD.  .. Tr 8/21/2023 Tr 31     . CHF   .. bnp 8/20-8/23/2023 bnp 4545 - 3219   .. 8/22/2023 enalapril 10       HEMAT   .. Hb 8/20-8/21-8/23/2023 Hb 12 - 10.5 - 10   .. plt 8/20/2023 plt 225   .. inr 8/20/2023 inr 129     RENAL   .. Na 8/20/2023 Na 144   .. K 8/20/2023 K 3.3   .. co2 8/20/2023 co2 29   .. Cr 8/20/2023 Cr .8     . Dysphagia  .. esophagogram 8/22/2023 smooth tapering o distal esophagus     LFTS   .. AP 8/20/2023 124  .. ast 23  .. alt 25     MAIN ISSUES.  . Cough x 2 wk poa 8/20/2023  . Pneumonia   . E coli ESBL CTX M bacteremia 8/20 8/21  .. bc 8/22 (-)   .. 8/20/2023 levaquin -> 8/21 ertapenem 1 x 8d Dr Green  . COPD   . A fib   .. 8/20/2023 apixaba   . CHF     TIME SPENT.   . Over 36 minutes aggregate care time spent on encounter; activities included   direct patient care, counseling and/or coordinating care reviewing notes, lab data/ imaging , discussion with multidisciplinary team/ patient  /family and explaining in detail risks, benefits, alternatives  of the recommendations     JAKOB WORTHY 89 f 8/20/2023 9/22/1929 DR BAM HERRERA      REASON FOR VISIT  .. Management of problems listed below      PHYSICAL EXAM    HEENT Unremarkable  atraumatic   RESP Fair air entry  Harsh breath sound   CARDIAC S1 S2 No S3     NO JVD    ABDOMEN No hepatosplenomegaly   PEDAL EDEMA present No calf tenderness  NO rash       GENERAL DATA .     GOC.    .. 8/26/2023 dnr  ICU STAY.   .. none  COVID.   .. scv2 8/21/2023 (-)    BEST PRACTICE ISSUES.    HOB ELEVATN.   .. Yes  DVT PPLX.   ..  8/20/2023 apixaba 2.5 x 2 (af)     SPEECH SWALLOW RECOMMENDATIONS.    ..    8/21/2023 soft bite     DIET.    ..  8/20/2023 soft bite size   IV fl.  .. 8/22/2023 D5 1/2 ns  50   STRESS ULCER PPLX.   ..    8/20/2023 protonix 40   INFECTION PPLX.   ..     ALLGY.  ..    pncl                     WT.  ..  8/20/2023 56  BMI.  ..   8/20/2023 21    PROCEDURES.  ..     ABGS.   .     VS/ PO/IO/ VENT/ DRIPS.   8/26/2023 afeb 98 170/80   8/26/2023 ra 91%     DOA C/C.     . 8/20/2023 · Chief Complaint Quote sent by [employer] [facility] for cough x 2 weeks. negative cxr and covid swab. poor PO intake today.           INITIAL PRESENTATION.   . 8/20/2023 89-year-old female with history of hypothyroidism, A-fib on Eliquis, recent pacemaker for complete heart block brought in by ambulance from [facility] assisted living home for cough for the past 2 weeks.  Associated with generalized weakness and decreased p.o. intake.  No fall or trauma.  Patient states she has had cold symptoms for a while.  Denies fever, chills, chest pain, shortness of breath, abdominal pain, nausea, vomiting, upper or lower extremity weakness or paresthesias.   pmd: Dr. Crawley, cards: [facility].   . hypothyroidism   . A-fib on Eliquis (hx AFL ablation),  .  PFO  HOME MEDS.   . levoxyl eliquis 2.5 x 2   COURSE.  . 8/20/2023 pulm consultd    . 8/20/2023 5:39 PM ER meds given already include cefepime 2g     PROBLEMS/ASSESSMENT/RECOMMENDATIONS (A/R).  PULMONARY.  . GAS EXCHANGE.  .. A/R.   .. Monitor pulse oximetry and target po 90-95%    . COPD.  .. 8/20/2023 duoneb.4p   .. 8/20/2023 benzonatate 100.3 x3d   .. 8/20/2023 gauiafenesin     INFECTION.  . E coli ESBL CTX M bacteremia 8/20  .. w 8/20-8/21-8/22-8/23/2023 w 16 - 21- 6.9 - 6   .. pr 8/21/2023 pr 7.5   .. ua 8/20/2023 ua 1012 w tntc l estrase large r 25   .. ct chest 8/20/2023   .... l upper ant cardiac device leads terminating in r atrium and rv   .... no enlarged hilar or mediastinal ln   .... mild doe mod diffuse distention of mid to upper esophagus   .... cm   .... increased density of liver ? amiodarone effect   .... bl lower lobe partial areas of compressive atelectasis sl progressd since ctap 3/4/2023   .... mild bl lower lung areas of linear and compressive atelectasis lower lobes and lingula   .... mild bl upper lobe subsegmental linear and dependent atelectasis   .... nonsp mild interlobular septal thickening   .. MICRO  .. bc 8/20 E coli ESBL CTX M bacteremia 8/20   .. bc 8/21 e coli esbl    .. bc 8/22 (-)   .. legn 8/21 (-)   .. rvp 8/20 (-)   .. EVENTS   .. 8/20/2023 will start empiric levaquin   .. 8/21/2023 message sent to ID to change to carbapenem   .. ABIO  .. 8/21 ertapenem 8d dr green   .. AR   .. ESBL e coli bacteremia on 8/20 likely from urine on ertapenem stratd 8/21     . Hemodynamics.  .. la 8/20/2023 la 1.4  .. BP ok      . A fib   .. 8/20/2023 eliquis   .. 8/22/2023 amiodaron 200   .. 8/22/2023 metporolol 50.2     . CAD.  .. Tr 8/21/2023 Tr 31     . CHF   .. bnp 8/20-8/23/2023 bnp 4545 - 3219   .. 8/22/2023 enalapril 10       HEMAT   .. Hb 8/20-8/21-8/23/2023 Hb 12 - 10.5 - 10   .. plt 8/20/2023 plt 225   .. inr 8/20/2023 inr 129     RENAL   .. Na 8/20/2023 Na 144   .. K 8/20/2023 K 3.3   .. co2 8/20/2023 co2 29   .. Cr 8/20/2023 Cr .8     . Dysphagia  .. esophagogram 8/22/2023 smooth tapering o distal esophagus     LFTS   .. AP 8/20/2023 124  .. ast 23  .. alt 25     MAIN ISSUES.  . Cough x 2 wk poa 8/20/2023  . Pneumonia   . E coli ESBL CTX M bacteremia 8/20 8/21  .. bc 8/22 (-)   .. 8/20/2023 levaquin -> 8/21 ertapenem 1 x 8d Dr Green  . COPD   . A fib   .. 8/20/2023 apixaba   . CHF     TIME SPENT.   . Over 36 minutes aggregate care time spent on encounter; activities included   direct patient care, counseling and/or coordinating care reviewing notes, lab data/ imaging , discussion with multidisciplinary team/ patient  /family and explaining in detail risks, benefits, alternatives  of the recommendations     JAKOB WORTHY 89 f 8/20/2023 9/22/1929 DR BAM HERRERA    Calm

## 2024-06-28 NOTE — ED ADULT NURSE NOTE - CADM POA URETHRAL CATHETER
Patient's HM shows they are overdue for A1c.   Care Everywhere and  files searched.  Results attached to order and HM updated.       
No

## 2025-06-24 NOTE — SWALLOW BEDSIDE ASSESSMENT ADULT - ASR SWALLOW REFERRAL
Breath Sounds equal & clear to percussion & auscultation, no accessory muscle use Registered Dietitian